# Patient Record
Sex: MALE | Race: WHITE | NOT HISPANIC OR LATINO | Employment: FULL TIME | URBAN - METROPOLITAN AREA
[De-identification: names, ages, dates, MRNs, and addresses within clinical notes are randomized per-mention and may not be internally consistent; named-entity substitution may affect disease eponyms.]

---

## 2017-05-22 ENCOUNTER — OFFICE VISIT (OUTPATIENT)
Dept: FAMILY MEDICINE | Facility: CLINIC | Age: 37
End: 2017-05-22
Payer: COMMERCIAL

## 2017-05-22 VITALS
OXYGEN SATURATION: 100 % | SYSTOLIC BLOOD PRESSURE: 112 MMHG | BODY MASS INDEX: 23.79 KG/M2 | TEMPERATURE: 97.9 F | WEIGHT: 157 LBS | HEART RATE: 64 BPM | HEIGHT: 68 IN | DIASTOLIC BLOOD PRESSURE: 66 MMHG

## 2017-05-22 DIAGNOSIS — Z23 NEED FOR TDAP VACCINATION: ICD-10-CM

## 2017-05-22 DIAGNOSIS — R13.10 SWALLOWING PROBLEM: ICD-10-CM

## 2017-05-22 DIAGNOSIS — Z00.00 ROUTINE GENERAL MEDICAL EXAMINATION AT A HEALTH CARE FACILITY: Primary | ICD-10-CM

## 2017-05-22 PROCEDURE — 90471 IMMUNIZATION ADMIN: CPT | Performed by: FAMILY MEDICINE

## 2017-05-22 PROCEDURE — 99395 PREV VISIT EST AGE 18-39: CPT | Mod: 25 | Performed by: FAMILY MEDICINE

## 2017-05-22 PROCEDURE — 90715 TDAP VACCINE 7 YRS/> IM: CPT | Performed by: FAMILY MEDICINE

## 2017-05-22 ASSESSMENT — PAIN SCALES - GENERAL: PAINLEVEL: NO PAIN (0)

## 2017-05-22 NOTE — PROGRESS NOTES
SUBJECTIVE:     CC: Daniel Sandhu is an 36 year old male who presents for preventative health visit.     Healthy Habits:    Do you get at least three servings of calcium containing foods daily (dairy, green leafy vegetables, etc.)? Yes    Amount of exercise or daily activities, outside of work: 7 day(s) per week    Problems taking medications regularly No    Medication side effects: No    Have you had an eye exam in the past two years? Yes    Do you see a dentist twice per year? No    Do you have sleep apnea, excessive snoring or daytime drowsiness? No             Today's PHQ-2 Score:   PHQ-2 ( 1999 Pfizer) 5/22/2017   Q1: Little interest or pleasure in doing things 0   Q2: Feeling down, depressed or hopeless 0   PHQ-2 Score 0       Abuse: Current or Past(Physical, Sexual or Emotional)- No  Do you feel safe in your environment - Yes    Social History   Substance Use Topics     Smoking status: Never Smoker     Smokeless tobacco: Not on file     Alcohol use Yes      Comment: 1 beer     The patient does not drink >3 drinks per day nor >7 drinks per week.    Last PSA: No results found for: PSA    No results for input(s): CHOL, HDL, LDL, TRIG, CHOLHDLRATIO, NHDL in the last 66285 hours.    Reviewed orders with patient. Reviewed health maintenance and updated orders accordingly - Yes    Reviewed and updated as needed this visit by clinical staff  Tobacco  Allergies  Meds  Med Hx  Surg Hx  Fam Hx  Soc Hx        Reviewed and updated as needed this visit by Provider            ROS:  C: NEGATIVE for fever, chills, change in weight  I: NEGATIVE for worrisome rashes, moles or lesions  E: NEGATIVE for vision changes or irritation  ENT: NEGATIVE for ear, mouth and throat problems  R: NEGATIVE for significant cough or SOB  CV: NEGATIVE for chest pain, palpitations or peripheral edema  GI: swallowing problems, especially at beginning of meal.  Like a pressure/ push  Able to get food down, no vomiting  Had some reflux which  is unusual.  In past had this a lot  No change in diet recently  Bowels fine overall   male: negative for dysuria, hematuria, decreased urinary stream, erectile dysfunction, urethral discharge  M: NEGATIVE for significant arthralgias or myalgia  N: NEGATIVE for weakness, dizziness or paresthesias    No wt change    Wears contacts, sees eye doctor    Walks dogs daily    claritin as needed     Occasional tinnitis         OBJECTIVE:     There were no vitals taken for this visit.  EXAM:  GENERAL: healthy, alert and no distress  EYES: Eyes grossly normal to inspection, PERRL and conjunctivae and sclerae normal  HENT: ear canals and TM's normal, nose and mouth without ulcers or lesions  NECK: no adenopathy, no asymmetry, masses, or scars and thyroid normal to palpation  RESP: lungs clear to auscultation - no rales, rhonchi or wheezes  CV: regular rate and rhythm, normal S1 S2, no S3 or S4, no murmur, click or rub, no peripheral edema and peripheral pulses strong  ABDOMEN: soft, nontender, no hepatosplenomegaly, no masses and bowel sounds normal  MS: no gross musculoskeletal defects noted, no edema  SKIN: no suspicious lesions or rashes  NEURO: Normal strength and tone, mentation intact and speech normal  PSYCH: mentation appears normal, affect normal/bright    ASSESSMENT/PLAN:     Daniel was seen today for physical.    Diagnoses and all orders for this visit:    Routine general medical examination at a health care facility    Need for Tdap vaccination  -     TDAP VACCINE (ADACEL)  -     VACCINE ADMINISTRATION, INITIAL    Swallowing problem  -     GASTROENTEROLOGY ADULT REF PROCEDURE ONLY    Other orders  -     Cancel: Lipid panel reflex to direct LDL      Discussed in detail several issues with patient  The swallowing / stomach issues are better now, but I did advise he call to schedule egd.  It is his choice to do this, but don't want to miss ulcer, damage to esophagus given long history of intermittent gerd, etc  Keep  "working on healthy diet/exercise   tdap given  No labs done today  No std concern per patient         COUNSELING:  Reviewed preventive health counseling, as reflected in patient instructions       Regular exercise       Healthy diet/nutrition       Vision screening       Family planning       Safe sex practices/STD prevention         reports that he has never smoked. He does not have any smokeless tobacco history on file.    Estimated body mass index is 24.33 kg/(m^2) as calculated from the following:    Height as of 11/3/16: 5' 8\" (1.727 m).    Weight as of 11/3/16: 160 lb (72.6 kg).       Counseling Resources:  ATP IV Guidelines  Pooled Cohorts Equation Calculator  FRAX Risk Assessment  ICSI Preventive Guidelines  Dietary Guidelines for Americans, 2010  USDA's MyPlate  ASA Prophylaxis  Lung CA Screening    Lencho Chan MD  [facility]  "

## 2017-05-22 NOTE — NURSING NOTE
Screening Questionnaire for Adult Immunization    Are you sick today?   No   Do you have allergies to medications, food, a vaccine component or latex?   No   Have you ever had a serious reaction after receiving a vaccination?   No   Do you have a long-term health problem with heart disease, lung disease, asthma, kidney disease, metabolic disease (e.g. diabetes), anemia, or other blood disorder?   No   Do you have cancer, leukemia, HIV/AIDS, or any other immune system problem?   No   In the past 3 months, have you taken medications that affect  your immune system, such as prednisone, other steroids, or anticancer drugs; drugs for the treatment of rheumatoid arthritis, Crohn s disease, or psoriasis; or have you had radiation treatments?   No   Have you had a seizure, or a brain or other nervous system problem?   No   During the past year, have you received a transfusion of blood or blood     products, or been given immune (gamma) globulin or antiviral drug?   No   For women: Are you pregnant or is there a chance you could become        pregnant during the next month?   No   Have you received any vaccinations in the past 4 weeks?   No     Immunization questionnaire answers were all negative.      MNVFC doesn't apply on this patient    Per orders of Dr. Chan, injection of Adacel given by Deborah Lozano. Patient instructed to remain in clinic for 20 minutes afterwards, and to report any adverse reaction to me immediately.       Screening performed by Deborah Lozano on 5/22/2017 at 3:34 PM.

## 2017-05-22 NOTE — MR AVS SNAPSHOT
After Visit Summary   5/22/2017    Daniel Sandhu    MRN: [id number]           Patient Information     Date Of Birth          1980        Visit Information        Provider Department      5/22/2017 2:00 PM Lencho Chan MD [facility]        Today's Diagnoses     Need for Tdap vaccination    -  1    Swallowing problem          Care Instructions      Preventive Health Recommendations  Male Ages 26 - 39    Yearly exam:             See your health care provider every year in order to  o   Review health changes.   o   Discuss preventive care.    o   Review your medicines if your doctor has prescribed any.    You should be tested each year for STDs (sexually transmitted diseases), if you re at risk.     After age 35, talk to your provider about cholesterol testing. If you are at risk for heart disease, have your cholesterol tested at least every 5 years.     If you are at risk for diabetes, you should have a diabetes test (fasting glucose).  Shots: Get a flu shot each year. Get a tetanus shot every 10 years.     Nutrition:    Eat at least 5 servings of fruits and vegetables daily.     Eat whole-grain bread, whole-wheat pasta and brown rice instead of white grains and rice.     Talk to your provider about Calcium and Vitamin D.     Lifestyle    Exercise for at least 150 minutes a week (30 minutes a day, 5 days a week). This will help you control your weight and prevent disease.     Limit alcohol to one drink per day.     No smoking.     Wear sunscreen to prevent skin cancer.     See your dentist every six months for an exam and cleaning.       Keep working on healthy diet/exercise     Advise scheduling upper endoscopy EGD    Schedule with dentist        Follow-ups after your visit        Additional Services     GASTROENTEROLOGY ADULT REF PROCEDURE ONLY       Last Lab Result: No results found for: CR  Body mass index is 23.7 kg/(m^2).     Needed:  No  Language:   English    Patient will be contacted to schedule procedure.     Please be aware that coverage of these services is subject to the terms and limitations of your health insurance plan.  Call member services at your health plan with any benefit or coverage questions.  Any procedures must be performed at a [facility] facility OR coordinated by your clinic's referral office.    Please bring the following with you to your appointment:    (1) Any X-Rays, CTs or MRIs which have been performed.  Contact the facility where they were done to arrange for  prior to your scheduled appointment.    (2) List of current medications   (3) This referral request   (4) Any documents/labs given to you for this referral                  Who to contact     If you have questions or need follow up information about today's clinic visit or your schedule please contact [facility] directly at [phone].  Normal or non-critical lab and imaging results will be communicated to you by MyChart, letter or phone within 4 business days after the clinic has received the results. If you do not hear from us within 7 days, please contact the clinic through [employer] or phone. If you have a critical or abnormal lab result, we will notify you by phone as soon as possible.  Submit refill requests through [employer] or call your pharmacy and they will forward the refill request to us. Please allow 3 business days for your refill to be completed.          Additional Information About Your Visit        [employer] Information     [employer] gives you secure access to your electronic health record. If you see a primary care provider, you can also send messages to your care team and make appointments. If you have questions, please call your primary care clinic.  If you do not have a primary care provider, please call [phone] and they will assist you.        Care EveryWhere ID     This is your Care EveryWhere ID. This could be used by other  "organizations to access your [facility] medical records  [id number]        Your Vitals Were     Pulse Temperature Height Pulse Oximetry BMI (Body Mass Index)       64 97.9  F (36.6  C) (Oral) 5' 8.25\" (1.734 m) 100% 23.7 kg/m2        Blood Pressure from Last 3 Encounters:   05/22/17 112/66   11/01/16 115/77    Weight from Last 3 Encounters:   05/22/17 157 lb (71.2 kg)   11/03/16 160 lb (72.6 kg)   11/01/16 160 lb (72.6 kg)              We Performed the Following     GASTROENTEROLOGY ADULT REF PROCEDURE ONLY     TDAP VACCINE (ADACEL)     VACCINE ADMINISTRATION, INITIAL        Primary Care Provider    None Specified       No primary provider on file.        Thank you!     Thank you for choosing [facility]  for your care. Our goal is always to provide you with excellent care. Hearing back from our patients is one way we can continue to improve our services. Please take a few minutes to complete the written survey that you may receive in the mail after your visit with us. Thank you!             Your Updated Medication List - Protect others around you: Learn how to safely use, store and throw away your medicines at www.disposemymeds.org.      Notice  As of 5/22/2017  3:13 PM    You have not been prescribed any medications.      "

## 2017-05-30 ENCOUNTER — TRANSFERRED RECORDS (OUTPATIENT)
Dept: HEALTH INFORMATION MANAGEMENT | Facility: CLINIC | Age: 37
End: 2017-05-30

## 2017-05-31 ENCOUNTER — TELEPHONE (OUTPATIENT)
Dept: FAMILY MEDICINE | Facility: CLINIC | Age: 37
End: 2017-05-31

## 2017-05-31 ENCOUNTER — TRANSFERRED RECORDS (OUTPATIENT)
Dept: HEALTH INFORMATION MANAGEMENT | Facility: CLINIC | Age: 37
End: 2017-05-31

## 2017-05-31 DIAGNOSIS — C15.9 MALIGNANT NEOPLASM OF ESOPHAGUS, UNSPECIFIED LOCATION (H): Primary | ICD-10-CM

## 2017-05-31 NOTE — TELEPHONE ENCOUNTER
I tried to call patient, not available.     Dr. Lopez of [facility] had called, patient had egd yest, mass present.  Biopsy showed adenocarcinoma esophagus.    Patient need to see oncology.    Lencho Chan MD

## 2017-05-31 NOTE — TELEPHONE ENCOUNTER
I called patient back and discussed in detail.  We will have him see oncology.    Please help schedule patient.    Lencho Chan MD

## 2017-05-31 NOTE — TELEPHONE ENCOUNTER
Reason for Call:  Other call back    Detailed comments: Patient is returning a call to Dr. Chan    Phone Number Patient can be reached at: Home number on file [phone] (home)    Best Time: Anytime    Can we leave a detailed message on this number? YES    Call taken on 5/31/2017 at 1:22 PM by Dom Montelongo

## 2017-06-02 PROCEDURE — 00000346 ZZHCL STATISTIC REVIEW OUTSIDE SLIDES TC 88321: Performed by: INTERNAL MEDICINE

## 2017-06-02 ASSESSMENT — ENCOUNTER SYMPTOMS
DECREASED APPETITE: 1
ABDOMINAL PAIN: 1
NERVOUS/ANXIOUS: 1

## 2017-06-05 ENCOUNTER — ONCOLOGY VISIT (OUTPATIENT)
Dept: ONCOLOGY | Facility: CLINIC | Age: 37
End: 2017-06-05
Attending: INTERNAL MEDICINE
Payer: COMMERCIAL

## 2017-06-05 ENCOUNTER — APPOINTMENT (OUTPATIENT)
Dept: LAB | Facility: CLINIC | Age: 37
End: 2017-06-05
Attending: INTERNAL MEDICINE
Payer: COMMERCIAL

## 2017-06-05 ENCOUNTER — CARE COORDINATION (OUTPATIENT)
Dept: ONCOLOGY | Facility: CLINIC | Age: 37
End: 2017-06-05

## 2017-06-05 VITALS
RESPIRATION RATE: 16 BRPM | OXYGEN SATURATION: 99 % | DIASTOLIC BLOOD PRESSURE: 73 MMHG | SYSTOLIC BLOOD PRESSURE: 116 MMHG | HEART RATE: 66 BPM | WEIGHT: 155.87 LBS | BODY MASS INDEX: 23.53 KG/M2 | TEMPERATURE: 97.8 F

## 2017-06-05 DIAGNOSIS — C15.5 MALIGNANT NEOPLASM OF LOWER THIRD OF ESOPHAGUS (H): Primary | ICD-10-CM

## 2017-06-05 LAB
ALBUMIN SERPL-MCNC: 4.1 G/DL (ref 3.4–5)
ALP SERPL-CCNC: 53 U/L (ref 40–150)
ALT SERPL W P-5'-P-CCNC: 18 U/L (ref 0–70)
ANION GAP SERPL CALCULATED.3IONS-SCNC: 8 MMOL/L (ref 3–14)
AST SERPL W P-5'-P-CCNC: 16 U/L (ref 0–45)
BASOPHILS # BLD AUTO: 0 10E9/L (ref 0–0.2)
BASOPHILS NFR BLD AUTO: 0.4 %
BILIRUB SERPL-MCNC: 0.7 MG/DL (ref 0.2–1.3)
BUN SERPL-MCNC: 14 MG/DL (ref 7–30)
CALCIUM SERPL-MCNC: 8.6 MG/DL (ref 8.5–10.1)
CHLORIDE SERPL-SCNC: 103 MMOL/L (ref 94–109)
CO2 SERPL-SCNC: 27 MMOL/L (ref 20–32)
CREAT SERPL-MCNC: 0.82 MG/DL (ref 0.66–1.25)
DIFFERENTIAL METHOD BLD: ABNORMAL
EOSINOPHIL # BLD AUTO: 0.1 10E9/L (ref 0–0.7)
EOSINOPHIL NFR BLD AUTO: 2 %
ERYTHROCYTE [DISTWIDTH] IN BLOOD BY AUTOMATED COUNT: 12.1 % (ref 10–15)
GFR SERPL CREATININE-BSD FRML MDRD: NORMAL ML/MIN/1.7M2
GLUCOSE SERPL-MCNC: 94 MG/DL (ref 70–99)
HCT VFR BLD AUTO: 39.5 % (ref 40–53)
HGB BLD-MCNC: 13.7 G/DL (ref 13.3–17.7)
IMM GRANULOCYTES # BLD: 0 10E9/L (ref 0–0.4)
IMM GRANULOCYTES NFR BLD: 0.4 %
LYMPHOCYTES # BLD AUTO: 1.7 10E9/L (ref 0.8–5.3)
LYMPHOCYTES NFR BLD AUTO: 30.4 %
MCH RBC QN AUTO: 30.1 PG (ref 26.5–33)
MCHC RBC AUTO-ENTMCNC: 34.7 G/DL (ref 31.5–36.5)
MCV RBC AUTO: 87 FL (ref 78–100)
MONOCYTES # BLD AUTO: 0.5 10E9/L (ref 0–1.3)
MONOCYTES NFR BLD AUTO: 8.6 %
NEUTROPHILS # BLD AUTO: 3.3 10E9/L (ref 1.6–8.3)
NEUTROPHILS NFR BLD AUTO: 58.2 %
NRBC # BLD AUTO: 0 10*3/UL
NRBC BLD AUTO-RTO: 0 /100
PLATELET # BLD AUTO: 177 10E9/L (ref 150–450)
POTASSIUM SERPL-SCNC: 3.5 MMOL/L (ref 3.4–5.3)
PROT SERPL-MCNC: 7.3 G/DL (ref 6.8–8.8)
RBC # BLD AUTO: 4.55 10E12/L (ref 4.4–5.9)
SODIUM SERPL-SCNC: 138 MMOL/L (ref 133–144)
WBC # BLD AUTO: 5.6 10E9/L (ref 4–11)

## 2017-06-05 PROCEDURE — 36415 COLL VENOUS BLD VENIPUNCTURE: CPT | Performed by: INTERNAL MEDICINE

## 2017-06-05 PROCEDURE — 85025 COMPLETE CBC W/AUTO DIFF WBC: CPT | Performed by: INTERNAL MEDICINE

## 2017-06-05 PROCEDURE — 99212 OFFICE O/P EST SF 10 MIN: CPT | Mod: ZF

## 2017-06-05 PROCEDURE — 80053 COMPREHEN METABOLIC PANEL: CPT | Performed by: INTERNAL MEDICINE

## 2017-06-05 PROCEDURE — 99205 OFFICE O/P NEW HI 60 MIN: CPT | Mod: ZP | Performed by: INTERNAL MEDICINE

## 2017-06-05 ASSESSMENT — PAIN SCALES - GENERAL: PAINLEVEL: NO PAIN (0)

## 2017-06-05 NOTE — MR AVS SNAPSHOT
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    Reason for Referral: esophageal cancer, age 36  Family History of: breast cancer  Personal History of: esophageal cancer  Other:     We have a sent a notice to a staff member of the Cancer Risk Management Program to give you a call to assist with scheduling your appointment.  You may also call  [phone] ([phone]) to initiate scheduling.    Please be aware that coverage of these services is subject to the terms and limitations of your health insurance plan.  Call member services at your health plan with any benefit or coverage questions.      Please bring the completed family history sheet to your appointment in addition to any available outside medical records documenting your cancer diagnosis.            THORACIC SURGERY REFERRAL       Your provider has referred you to:  [facility]: Thoracic Surgery Clinic [facility] [phone]   [email or web address]    Please be aware that coverage of these services is subject to the terms and limitations of your health insurance plan.  Call member services at your health plan with any benefit or coverage questions.      Please bring the following to your appointment:    >>   Any x-rays, CTs or MRIs which have been performed.  Contact the facility where they were done to arrange for  prior to your scheduled appointment.    >>   List  of current medications   >>   This referral request   >>   Any documents/labs given to you for this referral                  Your next 10 appointments already scheduled     Jun 12, 2017  7:30 AM CDT   PE NPET ONCOLOGY (EYES TO THIGHS) with UUPET1   [facility], [place] PET CT ([facility] [place])    [address] [id number] [phone]           Tell your doctor:   If there is any chance you may be pregnant or if you are breastfeeding.   If you have problems lying in small spaces (claustrophobia). If you do, your doctor may give you medicine to help you relax. If you have diabetes:   Have your exam early in the morning. Your blood glucose will go up as the day goes by.   Your glucose level must be 180 or less at the start of the exam. Please take any medicines you need to ensure this blood glucose level. 24 hours before your scan: Don t do any heavy exercise. (No jogging, aerobics or other workouts.) Exercise will make your pictures less accurate. 6 hours before your scan:   Stop all food and liquids (except water).   Do not chew gum or suck on mints.   If you need to take medicine with food, you may take it with a few crackers.  Please call your Imaging Department at your exam site with any questions.              Future tests that were ordered for you today     Open Future Orders        Priority Expected Expires Ordered    PET Oncology (Eyes to Thighs) Routine  6/5/2018 6/5/2017            Who to contact     If you have questions or need follow up information about today's clinic visit or your schedule please contact [facility] directly at [phone].  Normal or non-critical lab and imaging results will be communicated to you by MyChart, letter or phone within 4 business days after the clinic has received the results. If you do not hear from us within 7 days, please contact the clinic through MyChart or phone. If you have a critical or  abnormal lab result, we will notify you by phone as soon as possible.  Submit refill requests through [employer] or call your pharmacy and they will forward the refill request to us. Please allow 3 business days for your refill to be completed.          Additional Information About Your Visit        [employer] Information     [employer] gives you secure access to your electronic health record. If you see a primary care provider, you can also send messages to your care team and make appointments. If you have questions, please call your primary care clinic.  If you do not have a primary care provider, please call [phone] and they will assist you.        Care EveryWhere ID     This is your Care EveryWhere ID. This could be used by other organizations to access your [facility] medical records  [id number]        Your Vitals Were     Pulse Temperature Respirations Pulse Oximetry BMI (Body Mass Index)       66 97.8  F (36.6  C) (Oral) 16 99% 23.53 kg/m2        Blood Pressure from Last 3 Encounters:   06/05/17 116/73   05/22/17 112/66   11/01/16 115/77    Weight from Last 3 Encounters:   06/05/17 70.7 kg (155 lb 13.8 oz)   05/22/17 71.2 kg (157 lb)   11/03/16 72.6 kg (160 lb)              We Performed the Following     CANCER RISK MGMT/CANCER GENETIC COUNSELING REFERRAL     CBC with platelets differential     Comprehensive metabolic panel     THORACIC SURGERY REFERRAL     UPPER EUS        Primary Care Provider    None Specified       No primary provider on file.        Thank you!     Thank you for choosing [facility]  for your care. Our goal is always to provide you with excellent care. Hearing back from our patients is one way we can continue to improve our services. Please take a few minutes to complete the written survey that you may receive in the mail after your visit with us. Thank you!             Your Updated Medication List - Protect others around you: Learn how to safely use, store and throw away  your medicines at www.disposemymeds.org.      Notice  As of 6/5/2017  4:36 PM    You have not been prescribed any medications.

## 2017-06-05 NOTE — PROGRESS NOTES
"Met with pt and his wife after clinic visit/consultation appt with Dr. Hood  Pt  verbalizes understanding of Dr. Hood's plan of care   Household includes wife, 4 1/2 year old dtr and soon will have son (wife is pregnant and due in six weeks)  Introduced self and role of RN Care Coordinator at [facility].  Provided my contact information, [facility] Nurse Line, [facility] Scheduling Line and reviewed sections of [facility] Cancer Care Guidebook, with emphasis on \"Good to Know Numbers.\"  Provided overview of supportive services at [facility] including SW and ACS Navigator to assist with resources as needed, as well as the availability of [facility] as needed.  Pt voiced understanding and appreciation of above information and denies any further questions, and he/she understands that I will follow and provide coordination as needed.    "

## 2017-06-05 NOTE — PROGRESS NOTES
[facility]    Hematology/Oncology New Patient Note      Today's Date: 17    Reason for Consult: adenocarcinoma of the GE junction      HISTORY OF PRESENT ILLNESS: Daniel Sandhu is a 36 year old male who presents with adenocarcinoma of the GE junction.  In early 2017, patient started noticing difficulty swallowing, and that food seems to take longer to go down.  It became more frequent, and he saw his PCP, and was referred for EGD, which showed an esophageal mass located at the GE junction, measuring 4 cm.  Biopsy showed poorly differentiated adenocarcinoma.  HER-2 was sent and pending.  CT c/a/p showed a mildly prominent lymph node in the gastrohepatic ligament, but there were no pathologically enlarged lymph nodes in the chest, abdomen, or pelvis.  There was otherwise no evidence of metastatic disease.      Today, patient is here with his wife.  He says that other than the difficulty swallowing, he feels fine.  He denies pain.  No change in diet or acid reflux.  He denies weight loss.  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  He denies smoking.  He drinks ~1 beer a day.  He denies illicit drug use, other than occasional marijuana.  He lives in [place] with his wife, and 4.5 year-old daughter.  His wife is currently pregnant with a boy, and is due in 6 weeks.  He has a half sister who  of breast cancer at age 32; she was diagnosed in her late 20's.  A paternal grandmother had breast cancer in her 70's.          REVIEW OF SYSTEMS:   General Symptoms: Yes  Skin Symptoms: No  HENT Symptoms: No  EYE SYMPTOMS: No  HEART SYMPTOMS: No  LUNG SYMPTOMS: No  INTESTINAL SYMPTOMS: Yes  URINARY SYMPTOMS: No  REPRODUCTIVE SYMPTOMS: No  SKELETAL SYMPTOMS: No  BLOOD SYMPTOMS: No  NERVOUS SYSTEM SYMPTOMS: No  MENTAL HEALTH SYMPTOMS: Yes  Loss of appetite: Yes  Increased stress:  Yes  Abdominal pain: Yes  Nervous or Anxious: Yes        HOME MEDICATIONS:  No current outpatient prescriptions on file.         ALLERGIES:  No Known Allergies      PAST MEDICAL HISTORY:  No past medical history on file.      PAST SURGICAL HISTORY:  Past Surgical History:   Procedure Laterality Date     HAND SURGERY      childhood, torn tendon         SOCIAL HISTORY:  Social History     Social History     Marital status:      Spouse name: N/A     Number of children: 1     Years of education: N/A     Occupational History     musician and teacher       Social History Main Topics     Smoking status: Never Smoker     Smokeless tobacco: Not on file     Alcohol use Yes      Comment: 1 beer     Drug use: Not on file     Sexual activity: Yes     Partners: Female     Birth control/ protection: Natural Family Planning     Other Topics Concern     Not on file     Social History Narrative     Daniel lives in [place] with his wife.  He has a 4.5 year old daughter, and his wife is pregnant with a boy.        FAMILY HISTORY:  Family History   Problem Relation Age of Onset     Other - See Comments Father      sepsis     CANCER Sister      breast cancer  29 yo 1/2 sister      CANCER Paternal Grandmother      breast         PHYSICAL EXAM:  Vital signs:  /73 (BP Location: Left arm, Patient Position: Chair, Cuff Size: Adult Regular)  Pulse 66  Temp 97.8  F (36.6  C) (Oral)  Resp 16  Wt 70.7 kg (155 lb 13.8 oz)  SpO2 99%  BMI 23.53 kg/m2   ECO  GENERAL/CONSTITUTIONAL: No acute distress. Accompanied by wife.  EYES: No scleral icterus.  ENT/MOUTH: Neck supple. Oropharynx clear, no mucositis.  LYMPH: No anterior cervical, posterior cervical, or supraclavicular adenopathy.   RESPIRATORY: Clear to auscultation bilaterally. No crackles or wheezing.   CARDIOVASCULAR: Regular rate and rhythm without murmurs, gallops, or rubs.  GASTROINTESTINAL: No hepatosplenomegaly, masses, or tenderness. The patient has  "normal bowel sounds. No guarding.  No distention.  MUSCULOSKELETAL: Warm and well-perfused, no cyanosis, clubbing, or edema.  NEUROLOGIC: Alert, oriented, answers questions appropriately.  INTEGUMENTARY: No jaundice.  GAIT: Steady, does not use assistive device      LABS:  Pending.      PATHOLOGY:  5/30/17:  A. Gastroesophageal junction, \"mass\", biopsy:  1. Poorly differentiated adenocarcinoma, glandular (intestinal) type  2. Background Mathias's mucosa absent  3. HER-2 testing in progress, with addendum to follow    B. Stomach, biopsy:  1. Gastric antral and body mucosae with no diagnostic abnormalities  2. No evidence of Helicobacter organisms      EGD 5/30/17:  Esophageal mass at the GE junction, friable, size 4 cm, extending into the gastric fundus.  Biopsies were taken.        IMAGING:  CT chest/abdomen/pelvis 5/31/17:  1. There is no definite CT correlate for the esophageal lesion seen endoscopically.  Slight soft tissue fullness at the esophagogastric junction and cardia is nonspecific and can be seen in normal patients when rugal folds are prominent.  2. A mildly prominent lymph node is present in the gastrohepatic ligament, but there are no pathologically enlarged lymph nodes in the chest, abdomen, or pelvis.    3. The remainder of the study is within normal limits.      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  CT c/a/p shows no evidence of metastatic disease or pathologically enlarged lymph nodes.  There is note of a \"slighly prominent lymph node in the gastrohepatic ligament.\"  We discussed in detail the imaging and biopsy results so far.  He still needs staging with EUS.  I will having him undergo a PET scan as well for staging.  Depending on staging results, he may need esophagectomy with or without pre-operative chemoradiation, which we discussed briefly about.  I will go ahead and refer him to see thoracic surgery as " well for evaluation.  In addition, with his young age and family history, I recommended referral to genetic counseling, and he agrees.      Patient says that he and his wife decided not to have any more children, even prior to his new cancer diagnosis, so he declined fertility/sperm banking referral.  He currently has a 4.6 yo daughter and his wife is pregnant with a boy, due in 6 weeks.      -schedule PET  -refer for EUS  -referral to thoracic surgery  -referral to genetic counseling  -labs today  -RTC for follow-up once the staging scans and thoracic surgery evaluation is completed    2) Genetics:  -will refer for genetic counseling, as above      I spent a total of 60 minutes with the patient, with over >50% of the time in counseling and/or coordination of care.       Laurence Hood MD  Hematology/Oncology  [facility]

## 2017-06-05 NOTE — NURSING NOTE
"Oncology Rooming Note    June 5, 2017 2:56 PM   Daniel Sandhu is a 36 year old male who presents for:    Chief Complaint   Patient presents with     Oncology Clinic Visit     New Dx Esophageal Ca      Initial Vitals: /73 (BP Location: Left arm, Patient Position: Chair, Cuff Size: Adult Regular)  Pulse 66  Temp 97.8  F (36.6  C) (Oral)  Resp 16  Wt 70.7 kg (155 lb 13.8 oz)  SpO2 99%  BMI 23.53 kg/m2 Estimated body mass index is 23.53 kg/(m^2) as calculated from the following:    Height as of 5/22/17: 1.734 m (5' 8.25\").    Weight as of this encounter: 70.7 kg (155 lb 13.8 oz). Body surface area is 1.85 meters squared.  No Pain (0) Comment: Data Unavailable   No LMP for male patient.  Allergies reviewed: Yes  Medications reviewed: Yes    Medications: Medication refills not needed today.  Pharmacy name entered into EPIC: Data Unavailable    Clinical concerns: concerns, questions  degroot  was notified.    6 minutes for nursing intake (face to face time)     Christine Antonio MA              "

## 2017-06-05 NOTE — LETTER
2017       RE: Daniel Sandhu  [address] [address]     Dear Colleague,    Thank you for referring your patient, Daniel Sandhu, to the [facility]. Please see a copy of my visit note below.    [facility]    Hematology/Oncology New Patient Note      Today's Date: 17    Reason for Consult: adenocarcinoma of the GE junction      HISTORY OF PRESENT ILLNESS: Daniel Sandhu is a 36 year old male who presents with adenocarcinoma of the GE junction.  In early , patient started noticing difficulty swallowing, and that food seems to take longer to go down.  It became more frequent, and he saw his PCP, and was referred for EGD, which showed an esophageal mass located at the GE junction, measuring 4 cm.  Biopsy showed poorly differentiated adenocarcinoma.  HER-2 was sent and pending.  CT c/a/p showed a mildly prominent lymph node in the gastrohepatic ligament, but there were no pathologically enlarged lymph nodes in the chest, abdomen, or pelvis.  There was otherwise no evidence of metastatic disease.      Today, patient is here with his wife.  He says that other than the difficulty swallowing, he feels fine.  He denies pain.  No change in diet or acid reflux.  He denies weight loss.  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  He denies smoking.  He drinks ~1 beer a day.  He denies illicit drug use, other than occasional marijuana.  He lives in [place] with his wife, and 4.5 year-old daughter.  His wife is currently pregnant with a boy, and is due in 6 weeks.  He has a half sister who  of breast cancer at age 32; she was diagnosed in her late 20's.  A paternal grandmother had breast cancer in her 70's.          REVIEW OF SYSTEMS:   General Symptoms: Yes  Skin Symptoms: No  HENT Symptoms: No  EYE SYMPTOMS: No  HEART  SYMPTOMS: No  LUNG SYMPTOMS: No  INTESTINAL SYMPTOMS: Yes  URINARY SYMPTOMS: No  REPRODUCTIVE SYMPTOMS: No  SKELETAL SYMPTOMS: No  BLOOD SYMPTOMS: No  NERVOUS SYSTEM SYMPTOMS: No  MENTAL HEALTH SYMPTOMS: Yes  Loss of appetite: Yes  Increased stress: Yes  Abdominal pain: Yes  Nervous or Anxious: Yes        HOME MEDICATIONS:  No current outpatient prescriptions on file.         ALLERGIES:  No Known Allergies      PAST MEDICAL HISTORY:  No past medical history on file.      PAST SURGICAL HISTORY:  Past Surgical History:   Procedure Laterality Date     HAND SURGERY      childhood, torn tendon         SOCIAL HISTORY:  Social History     Social History     Marital status:      Spouse name: N/A     Number of children: 1     Years of education: N/A     Occupational History     musician and teacher       Social History Main Topics     Smoking status: Never Smoker     Smokeless tobacco: Not on file     Alcohol use Yes      Comment: 1 beer     Drug use: Not on file     Sexual activity: Yes     Partners: Female     Birth control/ protection: Natural Family Planning     Other Topics Concern     Not on file     Social History Narrative     Daniel lives in [place] with his wife.  He has a 4.5 year old daughter, and his wife is pregnant with a boy.        FAMILY HISTORY:  Family History   Problem Relation Age of Onset     Other - See Comments Father      sepsis     CANCER Sister      breast cancer  31 yo 1/2 sister      CANCER Paternal Grandmother      breast         PHYSICAL EXAM:  Vital signs:  /73 (BP Location: Left arm, Patient Position: Chair, Cuff Size: Adult Regular)  Pulse 66  Temp 97.8  F (36.6  C) (Oral)  Resp 16  Wt 70.7 kg (155 lb 13.8 oz)  SpO2 99%  BMI 23.53 kg/m2   ECO  GENERAL/CONSTITUTIONAL: No acute distress. Accompanied by wife.  EYES: No scleral icterus.  ENT/MOUTH: Neck supple. Oropharynx clear, no mucositis.  LYMPH: No anterior cervical, posterior cervical, or supraclavicular  "adenopathy.   RESPIRATORY: Clear to auscultation bilaterally. No crackles or wheezing.   CARDIOVASCULAR: Regular rate and rhythm without murmurs, gallops, or rubs.  GASTROINTESTINAL: No hepatosplenomegaly, masses, or tenderness. The patient has normal bowel sounds. No guarding.  No distention.  MUSCULOSKELETAL: Warm and well-perfused, no cyanosis, clubbing, or edema.  NEUROLOGIC: Alert, oriented, answers questions appropriately.  INTEGUMENTARY: No jaundice.  GAIT: Steady, does not use assistive device      LABS:  Pending.      PATHOLOGY:  5/30/17:  A. Gastroesophageal junction, \"mass\", biopsy:  1. Poorly differentiated adenocarcinoma, glandular (intestinal) type  2. Background Mathias's mucosa absent  3. HER-2 testing in progress, with addendum to follow    B. Stomach, biopsy:  1. Gastric antral and body mucosae with no diagnostic abnormalities  2. No evidence of Helicobacter organisms      EGD 5/30/17:  Esophageal mass at the GE junction, friable, size 4 cm, extending into the gastric fundus.  Biopsies were taken.        IMAGING:  CT chest/abdomen/pelvis 5/31/17:  1. There is no definite CT correlate for the esophageal lesion seen endoscopically.  Slight soft tissue fullness at the esophagogastric junction and cardia is nonspecific and can be seen in normal patients when rugal folds are prominent.  2. A mildly prominent lymph node is present in the gastrohepatic ligament, but there are no pathologically enlarged lymph nodes in the chest, abdomen, or pelvis.    3. The remainder of the study is within normal limits.      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  CT c/a/p shows no evidence of metastatic disease or pathologically enlarged lymph nodes.  There is note of a \"slighly prominent lymph node in the gastrohepatic ligament.\"  We discussed in detail the imaging and biopsy results so far.  He still needs staging with EUS.  I will " having him undergo a PET scan as well for staging.  Depending on staging results, he may need esophagectomy with or without pre-operative chemoradiation, which we discussed briefly about.  I will go ahead and refer him to see thoracic surgery as well for evaluation.  In addition, with his young age and family history, I recommended referral to genetic counseling, and he agrees.      Patient says that he and his wife decided not to have any more children, even prior to his new cancer diagnosis, so he declined fertility/sperm banking referral.  He currently has a 4.4 yo daughter and his wife is pregnant with a boy, due in 6 weeks.      -schedule PET  -refer for EUS  -referral to thoracic surgery  -referral to genetic counseling  -labs today  -RTC for follow-up once the staging scans and thoracic surgery evaluation is completed    2) Genetics:  -will refer for genetic counseling, as above      I spent a total of 60 minutes with the patient, with over >50% of the time in counseling and/or coordination of care.       Laurence Hood MD  Hematology/Oncology  [facility]

## 2017-06-06 ENCOUNTER — TELEPHONE (OUTPATIENT)
Dept: GASTROENTEROLOGY | Facility: CLINIC | Age: 37
End: 2017-06-06

## 2017-06-06 NOTE — TELEPHONE ENCOUNTER
Daniel is informed that he is scheduled on 06/09/2017 at 2:30 P with an arrival time of 12:30 P for an EUS procedure.  He has already had an H&P per message sent to me by NUVIA Lainez.  He was instructed to arrange for a  and someone to monitor him for 24 hours post procedure.  All instructions were sent via E-mail to Daniel, his e-mail was confirmed to be correct during this call.    SR 6.6.17  229p

## 2017-06-07 ENCOUNTER — CARE COORDINATION (OUTPATIENT)
Dept: ONCOLOGY | Facility: CLINIC | Age: 37
End: 2017-06-07

## 2017-06-07 ENCOUNTER — TELEPHONE (OUTPATIENT)
Dept: GASTROENTEROLOGY | Facility: CLINIC | Age: 37
End: 2017-06-07

## 2017-06-07 ENCOUNTER — TELEPHONE (OUTPATIENT)
Dept: PET IMAGING | Facility: CLINIC | Age: 37
End: 2017-06-07

## 2017-06-07 NOTE — PROGRESS NOTES
Reason for Outgoing Call:   Per Rossy Riley, RNCC: MD who is doing EUS on 6/12 is asking if it's possible to get the PET moved to 6/8 or even early am on 6/9 would be ok since the EUS is in the afternoon.   Nursing Action/Patient Instruction:  Notified pt of above, [facility] Scheduling adjusting PET to 6/9 am at [facility] location - okay with pt and he will look for appt info on [facility].  Patient Response/Evaluation:   Pt voiced understanding of above instructions and information and denied further questions

## 2017-06-09 ENCOUNTER — RADIANT APPOINTMENT (OUTPATIENT)
Dept: PET IMAGING | Facility: CLINIC | Age: 37
End: 2017-06-09
Attending: INTERNAL MEDICINE
Payer: COMMERCIAL

## 2017-06-09 ENCOUNTER — ANESTHESIA EVENT (OUTPATIENT)
Dept: SURGERY | Facility: CLINIC | Age: 37
End: 2017-06-09
Payer: COMMERCIAL

## 2017-06-09 ENCOUNTER — SURGERY (OUTPATIENT)
Age: 37
End: 2017-06-09

## 2017-06-09 ENCOUNTER — ANESTHESIA (OUTPATIENT)
Dept: SURGERY | Facility: CLINIC | Age: 37
End: 2017-06-09
Payer: COMMERCIAL

## 2017-06-09 ENCOUNTER — HOSPITAL ENCOUNTER (OUTPATIENT)
Facility: CLINIC | Age: 37
Discharge: HOME | End: 2017-06-09
Attending: INTERNAL MEDICINE | Admitting: INTERNAL MEDICINE
Payer: COMMERCIAL

## 2017-06-09 ENCOUNTER — TRANSFERRED RECORDS (OUTPATIENT)
Dept: HEALTH INFORMATION MANAGEMENT | Facility: CLINIC | Age: 37
End: 2017-06-09

## 2017-06-09 VITALS
HEART RATE: 72 BPM | OXYGEN SATURATION: 98 % | TEMPERATURE: 97.5 F | SYSTOLIC BLOOD PRESSURE: 114 MMHG | HEIGHT: 68 IN | WEIGHT: 154.54 LBS | BODY MASS INDEX: 23.42 KG/M2 | DIASTOLIC BLOOD PRESSURE: 66 MMHG | RESPIRATION RATE: 16 BRPM

## 2017-06-09 DIAGNOSIS — C15.5 MALIGNANT NEOPLASM OF LOWER THIRD OF ESOPHAGUS (H): ICD-10-CM

## 2017-06-09 LAB
BUN SERPL-MCNC: 11 MG/DL (ref 7–30)
ERYTHROCYTE [DISTWIDTH] IN BLOOD BY AUTOMATED COUNT: 12.7 % (ref 10–15)
HCT VFR BLD AUTO: 43 % (ref 40–53)
HGB BLD-MCNC: 14.4 G/DL (ref 13.3–17.7)
INR PPP: 1.12 (ref 0.86–1.14)
MCH RBC QN AUTO: 29.8 PG (ref 26.5–33)
MCHC RBC AUTO-ENTMCNC: 33.5 G/DL (ref 31.5–36.5)
MCV RBC AUTO: 89 FL (ref 78–100)
PLATELET # BLD AUTO: 173 10E9/L (ref 150–450)
RBC # BLD AUTO: 4.84 10E12/L (ref 4.4–5.9)
WBC # BLD AUTO: 4.7 10E9/L (ref 4–11)

## 2017-06-09 PROCEDURE — 78815 PET IMAGE W/CT SKULL-THIGH: CPT | Mod: PI | Performed by: RADIOLOGY

## 2017-06-09 PROCEDURE — 85610 PROTHROMBIN TIME: CPT | Performed by: INTERNAL MEDICINE

## 2017-06-09 PROCEDURE — 27211024 ZZHC OR SUPPLY OTHER OPNP: Performed by: INTERNAL MEDICINE

## 2017-06-09 PROCEDURE — 37000009 ZZH ANESTHESIA TECHNICAL FEE, EACH ADDTL 15 MIN: Performed by: INTERNAL MEDICINE

## 2017-06-09 PROCEDURE — 25000125 ZZHC RX 250: Performed by: ANESTHESIOLOGY

## 2017-06-09 PROCEDURE — 25000125 ZZHC RX 250: Performed by: NURSE ANESTHETIST, CERTIFIED REGISTERED

## 2017-06-09 PROCEDURE — 88173 CYTOPATH EVAL FNA REPORT: CPT | Mod: 91 | Performed by: INTERNAL MEDICINE

## 2017-06-09 PROCEDURE — 40000170 ZZH STATISTIC PRE-PROCEDURE ASSESSMENT II: Performed by: INTERNAL MEDICINE

## 2017-06-09 PROCEDURE — 00000155 ZZHCL STATISTIC H-CELL BLOCK W/STAIN: Performed by: INTERNAL MEDICINE

## 2017-06-09 PROCEDURE — 85027 COMPLETE CBC AUTOMATED: CPT | Performed by: INTERNAL MEDICINE

## 2017-06-09 PROCEDURE — 36415 COLL VENOUS BLD VENIPUNCTURE: CPT | Performed by: INTERNAL MEDICINE

## 2017-06-09 PROCEDURE — 27210794 ZZH OR GENERAL SUPPLY STERILE: Performed by: INTERNAL MEDICINE

## 2017-06-09 PROCEDURE — 25000566 ZZH SEVOFLURANE, EA 15 MIN: Performed by: INTERNAL MEDICINE

## 2017-06-09 PROCEDURE — 71000014 ZZH RECOVERY PHASE 1 LEVEL 2 FIRST HR: Performed by: INTERNAL MEDICINE

## 2017-06-09 PROCEDURE — 88172 CYTP DX EVAL FNA 1ST EA SITE: CPT | Performed by: INTERNAL MEDICINE

## 2017-06-09 PROCEDURE — 84520 ASSAY OF UREA NITROGEN: CPT | Performed by: INTERNAL MEDICINE

## 2017-06-09 PROCEDURE — 36000057 ZZH SURGERY LEVEL 3 1ST 30 MIN - UMMC: Performed by: INTERNAL MEDICINE

## 2017-06-09 PROCEDURE — 25000128 H RX IP 250 OP 636: Performed by: NURSE ANESTHETIST, CERTIFIED REGISTERED

## 2017-06-09 PROCEDURE — 36000059 ZZH SURGERY LEVEL 3 EA 15 ADDTL MIN UMMC: Performed by: INTERNAL MEDICINE

## 2017-06-09 PROCEDURE — 37000008 ZZH ANESTHESIA TECHNICAL FEE, 1ST 30 MIN: Performed by: INTERNAL MEDICINE

## 2017-06-09 PROCEDURE — 25000125 ZZHC RX 250: Performed by: INTERNAL MEDICINE

## 2017-06-09 PROCEDURE — 71000027 ZZH RECOVERY PHASE 2 EACH 15 MINS: Performed by: INTERNAL MEDICINE

## 2017-06-09 PROCEDURE — C9399 UNCLASSIFIED DRUGS OR BIOLOG: HCPCS | Performed by: NURSE ANESTHETIST, CERTIFIED REGISTERED

## 2017-06-09 PROCEDURE — 88305 TISSUE EXAM BY PATHOLOGIST: CPT | Performed by: INTERNAL MEDICINE

## 2017-06-09 PROCEDURE — A9552 F18 FDG: HCPCS | Performed by: RADIOLOGY

## 2017-06-09 RX ORDER — IOPAMIDOL 755 MG/ML
20-135 INJECTION, SOLUTION INTRAVASCULAR ONCE
Status: DISCONTINUED | OUTPATIENT
Start: 2017-06-09 | End: 2017-06-09 | Stop reason: CLARIF

## 2017-06-09 RX ORDER — SODIUM CHLORIDE, SODIUM LACTATE, POTASSIUM CHLORIDE, CALCIUM CHLORIDE 600; 310; 30; 20 MG/100ML; MG/100ML; MG/100ML; MG/100ML
INJECTION, SOLUTION INTRAVENOUS CONTINUOUS PRN
Status: DISCONTINUED | OUTPATIENT
Start: 2017-06-09 | End: 2017-06-09

## 2017-06-09 RX ORDER — LIDOCAINE 40 MG/G
CREAM TOPICAL
Status: DISCONTINUED | OUTPATIENT
Start: 2017-06-09 | End: 2017-06-09 | Stop reason: HOSPADM

## 2017-06-09 RX ORDER — FENTANYL CITRATE 50 UG/ML
25-50 INJECTION, SOLUTION INTRAMUSCULAR; INTRAVENOUS
Status: DISCONTINUED | OUTPATIENT
Start: 2017-06-09 | End: 2017-06-09 | Stop reason: HOSPADM

## 2017-06-09 RX ORDER — ONDANSETRON 2 MG/ML
INJECTION INTRAMUSCULAR; INTRAVENOUS PRN
Status: DISCONTINUED | OUTPATIENT
Start: 2017-06-09 | End: 2017-06-09

## 2017-06-09 RX ORDER — PROPOFOL 10 MG/ML
INJECTION, EMULSION INTRAVENOUS PRN
Status: DISCONTINUED | OUTPATIENT
Start: 2017-06-09 | End: 2017-06-09

## 2017-06-09 RX ORDER — NALOXONE HYDROCHLORIDE 0.4 MG/ML
.1-.4 INJECTION, SOLUTION INTRAMUSCULAR; INTRAVENOUS; SUBCUTANEOUS
Status: DISCONTINUED | OUTPATIENT
Start: 2017-06-09 | End: 2017-06-09 | Stop reason: HOSPADM

## 2017-06-09 RX ORDER — FLUMAZENIL 0.1 MG/ML
0.2 INJECTION, SOLUTION INTRAVENOUS
Status: DISCONTINUED | OUTPATIENT
Start: 2017-06-09 | End: 2017-06-09 | Stop reason: HOSPADM

## 2017-06-09 RX ORDER — MEPERIDINE HYDROCHLORIDE 25 MG/ML
12.5 INJECTION INTRAMUSCULAR; INTRAVENOUS; SUBCUTANEOUS
Status: DISCONTINUED | OUTPATIENT
Start: 2017-06-09 | End: 2017-06-09 | Stop reason: HOSPADM

## 2017-06-09 RX ORDER — GLYCOPYRROLATE 0.2 MG/ML
INJECTION, SOLUTION INTRAMUSCULAR; INTRAVENOUS PRN
Status: DISCONTINUED | OUTPATIENT
Start: 2017-06-09 | End: 2017-06-09

## 2017-06-09 RX ORDER — FENTANYL CITRATE 50 UG/ML
INJECTION, SOLUTION INTRAMUSCULAR; INTRAVENOUS PRN
Status: DISCONTINUED | OUTPATIENT
Start: 2017-06-09 | End: 2017-06-09

## 2017-06-09 RX ORDER — SODIUM CHLORIDE, SODIUM LACTATE, POTASSIUM CHLORIDE, CALCIUM CHLORIDE 600; 310; 30; 20 MG/100ML; MG/100ML; MG/100ML; MG/100ML
INJECTION, SOLUTION INTRAVENOUS CONTINUOUS
Status: DISCONTINUED | OUTPATIENT
Start: 2017-06-09 | End: 2017-06-09 | Stop reason: HOSPADM

## 2017-06-09 RX ORDER — ONDANSETRON 2 MG/ML
4 INJECTION INTRAMUSCULAR; INTRAVENOUS EVERY 30 MIN PRN
Status: DISCONTINUED | OUTPATIENT
Start: 2017-06-09 | End: 2017-06-09 | Stop reason: HOSPADM

## 2017-06-09 RX ORDER — DEXAMETHASONE SODIUM PHOSPHATE 4 MG/ML
INJECTION, SOLUTION INTRA-ARTICULAR; INTRALESIONAL; INTRAMUSCULAR; INTRAVENOUS; SOFT TISSUE PRN
Status: DISCONTINUED | OUTPATIENT
Start: 2017-06-09 | End: 2017-06-09

## 2017-06-09 RX ORDER — ONDANSETRON 4 MG/1
4 TABLET, ORALLY DISINTEGRATING ORAL EVERY 30 MIN PRN
Status: DISCONTINUED | OUTPATIENT
Start: 2017-06-09 | End: 2017-06-09 | Stop reason: HOSPADM

## 2017-06-09 RX ORDER — LIDOCAINE HYDROCHLORIDE 20 MG/ML
INJECTION, SOLUTION INFILTRATION; PERINEURAL PRN
Status: DISCONTINUED | OUTPATIENT
Start: 2017-06-09 | End: 2017-06-09

## 2017-06-09 RX ADMIN — SIMETHICONE 4 ML: 63.3; 3.7 SOLUTION/ DROPS ORAL at 13:25

## 2017-06-09 RX ADMIN — ROCURONIUM BROMIDE 20 MG: 10 INJECTION INTRAVENOUS at 13:26

## 2017-06-09 RX ADMIN — FENTANYL CITRATE 50 MCG: 50 INJECTION, SOLUTION INTRAMUSCULAR; INTRAVENOUS at 13:35

## 2017-06-09 RX ADMIN — ONDANSETRON 4 MG: 2 INJECTION INTRAMUSCULAR; INTRAVENOUS at 13:15

## 2017-06-09 RX ADMIN — DEXAMETHASONE SODIUM PHOSPHATE 4 MG: 4 INJECTION, SOLUTION INTRA-ARTICULAR; INTRALESIONAL; INTRAMUSCULAR; INTRAVENOUS; SOFT TISSUE at 13:15

## 2017-06-09 RX ADMIN — MIDAZOLAM HYDROCHLORIDE 2 MG: 1 INJECTION, SOLUTION INTRAMUSCULAR; INTRAVENOUS at 13:00

## 2017-06-09 RX ADMIN — GLYCOPYRROLATE 0.2 MG: 0.2 INJECTION, SOLUTION INTRAMUSCULAR; INTRAVENOUS at 13:28

## 2017-06-09 RX ADMIN — FENTANYL CITRATE 100 MCG: 50 INJECTION, SOLUTION INTRAMUSCULAR; INTRAVENOUS at 13:10

## 2017-06-09 RX ADMIN — ROCURONIUM BROMIDE 30 MG: 10 INJECTION INTRAVENOUS at 13:10

## 2017-06-09 RX ADMIN — SODIUM CHLORIDE, POTASSIUM CHLORIDE, SODIUM LACTATE AND CALCIUM CHLORIDE: 600; 310; 30; 20 INJECTION, SOLUTION INTRAVENOUS at 13:00

## 2017-06-09 RX ADMIN — ROCURONIUM BROMIDE 20 MG: 10 INJECTION INTRAVENOUS at 13:36

## 2017-06-09 RX ADMIN — SUGAMMADEX 200 MG: 100 INJECTION, SOLUTION INTRAVENOUS at 14:22

## 2017-06-09 RX ADMIN — PROPOFOL 200 MG: 10 INJECTION, EMULSION INTRAVENOUS at 13:10

## 2017-06-09 RX ADMIN — FENTANYL CITRATE 25 MCG: 50 INJECTION INTRAMUSCULAR; INTRAVENOUS at 14:54

## 2017-06-09 RX ADMIN — LIDOCAINE HYDROCHLORIDE 100 MG: 20 INJECTION, SOLUTION INFILTRATION; PERINEURAL at 13:10

## 2017-06-09 RX ADMIN — PHENYLEPHRINE HYDROCHLORIDE 100 MCG: 10 INJECTION, SOLUTION INTRAMUSCULAR; INTRAVENOUS; SUBCUTANEOUS at 13:24

## 2017-06-09 NOTE — PROGRESS NOTES
THORACIC SURGERY - NEW PATIENT OFFICE VISIT      Dear Dr. Hood,    I saw Mr. 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     HPI  Mr. Daniel Sandhu is a 36 year old year-old male with a GE junction adenocarcinoma. He had difficulty swallowing that began in early , and it took longer for food to empty into this stomach. He also had a recurrence of reflux that he had not had since mid 20s. His energy level and weight are stable    Histopathology: poorly differentiated adenocarcinoma, glandular type    Previsit Tests   PET/CT 17: PET+ lesions at distal esophagus and gastrohepatic ligament.       CT chest 17: GE mass seen; mildly prominent gastrohepatic ligament node.  EGD 17: 4cm mass beginning near the GE junction and extending into the fundus, small hiatal hernia      EUS (2017): tumor extending from 42-46 cm, epicenter in the gastric cardia, uT2N1    PMH  GERD    ETOH 1 beer/day  TOB no  Occasional marijuana    Physical examination  BMI 23    From a personal perspective, he lives in [facility] with his wife and 4.5 year-old daughter. He wife is pregnant with a boy, due in mid July. 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 His friends use inspiratory spirometers to train their lungs for their musical instruments. His half sister  of breast cancer at age 32, and was diagnosed in her late 20s.    IMPRESSION (C16.0) Gastroesophageal cancer (H)  (primary encounter diagnosis)    36 year old year-old male with a Siewert type III GE junction adenocarcinoma.    PLAN  I spent a total of 30 minutes with Mr. Sandhu, more than 50% of which were spent in counseling, coordination of care, and face-to-face time. I reviewed the plan as follows:  We will discuss his case at [facility]. My inclination is to treat him as a gastric cancer with neoadjuvant chemotherapy and adjuvant  chemotherapy (akin to MAGIC protocol). He will be best served with a total or subtotal gastrectomy with Terrell-en-Y reconstruction, and we briefly reviewed the procedure.  I plan to see him back in clinic 1 month after completion of neoadjuvant therapy with a new PET-CT, and we will perform surgery ~6 weeks after completion of neoadjuvant therapy.  All questions were answered and Daniel Sandhu was in agreement with the plan.  I appreciate the opportunity to participate in the care of your patient and will keep you updated.  Sincerely,

## 2017-06-09 NOTE — IP AVS SNAPSHOT
MRN:[id number]                      After Visit Summary   6/9/2017    Daniel Sandhu    MRN: [id number]           Thank you!     Thank you for choosing [facility] for your care. Our goal is always to provide you with excellent care. Hearing back from our patients is one way we can continue to improve our services. Please take a few minutes to complete the written survey that you may receive in the mail after you visit with us. Thank you!        Patient Information     Date Of Birth          1980        About your hospital stay     You were admitted on:  June 9, 2017 You last received care in the:  Post Anesthesia Care Unit [facility]    You were discharged on:  June 9, 2017       Who to Call     For medical emergencies, please call 911.  For non-urgent questions about your medical care, please call your primary care provider or clinic, [phone]  For questions related to your surgery, please call your surgery clinic        Attending Provider     Provider Guru Mark Bello MD Gastroenterology       Primary Care Provider Office Phone # Fax #    Lencho Chna [phone]      After Care Instructions     Discharge Instructions       No driving or operating machinery until the day after procedure.            Discharge Instructions       Recommend that a responsible adult remain with the patient at home for 6 hours post discharge.            Discharge Instructions       Start with clear liquids, sips of water 1 hour after procedure. If no abdominal pain and gag reflex has returned, advance as tolerated to pre-procedure diet.              Discharge Instructions       Restart home medications.            Discharge Instructions       Check with MD when to start anticoagulants.            Discharge Instructions       No ALCOHOL 24 hours post procedure.                  Your next 10 appointments already scheduled     Jun 14, 2017  5:00 PM CDT    (Arrive by 4:45 PM)   New Patient Visit with Yunior Esteban MD   [facility] ([facility])    [address] [place] [address]   [phone]            Jun 16, 2017 12:00 PM CDT   (Arrive by 11:45 AM)   NEW WITH ROOM with Ashley Trejo GC,  2 116 CONSULT RM   [facility] ([facility])    [address] [place] [address]   [phone]            Jun 16, 2017  1:15 PM CDT   [facility] Draw with  [facility] DRAW   [facility] Draw ([facility])    [address] [place] [address]   [phone]            Jun 19, 2017 11:15 AM CDT   (Arrive by 11:00 AM)   Return Visit with Laurence Hood MD   [facility] ([facility])    [address] [place] [address]   [phone]              Further instructions from your care team       [facility] [place]  Same-Day Surgery   Adult Discharge Orders & Instructions     For 24 hours after surgery    1. Get plenty of rest.  A responsible adult must stay with you for at least 24 hours after you leave the hospital.   2. Do not drive or use heavy equipment.  If you have weakness or tingling, don't drive or use heavy equipment until this feeling goes away.  3. Do not drink alcohol.  4. Avoid strenuous or risky activities.  Ask for help when climbing stairs.   5. You may feel lightheaded.  IF so, sit for a few minutes before standing.  Have someone help you get up.   6. If you have nausea (feel sick to your stomach): Drink only clear liquids such as apple juice, ginger ale, broth or 7-Up.  Rest may also help.  Be sure to drink enough fluids.  Move to a regular diet as you feel able.  7. You may have a slight fever. Call the doctor if your fever is over 100 F (37.7 C)  "(taken under the tongue) or lasts longer than 24 hours.  8. You may have a dry mouth, a sore throat, muscle aches or trouble sleeping.  These should go away after 24 hours.  9. Do not make important or legal decisions.   Call your doctor for any of the followin.  Signs of infection (fever, growing tenderness at the surgery site, a large amount of drainage or bleeding, severe pain, foul-smelling drainage, redness, swelling).    2. It has been over 8 to 10 hours since surgery and you are still not able to urinate (pass water).    3.  Headache for over 24 hours.    4.  Numbness, tingling or weakness the day after surgery (if you had spinal anesthesia).  To contact a doctor, call _Dr. Guru Avila @ [phone]  Or:   [phone] and ask for the resident on call for   GI (answered 24 hours a day)      Emergency Department:    [facility]: [phone]       (TTY for hearing impaired: [phone])              Pending Results     No orders found from 2017 to 6/10/2017.            Admission Information     Date & Time Provider Department Dept. Phone    2017 Guru Mark Avila MD Post Anesthesia Care Unit [facility] [phone]      Your Vitals Were     Blood Pressure Pulse Temperature Respirations Height Weight    110/73 71 97.8  F (36.6  C) (Temporal) 16 1.727 m (5' 8\") 70.1 kg (154 lb 8.7 oz)    Pulse Oximetry BMI (Body Mass Index)                93% 23.5 kg/m2          [employer] Information     [employer] gives you secure access to your electronic health record. If you see a primary care provider, you can also send messages to your care team and make appointments. If you have questions, please call your primary care clinic.  If you do not have a primary care provider, please call [phone] and they will assist you.        Care EveryWhere ID     This is your Care EveryWhere ID. This could be used by other organizations to access your [facility] medical " records  [id number]           Review of your medicines      Notice     You have not been prescribed any medications.             Protect others around you: Learn how to safely use, store and throw away your medicines at www.disposemymeds.org.             Medication List: This is a list of all your medications and when to take them. Check marks below indicate your daily home schedule. Keep this list as a reference.      Notice     You have not been prescribed any medications.

## 2017-06-09 NOTE — PROGRESS NOTES
Dr. Guru Avila at bedside talking with patient and patient's wife. MD will place brief op note.

## 2017-06-09 NOTE — ANESTHESIA POSTPROCEDURE EVALUATION
Patient: Daniel Sandhu    Procedure(s):  Upper Endoscopic Ultrasound, fine needle aspirate/biopsy - Wound Class: II-Clean Contaminated    Diagnosis:Esophageal Cancer   Diagnosis Additional Information: No value filed.    Anesthesia Type:  General    Note:  Anesthesia Post Evaluation    Patient location during evaluation: PACU  Patient participation: Able to fully participate in evaluation  Level of consciousness: awake  Pain management: adequate  Airway patency: patent  Cardiovascular status: acceptable  Respiratory status: acceptable  Hydration status: acceptable  PONV: none     Anesthetic complications: None          Last vitals:  Vitals:    06/09/17 1450 06/09/17 1500 06/09/17 1510   BP:  120/75 125/70   Pulse:      Resp: 14 14 18   Temp:   36.6  C (97.9  F)   SpO2: 100% 96% 100%         Electronically Signed By: Delonte Torres MD  June 9, 2017  3:20 PM

## 2017-06-09 NOTE — ANESTHESIA PREPROCEDURE EVALUATION
HPI: Daniel Sandhu is a 36 year old male who presents with adenocarcinoma of the GE junction.  In early 2017, patient started noticing difficulty swallowing, and that food seems to take longer to go down.  It became more frequent, and he saw his PCP, and was referred for EGD, which showed an esophageal mass located at the GE junction, measuring 4 cm.  Biopsy showed poorly differentiated adenocarcinoma.  HER-2 was sent and pending.  CT c/a/p showed a mildly prominent lymph node in the gastrohepatic ligament, but there were no pathologically enlarged lymph nodes in the chest, abdomen, or pelvis.  There was otherwise no evidence of metastatic disease.          Anesthesia Evaluation     .             ROS/MED HX    ENT/Pulmonary:  - neg pulmonary ROS     Neurologic:  - neg neurologic ROS     Cardiovascular:  - neg cardiovascular ROS       METS/Exercise Tolerance:     Hematologic:  - neg hematologic  ROS       Musculoskeletal:  - neg musculoskeletal ROS       GI/Hepatic:  - neg GI/hepatic ROS       Renal/Genitourinary:  - ROS Renal section negative       Endo:  - neg endo ROS       Psychiatric:  - neg psychiatric ROS       Infectious Disease:  - neg infectious disease ROS       Malignancy:   (+) Malignancy History of Other  Other CA GE junction status post      - no malignancy   Other:                     Physical Exam  Normal systems: dental    Airway   Mallampati: I  TM distance: >3 FB  Neck ROM: full    Dental     Cardiovascular   Rhythm and rate: regular and normal      Pulmonary    breath sounds clear to auscultation                    Anesthesia Plan      History & Physical Review  History and physical reviewed and following examination; no interval change.    ASA Status:  2 .    NPO Status:  > 8 hours    Plan for General with Intravenous induction. Maintenance will be Inhalation.    PONV prophylaxis:  Ondansetron          Postoperative Care  Postoperative pain management:  IV analgesics.       Consents  Anesthetic plan, risks, benefits and alternatives discussed with:  Patient..                          .

## 2017-06-09 NOTE — BRIEF OP NOTE
[facility] Brief Operative Note    Pre-operative diagnosis: Esophageal Cancer    Post-operative diagnosis * No post-op diagnosis entered *   Procedure: Procedure(s):  Upper Endoscopic Ultrasound, fine needle aspirate/biopsy - Wound Class: II-Clean Contaminated   Surgeon: Guru Austin MD       Estimated blood loss: None    Specimens: None   Findings:    Upper endoscopy   Partially obstructing esophageal mass at 43 cm to 46 cm   EUS  T2 hypoechoic lesion, no vascular involvement  A 11 mm peritumoral gastrohepatic  lymph node was seen immediately adjacent to the tumor  Three subcentimeter lymph nodes in gastrohepatic and celiac region were identified  No liver lesions    Recommendations   Await pathology  Discussed findings with Dr Hood  Follow up with Dr Esteban

## 2017-06-09 NOTE — TELEPHONE ENCOUNTER
Health Plan: [employer]   Provider: CALEB PALMA   Member: ABELARDO ARMAS ([id number])   Procedure: PET Whole Body   Est. Date of Service: June 9, 2017   Location: Clinic   Indications: DDx - Stage/Restage   DDx - Stage   Sx - Unexplained Weight Loss   Sx - Palpable Mass   Hx - Known Malignancy   Hx - Malignancy (Known Active) Type   Authorization #: [id number]   Valid: June 2, 2017 - December 6, 2017

## 2017-06-09 NOTE — ANESTHESIA CARE TRANSFER NOTE
Patient: Daniel Sandhu    Procedure(s):  Upper Endoscopic Ultrasound, fine needle aspirate/biopsy - Wound Class: II-Clean Contaminated    Diagnosis: Esophageal Cancer   Diagnosis Additional Information: No value filed.    Anesthesia Type:   General     Note:  Airway :Face Mask  Patient transferred to:PACU  Comments: To PACU, VSS, airway patent, RN at bedside.      Vitals: (Last set prior to Anesthesia Care Transfer)    CRNA VITALS  6/9/2017 1403 - 6/9/2017 1439      6/9/2017             Pulse: 198    SpO2: 97 %    Resp Rate (observed): 15                Electronically Signed By: GAVIN Zambrano CRNA  June 9, 2017  2:39 PM

## 2017-06-09 NOTE — DISCHARGE INSTRUCTIONS
[facility]  Same-Day Surgery   Adult Discharge Orders & Instructions     For 24 hours after surgery    1. Get plenty of rest.  A responsible adult must stay with you for at least 24 hours after you leave the hospital.   2. Do not drive or use heavy equipment.  If you have weakness or tingling, don't drive or use heavy equipment until this feeling goes away.  3. Do not drink alcohol.  4. Avoid strenuous or risky activities.  Ask for help when climbing stairs.   5. You may feel lightheaded.  IF so, sit for a few minutes before standing.  Have someone help you get up.   6. If you have nausea (feel sick to your stomach): Drink only clear liquids such as apple juice, ginger ale, broth or 7-Up.  Rest may also help.  Be sure to drink enough fluids.  Move to a regular diet as you feel able.  7. You may have a slight fever. Call the doctor if your fever is over 100 F (37.7 C) (taken under the tongue) or lasts longer than 24 hours.  8. You may have a dry mouth, a sore throat, muscle aches or trouble sleeping.  These should go away after 24 hours.  9. Do not make important or legal decisions.   Call your doctor for any of the followin.  Signs of infection (fever, growing tenderness at the surgery site, a large amount of drainage or bleeding, severe pain, foul-smelling drainage, redness, swelling).    2. It has been over 8 to 10 hours since surgery and you are still not able to urinate (pass water).    3.  Headache for over 24 hours.    4.  Numbness, tingling or weakness the day after surgery (if you had spinal anesthesia).  To contact a doctor, call _Dr. Guru Avila @ [phone]  Or:   [phone] and ask for the resident on call for   GI (answered 24 hours a day)      Emergency Department:    [facility]: [phone]       (TTY for hearing impaired: [phone])

## 2017-06-13 ENCOUNTER — TELEPHONE (OUTPATIENT)
Dept: GASTROENTEROLOGY | Facility: CLINIC | Age: 37
End: 2017-06-13

## 2017-06-13 LAB
COPATH REPORT: NORMAL
UPPER EUS: NORMAL

## 2017-06-13 ASSESSMENT — ENCOUNTER SYMPTOMS
VOMITING: 0
RECTAL PAIN: 0
BLOOD IN STOOL: 0
CONSTIPATION: 0
BOWEL INCONTINENCE: 0
RECTAL BLEEDING: 0
BLOOD IN STOOL: 0
DECREASED CONCENTRATION: 0
ABDOMINAL PAIN: 0
DEPRESSION: 0
DECREASED CONCENTRATION: 0
PANIC: 0
INSOMNIA: 1
NERVOUS/ANXIOUS: 1
BLOATING: 0
JAUNDICE: 0
BOWEL INCONTINENCE: 0
DEPRESSION: 0
RECTAL BLEEDING: 0
ABDOMINAL PAIN: 0
JAUNDICE: 0
NAUSEA: 0
CONSTIPATION: 0
HEARTBURN: 1
HEARTBURN: 1
NERVOUS/ANXIOUS: 1
VOMITING: 0
RECTAL PAIN: 0
NAUSEA: 0
BLOATING: 0
DIARRHEA: 0
DIARRHEA: 0
INSOMNIA: 1
PANIC: 0

## 2017-06-13 NOTE — TELEPHONE ENCOUNTER
Discussed findings of fine needle biopsy results with patient  Fine needle biopsy of the peritumoral node in the gastrohepatic ligament was suspicious for malignancy  Fine needle biopsy of the celiac node was negative for malignancy  He is scheduled to meet Dr Esteban tomorrow and Dr Hood next week for further management and follow up

## 2017-06-14 ENCOUNTER — OFFICE VISIT (OUTPATIENT)
Dept: SURGERY | Facility: CLINIC | Age: 37
End: 2017-06-14
Attending: THORACIC SURGERY (CARDIOTHORACIC VASCULAR SURGERY)
Payer: COMMERCIAL

## 2017-06-14 ENCOUNTER — CARE COORDINATION (OUTPATIENT)
Dept: GASTROENTEROLOGY | Facility: CLINIC | Age: 37
End: 2017-06-14

## 2017-06-14 VITALS
DIASTOLIC BLOOD PRESSURE: 74 MMHG | TEMPERATURE: 98.5 F | OXYGEN SATURATION: 100 % | SYSTOLIC BLOOD PRESSURE: 118 MMHG | BODY MASS INDEX: 23.31 KG/M2 | HEART RATE: 70 BPM | RESPIRATION RATE: 12 BRPM | WEIGHT: 157.4 LBS | HEIGHT: 69 IN

## 2017-06-14 DIAGNOSIS — C16.0 GASTROESOPHAGEAL CANCER (H): Primary | ICD-10-CM

## 2017-06-14 LAB — COPATH REPORT: NORMAL

## 2017-06-14 PROCEDURE — 99212 OFFICE O/P EST SF 10 MIN: CPT | Mod: ZF

## 2017-06-14 ASSESSMENT — PAIN SCALES - GENERAL: PAINLEVEL: NO PAIN (0)

## 2017-06-14 NOTE — MR AVS SNAPSHOT
After Visit Summary   6/14/2017    Daniel Sandhu    MRN: [id number]           Patient Information     Date Of Birth          1980        Visit Information        Provider Department      6/14/2017 5:00 PM Yunior Esteban MD [facility]        Today's Diagnoses     Gastroesophageal cancer (H)    -  1       Follow-ups after your visit        Your next 10 appointments already scheduled     Jun 19, 2017 11:15 AM CDT   (Arrive by 11:00 AM)   Return Visit with Laurence Hood MD   [facility] ([facility])    [address] [address] [phone]              Who to contact     If you have questions or need follow up information about today's clinic visit or your schedule please contact [facility] directly at [phone].  Normal or non-critical lab and imaging results will be communicated to you by MyChart, letter or phone within 4 business days after the clinic has received the results. If you do not hear from us within 7 days, please contact the clinic through MyChart or phone. If you have a critical or abnormal lab result, we will notify you by phone as soon as possible.  Submit refill requests through truedash or call your pharmacy and they will forward the refill request to us. Please allow 3 business days for your refill to be completed.          Additional Information About Your Visit        MyChart Information     [employer] gives you secure access to your electronic health record. If you see a primary care provider, you can also send messages to your care team and make appointments. If you have questions, please call your primary care clinic.  If you do not have a primary care provider, please call [phone] and they will assist you.        Care EveryWhere ID     This is your Care EveryWhere ID. This could be used by other organizations to access your  "[facility] medical records  [id number]        Your Vitals Were     Pulse Temperature Respirations Height Pulse Oximetry BMI (Body Mass Index)    70 98.5  F (36.9  C) (Oral) 12 1.749 m (5' 8.86\") 100% 23.34 kg/m2       Blood Pressure from Last 3 Encounters:   06/14/17 118/74   06/09/17 114/66   06/05/17 116/73    Weight from Last 3 Encounters:   06/14/17 71.4 kg (157 lb 6.4 oz)   06/09/17 70.1 kg (154 lb 8.7 oz)   06/05/17 70.7 kg (155 lb 13.8 oz)              Today, you had the following     No orders found for display       Primary Care Provider Office Phone # Fax #    Lencho Chan [phone]       [facility] [address] [facility] [address]        Thank you!     Thank you for choosing [facility]  for your care. Our goal is always to provide you with excellent care. Hearing back from our patients is one way we can continue to improve our services. Please take a few minutes to complete the written survey that you may receive in the mail after your visit with us. Thank you!             Your Updated Medication List - Protect others around you: Learn how to safely use, store and throw away your medicines at www.disposemymeds.org.      Notice  As of 6/14/2017 11:59 PM    You have not been prescribed any medications.      "

## 2017-06-14 NOTE — NURSING NOTE
"Oncology Rooming Note    June 14, 2017 5:16 PM   Daniel Sandhu is a 36 year old male who presents for:    Chief Complaint   Patient presents with     Oncology Clinic Visit     adenocarcinoma      Initial Vitals: /74  Pulse 70  Temp 98.5  F (36.9  C) (Oral)  Resp 12  Ht 1.749 m (5' 8.86\")  Wt 71.4 kg (157 lb 6.4 oz)  SpO2 100%  BMI 23.34 kg/m2 Estimated body mass index is 23.34 kg/(m^2) as calculated from the following:    Height as of this encounter: 1.749 m (5' 8.86\").    Weight as of this encounter: 71.4 kg (157 lb 6.4 oz). Body surface area is 1.86 meters squared.  No Pain (0) Comment: Data Unavailable   No LMP for male patient.  Allergies reviewed: Yes  Medications reviewed: Yes    Medications: Medication refills not needed today.  Pharmacy name entered into [employer]: [facility] - [place], [place] - [address] [place] [address]    Clinical concerns: no doc was NOT notified.    5 minutes for nursing intake (face to face time)     Marion Red CMA              "

## 2017-06-14 NOTE — LETTER
2017      RE: Daniel Sandhu  [address] [facility] [address]       THORACIC SURGERY - NEW PATIENT OFFICE VISIT      Dear Dr. Hood,    I saw Mr. 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     HPI  Mr. Daniel Sandhu is a 36 year old year-old male with a GE junction adenocarcinoma. He had difficulty swallowing that began in early , and it took longer for food to empty into this stomach. He also had a recurrence of reflux that he had not had since mid 20s. His energy level and weight are stable    Histopathology: poorly differentiated adenocarcinoma, glandular type    Previsit Tests   PET/CT 17: PET+ lesions at distal esophagus and gastrohepatic ligament.       CT chest 17: GE mass seen; mildly prominent gastrohepatic ligament node.  EGD 17: 4cm mass beginning near the GE junction and extending into the fundus, small hiatal hernia      EUS (2017): tumor extending from 42-46 cm, epicenter in the gastric cardia, uT2N1    PMH  GERD    ETOH 1 beer/day  TOB no  Occasional marijuana    Physical examination  BMI 23    From a personal perspective, he lives in [place] with his wife and 4.5 year-old daughter. He wife is pregnant with a boy, due in mid July. 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 His friends use inspiratory spirometers to train their lungs for their musical instruments. His half sister  of breast cancer at age 32, and was diagnosed in her late 20s.    IMPRESSION (C16.0) Gastroesophageal cancer (H)  (primary encounter diagnosis)    36 year old year-old male with a Siewert type III GE junction adenocarcinoma.    PLAN  I spent a total of 30 minutes with Mr. Sandhu, more than 50% of which were spent in counseling, coordination of care, and face-to-face time. I reviewed the plan as follows:  We will discuss his case at [facility]. My  inclination is to treat him as a gastric cancer with neoadjuvant chemotherapy and adjuvant chemotherapy (akin to MAGIC protocol). He will be best served with a total or subtotal gastrectomy with Terrell-en-Y reconstruction, and we briefly reviewed the procedure.  I plan to see him back in clinic 1 month after completion of neoadjuvant therapy with a new PET-CT, and we will perform surgery ~6 weeks after completion of neoadjuvant therapy.  All questions were answered and Daniel Sandhu was in agreement with the plan.  I appreciate the opportunity to participate in the care of your patient and will keep you updated.  Sincerely,      Yunior Esteban MD

## 2017-06-14 NOTE — PROGRESS NOTES
Post EUS (6/9/2017) with Dr Avila: Follow-up    Post procedure recommendations: He is scheduled to follow up with Dr Esteban on 6/14 to discuss further surgical management - Discussed finding with Dr Hood who will meet her next week as well     Patient states he is doing fine, throat is fine but his lip is still healing. Denies N/V/F and pain. Able to take in PO with no issues.     Orders placed: None at this time.     Contact information verified for future questions/concerns.    Claudia SAHA RN Coordinator  Dr. Meyer, Dr. Rashid & Dr. Avila  Pancreas~Biliary  [phone]

## 2017-06-16 ENCOUNTER — OFFICE VISIT (OUTPATIENT)
Dept: ONCOLOGY | Facility: CLINIC | Age: 37
End: 2017-06-16
Attending: GENETIC COUNSELOR, MS
Payer: COMMERCIAL

## 2017-06-16 ENCOUNTER — RESULTS ONLY (OUTPATIENT)
Dept: SURGERY | Facility: CLINIC | Age: 37
End: 2017-06-16

## 2017-06-16 DIAGNOSIS — C16.0 GE JUNCTION CARCINOMA (H): Primary | ICD-10-CM

## 2017-06-16 DIAGNOSIS — Z80.0 FAMILY HISTORY OF COLON CANCER: ICD-10-CM

## 2017-06-16 DIAGNOSIS — Z80.3 FAMILY HISTORY OF MALIGNANT NEOPLASM OF BREAST: ICD-10-CM

## 2017-06-16 PROCEDURE — 96040 ZZH GENETIC COUNSELING, EACH 30 MINUTES: CPT | Mod: ZF | Performed by: GENETIC COUNSELOR, MS

## 2017-06-16 NOTE — LETTER
Cancer Risk Management  Program Locations    [facility]  [facility]  Mailing Address  Cancer Risk Management Program  [facility]  [address]  [place] [address]    New patient appointments  [phone]  June 19, 2017    Daniel Sandhu  [address] [address]      Dear Daniel,    It was a pleasure meeting with you at the [facility] on June 16, 2017. Here is a copy of the progress note from your recent genetic counseling visit to the Cancer Risk Management Program. If you have any additional questions, please feel free to call.    6/16/2017    Referring Provider: Laurence Hood MD    Presenting Information:   I met with Daniel Sandhu today for genetic counseling at the Cancer Risk Management Program at the [facility] to discuss his personal history of a gastroesophageal junction cancer and family history of breast, colon, and gynecologic cancer. He is here today to review this history, cancer screening recommendations, and available genetic testing options.    Personal History:  Daniel is a 36 year old male.  He was diagnosed with a moderately-poorly differentiated adenocarcinoma, intestinal type, of the gastroesophageal (GE) junction at age 36. Treatment will likely include chemotherapy, followed by surgery and additional chemotherapy.    He had one upper endoscopy in May 2017 to address dysphagia that identified this cancer. He has not had a colonoscopy. He reports having one large dark-colored birthmark on his arm, but denies having any other differences to his skin (including oral leukoplakia and thick skin of his palms or soles). He does not regularly do any other cancer screening at this time.  Daniel reported no tobacco use and that he has approximately one  "alcoholic beverage per day.    Family History: (Please see scanned pedigree for detailed family history information)    Daniel has one healthy 4 year old daughter and his wife is currently pregnant, due in July 2017.    One maternal half sister was diagnosed with breast cancer at age 28 and passed away at age 32; treatment included chemotherapy. No genetic testing was pursued.    One maternal first cousin was diagnosed with a \"superficial\" bladder cancer in his 40's.    One maternal great aunt (through his grandmother) was diagnosed with lung and brain cancer at an unknown age; she did not have a history of smoking. Her daughter passed away from an unknown gynecologic cancer in her 50's.    Daniel believes several extended maternal cousins (through his grandfather) have been diagnosed with breast cancer; further details are limited.    Daniel's paternal grandmother was diagnosed with colon cancer at age 68 and passed away in her 70's; treatment is unknown.    Daniel's paternal great grandmother (through his grandmother) was diagnosed with colon cancer and passed away at age 54.    One paternal great uncle (through his grandfather) passed away from myeloma at age 89.    Daniel's paternal great grandfather (through his grandfather) may have been diagnosed with a stomach cancer before he passed away; details are unknown.    His maternal ethnicity is [place] and Citizen of [place]. His paternal ethnicity is [place] and possibly  Episcopalian. There is no reported consanguinity.    Discussion:    Daniel's personal history of a GE junction cancer and family history of multiple types of cancer is possibly suggestive of a hereditary cancer syndrome.    We reviewed the features of sporadic, familial, and hereditary cancers. In looking at Daniel's family history, it is possible that a cancer susceptibility gene is present as he was diagnosed with a GE junction cancer at a young age and without the typical risk factors (i.e. tobacco use). " He also has several other relatives diagnosed with cancer, including his half sister under age 50 and paternal relatives diagnosed with gastrointestinal cancers. That being said, he also has multiple relatives that have never been diagnosed with a cancer, including both of his parents, all his aunts and uncles, and multiple grandparents.    We discussed the natural history and genetics of several hereditary cancer syndromes, including Howel-Henson syndromes, Li-Fraumeni syndrome, and Hereditary Breast and Ovarian Cancer (HBOC) syndrome. A detailed handout regarding these syndromes and the information we discussed was provided to Daniel at the end of our appointment today and can be found in the after visit summary.  Topics included: inheritance pattern, cancer risks, cancer screening recommendations, and also risks, benefits and limitations of testing.    We reviewed that at this time, there is limited information regarding hereditary causes of GE junction cancer. There is one syndrome called Howel-Henson syndrome (caused by mutations in the RHBDF2 gene) that increases risk for esophageal cancer (typically squamous cell), as well as oral leukoplakia (white patches) and palmar/plantar keratosis (thick skin of the palms of the hands and soles of the feet). As Daniel denied having any features of this condition and his cancer is not a squamous cell carcinoma, it is very unlikely that he has this syndrome.    Though there are no other known hereditary cancer syndromes specific for GE junction cancers, we reviewed that there are still some possible explanations for his personal and family history of cancer. For example, mutations in the TP53 gene cause Li-Fraumeni syndrome. Individuals with this syndrome are at significantly increased risk for many different types of cancer and are often diagnosed with their first cancer in their 30's. This may explain why he and his half sister was diagnosed with their early-onset cancers.  Their family history is otherwise not consistent with this syndrome, though, as their mother has never been diagnosed with a cancer and she is 78. His half sister's history may also be consistent with HBOC syndrome, which is caused by mutations in the BRCA1 and BRCA2 genes.     Based on his personal and family history, Daniel meets current National Comprehensive Cancer Network (NCCN) criteria for genetic testing of BRCA1 and BRCA2.      We discussed that there are also other additional genes that could cause increased risk for the cancers in Daniel's family. As many of these genes present with overlapping features in a family, it would be reasonable for Daniel to consider panel genetic testing to analyze multiple genes at once.    Daniel explained that he would be interested in gaining as much information as possible from the testing. As such, we discussed the CancerNext gene panel.    Genetic testing is available for 34 genes associated with hereditary cancer: CancerNext (APC, TIANA, BARD1, BRCA1, BRCA2, BRIP1, BMPR1A, CDH1, CDK4, CDKN2A, CHEK2, DICER1, EPCAM, GREM1, HOXB13, MLH1, MRE11A, MSH2, MSH6, MUTYH, NBN, NF1, PALB2, PMS2, POLD1, POLE, PTEN, RAD50, RAD51C, RAD51D, SMAD4, SMARCA4, STK11, and TP53).    We discussed that many of the genes in the CancerNext panel have known risks and published management guidelines.  Some genes are associated with specific hereditary cancer syndromes: HBOC syndrome (BRCA1, BRCA2), Forbes syndrome (MLH1, MSH2, MSH6, PMS2, EPCAM), Familial Adenomatous Polyposis (APC), Hereditary Diffuse Gastric Cancer (CDH1), Familial Atypical Multiple Mole Melanoma syndrome (CDK4, CDKN2A), Juvenile Polyposis syndrome (BMPR1A, SMAD4), Cowden syndrome (PTEN), Li Fraumeni syndrome (TP53), Peutz-Jeghers syndrome (STK11), MUTYH Associated Polyposis (MUTYH), and Neurofibromatosis type 1 (NF1).     The TIANA, BRIP1, CHEK2, GREM1, NBN , PALB2, POLD1, POLE, RAD51C, and RAD51D genes are associated with increased  cancer risk and have published management guidelines for certain cancers.      The remaining genes (BARD1, DICER1, HOXB13, MRE11A, RAD50, and SMARCA4) are associated with increased cancer risk and may allow us to make medical recommendations when mutations are identified.      Daniel was provided with a detailed brochure from [employer] explaining the CancerNext testing.    Consent was obtained and genetic testing for CancerNext was sent to [facility]. Turn around time: 4 weeks.       Medical Management: The information from genetic testing may determine additional cancer screening recommendations (i.e. more frequent colonoscopies, annual dermatologic exams, MRI-based screening, etc.) as well as options for risk reducing surgeries (i.e. surgery to remove the stomach and/or colon, etc.) for Daniel and his relatives. These recommendations will be discussed in detail once genetic testing is completed.    Plan:  1) Today Daniel elected to proceed with genetic testing using the CancerNext gene panel offered by [employer].  2) This information should be available in 4 weeks.  3) Daniel will return to clinic to discuss the results.    Face to face time: 45 minutes    Ashley Trejo MS, [facility]  Certified Genetic Counselor  Office: [phone]  Pager: [phone]

## 2017-06-16 NOTE — PROGRESS NOTES
"6/16/2017    Referring Provider: Laurence Hood MD    Presenting Information:   I met with Daniel Sandhu today for genetic counseling at the Cancer Risk Management Program at the [facility] to discuss his personal history of a gastroesophageal junction cancer and family history of breast, colon, and gynecologic cancer. He is here today to review this history, cancer screening recommendations, and available genetic testing options.    Personal History:  Daniel is a 36 year old male.  He was diagnosed with a moderately-poorly differentiated adenocarcinoma, intestinal type, of the gastroesophageal (GE) junction at age 36. Treatment will likely include chemotherapy, followed by surgery and additional chemotherapy.    He had one upper endoscopy in May 2017 to address dysphagia that identified this cancer. He has not had a colonoscopy. He reports having one large dark-colored birthmark on his arm, but denies having any other differences to his skin (including oral leukoplakia and thick skin of his palms or soles). He does not regularly do any other cancer screening at this time.  Daniel reported no tobacco use and that he has approximately one alcoholic beverage per day.    Family History: (Please see scanned pedigree for detailed family history information)    Daniel has one healthy 4 year old daughter and his wife is currently pregnant, due in July 2017.    One maternal half sister was diagnosed with breast cancer at age 28 and passed away at age 32; treatment included chemotherapy. No genetic testing was pursued.    One maternal first cousin was diagnosed with a \"superficial\" bladder cancer in his 40's.    One maternal great aunt (through his grandmother) was diagnosed with lung and brain cancer at an unknown age; she did not have a history of smoking. Her daughter passed away from an unknown gynecologic cancer in her 50's.    Daniel believes several extended maternal cousins (through his grandfather) have been " diagnosed with breast cancer; further details are limited.    Daniel's paternal grandmother was diagnosed with colon cancer at age 68 and passed away in her 70's; treatment is unknown.    Daniel's paternal great grandmother (through his grandmother) was diagnosed with colon cancer and passed away at age 54.    One paternal great uncle (through his grandfather) passed away from myeloma at age 89.    Daniel's paternal great grandfather (through his grandfather) may have been diagnosed with a stomach cancer before he passed away; details are unknown.    His maternal ethnicity is [place] and [place]. His paternal ethnicity is [place] and possibly  [place]. There is no reported consanguinity.    Discussion:    Daniel's personal history of a GE junction cancer and family history of multiple types of cancer is possibly suggestive of a hereditary cancer syndrome.    We reviewed the features of sporadic, familial, and hereditary cancers. In looking at Daniel's family history, it is possible that a cancer susceptibility gene is present as he was diagnosed with a GE junction cancer at a young age and without the typical risk factors (i.e. tobacco use). He also has several other relatives diagnosed with cancer, including his half sister under age 50 and paternal relatives diagnosed with gastrointestinal cancers. That being said, he also has multiple relatives that have never been diagnosed with a cancer, including both of his parents, all his aunts and uncles, and multiple grandparents.    We discussed the natural history and genetics of several hereditary cancer syndromes, including Howel-Henson syndromes, Li-Fraumeni syndrome, and Hereditary Breast and Ovarian Cancer (HBOC) syndrome. A detailed handout regarding these syndromes and the information we discussed was provided to Daniel at the end of our appointment today and can be found in the after visit summary.  Topics included: inheritance pattern, cancer risks, cancer  screening recommendations, and also risks, benefits and limitations of testing.    We reviewed that at this time, there is limited information regarding hereditary causes of GE junction cancer. There is one syndrome called Howel-Henson syndrome (caused by mutations in the RHBDF2 gene) that increases risk for esophageal cancer (typically squamous cell), as well as oral leukoplakia (white patches) and palmar/plantar keratosis (thick skin of the palms of the hands and soles of the feet). As Daniel denied having any features of this condition and his cancer is not a squamous cell carcinoma, it is very unlikely that he has this syndrome.    Though there are no other known hereditary cancer syndromes specific for GE junction cancers, we reviewed that there are still some possible explanations for his personal and family history of cancer. For example, mutations in the TP53 gene cause Li-Fraumeni syndrome. Individuals with this syndrome are at significantly increased risk for many different types of cancer and are often diagnosed with their first cancer in their 30's. This may explain why he and his half sister was diagnosed with their early-onset cancers. Their family history is otherwise not consistent with this syndrome, though, as their mother has never been diagnosed with a cancer and she is 78. His half sister's history may also be consistent with HBOC syndrome, which is caused by mutations in the BRCA1 and BRCA2 genes.     Based on his personal and family history, Daniel meets current National Comprehensive Cancer Network (NCCN) criteria for genetic testing of BRCA1 and BRCA2.      We discussed that there are also other additional genes that could cause increased risk for the cancers in Daniel's family. As many of these genes present with overlapping features in a family, it would be reasonable for Daniel to consider panel genetic testing to analyze multiple genes at once.    Daniel explained that he would be interested  in gaining as much information as possible from the testing. As such, we discussed the CancerNext gene panel.    Genetic testing is available for 34 genes associated with hereditary cancer: CancerNext (APC, TIANA, BARD1, BRCA1, BRCA2, BRIP1, BMPR1A, CDH1, CDK4, CDKN2A, CHEK2, DICER1, EPCAM, GREM1, HOXB13, MLH1, MRE11A, MSH2, MSH6, MUTYH, NBN, NF1, PALB2, PMS2, POLD1, POLE, PTEN, RAD50, RAD51C, RAD51D, SMAD4, SMARCA4, STK11, and TP53).    We discussed that many of the genes in the CancerNext panel have known risks and published management guidelines.  Some genes are associated with specific hereditary cancer syndromes: HBOC syndrome (BRCA1, BRCA2), Forbes syndrome (MLH1, MSH2, MSH6, PMS2, EPCAM), Familial Adenomatous Polyposis (APC), Hereditary Diffuse Gastric Cancer (CDH1), Familial Atypical Multiple Mole Melanoma syndrome (CDK4, CDKN2A), Juvenile Polyposis syndrome (BMPR1A, SMAD4), Cowden syndrome (PTEN), Li Fraumeni syndrome (TP53), Peutz-Jeghers syndrome (STK11), MUTYH Associated Polyposis (MUTYH), and Neurofibromatosis type 1 (NF1).     The TIANA, BRIP1, CHEK2, GREM1, NBN , PALB2, POLD1, POLE, RAD51C, and RAD51D genes are associated with increased cancer risk and have published management guidelines for certain cancers.      The remaining genes (BARD1, DICER1, HOXB13, MRE11A, RAD50, and SMARCA4) are associated with increased cancer risk and may allow us to make medical recommendations when mutations are identified.      Daniel was provided with a detailed brochure from [employer] explaining the CancerNext testing.    Consent was obtained and genetic testing for CancerNext was sent to [facility]. Turn around time: 4 weeks.       Medical Management: The information from genetic testing may determine additional cancer screening recommendations (i.e. more frequent colonoscopies, annual dermatologic exams, MRI-based screening, etc.) as well as options for risk reducing surgeries (i.e. surgery to remove  the stomach and/or colon, etc.) for Daniel and his relatives. These recommendations will be discussed in detail once genetic testing is completed.    Plan:  1) Today Daniel elected to proceed with genetic testing using the CancerNext gene panel offered by [employer].  2) This information should be available in 4 weeks.  3) Daniel will return to clinic to discuss the results.    Face to face time: 45 minutes    Ashley Trejo MS, [facility]  Certified Genetic Counselor  Office: [phone]  Pager: [phone]

## 2017-06-16 NOTE — LETTER
"6/16/2017       RE: Daniel Sandhu  [facility] [facility] [address]     Dear Colleague,    Thank you for referring your patient, Daniel Sandhu, to the [facility]. Please see a copy of my visit note below.    6/16/2017    Referring Provider: Laurence Hood MD    Presenting Information:   I met with Daniel Sandhu today for genetic counseling at the Cancer Risk Management Program at the [facility] to discuss his personal history of a gastroesophageal junction cancer and family history of breast, colon, and gynecologic cancer. He is here today to review this history, cancer screening recommendations, and available genetic testing options.    Personal History:  Daniel is a 36 year old male.  He was diagnosed with a moderately-poorly differentiated adenocarcinoma, intestinal type, of the gastroesophageal (GE) junction at age 36. Treatment will likely include chemotherapy, followed by surgery and additional chemotherapy.    He had one upper endoscopy in May 2017 to address dysphagia that identified this cancer. He has not had a colonoscopy. He reports having one large dark-colored birthmark on his arm, but denies having any other differences to his skin (including oral leukoplakia and thick skin of his palms or soles). He does not regularly do any other cancer screening at this time.  Daniel reported no tobacco use and that he has approximately one alcoholic beverage per day.    Family History: (Please see scanned pedigree for detailed family history information)    Daniel has one healthy 4 year old daughter and his wife is currently pregnant, due in July 2017.    One maternal half sister was diagnosed with breast cancer at age 28 and passed away at age 32; treatment included chemotherapy. No genetic testing was pursued.    One maternal first cousin was diagnosed with a \"superficial\" bladder cancer in his 40's.    One maternal great aunt (through his grandmother) was " diagnosed with lung and brain cancer at an unknown age; she did not have a history of smoking. Her daughter passed away from an unknown gynecologic cancer in her 50's.    Daniel believes several extended maternal cousins (through his grandfather) have been diagnosed with breast cancer; further details are limited.    Daniel's paternal grandmother was diagnosed with colon cancer at age 68 and passed away in her 70's; treatment is unknown.    Daniel's paternal great grandmother (through his grandmother) was diagnosed with colon cancer and passed away at age 54.    One paternal great uncle (through his grandfather) passed away from myeloma at age 89.    Daniel's paternal great grandfather (through his grandfather) may have been diagnosed with a stomach cancer before he passed away; details are unknown.    His maternal ethnicity is [place] and [place]. His paternal ethnicity is [place] and possibly  [place]nostic[place]. There is no reported consanguinity.    Discussion:    Daniel's personal history of a GE junction cancer and family history of multiple types of cancer is possibly suggestive of a hereditary cancer syndrome.    We reviewed the features of sporadic, familial, and hereditary cancers. In looking at Daniel's family history, it is possible that a cancer susceptibility gene is present as he was diagnosed with a GE junction cancer at a young age and without the typical risk factors (i.e. tobacco use). He also has several other relatives diagnosed with cancer, including his half sister under age 50 and paternal relatives diagnosed with gastrointestinal cancers. That being said, he also has multiple relatives that have never been diagnosed with a cancer, including both of his parents, all his aunts and uncles, and multiple grandparents.    We discussed the natural history and genetics of several hereditary cancer syndromes, including Howel-Henson syndromes, Li-Fraumeni syndrome, and Hereditary Breast and Ovarian Cancer  (HBOC) syndrome. A detailed handout regarding these syndromes and the information we discussed was provided to Daniel at the end of our appointment today and can be found in the after visit summary.  Topics included: inheritance pattern, cancer risks, cancer screening recommendations, and also risks, benefits and limitations of testing.    We reviewed that at this time, there is limited information regarding hereditary causes of GE junction cancer. There is one syndrome called Howel-Henson syndrome (caused by mutations in the RHBDF2 gene) that increases risk for esophageal cancer (typically squamous cell), as well as oral leukoplakia (white patches) and palmar/plantar keratosis (thick skin of the palms of the hands and soles of the feet). As Daniel denied having any features of this condition and his cancer is not a squamous cell carcinoma, it is very unlikely that he has this syndrome.    Though there are no other known hereditary cancer syndromes specific for GE junction cancers, we reviewed that there are still some possible explanations for his personal and family history of cancer. For example, mutations in the TP53 gene cause Li-Fraumeni syndrome. Individuals with this syndrome are at significantly increased risk for many different types of cancer and are often diagnosed with their first cancer in their 30's. This may explain why he and his half sister was diagnosed with their early-onset cancers. Their family history is otherwise not consistent with this syndrome, though, as their mother has never been diagnosed with a cancer and she is 78. His half sister's history may also be consistent with HBOC syndrome, which is caused by mutations in the BRCA1 and BRCA2 genes.     Based on his personal and family history, Daniel meets current National Comprehensive Cancer Network (NCCN) criteria for genetic testing of BRCA1 and BRCA2.      We discussed that there are also other additional genes that could cause increased  risk for the cancers in Daniel's family. As many of these genes present with overlapping features in a family, it would be reasonable for Daniel to consider panel genetic testing to analyze multiple genes at once.    Daniel explained that he would be interested in gaining as much information as possible from the testing. As such, we discussed the CancerNext gene panel.    Genetic testing is available for 34 genes associated with hereditary cancer: CancerNext (APC, TIANA, BARD1, BRCA1, BRCA2, BRIP1, BMPR1A, CDH1, CDK4, CDKN2A, CHEK2, DICER1, EPCAM, GREM1, HOXB13, MLH1, MRE11A, MSH2, MSH6, MUTYH, NBN, NF1, PALB2, PMS2, POLD1, POLE, PTEN, RAD50, RAD51C, RAD51D, SMAD4, SMARCA4, STK11, and TP53).    We discussed that many of the genes in the CancerNext panel have known risks and published management guidelines.  Some genes are associated with specific hereditary cancer syndromes: HBOC syndrome (BRCA1, BRCA2), Forbes syndrome (MLH1, MSH2, MSH6, PMS2, EPCAM), Familial Adenomatous Polyposis (APC), Hereditary Diffuse Gastric Cancer (CDH1), Familial Atypical Multiple Mole Melanoma syndrome (CDK4, CDKN2A), Juvenile Polyposis syndrome (BMPR1A, SMAD4), Cowden syndrome (PTEN), Li Fraumeni syndrome (TP53), Peutz-Jeghers syndrome (STK11), MUTYH Associated Polyposis (MUTYH), and Neurofibromatosis type 1 (NF1).     The TIANA, BRIP1, CHEK2, GREM1, NBN , PALB2, POLD1, POLE, RAD51C, and RAD51D genes are associated with increased cancer risk and have published management guidelines for certain cancers.      The remaining genes (BARD1, DICER1, HOXB13, MRE11A, RAD50, and SMARCA4) are associated with increased cancer risk and may allow us to make medical recommendations when mutations are identified.      Daniel was provided with a detailed brochure from [employer] explaining the CancerNext testing.    Consent was obtained and genetic testing for CancerNext was sent to [facility]. Turn around time: 4 weeks.       Medical  Management: The information from genetic testing may determine additional cancer screening recommendations (i.e. more frequent colonoscopies, annual dermatologic exams, MRI-based screening, etc.) as well as options for risk reducing surgeries (i.e. surgery to remove the stomach and/or colon, etc.) for Daniel and his relatives. These recommendations will be discussed in detail once genetic testing is completed.    Plan:  1) Today Daniel elected to proceed with genetic testing using the CancerNext gene panel offered by [employer]  2) This information should be available in 4 weeks.  3) Daniel will return to clinic to discuss the results.    Face to face time: 45 minutes    Ashley Trejo MS, [facility]  Certified Genetic Counselor  Office: [phone]  Pager: [phone]

## 2017-06-16 NOTE — MR AVS SNAPSHOT
After Visit Summary   6/16/2017    Daniel Sandhu    MRN: [id number]           Patient Information     Date Of Birth          1980        Visit Information        Provider Department      6/16/2017 12:00 PM Ashley Trejo GC;  [address] CONSULT [facility]        Today's Diagnoses     GE junction carcinoma (H)    -  1    Family history of malignant neoplasm of breast        Family history of colon cancer          Care Instructions      Assessing Cancer Risk  Only about 5-10% of cancers are thought to be due to an inherited cancer susceptibility gene.    These families often have:    Several people with the same or related types of cancer    Cancers diagnosed at a young age (before age 50)    Individuals with more than one primary cancer    Multiple generations of the family affected with cancer    Some people may be candidates for genetic testing of more than one gene.  For these families, genetic testing using a multi-gene cancer panel may be offered.  These panels may test genes known to increase the risk for breast (and other) cancers: TIANA, BRCA1, BRCA2, CDH1, CHEK2, PALB2, PTEN, and TP53.  The purpose of this handout is to serve as a brief summary of the high/moderate-risk breast cancer genes which have published clinical management guidelines for individuals who are found to carry a mutation.    Hereditary Breast and Ovarian Cancer Syndrome (HBOC)  A single mutation in one of the copies of BRCA1 or BRCA2 increases the risk for breast and ovarian cancer, among others.  The risk for pancreatic cancer and melanoma may also be slightly increased in some families.  The tables below list the chance that someone with a BRCA mutation would develop cancer in his or her lifetime1,2,3,4.          Women   Men     General Population BRCA+    General Population BRCA+   Breast  12% 40-80%  Breast <1% ~7%   Ovarian  1-2% 10-40%  Prostate 16% 20%     A person s ethnic background is also  important to consider, as individuals of Ashkenazi Christian ancestry have a higher chance of having a BRCA gene mutation.  There are three BRCA mutations that occur more frequently in this population.    Li-Fraumeni Syndrome (LFS)  LFS is a cancer predisposition syndrome. Individuals with LFS are at an increased risk for developing cancer at a young age. The general lifetime risk for development of cancer is 50% by age 30 and 90% by age 60.  LFS is caused by a mutation in the TP53 gene.  A single mutation in one of the copies of TP53 increases the risk for multiple cancers.     Core Cancers: Sarcomas, Breast, Brain, Lung, Leukemias/Lymphomas, Adrenocortical carcinomas  Other Cancers: Gastrointestinal, Thyroid, Skin, Genitourinary        Cowden Syndrome  Cowden syndrome is a hereditary condition that increases the risk for breast, thyroid, endometrial, and kidney cancer.  Cowden syndrome is caused by a mutation in the PTEN gene.  A single mutation in one of the copies of PTEN causes Cowden syndrome and increases cancer risk.  The table below shows the chance that someone with a PTEN mutation would develop cancer in their lifetime5,6.  Other benign features seen in some individuals with Cowden syndrome include benign skin lesions (facial papules, keratoses, lipomas), learning disability, autism, thyroid nodules, colon polyps, and larger head size.      Lifetime Cancer Risk   Cancer Type General Population Cowden Syndrome   Breast  12% 25-50%*   Thyroid  1% 35%   Renal 1-2% 35%   Endometrial  2-3% 28%   Colon 5% 9%   Melanoma 2-3% >5%     *One recent study found breast cancer risk to be increased to 85%    Hereditary Diffuse Gastric Cancer (HDGC)  Currently, one gene is known to cause hereditary diffuse gastric cancer: CDH1.  Individuals with HDGC are at increased risk for diffuse gastric cancer and lobular breast cancer. Of people diagnosed with HDGC, 30-50% have a mutation in the CDH1 gene.  This suggests there are  likely other genes that may cause HDGC that have not been identified yet.      Lifetime Cancer Risks    General Pop HDGC    Diffuse Gastric  <1% ~80%   Breast 12% 39-52%     Additional Genes Associated with Increased Breast Cancer Risk  PALB2  Mutations in PALB2 have been shown to increase the risk of breast cancer up to 33-58% in some families; where individuals fall within this risk range is dependent upon family history.9 PALB2 mutations have also been associated with increased risk for pancreatic cancer, although this risk has not been quantified yet.  Individuals who inherit two PALB2 mutations--one from their mother and one from their father--have a condition called Fanconi Anemia.  This rare autosomal recessive condition is associated with short stature, developmental delay, bone marrow failure, and increased risk for childhood cancers.    TIANA  TIANA is a moderate-risk breast cancer gene. Women who have a mutation in TIANA can have between a 2-4 fold increased risk for breast cancer compared to the general population.10  TIANA mutations have also been associated with increased risk for pancreatic cancer, however an estimate of this cancer risk is not well understood.11  Individuals who inherit two TIANA mutations have a condition called ataxia-telangiectasia (AT).  This rare autosomal recessive condition affects the nervous system and immune system, and is associated with progressive cerebellar ataxia beginning in childhood.  Individuals with ataxia-telangiectasia often have a weakened immune system and have an increased risk for childhood cancers.           CHEK2   CHEK2 is a moderate-risk breast cancer gene.  Women who have a mutation in CHEK2 have around a 2-fold increased risk for breast cancer compared to the general population, and this risk may be higher depending upon family history.12,13,14  Mutations in CHEK2 have also been shown to increase the risk of a number of other cancers, including colon and  prostate, however these cancer risks are currently not well understood.         Inheritance   All of the genes reviewed above are inherited in an autosomal dominant pattern.  This means that if a parent has a mutation, each of his or her children will have a 50% chance of inheriting that same mutation.  Therefore, each child--male or female--would have a 50% chance of being at increased risk for developing cancer.      Image obtained from Genetics Home Reference, 2013     Mutations in some genes can occur de sarah, which means that a person s mutation occurred for the first time in them and was not inherited from a parent.  Now that they have the mutation, however, it can be passed on to future generations.    Genetic Testing  Genetic testing involves a blood test and will look for any harmful mutations within genes that are associated with increased cancer risk.  If possible, it is recommended that the person(s) who has had cancer be tested before other family members.  That person will give us the most useful information about whether or not a specific gene is associated with the cancer in the family.    Results  There are three possible results of genetic testing:    Positive--a harmful mutation was identified in one or more of the genes    Negative--no mutation was identified in any of the genes on this panel    Variant of unknown significance--a variation in one of the genes was identified, but it is unclear how this impacts cancer risk in the family    Advantages and Disadvantages  There are advantages and disadvantages to genetic testing.  Advantages    May clarify your cancer risk    Can help you make medical decisions    May explain the cancers in your family    May give useful information to your family members (if you share your results)    Disadvantages    Possible negative emotional impact of learning about inherited cancer risk    Uncertainty in interpreting a negative test result in some  situations    Possible genetic discrimination concerns (see below)    Genetic Information Nondiscrimination Act (SHANELL)  SHANELL is a federal law that protects individuals from health insurance or employment discrimination based on a genetic test result alone.  Although rare, there are currently no legal discrimination protections in terms of life insurance, long term care, or disability insurances.  Visit the National Human [employer] Ceres genome.gov/30639017 to learn more.    Reducing Cancer Risk  Each of the genes listed within this handout have nationally recognized cancer screening guidelines that would be recommended for individuals who test positive.  In addition to increased cancer screening, surgeries may be offered or recommended to reduce cancer risk.  Recommendations are based upon an individual s genetic test result as well as their personal and family history of cancer.    Questions to Think About Regarding Genetic Testing    What effect will the test result have on me and my relationship with my family members if I have an inherited gene mutation?  If I don t have a gene mutation?    Should I share my test results, and how will my family react to this news, which may also affect them?    Are my children ready to learn new information that may one day affect their own health?      Resources  FORCE: Facing Our Risk of Cancer Empowered facingourrisk.org   Bright Pink bebrightpink.org   Li-Fraumeni Syndrome Association lfsassociation.org   PTEN World [email or web address].[email or web address]   No stomach for cancer, Inc. nostomachforcancer.org   Stomach cancer relief network scrnet.org   Collaborative Group of the Americas on Inherited Colorectal Cancer (CGA) cgaicc.com    Cancer Care cancercare.org   American Cancer Society (ACS) cancer.org   National Cancer Ceres (NCI) cancer.gov     Cancer Risk Management Program [phone] ([phone])  ? Francisca Do MS, [facility]  [phone]  ? Violeta Al MS,  [facility]  [phone]  ? Ashley Trejo, MS, [facility]  [phone]  ? Merlene Ochoa, MS, [facility]  [phone]    References  1. Bernice العراقي PDP, John S, Lolita BOO, Nas JE, Tiffanie JL, Jeffrey N, Kristi H, Dex O, Hernando A, Hoseaini B, Radipavel P, Manlev S, Karl DM, Quiroz N, Shannon E, Dione H, Corbin E, Laura J, Danika J, Bekah B, Tulinius H, Thorlacius S, Eerola H, Nevmarissa H, Nicky K, Yeny OP. Average risks of breast and ovarian cancer associated with BRCA1 or BRCA2 mutations detected in case series unselected for family history: a combined analysis of 222 studies. Am J Hum Shasta. 2003;72:1117-30.  2. Ran N, iN M, Uzma G.  BRCA1 and BRCA2 Hereditary Breast and Ovarian Cancer. Gene Reviews online. 2013.  3. Espinoza YC, Ted S, Ellie G, Colon S. Breast cancer risk among male BRCA1 and BRCA2 mutation carriers. J Natl Cancer Inst. 2007;99:1811-4.  4. Sixto DG, Laisha I, Rachid J, Rubia E, Almaz ER, Jax F. Risk of breast cancer in male BRCA2 carriers. J Med Shasta. 2010;47:710-1.  5. Ino MH, Pollo J, Lisa J, Alley LA, Genesis MS, Eng C. Lifetime cancer risks in individuals with germline PTEN mutations. Clin Cancer Res. 2012;18:400-7.  6. Pilelvaki R. Cowden Syndrome: A Critical Review of the Clinical Literature. J Shasta . 2009:18:13-27.  7. National Comprehensive Cancer Network. Clinical practice guidelines in oncology, colorectal cancer screening. Available online (registration required). 2013.  8. National Cancer [place]. SEER Cancer Stat Fact Sheets.  December 2013.  9. Rogelio EAST et al. Breast-Cancer Risk in Families with Mutations in PALB2. NEJM. 2014; 371(6):497-506.  10. Elizabeth ESPINOZA, Rick D, Marissa S, Claudette P, Xiomara T, Dora M, Jacinto B, Cecile H, Erick R, Zee K, Isabel L, Sixto SERRANO, Karl D, Adan DF, Gamal DALE, The Breast Cancer Susceptibility Collaboration (UK) & Hernandez MCDERMOTT. TIANA mutations that cause ataxia-telangiectasia are breast  cancer susceptibility alleles. Nature Genetics. 2006;38:873-875  11. Calixto N , Harish Y, Lucille J, Gurvinder L, Sara GM , Tiki ML, Janice S, Saavedra AG, Syngal S, Alan ML, Tom J , Sheila R, Ingris SZ, Yashira JR, Thomas VE, Mohinder M, Vozeeshanstein B, Vee N, Patti RH, Ant KW, and Brian AP. TIANA mutations in patients with hereditary pancreatic cancer. Cancer Discover. 2012;2:41-46  12. CHEK2 Breast Cancer Case-Control Consortium. CHEK2*1100delC and susceptibility to breast cancer: A collaborative analysis involving 10,860 breast cancer cases and 9,065 controls from 10 studies. Am J Hum [place]asta, 74 (2004), pp. 5618-7978  13. John T, Ronny S, Belgica K, et al. Spectrum of Mutations in BRCA1, BRCA2, CHEK2, and TP53 in Families at High Risk of Breast Cancer. SKINNY. 2006;295(12):0990-6451.   14. Elizabeth C, Sanjay D, Abi A, et al. Risk of breast cancer in women with a CHEK2 mutation with and without a family history of breast cancer. J Clin Oncol. 2011;29:2900-4238    Assessing Cancer Risk  Only about 5-10% of cancers are thought to be due to an inherited cancer susceptibility gene.    These families often have:  ? Several people with the same or related types of cancer  ? Cancers diagnosed at a young age (before age 50)  ? Individuals with more than one primary cancer  ? Multiple generations of the family affected with cancer    Comprehensive Colon Cancer Panel  We each inherit two copies of every gene in our bodies: one from our mother, and one from our father.  Each gene has a specific job to do.  When a gene has a mistake or  mutation  in it, it does not work like it should.      This handout will review common hereditary colon cancer syndromes, and other genes related to an increased risk for colon cancer.  The genes that will be discussed in this handout are: APC, BMPR1A, CDH1, CHEK2, EPCAM, GREM1, MLH1, MSH2, MSH6, MUTYH, PMS2, POLD1, POLE, PTEN, SMAD4, STK11, and TP53.  These  genes are clinically actionable, meaning there are published guidelines for cancer screening and management for individuals who are found to carry mutations in these genes. Inheriting a mutation does not mean a person will develop cancer, but it does significantly increase his or her risk above the general population risk.      Familial Adenomatous Polyposis (FAP)  FAP is a hereditary cancer syndrome caused by mutations in the APC gene. The condition is known to cause hundreds to thousands of adenomatous polyps in the colon, creating a  carpet  of polyps. Some individuals have what is called attenuated FAP (AFAP), a milder form of FAP with fewer polyps and typically later onset. Individuals with an APC gene mutation are at an increased risk for colon, thyroid, and duodenal cancers, as well as several other types of cancer1.  Other features of this condition may include: osteomas, dental anomalies, benign skin lesions, CHRPE ( freckle  on the inside of the eye), and desmoid tumors.      Lifetime Cancer Risks     Cancer Type General Population FAP   Colon  5% near 100%   Thyroid (papillary) 1% 1-12%   Duodenal <1% 5%   Liver  <1% 1-2% before age 5   Pancreas <1% 1%   Stomach <1% 1%       Juvenile Polyposis Syndrome (JPS)  JPS is characterized by hamartomatous polyps, called juvenile polyps, in the gastrointestinal tract.  Juvenile  refers to the type of polyps seen in this hereditary cancer condition, not the age of onset. Currently, mutations in two genes are known to cause JPS: BMPR1A and SMAD4. Of individuals clinically diagnosed with JPS, only 40% have an identifiable mutation in one of these genes, suggesting there are other genes that cause JPS that have not been discovered yet. Individuals with JPS are at an increased risk for colon cancer and stomach cancer 2,3,4. Pancreatic and small bowel cancers have also been reported in JPS, but the actual risks are unknown.         Lifetime Cancer Risks    Cancer Type  General Population JPS   Colon 5% 40-50%   Gastric/Duodenal <1% 10-21%     Some individuals with SMAD4 mutations have a condition called JPS/HHT (Juvenile Polyposis/Hereditary Hemorrhagic Telangiectasia) where in addition to JPS, individuals may have nose bleeds and clotting issues.     Hereditary Diffuse Gastric Cancer (HDGC)  Currently, mutations in one gene are known to cause Hereditary Diffuse Gastric Cancer: CDH1.  Individuals with HDGC are at increased risk for diffuse gastric cancer and lobular breast cancer. Of people diagnosed with HDGC, 30-50% have a mutation in the CDH1 gene.  This suggests there are likely mutations in other genes that may cause HDGC that have not been identified yet. Individuals with HDGC may also be at increased risk for colon cancer.      Lifetime Cancer Risks    Cancer Type General Population HDGC   Diffuse Gastric <1% 67-83%   Breast 12% 39-52%     Forbes syndrome  Mutations in five different genes are known to cause Forbes syndrome: MLH1, MSH2, MSH6, PMS2, and EPCAM. Individuals with Forbes syndrome have an increased risk for colon, uterine, ovarian, small bowel, stomach, urinary tract, and brain cancer, as well as several other types of cancer. The exact lifetime cancer risks are dependent upon the gene in which the mutation was identified.      Lifetime Cancer Risks    Cancer Type General Population Forbes syndrome   Colon 5% 10-80%   Uterine 2-3% 15-60%   Stomach <1% 6-13%   Ovarian 2% 4-24%   Urinary tract <1% 1-7%   Hepatobiliary tract <1% 1-4%   Small bowel <1% 3-6%   Brain/CNS <1% 1-3%   Pancreas <1% 1-6%       MUTYH-Associated Polyposis (MAP)  MAP is a hereditary cancer syndrome caused by mutations in the MUTYH gene. Unlike the other hereditary cancer genes discussed in this handout, two mutations in the MUTYH gene cause MAP and increase cancer risk. Those affected with MAP typically have between  adenomatous polyps. This syndrome also increases the risk for colon and  duodenal cancer. Current research suggests that other cancers may be associated with MUTYH mutations, as well. The table below includes the risk that someone with two MUTYH gene mutations would develop cancer in their lifetime; of note, there is also an increased colon cancer risk for individuals who carry only one MUTYH gene mutation5,6,7.       Lifetime Cancer Risks    Cancer Type General Population MAP   Colon 5% %   Duodenal  <1% 5%       Cowden syndrome  Cowden syndrome is a hereditary condition that increases the risk for breast, thyroid, endometrial, colon, and kidney cancer.  A single mutation in the PTEN gene causes Cowden syndrome and increases cancer risk.  The table below shows the chance that someone with a PTEN mutation would develop cancer in their lifetime8,9.  Other benign features seen in some individuals with Cowden syndrome include benign skin lesions (facial papules, keratoses, lipomas), learning disabilities, autism, thyroid nodules, hamartomatous colon polyps, and larger head size.      Lifetime Cancer Risks   Cancer Type General Population Cowden Syndrome   Breast 12% 25-50%*   Thyroid 1% 35%   Renal 1-2% 35%   Endometrial 2-3% 28%   Colon 5% 9%   Melanoma 2-3% >5%     *One recent study found breast cancer risk to be increased to 85%    Peutz-Jeghers syndrome (PJS)  PJS is a hereditary cancer syndrome caused by mutations in the STK11 gene. This condition can be distinguished from other hereditary syndromes by the presence of hamartomatous polyps in the gastrointestinal tract and freckles present in unusual places such as the hands, feet, neck, and lips. Individuals with Peutz-Jeghers syndrome have an increased risk for colon, breast, pancreatic, and other cancers3.  Men are at risk for testicular tumors which can affect hormones in the body. Women are at risk for sex cord tumors of the ovaries and a rare aggressive type of cervical cancer.     Lifetime Cancer Risks    Cancer Type  General Population PJS   Breast 12% 45-50%   Colon 5% 39%   Stomach <1% 29%   Pancreas 1.5% 11-36%   Small Intestine <1% 13%     Ovarian  2%  18%   Lung 6-7% 15-17%     Additional Genes Associated with Increased Colon Cancer Risk  CHEK2  CHEK2 is a moderate-risk breast cancer gene.  Women who have a mutation in CHEK2 have around a 2-fold increased risk for breast cancer compared to the general population, and this risk may be higher depending upon family history.12,13,14.  Mutations in CHEK2 have also been shown to increase the risk of a number of other cancers, including colon and prostate, however these cancer risks are currently not well understood.     GREM1  GREM1 is a moderate-risk colon polyposis gene. Duplications of this gene are more commonly found in individuals with Ashkenazi Latter-day [id number]. Mutations in GREM1 are associated with colon polyps and therefore an increased risk of colon cancer; however the estimated cancer risk is not well [id number].     POLD1 and POLE  POLD1 and POLE are moderate-risk colon cancer genes. Carriers of a mutation in one of these genes increases the lifetime risk of colorectal [id number],18,19,20. Mutations in these genes may also be associated with increased risk for other cancers including: endometrial cancer, duodenal adenomas and carcinomas, and brain tumors.    TP53  Li Fraumeni syndrome (LFS) is a hereditary cancer predisposition syndrome. LFS is caused by a mutation in the TP53 gene. A single mutation in one of the copies of TP53 increases the risk for multiple cancers. Individuals with LFS are at an increased risk for developing cancer at a young age. The general lifetime risk for development of cancer is 50% by age 30 and 90% by age 60.      Core Cancers: Sarcomas, Breast, Brain, Lung, Leukemias/Lymphomas, Adrenocortical carcinomas  Other Cancers: Gastrointestinal, Thyroid, Skin, Genitourinary    Genetic Testing  Genetic testing involves a simple blood test and  will look at the genetic information in genes associated with an increased risk of colon cancer. The tests look for any harmful mutations that are associated with increased cancer risk.  If possible, it is recommended that the person(s) who has had cancer be tested before other family members.  That person will give us the most useful information about whether or not a specific gene mutation is associated with the cancer in the family.     Results  There are three possible results from genetic testing:  ? Positive--a harmful mutation was identified  ? Negative--no mutation was identified  ? Variant of unknown significance--a variation in one of the genes was identified, but it is unclear how this impacts cancer risk in the family  Advantages and Disadvantages  There are advantages and disadvantages to genetic testing of these genes.    Advantages  ? May clarify your cancer risk  ? Can help you make medical decisions  ? May explain the cancers in your family  ? May give useful information to your family members (if you share your results)    Disadvantages  ? Possible negative emotional impact of learning about inherited cancer risk  ? Uncertainty in interpreting a negative test result in some situations  ? Possible genetic discrimination concerns (see below)    Inheritance   Most mutations in the genes outlined above are inherited in an autosomal dominant pattern.  This means that if a parent has a mutation, each of his or her children will have a 50% chance of inheriting that same mutation.  Therefore, each child--male or female--would have a 50% chance of being at increased risk for developing cancer.                                            Image obtained from Genetics Home Reference, 2013     In the case of MUTYH-Associated Polyposis (MAP) this hereditary cancer syndrome is inherited in an autosomal recessive pattern. This means that each parent of an individual with MAP is a carrier of MAP, meaning that they  have only one mutation in MUTYH. They still have one functioning copy of their gene.  Carriers are at a slightly higher risk for colon cancer than the general population. If each parent is a carrier for MAP, they have a 25% of having a child who is affected with MAP, meaning the child inherited both gene mutations - one from each parent.       Image obtained from Genetics Home Reference, 2016    Genetic Information Nondiscrimination Act (SHANELL)  SHANELL is a federal law that protects individuals from health insurance or employment discrimination based on a genetic test result alone.  Although rare, there are currently no legal protections in terms of life insurance, long term care, or disability insurances.  Visit the National Human Genome Research [place] at Genome.gov/63589931 to learn more.    Reducing Cancer Risk  Each of the genes listed within this handout have nationally recognized cancer screening guidelines that would be recommended for individuals who test positive.  In addition to increased cancer screening, surgeries may be offered or recommended to reduce cancer risk in certain cases.  Recommendations are based upon an individual s genetic test result as well as their personal and family history of cancer.    Questions to Think About Regarding Genetic Testing  ? What effect will the test result have on me and my relationship with my family members if I have an inherited gene mutation?  If I don t have a gene mutation?  ? Should I share my test results, and how will my family react to this news, which may also affect them?  ? Are my children ready to learn new information that may one day affect their own health?    Resources    [facility] [employer]   No Stomach for Cancer, Inc. [email or web address]   Stomach Cancer Relief Network scrnet.org   Collaborative Group of the Americas on Inherited Colorectal Cancer (CGA) cgaicc.com   Cancer Care [email or web address]   American Cancer Society (ACS) cancer.org    National Cancer [place] (NCI) cancer.org   Forbes Syndrome International [email or web address]       Please call us if you have any questions or concerns.     Cancer Risk Management Program [phone]-[facility] ([phone])  ? Francisca Do, MS, [facility]  [phone]  ? Violeta Al, MS, [facility]  [phone]  ? Ashley Trejo, MS, [facility]  [phone]  ? Merlene Ochoa, MS, [facility]  [phone]  ? Fanny Hathaway, MS, [facility]  [phone]    References    1. Shalom TURCIOS, Carisa J, Erwin G, Domitila E, Nani J, et al. The Prevalence of thyroid cancer and benign thyroid disease in patients with familial adenomatous polyposis may be higher than previously recognized. Clin Colorectal Cancer. 2012;11:304-308.  2. Sammi L, Van Dk A, Tello L, Robert C, Kobe K, et al. Risk of colorectal cancer in juvenile polyposis. Gut. 2007;56:965-967.  3. Marino FG, Armin MN, Neal CA. Colorectal cancer risk in hamartomatous polyposis syndromes. World Journal of Gastrointestinal Surgery. 2015;27:25-32  4. Chuck MG. Guidance on gastrointestinal surveillance for hereditary non-polyposis colorectal cancer, familial adenomatous polypolis, juvenile polyposis, and Peutz-Jeghers syndrome. Gut. 2002;51:21-27.  5. Quoc AK, Daniel ELIGIO, Tiffany JG et al. Risk of extracolonic cancers for people with biallelic and monoallelic mutations in MUTYH. Int J of Cancer. 2016;139:1874-4601.  6. Gelacio S, Marbellaljacquie S, Godilip H, Cheikh K, Crowley M, et al. MUTYH-associated polyposis: 70 of 71 patients with biallelic mutations present with an attenuated or atypical phenotype. Int J of Cancer. 2006;119:807-814.  7. Nelly GALVEZ, Ashish F, Anthony I, Madhu M, Nelly H, et al. MUTYH mutation carriers have increased breast cancer risk. Cancer. 2012;6479-1984.  8. Ino COLLINS, Pollo J, Lisa J, Alley LA, Genesis MS, Eng C. Lifetime cancer risks in individuals with germline PTEN mutations. Clin Cancer Res. 2012;18:400-7.  9. Nelsy CHOU. Cowden Syndrome: A Critical Review of  the Clinical Literature. J [place] . 2009:18:13-27.  10. National Comprehensive Cancer Network. Clinical practice guidelines in oncology, colorectal cancer screening. Available online (registration required). 2013.  11. National Cancer [place]. SEER Cancer Stat Fact Sheets.  December 2013.  12. CHEK2 Breast Cancer Case-Control Consortium. CHEK2*1100delC and susceptibility to breast cancer: A collaborative analysis involving 10,860 breast cancer cases and 9,065 controls from 10 studies. Am J Hum [place], 74 (2004), pp. 2607-8323  13. John T, Ronny S, Belgica K, et al. Spectrum of Mutations in BRCA1, BRCA2, CHEK2, and TP53 in Families at High Risk of Breast Cancer. SKINNY. 2006;295(12):3221-2288.  14. Elizabeth ROMAN, Sanjay D, Abi ESPINOZA, et al. Risk of breast cancer in women with a CHEK2 mutation with and without a family history of breast cancer. J Clin Oncol. 2011;29:9406-0296.  15. Marcella MONTIEL, Garrett E, Abdi J, Tres N, Gurdeep M et al. Defining the polyposis/colorectal cancer phenotype associated with the GREM1 duplication: counseling and management guidelines. [place] .Res. 2016;98:1-5.  16. Afshin TURCIOS, Minh S, Jeramy A, Juan Carlos Sanders, et al. Hereditary mixed polyposis syndrome is caused by a 40kb upstream duplication that leads to increased and ectopic expression of the BMP antagonist GREM1. Jazmin [place]asta. 2015;44:699-703.  17. JESSIE Oden. et al. Germline mutations affecting the proofreading domains of POLE and POLD1 predispose to colorectal adenomas and carcinomas. Jazmin. Shasta. 45, 136-44 (2013).  18. HUBER Bergman. et al. POLE and POLD1 mutations in 529 sushila with familial colorectal cancer and/or polyposis: review of reported cases and recommendations for genetic testing and surveillance. [place]. Med. (2015). doi:10.1038/gim.2015.75  19. FLAKO Rios et al. New insights into POLE and POLD1 germline mutations in familial colorectal cancer and polyposis. Hum. Mol. [place]. 23, 4397-49 (2014).  20. Samuel Aviles al.  Frequency and phenotypic spectrum of germline mutations in POLE and seven other polymerase genes in 266 patients with colorectal adenomas and carcinomas. Int. J. Cancer 137, 320-31 (2015).           Follow-ups after your visit        Your next 10 appointments already scheduled     Jun 16, 2017  1:15 PM CDT   [facility] Draw with  [facility] DRAW   [facility] ([facility])    [address] [id number] [phone]            Jun 19, 2017 11:15 AM CDT   (Arrive by 11:00 AM)   Return Visit with Laurence Hood MD   [facility] ([facility])    [address] [place] [id number] [phone]              Future tests that were ordered for you today     Open Future Orders        Priority Expected Expires Ordered    [facility]cerNext genetic testing, [place] Test: Laboratory Miscellaneous Order Routine  6/16/2018 6/16/2017            Who to contact     If you have questions or need follow up information about today's clinic visit or your schedule please contact [facility] directly at [phone].  Normal or non-critical lab and imaging results will be communicated to you by [employer], letter or phone within 4 business days after the clinic has received the results. If you do not hear from us within 7 days, please contact the clinic through [employer] or phone. If you have a critical or abnormal lab result, we will notify you by phone as soon as possible.  Submit refill requests through [employer] or call your pharmacy and they will forward the refill request to us. Please allow 3 business days for your refill to be completed.          Additional Information About Your Visit        [employer] Information     [employer] gives you secure access to your electronic health record. If you see a primary care provider, you can also send messages to your care team and make  appointments. If you have questions, please call your primary care clinic.  If you do not have a primary care provider, please call [phone] and they will assist you.        Care EveryWhere ID     This is your Care EveryWhere ID. This could be used by other organizations to access your [place] medical records  [id number]         Blood Pressure from Last 3 Encounters:   06/14/17 118/74   06/09/17 114/66   06/05/17 116/73    Weight from Last 3 Encounters:   06/14/17 71.4 kg (157 lb 6.4 oz)   06/09/17 70.1 kg (154 lb 8.7 oz)   06/05/17 70.7 kg (155 lb 13.8 oz)               Primary Care Provider Office Phone # Fax #    Lencho Chan [phone]       [address] [address]        Thank you!     Thank you for choosing [facility]  for your care. Our goal is always to provide you with excellent care. Hearing back from our patients is one way we can continue to improve our services. Please take a few minutes to complete the written survey that you may receive in the mail after your visit with us. Thank you!             Your Updated Medication List - Protect others around you: Learn how to safely use, store and throw away your medicines at www.disposemymeds.org.      Notice  As of 6/16/2017  1:12 PM    You have not been prescribed any medications.

## 2017-06-16 NOTE — PATIENT INSTRUCTIONS
Assessing Cancer Risk  Only about 5-10% of cancers are thought to be due to an inherited cancer susceptibility gene.    These families often have:    Several people with the same or related types of cancer    Cancers diagnosed at a young age (before age 50)    Individuals with more than one primary cancer    Multiple generations of the family affected with cancer    Some people may be candidates for genetic testing of more than one gene.  For these families, genetic testing using a multi-gene cancer panel may be offered.  These panels may test genes known to increase the risk for breast (and other) cancers: TIANA, BRCA1, BRCA2, CDH1, CHEK2, PALB2, PTEN, and TP53.  The purpose of this handout is to serve as a brief summary of the high/moderate-risk breast cancer genes which have published clinical management guidelines for individuals who are found to carry a mutation.    Hereditary Breast and Ovarian Cancer Syndrome (HBOC)  A single mutation in one of the copies of BRCA1 or BRCA2 increases the risk for breast and ovarian cancer, among others.  The risk for pancreatic cancer and melanoma may also be slightly increased in some families.  The tables below list the chance that someone with a BRCA mutation would develop cancer in his or her lifetime1,2,3,4.          Women   Men     General Population BRCA+    General Population BRCA+   Breast  12% 40-80%  Breast <1% ~7%   Ovarian  1-2% 10-40%  Prostate 16% 20%     A person s ethnic background is also important to consider, as individuals of Ashkenazi Hinduism ancestry have a higher chance of having a BRCA gene mutation.  There are three BRCA mutations that occur more frequently in this population.    Li-Fraumeni Syndrome (LFS)  LFS is a cancer predisposition syndrome. Individuals with LFS are at an increased risk for developing cancer at a young age. The general lifetime risk for development of cancer is 50% by age 30 and 90% by age 60.  LFS is caused by a mutation in the  TP53 gene.  A single mutation in one of the copies of TP53 increases the risk for multiple cancers.     Core Cancers: Sarcomas, Breast, Brain, Lung, Leukemias/Lymphomas, Adrenocortical carcinomas  Other Cancers: Gastrointestinal, Thyroid, Skin, Genitourinary        Cowden Syndrome  Cowden syndrome is a hereditary condition that increases the risk for breast, thyroid, endometrial, and kidney cancer.  Cowden syndrome is caused by a mutation in the PTEN gene.  A single mutation in one of the copies of PTEN causes Cowden syndrome and increases cancer risk.  The table below shows the chance that someone with a PTEN mutation would develop cancer in their lifetime5,6.  Other benign features seen in some individuals with Cowden syndrome include benign skin lesions (facial papules, keratoses, lipomas), learning disability, autism, thyroid nodules, colon polyps, and larger head size.      Lifetime Cancer Risk   Cancer Type General Population Cowden Syndrome   Breast  12% 25-50%*   Thyroid  1% 35%   Renal 1-2% 35%   Endometrial  2-3% 28%   Colon 5% 9%   Melanoma 2-3% >5%     *One recent study found breast cancer risk to be increased to 85%    Hereditary Diffuse Gastric Cancer (HDGC)  Currently, one gene is known to cause hereditary diffuse gastric cancer: CDH1.  Individuals with HDGC are at increased risk for diffuse gastric cancer and lobular breast cancer. Of people diagnosed with HDGC, 30-50% have a mutation in the CDH1 gene.  This suggests there are likely other genes that may cause HDGC that have not been identified yet.      Lifetime Cancer Risks    General Pop HDGC    Diffuse Gastric  <1% ~80%   Breast 12% 39-52%     Additional Genes Associated with Increased Breast Cancer Risk  PALB2  Mutations in PALB2 have been shown to increase the risk of breast cancer up to 33-58% in some families; where individuals fall within this risk range is dependent upon family history.9 PALB2 mutations have also been associated with  increased risk for pancreatic cancer, although this risk has not been quantified yet.  Individuals who inherit two PALB2 mutations--one from their mother and one from their father--have a condition called Fanconi Anemia.  This rare autosomal recessive condition is associated with short stature, developmental delay, bone marrow failure, and increased risk for childhood cancers.    TIANA  TIANA is a moderate-risk breast cancer gene. Women who have a mutation in TIANA can have between a 2-4 fold increased risk for breast cancer compared to the general population.10  TIANA mutations have also been associated with increased risk for pancreatic cancer, however an estimate of this cancer risk is not well understood.11  Individuals who inherit two TIANA mutations have a condition called ataxia-telangiectasia (AT).  This rare autosomal recessive condition affects the nervous system and immune system, and is associated with progressive cerebellar ataxia beginning in childhood.  Individuals with ataxia-telangiectasia often have a weakened immune system and have an increased risk for childhood cancers.           CHEK2   CHEK2 is a moderate-risk breast cancer gene.  Women who have a mutation in CHEK2 have around a 2-fold increased risk for breast cancer compared to the general population, and this risk may be higher depending upon family history.12,13,14  Mutations in CHEK2 have also been shown to increase the risk of a number of other cancers, including colon and prostate, however these cancer risks are currently not well understood.         Inheritance   All of the genes reviewed above are inherited in an autosomal dominant pattern.  This means that if a parent has a mutation, each of his or her children will have a 50% chance of inheriting that same mutation.  Therefore, each child--male or female--would have a 50% chance of being at increased risk for developing cancer.      Image obtained from [employer], 2013      Mutations in some genes can occur de sarah, which means that a person s mutation occurred for the first time in them and was not inherited from a parent.  Now that they have the mutation, however, it can be passed on to future generations.    Genetic Testing  Genetic testing involves a blood test and will look for any harmful mutations within genes that are associated with increased cancer risk.  If possible, it is recommended that the person(s) who has had cancer be tested before other family members.  That person will give us the most useful information about whether or not a specific gene is associated with the cancer in the family.    Results  There are three possible results of genetic testing:    Positive--a harmful mutation was identified in one or more of the genes    Negative--no mutation was identified in any of the genes on this panel    Variant of unknown significance--a variation in one of the genes was identified, but it is unclear how this impacts cancer risk in the family    Advantages and Disadvantages  There are advantages and disadvantages to genetic testing.  Advantages    May clarify your cancer risk    Can help you make medical decisions    May explain the cancers in your family    May give useful information to your family members (if you share your results)    Disadvantages    Possible negative emotional impact of learning about inherited cancer risk    Uncertainty in interpreting a negative test result in some situations    Possible genetic discrimination concerns (see below)    Genetic Information Nondiscrimination Act (SHANELL)  SHANELL is a federal law that protects individuals from health insurance or employment discrimination based on a genetic test result alone.  Although rare, there are currently no legal discrimination protections in terms of life insurance, long term care, or disability insurances.  Visit the National Human Genome Research [place] genome.gov/97191609 to learn  more.    Reducing Cancer Risk  Each of the genes listed within this handout have nationally recognized cancer screening guidelines that would be recommended for individuals who test positive.  In addition to increased cancer screening, surgeries may be offered or recommended to reduce cancer risk.  Recommendations are based upon an individual s genetic test result as well as their personal and family history of cancer.    Questions to Think About Regarding Genetic Testing    What effect will the test result have on me and my relationship with my family members if I have an inherited gene mutation?  If I don t have a gene mutation?    Should I share my test results, and how will my family react to this news, which may also affect them?    Are my children ready to learn new information that may one day affect their own health?      Resources  FORCE: Facing Our Risk of Cancer Empowered facingourrisk.org   Bright Pink bebrightpink.org   Li-Fraumeni Syndrome Association lfsassociation.org   PTEN World PTENworld.com   No stomach for cancer, Inc. nostomachforcancer.org   Stomach cancer relief network scrnet.org   Collaborative Group of the Americas on Inherited Colorectal Cancer (CGA) cgaicc.com    Cancer Care cancercare.org   American Cancer Society (ACS) cancer.org   National Cancer [place] (NCI) cancer.gov     Cancer Risk Management Program [phone] ([phone])  ? Francisca Ernestina, MS, [facility]  [phone]  ? Violeta Servando, MS, [facility]  [phone]  ? Ashley Trejo, MS, [facility]  [phone]  ? Merlene Ochoa, MS, [facility]  [phone]    References  1. Bernice العراقي PDP, John S, Lolita BOO, Nas JE, Tiffanie JL, Jeffrey N, Kristi H, Dex O, Hernando A, Tamiko B, Forrest P, Manlev S, Karl DM, Richie N, Shannon TURCIOS, Dione BENITEZ, Corbin E, Laura J, Danika J, Bekah B, Tianna H, Thorlacius S, Eerola H, Sil H, Nicky K, Yeny OP. Average risks of breast and ovarian cancer associated with BRCA1 or BRCA2  mutations detected in case series unselected for family history: a combined analysis of 222 studies. Am J Hum [place]. 2003;72:1117-30.  2. Ran N, Ni M, Uzma G.  BRCA1 and BRCA2 Hereditary Breast and Ovarian Cancer. Gene Reviews online. 2013.  3. Espinoza YC, Ted S, Ellie G, Darlene S. Breast cancer risk among male BRCA1 and BRCA2 mutation carriers. J Natl Cancer Inst. 2007;99:1811-4.  4. Sixto SERRANO, Laisha I, Rachid J, Rubia E, Almaz ER, Jax F. Risk of breast cancer in male BRCA2 carriers. J Med [place]. 2010;47:710-1.  5. Ino MH, Pollo J, Lisa J, Alley ARTHUR, Genesis RANGEL, Carla C. Lifetime cancer risks in individuals with germline PTEN mutations. Clin Cancer Res. 2012;18:400-7.  6. Maryki R. Cowden Syndrome: A Critical Review of the Clinical Literature. J [place] . 2009:18:13-27.  7. National Comprehensive Cancer Network. Clinical practice guidelines in oncology, colorectal cancer screening. Available online (registration required). 2013.  8. National Cancer [place]. SEER Cancer Stat Fact Sheets.  December 2013.  9. Rogelio BLACKWELL., et al. Breast-Cancer Risk in Families with Mutations in PALB2. NEJM. 2014; 371(6):497-506.  10. Elizabeth ESPINOZA, Rick D, Marissa S, Claudette P, Xiomara T, Dora M, Jacinto B, Cecile H, Erick R, Zee K, Isabel L, Sixto SERRANO, Karl D, Adan DF, Gamal MR, The Breast Cancer Susceptibility Collaboration ([place]) & Hernandez N. TIANA mutations that cause ataxia-telangiectasia are breast cancer susceptibility alleles. Nature Genetics. 2006;38:873-875  11. Calixto N , Harish Y, Lucille J, Gurvinder L, Sara GM , Tiki ML, Janice S, Saavedra AG, Syngal S, Alan ML, Tom J , Sheila R, Ingris SZ, Yashira JR, Thomas VE, Mohinder M, Vogelstein B, Vee N, Patti RH, Ant KW, and Brian AP. TIANA mutations in patients with hereditary pancreatic cancer. Cancer Discover. 2012;2:41-46  12. CHEK2 Breast Cancer Case-Control Consortium. CHEK2*1100delC and susceptibility to  breast cancer: A collaborative analysis involving 10,860 breast cancer cases and 9,065 controls from 10 studies. Am J Hum [place], 74 (2004), pp. 4991-0199  13. John T, Ronny S, Belgica K, et al. Spectrum of Mutations in BRCA1, BRCA2, CHEK2, and TP53 in Families at High Risk of Breast Cancer. SKINNY. 2006;295(12):1648-9000.   14. Elizabeth ROMAN, Sanjay PATTERSON, Abi ESPINOZA, et al. Risk of breast cancer in women with a CHEK2 mutation with and without a family history of breast cancer. J Clin Oncol. 2011;29:9901-2061    Assessing Cancer Risk  Only about 5-10% of cancers are thought to be due to an inherited cancer susceptibility gene.    These families often have:  ? Several people with the same or related types of cancer  ? Cancers diagnosed at a young age (before age 50)  ? Individuals with more than one primary cancer  ? Multiple generations of the family affected with cancer    Comprehensive Colon Cancer Panel  We each inherit two copies of every gene in our bodies: one from our mother, and one from our father.  Each gene has a specific job to do.  When a gene has a mistake or  mutation  in it, it does not work like it should.      This handout will review common hereditary colon cancer syndromes, and other genes related to an increased risk for colon cancer.  The genes that will be discussed in this handout are: APC, BMPR1A, CDH1, CHEK2, EPCAM, GREM1, MLH1, MSH2, MSH6, MUTYH, PMS2, POLD1, POLE, PTEN, SMAD4, STK11, and TP53.  These genes are clinically actionable, meaning there are published guidelines for cancer screening and management for individuals who are found to carry mutations in these genes. Inheriting a mutation does not mean a person will develop cancer, but it does significantly increase his or her risk above the general population risk.      Familial Adenomatous Polyposis (FAP)  FAP is a hereditary cancer syndrome caused by mutations in the APC gene. The condition is known to cause hundreds to thousands of  adenomatous polyps in the colon, creating a  carpet  of polyps. Some individuals have what is called attenuated FAP (AFAP), a milder form of FAP with fewer polyps and typically later onset. Individuals with an APC gene mutation are at an increased risk for colon, thyroid, and duodenal cancers, as well as several other types of cancer1.  Other features of this condition may include: osteomas, dental anomalies, benign skin lesions, CHRPE ( freckle  on the inside of the eye), and desmoid tumors.      Lifetime Cancer Risks     Cancer Type General Population FAP   Colon  5% near 100%   Thyroid (papillary) 1% 1-12%   Duodenal <1% 5%   Liver  <1% 1-2% before age 5   Pancreas <1% 1%   Stomach <1% 1%       Juvenile Polyposis Syndrome (JPS)  JPS is characterized by hamartomatous polyps, called juvenile polyps, in the gastrointestinal tract.  Juvenile  refers to the type of polyps seen in this hereditary cancer condition, not the age of onset. Currently, mutations in two genes are known to cause JPS: BMPR1A and SMAD4. Of individuals clinically diagnosed with JPS, only 40% have an identifiable mutation in one of these genes, suggesting there are other genes that cause JPS that have not been discovered yet. Individuals with JPS are at an increased risk for colon cancer and stomach cancer 2,3,4. Pancreatic and small bowel cancers have also been reported in JPS, but the actual risks are unknown.         Lifetime Cancer Risks    Cancer Type General Population JPS   Colon 5% 40-50%   Gastric/Duodenal <1% 10-21%     Some individuals with SMAD4 mutations have a condition called JPS/HHT (Juvenile Polyposis/Hereditary Hemorrhagic Telangiectasia) where in addition to JPS, individuals may have nose bleeds and clotting issues.     Hereditary Diffuse Gastric Cancer (HDGC)  Currently, mutations in one gene are known to cause Hereditary Diffuse Gastric Cancer: CDH1.  Individuals with HDGC are at increased risk for diffuse gastric cancer and  lobular breast cancer. Of people diagnosed with HDGC, 30-50% have a mutation in the CDH1 gene.  This suggests there are likely mutations in other genes that may cause HDGC that have not been identified yet. Individuals with HDGC may also be at increased risk for colon cancer.      Lifetime Cancer Risks    Cancer Type General Population HDGC   Diffuse Gastric <1% 67-83%   Breast 12% 39-52%     Forbes syndrome  Mutations in five different genes are known to cause Forbes syndrome: MLH1, MSH2, MSH6, PMS2, and EPCAM. Individuals with Forbes syndrome have an increased risk for colon, uterine, ovarian, small bowel, stomach, urinary tract, and brain cancer, as well as several other types of cancer. The exact lifetime cancer risks are dependent upon the gene in which the mutation was identified.      Lifetime Cancer Risks    Cancer Type General Population Forbes syndrome   Colon 5% 10-80%   Uterine 2-3% 15-60%   Stomach <1% 6-13%   Ovarian 2% 4-24%   Urinary tract <1% 1-7%   Hepatobiliary tract <1% 1-4%   Small bowel <1% 3-6%   Brain/CNS <1% 1-3%   Pancreas <1% 1-6%       MUTYH-Associated Polyposis (MAP)  MAP is a hereditary cancer syndrome caused by mutations in the MUTYH gene. Unlike the other hereditary cancer genes discussed in this handout, two mutations in the MUTYH gene cause MAP and increase cancer risk. Those affected with MAP typically have between  adenomatous polyps. This syndrome also increases the risk for colon and duodenal cancer. Current research suggests that other cancers may be associated with MUTYH mutations, as well. The table below includes the risk that someone with two MUTYH gene mutations would develop cancer in their lifetime; of note, there is also an increased colon cancer risk for individuals who carry only one MUTYH gene mutation5,6,7.       Lifetime Cancer Risks    Cancer Type General Population MAP   Colon 5% %   Duodenal  <1% 5%       Cowden syndrome  Cowden syndrome is a hereditary  condition that increases the risk for breast, thyroid, endometrial, colon, and kidney cancer.  A single mutation in the PTEN gene causes Cowden syndrome and increases cancer risk.  The table below shows the chance that someone with a PTEN mutation would develop cancer in their lifetime8,9.  Other benign features seen in some individuals with Cowden syndrome include benign skin lesions (facial papules, keratoses, lipomas), learning disabilities, autism, thyroid nodules, hamartomatous colon polyps, and larger head size.      Lifetime Cancer Risks   Cancer Type General Population Cowden Syndrome   Breast 12% 25-50%*   Thyroid 1% 35%   Renal 1-2% 35%   Endometrial 2-3% 28%   Colon 5% 9%   Melanoma 2-3% >5%     *One recent study found breast cancer risk to be increased to 85%    Peutz-Jeghers syndrome (PJS)  PJS is a hereditary cancer syndrome caused by mutations in the STK11 gene. This condition can be distinguished from other hereditary syndromes by the presence of hamartomatous polyps in the gastrointestinal tract and freckles present in unusual places such as the hands, feet, neck, and lips. Individuals with Peutz-Jeghers syndrome have an increased risk for colon, breast, pancreatic, and other cancers3.  Men are at risk for testicular tumors which can affect hormones in the body. Women are at risk for sex cord tumors of the ovaries and a rare aggressive type of cervical cancer.     Lifetime Cancer Risks    Cancer Type General Population PJS   Breast 12% 45-50%   Colon 5% 39%   Stomach <1% 29%   Pancreas 1.5% 11-36%   Small Intestine <1% 13%     Ovarian  2%  18%   Lung 6-7% 15-17%     Additional Genes Associated with Increased Colon Cancer Risk  CHEK2  CHEK2 is a moderate-risk breast cancer gene.  Women who have a mutation in CHEK2 have around a 2-fold increased risk for breast cancer compared to the general population, and this risk may be higher depending upon family history.12,13,14.  Mutations in CHEK2 have also  been shown to increase the risk of a number of other cancers, including colon and prostate, however these cancer risks are currently not well understood.     GREM1  GREM1 is a moderate-risk colon polyposis gene. Duplications of this gene are more commonly found in individuals with Ashkenazi Taoist [id number]. Mutations in GREM1 are associated with colon polyps and therefore an increased risk of colon cancer; however the estimated cancer risk is not well [id number].     POLD1 and POLE  POLD1 and POLE are moderate-risk colon cancer genes. Carriers of a mutation in one of these genes increases the lifetime risk of colorectal ozzeeq50,18,19,20. Mutations in these genes may also be associated with increased risk for other cancers including: endometrial cancer, duodenal adenomas and carcinomas, and brain tumors.    TP53  Li Fraumeni syndrome (LFS) is a hereditary cancer predisposition syndrome. LFS is caused by a mutation in the TP53 gene. A single mutation in one of the copies of TP53 increases the risk for multiple cancers. Individuals with LFS are at an increased risk for developing cancer at a young age. The general lifetime risk for development of cancer is 50% by age 30 and 90% by age 60.      Core Cancers: Sarcomas, Breast, Brain, Lung, Leukemias/Lymphomas, Adrenocortical carcinomas  Other Cancers: Gastrointestinal, Thyroid, Skin, Genitourinary    Genetic Testing  Genetic testing involves a simple blood test and will look at the genetic information in genes associated with an increased risk of colon cancer. The tests look for any harmful mutations that are associated with increased cancer risk.  If possible, it is recommended that the person(s) who has had cancer be tested before other family members.  That person will give us the most useful information about whether or not a specific gene mutation is associated with the cancer in the family.     Results  There are three possible results from genetic  testing:  ? Positive--a harmful mutation was identified  ? Negative--no mutation was identified  ? Variant of unknown significance--a variation in one of the genes was identified, but it is unclear how this impacts cancer risk in the family  Advantages and Disadvantages  There are advantages and disadvantages to genetic testing of these genes.    Advantages  ? May clarify your cancer risk  ? Can help you make medical decisions  ? May explain the cancers in your family  ? May give useful information to your family members (if you share your results)    Disadvantages  ? Possible negative emotional impact of learning about inherited cancer risk  ? Uncertainty in interpreting a negative test result in some situations  ? Possible genetic discrimination concerns (see below)    Inheritance   Most mutations in the genes outlined above are inherited in an autosomal dominant pattern.  This means that if a parent has a mutation, each of his or her children will have a 50% chance of inheriting that same mutation.  Therefore, each child--male or female--would have a 50% chance of being at increased risk for developing cancer.                                            Image obtained from [employer], 2013     In the case of MUTYH-Associated Polyposis (MAP) this hereditary cancer syndrome is inherited in an autosomal recessive pattern. This means that each parent of an individual with MAP is a carrier of MAP, meaning that they have only one mutation in MUTYH. They still have one functioning copy of their gene.  Carriers are at a slightly higher risk for colon cancer than the general population. If each parent is a carrier for MAP, they have a 25% of having a child who is affected with MAP, meaning the child inherited both gene mutations - one from each parent.       Image obtained from [employer], 2016    Genetic Information Nondiscrimination Act (SHANELL)  SHANELL is a federal law that protects individuals  from health insurance or employment discrimination based on a genetic test result alone.  Although rare, there are currently no legal protections in terms of life insurance, long term care, or disability insurances.  Visit the National Human Genome Research [place] at Genome.gov/82462500 to learn more.    Reducing Cancer Risk  Each of the genes listed within this handout have nationally recognized cancer screening guidelines that would be recommended for individuals who test positive.  In addition to increased cancer screening, surgeries may be offered or recommended to reduce cancer risk in certain cases.  Recommendations are based upon an individual s genetic test result as well as their personal and family history of cancer.    Questions to Think About Regarding Genetic Testing  ? What effect will the test result have on me and my relationship with my family members if I have an inherited gene mutation?  If I don t have a gene mutation?  ? Should I share my test results, and how will my family react to this news, which may also affect them?  ? Are my children ready to learn new information that may one day affect their own health?    Resources    [facility] [email or web address]   No Stomach for Cancer, Inc. nostomachforcancer.[email or web address]   Stomach Cancer Relief Network scrnet.org   Collaborative Group of the Americas on Inherited Colorectal Cancer (CGA) cgaicc.com   Cancer Care cancercare.org   American Cancer Society (ACS) cancer.org   National Cancer [place] (NCI) cancer.org   Forbes Syndrome International [email or web address]       Please call us if you have any questions or concerns.     Cancer Risk Management Program [phone]-[phone]-CANCER ([phone])  ? Francisca Do, MS, [facility]  [phone]  ? Violeta Al, MS, [facility]  [phone]  ? Ashley Trejo, MS, [facility]  [phone]  ? Merlene Ochoa, MS, [facility]  [phone]  ? Fanny Hathaway, MS, [facility]  [phone]    References    1. Shalom TURCIOS, Carisa MONTIEL, Erwin GALVEZ, Domitila TURCIOS, Nani MONTIEL, et al. The  Prevalence of thyroid cancer and benign thyroid disease in patients with familial adenomatous polyposis may be higher than previously recognized. Clin Colorectal Cancer. 2012;11:304-308.  2. Sammi HARVEY, Eduardo Root A, Tello HARVEY, Robert ROMAN, Kobe ZUNIGA, et al. Risk of colorectal cancer in juvenile polyposis. Gut. 2007;56:965-967.  3. Ned FG, Armin MN, Neal CA. Colorectal cancer risk in hamartomatous polyposis syndromes. World Journal of Gastrointestinal Surgery. 2015;27:25-32  4. Chuck MG. Guidance on gastrointestinal surveillance for hereditary non-polyposis colorectal cancer, familial adenomatous polypolis, juvenile polyposis, and Peutz-Jeghers syndrome. Gut. 2002;51:21-27.  5. Quoc AK, Daniel ELIGIO, Tiffany MONTIELG et al. Risk of extracolonic cancers for people with biallelic and monoallelic mutations in MUTYH. Int J of Cancer. 2016;139:3290-5883.  6. Gelacio S, Homar S, Chiki H, Cheikh K, Crowley M, et al. MUTYH-associated polyposis: 70 of 71 patients with biallelic mutations present with an attenuated or atypical phenotype. Int J of Cancer. 2006;119:807-814.  7. Nelly G, Ashish F, Anthony I, Pinandrea M, Nelly H, et al. MUTYH mutation carriers have increased breast cancer risk. Cancer. 2012;3318-7638.  8. Mckinnon MH, Pollo J, Lisa J, Alley LA, Genesis MS, Eng C. Lifetime cancer risks in individuals with germline PTEN mutations. Clin Cancer Res. 2012;18:400-7.  9. Pilarski R. Cowden Syndrome: A Critical Review of the Clinical Literature. J [place] . 2009:18:13-27.  10. National Comprehensive Cancer Network. Clinical practice guidelines in oncology, colorectal cancer screening. Available online (registration required). 2013.  11. National Cancer [place]. SEER Cancer Stat Fact Sheets.  December 2013.  12. CHEK2 Breast Cancer Case-Control Consortium. CHEK2*1100delC and susceptibility to breast cancer: A collaborative analysis involving 10,860 breast cancer cases and 9,065 controls from 10 studies. Am  J Hum [place], 74 (2004), pp. 0328-8260  13. John T, Ronny S, Belgica K, et al. Spectrum of Mutations in BRCA1, BRCA2, CHEK2, and TP53 in Families at High Risk of Breast Cancer. SKINNY. 2006;295(12):1430-1779.  14. Elizabeth ROMAN, Sanjay D, Abi ESPINOZA, et al. Risk of breast cancer in women with a CHEK2 mutation with and without a family history of breast cancer. J Clin Oncol. 2011;29:6007-4065.  15. Marcella MONTIEL, Garrett E, Abdi J, Tres N, Gurdeep M et al. Defining the polyposis/colorectal cancer phenotype associated with the GREM1 duplication: counseling and management guidelines. [place] .Res. 2016;98:1-5.  16. Afshin TURCIOS, Minh S, Jeramy ESPINOZA, Juan Carlos Sanders, et al. Hereditary mixed polyposis syndrome is caused by a 40kb upstream duplication that leads to increased and ectopic expression of the BMP antagonist GREM1. Jazmin [place]. 2015;44:699-703.  17. JESSIE Oden. et al. Germline mutations affecting the proofreading domains of POLE and POLD1 predispose to colorectal adenomas and carcinomas. Jazmin. [place]asta. 45, 136-44 (2013).  18. HUBER Bergman. et al. POLE and POLD1 mutations in 529 sushila with familial colorectal cancer and/or polyposis: review of reported cases and recommendations for genetic testing and surveillance. [place]. Med. (2015). doi:10.1038/gim.2015.75  19. FLAKO Rios et al. New insights into POLE and POLD1 germline mutations in familial colorectal cancer and polyposis. Hum. Mol. [place]. 23, 6056-12 (2014).  20. CHACORTA Aviles. et al. Frequency and phenotypic spectrum of germline mutations in POLE and seven other polymerase genes in 266 patients with colorectal adenomas and carcinomas. Int. J. Cancer 137, 320-31 (2015).

## 2017-06-19 ENCOUNTER — CARE COORDINATION (OUTPATIENT)
Dept: ONCOLOGY | Facility: CLINIC | Age: 37
End: 2017-06-19

## 2017-06-19 ENCOUNTER — ONCOLOGY VISIT (OUTPATIENT)
Dept: ONCOLOGY | Facility: CLINIC | Age: 37
End: 2017-06-19
Attending: INTERNAL MEDICINE
Payer: COMMERCIAL

## 2017-06-19 VITALS
SYSTOLIC BLOOD PRESSURE: 129 MMHG | DIASTOLIC BLOOD PRESSURE: 81 MMHG | OXYGEN SATURATION: 100 % | BODY MASS INDEX: 23.05 KG/M2 | RESPIRATION RATE: 18 BRPM | TEMPERATURE: 98.2 F | HEIGHT: 69 IN | HEART RATE: 80 BPM | WEIGHT: 155.6 LBS

## 2017-06-19 DIAGNOSIS — C15.5 MALIGNANT NEOPLASM OF LOWER THIRD OF ESOPHAGUS (H): Primary | ICD-10-CM

## 2017-06-19 PROCEDURE — 99215 OFFICE O/P EST HI 40 MIN: CPT | Mod: ZP | Performed by: INTERNAL MEDICINE

## 2017-06-19 PROCEDURE — 99212 OFFICE O/P EST SF 10 MIN: CPT | Mod: ZF

## 2017-06-19 RX ORDER — METHYLPREDNISOLONE SODIUM SUCCINATE 125 MG/2ML
125 INJECTION, POWDER, LYOPHILIZED, FOR SOLUTION INTRAMUSCULAR; INTRAVENOUS
Status: CANCELLED
Start: 2017-06-26

## 2017-06-19 RX ORDER — LORAZEPAM 2 MG/ML
0.5 INJECTION INTRAMUSCULAR EVERY 4 HOURS PRN
Status: CANCELLED
Start: 2017-06-26

## 2017-06-19 RX ORDER — EPINEPHRINE 0.3 MG/.3ML
0.3 INJECTION SUBCUTANEOUS EVERY 5 MIN PRN
Status: CANCELLED | OUTPATIENT
Start: 2017-06-26

## 2017-06-19 RX ORDER — MEPERIDINE HYDROCHLORIDE 25 MG/ML
25 INJECTION INTRAMUSCULAR; INTRAVENOUS; SUBCUTANEOUS EVERY 30 MIN PRN
Status: CANCELLED | OUTPATIENT
Start: 2017-06-26

## 2017-06-19 RX ORDER — ALBUTEROL SULFATE 90 UG/1
1-2 AEROSOL, METERED RESPIRATORY (INHALATION)
Status: CANCELLED
Start: 2017-06-26

## 2017-06-19 RX ORDER — DIPHENHYDRAMINE HYDROCHLORIDE 50 MG/ML
50 INJECTION INTRAMUSCULAR; INTRAVENOUS
Status: CANCELLED
Start: 2017-06-26

## 2017-06-19 RX ORDER — SODIUM CHLORIDE 9 MG/ML
1000 INJECTION, SOLUTION INTRAVENOUS CONTINUOUS PRN
Status: CANCELLED
Start: 2017-06-26

## 2017-06-19 RX ORDER — ALBUTEROL SULFATE 0.83 MG/ML
2.5 SOLUTION RESPIRATORY (INHALATION)
Status: CANCELLED | OUTPATIENT
Start: 2017-06-26

## 2017-06-19 ASSESSMENT — PAIN SCALES - GENERAL: PAINLEVEL: NO PAIN (0)

## 2017-06-19 NOTE — PROGRESS NOTES
[facility]    Hematology/Oncology Established Patient Note      Today's Date: 06/19/17    Reason for Follow-up: adenocarcinoma of the GE junction      HISTORY OF PRESENT ILLNESS: Daniel Sandhu is a 36 year old male who presents with adenocarcinoma of the GE junction.  In early 2017, patient started noticing difficulty swallowing, and that food seems to take longer to go down.  It became more frequent, and he saw his PCP, and was referred for EGD, which showed an esophageal mass located at the GE junction, measuring 4 cm.  Biopsy showed poorly differentiated adenocarcinoma.  HER-2 was sent and is equivocal (2+).  CT c/a/p showed a mildly prominent lymph node in the gastrohepatic ligament, but there were no pathologically enlarged lymph nodes in the chest, abdomen, or pelvis.  There was otherwise no evidence of metastatic disease.  PET-CT showed thickening of the distal esophagus consistent with known esophageal adenocarcinoma, as well as mildly hypermetabolic 1 cm lymph node in the gastrohepatic ligament.  There was no evidence of distant metastatic disease.  He underwent EUS on 6/9/17, which showed the esophageal tumor in the lower third of the esophagus, staged T2NX.  There were 2 abnormal lymph nodes seen in the gastrohepatic ligament; pathology was suspicious for adenocarcinoma.  Celiac node was sampled as well, which was benign.  He was seen by surgery, Dr. Esteban, and recommended srinivas-operative chemotherapy.      INTERIM HISTORY: Daniel comes in for follow-up today.  He feels okay.  He has done his staging evaluations and seen surgery, and he is anxious to start chemotherapy and has many questions today.          REVIEW OF SYSTEMS:   General Symptoms: No  Skin Symptoms: No  HENT Symptoms: No  EYE SYMPTOMS: No  HEART SYMPTOMS: No  LUNG SYMPTOMS: No  INTESTINAL SYMPTOMS: Yes  URINARY SYMPTOMS: No  REPRODUCTIVE SYMPTOMS: No  SKELETAL SYMPTOMS: No  BLOOD SYMPTOMS: No  NERVOUS SYSTEM  SYMPTOMS: No  MENTAL HEALTH SYMPTOMS: Yes  Heart burn or indigestion: Yes  Nausea: No  Vomiting: No  Abdominal pain: No  Bloating: No  Constipation: No  Diarrhea: No  Blood in stool: No  Black stools: No  Rectal or Anal pain: No  Fecal incontinence: No  Rectal bleeding: No  Yellowing of skin or eyes: No  Vomit with blood: No  Change in stools: No  Hemorrhoids: No  Nervous or Anxious: Yes  Depression: No  Trouble sleeping: Yes  Trouble thinking or concentrating: No  Mood changes: No  Panic attacks: No      HOME MEDICATIONS:  No current outpatient prescriptions on file.         ALLERGIES:  No Known Allergies      PAST MEDICAL HISTORY:  No past medical history on file.      PAST SURGICAL HISTORY:  Past Surgical History:   Procedure Laterality Date     ESOPHAGOGASTRODUODENOSCOPY       ESOPHAGOSCOPY, GASTROSCOPY, DUODENOSCOPY (EGD), COMBINED N/A 6/9/2017    Procedure: COMBINED ENDOSCOPIC ULTRASOUND, ESOPHAGOSCOPY, GASTROSCOPY, DUODENOSCOPY (EGD), FINE NEEDLE ASPIRATE/BIOPSY;  Upper Endoscopic Ultrasound, fine needle aspirate/biopsy;  Surgeon: Guru Mark Avila MD;  Location:  OR     HAND SURGERY      childhood, torn tendon         SOCIAL HISTORY:  Social History     Social History     Marital status:      Spouse name: N/A     Number of children: 1     Years of education: N/A     Occupational History     musician and teacher       Social History Main Topics     Smoking status: Never Smoker     Smokeless tobacco: Not on file     Alcohol use Yes      Comment: 1 beer daily     Drug use: No     Sexual activity: Yes     Partners: Female     Birth control/ protection: Natural Family Planning     Other Topics Concern     Not on file     Social History Narrative     He works at [employer] in [place], 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  He denies smoking.  He drinks ~1 beer a day.  He denies illicit drug use, other than occasional marijuana.  He lives in [place] with his wife,  "and 4.5 year-old daughter.  His wife is currently pregnant with a boy, and is due in 6 weeks.  He has a half sister who  of breast cancer at age 32; she was diagnosed in her late 20's.  A paternal grandmother had breast cancer in her 70's      FAMILY HISTORY:  Family History   Problem Relation Age of Onset     Other - See Comments Father      sepsis     CANCER Sister      breast cancer  29 yo 1/2 sister      CANCER Paternal Grandmother      breast         PHYSICAL EXAM:  Vital signs:  /81 (BP Location: Left arm, Patient Position: Chair, Cuff Size: Adult Regular)  Pulse 80  Temp 98.2  F (36.8  C) (Oral)  Resp 18  Ht 1.749 m (5' 8.86\")  Wt 70.6 kg (155 lb 9.6 oz)  SpO2 100%  BMI 23.07 kg/m2   ECO  GENERAL/CONSTITUTIONAL: No acute distress.   EYES: No scleral icterus.  NEUROLOGIC: Alert, oriented, answers questions appropriately.  INTEGUMENTARY: No jaundice.      LABS:  Previous labs reviewed.    CBC RESULTS:   Recent Labs   Lab Test  17   1226   WBC  4.7   RBC  4.84   HGB  14.4   HCT  43.0   MCV  89   MCH  29.8   MCHC  33.5   RDW  12.7   PLT  173     Recent Labs   Lab Test  17   1226  17   1624   NA   --   138   POTASSIUM   --   3.5   CHLORIDE   --   103   CO2   --   27   ANIONGAP   --   8   GLC   --   94   BUN  11  14   CR   --   0.82   YOBANY   --   8.6     Lab Results   Component Value Date    AST 16 2017     Lab Results   Component Value Date    ALT 18 2017     No results found for: BILICONJ   Lab Results   Component Value Date    BILITOTAL 0.7 2017     Lab Results   Component Value Date    ALBUMIN 4.1 2017     Lab Results   Component Value Date    PROTTOTAL 7.3 2017      Lab Results   Component Value Date    ALKPHOS 53 2017         PATHOLOGY:  17:  FINAL DIAGNOSIS:   [facility] [facility] [id number]   OBTAINED 2017):   A. GASTROESOPHAGEAL JUNCTION, \"MASS\", BIOPSY:   - Adenocarcinoma, " moderately-poorly differentiated   - No background intestinal metaplasia seen   - Per report, HER2 immunohistochemistry is equivocal for expression (2+   out of 3)     B. STOMACH, BIOPSY:   - Gastric mucosa with no significant inflammation   - No H. pylori like organisms identified on routine staining   - Negative for intestinal metaplasia or dysplasia       EGD 5/30/17:  Esophageal mass at the GE junction, friable, size 4 cm, extending into the gastric fundus.  Biopsies were taken.      6/9/17:  A. Lymph node, gastrohepatic, EUS guided fine needle aspiration:   Suspicious for adenocarcinoma.   Specimen Adequacy: Satisfactory for evaluation but limited by scant   lymphoid elements     B. Lymph node, celiac node, EUS guided fine needle aspiration:   Benign   Specimen Adequacy: Satisfactory for evaluation but limited by scant   lymphoid elements.       IMAGING:  PET-CT 6/9/17:  IMPRESSION: In this patient with a history of newly diagnosed  esophageal carcinoma at the gastroesophageal junction:  1. Subtle soft tissue thickening of the distal esophagus with  associated hypermetabolism consistent with patient's known esophageal  adenocarcinoma.  2. Mildly hypermetabolic 1 cm lymph node in the lesser sac in the area  of the gastrohepatic ligament suspicious for local regional lymphatic  metastasis.  3. No evidence of metastatic disease in the chest, pelvis, or axial  skeleton.      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  Clinical stage T2N1M0 (stage IIB), based on PET-CT and EUS.  A gastrohepatic lymph node was biopsied and suspicious for adenocarcinoma.  He was seen by Dr. Esteban, and with the degree of extension into the stomach, his inclination is to treat him as a gastric cancer, and so perioperative chemotherapy, as per MAGIC trial, would be reasonable.      I had a long discussion with the patient about this today.  He is agreeable  with the plan and eager to proceed.  He would have 3 cycles of neoadjuvant chemotherapy, followed by new PET-CT about 1 month after completion of chemotherapy, followed by surgery ~ 6 weeks after completion of neoadjuvant chemotherapy, followed by 3 cycles of adjuvant chemotherapy.    Regimen as follows:    Epirubicin 50 mg/m2 IV on day 1  Oxaliplatin 130 mg/m2 IV on day 1  Capecitabine 625 mg/m2 po BID on days 1-21  Every 21 day cycles    -I discussed the schedule, regimen, dose, possible side effects, the benefits and risks, and patient agrees to treatment plan.   -side effects may include, but not limited to: fever/chills, nausea/vomiting, infection, diarrhea/constipation, pain, fatigue, anemia, bleeding, neuropathy, hair loss, mucositis, hand-foot syndrome, organ failure  -chemotherapy teaching today  -will have port placed this week  -echo this week  -registered for medical cannabis, per patient request  -plan to start chemotherapy on Monday, 6/26/17  -RTC on 6/26/17    2) Genetics:  -he has met with genetic counselor, and panel was sent      I spent a total of 40 minutes with the patient, with over >50% of the time in counseling and/or coordination of care.       Laurence Hood MD  Hematology/Oncology  [facility]

## 2017-06-19 NOTE — LETTER
6/19/2017      RE: Daniel Sandhu  [address] [address]       [facility]    Hematology/Oncology Established Patient Note      Today's Date: 06/19/17    Reason for Follow-up: adenocarcinoma of the GE junction      HISTORY OF PRESENT ILLNESS: Daniel Sandhu is a 36 year old male who presents with adenocarcinoma of the GE junction.  In early 2017, patient started noticing difficulty swallowing, and that food seems to take longer to go down.  It became more frequent, and he saw his PCP, and was referred for EGD, which showed an esophageal mass located at the GE junction, measuring 4 cm.  Biopsy showed poorly differentiated adenocarcinoma.  HER-2 was sent and is equivocal (2+).  CT c/a/p showed a mildly prominent lymph node in the gastrohepatic ligament, but there were no pathologically enlarged lymph nodes in the chest, abdomen, or pelvis.  There was otherwise no evidence of metastatic disease.  PET-CT showed thickening of the distal esophagus consistent with known esophageal adenocarcinoma, as well as mildly hypermetabolic 1 cm lymph node in the gastrohepatic ligament.  There was no evidence of distant metastatic disease.  He underwent EUS on 6/9/17, which showed the esophageal tumor in the lower third of the esophagus, staged T2NX.  There were 2 abnormal lymph nodes seen in the gastrohepatic ligament; pathology was suspicious for adenocarcinoma.  Celiac node was sampled as well, which was benign.  He was seen by surgery, Dr. Esteban, and recommended srinivas-operative chemotherapy.      INTERIM HISTORY: Daniel comes in for follow-up today.  He feels okay.  He has done his staging evaluations and seen surgery, and he is anxious to start chemotherapy and has many questions today.          REVIEW OF SYSTEMS:   General Symptoms: No  Skin Symptoms: No  HENT Symptoms: No  EYE SYMPTOMS: No  HEART SYMPTOMS: No  LUNG SYMPTOMS: No  INTESTINAL SYMPTOMS: Yes  URINARY SYMPTOMS:  No  REPRODUCTIVE SYMPTOMS: No  SKELETAL SYMPTOMS: No  BLOOD SYMPTOMS: No  NERVOUS SYSTEM SYMPTOMS: No  MENTAL HEALTH SYMPTOMS: Yes  Heart burn or indigestion: Yes  Nausea: No  Vomiting: No  Abdominal pain: No  Bloating: No  Constipation: No  Diarrhea: No  Blood in stool: No  Black stools: No  Rectal or Anal pain: No  Fecal incontinence: No  Rectal bleeding: No  Yellowing of skin or eyes: No  Vomit with blood: No  Change in stools: No  Hemorrhoids: No  Nervous or Anxious: Yes  Depression: No  Trouble sleeping: Yes  Trouble thinking or concentrating: No  Mood changes: No  Panic attacks: No      HOME MEDICATIONS:  No current outpatient prescriptions on file.         ALLERGIES:  No Known Allergies      PAST MEDICAL HISTORY:  No past medical history on file.      PAST SURGICAL HISTORY:  Past Surgical History:   Procedure Laterality Date     ESOPHAGOGASTRODUODENOSCOPY       ESOPHAGOSCOPY, GASTROSCOPY, DUODENOSCOPY (EGD), COMBINED N/A 6/9/2017    Procedure: COMBINED ENDOSCOPIC ULTRASOUND, ESOPHAGOSCOPY, GASTROSCOPY, DUODENOSCOPY (EGD), FINE NEEDLE ASPIRATE/BIOPSY;  Upper Endoscopic Ultrasound, fine needle aspirate/biopsy;  Surgeon: Guru Mark Avila MD;  Location:  OR     HAND SURGERY      childhood, torn tendon         SOCIAL HISTORY:  Social History     Social History     Marital status:      Spouse name: N/A     Number of children: 1     Years of education: N/A     Occupational History     musician and teacher       Social History Main Topics     Smoking status: Never Smoker     Smokeless tobacco: Not on file     Alcohol use Yes      Comment: 1 beer daily     Drug use: No     Sexual activity: Yes     Partners: Female     Birth control/ protection: Natural Family Planning     Other Topics Concern     Not on file     Social History Narrative     He works at [employer] in [place], 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  He denies smoking.  He drinks ~1 beer a day.  He denies illicit  "drug use, other than occasional marijuana.  He lives in [place] with his wife, and 4.5 year-old daughter.  His wife is currently pregnant with a boy, and is due in 6 weeks.  He has a half sister who  of breast cancer at age 32; she was diagnosed in her late 20's.  A paternal grandmother had breast cancer in her 70's      FAMILY HISTORY:  Family History   Problem Relation Age of Onset     Other - See Comments Father      sepsis     CANCER Sister      breast cancer  31 yo 1/2 sister      CANCER Paternal Grandmother      breast         PHYSICAL EXAM:  Vital signs:  /81 (BP Location: Left arm, Patient Position: Chair, Cuff Size: Adult Regular)  Pulse 80  Temp 98.2  F (36.8  C) (Oral)  Resp 18  Ht 1.749 m (5' 8.86\")  Wt 70.6 kg (155 lb 9.6 oz)  SpO2 100%  BMI 23.07 kg/m2   ECO  GENERAL/CONSTITUTIONAL: No acute distress.   EYES: No scleral icterus.  NEUROLOGIC: Alert, oriented, answers questions appropriately.  INTEGUMENTARY: No jaundice.      LABS:  Previous labs reviewed.    CBC RESULTS:   Recent Labs   Lab Test  17   1226   WBC  4.7   RBC  4.84   HGB  14.4   HCT  43.0   MCV  89   MCH  29.8   MCHC  33.5   RDW  12.7   PLT  173     Recent Labs   Lab Test  17   1226  17   1624   NA   --   138   POTASSIUM   --   3.5   CHLORIDE   --   103   CO2   --   27   ANIONGAP   --   8   GLC   --   94   BUN  11  14   CR   --   0.82   YOBANY   --   8.6     Lab Results   Component Value Date    AST 16 2017     Lab Results   Component Value Date    ALT 18 2017     No results found for: BILICONJ   Lab Results   Component Value Date    BILITOTAL 0.7 2017     Lab Results   Component Value Date    ALBUMIN 4.1 2017     Lab Results   Component Value Date    PROTTOTAL 7.3 2017      Lab Results   Component Value Date    ALKPHOS 53 2017         PATHOLOGY:  17:  FINAL DIAGNOSIS:   [facility] [facility] [id number],   OBTAINED " "05/30/2017):   A. GASTROESOPHAGEAL JUNCTION, \"MASS\", BIOPSY:   - Adenocarcinoma, moderately-poorly differentiated   - No background intestinal metaplasia seen   - Per report, HER2 immunohistochemistry is equivocal for expression (2+   out of 3)     B. STOMACH, BIOPSY:   - Gastric mucosa with no significant inflammation   - No H. pylori like organisms identified on routine staining   - Negative for intestinal metaplasia or dysplasia       EGD 5/30/17:  Esophageal mass at the GE junction, friable, size 4 cm, extending into the gastric fundus.  Biopsies were taken.      6/9/17:  A. Lymph node, gastrohepatic, EUS guided fine needle aspiration:   Suspicious for adenocarcinoma.   Specimen Adequacy: Satisfactory for evaluation but limited by scant   lymphoid elements     B. Lymph node, celiac node, EUS guided fine needle aspiration:   Benign   Specimen Adequacy: Satisfactory for evaluation but limited by scant   lymphoid elements.       IMAGING:  PET-CT 6/9/17:  IMPRESSION: In this patient with a history of newly diagnosed  esophageal carcinoma at the gastroesophageal junction:  1. Subtle soft tissue thickening of the distal esophagus with  associated hypermetabolism consistent with patient's known esophageal  adenocarcinoma.  2. Mildly hypermetabolic 1 cm lymph node in the lesser sac in the area  of the gastrohepatic ligament suspicious for local regional lymphatic  metastasis.  3. No evidence of metastatic disease in the chest, pelvis, or axial  skeleton.      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  Clinical stage T2N1M0 (stage IIB), based on PET-CT and EUS.  A gastrohepatic lymph node was biopsied and suspicious for adenocarcinoma.  He was seen by Dr. Esteban, and with the degree of extension into the stomach, his inclination is to treat him as a gastric cancer, and so perioperative chemotherapy, as per MAGIC trial, would be reasonable. "      I had a long discussion with the patient about this today.  He is agreeable with the plan and eager to proceed.  He would have 3 cycles of neoadjuvant chemotherapy, followed by new PET-CT about 1 month after completion of chemotherapy, followed by surgery ~ 6 weeks after completion of neoadjuvant chemotherapy, followed by 3 cycles of adjuvant chemotherapy.    Regimen as follows:    Epirubicin 50 mg/m2 IV on day 1  Oxaliplatin 130 mg/m2 IV on day 1  Capecitabine 625 mg/m2 po BID on days 1-21  Every 21 day cycles    -I discussed the schedule, regimen, dose, possible side effects, the benefits and risks, and patient agrees to treatment plan.   -side effects may include, but not limited to: fever/chills, nausea/vomiting, infection, diarrhea/constipation, pain, fatigue, anemia, bleeding, neuropathy, hair loss, mucositis, hand-foot syndrome, organ failure  -chemotherapy teaching today  -will have port placed this week  -echo this week  -registered for medical cannabis, per patient request  -plan to start chemotherapy on Monday, 6/26/17  -RTC on 6/26/17    2) Genetics:  -he has met with genetic counselor, and panel was sent      I spent a total of 40 minutes with the patient, with over >50% of the time in counseling and/or coordination of care.       Laurence Hood MD  Hematology/Oncology  [facility]

## 2017-06-19 NOTE — NURSING NOTE
"Oncology Rooming Note    June 19, 2017 11:27 AM   Daniel Sandhu is a 36 year old male who presents for:    Chief Complaint   Patient presents with     Oncology Clinic Visit     Malignant neoplasm of lower third of esophagus      Initial Vitals: /81 (BP Location: Left arm, Patient Position: Chair, Cuff Size: Adult Regular)  Pulse 80  Temp 98.2  F (36.8  C) (Oral)  Resp 18  Ht 1.749 m (5' 8.86\")  Wt 70.6 kg (155 lb 9.6 oz)  SpO2 100%  BMI 23.07 kg/m2 Estimated body mass index is 23.07 kg/(m^2) as calculated from the following:    Height as of this encounter: 1.749 m (5' 8.86\").    Weight as of this encounter: 70.6 kg (155 lb 9.6 oz). Body surface area is 1.85 meters squared.  No Pain (0) Comment: Data Unavailable   No LMP for male patient.  Allergies reviewed: Yes  Medications reviewed: Yes    Medications: Medication refills not needed today.  Pharmacy name entered into [facility]: [facility] - [place], [place] - [address] [place] [address]    Clinical concerns: No new concerns  Provider was notified.    7 minutes for nursing intake (face to face time)     Kaylen Major MA                        "

## 2017-06-19 NOTE — PROGRESS NOTES
Met with pt after clinic appt with Dr. Hood:  Pt  verbalizes understanding of Dr. Hood's plan of care and had questions re: whether Dr. Hood thinks he will lose his hair and if he qualifies to be registered as a medical cannabis user.  See [employer] message for answers to these questions.  Pt brought in a partially completed [employer] ervin application and asks for assistance in submitting this.    Reviewed in detail with pt:  - [place] document:  Your Implanted Port: What to Expect - Pt Education  - Epirubicin, oxaliplatin and capecitabine ChemoCare handouts for pt to take home, emphasizing Side Effects, self care measures and When to Contact Your Doctor of Health Care Provider sections.   - [place] document: Echocardiogram - Pt Education    Informed pt that I will give the application to ALEXA Parisi so that she can facilitate application process.    [employer] message sent to pt to indicate that Dr. Hood will certify pt as having qualifying condition for medical cannabis in [place] and that it is likely that he will lose his hair from epirubicin per Dr. Hood.    Pt voiced understanding and appreciation of above information and denies any further questions, and he understands that I will follow and provide coordination as needed.

## 2017-06-20 ENCOUNTER — TELEPHONE (OUTPATIENT)
Dept: ONCOLOGY | Facility: CLINIC | Age: 37
End: 2017-06-20

## 2017-06-20 ENCOUNTER — TEAM CONFERENCE (OUTPATIENT)
Dept: SURGERY | Facility: CLINIC | Age: 37
End: 2017-06-20

## 2017-06-20 DIAGNOSIS — C15.5 MALIGNANT NEOPLASM OF LOWER THIRD OF ESOPHAGUS (H): Primary | ICD-10-CM

## 2017-06-20 NOTE — TELEPHONE ENCOUNTER
Thoracic Tumor Conference      Patient Name: Daniel Sandhu    Reason for conference discussion (brief overview): 36 year old male with newly diagnosed GE junction adenocarcinoma. PET+ lesions at distal esophagus and gastrohepatic ligament. 4cm mass beginning near GE junction and extending into the fundus. Inclination is to treat as a gastric cancer with neoadjuvant chemotherapy and adjuvant chemotherapy (akin to the MAGIC protocol). Surgery would be a total or subtotal gastrectomy with Terrell-en-Y reconstruction. His wife is currently pregnant and due to deliver in July.     Specific Question:  Is group in agreement with the plan?    Pertinent Histology:  Poorly differentiated adenocarcinoma, glandular type.    Referring Physician: Dr. Esteban    The patient's case was presented at the multidisciplinary conference for the above noted reason.  There was a consensus recommendation for the following actions:     The group felt that this recommendation is reasonable. Patient has already seen Dr. Hood in Medical Oncology and will be starting neoadjuvant chemotherapy.          Case Lead:  Nelly Dasilva    Interventional Radiology Staff Present: N/A

## 2017-06-20 NOTE — TELEPHONE ENCOUNTER
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  He denies smoking.  He drinks ~1 beer a day.  He denies illicit drug use, other than occasional marijuana.  He lives in [place] with his wife, and 4.5 year-old daughter.  His wife is currently pregnant with a boy, and is due in 6 weeks.  He has a half sister who  of breast cancer at age 32; she was diagnosed in her late 20's.  A paternal grandmother had breast cancer in her 70's    Pt left an [employer] application in clinic yesterday to be completed.  Called pt to get the financial info required.  Will fax when pt starts treatment.  He and his wife live in [place] and are expecting their second baby next month.  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  He has set up a funding website, and said they have some very supportive friends.  His wife's family is in [place], but his mother is in [place].  She and some friends, or parents of her daughter's friends will be back-ups when they need  for the immediate future.  Sounds like friends will also help with meals.    Daniel said his position at the [employer] is contracted so he was looking at his benefits as we spoke.  Is not certain if there are any disability benefits.  He has talked to his HR to ascertain what his medical coverage is if he cannot work and the cost when those months run out.  With his wife not working next month after the new baby comes, and one of his two students going away for a while, their income will be down to about $200/month.  Their mortgage is $1050.   Gave pt some brief info on Social Security Disability if needed, and am sending info to pt on their criteria for esophageal cancer.     One of Daniel's best ways  "to cope is diversion into his work with music.  Discussed other resources, and sent a list to him of some of those including [employer].  His most difficult days are those after he meets with the doctors. Provided supportive listening.  Also sent a book for their daughter if they feel she would understand as it is written for 5-10 yr olds.  She sounds like she asks appropriate questions and they have explained it well in terms she can understand.  She sounds like a happy well-adjusted child.  Am also sending a book for her, \"Someone I Love Has Cancer\".  Pt is very resourceful, and has found a group for adults with cancer from age 18-40, which he sounds like he will attend.    P) as above  Available for support and resources  At next appt will discuss ACD, [facility] and review resources sent (SSDI, Caregiver/pt resources, book for daughter)    Laure Enrique, [facility]  [phone]               "

## 2017-06-21 RX ORDER — HEPARIN SODIUM (PORCINE) LOCK FLUSH IV SOLN 100 UNIT/ML 100 UNIT/ML
5 SOLUTION INTRAVENOUS
Status: COMPLETED | OUTPATIENT
Start: 2017-06-22 | End: 2017-06-22

## 2017-06-22 ENCOUNTER — ANESTHESIA (OUTPATIENT)
Dept: SURGERY | Facility: AMBULATORY SURGERY CENTER | Age: 37
End: 2017-06-22

## 2017-06-22 ENCOUNTER — ANESTHESIA EVENT (OUTPATIENT)
Dept: SURGERY | Facility: AMBULATORY SURGERY CENTER | Age: 37
End: 2017-06-22

## 2017-06-22 ENCOUNTER — SURGERY (OUTPATIENT)
Age: 37
End: 2017-06-22

## 2017-06-22 ENCOUNTER — HOSPITAL ENCOUNTER (OUTPATIENT)
Facility: AMBULATORY SURGERY CENTER | Age: 37
End: 2017-06-22
Attending: PHYSICIAN ASSISTANT

## 2017-06-22 VITALS
OXYGEN SATURATION: 100 % | TEMPERATURE: 98.4 F | DIASTOLIC BLOOD PRESSURE: 46 MMHG | RESPIRATION RATE: 14 BRPM | HEART RATE: 74 BPM | SYSTOLIC BLOOD PRESSURE: 108 MMHG

## 2017-06-22 DEVICE — CATH PORT POWERPORT CLEARVUE SLIM 6FR 5616000
Type: IMPLANTABLE DEVICE | Site: JUGULAR | Status: NON-FUNCTIONAL
Removed: 2018-03-19

## 2017-06-22 RX ORDER — HEPARIN SODIUM,PORCINE 10 UNIT/ML
5 VIAL (ML) INTRAVENOUS EVERY 24 HOURS
Status: CANCELLED | OUTPATIENT
Start: 2017-06-22

## 2017-06-22 RX ORDER — HEPARIN SODIUM (PORCINE) LOCK FLUSH IV SOLN 100 UNIT/ML 100 UNIT/ML
5 SOLUTION INTRAVENOUS
Status: CANCELLED | OUTPATIENT
Start: 2017-06-22

## 2017-06-22 RX ORDER — LIDOCAINE HYDROCHLORIDE 20 MG/ML
INJECTION, SOLUTION INFILTRATION; PERINEURAL PRN
Status: DISCONTINUED | OUTPATIENT
Start: 2017-06-22 | End: 2017-06-22

## 2017-06-22 RX ORDER — LIDOCAINE 40 MG/G
CREAM TOPICAL
Status: DISCONTINUED | OUTPATIENT
Start: 2017-06-22 | End: 2017-06-23 | Stop reason: HOSPADM

## 2017-06-22 RX ORDER — SODIUM CHLORIDE, SODIUM LACTATE, POTASSIUM CHLORIDE, CALCIUM CHLORIDE 600; 310; 30; 20 MG/100ML; MG/100ML; MG/100ML; MG/100ML
INJECTION, SOLUTION INTRAVENOUS CONTINUOUS PRN
Status: DISCONTINUED | OUTPATIENT
Start: 2017-06-22 | End: 2017-06-22

## 2017-06-22 RX ORDER — PROPOFOL 10 MG/ML
INJECTION, EMULSION INTRAVENOUS PRN
Status: DISCONTINUED | OUTPATIENT
Start: 2017-06-22 | End: 2017-06-22

## 2017-06-22 RX ORDER — PROPOFOL 10 MG/ML
INJECTION, EMULSION INTRAVENOUS CONTINUOUS PRN
Status: DISCONTINUED | OUTPATIENT
Start: 2017-06-22 | End: 2017-06-22

## 2017-06-22 RX ADMIN — PROPOFOL 30 MG: 10 INJECTION, EMULSION INTRAVENOUS at 09:32

## 2017-06-22 RX ADMIN — PROPOFOL 50 MG: 10 INJECTION, EMULSION INTRAVENOUS at 09:22

## 2017-06-22 RX ADMIN — LIDOCAINE HYDROCHLORIDE 80 MG: 20 INJECTION, SOLUTION INFILTRATION; PERINEURAL at 09:09

## 2017-06-22 RX ADMIN — Medication 10 ML: at 09:40

## 2017-06-22 RX ADMIN — SODIUM CHLORIDE, SODIUM LACTATE, POTASSIUM CHLORIDE, CALCIUM CHLORIDE: 600; 310; 30; 20 INJECTION, SOLUTION INTRAVENOUS at 09:07

## 2017-06-22 RX ADMIN — HEPARIN SODIUM (PORCINE) LOCK FLUSH IV SOLN 100 UNIT/ML 5 ML: 100 SOLUTION at 09:40

## 2017-06-22 RX ADMIN — PROPOFOL 150 MCG/KG/MIN: 10 INJECTION, EMULSION INTRAVENOUS at 09:09

## 2017-06-22 NOTE — PROCEDURES
Interventional Radiology Brief Post Procedure Note    Procedure:  IR CHEST PORT PLACEMENT > 5 YRS OF AGE [[id number]]    Proceduralist: Dereje Driver PA-C    Assistant: None    Time Out: Prior to the start of the procedure and with procedural staff participation, I verbally confirmed the patient s identity using two indicators, relevant allergies, that the procedure was appropriate and matched the consent or emergent situation, and that the correct equipment/implants were available. Immediately prior to starting the procedure I conducted the Time Out with the procedural staff and re-confirmed the patient s name, procedure, and site/side. (The Joint Commission universal protocol was followed.)  Yes    Sedation: Monitored Anesthesia Care (MAC) administered and documented by Anesthesia Care Provider    Findings: Completed image-guided placement of 6 [place]an 23 cm single lumen power-injectable central venous port via right IJ. Aspirates and flushes freely, heparin locked and is ready for immediate use.    Estimated Blood Loss: Less than 10 ml    Fluoroscopy Time: 0.2 minute(s)    SPECIMENS: None    Complications: 1. None     Condition: Stable    Plan: Follow-up per primary team. Return for removal as indicated.    Comments: See dictated procedure note for full details.    Dereje Driver PA-C

## 2017-06-22 NOTE — ANESTHESIA POSTPROCEDURE EVALUATION
Patient: Daniel Sandhu    Procedure(s):  Single Lumen Chest Power Port - Wound Class: I-Clean    Diagnosis:Malignant Neoplasm of Lower Third Esophagus  Diagnosis Additional Information: No value filed.    Anesthesia Type:  MAC    Note:  Anesthesia Post Evaluation    Patient location during evaluation: bedside  Patient participation: Able to fully participate in evaluation  Level of consciousness: awake  Pain management: adequate  Airway patency: patent  Cardiovascular status: acceptable  Respiratory status: acceptable  Hydration status: acceptable  PONV: controlled     Anesthetic complications: None          Last vitals:  Vitals:    06/22/17 0756 06/22/17 1000 06/22/17 1011   BP: 121/73 106/60 96/62   Pulse:  78 88   Resp: 14     Temp: 36.7  C (98  F) 36.8  C (98.2  F) 36.9  C (98.4  F)   SpO2: 100% 96% 97%         Electronically Signed By: Dwight Mondragon MD, MD  June 22, 2017  10:36 AM

## 2017-06-22 NOTE — DISCHARGE INSTRUCTIONS
A collaboration between [facility] and [facility]  Experts in minimally invasive, targeted treatments performed using imaging guidance    Venous Access Device,  Port Catheter or Tunneled Central Line Placement    Today you had a procedure today to install a venous access device; either a tunneled central vein catheter or a subcutaneous port catheter.    One of our Radiology PAs performed this procedure for you today:  ? Rod Villalpando, [facility], PA-C  ? IGOR Fraser, ARMANDO  ? Dereje Driver PA-C  ? Krysten Hopper MS, PA-C    After you go home:  - Drink plenty of fluids.  Generally 6-8 (8 ounce) glasses a day is recommended.  - Resume your regular diet unless otherwise ordered by a medical provider.  - Keep any applied tape/gauze dressings clean and dry.  Change tape/gauze dressings if they get wet or soiled.  - You may shower the following day after procedure, however cover and protect from moisture any tape/gauze dressings.  You may let water hit and run over dried skin glue, but do not scrub.  Pat the area dry after showering.  - Port placement incisions are closed with absorbable suture, meaning they do not need to be removed at a later date, and a topical skin adhesive (skin glue).  This glue will wear off in 7-14 days.  Do not remove before this time.  If 14 days have passed and residual glue is present, you may gently remove it.  - Do not apply gels, lotions, or ointments to the glue site for the first 10 days as this may cause the glue to prematurely soften and fail.  - Do not perform strenuous activities or lift greater than 10 pounds for the next three days.  - If there is bleeding or oozing from the procedure site, apply firm pressure to the area for 5-10 minutes.  If the bleeding continues seek medical advice at the numbers below.  - Mild procedure site discomfort can be treated with an ice pack and over-the-counter pain relievers.        For 24  hours after any sedation used:  - Relax and take it easy.  No strenuous activities.  - Do not drive or operate machines at home or at work.  - No alcohol consumption.  - Do not make any important or legal decisions.    Call our Interventional Radiology (IR) service if:  - If you start bleeding from the procedure site.  If you do start to bleed from the site, lie down and hold some pressure on the site.  Our radiology provider can help you decide if you need to return to the hospital.  - If you have new or worsening pain related to the procedure.  - If you have concerning swelling at the procedure site.  - If you develop persistent nausea or vomiting.  - If you develop hives or a rash or any unexplained itching.  - If you have a fever of greater than 100.5  F and chills in the first 5 days after procedure.  - Any other concerns related to your procedure.      [facility]  Interventional Radiology (IR)  [address] [facility] [address]  2nd [facility] [place] [address]    Contact Number:  [phone]  (IR control desk)  - Monday - Friday 8:00 am - 4:30 pm    After hours for urgent concerns:  [phone]  - After 4:30 pm Monday - Friday, Weekends and Holidays.   - Ask for Interventional Radiology on-call.  Someone is available 24 hours a day.  - [facility] toll free number:  [phone]

## 2017-06-22 NOTE — ANESTHESIA PREPROCEDURE EVALUATION
Anesthesia Evaluation     . Pt has had prior anesthetic. Type: General    No history of anesthetic complications          ROS/MED HX    ENT/Pulmonary:  - neg pulmonary ROS     Neurologic:  - neg neurologic ROS     Cardiovascular:  - neg cardiovascular ROS       METS/Exercise Tolerance:  >4 METS   Hematologic:  - neg hematologic  ROS       Musculoskeletal:  - neg musculoskeletal ROS       GI/Hepatic:  - neg GI/hepatic ROS       Renal/Genitourinary:  - ROS Renal section negative       Endo:  - neg endo ROS       Psychiatric:  - neg psychiatric ROS       Infectious Disease:  - neg infectious disease ROS       Malignancy:      - no malignancy   Other:                     Physical Exam  Normal systems: dental    Airway   Mallampati: I  TM distance: >3 FB  Neck ROM: full    Dental     Cardiovascular   Rhythm and rate: regular and normal      Pulmonary    breath sounds clear to auscultation                    Anesthesia Plan      History & Physical Review  History and physical reviewed and following examination; no interval change.    ASA Status:  3 .    NPO Status:  > 6 hours    Plan for MAC with Intravenous induction. Maintenance will be TIVA.  Reason for MAC:  Deep or markedly invasive procedure (G8)  PONV prophylaxis:  Ondansetron (or other 5HT-3)       Postoperative Care  Postoperative pain management:  Oral pain medications and Multi-modal analgesia.      Consents  Anesthetic plan, risks, benefits and alternatives discussed with:  Patient..                          .

## 2017-06-22 NOTE — IP AVS SNAPSHOT
MRN:[id number]                      After Visit Summary   6/22/2017    Daniel Sandhu    MRN: [id number]           Thank you!     Thank you for choosing [facility] for your care. Our goal is always to provide you with excellent care. Hearing back from our patients is one way we can continue to improve our services. Please take a few minutes to complete the written survey that you may receive in the mail after you visit with us. Thank you!        Patient Information     Date Of Birth          1980        About your hospital stay     You were admitted on:  June 22, 2017 You last received care in the:  [facility]    You were discharged on:  June 22, 2017       Who to Call     For medical emergencies, please call 911.  For non-urgent questions about your medical care, please call your primary care provider or clinic, [phone]  For questions related to your surgery, please call your surgery clinic        Attending Provider     Provider Iván Thao PA-C Physician Assistant       Primary Care Provider Office Phone # Fax #    Lencho Chan [phone]      Your next 10 appointments already scheduled     Jun 23, 2017 10:00 AM CDT   Ech Limited with UCECHCR2   [facility] ([facility])    [address] [place] [id number] [phone]           1.  Please bring or wear a comfortable two-piece outfit. 2.  You may eat, drink and take your normal medicines. 3.  For any questions that cannot be answered, please contact the ordering physician            Jun 26, 2017  6:30 AM CDT   [facility] Lab Draw with  [facility] LAB DRAW   [facility] Draw ([facility])    [address] [place] [id number] [phone]            Jun 26, 2017  7:00 AM CDT   Infusion 240 with  ONCOLOGY INFUSION, UC 12 ATC   [facility] (  [facility])    [address] [facility]  [address] [facility] [address]   [phone]            Jun 26, 2017  8:45 AM CDT   (Arrive by 8:30 AM)   Return Visit with Laurence Hood MD   [facility] ([facility])    [address] [address]   [phone]              Further instructions from your care team         A collaboration between [facility] and [facility]  Experts in minimally invasive, targeted treatments performed using imaging guidance    Venous Access Device,  Port Catheter or Tunneled Central Line Placement    Today you had a procedure today to install a venous access device; either a tunneled central vein catheter or a subcutaneous port catheter.    One of our Radiology PAs performed this procedure for you today:  ? Rod Villalpando [facility], ARMANDO  ? IGOR rFaser PA-C  ? Dereje Driver PA-C  ? Krysten Hopper MS, PA-C    After you go home:  - Drink plenty of fluids.  Generally 6-8 (8 ounce) glasses a day is recommended.  - Resume your regular diet unless otherwise ordered by a medical provider.  - Keep any applied tape/gauze dressings clean and dry.  Change tape/gauze dressings if they get wet or soiled.  - You may shower the following day after procedure, however cover and protect from moisture any tape/gauze dressings.  You may let water hit and run over dried skin glue, but do not scrub.  Pat the area dry after showering.  - Port placement incisions are closed with absorbable suture, meaning they do not need to be removed at a later date, and a topical skin adhesive (skin glue).  This glue will wear off in 7-14 days.  Do not remove before this time.  If 14 days have passed and residual glue is present, you may gently remove it.  - Do not apply gels, lotions, or ointments to the glue site for the first 10 days as this may cause the  glue to prematurely soften and fail.  - Do not perform strenuous activities or lift greater than 10 pounds for the next three days.  - If there is bleeding or oozing from the procedure site, apply firm pressure to the area for 5-10 minutes.  If the bleeding continues seek medical advice at the numbers below.  - Mild procedure site discomfort can be treated with an ice pack and over-the-counter pain relievers.        For 24 hours after any sedation used:  - Relax and take it easy.  No strenuous activities.  - Do not drive or operate machines at home or at work.  - No alcohol consumption.  - Do not make any important or legal decisions.    Call our Interventional Radiology (IR) service if:  - If you start bleeding from the procedure site.  If you do start to bleed from the site, lie down and hold some pressure on the site.  Our radiology provider can help you decide if you need to return to the hospital.  - If you have new or worsening pain related to the procedure.  - If you have concerning swelling at the procedure site.  - If you develop persistent nausea or vomiting.  - If you develop hives or a rash or any unexplained itching.  - If you have a fever of greater than 100.5  F and chills in the first 5 days after procedure.  - Any other concerns related to your procedure.      [facility]  Interventional Radiology (IR)  [address] [place] [address]    Contact Number:  [phone]  (IR control desk)  - Monday - Friday 8:00 am - 4:30 pm    After hours for urgent concerns:  [phone]  - After 4:30 pm Monday - Friday, Weekends and Holidays.   - Ask for Interventional Radiology on-call.  Someone is available 24 hours a day.  - [facility] toll free number:  [phone]        Pending Results     No orders found from 6/20/2017 to 6/23/2017.            Admission Information     Date & Time Provider Department Dept. Phone    6/22/2017 Iván Driver,  ARMANDO ROBBINS [facility] [phone]      Your Vitals Were     Blood Pressure Pulse Temperature Respirations Pulse Oximetry       96/62 88 98.2  F (36.8  C) (Temporal) 14 97%       [employer] â€“ [employer] Information     [employer] gives you secure access to your electronic health record. If you see a primary care provider, you can also send messages to your care team and make appointments. If you have questions, please call your primary care clinic.  If you do not have a primary care provider, please call [phone] and they will assist you.      [employer] is an electronic gateway that provides easy, online access to your medical records. With [employer], you can request a clinic appointment, read your test results, renew a prescription or communicate with your care team.     To access your existing account, please contact your [facility] or call [phone] for assistance.        Care EveryWhere ID     This is your Care EveryWhere ID. This could be used by other organizations to access your [facility] medical records  [id number]        Equal Access to Services     SHAWN SWAIN : Hadii aad ku hadasho Soomaali, waaxda luqadaha, qaybta kaalmada adeegyada, waxay miko brown . So [facility] [phone].    ATENCIÓN: Si habla español, tiene a zayas disposición servicios gratuitos de asistencia lingüística. Llame al [phone].    We comply with applicable federal civil rights laws and [place] laws. We do not discriminate on the basis of race, color, national origin, age, disability sex, sexual orientation or gender identity.               Review of your medicines      Notice     You have not been prescribed any medications.             Protect others around you: Learn how to safely use, store and throw away your medicines at www.disposemymeds.org.             Medication List: This is a list of all your medications and when to take them. Check marks below indicate your  daily home schedule. Keep this list as a reference.      Notice     You have not been prescribed any medications.

## 2017-06-22 NOTE — IP AVS SNAPSHOT
[facility]    [address] [place] [address]    Phone:  [phone]    Fax:  [phone]                                       After Visit Summary   6/22/2017    Daniel Sandhu    MRN: [id number]           After Visit Summary Signature Page     I have received my discharge instructions, and my questions have been answered. I have discussed any challenges I see with this plan with the nurse or doctor.    ..........................................................................................................................................  Patient/Patient Representative Signature      ..........................................................................................................................................  Patient Representative Print Name and Relationship to Patient    ..................................................               ................................................  Date                                            Time    ..........................................................................................................................................  Reviewed by Signature/Title    ...................................................              ..............................................  Date                                                            Time

## 2017-06-22 NOTE — ANESTHESIA CARE TRANSFER NOTE
Patient: Daniel Sandhu    Procedure(s):  Single Lumen Chest Power Port - Wound Class: I-Clean    Diagnosis: Malignant Neoplasm of Lower Third Esophagus  Diagnosis Additional Information: No value filed.    Anesthesia Type:   MAC     Note:  Airway :Room Air  Patient transferred to:Phase II  Comments: VSS, denies pain, no PONV, report to RN      Vitals: (Last set prior to Anesthesia Care Transfer)    CRNA VITALS  6/22/2017 0924 - 6/22/2017 1003      6/22/2017             Pulse: 67    SpO2: 100 %    Resp Rate (set): 10                Electronically Signed By: GAVIN Moreau CRNA  June 22, 2017  10:03 AM

## 2017-06-23 ENCOUNTER — RADIANT APPOINTMENT (OUTPATIENT)
Dept: CARDIOLOGY | Facility: CLINIC | Age: 37
End: 2017-06-23
Attending: INTERNAL MEDICINE

## 2017-06-23 DIAGNOSIS — C15.5 MALIGNANT NEOPLASM OF LOWER THIRD OF ESOPHAGUS (H): ICD-10-CM

## 2017-06-23 RX ORDER — ONDANSETRON 8 MG/1
8 TABLET, FILM COATED ORAL EVERY 8 HOURS PRN
Qty: 10 TABLET | Refills: 7 | Status: ON HOLD | OUTPATIENT
Start: 2017-06-23 | End: 2017-11-17

## 2017-06-23 RX ORDER — LORAZEPAM 0.5 MG/1
0.5 TABLET ORAL EVERY 4 HOURS PRN
Qty: 30 TABLET | Refills: 5 | Status: SHIPPED | OUTPATIENT
Start: 2017-06-23 | End: 2017-12-11

## 2017-06-23 RX ORDER — PROCHLORPERAZINE MALEATE 10 MG
10 TABLET ORAL EVERY 6 HOURS PRN
Qty: 30 TABLET | Refills: 5 | Status: ON HOLD | OUTPATIENT
Start: 2017-06-23 | End: 2017-11-17

## 2017-06-26 ENCOUNTER — INFUSION THERAPY VISIT (OUTPATIENT)
Dept: ONCOLOGY | Facility: CLINIC | Age: 37
End: 2017-06-26
Attending: INTERNAL MEDICINE
Payer: COMMERCIAL

## 2017-06-26 ENCOUNTER — TELEPHONE (OUTPATIENT)
Dept: ONCOLOGY | Facility: CLINIC | Age: 37
End: 2017-06-26

## 2017-06-26 ENCOUNTER — ALLIED HEALTH/NURSE VISIT (OUTPATIENT)
Dept: ONCOLOGY | Facility: CLINIC | Age: 37
End: 2017-06-26

## 2017-06-26 ENCOUNTER — NURSE TRIAGE (OUTPATIENT)
Dept: NURSING | Facility: CLINIC | Age: 37
End: 2017-06-26

## 2017-06-26 ENCOUNTER — APPOINTMENT (OUTPATIENT)
Dept: LAB | Facility: CLINIC | Age: 37
End: 2017-06-26
Attending: INTERNAL MEDICINE
Payer: COMMERCIAL

## 2017-06-26 VITALS
WEIGHT: 155.9 LBS | TEMPERATURE: 98.5 F | RESPIRATION RATE: 16 BRPM | DIASTOLIC BLOOD PRESSURE: 66 MMHG | OXYGEN SATURATION: 100 % | BODY MASS INDEX: 23.12 KG/M2 | SYSTOLIC BLOOD PRESSURE: 116 MMHG | HEART RATE: 85 BPM

## 2017-06-26 DIAGNOSIS — C15.5 MALIGNANT NEOPLASM OF LOWER THIRD OF ESOPHAGUS (H): Primary | ICD-10-CM

## 2017-06-26 DIAGNOSIS — Z71.9 VISIT FOR COUNSELING: Primary | ICD-10-CM

## 2017-06-26 LAB
ALBUMIN SERPL-MCNC: 3.7 G/DL (ref 3.4–5)
ALP SERPL-CCNC: 56 U/L (ref 40–150)
ALT SERPL W P-5'-P-CCNC: 17 U/L (ref 0–70)
ANION GAP SERPL CALCULATED.3IONS-SCNC: 8 MMOL/L (ref 3–14)
AST SERPL W P-5'-P-CCNC: 15 U/L (ref 0–45)
BASOPHILS # BLD AUTO: 0 10E9/L (ref 0–0.2)
BASOPHILS NFR BLD AUTO: 0.6 %
BILIRUB SERPL-MCNC: 0.5 MG/DL (ref 0.2–1.3)
BUN SERPL-MCNC: 12 MG/DL (ref 7–30)
CALCIUM SERPL-MCNC: 8.5 MG/DL (ref 8.5–10.1)
CHLORIDE SERPL-SCNC: 104 MMOL/L (ref 94–109)
CO2 SERPL-SCNC: 27 MMOL/L (ref 20–32)
CREAT SERPL-MCNC: 0.76 MG/DL (ref 0.66–1.25)
DIFFERENTIAL METHOD BLD: NORMAL
EOSINOPHIL # BLD AUTO: 0.2 10E9/L (ref 0–0.7)
EOSINOPHIL NFR BLD AUTO: 3.6 %
ERYTHROCYTE [DISTWIDTH] IN BLOOD BY AUTOMATED COUNT: 12 % (ref 10–15)
GFR SERPL CREATININE-BSD FRML MDRD: ABNORMAL ML/MIN/1.7M2
GLUCOSE SERPL-MCNC: 109 MG/DL (ref 70–99)
HCT VFR BLD AUTO: 40.9 % (ref 40–53)
HGB BLD-MCNC: 13.9 G/DL (ref 13.3–17.7)
IMM GRANULOCYTES # BLD: 0 10E9/L (ref 0–0.4)
IMM GRANULOCYTES NFR BLD: 0 %
LYMPHOCYTES # BLD AUTO: 1.7 10E9/L (ref 0.8–5.3)
LYMPHOCYTES NFR BLD AUTO: 33.5 %
MCH RBC QN AUTO: 29.6 PG (ref 26.5–33)
MCHC RBC AUTO-ENTMCNC: 34 G/DL (ref 31.5–36.5)
MCV RBC AUTO: 87 FL (ref 78–100)
MONOCYTES # BLD AUTO: 0.5 10E9/L (ref 0–1.3)
MONOCYTES NFR BLD AUTO: 10.2 %
NEUTROPHILS # BLD AUTO: 2.6 10E9/L (ref 1.6–8.3)
NEUTROPHILS NFR BLD AUTO: 52.1 %
NRBC # BLD AUTO: 0 10*3/UL
NRBC BLD AUTO-RTO: 0 /100
PLATELET # BLD AUTO: 187 10E9/L (ref 150–450)
POTASSIUM SERPL-SCNC: 3.6 MMOL/L (ref 3.4–5.3)
PROT SERPL-MCNC: 7.1 G/DL (ref 6.8–8.8)
RBC # BLD AUTO: 4.69 10E12/L (ref 4.4–5.9)
SODIUM SERPL-SCNC: 139 MMOL/L (ref 133–144)
WBC # BLD AUTO: 5 10E9/L (ref 4–11)

## 2017-06-26 PROCEDURE — 96367 TX/PROPH/DG ADDL SEQ IV INF: CPT

## 2017-06-26 PROCEDURE — 96415 CHEMO IV INFUSION ADDL HR: CPT

## 2017-06-26 PROCEDURE — 36415 COLL VENOUS BLD VENIPUNCTURE: CPT | Performed by: INTERNAL MEDICINE

## 2017-06-26 PROCEDURE — 85025 COMPLETE CBC W/AUTO DIFF WBC: CPT | Performed by: INTERNAL MEDICINE

## 2017-06-26 PROCEDURE — 25000128 H RX IP 250 OP 636: Mod: ZF | Performed by: INTERNAL MEDICINE

## 2017-06-26 PROCEDURE — 96411 CHEMO IV PUSH ADDL DRUG: CPT

## 2017-06-26 PROCEDURE — 25000125 ZZHC RX 250: Mod: JW,ZF | Performed by: INTERNAL MEDICINE

## 2017-06-26 PROCEDURE — 80053 COMPREHEN METABOLIC PANEL: CPT | Performed by: INTERNAL MEDICINE

## 2017-06-26 PROCEDURE — 96413 CHEMO IV INFUSION 1 HR: CPT

## 2017-06-26 PROCEDURE — 99214 OFFICE O/P EST MOD 30 MIN: CPT | Performed by: INTERNAL MEDICINE

## 2017-06-26 RX ORDER — CAPECITABINE 500 MG/1
625 TABLET, FILM COATED ORAL 2 TIMES DAILY
Qty: 84 TABLET | Refills: 0 | Status: SHIPPED | OUTPATIENT
Start: 2017-06-26 | End: 2020-05-08

## 2017-06-26 RX ORDER — HEPARIN SODIUM (PORCINE) LOCK FLUSH IV SOLN 100 UNIT/ML 100 UNIT/ML
500 SOLUTION INTRAVENOUS EVERY 8 HOURS
Status: DISCONTINUED | OUTPATIENT
Start: 2017-06-26 | End: 2017-06-26 | Stop reason: HOSPADM

## 2017-06-26 RX ORDER — HEPARIN SODIUM (PORCINE) LOCK FLUSH IV SOLN 100 UNIT/ML 100 UNIT/ML
5 SOLUTION INTRAVENOUS
Status: COMPLETED | OUTPATIENT
Start: 2017-06-26 | End: 2017-06-26

## 2017-06-26 RX ORDER — EPINEPHRINE 0.3 MG/.3ML
INJECTION SUBCUTANEOUS
Status: DISCONTINUED
Start: 2017-06-26 | End: 2017-06-26 | Stop reason: WASHOUT

## 2017-06-26 RX ADMIN — EPIRUBICIN HYDROCHLORIDE 93 MG: 2 INJECTION, SOLUTION INTRAVENOUS at 08:42

## 2017-06-26 RX ADMIN — OXALIPLATIN 250 MG: 5 INJECTION, SOLUTION, CONCENTRATE INTRAVENOUS at 08:57

## 2017-06-26 RX ADMIN — DEXAMETHASONE SODIUM PHOSPHATE: 10 INJECTION, SOLUTION INTRAMUSCULAR; INTRAVENOUS at 07:55

## 2017-06-26 RX ADMIN — DEXTROSE MONOHYDRATE 250 ML: 50 INJECTION, SOLUTION INTRAVENOUS at 07:55

## 2017-06-26 RX ADMIN — SODIUM CHLORIDE, PRESERVATIVE FREE 5 ML: 5 INJECTION INTRAVENOUS at 06:42

## 2017-06-26 RX ADMIN — SODIUM CHLORIDE, PRESERVATIVE FREE 500 UNITS: 5 INJECTION INTRAVENOUS at 11:12

## 2017-06-26 ASSESSMENT — PAIN SCALES - GENERAL
PAINLEVEL: NO PAIN (0)
PAINLEVEL: MILD PAIN (2)

## 2017-06-26 NOTE — PROGRESS NOTES
[facility]    Hematology/Oncology Established Patient Note      Today's Date: 06/26/17    Reason for Follow-up: adenocarcinoma of the GE junction      HISTORY OF PRESENT ILLNESS: Daniel Sandhu is a 36 year old male who presents with adenocarcinoma of the GE junction.  In early 2017, patient started noticing difficulty swallowing, and that food seems to take longer to go down.  It became more frequent, and he saw his PCP, and was referred for EGD, which showed an esophageal mass located at the GE junction, measuring 4 cm.  Biopsy showed poorly differentiated adenocarcinoma.  HER-2 was sent and is equivocal (2+).  CT c/a/p showed a mildly prominent lymph node in the gastrohepatic ligament, but there were no pathologically enlarged lymph nodes in the chest, abdomen, or pelvis.  There was otherwise no evidence of metastatic disease.  PET-CT showed thickening of the distal esophagus consistent with known esophageal adenocarcinoma, as well as mildly hypermetabolic 1 cm lymph node in the gastrohepatic ligament.  There was no evidence of distant metastatic disease.  He underwent EUS on 6/9/17, which showed the esophageal tumor in the lower third of the esophagus, staged T2NX.  There were 2 abnormal lymph nodes seen in the gastrohepatic ligament; pathology was suspicious for adenocarcinoma.  Celiac node was sampled as well, which was benign.  He was seen by surgery, Dr. Esteban, and recommended srinivas-operative chemotherapy.    Port was placed on 6/22/17.    Chemotherapy:  Epirubicin 50 mg/m2 IV on day 1  Oxaliplatin 130 mg/m2 IV on day 1  Capecitabine 625 mg/m2 po BID on days 1-21  Every 21 day cycles    C1D1: 6/26/17  C2D1: anticipated for 7/17/17      INTERIM HISTORY: Daniel comes in for follow-up today.  He feels well and ready to start chemotherapy.  He notes that his new baby boy is due in 3 weeks, but will likely be early.  He had some questions regarding vitamins.        REVIEW OF SYSTEMS:   14  point ROS was reviewed and is negative other than as noted above in HPI.       HOME MEDICATIONS:  Current Outpatient Prescriptions   Medication Sig Dispense Refill     capecitabine (XELODA) 500 MG tablet CHEMO Take 2 tablets (1,000 mg) by mouth 2 times daily for 21 days 84 tablet 0     LORazepam (ATIVAN) 0.5 MG tablet Take 1 tablet (0.5 mg) by mouth every 4 hours as needed (Anxiety, Nausea/Vomiting or Sleep) 30 tablet 5     prochlorperazine (COMPAZINE) 10 MG tablet Take 1 tablet (10 mg) by mouth every 6 hours as needed (Nausea/Vomiting) 30 tablet 5     ondansetron (ZOFRAN) 8 MG tablet Take 1 tablet (8 mg) by mouth every 8 hours as needed (Nausea/Vomiting) 10 tablet 7         ALLERGIES:  No Known Allergies      PAST MEDICAL HISTORY:  No past medical history on file.      PAST SURGICAL HISTORY:  Past Surgical History:   Procedure Laterality Date     ESOPHAGOGASTRODUODENOSCOPY       ESOPHAGOSCOPY, GASTROSCOPY, DUODENOSCOPY (EGD), COMBINED N/A 6/9/2017    Procedure: COMBINED ENDOSCOPIC ULTRASOUND, ESOPHAGOSCOPY, GASTROSCOPY, DUODENOSCOPY (EGD), FINE NEEDLE ASPIRATE/BIOPSY;  Upper Endoscopic Ultrasound, fine needle aspirate/biopsy;  Surgeon: Guru Mark Avila MD;  Location: [facility] OR     HAND SURGERY      childhood, torn tendon         SOCIAL HISTORY:  Social History     Social History     Marital status:      Spouse name: N/A     Number of children: 1     Years of education: N/A     Occupational History     musician and teacher       Social History Main Topics     Smoking status: Never Smoker     Smokeless tobacco: Not on file     Alcohol use Yes      Comment: 1 beer daily     Drug use: No     Sexual activity: Yes     Partners: Female     Birth control/ protection: Natural Family Planning     Other Topics Concern     Not on file     Social History Narrative     He works at [employer] in [place], 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  He denies smoking.  He drinks ~1 beer a day.  He  denies illicit drug use, other than occasional marijuana.  He lives in [place] with his wife, and 4.5 year-old daughter.  His wife is currently pregnant with a boy, and is due in 6 weeks.  He has a half sister who  of breast cancer at age 32; she was diagnosed in her late 20's.  A paternal grandmother had breast cancer in her 70's      FAMILY HISTORY:  Family History   Problem Relation Age of Onset     Other - See Comments Father      sepsis     CANCER Sister      breast cancer  29 yo 1/2 sister      CANCER Paternal Grandmother      breast         PHYSICAL EXAM:  Vital signs:  /66 (BP Location: Right arm, Patient Position: Chair, Cuff Size: Adult Regular)  Pulse 85  Temp 98.5  F (36.9  C) (Oral)  Resp 16  Wt 70.7 kg (155 lb 14.4 oz)  SpO2 100%  BMI 23.12 kg/m2   ECO  GENERAL/CONSTITUTIONAL: No acute distress.  EYES:No scleral icterus.  RESPIRATORY: Clear to auscultation bilaterally. No crackles or wheezing.   CARDIOVASCULAR: Regular rate and rhythm without murmurs, gallops, or rubs.  GASTROINTESTINAL: No tenderness. The patient has normal bowel sounds. No guarding.  No distention.  MUSCULOSKELETAL: Warm and well-perfused, no cyanosis, clubbing, or edema.  NEUROLOGIC: Alert, oriented, answers questions appropriately.  INTEGUMENTARY: No jaundice.  Port in place.      LABS:  CBC RESULTS:   Recent Labs   Lab Test  17   0649   WBC  5.0   RBC  4.69   HGB  13.9   HCT  40.9   MCV  87   MCH  29.6   MCHC  34.0   RDW  12.0   PLT  187     Recent Labs   Lab Test  17   0649  17   1226  17   1624   NA  139   --   138   POTASSIUM  3.6   --   3.5   CHLORIDE  104   --   103   CO2  27   --   27   ANIONGAP  8   --   8   GLC  109*   --   94   BUN  12  11  14   CR  0.76   --   0.82   YOBANY  8.5   --   8.6     Lab Results   Component Value Date    AST 15 2017     Lab Results   Component Value Date    ALT 2017     No results found for: BILICONJ   Lab Results   Component  "Value Date    BILITOTAL 0.5 06/26/2017     Lab Results   Component Value Date    ALBUMIN 3.7 06/26/2017     Lab Results   Component Value Date    PROTTOTAL 7.1 06/26/2017      Lab Results   Component Value Date    ALKPHOS 56 06/26/2017         PATHOLOGY:  5/30/17:  FINAL DIAGNOSIS:   [facility] [id number],   OBTAINED 05/30/2017):   A. GASTROESOPHAGEAL JUNCTION, \"MASS\", BIOPSY:   - Adenocarcinoma, moderately-poorly differentiated   - No background intestinal metaplasia seen   - Per report, HER2 immunohistochemistry is equivocal for expression (2+   out of 3)     B. STOMACH, BIOPSY:   - Gastric mucosa with no significant inflammation   - No H. pylori like organisms identified on routine staining   - Negative for intestinal metaplasia or dysplasia       EGD 5/30/17:  Esophageal mass at the GE junction, friable, size 4 cm, extending into the gastric fundus.  Biopsies were taken.      6/9/17:  A. Lymph node, gastrohepatic, EUS guided fine needle aspiration:   Suspicious for adenocarcinoma.   Specimen Adequacy: Satisfactory for evaluation but limited by scant   lymphoid elements     B. Lymph node, celiac node, EUS guided fine needle aspiration:   Benign   Specimen Adequacy: Satisfactory for evaluation but limited by scant   lymphoid elements.       IMAGING:  PET-CT 6/9/17:  IMPRESSION: In this patient with a history of newly diagnosed  esophageal carcinoma at the gastroesophageal junction:  1. Subtle soft tissue thickening of the distal esophagus with  associated hypermetabolism consistent with patient's known esophageal  adenocarcinoma.  2. Mildly hypermetabolic 1 cm lymph node in the lesser sac in the area  of the gastrohepatic ligament suspicious for local regional lymphatic  metastasis.  3. No evidence of metastatic disease in the chest, pelvis, or axial  skeleton.    Echo 6/23/17:  Global and regional left ventricular function is normal with an EF of 55-60%.  Global peak LV " longitudinal strain is averaged at -21.4%. This is within  reported normal limits (normal <-18%).  Right ventricular function, chamber size, wall motion, and thickness are  normal.  No pericardial effusion is present.      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  Clinical stage T2N1M0 (stage IIB), based on PET-CT and EUS.  A gastrohepatic lymph node was biopsied and suspicious for adenocarcinoma.  He was seen by Dr. Esteban, and with the degree of extension into the stomach, his inclination is to treat him as a gastric cancer, and so perioperative chemotherapy, as per MAGIC trial, would be reasonable.  Plan was discussed at tumor conference, the group agreed with the recommendation.      He will undergo 3 cycles of neoadjuvant chemotherapy, followed by new PET-CT about 1 month after completion of chemotherapy, followed by surgery ~ 6 weeks after completion of neoadjuvant chemotherapy, followed by 3 cycles of adjuvant chemotherapy.    -echo every 3 months  -registered for medical cannabis, per patient request  -C1D1 of EOX today  -prn anti-emetics written  -RTC in 3 weeks and cycle 2 same day    2) Genetics:  -he has met with genetic counselor, and panel was sent      I spent a total of 25 minutes with the patient, with over >50% of the time in counseling and/or coordination of care.       Laurence Hood MD  Hematology/Oncology  [facility]

## 2017-06-26 NOTE — LETTER
6/26/2017      RE: Daniel Sandhu  [address] [address]       [facility]    Hematology/Oncology Established Patient Note      Today's Date: 06/26/17    Reason for Follow-up: adenocarcinoma of the GE junction      HISTORY OF PRESENT ILLNESS: Daniel Sandhu is a 36 year old male who presents with adenocarcinoma of the GE junction.  In early 2017, patient started noticing difficulty swallowing, and that food seems to take longer to go down.  It became more frequent, and he saw his PCP, and was referred for EGD, which showed an esophageal mass located at the GE junction, measuring 4 cm.  Biopsy showed poorly differentiated adenocarcinoma.  HER-2 was sent and is equivocal (2+).  CT c/a/p showed a mildly prominent lymph node in the gastrohepatic ligament, but there were no pathologically enlarged lymph nodes in the chest, abdomen, or pelvis.  There was otherwise no evidence of metastatic disease.  PET-CT showed thickening of the distal esophagus consistent with known esophageal adenocarcinoma, as well as mildly hypermetabolic 1 cm lymph node in the gastrohepatic ligament.  There was no evidence of distant metastatic disease.  He underwent EUS on 6/9/17, which showed the esophageal tumor in the lower third of the esophagus, staged T2NX.  There were 2 abnormal lymph nodes seen in the gastrohepatic ligament; pathology was suspicious for adenocarcinoma.  Celiac node was sampled as well, which was benign.  He was seen by surgery, Dr. Esteban, and recommended srinivas-operative chemotherapy.    Port was placed on 6/22/17.    Chemotherapy:  Epirubicin 50 mg/m2 IV on day 1  Oxaliplatin 130 mg/m2 IV on day 1  Capecitabine 625 mg/m2 po BID on days 1-21  Every 21 day cycles    C1D1: 6/26/17  C2D1: anticipated for 7/17/17      INTERIM HISTORY: Daniel comes in for follow-up today.  He feels well and ready to start chemotherapy.  He notes that his new baby boy is due in 3 weeks, but will  likely be early.  He had some questions regarding vitamins.        REVIEW OF SYSTEMS:   14 point ROS was reviewed and is negative other than as noted above in HPI.       HOME MEDICATIONS:  Current Outpatient Prescriptions   Medication Sig Dispense Refill     capecitabine (XELODA) 500 MG tablet CHEMO Take 2 tablets (1,000 mg) by mouth 2 times daily for 21 days 84 tablet 0     LORazepam (ATIVAN) 0.5 MG tablet Take 1 tablet (0.5 mg) by mouth every 4 hours as needed (Anxiety, Nausea/Vomiting or Sleep) 30 tablet 5     prochlorperazine (COMPAZINE) 10 MG tablet Take 1 tablet (10 mg) by mouth every 6 hours as needed (Nausea/Vomiting) 30 tablet 5     ondansetron (ZOFRAN) 8 MG tablet Take 1 tablet (8 mg) by mouth every 8 hours as needed (Nausea/Vomiting) 10 tablet 7         ALLERGIES:  No Known Allergies      PAST MEDICAL HISTORY:  No past medical history on file.      PAST SURGICAL HISTORY:  Past Surgical History:   Procedure Laterality Date     ESOPHAGOGASTRODUODENOSCOPY       ESOPHAGOSCOPY, GASTROSCOPY, DUODENOSCOPY (EGD), COMBINED N/A 6/9/2017    Procedure: COMBINED ENDOSCOPIC ULTRASOUND, ESOPHAGOSCOPY, GASTROSCOPY, DUODENOSCOPY (EGD), FINE NEEDLE ASPIRATE/BIOPSY;  Upper Endoscopic Ultrasound, fine needle aspirate/biopsy;  Surgeon: Guru Mark Avila MD;  Location:  OR     HAND SURGERY      childhood, torn tendon         SOCIAL HISTORY:  Social History     Social History     Marital status:      Spouse name: N/A     Number of children: 1     Years of education: N/A     Occupational History     musician and teacher       Social History Main Topics     Smoking status: Never Smoker     Smokeless tobacco: Not on file     Alcohol use Yes      Comment: 1 beer daily     Drug use: No     Sexual activity: Yes     Partners: Female     Birth control/ protection: Natural Family Planning     Other Topics Concern     Not on file     Social History Narrative     He works at [employer] in [place], 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  He denies smoking.  He drinks ~1 beer a day.  He denies illicit drug use, other than occasional marijuana.  He lives in [place] with his wife, and 4.5 year-old daughter.  His wife is currently pregnant with a boy, and is due in 6 weeks.  He has a half sister who  of breast cancer at age 32; she was diagnosed in her late 20's.  A paternal grandmother had breast cancer in her 70's      FAMILY HISTORY:  Family History   Problem Relation Age of Onset     Other - See Comments Father      sepsis     CANCER Sister      breast cancer  29 yo 1/2 sister      CANCER Paternal Grandmother      breast         PHYSICAL EXAM:  Vital signs:  /66 (BP Location: Right arm, Patient Position: Chair, Cuff Size: Adult Regular)  Pulse 85  Temp 98.5  F (36.9  C) (Oral)  Resp 16  Wt 70.7 kg (155 lb 14.4 oz)  SpO2 100%  BMI 23.12 kg/m2   ECO  GENERAL/CONSTITUTIONAL: No acute distress.  EYES:No scleral icterus.  RESPIRATORY: Clear to auscultation bilaterally. No crackles or wheezing.   CARDIOVASCULAR: Regular rate and rhythm without murmurs, gallops, or rubs.  GASTROINTESTINAL: No tenderness. The patient has normal bowel sounds. No guarding.  No distention.  MUSCULOSKELETAL: Warm and well-perfused, no cyanosis, clubbing, or edema.  NEUROLOGIC: Alert, oriented, answers questions appropriately.  INTEGUMENTARY: No jaundice.  Port in place.      LABS:  CBC RESULTS:   Recent Labs   Lab Test  17   0649   WBC  5.0   RBC  4.69   HGB  13.9   HCT  40.9   MCV  87   MCH  29.6   MCHC  34.0   RDW  12.0   PLT  187     Recent Labs   Lab Test  17   0649  17   1226  17   1624   NA  139   --   138   POTASSIUM  3.6   --   3.5   CHLORIDE  104   --   103   CO2  27   --   27   ANIONGAP  8   --   8   GLC  109*   --   94   BUN  12  11  14   CR  0.76   --   0.82   YOBANY  8.5   --   8.6     Lab Results   Component Value Date    AST 15 2017     Lab Results   Component  "Value Date    ALT 17 06/26/2017     No results found for: BILICONJ   Lab Results   Component Value Date    BILITOTAL 0.5 06/26/2017     Lab Results   Component Value Date    ALBUMIN 3.7 06/26/2017     Lab Results   Component Value Date    PROTTOTAL 7.1 06/26/2017      Lab Results   Component Value Date    ALKPHOS 56 06/26/2017         PATHOLOGY:  5/30/17:  FINAL DIAGNOSIS:   [place] [facility] [id number]   OBTAINED 05/30/2017):   A. GASTROESOPHAGEAL JUNCTION, \"MASS\", BIOPSY:   - Adenocarcinoma, moderately-poorly differentiated   - No background intestinal metaplasia seen   - Per report, HER2 immunohistochemistry is equivocal for expression (2+   out of 3)     B. STOMACH, BIOPSY:   - Gastric mucosa with no significant inflammation   - No H. pylori like organisms identified on routine staining   - Negative for intestinal metaplasia or dysplasia       EGD 5/30/17:  Esophageal mass at the GE junction, friable, size 4 cm, extending into the gastric fundus.  Biopsies were taken.      6/9/17:  A. Lymph node, gastrohepatic, EUS guided fine needle aspiration:   Suspicious for adenocarcinoma.   Specimen Adequacy: Satisfactory for evaluation but limited by scant   lymphoid elements     B. Lymph node, celiac node, EUS guided fine needle aspiration:   Benign   Specimen Adequacy: Satisfactory for evaluation but limited by scant   lymphoid elements.       IMAGING:  PET-CT 6/9/17:  IMPRESSION: In this patient with a history of newly diagnosed  esophageal carcinoma at the gastroesophageal junction:  1. Subtle soft tissue thickening of the distal esophagus with  associated hypermetabolism consistent with patient's known esophageal  adenocarcinoma.  2. Mildly hypermetabolic 1 cm lymph node in the lesser sac in the area  of the gastrohepatic ligament suspicious for local regional lymphatic  metastasis.  3. No evidence of metastatic disease in the chest, pelvis, or axial  skeleton.    Echo " 6/23/17:  Global and regional left ventricular function is normal with an EF of 55-60%.  Global peak LV longitudinal strain is averaged at -21.4%. This is within  reported normal limits (normal <-18%).  Right ventricular function, chamber size, wall motion, and thickness are  normal.  No pericardial effusion is present.      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  Clinical stage T2N1M0 (stage IIB), based on PET-CT and EUS.  A gastrohepatic lymph node was biopsied and suspicious for adenocarcinoma.  He was seen by Dr. Esteban, and with the degree of extension into the stomach, his inclination is to treat him as a gastric cancer, and so perioperative chemotherapy, as per MAGIC trial, would be reasonable.  Plan was discussed at tumor conference, the group agreed with the recommendation.      He will undergo 3 cycles of neoadjuvant chemotherapy, followed by new PET-CT about 1 month after completion of chemotherapy, followed by surgery ~ 6 weeks after completion of neoadjuvant chemotherapy, followed by 3 cycles of adjuvant chemotherapy.    -echo every 3 months  -registered for medical cannabis, per patient request  -C1D1 of EOX today  -prn anti-emetics written  -RTC in 3 weeks and cycle 2 same day    2) Genetics:  -he has met with genetic counselor, and panel was sent      I spent a total of 25 minutes with the patient, with over >50% of the time in counseling and/or coordination of care.       Laurence Hood MD  Hematology/Oncology  [facility]

## 2017-06-26 NOTE — MR AVS SNAPSHOT
After Visit Summary   6/26/2017    Daniel Sandhu    MRN: [id number]           Patient Information     Date Of Birth          1980        Visit Information        Provider Department      6/26/2017 7:00 AM  12 ATC; [facility] ONCOLOGY INFUSION [facility]        Today's Diagnoses     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    -  1      Care Instructions    Contact Numbers    [facility] [place]: [phone]  [facility] [place] Triage:  [phone]    Call triage with chills and/or temperature greater than or equal to 100.5, uncontrolled nausea/vomiting, diarrhea, constipation, dizziness, shortness of breath, chest pain, bleeding, unexplained bruising, or any new/concerning symptoms, questions/concerns.     If you are having any concerning symptoms or wish to speak to a provider before your next infusion visit, please call your care coordinator or triage to notify them so we can adequately serve you.       After Hours: [phone]    If after hours, weekends, or holidays, call main hospital  and ask for Oncology doctor on call.           June 2017 Sunday Monday Tuesday Wednesday Thursday Friday Saturday                       1     2     LAB    5:45 PM   (15 min.)   SPECIMEN MGMT   [facility] [place], Lab 3       4     5     [facility] NEW    2:30 PM   (60 min.)   Laurence Hood MD   [facility] LAB DRAW    4:30 PM   (15 min.)    [facility] LAB DRAW   [facility] Lab Draw 6     7     8     9     PE EYE/TH ONCOLOGY    9:15 AM   (45 min.)   [id number]   RUST     Admission   11:36 AM   Guru Mark Avila MD   Post Anesthesia Care Unit [facility]   (Discharge: 6/9/2017)     ENDOSCOPIC ULTRASOUND, ESOPHAGOSCOPY / UPPER GASTROINTESTINAL TRACT (GI)    1:00 PM   Guru Mark Avila MD   [facility] OR 10       11     12     13     14     [facility] NEW    4:45 PM   (30 min.)   Yunior Esteban MD     [facility] 15     16     [facility] NEW WITH ROOM   11:45 AM   (75 min.)   Ashley Trejo GC   [facility] LAB DRAW    1:15 PM   (15 min.)    [facility] LAB DRAW   [facility] Lab Draw 17       18     19     [facility] RETURN   11:00 AM   (30 min.)   Laurence Hood MD   [facility] 20     21     22     IR CHEST PORT PLACEMENT >5 YRS    7:30 AM   (60 min.)   [id number]   [facility] ASC Imaging     Outpatient Visit    7:46 AM   [facility]     INSERT PORT VASCULAR ACCESS    9:00 AM   Iván Driver PA-C   [facility] OR 23     ECH LIMITED   10:00 AM   (60 min.)   [email or web address]   [facility] Echo 24       25     26     [facility] LAB DRAW    6:30 AM   (15 min.)    [facility] LAB DRAW   [facility] Lab Draw     [facility] ONC INFUSION 240    7:00 AM   (240 min.)    ONCOLOGY INFUSION   [facility]     [facility] RETURN    8:30 AM   (30 min.)   Laurence Hood MD   [facility] 27  Happy Birthday!     28 29 30 July 2017 Sunday Monday Tuesday Wednesday Thursday Friday Saturday                                 1       2     3     4     5     6     7     8       9     10     11     12     13     14     15       16     17     [facility] LAB DRAW   12:45 PM   (15 min.)    [facility] LAB DRAW   [facility] Lab Draw     [facility] RETURN    1:00 PM   (30 min.)   Laurence Hood MD   [facility]     [facility] ONC INFUSION 240    1:30 PM   (240 min.)    ONCOLOGY INFUSION   [facility] 18     19     20     21     22       23     24     25     26     27     28     29       30     31                                          Recent Results (from the past 24 hour(s))   CBC with platelets differential    Collection Time: 06/26/17  6:49 AM   Result Value Ref Range    WBC 5.0 4.0 - 11.0 10e9/L    RBC Count 4.69 4.4 - 5.9 10e12/L    Hemoglobin  13.9 13.3 - 17.7 g/dL    Hematocrit 40.9 40.0 - 53.0 %    MCV 87 78 - 100 fl    MCH 29.6 26.5 - 33.0 pg    MCHC 34.0 31.5 - 36.5 g/dL    RDW 12.0 10.0 - 15.0 %    Platelet Count 187 150 - 450 10e9/L    Diff Method Automated Method     % Neutrophils 52.1 %    % Lymphocytes 33.5 %    % Monocytes 10.2 %    % Eosinophils 3.6 %    % Basophils 0.6 %    % Immature Granulocytes 0.0 %    Nucleated RBCs 0 0 /100    Absolute Neutrophil 2.6 1.6 - 8.3 10e9/L    Absolute Lymphocytes 1.7 0.8 - 5.3 10e9/L    Absolute Monocytes 0.5 0.0 - 1.3 10e9/L    Absolute Eosinophils 0.2 0.0 - 0.7 10e9/L    Absolute Basophils 0.0 0.0 - 0.2 10e9/L    Abs Immature Granulocytes 0.0 0 - 0.4 10e9/L    Absolute Nucleated RBC 0.0    Comprehensive metabolic panel    Collection Time: 06/26/17  6:49 AM   Result Value Ref Range    Sodium 139 133 - 144 mmol/L    Potassium 3.6 3.4 - 5.3 mmol/L    Chloride 104 94 - 109 mmol/L    Carbon Dioxide 27 20 - 32 mmol/L    Anion Gap 8 3 - 14 mmol/L    Glucose 109 (H) 70 - 99 mg/dL    Urea Nitrogen 12 7 - 30 mg/dL    Creatinine 0.76 0.66 - 1.25 mg/dL    GFR Estimate >90  Non  GFR Calc   >60 mL/min/1.7m2    GFR Estimate If Black >90   GFR Calc   >60 mL/min/1.7m2    Calcium 8.5 8.5 - 10.1 mg/dL    Bilirubin Total 0.5 0.2 - 1.3 mg/dL    Albumin 3.7 3.4 - 5.0 g/dL    Protein Total 7.1 6.8 - 8.8 g/dL    Alkaline Phosphatase 56 40 - 150 U/L    ALT 17 0 - 70 U/L    AST 15 0 - 45 U/L               Follow-ups after your visit        Your next 10 appointments already scheduled     Jul 17, 2017 12:45 PM T   [facility] Lab Draw with  [facility] LAB DRAW   [facility] Lab Draw ([facility])    [address] [place] [address] Se  2nd Floor  [facility] [address]   [phone]            Jul 17, 2017  1:15 PM CDT   (Arrive by 1:00 PM)   Return Visit with Laurence Hood MD   [facility] ([facility])    [address] [place] [address]  Floor  [facility] [address] [phone]            Jul 17, 2017  1:30 PM CDT   Infusion 240 with UC ONCOLOGY INFUSION, UC 32 ATC   [facility] Cancer [facility] ([facility])    [address]  2nd Floor  [facility] [address] [phone]              Who to contact     If you have questions or need follow up information about today's clinic visit or your schedule please contact [facility] CANCER [facility] directly at [phone].  Normal or non-critical lab and imaging results will be communicated to you by Moonfryehart, letter or phone within 4 business days after the clinic has received the results. If you do not hear from us within 7 days, please contact the clinic through CatchThatBus or phone. If you have a critical or abnormal lab result, we will notify you by phone as soon as possible.  Submit refill requests through CatchThatBus or call your pharmacy and they will forward the refill request to us. Please allow 3 business days for your refill to be completed.          Additional Information About Your Visit        CatchThatBus Information     CatchThatBus gives you secure access to your electronic health record. If you see a primary care provider, you can also send messages to your care team and make appointments. If you have questions, please call your primary care clinic.  If you do not have a primary care provider, please call [phone] and they will assist you.        Care EveryWhere ID     This is your Care EveryWhere ID. This could be used by other organizations to access your [place] medical records  [id number]         Blood Pressure from Last 3 Encounters:   06/26/17 116/66   06/22/17 108/46   06/19/17 129/81    Weight from Last 3 Encounters:   06/26/17 70.7 kg (155 lb 14.4 oz)   06/19/17 70.6 kg (155 lb 9.6 oz)   06/14/17 71.4 kg (157 lb 6.4 oz)              We Performed the Following     CBC with platelets differential     Comprehensive metabolic panel           Today's Medication Changes          These changes are accurate as of: 6/26/17 10:35 AM.  If you have any questions, ask your nurse or doctor.               Start taking these medicines.        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        Dose:  0.5 mg   Take 1 tablet (0.5 mg) by mouth every 4 hours as needed (Anxiety, Nausea/Vomiting or Sleep)   Quantity:  30 tablet   Refills:  5       ondansetron 8 MG tablet   Commonly known as:  ZOFRAN   Used for:  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        Dose:  8 mg   Take 1 tablet (8 mg) by mouth every 8 hours as needed (Nausea/Vomiting)   Quantity:  10 tablet   Refills:  7       prochlorperazine 10 MG tablet   Commonly known as:  COMPAZINE   Used for:  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        Dose:  10 mg   Take 1 tablet (10 mg) by mouth every 6 hours as needed (Nausea/Vomiting)   Quantity:  30 tablet   Refills:  5            Where to get your medicines      These medications were sent to [address] [address]    Hours:  TRANSPLANT PHONE NUMBER [phone] Phone:  [phone]     capecitabine 500 MG tablet CHEMO    ondansetron 8 MG tablet    prochlorperazine 10 MG tablet         Some of these will need a paper prescription and others can be bought over the counter.  Ask your nurse if you have questions.     Bring a paper prescription for each of these medications     LORazepam 0.5 MG tablet                Primary Care Provider Office Phone # Fax #    Lencho Chan [phone]  [phone]       [facility] [address] [facility] [address]        Equal Access to Services     SHAWN SWAIN : Hadii aad ku hadjesus albertonancy Bambiali, waaxda luqadaha, qaybta kaalmada niecyjudson, alejandro florencioin hayaarobina pembertonciara naomiemaría stephanie arteaga. So [facility] [phone].    ATENCIÓN: Si habla español, tiene a zayas disposición servicios gratuitos de asistencia lingüística. Llame al [phone].    We comply with applicable federal civil rights laws and [place] laws. We do not discriminate on the basis of race, color, national origin, age, disability sex, sexual orientation or gender identity.            Thank you!     Thank you for choosing [facility] CANCER CLINIC  for your care. Our goal is always to provide you with excellent care. Hearing back from our patients is one way we can continue to improve our services. Please take a few minutes to complete the written survey that you may receive in the mail after your visit with us. Thank you!             Your Updated Medication List - Protect others around you: Learn how to safely use, store and throw away your medicines at www.disposemymeds.org.          This list is accurate as of: 6/26/17 10:35 AM.  Always use your most recent med list.                   Brand Name Dispense Instructions for use Diagnosis    capecitabine 500 MG tablet CHEMO    XELODA    84 tablet    Take 2 tablets (1,000 mg) by mouth 2 times daily for 21 days    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       LORazepam 0.5 MG tablet    ATIVAN    30 tablet    Take 1 tablet (0.5 mg) by mouth every 4 hours as needed (Anxiety, Nausea/Vomiting or Sleep)    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       ondansetron 8 MG tablet    ZOFRAN    10 tablet    Take 1 tablet (8 mg) by mouth every 8 hours as needed (Nausea/Vomiting)    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       prochlorperazine 10 MG tablet    COMPAZINE    30 tablet    Take 1 tablet (10 mg) by mouth  every 6 hours as needed (Nausea/Vomiting)    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

## 2017-06-26 NOTE — PROGRESS NOTES
ONCOLOGY SOCIAL WORK  D) Daniel is  A 36-yr-old gentleman newly dx'd with esophageal cancer.  See prior SW note  Met pt briefly.  He went over the info sent briefly.  Couple have decided they would like meals so faxed completed application to [employer]  [facility] application was faxed today as treatment started.    Advance Care Directive/Planning: gave pt a copy of an advance care directive and went thru the form briefly    P) available as needed for support and resources    Laure Enrique, [facility]  [phone]

## 2017-06-26 NOTE — NURSING NOTE
"Chief Complaint   Patient presents with     Port Draw     port accessed and labs drawn by rn.  vs taken.     Port accessed with 20g 3/4\" gripper needle, labs drawn, port flushed with saline and heparin, vitals checked, pt checked in for next appointment.  Génesis Tripp RN    "

## 2017-06-26 NOTE — MR AVS SNAPSHOT
After Visit Summary   6/26/2017    Daniel Sandhu    MRN: [id number]           Patient Information     Date Of Birth          1980        Visit Information        Provider Department      6/26/2017 12:20 PM Laure Enrique, [facility]        Today's Diagnoses     Visit for counseling    -  1       Follow-ups after your visit        Your next 10 appointments already scheduled     Jul 17, 2017 12:45 PM CDT   [facility] Lab Draw with  [facility] LAB DRAW   [facility] Lab Draw ([facility])    [address] [place] [address] [phone]            Jul 17, 2017  1:15 PM CDT   (Arrive by 1:00 PM)   Return Visit with Laurence Hood MD   [facility] ([facility])    [address] [place] [address] [phone]            Jul 17, 2017  1:30 PM CDT   Infusion 240 with UC ONCOLOGY INFUSION, UC 32 ATC   [facility] Cancer [facility] ([facility])    [address] [place] [address] [phone]              Who to contact     Please call your clinic at [phone] to:    Ask questions about your health    Make or cancel appointments    Discuss your medicines    Learn about your test results    Speak to your doctor   If you have compliments or concerns about an experience at your clinic, or if you wish to file a complaint, please contact [facility] Patient Relations at [phone] or email us at [email or web address]         Additional Information About Your Visit        MyChart Information     Avanthahart gives you secure access to your electronic health record. If you see a primary care provider, you can also send messages to your care team and make appointments. If you have questions, please call your primary care clinic.  If you do not have a primary care provider,  please call [phone] and they will assist you.      GIDEEN is an electronic gateway that provides easy, online access to your medical records. With GIDEEN, you can request a clinic appointment, read your test results, renew a prescription or communicate with your care team.     To access your existing account, please contact your [facility] or call [phone] for assistance.        Care EveryWhere ID     This is your Care EveryWhere ID. This could be used by other organizations to access your [place] medical records  [id number]         Blood Pressure from Last 3 Encounters:   06/26/17 116/66   06/22/17 108/46   06/19/17 129/81    Weight from Last 3 Encounters:   06/26/17 70.7 kg (155 lb 14.4 oz)   06/19/17 70.6 kg (155 lb 9.6 oz)   06/14/17 71.4 kg (157 lb 6.4 oz)              Today, you had the following     No orders found for display         Today's Medication Changes          These changes are accurate as of: 6/26/17 12:24 PM.  If you have any questions, ask your nurse or doctor.               Start taking these medicines.        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        Dose:  0.5 mg   Take 1 tablet (0.5 mg) by mouth every 4 hours as needed (Anxiety, Nausea/Vomiting or Sleep)   Quantity:  30 tablet   Refills:  5       ondansetron 8 MG tablet   Commonly known as:  ZOFRAN   Used for:  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        Dose:  8 mg   Take 1 tablet (8 mg) by mouth every 8 hours as needed (Nausea/Vomiting)   Quantity:  10 tablet   Refills:  7       prochlorperazine 10 MG tablet   Commonly known as:   COMPAZINE   Used for:  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        Dose:  10 mg   Take 1 tablet (10 mg) by mouth every 6 hours as needed (Nausea/Vomiting)   Quantity:  30 tablet   Refills:  5            Where to get your medicines      These medications were sent to [facility] [place], [place] - [address]  [address], [facility] [address]    Hours:  TRANSPLANT PHONE NUMBER [phone] Phone:  [phone]     capecitabine 500 MG tablet CHEMO    ondansetron 8 MG tablet    prochlorperazine 10 MG tablet         Some of these will need a paper prescription and others can be bought over the counter.  Ask your nurse if you have questions.     Bring a paper prescription for each of these medications     LORazepam 0.5 MG tablet                Primary Care Provider Office Phone # Fax #    Lencho Chan [phone]       [facility] [address] [facility] [address]        Equal Access to Services     SHAWN SWAIN AH: Hadii garo ku hadasho Soomaali, waaxda luqadaha, qaybta kaalmada adeegyada, waxay idiin hayewan madelyn brown . So [facility] [phone].    ATENCIÓN: Si habla español, tiene a zayas disposición servicios gratuitos de asistencia lingüística. Llame al [phone].    We comply with applicable federal civil rights laws and [place] laws. We do not discriminate on the basis of race, color, national origin, age, disability sex, sexual orientation or gender identity.            Thank you!     Thank you for choosing [facility] CANCER [facility]  for your care. Our goal is always to provide you with excellent care. Hearing back from our patients is one way we can continue to improve our services. Please take a few minutes to complete the written survey that you may receive in the mail after your visit with us. Thank you!             Your Updated Medication List - Protect others around you: Learn how to  safely use, store and throw away your medicines at www.disposemymeds.org.          This list is accurate as of: 6/26/17 12:24 PM.  Always use your most recent med list.                   Brand Name Dispense Instructions for use Diagnosis    capecitabine 500 MG tablet CHEMO    XELODA    84 tablet    Take 2 tablets (1,000 mg) by mouth 2 times daily for 21 days    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       LORazepam 0.5 MG tablet    ATIVAN    30 tablet    Take 1 tablet (0.5 mg) by mouth every 4 hours as needed (Anxiety, Nausea/Vomiting or Sleep)    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       ondansetron 8 MG tablet    ZOFRAN    10 tablet    Take 1 tablet (8 mg) by mouth every 8 hours as needed (Nausea/Vomiting)    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       prochlorperazine 10 MG tablet    COMPAZINE    30 tablet    Take 1 tablet (10 mg) by mouth every 6 hours as needed (Nausea/Vomiting)    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

## 2017-06-26 NOTE — PROGRESS NOTES
Infusion Nursing Note:    Patient presents today for Cycle 1 Day 1 Epirubicin, Oxaliplatin, Xeloda.    Patient met with Dr. Hood during infusion today.    Lab Results   Component Value Date    HGB 13.9 06/26/2017     Lab Results   Component Value Date    WBC 5.0 06/26/2017      Lab Results   Component Value Date    ANEU 2.6 06/26/2017     Lab Results   Component Value Date     06/26/2017      Lab Results   Component Value Date     06/26/2017                   Lab Results   Component Value Date    POTASSIUM 3.6 06/26/2017           No results found for: MAG         Lab Results   Component Value Date    CR 0.76 06/26/2017                   Lab Results   Component Value Date    YOBANY 8.5 06/26/2017                Lab Results   Component Value Date    BILITOTAL 0.5 06/26/2017           Lab Results   Component Value Date    ALBUMIN 3.7 06/26/2017                    Lab Results   Component Value Date    ALT 17 06/26/2017           Lab Results   Component Value Date    AST 15 06/26/2017     ECHO/MUGA completed 6/23/17  EF 60%.    Note: Pt new to infusion today, oriented to infusion space. Pt has written information on chemotherapy, side effects reviewed and all questions answered.     Intravenous Access:  Implanted Port.    Post Infusion Assessment:  Patient tolerated infusion without incident.  Blood return noted pre and post infusion.  Blood return noted during administration every 2 cc during epirubicin administration.  Site patent and intact, free from redness, edema or discomfort.  No evidence of extravasations.  Access discontinued per protocol.    Discharge Plan:   Prescription refills given for xeloda, ativan, compazine, and zofran.  Discharge instructions reviewed with: Patient.  Copy of AVS reviewed with patient and/or family.  Patient will return 7/17 for next appointment.  Patient discharged in stable condition accompanied by: self.  Departure Mode: Ambulatory.  Face to Face time: 0.

## 2017-06-26 NOTE — PATIENT INSTRUCTIONS
Contact Numbers    [facility] Main Line: [phone]  [facility] Triage:  [phone]    Call triage with chills and/or temperature greater than or equal to 100.5, uncontrolled nausea/vomiting, diarrhea, constipation, dizziness, shortness of breath, chest pain, bleeding, unexplained bruising, or any new/concerning symptoms, questions/concerns.     If you are having any concerning symptoms or wish to speak to a provider before your next infusion visit, please call your care coordinator or triage to notify them so we can adequately serve you.       After Hours: [phone]    If after hours, weekends, or holidays, call main hospital  and ask for Oncology doctor on call.           June 2017 Sunday Monday Tuesday Wednesday Thursday Friday Saturday                       1     2     LAB    5:45 PM   (15 min.)   SPECIMEN MGMT   [facility] [place], Lab 3       4     5     [facility] NEW    2:30 PM   (60 min.)   Laurence Hood MD   [facility] DRAW    4:30 PM   (15 min.)   [facility]   [facility] Lab Draw 6     7     8     9     PE EYE/ ONCOLOGY    9:15 AM   (45 min.)   [id number]   [facility]     Admission   11:36 AM   Guru Mark Avila MD   Post Anesthesia Care Unit [facility]   (Discharge: 6/9/2017)     ENDOSCOPIC ULTRASOUND, ESOPHAGOSCOPY / UPPER GASTROINTESTINAL TRACT (GI)    1:00 PM   Guru Mark Avila MD   [facility] OR 10       11     12     13     14     [facility] NEW    4:45 PM   (30 min.)   Yunior Esteban MD   [facility] 15     16     [facility] NEW WITH ROOM   11:45 AM   (75 min.)   Ashley Trejo GC   [facility] DRAW    1:15 PM   (15 min.)   [facility] D[facility]   [facility] Lab Draw 17       18     19     [facility] RETURN   11:00 AM   (30 min.)   Laurence Hood MD   [facility] 20     21     22     IR CHEST PORT PLACEMENT  >5 YRS    7:30 AM   (60 min.)   [email or web address]   [facility]     Outpatient Visit    7:46 AM   [facility]     INSERT PORT VASCULAR ACCESS    9:00 AM   Iván Driver PA-C    OR 23     ECH LIMITED   10:00 AM   (60 min.)   [email or web address]   [facility] Echo 24       25     26     [facility] DRAW    6:30 AM   (15 min.)    [facility]     [facility] ONC INFUSION 240    7:00 AM   (240 min.)    ONCOLOGY INFUSION   [facility]     [facility] RETURN    8:30 AM   (30 min.)   Laurence Hood MD   [facility] 27  Happy Birthday!     28 29 30 July 2017 Sunday Monday Tuesday Wednesday Thursday Friday Saturday                                 1       2     3     4     5     6     7     8       9     10     11     12     13     14     15       16     17     [facility] DRAW   12:45 PM   (15 min.)    [facility] DRAW   [facility] Lab Draw     [facility] RETURN    1:00 PM   (30 min.)   Laurence Hood MD   [facility]     [facility] ONC INFUSION 240    1:30 PM   (240 min.)    ONCOLOGY INFUSION   [facility] 18     19     20     21     22       23     24     25     26     27     28     29       30     31                                          Recent Results (from the past 24 hour(s))   CBC with platelets differential    Collection Time: 06/26/17  6:49 AM   Result Value Ref Range    WBC 5.0 4.0 - 11.0 10e9/L    RBC Count 4.69 4.4 - 5.9 10e12/L    Hemoglobin 13.9 13.3 - 17.7 g/dL    Hematocrit 40.9 40.0 - 53.0 %    MCV 87 78 - 100 fl    MCH 29.6 26.5 - 33.0 pg    MCHC 34.0 31.5 - 36.5 g/dL    RDW 12.0 10.0 - 15.0 %    Platelet Count 187 150 - 450 10e9/L    Diff Method Automated Method     % Neutrophils 52.1 %    % Lymphocytes 33.5 %    % Monocytes 10.2 %    % Eosinophils 3.6 %    % Basophils 0.6 %    % Immature Granulocytes 0.0 %    Nucleated RBCs 0 0  /100    Absolute Neutrophil 2.6 1.6 - 8.3 10e9/L    Absolute Lymphocytes 1.7 0.8 - 5.3 10e9/L    Absolute Monocytes 0.5 0.0 - 1.3 10e9/L    Absolute Eosinophils 0.2 0.0 - 0.7 10e9/L    Absolute Basophils 0.0 0.0 - 0.2 10e9/L    Abs Immature Granulocytes 0.0 0 - 0.4 10e9/L    Absolute Nucleated RBC 0.0    Comprehensive metabolic panel    Collection Time: 06/26/17  6:49 AM   Result Value Ref Range    Sodium 139 133 - 144 mmol/L    Potassium 3.6 3.4 - 5.3 mmol/L    Chloride 104 94 - 109 mmol/L    Carbon Dioxide 27 20 - 32 mmol/L    Anion Gap 8 3 - 14 mmol/L    Glucose 109 (H) 70 - 99 mg/dL    Urea Nitrogen 12 7 - 30 mg/dL    Creatinine 0.76 0.66 - 1.25 mg/dL    GFR Estimate >90  Non  GFR Calc   >60 mL/min/1.7m2    GFR Estimate If Black >90   GFR Calc   >60 mL/min/1.7m2    Calcium 8.5 8.5 - 10.1 mg/dL    Bilirubin Total 0.5 0.2 - 1.3 mg/dL    Albumin 3.7 3.4 - 5.0 g/dL    Protein Total 7.1 6.8 - 8.8 g/dL    Alkaline Phosphatase 56 40 - 150 U/L    ALT 17 0 - 70 U/L    AST 15 0 - 45 U/L

## 2017-06-26 NOTE — MR AVS SNAPSHOT
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  Normal or non-critical lab and imaging results will be communicated to you by [employer], letter or phone within 4 business days after the clinic has received the results. If you do not hear from us within 7 days, please contact the clinic through [employer] or phone. If you have a critical or abnormal lab result, we will notify you by phone as soon as possible.  Submit refill requests through [employer] or call your pharmacy and they will forward the refill request to us. Please allow 3 business days for your refill to be completed.          Additional Information About Your Visit        MyChart Information     [employer] gives you secure access to your electronic health record. If you see a primary care provider, you can also send messages to your care team and make appointments. If you have questions, please call your primary care clinic.  If you do not have a primary care provider, please call [phone] and they will assist you.        Care EveryWhere ID     This is your Care EveryWhere ID. This could be used by other organizations to access your [facility] medical records  [id number]        Your Vitals Were     Pulse Temperature Respirations Pulse Oximetry BMI (Body Mass Index)       85 98.5  F (36.9  C) (Oral) 16 100% 23.12 kg/m2        Blood Pressure from Last 3 Encounters:   06/26/17 116/66   06/22/17 108/46   06/19/17 129/81     Weight from Last 3 Encounters:   06/26/17 70.7 kg (155 lb 14.4 oz)   06/19/17 70.6 kg (155 lb 9.6 oz)   06/14/17 71.4 kg (157 lb 6.4 oz)              Today, you had the following     No orders found for display         Today's Medication Changes          These changes are accurate as of: 6/26/17  9:21 AM.  If you have any questions, ask your nurse or doctor.               Start taking these medicines.        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        Dose:  0.5 mg   Take 1 tablet (0.5 mg) by mouth every 4 hours as needed (Anxiety, Nausea/Vomiting or Sleep)   Quantity:  30 tablet   Refills:  5       ondansetron 8 MG tablet   Commonly known as:  ZOFRAN   Used for:  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        Dose:  8 mg   Take 1 tablet (8 mg) by mouth every 8 hours as needed (Nausea/Vomiting)   Quantity:  10 tablet   Refills:  7       prochlorperazine 10 MG tablet   Commonly known as:  COMPAZINE   Used for:  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        Dose:  10 mg   Take 1 tablet (10 mg) by mouth every 6 hours as needed (Nausea/Vomiting)   Quantity:  30 tablet   Refills:  5            Where to get your medicines      These medications were sent to [facility] [address] [facility] [address] [place] [address] [address] [address]    Hours:  TRANSPLANT PHONE NUMBER [phone] Phone:  [phone]     capecitabine 500 MG tablet CHEMO    ondansetron 8 MG tablet    prochlorperazine 10 MG tablet         Some of these will need a paper prescription and others can be bought over the counter.   Ask your nurse if you have questions.     Bring a paper prescription for each of these medications     LORazepam 0.5 MG tablet                Primary Care Provider Office Phone # Fax #    Lencho Chan [phone]       [facility] [address] [place], [place]. \Hospital Has a New Name and Outlook.\"" [id number]        Equal Access to Services     SHAWN SWAIN : Hadii aad ku hadasho Soomaali, waaxda luqadaha, qaybta kaalmada adeegyada, waxay florencioin hayewan madelyn akbarnailachaka arteaga. So [facility] [phone].    ATENCIÓN: Si habla español, tiene a zayas disposición servicios gratuitos de asistencia lingüística. [facility] [phone].    We comply with applicable federal civil rights laws and [place] laws. We do not discriminate on the basis of race, color, national origin, age, disability sex, sexual orientation or gender identity.            Thank you!     Thank you for choosing [facility]  for your care. Our goal is always to provide you with excellent care. Hearing back from our patients is one way we can continue to improve our services. Please take a few minutes to complete the written survey that you may receive in the mail after your visit with us. Thank you!             Your Updated Medication List - Protect others around you: Learn how to safely use, store and throw away your medicines at www.disposemymeds.org.          This list is accurate as of: 6/26/17  9:21 AM.  Always use your most recent med list.                   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

## 2017-06-27 ENCOUNTER — TELEPHONE (OUTPATIENT)
Dept: ONCOLOGY | Facility: CLINIC | Age: 37
End: 2017-06-27

## 2017-06-27 ENCOUNTER — APPOINTMENT (OUTPATIENT)
Dept: LAB | Facility: CLINIC | Age: 37
End: 2017-06-27
Attending: INTERNAL MEDICINE
Payer: COMMERCIAL

## 2017-06-27 ENCOUNTER — ONCOLOGY VISIT (OUTPATIENT)
Dept: ONCOLOGY | Facility: CLINIC | Age: 37
End: 2017-06-27
Attending: PHYSICIAN ASSISTANT
Payer: COMMERCIAL

## 2017-06-27 ENCOUNTER — INFUSION THERAPY VISIT (OUTPATIENT)
Dept: ONCOLOGY | Facility: CLINIC | Age: 37
End: 2017-06-27
Attending: INTERNAL MEDICINE
Payer: COMMERCIAL

## 2017-06-27 VITALS
TEMPERATURE: 98 F | SYSTOLIC BLOOD PRESSURE: 109 MMHG | OXYGEN SATURATION: 98 % | WEIGHT: 153.4 LBS | BODY MASS INDEX: 22.75 KG/M2 | HEART RATE: 80 BPM | DIASTOLIC BLOOD PRESSURE: 63 MMHG | RESPIRATION RATE: 16 BRPM

## 2017-06-27 DIAGNOSIS — C15.5 MALIGNANT NEOPLASM OF LOWER THIRD OF ESOPHAGUS (H): Primary | ICD-10-CM

## 2017-06-27 DIAGNOSIS — C15.5 MALIGNANT NEOPLASM OF LOWER THIRD OF ESOPHAGUS (H): ICD-10-CM

## 2017-06-27 DIAGNOSIS — R11.0 NAUSEA WITHOUT VOMITING: Primary | ICD-10-CM

## 2017-06-27 DIAGNOSIS — R11.2 NON-INTRACTABLE VOMITING WITH NAUSEA, UNSPECIFIED VOMITING TYPE: ICD-10-CM

## 2017-06-27 LAB
ALBUMIN SERPL-MCNC: 3.8 G/DL (ref 3.4–5)
ALP SERPL-CCNC: 43 U/L (ref 40–150)
ALT SERPL W P-5'-P-CCNC: 19 U/L (ref 0–70)
ANION GAP SERPL CALCULATED.3IONS-SCNC: 7 MMOL/L (ref 3–14)
AST SERPL W P-5'-P-CCNC: 14 U/L (ref 0–45)
BASOPHILS # BLD AUTO: 0 10E9/L (ref 0–0.2)
BASOPHILS NFR BLD AUTO: 0.1 %
BILIRUB SERPL-MCNC: 0.8 MG/DL (ref 0.2–1.3)
BUN SERPL-MCNC: 18 MG/DL (ref 7–30)
CALCIUM SERPL-MCNC: 8.8 MG/DL (ref 8.5–10.1)
CHLORIDE SERPL-SCNC: 99 MMOL/L (ref 94–109)
CO2 SERPL-SCNC: 28 MMOL/L (ref 20–32)
CREAT SERPL-MCNC: 0.75 MG/DL (ref 0.66–1.25)
DIFFERENTIAL METHOD BLD: ABNORMAL
EOSINOPHIL # BLD AUTO: 0 10E9/L (ref 0–0.7)
EOSINOPHIL NFR BLD AUTO: 0.1 %
ERYTHROCYTE [DISTWIDTH] IN BLOOD BY AUTOMATED COUNT: 12 % (ref 10–15)
GFR SERPL CREATININE-BSD FRML MDRD: ABNORMAL ML/MIN/1.7M2
GLUCOSE SERPL-MCNC: 124 MG/DL (ref 70–99)
HCT VFR BLD AUTO: 37.9 % (ref 40–53)
HGB BLD-MCNC: 13.1 G/DL (ref 13.3–17.7)
IMM GRANULOCYTES # BLD: 0 10E9/L (ref 0–0.4)
IMM GRANULOCYTES NFR BLD: 0.4 %
LYMPHOCYTES # BLD AUTO: 0.7 10E9/L (ref 0.8–5.3)
LYMPHOCYTES NFR BLD AUTO: 8.8 %
MAGNESIUM SERPL-MCNC: 2.3 MG/DL (ref 1.6–2.3)
MCH RBC QN AUTO: 30 PG (ref 26.5–33)
MCHC RBC AUTO-ENTMCNC: 34.6 G/DL (ref 31.5–36.5)
MCV RBC AUTO: 87 FL (ref 78–100)
MONOCYTES # BLD AUTO: 0.7 10E9/L (ref 0–1.3)
MONOCYTES NFR BLD AUTO: 8.5 %
NEUTROPHILS # BLD AUTO: 6.8 10E9/L (ref 1.6–8.3)
NEUTROPHILS NFR BLD AUTO: 82.1 %
NRBC # BLD AUTO: 0 10*3/UL
NRBC BLD AUTO-RTO: 0 /100
PHOSPHATE SERPL-MCNC: 2.3 MG/DL (ref 2.5–4.5)
PLATELET # BLD AUTO: 186 10E9/L (ref 150–450)
POTASSIUM SERPL-SCNC: 3.6 MMOL/L (ref 3.4–5.3)
PROT SERPL-MCNC: 7 G/DL (ref 6.8–8.8)
RBC # BLD AUTO: 4.36 10E12/L (ref 4.4–5.9)
SODIUM SERPL-SCNC: 134 MMOL/L (ref 133–144)
WBC # BLD AUTO: 8.2 10E9/L (ref 4–11)

## 2017-06-27 PROCEDURE — 25000128 H RX IP 250 OP 636: Mod: ZF | Performed by: PHYSICIAN ASSISTANT

## 2017-06-27 PROCEDURE — 96361 HYDRATE IV INFUSION ADD-ON: CPT

## 2017-06-27 PROCEDURE — 96375 TX/PRO/DX INJ NEW DRUG ADDON: CPT

## 2017-06-27 PROCEDURE — 83735 ASSAY OF MAGNESIUM: CPT | Performed by: PHYSICIAN ASSISTANT

## 2017-06-27 PROCEDURE — 84100 ASSAY OF PHOSPHORUS: CPT | Performed by: PHYSICIAN ASSISTANT

## 2017-06-27 PROCEDURE — 36415 COLL VENOUS BLD VENIPUNCTURE: CPT | Performed by: PHYSICIAN ASSISTANT

## 2017-06-27 PROCEDURE — 96365 THER/PROPH/DIAG IV INF INIT: CPT

## 2017-06-27 PROCEDURE — 85025 COMPLETE CBC W/AUTO DIFF WBC: CPT | Performed by: PHYSICIAN ASSISTANT

## 2017-06-27 PROCEDURE — 80053 COMPREHEN METABOLIC PANEL: CPT | Performed by: PHYSICIAN ASSISTANT

## 2017-06-27 PROCEDURE — 96367 TX/PROPH/DG ADDL SEQ IV INF: CPT

## 2017-06-27 PROCEDURE — 25000128 H RX IP 250 OP 636: Mod: ZF | Performed by: INTERNAL MEDICINE

## 2017-06-27 PROCEDURE — 99213 OFFICE O/P EST LOW 20 MIN: CPT | Mod: ZP | Performed by: PHYSICIAN ASSISTANT

## 2017-06-27 RX ORDER — ONDANSETRON 8 MG/1
8 TABLET, ORALLY DISINTEGRATING ORAL EVERY 8 HOURS PRN
Qty: 60 TABLET | Refills: 3 | Status: ON HOLD | OUTPATIENT
Start: 2017-06-27 | End: 2017-11-17

## 2017-06-27 RX ORDER — HEPARIN SODIUM (PORCINE) LOCK FLUSH IV SOLN 100 UNIT/ML 100 UNIT/ML
500 SOLUTION INTRAVENOUS EVERY 8 HOURS
Status: DISCONTINUED | OUTPATIENT
Start: 2017-06-27 | End: 2017-06-27 | Stop reason: HOSPADM

## 2017-06-27 RX ORDER — PALONOSETRON 0.05 MG/ML
0.25 INJECTION, SOLUTION INTRAVENOUS ONCE
Status: CANCELLED
Start: 2017-06-27 | End: 2017-06-27

## 2017-06-27 RX ORDER — PALONOSETRON 0.05 MG/ML
0.25 INJECTION, SOLUTION INTRAVENOUS ONCE
Status: COMPLETED | OUTPATIENT
Start: 2017-06-27 | End: 2017-06-27

## 2017-06-27 RX ORDER — HEPARIN SODIUM (PORCINE) LOCK FLUSH IV SOLN 100 UNIT/ML 100 UNIT/ML
500 SOLUTION INTRAVENOUS ONCE
Status: COMPLETED | OUTPATIENT
Start: 2017-06-27 | End: 2017-06-27

## 2017-06-27 RX ADMIN — SODIUM CHLORIDE 150 MG: 9 INJECTION, SOLUTION INTRAVENOUS at 15:36

## 2017-06-27 RX ADMIN — SODIUM CHLORIDE 1000 ML: 9 INJECTION, SOLUTION INTRAVENOUS at 15:06

## 2017-06-27 RX ADMIN — PALONOSETRON HYDROCHLORIDE 0.25 MG: 0.25 INJECTION INTRAVENOUS at 15:34

## 2017-06-27 RX ADMIN — SODIUM CHLORIDE, PRESERVATIVE FREE 500 UNITS: 5 INJECTION INTRAVENOUS at 17:03

## 2017-06-27 RX ADMIN — DEXAMETHASONE SODIUM PHOSPHATE 12 MG: 10 INJECTION, SOLUTION INTRAMUSCULAR; INTRAVENOUS at 15:18

## 2017-06-27 RX ADMIN — SODIUM CHLORIDE, PRESERVATIVE FREE 500 UNITS: 5 INJECTION INTRAVENOUS at 14:40

## 2017-06-27 ASSESSMENT — PAIN SCALES - GENERAL: PAINLEVEL: MILD PAIN (3)

## 2017-06-27 NOTE — NURSING NOTE
Chief Complaint   Patient presents with     Blood Draw     labs collected form portacath, line flushed with NS and heparin, vtials taken      Molly Whelan RN

## 2017-06-27 NOTE — LETTER
6/27/2017      RE: Daniel Sandhu  [address] [address]       [facility]    Hematology/Oncology Established Patient Note      Today's Date: Jun 27, 2017    Reason for Follow-up: adenocarcinoma of the GE junction, recent nausea/vomiting after chemotherapy      HISTORY OF PRESENT ILLNESS: Daniel Sandhu is a 37 year old male who presents with adenocarcinoma of the GE junction.  In early 2017, patient started noticing difficulty swallowing, and that food seems to take longer to go down.  It became more frequent, and he saw his PCP, and was referred for EGD, which showed an esophageal mass located at the GE junction, measuring 4 cm.  Biopsy showed poorly differentiated adenocarcinoma.  HER-2 was sent and is equivocal (2+).  CT c/a/p showed a mildly prominent lymph node in the gastrohepatic ligament, but there were no pathologically enlarged lymph nodes in the chest, abdomen, or pelvis.  There was otherwise no evidence of metastatic disease.  PET-CT showed thickening of the distal esophagus consistent with known esophageal adenocarcinoma, as well as mildly hypermetabolic 1 cm lymph node in the gastrohepatic ligament.  There was no evidence of distant metastatic disease.  He underwent EUS on 6/9/17, which showed the esophageal tumor in the lower third of the esophagus, staged T2NX.  There were 2 abnormal lymph nodes seen in the gastrohepatic ligament; pathology was suspicious for adenocarcinoma.  Celiac node was sampled as well, which was benign.  He was seen by surgery, Dr. Esteban, and recommended srinivas-operative chemotherapy.    Port was placed on 6/22/17.    Chemotherapy:  Epirubicin 50 mg/m2 IV on day 1  Oxaliplatin 130 mg/m2 IV on day 1  Capecitabine 625 mg/m2 po BID on days 1-21  Every 21 day cycles    C1D1: 6/26/17    INTERIM HISTORY: Patient reports that last night, he developed nausea and vomiting x 5-6 episodes. He got some relief from Zofran ODT from his wife  with some relief. He tried vaping marijuana, but did not find much benefit from that. He also took Compazine. His appetite remains low today. He was able to eat cheese, a honey stick, cereal bar, and kale today. He has had 12 oz water today and some orange juice. He did feel fatigued this morning, but feels better now. He was able to take his Xeloda this morning. He did feel dizzy earlier this afternoon, better now. His abdomen is achy after vomiting, but he denies abdominal pain. He had a normal bowel movement yesterday and has urinated twice today. He has mild cold sensitivity in his throat. He denies other concerns.     HOME MEDICATIONS:  Current Outpatient Prescriptions   Medication Sig Dispense Refill     ondansetron (ZOFRAN-ODT) 8 MG ODT tab Take 1 tablet (8 mg) by mouth every 8 hours as needed for nausea 60 tablet 3     capecitabine (XELODA) 500 MG tablet CHEMO Take 2 tablets (1,000 mg) by mouth 2 times daily for 21 days 84 tablet 0     LORazepam (ATIVAN) 0.5 MG tablet Take 1 tablet (0.5 mg) by mouth every 4 hours as needed (Anxiety, Nausea/Vomiting or Sleep) 30 tablet 5     prochlorperazine (COMPAZINE) 10 MG tablet Take 1 tablet (10 mg) by mouth every 6 hours as needed (Nausea/Vomiting) 30 tablet 5     ondansetron (ZOFRAN) 8 MG tablet Take 1 tablet (8 mg) by mouth every 8 hours as needed (Nausea/Vomiting) 10 tablet 7     ALLERGIES:  No Known Allergies    PHYSICAL EXAM:  Vital signs:       Vital Signs 2017   Systolic 109   Diastolic 63   Pulse 80   Temperature 98   Respirations 16   Weight (LB) 153 lb 6.4 oz   O2 98   ECO  GENERAL/CONSTITUTIONAL: No acute distress. Here today with his wife.  EYES:No scleral icterus.   MOUTH: Mucosa is moist with no lesions or thrush.  RESPIRATORY: Clear to auscultation bilaterally. No crackles or wheezing.   CARDIOVASCULAR: Regular rate and rhythm.  GASTROINTESTINAL: No tenderness. The patient has normal bowel sounds. No guarding.  No distention. No masses palpable  in a seated position.  MUSCULOSKELETAL: No edema.  NEUROLOGIC: Alert, oriented, answers questions appropriately.  INTEGUMENTARY: No jaundice.  Port in place.      LABS:   6/27/2017 14:47   Sodium 134   Potassium 3.6   Chloride 99   Carbon Dioxide 28   Urea Nitrogen 18   Creatinine 0.75   GFR Estimate >90...   GFR Estimate If Black >90...   Calcium 8.8   Anion Gap 7   Magnesium 2.3   Phosphorus 2.3 (L)   Albumin 3.8   Protein Total 7.0   Bilirubin Total 0.8   Alkaline Phosphatase 43   ALT 19   AST 14   Glucose 124 (H)   WBC 8.2   Hemoglobin 13.1 (L)   Hematocrit 37.9 (L)   Platelet Count 186   RBC Count 4.36 (L)   MCV 87   MCH 30.0   MCHC 34.6   RDW 12.0   Diff Method Automated Method   % Neutrophils 82.1   % Lymphocytes 8.8   % Monocytes 8.5   % Eosinophils 0.1   % Basophils 0.1   % Immature Granulocytes 0.4   Nucleated RBCs 0   Absolute Neutrophil 6.8   Absolute Lymphocytes 0.7 (L)   Absolute Monocytes 0.7   Absolute Eosinophils 0.0   Absolute Basophils 0.0   Abs Immature Granulocytes 0.0   Absolute Nucleated RBC 0.0     ASSESSMENT/PLAN:  Daniel Sandhu is a 37 year old male with:  1. Nausea and vomiting. Secondary to chemotherapy. Already much improved. No concerns on his labs from today. He will receive 1L IV NS, IV Emend/Aloxi/dex today. He was given a prescription for Zofran ODT, which he may start in 2 days if still nauseated. In the interim, he may use Compazine or Ativan if still nauseated. I will add Emend and switch from Zofran to Aloxi to his pre-meds for cycle 2. We reviewed eating a bland diet today and gradually advancing his diet over the next week.     2. 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  Clinical stage T2N1M0 (stage IIB), based on PET-CT and EUS.  A gastrohepatic lymph node was biopsied and suspicious for adenocarcinoma.  He was seen by Dr. Esteban, and with the degree of extension into the stomach, his inclination is to treat him as a gastric cancer, and so perioperative  chemotherapy, as per MAGIC trial, would be reasonable.  Plan was discussed at tumor conference, the group agreed with the recommendation.  He will undergo 3 cycles of neoadjuvant chemotherapy, followed by new PET-CT about 1 month after completion of chemotherapy, followed by surgery ~ 6 weeks after completion of neoadjuvant chemotherapy, followed by 3 cycles of adjuvant chemotherapy. He started treatment yesterday and will see Dr. Mosqueda with in 3 weeks with cycle 2.     Violeta Coto PA-C  [facility]  [address] [place] [address] [id number] [phone]

## 2017-06-27 NOTE — PATIENT INSTRUCTIONS
The following foods are high in phosphorus.     Milk and milk products (including chocolate milk, cheese, cottage cheese, yogurt, ice cream, custard and pudding)    Whole-grain breads, bran, whole-grain cereal, wheat germ, galloway's yeast    Legumes (dried or canned beans, baked beans, split peas, lentils), nuts, nut butters (like peanut butter), seeds    Chocolate and cocoa    Cola (like Dustin Bonner, Mr. Pibb), beer and matt    Tofu

## 2017-06-27 NOTE — PROGRESS NOTES
[facility]    Hematology/Oncology Established Patient Note      Today's Date: Jun 27, 2017    Reason for Follow-up: adenocarcinoma of the GE junction, recent nausea/vomiting after chemotherapy      HISTORY OF PRESENT ILLNESS: Daniel Sandhu is a 37 year old male who presents with adenocarcinoma of the GE junction.  In early 2017, patient started noticing difficulty swallowing, and that food seems to take longer to go down.  It became more frequent, and he saw his PCP, and was referred for EGD, which showed an esophageal mass located at the GE junction, measuring 4 cm.  Biopsy showed poorly differentiated adenocarcinoma.  HER-2 was sent and is equivocal (2+).  CT c/a/p showed a mildly prominent lymph node in the gastrohepatic ligament, but there were no pathologically enlarged lymph nodes in the chest, abdomen, or pelvis.  There was otherwise no evidence of metastatic disease.  PET-CT showed thickening of the distal esophagus consistent with known esophageal adenocarcinoma, as well as mildly hypermetabolic 1 cm lymph node in the gastrohepatic ligament.  There was no evidence of distant metastatic disease.  He underwent EUS on 6/9/17, which showed the esophageal tumor in the lower third of the esophagus, staged T2NX.  There were 2 abnormal lymph nodes seen in the gastrohepatic ligament; pathology was suspicious for adenocarcinoma.  Celiac node was sampled as well, which was benign.  He was seen by surgery, Dr. Esteban, and recommended srinivas-operative chemotherapy.    Port was placed on 6/22/17.    Chemotherapy:  Epirubicin 50 mg/m2 IV on day 1  Oxaliplatin 130 mg/m2 IV on day 1  Capecitabine 625 mg/m2 po BID on days 1-21  Every 21 day cycles    C1D1: 6/26/17    INTERIM HISTORY: Patient reports that last night, he developed nausea and vomiting x 5-6 episodes. He got some relief from Zofran ODT from his wife with some relief. He tried vaping marijuana, but did not find much benefit from that. He  also took Compazine. His appetite remains low today. He was able to eat cheese, a honey stick, cereal bar, and kale today. He has had 12 oz water today and some orange juice. He did feel fatigued this morning, but feels better now. He was able to take his Xeloda this morning. He did feel dizzy earlier this afternoon, better now. His abdomen is achy after vomiting, but he denies abdominal pain. He had a normal bowel movement yesterday and has urinated twice today. He has mild cold sensitivity in his throat. He denies other concerns.     HOME MEDICATIONS:  Current Outpatient Prescriptions   Medication Sig Dispense Refill     ondansetron (ZOFRAN-ODT) 8 MG ODT tab Take 1 tablet (8 mg) by mouth every 8 hours as needed for nausea 60 tablet 3     capecitabine (XELODA) 500 MG tablet CHEMO Take 2 tablets (1,000 mg) by mouth 2 times daily for 21 days 84 tablet 0     LORazepam (ATIVAN) 0.5 MG tablet Take 1 tablet (0.5 mg) by mouth every 4 hours as needed (Anxiety, Nausea/Vomiting or Sleep) 30 tablet 5     prochlorperazine (COMPAZINE) 10 MG tablet Take 1 tablet (10 mg) by mouth every 6 hours as needed (Nausea/Vomiting) 30 tablet 5     ondansetron (ZOFRAN) 8 MG tablet Take 1 tablet (8 mg) by mouth every 8 hours as needed (Nausea/Vomiting) 10 tablet 7     ALLERGIES:  No Known Allergies    PHYSICAL EXAM:  Vital signs:       Vital Signs 2017   Systolic 109   Diastolic 63   Pulse 80   Temperature 98   Respirations 16   Weight (LB) 153 lb 6.4 oz   O2 98   ECO  GENERAL/CONSTITUTIONAL: No acute distress. Here today with his wife.  EYES:No scleral icterus.   MOUTH: Mucosa is moist with no lesions or thrush.  RESPIRATORY: Clear to auscultation bilaterally. No crackles or wheezing.   CARDIOVASCULAR: Regular rate and rhythm.  GASTROINTESTINAL: No tenderness. The patient has normal bowel sounds. No guarding.  No distention. No masses palpable in a seated position.  MUSCULOSKELETAL: No edema.  NEUROLOGIC: Alert, oriented, answers  questions appropriately.  INTEGUMENTARY: No jaundice.  Port in place.      LABS:   6/27/2017 14:47   Sodium 134   Potassium 3.6   Chloride 99   Carbon Dioxide 28   Urea Nitrogen 18   Creatinine 0.75   GFR Estimate >90...   GFR Estimate If Black >90...   Calcium 8.8   Anion Gap 7   Magnesium 2.3   Phosphorus 2.3 (L)   Albumin 3.8   Protein Total 7.0   Bilirubin Total 0.8   Alkaline Phosphatase 43   ALT 19   AST 14   Glucose 124 (H)   WBC 8.2   Hemoglobin 13.1 (L)   Hematocrit 37.9 (L)   Platelet Count 186   RBC Count 4.36 (L)   MCV 87   MCH 30.0   MCHC 34.6   RDW 12.0   Diff Method Automated Method   % Neutrophils 82.1   % Lymphocytes 8.8   % Monocytes 8.5   % Eosinophils 0.1   % Basophils 0.1   % Immature Granulocytes 0.4   Nucleated RBCs 0   Absolute Neutrophil 6.8   Absolute Lymphocytes 0.7 (L)   Absolute Monocytes 0.7   Absolute Eosinophils 0.0   Absolute Basophils 0.0   Abs Immature Granulocytes 0.0   Absolute Nucleated RBC 0.0     ASSESSMENT/PLAN:  Daniel Sandhu is a 37 year old male with:  1. Nausea and vomiting. Secondary to chemotherapy. Already much improved. No concerns on his labs from today. He will receive 1L IV NS, IV Emend/Aloxi/dex today. He was given a prescription for Zofran ODT, which he may start in 2 days if still nauseated. In the interim, he may use Compazine or Ativan if still nauseated. I will add Emend and switch from Zofran to Aloxi to his pre-meds for cycle 2. We reviewed eating a bland diet today and gradually advancing his diet over the next week.     2. 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  Clinical stage T2N1M0 (stage IIB), based on PET-CT and EUS.  A gastrohepatic lymph node was biopsied and suspicious for adenocarcinoma.  He was seen by Dr. Esteban, and with the degree of extension into the stomach, his inclination is to treat him as a gastric cancer, and so perioperative chemotherapy, as per MAGIC trial, would be reasonable.  Plan was discussed at tumor  conference, the group agreed with the recommendation.  He will undergo 3 cycles of neoadjuvant chemotherapy, followed by new PET-CT about 1 month after completion of chemotherapy, followed by surgery ~ 6 weeks after completion of neoadjuvant chemotherapy, followed by 3 cycles of adjuvant chemotherapy. He started treatment yesterday and will see Dr. Mosqueda with in 3 weeks with cycle 2.     Violeta Coto PA-C  [facility] [address] [address] [phone]

## 2017-06-27 NOTE — PROGRESS NOTES
Infusion Nursing Note:  Daniel Sandhu presents today for IVF's, IV antiemetics.    Patient seen by provider today: Yes: Violeta JARVIS while in infusion room.   present during visit today: Not Applicable.    Note: Being seen today as an add on via triage.  Reports having 5 episodes of vomiting yesterday.  None today.  Last took Zofran and Compazine together about 8 hours ago.  Denies nausea currently but he would like IV antiemetics.  Had some dizziness when getting up today but none currently.  Has a slight HA.   Doesn't feel like eating.  See doc flow sheet for assessment.      Feeling much better while here today getting infused.  Was able to snack on crackers and had a sandwich.  Keeping fluids down as well.    Intravenous Access:  Implanted Port.    Treatment Conditions:  Lab Results   Component Value Date    HGB 13.1 06/27/2017     Lab Results   Component Value Date    WBC 8.2 06/27/2017      Lab Results   Component Value Date    ANEU 6.8 06/27/2017     Lab Results   Component Value Date     06/27/2017      Lab Results   Component Value Date     06/27/2017                   Lab Results   Component Value Date    POTASSIUM 3.6 06/27/2017           Lab Results   Component Value Date    MAG 2.3 06/27/2017            Lab Results   Component Value Date    CR 0.75 06/27/2017                   Lab Results   Component Value Date    YOBANY 8.8 06/27/2017                Lab Results   Component Value Date    BILITOTAL 0.8 06/27/2017           Lab Results   Component Value Date    ALBUMIN 3.8 06/27/2017                    Lab Results   Component Value Date    ALT 19 06/27/2017           Lab Results   Component Value Date    AST 14 06/27/2017         Post Infusion Assessment:  Patient tolerated infusion without incident.  Blood return noted pre and post infusion.  Site patent and intact, free from redness, edema or discomfort.  No evidence of extravasations.  Access discontinued per  protocol.    Discharge Plan:   Prescription refills given for Zofran ODT.  AVS to patient via.  Patient will return 7/17/17 for next appointment.   Patient discharged in stable condition accompanied by: wife.  Departure Mode: Ambulatory.  Face to Face time: 0.    Ciarra Dong RN

## 2017-06-27 NOTE — MR AVS SNAPSHOT
After Visit Summary   6/27/2017    Daniel Sandhu    MRN: [id number]           Patient Information     Date Of Birth          1980        Visit Information        Provider Department      6/27/2017 3:00 PM Violeta Coto PA-C [facility]        Today's Diagnoses     Nausea without vomiting    -  1    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           Follow-ups after your visit        Your next 10 appointments already scheduled     Jul 17, 2017 12:45 PM CDT   [facility] Lab Draw with [facility] LAB DRAW   [facility] Lab Draw ([facility])    [address] [place] [id number] [phone]            Jul 17, 2017  1:15 PM CDT   (Arrive by 1:00 PM)   Return Visit with Laurence Hood MD   [facility] ([facility])    [address] [place] [address] [id number] [phone]            Jul 17, 2017  1:30 PM CDT   Infusion 240 with  ONCOLOGY INFUSION, UC 32 ATC   [facility] ([facility])    [address] [place] [id number] [phone]              Who to contact     If you have questions or need follow up information about today's clinic visit or your schedule please contact [facility] directly at [phone].  Normal or non-critical lab and imaging results will be communicated to you by MyChart, letter or phone within 4 business days after the clinic has received the results. If you do not hear from us within 7 days, please contact the clinic through MyChart or phone. If you have a critical or abnormal lab result, we will notify you by phone as soon as possible.  Submit refill requests through [employer] or call your pharmacy and they will forward the refill request to us. Please allow 3 business days for your refill to be completed.          Additional  Information About Your Visit        MyChart Information     [employer] gives you secure access to your electronic health record. If you see a primary care provider, you can also send messages to your care team and make appointments. If you have questions, please call your primary care clinic.  If you do not have a primary care provider, please call [phone] and they will assist you.        Care EveryWhere ID     This is your Care EveryWhere ID. This could be used by other organizations to access your [place] medical records  [id number]         Blood Pressure from Last 3 Encounters:   06/27/17 109/63   06/26/17 116/66   06/22/17 108/46    Weight from Last 3 Encounters:   06/27/17 69.6 kg (153 lb 6.4 oz)   06/26/17 70.7 kg (155 lb 14.4 oz)   06/19/17 70.6 kg (155 lb 9.6 oz)              Today, you had the following     No orders found for display         Today's Medication Changes          These changes are accurate as of: 6/27/17  4:41 PM.  If you have any questions, ask your nurse or doctor.               Start taking these medicines.        Dose/Directions    ondansetron 8 MG ODT tab   Commonly known as:  ZOFRAN-ODT   Used for:  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, Non-intractable vomiting with nausea, unspecified vomiting type        Dose:  8 mg   Take 1 tablet (8 mg) by mouth every 8 hours as needed for nausea   Quantity:  60 tablet   Refills:  3            Where to get your medicines      These medications were sent to [place] [id number] [facility] [address] [place] [address] [address] [place] [id number]    Hours:  TRANSPLANT PHONE NUMBER [phone] Phone:  [phone]     ondansetron 8 MG ODT tab                Primary Care Provider Office Phone # Fax #    Lencho Chan [phone]       [facility] [address] [facility] [address]        Equal Access to Services     SHAWN SWAIN AH: Fadyii garo ku  mannie Araujo, wakarlda luqadaha, qaybta kadiannda ross, alejandro irving niecyciara naomiemaría lacoryrobina chandler. So [facility] [phone].    ATENCIÓN: Si habla ginañol, tiene a zayas disposición servicios gratuitos de asistencia lingüística. Isaias al [phone].    We comply with applicable federal civil rights laws and [place] laws. We do not discriminate on the basis of race, color, national origin, age, disability sex, sexual orientation or gender identity.            Thank you!     Thank you for choosing [facility]  for your care. Our goal is always to provide you with excellent care. Hearing back from our patients is one way we can continue to improve our services. Please take a few minutes to complete the written survey that you may receive in the mail after your visit with us. Thank you!             Your Updated Medication List - Protect others around you: Learn how to safely use, store and throw away your medicines at www.disposemymeds.org.          This list is accurate as of: 6/27/17  4:41 PM.  Always use your most recent med list.                   Brand Name Dispense Instructions for use Diagnosis    capecitabine 500 MG tablet CHEMO    XELODA    84 tablet    Take 2 tablets (1,000 mg) by mouth 2 times daily for 21 days    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       LORazepam 0.5 MG tablet    ATIVAN    30 tablet    Take 1 tablet (0.5 mg) by mouth every 4 hours as needed (Anxiety, Nausea/Vomiting or Sleep)    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       ondansetron 8 MG ODT tab    ZOFRAN-ODT    60 tablet    Take 1 tablet (8 mg) by mouth every 8 hours as needed for nausea    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, Non-intractable vomiting with nausea, unspecified vomiting type       ondansetron 8 MG tablet    ZOFRAN    10 tablet    Take 1 tablet (8 mg) by mouth every 8 hours as needed (Nausea/Vomiting)    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        prochlorperazine 10 MG tablet    COMPAZINE    30 tablet    Take 1 tablet (10 mg) by mouth every 6 hours as needed (Nausea/Vomiting)    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

## 2017-06-27 NOTE — TELEPHONE ENCOUNTER
Patient is getting neoadjuvant EOX chemo, first day today. He developed nausea and emesis~ 5 episodes this pm. He took sublingual zofran 8 mg (leftover from his wife pregnancy), this seems to help and he is not nauseated now, able to keep fluids down. We went over his antiemetics, advised to continue with zofran q 8 hr, okay to make it scheduled for a day or so, compazine prn q 6 hrs. He also has ativan if nothing works. Advised to call back or go to the ER if he is unable to keep fluids down or develops new symptoms, such as dizziness, confusion, decreased urine output. Patient verbalized understanding.

## 2017-06-27 NOTE — TELEPHONE ENCOUNTER
[facility] Telephone Triage Note    Assessment: Patient called in to triage reporting the following symptoms: nausea,  vomiting, starting yesterday with 5 episodes in the last 24 hours (none so far today), dizziness.  Per pt report, he has only had a couple of bites of oatmeal and some sips of juice and water today.    Recommendations: Discussed with MATHEUS Gomez.  Clinic visit today with MATHEUS Card with the following procedures/labs:  no procedures,  CBC, CMP, Magnesium, Phosphorus,  Infusion of IVF and possible IV anti-emetics.    Follow-Up: Order for above labs/procedures/infusion placed, Message sent to schedulers to add pt on to schedule, Informed patient of appointment times, Instructed patient to seek care immediately for worsening sypmtoms, including: fever, chest pain, shortness of breath, dizziness. Patient voiced understanding of advice and/or instructions given.

## 2017-06-27 NOTE — MR AVS SNAPSHOT
After Visit Summary   6/27/2017    Daniel Sandhu    MRN: [id number]           Patient Information     Date Of Birth          1980        Visit Information        Provider Department      6/27/2017 3:00 PM UC 24 ATC; UC ONCOLOGY INFUSION [facility]        Today's Diagnoses     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    -  1    Non-intractable vomiting with nausea, unspecified vomiting type          Care Instructions    The following foods are high in phosphorus.     Milk and milk products (including chocolate milk, cheese, cottage cheese, yogurt, ice cream, custard and pudding)    Whole-grain breads, bran, whole-grain cereal, wheat germ, galloway's yeast    Legumes (dried or canned beans, baked beans, split peas, lentils), nuts, nut butters (like peanut butter), seeds    Chocolate and cocoa    Cola (like Coke, Pepsi, MrNikita Delarosa), beer and matt    Tofu            Follow-ups after your visit        Your next 10 appointments already scheduled     Jul 17, 2017 12:45 PM CDT   [facility]   [facility] Draw ([facility])    [address] [place] [id number] [phone]            Jul 17, 2017  1:15 PM CDT   (Arrive by 1:00 PM)   Return Visit with Laurence Hood MD   [facility] ([facility])    [address] [place] [id number] [phone]            Jul 17, 2017  1:30 PM CDT   Infusion 240 with  ONCOLOGY INFUSION, UC 32 ATC   [facility] ([facility])    [address] [place] [id number] [phone]              Who to contact     If you have questions or need follow up information about today's clinic visit or your schedule please contact [facility] directly at [phone].  Normal or non-critical lab and imaging  results will be communicated to you by [employer] [facility] [employer], letter or phone within 4 business days after the clinic has received the results. If you do not hear from us within 7 days, please contact the clinic through [employer] or phone. If you have a critical or abnormal lab result, we will notify you by phone as soon as possible.  Submit refill requests through [employer] or call your pharmacy and they will forward the refill request to us. Please allow 3 business days for your refill to be completed.          Additional Information About Your Visit        [employer] Information     [employer] gives you secure access to your electronic health record. If you see a primary care provider, you can also send messages to your care team and make appointments. If you have questions, please call your primary care clinic.  If you do not have a primary care provider, please call [phone] and they will assist you.        Care EveryWhere ID     This is your Care EveryWhere ID. This could be used by other organizations to access your [place] medical records  [id number]        Your Vitals Were     Pulse Temperature Respirations Pulse Oximetry BMI (Body Mass Index)       80 98  F (36.7  C) 16 98% 22.75 kg/m2        Blood Pressure from Last 3 Encounters:   06/27/17 109/63   06/26/17 116/66   06/22/17 108/46    Weight from Last 3 Encounters:   06/27/17 69.6 kg (153 lb 6.4 oz)   06/26/17 70.7 kg (155 lb 14.4 oz)   06/19/17 70.6 kg (155 lb 9.6 oz)              We Performed the Following     *CBC with platelets differential     Comprehensive metabolic panel     Magnesium     Phosphorus          Today's Medication Changes          These changes are accurate as of: 6/27/17  5:23 PM.  If you have any questions, ask your nurse or doctor.               Start taking these medicines.        Dose/Directions    ondansetron 8 MG ODT tab   Commonly known as:  ZOFRAN-ODT   Used for:  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, Non-intractable vomiting  with nausea, unspecified vomiting type        Dose:  8 mg   Take 1 tablet (8 mg) by mouth every 8 hours as needed for nausea   Quantity:  60 tablet   Refills:  3            Where to get your medicines      These medications were sent to [place], [place] - [address]  [address], [facility] [address]    Hours:  TRANSPLANT PHONE NUMBER [phone] Phone:  [phone]     ondansetron 8 MG ODT tab                Primary Care Provider Office Phone # Fax #    Lencho Chan [phone]       [facility] [address]  [facility] [address]        Equal Access to Services     SHAWN SWAIN : Hadii aad ku hadasho Soomaali, waaxda luqadaha, qaybta kaalmada adeegyada, alejandro arteaga. So [facility] [phone].    ATENCIÓN: Si habla español, tiene a zayas disposición servicios gratuitos de asistencia lingüística. Llame al [phone].    We comply with applicable federal civil rights laws and [place] laws. We do not discriminate on the basis of race, color, national origin, age, disability sex, sexual orientation or gender identity.            Thank you!     Thank you for choosing [facility]  for your care. Our goal is always to provide you with excellent care. Hearing back from our patients is one way we can continue to improve our services. Please take a few minutes to complete the written survey that you may receive in the mail after your visit with us. Thank you!             Your Updated Medication List - Protect others around you: Learn how to safely use, store and throw away your medicines at www.disposemymeds.org.          This list is accurate as of: 6/27/17  5:23 PM.  Always use your most recent med list.                   Brand Name Dispense Instructions for use Diagnosis    capecitabine 500 MG tablet CHEMO    XELODA    84 tablet    Take 2 tablets (1,000 mg) by  mouth 2 times daily for 21 days    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       LORazepam 0.5 MG tablet    ATIVAN    30 tablet    Take 1 tablet (0.5 mg) by mouth every 4 hours as needed (Anxiety, Nausea/Vomiting or Sleep)    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       ondansetron 8 MG ODT tab    ZOFRAN-ODT    60 tablet    Take 1 tablet (8 mg) by mouth every 8 hours as needed for nausea    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, Non-intractable vomiting with nausea, unspecified vomiting type       ondansetron 8 MG tablet    ZOFRAN    10 tablet    Take 1 tablet (8 mg) by mouth every 8 hours as needed (Nausea/Vomiting)    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       prochlorperazine 10 MG tablet    COMPAZINE    30 tablet    Take 1 tablet (10 mg) by mouth every 6 hours as needed (Nausea/Vomiting)    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

## 2017-06-27 NOTE — TELEPHONE ENCOUNTER
"\"I had my first chemo treatment today.  At 2:30pm I started feeling nauseous.  I have vomited 5 times since then.  I have tried Zofran and Compazine without relief of nausea/vomiting.  I have an order for Lorazepam but I really didn't want to have to take all three of my meds today.\"  Advised patient that the ED is recommended.  Patient prefers he speaks to his physician.  Paged on call Oncologist at 9:50pm to contact patient directly.     Reason for Disposition    [1] Neutropenia known or suspected (e.g., recent cancer chemotherapy) AND [2] vomiting    Additional Information    Negative: Shock suspected (e.g., cold/pale/clammy skin, too weak to stand, low BP, rapid pulse)    Negative: Difficult to awaken or acting confused  (e.g., disoriented, slurred speech)    Negative: Sounds like a life-threatening emergency to the triager    Negative: Chest pain    Negative: Vomiting occurs only while coughing    Negative: Constipation is main symptom    Negative: Diarrhea is main symptom    Negative: [1] Vomiting AND [2] contains red blood or black (\"coffee ground\") material  (Exception: few red streaks in vomit that only happened once)    Negative: [1] Vomiting AND [2] recent head injury (within last 3 days)    Negative: [1] Vomiting AND [2] recent abdominal injury (within last 3 days)    Negative: [1] Vomiting AND [2] insulin-dependent diabetes (Type I) AND [3] glucose > 400 mg/dl (22 mmol/l)    Negative: [1] Neutropenia known or suspected (e.g., recent cancer chemotherapy) AND [2] fever > 100.4 F (38.0 C)    Protocols used: CANCER - NAUSEA AND VOMITING-ADULT-    "

## 2017-06-28 ENCOUNTER — OFFICE VISIT (OUTPATIENT)
Dept: GASTROENTEROLOGY | Facility: CLINIC | Age: 37
End: 2017-06-28

## 2017-06-28 ENCOUNTER — CARE COORDINATION (OUTPATIENT)
Dept: ONCOLOGY | Facility: CLINIC | Age: 37
End: 2017-06-28

## 2017-06-28 DIAGNOSIS — C15.5 MALIGNANT NEOPLASM OF LOWER THIRD OF ESOPHAGUS (H): Primary | ICD-10-CM

## 2017-06-28 DIAGNOSIS — C15.5 MALIGNANT NEOPLASM OF LOWER THIRD OF ESOPHAGUS (H): ICD-10-CM

## 2017-06-28 NOTE — LETTER
"6/28/2017       RE: Daniel Sandhu  [address] [facility] [address]     Dear Colleague,    Thank you for referring your patient, Daniel Sandhu, to the [facility] GASTROENTEROLOGY AND IBD at [facility]. Please see a copy of my visit note below.    CLINICAL NUTRITION SERVICES - ASSESSMENT NOTE    REASON FOR ASSESSMENT  Daniel Sandhu is a 37 year old male seen by the dietitian for Medical Nutrition Therapy related to esophageal cancer referred by Dr. Hood  Pt accompanied by self.     NUTRITION HISTORY  Factors affecting nutrition intake include:nausea and vomiting    C1D1 6/25 EOX     Daniel reports that his intake was low 2-3 days post first infusion d/t nausea and vomiting.  He has started anti-emetic regimen which has significantly help reduce both nausea and vomiting.    -He presents today with desire to learn about what foods to eat during treatment and ways to cope with side effects from chemotherapy.   -He endorses a very healthy diet, avoiding most processed foods and seldomly dining out.  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  He expresses his concern with this as his wife is due to have their 2nd child on 7/17, his next scheduled chemotherapy.  He has a 4 year old daughter at home.    -His family and friends will be providing healthful meals via meal train during his cancer treatment and upcoming birth of his baby boy.     Diet recall   Breakfast: oatmeal made with cheese and eggs   Snack: full fat cherry yogurt   Lunch: sandwich with meat and cheese on whole wheat bread   Dinner: lean protein source (fish, chicken, turkey), whole grains (bread, quiona, pasta), cooked vegetables or Kale   Snacks: Cheese stick, cereal bar   Beverages: Coconut water, water     Patient is consuming the following supplements at home - not taking ONS at this time.     ANTHROPOMETRICS  Height: 68\"  Weight: 153 lbs/70kg  BMI: 22.75 kg/m   Weight Status:  Normal BMI  IBW: 156 " lbs  % IBW: 98%  Weight History: down 2-3 lbs from baseline weight.  He reports that his UBW is 155 lbs which has been his weight since HS.   Wt Readings from Last 5 Encounters:   06/27/17 69.6 kg (153 lb 6.4 oz)   06/26/17 70.7 kg (155 lb 14.4 oz)   06/19/17 70.6 kg (155 lb 9.6 oz)   06/14/17 71.4 kg (157 lb 6.4 oz)   06/09/17 70.1 kg (154 lb 8.7 oz)     LABS  Labs reviewed    MEDICATIONS/VITAMINS/MINERALS  Medications reviewed    PROCEDURES WITH NUTRITIONAL IMPLICATIONS  Chemotherapy - EOX    ASSESSED NUTRITION NEEDS:  Estimated Energy Needs: 2500 kcals (30-35 Kcal/Kg)  Justification: increased needs with Chemotherapy   Estimated Protein Needs: 105 grams protein (1.2-1.5 g pro/Kg)  Justification: increased needs with Chemotherapy   Estimated Fluid Needs: 2500  mL (1 mL/Kcal)  Justification: maintenance    MALNUTRITION:  % Weight Loss:  Weight loss does not meet criteria for malnutrition   % Intake:  Decreased intake does not meet criteria for malnutrition   Subcutaneous Fat Loss:  None observed  Muscle Loss:  None observed  Fluid Retention:  None noted    Malnutrition Diagnosis: Patient does not meet two of the above criteria necessary for diagnosing malnutrition    NUTRITION DIAGNOSIS:  Food and nutrition-related knowledge deficit related to nutrition needs with cancer treatment as evidenced by pt unable to verbalize understanding of calorie/protein needs and ways to cope with chemo side effects.     INTERVENTIONS  Recommendations / Nutrition Prescription  1. 5-6 small frequent meals, 2500kcal, 105g protein/day  Nutrition Education    Provided written & verbal education on:   - Ways to maximize kcal and protein intake. Discussed calorie and protein needs for maintenance of weight and nutrition status.  Advised pt to aim for 2500kcal and 105g protein via 5-6 small frequent meals.   - Coping with barriers - as chemotherapy/radiation progresses, eating may become more difficult and discussed ways to cope with  this.  - ONS (Ensure Enlive, Boost Plus, CIB with whole milk etc). Suggested ways to incorporate these supplements to avoid flavor fatigue (adding to home made smoothies)    Provided pt with corresponding education materials on:  High Calorie, High Protein Recipe booklet, Healthful recipes for batch cooking/convenience meals, Tips to Increase the Calories in Your Diet, Tips to Increase the Protein in Your Diet, Protein Sources and list of cookbooks for recipes during cancer treatment.      Pt verbalize understanding of materials provided during consult.   Patient Understanding: Excellent  Expected Compliance: Excellent    Goals  1.  Aim for 5-6 small frequent meals  2.  Aim for 2500kcal and 105g protein/day  3. Weight maintenance through cancer treatment      Follow-Up Plans: Pt has RD contact information for questions.      MONITORING AND EVALUATION:  -Food intake, adequacy  -Weight trends     Time spent with patient: 45 minutes.  Claudette Pineda, JUNO, LD

## 2017-06-28 NOTE — MR AVS SNAPSHOT
MRN:[id number]                      After Visit Summary   6/28/2017    Daniel Sandhu    MRN: [id number]           Visit Information        Provider Department      6/28/2017 2:00 PM Claudette Aguayo RD [facility] Gastroenterology and IBD        Your next 10 appointments already scheduled     Jul 17, 2017 12:45 PM CDT   [facility] Draw with  [facility]   [facility] ([facility])    [address] [place] [id number] [phone]            Jul 17, 2017  1:15 PM CDT   (Arrive by 1:00 PM)   Return Visit with Laurence Hood MD   [facility] ([facility])    [address] [address] [phone]            Jul 17, 2017  1:30 PM CDT   Infusion 240 with UC ONCOLOGY INFUSION, UC 32 ATC   [facility] ([facility])    [address] [place] [address] [phone]              [employer] Information     [email or web address] gives you secure access to your electronic health record. If you see a primary care provider, you can also send messages to your care team and make appointments. If you have questions, please call your primary care clinic.  If you do not have a primary care provider, please call [phone] and they will assist you.      [email or web address] is an electronic gateway that provides easy, online access to your medical records. With [email or web address], you can request a clinic appointment, read your test results, renew a prescription or communicate with your care team.     To access your existing account, please contact your [facility] or call [phone] for assistance.        Care EveryWhere ID     This is your Care EveryWhere ID. This could be used by other organizations to access your [facility] medical records  [id number]        Equal Access to Services      SHAWN SWAIN : Hadii garo Araujo, waaxda luqadaha, qaybta kaalmada ross, alejandro arteaga. [facility] [phone].    ATENCIÓN: Si habla español, tiene a zayas disposición servicios gratuitos de asistencia lingüística. Llame al [phone].    We comply with applicable federal civil rights laws and [place] laws. We do not discriminate on the basis of race, color, national origin, age, disability sex, sexual orientation or gender identity.

## 2017-06-28 NOTE — PROGRESS NOTES
Reason for Outgoing Call:   To follow-up on pt after first dose of EOX on 6/26.   Chart reviewed yesterday, pt was in clinic after calling [facility] [employer] Triage re: n/v and my call was postponed until today. MATHEUS Sigala saw pt and planning for adjustment of pre-meds with next cycle.  Patient Questions/Concerns:   - Daniel reports he is still feeling better, no more n/v. Eating bland small meals, has appetite. No other sx/concerns when asked.  - LBM this am: soft/small. Will watch for and tx constipation with OTC meds PRN  - asks for dietitian referral as offered earlier, his wife's due date is 7/17 and friends/family will be providing Meal Train assistance and he would like guidance for foods during tx  Nursing Action/Patient Instruction:  - reiterated watching for constipation due to antiemetics and tx with OTC meds PRN, see Coping with Side Effects literature in [facility] [employer] Cancer Guidebook  - referral placed for oncology dietitian consult and message sent to Claudette Gatica RD.  Expained to pt that he will receive a call re: consult  - confirmed next appts 7/17: lab/MD/cycle 2 EOX and to call [facility] [employer] Triage PRN sx in interim  Patient Response/Evaluation:   Pt voiced understanding of above instructions and information and denied further questions today

## 2017-06-29 NOTE — PROGRESS NOTES
"CLINICAL NUTRITION SERVICES - ASSESSMENT NOTE    REASON FOR ASSESSMENT  Daniel Sandhu is a 37 year old male seen by the dietitian for Medical Nutrition Therapy related to esophageal cancer referred by Dr. Hood  Pt accompanied by self.     NUTRITION HISTORY  Factors affecting nutrition intake include:nausea and vomiting    C1D1 6/25 EOX     Daniel reports that his intake was low 2-3 days post first infusion d/t nausea and vomiting.  He has started anti-emetic regimen which has significantly help reduce both nausea and vomiting.    -He presents today with desire to learn about what foods to eat during treatment and ways to cope with side effects from chemotherapy.   -He endorses a very healthy diet, avoiding most processed foods and seldomly dining out.  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  He expresses his concern with this as his wife is due to have their 2nd child on 7/17, his next scheduled chemotherapy.  He has a 4 year old daughter at home.    -His family and friends will be providing healthful meals via meal train during his cancer treatment and upcoming birth of his baby boy.     Diet recall   Breakfast: oatmeal made with cheese and eggs   Snack: full fat cherry yogurt   Lunch: sandwich with meat and cheese on whole wheat bread   Dinner: lean protein source (fish, chicken, turkey), whole grains (bread, quiona, pasta), cooked vegetables or Kale   Snacks: Cheese stick, cereal bar   Beverages: Coconut water, water     Patient is consuming the following supplements at home - not taking ONS at this time.     ANTHROPOMETRICS  Height: 68\"  Weight: 153 lbs/70kg  BMI: 22.75 kg/m   Weight Status:  Normal BMI  IBW: 156 lbs  % IBW: 98%  Weight History: down 2-3 lbs from baseline weight.  He reports that his UBW is 155 lbs which has been his weight since HS.   Wt Readings from Last 5 Encounters:   06/27/17 69.6 kg (153 lb 6.4 oz)   06/26/17 70.7 kg (155 lb 14.4 oz)   06/19/17 70.6 kg (155 lb 9.6 oz)   06/14/17 71.4 kg " (157 lb 6.4 oz)   06/09/17 70.1 kg (154 lb 8.7 oz)     LABS  Labs reviewed    MEDICATIONS/VITAMINS/MINERALS  Medications reviewed    PROCEDURES WITH NUTRITIONAL IMPLICATIONS  Chemotherapy - EOX    ASSESSED NUTRITION NEEDS:  Estimated Energy Needs: 2500 kcals (30-35 Kcal/Kg)  Justification: increased needs with Chemotherapy   Estimated Protein Needs: 105 grams protein (1.2-1.5 g pro/Kg)  Justification: increased needs with Chemotherapy   Estimated Fluid Needs: 2500  mL (1 mL/Kcal)  Justification: maintenance    MALNUTRITION:  % Weight Loss:  Weight loss does not meet criteria for malnutrition   % Intake:  Decreased intake does not meet criteria for malnutrition   Subcutaneous Fat Loss:  None observed  Muscle Loss:  None observed  Fluid Retention:  None noted    Malnutrition Diagnosis: Patient does not meet two of the above criteria necessary for diagnosing malnutrition    NUTRITION DIAGNOSIS:  Food and nutrition-related knowledge deficit related to nutrition needs with cancer treatment as evidenced by pt unable to verbalize understanding of calorie/protein needs and ways to cope with chemo side effects.     INTERVENTIONS  Recommendations / Nutrition Prescription  1. 5-6 small frequent meals, 2500kcal, 105g protein/day  Nutrition Education    Provided written & verbal education on:   - Ways to maximize kcal and protein intake. Discussed calorie and protein needs for maintenance of weight and nutrition status.  Advised pt to aim for 2500kcal and 105g protein via 5-6 small frequent meals.   - Coping with barriers - as chemotherapy/radiation progresses, eating may become more difficult and discussed ways to cope with this.  - ONS (Ensure Enlive, Boost Plus, CIB with whole milk etc). Suggested ways to incorporate these supplements to avoid flavor fatigue (adding to home made smoothies)    Provided pt with corresponding education materials on:  High Calorie, High Protein Recipe booklet, Healthful recipes for batch  cooking/convenience meals, Tips to Increase the Calories in Your Diet, Tips to Increase the Protein in Your Diet, Protein Sources and list of cookbooks for recipes during cancer treatment.      Pt verbalize understanding of materials provided during consult.   Patient Understanding: Excellent  Expected Compliance: Excellent    Goals  1.  Aim for 5-6 small frequent meals  2.  Aim for 2500kcal and 105g protein/day  3. Weight maintenance through cancer treatment      Follow-Up Plans: Pt has RD contact information for questions.      MONITORING AND EVALUATION:  -Food intake, adequacy  -Weight trends     Time spent with patient: 45 minutes.  Claudette Pineda RD, LD

## 2017-07-02 LAB — LAB SCANNED RESULT: NORMAL

## 2017-07-03 DIAGNOSIS — B37.0 THRUSH: Primary | ICD-10-CM

## 2017-07-03 RX ORDER — NYSTATIN 100000/ML
500000 SUSPENSION, ORAL (FINAL DOSE FORM) ORAL 4 TIMES DAILY
Qty: 60 ML | Refills: 0 | Status: SHIPPED | OUTPATIENT
Start: 2017-07-03 | End: 2017-07-17

## 2017-07-17 ENCOUNTER — INFUSION THERAPY VISIT (OUTPATIENT)
Dept: ONCOLOGY | Facility: CLINIC | Age: 37
End: 2017-07-17
Attending: INTERNAL MEDICINE
Payer: COMMERCIAL

## 2017-07-17 ENCOUNTER — APPOINTMENT (OUTPATIENT)
Dept: LAB | Facility: CLINIC | Age: 37
End: 2017-07-17
Attending: INTERNAL MEDICINE
Payer: COMMERCIAL

## 2017-07-17 ENCOUNTER — ONCOLOGY VISIT (OUTPATIENT)
Dept: ONCOLOGY | Facility: CLINIC | Age: 37
End: 2017-07-17
Attending: GENETIC COUNSELOR, MS
Payer: COMMERCIAL

## 2017-07-17 VITALS
OXYGEN SATURATION: 98 % | HEART RATE: 106 BPM | SYSTOLIC BLOOD PRESSURE: 116 MMHG | BODY MASS INDEX: 23.55 KG/M2 | DIASTOLIC BLOOD PRESSURE: 57 MMHG | WEIGHT: 158.8 LBS | TEMPERATURE: 97.6 F

## 2017-07-17 DIAGNOSIS — Z80.3 FAMILY HISTORY OF MALIGNANT NEOPLASM OF BREAST: ICD-10-CM

## 2017-07-17 DIAGNOSIS — C15.5 MALIGNANT NEOPLASM OF LOWER THIRD OF ESOPHAGUS (H): Primary | ICD-10-CM

## 2017-07-17 DIAGNOSIS — B37.0 THRUSH: ICD-10-CM

## 2017-07-17 DIAGNOSIS — Z80.0 FAMILY HISTORY OF COLON CANCER: ICD-10-CM

## 2017-07-17 DIAGNOSIS — C16.0 GE JUNCTION CARCINOMA (H): Primary | ICD-10-CM

## 2017-07-17 LAB
ALBUMIN SERPL-MCNC: 3.9 G/DL (ref 3.4–5)
ALP SERPL-CCNC: 65 U/L (ref 40–150)
ALT SERPL W P-5'-P-CCNC: 29 U/L (ref 0–70)
ANION GAP SERPL CALCULATED.3IONS-SCNC: 7 MMOL/L (ref 3–14)
AST SERPL W P-5'-P-CCNC: 19 U/L (ref 0–45)
BASOPHILS # BLD AUTO: 0 10E9/L (ref 0–0.2)
BASOPHILS NFR BLD AUTO: 0.3 %
BILIRUB SERPL-MCNC: 0.4 MG/DL (ref 0.2–1.3)
BUN SERPL-MCNC: 19 MG/DL (ref 7–30)
CALCIUM SERPL-MCNC: 8.5 MG/DL (ref 8.5–10.1)
CHLORIDE SERPL-SCNC: 104 MMOL/L (ref 94–109)
CO2 SERPL-SCNC: 28 MMOL/L (ref 20–32)
CREAT SERPL-MCNC: 0.75 MG/DL (ref 0.66–1.25)
DIFFERENTIAL METHOD BLD: ABNORMAL
EOSINOPHIL # BLD AUTO: 0.1 10E9/L (ref 0–0.7)
EOSINOPHIL NFR BLD AUTO: 1 %
ERYTHROCYTE [DISTWIDTH] IN BLOOD BY AUTOMATED COUNT: 13 % (ref 10–15)
GFR SERPL CREATININE-BSD FRML MDRD: ABNORMAL ML/MIN/1.7M2
GLUCOSE SERPL-MCNC: 100 MG/DL (ref 70–99)
HCT VFR BLD AUTO: 37.9 % (ref 40–53)
HGB BLD-MCNC: 12.9 G/DL (ref 13.3–17.7)
IMM GRANULOCYTES # BLD: 0 10E9/L (ref 0–0.4)
IMM GRANULOCYTES NFR BLD: 0.1 %
LYMPHOCYTES # BLD AUTO: 1.4 10E9/L (ref 0.8–5.3)
LYMPHOCYTES NFR BLD AUTO: 19.5 %
MCH RBC QN AUTO: 29.8 PG (ref 26.5–33)
MCHC RBC AUTO-ENTMCNC: 34 G/DL (ref 31.5–36.5)
MCV RBC AUTO: 88 FL (ref 78–100)
MONOCYTES # BLD AUTO: 1 10E9/L (ref 0–1.3)
MONOCYTES NFR BLD AUTO: 14.9 %
NEUTROPHILS # BLD AUTO: 4.4 10E9/L (ref 1.6–8.3)
NEUTROPHILS NFR BLD AUTO: 64.2 %
NRBC # BLD AUTO: 0 10*3/UL
NRBC BLD AUTO-RTO: 0 /100
PLATELET # BLD AUTO: 215 10E9/L (ref 150–450)
POTASSIUM SERPL-SCNC: 4 MMOL/L (ref 3.4–5.3)
PROT SERPL-MCNC: 7.1 G/DL (ref 6.8–8.8)
RBC # BLD AUTO: 4.33 10E12/L (ref 4.4–5.9)
SODIUM SERPL-SCNC: 139 MMOL/L (ref 133–144)
WBC # BLD AUTO: 6.9 10E9/L (ref 4–11)

## 2017-07-17 PROCEDURE — 96415 CHEMO IV INFUSION ADDL HR: CPT

## 2017-07-17 PROCEDURE — 36415 COLL VENOUS BLD VENIPUNCTURE: CPT | Performed by: INTERNAL MEDICINE

## 2017-07-17 PROCEDURE — 96367 TX/PROPH/DG ADDL SEQ IV INF: CPT

## 2017-07-17 PROCEDURE — 99215 OFFICE O/P EST HI 40 MIN: CPT | Mod: ZP | Performed by: INTERNAL MEDICINE

## 2017-07-17 PROCEDURE — 85025 COMPLETE CBC W/AUTO DIFF WBC: CPT | Performed by: INTERNAL MEDICINE

## 2017-07-17 PROCEDURE — 40000072 ZZH STATISTIC GENETIC COUNSELING, < 16 MIN: Mod: ZF | Performed by: GENETIC COUNSELOR, MS

## 2017-07-17 PROCEDURE — 80053 COMPREHEN METABOLIC PANEL: CPT | Performed by: INTERNAL MEDICINE

## 2017-07-17 PROCEDURE — 25000125 ZZHC RX 250: Mod: JW,ZF | Performed by: INTERNAL MEDICINE

## 2017-07-17 PROCEDURE — 25000128 H RX IP 250 OP 636: Mod: ZF | Performed by: INTERNAL MEDICINE

## 2017-07-17 PROCEDURE — 96375 TX/PRO/DX INJ NEW DRUG ADDON: CPT

## 2017-07-17 PROCEDURE — 96413 CHEMO IV INFUSION 1 HR: CPT

## 2017-07-17 PROCEDURE — 96411 CHEMO IV PUSH ADDL DRUG: CPT

## 2017-07-17 RX ORDER — CAPECITABINE 500 MG/1
625 TABLET, FILM COATED ORAL 2 TIMES DAILY
Qty: 84 TABLET | Refills: 0 | Status: SHIPPED | OUTPATIENT
Start: 2017-07-17 | End: 2020-05-08

## 2017-07-17 RX ORDER — PALONOSETRON 0.05 MG/ML
0.25 INJECTION, SOLUTION INTRAVENOUS ONCE
Status: CANCELLED
Start: 2017-07-17 | End: 2017-07-17

## 2017-07-17 RX ORDER — HEPARIN SODIUM (PORCINE) LOCK FLUSH IV SOLN 100 UNIT/ML 100 UNIT/ML
500 SOLUTION INTRAVENOUS EVERY 8 HOURS
Status: CANCELLED
Start: 2017-07-17

## 2017-07-17 RX ORDER — METHYLPREDNISOLONE SODIUM SUCCINATE 125 MG/2ML
125 INJECTION, POWDER, LYOPHILIZED, FOR SOLUTION INTRAMUSCULAR; INTRAVENOUS
Status: CANCELLED
Start: 2017-07-17

## 2017-07-17 RX ORDER — SODIUM CHLORIDE 9 MG/ML
1000 INJECTION, SOLUTION INTRAVENOUS CONTINUOUS PRN
Status: CANCELLED
Start: 2017-07-17

## 2017-07-17 RX ORDER — NYSTATIN 100000/ML
500000 SUSPENSION, ORAL (FINAL DOSE FORM) ORAL 4 TIMES DAILY
Qty: 60 ML | Refills: 3 | Status: SHIPPED | OUTPATIENT
Start: 2017-07-17 | End: 2017-08-08

## 2017-07-17 RX ORDER — MEPERIDINE HYDROCHLORIDE 25 MG/ML
25 INJECTION INTRAMUSCULAR; INTRAVENOUS; SUBCUTANEOUS EVERY 30 MIN PRN
Status: CANCELLED | OUTPATIENT
Start: 2017-07-17

## 2017-07-17 RX ORDER — HEPARIN SODIUM (PORCINE) LOCK FLUSH IV SOLN 100 UNIT/ML 100 UNIT/ML
500 SOLUTION INTRAVENOUS EVERY 8 HOURS
Status: DISCONTINUED | OUTPATIENT
Start: 2017-07-17 | End: 2017-07-17 | Stop reason: HOSPADM

## 2017-07-17 RX ORDER — ALBUTEROL SULFATE 0.83 MG/ML
2.5 SOLUTION RESPIRATORY (INHALATION)
Status: CANCELLED | OUTPATIENT
Start: 2017-07-17

## 2017-07-17 RX ORDER — PALONOSETRON 0.05 MG/ML
0.25 INJECTION, SOLUTION INTRAVENOUS ONCE
Status: COMPLETED | OUTPATIENT
Start: 2017-07-17 | End: 2017-07-17

## 2017-07-17 RX ORDER — EPINEPHRINE 0.3 MG/.3ML
0.3 INJECTION SUBCUTANEOUS EVERY 5 MIN PRN
Status: CANCELLED | OUTPATIENT
Start: 2017-07-17

## 2017-07-17 RX ORDER — ALBUTEROL SULFATE 90 UG/1
1-2 AEROSOL, METERED RESPIRATORY (INHALATION)
Status: CANCELLED
Start: 2017-07-17

## 2017-07-17 RX ORDER — DIPHENHYDRAMINE HYDROCHLORIDE 50 MG/ML
50 INJECTION INTRAMUSCULAR; INTRAVENOUS
Status: CANCELLED
Start: 2017-07-17

## 2017-07-17 RX ORDER — LORAZEPAM 2 MG/ML
0.5 INJECTION INTRAMUSCULAR EVERY 4 HOURS PRN
Status: CANCELLED
Start: 2017-07-17

## 2017-07-17 RX ORDER — HEPARIN SODIUM (PORCINE) LOCK FLUSH IV SOLN 100 UNIT/ML 100 UNIT/ML
5 SOLUTION INTRAVENOUS EVERY 8 HOURS
Status: DISCONTINUED | OUTPATIENT
Start: 2017-07-17 | End: 2017-07-17 | Stop reason: HOSPADM

## 2017-07-17 RX ADMIN — PALONOSETRON HYDROCHLORIDE 0.25 MG: 0.25 INJECTION INTRAVENOUS at 14:35

## 2017-07-17 RX ADMIN — SODIUM CHLORIDE 150 MG: 9 INJECTION, SOLUTION INTRAVENOUS at 14:56

## 2017-07-17 RX ADMIN — DEXTROSE MONOHYDRATE 250 ML: 50 INJECTION, SOLUTION INTRAVENOUS at 14:23

## 2017-07-17 RX ADMIN — DEXAMETHASONE SODIUM PHOSPHATE: 10 INJECTION, SOLUTION INTRAMUSCULAR; INTRAVENOUS at 14:35

## 2017-07-17 RX ADMIN — OXALIPLATIN 250 MG: 5 INJECTION, SOLUTION, CONCENTRATE INTRAVENOUS at 15:49

## 2017-07-17 RX ADMIN — EPIRUBICIN HYDROCHLORIDE 93 MG: 2 INJECTION, SOLUTION INTRAVENOUS at 15:36

## 2017-07-17 RX ADMIN — SODIUM CHLORIDE, PRESERVATIVE FREE 500 UNITS: 5 INJECTION INTRAVENOUS at 17:50

## 2017-07-17 RX ADMIN — SODIUM CHLORIDE, PRESERVATIVE FREE 5 ML: 5 INJECTION INTRAVENOUS at 13:30

## 2017-07-17 NOTE — MR AVS SNAPSHOT
After Visit Summary   7/17/2017    Daniel Sandhu    MRN: [id number]           Patient Information     Date Of Birth          1980        Visit Information        Provider Department      7/17/2017 2:00 PM Ashley Trejo GC [facility]        Today's Diagnoses     GE junction carcinoma (H)    -  1    Family history of malignant neoplasm of breast        Family history of colon cancer           Follow-ups after your visit        Your next 10 appointments already scheduled     Aug 08, 2017 11:15 AM CDT   [facility] Draw with [facility] LAB DRAW   [facility] Draw ([facility])    [address] [place] [id number]   [phone]            Aug 08, 2017 11:50 AM CDT   (Arrive by 11:35 AM)   Return Visit with Alisa Otero PA-C   [facility] ([facility])    [address] [place] [id number]   [phone]            Aug 08, 2017  1:00 PM CDT   Infusion 240 with  ONCOLOGY INFUSION, UC 10 ATC   [facility] ([facility])    [address] [place] [id number]   [phone]            Sep 22, 2017  9:15 AM CDT   PE NPET ONCOLOGY (EYES TO THIGHS) with MGPET1   [facility] ([facility])    [id number] [place] [id number] [phone]           Tell your doctor:   If there is any chance you may be pregnant or if you are breastfeeding.   If you have problems lying in small spaces (claustrophobia). If you do, your doctor may give you medicine to help you relax. If you have diabetes:   Have your exam early in the morning. Your blood glucose will go up as the day goes by.   Your glucose level must be 180 or less at the start of the exam. Please take any medicines you need to ensure this blood glucose level. 24 hours before your scan: Don t  do any heavy exercise. (No jogging, aerobics or other workouts.) Exercise will make your pictures less accurate. 6 hours before your scan:   Stop all food and liquids (except water).   Do not chew gum or suck on mints.   If you need to take medicine with food, you may take it with a few crackers.  Please call your Imaging Department at your exam site with any questions.            Sep 25, 2017  9:45 AM CDT   [facility] Lab Draw with  [facility] DRAW   [facility] Lab Draw ([facility])    [address] [place] [id number] [phone]            Sep 25, 2017 10:15 AM CDT   (Arrive by 10:00 AM)   Return Visit with Laurence Hood MD   [facility] ([facility])    [address] [place] [id number] [phone]              Future tests that were ordered for you today     Open Future Orders        Priority Expected Expires Ordered    CBC with platelets differential Routine 9/25/2017 7/17/2018 7/17/2017    Comprehensive metabolic panel Routine 9/25/2017 7/17/2018 7/17/2017    PET Oncology (Eyes to Thighs) Routine 9/22/2017 7/17/2018 7/17/2017            Who to contact     If you have questions or need follow up information about today's clinic visit or your schedule please contact [facility] directly at [phone].  Normal or non-critical lab and imaging results will be communicated to you by MyChart, letter or phone within 4 business days after the clinic has received the results. If you do not hear from us within 7 days, please contact the clinic through MyChart or phone. If you have a critical or abnormal lab result, we will notify you by phone as soon as possible.  Submit refill requests through [employer] or call your pharmacy and they will forward the refill request to us. Please allow 3 business days for your refill to be completed.          Additional  Information About Your Visit        [employer] Information     [employer] gives you secure access to your electronic health record. If you see a primary care provider, you can also send messages to your care team and make appointments. If you have questions, please call your primary care clinic.  If you do not have a primary care provider, please call [phone] and they will assist you.        Care EveryWhere ID     This is your Care EveryWhere ID. This could be used by other organizations to access your [place] medical records  [id number]         Blood Pressure from Last 3 Encounters:   07/17/17 116/57   06/27/17 109/63   06/26/17 116/66    Weight from Last 3 Encounters:   07/17/17 72 kg (158 lb 12.8 oz)   06/27/17 69.6 kg (153 lb 6.4 oz)   06/26/17 70.7 kg (155 lb 14.4 oz)              Today, you had the following     No orders found for display         Today's Medication Changes          These changes are accurate as of: 7/17/17 11:59 PM.  If you have any questions, ask your nurse or doctor.               These medicines have changed or have updated prescriptions.        Dose/Directions    * capecitabine 500 MG tablet CHEMO   Commonly known as:  XELODA   This may have changed:  Another medication with the same name was added. Make sure you understand how and when to take each.   Used for:  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        Dose:  625 mg/m2   Take 2 tablets (1,000 mg) by mouth 2 times daily for 21 days   Quantity:  84 tablet   Refills:  0       * capecitabine 500 MG tablet CHEMO   Commonly known as:  XELODA   This may have changed:  You were already taking a medication with the same name, and this prescription was added. Make sure you understand how and when to take each.   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 Read the directions carefully, and ask your doctor or other care provider to review them with you.         Where to get your medicines      These medications were sent to [facility] - [place], [place] - [address]  [address], [facility] [address]     Phone:  [phone]     capecitabine 500 MG tablet CHEMO    nystatin 269146 UNIT/ML suspension                Primary Care Provider Office Phone # Fax #    Lencho Chan [phone]       [facility] [address] [facility] [address]        Equal Access to Services     [facility]: Hadii aad ku hadasho Soomaali, waaxda luqadaha, qaybta kaalmada adeegyada, alejandro brown . So [facility] [phone].    ATENCIÓN: Si habla español, tiene a zayas disposición servicios gratuitos de asistencia lingüística. [facility] [phone].    We comply with applicable federal civil rights laws and [place] laws. We do not discriminate on the basis of race, color, national origin, age, disability sex, sexual orientation or gender identity.            Thank you!     Thank you for choosing [facility]  for your care. Our goal is always to provide you with excellent care. Hearing back from our patients is one way we can continue to improve our services. Please take a few minutes to complete the written survey that you may receive in the mail after your visit with us. Thank you!             Your Updated Medication List - Protect others around you: Learn how to safely use, store and throw away your medicines at www.disposemymeds.org.          This list is accurate as of: 7/17/17 11:59 PM.  Always use your most recent med list.                   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       LORazepam 0.5 MG tablet    ATIVAN    30 tablet    Take 1 tablet (0.5 mg) by mouth every 4 hours as needed (Anxiety, Nausea/Vomiting or Sleep)    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       nystatin 729215 UNIT/ML suspension    MYCOSTATIN    60 mL    Take 5 mLs (500,000 Units) by mouth 4 times daily    Thrush       ondansetron 8 MG ODT tab    ZOFRAN-ODT    60 tablet    Take 1 tablet (8 mg) by mouth every 8 hours as needed for nausea    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, Non-intractable vomiting with nausea, unspecified vomiting type       ondansetron 8 MG tablet    ZOFRAN    10 tablet    Take 1 tablet (8 mg) by mouth every 8 hours as needed (Nausea/Vomiting)    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       prochlorperazine 10 MG tablet    COMPAZINE    30 tablet    Take 1 tablet (10 mg) by mouth every 6 hours as needed (Nausea/Vomiting)    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       * Notice:  This list has 2 medication(s) that are the same as other medications prescribed for you. Read the directions carefully, and ask your doctor or other care provider to review them with you.

## 2017-07-17 NOTE — LETTER
7/17/2017      RE: Daniel Sandhu  [address] [facility] [address]       [facility]    Hematology/Oncology Established Patient Note      Today's Date: 07/17/17    Reason for Follow-up: adenocarcinoma of the GE junction      HISTORY OF PRESENT ILLNESS: Daniel Sandhu is a 36 year old male who presents with adenocarcinoma of the GE junction.  In early 2017, patient started noticing difficulty swallowing, and that food seems to take longer to go down.  It became more frequent, and he saw his PCP, and was referred for EGD, which showed an esophageal mass located at the GE junction, measuring 4 cm.  Biopsy showed poorly differentiated adenocarcinoma.  HER-2 was sent and is equivocal (2+).  CT c/a/p showed a mildly prominent lymph node in the gastrohepatic ligament, but there were no pathologically enlarged lymph nodes in the chest, abdomen, or pelvis.  There was otherwise no evidence of metastatic disease.  PET-CT showed thickening of the distal esophagus consistent with known esophageal adenocarcinoma, as well as mildly hypermetabolic 1 cm lymph node in the gastrohepatic ligament.  There was no evidence of distant metastatic disease.  He underwent EUS on 6/9/17, which showed the esophageal tumor in the lower third of the esophagus, staged T2NX.  There were 2 abnormal lymph nodes seen in the gastrohepatic ligament; pathology was suspicious for adenocarcinoma.  Celiac node was sampled as well, which was benign.  He was seen by surgery, Dr. Esteban, and recommended srinivas-operative chemotherapy.    Port was placed on 6/22/17.    Chemotherapy:  Epirubicin 50 mg/m2 IV on day 1  Oxaliplatin 130 mg/m2 IV on day 1  Capecitabine 625 mg/m2 po BID on days 1-21  Every 21 day cycles    C1D1: 6/26/17  C2D1: 7/17/17  C3D1: anticipated for 8/7/17      INTERIM HISTORY: Daniel comes in for follow-up today.  The day after he received his first cycle of chemotherapy, he came back in and saw iVoleta  MATHEUS Clement for nausea/vomiting.  He received IV fluids, and IV Emend/Aloxi/dex that day.  He received prescription for Zofran ODT.  His pre-meds were switched from Zofran to Aloxi and Emend was added with cycle 2.    He noted that for the first 2 weeks, he had mistakenly only taken 1 Xeloda pill twice a day, instead of 2 pills twice a day.    His nausea has resolved, and he feels great now.  His new baby boy was born last week.  He notes having thrush initially, but resolved with Nystatin.  He also had some reflux, and has some Nexium.  In addition, he had constipation with the Zofran/Aloxi.  Milk of Magnesia has been effective.  He also has Miralax.  He noted some mild cold sensitivity in his throat early on in the cycle, but has since resolved.  He has not had peripheral neuropathy.      REVIEW OF SYSTEMS:   14 point ROS was reviewed and is negative other than as noted above in HPI.       HOME MEDICATIONS:  Current Outpatient Prescriptions   Medication Sig Dispense Refill     nystatin (MYCOSTATIN) 259451 UNIT/ML suspension Take 5 mLs (500,000 Units) by mouth 4 times daily 60 mL 3     ondansetron (ZOFRAN-ODT) 8 MG ODT tab Take 1 tablet (8 mg) by mouth every 8 hours as needed for nausea 60 tablet 3     capecitabine (XELODA) 500 MG tablet CHEMO Take 2 tablets (1,000 mg) by mouth 2 times daily for 21 days 84 tablet 0     LORazepam (ATIVAN) 0.5 MG tablet Take 1 tablet (0.5 mg) by mouth every 4 hours as needed (Anxiety, Nausea/Vomiting or Sleep) 30 tablet 5     prochlorperazine (COMPAZINE) 10 MG tablet Take 1 tablet (10 mg) by mouth every 6 hours as needed (Nausea/Vomiting) 30 tablet 5     ondansetron (ZOFRAN) 8 MG tablet Take 1 tablet (8 mg) by mouth every 8 hours as needed (Nausea/Vomiting) 10 tablet 7         ALLERGIES:  No Known Allergies      PAST MEDICAL HISTORY:  No past medical history on file.      PAST SURGICAL HISTORY:  Past Surgical History:   Procedure Laterality Date     ESOPHAGOGASTRODUODENOSCOPY        ESOPHAGOSCOPY, GASTROSCOPY, DUODENOSCOPY (EGD), COMBINED N/A 2017    Procedure: COMBINED ENDOSCOPIC ULTRASOUND, ESOPHAGOSCOPY, GASTROSCOPY, DUODENOSCOPY (EGD), FINE NEEDLE ASPIRATE/BIOPSY;  Upper Endoscopic Ultrasound, fine needle aspirate/biopsy;  Surgeon: Guru Mark Avila MD;  Location: [facility]     HAND SURGERY      childhood, torn tendon     INSERT PORT VASCULAR ACCESS Right 2017    Procedure: INSERT PORT VASCULAR ACCESS;  Single Lumen Chest Power Port;  Surgeon: Iván Driver PA-C;  Location:  OR         SOCIAL HISTORY:  Social History     Social History     Marital status:      Spouse name: N/A     Number of children: 1     Years of education: N/A     Occupational History     musician and teacher       Social History Main Topics     Smoking status: Never Smoker     Smokeless tobacco: Not on file     Alcohol use Yes      Comment: 1 beer daily     Drug use: No     Sexual activity: Yes     Partners: Female     Birth control/ protection: Natural Family Planning     Other Topics Concern     Not on file     Social History Narrative     He works at [employer] in [place], 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  He denies smoking.  He drinks ~1 beer a day.  He denies illicit drug use, other than occasional marijuana.  He lives in [place] with his wife, and 4.5 year-old daughter.  His wife is currently pregnant with a boy, and is due in 6 weeks.  He has a half sister who  of breast cancer at age 32; she was diagnosed in her late 20's.  A paternal grandmother had breast cancer in her 70's      FAMILY HISTORY:  Family History   Problem Relation Age of Onset     Other - See Comments Father      sepsis     CANCER Sister      breast cancer  31 yo 1/2 sister      CANCER Paternal Grandmother      breast         PHYSICAL EXAM:  Vital signs:  /57  Pulse 106  Temp 97.6  F (36.4  C) (Oral)  Wt 72 kg (158 lb 12.8 oz)  SpO2 98%  BMI 23.55 kg/m2   ECOG:  "0  GENERAL/CONSTITUTIONAL: No acute distress.  EYES: No scleral icterus.  MOUTH: No mucositis, no thrush.  RESPIRATORY: Clear to auscultation bilaterally. No crackles or wheezing.   CARDIOVASCULAR: Regular rate and rhythm without murmurs, gallops, or rubs.  GASTROINTESTINAL: No tenderness. The patient has normal bowel sounds. No guarding.  No distention.  MUSCULOSKELETAL: Warm and well-perfused, no cyanosis, clubbing, or edema.  NEUROLOGIC: Alert, oriented, answers questions appropriately.  INTEGUMENTARY: No jaundice.  Port in place.      LABS:  CBC RESULTS:   Recent Labs   Lab Test  07/17/17   1330   WBC  6.9   RBC  4.33*   HGB  12.9*   HCT  37.9*   MCV  88   MCH  29.8   MCHC  34.0   RDW  13.0   PLT  215     Recent Labs   Lab Test  07/17/17   1330  06/27/17   1447   NA  139  134   POTASSIUM  4.0  3.6   CHLORIDE  104  99   CO2  28  28   ANIONGAP  7  7   GLC  100*  124*   BUN  19  18   CR  0.75  0.75   YOBANY  8.5  8.8     Lab Results   Component Value Date    AST 19 07/17/2017     Lab Results   Component Value Date    ALT 29 07/17/2017     No results found for: BILICONJ   Lab Results   Component Value Date    BILITOTAL 0.4 07/17/2017     Lab Results   Component Value Date    ALBUMIN 3.9 07/17/2017     Lab Results   Component Value Date    PROTTOTAL 7.1 07/17/2017      Lab Results   Component Value Date    ALKPHOS 65 07/17/2017         PATHOLOGY:  5/30/17:  FINAL DIAGNOSIS:   [facility] [id number],   OBTAINED 05/30/2017):   A. GASTROESOPHAGEAL JUNCTION, \"MASS\", BIOPSY:   - Adenocarcinoma, moderately-poorly differentiated   - No background intestinal metaplasia seen   - Per report, HER2 immunohistochemistry is equivocal for expression (2+   out of 3)     B. STOMACH, BIOPSY:   - Gastric mucosa with no significant inflammation   - No H. pylori like organisms identified on routine staining   - Negative for intestinal metaplasia or dysplasia       EGD 5/30/17:  Esophageal mass at " the GE junction, friable, size 4 cm, extending into the gastric fundus.  Biopsies were taken.      6/9/17:  A. Lymph node, gastrohepatic, EUS guided fine needle aspiration:   Suspicious for adenocarcinoma.   Specimen Adequacy: Satisfactory for evaluation but limited by scant   lymphoid elements     B. Lymph node, celiac node, EUS guided fine needle aspiration:   Benign   Specimen Adequacy: Satisfactory for evaluation but limited by scant   lymphoid elements.       IMAGING:  PET-CT 6/9/17:  IMPRESSION: In this patient with a history of newly diagnosed  esophageal carcinoma at the gastroesophageal junction:  1. Subtle soft tissue thickening of the distal esophagus with  associated hypermetabolism consistent with patient's known esophageal  adenocarcinoma.  2. Mildly hypermetabolic 1 cm lymph node in the lesser sac in the area  of the gastrohepatic ligament suspicious for local regional lymphatic  metastasis.  3. No evidence of metastatic disease in the chest, pelvis, or axial  skeleton.    Echo 6/23/17:  Global and regional left ventricular function is normal with an EF of 55-60%.  Global peak LV longitudinal strain is averaged at -21.4%. This is within  reported normal limits (normal <-18%).  Right ventricular function, chamber size, wall motion, and thickness are  normal.  No pericardial effusion is present.      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  Clinical stage T2N1M0 (stage IIB), based on PET-CT and EUS.  A gastrohepatic lymph node was biopsied and suspicious for adenocarcinoma.  He was seen by Dr. Esteban, and with the degree of extension into the stomach, his inclination is to treat him as a gastric cancer, and so perioperative chemotherapy, as per MAGIC trial, would be reasonable.  Plan was discussed at tumor conference, the group agreed with the recommendation.      He will undergo 3 cycles of neoadjuvant chemotherapy,  followed by new PET-CT about 1 month after completion of chemotherapy, followed by surgery ~ 6 weeks after completion of neoadjuvant chemotherapy, followed by 3 cycles of adjuvant chemotherapy.    -echo every 3 months  -registered for medical cannabis, per patient request  -C2D1 of EOX today  -return to clinic in 3 weeks with PA with cycle 3 of chemotherapy  -plan for restaging PET-CT ~4 weeks after completion of chemotherapy - will schedule for week of 9/22/17  -appointment with me on 9/25/17 to review results and likely referral back to surgery    2) Genetics:  -he has met with genetic counselor, and panel was sent    3) Chemotherapy-induced nausea/vomiting:   -Emend and Aloxi added to pre-meds  -he has Zofran ODT and Compazine, which are helpful    4) Oral thrush:  -Nystatin prescription renewed to his pharmacy    5) GERD:  -he has Nexium    6) Constipation:  -Milk of magnesia works well for him.  He also has Miralax.      I spent a total of 40 minutes with the patient, with over >50% of the time in counseling and/or coordination of care.       Laurence Hood MD  Hematology/Oncology  [facility]

## 2017-07-17 NOTE — MR AVS SNAPSHOT
After Visit Summary   7/17/2017    Daniel Sandhu    MRN: [id number]           Patient Information     Date Of Birth          1980        Visit Information        Provider Department      7/17/2017 1:15 PM Laurence Hood MD [facility]        Today's Diagnoses     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    -  1    Thrush           Follow-ups after your visit        Your next 10 appointments already scheduled     Aug 08, 2017 11:15 AM CDT   [facility] Lab Draw with UC [facility]IC LAB DRAW   [facility] Lab Draw ([facility])    [address] [place] [id number]   [phone]            Aug 08, 2017 11:50 AM CDT   (Arrive by 11:35 AM)   Return Visit with Alisa Otero PA-C   [facility] ([facility])    [address] [place] [id number]   [phone]            Aug 08, 2017  1:00 PM CDT   Infusion 240 with UC ONCOLOGY INFUSION, UC 10 ATC   [facility] ([facility])    [address] [place] [address]   [phone]            Sep 22, 2017  9:15 AM CDT   PE NPET ONCOLOGY (EYES TO THIGHS) with MGPET1   [facility]    [id number] [place] [id number] [phone]           Tell your doctor:   If there is any chance you may be pregnant or if you are breastfeeding.   If you have problems lying in small spaces (claustrophobia). If you do, your doctor may give you medicine to help you relax. If you have diabetes:   Have your exam early in the morning. Your blood glucose will go up as the day goes by.   Your glucose level must be 180 or less at the start of the exam. Please take any medicines you need to ensure this blood glucose level. 24 hours before your scan: Don t do any heavy exercise. (No jogging,  aerobics or other workouts.) Exercise will make your pictures less accurate. 6 hours before your scan:   Stop all food and liquids (except water).   Do not chew gum or suck on mints.   If you need to take medicine with food, you may take it with a few crackers.  Please call your Imaging Department at your exam site with any questions.            Sep 25, 2017  9:45 AM CDT   M[facility]ic Lab Draw with  MASONIC LAB DRAW   [facility] Draw ([facility])    [address] [place] [address] [phone]            Sep 25, 2017 10:15 AM CDT   (Arrive by 10:00 AM)   Return Visit with Laurence Hood MD   [facility] ([facility])    [address] [place] [address] [phone]              Future tests that were ordered for you today     Open Future Orders        Priority Expected Expires Ordered    CBC with platelets differential Routine 9/25/2017 7/17/2018 7/17/2017    Comprehensive metabolic panel Routine 9/25/2017 7/17/2018 7/17/2017    PET Oncology (Eyes to Thighs) Routine 9/22/2017 7/17/2018 7/17/2017            Who to contact     If you have questions or need follow up information about today's clinic visit or your schedule please contact [facility] directly at [phone].  Normal or non-critical lab and imaging results will be communicated to you by MyChart, letter or phone within 4 business days after the clinic has received the results. If you do not hear from us within 7 days, please contact the clinic through [email or web address] or phone. If you have a critical or abnormal lab result, we will notify you by phone as soon as possible.  Submit refill requests through [employer] or call your pharmacy and they will forward the refill request to us. Please allow 3 business days for your refill to be completed.          Additional Information About Your Visit        [email or web address] ([email or web address]  Information     [employer] gives you secure access to your electronic health record. If you see a primary care provider, you can also send messages to your care team and make appointments. If you have questions, please call your primary care clinic.  If you do not have a primary care provider, please call [phone] and they will assist you.        Care EveryWhere ID     This is your Care EveryWhere ID. This could be used by other organizations to access your [facility] medical records  [id number]        Your Vitals Were     Pulse Temperature Pulse Oximetry BMI (Body Mass Index)          106 97.6  F (36.4  C) (Oral) 98% 23.55 kg/m2         Blood Pressure from Last 3 Encounters:   07/17/17 116/57   06/27/17 109/63   06/26/17 116/66    Weight from Last 3 Encounters:   07/17/17 72 kg (158 lb 12.8 oz)   06/27/17 69.6 kg (153 lb 6.4 oz)   06/26/17 70.7 kg (155 lb 14.4 oz)                 Today's Medication Changes          These changes are accurate as of: 7/17/17  2:20 PM.  If you have any questions, ask your nurse or doctor.               These medicines have changed or have updated prescriptions.        Dose/Directions    * capecitabine 500 MG tablet CHEMO   Commonly known as:  XELODA   This may have changed:  Another medication with the same name was added. Make sure you understand how and when to take each.   Used for:  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        Dose:  625 mg/m2   Take 2 tablets (1,000 mg) by mouth 2 times daily for 21 days   Quantity:  84 tablet   Refills:  0       * capecitabine 500 MG tablet CHEMO   Commonly known as:  XELODA   This may have changed:  You were already taking a medication with the same name, and this prescription was added. Make sure you understand how and when to take each.   Used for:  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        Dose:  625 mg/m2   Take 2 tablets (1,000 mg) by mouth 2 times daily for 21 days   Quantity:  84 tablet   Refills:  0       *  Notice:  This list has 2 medication(s) that are the same as other medications prescribed for you. Read the directions carefully, and ask your doctor or other care provider to review them with you.         Where to get your medicines      These medications were sent to [facility] - [place], [place] - [address]  [address], [facility] [address]     Phone:  [phone]     capecitabine 500 MG tablet CHEMO    nystatin 292041 UNIT/ML suspension                Primary Care Provider Office Phone # Fax #    Lencho Chan [phone]       [facility] [address] [facility] [address]        Equal Access to Services     Kern ValleyJOSÉ ANTONIO : Hadii garo stanley hadasho Soomaali, waaxda luqadaha, qaybta kaalmada adeegyada, alejandro brown . So [facility] [phone].    ATENCIÓN: Si habla español, tiene a zayas disposición servicios gratuitos de asistencia lingüística. Ll[facility] [phone].    We comply with applicable federal civil rights laws and [place] laws. We do not discriminate on the basis of race, color, national origin, age, disability sex, sexual orientation or gender identity.            Thank you!     Thank you for choosing [facility]  for your care. Our goal is always to provide you with excellent care. Hearing back from our patients is one way we can continue to improve our services. Please take a few minutes to complete the written survey that you may receive in the mail after your visit with us. Thank you!             Your Updated Medication List - Protect others around you: Learn how to safely use, store and throw away your medicines at www.disposemymeds.org.          This list is accurate as of: 7/17/17  2:20 PM.  Always use your most recent med list.                   Brand Name Dispense Instructions for use Diagnosis    * capecitabine 500 MG tablet CHEMO    XELODA    84 tablet     Take 2 tablets (1,000 mg) by mouth 2 times daily for 21 days    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       * capecitabine 500 MG tablet CHEMO    XELODA    84 tablet    Take 2 tablets (1,000 mg) by mouth 2 times daily for 21 days    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       LORazepam 0.5 MG tablet    ATIVAN    30 tablet    Take 1 tablet (0.5 mg) by mouth every 4 hours as needed (Anxiety, Nausea/Vomiting or Sleep)    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       nystatin [id number] UNIT/ML suspension    MYCOSTATIN    60 mL    Take 5 mLs (500,000 Units) by mouth 4 times daily    Thrush       ondansetron 8 MG ODT tab    ZOFRAN-ODT    60 tablet    Take 1 tablet (8 mg) by mouth every 8 hours as needed for nausea    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, Non-intractable vomiting with nausea, unspecified vomiting type       ondansetron 8 MG tablet    ZOFRAN    10 tablet    Take 1 tablet (8 mg) by mouth every 8 hours as needed (Nausea/Vomiting)    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       prochlorperazine 10 MG tablet    COMPAZINE    30 tablet    Take 1 tablet (10 mg) by mouth every 6 hours as needed (Nausea/Vomiting)    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       * Notice:  This list has 2 medication(s) that are the same as other medications prescribed for you. Read the directions carefully, and ask your doctor or other care provider to review them with you.

## 2017-07-17 NOTE — PROGRESS NOTES
Infusion Nursing Note:  Daniel MONTIEL Sandhu presents today for C2D1 - Epirubicin/Oxaliplatin.    Patient seen by provider today: Yes: Dr. Dykes   present during visit today: Not Applicable.    Note: N/A.    Intravenous Access:  Implanted Port.    Treatment Conditions:  Lab Results   Component Value Date    HGB 12.9 07/17/2017     Lab Results   Component Value Date    WBC 6.9 07/17/2017      Lab Results   Component Value Date    ANEU 4.4 07/17/2017     Lab Results   Component Value Date     07/17/2017      Lab Results   Component Value Date     07/17/2017                   Lab Results   Component Value Date    POTASSIUM 4.0 07/17/2017           Lab Results   Component Value Date    MAG 2.3 06/27/2017            Lab Results   Component Value Date    CR 0.75 07/17/2017                   Lab Results   Component Value Date    YOBANY 8.5 07/17/2017                Lab Results   Component Value Date    BILITOTAL 0.4 07/17/2017           Lab Results   Component Value Date    ALBUMIN 3.9 07/17/2017                    Lab Results   Component Value Date    ALT 29 07/17/2017           Lab Results   Component Value Date    AST 19 07/17/2017     Results reviewed, labs MET treatment parameters, ok to proceed with treatment.  ECHO/MUGA completed 6/23/17  EF 55-60%.      Post Infusion Assessment:  Patient tolerated infusion without incident.  Blood return noted pre and post infusion.  Blood return noted during administration every 3 cc.  Site patent and intact, free from redness, edema or discomfort.  No evidence of extravasations.  Access discontinued per protocol.    Discharge Plan:   Prescription refills given for Xeloda.  Discharge instructions reviewed with: Patient.  Patient and/or family verbalized understanding of discharge instructions and all questions answered.  Copy of AVS reviewed with patient and/or family.  Patient will return 8/8/17 for next appointment.  Patient discharged in stable condition  accompanied by: self.  Departure Mode: Ambulatory.    Phyllis Leon RN

## 2017-07-17 NOTE — MR AVS SNAPSHOT
After Visit Summary   7/17/2017    Daniel Sandhu    MRN: [id number]           Patient Information     Date Of Birth          1980        Visit Information        Provider Department      7/17/2017 1:30 PM  32 ATC;  ONCOLOGY INFUSION [facility]        Today's Diagnoses     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    -  1      Care Instructions    Contact Numbers    [facility] Main Line: [phone]  [facility] Triage:  [phone]    Call triage with chills and/or temperature greater than or equal to 100.5, uncontrolled nausea/vomiting, diarrhea, constipation, dizziness, shortness of breath, chest pain, bleeding, unexplained bruising, or any new/concerning symptoms, questions/concerns.     If you are having any concerning symptoms or wish to speak to a provider before your next infusion visit, please call your care coordinator or triage to notify them so we can adequately serve you.       After Hours: [phone]    If after hours, weekends, or holidays, call main hospital  and ask for Oncology doctor on call.         July 2017 Sunday Monday Tuesday Wednesday Thursday Friday Saturday                                 1       2     3     4     5     6     7     8       9     10     11     12     13     14     15       16     17     [facility] LAB DRAW   12:45 PM   (15 min.)   [facility]   [facility] Lab Draw     [facility] RETURN    1:00 PM   (30 min.)   Laurence Hood MD   [facility] ONC INFUSION 240    1:30 PM   (240 min.)    ONCOLOGY INFUSION   [facility] RETURN    1:45 PM   (60 min.)   Ashley Trejo GC   [facility] 18     19     20     21     22       23     24     25     26     27     28     29       30     31 August 2017 Sunday Monday Tuesday Wednesday Thursday Friday Saturday             1     2     3     4     5        6     7     8     [facility] LAB DRAW   11:15 AM   (15 min.)   [facility] LAB DRAW   [facility] Lab Draw     [facility] RETURN   11:35 AM   (50 min.)   Alisa Otero PA-C   [facility] ONC INFUSION 240    1:00 PM   (240 min.)    ONCOLOGY INFUSION   [facility] 9     10     11     12       13     14     15     16     17     18     19       20     21     22     23     24     25     26       27     28     29     30     31                            Lab Results:  Recent Results (from the past 12 hour(s))   CBC with platelets differential    Collection Time: 07/17/17  1:30 PM   Result Value Ref Range    WBC 6.9 4.0 - 11.0 10e9/L    RBC Count 4.33 (L) 4.4 - 5.9 10e12/L    Hemoglobin 12.9 (L) 13.3 - 17.7 g/dL    Hematocrit 37.9 (L) 40.0 - 53.0 %    MCV 88 78 - 100 fl    MCH 29.8 26.5 - 33.0 pg    MCHC 34.0 31.5 - 36.5 g/dL    RDW 13.0 10.0 - 15.0 %    Platelet Count 215 150 - 450 10e9/L    Diff Method Automated Method     % Neutrophils 64.2 %    % Lymphocytes 19.5 %    % Monocytes 14.9 %    % Eosinophils 1.0 %    % Basophils 0.3 %    % Immature Granulocytes 0.1 %    Nucleated RBCs 0 0 /100    Absolute Neutrophil 4.4 1.6 - 8.3 10e9/L    Absolute Lymphocytes 1.4 0.8 - 5.3 10e9/L    Absolute Monocytes 1.0 0.0 - 1.3 10e9/L    Absolute Eosinophils 0.1 0.0 - 0.7 10e9/L    Absolute Basophils 0.0 0.0 - 0.2 10e9/L    Abs Immature Granulocytes 0.0 0 - 0.4 10e9/L    Absolute Nucleated RBC 0.0    Comprehensive metabolic panel    Collection Time: 07/17/17  1:30 PM   Result Value Ref Range    Sodium 139 133 - 144 mmol/L    Potassium 4.0 3.4 - 5.3 mmol/L    Chloride 104 94 - 109 mmol/L    Carbon Dioxide 28 20 - 32 mmol/L    Anion Gap 7 3 - 14 mmol/L    Glucose 100 (H) 70 - 99 mg/dL    Urea Nitrogen 19 7 - 30 mg/dL    Creatinine 0.75 0.66 - 1.25 mg/dL    GFR Estimate >90  Non  GFR Calc   >60 mL/min/1.7m2    GFR Estimate If Black >90   GFR Calc    >60 mL/min/1.7m2    Calcium 8.5 8.5 - 10.1 mg/dL    Bilirubin Total 0.4 0.2 - 1.3 mg/dL    Albumin 3.9 3.4 - 5.0 g/dL    Protein Total 7.1 6.8 - 8.8 g/dL    Alkaline Phosphatase 65 40 - 150 U/L    ALT 29 0 - 70 U/L    AST 19 0 - 45 U/L               Follow-ups after your visit        Your next 10 appointments already scheduled     Aug 08, 2017 11:15 AM CDT   [facility] LAB DRAW   [facility] Lab Draw ([facility])    [address] [place] [id number]   [phone]            Aug 08, 2017 11:50 AM CDT   (Arrive by 11:35 AM)   Return Visit with Alisa Otero PA-C   [facility] ([facility])    [address] [place] [id number]   [phone]            Aug 08, 2017  1:00 PM CDT   Infusion 240 with  ONCOLOGY INFUSION, UC 10 ATC   [facility]    [address] [place] [id number]   [phone]            Sep 22, 2017  9:15 AM CDT   PE NPET ONCOLOGY (EYES TO THIGHS) with MGPET1   [facility] ([facility]    [address] [place] [id number] [phone]           Tell your doctor:   If there is any chance you may be pregnant or if you are breastfeeding.   If you have problems lying in small spaces (claustrophobia). If you do, your doctor may give you medicine to help you relax. If you have diabetes:   Have your exam early in the morning. Your blood glucose will go up as the day goes by.   Your glucose level must be 180 or less at the start of the exam. Please take any medicines you need to ensure this blood glucose level. 24 hours before your scan: Don t do any heavy exercise. (No jogging, aerobics or other workouts.) Exercise will make your pictures less accurate. 6 hours before your scan:   Stop all food and liquids (except water).    Do not chew gum or suck on mints.   If you need to take medicine with food, you may take it with a few crackers.  Please call your Imaging Department at your exam site with any questions.            Sep 25, 2017  9:45 AM CDT   [facility] Lab Draw with  [facility] LAB DRAW   [facility] Lab Draw ([facility])    [address] [place] [id number] [phone]            Sep 25, 2017 10:15 AM CDT   (Arrive by 10:00 AM)   Return Visit with Laurence Hood MD   [facility] ([facility])    [address] [place] [id number] [phone]              Future tests that were ordered for you today     Open Future Orders        Priority Expected Expires Ordered    CBC with platelets differential Routine 9/25/2017 7/17/2018 7/17/2017    Comprehensive metabolic panel Routine 9/25/2017 7/17/2018 7/17/2017    PET Oncology (Eyes to Thighs) Routine 9/22/2017 7/17/2018 7/17/2017            Who to contact     If you have questions or need follow up information about today's clinic visit or your schedule please contact [facility] directly at [phone].  Normal or non-critical lab and imaging results will be communicated to you by [employer], letter or phone within 4 business days after the clinic has received the results. If you do not hear from us within 7 days, please contact the clinic through [employer] or phone. If you have a critical or abnormal lab result, we will notify you by phone as soon as possible.  Submit refill requests through [employer] or call your pharmacy and they will forward the refill request to us. Please allow 3 business days for your refill to be completed.          Additional Information About Your Visit        [employer] Information     [employer] gives you secure access to your electronic health record. If you see a primary care provider, you can also send messages to  your care team and make appointments. If you have questions, please call your primary care clinic.  If you do not have a primary care provider, please call [phone] and they will assist you.        Care EveryWhere ID     This is your Care EveryWhere ID. This could be used by other organizations to access your [place] medical records  [id number]         Blood Pressure from Last 3 Encounters:   07/17/17 116/57   06/27/17 109/63   06/26/17 116/66    Weight from Last 3 Encounters:   07/17/17 72 kg (158 lb 12.8 oz)   06/27/17 69.6 kg (153 lb 6.4 oz)   06/26/17 70.7 kg (155 lb 14.4 oz)              We Performed the Following     CBC with platelets differential     Comprehensive metabolic panel          Today's Medication Changes          These changes are accurate as of: 7/17/17  3:59 PM.  If you have any questions, ask your nurse or doctor.               These medicines have changed or have updated prescriptions.        Dose/Directions    * capecitabine 500 MG tablet CHEMO   Commonly known as:  XELODA   This may have changed:  Another medication with the same name was added. Make sure you understand how and when to take each.   Used for:  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        Dose:  625 mg/m2   Take 2 tablets (1,000 mg) by mouth 2 times daily for 21 days   Quantity:  84 tablet   Refills:  0       * capecitabine 500 MG tablet CHEMO   Commonly known as:  XELODA   This may have changed:  You were already taking a medication with the same name, and this prescription was added. Make sure you understand how and when to take each.   Used for:  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        Dose:  625 mg/m2   Take 2 tablets (1,000 mg) by mouth 2 times daily for 21 days   Quantity:  84 tablet   Refills:  0       * Notice:  This list has 2 medication(s) that are the same as other medications prescribed for you. Read the directions carefully, and ask your doctor or other care provider to review  them with you.         Where to get your medicines      These medications were sent to [facility] - [place], [place] - [address]  [address], [facility] [address]     Phone:  [phone]     capecitabine 500 MG tablet CHEMO    nystatin 613266 UNIT/ML suspension                Primary Care Provider Office Phone # Fax #    Lencho Chan [phone]       [facility] [address] [facility] [address]        Equal Access to Services     SHAWN SWAIN : Hadii aad ku hadasho Soomaali, waaxda luqadaha, qaybta kaalmada adeegyada, waxay idiin hayaan adeeg naomiearachaka labello . So [facility] [phone].    ATENCIÓN: Si habla español, tiene a zayas disposición servicios gratuitos de asistencia lingüística. Llame al [phone].    We comply with applicable federal civil rights laws and [place] laws. We do not discriminate on the basis of race, color, national origin, age, disability sex, sexual orientation or gender identity.            Thank you!     Thank you for choosing [facility]  for your care. Our goal is always to provide you with excellent care. Hearing back from our patients is one way we can continue to improve our services. Please take a few minutes to complete the written survey that you may receive in the mail after your visit with us. Thank you!             Your Updated Medication List - Protect others around you: Learn how to safely use, store and throw away your medicines at www.disposemymeds.org.          This list is accurate as of: 7/17/17  3:59 PM.  Always use your most recent med list.                   Brand Name Dispense Instructions for use Diagnosis    * capecitabine 500 MG tablet CHEMO    XELODA    84 tablet    Take 2 tablets (1,000 mg) by mouth 2 times daily for 21 days    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       * capecitabine 500 MG tablet CHEMO    XELODA    84 tablet     Take 2 tablets (1,000 mg) by mouth 2 times daily for 21 days    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       LORazepam 0.5 MG tablet    ATIVAN    30 tablet    Take 1 tablet (0.5 mg) by mouth every 4 hours as needed (Anxiety, Nausea/Vomiting or Sleep)    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       nystatin 540758 UNIT/ML suspension    MYCOSTATIN    60 mL    Take 5 mLs (500,000 Units) by mouth 4 times daily    Thrush       ondansetron 8 MG ODT tab    ZOFRAN-ODT    60 tablet    Take 1 tablet (8 mg) by mouth every 8 hours as needed for nausea    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, Non-intractable vomiting with nausea, unspecified vomiting type       ondansetron 8 MG tablet    ZOFRAN    10 tablet    Take 1 tablet (8 mg) by mouth every 8 hours as needed (Nausea/Vomiting)    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       prochlorperazine 10 MG tablet    COMPAZINE    30 tablet    Take 1 tablet (10 mg) by mouth every 6 hours as needed (Nausea/Vomiting)    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       * Notice:  This list has 2 medication(s) that are the same as other medications prescribed for you. Read the directions carefully, and ask your doctor or other care provider to review them with you.

## 2017-07-17 NOTE — LETTER
Cancer Risk Management  Program Locations    [facility]  [facility]  Mailing Address  Cancer Risk Management Program  [address]  [place] [address]    New patient appointments  [phone]  July 18, 2017    Daniel Sandhu  [address] [facility] [facility] [address]      Dear Daniel,    It was a pleasure meeting with you again at the [facility] on July 17, 2017. Here is a copy of the progress note from your recent genetic counseling visit to the Cancer Risk Management Program. If you have any additional questions, please feel free to call.    7/17/2017    Referring Provider: Laurence Hood MD    Presenting Information:  Daniel Sandhu returned to the Cancer Risk Management Program at the [facility] to discuss his genetic testing results. His blood was drawn on 6/16/2017.  BRCA1/2 Analyses with the CancerNext gene panel was ordered from [facility]. This testing was done because of his personal history of GE junction cancer and family history of breast and gastrointestinal cancers.    Genetic Testing Result: NEGATIVE  Daniel is negative for mutations in the APC, TIANA, BARD1, BRCA1, BRCA2, BRIP1, BMPR1A, CDH1, CDK4, CDKN2A, CHEK2, DICER1, EPCAM, HOXB13, GREM1, MLH1, MRE11A, MSH2, MSH6, MUTYH, NBN, NF1, PALB2, PMS2, POLD1, POLE, PTEN, RAD50, RAD51C, RAD51D, SMAD4, SMARCA4, STK11, and TP53 genes. No mutations were found in any of the 34 genes analyzed. This test involved sequencing and deletion/duplication analysis of all genes with the exception of EPCAM and GREM1 (deletions only).    Testing did not detect an identifiable mutation associated with Hereditary Breast and Ovarian Cancer syndrome (BRCA1, BRCA2), Forbes syndrome (MLH1, MSH2, MSH6, PMS2, EPCAM), Familial Adenomatous  "Polyposis (APC), Hereditary Diffuse Gastric Cancer (CDH1), Familial Atypical Multiple Mole Melanoma syndrome (CDK4, CDKN2A), Juvenile Polyposis syndrome (BMPR1A, SMAD4), Cowden syndrome (PTEN), Li Fraumeni syndrome (TP53), Peutz-Jeghers syndrome (STK11), MUTYH Associated Polyposis (MUTYH), or Neurofibromatosis type 1 (NF1).     A copy of the test report can be found in the Laboratory tab, dated 6/16/2017, and named \"SEND OUTS [facility] TEST\". The report is scanned in as a linked document.    Interpretation:  We discussed several different interpretations of this negative test result.    1. One explanation may be that there is a different gene or combination of genes and environment that are associated with the cancers in Daniel and/or his relatives. We reviewed that additional genetic testing may be available in the future that can identify a genetic/hereditary explanation for Daniel's cancer. As such, he is encouraged to contact me annually to review any new genetic testing options that may be appropriate for him.   2. It is possible that another relative, such as his half-sister with breast cancer, did have a mutation in one of these 34 genes and Daniel did not inherit it. If that is the case, Daniel's GE junction cancer may be a sporadic cancer caused by random cellular changes.  3. There is also a small possibility that there is a mutation in one of these genes, and we could not find it with our current testing methods.       Screening:  Based on this negative test result, it is important for Daniel and his relatives to refer back to the family history for appropriate cancer screening.      Daniel should continue to follow all GE junction cancer treatment and future cancer screening recommendations as made by his medical providers.    Given Daniel's history, it is likely that his close relatives are at some increased risk for a GE junction cancer. The exact lifetime risk, though, is unknown. Currently there are no published "  junction cancer screening guidelines for those with a family history of the cancer. Daniel's relatives, though, are strongly encouraged to share the family history with their primary care providers, as they may have more individualized screening recommendations.    Daniel s close female relatives (particularly the children of his half-brothers) remain at increased risk for breast cancer given their family history. Breast screening options should be discussed with an individual's primary care provider and a genetic counselor, to determine at what age to begin screening (possibly as early as the 20's), what screening is appropriate, or if additional screening (such as breast MRI) is necessary based on personal/family history factors.    Other population cancer screening options, such as those recommended by the American Cancer Society and the National Comprehensive Cancer Network (NCCN), are appropriate for Daniel and his family. These screening recommendations may change if there are changes to Daniel's personal and/or family history. Final screening recommendations should be made by each individual's managing physician.       Inheritance:  We reviewed the autosomal dominant inheritance of mutations in these 34 genes. We discussed that Daniel did not pass on an identifiable mutation in these genes to his children based on this test result.  Mutations in these gene do not skip generations.      Additional Testing Considerations:  Although Daniel's genetic testing result was negative, other relatives may still carry a gene mutation associated with hereditary cancer. Genetic counseling is recommended for Daniel's mother and maternal half-brother to discuss the family history and their genetic testing options. If these relatives do pursue genetic testing, Daniel is encouraged to contact me with the results so we may review the impact of these results on Daniel.    We also briefly reviewed the option of DNA banking in the event  that additional genetic testing is available in the future. One option is [employer], which currently offers DNA banking for a one time cost of $169.00. Once a sample is collected and banked, Daniel would be able to specify which relatives would have access to the banked DNA in the future. This and other options can be discussed in more detail in the future, if Daniel is interested.    Summary:  We do not have an explanation for Daniel's GE junction cancer. Because of that, it is important that he continue with cancer screening based on his personal and family history as discussed above.    Genetic testing is rapidly advancing and new cancer susceptibility genes will most likely be identified in the future. Therefore, I encouraged Daniel to contact me annually or if there are changes in his personal or family history. This may change how we assess his cancer risk, screening, and the testing we would offer.    Plan:  1. I provided Daniel with a copy of his test results today.  2. He plans to follow-up with his medical providers, including Dr. Hood.  3. He should contact me annually, or sooner if his family history changes.    If Daniel has any further questions, I encouraged him to contact me at [phone].    Time spent face to face: 15 minutes    Ashley Trejo MS, [facility]  Certified Genetic Counselor  Office: [phone]  Pager: [phone]

## 2017-07-17 NOTE — PATIENT INSTRUCTIONS
Contact Numbers    [facility] Main Line: [phone]  [facility] Triage:  [phone]    Call triage with chills and/or temperature greater than or equal to 100.5, uncontrolled nausea/vomiting, diarrhea, constipation, dizziness, shortness of breath, chest pain, bleeding, unexplained bruising, or any new/concerning symptoms, questions/concerns.     If you are having any concerning symptoms or wish to speak to a provider before your next infusion visit, please call your care coordinator or triage to notify them so we can adequately serve you.       After Hours: [phone]    If after hours, weekends, or holidays, call main hospital  and ask for Oncology doctor on call.         July 2017 Sunday Monday Tuesday Wednesday Thursday Friday Saturday                                 1       2     3     4     5     6     7     8       9     10     11     12     13     14     15       16     17     [facility] D[facility]   12:45 PM   (15 min.)    [facility]   [facility] Draw     [facility] RETURN    1:00 PM   (30 min.)   Laurence Hood MD   [facility] EDP ONC INFUSION 240    1:30 PM   (240 min.)    ONCOLOGY INFUSION   [facility]     [facility] RETURN    1:45 PM   (60 min.)   Ashley Trejo GC   [facility] 18     19     20     21     22       23     24     25     26     27     28     29       30     31                                         August 2017 Sunday Monday Tuesday Wednesday Thursday Friday Saturday             1     2     3     4     5       6     7     8     [facility]   11:15 AM   (15 min.)    [facility] D[facility]   [facility] Draw     [facility] RETURN   11:35 AM   (50 min.)   Alisa Otero PA-C   [facility] EDP ONC INFUSION 240    1:00 PM   (240 min.)    ONCOLOGY INFUSION   [facility] 9     10     11     12       13     14     15     16     17     18     19        20     21     22     23     24     25     26       27     28     29     30     31                            Lab Results:  Recent Results (from the past 12 hour(s))   CBC with platelets differential    Collection Time: 07/17/17  1:30 PM   Result Value Ref Range    WBC 6.9 4.0 - 11.0 10e9/L    RBC Count 4.33 (L) 4.4 - 5.9 10e12/L    Hemoglobin 12.9 (L) 13.3 - 17.7 g/dL    Hematocrit 37.9 (L) 40.0 - 53.0 %    MCV 88 78 - 100 fl    MCH 29.8 26.5 - 33.0 pg    MCHC 34.0 31.5 - 36.5 g/dL    RDW 13.0 10.0 - 15.0 %    Platelet Count 215 150 - 450 10e9/L    Diff Method Automated Method     % Neutrophils 64.2 %    % Lymphocytes 19.5 %    % Monocytes 14.9 %    % Eosinophils 1.0 %    % Basophils 0.3 %    % Immature Granulocytes 0.1 %    Nucleated RBCs 0 0 /100    Absolute Neutrophil 4.4 1.6 - 8.3 10e9/L    Absolute Lymphocytes 1.4 0.8 - 5.3 10e9/L    Absolute Monocytes 1.0 0.0 - 1.3 10e9/L    Absolute Eosinophils 0.1 0.0 - 0.7 10e9/L    Absolute Basophils 0.0 0.0 - 0.2 10e9/L    Abs Immature Granulocytes 0.0 0 - 0.4 10e9/L    Absolute Nucleated RBC 0.0    Comprehensive metabolic panel    Collection Time: 07/17/17  1:30 PM   Result Value Ref Range    Sodium 139 133 - 144 mmol/L    Potassium 4.0 3.4 - 5.3 mmol/L    Chloride 104 94 - 109 mmol/L    Carbon Dioxide 28 20 - 32 mmol/L    Anion Gap 7 3 - 14 mmol/L    Glucose 100 (H) 70 - 99 mg/dL    Urea Nitrogen 19 7 - 30 mg/dL    Creatinine 0.75 0.66 - 1.25 mg/dL    GFR Estimate >90  Non  GFR Calc   >60 mL/min/1.7m2    GFR Estimate If Black >90   GFR Calc   >60 mL/min/1.7m2    Calcium 8.5 8.5 - 10.1 mg/dL    Bilirubin Total 0.4 0.2 - 1.3 mg/dL    Albumin 3.9 3.4 - 5.0 g/dL    Protein Total 7.1 6.8 - 8.8 g/dL    Alkaline Phosphatase 65 40 - 150 U/L    ALT 29 0 - 70 U/L    AST 19 0 - 45 U/L

## 2017-07-17 NOTE — PROGRESS NOTES
"7/17/2017    Referring Provider: Laurence Hood MD    Presenting Information:  Daniel Sandhu returned to the Cancer Risk Management Program at the [facility] to discuss his genetic testing results. His blood was drawn on 6/16/2017.  BRCA1/2 Analyses with the CancerNext gene panel was ordered from [employer]. This testing was done because of his personal history of GE junction cancer and family history of breast and gastrointestinal cancers.    Genetic Testing Result: NEGATIVE  Daniel is negative for mutations in the APC, TIANA, BARD1, BRCA1, BRCA2, BRIP1, BMPR1A, CDH1, CDK4, CDKN2A, CHEK2, DICER1, EPCAM, HOXB13, GREM1, MLH1, MRE11A, MSH2, MSH6, MUTYH, NBN, NF1, PALB2, PMS2, POLD1, POLE, PTEN, RAD50, RAD51C, RAD51D, SMAD4, SMARCA4, STK11, and TP53 genes. No mutations were found in any of the 34 genes analyzed. This test involved sequencing and deletion/duplication analysis of all genes with the exception of EPCAM and GREM1 (deletions only).    Testing did not detect an identifiable mutation associated with Hereditary Breast and Ovarian Cancer syndrome (BRCA1, BRCA2), Forbes syndrome (MLH1, MSH2, MSH6, PMS2, EPCAM), Familial Adenomatous Polyposis (APC), Hereditary Diffuse Gastric Cancer (CDH1), Familial Atypical Multiple Mole Melanoma syndrome (CDK4, CDKN2A), Juvenile Polyposis syndrome (BMPR1A, SMAD4), Cowden syndrome (PTEN), Li Fraumeni syndrome (TP53), Peutz-Jeghers syndrome (STK11), MUTYH Associated Polyposis (MUTYH), or Neurofibromatosis type 1 (NF1).     A copy of the test report can be found in the Laboratory tab, dated 6/16/2017, and named \"[facility]\". The report is scanned in as a linked document.    Interpretation:  We discussed several different interpretations of this negative test result.    1. One explanation may be that there is a different gene or combination of genes and environment that are associated with the cancers in Daniel and/or his relatives. We reviewed that additional " genetic testing may be available in the future that can identify a genetic/hereditary explanation for Daniel's cancer. As such, he is encouraged to contact me annually to review any new genetic testing options that may be appropriate for him.   2. It is possible that another relative, such as his half-sister with breast cancer, did have a mutation in one of these 34 genes and Daniel did not inherit it. If that is the case, Daniel's GE junction cancer may be a sporadic cancer caused by random cellular changes.  3. There is also a small possibility that there is a mutation in one of these genes, and we could not find it with our current testing methods.       Screening:  Based on this negative test result, it is important for Daniel and his relatives to refer back to the family history for appropriate cancer screening.      Daniel should continue to follow all GE junction cancer treatment and future cancer screening recommendations as made by his medical providers.    Given Daniel's history, it is likely that his close relatives are at some increased risk for a GE junction cancer. The exact lifetime risk, though, is unknown. Currently there are no published GE junction cancer screening guidelines for those with a family history of the cancer. Daniel's relatives, though, are strongly encouraged to share the family history with their primary care providers, as they may have more individualized screening recommendations.    Daniel s close female relatives (particularly the children of his half-brothers) remain at increased risk for breast cancer given their family history. Breast screening options should be discussed with an individual's primary care provider and a genetic counselor, to determine at what age to begin screening (possibly as early as the 20's), what screening is appropriate, or if additional screening (such as breast MRI) is necessary based on personal/family history factors.    Other population cancer screening  options, such as those recommended by the American Cancer Society and the National Comprehensive Cancer Network (NCCN), are appropriate for Daniel and his family. These screening recommendations may change if there are changes to Daniel's personal and/or family history. Final screening recommendations should be made by each individual's managing physician.       Inheritance:  We reviewed the autosomal dominant inheritance of mutations in these 34 genes. We discussed that Daniel did not pass on an identifiable mutation in these genes to his children based on this test result.  Mutations in these gene do not skip generations.      Additional Testing Considerations:  Although Daniel's genetic testing result was negative, other relatives may still carry a gene mutation associated with hereditary cancer. Genetic counseling is recommended for Daniel's mother and maternal half-brother to discuss the family history and their genetic testing options. If these relatives do pursue genetic testing, Daniel is encouraged to contact me with the results so we may review the impact of these results on Daniel.    We also briefly reviewed the option of DNA banking in the event that additional genetic testing is available in the future. One option is [employer], which currently offers DNA banking for a one time cost of $169.00. Once a sample is collected and banked, Daniel would be able to specify which relatives would have access to the banked DNA in the future. This and other options can be discussed in more detail in the future, if Daniel is interested.    Summary:  We do not have an explanation for Daniel's GE junction cancer. Because of that, it is important that he continue with cancer screening based on his personal and family history as discussed above.    Genetic testing is rapidly advancing and new cancer susceptibility genes will most likely be identified in the future. Therefore, I encouraged Daniel to contact me annually or  if there are changes in his personal or family history. This may change how we assess his cancer risk, screening, and the testing we would offer.    Plan:  1. I provided Daniel with a copy of his test results today.  2. He plans to follow-up with his medical providers, including Dr. Hood.  3. He should contact me annually, or sooner if his family history changes.    If Daniel has any further questions, I encouraged him to contact me at [phone].    Time spent face to face: 15 minutes    Ashley Trejo MS, [facility]  Certified Genetic Counselor  Office: [phone]  Pager: [phone]

## 2017-07-17 NOTE — PROGRESS NOTES
[facility]    Hematology/Oncology Established Patient Note      Today's Date: 07/17/17    Reason for Follow-up: adenocarcinoma of the GE junction      HISTORY OF PRESENT ILLNESS: Daniel Sandhu is a 36 year old male who presents with adenocarcinoma of the GE junction.  In early 2017, patient started noticing difficulty swallowing, and that food seems to take longer to go down.  It became more frequent, and he saw his PCP, and was referred for EGD, which showed an esophageal mass located at the GE junction, measuring 4 cm.  Biopsy showed poorly differentiated adenocarcinoma.  HER-2 was sent and is equivocal (2+).  CT c/a/p showed a mildly prominent lymph node in the gastrohepatic ligament, but there were no pathologically enlarged lymph nodes in the chest, abdomen, or pelvis.  There was otherwise no evidence of metastatic disease.  PET-CT showed thickening of the distal esophagus consistent with known esophageal adenocarcinoma, as well as mildly hypermetabolic 1 cm lymph node in the gastrohepatic ligament.  There was no evidence of distant metastatic disease.  He underwent EUS on 6/9/17, which showed the esophageal tumor in the lower third of the esophagus, staged T2NX.  There were 2 abnormal lymph nodes seen in the gastrohepatic ligament; pathology was suspicious for adenocarcinoma.  Celiac node was sampled as well, which was benign.  He was seen by surgery, Dr. Esteban, and recommended srinivas-operative chemotherapy.    Port was placed on 6/22/17.    Chemotherapy:  Epirubicin 50 mg/m2 IV on day 1  Oxaliplatin 130 mg/m2 IV on day 1  Capecitabine 625 mg/m2 po BID on days 1-21  Every 21 day cycles    C1D1: 6/26/17  C2D1: 7/17/17  C3D1: anticipated for 8/7/17      INTERIM HISTORY: Daniel comes in for follow-up today.  The day after he received his first cycle of chemotherapy, he came back in and saw MATHEUS Auguste for nausea/vomiting.  He received IV fluids, and IV Emend/Aloxi/dex that day.  He  received prescription for Zofran ODT.  His pre-meds were switched from Zofran to Aloxi and Emend was added with cycle 2.    He noted that for the first 2 weeks, he had mistakenly only taken 1 Xeloda pill twice a day, instead of 2 pills twice a day.    His nausea has resolved, and he feels great now.  His new baby boy was born last week.  He notes having thrush initially, but resolved with Nystatin.  He also had some reflux, and has some Nexium.  In addition, he had constipation with the Zofran/Aloxi.  Milk of Magnesia has been effective.  He also has Miralax.  He noted some mild cold sensitivity in his throat early on in the cycle, but has since resolved.  He has not had peripheral neuropathy.      REVIEW OF SYSTEMS:   14 point ROS was reviewed and is negative other than as noted above in HPI.       HOME MEDICATIONS:  Current Outpatient Prescriptions   Medication Sig Dispense Refill     nystatin (MYCOSTATIN) 412036 UNIT/ML suspension Take 5 mLs (500,000 Units) by mouth 4 times daily 60 mL 3     ondansetron (ZOFRAN-ODT) 8 MG ODT tab Take 1 tablet (8 mg) by mouth every 8 hours as needed for nausea 60 tablet 3     capecitabine (XELODA) 500 MG tablet CHEMO Take 2 tablets (1,000 mg) by mouth 2 times daily for 21 days 84 tablet 0     LORazepam (ATIVAN) 0.5 MG tablet Take 1 tablet (0.5 mg) by mouth every 4 hours as needed (Anxiety, Nausea/Vomiting or Sleep) 30 tablet 5     prochlorperazine (COMPAZINE) 10 MG tablet Take 1 tablet (10 mg) by mouth every 6 hours as needed (Nausea/Vomiting) 30 tablet 5     ondansetron (ZOFRAN) 8 MG tablet Take 1 tablet (8 mg) by mouth every 8 hours as needed (Nausea/Vomiting) 10 tablet 7         ALLERGIES:  No Known Allergies      PAST MEDICAL HISTORY:  No past medical history on file.      PAST SURGICAL HISTORY:  Past Surgical History:   Procedure Laterality Date     ESOPHAGOGASTRODUODENOSCOPY       ESOPHAGOSCOPY, GASTROSCOPY, DUODENOSCOPY (EGD), COMBINED N/A 6/9/2017    Procedure: COMBINED  ENDOSCOPIC ULTRASOUND, ESOPHAGOSCOPY, GASTROSCOPY, DUODENOSCOPY (EGD), FINE NEEDLE ASPIRATE/BIOPSY;  Upper Endoscopic Ultrasound, fine needle aspirate/biopsy;  Surgeon: Guru Mark Avila MD;  Location:  OR     HAND SURGERY      childhood, torn tendon     INSERT PORT VASCULAR ACCESS Right 2017    Procedure: INSERT PORT VASCULAR ACCESS;  Single Lumen Chest Power Port;  Surgeon: Iván Driver PA-C;  Location:  OR         SOCIAL HISTORY:  Social History     Social History     Marital status:      Spouse name: N/A     Number of children: 1     Years of education: N/A     Occupational History     musician and teacher       Social History Main Topics     Smoking status: Never Smoker     Smokeless tobacco: Not on file     Alcohol use Yes      Comment: 1 beer daily     Drug use: No     Sexual activity: Yes     Partners: Female     Birth control/ protection: Natural Family Planning     Other Topics Concern     Not on file     Social History Narrative     He works at [employer] in [place], 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  He denies smoking.  He drinks ~1 beer a day.  He denies illicit drug use, other than occasional marijuana.  He lives in [place] with his wife, and 4.5 year-old daughter.  His wife is currently pregnant with a boy, and is due in 6 weeks.  He has a half sister who  of breast cancer at age 32; she was diagnosed in her late 20's.  A paternal grandmother had breast cancer in her 70's      FAMILY HISTORY:  Family History   Problem Relation Age of Onset     Other - See Comments Father      sepsis     CANCER Sister      breast cancer  29 yo 1/2 sister      CANCER Paternal Grandmother      breast         PHYSICAL EXAM:  Vital signs:  /57  Pulse 106  Temp 97.6  F (36.4  C) (Oral)  Wt 72 kg (158 lb 12.8 oz)  SpO2 98%  BMI 23.55 kg/m2   ECO  GENERAL/CONSTITUTIONAL: No acute distress.  EYES: No scleral icterus.  MOUTH: No  "mucositis, no thrush.  RESPIRATORY: Clear to auscultation bilaterally. No crackles or wheezing.   CARDIOVASCULAR: Regular rate and rhythm without murmurs, gallops, or rubs.  GASTROINTESTINAL: No tenderness. The patient has normal bowel sounds. No guarding.  No distention.  MUSCULOSKELETAL: Warm and well-perfused, no cyanosis, clubbing, or edema.  NEUROLOGIC: Alert, oriented, answers questions appropriately.  INTEGUMENTARY: No jaundice.  Port in place.      LABS:  CBC RESULTS:   Recent Labs   Lab Test  07/17/17   1330   WBC  6.9   RBC  4.33*   HGB  12.9*   HCT  37.9*   MCV  88   MCH  29.8   MCHC  34.0   RDW  13.0   PLT  215     Recent Labs   Lab Test  07/17/17   1330  06/27/17   1447   NA  139  134   POTASSIUM  4.0  3.6   CHLORIDE  104  99   CO2  28  28   ANIONGAP  7  7   GLC  100*  124*   BUN  19  18   CR  0.75  0.75   YOBANY  8.5  8.8     Lab Results   Component Value Date    AST 19 07/17/2017     Lab Results   Component Value Date    ALT 29 07/17/2017     No results found for: BILICONJ   Lab Results   Component Value Date    BILITOTAL 0.4 07/17/2017     Lab Results   Component Value Date    ALBUMIN 3.9 07/17/2017     Lab Results   Component Value Date    PROTTOTAL 7.1 07/17/2017      Lab Results   Component Value Date    ALKPHOS 65 07/17/2017         PATHOLOGY:  5/30/17:  FINAL DIAGNOSIS:   [facility] [id number]   OBTAINED 05/30/2017):   A. GASTROESOPHAGEAL JUNCTION, \"MASS\", BIOPSY:   - Adenocarcinoma, moderately-poorly differentiated   - No background intestinal metaplasia seen   - Per report, HER2 immunohistochemistry is equivocal for expression (2+   out of 3)     B. STOMACH, BIOPSY:   - Gastric mucosa with no significant inflammation   - No H. pylori like organisms identified on routine staining   - Negative for intestinal metaplasia or dysplasia       EGD 5/30/17:  Esophageal mass at the GE junction, friable, size 4 cm, extending into the gastric fundus.  Biopsies were " taken.      6/9/17:  A. Lymph node, gastrohepatic, EUS guided fine needle aspiration:   Suspicious for adenocarcinoma.   Specimen Adequacy: Satisfactory for evaluation but limited by scant   lymphoid elements     B. Lymph node, celiac node, EUS guided fine needle aspiration:   Benign   Specimen Adequacy: Satisfactory for evaluation but limited by scant   lymphoid elements.       IMAGING:  PET-CT 6/9/17:  IMPRESSION: In this patient with a history of newly diagnosed  esophageal carcinoma at the gastroesophageal junction:  1. Subtle soft tissue thickening of the distal esophagus with  associated hypermetabolism consistent with patient's known esophageal  adenocarcinoma.  2. Mildly hypermetabolic 1 cm lymph node in the lesser sac in the area  of the gastrohepatic ligament suspicious for local regional lymphatic  metastasis.  3. No evidence of metastatic disease in the chest, pelvis, or axial  skeleton.    Echo 6/23/17:  Global and regional left ventricular function is normal with an EF of 55-60%.  Global peak LV longitudinal strain is averaged at -21.4%. This is within  reported normal limits (normal <-18%).  Right ventricular function, chamber size, wall motion, and thickness are  normal.  No pericardial effusion is present.      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  Clinical stage T2N1M0 (stage IIB), based on PET-CT and EUS.  A gastrohepatic lymph node was biopsied and suspicious for adenocarcinoma.  He was seen by Dr. Esteban, and with the degree of extension into the stomach, his inclination is to treat him as a gastric cancer, and so perioperative chemotherapy, as per MAGIC trial, would be reasonable.  Plan was discussed at tumor conference, the group agreed with the recommendation.      He will undergo 3 cycles of neoadjuvant chemotherapy, followed by new PET-CT about 1 month after completion of chemotherapy, followed by  surgery ~ 6 weeks after completion of neoadjuvant chemotherapy, followed by 3 cycles of adjuvant chemotherapy.    -echo every 3 months  -registered for medical cannabis, per patient request  -C2D1 of EOX today  -return to clinic in 3 weeks with PA with cycle 3 of chemotherapy  -plan for restaging PET-CT ~4 weeks after completion of chemotherapy - will schedule for week of 9/22/17  -appointment with me on 9/25/17 to review results and likely referral back to surgery    2) Genetics:  -he has met with genetic counselor, and panel was sent    3) Chemotherapy-induced nausea/vomiting:   -Emend and Aloxi added to pre-meds  -he has Zofran ODT and Compazine, which are helpful    4) Oral thrush:  -Nystatin prescription renewed to his pharmacy    5) GERD:  -he has Nexium    6) Constipation:  -Milk of magnesia works well for him.  He also has Miralax.      I spent a total of 40 minutes with the patient, with over >50% of the time in counseling and/or coordination of care.       Laurence Hood MD  Hematology/Oncology  [facility]

## 2017-07-17 NOTE — NURSING NOTE
Chief Complaint   Patient presents with     Port Draw     Esophageal ca, labs collected via portacath.

## 2017-07-17 NOTE — Clinical Note
7/17/2017       RE: Daniel Sandhu  [address] [facility] [address]     Dear Colleague,    Thank you for referring your patient, Daniel Sandhu, to the [facility]. Please see a copy of my visit note below.    [facility]    Hematology/Oncology Established Patient Note      Today's Date: 07/17/17    Reason for Follow-up: adenocarcinoma of the GE junction      HISTORY OF PRESENT ILLNESS: Daniel Sandhu is a 36 year old male who presents with adenocarcinoma of the GE junction.  In early 2017, patient started noticing difficulty swallowing, and that food seems to take longer to go down.  It became more frequent, and he saw his PCP, and was referred for EGD, which showed an esophageal mass located at the GE junction, measuring 4 cm.  Biopsy showed poorly differentiated adenocarcinoma.  HER-2 was sent and is equivocal (2+).  CT c/a/p showed a mildly prominent lymph node in the gastrohepatic ligament, but there were no pathologically enlarged lymph nodes in the chest, abdomen, or pelvis.  There was otherwise no evidence of metastatic disease.  PET-CT showed thickening of the distal esophagus consistent with known esophageal adenocarcinoma, as well as mildly hypermetabolic 1 cm lymph node in the gastrohepatic ligament.  There was no evidence of distant metastatic disease.  He underwent EUS on 6/9/17, which showed the esophageal tumor in the lower third of the esophagus, staged T2NX.  There were 2 abnormal lymph nodes seen in the gastrohepatic ligament; pathology was suspicious for adenocarcinoma.  Celiac node was sampled as well, which was benign.  He was seen by surgery, Dr. Esteban, and recommended srinivas-operative chemotherapy.    Port was placed on 6/22/17.    Chemotherapy:  Epirubicin 50 mg/m2 IV on day 1  Oxaliplatin 130 mg/m2 IV on day 1  Capecitabine 625 mg/m2 po BID on days 1-21  Every 21 day cycles    C1D1: 6/26/17  C2D1: 7/17/17  C3D1: anticipated for  8/7/17      INTERIM HISTORY: Daniel comes in for follow-up today.  The day after he received his first cycle of chemotherapy, he came back in and saw MATHEUS Auguste for nausea/vomiting.  He received IV fluids, and IV Emend/Aloxi/dex that day.  He received prescription for Zofran ODT.  His pre-meds were switched from Zofran to Aloxi and Emend was added with cycle 2.    He noted that for the first 2 weeks, he had mistakenly only taken 1 Xeloda pill twice a day, instead of 2 pills twice a day.    His nausea has resolved, and he feels great now.  His new baby boy was born last week.  He notes having thrush initially, but resolved with Nystatin.  He also had some reflux, and has some Nexium.  In addition, he had constipation with the Zofran/Aloxi.  Milk of Magnesia has been effective.  He also has Miralax.  He noted some mild cold sensitivity in his throat early on in the cycle, but has since resolved.  He has not had peripheral neuropathy.      REVIEW OF SYSTEMS:   14 point ROS was reviewed and is negative other than as noted above in HPI.       HOME MEDICATIONS:  Current Outpatient Prescriptions   Medication Sig Dispense Refill     nystatin (MYCOSTATIN) 214548 UNIT/ML suspension Take 5 mLs (500,000 Units) by mouth 4 times daily 60 mL 3     ondansetron (ZOFRAN-ODT) 8 MG ODT tab Take 1 tablet (8 mg) by mouth every 8 hours as needed for nausea 60 tablet 3     capecitabine (XELODA) 500 MG tablet CHEMO Take 2 tablets (1,000 mg) by mouth 2 times daily for 21 days 84 tablet 0     LORazepam (ATIVAN) 0.5 MG tablet Take 1 tablet (0.5 mg) by mouth every 4 hours as needed (Anxiety, Nausea/Vomiting or Sleep) 30 tablet 5     prochlorperazine (COMPAZINE) 10 MG tablet Take 1 tablet (10 mg) by mouth every 6 hours as needed (Nausea/Vomiting) 30 tablet 5     ondansetron (ZOFRAN) 8 MG tablet Take 1 tablet (8 mg) by mouth every 8 hours as needed (Nausea/Vomiting) 10 tablet 7         ALLERGIES:  No Known Allergies      PAST MEDICAL  HISTORY:  No past medical history on file.      PAST SURGICAL HISTORY:  Past Surgical History:   Procedure Laterality Date     ESOPHAGOGASTRODUODENOSCOPY       ESOPHAGOSCOPY, GASTROSCOPY, DUODENOSCOPY (EGD), COMBINED N/A 2017    Procedure: COMBINED ENDOSCOPIC ULTRASOUND, ESOPHAGOSCOPY, GASTROSCOPY, DUODENOSCOPY (EGD), FINE NEEDLE ASPIRATE/BIOPSY;  Upper Endoscopic Ultrasound, fine needle aspirate/biopsy;  Surgeon: Guru Mark Avila MD;  Location:  OR     HAND SURGERY      childhood, torn tendon     INSERT PORT VASCULAR ACCESS Right 2017    Procedure: INSERT PORT VASCULAR ACCESS;  Single Lumen Chest Power Port;  Surgeon: Iván Driver PA-C;  Location:  OR         SOCIAL HISTORY:  Social History     Social History     Marital status:      Spouse name: N/A     Number of children: 1     Years of education: N/A     Occupational History     musician and teacher       Social History Main Topics     Smoking status: Never Smoker     Smokeless tobacco: Not on file     Alcohol use Yes      Comment: 1 beer daily     Drug use: No     Sexual activity: Yes     Partners: Female     Birth control/ protection: Natural Family Planning     Other Topics Concern     Not on file     Social History Narrative     He works at [employer] in [place], 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  He denies smoking.  He drinks ~1 beer a day.  He denies illicit drug use, other than occasional marijuana.  He lives in [place] with his wife, and 4.5 year-old daughter.  His wife is currently pregnant with a boy, and is due in 6 weeks.  He has a half sister who  of breast cancer at age 32; she was diagnosed in her late 20's.  A paternal grandmother had breast cancer in her 70's      FAMILY HISTORY:  Family History   Problem Relation Age of Onset     Other - See Comments Father      sepsis     CANCER Sister      breast cancer  29 yo 1/2 sister      CANCER Paternal Grandmother       "breast         PHYSICAL EXAM:  Vital signs:  /57  Pulse 106  Temp 97.6  F (36.4  C) (Oral)  Wt 72 kg (158 lb 12.8 oz)  SpO2 98%  BMI 23.55 kg/m2   ECO  GENERAL/CONSTITUTIONAL: No acute distress.  EYES: No scleral icterus.  MOUTH: No mucositis, no thrush.  RESPIRATORY: Clear to auscultation bilaterally. No crackles or wheezing.   CARDIOVASCULAR: Regular rate and rhythm without murmurs, gallops, or rubs.  GASTROINTESTINAL: No tenderness. The patient has normal bowel sounds. No guarding.  No distention.  MUSCULOSKELETAL: Warm and well-perfused, no cyanosis, clubbing, or edema.  NEUROLOGIC: Alert, oriented, answers questions appropriately.  INTEGUMENTARY: No jaundice.  Port in place.      LABS:  CBC RESULTS:   Recent Labs   Lab Test  17   1330   WBC  6.9   RBC  4.33*   HGB  12.9*   HCT  37.9*   MCV  88   MCH  29.8   MCHC  34.0   RDW  13.0   PLT  215     Recent Labs   Lab Test  17   1330  17   1447   NA  139  134   POTASSIUM  4.0  3.6   CHLORIDE  104  99   CO2  28  28   ANIONGAP  7  7   GLC  100*  124*   BUN  19  18   CR  0.75  0.75   YOBANY  8.5  8.8     Lab Results   Component Value Date    AST 19 2017     Lab Results   Component Value Date    ALT 29 2017     No results found for: BILICONJ   Lab Results   Component Value Date    BILITOTAL 0.4 2017     Lab Results   Component Value Date    ALBUMIN 3.9 2017     Lab Results   Component Value Date    PROTTOTAL 7.1 2017      Lab Results   Component Value Date    ALKPHOS 65 2017         PATHOLOGY:  17:  FINAL DIAGNOSIS:   [facility] [id number],   OBTAINED 2017):   A. GASTROESOPHAGEAL JUNCTION, \"MASS\", BIOPSY:   - Adenocarcinoma, moderately-poorly differentiated   - No background intestinal metaplasia seen   - Per report, HER2 immunohistochemistry is equivocal for expression (2+   out of 3)     B. STOMACH, BIOPSY:   - Gastric mucosa with no significant " inflammation   - No H. pylori like organisms identified on routine staining   - Negative for intestinal metaplasia or dysplasia       EGD 5/30/17:  Esophageal mass at the GE junction, friable, size 4 cm, extending into the gastric fundus.  Biopsies were taken.      6/9/17:  A. Lymph node, gastrohepatic, EUS guided fine needle aspiration:   Suspicious for adenocarcinoma.   Specimen Adequacy: Satisfactory for evaluation but limited by scant   lymphoid elements     B. Lymph node, celiac node, EUS guided fine needle aspiration:   Benign   Specimen Adequacy: Satisfactory for evaluation but limited by scant   lymphoid elements.       IMAGING:  PET-CT 6/9/17:  IMPRESSION: In this patient with a history of newly diagnosed  esophageal carcinoma at the gastroesophageal junction:  1. Subtle soft tissue thickening of the distal esophagus with  associated hypermetabolism consistent with patient's known esophageal  adenocarcinoma.  2. Mildly hypermetabolic 1 cm lymph node in the lesser sac in the area  of the gastrohepatic ligament suspicious for local regional lymphatic  metastasis.  3. No evidence of metastatic disease in the chest, pelvis, or axial  skeleton.    Echo 6/23/17:  Global and regional left ventricular function is normal with an EF of 55-60%.  Global peak LV longitudinal strain is averaged at -21.4%. This is within  reported normal limits (normal <-18%).  Right ventricular function, chamber size, wall motion, and thickness are  normal.  No pericardial effusion is present.      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  Clinical stage T2N1M0 (stage IIB), based on PET-CT and EUS.  A gastrohepatic lymph node was biopsied and suspicious for adenocarcinoma.  He was seen by Dr. Esteban, and with the degree of extension into the stomach, his inclination is to treat him as a gastric cancer, and so perioperative chemotherapy, as per MAGIC  trial, would be reasonable.  Plan was discussed at tumor conference, the group agreed with the recommendation.      He will undergo 3 cycles of neoadjuvant chemotherapy, followed by new PET-CT about 1 month after completion of chemotherapy, followed by surgery ~ 6 weeks after completion of neoadjuvant chemotherapy, followed by 3 cycles of adjuvant chemotherapy.    -echo every 3 months  -registered for medical cannabis, per patient request  -C2D1 of EOX today  -return to clinic in 3 weeks with PA with cycle 3 of chemotherapy  -plan for restaging PET-CT ~4 weeks after completion of chemotherapy - will schedule for week of 9/22/17  -appointment with me on 9/25/17 to review results and likely referral back to surgery    2) Genetics:  -he has met with genetic counselor, and panel was sent    3) Chemotherapy-induced nausea/vomiting:   -Emend and Aloxi added to pre-meds  -he has Zofran ODT and Compazine, which are helpful    4) Oral thrush:  -Nystatin prescription renewed to his pharmacy    5) GERD:  -he has Nexium    6) Constipation:  -Milk of magnesia works well for him.  He also has Miralax.      I spent a total of 40 minutes with the patient, with over >50% of the time in counseling and/or coordination of care.       Laurence Hood MD  Hematology/Oncology  [facility]      Again, thank you for allowing me to participate in the care of your patient.      Sincerely,    Laurence Hood MD

## 2017-07-17 NOTE — LETTER
"7/17/2017       RE: Daniel Sandhu  [facility] [facility] [address]     Dear Colleague,    Thank you for referring your patient, Daniel Sandhu, to the [facility]. Please see a copy of my visit note below.    7/17/2017    Referring Provider: Laurence Hood MD    Presenting Information:  Daniel Sandhu returned to the Cancer Risk Management Program at the [facility] to discuss his genetic testing results. His blood was drawn on 6/16/2017.  BRCA1/2 Analyses with the CancerNext gene panel was ordered from [facility]. This testing was done because of his personal history of GE junction cancer and family history of breast and gastrointestinal cancers.    Genetic Testing Result: NEGATIVE  Daniel is negative for mutations in the APC, TIANA, BARD1, BRCA1, BRCA2, BRIP1, BMPR1A, CDH1, CDK4, CDKN2A, CHEK2, DICER1, EPCAM, HOXB13, GREM1, MLH1, MRE11A, MSH2, MSH6, MUTYH, NBN, NF1, PALB2, PMS2, POLD1, POLE, PTEN, RAD50, RAD51C, RAD51D, SMAD4, SMARCA4, STK11, and TP53 genes. No mutations were found in any of the 34 genes analyzed. This test involved sequencing and deletion/duplication analysis of all genes with the exception of EPCAM and GREM1 (deletions only).    Testing did not detect an identifiable mutation associated with Hereditary Breast and Ovarian Cancer syndrome (BRCA1, BRCA2), Forbes syndrome (MLH1, MSH2, MSH6, PMS2, EPCAM), Familial Adenomatous Polyposis (APC), Hereditary Diffuse Gastric Cancer (CDH1), Familial Atypical Multiple Mole Melanoma syndrome (CDK4, CDKN2A), Juvenile Polyposis syndrome (BMPR1A, SMAD4), Cowden syndrome (PTEN), Li Fraumeni syndrome (TP53), Peutz-Jeghers syndrome (STK11), MUTYH Associated Polyposis (MUTYH), or Neurofibromatosis type 1 (NF1).     A copy of the test report can be found in the Laboratory tab, dated 6/16/2017, and named \"[facility]". The report is scanned in as a linked document.    Interpretation:  We discussed several " different interpretations of this negative test result.    1. One explanation may be that there is a different gene or combination of genes and environment that are associated with the cancers in Daniel and/or his relatives. We reviewed that additional genetic testing may be available in the future that can identify a genetic/hereditary explanation for Daniel's cancer. As such, he is encouraged to contact me annually to review any new genetic testing options that may be appropriate for him.   2. It is possible that another relative, such as his half-sister with breast cancer, did have a mutation in one of these 34 genes and Daniel did not inherit it. If that is the case, Daniel's GE junction cancer may be a sporadic cancer caused by random cellular changes.  3. There is also a small possibility that there is a mutation in one of these genes, and we could not find it with our current testing methods.       Screening:  Based on this negative test result, it is important for Daniel and his relatives to refer back to the family history for appropriate cancer screening.      Daniel should continue to follow all GE junction cancer treatment and future cancer screening recommendations as made by his medical providers.    Given Daniel's history, it is likely that his close relatives are at some increased risk for a GE junction cancer. The exact lifetime risk, though, is unknown. Currently there are no published GE junction cancer screening guidelines for those with a family history of the cancer. Daniel's relatives, though, are strongly encouraged to share the family history with their primary care providers, as they may have more individualized screening recommendations.    Daniel s close female relatives (particularly the children of his half-brothers) remain at increased risk for breast cancer given their family history. Breast screening options should be discussed with an individual's primary care provider and a genetic  counselor, to determine at what age to begin screening (possibly as early as the 20's), what screening is appropriate, or if additional screening (such as breast MRI) is necessary based on personal/family history factors.    Other population cancer screening options, such as those recommended by the American Cancer Society and the National Comprehensive Cancer Network (NCCN), are appropriate for Daniel and his family. These screening recommendations may change if there are changes to Daniel's personal and/or family history. Final screening recommendations should be made by each individual's managing physician.       Inheritance:  We reviewed the autosomal dominant inheritance of mutations in these 34 genes. We discussed that Daniel did not pass on an identifiable mutation in these genes to his children based on this test result.  Mutations in these gene do not skip generations.      Additional Testing Considerations:  Although Daniel's genetic testing result was negative, other relatives may still carry a gene mutation associated with hereditary cancer. Genetic counseling is recommended for Daniel's mother and maternal half-brother to discuss the family history and their genetic testing options. If these relatives do pursue genetic testing, Daniel is encouraged to contact me with the results so we may review the impact of these results on Daniel.    We also briefly reviewed the option of DNA banking in the event that additional genetic testing is available in the future. One option is [employer], which currently offers DNA banking for a one time cost of $169.00. Once a sample is collected and banked, Daniel would be able to specify which relatives would have access to the banked DNA in the future. This and other options can be discussed in more detail in the future, if Daniel is interested.    Summary:  We do not have an explanation for Daniel's GE junction cancer. Because of that, it is important that he continue  with cancer screening based on his personal and family history as discussed above.    Genetic testing is rapidly advancing and new cancer susceptibility genes will most likely be identified in the future. Therefore, I encouraged Daniel to contact me annually or if there are changes in his personal or family history. This may change how we assess his cancer risk, screening, and the testing we would offer.    Plan:  1. I provided Daniel with a copy of his test results today.  2. He plans to follow-up with his medical providers, including Dr. Hood.  3. He should contact me annually, or sooner if his family history changes.    If Daniel has any further questions, I encouraged him to contact me at [phone].    Time spent face to face: 15 minutes    Ashley Trejo MS, [facility]  Certified Genetic Counselor  Office: [phone]  Pager: [phone]    Again, thank you for allowing me to participate in the care of your patient.      Sincerely,    Ashley Trejo GC

## 2017-07-23 ENCOUNTER — NURSE TRIAGE (OUTPATIENT)
Dept: NURSING | Facility: CLINIC | Age: 37
End: 2017-07-23

## 2017-07-24 ENCOUNTER — TELEPHONE (OUTPATIENT)
Dept: ONCOLOGY | Facility: CLINIC | Age: 37
End: 2017-07-24

## 2017-07-24 ENCOUNTER — OFFICE VISIT (OUTPATIENT)
Dept: FAMILY MEDICINE | Facility: CLINIC | Age: 37
End: 2017-07-24
Payer: COMMERCIAL

## 2017-07-24 VITALS
DIASTOLIC BLOOD PRESSURE: 63 MMHG | HEART RATE: 84 BPM | BODY MASS INDEX: 22.69 KG/M2 | OXYGEN SATURATION: 100 % | WEIGHT: 153 LBS | TEMPERATURE: 97.4 F | SYSTOLIC BLOOD PRESSURE: 102 MMHG

## 2017-07-24 DIAGNOSIS — B37.0 THRUSH: ICD-10-CM

## 2017-07-24 DIAGNOSIS — N48.89 PENILE SWELLING: Primary | ICD-10-CM

## 2017-07-24 PROCEDURE — 99213 OFFICE O/P EST LOW 20 MIN: CPT | Performed by: FAMILY MEDICINE

## 2017-07-24 NOTE — MR AVS SNAPSHOT
After Visit Summary   7/24/2017    Daniel Sandhu    MRN: [id number]           Patient Information     Date Of Birth          1980        Visit Information        Provider Department      7/24/2017 3:20 PM Harjinder Powell MD [facility]        Today's Diagnoses     Penile swelling    -  1    Thrush           Follow-ups after your visit        Your next 10 appointments already scheduled     Aug 08, 2017 11:15 AM CDT   [facility] Draw with [facility] LAB DRAW   [facility] ([facility])    [address] [place] [id number]   [phone]            Aug 08, 2017 11:50 AM CDT   (Arrive by 11:35 AM)   Return Visit with Alisa Otero PA-C   [facility] ([facility])    [address] [place] [id number]   [phone]            Aug 08, 2017  1:00 PM CDT   Infusion 240 with  ONCOLOGY INFUSION, UC 10 ATC   [facility] ([facility])    [address] [place] [id number]   [phone]            Sep 22, 2017  9:15 AM CDT   PE NPET ONCOLOGY (EYES TO THIGHS) with MGPET1   [facility] ([facility])    [address] [place] [id number] [phone]           Tell your doctor:   If there is any chance you may be pregnant or if you are breastfeeding.   If you have problems lying in small spaces (claustrophobia). If you do, your doctor may give you medicine to help you relax. If you have diabetes:   Have your exam early in the morning. Your blood glucose will go up as the day goes by.   Your glucose level must be 180 or less at the start of the exam. Please take any medicines you need to ensure this blood glucose level. 24 hours before your scan: Don t do any heavy exercise. (No jogging, aerobics or other workouts.) Exercise will  make your pictures less accurate. 6 hours before your scan:   Stop all food and liquids (except water).   Do not chew gum or suck on mints.   If you need to take medicine with food, you may take it with a few crackers.  Please call your Imaging Department at your exam site with any questions.            Sep 25, 2017  9:45 AM CDT   [facility] Lab Draw with  [facility] LAB DRAW   [facility] ([facility])    [address] [place] [id number] [phone]            Sep 25, 2017 10:15 AM CDT   (Arrive by 10:00 AM)   Return Visit with Laurence Hood MD   [facility] ([facility])    [address] [place] [id number] [phone]              Who to contact     If you have questions or need follow up information about today's clinic visit or your schedule please contact [facility] directly at [phone].  Normal or non-critical lab and imaging results will be communicated to you by MyChart, letter or phone within 4 business days after the clinic has received the results. If you do not hear from us within 7 days, please contact the clinic through QRusohart or phone. If you have a critical or abnormal lab result, we will notify you by phone as soon as possible.  Submit refill requests through [employer] or call your pharmacy and they will forward the refill request to us. Please allow 3 business days for your refill to be completed.          Additional Information About Your Visit        QRusohart Information     [employer] gives you secure access to your electronic health record. If you see a primary care provider, you can also send messages to your care team and make appointments. If you have questions, please call your primary care clinic.  If you do not have a primary care provider, please call [phone] and they will assist you.        Care EveryWhere ID      This is your Care EveryWhere ID. This could be used by other organizations to access your [facility] medical records  [id number]        Your Vitals Were     Pulse Temperature Pulse Oximetry BMI (Body Mass Index)          84 97.4  F (36.3  C) (Oral) 100% 22.69 kg/m2         Blood Pressure from Last 3 Encounters:   07/24/17 102/63   07/17/17 116/57   06/27/17 109/63    Weight from Last 3 Encounters:   07/24/17 153 lb (69.4 kg)   07/17/17 158 lb 12.8 oz (72 kg)   06/27/17 153 lb 6.4 oz (69.6 kg)              Today, you had the following     No orders found for display       Primary Care Provider Office Phone # Fax #    Lencho Chan [phone]       [facility] [address]  [place] [address]        Equal Access to Services     [facility] Center[facility] ANTONIO : Hadii aad ku hadasho Soomaali, waaxda luqadaha, qaybta kaalmada adeegyada, waxay florencioin hayewan madelyn brown . So [facility] [phone].    ATENCIÓN: Si habla español, tiene a zayas disposición servicios gratuitos de asistencia lingüística. Llame al [phone].    We comply with applicable federal civil rights laws and [place] laws. We do not discriminate on the basis of race, color, national origin, age, disability sex, sexual orientation or gender identity.            Thank you!     Thank you for choosing [facility]  for your care. Our goal is always to provide you with excellent care. Hearing back from our patients is one way we can continue to improve our services. Please take a few minutes to complete the written survey that you may receive in the mail after your visit with us. Thank you!             Your Updated Medication List - Protect others around you: Learn how to safely use, store and throw away your medicines at www.disposemymeds.org.          This list is accurate as of: 7/24/17  4:12 PM.  Always use your most recent med list.                   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       nystatin 998441 UNIT/ML suspension    MYCOSTATIN    60 mL    Take 5 mLs (500,000 Units) by mouth 4 times daily    Thrush       ondansetron 8 MG ODT tab    ZOFRAN-ODT    60 tablet    Take 1 tablet (8 mg) by mouth every 8 hours as needed for nausea    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

## 2017-07-24 NOTE — TELEPHONE ENCOUNTER
ONCOLOGY SOCIAL WORK  D) Daniel is a 36-yr-old gentleman newly dx'd with esophageal cancer  I) resources and support  A) see prior SW note  P/c from pt's wife Nallely asking about day care resources given Daniel will not be able to care for their  part time as they had originally planned.  She is asking about both financial help and the names of providers.  Encouraged her to begin with the [facility] hospital and asking for their list or website to available day care providers, as well as what the criteria are for financial help.  Also gave her the website for [facility] as they have some very limited resources for assistance with the cost of day care while pts are undergoing treatment  P) Pt's wife will follow up with the Critical access hospital and [facility], and follow up with SW as needed    Laure Enrique, [facility]  [phone]

## 2017-07-24 NOTE — PROGRESS NOTES
SUBJECTIVE:                                                    Daniel Sandhu is a 37 year old male who presents to clinic today for the following health issues:    Check lesion tip of urethral x 1 day    Patient has esophageal cancer     He states he is getting chemotherapy     Denies dysuria   He has some blood   He has an open sore which he has to peel apart to urinate     History reviewed. No pertinent past medical history.    Past Surgical History:   Procedure Laterality Date     ESOPHAGOGASTRODUODENOSCOPY       ESOPHAGOSCOPY, GASTROSCOPY, DUODENOSCOPY (EGD), COMBINED N/A 2017    Procedure: COMBINED ENDOSCOPIC ULTRASOUND, ESOPHAGOSCOPY, GASTROSCOPY, DUODENOSCOPY (EGD), FINE NEEDLE ASPIRATE/BIOPSY;  Upper Endoscopic Ultrasound, fine needle aspirate/biopsy;  Surgeon: Guru Mark Avila MD;  Location: [facility] OR     HAND SURGERY      childhood, torn tendon     INSERT PORT VASCULAR ACCESS Right 2017    Procedure: INSERT PORT VASCULAR ACCESS;  Single Lumen Chest Power Port;  Surgeon: Iván Driver PA-C;  Location: [facility] OR       Family History   Problem Relation Age of Onset     Other - See Comments Father      sepsis     CANCER Sister      breast cancer  29 yo 1/2 sister      CANCER Paternal Grandmother      breast       Social History   Substance Use Topics     Smoking status: Never Smoker     Smokeless tobacco: Never Used     Alcohol use Yes      Comment: 1 beer daily         O: /63  Pulse 84  Temp 97.4  F (36.3  C) (Oral)  Wt 153 lb (69.4 kg)  SpO2 100%  BMI 22.69 kg/m2      Single partner with no symptoms     Patient has thrush in his mouth   He has some white on the back of his throat     Small area of swelling and redness on the tip of the penis with no active ulceration       ICD-10-CM    1. Penile swelling N48.89    2. Thrush B37.0      Patient will apply topical antibiotic  to the red area     If worsens call for an  Antibiotic     Thrush is already being treated

## 2017-07-24 NOTE — TELEPHONE ENCOUNTER
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 On day two of taking daily nexium.  Ate lasagna for dinner.  During triage states abrupt decrease in intensity of pain.    Advised in home care  Advised to call if pain returns or develops any new or worsening pain  Advised to contact care coordinator tomorrow to report incident.    Additional Information    Pain is mainly in upper abdomen  (if needed ask: \"is it mainly above the belly button?\")    Negative: [1] Abdominal pain is intermittent AND [2] shoots into chest, with sour taste in mouth    Abdominal pain (all triage questions negative)    Protocols used: ABDOMINAL PAIN - MALE-ADULT-, ABDOMINAL PAIN - UPPER-ADULT-  Vickie Bowens RN  FNA    "

## 2017-07-24 NOTE — NURSING NOTE
"Chief Complaint   Patient presents with     Lesion     Tip of Urethal x 1 day       Initial /63  Pulse 84  Temp 97.4  F (36.3  C) (Oral)  Wt 153 lb (69.4 kg)  SpO2 100%  BMI 22.69 kg/m2 Estimated body mass index is 22.69 kg/(m^2) as calculated from the following:    Height as of 6/19/17: 5' 8.86\" (1.749 m).    Weight as of this encounter: 153 lb (69.4 kg).  Medication Reconciliation: complete   Marie Hu MA      "

## 2017-07-25 ENCOUNTER — CARE COORDINATION (OUTPATIENT)
Dept: ONCOLOGY | Facility: CLINIC | Age: 37
End: 2017-07-25

## 2017-07-25 NOTE — PROGRESS NOTES
Reason for Outgoing Call:   - to notify pt that according to [facility] Pharmacist he will pay $200 copay for full Xeloda script as well as one that is 6 pills less due to how it is billed  - to f/u on sx pt saw PCP for yesterday: sore on penis  Patient Questions/Concerns:   - pt states that he is treating the sore on his penis with Neosporin as advised by his PCP yesterday and it has not worsened or improved yet  - pt states that use of the Nystatin swish has help resolve his oral thrush and his reflux sx have also improved  Nursing Action/Patient Instruction:  - Advised pt to call as per Dr. Hood's instructions yesterday if sx do not improve soon and we will have him in to see ROMEO here this week  - Notified pt of Xeloda copay information above  Patient Response/Evaluation:   Pt voiced understanding of above instructions and information and denied further questions for us today

## 2017-07-29 ENCOUNTER — NURSE TRIAGE (OUTPATIENT)
Dept: NURSING | Facility: CLINIC | Age: 37
End: 2017-07-29

## 2017-07-29 ENCOUNTER — TELEPHONE (OUTPATIENT)
Dept: ONCOLOGY | Facility: CLINIC | Age: 37
End: 2017-07-29

## 2017-07-29 ENCOUNTER — HOSPITAL ENCOUNTER (EMERGENCY)
Facility: CLINIC | Age: 37
Discharge: HOME | End: 2017-07-29
Attending: INTERNAL MEDICINE | Admitting: INTERNAL MEDICINE
Payer: COMMERCIAL

## 2017-07-29 VITALS
OXYGEN SATURATION: 100 % | RESPIRATION RATE: 16 BRPM | SYSTOLIC BLOOD PRESSURE: 112 MMHG | TEMPERATURE: 98.9 F | WEIGHT: 152.78 LBS | HEART RATE: 89 BPM | DIASTOLIC BLOOD PRESSURE: 72 MMHG | BODY MASS INDEX: 22.65 KG/M2

## 2017-07-29 DIAGNOSIS — K62.5 HEMORRHAGE OF RECTUM AND ANUS: ICD-10-CM

## 2017-07-29 DIAGNOSIS — C15.5 MALIGNANT NEOPLASM OF LOWER THIRD OF ESOPHAGUS (H): ICD-10-CM

## 2017-07-29 LAB
ALBUMIN SERPL-MCNC: 3.7 G/DL (ref 3.4–5)
ALP SERPL-CCNC: 57 U/L (ref 40–150)
ALT SERPL W P-5'-P-CCNC: 70 U/L (ref 0–70)
ANION GAP SERPL CALCULATED.3IONS-SCNC: 5 MMOL/L (ref 3–14)
AST SERPL W P-5'-P-CCNC: 41 U/L (ref 0–45)
BASOPHILS # BLD AUTO: 0 10E9/L (ref 0–0.2)
BASOPHILS NFR BLD AUTO: 0.3 %
BILIRUB SERPL-MCNC: 0.4 MG/DL (ref 0.2–1.3)
BUN SERPL-MCNC: 14 MG/DL (ref 7–30)
CALCIUM SERPL-MCNC: 8.3 MG/DL (ref 8.5–10.1)
CHLORIDE SERPL-SCNC: 106 MMOL/L (ref 94–109)
CO2 SERPL-SCNC: 30 MMOL/L (ref 20–32)
CREAT SERPL-MCNC: 0.76 MG/DL (ref 0.66–1.25)
DIFFERENTIAL METHOD BLD: ABNORMAL
EOSINOPHIL # BLD AUTO: 0.1 10E9/L (ref 0–0.7)
EOSINOPHIL NFR BLD AUTO: 3.2 %
ERYTHROCYTE [DISTWIDTH] IN BLOOD BY AUTOMATED COUNT: 13.9 % (ref 10–15)
GFR SERPL CREATININE-BSD FRML MDRD: ABNORMAL ML/MIN/1.7M2
GLUCOSE SERPL-MCNC: 101 MG/DL (ref 70–99)
HCT VFR BLD AUTO: 34.3 % (ref 40–53)
HGB BLD-MCNC: 12.3 G/DL (ref 13.3–17.7)
IMM GRANULOCYTES # BLD: 0 10E9/L (ref 0–0.4)
IMM GRANULOCYTES NFR BLD: 0 %
INR PPP: 1.14 (ref 0.86–1.14)
LYMPHOCYTES # BLD AUTO: 1.2 10E9/L (ref 0.8–5.3)
LYMPHOCYTES NFR BLD AUTO: 31.3 %
MCH RBC QN AUTO: 30.9 PG (ref 26.5–33)
MCHC RBC AUTO-ENTMCNC: 35.9 G/DL (ref 31.5–36.5)
MCV RBC AUTO: 86 FL (ref 78–100)
MONOCYTES # BLD AUTO: 0.5 10E9/L (ref 0–1.3)
MONOCYTES NFR BLD AUTO: 12.4 %
NEUTROPHILS # BLD AUTO: 2 10E9/L (ref 1.6–8.3)
NEUTROPHILS NFR BLD AUTO: 52.8 %
NRBC # BLD AUTO: 0 10*3/UL
NRBC BLD AUTO-RTO: 0 /100
PLATELET # BLD AUTO: 140 10E9/L (ref 150–450)
POTASSIUM SERPL-SCNC: 3.9 MMOL/L (ref 3.4–5.3)
PROT SERPL-MCNC: 6.8 G/DL (ref 6.8–8.8)
RBC # BLD AUTO: 3.98 10E12/L (ref 4.4–5.9)
SODIUM SERPL-SCNC: 140 MMOL/L (ref 133–144)
WBC # BLD AUTO: 3.7 10E9/L (ref 4–11)

## 2017-07-29 PROCEDURE — 85610 PROTHROMBIN TIME: CPT | Performed by: INTERNAL MEDICINE

## 2017-07-29 PROCEDURE — 80053 COMPREHEN METABOLIC PANEL: CPT | Performed by: INTERNAL MEDICINE

## 2017-07-29 PROCEDURE — 46600 DIAGNOSTIC ANOSCOPY SPX: CPT

## 2017-07-29 PROCEDURE — 99285 EMERGENCY DEPT VISIT HI MDM: CPT | Mod: 25

## 2017-07-29 PROCEDURE — 46600 DIAGNOSTIC ANOSCOPY SPX: CPT | Mod: Z6 | Performed by: INTERNAL MEDICINE

## 2017-07-29 PROCEDURE — 25000128 H RX IP 250 OP 636: Performed by: INTERNAL MEDICINE

## 2017-07-29 PROCEDURE — 99284 EMERGENCY DEPT VISIT MOD MDM: CPT | Mod: 25 | Performed by: INTERNAL MEDICINE

## 2017-07-29 PROCEDURE — 85025 COMPLETE CBC W/AUTO DIFF WBC: CPT | Performed by: INTERNAL MEDICINE

## 2017-07-29 RX ORDER — HEPARIN SODIUM (PORCINE) LOCK FLUSH IV SOLN 100 UNIT/ML 100 UNIT/ML
5 SOLUTION INTRAVENOUS ONCE
Status: COMPLETED | OUTPATIENT
Start: 2017-07-29 | End: 2017-07-29

## 2017-07-29 RX ORDER — HEPARIN SODIUM (PORCINE) LOCK FLUSH IV SOLN 100 UNIT/ML 100 UNIT/ML
5 SOLUTION INTRAVENOUS
Status: DISCONTINUED | OUTPATIENT
Start: 2017-07-29 | End: 2017-07-29

## 2017-07-29 RX ADMIN — SODIUM CHLORIDE, PRESERVATIVE FREE 5 ML: 5 INJECTION INTRAVENOUS at 16:05

## 2017-07-29 ASSESSMENT — ENCOUNTER SYMPTOMS
BLOOD IN STOOL: 1
ANAL BLEEDING: 1
ABDOMINAL PAIN: 0
RECTAL PAIN: 0

## 2017-07-29 NOTE — TELEPHONE ENCOUNTER
"\"I have been constipated from the anti-nausea medication and have been taking Miralax.  I have had bowel movements but last night I noticed blood in the toilet.  This has happened twice now and I noticed small clots of blood, even without having a stool.\"  Denies a fever, constipation, abdominal pain.     Reason for Disposition    [1] MODERATE rectal bleeding (small blood clots, passing blood without stool, or toilet water turns red) AND [2] more than once a day    Additional Information    Negative: Shock suspected (e.g., cold/pale/clammy skin, too weak to stand, low BP, rapid pulse)    Negative: Difficult to awaken or acting confused  (e.g., disoriented, slurred speech)    Negative: Passed out (i.e., lost consciousness, collapsed and was not responding)    Negative: [1] Vomiting AND [2] contains red blood or black (\"coffee ground\") material  (Exception: few red streaks in vomit that only happened once)    Negative: Sounds like a life-threatening emergency to the triager    Negative: Diarrhea is main symptom    Negative: Stool color other than brown or tan is main concern  (no bleeding and no melena)    Negative: SEVERE rectal bleeding (large blood clots; on and off, or constant bleeding)    Negative: SEVERE dizziness (e.g., unable to stand, requires support to walk, feels like passing out now)    Protocols used: RECTAL BLEEDING-ADULT-    "

## 2017-07-29 NOTE — ED PROVIDER NOTES
"  History     Chief Complaint   Patient presents with     Rectal Bleeding     HPI  Daniel Sandhu is a 37 year old male with a history of adenocarcinoma of the GE junction who presents to the Emergency Department for evaluation of rectal bleeding. Patient reports seeing bright red blood in the toilet following a bowel movement around 8:00PM last night. He has had recurrent episodes of this x3 today. He notes, that he noticed a clot \"the size of the tip of the pinky\" in his stool today and then some rectal bleeding after that. He denies having any strenuous bowel movements in the last couple days, but has had a history of this for the last couple months due to Zofran use. He has not taken Zofran in the last 1 week. Patient has a history of hemorrhoids. He denies abdominal or rectal pain. Patient is on chemotherapy; last treatment on 17.    History reviewed. No pertinent past medical history.    Past Surgical History:   Procedure Laterality Date     ESOPHAGOGASTRODUODENOSCOPY       ESOPHAGOSCOPY, GASTROSCOPY, DUODENOSCOPY (EGD), COMBINED N/A 2017    Procedure: COMBINED ENDOSCOPIC ULTRASOUND, ESOPHAGOSCOPY, GASTROSCOPY, DUODENOSCOPY (EGD), FINE NEEDLE ASPIRATE/BIOPSY;  Upper Endoscopic Ultrasound, fine needle aspirate/biopsy;  Surgeon: Guru Mark Avila MD;  Location: [facility] OR     HAND SURGERY      childhood, torn tendon     INSERT PORT VASCULAR ACCESS Right 2017    Procedure: INSERT PORT VASCULAR ACCESS;  Single Lumen Chest Power Port;  Surgeon: Iván Driver PA-C;  Location: [facility] OR       Family History   Problem Relation Age of Onset     Other - See Comments Father      sepsis     CANCER Sister      breast cancer  31 yo 1/2 sister      CANCER Paternal Grandmother      breast       Social History   Substance Use Topics     Smoking status: Never Smoker     Smokeless tobacco: Never Used     Alcohol use Yes      Comment: 1 beer daily       No current facility-administered " medications for this encounter.      Current Outpatient Prescriptions   Medication     UNABLE TO FIND     nystatin (MYCOSTATIN) 717774 UNIT/ML suspension     capecitabine (XELODA) 500 MG tablet CHEMO     ondansetron (ZOFRAN-ODT) 8 MG ODT tab     LORazepam (ATIVAN) 0.5 MG tablet     prochlorperazine (COMPAZINE) 10 MG tablet     ondansetron (ZOFRAN) 8 MG tablet      No Known Allergies      I have reviewed the Medications, Allergies, Past Medical and Surgical History, and Social History in the Epic system.    Review of Systems   Gastrointestinal: Positive for anal bleeding and blood in stool. Negative for abdominal pain and rectal pain.       Physical Exam   BP: 117/65  Pulse: 89  Temp: 98.9  F (37.2  C)  Resp: 16  Weight: 69.3 kg (152 lb 12.5 oz)  SpO2: 93 %  Physical Exam   Constitutional: No distress.   HENT:   Head: Atraumatic.   Mouth/Throat: Oropharynx is clear and moist. No oropharyngeal exudate.   Eyes: Pupils are equal, round, and reactive to light. No scleral icterus.   Neck: Neck supple. No JVD present.   Cardiovascular: Normal rate, normal heart sounds and intact distal pulses.  Exam reveals no gallop and no friction rub.    No murmur heard.  Pulmonary/Chest: Effort normal and breath sounds normal. No respiratory distress. He has no wheezes. He has no rales. He exhibits no tenderness.   Abdominal: Soft. Bowel sounds are normal. He exhibits no distension and no mass. There is no tenderness. There is no rebound and no guarding.   Genitourinary: Rectal exam shows internal hemorrhoid. Rectal exam shows no external hemorrhoid, no fissure, no mass, no tenderness and anal tone normal.   Musculoskeletal: He exhibits no edema or tenderness.   Skin: Skin is warm. No rash noted. He is not diaphoretic.       ED Course   2:35 PM  The patient was seen and examined by Annika Ross MD in Room 17.     ED Course     Procedures        Results for orders placed or performed during the hospital encounter of 07/29/17 (from the  past 24 hour(s))   CBC with platelets differential     Status: Abnormal    Collection Time: 07/29/17  2:53 PM   Result Value Ref Range    WBC 3.7 (L) 4.0 - 11.0 10e9/L    RBC Count 3.98 (L) 4.4 - 5.9 10e12/L    Hemoglobin 12.3 (L) 13.3 - 17.7 g/dL    Hematocrit 34.3 (L) 40.0 - 53.0 %    MCV 86 78 - 100 fl    MCH 30.9 26.5 - 33.0 pg    MCHC 35.9 31.5 - 36.5 g/dL    RDW 13.9 10.0 - 15.0 %    Platelet Count 140 (L) 150 - 450 10e9/L    Diff Method Automated Method     % Neutrophils 52.8 %    % Lymphocytes 31.3 %    % Monocytes 12.4 %    % Eosinophils 3.2 %    % Basophils 0.3 %    % Immature Granulocytes 0.0 %    Nucleated RBCs 0 0 /100    Absolute Neutrophil 2.0 1.6 - 8.3 10e9/L    Absolute Lymphocytes 1.2 0.8 - 5.3 10e9/L    Absolute Monocytes 0.5 0.0 - 1.3 10e9/L    Absolute Eosinophils 0.1 0.0 - 0.7 10e9/L    Absolute Basophils 0.0 0.0 - 0.2 10e9/L    Abs Immature Granulocytes 0.0 0 - 0.4 10e9/L    Absolute Nucleated RBC 0.0    INR     Status: None    Collection Time: 07/29/17  2:53 PM   Result Value Ref Range    INR 1.14 0.86 - 1.14   Comprehensive metabolic panel     Status: Abnormal    Collection Time: 07/29/17  2:53 PM   Result Value Ref Range    Sodium 140 133 - 144 mmol/L    Potassium 3.9 3.4 - 5.3 mmol/L    Chloride 106 94 - 109 mmol/L    Carbon Dioxide 30 20 - 32 mmol/L    Anion Gap 5 3 - 14 mmol/L    Glucose 101 (H) 70 - 99 mg/dL    Urea Nitrogen 14 7 - 30 mg/dL    Creatinine 0.76 0.66 - 1.25 mg/dL    GFR Estimate >90  Non  GFR Calc   >60 mL/min/1.7m2    GFR Estimate If Black >90   GFR Calc   >60 mL/min/1.7m2    Calcium 8.3 (L) 8.5 - 10.1 mg/dL    Bilirubin Total 0.4 0.2 - 1.3 mg/dL    Albumin 3.7 3.4 - 5.0 g/dL    Protein Total 6.8 6.8 - 8.8 g/dL    Alkaline Phosphatase 57 40 - 150 U/L    ALT 70 0 - 70 U/L    AST 41 0 - 45 U/L           Labs Ordered and Resulted from Time of ED Arrival Up to the Time of Departure from the ED   CBC WITH PLATELETS DIFFERENTIAL - Abnormal;  Notable for the following:        Result Value    WBC 3.7 (*)     RBC Count 3.98 (*)     Hemoglobin 12.3 (*)     Hematocrit 34.3 (*)     Platelet Count 140 (*)     All other components within normal limits   COMPREHENSIVE METABOLIC PANEL - Abnormal; Notable for the following:     Glucose 101 (*)     Calcium 8.3 (*)     All other components within normal limits   INR   ORTHOSTATIC BLOOD PRESSURE AND PULSE            Assessments & Plan (with Medical Decision Making)  BRBPR from internal hemorrhoid visualized on anoscope, labs including CBC mildly low as expected with chemo, will DC with hemorroid hand out, sitz bath, laxative prn, follow up with his PMD and Onc.  Esophageal Ca on chemo last one 3 weeks ago.       I have reviewed the nursing notes.    I have reviewed the findings, diagnosis, plan and need for follow up with the patient.    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      IAnnika MD, was physically present and have reviewed and verified the accuracy of this note documented by Karley Pandey.       7/29/2017   [facility], [place], EMERGENCY DEPARTMENT     Annika Ross MD  07/29/17 [id number]

## 2017-07-29 NOTE — DISCHARGE INSTRUCTIONS
Please make an appointment to follow up with Your Primary Care Provider or Oncologist in 5-10 days even if entirely better.

## 2017-07-29 NOTE — ED AVS SNAPSHOT
[facility], Emergency Department    [address] 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        Discharge Instructions       Please make an appointment to follow up with Your Primary Care Provider or Oncologist in 5-10 days even if entirely better.     Discharge References/Attachments     HEMORRHOIDS, TREATING: SELF-CARE (ENGLISH)    HEMORRHOIDS, UNDERSTANDING (ENGLISH)      Future Appointments        Provider Department Dept Phone Center    8/8/2017 11:15 AM [facility] Draw [facility] Lab Draw [phone] [facility]    8/8/2017 11:50 AM Alisa Otero PA-C [facility] Cancer [place] [address] [phone] [facility]    8/8/2017 1:00 PM [facility]; Oncology Infusion [facility] [place] [address] [phone] [facility]    9/22/2017 9:15 AM [facility] PET ROOM 1 [facility] [phone] [place]    9/25/2017 9:45 AM [facility] Draw [facility] Lab Draw [phone] [facility]    9/25/2017 10:15 AM Laurence Hood MD [facility] [place] [address] [phone] [facility]      24 Hour Appointment Hotline       To make an appointment at any [facility], call [phone] ([phone]). If you don't have a family doctor or clinic, we will help you find one. [facility] clinics are conveniently located to serve the needs of you and your family.             Review of your medicines      Our records show that you are taking the medicines listed below. If these are incorrect, please call your family doctor or clinic.        Dose / Directions Last dose taken    capecitabine  500 MG tablet CHEMO   Commonly known as:  XELODA   Dose:  625 mg/m2   Quantity:  84 tablet        Take 2 tablets (1,000 mg) by mouth 2 times daily for 21 days   Refills:  0        LORazepam 0.5 MG tablet   Commonly known as:  ATIVAN   Dose:  0.5 mg   Quantity:  30 tablet        Take 1 tablet (0.5 mg) by mouth every 4 hours as needed (Anxiety, Nausea/Vomiting or Sleep)   Refills:  5        nystatin 611698 UNIT/ML suspension   Commonly known as:  MYCOSTATIN   Dose:  196216 Units   Quantity:  60 mL        Take 5 mLs (500,000 Units) by mouth 4 times daily   Refills:  3        ondansetron 8 MG ODT tab   Commonly known as:  ZOFRAN-ODT   Dose:  8 mg   Quantity:  60 tablet        Take 1 tablet (8 mg) by mouth every 8 hours as needed for nausea   Refills:  3        ondansetron 8 MG tablet   Commonly known as:  ZOFRAN   Dose:  8 mg   Quantity:  10 tablet        Take 1 tablet (8 mg) by mouth every 8 hours as needed (Nausea/Vomiting)   Refills:  7        prochlorperazine 10 MG tablet   Commonly known as:  COMPAZINE   Dose:  10 mg   Quantity:  30 tablet        Take 1 tablet (10 mg) by mouth every 6 hours as needed (Nausea/Vomiting)   Refills:  5        UNABLE TO FIND        MEDICATION NAME: IV Chemo Therapy   Refills:  0                Procedures and tests performed during your visit     CBC with platelets differential    Comprehensive metabolic panel    INR    Orthostatic blood pressure and pulse      Orders Needing Specimen Collection     None      Pending Results     No orders found from 7/27/2017 to 7/30/2017.            Pending Culture Results     No orders found from 7/27/2017 to 7/30/2017.            Pending Results Instructions     If you had any lab results that were not finalized at the time of your Discharge, you can call the ED Lab Result RN at [phone]. You will be contacted by this team for any positive Lab results or changes in treatment. The nurses are available 7 days a week from 10A to 6:30P.  You can leave  a message 24 hours per day and they will return your call.        Thank you for choosing [facility]       Thank you for choosing [facility] for your care. Our goal is always to provide you with excellent care. Hearing back from our patients is one way we can continue to improve our services. Please take a few minutes to complete the written survey that you may receive in the mail after you visit with us. Thank you!        [facility] Information     [facility] gives you secure access to your electronic health record. If you see a primary care provider, you can also send messages to your care team and make appointments. If you have questions, please call your primary care clinic.  If you do not have a primary care provider, please call [phone] and they will assist you.        Care EveryWhere ID     This is your Care EveryWhere ID. This could be used by other organizations to access your [facility] medical records  [id number]        Equal Access to Services     SHAWN SWAIN : Judy Araujo, kelly matias, alejandro garcia. So [facility] [phone].    ATENCIÓN: Si habla español, tiene a zayas disposición servicios gratuitos de asistencia lingüística. Llame al [phone].    We comply with applicable federal civil rights laws and [place] laws. We do not discriminate on the basis of race, color, national origin, age, disability sex, sexual orientation or gender identity.            After Visit Summary       This is your record. Keep this with you and show to your community pharmacist(s) and doctor(s) at your next visit.

## 2017-07-29 NOTE — ED NOTES
Arrived to ED d/t c/o rectal bleeding, last night was the onset, has been noted straining more going to the bathroom, having formed stools, does have hx of hemorrhoids, oncologist requested he come into the ED to have blood work and rectal exam done, hx of esophageal and gastric CA, VSS in triage, denies pain, last round of chem on 7/17/17

## 2017-07-29 NOTE — ED AVS SNAPSHOT
[facility], [place], Emergency Department    [address] [facility] [address]    Phone:  [phone]                                       Daniel Sandhu   MRN: [id number]    Department:  [facility], Emergency Department   Date of Visit:  7/29/2017           After Visit Summary Signature Page     I have received my discharge instructions, and my questions have been answered. I have discussed any challenges I see with this plan with the nurse or doctor.    ..........................................................................................................................................  Patient/Patient Representative Signature      ..........................................................................................................................................  Patient Representative Print Name and Relationship to Patient    ..................................................               ................................................  Date                                            Time    ..........................................................................................................................................  Reviewed by Signature/Title    ...................................................              ..............................................  Date                                                            Time

## 2017-07-29 NOTE — TELEPHONE ENCOUNTER
"Patient states, \"I just spoke with a nurse there and she triaged my rectal bleeding symptoms and told me to go into ER, but I am not in pain, I am not actively bleeding and I can eat normally.  Would you please page my oncologist to call me so I can discuss this with her?\"  On call provider for [facility], Dr Edgar Rosenthal, was paged at 1:05pm to call patient at [phone].  Instructed patient to call back if he has not heard from provider by 1:30pm.  Patient verbalized understanding and is appreciative of information.      "

## 2017-08-08 ENCOUNTER — ALLIED HEALTH/NURSE VISIT (OUTPATIENT)
Dept: ONCOLOGY | Facility: CLINIC | Age: 37
End: 2017-08-08

## 2017-08-08 ENCOUNTER — APPOINTMENT (OUTPATIENT)
Dept: LAB | Facility: CLINIC | Age: 37
End: 2017-08-08
Attending: INTERNAL MEDICINE
Payer: COMMERCIAL

## 2017-08-08 ENCOUNTER — ONCOLOGY VISIT (OUTPATIENT)
Dept: ONCOLOGY | Facility: CLINIC | Age: 37
End: 2017-08-08
Attending: INTERNAL MEDICINE
Payer: COMMERCIAL

## 2017-08-08 VITALS
RESPIRATION RATE: 18 BRPM | HEART RATE: 97 BPM | WEIGHT: 158.9 LBS | OXYGEN SATURATION: 99 % | DIASTOLIC BLOOD PRESSURE: 62 MMHG | TEMPERATURE: 98.6 F | SYSTOLIC BLOOD PRESSURE: 123 MMHG | BODY MASS INDEX: 23.54 KG/M2 | HEIGHT: 69 IN

## 2017-08-08 DIAGNOSIS — C15.5 MALIGNANT NEOPLASM OF LOWER THIRD OF ESOPHAGUS (H): Primary | ICD-10-CM

## 2017-08-08 DIAGNOSIS — B37.0 THRUSH: ICD-10-CM

## 2017-08-08 DIAGNOSIS — Z71.9 VISIT FOR COUNSELING: Primary | ICD-10-CM

## 2017-08-08 LAB
ALBUMIN SERPL-MCNC: 3.9 G/DL (ref 3.4–5)
ALP SERPL-CCNC: 71 U/L (ref 40–150)
ALT SERPL W P-5'-P-CCNC: 219 U/L (ref 0–70)
ANION GAP SERPL CALCULATED.3IONS-SCNC: 5 MMOL/L (ref 3–14)
AST SERPL W P-5'-P-CCNC: 99 U/L (ref 0–45)
BASOPHILS # BLD AUTO: 0 10E9/L (ref 0–0.2)
BASOPHILS NFR BLD AUTO: 0.6 %
BILIRUB SERPL-MCNC: 0.6 MG/DL (ref 0.2–1.3)
BUN SERPL-MCNC: 15 MG/DL (ref 7–30)
CALCIUM SERPL-MCNC: 8.8 MG/DL (ref 8.5–10.1)
CHLORIDE SERPL-SCNC: 104 MMOL/L (ref 94–109)
CO2 SERPL-SCNC: 29 MMOL/L (ref 20–32)
CREAT SERPL-MCNC: 0.74 MG/DL (ref 0.66–1.25)
DIFFERENTIAL METHOD BLD: ABNORMAL
EOSINOPHIL # BLD AUTO: 0.1 10E9/L (ref 0–0.7)
EOSINOPHIL NFR BLD AUTO: 1.7 %
ERYTHROCYTE [DISTWIDTH] IN BLOOD BY AUTOMATED COUNT: 16 % (ref 10–15)
GFR SERPL CREATININE-BSD FRML MDRD: ABNORMAL ML/MIN/1.7M2
GLUCOSE SERPL-MCNC: 92 MG/DL (ref 70–99)
HCT VFR BLD AUTO: 36.6 % (ref 40–53)
HGB BLD-MCNC: 12.8 G/DL (ref 13.3–17.7)
IMM GRANULOCYTES # BLD: 0 10E9/L (ref 0–0.4)
IMM GRANULOCYTES NFR BLD: 0.4 %
LYMPHOCYTES # BLD AUTO: 1.6 10E9/L (ref 0.8–5.3)
LYMPHOCYTES NFR BLD AUTO: 29.9 %
MCH RBC QN AUTO: 31.2 PG (ref 26.5–33)
MCHC RBC AUTO-ENTMCNC: 35 G/DL (ref 31.5–36.5)
MCV RBC AUTO: 89 FL (ref 78–100)
MONOCYTES # BLD AUTO: 0.9 10E9/L (ref 0–1.3)
MONOCYTES NFR BLD AUTO: 16.6 %
NEUTROPHILS # BLD AUTO: 2.7 10E9/L (ref 1.6–8.3)
NEUTROPHILS NFR BLD AUTO: 50.8 %
NRBC # BLD AUTO: 0 10*3/UL
NRBC BLD AUTO-RTO: 0 /100
PLATELET # BLD AUTO: 202 10E9/L (ref 150–450)
POTASSIUM SERPL-SCNC: 4 MMOL/L (ref 3.4–5.3)
PROT SERPL-MCNC: 7.2 G/DL (ref 6.8–8.8)
RBC # BLD AUTO: 4.1 10E12/L (ref 4.4–5.9)
SODIUM SERPL-SCNC: 138 MMOL/L (ref 133–144)
WBC # BLD AUTO: 5.3 10E9/L (ref 4–11)

## 2017-08-08 PROCEDURE — 99215 OFFICE O/P EST HI 40 MIN: CPT | Mod: ZP | Performed by: PHYSICIAN ASSISTANT

## 2017-08-08 PROCEDURE — 85025 COMPLETE CBC W/AUTO DIFF WBC: CPT | Performed by: PHYSICIAN ASSISTANT

## 2017-08-08 PROCEDURE — 96415 CHEMO IV INFUSION ADDL HR: CPT

## 2017-08-08 PROCEDURE — 80053 COMPREHEN METABOLIC PANEL: CPT | Performed by: PHYSICIAN ASSISTANT

## 2017-08-08 PROCEDURE — 96375 TX/PRO/DX INJ NEW DRUG ADDON: CPT

## 2017-08-08 PROCEDURE — 25000128 H RX IP 250 OP 636: Mod: ZF | Performed by: PHYSICIAN ASSISTANT

## 2017-08-08 PROCEDURE — 99212 OFFICE O/P EST SF 10 MIN: CPT | Mod: ZF

## 2017-08-08 PROCEDURE — 96411 CHEMO IV PUSH ADDL DRUG: CPT

## 2017-08-08 PROCEDURE — 96413 CHEMO IV INFUSION 1 HR: CPT

## 2017-08-08 PROCEDURE — 96367 TX/PROPH/DG ADDL SEQ IV INF: CPT

## 2017-08-08 RX ORDER — ALBUTEROL SULFATE 90 UG/1
1-2 AEROSOL, METERED RESPIRATORY (INHALATION)
Status: CANCELLED
Start: 2017-08-08

## 2017-08-08 RX ORDER — ALBUTEROL SULFATE 0.83 MG/ML
2.5 SOLUTION RESPIRATORY (INHALATION)
Status: CANCELLED | OUTPATIENT
Start: 2017-08-08

## 2017-08-08 RX ORDER — EPINEPHRINE 0.3 MG/.3ML
0.3 INJECTION SUBCUTANEOUS EVERY 5 MIN PRN
Status: CANCELLED | OUTPATIENT
Start: 2017-08-08

## 2017-08-08 RX ORDER — MEPERIDINE HYDROCHLORIDE 25 MG/ML
25 INJECTION INTRAMUSCULAR; INTRAVENOUS; SUBCUTANEOUS EVERY 30 MIN PRN
Status: CANCELLED | OUTPATIENT
Start: 2017-08-08

## 2017-08-08 RX ORDER — PALONOSETRON 0.05 MG/ML
0.25 INJECTION, SOLUTION INTRAVENOUS ONCE
Status: CANCELLED
Start: 2017-08-08 | End: 2017-08-08

## 2017-08-08 RX ORDER — PALONOSETRON 0.05 MG/ML
0.25 INJECTION, SOLUTION INTRAVENOUS ONCE
Status: COMPLETED | OUTPATIENT
Start: 2017-08-08 | End: 2017-08-08

## 2017-08-08 RX ORDER — NYSTATIN 100000/ML
500000 SUSPENSION, ORAL (FINAL DOSE FORM) ORAL 4 TIMES DAILY
Qty: 473 ML | Refills: 3 | Status: ON HOLD | OUTPATIENT
Start: 2017-08-08 | End: 2017-11-17

## 2017-08-08 RX ORDER — METHYLPREDNISOLONE SODIUM SUCCINATE 125 MG/2ML
125 INJECTION, POWDER, LYOPHILIZED, FOR SOLUTION INTRAMUSCULAR; INTRAVENOUS
Status: CANCELLED
Start: 2017-08-08

## 2017-08-08 RX ORDER — HEPARIN SODIUM (PORCINE) LOCK FLUSH IV SOLN 100 UNIT/ML 100 UNIT/ML
5 SOLUTION INTRAVENOUS
Status: COMPLETED | OUTPATIENT
Start: 2017-08-08 | End: 2017-08-08

## 2017-08-08 RX ORDER — HEPARIN SODIUM (PORCINE) LOCK FLUSH IV SOLN 100 UNIT/ML 100 UNIT/ML
500 SOLUTION INTRAVENOUS EVERY 8 HOURS
Status: CANCELLED
Start: 2017-08-08

## 2017-08-08 RX ORDER — HEPARIN SODIUM (PORCINE) LOCK FLUSH IV SOLN 100 UNIT/ML 100 UNIT/ML
500 SOLUTION INTRAVENOUS EVERY 8 HOURS
Status: DISCONTINUED | OUTPATIENT
Start: 2017-08-08 | End: 2017-08-08 | Stop reason: HOSPADM

## 2017-08-08 RX ORDER — SODIUM CHLORIDE 9 MG/ML
1000 INJECTION, SOLUTION INTRAVENOUS CONTINUOUS PRN
Status: CANCELLED
Start: 2017-08-08

## 2017-08-08 RX ORDER — NYSTATIN 100000/ML
500000 SUSPENSION, ORAL (FINAL DOSE FORM) ORAL 4 TIMES DAILY
Qty: 60 ML | Refills: 3 | Status: SHIPPED | OUTPATIENT
Start: 2017-08-08 | End: 2017-08-08

## 2017-08-08 RX ORDER — DIPHENHYDRAMINE HYDROCHLORIDE 50 MG/ML
50 INJECTION INTRAMUSCULAR; INTRAVENOUS
Status: CANCELLED
Start: 2017-08-08

## 2017-08-08 RX ORDER — CAPECITABINE 500 MG/1
625 TABLET, FILM COATED ORAL 2 TIMES DAILY
Qty: 84 TABLET | Refills: 0 | Status: SHIPPED | OUTPATIENT
Start: 2017-08-08 | End: 2017-11-01

## 2017-08-08 RX ORDER — LORAZEPAM 2 MG/ML
0.5 INJECTION INTRAMUSCULAR EVERY 4 HOURS PRN
Status: CANCELLED
Start: 2017-08-08

## 2017-08-08 RX ORDER — EPINEPHRINE 1 MG/ML
0.3 INJECTION INTRAMUSCULAR; INTRAVENOUS; SUBCUTANEOUS EVERY 5 MIN PRN
Status: CANCELLED | OUTPATIENT
Start: 2017-08-08

## 2017-08-08 RX ORDER — CAPECITABINE 500 MG/1
625 TABLET, FILM COATED ORAL 2 TIMES DAILY
Qty: 84 TABLET | Refills: 0 | Status: CANCELLED | OUTPATIENT
Start: 2017-08-08 | End: 2017-08-29

## 2017-08-08 RX ADMIN — PALONOSETRON HYDROCHLORIDE 0.25 MG: 0.25 INJECTION INTRAVENOUS at 13:30

## 2017-08-08 RX ADMIN — SODIUM CHLORIDE, PRESERVATIVE FREE 500 UNITS: 5 INJECTION INTRAVENOUS at 16:33

## 2017-08-08 RX ADMIN — SODIUM CHLORIDE, PRESERVATIVE FREE 5 ML: 5 INJECTION INTRAVENOUS at 11:51

## 2017-08-08 RX ADMIN — OXALIPLATIN 250 MG: 5 INJECTION, SOLUTION, CONCENTRATE INTRAVENOUS at 14:31

## 2017-08-08 RX ADMIN — DEXTROSE MONOHYDRATE 250 ML: 50 INJECTION, SOLUTION INTRAVENOUS at 14:16

## 2017-08-08 RX ADMIN — EPIRUBICIN HYDROCHLORIDE 46 MG: 2 INJECTION, SOLUTION INTRAVENOUS at 14:19

## 2017-08-08 RX ADMIN — SODIUM CHLORIDE 150 MG: 9 INJECTION, SOLUTION INTRAVENOUS at 13:33

## 2017-08-08 RX ADMIN — DEXAMETHASONE SODIUM PHOSPHATE: 10 INJECTION, SOLUTION INTRAMUSCULAR; INTRAVENOUS at 13:59

## 2017-08-08 ASSESSMENT — PAIN SCALES - GENERAL: PAINLEVEL: NO PAIN (1)

## 2017-08-08 NOTE — PATIENT INSTRUCTIONS
Contact Numbers    [facility] Main Line: [phone]  [facility] Triage:  [phone]    Call triage with chills and/or temperature greater than or equal to 100.5, uncontrolled nausea/vomiting, diarrhea, constipation, dizziness, shortness of breath, chest pain, bleeding, unexplained bruising, or any new/concerning symptoms, questions/concerns.     If you are having any concerning symptoms or wish to speak to a provider before your next infusion visit, please call your care coordinator or triage to notify them so we can adequately serve you.       After Hours: [phone]    If after hours, weekends, or holidays, call main hospital  and ask for Oncology doctor on call.         August 2017 Sunday Monday Tuesday Wednesday Thursday Friday Saturday             1     2     3     4     5       6     7     8     P [facility] DRAW   11:15 AM   (15 min.)    [facility] DRAW   [facility] Lab Draw     [facility] RETURN   11:35 AM   (50 min.)   Al[facility]tero [facility]-C   [facility]    1:00 PM   (240 min.)   [facility]   [facility] 9     10     11     12       13     14     15     16     17     18     19       20     21     22     23     24     25     26       27     28     29     30     31 September 2017 Sunday Monday Tuesday Wednesday Thursday Friday Saturday                            1     2       3     4     5     6     7     8     9       10     11     12     13     14     15     16       17     18     19     20     21     22     [facility]/ ONCOLOGY    9:15 AM   (45 min.)   [id number]   [facility] 23       24     25     [facility] DRAW    8:00 AM   (15 min.)    [facility] DRAW   [facility] Lab Draw     ECH COMPLETE    8:30 AM   (60 min.)   [email or web address]   [facility]     [facility] RETURN   10:00 AM   (30 min.)   Laurence Hood MD   [facility] 26     27     28      29     30                  Lab Results:  Recent Results (from the past 12 hour(s))   CBC with platelets differential    Collection Time: 08/08/17 11:58 AM   Result Value Ref Range    WBC 5.3 4.0 - 11.0 10e9/L    RBC Count 4.10 (L) 4.4 - 5.9 10e12/L    Hemoglobin 12.8 (L) 13.3 - 17.7 g/dL    Hematocrit 36.6 (L) 40.0 - 53.0 %    MCV 89 78 - 100 fl    MCH 31.2 26.5 - 33.0 pg    MCHC 35.0 31.5 - 36.5 g/dL    RDW 16.0 (H) 10.0 - 15.0 %    Platelet Count 202 150 - 450 10e9/L    Diff Method Automated Method     % Neutrophils 50.8 %    % Lymphocytes 29.9 %    % Monocytes 16.6 %    % Eosinophils 1.7 %    % Basophils 0.6 %    % Immature Granulocytes 0.4 %    Nucleated RBCs 0 0 /100    Absolute Neutrophil 2.7 1.6 - 8.3 10e9/L    Absolute Lymphocytes 1.6 0.8 - 5.3 10e9/L    Absolute Monocytes 0.9 0.0 - 1.3 10e9/L    Absolute Eosinophils 0.1 0.0 - 0.7 10e9/L    Absolute Basophils 0.0 0.0 - 0.2 10e9/L    Abs Immature Granulocytes 0.0 0 - 0.4 10e9/L    Absolute Nucleated RBC 0.0    Comprehensive metabolic panel    Collection Time: 08/08/17 11:58 AM   Result Value Ref Range    Sodium 138 133 - 144 mmol/L    Potassium 4.0 3.4 - 5.3 mmol/L    Chloride 104 94 - 109 mmol/L    Carbon Dioxide 29 20 - 32 mmol/L    Anion Gap 5 3 - 14 mmol/L    Glucose 92 70 - 99 mg/dL    Urea Nitrogen 15 7 - 30 mg/dL    Creatinine 0.74 0.66 - 1.25 mg/dL    GFR Estimate >90  Non  GFR Calc   >60 mL/min/1.7m2    GFR Estimate If Black >90   GFR Calc   >60 mL/min/1.7m2    Calcium 8.8 8.5 - 10.1 mg/dL    Bilirubin Total 0.6 0.2 - 1.3 mg/dL    Albumin 3.9 3.4 - 5.0 g/dL    Protein Total 7.2 6.8 - 8.8 g/dL    Alkaline Phosphatase 71 40 - 150 U/L     (H) 0 - 70 U/L    AST 99 (H) 0 - 45 U/L

## 2017-08-08 NOTE — LETTER
8/8/2017       RE: Daniel Sandhu  [address] [address]     Dear Colleague,    Thank you for referring your patient, Daniel Sandhu, to the [facility]. Please see a copy of my visit note below.    [facility]    Hematology/Oncology Established Patient Note      August 8, 2017      Reason for Follow-up: adenocarcinoma of the GE junction      HISTORY OF PRESENT ILLNESS: Daniel Sandhu is a 36 year old male who presents with adenocarcinoma of the GE junction.  In early 2017, patient started noticing difficulty swallowing, and that food seems to take longer to go down.  It became more frequent, and he saw his PCP, and was referred for EGD, which showed an esophageal mass located at the GE junction, measuring 4 cm.  Biopsy showed poorly differentiated adenocarcinoma.  HER-2 was sent and is equivocal (2+).  CT c/a/p showed a mildly prominent lymph node in the gastrohepatic ligament, but there were no pathologically enlarged lymph nodes in the chest, abdomen, or pelvis.  There was otherwise no evidence of metastatic disease.  PET-CT showed thickening of the distal esophagus consistent with known esophageal adenocarcinoma, as well as mildly hypermetabolic 1 cm lymph node in the gastrohepatic ligament.  There was no evidence of distant metastatic disease.  He underwent EUS on 6/9/17, which showed the esophageal tumor in the lower third of the esophagus, staged T2NX.  There were 2 abnormal lymph nodes seen in the gastrohepatic ligament; pathology was suspicious for adenocarcinoma.  Celiac node was sampled as well, which was benign.  He was seen by surgery, Dr. Esteban, and recommended srinivas-operative chemotherapy.    Port was placed on 6/22/17.    Chemotherapy:  Epirubicin 50 mg/m2 IV on day 1  Oxaliplatin 130 mg/m2 IV on day 1  Capecitabine 625 mg/m2 po BID on days 1-21  Every 21 day cycles    C1D1: 6/26/17  C2D1: 7/17/17  C3D1: anticipated for  8/7/17      INTERIM HISTORY: Daniel comes in for follow-up today. Overall, things are going well. The nausea was so much better with cycle 2, but at a cost for constipation. He was found to have a bleeding hemorrhoid in the ED. This hasn't re-bled and bowels now moving. He has a few things in his arsenal to facitate a bowel movement but isn't needing anything right now. He is starting to notice some sensitivity on his hands and feet, especially with prolonged use. Using Udder Balm. No cracks, peeling, or blisters. GERD under control now. Minimal cold sensitivity. No neuropathy symptoms. Otherwise, no fevers, chills, cough, SOB, chest pain, abdominal pain, vomiting, bleeding, or swelling.     REVIEW OF SYSTEMS:   14 point ROS was reviewed and is negative other than as noted above in HPI.       HOME MEDICATIONS:  Current Outpatient Prescriptions   Medication Sig Dispense Refill     UNABLE TO FIND MEDICATION NAME: IV Chemo Therapy       nystatin (MYCOSTATIN) 264445 UNIT/ML suspension Take 5 mLs (500,000 Units) by mouth 4 times daily 60 mL 3     capecitabine (XELODA) 500 MG tablet CHEMO Take 2 tablets (1,000 mg) by mouth 2 times daily for 21 days 84 tablet 0     ondansetron (ZOFRAN-ODT) 8 MG ODT tab Take 1 tablet (8 mg) by mouth every 8 hours as needed for nausea 60 tablet 3     LORazepam (ATIVAN) 0.5 MG tablet Take 1 tablet (0.5 mg) by mouth every 4 hours as needed (Anxiety, Nausea/Vomiting or Sleep) 30 tablet 5     prochlorperazine (COMPAZINE) 10 MG tablet Take 1 tablet (10 mg) by mouth every 6 hours as needed (Nausea/Vomiting) 30 tablet 5     ondansetron (ZOFRAN) 8 MG tablet Take 1 tablet (8 mg) by mouth every 8 hours as needed (Nausea/Vomiting) 10 tablet 7         ALLERGIES:  No Known Allergies      PHYSICAL EXAM:  Vital signs:  /62 (BP Location: Right arm, Patient Position: Chair, Cuff Size: Adult Regular)  Pulse 97  Temp 98.6  F (37  C) (Oral)  Resp 18  Wt 72.1 kg (158 lb 14.4 oz)  SpO2 99%  BMI 23.56  kg/m2   ECO  GENERAL/CONSTITUTIONAL: No acute distress.  EYES: No scleral icterus.  MOUTH: No mucositis, no thrush.  RESPIRATORY: Clear to auscultation bilaterally. No crackles or wheezing.   CARDIOVASCULAR: Regular rate and rhythm without murmurs, gallops, or rubs.  GASTROINTESTINAL: No tenderness. The patient has normal bowel sounds. No guarding.  No distention.  MUSCULOSKELETAL: Warm and well-perfused, no cyanosis, clubbing, or edema.  NEUROLOGIC: Alert, oriented, answers questions appropriately.  INTEGUMENTARY: No jaundice. Skin is intact without fissuring or blistering on hands and feet. Mild erythema in skin creases on palms.  Port in place.      LABS:  Results for ABELARDO ARMAS (MRN [id number]) as of 2017 12:07   Ref. Range 2017 14:53 2017 11:58   WBC Latest Ref Range: 4.0 - 11.0 10e9/L 3.7 (L) 5.3   Hemoglobin Latest Ref Range: 13.3 - 17.7 g/dL 12.3 (L) 12.8 (L)   Hematocrit Latest Ref Range: 40.0 - 53.0 % 34.3 (L) 36.6 (L)   Platelet Count Latest Ref Range: 150 - 450 10e9/L 140 (L) 202   Results for ABELARDO ARMAS (MRN [id number]) as of 2017 12:49   Ref. Range 2017 14:53 2017 11:58   Sodium Latest Ref Range: 133 - 144 mmol/L 140 138   Potassium Latest Ref Range: 3.4 - 5.3 mmol/L 3.9 4.0   Chloride Latest Ref Range: 94 - 109 mmol/L 106 104   Carbon Dioxide Latest Ref Range: 20 - 32 mmol/L 30 29   Urea Nitrogen Latest Ref Range: 7 - 30 mg/dL 14 15   Creatinine Latest Ref Range: 0.66 - 1.25 mg/dL 0.76 0.74   GFR Estimate Latest Ref Range: >60 mL/min/1.7m2 >90... >90...   GFR Estimate If Black Latest Ref Range: >60 mL/min/1.7m2 >90... >90...   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  Clinical stage T2N1M0 (stage IIB), based on PET-CT and EUS.  A gastrohepatic lymph node was biopsied and suspicious for adenocarcinoma.  He was seen by Dr. Esteban, and with the degree of extension into the stomach, his inclination is to treat him as a gastric cancer, and so perioperative chemotherapy, as per MAGIC trial, would be reasonable.  Plan was discussed at tumor conference, the group agreed with the recommendation.      He will undergo 3 cycles of neoadjuvant chemotherapy with EOX, followed by new PET-CT about 1 month after completion of chemotherapy, followed by surgery ~ 6 weeks after completion of neoadjuvant chemotherapy, followed by 3 cycles of adjuvant chemotherapy.    Today is cycle 3. He is tolerating well without nausea or GERD with the addition of Emend/Aloxi and Nexium. Elevated LFTs today, d/w pharmacy, requires 50% dose reduction of epirubicin.    -echo every 3 months, due in September prior to seeing Dr. Hood.    2) Genetics:  -he has met with genetic counselor, and panel was sent    3) Chemotherapy-induced nausea/vomiting:   -Improvement with addition of Emend and Aloxi to pre-meds  -he has Zofran ODT and Compazine, which are helpful    4) Oral thrush:  Resolved today, but has been recurrent. He wanted to  an Rx just in case. Sent.    5) GERD:  -he has Nexium    6) Constipation:  - Recommended starting the Miralax today in anticipation of constipation with the anti-emetics, continue with daily Miralax and add in dulcolax and/or Milk of Mag as needed. He didn't want an Rx for Senna-S.     7. Rectal bleeding: Went to ED 7/29 for rectal bleeding. Found to have internal hemorrhoid on anoscope. Encouraged to take sitz baths.  Will work on constipation, as above.    8. ELevated LFTs: Likely secondary to oxali or epirubicin. Dose reduction as above.    9. HFS: Mild, skin integrity is intact. Mostly just mild tenderness. Continue with emollients. Can try epsom soaks. Avoid friction. Call with any worsening sx.      Alisa Otero PA-C      Again, thank you for allowing me to participate in the care of your patient.      Sincerely,    Alisa Otero PA-C

## 2017-08-08 NOTE — NURSING NOTE
"Chief Complaint   Patient presents with     Oncology Clinic Visit     Return: Esophageal ca      Port Draw     port accessed and labs drawn by rn.  vs taken.     Port accessed with 20g 3/4\" gripper needle, labs drawn, port flushed with saline and heparin, vitals checked, pt checked in for next appointment.  Génesis Tripp RN    "

## 2017-08-08 NOTE — PROGRESS NOTES
Infusion Nursing Note:  Daniel Sandhu presents today for Cycle 3, day 1 Epirubicin and Oxaliplatin.    Patient seen by provider today: Yes: MATHEUS Gomez    Note: Patient presents to clinic today feeling well with no questions.      Intravenous Access:  Implanted Port.    Treatment Conditions:  Lab Results   Component Value Date    HGB 12.8 08/08/2017     Lab Results   Component Value Date    WBC 5.3 08/08/2017      Lab Results   Component Value Date    ANEU 2.7 08/08/2017     Lab Results   Component Value Date     08/08/2017      Lab Results   Component Value Date     08/08/2017                   Lab Results   Component Value Date    POTASSIUM 4.0 08/08/2017           Lab Results   Component Value Date    MAG 2.3 06/27/2017            Lab Results   Component Value Date    CR 0.74 08/08/2017                   Lab Results   Component Value Date    YOBANY 8.8 08/08/2017                Lab Results   Component Value Date    BILITOTAL 0.6 08/08/2017           Lab Results   Component Value Date    ALBUMIN 3.9 08/08/2017                    Lab Results   Component Value Date     08/08/2017           Lab Results   Component Value Date    AST 99 08/08/2017     Results reviewed, labs MET treatment parameters, ok to proceed with treatment.  ECHO/MUGA completed 6/23/17 EF 55-60%.    Post Infusion Assessment:  Patient tolerated infusion without incident.  Blood return noted pre and post infusion.  Blood return noted during Epirubicin administration every 2 cc.  Site patent and intact, free from redness, edema or discomfort.  No evidence of extravasations.  Access discontinued per protocol.    Discharge Plan:   Prescription refill for Xeloda given.  Discharge instructions reviewed with: Patient.  Patient and/or family verbalized understanding of discharge instructions and all questions answered.  Copy of AVS reviewed with patient and/or family.  Patient will return 9/22/2017 for next appointment.  Departure  Mode: Ambulatory.    Laurence Davidson RN

## 2017-08-08 NOTE — MR AVS SNAPSHOT
After Visit Summary   8/8/2017    Daniel Sandhu    MRN: [id number]           Patient Information     Date Of Birth          1980        Visit Information        Provider Department      8/8/2017 11:50 AM Alisa Otero PA-C [facility]        Today's Diagnoses     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    -  1    Thrush           Follow-ups after your visit        Your next 10 appointments already scheduled     Sep 22, 2017  9:15 AM CDT   PE NPET ONCOLOGY (EYES TO THIGHS) with MGPET1   [facility] ([facility])    [id number] [place] [address] [phone]           Tell your doctor:   If there is any chance you may be pregnant or if you are breastfeeding.   If you have problems lying in small spaces (claustrophobia). If you do, your doctor may give you medicine to help you relax. If you have diabetes:   Have your exam early in the morning. Your blood glucose will go up as the day goes by.   Your glucose level must be 180 or less at the start of the exam. Please take any medicines you need to ensure this blood glucose level. 24 hours before your scan: Don t do any heavy exercise. (No jogging, aerobics or other workouts.) Exercise will make your pictures less accurate. 6 hours before your scan:   Stop all food and liquids (except water).   Do not chew gum or suck on mints.   If you need to take medicine with food, you may take it with a few crackers.  Please call your Imaging Department at your exam site with any questions.            Sep 25, 2017  8:00 AM CDT   Masonic Lab Draw with  MASONIC LAB DRAW   [facility] Masonic Lab Draw ([facility])    [address] [place] [address] [phone]            Sep 25, 2017  8:30 AM CDT   Ech Complete with [email or web address]   [facility] Echo ([facility])    [address] [place] [address]  Floor  [facility] [id number] [phone]           1. Please bring or wear a comfortable two-piece outfit. 2. You may eat, drink and take your normal medicines. 3. For any questions that cannot be answered, please contact the ordering physician            Sep 25, 2017 10:15 AM CDT   (Arrive by 10:00 AM)   Return Visit with Laurence Hood MD   [facility] ([facility])    [address]  2nd Floor  [facility] [id number] [phone]              Future tests that were ordered for you today     Open Future Orders        Priority Expected Expires Ordered    CBC with platelets differential Routine 9/25/2017 11/8/2017 8/8/2017    Comprehensive metabolic panel Routine 9/25/2017 11/8/2017 8/8/2017    Echocardiogram Complete Routine  8/8/2018 8/8/2017            Who to contact     If you have questions or need follow up information about today's clinic visit or your schedule please contact [facility] directly at [phone].  Normal or non-critical lab and imaging results will be communicated to you by Nexanthart, letter or phone within 4 business days after the clinic has received the results. If you do not hear from us within 7 days, please contact the clinic through Theralogixt or phone. If you have a critical or abnormal lab result, we will notify you by phone as soon as possible.  Submit refill requests through Viratech or call your pharmacy and they will forward the refill request to us. Please allow 3 business days for your refill to be completed.          Additional Information About Your Visit        Viratech Information     Viratech gives you secure access to your electronic health record. If you see a primary care provider, you can also send messages to your care team and make appointments. If you have questions, please call your primary care clinic.  If you do not have a primary care provider, please call [phone] and they  "will assist you.        Care EveryWhere ID     This is your Care EveryWhere ID. This could be used by other organizations to access your [place] medical records  [id number]        Your Vitals Were     Pulse Temperature Respirations Height Pulse Oximetry BMI (Body Mass Index)    97 98.6  F (37  C) (Oral) 18 1.749 m (5' 8.86\") 99% 23.56 kg/m2       Blood Pressure from Last 3 Encounters:   08/08/17 123/62   07/29/17 112/72   07/24/17 102/63    Weight from Last 3 Encounters:   08/08/17 72.1 kg (158 lb 14.4 oz)   07/29/17 69.3 kg (152 lb 12.5 oz)   07/24/17 69.4 kg (153 lb)                 Today's Medication Changes          These changes are accurate as of: 8/8/17  2:47 PM.  If you have any questions, ask your nurse or doctor.               Start taking these medicines.        Dose/Directions    nystatin 473154 UNIT/ML suspension   Commonly known as:  MYCOSTATIN   Used for:  Thrush   Started by:  Alisa Otero PA-C        Dose:  272916 Units   Take 5 mLs (500,000 Units) by mouth 4 times daily   Quantity:  473 mL   Refills:  3         These medicines have changed or have updated prescriptions.        Dose/Directions    * capecitabine 500 MG tablet CHEMO   Commonly known as:  XELODA   This may have changed:  Another medication with the same name was added. Make sure you understand how and when to take each.   Used for:  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        Dose:  625 mg/m2   Take 2 tablets (1,000 mg) by mouth 2 times daily for 21 days   Quantity:  84 tablet   Refills:  0       * capecitabine 500 MG tablet CHEMO   Commonly known as:  XELODA   This may have changed:  You were already taking a medication with the same name, and this prescription was added. Make sure you understand how and when to take each.   Used for:  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        Dose:  625 mg/m2   Take 2 tablets (1,000 mg) by mouth 2 times daily for 21 days   Quantity:  84 tablet   Refills:  " 0       * Notice:  This list has 2 medication(s) that are the same as other medications prescribed for you. Read the directions carefully, and ask your doctor or other care provider to review them with you.         Where to get your medicines      These medications were sent to [facility] - [place], [place] - [address]  [address], [facility] [address]    Hours:  TRANSPLANT PHONE NUMBER [phone] Phone:  [phone]     capecitabine 500 MG tablet CHEMO    nystatin 241874 UNIT/ML suspension                Primary Care Provider Office Phone # Fax #    Lencho Chan [phone]       [facility] [address]  [facility] [address]        Equal Access to Services     SHAWN SWAIN : Hadii garo stanley hadasho Solanreali, waaxda luqadaha, qaybta kaalmada adeegyada, waxkeith matiasin aristides arteaga. So [facility] [phone].    ATENCIÓN: Si habla español, tiene a zayas disposición servicios gratuitos de asistencia lingüística. [facility] [phone].    We comply with applicable federal civil rights laws and [place] laws. We do not discriminate on the basis of race, color, national origin, age, disability sex, sexual orientation or gender identity.            Thank you!     Thank you for choosing [facility]  for your care. Our goal is always to provide you with excellent care. Hearing back from our patients is one way we can continue to improve our services. Please take a few minutes to complete the written survey that you may receive in the mail after your visit with us. Thank you!             Your Updated Medication List - Protect others around you: Learn how to safely use, store and throw away your medicines at www.disposemymeds.org.          This list is accurate as of: 8/8/17  2:47 PM.  Always use your most recent med list.                   Brand Name Dispense Instructions for use Diagnosis    *  capecitabine 500 MG tablet CHEMO    XELODA    84 tablet    Take 2 tablets (1,000 mg) by mouth 2 times daily for 21 days    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       * capecitabine 500 MG tablet CHEMO    XELODA    84 tablet    Take 2 tablets (1,000 mg) by mouth 2 times daily for 21 days    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       LORazepam 0.5 MG tablet    ATIVAN    30 tablet    Take 1 tablet (0.5 mg) by mouth every 4 hours as needed (Anxiety, Nausea/Vomiting or Sleep)    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       nystatin 563863 UNIT/ML suspension    MYCOSTATIN    473 mL    Take 5 mLs (500,000 Units) by mouth 4 times daily    Thrush       ondansetron 8 MG ODT tab    ZOFRAN-ODT    60 tablet    Take 1 tablet (8 mg) by mouth every 8 hours as needed for nausea    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        * Notice:  This list has 2 medication(s) that are the same as other medications prescribed for you. Read the directions carefully, and ask your doctor or other care provider to review them with you.

## 2017-08-08 NOTE — MR AVS SNAPSHOT
After Visit Summary   8/8/2017    Daniel Sandhu    MRN: [id number]           Patient Information     Date Of Birth          1980        Visit Information        Provider Department      8/8/2017 3:54 PM Han, Soo Yeon, MSW [facility]        Today's Diagnoses     Visit for counseling    -  1       Follow-ups after your visit        Your next 10 appointments already scheduled     Sep 22, 2017  9:15 AM CDT   PE NPET ONCOLOGY (EYES TO THIGHS) with MGPET1   [facility]    [address] [place] [id number] [phone]           Tell your doctor:   If there is any chance you may be pregnant or if you are breastfeeding.   If you have problems lying in small spaces (claustrophobia). If you do, your doctor may give you medicine to help you relax. If you have diabetes:   Have your exam early in the morning. Your blood glucose will go up as the day goes by.   Your glucose level must be 180 or less at the start of the exam. Please take any medicines you need to ensure this blood glucose level. 24 hours before your scan: Don t do any heavy exercise. (No jogging, aerobics or other workouts.) Exercise will make your pictures less accurate. 6 hours before your scan:   Stop all food and liquids (except water).   Do not chew gum or suck on mints.   If you need to take medicine with food, you may take it with a few crackers.  Please call your Imaging Department at your exam site with any questions.            Sep 25, 2017  8:00 AM CDT   Masonic Lab Draw with UC MASONIC LAB DRAW   [facility] Masonic Lab Draw ([facility])    [address] [place] [address]  [address] [facility] [address] [phone]            Sep 25, 2017  8:30 AM CDT   Ech Complete with UCECHCR1   [facility] Echo ([facility])    [address] [place] [address] [phone]           1. Please  bring or wear a comfortable two-piece outfit. 2. You may eat, drink and take your normal medicines. 3. For any questions that cannot be answered, please contact the ordering physician            Sep 25, 2017 10:15 AM CDT   (Arrive by 10:00 AM)   Return Visit with Laurence Hood MD   [facility] ([facility])    [address] [address] [phone]              Who to contact     If you have questions or need follow up information about today's clinic visit or your schedule please contact [facility] directly at [phone].  Normal or non-critical lab and imaging results will be communicated to you by Sync.MEhart, letter or phone within 4 business days after the clinic has received the results. If you do not hear from us within 7 days, please contact the clinic through Sync.MEhart or phone. If you have a critical or abnormal lab result, we will notify you by phone as soon as possible.  Submit refill requests through ACS Biomarker or call your pharmacy and they will forward the refill request to us. Please allow 3 business days for your refill to be completed.          Additional Information About Your Visit        MyChart Information     ACS Biomarker gives you secure access to your electronic health record. If you see a primary care provider, you can also send messages to your care team and make appointments. If you have questions, please call your primary care clinic.  If you do not have a primary care provider, please call [phone] and they will assist you.        Care EveryWhere ID     This is your Care EveryWhere ID. This could be used by other organizations to access your [place] medical records  [id number]         Blood Pressure from Last 3 Encounters:   08/08/17 123/62   07/29/17 112/72   07/24/17 102/63    Weight from Last 3 Encounters:   08/08/17 72.1 kg (158 lb 14.4 oz)   07/29/17 69.3 kg (152 lb 12.5 oz)    07/24/17 69.4 kg (153 lb)              Today, you had the following     No orders found for display         Today's Medication Changes          These changes are accurate as of: 8/8/17 11:59 PM.  If you have any questions, ask your nurse or doctor.               Start taking these medicines.        Dose/Directions    nystatin 482625 UNIT/ML suspension   Commonly known as:  MYCOSTATIN   Used for:  Thrush   Started by:  Alisa Otero PA-C        Dose:  954952 Units   Take 5 mLs (500,000 Units) by mouth 4 times daily   Quantity:  473 mL   Refills:  3         These medicines have changed or have updated prescriptions.        Dose/Directions    * capecitabine 500 MG tablet CHEMO   Commonly known as:  XELODA   This may have changed:  Another medication with the same name was added. Make sure you understand how and when to take each.   Used for:  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        Dose:  625 mg/m2   Take 2 tablets (1,000 mg) by mouth 2 times daily for 21 days   Quantity:  84 tablet   Refills:  0       * capecitabine 500 MG tablet CHEMO   Commonly known as:  XELODA   This may have changed:  You were already taking a medication with the same name, and this prescription was added. Make sure you understand how and when to take each.   Used for:  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        Dose:  625 mg/m2   Take 2 tablets (1,000 mg) by mouth 2 times daily for 21 days   Quantity:  84 tablet   Refills:  0       * Notice:  This list has 2 medication(s) that are the same as other medications prescribed for you. Read the directions carefully, and ask your doctor or other care provider to review them with you.         Where to get your medicines      These medications were sent to [place], [place] - [address] [address]    Hours:  TRANSPLANT PHONE NUMBER [phone] Phone:  [phone]      capecitabine 500 MG tablet CHEMO    nystatin 715291 UNIT/ML suspension                Primary Care Provider Office Phone # Fax #    Lencho Chan [phone]       [address] [facility] [address]        Equal Access to Services     SHAWN SWAIN : Hadii garo stanley mannie Sotee, wakarlda luqadaha, qaybta kaalmada ross, alejandro florencioin hayaarobina pembertonciara meraz stephanie arteaga. So [facility] [phone].    ATENCIÓN: Si habla español, tiene a zayas disposición servicios gratuitos de asistencia lingüística. Ll[facility] [phone].    We comply with applicable federal civil rights laws and [place] laws. We do not discriminate on the basis of race, color, national origin, age, disability sex, sexual orientation or gender identity.            Thank you!     Thank you for choosing [facility]  for your care. Our goal is always to provide you with excellent care. Hearing back from our patients is one way we can continue to improve our services. Please take a few minutes to complete the written survey that you may receive in the mail after your visit with us. Thank you!             Your Updated Medication List - Protect others around you: Learn how to safely use, store and throw away your medicines at www.disposemymeds.org.          This list is accurate as of: 8/8/17 11:59 PM.  Always use your most recent med list.                   Brand Name Dispense Instructions for use Diagnosis    * capecitabine 500 MG tablet CHEMO    XELODA    84 tablet    Take 2 tablets (1,000 mg) by mouth 2 times daily for 21 days    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       * capecitabine 500 MG tablet CHEMO    XELODA    84 tablet    Take 2 tablets (1,000 mg) by mouth 2 times daily for 21 days    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       LORazepam 0.5 MG tablet    ATIVAN    30 tablet    Take 1 tablet (0.5 mg) by mouth every 4 hours as needed (Anxiety, Nausea/Vomiting or Sleep)     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       nystatin 474354 UNIT/ML suspension    MYCOSTATIN    473 mL    Take 5 mLs (500,000 Units) by mouth 4 times daily    Thrush       ondansetron 8 MG ODT tab    ZOFRAN-ODT    60 tablet    Take 1 tablet (8 mg) by mouth every 8 hours as needed for nausea    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        * Notice:  This list has 2 medication(s) that are the same as other medications prescribed for you. Read the directions carefully, and ask your doctor or other care provider to review them with you.

## 2017-08-08 NOTE — NURSING NOTE
"Oncology Rooming Note    August 8, 2017 12:04 PM   Daniel Sandhu is a 53 year old male who presents for: Oncology Clinic Visit and Port Draw    Initial Vitals: /62 (BP Location: Right arm, Patient Position: Chair, Cuff Size: Adult Regular)  Pulse 97  Temp 98.6  F (37  C) (Oral)  Resp 18  Ht 1.749 m (5' 8.86\")  Wt 72.1 kg (158 lb 14.4 oz)  SpO2 99%  BMI 23.56 kg/m2 Estimated body mass index is 23.56 kg/(m^2) as calculated from the following:    Height as of this encounter: 1.749 m (5' 8.86\").    Weight as of this encounter: 72.1 kg (158 lb 14.4 oz). Body surface area is 1.87 meters squared.  No Pain (1) Comment: Data Unavailable   No LMP for male patient.  Allergies reviewed: Yes  Medications reviewed: Yes    Medications: MEDICATION REFILLS NEEDED TODAY. Provider was NOT notified.  Pharmacy name entered into [facility]: [facility] - [address]    Clinical concerns:refill on Nystatin solution .  I informed pt to remind the Provider/ROMEO about refills in case i dont touch bases with them, if the provider was in the exam room while i attend on rooming the next pt. Pt verbalized understandings.  Pam Lui CMA     6 minutes for nursing intake (face to face time)     Pam Lui CMA                              "

## 2017-08-08 NOTE — PROGRESS NOTES
[facility]    Hematology/Oncology Established Patient Note      August 8, 2017      Reason for Follow-up: adenocarcinoma of the GE junction      HISTORY OF PRESENT ILLNESS: Daniel Sandhu is a 36 year old male who presents with adenocarcinoma of the GE junction.  In early 2017, patient started noticing difficulty swallowing, and that food seems to take longer to go down.  It became more frequent, and he saw his PCP, and was referred for EGD, which showed an esophageal mass located at the GE junction, measuring 4 cm.  Biopsy showed poorly differentiated adenocarcinoma.  HER-2 was sent and is equivocal (2+).  CT c/a/p showed a mildly prominent lymph node in the gastrohepatic ligament, but there were no pathologically enlarged lymph nodes in the chest, abdomen, or pelvis.  There was otherwise no evidence of metastatic disease.  PET-CT showed thickening of the distal esophagus consistent with known esophageal adenocarcinoma, as well as mildly hypermetabolic 1 cm lymph node in the gastrohepatic ligament.  There was no evidence of distant metastatic disease.  He underwent EUS on 6/9/17, which showed the esophageal tumor in the lower third of the esophagus, staged T2NX.  There were 2 abnormal lymph nodes seen in the gastrohepatic ligament; pathology was suspicious for adenocarcinoma.  Celiac node was sampled as well, which was benign.  He was seen by surgery, Dr. Esteban, and recommended srinivas-operative chemotherapy.    Port was placed on 6/22/17.    Chemotherapy:  Epirubicin 50 mg/m2 IV on day 1  Oxaliplatin 130 mg/m2 IV on day 1  Capecitabine 625 mg/m2 po BID on days 1-21  Every 21 day cycles    C1D1: 6/26/17  C2D1: 7/17/17  C3D1: anticipated for 8/7/17      INTERIM HISTORY: Daniel comes in for follow-up today. Overall, things are going well. The nausea was so much better with cycle 2, but at a cost for constipation. He was found to have a bleeding hemorrhoid in the ED. This hasn't re-bled and  bowels now moving. He has a few things in his arsenal to facitate a bowel movement but isn't needing anything right now. He is starting to notice some sensitivity on his hands and feet, especially with prolonged use. Using Udder Balm. No cracks, peeling, or blisters. GERD under control now. Minimal cold sensitivity. No neuropathy symptoms. Otherwise, no fevers, chills, cough, SOB, chest pain, abdominal pain, vomiting, bleeding, or swelling.     REVIEW OF SYSTEMS:   14 point ROS was reviewed and is negative other than as noted above in HPI.       HOME MEDICATIONS:  Current Outpatient Prescriptions   Medication Sig Dispense Refill     UNABLE TO FIND MEDICATION NAME: IV Chemo Therapy       nystatin (MYCOSTATIN) 104109 UNIT/ML suspension Take 5 mLs (500,000 Units) by mouth 4 times daily 60 mL 3     capecitabine (XELODA) 500 MG tablet CHEMO Take 2 tablets (1,000 mg) by mouth 2 times daily for 21 days 84 tablet 0     ondansetron (ZOFRAN-ODT) 8 MG ODT tab Take 1 tablet (8 mg) by mouth every 8 hours as needed for nausea 60 tablet 3     LORazepam (ATIVAN) 0.5 MG tablet Take 1 tablet (0.5 mg) by mouth every 4 hours as needed (Anxiety, Nausea/Vomiting or Sleep) 30 tablet 5     prochlorperazine (COMPAZINE) 10 MG tablet Take 1 tablet (10 mg) by mouth every 6 hours as needed (Nausea/Vomiting) 30 tablet 5     ondansetron (ZOFRAN) 8 MG tablet Take 1 tablet (8 mg) by mouth every 8 hours as needed (Nausea/Vomiting) 10 tablet 7         ALLERGIES:  No Known Allergies      PHYSICAL EXAM:  Vital signs:  /62 (BP Location: Right arm, Patient Position: Chair, Cuff Size: Adult Regular)  Pulse 97  Temp 98.6  F (37  C) (Oral)  Resp 18  Wt 72.1 kg (158 lb 14.4 oz)  SpO2 99%  BMI 23.56 kg/m2   ECO  GENERAL/CONSTITUTIONAL: No acute distress.  EYES: No scleral icterus.  MOUTH: No mucositis, no thrush.  RESPIRATORY: Clear to auscultation bilaterally. No crackles or wheezing.   CARDIOVASCULAR: Regular rate and rhythm without murmurs,  gallops, or rubs.  GASTROINTESTINAL: No tenderness. The patient has normal bowel sounds. No guarding.  No distention.  MUSCULOSKELETAL: Warm and well-perfused, no cyanosis, clubbing, or edema.  NEUROLOGIC: Alert, oriented, answers questions appropriately.  INTEGUMENTARY: No jaundice. Skin is intact without fissuring or blistering on hands and feet. Mild erythema in skin creases on palms.  Port in place.      LABS:  Results for DANIEL SANDHU (MRN [id number]) as of 8/8/2017 12:07   Ref. Range 7/29/2017 14:53 8/8/2017 11:58   WBC Latest Ref Range: 4.0 - 11.0 10e9/L 3.7 (L) 5.3   Hemoglobin Latest Ref Range: 13.3 - 17.7 g/dL 12.3 (L) 12.8 (L)   Hematocrit Latest Ref Range: 40.0 - 53.0 % 34.3 (L) 36.6 (L)   Platelet Count Latest Ref Range: 150 - 450 10e9/L 140 (L) 202   Results for DANIEL SANDHU (MRN [id number]) as of 8/8/2017 12:49   Ref. Range 7/29/2017 14:53 8/8/2017 11:58   Sodium Latest Ref Range: 133 - 144 mmol/L 140 138   Potassium Latest Ref Range: 3.4 - 5.3 mmol/L 3.9 4.0   Chloride Latest Ref Range: 94 - 109 mmol/L 106 104   Carbon Dioxide Latest Ref Range: 20 - 32 mmol/L 30 29   Urea Nitrogen Latest Ref Range: 7 - 30 mg/dL 14 15   Creatinine Latest Ref Range: 0.66 - 1.25 mg/dL 0.76 0.74   GFR Estimate Latest Ref Range: >60 mL/min/1.7m2 >90... >90...   GFR Estimate If Black Latest Ref Range: >60 mL/min/1.7m2 >90... >90...   Calcium Latest Ref Range: 8.5 - 10.1 mg/dL 8.3 (L) 8.8   Anion Gap Latest Ref Range: 3 - 14 mmol/L 5 5   Albumin Latest Ref Range: 3.4 - 5.0 g/dL 3.7 3.9   Protein Total Latest Ref Range: 6.8 - 8.8 g/dL 6.8 7.2   Bilirubin Total Latest Ref Range: 0.2 - 1.3 mg/dL 0.4 0.6   Alkaline Phosphatase Latest Ref Range: 40 - 150 U/L 57 71   ALT Latest Ref Range: 0 - 70 U/L 70 219 (H)   AST Latest Ref Range: 0 - 45 U/L 41 99 (H)   Glucose Latest Ref Range: 70 - 99 mg/dL 101 (H) 92     ASSESSMENT/PLAN:  Daniel DINESH Sandhu is a 36 year old male with:    1) Adenocarcinoma of the GE junction: measures 4 cm  on EGD.  Clinical stage T2N1M0 (stage IIB), based on PET-CT and EUS.  A gastrohepatic lymph node was biopsied and suspicious for adenocarcinoma.  He was seen by Dr. Esteban, and with the degree of extension into the stomach, his inclination is to treat him as a gastric cancer, and so perioperative chemotherapy, as per MAGIC trial, would be reasonable.  Plan was discussed at tumor conference, the group agreed with the recommendation.      He will undergo 3 cycles of neoadjuvant chemotherapy with EOX, followed by new PET-CT about 1 month after completion of chemotherapy, followed by surgery ~ 6 weeks after completion of neoadjuvant chemotherapy, followed by 3 cycles of adjuvant chemotherapy.    Today is cycle 3. He is tolerating well without nausea or GERD with the addition of Emend/Aloxi and Nexium. Elevated LFTs today, d/w pharmacy, requires 50% dose reduction of epirubicin.    -echo every 3 months, due in September prior to seeing Dr. Hood.    2) Genetics:  -he has met with genetic counselor, and panel was sent    3) Chemotherapy-induced nausea/vomiting:   -Improvement with addition of Emend and Aloxi to pre-meds  -he has Zofran ODT and Compazine, which are helpful    4) Oral thrush:  Resolved today, but has been recurrent. He wanted to  an Rx just in case. Sent.    5) GERD:  -he has Nexium    6) Constipation:  - Recommended starting the Miralax today in anticipation of constipation with the anti-emetics, continue with daily Miralax and add in dulcolax and/or Milk of Mag as needed. He didn't want an Rx for Senna-S.     7. Rectal bleeding: Went to ED 7/29 for rectal bleeding. Found to have internal hemorrhoid on anoscope. Encouraged to take sitz baths. Will work on constipation, as above.    8. ELevated LFTs: Likely secondary to oxali or epirubicin. Dose reduction as above.    9. HFS: Mild, skin integrity is intact. Mostly just mild tenderness. Continue with emollients. Can try epsom soaks. Avoid friction.  Call with any worsening sx.      Alisa Otero PA-C

## 2017-08-08 NOTE — MR AVS SNAPSHOT
After Visit Summary   8/8/2017    Daniel Sandhu    MRN: [id number]           Patient Information     Date Of Birth          1980        Visit Information        Provider Department      8/8/2017 1:00 PM  10 ATC;  ONCOLOGY INFUSION [facility]        Today's Diagnoses     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    -  1      Care Instructions    Contact Numbers    [facility] Main Line: [phone]  [facility] Triage:  [phone]    Call triage with chills and/or temperature greater than or equal to 100.5, uncontrolled nausea/vomiting, diarrhea, constipation, dizziness, shortness of breath, chest pain, bleeding, unexplained bruising, or any new/concerning symptoms, questions/concerns.     If you are having any concerning symptoms or wish to speak to a provider before your next infusion visit, please call your care coordinator or triage to notify them so we can adequately serve you.       After Hours: [phone]    If after hours, weekends, or holidays, call main hospital  and ask for Oncology doctor on call.         August 2017 Sunday Monday Tuesday Wednesday Thursday Friday Saturday             1     2     3     4     5       6     7     8     [facility] LAB DRAW   11:15 AM   (15 min.)   [facility] D[facility]   [facility] Lab Draw     P RETURN   11:35 AM   (50 min.)   Alisa Otero PA-C   [facility] ONC INFUSION 240    1:00 PM   (240 min.)    ONCOLOGY INFUSION   [facility] 9     10     11     12       13     14     15     16     17     18     19       20     21     22     23     24     25     26       27     28     29     30     31 September 2017 Sunday Monday Tuesday Wednesday Thursday Friday Saturday                            1     2       3     4     5     6     7     8     9       10     11     12     13     14     15     16       17     18     19     20     21      22     PE EYE/TH ONCOLOGY    9:15 AM   (45 min.)   [email or web address]   [facility] 23       24     25     [facility] LAB DRAW    8:00 AM   (15 min.)   [facility] LAB DRAW   [facility] Lab Draw     ECH COMPLETE    8:30 AM   (60 min.)   [id number]   [facility] Echo     UMP RETURN   10:00 AM   (30 min.)   Laurence Hood MD   [facility] 26     27     28     29     30                  Lab Results:  Recent Results (from the past 12 hour(s))   CBC with platelets differential    Collection Time: 08/08/17 11:58 AM   Result Value Ref Range    WBC 5.3 4.0 - 11.0 10e9/L    RBC Count 4.10 (L) 4.4 - 5.9 10e12/L    Hemoglobin 12.8 (L) 13.3 - 17.7 g/dL    Hematocrit 36.6 (L) 40.0 - 53.0 %    MCV 89 78 - 100 fl    MCH 31.2 26.5 - 33.0 pg    MCHC 35.0 31.5 - 36.5 g/dL    RDW 16.0 (H) 10.0 - 15.0 %    Platelet Count 202 150 - 450 10e9/L    Diff Method Automated Method     % Neutrophils 50.8 %    % Lymphocytes 29.9 %    % Monocytes 16.6 %    % Eosinophils 1.7 %    % Basophils 0.6 %    % Immature Granulocytes 0.4 %    Nucleated RBCs 0 0 /100    Absolute Neutrophil 2.7 1.6 - 8.3 10e9/L    Absolute Lymphocytes 1.6 0.8 - 5.3 10e9/L    Absolute Monocytes 0.9 0.0 - 1.3 10e9/L    Absolute Eosinophils 0.1 0.0 - 0.7 10e9/L    Absolute Basophils 0.0 0.0 - 0.2 10e9/L    Abs Immature Granulocytes 0.0 0 - 0.4 10e9/L    Absolute Nucleated RBC 0.0    Comprehensive metabolic panel    Collection Time: 08/08/17 11:58 AM   Result Value Ref Range    Sodium 138 133 - 144 mmol/L    Potassium 4.0 3.4 - 5.3 mmol/L    Chloride 104 94 - 109 mmol/L    Carbon Dioxide 29 20 - 32 mmol/L    Anion Gap 5 3 - 14 mmol/L    Glucose 92 70 - 99 mg/dL    Urea Nitrogen 15 7 - 30 mg/dL    Creatinine 0.74 0.66 - 1.25 mg/dL    GFR Estimate >90  Non  GFR Calc   >60 mL/min/1.7m2    GFR Estimate If Black >90   GFR Calc   >60 mL/min/1.7m2    Calcium 8.8 8.5 - 10.1 mg/dL    Bilirubin Total 0.6 0.2 - 1.3 mg/dL     Albumin 3.9 3.4 - 5.0 g/dL    Protein Total 7.2 6.8 - 8.8 g/dL    Alkaline Phosphatase 71 40 - 150 U/L     (H) 0 - 70 U/L    AST 99 (H) 0 - 45 U/L               Follow-ups after your visit        Your next 10 appointments already scheduled     Sep 22, 2017  9:15 AM CDT   PE NPET ONCOLOGY (EYES TO THIGHS) with MGPET1   [facility]    [id number] [place] [address] [phone]           Tell your doctor:   If there is any chance you may be pregnant or if you are breastfeeding.   If you have problems lying in small spaces (claustrophobia). If you do, your doctor may give you medicine to help you relax. If you have diabetes:   Have your exam early in the morning. Your blood glucose will go up as the day goes by.   Your glucose level must be 180 or less at the start of the exam. Please take any medicines you need to ensure this blood glucose level. 24 hours before your scan: Don t do any heavy exercise. (No jogging, aerobics or other workouts.) Exercise will make your pictures less accurate. 6 hours before your scan:   Stop all food and liquids (except water).   Do not chew gum or suck on mints.   If you need to take medicine with food, you may take it with a few crackers.  Please call your Imaging Department at your exam site with any questions.            Sep 25, 2017  8:00 AM CDT   [facility] Lab Draw with  MASONIC LAB DRAW   [facility] Lab Draw ([facility])    [address] [place] [address] [phone]            Sep 25, 2017  8:30 AM CDT   Ech Complete with [id number]   [facility] Echo ([facility])    [address] [address] [phone]           1. Please bring or wear a comfortable two-piece outfit. 2. You may eat, drink and take your normal medicines. 3. For any questions that cannot be answered, please contact the  ordering physician            Sep 25, 2017 10:15 AM CDT   (Arrive by 10:00 AM)   Return Visit with Laurence Hood MD   [facility] ([facility])    [address] [address] [phone]              Future tests that were ordered for you today     Open Future Orders        Priority Expected Expires Ordered    CBC with platelets differential Routine 9/25/2017 11/8/2017 8/8/2017    Comprehensive metabolic panel Routine 9/25/2017 11/8/2017 8/8/2017    Echocardiogram Complete Routine  8/8/2018 8/8/2017            Who to contact     If you have questions or need follow up information about today's clinic visit or your schedule please contact [facility] directly at [phone].  Normal or non-critical lab and imaging results will be communicated to you by [employer], letter or phone within 4 business days after the clinic has received the results. If you do not hear from us within 7 days, please contact the clinic through [employer] or phone. If you have a critical or abnormal lab result, we will notify you by phone as soon as possible.  Submit refill requests through [employer] or call your pharmacy and they will forward the refill request to us. Please allow 3 business days for your refill to be completed.          Additional Information About Your Visit        MyChart Information     [employer] gives you secure access to your electronic health record. If you see a primary care provider, you can also send messages to your care team and make appointments. If you have questions, please call your primary care clinic.  If you do not have a primary care provider, please call [phone] and they will assist you.        Care EveryWhere ID     This is your Care EveryWhere ID. This could be used by other organizations to access your [employer] medical records  [id number]         Blood Pressure from Last 3 Encounters:   08/08/17  123/62   07/29/17 112/72   07/24/17 102/63    Weight from Last 3 Encounters:   08/08/17 72.1 kg (158 lb 14.4 oz)   07/29/17 69.3 kg (152 lb 12.5 oz)   07/24/17 69.4 kg (153 lb)              We Performed the Following     CBC with platelets differential     Comprehensive metabolic panel          Today's Medication Changes          These changes are accurate as of: 8/8/17  5:53 PM.  If you have any questions, ask your nurse or doctor.               Start taking these medicines.        Dose/Directions    nystatin 937537 UNIT/ML suspension   Commonly known as:  MYCOSTATIN   Used for:  Thrush   Started by:  Alisa Otero PA-C        Dose:  520651 Units   Take 5 mLs (500,000 Units) by mouth 4 times daily   Quantity:  473 mL   Refills:  3         These medicines have changed or have updated prescriptions.        Dose/Directions    * capecitabine 500 MG tablet CHEMO   Commonly known as:  XELODA   This may have changed:  Another medication with the same name was added. Make sure you understand how and when to take each.   Used for:  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        Dose:  625 mg/m2   Take 2 tablets (1,000 mg) by mouth 2 times daily for 21 days   Quantity:  84 tablet   Refills:  0       * capecitabine 500 MG tablet CHEMO   Commonly known as:  XELODA   This may have changed:  You were already taking a medication with the same name, and this prescription was added. Make sure you understand how and when to take each.   Used for:  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        Dose:  625 mg/m2   Take 2 tablets (1,000 mg) by mouth 2 times daily for 21 days   Quantity:  84 tablet   Refills:  0       * Notice:  This list has 2 medication(s) that are the same as other medications prescribed for you. Read the directions carefully, and ask your doctor or other care provider to review them with you.         Where to get your medicines      These medications were sent to [facility]  [place], [place] - [address]  [address], [facility] [address]    Hours:  TRANSPLANT PHONE NUMBER [phone] Phone:  [phone]     capecitabine 500 MG tablet CHEMO    nystatin 085686 UNIT/ML suspension                Primary Care Provider Office Phone # Fax #    Lencho Chan [phone]       [address] [facility] [address]        Equal Access to Services     SHAWN SWAIN : Hadii aad ku hadasho Soomaali, waaxda luqadaha, qaybta kaalmada adeegyada, waxay idiin hayaan adeeg kharash la'marla . So [facility] [phone].    ATENCIÓN: Si habla español, tiene a zayas disposición servicios gratuitos de asistencia lingüística. [facility] [phone].    We comply with applicable federal civil rights laws and [place] laws. We do not discriminate on the basis of race, color, national origin, age, disability sex, sexual orientation or gender identity.            Thank you!     Thank you for choosing [facility]  for your care. Our goal is always to provide you with excellent care. Hearing back from our patients is one way we can continue to improve our services. Please take a few minutes to complete the written survey that you may receive in the mail after your visit with us. Thank you!             Your Updated Medication List - Protect others around you: Learn how to safely use, store and throw away your medicines at www.disposemymeds.org.          This list is accurate as of: 8/8/17  5:53 PM.  Always use your most recent med list.                   Brand Name Dispense Instructions for use Diagnosis    * capecitabine 500 MG tablet CHEMO    XELODA    84 tablet    Take 2 tablets (1,000 mg) by mouth 2 times daily for 21 days    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       * capecitabine 500 MG tablet CHEMO    XELODA    84 tablet    Take 2 tablets (1,000 mg) by mouth 2 times daily for 21 days    Malignant neoplasm  of lower third of esophagus (H)       LORazepam 0.5 MG tablet    ATIVAN    30 tablet    Take 1 tablet (0.5 mg) by mouth every 4 hours as needed (Anxiety, Nausea/Vomiting or Sleep)    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       nystatin 977665 UNIT/ML suspension    MYCOSTATIN    473 mL    Take 5 mLs (500,000 Units) by mouth 4 times daily    Thrush       ondansetron 8 MG ODT tab    ZOFRAN-ODT    60 tablet    Take 1 tablet (8 mg) by mouth every 8 hours as needed for nausea    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        * Notice:  This list has 2 medication(s) that are the same as other medications prescribed for you. Read the directions carefully, and ask your doctor or other care provider to review them with you.

## 2017-08-08 NOTE — LETTER
8/8/2017      RE: Daniel Sandhu  [address] [facility] [address]       [facility]    Hematology/Oncology Established Patient Note      August 8, 2017      Reason for Follow-up: adenocarcinoma of the GE junction      HISTORY OF PRESENT ILLNESS: Daniel Sandhu is a 36 year old male who presents with adenocarcinoma of the GE junction.  In early 2017, patient started noticing difficulty swallowing, and that food seems to take longer to go down.  It became more frequent, and he saw his PCP, and was referred for EGD, which showed an esophageal mass located at the GE junction, measuring 4 cm.  Biopsy showed poorly differentiated adenocarcinoma.  HER-2 was sent and is equivocal (2+).  CT c/a/p showed a mildly prominent lymph node in the gastrohepatic ligament, but there were no pathologically enlarged lymph nodes in the chest, abdomen, or pelvis.  There was otherwise no evidence of metastatic disease.  PET-CT showed thickening of the distal esophagus consistent with known esophageal adenocarcinoma, as well as mildly hypermetabolic 1 cm lymph node in the gastrohepatic ligament.  There was no evidence of distant metastatic disease.  He underwent EUS on 6/9/17, which showed the esophageal tumor in the lower third of the esophagus, staged T2NX.  There were 2 abnormal lymph nodes seen in the gastrohepatic ligament; pathology was suspicious for adenocarcinoma.  Celiac node was sampled as well, which was benign.  He was seen by surgery, Dr. Esteban, and recommended srinivas-operative chemotherapy.    Port was placed on 6/22/17.    Chemotherapy:  Epirubicin 50 mg/m2 IV on day 1  Oxaliplatin 130 mg/m2 IV on day 1  Capecitabine 625 mg/m2 po BID on days 1-21  Every 21 day cycles    C1D1: 6/26/17  C2D1: 7/17/17  C3D1: anticipated for 8/7/17      INTERIM HISTORY: Daniel comes in for follow-up today. Overall, things are going well. The nausea was so much better with cycle 2, but at a cost for  constipation. He was found to have a bleeding hemorrhoid in the ED. This hasn't re-bled and bowels now moving. He has a few things in his arsenal to facitate a bowel movement but isn't needing anything right now. He is starting to notice some sensitivity on his hands and feet, especially with prolonged use. Using Udder Balm. No cracks, peeling, or blisters. GERD under control now. Minimal cold sensitivity. No neuropathy symptoms. Otherwise, no fevers, chills, cough, SOB, chest pain, abdominal pain, vomiting, bleeding, or swelling.     REVIEW OF SYSTEMS:   14 point ROS was reviewed and is negative other than as noted above in HPI.       HOME MEDICATIONS:  Current Outpatient Prescriptions   Medication Sig Dispense Refill     UNABLE TO FIND MEDICATION NAME: IV Chemo Therapy       nystatin (MYCOSTATIN) 365767 UNIT/ML suspension Take 5 mLs (500,000 Units) by mouth 4 times daily 60 mL 3     capecitabine (XELODA) 500 MG tablet CHEMO Take 2 tablets (1,000 mg) by mouth 2 times daily for 21 days 84 tablet 0     ondansetron (ZOFRAN-ODT) 8 MG ODT tab Take 1 tablet (8 mg) by mouth every 8 hours as needed for nausea 60 tablet 3     LORazepam (ATIVAN) 0.5 MG tablet Take 1 tablet (0.5 mg) by mouth every 4 hours as needed (Anxiety, Nausea/Vomiting or Sleep) 30 tablet 5     prochlorperazine (COMPAZINE) 10 MG tablet Take 1 tablet (10 mg) by mouth every 6 hours as needed (Nausea/Vomiting) 30 tablet 5     ondansetron (ZOFRAN) 8 MG tablet Take 1 tablet (8 mg) by mouth every 8 hours as needed (Nausea/Vomiting) 10 tablet 7         ALLERGIES:  No Known Allergies      PHYSICAL EXAM:  Vital signs:  /62 (BP Location: Right arm, Patient Position: Chair, Cuff Size: Adult Regular)  Pulse 97  Temp 98.6  F (37  C) (Oral)  Resp 18  Wt 72.1 kg (158 lb 14.4 oz)  SpO2 99%  BMI 23.56 kg/m2   ECO  GENERAL/CONSTITUTIONAL: No acute distress.  EYES: No scleral icterus.  MOUTH: No mucositis, no thrush.  RESPIRATORY: Clear to auscultation  bilaterally. No crackles or wheezing.   CARDIOVASCULAR: Regular rate and rhythm without murmurs, gallops, or rubs.  GASTROINTESTINAL: No tenderness. The patient has normal bowel sounds. No guarding.  No distention.  MUSCULOSKELETAL: Warm and well-perfused, no cyanosis, clubbing, or edema.  NEUROLOGIC: Alert, oriented, answers questions appropriately.  INTEGUMENTARY: No jaundice. Skin is intact without fissuring or blistering on hands and feet. Mild erythema in skin creases on palms.  Port in place.      LABS:  Results for ABELARDO ARMAS (MRN [id number]) as of 8/8/2017 12:07   Ref. Range 7/29/2017 14:53 8/8/2017 11:58   WBC Latest Ref Range: 4.0 - 11.0 10e9/L 3.7 (L) 5.3   Hemoglobin Latest Ref Range: 13.3 - 17.7 g/dL 12.3 (L) 12.8 (L)   Hematocrit Latest Ref Range: 40.0 - 53.0 % 34.3 (L) 36.6 (L)   Platelet Count Latest Ref Range: 150 - 450 10e9/L 140 (L) 202   Results for ABELARDO ARMAS (MRN [id number]) as of 8/8/2017 12:49   Ref. Range 7/29/2017 14:53 8/8/2017 11:58   Sodium Latest Ref Range: 133 - 144 mmol/L 140 138   Potassium Latest Ref Range: 3.4 - 5.3 mmol/L 3.9 4.0   Chloride Latest Ref Range: 94 - 109 mmol/L 106 104   Carbon Dioxide Latest Ref Range: 20 - 32 mmol/L 30 29   Urea Nitrogen Latest Ref Range: 7 - 30 mg/dL 14 15   Creatinine Latest Ref Range: 0.66 - 1.25 mg/dL 0.76 0.74   GFR Estimate Latest Ref Range: >60 mL/min/1.7m2 >90... >90...   GFR Estimate If Black Latest Ref Range: >60 mL/min/1.7m2 >90... >90...   Calcium Latest Ref Range: 8.5 - 10.1 mg/dL 8.3 (L) 8.8   Anion Gap Latest Ref Range: 3 - 14 mmol/L 5 5   Albumin Latest Ref Range: 3.4 - 5.0 g/dL 3.7 3.9   Protein Total Latest Ref Range: 6.8 - 8.8 g/dL 6.8 7.2   Bilirubin Total Latest Ref Range: 0.2 - 1.3 mg/dL 0.4 0.6   Alkaline Phosphatase Latest Ref Range: 40 - 150 U/L 57 71   ALT Latest Ref Range: 0 - 70 U/L 70 219 (H)   AST Latest Ref Range: 0 - 45 U/L 41 99 (H)   Glucose Latest Ref Range: 70 - 99 mg/dL 101 (H) 92     ASSESSMENT/PLAN:   Daniel Sadnhu is a 36 year old male with:    1) Adenocarcinoma of the GE junction: measures 4 cm on EGD.  Clinical stage T2N1M0 (stage IIB), based on PET-CT and EUS.  A gastrohepatic lymph node was biopsied and suspicious for adenocarcinoma.  He was seen by Dr. Esteban, and with the degree of extension into the stomach, his inclination is to treat him as a gastric cancer, and so perioperative chemotherapy, as per MAGIC trial, would be reasonable.  Plan was discussed at tumor conference, the group agreed with the recommendation.      He will undergo 3 cycles of neoadjuvant chemotherapy with EOX, followed by new PET-CT about 1 month after completion of chemotherapy, followed by surgery ~ 6 weeks after completion of neoadjuvant chemotherapy, followed by 3 cycles of adjuvant chemotherapy.    Today is cycle 3. He is tolerating well without nausea or GERD with the addition of Emend/Aloxi and Nexium. Elevated LFTs today, d/w pharmacy, requires 50% dose reduction of epirubicin.    -echo every 3 months, due in September prior to seeing Dr. Hood.    2) Genetics:  -he has met with genetic counselor, and panel was sent    3) Chemotherapy-induced nausea/vomiting:   -Improvement with addition of Emend and Aloxi to pre-meds  -he has Zofran ODT and Compazine, which are helpful    4) Oral thrush:  Resolved today, but has been recurrent. He wanted to  an Rx just in case. Sent.    5) GERD:  -he has Nexium    6) Constipation:  - Recommended starting the Miralax today in anticipation of constipation with the anti-emetics, continue with daily Miralax and add in dulcolax and/or Milk of Mag as needed. He didn't want an Rx for Senna-S.     7. Rectal bleeding: Went to ED 7/29 for rectal bleeding. Found to have internal hemorrhoid on anoscope. Encouraged to take sitz baths. Will work on constipation, as above.    8. ELevated LFTs: Likely secondary to oxali or epirubicin. Dose reduction as above.    9. HFS: Mild, skin integrity is  intact. Mostly just mild tenderness. Continue with emollients. Can try epsom soaks. Avoid friction. Call with any worsening sx.    Alisa Otero PA-C

## 2017-08-11 NOTE — PROGRESS NOTES
D: 37 year old male with esophageal cancer  I: Patient request to meet with social work  A: ALEXA, covering for ALEXA Enrique, met with patient, his wife and their baby in infusion per patient's request.  Patient indicated his new baby is four weeks old ([place]) and that the family is anticipating that his wife will need to be a caregiver for both the infant and patient following his surgery.  He also reported his wife may have to work during this time period in order to provide income for the family.  Their primary concern during visit was that their older daughter (4.4 yo) would be starting  in the fall and they have attempted to contact the [facility] about financial assistance with  costs but were told they would need to be eligible for general assistance in order to receive help with education.  Patient stated after looking into general assistance that the family would make too much income in order to be eligible.  SW suggested patient talk directly with the  in order to see if a scholarship program or some other financial aid would be available particularly for a private school, which patient confirmed.  ALEXA also provided information on community organizations that could assist in seeing if patient would be eligible for any other state based assistance with education.      Patient had also brought his completed advance directive.  Healthcare directive was notarized while he was in clinic and copy was sent to be scanned into medical record.      ALEXA also provided [employer] pack for patient's children.    P: ALEXA Enrique contact information provided and is available to assist with any other identified needs, questions or concerns.     Soo Yeon Han, [facility]  [phone]

## 2017-08-25 ENCOUNTER — CARE COORDINATION (OUTPATIENT)
Dept: ONCOLOGY | Facility: CLINIC | Age: 37
End: 2017-08-25

## 2017-08-25 ENCOUNTER — ONCOLOGY VISIT (OUTPATIENT)
Dept: ONCOLOGY | Facility: CLINIC | Age: 37
End: 2017-08-25
Attending: PHYSICIAN ASSISTANT
Payer: COMMERCIAL

## 2017-08-25 VITALS
WEIGHT: 157.9 LBS | RESPIRATION RATE: 16 BRPM | HEART RATE: 78 BPM | DIASTOLIC BLOOD PRESSURE: 69 MMHG | OXYGEN SATURATION: 100 % | TEMPERATURE: 97.9 F | BODY MASS INDEX: 23.39 KG/M2 | SYSTOLIC BLOOD PRESSURE: 120 MMHG | HEIGHT: 69 IN

## 2017-08-25 DIAGNOSIS — C15.5 MALIGNANT NEOPLASM OF LOWER THIRD OF ESOPHAGUS (H): Primary | ICD-10-CM

## 2017-08-25 DIAGNOSIS — N48.9 PENILE LESION: Primary | ICD-10-CM

## 2017-08-25 DIAGNOSIS — C15.5 MALIGNANT NEOPLASM OF LOWER THIRD OF ESOPHAGUS (H): ICD-10-CM

## 2017-08-25 LAB
DIFFERENTIAL METHOD BLD: NORMAL
ERYTHROCYTE [DISTWIDTH] IN BLOOD BY AUTOMATED COUNT: NORMAL % (ref 10–15)
HCT VFR BLD AUTO: NORMAL % (ref 40–53)
HGB BLD-MCNC: NORMAL G/DL (ref 13.3–17.7)
MCH RBC QN AUTO: NORMAL PG (ref 26.5–33)
MCHC RBC AUTO-ENTMCNC: NORMAL G/DL (ref 31.5–36.5)
MCV RBC AUTO: NORMAL FL (ref 78–100)
PLATELET # BLD AUTO: NORMAL 10E9/L (ref 150–450)
RBC # BLD AUTO: NORMAL 10E12/L (ref 4.4–5.9)
WBC # BLD AUTO: NORMAL 10E9/L (ref 4–11)

## 2017-08-25 PROCEDURE — 99213 OFFICE O/P EST LOW 20 MIN: CPT | Mod: ZP | Performed by: PHYSICIAN ASSISTANT

## 2017-08-25 PROCEDURE — 99212 OFFICE O/P EST SF 10 MIN: CPT | Mod: ZF

## 2017-08-25 ASSESSMENT — ENCOUNTER SYMPTOMS
NAIL CHANGES: 0
POOR WOUND HEALING: 1
SKIN CHANGES: 0

## 2017-08-25 ASSESSMENT — PAIN SCALES - GENERAL: PAINLEVEL: NO PAIN (0)

## 2017-08-25 NOTE — NURSING NOTE
"Oncology Rooming Note    August 25, 2017 4:13 PM   Daniel Sandhu is a 37 year old male who presents for:    Chief Complaint   Patient presents with     Blood Draw     Labs drawn from right arm in lab by MA     Oncology Clinic Visit     Follow up-Esophageal CA     Initial Vitals: /69  Pulse 78  Temp 97.9  F (36.6  C) (Oral)  Resp 16  Ht 1.749 m (5' 8.86\")  Wt 71.6 kg (157 lb 14.4 oz)  SpO2 100%  BMI 23.41 kg/m2 Estimated body mass index is 23.41 kg/(m^2) as calculated from the following:    Height as of this encounter: 1.749 m (5' 8.86\").    Weight as of this encounter: 71.6 kg (157 lb 14.4 oz). Body surface area is 1.87 meters squared.  No Pain (0) Comment: Data Unavailable   No LMP for male patient.  Allergies reviewed: Yes  Medications reviewed: Yes    Medications: Medication refills not needed today.  Pharmacy name entered into [facility]: [facility] [place], [place] - [address] [place] [address]    Clinical concerns: Sores on Penis, Hands and Feet Callus Alisa Otero was notified.    10 minutes for nursing intake (face to face time)     Rhonda Swain LPN              "

## 2017-08-25 NOTE — PROGRESS NOTES
Reason for Outgoing Call:  See pt's [employer] message re: sx  Nursing Action/Patient Instruction:  Discussed sx with MATHEUS Gomez who will see pt today  with labs at 3:15, and pt agrees with this plan

## 2017-08-25 NOTE — MR AVS SNAPSHOT
After Visit Summary   8/25/2017    Daniel Sandhu    MRN: [id number]           Patient Information     Date Of Birth          1980        Visit Information        Provider Department      8/25/2017 4:10 PM Alisa Otero PA-C [facility]        Today's Diagnoses     Penile lesion    -  1    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           Follow-ups after your visit        Your next 10 appointments already scheduled     Sep 22, 2017  9:15 AM CDT   PE NPET ONCOLOGY (EYES TO THIGHS) with [id number]   [facility] ([facility])    [id number] [place] [address] [phone]           Tell your doctor:   If there is any chance you may be pregnant or if you are breastfeeding.   If you have problems lying in small spaces (claustrophobia). If you do, your doctor may give you medicine to help you relax. If you have diabetes:   Have your exam early in the morning. Your blood glucose will go up as the day goes by.   Your glucose level must be 180 or less at the start of the exam. Please take any medicines you need to ensure this blood glucose level. 24 hours before your scan: Don t do any heavy exercise. (No jogging, aerobics or other workouts.) Exercise will make your pictures less accurate. 6 hours before your scan:   Stop all food and liquids (except water).   Do not chew gum or suck on mints.   If you need to take medicine with food, you may take it with a few crackers.  Please call your Imaging Department at your exam site with any questions.            Sep 25, 2017  8:00 AM CDT   Masonic Lab Draw with  MASONIC LAB DRAW   [facility] Masonic Lab Draw ([facility])    [address] [place] [id number] [phone]            Sep 25, 2017  8:30 AM CDT   Ech Complete with [id number]   [facility] Echo ([facility])    [address] [place] [address]  Floor  [facility] [address] [phone]           1. Please bring or wear a comfortable two-piece outfit. 2. You may eat, drink and take your normal medicines. 3. For any questions that cannot be answered, please contact the ordering physician            Sep 25, 2017 10:15 AM CDT   (Arrive by 10:00 AM)   Return Visit with Laurence Hood MD   [facility] ([facility])    [address] [facility]  [address] [facility] [address] [phone]              Who to contact     If you have questions or need follow up information about today's clinic visit or your schedule please contact [facility] directly at [phone].  Normal or non-critical lab and imaging results will be communicated to you by MyChart, letter or phone within 4 business days after the clinic has received the results. If you do not hear from us within 7 days, please contact the clinic through [employer] or phone. If you have a critical or abnormal lab result, we will notify you by phone as soon as possible.  Submit refill requests through [employer] or call your pharmacy and they will forward the refill request to us. Please allow 3 business days for your refill to be completed.          Additional Information About Your Visit        [employer] Information     [employer] gives you secure access to your electronic health record. If you see a primary care provider, you can also send messages to your care team and make appointments. If you have questions, please call your primary care clinic.  If you do not have a primary care provider, please call [phone] and they will assist you.        Care EveryWhere ID     This is your Care EveryWhere ID. This could be used by other organizations to access your [facility] medical records  [id number]        Your Vitals Were     Pulse Temperature Respirations Height Pulse Oximetry BMI (Body Mass Index)    78 97.9  F (36.6  C) (Oral) 16 1.749  "m (5' 8.86\") 100% 23.41 kg/m2       Blood Pressure from Last 3 Encounters:   08/25/17 120/69   08/08/17 123/62   07/29/17 112/72    Weight from Last 3 Encounters:   08/25/17 71.6 kg (157 lb 14.4 oz)   08/08/17 72.1 kg (158 lb 14.4 oz)   07/29/17 69.3 kg (152 lb 12.5 oz)              Today, you had the following     No orders found for display         Today's Medication Changes          These changes are accurate as of: 8/25/17  5:17 PM.  If you have any questions, ask your nurse or doctor.               Stop taking these medicines if you haven't already. Please contact your care team if you have questions.     UNABLE TO FIND   Stopped by:  Alisa Otero PA-C                    Primary Care Provider Office Phone # Fax #    Lencho Chan [phone]       [address] [address]        Equal Access to Services     [facility]: Hadii aad ku hadasho Soomaali, waaxda luqadaha, qaybta kaalmada adeegyada, waxay idiin hayewan madelyn brown . So [facility] [phone].    ATENCIÓN: Si habla español, tiene a zayas disposición servicios gratuitos de asistencia lingüística. LlFort [facility] [phone].    We comply with applicable federal civil rights laws and [place] laws. We do not discriminate on the basis of race, color, national origin, age, disability sex, sexual orientation or gender identity.            Thank you!     Thank you for choosing [facility]  for your care. Our goal is always to provide you with excellent care. Hearing back from our patients is one way we can continue to improve our services. Please take a few minutes to complete the written survey that you may receive in the mail after your visit with us. Thank you!             Your Updated Medication List - Protect others around you: Learn how to safely use, store and throw away your medicines at www.disposemymeds.org.          This list is accurate as of: 8/25/17  5:17 PM.  Always use " your most recent med list.                   Brand Name Dispense Instructions for use Diagnosis    capecitabine 500 MG tablet CHEMO    XELODA    84 tablet    Take 2 tablets (1,000 mg) by mouth 2 times daily for 21 days    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       LORazepam 0.5 MG tablet    ATIVAN    30 tablet    Take 1 tablet (0.5 mg) by mouth every 4 hours as needed (Anxiety, Nausea/Vomiting or Sleep)    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       nystatin [id number] UNIT/ML suspension    MYCOSTATIN    473 mL    Take 5 mLs (500,000 Units) by mouth 4 times daily    Thrush       ondansetron 8 MG ODT tab    ZOFRAN-ODT    60 tablet    Take 1 tablet (8 mg) by mouth every 8 hours as needed for nausea    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, Non-intractable vomiting with nausea, unspecified vomiting type       ondansetron 8 MG tablet    ZOFRAN    10 tablet    Take 1 tablet (8 mg) by mouth every 8 hours as needed (Nausea/Vomiting)    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       prochlorperazine 10 MG tablet    COMPAZINE    30 tablet    Take 1 tablet (10 mg) by mouth every 6 hours as needed (Nausea/Vomiting)    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

## 2017-08-25 NOTE — LETTER
8/25/2017      RE: Daniel Sandhu  [address] [place] [address]       [facility]    Hematology/Oncology Established Patient Note      August 25, 2017    Reason for Follow-up: add-on for penile sores in patient w/ adenocarcinoma of the GE junction    HISTORY OF PRESENT ILLNESS: Daniel Sandhu is a 36 year old male who presents with adenocarcinoma of the GE junction.  In early 2017, patient started noticing difficulty swallowing, and that food seems to take longer to go down.  It became more frequent, and he saw his PCP, and was referred for EGD, which showed an esophageal mass located at the GE junction, measuring 4 cm.  Biopsy showed poorly differentiated adenocarcinoma.  HER-2 was sent and is equivocal (2+).  CT c/a/p showed a mildly prominent lymph node in the gastrohepatic ligament, but there were no pathologically enlarged lymph nodes in the chest, abdomen, or pelvis.  There was otherwise no evidence of metastatic disease.  PET-CT showed thickening of the distal esophagus consistent with known esophageal adenocarcinoma, as well as mildly hypermetabolic 1 cm lymph node in the gastrohepatic ligament.  There was no evidence of distant metastatic disease.  He underwent EUS on 6/9/17, which showed the esophageal tumor in the lower third of the esophagus, staged T2NX.  There were 2 abnormal lymph nodes seen in the gastrohepatic ligament; pathology was suspicious for adenocarcinoma.  Celiac node was sampled as well, which was benign.  He was seen by surgery, Dr. Esteban, and recommended srinivas-operative chemotherapy.    Port was placed on 6/22/17.    Chemotherapy:  Epirubicin 50 mg/m2 IV on day 1  Oxaliplatin 130 mg/m2 IV on day 1  Capecitabine 625 mg/m2 po BID on days 1-21  Every 21 day cycles    C1D1: 6/26/17  C2D1: 7/17/17  C3D1:  8/7/17      INTERIM HISTORY: Daniel comes in on an add-on basis for concerns of penile lesions again. He had a small lesion on the tip of his pain  "with cycle 2. It ultimately resolved off Xeloda and with anti-biotic ointment.    Three days ago, he was at [employer] and started to notice some discomfort on the tip of his penis as it was rubbing on his underwear. It got progressively worse. He examined the area when he got home and it looked like a \"rug burn\". He has been applying gauze and anti-biotic cream, which does seem to be helping. He hasn't noticed any fevers, discharge, drainage, or additional lesions. The area is in the area of his penis that rubs on his underwear. There is no pain at rest, only painful when it rubs. Aside from the smaller lesion with cycle 2, never has had a penile lesion. Reports he has no concerns regarding STD with himself or his wife, see [employer] message for his \"sexual tree\".     Hand foot syndrome has been overall stable, has callouses on his hands and feet with redness, small dry fissuring on his palm creases without any actual open skin, no blisters.     Otherwise doing well.           HOME MEDICATIONS:  Current Outpatient Prescriptions   Medication Sig Dispense Refill     nystatin (MYCOSTATIN) 196944 UNIT/ML suspension Take 5 mLs (500,000 Units) by mouth 4 times daily 473 mL 3     capecitabine (XELODA) 500 MG tablet CHEMO Take 2 tablets (1,000 mg) by mouth 2 times daily for 21 days 84 tablet 0     UNABLE TO FIND MEDICATION NAME: IV Chemo Therapy       ondansetron (ZOFRAN-ODT) 8 MG ODT tab Take 1 tablet (8 mg) by mouth every 8 hours as needed for nausea (Patient not taking: Reported on 8/8/2017) 60 tablet 3     LORazepam (ATIVAN) 0.5 MG tablet Take 1 tablet (0.5 mg) by mouth every 4 hours as needed (Anxiety, Nausea/Vomiting or Sleep) (Patient not taking: Reported on 8/8/2017) 30 tablet 5     prochlorperazine (COMPAZINE) 10 MG tablet Take 1 tablet (10 mg) by mouth every 6 hours as needed (Nausea/Vomiting) (Patient not taking: Reported on 8/8/2017) 30 tablet 5     ondansetron (ZOFRAN) 8 MG tablet Take 1 tablet (8 mg) by mouth every " "8 hours as needed (Nausea/Vomiting) (Patient not taking: Reported on 2017) 10 tablet 7         ALLERGIES:  No Known Allergies      PHYSICAL EXAM:  Vital signs:  /69  Pulse 78  Temp 97.9  F (36.6  C) (Oral)  Resp 16  Ht 1.749 m (5' 8.86\")  Wt 71.6 kg (157 lb 14.4 oz)  SpO2 100%  BMI 23.41 kg/m2   ECO  GENERAL/CONSTITUTIONAL: No acute distress.  INTEGUMENTARY: No jaundice. Hands with blotchy erythema and leathered appearance, dry fissuring on palmar creases and fingers, without any open skin. No blisters.  Port in place.    Genital: two small superficial erythematous erosions on the tip of the penis on either side of the urethral meatus, in distrubution of where the tip of the penis comes in contact with underwear, without drainage    ASSESSMENT/PLAN:  Daniel Sandhu is a 36 year old male with adenocarcinoma of the GE junction, currently on chemotherapy with EOX, here today on an add-on basis for concerns of recurrent penile lesions.    1) Penile lesions: Daniel had a small area of erosion/pain on the tip of his penis towards the end of cycle 2, improved off Xeloda and with antibiotic cream prescribed by PCP. Three days ago, lesion returned however affected larger area of his head of penis, only in the area that comes in contact with his underwear. He has already found relief with covering the area and avoiding friction, no pain at rest, no drainage, and a negative sexual history. On exam there are two small areas of erosion on the tip of the penis on either side of the urethra, without drainage or vesicles. This does not have the appearance of Herpes or any other STDs, does not look like an infection.      While I have not personally seen this side effect nor is it listed on UTD, I was able to find a case report in [facility] reporting three cases of genital erythrodysesthesia secondary to capecitabine, which involved painful erythema, crusts, and erosions. Topical steroids, anti-fungals, and " anti-biotics were all infective. The lesions resolved within 10 days of cessation of capecitabine. The lesions that Daniel has do look similar to these lesions.    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 We discussed that stopping the Xeloda will be the only way to allow this to heal, and that since I have not seen this before, I do not know the repercussion of continuing the medication, but discussed perhaps that he could have complications of scarring, more significant tissue damage, or infection.    Ultimately, Daniel was very much wanting to continue Xeloda at this time, which he is due to complete on Monday. He agreed that should the lesions get ANY worse, he will stop. We discussed that he could always resume the last few days of Xeloda once the area settles down and heals.      2. Adenocarcinoma of the GE junction: Clinical stage T2N1M0 based on PET and EUS. He is currently on EOX, with cycle 3 day 1 8/8/2017. Planning restaging next month and to see Dr. Hood at that time. Overall plan is to proceed with surgery, followed by 3 additional cycles of EOX. With the suspected genital erythroysesthesia as above, I am wondering if 5-FU would be better for him.          Alisa Otero PA-C

## 2017-08-25 NOTE — NURSING NOTE
Chief Complaint   Patient presents with     Blood Draw     Labs drawn from right arm in lab by MA     Pt tolerated well  Kaylen Major MA

## 2017-08-25 NOTE — PROGRESS NOTES
[facility]    Hematology/Oncology Established Patient Note      August 25, 2017    Reason for Follow-up: add-on for penile sores in patient w/ adenocarcinoma of the GE junction    HISTORY OF PRESENT ILLNESS: Daniel Sandhu is a 36 year old male who presents with adenocarcinoma of the GE junction.  In early 2017, patient started noticing difficulty swallowing, and that food seems to take longer to go down.  It became more frequent, and he saw his PCP, and was referred for EGD, which showed an esophageal mass located at the GE junction, measuring 4 cm.  Biopsy showed poorly differentiated adenocarcinoma.  HER-2 was sent and is equivocal (2+).  CT c/a/p showed a mildly prominent lymph node in the gastrohepatic ligament, but there were no pathologically enlarged lymph nodes in the chest, abdomen, or pelvis.  There was otherwise no evidence of metastatic disease.  PET-CT showed thickening of the distal esophagus consistent with known esophageal adenocarcinoma, as well as mildly hypermetabolic 1 cm lymph node in the gastrohepatic ligament.  There was no evidence of distant metastatic disease.  He underwent EUS on 6/9/17, which showed the esophageal tumor in the lower third of the esophagus, staged T2NX.  There were 2 abnormal lymph nodes seen in the gastrohepatic ligament; pathology was suspicious for adenocarcinoma.  Celiac node was sampled as well, which was benign.  He was seen by surgery, Dr. Esteban, and recommended srinivas-operative chemotherapy.    Port was placed on 6/22/17.    Chemotherapy:  Epirubicin 50 mg/m2 IV on day 1  Oxaliplatin 130 mg/m2 IV on day 1  Capecitabine 625 mg/m2 po BID on days 1-21  Every 21 day cycles    C1D1: 6/26/17  C2D1: 7/17/17  C3D1:  8/7/17      INTERIM HISTORY: Daniel comes in on an add-on basis for concerns of penile lesions again. He had a small lesion on the tip of his pain with cycle 2. It ultimately resolved off Xeloda and with anti-biotic ointment.    Three  "days ago, he was at [employer] and started to notice some discomfort on the tip of his penis as it was rubbing on his underwear. It got progressively worse. He examined the area when he got home and it looked like a \"rug burn\". He has been applying gauze and anti-biotic cream, which does seem to be helping. He hasn't noticed any fevers, discharge, drainage, or additional lesions. The area is in the area of his penis that rubs on his underwear. There is no pain at rest, only painful when it rubs. Aside from the smaller lesion with cycle 2, never has had a penile lesion. Reports he has no concerns regarding STD with himself or his wife, see [employer] message for his \"sexual tree\".     Hand foot syndrome has been overall stable, has callouses on his hands and feet with redness, small dry fissuring on his palm creases without any actual open skin, no blisters.     Otherwise doing well.           HOME MEDICATIONS:  Current Outpatient Prescriptions   Medication Sig Dispense Refill     nystatin (MYCOSTATIN) 333288 UNIT/ML suspension Take 5 mLs (500,000 Units) by mouth 4 times daily 473 mL 3     capecitabine (XELODA) 500 MG tablet CHEMO Take 2 tablets (1,000 mg) by mouth 2 times daily for 21 days 84 tablet 0     UNABLE TO FIND MEDICATION NAME: IV Chemo Therapy       ondansetron (ZOFRAN-ODT) 8 MG ODT tab Take 1 tablet (8 mg) by mouth every 8 hours as needed for nausea (Patient not taking: Reported on 8/8/2017) 60 tablet 3     LORazepam (ATIVAN) 0.5 MG tablet Take 1 tablet (0.5 mg) by mouth every 4 hours as needed (Anxiety, Nausea/Vomiting or Sleep) (Patient not taking: Reported on 8/8/2017) 30 tablet 5     prochlorperazine (COMPAZINE) 10 MG tablet Take 1 tablet (10 mg) by mouth every 6 hours as needed (Nausea/Vomiting) (Patient not taking: Reported on 8/8/2017) 30 tablet 5     ondansetron (ZOFRAN) 8 MG tablet Take 1 tablet (8 mg) by mouth every 8 hours as needed (Nausea/Vomiting) (Patient not taking: Reported on 8/8/2017) 10 " "tablet 7         ALLERGIES:  No Known Allergies      PHYSICAL EXAM:  Vital signs:  /69  Pulse 78  Temp 97.9  F (36.6  C) (Oral)  Resp 16  Ht 1.749 m (5' 8.86\")  Wt 71.6 kg (157 lb 14.4 oz)  SpO2 100%  BMI 23.41 kg/m2   ECO  GENERAL/CONSTITUTIONAL: No acute distress.  INTEGUMENTARY: No jaundice. Hands with blotchy erythema and leathered appearance, dry fissuring on palmar creases and fingers, without any open skin. No blisters.  Port in place.    Genital: two small superficial erythematous erosions on the tip of the penis on either side of the urethral meatus, in distrubution of where the tip of the penis comes in contact with underwear, without drainage    ASSESSMENT/PLAN:  Daniel Sandhu is a 36 year old male with adenocarcinoma of the GE junction, currently on chemotherapy with EOX, here today on an add-on basis for concerns of recurrent penile lesions.    1) Penile lesions: Daniel had a small area of erosion/pain on the tip of his penis towards the end of cycle 2, improved off Xeloda and with antibiotic cream prescribed by PCP. Three days ago, lesion returned however affected larger area of his head of penis, only in the area that comes in contact with his underwear. He has already found relief with covering the area and avoiding friction, no pain at rest, no drainage, and a negative sexual history. On exam there are two small areas of erosion on the tip of the penis on either side of the urethra, without drainage or vesicles. This does not have the appearance of Herpes or any other STDs, does not look like an infection.      While I have not personally seen this side effect nor is it listed on UTD, I was able to find a case report in [facility] reporting three cases of genital erythrodysesthesia secondary to capecitabine, which involved painful erythema, crusts, and erosions. Topical steroids, anti-fungals, and anti-biotics were all infective. The lesions resolved within 10 days of cessation of " capecitabine. The lesions that Daniel has do look similar to these lesions.    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 We discussed that stopping the Xeloda will be the only way to allow this to heal, and that since I have not seen this before, I do not know the repercussion of continuing the medication, but discussed perhaps that he could have complications of scarring, more significant tissue damage, or infection.    Ultimately, Daniel was very much wanting to continue Xeloda at this time, which he is due to complete on Monday. He agreed that should the lesions get ANY worse, he will stop. We discussed that he could always resume the last few days of Xeloda once the area settles down and heals.      2. Adenocarcinoma of the GE junction: Clinical stage T2N1M0 based on PET and EUS. He is currently on EOX, with cycle 3 day 1 8/8/2017. Planning restaging next month and to see Dr. Hood at that time. Overall plan is to proceed with surgery, followed by 3 additional cycles of EOX. With the suspected genital erythroysesthesia as above, I am wondering if 5-FU would be better for him.          Alisa Otero PA-C

## 2017-09-22 ENCOUNTER — RADIANT APPOINTMENT (OUTPATIENT)
Dept: PET IMAGING | Facility: CLINIC | Age: 37
End: 2017-09-22
Attending: INTERNAL MEDICINE
Payer: COMMERCIAL

## 2017-09-22 DIAGNOSIS — C15.5 MALIGNANT NEOPLASM OF LOWER THIRD OF ESOPHAGUS (H): ICD-10-CM

## 2017-09-22 PROCEDURE — 71260 CT THORAX DX C+: CPT | Mod: 59

## 2017-09-22 PROCEDURE — A9552 F18 FDG: HCPCS

## 2017-09-22 PROCEDURE — 78815 PET IMAGE W/CT SKULL-THIGH: CPT | Mod: PS

## 2017-09-22 PROCEDURE — 74177 CT ABD & PELVIS W/CONTRAST: CPT | Mod: 59

## 2017-09-22 RX ORDER — IOPAMIDOL 755 MG/ML
100 INJECTION, SOLUTION INTRAVASCULAR ONCE
Status: COMPLETED | OUTPATIENT
Start: 2017-09-22 | End: 2017-09-22

## 2017-09-22 RX ADMIN — IOPAMIDOL 100 ML: 755 INJECTION, SOLUTION INTRAVASCULAR at 09:21

## 2017-09-25 ENCOUNTER — APPOINTMENT (OUTPATIENT)
Dept: LAB | Facility: CLINIC | Age: 37
End: 2017-09-25
Attending: INTERNAL MEDICINE
Payer: COMMERCIAL

## 2017-09-25 ENCOUNTER — RADIANT APPOINTMENT (OUTPATIENT)
Dept: CARDIOLOGY | Facility: CLINIC | Age: 37
End: 2017-09-25
Attending: PHYSICIAN ASSISTANT

## 2017-09-25 ENCOUNTER — ONCOLOGY VISIT (OUTPATIENT)
Dept: ONCOLOGY | Facility: CLINIC | Age: 37
End: 2017-09-25
Attending: INTERNAL MEDICINE
Payer: COMMERCIAL

## 2017-09-25 VITALS
DIASTOLIC BLOOD PRESSURE: 80 MMHG | OXYGEN SATURATION: 98 % | HEART RATE: 84 BPM | WEIGHT: 161.4 LBS | BODY MASS INDEX: 23.91 KG/M2 | SYSTOLIC BLOOD PRESSURE: 129 MMHG | TEMPERATURE: 97.9 F | RESPIRATION RATE: 16 BRPM | HEIGHT: 69 IN

## 2017-09-25 DIAGNOSIS — C15.5 MALIGNANT NEOPLASM OF LOWER THIRD OF ESOPHAGUS (H): ICD-10-CM

## 2017-09-25 DIAGNOSIS — B37.0 THRUSH: Primary | ICD-10-CM

## 2017-09-25 LAB
ALBUMIN SERPL-MCNC: 3.7 G/DL (ref 3.4–5)
ALP SERPL-CCNC: 56 U/L (ref 40–150)
ALT SERPL W P-5'-P-CCNC: 35 U/L (ref 0–70)
ANION GAP SERPL CALCULATED.3IONS-SCNC: 4 MMOL/L (ref 3–14)
AST SERPL W P-5'-P-CCNC: 24 U/L (ref 0–45)
BASOPHILS # BLD AUTO: 0 10E9/L (ref 0–0.2)
BASOPHILS NFR BLD AUTO: 0.7 %
BILIRUB SERPL-MCNC: 0.7 MG/DL (ref 0.2–1.3)
BUN SERPL-MCNC: 13 MG/DL (ref 7–30)
CALCIUM SERPL-MCNC: 8.4 MG/DL (ref 8.5–10.1)
CHLORIDE SERPL-SCNC: 106 MMOL/L (ref 94–109)
CO2 SERPL-SCNC: 29 MMOL/L (ref 20–32)
CREAT SERPL-MCNC: 0.78 MG/DL (ref 0.66–1.25)
DIFFERENTIAL METHOD BLD: ABNORMAL
EOSINOPHIL # BLD AUTO: 0.1 10E9/L (ref 0–0.7)
EOSINOPHIL NFR BLD AUTO: 2 %
ERYTHROCYTE [DISTWIDTH] IN BLOOD BY AUTOMATED COUNT: 13.3 % (ref 10–15)
GFR SERPL CREATININE-BSD FRML MDRD: >90 ML/MIN/1.7M2
GLUCOSE SERPL-MCNC: 99 MG/DL (ref 70–99)
HCT VFR BLD AUTO: 36.9 % (ref 40–53)
HGB BLD-MCNC: 12.9 G/DL (ref 13.3–17.7)
IMM GRANULOCYTES # BLD: 0 10E9/L (ref 0–0.4)
IMM GRANULOCYTES NFR BLD: 0.2 %
LYMPHOCYTES # BLD AUTO: 1.2 10E9/L (ref 0.8–5.3)
LYMPHOCYTES NFR BLD AUTO: 26.7 %
MCH RBC QN AUTO: 32.8 PG (ref 26.5–33)
MCHC RBC AUTO-ENTMCNC: 35 G/DL (ref 31.5–36.5)
MCV RBC AUTO: 94 FL (ref 78–100)
MONOCYTES # BLD AUTO: 0.6 10E9/L (ref 0–1.3)
MONOCYTES NFR BLD AUTO: 14.5 %
NEUTROPHILS # BLD AUTO: 2.5 10E9/L (ref 1.6–8.3)
NEUTROPHILS NFR BLD AUTO: 55.9 %
NRBC # BLD AUTO: 0 10*3/UL
NRBC BLD AUTO-RTO: 0 /100
PLATELET # BLD AUTO: 195 10E9/L (ref 150–450)
POTASSIUM SERPL-SCNC: 4.1 MMOL/L (ref 3.4–5.3)
PROT SERPL-MCNC: 7 G/DL (ref 6.8–8.8)
RBC # BLD AUTO: 3.93 10E12/L (ref 4.4–5.9)
SODIUM SERPL-SCNC: 139 MMOL/L (ref 133–144)
WBC # BLD AUTO: 4.4 10E9/L (ref 4–11)

## 2017-09-25 PROCEDURE — 99213 OFFICE O/P EST LOW 20 MIN: CPT | Mod: ZF

## 2017-09-25 PROCEDURE — 85025 COMPLETE CBC W/AUTO DIFF WBC: CPT | Performed by: PHYSICIAN ASSISTANT

## 2017-09-25 PROCEDURE — 36591 DRAW BLOOD OFF VENOUS DEVICE: CPT

## 2017-09-25 PROCEDURE — 25000128 H RX IP 250 OP 636: Mod: ZF | Performed by: INTERNAL MEDICINE

## 2017-09-25 PROCEDURE — 80053 COMPREHEN METABOLIC PANEL: CPT | Performed by: PHYSICIAN ASSISTANT

## 2017-09-25 PROCEDURE — 99215 OFFICE O/P EST HI 40 MIN: CPT | Mod: ZP | Performed by: INTERNAL MEDICINE

## 2017-09-25 RX ORDER — HEPARIN SODIUM (PORCINE) LOCK FLUSH IV SOLN 100 UNIT/ML 100 UNIT/ML
5 SOLUTION INTRAVENOUS EVERY 8 HOURS
Status: DISCONTINUED | OUTPATIENT
Start: 2017-09-25 | End: 2017-09-25 | Stop reason: HOSPADM

## 2017-09-25 RX ORDER — FLUCONAZOLE 200 MG/1
200 TABLET ORAL DAILY
Qty: 14 TABLET | Refills: 0 | Status: SHIPPED | OUTPATIENT
Start: 2017-09-25 | End: 2017-10-11

## 2017-09-25 RX ADMIN — SODIUM CHLORIDE, PRESERVATIVE FREE 5 ML: 5 INJECTION INTRAVENOUS at 08:21

## 2017-09-25 ASSESSMENT — PAIN SCALES - GENERAL: PAINLEVEL: NO PAIN (0)

## 2017-09-25 NOTE — NURSING NOTE
Chief Complaint   Patient presents with     Port Draw     accessed with Gripper needle for labs, heparin locked, vitals checked

## 2017-09-25 NOTE — NURSING NOTE
"Oncology Rooming Note    September 25, 2017 9:17 AM   Daniel Sandhu is a 37 year old male who presents for:    Chief Complaint   Patient presents with     Port Draw     accessed with Gripper needle for labs, heparin locked, vitals checked     Oncology Clinic Visit     Return visit related to Esophageal Cancer     Initial Vitals: /80  Pulse 84  Temp 97.9  F (36.6  C) (Oral)  Resp 16  Ht 1.749 m (5' 8.86\")  Wt 73.2 kg (161 lb 6.4 oz)  SpO2 98%  BMI 23.93 kg/m2 Estimated body mass index is 23.93 kg/(m^2) as calculated from the following:    Height as of this encounter: 1.749 m (5' 8.86\").    Weight as of this encounter: 73.2 kg (161 lb 6.4 oz). Body surface area is 1.89 meters squared.  No Pain (0) Comment: Data Unavailable   No LMP for male patient.  Allergies reviewed: Yes  Medications reviewed: Yes    Medications: Medication refills not needed today.  Pharmacy name entered into [facility]: [facility] - [place], [place] - [address] [place] [address]    Clinical concerns: Patient states that he still has thrush and that he would like a different prescription. Provider was notified.    10 minutes for nursing intake (face to face time)     Xochitl Tovar LPN            "

## 2017-09-25 NOTE — LETTER
9/25/2017      RE: Daniel Sandhu  [address] [address]       [facility]    Hematology/Oncology Established Patient Note      Today's Date: 09/25/17    Reason for Follow-up: adenocarcinoma of the GE junction      HISTORY OF PRESENT ILLNESS: Daniel Sandhu is a 37 year old male who presents with adenocarcinoma of the GE junction.  In early 2017, patient started noticing difficulty swallowing, and that food seems to take longer to go down.  It became more frequent, and he saw his PCP, and was referred for EGD, which showed an esophageal mass located at the GE junction, measuring 4 cm.  Biopsy showed poorly differentiated adenocarcinoma.  HER-2 was sent and is equivocal (2+).  CT c/a/p showed a mildly prominent lymph node in the gastrohepatic ligament, but there were no pathologically enlarged lymph nodes in the chest, abdomen, or pelvis.  There was otherwise no evidence of metastatic disease.  PET-CT showed thickening of the distal esophagus consistent with known esophageal adenocarcinoma, as well as mildly hypermetabolic 1 cm lymph node in the gastrohepatic ligament.  There was no evidence of distant metastatic disease.  He underwent EUS on 6/9/17, which showed the esophageal tumor in the lower third of the esophagus, staged T2NX.  There were 2 abnormal lymph nodes seen in the gastrohepatic ligament; pathology was suspicious for adenocarcinoma.  Celiac node was sampled as well, which was benign.  He was seen by surgery, Dr. Esteban, and recommended srinivas-operative chemotherapy.    Port was placed on 6/22/17.    Chemotherapy:  Epirubicin 50 mg/m2 IV on day 1  Oxaliplatin 130 mg/m2 IV on day 1  Capecitabine 625 mg/m2 po BID on days 1-21  Every 21 day cycles    C1D1: 6/26/17  C2D1: 7/17/17  C3D1: 8/7/17      INTERIM HISTORY: Daniel comes in for follow-up today.  He has finished 3 cycles of chemotherapy.  During cycle 2, he developed a small lesion on the tip of his  penis.  It resolved off of Xeloda and with antibiotic ointment.  During cycle 3, he again noticed discomfort on the tip of his penis and got worse.  He did take a few days break off from Xeloda and the lesion did improve, and then he resumed the Xeloda.  His last day of chemotherapy was on 8/25/17.  He also got hand-foot syndrome with the Xeloda, with blisters on his hands and feet.      He is feeling much better now that he has been off of chemotherapy for a month.  He still has thrush in the back of his tongue despite using Nystatin mouthwash.        REVIEW OF SYSTEMS:   14 point ROS was reviewed and is negative other than as noted above in HPI.       HOME MEDICATIONS:  Current Outpatient Prescriptions   Medication Sig Dispense Refill     fluconazole (DIFLUCAN) 200 MG tablet Take 1 tablet (200 mg) by mouth daily for 14 days 14 tablet 0     nystatin (MYCOSTATIN) 986088 UNIT/ML suspension Take 5 mLs (500,000 Units) by mouth 4 times daily (Patient not taking: Reported on 8/25/2017) 473 mL 3     capecitabine (XELODA) 500 MG tablet CHEMO Take 2 tablets (1,000 mg) by mouth 2 times daily for 21 days 84 tablet 0     ondansetron (ZOFRAN-ODT) 8 MG ODT tab Take 1 tablet (8 mg) by mouth every 8 hours as needed for nausea (Patient not taking: Reported on 8/8/2017) 60 tablet 3     LORazepam (ATIVAN) 0.5 MG tablet Take 1 tablet (0.5 mg) by mouth every 4 hours as needed (Anxiety, Nausea/Vomiting or Sleep) (Patient not taking: Reported on 8/8/2017) 30 tablet 5     prochlorperazine (COMPAZINE) 10 MG tablet Take 1 tablet (10 mg) by mouth every 6 hours as needed (Nausea/Vomiting) (Patient not taking: Reported on 8/8/2017) 30 tablet 5     ondansetron (ZOFRAN) 8 MG tablet Take 1 tablet (8 mg) by mouth every 8 hours as needed (Nausea/Vomiting) (Patient not taking: Reported on 8/8/2017) 10 tablet 7         ALLERGIES:  No Known Allergies      PAST MEDICAL HISTORY:  No past medical history on file.      PAST SURGICAL HISTORY:  Past  Surgical History:   Procedure Laterality Date     ESOPHAGOGASTRODUODENOSCOPY       ESOPHAGOSCOPY, GASTROSCOPY, DUODENOSCOPY (EGD), COMBINED N/A 2017    Procedure: COMBINED ENDOSCOPIC ULTRASOUND, ESOPHAGOSCOPY, GASTROSCOPY, DUODENOSCOPY (EGD), FINE NEEDLE ASPIRATE/BIOPSY;  Upper Endoscopic Ultrasound, fine needle aspirate/biopsy;  Surgeon: Guru Mark Avila MD;  Location: [facility] OR     HAND SURGERY      childhood, torn tendon     INSERT PORT VASCULAR ACCESS Right 2017    Procedure: INSERT PORT VASCULAR ACCESS;  Single Lumen Chest Power Port;  Surgeon: Iván Driver PA-C;  Location:  OR         SOCIAL HISTORY:  Social History     Social History     Marital status:      Spouse name: N/A     Number of children: 1     Years of education: N/A     Occupational History     musician and teacher       Social History Main Topics     Smoking status: Never Smoker     Smokeless tobacco: Never Used     Alcohol use Yes      Comment: 1 beer daily     Drug use: No     Sexual activity: Yes     Partners: Female     Birth control/ protection: Natural Family Planning     Other Topics Concern     Not on file     Social History Narrative     He works at [employer] in [place], 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  He denies smoking.  He drinks ~1 beer a day.  He denies illicit drug use, other than occasional marijuana.  He lives in [place] with his wife, and 4.5 year-old daughter.  His wife is currently pregnant with a boy, and is due in 6 weeks.  He has a half sister who  of breast cancer at age 32; she was diagnosed in her late 20's.  A paternal grandmother had breast cancer in her 70's      FAMILY HISTORY:  Family History   Problem Relation Age of Onset     Other - See Comments Father      sepsis     CANCER Sister      breast cancer  31 yo 1/2 sister      CANCER Paternal Grandmother      breast         PHYSICAL EXAM:  Vital signs:  /80  Pulse 84  Temp 97.9  F  "(36.6  C) (Oral)  Resp 16  Ht 1.749 m (5' 8.86\")  Wt 73.2 kg (161 lb 6.4 oz)  SpO2 98%  BMI 23.93 kg/m2   ECO  GENERAL/CONSTITUTIONAL: No acute distress. Accompanied by wife.  EYES: No scleral icterus.  MOUTH: No mucositis, +thrush at the back of the tongue.  RESPIRATORY: Clear to auscultation bilaterally. No crackles or wheezing.   CARDIOVASCULAR: Regular rate and rhythm without murmurs, gallops, or rubs.  GASTROINTESTINAL: No tenderness. The patient has normal bowel sounds. No guarding.  No distention.  MUSCULOSKELETAL: Warm and well-perfused, no cyanosis, clubbing, or edema.  NEUROLOGIC: Alert, oriented, answers questions appropriately.  INTEGUMENTARY: No jaundice.  Port in place.      LABS:  CBC RESULTS:   Recent Labs   Lab Test  17   WBC  4.4   RBC  3.93*   HGB  12.9*   HCT  36.9*   MCV  94   MCH  32.8   MCHC  35.0   RDW  13.3   PLT  195     Recent Labs   Lab Test  17   0822  17   1158   NA  139  138   POTASSIUM  4.1  4.0   CHLORIDE  106  104   CO2  29  29   ANIONGAP  4  5   GLC  99  92   BUN  13  15   CR  0.78  0.74   YOBANY  8.4*  8.8     Lab Results   Component Value Date    AST 24 2017     Lab Results   Component Value Date    ALT 35 2017     No results found for: BILICONJ   Lab Results   Component Value Date    BILITOTAL 0.7 2017     Lab Results   Component Value Date    ALBUMIN 3.7 2017     Lab Results   Component Value Date    PROTTOTAL 7.0 2017      Lab Results   Component Value Date    ALKPHOS 56 2017         PATHOLOGY:  17:  FINAL DIAGNOSIS:   [facility] [id number],   OBTAINED 2017):   A. GASTROESOPHAGEAL JUNCTION, \"MASS\", BIOPSY:   - Adenocarcinoma, moderately-poorly differentiated   - No background intestinal metaplasia seen   - Per report, HER2 immunohistochemistry is equivocal for expression (2+   out of 3)     B. STOMACH, BIOPSY:   - Gastric mucosa with no significant " inflammation   - No H. pylori like organisms identified on routine staining   - Negative for intestinal metaplasia or dysplasia       EGD 5/30/17:  Esophageal mass at the GE junction, friable, size 4 cm, extending into the gastric fundus.  Biopsies were taken.      6/9/17:  A. Lymph node, gastrohepatic, EUS guided fine needle aspiration:   Suspicious for adenocarcinoma.   Specimen Adequacy: Satisfactory for evaluation but limited by scant   lymphoid elements     B. Lymph node, celiac node, EUS guided fine needle aspiration:   Benign   Specimen Adequacy: Satisfactory for evaluation but limited by scant   lymphoid elements.       IMAGING:  PET-CT 9/22/17:  FINDINGS:      HEAD/NECK:  There is no  suspicious FDG uptake in the neck.      Mucosal thickening in bilateral maxillary sinuses with near complete  opacification of the left maxillary sinus. The mastoid air cells are  also clear.      The mucosal pharyngeal space, the , prevertebral and carotid  spaces are within normal limits.      No masses, mass effect or pathologically enlarged lymph nodes are  evident. The thyroid gland is normal.     CHEST:  There is no suspicious FDG uptake in the chest.      There are no pathologically enlarged mediastinal, hilar or axillary  lymph nodes.  There are no suspicious lung nodules or evidence for infection.      There is no significant pericardial or plural effusions.     Right IJV port catheter with the tip is in the right atrium.     ABDOMEN AND PELVIS:  Interval decrease in the size and FDG uptake of soft tissue thickening  at the gastroesophageal junction with max SUV of 3.0, previously 14.  Also, there is interval decrease in size with resolution of FDG uptake  of previously reported the gastrohepatic lymph node. No new lymph  node.     There are no suspicious hepatic lesions. There is no splenomegaly or  evidence for splenic or pancreatic mass lesion.  There are no suspicious adrenal mass lesions or opaque  gallbladder  calculi. There is symmetric nephrographic renal phase without  hydronephrosis.     There is no evidence for diverticulitis, bowel obstruction or free  fluid.     LOWER EXTREMITIES:   No abnormal masses or hypermetabolic lesions.     BONES:   There are no suspicious lytic or blastic osseous lesions.  There is no  abnormal FDG uptake in the skeleton.         IMPRESSION: In this patient with history of the esophageal  adenocarcinoma;  1. Substantial interval decrease in the size and FDG uptake of the  mass at the level of gastroesophageal junction. There may be complete  remission.  2. Interval resolution of FDG avid gastrohepatic lymph node.      Echo 9/25/17:  Global and regional left ventricular function is normal with an EF of 55-60%.  Global peak LV longitudinal strain is averaged at -20.3%. This is within  reported normal limits (normal <-18%).  Right ventricular function, chamber size, wall motion, and thickness are  normal.  No pericardial effusion is present.  There has been no change.      ASSESSMENT/PLAN:  Daniel Sandhu is a 37 year old male with:    1) Adenocarcinoma of the GE junction: measures 4 cm on EGD.  Clinical stage T2N1M0 (stage IIB), based on PET-CT and EUS.  A gastrohepatic lymph node was biopsied and suspicious for adenocarcinoma.  He was seen by Dr. Esteban, and with the degree of extension into the stomach, his inclination is to treat him as a gastric cancer, and so perioperative chemotherapy, as per MAGIC trial, would be reasonable.  Plan was discussed at tumor conference, the group agreed with the recommendation.      He has completed 3 cycles of neoadjuvant chemotherapy.  He has surgery appointment with Dr. Esteban on 11/1/17.  He will finish up with 3 cycles of adjuvant chemotherapy after recover from surgery.  Considering his struggles with Xeloda, with palmar-plantar and likely genital erythrodysesthesia, we discussed changing to 5-FU.  Daniel wants to try the Xeloda again  and switch if he continues to have troubles.  We will further discuss and decide after his surgery.      -echo every 3 months while on chemotherapy  -registered for medical cannabis, per patient request  -follow-up with surgery  -return to clinic ~2-3 weeks after surgery to review pathology, and to plan for adjuvant chemotherapy    2) Genetics:  -genetic testing was negative    3) Chemotherapy-induced nausea/vomiting:   -Emend and Aloxi added to pre-meds  -he has Zofran ODT and Compazine, which are helpful    4) Oral thrush:  -Nystatin hasn't been effective  -will prescribe fluconazole    5) GERD:  -he has Nexium    6) Constipation:  -Milk of magnesia works well for him.  He also has Miralax.      I spent a total of 40 minutes with the patient, with over >50% of the time in counseling and/or coordination of care.       Laurence Hood MD  Hematology/Oncology  [facility]

## 2017-09-25 NOTE — PROGRESS NOTES
[facility]    Hematology/Oncology Established Patient Note      Today's Date: 09/25/17    Reason for Follow-up: adenocarcinoma of the GE junction      HISTORY OF PRESENT ILLNESS: Daniel Sandhu is a 37 year old male who presents with adenocarcinoma of the GE junction.  In early 2017, patient started noticing difficulty swallowing, and that food seems to take longer to go down.  It became more frequent, and he saw his PCP, and was referred for EGD, which showed an esophageal mass located at the GE junction, measuring 4 cm.  Biopsy showed poorly differentiated adenocarcinoma.  HER-2 was sent and is equivocal (2+).  CT c/a/p showed a mildly prominent lymph node in the gastrohepatic ligament, but there were no pathologically enlarged lymph nodes in the chest, abdomen, or pelvis.  There was otherwise no evidence of metastatic disease.  PET-CT showed thickening of the distal esophagus consistent with known esophageal adenocarcinoma, as well as mildly hypermetabolic 1 cm lymph node in the gastrohepatic ligament.  There was no evidence of distant metastatic disease.  He underwent EUS on 6/9/17, which showed the esophageal tumor in the lower third of the esophagus, staged T2NX.  There were 2 abnormal lymph nodes seen in the gastrohepatic ligament; pathology was suspicious for adenocarcinoma.  Celiac node was sampled as well, which was benign.  He was seen by surgery, Dr. Esteban, and recommended srinivas-operative chemotherapy.    Port was placed on 6/22/17.    Chemotherapy:  Epirubicin 50 mg/m2 IV on day 1  Oxaliplatin 130 mg/m2 IV on day 1  Capecitabine 625 mg/m2 po BID on days 1-21  Every 21 day cycles    C1D1: 6/26/17  C2D1: 7/17/17  C3D1: 8/7/17      INTERIM HISTORY: Daniel comes in for follow-up today.  He has finished 3 cycles of chemotherapy.  During cycle 2, he developed a small lesion on the tip of his penis.  It resolved off of Xeloda and with antibiotic ointment.  During cycle 3, he again  noticed discomfort on the tip of his penis and got worse.  He did take a few days break off from Xeloda and the lesion did improve, and then he resumed the Xeloda.  His last day of chemotherapy was on 8/25/17.  He also got hand-foot syndrome with the Xeloda, with blisters on his hands and feet.      He is feeling much better now that he has been off of chemotherapy for a month.  He still has thrush in the back of his tongue despite using Nystatin mouthwash.        REVIEW OF SYSTEMS:   14 point ROS was reviewed and is negative other than as noted above in HPI.       HOME MEDICATIONS:  Current Outpatient Prescriptions   Medication Sig Dispense Refill     fluconazole (DIFLUCAN) 200 MG tablet Take 1 tablet (200 mg) by mouth daily for 14 days 14 tablet 0     nystatin (MYCOSTATIN) 003947 UNIT/ML suspension Take 5 mLs (500,000 Units) by mouth 4 times daily (Patient not taking: Reported on 8/25/2017) 473 mL 3     capecitabine (XELODA) 500 MG tablet CHEMO Take 2 tablets (1,000 mg) by mouth 2 times daily for 21 days 84 tablet 0     ondansetron (ZOFRAN-ODT) 8 MG ODT tab Take 1 tablet (8 mg) by mouth every 8 hours as needed for nausea (Patient not taking: Reported on 8/8/2017) 60 tablet 3     LORazepam (ATIVAN) 0.5 MG tablet Take 1 tablet (0.5 mg) by mouth every 4 hours as needed (Anxiety, Nausea/Vomiting or Sleep) (Patient not taking: Reported on 8/8/2017) 30 tablet 5     prochlorperazine (COMPAZINE) 10 MG tablet Take 1 tablet (10 mg) by mouth every 6 hours as needed (Nausea/Vomiting) (Patient not taking: Reported on 8/8/2017) 30 tablet 5     ondansetron (ZOFRAN) 8 MG tablet Take 1 tablet (8 mg) by mouth every 8 hours as needed (Nausea/Vomiting) (Patient not taking: Reported on 8/8/2017) 10 tablet 7         ALLERGIES:  No Known Allergies      PAST MEDICAL HISTORY:  No past medical history on file.      PAST SURGICAL HISTORY:  Past Surgical History:   Procedure Laterality Date     ESOPHAGOGASTRODUODENOSCOPY       ESOPHAGOSCOPY,  "GASTROSCOPY, DUODENOSCOPY (EGD), COMBINED N/A 2017    Procedure: COMBINED ENDOSCOPIC ULTRASOUND, ESOPHAGOSCOPY, GASTROSCOPY, DUODENOSCOPY (EGD), FINE NEEDLE ASPIRATE/BIOPSY;  Upper Endoscopic Ultrasound, fine needle aspirate/biopsy;  Surgeon: Guru Mark Avila MD;  Location: [facility]     HAND SURGERY      childhood, torn tendon     INSERT PORT VASCULAR ACCESS Right 2017    Procedure: INSERT PORT VASCULAR ACCESS;  Single Lumen Chest Power Port;  Surgeon: Iván Driver PA-C;  Location:  OR         SOCIAL HISTORY:  Social History     Social History     Marital status:      Spouse name: N/A     Number of children: 1     Years of education: N/A     Occupational History     musician and teacher       Social History Main Topics     Smoking status: Never Smoker     Smokeless tobacco: Never Used     Alcohol use Yes      Comment: 1 beer daily     Drug use: No     Sexual activity: Yes     Partners: Female     Birth control/ protection: Natural Family Planning     Other Topics Concern     Not on file     Social History Narrative     He works at [employer] in [place], 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  He denies smoking.  He drinks ~1 beer a day.  He denies illicit drug use, other than occasional marijuana.  He lives in [place] with his wife, and 4.5 year-old daughter.  His wife is currently pregnant with a boy, and is due in 6 weeks.  He has a half sister who  of breast cancer at age 32; she was diagnosed in her late 20's.  A paternal grandmother had breast cancer in her 70's      FAMILY HISTORY:  Family History   Problem Relation Age of Onset     Other - See Comments Father      sepsis     CANCER Sister      breast cancer  29 yo 1/2 sister      CANCER Paternal Grandmother      breast         PHYSICAL EXAM:  Vital signs:  /80  Pulse 84  Temp 97.9  F (36.6  C) (Oral)  Resp 16  Ht 1.749 m (5' 8.86\")  Wt 73.2 kg (161 lb 6.4 oz)  SpO2 98%  BMI " "23.93 kg/m2   ECO  GENERAL/CONSTITUTIONAL: No acute distress. Accompanied by wife.  EYES: No scleral icterus.  MOUTH: No mucositis, +thrush at the back of the tongue.  RESPIRATORY: Clear to auscultation bilaterally. No crackles or wheezing.   CARDIOVASCULAR: Regular rate and rhythm without murmurs, gallops, or rubs.  GASTROINTESTINAL: No tenderness. The patient has normal bowel sounds. No guarding.  No distention.  MUSCULOSKELETAL: Warm and well-perfused, no cyanosis, clubbing, or edema.  NEUROLOGIC: Alert, oriented, answers questions appropriately.  INTEGUMENTARY: No jaundice.  Port in place.      LABS:  CBC RESULTS:   Recent Labs   Lab Test  17   WBC  4.4   RBC  3.93*   HGB  12.9*   HCT  36.9*   MCV  94   MCH  32.8   MCHC  35.0   RDW  13.3   PLT  195     Recent Labs   Lab Test  1722  17   1158   NA  139  138   POTASSIUM  4.1  4.0   CHLORIDE  106  104   CO2  29  29   ANIONGAP  4  5   GLC  99  92   BUN  13  15   CR  0.78  0.74   YOBANY  8.4*  8.8     Lab Results   Component Value Date    AST 24 2017     Lab Results   Component Value Date    ALT 35 2017     No results found for: BILICONJ   Lab Results   Component Value Date    BILITOTAL 0.7 2017     Lab Results   Component Value Date    ALBUMIN 3.7 2017     Lab Results   Component Value Date    PROTTOTAL 7.0 2017      Lab Results   Component Value Date    ALKPHOS 56 2017         PATHOLOGY:  17:  FINAL DIAGNOSIS:   [facility] [place] ([id number],   OBTAINED 2017):   A. GASTROESOPHAGEAL JUNCTION, \"MASS\", BIOPSY:   - Adenocarcinoma, moderately-poorly differentiated   - No background intestinal metaplasia seen   - Per report, HER2 immunohistochemistry is equivocal for expression (2+   out of 3)     B. STOMACH, BIOPSY:   - Gastric mucosa with no significant inflammation   - No H. pylori like organisms identified on routine staining   - Negative for intestinal " metaplasia or dysplasia       EGD 5/30/17:  Esophageal mass at the GE junction, friable, size 4 cm, extending into the gastric fundus.  Biopsies were taken.      6/9/17:  A. Lymph node, gastrohepatic, EUS guided fine needle aspiration:   Suspicious for adenocarcinoma.   Specimen Adequacy: Satisfactory for evaluation but limited by scant   lymphoid elements     B. Lymph node, celiac node, EUS guided fine needle aspiration:   Benign   Specimen Adequacy: Satisfactory for evaluation but limited by scant   lymphoid elements.       IMAGING:  PET-CT 9/22/17:  FINDINGS:      HEAD/NECK:  There is no  suspicious FDG uptake in the neck.      Mucosal thickening in bilateral maxillary sinuses with near complete  opacification of the left maxillary sinus. The mastoid air cells are  also clear.      The mucosal pharyngeal space, the , prevertebral and carotid  spaces are within normal limits.      No masses, mass effect or pathologically enlarged lymph nodes are  evident. The thyroid gland is normal.     CHEST:  There is no suspicious FDG uptake in the chest.      There are no pathologically enlarged mediastinal, hilar or axillary  lymph nodes.  There are no suspicious lung nodules or evidence for infection.      There is no significant pericardial or plural effusions.     Right IJV port catheter with the tip is in the right atrium.     ABDOMEN AND PELVIS:  Interval decrease in the size and FDG uptake of soft tissue thickening  at the gastroesophageal junction with max SUV of 3.0, previously 14.  Also, there is interval decrease in size with resolution of FDG uptake  of previously reported the gastrohepatic lymph node. No new lymph  node.     There are no suspicious hepatic lesions. There is no splenomegaly or  evidence for splenic or pancreatic mass lesion.  There are no suspicious adrenal mass lesions or opaque gallbladder  calculi. There is symmetric nephrographic renal phase without  hydronephrosis.     There is no  evidence for diverticulitis, bowel obstruction or free  fluid.     LOWER EXTREMITIES:   No abnormal masses or hypermetabolic lesions.     BONES:   There are no suspicious lytic or blastic osseous lesions.  There is no  abnormal FDG uptake in the skeleton.         IMPRESSION: In this patient with history of the esophageal  adenocarcinoma;  1. Substantial interval decrease in the size and FDG uptake of the  mass at the level of gastroesophageal junction. There may be complete  remission.  2. Interval resolution of FDG avid gastrohepatic lymph node.      Echo 9/25/17:  Global and regional left ventricular function is normal with an EF of 55-60%.  Global peak LV longitudinal strain is averaged at -20.3%. This is within  reported normal limits (normal <-18%).  Right ventricular function, chamber size, wall motion, and thickness are  normal.  No pericardial effusion is present.  There has been no change.      ASSESSMENT/PLAN:  Daniel Sandhu is a 37 year old male with:    1) Adenocarcinoma of the GE junction: measures 4 cm on EGD.  Clinical stage T2N1M0 (stage IIB), based on PET-CT and EUS.  A gastrohepatic lymph node was biopsied and suspicious for adenocarcinoma.  He was seen by Dr. Esteban, and with the degree of extension into the stomach, his inclination is to treat him as a gastric cancer, and so perioperative chemotherapy, as per MAGIC trial, would be reasonable.  Plan was discussed at tumor conference, the group agreed with the recommendation.      He has completed 3 cycles of neoadjuvant chemotherapy.  He has surgery appointment with Dr. Esteban on 11/1/17.  He will finish up with 3 cycles of adjuvant chemotherapy after recover from surgery.  Considering his struggles with Xeloda, with palmar-plantar and likely genital erythrodysesthesia, we discussed changing to 5-FU.  aDniel wants to try the Xeloda again and switch if he continues to have troubles.  We will further discuss and decide after his surgery.       -echo every 3 months while on chemotherapy  -registered for medical cannabis, per patient request  -follow-up with surgery  -return to clinic ~2-3 weeks after surgery to review pathology, and to plan for adjuvant chemotherapy    2) Genetics:  -genetic testing was negative    3) Chemotherapy-induced nausea/vomiting:   -Emend and Aloxi added to pre-meds  -he has Zofran ODT and Compazine, which are helpful    4) Oral thrush:  -Nystatin hasn't been effective  -will prescribe fluconazole    5) GERD:  -he has Nexium    6) Constipation:  -Milk of magnesia works well for him.  He also has Miralax.      I spent a total of 40 minutes with the patient, with over >50% of the time in counseling and/or coordination of care.       Laurence Hood MD  Hematology/Oncology  [facility]

## 2017-09-25 NOTE — MR AVS SNAPSHOT
After Visit Summary   9/25/2017    Daniel Sandhu    MRN: [id number]           Patient Information     Date Of Birth          1980        Visit Information        Provider Department      9/25/2017 10:15 AM Laurence Hood MD [facility]        Today's Diagnoses     Thrush    -  1    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           Follow-ups after your visit        Your next 10 appointments already scheduled     Nov 01, 2017  4:30 PM CDT   [facility] Draw with [facility] LAB DRAW   [facility] Lab Draw ([facility])    [address] [place] [id number] [phone]            Nov 01, 2017  5:00 PM CDT   (Arrive by 4:45 PM)   Return Visit with Yunior Esteban MD   [facility] ([facility])    [address] [place] [id number] [phone]              Who to contact     If you have questions or need follow up information about today's clinic visit or your schedule please contact [facility] directly at [phone].  Normal or non-critical lab and imaging results will be communicated to you by MyChart, letter or phone within 4 business days after the clinic has received the results. If you do not hear from us within 7 days, please contact the clinic through [facility] or phone. If you have a critical or abnormal lab result, we will notify you by phone as soon as possible.  Submit refill requests through giftee or call your pharmacy and they will forward the refill request to us. Please allow 3 business days for your refill to be completed.          Additional Information About Your Visit        MyChart Information     giftee gives you secure access to your electronic health record. If you see a primary care provider, you can also send messages to your care team and make appointments. If you have questions,  "please call your primary care clinic.  If you do not have a primary care provider, please call [phone] and they will assist you.        Care EveryWhere ID     This is your Care EveryWhere ID. This could be used by other organizations to access your [facility] medical records  [id number]        Your Vitals Were     Pulse Temperature Respirations Height Pulse Oximetry BMI (Body Mass Index)    84 97.9  F (36.6  C) (Oral) 16 1.749 m (5' 8.86\") 98% 23.93 kg/m2       Blood Pressure from Last 3 Encounters:   09/25/17 129/80   08/25/17 120/69   08/08/17 123/62    Weight from Last 3 Encounters:   09/25/17 73.2 kg (161 lb 6.4 oz)   08/25/17 71.6 kg (157 lb 14.4 oz)   08/08/17 72.1 kg (158 lb 14.4 oz)              We Performed the Following     CBC with platelets differential     Comprehensive metabolic panel          Today's Medication Changes          These changes are accurate as of: 9/25/17 11:12 AM.  If you have any questions, ask your nurse or doctor.               Start taking these medicines.        Dose/Directions    fluconazole 200 MG tablet   Commonly known as:  DIFLUCAN   Used for:  Thrush   Started by:  Laurence Hood MD        Dose:  200 mg   Take 1 tablet (200 mg) by mouth daily for 14 days   Quantity:  14 tablet   Refills:  0            Where to get your medicines      These medications were sent to [address] [address]    Hours:  TRANSPLANT PHONE NUMBER [phone] Phone:  [phone]     fluconazole 200 MG tablet                Primary Care Provider Office Phone # Fax #    Lencho Chan [phone]       [address] [facility] [place] [address]        Equal Access to Services     SHAWN SWAIN AH: Judy mayo Sotee, waaxda luqadaha, qaybta kaalmada adeegyada, alejandro arteaga. So [facility] [phone].    ATENCIÓN: Si habla " español, tiene a zayas disposición servicios gratuitos de asistencia lingüística. Isaias zavaleta [phone].    We comply with applicable federal civil rights laws and [place] laws. We do not discriminate on the basis of race, color, national origin, age, disability sex, sexual orientation or gender identity.            Thank you!     Thank you for choosing [facility]  for your care. Our goal is always to provide you with excellent care. Hearing back from our patients is one way we can continue to improve our services. Please take a few minutes to complete the written survey that you may receive in the mail after your visit with us. Thank you!             Your Updated Medication List - Protect others around you: Learn how to safely use, store and throw away your medicines at www.disposemymeds.org.          This list is accurate as of: 9/25/17 11:12 AM.  Always use your most recent med list.                   Brand Name Dispense Instructions for use Diagnosis    capecitabine 500 MG tablet CHEMO    XELODA    84 tablet    Take 2 tablets (1,000 mg) by mouth 2 times daily for 21 days    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       fluconazole 200 MG tablet    DIFLUCAN    14 tablet    Take 1 tablet (200 mg) by mouth daily for 14 days    Thrush       LORazepam 0.5 MG tablet    ATIVAN    30 tablet    Take 1 tablet (0.5 mg) by mouth every 4 hours as needed (Anxiety, Nausea/Vomiting or Sleep)    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       nystatin 056677 UNIT/ML suspension    MYCOSTATIN    473 mL    Take 5 mLs (500,000 Units) by mouth 4 times daily    Thrush       ondansetron 8 MG ODT tab    ZOFRAN-ODT    60 tablet    Take 1 tablet (8 mg) by mouth every 8 hours as needed for nausea    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, Non-intractable vomiting with nausea, unspecified vomiting type       ondansetron 8 MG tablet    ZOFRAN    10 tablet    Take 1 tablet (8 mg) by mouth  every 8 hours as needed (Nausea/Vomiting)    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       prochlorperazine 10 MG tablet    COMPAZINE    30 tablet    Take 1 tablet (10 mg) by mouth every 6 hours as needed (Nausea/Vomiting)    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

## 2017-09-28 DIAGNOSIS — C15.9 MALIGNANT NEOPLASM OF ESOPHAGUS, UNSPECIFIED LOCATION (H): Primary | ICD-10-CM

## 2017-10-31 ENCOUNTER — ANESTHESIA EVENT (OUTPATIENT)
Dept: SURGERY | Facility: CLINIC | Age: 37
DRG: 327 | End: 2017-10-31
Payer: COMMERCIAL

## 2017-11-01 ENCOUNTER — HOSPITAL ENCOUNTER (OUTPATIENT)
Dept: PET IMAGING | Facility: CLINIC | Age: 37
Discharge: HOME | End: 2017-11-01
Attending: CLINICAL NURSE SPECIALIST | Admitting: CLINICAL NURSE SPECIALIST
Payer: COMMERCIAL

## 2017-11-01 ENCOUNTER — OFFICE VISIT (OUTPATIENT)
Dept: SURGERY | Facility: CLINIC | Age: 37
End: 2017-11-01
Attending: THORACIC SURGERY (CARDIOTHORACIC VASCULAR SURGERY)
Payer: COMMERCIAL

## 2017-11-01 ENCOUNTER — OFFICE VISIT (OUTPATIENT)
Dept: SURGERY | Facility: CLINIC | Age: 37
End: 2017-11-01

## 2017-11-01 ENCOUNTER — ALLIED HEALTH/NURSE VISIT (OUTPATIENT)
Dept: SURGERY | Facility: CLINIC | Age: 37
End: 2017-11-01

## 2017-11-01 ENCOUNTER — APPOINTMENT (OUTPATIENT)
Dept: LAB | Facility: CLINIC | Age: 37
End: 2017-11-01
Attending: INTERNAL MEDICINE
Payer: COMMERCIAL

## 2017-11-01 VITALS
DIASTOLIC BLOOD PRESSURE: 76 MMHG | HEIGHT: 68 IN | HEART RATE: 99 BPM | OXYGEN SATURATION: 99 % | BODY MASS INDEX: 24.86 KG/M2 | TEMPERATURE: 97.9 F | RESPIRATION RATE: 16 BRPM | WEIGHT: 164 LBS | SYSTOLIC BLOOD PRESSURE: 125 MMHG

## 2017-11-01 VITALS
DIASTOLIC BLOOD PRESSURE: 76 MMHG | HEART RATE: 99 BPM | WEIGHT: 164.02 LBS | TEMPERATURE: 97.9 F | RESPIRATION RATE: 16 BRPM | SYSTOLIC BLOOD PRESSURE: 125 MMHG | BODY MASS INDEX: 24.94 KG/M2

## 2017-11-01 DIAGNOSIS — C15.5 MALIGNANT NEOPLASM OF LOWER THIRD OF ESOPHAGUS (H): ICD-10-CM

## 2017-11-01 DIAGNOSIS — Z01.818 PREOP EXAMINATION: Primary | ICD-10-CM

## 2017-11-01 DIAGNOSIS — C15.9 MALIGNANT NEOPLASM OF ESOPHAGUS, UNSPECIFIED LOCATION (H): ICD-10-CM

## 2017-11-01 LAB
ALBUMIN SERPL-MCNC: 3.8 G/DL (ref 3.4–5)
ALP SERPL-CCNC: 48 U/L (ref 40–150)
ALT SERPL W P-5'-P-CCNC: 31 U/L (ref 0–70)
ANION GAP SERPL CALCULATED.3IONS-SCNC: 4 MMOL/L (ref 3–14)
AST SERPL W P-5'-P-CCNC: 21 U/L (ref 0–45)
BASOPHILS # BLD AUTO: 0 10E9/L (ref 0–0.2)
BASOPHILS NFR BLD AUTO: 0.5 %
BILIRUB SERPL-MCNC: 0.5 MG/DL (ref 0.2–1.3)
BUN SERPL-MCNC: 18 MG/DL (ref 7–30)
CALCIUM SERPL-MCNC: 7.8 MG/DL (ref 8.5–10.1)
CHLORIDE SERPL-SCNC: 107 MMOL/L (ref 94–109)
CO2 SERPL-SCNC: 27 MMOL/L (ref 20–32)
CREAT SERPL-MCNC: 0.9 MG/DL (ref 0.66–1.25)
DIFFERENTIAL METHOD BLD: NORMAL
EOSINOPHIL # BLD AUTO: 0.1 10E9/L (ref 0–0.7)
EOSINOPHIL NFR BLD AUTO: 1.9 %
ERYTHROCYTE [DISTWIDTH] IN BLOOD BY AUTOMATED COUNT: 11 % (ref 10–15)
GFR SERPL CREATININE-BSD FRML MDRD: >90 ML/MIN/1.7M2
GLUCOSE BLDC GLUCOMTR-MCNC: 92 MG/DL (ref 70–99)
GLUCOSE SERPL-MCNC: 97 MG/DL (ref 70–99)
HCT VFR BLD AUTO: 41.2 % (ref 40–53)
HGB BLD-MCNC: 14.1 G/DL (ref 13.3–17.7)
IMM GRANULOCYTES # BLD: 0 10E9/L (ref 0–0.4)
IMM GRANULOCYTES NFR BLD: 0.2 %
LYMPHOCYTES # BLD AUTO: 1.4 10E9/L (ref 0.8–5.3)
LYMPHOCYTES NFR BLD AUTO: 21.5 %
MCH RBC QN AUTO: 31.5 PG (ref 26.5–33)
MCHC RBC AUTO-ENTMCNC: 34.2 G/DL (ref 31.5–36.5)
MCV RBC AUTO: 92 FL (ref 78–100)
MONOCYTES # BLD AUTO: 0.6 10E9/L (ref 0–1.3)
MONOCYTES NFR BLD AUTO: 9.5 %
NEUTROPHILS # BLD AUTO: 4.2 10E9/L (ref 1.6–8.3)
NEUTROPHILS NFR BLD AUTO: 66.4 %
NRBC # BLD AUTO: 0 10*3/UL
NRBC BLD AUTO-RTO: 0 /100
PLATELET # BLD AUTO: 185 10E9/L (ref 150–450)
POTASSIUM SERPL-SCNC: 3.7 MMOL/L (ref 3.4–5.3)
PROT SERPL-MCNC: 6.9 G/DL (ref 6.8–8.8)
RBC # BLD AUTO: 4.48 10E12/L (ref 4.4–5.9)
SODIUM SERPL-SCNC: 139 MMOL/L (ref 133–144)
WBC # BLD AUTO: 6.3 10E9/L (ref 4–11)

## 2017-11-01 PROCEDURE — 86900 BLOOD TYPING SEROLOGIC ABO: CPT | Performed by: CLINICAL NURSE SPECIALIST

## 2017-11-01 PROCEDURE — 78816 PET IMAGE W/CT FULL BODY: CPT | Mod: PS

## 2017-11-01 PROCEDURE — 86901 BLOOD TYPING SEROLOGIC RH(D): CPT | Performed by: CLINICAL NURSE SPECIALIST

## 2017-11-01 PROCEDURE — 34300033 ZZH RX 343: Performed by: CLINICAL NURSE SPECIALIST

## 2017-11-01 PROCEDURE — 86923 COMPATIBILITY TEST ELECTRIC: CPT | Performed by: CLINICAL NURSE SPECIALIST

## 2017-11-01 PROCEDURE — 25000128 H RX IP 250 OP 636: Mod: ZF | Performed by: THORACIC SURGERY (CARDIOTHORACIC VASCULAR SURGERY)

## 2017-11-01 PROCEDURE — 71260 CT THORAX DX C+: CPT

## 2017-11-01 PROCEDURE — A9552 F18 FDG: HCPCS | Performed by: CLINICAL NURSE SPECIALIST

## 2017-11-01 PROCEDURE — 25000128 H RX IP 250 OP 636: Performed by: CLINICAL NURSE SPECIALIST

## 2017-11-01 PROCEDURE — 80053 COMPREHEN METABOLIC PANEL: CPT | Performed by: INTERNAL MEDICINE

## 2017-11-01 PROCEDURE — 99212 OFFICE O/P EST SF 10 MIN: CPT | Mod: ZF

## 2017-11-01 PROCEDURE — 86850 RBC ANTIBODY SCREEN: CPT | Performed by: CLINICAL NURSE SPECIALIST

## 2017-11-01 PROCEDURE — 85025 COMPLETE CBC W/AUTO DIFF WBC: CPT | Performed by: INTERNAL MEDICINE

## 2017-11-01 RX ORDER — IOPAMIDOL 755 MG/ML
124 INJECTION, SOLUTION INTRAVASCULAR ONCE
Status: COMPLETED | OUTPATIENT
Start: 2017-11-01 | End: 2017-11-01

## 2017-11-01 RX ORDER — HEPARIN SODIUM (PORCINE) LOCK FLUSH IV SOLN 100 UNIT/ML 100 UNIT/ML
5 SOLUTION INTRAVENOUS
Status: COMPLETED | OUTPATIENT
Start: 2017-11-01 | End: 2017-11-01

## 2017-11-01 RX ORDER — HEPARIN SODIUM (PORCINE) LOCK FLUSH IV SOLN 100 UNIT/ML 100 UNIT/ML
500 SOLUTION INTRAVENOUS ONCE
Status: COMPLETED | OUTPATIENT
Start: 2017-11-01 | End: 2017-11-01

## 2017-11-01 RX ADMIN — SODIUM CHLORIDE, PRESERVATIVE FREE 500 UNITS: 5 INJECTION INTRAVENOUS at 10:44

## 2017-11-01 RX ADMIN — IOPAMIDOL 124 ML: 755 INJECTION, SOLUTION INTRAVENOUS at 10:23

## 2017-11-01 RX ADMIN — FLUDEOXYGLUCOSE F-18 12.93 MCI.: 500 INJECTION, SOLUTION INTRAVENOUS at 10:00

## 2017-11-01 RX ADMIN — SODIUM CHLORIDE, PRESERVATIVE FREE 5 ML: 5 INJECTION INTRAVENOUS at 16:00

## 2017-11-01 ASSESSMENT — PAIN SCALES - GENERAL: PAINLEVEL: NO PAIN (0)

## 2017-11-01 NOTE — MR AVS SNAPSHOT
After Visit Summary   2017    Daniel Sandhu    MRN: [id number]           Patient Information     Date Of Birth          1980        Visit Information        Provider Department      2017 3:00 PM Rn, [facility]        Care Instructions    Preparing for Your Surgery      Name:  Daniel Sandhu   MRN:  [id number]   :  1980   Today's Date:  2017     Arriving for surgery:  Surgery date:  11/3/17  Arrival time:  0530  Please come to:       [facility] [address]  [address] [place]  [address]    -   parking is available in front of the hospital from 5:15 am to 8:00 pm    -  Stop at the Information Desk in the lobby    -   Inform the information person that you are here for surgery. An escort to 3c will be provided. If you would not like an escort, please proceed to 3C on the 3rd floor. [phone]     What can I eat or drink?  -  You may have solid food or milk products until 8 hours prior to your surgery.  -  You may have water, apple juice or 7up/Sprite until 2 hours prior to your surgery.    Which medicines can I take?  -  Do NOT take these medications in the morning, the day of surgery:  Aspirin and supplements x 7 days before surgery, ibuprofen x 2 days before surgery    -  Please take these medications the day of surgery:  Tylenol or ativan or zofran or compazine if needed    How do I prepare myself?  -  Take two showers: one the night before surgery; and one the morning of surgery.         Use Scrubcare or Hibiclens to wash from neck down.  You may use your own shampoo and conditioner. No other hair products.   -  Do NOT use lotion, powder, deodorant, or antiperspirant the day of your surgery.  -  Do NOT wear any makeup, fingernail polish or jewelry.  -  Begin using Incentive Spirometer 1 week prior to surgery.  Use 4 times per day, up to 5 breaths each time.  Bring Incentive Spirometer to  hospital.  -Do not bring your own medications to the hospital, except for inhalers and eye drops.  -  Bring your ID and insurance card.    Questions or Concerns:  If you have questions or concerns, please call the  Preoperative Assessment Center, Monday-Friday 7AM-7PM:  [phone]                    Follow-ups after your visit        Your next 10 appointments already scheduled     Nov 01, 2017  3:00 PM CDT   (Arrive by 2:45 PM)   PAC RN ASSESSMENT with  Pac Rn   [facility] ([facility])    [address] [place] [id number]   [phone]            Nov 01, 2017  3:30 PM CDT   (Arrive by 3:15 PM)   PAC Anesthesia Consult with  Pac Anesthesiologist   [facility] Preoperative [facility] [place] ([facility])    [address] [place] [id number]   [phone]            Nov 01, 2017  3:45 PM CDT   LAB with  LAB   [facility] Lab ([facility])    [address] [place] [id number] [phone]           Please do not eat 10-12 hours before your appointment if you are coming in fasting for labs on lipids, cholesterol, or glucose (sugar). This does not apply to pregnant women. Water, hot tea and black coffee (with nothing added) are okay. Do not drink other fluids, diet soda or chew gum.            Nov 01, 2017  4:30 PM CDT   [facility] Draw with  [facility] DRAW   [facility] Draw ([facility])    [address] [place] [id number]   [phone]            Nov 01, 2017  5:00 PM CDT   (Arrive by 4:45 PM)   Return Visit with Yunior Esteban MD   [facility] ([facility])    [address] [place] [address]   [phone]            Nov 03, 2017   Procedure with Yunior Esteban MD   [facility] [place]  Day Surgery (--)    [address] [facility] [place] [id number] [phone]              Future tests that were ordered for you today     Open Future Orders        Priority Expected Expires Ordered    PET Oncology Whole Body Routine  12/27/2017 9/28/2017            Who to contact     Please call your clinic at [phone] to:    Ask questions about your health    Make or cancel appointments    Discuss your medicines    Learn about your test results    Speak to your doctor   If you have compliments or concerns about an experience at your clinic, or if you wish to file a complaint, please contact [facility] Patient Relations at [phone] or email us at [email or web address]         Additional Information About Your Visit        Where I've Been Information     Where I've Been gives you secure access to your electronic health record. If you see a primary care provider, you can also send messages to your care team and make appointments. If you have questions, please call your primary care clinic.  If you do not have a primary care provider, please call [phone] and they will assist you.      Where I've Been is an electronic gateway that provides easy, online access to your medical records. With Where I've Been, you can request a clinic appointment, read your test results, renew a prescription or communicate with your care team.     To access your existing account, please contact your [facility] or call [phone] for assistance.        Care EveryWhere ID     This is your Care EveryWhere ID. This could be used by other organizations to access your [facility] medical records  [id number]         Blood Pressure from Last 3 Encounters:   09/25/17 129/80   08/25/17 120/69   08/08/17 123/62    Weight from Last 3 Encounters:   09/25/17 73.2 kg (161 lb 6.4 oz)   08/25/17 71.6 kg (157 lb 14.4 oz)   08/08/17 72.1 kg (158 lb 14.4 oz)              Today, you had the following     No orders  found for display       Primary Care Provider Office Phone # Fax #    Lencho Chan [phone]       [address] [address]        Equal Access to Services     SHAWN SWAIN : Hadii garo stanley mannie Araujo, wakarlda luqadaha, qaybta kaalmada ross, alejandro meraz laWestleymarla arteaga. So [facility] [phone].    ATENCIÓN: Si habla español, tiene a zayas disposición servicios gratuitos de asistencia lingüística. Llame al [phone].    We comply with applicable federal civil rights laws and [place] laws. We do not discriminate on the basis of race, color, national origin, age, disability, sex, sexual orientation, or gender identity.            Thank you!     Thank you for choosing [facility]  for your care. Our goal is always to provide you with excellent care. Hearing back from our patients is one way we can continue to improve our services. Please take a few minutes to complete the written survey that you may receive in the mail after your visit with us. Thank you!             Your Updated Medication List - Protect others around you: Learn how to safely use, store and throw away your medicines at www.disposemymeds.org.          This list is accurate as of: 11/1/17  2:16 PM.  Always use your most recent med list.                   Brand Name Dispense Instructions for use Diagnosis    LORazepam 0.5 MG tablet    ATIVAN    30 tablet    Take 1 tablet (0.5 mg) by mouth every 4 hours as needed (Anxiety, Nausea/Vomiting or Sleep)    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       nystatin 341814 UNIT/ML suspension    MYCOSTATIN    473 mL    Take 5 mLs (500,000 Units) by mouth 4 times daily    Thrush       ondansetron 8 MG ODT tab    ZOFRAN-ODT    60 tablet    Take 1 tablet (8 mg) by mouth every 8 hours as needed for nausea    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, Non-intractable vomiting with nausea, unspecified vomiting  type       ondansetron 8 MG tablet    ZOFRAN    10 tablet    Take 1 tablet (8 mg) by mouth every 8 hours as needed (Nausea/Vomiting)    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       prochlorperazine 10 MG tablet    COMPAZINE    30 tablet    Take 1 tablet (10 mg) by mouth every 6 hours as needed (Nausea/Vomiting)    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

## 2017-11-01 NOTE — ANESTHESIA PREPROCEDURE EVALUATION
Anesthesia Evaluation     . Pt has had prior anesthetic. Type: General and MAC    No history of anesthetic complications          ROS/MED HX    ENT/Pulmonary:  - neg pulmonary ROS     Neurologic:  - neg neurologic ROS     Cardiovascular:  - neg cardiovascular ROS   (+) ----. : . . . :. . Previous cardiac testing Echodate:9/25/17results:Interpretation Summary  Global and regional left ventricular function is normal with an EF of 55-60%. Global peak LV longitudinal strain is averaged at -20.3%. This is within reported normal limits (normal <-18%). Right ventricular function, chamber size, wall motion, and thickness are normal. No pericardial effusion is present. There has been no change.    date: results: date: results: date: results:          METS/Exercise Tolerance:  >4 METS   Hematologic:  - neg hematologic  ROS       Musculoskeletal:   (+) , , other musculoskeletal- low back pain      GI/Hepatic:     (+) GERD Other,       Renal/Genitourinary:  - ROS Renal section negative       Endo:  - neg endo ROS       Psychiatric:  - neg psychiatric ROS       Infectious Disease:  - neg infectious disease ROS       Malignancy:   (+) Malignancy History of Other  Other CA esophageal cancer Active status post Chemo         Other:    (+) No chance of pregnancy C-spine cleared: N/A, no H/O Chronic Pain,no other significant disability                    Physical Exam  Normal systems: dental    Airway   Mallampati: I  TM distance: >3 FB  Neck ROM: full    Dental     Cardiovascular   Rhythm and rate: regular and normal      Pulmonary    breath sounds clear to auscultation    Other findings: For further details of assessment, testing, and physical exam please see H and P completed on same date.           PAC Discussion and Assessment    ASA Classification: 2  Case is suitable for: [place]  Anesthetic techniques and relevant risks discussed: GA and GA with regional block for post-op pain control  Invasive monitoring and risk  discussed: No  Types:   Possibility and Risk of blood transfusion discussed: Yes  NPO instructions given:   Additional anesthetic preparation and risks discussed:   Needs early admission to pre-op area:   Other:     PAC Resident/NP Anesthesia Assessment:      Reviewed and Signed by PAC Mid-Level Provider/Resident  Mid-Level Provider/Resident: GAVIN Guzman CNS  Date: 11/1/17  Time: 3:46pm    Attending Anesthesiologist Anesthesia Assessment:  I have examined the patient and reviewed the medical record.  I have discussed the patient with the ROMEO and concur with her assessment  The patient is scheduled for esophagectomy and partial gastrectomy for esophageal CA.  He is otherwise very healthy    PE:  THin, healthy appearing male in NAD.  MPC 1.  Lungs Clear.  CVS  RRR with no murmur.  Right chest port    No further testing necessary per protocol.  Final plan per attending anesthesiologist the day of surgery.  Discussed epidural preoperatively  Discussed arterial line, 2 PIV, possible blood transfuison    Reviewed and Signed by PAC Anesthesiologist  Anesthesiologist: Lee Downing MD  Date: 11/1/2017  Time:   Pass/Fail:   Disposition:     PAC Pharmacist Assessment:        Pharmacist:   Date:   Time:      Anesthesia Plan      History & Physical Review  History and physical reviewed and following examination; no interval change.    ASA Status:  3 .    NPO Status:  > 8 hours    Plan for ETT and General with Intravenous induction. Maintenance will be Balanced.    PONV prophylaxis:  Ondansetron (or other 5HT-3) and Dexamethasone or Solumedrol  Additional equipment: 2nd IV and Arterial Line I have examined the patient and reviewed the medical record.    Plan:  GA with ETT, arterial line with flotrak, 2 PIV, access the portacath.  Bupivicaine and preervative free fentanyl for epidural, Norepinepherine drip in room.  Bronchial blocker in room    Lee Downing MD      Postoperative Care  Postoperative pain management:   Neuraxial analgesia.      Consents  Anesthetic plan, risks, benefits and alternatives discussed with:  Patient.  Use of blood products discussed: Yes.   .                          .

## 2017-11-01 NOTE — PATIENT INSTRUCTIONS
Preparing for Your Surgery      Name:  Daniel Sandhu   MRN:  [id number]   :  1980   Today's Date:  2017     Arriving for surgery:  Surgery date:  11/3/17  Arrival time:  0530  Please come to:       [facility] [address]  [address] [place]  [address]    -   parking is available in front of the hospital from 5:15 am to 8:00 pm    -  Stop at the Information Desk in the lobby    -   Inform the information person that you are here for surgery. An escort to 3c will be provided. If you would not like an escort, please proceed to 3C on the 3rd floor. [phone]     What can I eat or drink?  -  You may have solid food or milk products until 8 hours prior to your surgery.  -  You may have water, apple juice or 7up/Sprite until 2 hours prior to your surgery.    Which medicines can I take?  -  Do NOT take these medications in the morning, the day of surgery:  Aspirin and supplements x 7 days before surgery, ibuprofen x 2 days before surgery    -  Please take these medications the day of surgery:  Tylenol or ativan or zofran or compazine if needed    How do I prepare myself?  -  Take two showers: one the night before surgery; and one the morning of surgery.         Use Scrubcare or Hibiclens to wash from neck down.  You may use your own shampoo and conditioner. No other hair products.   -  Do NOT use lotion, powder, deodorant, or antiperspirant the day of your surgery.  -  Do NOT wear any makeup, fingernail polish or jewelry.  -  Begin using Incentive Spirometer 1 week prior to surgery.  Use 4 times per day, up to 5 breaths each time.  Bring Incentive Spirometer to hospital.  -Do not bring your own medications to the hospital, except for inhalers and eye drops.  -  Bring your ID and insurance card.    Questions or Concerns:  If you have questions or concerns, please call the  Preoperative Assessment Center, Monday-Friday 7AM-7PM:  [phone]

## 2017-11-01 NOTE — NURSING NOTE
"Chief Complaint   Patient presents with     Port Draw     labs drawn from port by rn.     Port accessed with 20 gauge 3/4\" gripper needle and labs drawn by rn.  Port flushed with NS and heparin.  Pt tolerated well.  Pt checked in for next appt.  Fidelia Massey RN      "

## 2017-11-01 NOTE — H&P
Pre-Operative H & P     CC:  Preoperative exam to assess for increased cardiopulmonary risk while undergoing surgery and anesthesia.    Date of Encounter: 11/1/2017  Primary Care Physician:  Lencho Chan  Daniel Sandhu is a 37 year old male who presents for pre-operative H & P in preparation for diagnostic laparoscopy, laparotomy, left thoracotomy, total gastrectomy, jejunostomy placement, pharyngostomy tube placement, and EGD with Dr. Esteban on 11/3/17 at [facility]. History is obtained from the patient.     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 He has been undergoing preop chemotherapy, last approximately one month ago. He has also been followed by Dr. Esteban with above procedure now planned.  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 6/5/2017       Past Surgical History  Past Surgical History:   Procedure Laterality Date     ESOPHAGOGASTRODUODENOSCOPY       ESOPHAGOSCOPY, GASTROSCOPY, DUODENOSCOPY (EGD), COMBINED N/A 6/9/2017    Procedure: COMBINED ENDOSCOPIC ULTRASOUND, ESOPHAGOSCOPY, GASTROSCOPY, DUODENOSCOPY (EGD), FINE NEEDLE ASPIRATE/BIOPSY;  Upper Endoscopic Ultrasound, fine needle aspirate/biopsy;  Surgeon: Guru Mark Avila MD;  Location: [facility] OR     HAND SURGERY      childhood, torn tendon     INSERT PORT VASCULAR ACCESS Right 6/22/2017    Procedure: INSERT PORT VASCULAR ACCESS;  Single Lumen Chest Power Port;  Surgeon: Iván Driver PA-C;  Location: [facility] OR       Hx of Blood transfusions/reactions: Denies.     Hx of abnormal bleeding or anti-platelet use: Denies.     Menstrual history: No LMP for male patient.    Steroid use in the last year: Yes, with chemotherapy    Personal or FH with difficulty with Anesthesia:   Denies.    Prior to Admission Medications  Current Outpatient Prescriptions   Medication Sig Dispense Refill     nystatin (MYCOSTATIN) 899225 UNIT/ML suspension Take 5 mLs (500,000 Units) by mouth 4 times daily (Patient not taking: Reported on 2017) 473 mL 3     ondansetron (ZOFRAN-ODT) 8 MG ODT tab Take 1 tablet (8 mg) by mouth every 8 hours as needed for nausea (Patient not taking: Reported on 2017) 60 tablet 3     LORazepam (ATIVAN) 0.5 MG tablet Take 1 tablet (0.5 mg) by mouth every 4 hours as needed (Anxiety, Nausea/Vomiting or Sleep) (Patient not taking: Reported on 2017) 30 tablet 5     prochlorperazine (COMPAZINE) 10 MG tablet Take 1 tablet (10 mg) by mouth every 6 hours as needed (Nausea/Vomiting) (Patient not taking: Reported on 2017) 30 tablet 5     ondansetron (ZOFRAN) 8 MG tablet Take 1 tablet (8 mg) by mouth every 8 hours as needed (Nausea/Vomiting) (Patient not taking: Reported on 2017) 10 tablet 7       Allergies  No Known Allergies    Social History  Social History     Social History     Marital status:      Spouse name: N/A     Number of children: 1     Years of education: N/A     Occupational History     musician and teacher       Social History Main Topics     Smoking status: Never Smoker     Smokeless tobacco: Never Used     Alcohol use Yes      Comment: 1 beer daily     Drug use: Yes     Special: Marijuana      Comment: occasional     Sexual activity: Yes     Partners: Female     Birth control/ protection: Natural Family Planning     Other Topics Concern     Not on file     Social History Narrative       Family History  Family History   Problem Relation Age of Onset     Other - See Comments Father      sepsis     CANCER Sister      breast cancer  29 yo 1/2 sister      CANCER Paternal Grandmother      breast       Review of Systems  The complete review of systems is negative other than noted in the HPI or here.   Constitutional: Denies fever, chills, weight loss.  "Has gained weight in preparation for surgery.  HEENT: Wears contacts/glasses for vision. No swallowing difficulty. Since chemotherapy has been treated twice for oral thrush, once with Nystatin and most recently Diflucan. He is concerned that he still has symptoms.  Respiratory: Denies cough or shortness of breath.  CV: Denies chest pain or irregular HR. Good exercise tolerance.  GI: Denies abdominal pain or bowel issues. Possible occasional heartburn, he is having some difficulty differentiating this from his thrush symptoms.  : Denies dysuria.  M/S: Recent pain in coccyx and low back. Left index finger has joint tenderness.     Temp: 97.9  F (36.6  C) Temp src: Oral BP: 125/76 Pulse: 99   Resp: 16 SpO2: 99 %         164 lbs 0 oz  5' 8\"   Body mass index is 24.94 kg/(m^2).       Physical Exam  Constitutional: Awake, alert, cooperative, no apparent distress, and appears stated age.   Eyes: Pupils equal, round and reactive to light, extra ocular muscles intact, sclera clear, conjunctiva normal.   HENT: Normocephalic, oral pharynx with moist mucus membranes, good dentition. No goiter appreciated. Examined tongue and oral tissues; no redness, white patches or coating of tongue.   Respiratory: Clear to auscultation bilaterally, no crackles or wheezing. No cough or obvious dyspnea.  Cardiovascular: Regular rate and rhythm, normal S1 and S2, and no murmur noted. Carotids, no bruits. No edema. Palpable pulses to radial  DP and PT arteries.   GI: Normal bowel sounds, soft, non-distended, non-tender, no masses palpated, no hepatosplenomegaly.    Lymph/Hematologic: No cervical lymphadenopathy and no supraclavicular lymphadenopathy.  Genitourinary:  Deferred.  Skin: Warm and dry.  No rashes at anticipated surgical site. Right upper chest port.  Musculoskeletal: Full ROM of neck. There is no redness, warmth, or swelling of the joints. Gross motor strength is normal.    Neurologic: Awake, alert, oriented to name, place and " time. Cranial nerves II-XII are grossly intact. Gait is normal.   Neuropsychiatric: Mildly anxious, cooperative. Normal affect.     Labs: (personally reviewed)  Lab Results   Component Value Date    WBC 4.4 09/25/2017     Lab Results   Component Value Date    RBC 3.93 09/25/2017     Lab Results   Component Value Date    HGB 12.9 09/25/2017     Lab Results   Component Value Date    HCT 36.9 09/25/2017     Lab Results   Component Value Date    MCV 94 09/25/2017     Lab Results   Component Value Date    MCH 32.8 09/25/2017     Lab Results   Component Value Date    MCHC 35.0 09/25/2017     Lab Results   Component Value Date    RDW 13.3 09/25/2017     Lab Results   Component Value Date     09/25/2017     Last Basic Metabolic Panel:  Lab Results   Component Value Date     09/25/2017      Lab Results   Component Value Date    POTASSIUM 4.1 09/25/2017     Lab Results   Component Value Date    CHLORIDE 106 09/25/2017     Lab Results   Component Value Date    YOBANY 8.4 09/25/2017     Lab Results   Component Value Date    CO2 29 09/25/2017     Lab Results   Component Value Date    BUN 13 09/25/2017     Lab Results   Component Value Date    CR 0.78 09/25/2017     Lab Results   Component Value Date    GLC 99 09/25/2017     Lab Results   Component Value Date    AST 24 09/25/2017     Lab Results   Component Value Date    ALT 35 09/25/2017     No results found for: BILICONJ   Lab Results   Component Value Date    BILITOTAL 0.7 09/25/2017     Lab Results   Component Value Date    ALBUMIN 3.7 09/25/2017     Lab Results   Component Value Date    PROTTOTAL 7.0 09/25/2017      Lab Results   Component Value Date    ALKPHOS 56 09/25/2017     EKG: Not indicated.  Cardiac echo: 9/25/17  Interpretation Summary  Global and regional left ventricular function is normal with an EF of 55-60%.  Global peak LV longitudinal strain is averaged at -20.3%. This is within  reported normal limits (normal <-18%).  Right ventricular function,  chamber size, wall motion, and thickness are  normal.  No pericardial effusion is present.  There has been no change.    Most Recent [facility] Pulmonary Function Testing 11/1/17    FVC-Pred   Date Value Ref Range Status   11/01/2017 4.97 L      FVC-Pre   Date Value Ref Range Status   11/01/2017 6.13 L      FVC-%Pred-Pre   Date Value Ref Range Status   11/01/2017 123 %      FEV1-Pre   Date Value Ref Range Status   11/01/2017 4.80 L      FEV1-%Pred-Pre   Date Value Ref Range Status   11/01/2017 118 %      FEV1FVC-Pred   Date Value Ref Range Status   11/01/2017 82 %      FEV1FVC-Pre   Date Value Ref Range Status   11/01/2017 78 %        FEFMax-Pred   Date Value Ref Range Status   11/01/2017 9.79 L/sec      FEFMax-Pre   Date Value Ref Range Status   11/01/2017 12.19 L/sec      FEFMax-%Pred-Pre   Date Value Ref Range Status   11/01/2017 124 %      ExpTime-Pre   Date Value Ref Range Status   11/01/2017 7.41 sec      FIFMax-Pre   Date Value Ref Range Status   11/01/2017 10.73 L/sec      FEV1FEV6-Pred   Date Value Ref Range Status   11/01/2017 82 %      FEV1FEV6-Pre   Date Value Ref Range Status   11/01/2017 78 %        9/22/17 PET/CT  Updated imaging pending    IMPRESSION: In this patient with history of the esophageal  adenocarcinoma;  1. Substantial interval decrease in the size and FDG uptake of the  mass at the level of gastroesophageal junction. There may be complete  remission.  2. Interval resolution of FDG avid gastrohepatic lymph node.     ASSESSMENT and PLAN  Daniel Sandhu is a 37 year old male scheduled to undergo diagnostic laparoscopy, laparotomy, left thoracotomy, total gastrectomy, jejunostomy placement, pharyngostomy tube placement, and EGD with Dr. Esteban on 11/3/17. He has the following specific operative considerations:   - RCRI : No serious cardiac risks.    - Anesthesia considerations:  Refer to PAC assessment in anesthesia records  - VTE risk: 3%  - ELOINA # of risks 1/8 = Low risk  - Risk of PONV score =  2.  If > 2, anti-emetic intervention recommended.     --Adenocarcinoma of GE junction s/p three cycles of chemotherapy. Above procedure now planned.   --No cardiac history, symptoms or meds. Echo above.  Good exercise tolerance.   --Nonsmoker. No pulmonary issues.    --Occasional GERD symptoms. No meds at this time.   --IV access: Right chest  port   --Pain management. Discussed possibility of nerve block if appropriate for patient's procedure. Final counseling and decisions by regional team on DOS.  Type and screen drawn today.  Arrival time, NPO, shower and medication instructions provided by nursing staff today. Preparing For Your Surgery handout given.    Patient was discussed with Dr Downing.    GAVIN Aburto CNS  Preoperative Assessment Center  [facility]  Clinic and Surgery Center  Phone: [phone]  Fax: [phone]

## 2017-11-01 NOTE — NURSING NOTE
"Oncology Rooming Note    November 1, 2017 4:56 PM   Daniel Sandhu is a 37 year old male who presents for:    Chief Complaint   Patient presents with     Port Draw     labs drawn from port by rn.     Oncology Clinic Visit     Return for Pet, PFL results      Initial Vitals: /76  Pulse 99  Temp 97.9  F (36.6  C) (Oral)  Resp 16  Wt 74.4 kg (164 lb 0.4 oz)  BMI 24.94 kg/m2 Estimated body mass index is 24.94 kg/(m^2) as calculated from the following:    Height as of an earlier encounter on 11/1/17: 1.727 m (5' 8\").    Weight as of this encounter: 74.4 kg (164 lb 0.4 oz). Body surface area is 1.89 meters squared.  No Pain (0) Comment: Data Unavailable   No LMP for male patient.  Allergies reviewed: Yes  Medications reviewed: Yes    Medications: Medication refills not needed today.  Pharmacy name entered into [facility]: [facility] - [place], [place] - [address] [place] [address]    Clinical concerns: doreen  gael  was notified.    6  minutes for nursing intake (face to face time)     Christine Antonio MA              "

## 2017-11-01 NOTE — MR AVS SNAPSHOT
After Visit Summary   11/1/2017    Daniel Sandhu    MRN: [id number]           Patient Information     Date Of Birth          1980        Visit Information        Provider Department      11/1/2017 5:00 PM Yunior Esteban MD [facility]        Today's Diagnoses     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           Follow-ups after your visit        Your next 10 appointments already scheduled     Nov 03, 2017   Procedure with Yunior Esteban MD   [facility] [place], Same Day Surgery (--)    [address]  [place] [address] [phone]              Future tests that were ordered for you today     Open Future Orders        Priority Expected Expires Ordered    PET Oncology Whole Body Routine  12/27/2017 9/28/2017            Who to contact     If you have questions or need follow up information about today's clinic visit or your schedule please contact [facility] directly at [phone].  Normal or non-critical lab and imaging results will be communicated to you by [employer] [facility] [employer], letter or phone within 4 business days after the clinic has received the results. If you do not hear from us within 7 days, please contact the clinic through [employer] or phone. If you have a critical or abnormal lab result, we will notify you by phone as soon as possible.  Submit refill requests through [employer] or call your pharmacy and they will forward the refill request to us. Please allow 3 business days for your refill to be completed.          Additional Information About Your Visit        MyChart Information     [employer] gives you secure access to your electronic health record. If you see a primary care provider, you can also send messages to your care team and make appointments. If you have questions, please call your primary care clinic.  If you do not have a primary care provider, please call [phone] and they will assist you.        Care EveryWhere  ID     This is your Care EveryWhere ID. This could be used by other organizations to access your [facility] medical records  [id number]        Your Vitals Were     Pulse Temperature Respirations BMI (Body Mass Index)          99 97.9  F (36.6  C) (Oral) 16 24.94 kg/m2         Blood Pressure from Last 3 Encounters:   11/01/17 125/76   11/01/17 125/76   09/25/17 129/80    Weight from Last 3 Encounters:   11/01/17 74.4 kg (164 lb 0.4 oz)   11/01/17 74.4 kg (164 lb)   09/25/17 73.2 kg (161 lb 6.4 oz)              We Performed the Following     ABO/Rh type and screen     CBC with platelets differential     Comprehensive metabolic panel        Primary Care Provider Office Phone # Fax #    Lencho hCan [phone]       [address] [facility] [place] [address]        Equal Access to Services     [facility]: Hadii aad ku hadasho Soomaali, waaxda luqadaha, qaybta kaalmada adeegyada, waxay florencioin hayewan madelyn brown . So [facility] [phone].    ATENCIÓN: Si habla español, tiene a zayas disposición servicios gratuitos de asistencia lingüística. Llame al [phone].    We comply with applicable federal civil rights laws and [place] laws. We do not discriminate on the basis of race, color, national origin, age, disability, sex, sexual orientation, or gender identity.            Thank you!     Thank you for choosing [facility]  for your care. Our goal is always to provide you with excellent care. Hearing back from our patients is one way we can continue to improve our services. Please take a few minutes to complete the written survey that you may receive in the mail after your visit with us. Thank you!             Your Updated Medication List - Protect others around you: Learn how to safely use, store and throw away your medicines at www.disposemymeds.org.          This list is accurate as of: 11/1/17 11:59 PM.  Always use your most recent med list.                   Brand  Name Dispense Instructions for use Diagnosis    LORazepam 0.5 MG tablet    ATIVAN    30 tablet    Take 1 tablet (0.5 mg) by mouth every 4 hours as needed (Anxiety, Nausea/Vomiting or Sleep)    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       nystatin 929855 UNIT/ML suspension    MYCOSTATIN    473 mL    Take 5 mLs (500,000 Units) by mouth 4 times daily    Thrush       ondansetron 8 MG ODT tab    ZOFRAN-ODT    60 tablet    Take 1 tablet (8 mg) by mouth every 8 hours as needed for nausea    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, Non-intractable vomiting with nausea, unspecified vomiting type       ondansetron 8 MG tablet    ZOFRAN    10 tablet    Take 1 tablet (8 mg) by mouth every 8 hours as needed (Nausea/Vomiting)    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       prochlorperazine 10 MG tablet    COMPAZINE    30 tablet    Take 1 tablet (10 mg) by mouth every 6 hours as needed (Nausea/Vomiting)    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

## 2017-11-01 NOTE — PROGRESS NOTES
THORACIC SURGERY - ESTABLISHED PATIENT OFFICE VISIT       Dear Dr. Hood,     I saw Mr. 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      Neoadjuvant chemotherapy   Epirubicin, oxaliplatin, capecitabine: last dose 8/25/2017.     Complications from neoadjuvant chemotherapy   Hand-foot syndrome from capecitabine    Previsit Tests   PET/CT 11/1/2017: decreased hypermetabolism at GE junction compared to Zulema exam, mildly hypermetabolic punctate paraesophageal nodes    CT chest 5/31/17: GE mass seen; mildly prominent gastrohepatic ligament node.  EGD 5/30/17: 4cm mass beginning near the GE junction and extending into the fundus, small hiatal hernia       EUS (6/9/2017): tumor extending from 42-46 cm, epicenter in the gastric cardia, uT2N1      PMH  GERD     ETOH 1 beer/day  TOB no  Occasional marijuana     Physical examination  BMI 25  Daniel looks stronger and better than on previous visits     From a personal perspective, he lives in [place] with his wife Violeta and 5 year-old daughter. He also has a new son at home, Mayo, who was born in July. 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     IMPRESSION (C16.0) Gastroesophageal cancer (H)  (primary encounter diagnosis)     36 year old year-old male with a Siewert type III GE junction adenocarcinoma s/p neoadjuvant chemotherapy.      PLAN  I spent a total of 30 minutes with Mr. Sandhu, more than 50% of which were spent in counseling, coordination of care, and face-to-face time. I reviewed the plan as follows:  1) The patient is scheduled for surgery this Friday, November 3 with Dr. Esteban. We will plan for a diagnostic laparoscopy, distal esophagectomy, total gastrectomy with Terrell-en-Y reconstruction, jejunostomy tube placement, pharyngostomy tube placement, possible thoracotomy. I reviewed the indications, risks, and benefits of the procedure with Mr. Daniel Sandhu and his  wife. We discussed the intraoperative risks of bleeding, injury to vital organs, and potential for esophageal discontinuity. Potential postoperative complications include, but are not limited to, major respiratory events, arrhythmia, bleeding, infection, reoperation, anastomotic leak, conduit necrosis with discontinuity, vocal cord palsy, and death. I explained the anticipated hospital course (10-14 days) and postoperative recovery including pain control, tube feedings, dietary changes, and overall drain management. We discussed the importance of lifetime awareness to limit lifting heavy weights to prevent hiatal herniation as well as the need for aspiration precautions.  2) Consent obtained in clinic.  3) Enoxaparin 40 mg SQ to be given pre-op  4) PAC: done today  5) Epidural  All questions were answered and Daniel Sandhu was in agreement with the plan.  I appreciate the opportunity to participate in the care of your patient and will keep you updated.  Sincerely,

## 2017-11-01 NOTE — LETTER
11/1/2017      RE: Daniel Sandhu  [address] [address]       THORACIC SURGERY - ESTABLISHED PATIENT OFFICE VISIT       Dear Dr. Hood,     I saw Mr. 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      Neoadjuvant chemotherapy   Epirubicin, oxaliplatin, capecitabine: last dose 8/25/2017.     Complications from neoadjuvant chemotherapy   Hand-foot syndrome from capecitabine    Previsit Tests   PET/CT 11/1/2017: decreased hypermetabolism at GE junction compared to Zulema exam, mildly hypermetabolic punctate paraesophageal nodes    CT chest 5/31/17: GE mass seen; mildly prominent gastrohepatic ligament node.  EGD 5/30/17: 4cm mass beginning near the GE junction and extending into the fundus, small hiatal hernia       EUS (6/9/2017): tumor extending from 42-46 cm, epicenter in the gastric cardia, uT2N1      PMH  GERD     ETOH 1 beer/day  TOB no  Occasional marijuana     Physical examination  BMI 25  Daniel looks stronger and better than on previous visits     From a personal perspective, he lives in [place] with his wife Violeta and 5 year-old daughter. He also has a new son at home, Mayo, who was born in July. 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     IMPRESSION (C16.0) Gastroesophageal cancer (H)  (primary encounter diagnosis)     36 year old year-old male with a Siewert type III GE junction adenocarcinoma s/p neoadjuvant chemotherapy.      PLAN  I spent a total of 30 minutes with Mr. Sandhu, more than 50% of which were spent in counseling, coordination of care, and face-to-face time. I reviewed the plan as follows:  1) The patient is scheduled for surgery this Friday, November 3 with Dr. Esteban. We will plan for a diagnostic laparoscopy, distal esophagectomy, total gastrectomy with Terrell-en-Y reconstruction, jejunostomy tube placement, pharyngostomy tube placement, possible thoracotomy. I  reviewed the indications, risks, and benefits of the procedure with Mr. Daniel Sandhu and his wife. We discussed the intraoperative risks of bleeding, injury to vital organs, and potential for esophageal discontinuity. Potential postoperative complications include, but are not limited to, major respiratory events, arrhythmia, bleeding, infection, reoperation, anastomotic leak, conduit necrosis with discontinuity, vocal cord palsy, and death. I explained the anticipated hospital course (10-14 days) and postoperative recovery including pain control, tube feedings, dietary changes, and overall drain management. We discussed the importance of lifetime awareness to limit lifting heavy weights to prevent hiatal herniation as well as the need for aspiration precautions.  2) Consent obtained in clinic.  3) Enoxaparin 40 mg SQ to be given pre-op  4) PAC: done today  5) Epidural  All questions were answered and Daniel Sandhu was in agreement with the plan.  I appreciate the opportunity to participate in the care of your patient and will keep you updated.  Sincerely,      Yunior Esteban MD

## 2017-11-03 ENCOUNTER — APPOINTMENT (OUTPATIENT)
Dept: GENERAL RADIOLOGY | Facility: CLINIC | Age: 37
DRG: 327 | End: 2017-11-03
Attending: THORACIC SURGERY (CARDIOTHORACIC VASCULAR SURGERY)
Payer: COMMERCIAL

## 2017-11-03 ENCOUNTER — HOSPITAL ENCOUNTER (INPATIENT)
Facility: CLINIC | Age: 37
LOS: 14 days | Discharge: HOME HEALTH | DRG: 327 | End: 2017-11-17
Attending: THORACIC SURGERY (CARDIOTHORACIC VASCULAR SURGERY) | Admitting: THORACIC SURGERY (CARDIOTHORACIC VASCULAR SURGERY)
Payer: COMMERCIAL

## 2017-11-03 ENCOUNTER — ANESTHESIA (OUTPATIENT)
Dept: SURGERY | Facility: CLINIC | Age: 37
DRG: 327 | End: 2017-11-03
Payer: COMMERCIAL

## 2017-11-03 DIAGNOSIS — G89.18 ACUTE POST-OPERATIVE PAIN: ICD-10-CM

## 2017-11-03 DIAGNOSIS — C15.5 MALIGNANT NEOPLASM OF LOWER THIRD OF ESOPHAGUS (H): Primary | ICD-10-CM

## 2017-11-03 DIAGNOSIS — R19.4 BOWEL HABIT CHANGES: ICD-10-CM

## 2017-11-03 PROBLEM — C15.9 ESOPHAGEAL CANCER (H): Status: ACTIVE | Noted: 2017-11-03

## 2017-11-03 LAB
ABO + RH BLD: NORMAL
ABO + RH BLD: NORMAL
ANION GAP SERPL CALCULATED.3IONS-SCNC: 7 MMOL/L (ref 3–14)
BASE DEFICIT BLDA-SCNC: 1.9 MMOL/L
BASE DEFICIT BLDA-SCNC: 3.2 MMOL/L
BASE DEFICIT BLDA-SCNC: 3.3 MMOL/L
BASE DEFICIT BLDA-SCNC: 3.5 MMOL/L
BASE DEFICIT BLDA-SCNC: 3.7 MMOL/L
BASE DEFICIT BLDA-SCNC: 4.2 MMOL/L
BLD GP AB SCN SERPL QL: NORMAL
BLD PROD TYP BPU: NORMAL
BLOOD BANK CMNT PATIENT-IMP: NORMAL
BLOOD BANK CMNT PATIENT-IMP: NORMAL
BUN SERPL-MCNC: 23 MG/DL (ref 7–30)
CA-I BLD-MCNC: 4.4 MG/DL (ref 4.4–5.2)
CA-I BLD-MCNC: 4.5 MG/DL (ref 4.4–5.2)
CA-I BLD-MCNC: 4.5 MG/DL (ref 4.4–5.2)
CA-I BLD-MCNC: 4.6 MG/DL (ref 4.4–5.2)
CA-I BLD-MCNC: 4.6 MG/DL (ref 4.4–5.2)
CALCIUM SERPL-MCNC: 7.6 MG/DL (ref 8.5–10.1)
CHLORIDE SERPL-SCNC: 108 MMOL/L (ref 94–109)
CO2 SERPL-SCNC: 25 MMOL/L (ref 20–32)
CREAT SERPL-MCNC: 0.71 MG/DL (ref 0.66–1.25)
CREAT SERPL-MCNC: 0.74 MG/DL (ref 0.66–1.25)
ERYTHROCYTE [DISTWIDTH] IN BLOOD BY AUTOMATED COUNT: 11.6 % (ref 10–15)
GFR SERPL CREATININE-BSD FRML MDRD: >90 ML/MIN/1.7M2
GFR SERPL CREATININE-BSD FRML MDRD: >90 ML/MIN/1.7M2
GLUCOSE BLD-MCNC: 129 MG/DL (ref 70–99)
GLUCOSE BLD-MCNC: 167 MG/DL (ref 70–99)
GLUCOSE BLD-MCNC: 170 MG/DL (ref 70–99)
GLUCOSE BLD-MCNC: 174 MG/DL (ref 70–99)
GLUCOSE BLD-MCNC: 208 MG/DL (ref 70–99)
GLUCOSE BLDC GLUCOMTR-MCNC: 151 MG/DL (ref 70–99)
GLUCOSE BLDC GLUCOMTR-MCNC: 151 MG/DL (ref 70–99)
GLUCOSE BLDC GLUCOMTR-MCNC: 164 MG/DL (ref 70–99)
GLUCOSE BLDC GLUCOMTR-MCNC: 88 MG/DL (ref 70–99)
GLUCOSE SERPL-MCNC: 174 MG/DL (ref 70–99)
HCO3 BLD-SCNC: 23 MMOL/L (ref 21–28)
HCO3 BLD-SCNC: 24 MMOL/L (ref 21–28)
HCO3 BLD-SCNC: 25 MMOL/L (ref 21–28)
HCT VFR BLD AUTO: 33.2 % (ref 40–53)
HGB BLD-MCNC: 11.6 G/DL (ref 13.3–17.7)
HGB BLD-MCNC: 11.8 G/DL (ref 13.3–17.7)
HGB BLD-MCNC: 12.5 G/DL (ref 13.3–17.7)
HGB BLD-MCNC: 12.6 G/DL (ref 13.3–17.7)
HGB BLD-MCNC: 12.7 G/DL (ref 13.3–17.7)
HGB BLD-MCNC: 12.8 G/DL (ref 13.3–17.7)
LACTATE BLD-SCNC: 1.1 MMOL/L (ref 0.7–2)
LACTATE BLD-SCNC: 1.9 MMOL/L (ref 0.7–2)
MAGNESIUM SERPL-MCNC: 1.5 MG/DL (ref 1.6–2.3)
MAGNESIUM SERPL-MCNC: 1.7 MG/DL (ref 1.6–2.3)
MCH RBC QN AUTO: 32.1 PG (ref 26.5–33)
MCHC RBC AUTO-ENTMCNC: 34.9 G/DL (ref 31.5–36.5)
MCV RBC AUTO: 92 FL (ref 78–100)
MRSA DNA SPEC QL NAA+PROBE: NEGATIVE
NUM BPU REQUESTED: 2
O2/TOTAL GAS SETTING VFR VENT: 100 %
O2/TOTAL GAS SETTING VFR VENT: 100 %
O2/TOTAL GAS SETTING VFR VENT: 49 %
O2/TOTAL GAS SETTING VFR VENT: 63 %
O2/TOTAL GAS SETTING VFR VENT: ABNORMAL %
O2/TOTAL GAS SETTING VFR VENT: ABNORMAL %
PCO2 BLD: 45 MM HG (ref 35–45)
PCO2 BLD: 46 MM HG (ref 35–45)
PCO2 BLD: 48 MM HG (ref 35–45)
PCO2 BLD: 49 MM HG (ref 35–45)
PCO2 BLD: 51 MM HG (ref 35–45)
PCO2 BLD: 54 MM HG (ref 35–45)
PH BLD: 7.26 PH (ref 7.35–7.45)
PH BLD: 7.28 PH (ref 7.35–7.45)
PH BLD: 7.3 PH (ref 7.35–7.45)
PH BLD: 7.3 PH (ref 7.35–7.45)
PH BLD: 7.31 PH (ref 7.35–7.45)
PH BLD: 7.31 PH (ref 7.35–7.45)
PLATELET # BLD AUTO: 140 10E9/L (ref 150–450)
PLATELET # BLD AUTO: 164 10E9/L (ref 150–450)
PO2 BLD: 164 MM HG (ref 80–105)
PO2 BLD: 192 MM HG (ref 80–105)
PO2 BLD: 196 MM HG (ref 80–105)
PO2 BLD: 287 MM HG (ref 80–105)
PO2 BLD: 464 MM HG (ref 80–105)
PO2 BLD: 73 MM HG (ref 80–105)
POTASSIUM BLD-SCNC: 3.8 MMOL/L (ref 3.4–5.3)
POTASSIUM BLD-SCNC: 4 MMOL/L (ref 3.4–5.3)
POTASSIUM BLD-SCNC: 4.1 MMOL/L (ref 3.4–5.3)
POTASSIUM BLD-SCNC: 4.2 MMOL/L (ref 3.4–5.3)
POTASSIUM BLD-SCNC: 4.6 MMOL/L (ref 3.4–5.3)
POTASSIUM SERPL-SCNC: 4.6 MMOL/L (ref 3.4–5.3)
RBC # BLD AUTO: 3.61 10E12/L (ref 4.4–5.9)
SODIUM BLD-SCNC: 136 MMOL/L (ref 133–144)
SODIUM BLD-SCNC: 137 MMOL/L (ref 133–144)
SODIUM BLD-SCNC: 138 MMOL/L (ref 133–144)
SODIUM SERPL-SCNC: 140 MMOL/L (ref 133–144)
SPECIMEN EXP DATE BLD: NORMAL
SPECIMEN SOURCE: NORMAL
WBC # BLD AUTO: 14.6 10E9/L (ref 4–11)

## 2017-11-03 PROCEDURE — 25000128 H RX IP 250 OP 636: Performed by: NURSE ANESTHETIST, CERTIFIED REGISTERED

## 2017-11-03 PROCEDURE — 36000064 ZZH SURGERY LEVEL 4 EA 15 ADDTL MIN - UMMC: Performed by: THORACIC SURGERY (CARDIOTHORACIC VASCULAR SURGERY)

## 2017-11-03 PROCEDURE — 25000565 ZZH ISOFLURANE, EA 15 MIN: Performed by: THORACIC SURGERY (CARDIOTHORACIC VASCULAR SURGERY)

## 2017-11-03 PROCEDURE — 00000146 ZZHCL STATISTIC GLUCOSE BY METER IP

## 2017-11-03 PROCEDURE — 20000004 ZZH R&B ICU UMMC

## 2017-11-03 PROCEDURE — 84295 ASSAY OF SERUM SODIUM: CPT | Performed by: THORACIC SURGERY (CARDIOTHORACIC VASCULAR SURGERY)

## 2017-11-03 PROCEDURE — 88377 M/PHMTRC ALYS ISHQUANT/SEMIQ: CPT | Performed by: PATHOLOGY

## 2017-11-03 PROCEDURE — S0020 INJECTION, BUPIVICAINE HYDRO: HCPCS | Performed by: ANESTHESIOLOGY

## 2017-11-03 PROCEDURE — 84132 ASSAY OF SERUM POTASSIUM: CPT | Performed by: THORACIC SURGERY (CARDIOTHORACIC VASCULAR SURGERY)

## 2017-11-03 PROCEDURE — 27210995 ZZH RX 272: Performed by: THORACIC SURGERY (CARDIOTHORACIC VASCULAR SURGERY)

## 2017-11-03 PROCEDURE — 0DJ68ZZ INSPECTION OF STOMACH, VIA NATURAL OR ARTIFICIAL OPENING ENDOSCOPIC: ICD-10-PCS | Performed by: THORACIC SURGERY (CARDIOTHORACIC VASCULAR SURGERY)

## 2017-11-03 PROCEDURE — S0020 INJECTION, BUPIVICAINE HYDRO: HCPCS | Performed by: NURSE ANESTHETIST, CERTIFIED REGISTERED

## 2017-11-03 PROCEDURE — 25000128 H RX IP 250 OP 636: Performed by: CLINICAL NURSE SPECIALIST

## 2017-11-03 PROCEDURE — 25000128 H RX IP 250 OP 636: Performed by: ANESTHESIOLOGY

## 2017-11-03 PROCEDURE — 99233 SBSQ HOSP IP/OBS HIGH 50: CPT | Mod: GC | Performed by: ANESTHESIOLOGY

## 2017-11-03 PROCEDURE — 82947 ASSAY GLUCOSE BLOOD QUANT: CPT | Performed by: THORACIC SURGERY (CARDIOTHORACIC VASCULAR SURGERY)

## 2017-11-03 PROCEDURE — 25000125 ZZHC RX 250: Performed by: THORACIC SURGERY (CARDIOTHORACIC VASCULAR SURGERY)

## 2017-11-03 PROCEDURE — 07BD0ZZ EXCISION OF AORTIC LYMPHATIC, OPEN APPROACH: ICD-10-PCS | Performed by: THORACIC SURGERY (CARDIOTHORACIC VASCULAR SURGERY)

## 2017-11-03 PROCEDURE — 37000009 ZZH ANESTHESIA TECHNICAL FEE, EACH ADDTL 15 MIN: Performed by: THORACIC SURGERY (CARDIOTHORACIC VASCULAR SURGERY)

## 2017-11-03 PROCEDURE — 25000128 H RX IP 250 OP 636: Performed by: STUDENT IN AN ORGANIZED HEALTH CARE EDUCATION/TRAINING PROGRAM

## 2017-11-03 PROCEDURE — 0C7M8DZ DILATION OF PHARYNX WITH INTRALUMINAL DEVICE, VIA NATURAL OR ARTIFICIAL OPENING ENDOSCOPIC: ICD-10-PCS | Performed by: THORACIC SURGERY (CARDIOTHORACIC VASCULAR SURGERY)

## 2017-11-03 PROCEDURE — 82947 ASSAY GLUCOSE BLOOD QUANT: CPT | Performed by: ANESTHESIOLOGY

## 2017-11-03 PROCEDURE — 25000132 ZZH RX MED GY IP 250 OP 250 PS 637: Performed by: THORACIC SURGERY (CARDIOTHORACIC VASCULAR SURGERY)

## 2017-11-03 PROCEDURE — 84132 ASSAY OF SERUM POTASSIUM: CPT | Performed by: ANESTHESIOLOGY

## 2017-11-03 PROCEDURE — 00000159 ZZHCL STATISTIC H-SEND OUTS PREP: Performed by: THORACIC SURGERY (CARDIOTHORACIC VASCULAR SURGERY)

## 2017-11-03 PROCEDURE — 84295 ASSAY OF SERUM SODIUM: CPT | Performed by: ANESTHESIOLOGY

## 2017-11-03 PROCEDURE — 87641 MR-STAPH DNA AMP PROBE: CPT | Performed by: THORACIC SURGERY (CARDIOTHORACIC VASCULAR SURGERY)

## 2017-11-03 PROCEDURE — 86901 BLOOD TYPING SEROLOGIC RH(D): CPT | Performed by: CLINICAL NURSE SPECIALIST

## 2017-11-03 PROCEDURE — 0WJG4ZZ INSPECTION OF PERITONEAL CAVITY, PERCUTANEOUS ENDOSCOPIC APPROACH: ICD-10-PCS | Performed by: THORACIC SURGERY (CARDIOTHORACIC VASCULAR SURGERY)

## 2017-11-03 PROCEDURE — 25000125 ZZHC RX 250: Performed by: ANESTHESIOLOGY

## 2017-11-03 PROCEDURE — 40000171 ZZH STATISTIC PRE-PROCEDURE ASSESSMENT III: Performed by: THORACIC SURGERY (CARDIOTHORACIC VASCULAR SURGERY)

## 2017-11-03 PROCEDURE — 0BJ08ZZ INSPECTION OF TRACHEOBRONCHIAL TREE, VIA NATURAL OR ARTIFICIAL OPENING ENDOSCOPIC: ICD-10-PCS | Performed by: THORACIC SURGERY (CARDIOTHORACIC VASCULAR SURGERY)

## 2017-11-03 PROCEDURE — 82330 ASSAY OF CALCIUM: CPT | Performed by: ANESTHESIOLOGY

## 2017-11-03 PROCEDURE — 80048 BASIC METABOLIC PNL TOTAL CA: CPT | Performed by: THORACIC SURGERY (CARDIOTHORACIC VASCULAR SURGERY)

## 2017-11-03 PROCEDURE — 85027 COMPLETE CBC AUTOMATED: CPT | Performed by: THORACIC SURGERY (CARDIOTHORACIC VASCULAR SURGERY)

## 2017-11-03 PROCEDURE — 00HU33Z INSERTION OF INFUSION DEVICE INTO SPINAL CANAL, PERCUTANEOUS APPROACH: ICD-10-PCS

## 2017-11-03 PROCEDURE — 25000128 H RX IP 250 OP 636: Performed by: THORACIC SURGERY (CARDIOTHORACIC VASCULAR SURGERY)

## 2017-11-03 PROCEDURE — 86850 RBC ANTIBODY SCREEN: CPT | Performed by: CLINICAL NURSE SPECIALIST

## 2017-11-03 PROCEDURE — 83735 ASSAY OF MAGNESIUM: CPT | Performed by: THORACIC SURGERY (CARDIOTHORACIC VASCULAR SURGERY)

## 2017-11-03 PROCEDURE — 36000062 ZZH SURGERY LEVEL 4 1ST 30 MIN - UMMC: Performed by: THORACIC SURGERY (CARDIOTHORACIC VASCULAR SURGERY)

## 2017-11-03 PROCEDURE — 40000275 ZZH STATISTIC RCP TIME EA 10 MIN

## 2017-11-03 PROCEDURE — 00000158 ZZHCL STATISTIC H-FISH PROCESS B/S: Performed by: THORACIC SURGERY (CARDIOTHORACIC VASCULAR SURGERY)

## 2017-11-03 PROCEDURE — 0D150ZA BYPASS ESOPHAGUS TO JEJUNUM, OPEN APPROACH: ICD-10-PCS | Performed by: THORACIC SURGERY (CARDIOTHORACIC VASCULAR SURGERY)

## 2017-11-03 PROCEDURE — 88309 TISSUE EXAM BY PATHOLOGIST: CPT | Performed by: THORACIC SURGERY (CARDIOTHORACIC VASCULAR SURGERY)

## 2017-11-03 PROCEDURE — 25000125 ZZHC RX 250: Performed by: NURSE ANESTHETIST, CERTIFIED REGISTERED

## 2017-11-03 PROCEDURE — 27211024 ZZHC OR SUPPLY OTHER OPNP: Performed by: THORACIC SURGERY (CARDIOTHORACIC VASCULAR SURGERY)

## 2017-11-03 PROCEDURE — 82803 BLOOD GASES ANY COMBINATION: CPT | Performed by: ANESTHESIOLOGY

## 2017-11-03 PROCEDURE — 3E0R3NZ INTRODUCTION OF ANALGESICS, HYPNOTICS, SEDATIVES INTO SPINAL CANAL, PERCUTANEOUS APPROACH: ICD-10-PCS

## 2017-11-03 PROCEDURE — 88305 TISSUE EXAM BY PATHOLOGIST: CPT | Performed by: THORACIC SURGERY (CARDIOTHORACIC VASCULAR SURGERY)

## 2017-11-03 PROCEDURE — 88331 PATH CONSLTJ SURG 1 BLK 1SPC: CPT | Performed by: THORACIC SURGERY (CARDIOTHORACIC VASCULAR SURGERY)

## 2017-11-03 PROCEDURE — 71000017 ZZH RECOVERY PHASE 1 LEVEL 3 EA ADDTL HR: Performed by: THORACIC SURGERY (CARDIOTHORACIC VASCULAR SURGERY)

## 2017-11-03 PROCEDURE — 85049 AUTOMATED PLATELET COUNT: CPT | Performed by: THORACIC SURGERY (CARDIOTHORACIC VASCULAR SURGERY)

## 2017-11-03 PROCEDURE — C9399 UNCLASSIFIED DRUGS OR BIOLOG: HCPCS | Performed by: NURSE ANESTHETIST, CERTIFIED REGISTERED

## 2017-11-03 PROCEDURE — 82330 ASSAY OF CALCIUM: CPT | Performed by: THORACIC SURGERY (CARDIOTHORACIC VASCULAR SURGERY)

## 2017-11-03 PROCEDURE — 25000132 ZZH RX MED GY IP 250 OP 250 PS 637: Performed by: ANESTHESIOLOGY

## 2017-11-03 PROCEDURE — 0W9900Z DRAINAGE OF RIGHT PLEURAL CAVITY WITH DRAINAGE DEVICE, OPEN APPROACH: ICD-10-PCS | Performed by: THORACIC SURGERY (CARDIOTHORACIC VASCULAR SURGERY)

## 2017-11-03 PROCEDURE — 0DHA0UZ INSERTION OF FEEDING DEVICE INTO JEJUNUM, OPEN APPROACH: ICD-10-PCS | Performed by: THORACIC SURGERY (CARDIOTHORACIC VASCULAR SURGERY)

## 2017-11-03 PROCEDURE — 83605 ASSAY OF LACTIC ACID: CPT | Performed by: ANESTHESIOLOGY

## 2017-11-03 PROCEDURE — 40000986 XR CHEST PORT 1 VW

## 2017-11-03 PROCEDURE — 71000016 ZZH RECOVERY PHASE 1 LEVEL 3 FIRST HR: Performed by: THORACIC SURGERY (CARDIOTHORACIC VASCULAR SURGERY)

## 2017-11-03 PROCEDURE — 82803 BLOOD GASES ANY COMBINATION: CPT | Performed by: THORACIC SURGERY (CARDIOTHORACIC VASCULAR SURGERY)

## 2017-11-03 PROCEDURE — 37000008 ZZH ANESTHESIA TECHNICAL FEE, 1ST 30 MIN: Performed by: THORACIC SURGERY (CARDIOTHORACIC VASCULAR SURGERY)

## 2017-11-03 PROCEDURE — P9041 ALBUMIN (HUMAN),5%, 50ML: HCPCS | Performed by: NURSE ANESTHETIST, CERTIFIED REGISTERED

## 2017-11-03 PROCEDURE — 0DB40ZZ EXCISION OF ESOPHAGOGASTRIC JUNCTION, OPEN APPROACH: ICD-10-PCS | Performed by: THORACIC SURGERY (CARDIOTHORACIC VASCULAR SURGERY)

## 2017-11-03 PROCEDURE — 86900 BLOOD TYPING SEROLOGIC ABO: CPT | Performed by: CLINICAL NURSE SPECIALIST

## 2017-11-03 PROCEDURE — 27210794 ZZH OR GENERAL SUPPLY STERILE: Performed by: THORACIC SURGERY (CARDIOTHORACIC VASCULAR SURGERY)

## 2017-11-03 PROCEDURE — 40000014 ZZH STATISTIC ARTERIAL MONITORING DAILY

## 2017-11-03 PROCEDURE — 82565 ASSAY OF CREATININE: CPT | Performed by: THORACIC SURGERY (CARDIOTHORACIC VASCULAR SURGERY)

## 2017-11-03 PROCEDURE — 0DT60ZZ RESECTION OF STOMACH, OPEN APPROACH: ICD-10-PCS | Performed by: THORACIC SURGERY (CARDIOTHORACIC VASCULAR SURGERY)

## 2017-11-03 PROCEDURE — 87640 STAPH A DNA AMP PROBE: CPT | Performed by: THORACIC SURGERY (CARDIOTHORACIC VASCULAR SURGERY)

## 2017-11-03 RX ORDER — PROCHLORPERAZINE MALEATE 5 MG
5-10 TABLET ORAL EVERY 6 HOURS PRN
Status: DISCONTINUED | OUTPATIENT
Start: 2017-11-03 | End: 2017-11-17 | Stop reason: HOSPADM

## 2017-11-03 RX ORDER — ONDANSETRON 4 MG/1
4 TABLET, ORALLY DISINTEGRATING ORAL EVERY 30 MIN PRN
Status: DISCONTINUED | OUTPATIENT
Start: 2017-11-03 | End: 2017-11-03 | Stop reason: HOSPADM

## 2017-11-03 RX ORDER — SODIUM CHLORIDE, SODIUM LACTATE, POTASSIUM CHLORIDE, CALCIUM CHLORIDE 600; 310; 30; 20 MG/100ML; MG/100ML; MG/100ML; MG/100ML
INJECTION, SOLUTION INTRAVENOUS CONTINUOUS
Status: DISCONTINUED | OUTPATIENT
Start: 2017-11-03 | End: 2017-11-03 | Stop reason: HOSPADM

## 2017-11-03 RX ORDER — SODIUM CHLORIDE, SODIUM LACTATE, POTASSIUM CHLORIDE, CALCIUM CHLORIDE 600; 310; 30; 20 MG/100ML; MG/100ML; MG/100ML; MG/100ML
INJECTION, SOLUTION INTRAVENOUS CONTINUOUS
Status: DISCONTINUED | OUTPATIENT
Start: 2017-11-03 | End: 2017-11-04

## 2017-11-03 RX ORDER — POTASSIUM CHLORIDE 29.8 MG/ML
20 INJECTION INTRAVENOUS
Status: DISCONTINUED | OUTPATIENT
Start: 2017-11-03 | End: 2017-11-03 | Stop reason: RX

## 2017-11-03 RX ORDER — NALOXONE HYDROCHLORIDE 0.4 MG/ML
.1-.4 INJECTION, SOLUTION INTRAMUSCULAR; INTRAVENOUS; SUBCUTANEOUS
Status: DISCONTINUED | OUTPATIENT
Start: 2017-11-03 | End: 2017-11-06

## 2017-11-03 RX ORDER — ALBUMIN, HUMAN INJ 5% 5 %
SOLUTION INTRAVENOUS CONTINUOUS PRN
Status: DISCONTINUED | OUTPATIENT
Start: 2017-11-03 | End: 2017-11-03

## 2017-11-03 RX ORDER — ACETAMINOPHEN 10 MG/ML
1000 INJECTION, SOLUTION INTRAVENOUS ONCE
Status: COMPLETED | OUTPATIENT
Start: 2017-11-03 | End: 2017-11-03

## 2017-11-03 RX ORDER — MAGNESIUM HYDROXIDE 1200 MG/15ML
LIQUID ORAL PRN
Status: DISCONTINUED | OUTPATIENT
Start: 2017-11-03 | End: 2017-11-03

## 2017-11-03 RX ORDER — NALBUPHINE HYDROCHLORIDE 10 MG/ML
2.5-5 INJECTION, SOLUTION INTRAMUSCULAR; INTRAVENOUS; SUBCUTANEOUS EVERY 6 HOURS PRN
Status: DISCONTINUED | OUTPATIENT
Start: 2017-11-03 | End: 2017-11-03

## 2017-11-03 RX ORDER — CHLORHEXIDINE GLUCONATE ORAL RINSE 1.2 MG/ML
15 SOLUTION DENTAL 3 TIMES DAILY
Status: DISCONTINUED | OUTPATIENT
Start: 2017-11-03 | End: 2017-11-09

## 2017-11-03 RX ORDER — EPHEDRINE SULFATE 50 MG/ML
INJECTION, SOLUTION INTRAMUSCULAR; INTRAVENOUS; SUBCUTANEOUS PRN
Status: DISCONTINUED | OUTPATIENT
Start: 2017-11-03 | End: 2017-11-03

## 2017-11-03 RX ORDER — LIDOCAINE HYDROCHLORIDE 20 MG/ML
INJECTION, SOLUTION INFILTRATION; PERINEURAL PRN
Status: DISCONTINUED | OUTPATIENT
Start: 2017-11-03 | End: 2017-11-03

## 2017-11-03 RX ORDER — POTASSIUM CL/LIDO/0.9 % NACL 10MEQ/0.1L
10 INTRAVENOUS SOLUTION, PIGGYBACK (ML) INTRAVENOUS
Status: DISCONTINUED | OUTPATIENT
Start: 2017-11-03 | End: 2017-11-17 | Stop reason: HOSPADM

## 2017-11-03 RX ORDER — HEPARIN SODIUM 5000 [USP'U]/.5ML
5000 INJECTION, SOLUTION INTRAVENOUS; SUBCUTANEOUS ONCE
Status: COMPLETED | OUTPATIENT
Start: 2017-11-03 | End: 2017-11-03

## 2017-11-03 RX ORDER — POTASSIUM CHLORIDE 1.5 G/1.58G
20-40 POWDER, FOR SOLUTION ORAL
Status: DISCONTINUED | OUTPATIENT
Start: 2017-11-03 | End: 2017-11-17 | Stop reason: HOSPADM

## 2017-11-03 RX ORDER — ONDANSETRON 2 MG/ML
4 INJECTION INTRAMUSCULAR; INTRAVENOUS EVERY 30 MIN PRN
Status: DISCONTINUED | OUTPATIENT
Start: 2017-11-03 | End: 2017-11-03 | Stop reason: HOSPADM

## 2017-11-03 RX ORDER — HYDRALAZINE HYDROCHLORIDE 20 MG/ML
2.5-5 INJECTION INTRAMUSCULAR; INTRAVENOUS EVERY 10 MIN PRN
Status: DISCONTINUED | OUTPATIENT
Start: 2017-11-03 | End: 2017-11-03 | Stop reason: HOSPADM

## 2017-11-03 RX ORDER — FLUMAZENIL 0.1 MG/ML
0.2 INJECTION, SOLUTION INTRAVENOUS
Status: DISCONTINUED | OUTPATIENT
Start: 2017-11-03 | End: 2017-11-03 | Stop reason: HOSPADM

## 2017-11-03 RX ORDER — HYDRALAZINE HYDROCHLORIDE 20 MG/ML
10 INJECTION INTRAMUSCULAR; INTRAVENOUS EVERY 30 MIN PRN
Status: DISCONTINUED | OUTPATIENT
Start: 2017-11-03 | End: 2017-11-17 | Stop reason: HOSPADM

## 2017-11-03 RX ORDER — KETAMINE HYDROCHLORIDE 10 MG/ML
20 INJECTION, SOLUTION INTRAMUSCULAR; INTRAVENOUS ONCE
Status: COMPLETED | OUTPATIENT
Start: 2017-11-03 | End: 2017-11-03

## 2017-11-03 RX ORDER — LABETALOL HYDROCHLORIDE 5 MG/ML
10-40 INJECTION, SOLUTION INTRAVENOUS EVERY 10 MIN PRN
Status: DISCONTINUED | OUTPATIENT
Start: 2017-11-03 | End: 2017-11-17 | Stop reason: HOSPADM

## 2017-11-03 RX ORDER — DEXAMETHASONE SODIUM PHOSPHATE 4 MG/ML
INJECTION, SOLUTION INTRA-ARTICULAR; INTRALESIONAL; INTRAMUSCULAR; INTRAVENOUS; SOFT TISSUE PRN
Status: DISCONTINUED | OUTPATIENT
Start: 2017-11-03 | End: 2017-11-03

## 2017-11-03 RX ORDER — CHLORHEXIDINE GLUCONATE 2% 2 G/100ML
SOLUTION TOPICAL 2 TIMES DAILY
Status: DISCONTINUED | OUTPATIENT
Start: 2017-11-04 | End: 2017-11-03 | Stop reason: ALTCHOICE

## 2017-11-03 RX ORDER — FENTANYL CITRATE 50 UG/ML
25-50 INJECTION, SOLUTION INTRAMUSCULAR; INTRAVENOUS
Status: DISCONTINUED | OUTPATIENT
Start: 2017-11-03 | End: 2017-11-03 | Stop reason: HOSPADM

## 2017-11-03 RX ORDER — POTASSIUM CHLORIDE 1500 MG/1
20-40 TABLET, EXTENDED RELEASE ORAL
Status: DISCONTINUED | OUTPATIENT
Start: 2017-11-03 | End: 2017-11-17 | Stop reason: HOSPADM

## 2017-11-03 RX ORDER — POTASSIUM CHLORIDE 7.45 MG/ML
10 INJECTION INTRAVENOUS
Status: DISCONTINUED | OUTPATIENT
Start: 2017-11-03 | End: 2017-11-17 | Stop reason: HOSPADM

## 2017-11-03 RX ORDER — LIDOCAINE 40 MG/G
CREAM TOPICAL
Status: DISCONTINUED | OUTPATIENT
Start: 2017-11-03 | End: 2017-11-03 | Stop reason: HOSPADM

## 2017-11-03 RX ORDER — MAGNESIUM SULFATE HEPTAHYDRATE 40 MG/ML
4 INJECTION, SOLUTION INTRAVENOUS EVERY 4 HOURS PRN
Status: DISCONTINUED | OUTPATIENT
Start: 2017-11-03 | End: 2017-11-17 | Stop reason: HOSPADM

## 2017-11-03 RX ORDER — ONDANSETRON 2 MG/ML
INJECTION INTRAMUSCULAR; INTRAVENOUS PRN
Status: DISCONTINUED | OUTPATIENT
Start: 2017-11-03 | End: 2017-11-03

## 2017-11-03 RX ORDER — NALOXONE HYDROCHLORIDE 0.4 MG/ML
.1-.4 INJECTION, SOLUTION INTRAMUSCULAR; INTRAVENOUS; SUBCUTANEOUS
Status: DISCONTINUED | OUTPATIENT
Start: 2017-11-03 | End: 2017-11-03 | Stop reason: HOSPADM

## 2017-11-03 RX ORDER — LIDOCAINE HYDROCHLORIDE AND EPINEPHRINE 15; 5 MG/ML; UG/ML
INJECTION, SOLUTION EPIDURAL PRN
Status: DISCONTINUED | OUTPATIENT
Start: 2017-11-03 | End: 2017-11-03

## 2017-11-03 RX ORDER — ACETAMINOPHEN 325 MG/1
975 TABLET ORAL ONCE
Status: COMPLETED | OUTPATIENT
Start: 2017-11-03 | End: 2017-11-03

## 2017-11-03 RX ORDER — ONDANSETRON 4 MG/1
4 TABLET, ORALLY DISINTEGRATING ORAL EVERY 6 HOURS PRN
Status: DISCONTINUED | OUTPATIENT
Start: 2017-11-03 | End: 2017-11-17 | Stop reason: HOSPADM

## 2017-11-03 RX ORDER — GABAPENTIN 300 MG/1
300 CAPSULE ORAL ONCE
Status: COMPLETED | OUTPATIENT
Start: 2017-11-03 | End: 2017-11-03

## 2017-11-03 RX ORDER — IPRATROPIUM BROMIDE AND ALBUTEROL SULFATE 2.5; .5 MG/3ML; MG/3ML
3 SOLUTION RESPIRATORY (INHALATION)
Status: DISCONTINUED | OUTPATIENT
Start: 2017-11-03 | End: 2017-11-17 | Stop reason: HOSPADM

## 2017-11-03 RX ORDER — SODIUM CHLORIDE, SODIUM LACTATE, POTASSIUM CHLORIDE, CALCIUM CHLORIDE 600; 310; 30; 20 MG/100ML; MG/100ML; MG/100ML; MG/100ML
INJECTION, SOLUTION INTRAVENOUS CONTINUOUS PRN
Status: DISCONTINUED | OUTPATIENT
Start: 2017-11-03 | End: 2017-11-03

## 2017-11-03 RX ORDER — ONDANSETRON 2 MG/ML
4 INJECTION INTRAMUSCULAR; INTRAVENOUS EVERY 6 HOURS PRN
Status: DISCONTINUED | OUTPATIENT
Start: 2017-11-03 | End: 2017-11-17 | Stop reason: HOSPADM

## 2017-11-03 RX ORDER — LABETALOL HYDROCHLORIDE 5 MG/ML
5 INJECTION, SOLUTION INTRAVENOUS EVERY 10 MIN PRN
Status: DISCONTINUED | OUTPATIENT
Start: 2017-11-03 | End: 2017-11-03 | Stop reason: HOSPADM

## 2017-11-03 RX ORDER — LIDOCAINE 50 MG/G
1 PATCH TOPICAL
Status: DISCONTINUED | OUTPATIENT
Start: 2017-11-03 | End: 2017-11-17 | Stop reason: HOSPADM

## 2017-11-03 RX ORDER — PROPOFOL 10 MG/ML
INJECTION, EMULSION INTRAVENOUS PRN
Status: DISCONTINUED | OUTPATIENT
Start: 2017-11-03 | End: 2017-11-03

## 2017-11-03 RX ORDER — CEFAZOLIN SODIUM 2 G/100ML
2 INJECTION, SOLUTION INTRAVENOUS
Status: COMPLETED | OUTPATIENT
Start: 2017-11-03 | End: 2017-11-03

## 2017-11-03 RX ORDER — GINSENG 100 MG
CAPSULE ORAL 2 TIMES DAILY
Status: DISCONTINUED | OUTPATIENT
Start: 2017-11-04 | End: 2017-11-09

## 2017-11-03 RX ORDER — OXYMETAZOLINE HYDROCHLORIDE 0.05 G/100ML
SPRAY NASAL PRN
Status: DISCONTINUED | OUTPATIENT
Start: 2017-11-03 | End: 2017-11-03 | Stop reason: HOSPADM

## 2017-11-03 RX ORDER — FENTANYL CITRATE 50 UG/ML
INJECTION, SOLUTION INTRAMUSCULAR; INTRAVENOUS PRN
Status: DISCONTINUED | OUTPATIENT
Start: 2017-11-03 | End: 2017-11-03

## 2017-11-03 RX ORDER — CEFAZOLIN SODIUM 1 G/3ML
1 INJECTION, POWDER, FOR SOLUTION INTRAMUSCULAR; INTRAVENOUS SEE ADMIN INSTRUCTIONS
Status: DISCONTINUED | OUTPATIENT
Start: 2017-11-03 | End: 2017-11-03 | Stop reason: HOSPADM

## 2017-11-03 RX ORDER — NALOXONE HYDROCHLORIDE 0.4 MG/ML
.1-.4 INJECTION, SOLUTION INTRAMUSCULAR; INTRAVENOUS; SUBCUTANEOUS
Status: DISCONTINUED | OUTPATIENT
Start: 2017-11-03 | End: 2017-11-03

## 2017-11-03 RX ADMIN — ROCURONIUM BROMIDE 20 MG: 10 INJECTION INTRAVENOUS at 11:28

## 2017-11-03 RX ADMIN — FENTANYL CITRATE 25 MCG: 50 INJECTION, SOLUTION INTRAMUSCULAR; INTRAVENOUS at 09:21

## 2017-11-03 RX ADMIN — PHENYLEPHRINE HYDROCHLORIDE 100 MCG: 10 INJECTION, SOLUTION INTRAMUSCULAR; INTRAVENOUS; SUBCUTANEOUS at 10:00

## 2017-11-03 RX ADMIN — PHENYLEPHRINE HYDROCHLORIDE 100 MCG: 10 INJECTION, SOLUTION INTRAMUSCULAR; INTRAVENOUS; SUBCUTANEOUS at 10:37

## 2017-11-03 RX ADMIN — PROPOFOL 40 MG: 10 INJECTION, EMULSION INTRAVENOUS at 16:46

## 2017-11-03 RX ADMIN — BUPIVACAINE HYDROCHLORIDE 5 ML: 2.5 INJECTION, SOLUTION EPIDURAL; INFILTRATION; INTRACAUDAL; PERINEURAL at 09:02

## 2017-11-03 RX ADMIN — Medication 5 MG: at 09:08

## 2017-11-03 RX ADMIN — ROCURONIUM BROMIDE 20 MG: 10 INJECTION INTRAVENOUS at 14:15

## 2017-11-03 RX ADMIN — Medication 5 MG: at 09:27

## 2017-11-03 RX ADMIN — ROCURONIUM BROMIDE 50 MG: 10 INJECTION INTRAVENOUS at 08:14

## 2017-11-03 RX ADMIN — ONDANSETRON 4 MG: 2 INJECTION INTRAMUSCULAR; INTRAVENOUS at 16:13

## 2017-11-03 RX ADMIN — FENTANYL CITRATE 12.5 MCG: 50 INJECTION, SOLUTION INTRAMUSCULAR; INTRAVENOUS at 09:50

## 2017-11-03 RX ADMIN — ROCURONIUM BROMIDE 30 MG: 10 INJECTION INTRAVENOUS at 09:50

## 2017-11-03 RX ADMIN — BUPIVACAINE HYDROCHLORIDE 2.5 ML: 2.5 INJECTION, SOLUTION EPIDURAL; INFILTRATION; INTRACAUDAL; PERINEURAL at 09:50

## 2017-11-03 RX ADMIN — PHENYLEPHRINE HYDROCHLORIDE 200 MCG: 10 INJECTION, SOLUTION INTRAMUSCULAR; INTRAVENOUS; SUBCUTANEOUS at 13:36

## 2017-11-03 RX ADMIN — PHENYLEPHRINE HYDROCHLORIDE 100 MCG: 10 INJECTION, SOLUTION INTRAMUSCULAR; INTRAVENOUS; SUBCUTANEOUS at 09:15

## 2017-11-03 RX ADMIN — PHENYLEPHRINE HYDROCHLORIDE 100 MCG: 10 INJECTION, SOLUTION INTRAMUSCULAR; INTRAVENOUS; SUBCUTANEOUS at 12:18

## 2017-11-03 RX ADMIN — LIDOCAINE HYDROCHLORIDE,EPINEPHRINE BITARTRATE 3 ML: 15; .005 INJECTION, SOLUTION EPIDURAL; INFILTRATION; INTRACAUDAL; PERINEURAL at 07:33

## 2017-11-03 RX ADMIN — ROCURONIUM BROMIDE 20 MG: 10 INJECTION INTRAVENOUS at 15:17

## 2017-11-03 RX ADMIN — FENTANYL CITRATE 25 MCG: 50 INJECTION, SOLUTION INTRAMUSCULAR; INTRAVENOUS at 09:02

## 2017-11-03 RX ADMIN — MIDAZOLAM HYDROCHLORIDE 2 MG: 1 INJECTION, SOLUTION INTRAMUSCULAR; INTRAVENOUS at 08:05

## 2017-11-03 RX ADMIN — VASOPRESSIN 0.5 UNITS: 20 INJECTION, SOLUTION INTRAMUSCULAR; SUBCUTANEOUS at 13:05

## 2017-11-03 RX ADMIN — PHENYLEPHRINE HYDROCHLORIDE 100 MCG: 10 INJECTION, SOLUTION INTRAMUSCULAR; INTRAVENOUS; SUBCUTANEOUS at 08:38

## 2017-11-03 RX ADMIN — ALBUMIN (HUMAN): 12.5 SOLUTION INTRAVENOUS at 08:40

## 2017-11-03 RX ADMIN — PHENYLEPHRINE HYDROCHLORIDE 100 MCG: 10 INJECTION, SOLUTION INTRAMUSCULAR; INTRAVENOUS; SUBCUTANEOUS at 10:43

## 2017-11-03 RX ADMIN — SUGAMMADEX 150 MG: 100 INJECTION, SOLUTION INTRAVENOUS at 16:56

## 2017-11-03 RX ADMIN — FENTANYL CITRATE 50 MCG: 50 INJECTION, SOLUTION INTRAMUSCULAR; INTRAVENOUS at 07:16

## 2017-11-03 RX ADMIN — PHENYLEPHRINE HYDROCHLORIDE 100 MCG: 10 INJECTION, SOLUTION INTRAMUSCULAR; INTRAVENOUS; SUBCUTANEOUS at 11:43

## 2017-11-03 RX ADMIN — DEXAMETHASONE SODIUM PHOSPHATE 4 MG: 4 INJECTION, SOLUTION INTRA-ARTICULAR; INTRALESIONAL; INTRAMUSCULAR; INTRAVENOUS; SOFT TISSUE at 08:15

## 2017-11-03 RX ADMIN — PHENYLEPHRINE HYDROCHLORIDE 100 MCG: 10 INJECTION, SOLUTION INTRAMUSCULAR; INTRAVENOUS; SUBCUTANEOUS at 10:38

## 2017-11-03 RX ADMIN — SODIUM CHLORIDE, POTASSIUM CHLORIDE, SODIUM LACTATE AND CALCIUM CHLORIDE: 600; 310; 30; 20 INJECTION, SOLUTION INTRAVENOUS at 08:57

## 2017-11-03 RX ADMIN — Medication 20 MG: at 18:43

## 2017-11-03 RX ADMIN — MIDAZOLAM 1 MG: 1 INJECTION INTRAMUSCULAR; INTRAVENOUS at 07:16

## 2017-11-03 RX ADMIN — HEPARIN SODIUM 5000 UNITS: 10000 INJECTION, SOLUTION INTRAVENOUS; SUBCUTANEOUS at 08:20

## 2017-11-03 RX ADMIN — PHENYLEPHRINE HYDROCHLORIDE 100 MCG: 10 INJECTION, SOLUTION INTRAMUSCULAR; INTRAVENOUS; SUBCUTANEOUS at 16:00

## 2017-11-03 RX ADMIN — PHENYLEPHRINE HYDROCHLORIDE 100 MCG: 10 INJECTION, SOLUTION INTRAMUSCULAR; INTRAVENOUS; SUBCUTANEOUS at 12:20

## 2017-11-03 RX ADMIN — PHENYLEPHRINE HYDROCHLORIDE 100 MCG: 10 INJECTION, SOLUTION INTRAMUSCULAR; INTRAVENOUS; SUBCUTANEOUS at 09:01

## 2017-11-03 RX ADMIN — ALBUMIN (HUMAN): 12.5 SOLUTION INTRAVENOUS at 09:09

## 2017-11-03 RX ADMIN — ROCURONIUM BROMIDE 20 MG: 10 INJECTION INTRAVENOUS at 11:00

## 2017-11-03 RX ADMIN — PHENYLEPHRINE HYDROCHLORIDE 100 MCG: 10 INJECTION, SOLUTION INTRAMUSCULAR; INTRAVENOUS; SUBCUTANEOUS at 14:28

## 2017-11-03 RX ADMIN — PHENYLEPHRINE HYDROCHLORIDE 200 MCG: 10 INJECTION, SOLUTION INTRAMUSCULAR; INTRAVENOUS; SUBCUTANEOUS at 13:46

## 2017-11-03 RX ADMIN — SODIUM CHLORIDE, POTASSIUM CHLORIDE, SODIUM LACTATE AND CALCIUM CHLORIDE: 600; 310; 30; 20 INJECTION, SOLUTION INTRAVENOUS at 07:15

## 2017-11-03 RX ADMIN — PHENYLEPHRINE HYDROCHLORIDE 100 MCG: 10 INJECTION, SOLUTION INTRAMUSCULAR; INTRAVENOUS; SUBCUTANEOUS at 09:55

## 2017-11-03 RX ADMIN — NOREPINEPHRINE BITARTRATE 0.03 MCG/KG/MIN: 1 INJECTION INTRAVENOUS at 09:03

## 2017-11-03 RX ADMIN — Medication 5 MG: at 08:28

## 2017-11-03 RX ADMIN — ROCURONIUM BROMIDE 30 MG: 10 INJECTION INTRAVENOUS at 10:21

## 2017-11-03 RX ADMIN — HYDROMORPHONE HYDROCHLORIDE: 10 INJECTION, SOLUTION INTRAMUSCULAR; INTRAVENOUS; SUBCUTANEOUS at 21:16

## 2017-11-03 RX ADMIN — VASOPRESSIN 0.5 UNITS: 20 INJECTION, SOLUTION INTRAMUSCULAR; SUBCUTANEOUS at 13:10

## 2017-11-03 RX ADMIN — ROCURONIUM BROMIDE 20 MG: 10 INJECTION INTRAVENOUS at 14:49

## 2017-11-03 RX ADMIN — PHENYLEPHRINE HYDROCHLORIDE 100 MCG: 10 INJECTION, SOLUTION INTRAMUSCULAR; INTRAVENOUS; SUBCUTANEOUS at 09:27

## 2017-11-03 RX ADMIN — HYDROMORPHONE HYDROCHLORIDE 0.5 MG: 1 INJECTION, SOLUTION INTRAMUSCULAR; INTRAVENOUS; SUBCUTANEOUS at 17:16

## 2017-11-03 RX ADMIN — LIDOCAINE HYDROCHLORIDE 100 MG: 20 INJECTION, SOLUTION INFILTRATION; PERINEURAL at 08:11

## 2017-11-03 RX ADMIN — PHENYLEPHRINE HYDROCHLORIDE 0.3 MCG/KG/MIN: 10 INJECTION, SOLUTION INTRAMUSCULAR; INTRAVENOUS; SUBCUTANEOUS at 13:11

## 2017-11-03 RX ADMIN — FENTANYL CITRATE 50 MCG: 50 INJECTION, SOLUTION INTRAMUSCULAR; INTRAVENOUS at 17:15

## 2017-11-03 RX ADMIN — BUPIVACAINE HYDROCHLORIDE 5 ML: 2.5 INJECTION, SOLUTION EPIDURAL; INFILTRATION; INTRACAUDAL; PERINEURAL at 09:33

## 2017-11-03 RX ADMIN — ROCURONIUM BROMIDE 20 MG: 10 INJECTION INTRAVENOUS at 11:54

## 2017-11-03 RX ADMIN — BUPIVACAINE HYDROCHLORIDE 5 ML: 2.5 INJECTION, SOLUTION EPIDURAL; INFILTRATION; INTRACAUDAL; PERINEURAL at 09:21

## 2017-11-03 RX ADMIN — PHENYLEPHRINE HYDROCHLORIDE 100 MCG: 10 INJECTION, SOLUTION INTRAMUSCULAR; INTRAVENOUS; SUBCUTANEOUS at 10:36

## 2017-11-03 RX ADMIN — PHENYLEPHRINE HYDROCHLORIDE 100 MCG: 10 INJECTION, SOLUTION INTRAMUSCULAR; INTRAVENOUS; SUBCUTANEOUS at 12:25

## 2017-11-03 RX ADMIN — PHENYLEPHRINE HYDROCHLORIDE 200 MCG: 10 INJECTION, SOLUTION INTRAMUSCULAR; INTRAVENOUS; SUBCUTANEOUS at 13:02

## 2017-11-03 RX ADMIN — ACETAMINOPHEN 975 MG: 325 TABLET, FILM COATED ORAL at 07:45

## 2017-11-03 RX ADMIN — PHENYLEPHRINE HYDROCHLORIDE 100 MCG: 10 INJECTION, SOLUTION INTRAMUSCULAR; INTRAVENOUS; SUBCUTANEOUS at 11:08

## 2017-11-03 RX ADMIN — SODIUM CHLORIDE, POTASSIUM CHLORIDE, SODIUM LACTATE AND CALCIUM CHLORIDE: 600; 310; 30; 20 INJECTION, SOLUTION INTRAVENOUS at 23:26

## 2017-11-03 RX ADMIN — ROCURONIUM BROMIDE 10 MG: 10 INJECTION INTRAVENOUS at 15:57

## 2017-11-03 RX ADMIN — PHENYLEPHRINE HYDROCHLORIDE 100 MCG: 10 INJECTION, SOLUTION INTRAMUSCULAR; INTRAVENOUS; SUBCUTANEOUS at 13:50

## 2017-11-03 RX ADMIN — PHENYLEPHRINE HYDROCHLORIDE 200 MCG: 10 INJECTION, SOLUTION INTRAMUSCULAR; INTRAVENOUS; SUBCUTANEOUS at 13:55

## 2017-11-03 RX ADMIN — ALBUMIN (HUMAN): 12.5 SOLUTION INTRAVENOUS at 14:14

## 2017-11-03 RX ADMIN — PHENYLEPHRINE HYDROCHLORIDE 100 MCG: 10 INJECTION, SOLUTION INTRAMUSCULAR; INTRAVENOUS; SUBCUTANEOUS at 14:13

## 2017-11-03 RX ADMIN — BUPIVACAINE HYDROCHLORIDE 6 ML/HR: 7.5 INJECTION, SOLUTION EPIDURAL; RETROBULBAR at 10:12

## 2017-11-03 RX ADMIN — SODIUM CHLORIDE, POTASSIUM CHLORIDE, SODIUM LACTATE AND CALCIUM CHLORIDE: 600; 310; 30; 20 INJECTION, SOLUTION INTRAVENOUS at 15:35

## 2017-11-03 RX ADMIN — SUGAMMADEX 50 MG: 100 INJECTION, SOLUTION INTRAVENOUS at 17:07

## 2017-11-03 RX ADMIN — PHENYLEPHRINE HYDROCHLORIDE 100 MCG: 10 INJECTION, SOLUTION INTRAMUSCULAR; INTRAVENOUS; SUBCUTANEOUS at 13:11

## 2017-11-03 RX ADMIN — ROCURONIUM BROMIDE 20 MG: 10 INJECTION INTRAVENOUS at 12:30

## 2017-11-03 RX ADMIN — CEFAZOLIN SODIUM 2 G: 2 INJECTION, SOLUTION INTRAVENOUS at 09:00

## 2017-11-03 RX ADMIN — PHENYLEPHRINE HYDROCHLORIDE 200 MCG: 10 INJECTION, SOLUTION INTRAMUSCULAR; INTRAVENOUS; SUBCUTANEOUS at 13:13

## 2017-11-03 RX ADMIN — ROCURONIUM BROMIDE 20 MG: 10 INJECTION INTRAVENOUS at 13:52

## 2017-11-03 RX ADMIN — ROCURONIUM BROMIDE 30 MG: 10 INJECTION INTRAVENOUS at 13:26

## 2017-11-03 RX ADMIN — ROCURONIUM BROMIDE 50 MG: 10 INJECTION INTRAVENOUS at 08:33

## 2017-11-03 RX ADMIN — FENTANYL CITRATE 25 MCG: 50 INJECTION, SOLUTION INTRAMUSCULAR; INTRAVENOUS at 09:33

## 2017-11-03 RX ADMIN — PHENYLEPHRINE HYDROCHLORIDE 200 MCG: 10 INJECTION, SOLUTION INTRAMUSCULAR; INTRAVENOUS; SUBCUTANEOUS at 13:04

## 2017-11-03 RX ADMIN — PHENYLEPHRINE HYDROCHLORIDE 100 MCG: 10 INJECTION, SOLUTION INTRAMUSCULAR; INTRAVENOUS; SUBCUTANEOUS at 13:08

## 2017-11-03 RX ADMIN — CEFAZOLIN 1 G: 1 INJECTION, POWDER, FOR SOLUTION INTRAMUSCULAR; INTRAVENOUS at 15:00

## 2017-11-03 RX ADMIN — CEFAZOLIN 1 G: 1 INJECTION, POWDER, FOR SOLUTION INTRAMUSCULAR; INTRAVENOUS at 11:08

## 2017-11-03 RX ADMIN — PHENYLEPHRINE HYDROCHLORIDE 100 MCG: 10 INJECTION, SOLUTION INTRAMUSCULAR; INTRAVENOUS; SUBCUTANEOUS at 14:14

## 2017-11-03 RX ADMIN — PHENYLEPHRINE HYDROCHLORIDE 100 MCG: 10 INJECTION, SOLUTION INTRAMUSCULAR; INTRAVENOUS; SUBCUTANEOUS at 13:00

## 2017-11-03 RX ADMIN — PHENYLEPHRINE HYDROCHLORIDE 100 MCG: 10 INJECTION, SOLUTION INTRAMUSCULAR; INTRAVENOUS; SUBCUTANEOUS at 10:17

## 2017-11-03 RX ADMIN — PROPOFOL 200 MG: 10 INJECTION, EMULSION INTRAVENOUS at 08:13

## 2017-11-03 RX ADMIN — PHENYLEPHRINE HYDROCHLORIDE 100 MCG: 10 INJECTION, SOLUTION INTRAMUSCULAR; INTRAVENOUS; SUBCUTANEOUS at 12:04

## 2017-11-03 RX ADMIN — Medication 2 G: at 23:26

## 2017-11-03 RX ADMIN — LIDOCAINE 1 PATCH: 50 PATCH CUTANEOUS at 21:54

## 2017-11-03 RX ADMIN — PHENYLEPHRINE HYDROCHLORIDE 100 MCG: 10 INJECTION, SOLUTION INTRAMUSCULAR; INTRAVENOUS; SUBCUTANEOUS at 11:22

## 2017-11-03 RX ADMIN — PHENYLEPHRINE HYDROCHLORIDE 100 MCG: 10 INJECTION, SOLUTION INTRAMUSCULAR; INTRAVENOUS; SUBCUTANEOUS at 14:47

## 2017-11-03 RX ADMIN — ROCURONIUM BROMIDE 20 MG: 10 INJECTION INTRAVENOUS at 09:09

## 2017-11-03 RX ADMIN — GABAPENTIN 300 MG: 300 CAPSULE ORAL at 07:47

## 2017-11-03 RX ADMIN — ACETAMINOPHEN 1000 MG: 10 INJECTION, SOLUTION INTRAVENOUS at 14:43

## 2017-11-03 RX ADMIN — PHENYLEPHRINE HYDROCHLORIDE 200 MCG: 10 INJECTION, SOLUTION INTRAMUSCULAR; INTRAVENOUS; SUBCUTANEOUS at 13:03

## 2017-11-03 RX ADMIN — PHENYLEPHRINE HYDROCHLORIDE 100 MCG: 10 INJECTION, SOLUTION INTRAMUSCULAR; INTRAVENOUS; SUBCUTANEOUS at 15:15

## 2017-11-03 RX ADMIN — HUMAN INSULIN 2 UNITS/HR: 100 INJECTION, SOLUTION SUBCUTANEOUS at 11:34

## 2017-11-03 RX ADMIN — FENTANYL CITRATE 100 MCG: 50 INJECTION, SOLUTION INTRAMUSCULAR; INTRAVENOUS at 08:12

## 2017-11-03 RX ADMIN — PHENYLEPHRINE HYDROCHLORIDE 100 MCG: 10 INJECTION, SOLUTION INTRAMUSCULAR; INTRAVENOUS; SUBCUTANEOUS at 15:09

## 2017-11-03 RX ADMIN — ALBUMIN (HUMAN): 12.5 SOLUTION INTRAVENOUS at 08:51

## 2017-11-03 ASSESSMENT — PAIN DESCRIPTION - DESCRIPTORS: DESCRIPTORS: CONSTANT

## 2017-11-03 NOTE — H&P
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 Notify the MD for any acute changes in exam.  - Dilaudid PCA for pain.  - Sedation: none.    Pulmonary care:   - Supplemental oxygen to keep saturation above 92 %.  - Incentive spirometer every 15- 30 minutes when awake.  -CTs to water seal     Cardiovascular:    - Monitor hemodynamic status.   - Was on norepinephrine and phenylephrine in the OR. Currently off pressors  - Given new anastomosis, will avoid pressors as much as possible    GI care:   - NPO except ice chips and medications.  - J tube to gravity  - Pharyngostomy to LIS, pharyngostomy care orders placed    Fluids/ Electrolytes/ Nutrition:   - LR@110 for IV fluid hydration  - No indication for parenteral nutrition.    Renal/ Fluid Balance:    - Urine output is adequate so far.  - Will continue to monitor intake and output.  - Acuna to stay in place for strict Is and Os  - AM labs ordered     Endocrine:    - No management indication.   - hypoglycemia protocol    ID/ Antibiotics:  -  No srinivas-operative antibiotics as per primary team    Heme:     - Hemoglobin stable. 11.6, platelets 165  - Will monitor for increased output from pharyngostomy    MSK: No acute issues. PT/OT ordered     Prophylaxis:    - Mechanical prophylaxis for DVT.   - No chemical DVT prophylaxis due to high risk of bleeding at this point in time  - Lovenox to start 24 hours after procedure    Lines/ tubes/ drains:  - Vascular access port, Arterial line, Acuna , Pharyngostomy, chest tubes x 2 [ 19Fr Rt pleural marty drain, 28FR Left pleural angled  chest tube] , J tube     Disposition:  -  Surgical ICU.     Patient seen, findings and plan discussed with surgical ICU staff , Dr. Lawrence.    - - - - - - - - - - - - - - - - - - - - - - - - - - - - - - - - - - - - - - - - - - - - - - - - - - - - - - - - -     PRIMARY TEAM: Thoracic Surgery  PRIMARY PHYSICIAN: Dr. Esteban    REASON FOR CRITICAL CARE ADMISSION: Hemodynamic monitoring   ADMITTING PHYSICIAN: Dr. 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 He was extubated in the OR and was weaned off norepinephrine and phenylephrine prior to transfer to SICU. Is being admitted for hemodynamic monitoring     REVIEW OF SYSTEMS: Negative unless noted in HPI    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       SURGICAL HISTORY:   Past Surgical History:   Procedure Laterality Date     ESOPHAGOGASTRODUODENOSCOPY       ESOPHAGOSCOPY, GASTROSCOPY, DUODENOSCOPY (EGD), COMBINED N/A 6/9/2017    Procedure: COMBINED ENDOSCOPIC ULTRASOUND, ESOPHAGOSCOPY, GASTROSCOPY, DUODENOSCOPY (EGD), FINE NEEDLE ASPIRATE/BIOPSY;  Upper Endoscopic Ultrasound, fine needle aspirate/biopsy;  Surgeon: Guru Mark Avila MD;  Location: [facility] OR     HAND SURGERY      childhood, torn tendon     INSERT PORT VASCULAR ACCESS Right 6/22/2017    Procedure: INSERT PORT VASCULAR ACCESS;  Single Lumen Chest Power Port;  Surgeon: Iván Driver PA-C;  Location:  OR       SOCIAL HISTORY: Tobacco -  Never smoker , never used smokeless tobacco, occasional ETOH    FAMILY HISTORY: No bleeding/clotting disorders nor problems with anesthesia.    ALLERGIES:    No Known  Allergies    MEDICATIONS:    No current facility-administered medications on file prior to encounter.   Current Outpatient Prescriptions on File Prior to Encounter:  nystatin (MYCOSTATIN) 552209 UNIT/ML suspension Take 5 mLs (500,000 Units) by mouth 4 times daily   ondansetron (ZOFRAN-ODT) 8 MG ODT tab Take 1 tablet (8 mg) by mouth every 8 hours as needed for nausea (Patient not taking: Reported on 8/8/2017)   LORazepam (ATIVAN) 0.5 MG tablet Take 1 tablet (0.5 mg) by mouth every 4 hours as needed (Anxiety, Nausea/Vomiting or Sleep) (Patient not taking: Reported on 8/8/2017)   prochlorperazine (COMPAZINE) 10 MG tablet Take 1 tablet (10 mg) by mouth every 6 hours as needed (Nausea/Vomiting) (Patient not taking: Reported on 8/8/2017)   ondansetron (ZOFRAN) 8 MG tablet Take 1 tablet (8 mg) by mouth every 8 hours as needed (Nausea/Vomiting) (Patient not taking: Reported on 8/8/2017)       PHYSICAL EXAMINATION:  Temp:  [98.2  F (36.8  C)] 98.2  F (36.8  C)  Heart Rate:  [73-97] 85  Resp:  [11-16] 11  BP: ()/(40-77) 103/63  SpO2:  [100 %] 100 %  General: drowsy but answers questions appropriately   Neck: pharyngostomy site C/D with small amount of blood seen in tubing   Respiratory: Non-labored breathing.  Cardiovascular: Regular rate and rhythm.   Gastrointestinal: Abdomen non-distended, not palpated given POD 0. Incisions C/D. J tube site C/D    LABS: Reviewed.   Arterial Blood Gases     Recent Labs  Lab 11/03/17  1541 11/03/17  1402 11/03/17  1326 11/03/17  1115   PH 7.28* 7.30* 7.31* 7.26*   PCO2 49* 48* 46* 54*   PO2 464* 73* 196* 164*   HCO3 23 23 23 24     Complete Blood Count     Recent Labs  Lab 11/03/17  1541 11/03/17  1402 11/03/17  1326 11/03/17  1115  11/01/17  1609   WBC  --   --   --   --   --  6.3   HGB 11.8* 12.5* 12.7* 12.8*  < > 14.1   PLT  --   --   --   --   --  185   < > = values in this interval not displayed.  Basic Metabolic Panel    Recent Labs  Lab 11/03/17  1541 11/03/17  1402  11/03/17  1326 11/03/17  1115  11/01/17  1609    138 137 137  < > 139   POTASSIUM 4.6 4.0 3.8 4.1  < > 3.7   CHLORIDE  --   --   --   --   --  107   CO2  --   --   --   --   --  27   BUN  --   --   --   --   --  18   CR  --   --   --   --   --  0.90   * 170* 174* 208*  < > 97   < > = values in this interval not displayed.  Liver Function Tests    Recent Labs  Lab 11/01/17  1609   AST 21   ALT 31   ALKPHOS 48   BILITOTAL 0.5   ALBUMIN 3.8     Pancreatic Enzymes  No lab results found in last 7 days.  Coagulation Profile  No lab results found in last 7 days.  Lactate  Invalid input(s): LACTATE    IMAGING:  Results for orders placed or performed during the hospital encounter of 11/01/17   PET Oncology Whole Body    Narrative    Combined Report of:    PET and CT on  11/1/2017 11:29 AM :    1. PET of the neck, chest, abdomen, and pelvis.  2. PET CT Fusion for Attenuation Correction and Anatomical  Localization:    3. Diagnostic CT scan of the chest, abdomen, and pelvis with  intravenous contrast for interpretation.  3. CT of the chest, abdomen and pelvis obtained for diagnostic  interpretation.  4. 3D MIP and PET-CT fused images were processed on an independent  workstation and archived to PACS and reviewed by a radiologist.    Technique:    1. PET: The patient received 12.93 mCi of F-18-FDG; the serum glucose  was 92 prior to administration, body weight was 92.3 kg. Images were  evaluated in the axial, sagittal, and coronal planes as well as the  rotational whole body MIP. Images were acquired from the Vertex to the  Feet.    UPTAKE WAS MEASURED AT 70 MINUTES.     2. CT: Volumetric acquisition for clinical interpretation of the  chest, abdomen, and pelvis acquired at 3 mm sections . The chest,  abdomen, and pelvis were evaluated at 5 mm sections in bone, soft  tissue, and lung windows.      The patient received 124 cc. Of Isovue 370 intravenously for the  examination.      3. 3D MIP and PET-CT fused images  were processed on an independent  workstation and archived to PACS and reviewed by a radiologist.    INDICATION: Esophageal adenocarcinoma    ADDITIONAL INFORMATION OBTAINED FROM EMR: Siewert type III GE junction  adenocarcinoma s/p neoadjuvant chemotherapy.     COMPARISON: PET CT 9/22/2017    FINDINGS:     HEAD/NECK:  There is no  suspicious FDG uptake in the neck.     The paranasal sinuses are clear. The mastoid air cells are patent.     The mucosal pharyngeal space, the , prevertebral and carotid  spaces are within normal limits.     No masses, mass effect or pathologically enlarged lymph nodes are  evident. The thyroid gland is unremarkable.    CHEST:  Focal area of increased radiotracer at the GE junction, measuring 6.56  maximum SUV, previously measuring 2.98 maximum SUV. Subcentimeter  paraesophageal nodes, one of which measures maximum SUV of 3.54.    Right chest wall Port-A-Cath with tip in the high right atrium. No  axillary, mediastinal, or hilar lymphadenopathy. Normal branching  pattern of the great vessels. The heart is not enlarged. No  pericardial effusion. Central tracheobronchial tree is patent. No  pleural effusion. No suspicious pulmonary nodule.    ABDOMEN AND PELVIS:  There is no suspicious FDG uptake in the abdomen or pelvis.    Subcentimeter hypodensity in the right lower liver, too small to  adequately characterize. The gallbladder, pancreas, spleen, and  adrenal glands are unremarkable. Subcentimeter hypodensity in the  superior pole left kidney, too small to adequately characterize.  Urinary bladder is unremarkable. No focal bowel dilatation to suggest  obstruction. No free fluid. No free air. Abdominal and pelvic  vasculature are unremarkable.    LOWER EXTREMITIES:   No abnormal masses or hypermetabolic lesions.    BONES:   There are no suspicious lytic or blastic osseous lesions.  There is no  abnormal FDG uptake in the skeleton.        Impression    IMPRESSION:   1. In this  patient with GE junction adenocarcinoma status post  adjuvant chemotherapy, there is a focus of increased hypermetabolism  at the GE junction compared to the previous examination on 9/22/2017.  However this is significantly decreased since the examination on  6/9/2017. Direct visualization is recommended.  2. Mildly hypermetabolic punctate paraesophageal nodes, indeterminate  for metastatic disease versus reactive.    I have personally reviewed the examination and initial interpretation  and I agree with the findings.    RENETTA KERN MD         Discussed with staff, Dr.Ferris Simone Oakes MD  PGY-2 General Surgery Resident   [phone]

## 2017-11-03 NOTE — ANESTHESIA PROCEDURE NOTES
Epidural Procedure Note    Staff:     Anesthesiologist:  ENRIQUE CALDERON    Resident/CRNA:  RONALD GREENE    Procedure performed by resident/CRNA in the presence of a teaching physician    Location: Pre-op     Procedure start time:  11/3/2017 7:04 AM     Procedure end time:  11/3/2017 7:35 AM   Pre-procedure checklist:   patient identified, IV checked, site marked, risks and benefits discussed, informed consent, monitors and equipment checked, pre-op evaluation, at physician/surgeon's request and post-op pain management      Correct Patient: Yes      Correct Position: Yes      Correct Site: Yes      Correct Procedure: Yes      Correct Laterality:  Yes    Site Marked:  Yes  Procedure:     Procedure:  Epidural catheter    ASA:  2    Diagnosis:  Operative and post op analgesia    Position:  Sitting    Sterile Prep: chloraprep, mask, sterile gloves and patient draped      Insertion site:  T6-7    Local skin infiltration:  1% lidocaine    amount (mL):  5    Approach:  Right paramedian    Needle gauge (G):  17    Needle Length (in):  3.5    Block Needle Type:  Touhjosé miguel    Injection Technique:  LORT saline    GIANNI at (cm):  6.5    Attempts:  2 (first attempt midline unsuccessful)    Redirects:  2 (redirects on first attemp)    Catheter gauge (G):  19    Catheter threaded easily: Yes      Threaded to cm at skin:  11.5    Threaded in epidural space (cm):  5    Paresthesias:  No    Aspiration negative for Heme or CSF: Yes      Test dose (mL):  3     Local anesthetic:  Lidocaine 1.5% w/ 1:200,000 epinephrine    Test dose time:  07:33    Test dose negative for signs of intravascular, subdural or intrathecal injection: Yes

## 2017-11-03 NOTE — IP AVS SNAPSHOT
MRN:[id number]                      After Visit Summary   11/3/2017    Daniel Sandhu    MRN: [id number]           Thank you!     Thank you for choosing [facility] for your care. Our goal is always to provide you with excellent care. Hearing back from our patients is one way we can continue to improve our services. Please take a few minutes to complete the written survey that you may receive in the mail after you visit with us. Thank you!        Patient Information     Date Of Birth          1980        Designated Caregiver       Most Recent Value    Caregiver    Will someone help with your care after discharge? no      About your hospital stay     You were admitted on:  November 3, 2017 You last received care in the:  [address] [facility] [place]    You were discharged on:  November 17, 2017       Who to Call     For medical emergencies, please call 911.  For non-urgent questions about your medical care, please call your primary care provider or clinic, [phone]  For questions related to your surgery, please call your surgery clinic        Attending Provider     Provider Specialty    Yunior Esteban MD Thoracic Diseases       Primary Care Provider Office Phone # Fax #    Lencho Chan [phone]      After Care Instructions     Adult Formula Drip Feeding       Isosource 1.5 @ 100 mL/hr x 16 hours            Discharge Instructions       DIET: Clear liquid diet for 2 days, then full liquid diet for one week, then soft mechanical diet until follow up.  With the soft diet chew very well and take 4-5 small meals per day, or more if necessary.     You will continue tube feeds until follow up, so food by mouth is primarily for pleasure.  Transition to each new diet stage and introduce new foods slowly to  how well you tolerate them.    Follow instructions provided by the dietician and the speech therapist    Sleep with the head of your bed elevated to help prevent  "reflux, which is common after esophagectomy    If your travel plans upon discharge include prolonged periods of sitting (a lengthy car or plane ride), it is highly beneficial to get up and walk at least once per hour to help prevent swelling and blood clots.     You may remove chest tube dressing 48 hours after tube removal and bandage the site at your own discretion thereafter.  Small amounts of leakage are normal for 2-3 days after removal.  Feel free to call with questions.    You may get incision wet 2 days after operation. Do not submerge, soak, or scrub incision or swim until seen in follow-up.    Take incentive spirometer home for continued frequent use    Activity as tolerated, no strenous activity until seen in follow-up, no lifting greater than 10 pounds for the next 2-4 weeks.    Stay hydrated. Take over the counter fiber (metamucil or benefiber) and stool softeners (Miralax, docusate or senna) if becoming constipated.     Call for fever greater than 101.5, chills, increased size of incision, red skin around incision, vision changes, muscle strength changes, sensation changes, shortness of breath, or other concerns.    No driving while taking narcotic pain medication.    Transition to ibuprofen or tylenol/acetaminophen for pain control. Do not take tylenol/acetaminophen and acetaminophen containing narcotic (e.g., percocet or vicodin) at the same time. If you have known ulcer problems, or kidney trouble (elevated creatinine) do not take the ibuprofen.    In emergencies, call 911    For other Questions or Concerns;   A.) During weekday working hours (Monday through Friday 8am to 4:30pm)   call [phone] and ask to speak to a clinical nurse specialist.     B.) At nights (after 4:30pm), on weekends, or if urgent call [phone] and   tell the  \"I would like to page job code [phone], the thoracic surgery   fellow on call, please.\"            Tubes       J TUBE INSTRUCTIONS:    Flush your " feeding tube with 30cc of water;  1. After medication administration through the feeding tube  2. Before and after tube feeds  3. Every 8 hours while awake if you have not used the tube during that time      -If possible take medications by mouth or as solution via j tube (Do not put crushed pills through the tube if possible)     -Change the gauze around your tube daily or more often if soiled    -KEEP A FLEXI-TRACK (or other tube retention device) on your tube at all times to prevent incidental removal.                  Follow-up Appointments     Follow Up and recommended labs and tests       1.) Follow up with primary care physician, Lencho Chan, in 1-2 weeks.  2.) Follow up with a thoracic surgery Clinical Nurse Specialist in Thoracic Surgery clinic in 1 month, prior to which a CXR should be performed.                  Your next 10 appointments already scheduled     Nov 27, 2017 10:15 AM CST   (Arrive by 10:00 AM)   Return Visit with Laurence Hood MD   [facility]    [address] [place] [address]  2nd Floor  [facility] [address] [phone]              Additional Services     Home infusion referral       _______________________________  [facility]  Phone  [phone]  Fax  [phone]  ______________________________      Skilled RN visit for initial home safety eval and to assist with MD team plan of care as outlined in discharge orders.  Skilled RN to assist with enteral feeding via GJ enteral feeding tube  Skilled RN  To assist with GJ cares  per home care agency routine  Skilled RN  to assess for medication management, nutrition and hydration                  Future tests that were ordered for you     XR Chest 2 Views                 Pending Results     Date and Time Order Name Status Description    11/17/2017 1050 XR Chest 2 Views In process     11/17/2017 0617 XR Chest Port 1 View Preliminary            "  Statement of Approval     Ordered          11/17/17 1053  I have reviewed and agree with all the recommendations and orders detailed in this document.  EFFECTIVE NOW     Approved and electronically signed by:  Dung Souza PA-C             Admission Information     Date & Time Provider Department Dept. Phone    11/3/2017 Yunior Esteban MD [address] [facility] [place] [phone]      Your Vitals Were     Blood Pressure Pulse Temperature Respirations Height Weight    98/56 (BP Location: Left arm) 93 98.3  F (36.8  C) (Oral) 18 1.73 m (5' 8.11\") 72.5 kg (159 lb 14.4 oz)    Pulse Oximetry BMI (Body Mass Index)                98% 24.23 kg/m2          [employer] Information     [employer] gives you secure access to your electronic health record. If you see a primary care provider, you can also send messages to your care team and make appointments. If you have questions, please call your primary care clinic.  If you do not have a primary care provider, please call [phone] and they will assist you.        Care EveryWhere ID     This is your Care EveryWhere ID. This could be used by other organizations to access your [facility] medical records  [id number]        Equal Access to Services     SHAWN SWAIN AH: Judy Araujo, kelly matias, qaarvind kaderek hawkins, alejandro arteaga. So [facility] [phone].    ATENCIÓN: Si habla español, tiene a zayas disposición servicios gratuitos de asistencia lingüística. Llame al [phone].    We comply with applicable federal civil rights laws and [place] laws. We do not discriminate on the basis of race, color, national origin, age, disability, sex, sexual orientation, or gender identity.               Review of your medicines      START taking        Dose / Directions    acetaminophen 32 mg/mL solution   Commonly known as:  TYLENOL   Used for:  Acute post-operative pain        Dose:  975 mg   30.45 mLs (975 mg) by Per J Tube route " 3 times daily   Quantity:  400 mL   Refills:  0       multivitamins with minerals Liqd liquid        Dose:  15 mL   Start taking on:  11/18/2017   15 mLs by Per J Tube route daily   Quantity:  450 mL   Refills:  0       oxyCODONE 5 MG/5ML solution   Commonly known as:  ROXICODONE   Used for:  Acute post-operative pain        Dose:  5-10 mg   Take 5-10 mLs (5-10 mg) by mouth every 4 hours as needed for moderate to severe pain   Quantity:  300 mL   Refills:  0       sennosides 8.8 MG/5ML syrup   Commonly known as:  SENOKOT   Used for:  Bowel habit changes        Dose:  10 mL   10 mLs by Per J Tube route 2 times daily   Quantity:  400 mL   Refills:  0         CONTINUE these medicines which have NOT CHANGED        Dose / Directions    LORazepam 0.5 MG tablet   Commonly known as:  ATIVAN        Dose:  0.5 mg   Take 1 tablet (0.5 mg) by mouth every 4 hours as needed (Anxiety, Nausea/Vomiting or Sleep)   Quantity:  30 tablet   Refills:  5         STOP taking     nystatin 642481 UNIT/ML suspension   Commonly known as:  MYCOSTATIN           ondansetron 8 MG ODT tab   Commonly known as:  ZOFRAN-ODT           ondansetron 8 MG tablet   Commonly known as:  ZOFRAN           prochlorperazine 10 MG tablet   Commonly known as:  COMPAZINE                Where to get your medicines      These medications were sent to [facility] [place], [place] - [address] [address]     Phone:  [phone]     acetaminophen 32 mg/mL solution    multivitamins with minerals Liqd liquid    sennosides 8.8 MG/5ML syrup         Some of these will need a paper prescription and others can be bought over the counter. Ask your nurse if you have questions.     Bring a paper prescription for each of these medications     oxyCODONE 5 MG/5ML solution                Protect others around you: Learn how to safely use, store and throw away your medicines at www.disposemymeds.org.             Medication  List: This is a list of all your medications and when to take them. Check marks below indicate your daily home schedule. Keep this list as a reference.      Medications           Morning Afternoon Evening Bedtime As Needed    acetaminophen 32 mg/mL solution   Commonly known as:  TYLENOL   30.45 mLs (975 mg) by Per J Tube route 3 times daily   Last time this was given:  650 mg on 11/9/2017  3:46 AM                                LORazepam 0.5 MG tablet   Commonly known as:  ATIVAN   Take 1 tablet (0.5 mg) by mouth every 4 hours as needed (Anxiety, Nausea/Vomiting or Sleep)                                multivitamins with minerals Liqd liquid   15 mLs by Per J Tube route daily   Start taking on:  11/18/2017   Last time this was given:  15 mLs on 11/17/2017  8:15 AM                                oxyCODONE 5 MG/5ML solution   Commonly known as:  ROXICODONE   Take 5-10 mLs (5-10 mg) by mouth every 4 hours as needed for moderate to severe pain   Last time this was given:  5 mg on 11/17/2017  6:55 AM                                sennosides 8.8 MG/5ML syrup   Commonly known as:  SENOKOT   10 mLs by Per J Tube route 2 times daily   Last time this was given:  10 mLs on 11/17/2017  8:15 AM

## 2017-11-03 NOTE — OR NURSING
Pt got an epidural in preop. Pt's BP dropped after he got his versed and Fent. BP recovered without intervention and pt felt well at the end of epidural placement.

## 2017-11-03 NOTE — ANESTHESIA CARE TRANSFER NOTE
Patient: Daniel Sandhu    Procedure(s):  diagnostic laparoscopy, right chest tube, total gastrectomy with distal esophagectomy, intrathoracic eveline-y esophago-jejunostomy, feeding jejunostomy, pharyngostomy, esophagogastroduodenoscopy, flexible bronchoscopy - Wound Class: I-Clean   - Wound Class: I-Clean   - Wound Class: II-Clean Contaminated   - Wound Class: II-Clean Contaminated   - Wound Class: II-Clean Contaminated   - Wound Class: II-Clean Contaminated    Diagnosis: Esophageal Cancer   Diagnosis Additional Information: No value filed.    Anesthesia Type:   ETT, General     Note:  Airway :Face Mask  Patient transferred to:PACU  Comments: Sleepy, awakens to verbal stimuli. Stable, report to RN.Handoff Report: Identifed the Patient, Identified the Reponsible Provider, Reviewed the pertinent medical history, Discussed the surgical course, Reviewed Intra-OP anesthesia mangement and issues during anesthesia, Set expectations for post-procedure period and Allowed opportunity for questions and acknowledgement of understanding      Vitals: (Last set prior to Anesthesia Care Transfer)    CRNA VITALS  11/3/2017 1649 - 11/3/2017 1728      11/3/2017             Pulse: 123    SpO2: 99 %    Resp Rate (observed): (!)  1    Resp Rate (set): 10                Electronically Signed By: GAVIN Berry CRNA  November 3, 2017  5:28 PM

## 2017-11-03 NOTE — LETTER
Transition Communication Hand-off for Care Transitions to Next Level of Care Provider    Name: Daniel Sandhu  MRN #: [id number]  Primary Care Provider: Lencho Chan     Primary Clinic: [address] [place] [id number]     Reason for Hospitalization:  Esophageal cancer (H) [C15.9]  Admit Date/Time: 11/3/2017  5:43 AM  Discharge Date: 11/17/17  Payor Source: Payor: [employer] / Plan: [employer] OPEN ACCESS FULLY INSURED / Product Type: HMO /                Reason for Communication Hand-off Referral:  Admit and discharge communication    Discharge Plan: Home with Home Care     Concern for non-adherence with plan of care:   NO      Needs       Most Recent Value    Equipment Currently Used at Home none    Transportation Available family or friend will provide            Follow-up specialty is recommended: Yes    Follow-up plan:  Future Appointments  Date Time Provider Department Center   11/27/2017 10:15 AM Laurence Hood MD [facility]       Any outstanding tests or procedures:        Radiology & Cardiology Orders     Future Labs/Procedures Complete By Expires    XR Chest 2 Views  12/14/2017 2/15/2018        Referrals     Future Labs/Procedures    Home infusion referral     Comments:    _______________________________  [facility]  Phone  [phone]  Fax  [phone]  ______________________________      Skilled RN visit for initial home safety eval and to assist with MD team plan of care as outlined in discharge orders.  Skilled RN to assist with enteral feeding via GJ enteral feeding tube  Skilled RN  To assist with GJ cares  per home care agency routine  Skilled RN  to assess for medication management, nutrition and hydration            Key Recommendations:      Sue Live    AVS/Discharge Summary is the source of truth; this is a helpful guide for improved communication of patient story

## 2017-11-03 NOTE — OR NURSING
Dr. Ortiz going to double check if it is okay for patient to have heparin right after epidural. Waiting for okay to give heparin.

## 2017-11-03 NOTE — OR NURSING
Pt arrived to PACU with fbg 151, insulin drip turned town to 1 unit/hour per insulin infusion algorithm, verified and okay with Dr. Huggins.

## 2017-11-03 NOTE — ANESTHESIA PROCEDURE NOTES
Arterial Line Procedure Note  Staff:     Anesthesiologist:  WANDA BOSTON  Location: In OR After Induction  Procedure Start/Stop Times:     patient identified, IV checked, site marked, risks and benefits discussed, informed consent, monitors and equipment checked and pre-op evaluation      Correct Patient: Yes      Correct Position: Yes      Correct Site: Yes      Correct Procedure: Yes      Correct Laterality:  N/A    Site Marked:  N/A  Line Placement:     Procedure:  Arterial Line    Insertion Site:  Radial    Insertion laterality:  Left    Skin Prep: Chloraprep      Patient Prep: patient draped, mask, sterile gloves, hat and hand hygiene      Local skin infiltration:  None    Ultrasound Guided?: No      Catheter size:  20 gauge, 12 cm    Cath secured with: suture      Dressing:  Tegaderm    Complications:  None obvious    Arterial waveform: Yes      IBP within 10% of NIBP: Yes    Assessment/Narrative:      2 passes with TWN, easy passage of wire and catheter, good capillary refill post placement

## 2017-11-03 NOTE — BRIEF OP NOTE
[facility], [place]    Brief Operative Note    Pre-operative diagnosis: Esophageal Cancer   Post-operative diagnosis Same  Procedure: Procedure(s):  diagnostic laparoscopy, right chest tube, total gastrectomy with distal esophagectomy, intrathoracic eveline-y esophago-jejunostomy, feeding jejunostomy, pharyngostomy, esophagogastroduodenoscopy, flexible bronchoscopy - Wound Class: I-Clean   - Wound Class: I-Clean   - Wound Class: II-Clean Contaminated   - Wound Class: II-Clean Contaminated   - Wound Class: II-Clean Contaminated   - Wound Class: II-Clean Contaminated  Surgeon: Surgeon(s) and Role:     * Yunior Esteban MD - Primary     * Stevan Pavon MD - Assisting     * Damion Wiggins MD - Resident - Assisting     * Rosalba Grace MD - Assisting  Anesthesia: General   Estimated blood loss: 500 ml  Drains: 19Fr Rt pleural marty drain, 28FR Left pleural angled chest tube  Specimens:   ID Type Source Tests Collected by Time Destination   A : Shelby esophageal tissue  Tissue Other SURGICAL PATHOLOGY EXAM Yunior Esteban MD 11/3/2017 10:41 AM    B : common hepatic artery lymph node  Tissue Other SURGICAL PATHOLOGY EXAM Yunior Esteban MD 11/3/2017 12:35 PM    C : esophagogastrectomy  Tissue Other SURGICAL PATHOLOGY EXAM Yunior Esteban MD 11/3/2017  2:00 PM    D : esophageal final margin  Tissue Other SURGICAL PATHOLOGY EXAM Yunior Esteban MD 11/3/2017  2:20 PM      Findings:   Eveline en Y anastomosis in chest, no leak on intraop EGD, extensive lymphatic leakage from multiplelymphatic channels..  Complications: None.  Implants: None.

## 2017-11-04 ENCOUNTER — APPOINTMENT (OUTPATIENT)
Dept: GENERAL RADIOLOGY | Facility: CLINIC | Age: 37
DRG: 327 | End: 2017-11-04
Attending: THORACIC SURGERY (CARDIOTHORACIC VASCULAR SURGERY)
Payer: COMMERCIAL

## 2017-11-04 ENCOUNTER — APPOINTMENT (OUTPATIENT)
Dept: OCCUPATIONAL THERAPY | Facility: CLINIC | Age: 37
DRG: 327 | End: 2017-11-04
Attending: THORACIC SURGERY (CARDIOTHORACIC VASCULAR SURGERY)
Payer: COMMERCIAL

## 2017-11-04 LAB
ALBUMIN SERPL-MCNC: 3.1 G/DL (ref 3.4–5)
ALP SERPL-CCNC: 24 U/L (ref 40–150)
ALT SERPL W P-5'-P-CCNC: 205 U/L (ref 0–70)
ANION GAP SERPL CALCULATED.3IONS-SCNC: 6 MMOL/L (ref 3–14)
AST SERPL W P-5'-P-CCNC: 183 U/L (ref 0–45)
BILIRUB SERPL-MCNC: 0.8 MG/DL (ref 0.2–1.3)
BUN SERPL-MCNC: 20 MG/DL (ref 7–30)
CALCIUM SERPL-MCNC: 7.8 MG/DL (ref 8.5–10.1)
CHLORIDE SERPL-SCNC: 106 MMOL/L (ref 94–109)
CO2 SERPL-SCNC: 28 MMOL/L (ref 20–32)
CREAT SERPL-MCNC: 0.75 MG/DL (ref 0.66–1.25)
ERYTHROCYTE [DISTWIDTH] IN BLOOD BY AUTOMATED COUNT: 11.7 % (ref 10–15)
GFR SERPL CREATININE-BSD FRML MDRD: >90 ML/MIN/1.7M2
GLUCOSE SERPL-MCNC: 163 MG/DL (ref 70–99)
HCT VFR BLD AUTO: 32.2 % (ref 40–53)
HGB BLD-MCNC: 11 G/DL (ref 13.3–17.7)
MAGNESIUM SERPL-MCNC: 2.3 MG/DL (ref 1.6–2.3)
MCH RBC QN AUTO: 31.3 PG (ref 26.5–33)
MCHC RBC AUTO-ENTMCNC: 34.2 G/DL (ref 31.5–36.5)
MCV RBC AUTO: 92 FL (ref 78–100)
PLATELET # BLD AUTO: 180 10E9/L (ref 150–450)
POTASSIUM SERPL-SCNC: 4.6 MMOL/L (ref 3.4–5.3)
PROT SERPL-MCNC: 5.4 G/DL (ref 6.8–8.8)
RBC # BLD AUTO: 3.51 10E12/L (ref 4.4–5.9)
SODIUM SERPL-SCNC: 140 MMOL/L (ref 133–144)
WBC # BLD AUTO: 13.2 10E9/L (ref 4–11)

## 2017-11-04 PROCEDURE — 40000133 ZZH STATISTIC OT WARD VISIT: Performed by: OCCUPATIONAL THERAPIST

## 2017-11-04 PROCEDURE — S0020 INJECTION, BUPIVICAINE HYDRO: HCPCS | Performed by: ANESTHESIOLOGY

## 2017-11-04 PROCEDURE — 12000006 ZZH R&B IMCU INTERMEDIATE UMMC

## 2017-11-04 PROCEDURE — 40000014 ZZH STATISTIC ARTERIAL MONITORING DAILY

## 2017-11-04 PROCEDURE — 83735 ASSAY OF MAGNESIUM: CPT | Performed by: THORACIC SURGERY (CARDIOTHORACIC VASCULAR SURGERY)

## 2017-11-04 PROCEDURE — 25800025 ZZH RX 258: Performed by: SURGERY

## 2017-11-04 PROCEDURE — 71010 XR CHEST PORT 1 VW: CPT

## 2017-11-04 PROCEDURE — 97535 SELF CARE MNGMENT TRAINING: CPT | Mod: GO | Performed by: OCCUPATIONAL THERAPIST

## 2017-11-04 PROCEDURE — 25000125 ZZHC RX 250: Performed by: THORACIC SURGERY (CARDIOTHORACIC VASCULAR SURGERY)

## 2017-11-04 PROCEDURE — 40000275 ZZH STATISTIC RCP TIME EA 10 MIN

## 2017-11-04 PROCEDURE — 25000128 H RX IP 250 OP 636: Performed by: THORACIC SURGERY (CARDIOTHORACIC VASCULAR SURGERY)

## 2017-11-04 PROCEDURE — 80053 COMPREHEN METABOLIC PANEL: CPT | Performed by: THORACIC SURGERY (CARDIOTHORACIC VASCULAR SURGERY)

## 2017-11-04 PROCEDURE — 25000125 ZZHC RX 250: Performed by: ANESTHESIOLOGY

## 2017-11-04 PROCEDURE — 97165 OT EVAL LOW COMPLEX 30 MIN: CPT | Mod: GO | Performed by: OCCUPATIONAL THERAPIST

## 2017-11-04 PROCEDURE — 25000128 H RX IP 250 OP 636: Performed by: ANESTHESIOLOGY

## 2017-11-04 PROCEDURE — 85027 COMPLETE CBC AUTOMATED: CPT | Performed by: THORACIC SURGERY (CARDIOTHORACIC VASCULAR SURGERY)

## 2017-11-04 PROCEDURE — 25000132 ZZH RX MED GY IP 250 OP 250 PS 637: Performed by: THORACIC SURGERY (CARDIOTHORACIC VASCULAR SURGERY)

## 2017-11-04 RX ORDER — DEXTROSE MONOHYDRATE, SODIUM CHLORIDE, AND POTASSIUM CHLORIDE 50; 1.49; 4.5 G/1000ML; G/1000ML; G/1000ML
INJECTION, SOLUTION INTRAVENOUS CONTINUOUS
Status: DISCONTINUED | OUTPATIENT
Start: 2017-11-04 | End: 2017-11-11

## 2017-11-04 RX ORDER — NALOXONE HYDROCHLORIDE 0.4 MG/ML
.1-.4 INJECTION, SOLUTION INTRAMUSCULAR; INTRAVENOUS; SUBCUTANEOUS
Status: DISCONTINUED | OUTPATIENT
Start: 2017-11-04 | End: 2017-11-17 | Stop reason: HOSPADM

## 2017-11-04 RX ADMIN — BUPIVACAINE HYDROCHLORIDE: 7.5 INJECTION, SOLUTION EPIDURAL; RETROBULBAR at 11:46

## 2017-11-04 RX ADMIN — Medication 15 ML: at 14:13

## 2017-11-04 RX ADMIN — POTASSIUM CHLORIDE, DEXTROSE MONOHYDRATE AND SODIUM CHLORIDE: 150; 5; 450 INJECTION, SOLUTION INTRAVENOUS at 09:34

## 2017-11-04 RX ADMIN — Medication 15 ML: at 08:11

## 2017-11-04 RX ADMIN — ENOXAPARIN SODIUM 40 MG: 40 INJECTION SUBCUTANEOUS at 17:30

## 2017-11-04 RX ADMIN — SODIUM CHLORIDE, POTASSIUM CHLORIDE, SODIUM LACTATE AND CALCIUM CHLORIDE: 600; 310; 30; 20 INJECTION, SOLUTION INTRAVENOUS at 08:56

## 2017-11-04 RX ADMIN — Medication 15 ML: at 21:02

## 2017-11-04 RX ADMIN — POTASSIUM CHLORIDE, DEXTROSE MONOHYDRATE AND SODIUM CHLORIDE: 150; 5; 450 INJECTION, SOLUTION INTRAVENOUS at 17:31

## 2017-11-04 RX ADMIN — BACITRACIN: 500 OINTMENT TOPICAL at 08:11

## 2017-11-04 RX ADMIN — BACITRACIN: 500 OINTMENT TOPICAL at 21:01

## 2017-11-04 ASSESSMENT — PAIN DESCRIPTION - DESCRIPTORS
DESCRIPTORS: CONSTANT
DESCRIPTORS: ACHING;SORE
DESCRIPTORS: ACHING;SORE
DESCRIPTORS: CONSTANT
DESCRIPTORS: ACHING;SORE
DESCRIPTORS: CONSTANT
DESCRIPTORS: CONSTANT

## 2017-11-04 ASSESSMENT — ACTIVITIES OF DAILY LIVING (ADL): ADLS_ACUITY_SCORE: 11

## 2017-11-04 NOTE — PROGRESS NOTES
SURGICAL ICU PROGRESS NOTE  November 4, 2017      CO-MORBIDITIES:   No diagnosis found.    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 Notify the MD for any acute changes in exam.  - Dilaudid PCA for pain.  - Sedation: none.   - Thoracic epidural, Anesthesia Pain service following    Pulmonary care:   - Supplemental oxygen to keep saturation above 92 %.  - Incentive spirometer every 15- 30 minutes when awake.  -CTs to water seal      Cardiovascular:    - Monitor hemodynamic status.   - Off pressors, MAP goal >65  - Given new anastomosis, will avoid pressors as much as possible     GI care:   - NPO except ice chips and medications.  - J tube to gravity  - Pharyngostomy to LIS, pharyngostomy care orders placed     Fluids/ Electrolytes/ Nutrition:   - LR@110 for IV fluid hydration  - No indication for parenteral nutrition.    Renal/ Fluid Balance:    - Urine output is adequate   - Will continue to monitor intake and output.  - Acuna to stay in place for strict Is and Os     Endocrine:    - No management indication.   - hypoglycemia protocol     ID/ Antibiotics:  -  No srinivas-operative antibiotics as per primary team     Heme:     - Hemoglobin stable  - Will monitor for increased output from pharyngostomy     MSK: No acute issues.   - PT/OT ordered      Prophylaxis:    - Mechanical prophylaxis for DVT.   - No chemical DVT prophylaxis due to high risk of bleeding at this point in time  - Lovenox to start 24 hours after procedure     Lines/ tubes/ drains:  - Vascular access port, Arterial line, Acuna , Pharyngostomy, chest tubes x 2 [ 19Fr Rt pleural marty drain, 28FR Left  pleural angled chest tube] , J tube      Disposition:  -  Surgical ICU.      Patient seen, findings and plan discussed with surgical ICU staff , Dr. Kimo Raygoza MD  CA-2/PGY-3 Anesthesiology  [phone]      ====================================    SUBJECTIVE:   ROBERTO CARLOS ON, pain poorly controlled this am, epidural bolused with good relief. Denies SOB, fevers, chills, nausea or vomiting.        OBJECTIVE:   1. VITAL SIGNS:   Temp:  [96.6  F (35.9  C)-99.5  F (37.5  C)] 98.6  F (37  C)  Heart Rate:  [] 114  Resp:  [12-25] 12  BP: (104-122)/(56-86) 115/70  MAP:  [54 mmHg-78 mmHg] 72 mmHg  Arterial Line BP: ()/(39-63) 92/54  SpO2:  [90 %-100 %] 96 %  Resp: 12    2. INTAKE/ OUTPUT:   I/O last 3 completed shifts:  In: 4983.33 [I.V.:3733.33]  Out: 2595 [Urine:1155; Drains:30; Blood:500; Chest Tube:910]    3. PHYSICAL EXAMINATION:   General: diaphoretic, in some obvious pain, otherwise appropriate, pleasant  Neuro: A&Ox3  Resp: Breathing non-labored, on NC  CV: RRR  Abdomen: Soft, appropriately tender to palpation  Incisions: c/d/i  Extremities: warm and well perfused    4. INVESTIGATIONS:   Arterial Blood Gases     Recent Labs  Lab 11/03/17  1745 11/03/17  1541 11/03/17  1402 11/03/17  1326   PH 7.31* 7.28* 7.30* 7.31*   PCO2 45 49* 48* 46*   PO2 192* 464* 73* 196*   HCO3 23 23 23 23     Complete Blood Count     Recent Labs  Lab 11/04/17  0356 11/03/17  2220 11/03/17  1745 11/03/17  1541 11/03/17  1402  11/01/17  1609   WBC 13.2*  --  14.6*  --   --   --  6.3   HGB 11.0*  --  11.6* 11.8* 12.5*  < > 14.1    140* 164  --   --   --  185   < > = values in this interval not displayed.  Basic Metabolic Panel    Recent Labs  Lab 11/04/17  0356 11/03/17  2220 11/03/17  1745 11/03/17  1541 11/03/17  1402  11/01/17  1609     --  140 136 138  < > 139   POTASSIUM 4.6  --  4.6 4.6 4.0  < > 3.7   CHLORIDE 106  --  108  --   --   --  107   CO2 28  --  25  --   --   --  27   BUN 20  --  23  --   --    --  18   CR 0.75 0.71 0.74  --   --   --  0.90   *  --  174* 167* 170*  < > 97   < > = values in this interval not displayed.  Liver Function Tests    Recent Labs  Lab 11/04/17  0356 11/01/17  1609   * 21   * 31   ALKPHOS 24* 48   BILITOTAL 0.8 0.5   ALBUMIN 3.1* 3.8     Pancreatic Enzymes  No lab results found in last 7 days.  Coagulation Profile  No lab results found in last 7 days.      5. RADIOLOGY:   Recent Results (from the past 24 hour(s))   XR Chest Port 1 View    Narrative    XR CHEST PORT 1 VW 11/3/2017 5:45 PM    History: Post Op thoracic surgery    Comparison: PET/CT on 11/1/2017    Findings: Single view of the chest was obtained. Right IJ Port-A-Cath  tip projects over high right atrium. Bibasilar chest tubes. Enteric  tube crosses the diaphragm with the distal tip outside the field of  view. Surgical clips projects over the upper abdomen comp from prior  surgeries. Paravertebral anesthesia catheter is noted. No evidence of  pleural effusion. Small apical pneumothoraces. Left chest wall  subcutaneous emphysema.      Impression    Impression:   1. Stable postsurgical changes and supporting devices.  2. Small apical pneumothoraces. Chest tubes are in place.    I have personally reviewed the examination and initial interpretation  and I agree with the findings.    GWYN ABURTO MD   XR Chest Port 1 View    Narrative    XR CHEST PORT 1 VW  11/4/2017 8:26 AM      HISTORY: s/p thoracotomy, chest tubes in place    COMPARISON: 11/3/2017    FINDINGS: Right chest Port-A-Cath tip projects over the atriocaval  junction. Laparotomy staples. Right basilar chest tube. Left basilar  chest tube. Improving left lateral chest wall subcutaneous emphysema.  Small biapical pneumothoraces. Cardiac silhouette is not enlarged.  Radiopaque tube projects over the proximal stomach. No acute osseous  abnormalities. Stable surgical defect in the left ribs. Bibasilar  linear opacities.      Impression     IMPRESSION:   1. Small biapical pneumothoraces, not significantly changed in size.  Chest tubes in place and stable in position.  2. Bibasilar linear opacities, which favor atelectasis. Left  retrocardiac atelectasis has improved from prior exam.    I have personally reviewed the examination and initial interpretation  and I agree with the findings.    CHEN BEST       =========================================  Physician Attestation   I, Abdi Malin, saw this patient with the resident and agree with the resident s findings and plan of care as documented in the resident s note.      I personally reviewed vital signs, medications, labs and imaging.    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 Patient is extubated. Continue supportive care and monitoring.     Abdi Malin  Date of Service (when I saw the patient): 11/4/2017  Time Spent on this Encounter   I spent 30 minutes managing the critical care of Daniel Sandhu in relation to the issues listed in this note.

## 2017-11-04 NOTE — PROGRESS NOTES
"Thoracic Surgery Progress Note  Daniel Sandhu  [id number]  11/4/2017    No acute events overnight. Pain is present but controlled with epidural and PCA. No nausea/vomiting. Voiding well per barnes.     /70 (BP Location: Right arm)  Temp 98.6  F (37  C) (Oral)  Resp 12  Ht 1.73 m (5' 8.11\")  Wt 75.9 kg (167 lb 5.3 oz)  SpO2 96%  BMI 25.36 kg/m2  NAD  Comfortable in bed  Non-labored breathing on 1L NC  Sinus tachycardia  Abdomen soft, appropriately tender  Incision sites C/D/I  Bilateral chest tubes in place, serosanguinous, no chylous drainage, tidaling appropriately    R CT 0+10 cc  L +670 cc    Labs reviewed.  BMP WNL  Albumin 3.1      WBC 13.2  Hgb 11.0  Plt 180    CXR reviewed, stable small biapical pneumothoraces.    A/P: Daniel Sandhu is a 37 year old male with GE junction adenocarcinoma now POD#1 after distal esophagectomy, total gastrectomy, Terrell-en-Y reconstruction, thoracotomy, bilateral chest tube placement, pharyngostomy tube placement, jejunostomy tube placement.  -- Pain control with epidural, Dilaudid PCA  -- No feeds via J tube until Monday  -- Pharyngostomy tube to LIS, flush 30 cc water qshift  -- J tube to gravity drainage  -- Encourage cough, deep breathing, IS, ambulation  -- Chest tubes to water seal  -- mIVF at 125 cc/hr  -- Monitor I/O, ok to remove barnes  -- PT/OT  -- Ok to start lovenox, mechanical prophylaxis    Damion Wiggins MD  PGY-3, General Surgery  [phone]    "

## 2017-11-04 NOTE — PLAN OF CARE
Admission          11/3/2017  5:43 AM  -----------------------------------------------------------  Reason for admission: surgery   Primary team notified of pt arrival.  Admitted from: 4A  Via: bed  Accompanied by: family and float RN  Belongings: Placed in closet  Admission Profile: complete  Teaching: orientation to unit and call light- call light within reach, call don't fall, use of console, meal times, when to call for the RN, and enforced importance of safety   Access: PIV & Port  Telemetry:Placed on pt  Ht./Wt.: complete  2 RN Skin Assessment Completed By: Daphne Jeff    Temp:  [96.6  F (35.9  C)-99.1  F (37.3  C)] 98.5  F (36.9  C)  Heart Rate:  [] 98  Resp:  [12-25] 18  BP: (104-119)/(56-86) 118/76  MAP:  [54 mmHg-76 mmHg] 72 mmHg  Arterial Line BP: ()/(43-63) 92/54  SpO2:  [90 %-100 %] 99 %

## 2017-11-04 NOTE — PROGRESS NOTES
11/04/17 1304   Quick Adds   Type of Visit Initial Occupational Therapy Evaluation   Living Environment   Lives With spouse   Living Arrangements house   Home Accessibility stairs to enter home;stairs within home;tub/shower is not walk in  (walk in shower in basement)   Number of Stairs to Enter Home 5   Number of Stairs Within Home (Can be on main level otherwise 13 up and down)   Transportation Available car;family or friend will provide   Living Environment Comment Wife is home to A prn   Self-Care   Dominant Hand right   Usual Activity Tolerance excellent   Current Activity Tolerance moderate   Regular Exercise yes   Activity/Exercise Type walking   Exercise Amount/Frequency daily   Equipment Currently Used at Home none   Functional Level Prior   Ambulation 0-->independent   Transferring 0-->independent   Toileting 0-->independent   Bathing 0-->independent   Dressing 0-->independent   Eating 0-->independent   Communication 0-->understands/communicates without difficulty   Swallowing 0-->swallows foods/liquids without difficulty   Cognition 0 - no cognition issues reported   Fall history within last six months no   Which of the above functional risks had a recent onset or change? 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, Siewert type III (s/p neoadjuvant chemotherapy)    Precautions/Limitations abdominal precautions  (thoracotomy precautions)   Cognitive Status Examination   Orientation orientation to person, place and time   Level of Consciousness  alert   Visual Perception   Visual Perception Wears glasses;No deficits were identified   Sensory Examination   Sensory Quick Adds No deficits were identified   Pain Assessment   Patient Currently in Pain Yes, see Vital Sign flowsheet   Integumentary/Edema   Integumentary/Edema no deficits were identifed   Range of Motion (ROM)   ROM Comment WNL   Strength   Strength Comments NT 2/2 precautions   Muscle Tone Assessment   Muscle Tone Comments WNL   Coordination   Upper Extremity Coordination No deficits were identified   Transfer Skill: Sit to Stand   Level of Shubert: Sit/Stand stand-by assist   Upper Body Dressing   Level of Shubert: Dress Upper Body minimum assist (75% patients effort)   Lower Body Dressing   Level of Shubert: Dress Lower Body minimum assist (75% patients effort)   Toileting   Level of Shubert: Toilet minimum assist (75% patients effort)   Grooming   Level of Shubert: Grooming stand-by assist   Instrumental Activities of Daily Living (IADL)   Previous Responsibilities (Pt was Ind)   Activities of Daily Living Analysis   Impairments Contributing to Impaired Activities of Daily Living post surgical precautions;pain   General Therapy Interventions   Planned Therapy Interventions ADL retraining;home program guidelines;progressive activity/exercise   Clinical Impression   Criteria for Skilled Therapeutic Interventions Met yes, treatment indicated   OT Diagnosis decreased ADL I and tolerance   Influenced by the following impairments thoracic and abdominal precautions   Assessment of Occupational Performance 1-3 Performance Deficits   Identified Performance Deficits leisure, home mgmt, LE dressing   Clinical Decision Making (Complexity) Low complexity   Therapy Frequency daily   Predicted Duration of Therapy Intervention (days/wks) Pt would benefit from skilled OT to help increase ADL I and tolerance   Anticipated Equipment Needs at Discharge (none)   Anticipated Discharge  "Disposition Home with Assist  (A from family)   Risks and Benefits of Treatment have been explained. Yes   Patient, Family & other staff in agreement with plan of care Yes   Clinical Impression Comments Pt would benefit from skilled OT to help increase ADL I and tolerance post surgery   [facility] AM-PAC TM \"6 Clicks\"   2016, Trustees of [facility], under license to [employer].  All rights reserved.   6 Clicks Short Forms Daily Activity Inpatient Short Form   [facility] AM-PAC  \"6 Clicks\" Daily Activity Inpatient Short Form   1. Putting on and taking off regular lower body clothing? 3 - A Little   2. Bathing (including washing, rinsing, drying)? 3 - A Little   3. Toileting, which includes using toilet, bedpan or urinal? 3 - A Little   4. Putting on and taking off regular upper body clothing? 3 - A Little   5. Taking care of personal grooming such as brushing teeth? 4 - None   6. Eating meals? 4 - None   Daily Activity Raw Score (Score out of 24.Lower scores equate to lower levels of function) 20   Total Evaluation Time   Total Evaluation Time (Minutes) 10     "

## 2017-11-04 NOTE — OR NURSING
Dr. Huggins at bedside, pt complaining of abdominal and throat pain.  20mg ketamine IV ordered by Dr. Huggins.

## 2017-11-04 NOTE — PLAN OF CARE
Pt transferred from PACU after report from Janna MAJANO on 11/3/17 at 2100 to 4A. Post-op day #0 from a diagnostic laparoscopy, total gastrectomy with distal esophagectomy, intrathoracic eveline-y esophago-jejunostomy + feeding tube, and pharyngostomy s/p adenocarcinoma at the GE junction.     Neuro: A & O x 4. Follows commands, able to make needs known. Moves all extremities, but has generalized weakness. PERRL.   CV: Sinus tachy, 100-120's no ectopy noted. -120's, MAP > 70. Afebrile.   Resp: Pt remains on RA sats > 92%. Capnography in place, EtCO2 40's, PIP 10. LS CTA, equal bilaterally. Shallow breathing, due to pain, encouraging deep breathing, coughing, and splinting.   : Acuna remains in place for recovery from anesthesia and strict I&O. Adequate output, 50-75 mL/hr.   GI: Strict NPO, nothing through J-tube.   Skin: Midline incision with drsg marked and unchanged. L lateral chest incision covered, unchanged. 4 sites around umbilicus CDI.   Drains: R and L chest tube to water seal, no air leak. J tube to gravity. Pharyngostomy to LIS.   Access: R chest port infusing. R and L 16 G PIV in hands sl'd. L radial A-line, dampened and not correlating at times.   Gtts: LR @ 125. Dilaudid PCA 0.2, max dose 0.3, hour limit 1.8, 10 min lockout.      Continue to monitor and notify MD of any changes. Manage pain. Encourage good pulmonary toilet. Infection prevention. Possibly transfer to floor.

## 2017-11-04 NOTE — OR NURSING
Dr. Huggins and Dr. Monique at bedside in PACU for anesthesia handoff.  Writer asked about surgical team ordering dilaudid PCA while pt having epidural - per both Dr. Huggins and Dr. Monique, dilaudid PCA is not to be started unless ordered by pain team or epidural is discontinued, as epidural contains fentanyl.

## 2017-11-04 NOTE — PLAN OF CARE
Problem: Patient Care Overview  Goal: Plan of Care/Patient Progress Review  Discharge Planner OT   Patient plan for discharge: home with A from family  Current status: Pt requires Mod A for LE dressing, but CGA for standing and marching in place. SBA for simple g/h tasks, VSS throughout. Pt c/o dizziness throughout session.   Barriers to return to prior living situation: abdominal and thoracotomy precautions, pain  Recommendations for discharge: home with A from family  Rationale for recommendations: Anticipate pt's Ind will progress and be safe to discharge home.        Entered by: Nakul Samuel 11/04/2017 2:00 PM

## 2017-11-04 NOTE — PROGRESS NOTES
REGIONAL ANESTHESIA PAIN SERVICE CONTINUOUS NERVE INFUSION NOTE  Subjective and Interval History: Pt reports inadequate pain control via nerve block continuous infusion. He has a lot of incisional pain despite using PCA. Denies any weakness, paresthesias, circumoral numbness, metallic taste or tinnitus.  Pt is not ambulating yet.  Patient currently denies nausea or vomiting. Patient is NPO.       Clinically Aligned Pain Assessment (CAPA):   Comfort (How is your pain?): Tolerable with discomfort  Change in Pain (Since your last medication/intervention?): About the same  Pain Control (How are your pain treatments working?):  Inadequate pain control  Functioning (Are you able to do activities to get better?) : Can't do anything because of pain  Sleep (Does your pain management allow you to sleep or rest?): Awake with occasional pain        Anticoagulation:  lovenox to start tonight       OBJECTIVE:    Diagnostic:  Lab Results   Component Value Date    WBC 13.2 11/04/2017     Lab Results   Component Value Date    RBC 3.51 11/04/2017     Lab Results   Component Value Date    HGB 11.0 11/04/2017     Lab Results   Component Value Date    HCT 32.2 11/04/2017     Lab Results   Component Value Date     11/04/2017         Vitals:    Temp:  [96.6  F (35.9  C)-99.5  F (37.5  C)] 98.2  F (36.8  C)  Heart Rate:  [] 125  Resp:  [12-25] 18  BP: (104-122)/(56-86) 113/86  MAP:  [54 mmHg-78 mmHg] 72 mmHg  Arterial Line BP: ()/(39-63) 92/54  SpO2:  [90 %-100 %] 96 %    Exam:    Strength 5/5 and symmetric grossly in bilateral LE   Epidural catheter site with dressing c/d/i, no tenderness, erythema, heme, edema      ASSESSMENT/PLAN:    Daniel Sandhu is a 37 year old male POD #1 s/p LAPAROSCOPY DIAGNOSTIC (GENERAL)  LAPAROTOMY EXPLORATORY  THORACOTOMY  GASTRECTOMY  PERCUTANEOUS INSERTION TUBE JEJUNOSTOMY  PHARYNGOSTOMY  COMBINED ESOPHAGOSCOPY, GASTROSCOPY, DUODENOSCOPY (EGD) and placement of T6/7 epidural catheter for  analgesia.  Pt is receiving inadequate analgesia with bupivacaine 0.125% with 3 mcg/ml fentanyl, total infusion 10mL/hour.  Pt is not ambulating yet.  No weakness or paresthesias, Inadequate sensory block.  No evidence of adverse side effects associated with local anesthetic.     Epidural catheter bolused with PF bupivacaine 0.125%, 8 mL incrementally with negative aspirate before and between each mL without complication, no symptoms of local anesthetic toxicity (LAST).  Remained with and assessed patient for 10 min post-injection.  Bedside nurse aware she should continue monitor BP/P, MAP Q 5 min x 30 min, and assess for any untoward effects to local anesthetic following injection and contact anesthesia for any questions or concerns. or concerns.    - increase current total infusion to 12 mL/hour  - will continue to follow and adjust as needed  - discussed plan with attending anesthesiologist    Monik Ortiz MD  Regional Anesthesia Pain Service  11/4/2017 10:20 AM    24 hour Job Code Pager.  For in-house use only.     [place]:  * * [phone]  [place]: * * [phone]  Peds: * * [phone]  Enter call-back number and #      This pager only accepts text messages through [facility]

## 2017-11-04 NOTE — OR NURSING
Pt still infusing regular insulin drip at 1unit/hour IV, last two fbgs 151.  Orders from Dr. Monique to ENZO lyles.

## 2017-11-05 ENCOUNTER — APPOINTMENT (OUTPATIENT)
Dept: PHYSICAL THERAPY | Facility: CLINIC | Age: 37
DRG: 327 | End: 2017-11-05
Attending: THORACIC SURGERY (CARDIOTHORACIC VASCULAR SURGERY)
Payer: COMMERCIAL

## 2017-11-05 ENCOUNTER — APPOINTMENT (OUTPATIENT)
Dept: GENERAL RADIOLOGY | Facility: CLINIC | Age: 37
DRG: 327 | End: 2017-11-05
Attending: THORACIC SURGERY (CARDIOTHORACIC VASCULAR SURGERY)
Payer: COMMERCIAL

## 2017-11-05 ENCOUNTER — APPOINTMENT (OUTPATIENT)
Dept: OCCUPATIONAL THERAPY | Facility: CLINIC | Age: 37
DRG: 327 | End: 2017-11-05
Attending: THORACIC SURGERY (CARDIOTHORACIC VASCULAR SURGERY)
Payer: COMMERCIAL

## 2017-11-05 LAB
ANION GAP SERPL CALCULATED.3IONS-SCNC: 4 MMOL/L (ref 3–14)
BUN SERPL-MCNC: 17 MG/DL (ref 7–30)
CALCIUM SERPL-MCNC: 7.7 MG/DL (ref 8.5–10.1)
CHLORIDE SERPL-SCNC: 104 MMOL/L (ref 94–109)
CO2 SERPL-SCNC: 32 MMOL/L (ref 20–32)
CREAT SERPL-MCNC: 0.67 MG/DL (ref 0.66–1.25)
ERYTHROCYTE [DISTWIDTH] IN BLOOD BY AUTOMATED COUNT: 11.8 % (ref 10–15)
GFR SERPL CREATININE-BSD FRML MDRD: >90 ML/MIN/1.7M2
GLUCOSE SERPL-MCNC: 128 MG/DL (ref 70–99)
HCT VFR BLD AUTO: 28.4 % (ref 40–53)
HGB BLD-MCNC: 9.3 G/DL (ref 13.3–17.7)
LACTATE BLD-SCNC: 0.9 MMOL/L (ref 0.7–2)
MAGNESIUM SERPL-MCNC: 2 MG/DL (ref 1.6–2.3)
MCH RBC QN AUTO: 31.1 PG (ref 26.5–33)
MCHC RBC AUTO-ENTMCNC: 32.7 G/DL (ref 31.5–36.5)
MCV RBC AUTO: 95 FL (ref 78–100)
PHOSPHATE SERPL-MCNC: 1.7 MG/DL (ref 2.5–4.5)
PHOSPHATE SERPL-MCNC: 2.6 MG/DL (ref 2.5–4.5)
PLATELET # BLD AUTO: 154 10E9/L (ref 150–450)
POTASSIUM SERPL-SCNC: 4.5 MMOL/L (ref 3.4–5.3)
RBC # BLD AUTO: 2.99 10E12/L (ref 4.4–5.9)
SODIUM SERPL-SCNC: 139 MMOL/L (ref 133–144)
WBC # BLD AUTO: 12 10E9/L (ref 4–11)

## 2017-11-05 PROCEDURE — 40000133 ZZH STATISTIC OT WARD VISIT

## 2017-11-05 PROCEDURE — 84100 ASSAY OF PHOSPHORUS: CPT | Performed by: THORACIC SURGERY (CARDIOTHORACIC VASCULAR SURGERY)

## 2017-11-05 PROCEDURE — 25000125 ZZHC RX 250: Performed by: ANESTHESIOLOGY

## 2017-11-05 PROCEDURE — 83605 ASSAY OF LACTIC ACID: CPT | Performed by: THORACIC SURGERY (CARDIOTHORACIC VASCULAR SURGERY)

## 2017-11-05 PROCEDURE — 97530 THERAPEUTIC ACTIVITIES: CPT | Mod: GP

## 2017-11-05 PROCEDURE — 97161 PT EVAL LOW COMPLEX 20 MIN: CPT | Mod: GP

## 2017-11-05 PROCEDURE — 85027 COMPLETE CBC AUTOMATED: CPT | Performed by: SURGERY

## 2017-11-05 PROCEDURE — 40000193 ZZH STATISTIC PT WARD VISIT

## 2017-11-05 PROCEDURE — 36592 COLLECT BLOOD FROM PICC: CPT | Performed by: THORACIC SURGERY (CARDIOTHORACIC VASCULAR SURGERY)

## 2017-11-05 PROCEDURE — 71010 XR CHEST PORT 1 VW: CPT

## 2017-11-05 PROCEDURE — 25000128 H RX IP 250 OP 636: Performed by: ANESTHESIOLOGY

## 2017-11-05 PROCEDURE — 97530 THERAPEUTIC ACTIVITIES: CPT | Mod: GO

## 2017-11-05 PROCEDURE — S0020 INJECTION, BUPIVICAINE HYDRO: HCPCS | Performed by: ANESTHESIOLOGY

## 2017-11-05 PROCEDURE — 80048 BASIC METABOLIC PNL TOTAL CA: CPT | Performed by: SURGERY

## 2017-11-05 PROCEDURE — 25000125 ZZHC RX 250: Performed by: THORACIC SURGERY (CARDIOTHORACIC VASCULAR SURGERY)

## 2017-11-05 PROCEDURE — 83735 ASSAY OF MAGNESIUM: CPT | Performed by: SURGERY

## 2017-11-05 PROCEDURE — 25000128 H RX IP 250 OP 636: Performed by: SURGERY

## 2017-11-05 PROCEDURE — 97116 GAIT TRAINING THERAPY: CPT | Mod: GP

## 2017-11-05 PROCEDURE — 25800025 ZZH RX 258: Performed by: SURGERY

## 2017-11-05 PROCEDURE — 12000006 ZZH R&B IMCU INTERMEDIATE UMMC

## 2017-11-05 PROCEDURE — 25000128 H RX IP 250 OP 636: Performed by: THORACIC SURGERY (CARDIOTHORACIC VASCULAR SURGERY)

## 2017-11-05 PROCEDURE — 97110 THERAPEUTIC EXERCISES: CPT | Mod: GO

## 2017-11-05 PROCEDURE — 36592 COLLECT BLOOD FROM PICC: CPT | Performed by: SURGERY

## 2017-11-05 PROCEDURE — 25000132 ZZH RX MED GY IP 250 OP 250 PS 637: Performed by: THORACIC SURGERY (CARDIOTHORACIC VASCULAR SURGERY)

## 2017-11-05 PROCEDURE — 84100 ASSAY OF PHOSPHORUS: CPT | Performed by: SURGERY

## 2017-11-05 RX ORDER — DEXTROSE, SODIUM CHLORIDE, AND POTASSIUM CHLORIDE 5; .45; .15 G/100ML; G/100ML; G/100ML
0-500 INJECTION INTRAVENOUS EVERY 8 HOURS
Status: DISCONTINUED | OUTPATIENT
Start: 2017-11-05 | End: 2017-11-17 | Stop reason: HOSPADM

## 2017-11-05 RX ORDER — OXYCODONE HCL 5 MG/5 ML
5-10 SOLUTION, ORAL ORAL EVERY 4 HOURS PRN
Status: DISCONTINUED | OUTPATIENT
Start: 2017-11-05 | End: 2017-11-08

## 2017-11-05 RX ADMIN — POTASSIUM CHLORIDE, DEXTROSE MONOHYDRATE AND SODIUM CHLORIDE 260 ML: 150; 5; 450 INJECTION, SOLUTION INTRAVENOUS at 14:55

## 2017-11-05 RX ADMIN — POTASSIUM CHLORIDE, DEXTROSE MONOHYDRATE AND SODIUM CHLORIDE 115 ML: 150; 5; 450 INJECTION, SOLUTION INTRAVENOUS at 23:41

## 2017-11-05 RX ADMIN — BUPIVACAINE HYDROCHLORIDE: 7.5 INJECTION, SOLUTION EPIDURAL; RETROBULBAR at 06:57

## 2017-11-05 RX ADMIN — SODIUM CHLORIDE, POTASSIUM CHLORIDE, SODIUM LACTATE AND CALCIUM CHLORIDE 500 ML: 600; 310; 30; 20 INJECTION, SOLUTION INTRAVENOUS at 10:46

## 2017-11-05 RX ADMIN — BACITRACIN: 500 OINTMENT TOPICAL at 20:17

## 2017-11-05 RX ADMIN — ENOXAPARIN SODIUM 40 MG: 40 INJECTION SUBCUTANEOUS at 15:06

## 2017-11-05 RX ADMIN — Medication 15 ML: at 20:16

## 2017-11-05 RX ADMIN — BACITRACIN: 500 OINTMENT TOPICAL at 08:12

## 2017-11-05 RX ADMIN — POTASSIUM CHLORIDE, DEXTROSE MONOHYDRATE AND SODIUM CHLORIDE: 150; 5; 450 INJECTION, SOLUTION INTRAVENOUS at 08:12

## 2017-11-05 RX ADMIN — POTASSIUM PHOSPHATE, MONOBASIC AND POTASSIUM PHOSPHATE, DIBASIC 20 MMOL: 224; 236 INJECTION, SOLUTION INTRAVENOUS at 15:58

## 2017-11-05 RX ADMIN — Medication 2 G: at 13:10

## 2017-11-05 RX ADMIN — POTASSIUM CHLORIDE, DEXTROSE MONOHYDRATE AND SODIUM CHLORIDE: 150; 5; 450 INJECTION, SOLUTION INTRAVENOUS at 00:44

## 2017-11-05 RX ADMIN — Medication 15 ML: at 08:12

## 2017-11-05 RX ADMIN — Medication 15 ML: at 14:20

## 2017-11-05 RX ADMIN — LIDOCAINE 1 PATCH: 50 PATCH CUTANEOUS at 20:00

## 2017-11-05 ASSESSMENT — ACTIVITIES OF DAILY LIVING (ADL)
ADLS_ACUITY_SCORE: 11

## 2017-11-05 ASSESSMENT — PAIN DESCRIPTION - DESCRIPTORS
DESCRIPTORS: ACHING;SORE

## 2017-11-05 NOTE — PROGRESS NOTES
11/05/17 0800   Quick Adds   Type of Visit Initial PT Evaluation   Living Environment   Lives With spouse;child(janes), dependent  (6 yo and 4 mo, 2 dogs)   Living Arrangements house   Home Accessibility bed not on first floor;tub/shower is not walk in;stairs (1 railing present)  (downstairs shower is walk in and has bulit in bench)   Number of Stairs to Enter Home 5  (3 in front)   Number of Stairs Within Home 26  (13 up to bed, 13 down to office and shower)   Stair Railings at Home inside, present on left side   Transportation Available family or friend will provide   Living Environment Comment Lives with support of spouse   Self-Care   Usual Activity Tolerance excellent   Current Activity Tolerance moderate   Regular Exercise yes   Activity/Exercise Type walking   Exercise Amount/Frequency 5-7 times/wk   Equipment Currently Used at Home none   Activity/Exercise/Self-Care Comment Occupation: musician, 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. Hobbies: music and video games   Functional Level Prior   Ambulation 0-->independent   Transferring 0-->independent   Toileting 0-->independent   Bathing 0-->independent   Dressing 0-->independent   Eating 0-->independent   Communication 0-->understands/communicates without difficulty   Swallowing 0-->swallows foods/liquids without difficulty   Cognition 0 - no cognition issues reported   Fall history within last six months no   Which of the above functional risks had a recent onset or change? 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  Other PMH is unremarkable.    Precautions/Limitations abdominal precautions;oxygen therapy device and L/min   General Observations Pt supine with HOB elevated upon entry   Cognitive Status Examination   Orientation orientation to person, place and time   Level of Consciousness alert   Follows Commands and Answers Questions 100% of the time   Personal Safety and Judgment intact   Memory intact   Cognitive Comment No further screening indicated   Pain Assessment   Patient Currently in Pain Yes, see Vital Sign flowsheet   Integumentary/Edema   Integumentary/Edema no deficits were identifed   Posture    Posture Protracted shoulders;Forward head position;Kyphosis   Range of Motion (ROM)   ROM Comment B UE and LE ROM WFL as per functional exam.    Strength   Strength Comments B UE and LE strength WFL as per functional exam.  Pt able to bear weight evenly through B UE and LE while ambulating with FFW.   Bed Mobility   Bed Mobility Comments Pt transfers supine> sit with HOB elevated, min A of 1, and cuing for technique. Pt able to scoot to EOB independently.     Transfer Skills   Transfer Comments Pt performs sit<>stand with CGA, FWW and cuing for technique.    Gait   Gait Comments Pt amb 100' x2 with prolonged seated rest between bouts, CGA and FWW.  Pt on RA for first bout and 2 L O2 via NC during second bout. Pt amb with reciprocal gait pattern, decreased caitie, shortened step length and increased kyphosis.   Balance   Balance Comments Pt showed no signs of increased trunk sway, difficulty with foot clearance, or path deviations.  Pt was able to naviage obstacles in kincaid and room without LOB.    Sensory Examination   Sensory Perception Comments Pt reported he has decreased sensation in his feet while trying to put on shorts, but  "he believes it is due to being confined to his bed for the past few days.  No formal testing performed.   General Therapy Interventions   Planned Therapy Interventions gait training;neuromuscular re-education;strengthening;progressive activity/exercise   Clinical Impression   Criteria for Skilled Therapeutic Intervention yes, treatment indicated   PT Diagnosis Impaired functional mobility   Influenced by the following impairments Pain, deconditioning, posture   Functional limitations due to impairments gait, transfers, bed mobility, endurance, toileting, dressing, bathing   Clinical Presentation Stable/Uncomplicated   Clinical Presentation Rationale Pt's current status   Clinical Decision Making (Complexity) Low complexity   Therapy Frequency` daily   Predicted Duration of Therapy Intervention (days/wks) 1 week   Anticipated Discharge Disposition Home with Assist;Home with Home Therapy   Risk & Benefits of therapy have been explained Yes   Patient, Family & other staff in agreement with plan of care Yes   [facility] AM-PAC  \"6 Clicks\" V.2 Basic Mobility Inpatient Short Form   1. Turning from your back to your side while in a flat bed without using bedrails? 3 - A Little   2. Moving from lying on your back to sitting on the side of a flat bed without using bedrails? 3 - A Little   3. Moving to and from a bed to a chair (including a wheelchair)? 3 - A Little   4. Standing up from a chair using your arms (e.g., wheelchair, or bedside chair)? 3 - A Little   5. To walk in hospital room? 3 - A Little   6. Climbing 3-5 steps with a railing? 2 - A Lot   Basic Mobility Raw Score (Score out of 24.Lower scores equate to lower levels of function) 17   Total Evaluation Time   Total Evaluation Time (Minutes) 12     "

## 2017-11-05 NOTE — PLAN OF CARE
Problem: Patient Care Overview  Goal: Plan of Care/Patient Progress Review  Outcome: Improving  Pt transferred from  about 1700 via bed with wife at bedside.  Pt is A/Ox4 pleasant and cooperative.  Pt reports pain to left side of chest and back around surgery site, has dilaudid PCA and fentanyl/bupivicaine epidural.  Epidural site had about quarter sized amt of fresh bloody drainage, Monik Ortiz from anesthesia notified, came up and changed dressing and also gave pt small bolus in epidural see note, otherwise pts CMS checks intact.  Pt has barnes in place with subpar urine output, will continue to closely monitor.  Left chest tube and right/anterior chest tube to water seal, see I & O's for output.  VS stable see flowsheets.

## 2017-11-05 NOTE — PLAN OF CARE
Problem: Patient Care Overview  Goal: Plan of Care/Patient Progress Review  Pt transferred to 6B this evening, report called and given to 6B RN; VSS except for intermittent ST with activity; PCA Dilaudid at 0.2 Q 10 mins; Bupivacaine rate increased to 12ml/hr and michelle;us given by Anesthesia team for better pain management and pt has been reporting well controlled pain; on RA while awake but on 1L NC when sleeping; shallow breathing, pulm toilet encouraged; CT to water seal with no air leak; barnes with belia colored UOP; MIVF at 125ml/hr; NPO, pharyngostomy tube to LIS, flushed with 30cc NS per order;  J tube to gravity, scant amount of bile green output; hypoactive BS, burping but no flatus; wife has been at bedside.

## 2017-11-05 NOTE — PROGRESS NOTES
REGIONAL ANESTHESIA PAIN SERVICE CONTINUOUS NERVE INFUSION NOTE  Subjective and Interval History: Pt reports inadequate pain control via nerve block continuous infusion though somewhat improved today from yesterday. Denies any weakness, paresthesias, circumoral numbness, metallic taste or tinnitus.  Pt is not ambulating yet.  Patient currently denies nausea or vomiting. Patient is NPO.       Clinically Aligned Pain Assessment (CAPA):   Comfort (How is your pain?): Tolerable with discomfort  Change in Pain (Since your last medication/intervention?): About the same  Pain Control (How are your pain treatments working?):  Inadequate pain control  Functioning (Are you able to do activities to get better?) : Can't do anything because of pain  Sleep (Does your pain management allow you to sleep or rest?): Awake with occasional pain        Anticoagulation:  lovenox       OBJECTIVE:    Diagnostic:  Lab Results   Component Value Date    WBC 13.2 11/04/2017     Lab Results   Component Value Date    RBC 3.51 11/04/2017     Lab Results   Component Value Date    HGB 11.0 11/04/2017     Lab Results   Component Value Date    HCT 32.2 11/04/2017     Lab Results   Component Value Date     11/04/2017         Vitals:    Temp:  [36.9  C (98.5  F)-37.2  C (99  F)] 37.1  C (98.8  F)  Pulse:  [101] 101  Heart Rate:  [] 99  Resp:  [16-22] 22  BP: (110-134)/(59-76) 131/71  FiO2 (%):  [2 %] 2 %  SpO2:  [88 %-99 %] 96 %    Exam:    Strength 5/5 and symmetric grossly in bilateral LE   Epidural catheter site with dressing c/d/i, no tenderness, erythema, heme, edema      ASSESSMENT/PLAN:    Daniel Sandhu is a 37 year old male POD #2 s/p LAPAROSCOPY DIAGNOSTIC (GENERAL)  LAPAROTOMY EXPLORATORY  THORACOTOMY  GASTRECTOMY  PERCUTANEOUS INSERTION TUBE JEJUNOSTOMY  PHARYNGOSTOMY  COMBINED ESOPHAGOSCOPY, GASTROSCOPY, DUODENOSCOPY (EGD) and placement of T6/7 epidural catheter for analgesia.  Pt is receiving inadequate analgesia with  bupivacaine 0.125% with 3 mcg/ml fentanyl, total infusion 12mL/hour.  Pt is ambulating with significant discomfort.  No weakness or paresthesias, Inadequate sensory block.  No evidence of adverse side effects associated with local anesthetic.     - current total infusion to 12 mL/hour  - will continue to follow and adjust as needed  - discussed plan with attending anesthesiologist    Baljinder Casarez MD  Regional Anesthesia Pain Service  11/4/2017 10:20 AM    24 hour Job Code Pager.  For in-house use only.     [place]:  * * [phone]  [place]: * * [phone]  Peds: * * [phone]  Enter call-back number and #      This pager only accepts text messages through [facility]

## 2017-11-05 NOTE — PROGRESS NOTES
"Thoracic Surgery Progress Note  Daniel Sandhu  [id number]  11/5/2017    No acute events overnight. Pain is present but reasonably controlled with epidural and PCA. No nausea/vomiting. Voiding per barnes. Has not ambulated.    /63 (BP Location: Right arm)  Pulse 101  Temp 98.8  F (37.1  C) (Oral)  Resp 20  Ht 1.73 m (5' 8.11\")  Wt 74.8 kg (164 lb 14.4 oz)  SpO2 98%  BMI 24.99 kg/m2  NAD  Comfortable in bed  Non-labored breathing on 1L NC  Sinus tachycardia  Abdomen soft, appropriately tender  Incision sites C/D/I  Bilateral chest tubes in place, serosanguinous, no chylous drainage, tidaling appropriately  Barnes in place    R CT 35 cc  L CT 1380 cc    Labs reviewed.  Pending this AM    CXR reviewed, stable small biapical pneumothoraces.    A/P: Daniel Sandhu is a 37 year old male with GE junction adenocarcinoma now POD#1 after distal esophagectomy, total gastrectomy, Terrell-en-Y reconstruction, thoracotomy, bilateral chest tube placement, pharyngostomy tube placement, jejunostomy tube placement.  -- Pain control with epidural, Dilaudid PCA  -- No feeds via J tube until Monday  -- 500 cc bolus now  -- Replace left chest tube output 1:2 with D5 0.45% NaCl with 20 KCl  -- Pharyngostomy tube to LIS, flush 30 cc water qshift  -- J tube to gravity drainage  -- Encourage cough, deep breathing, IS, ambulation  -- Chest tubes to water seal   -- mIVF at 125 cc/hr  -- Monitor I/O  -- Remove Barnes  -- PT/OT  -- Aki Wiggins MD  PGY-3, General Surgery  [phone]    "

## 2017-11-05 NOTE — PLAN OF CARE
Problem: Patient Care Overview  Goal: Plan of Care/Patient Progress Review  Discharge Planner OT   Patient plan for discharge: home  Current status: Pt up in BR washing face, ambulated back to chair with SBA. Completed thoracotomy exercises. Limited by fatigue and back pain. Returned to supine position with min A for managing LB.   Barriers to return to prior living situation: pain, post surgical precautions  Recommendations for discharge: anticipate home with assist  Rationale for recommendations: pt limited by pain and decreased functional endurance though anticipate he will progress quickly with therapy.        Entered by: Sommer Lindsay 11/05/2017 3:10 PM     OT 6B

## 2017-11-05 NOTE — PLAN OF CARE
Problem: Pain, Acute (Adult)  Goal: Acceptable Pain Control/Comfort Level  Patient will demonstrate the desired outcomes by discharge/transition of care.   Outcome: No Change  A/O X4. Able to make needs known, uses call light appropriately.  -120's, VSS; afebrile; Lung sounds clear, RA; 2L NC while sleeping. CMS intact. Midline incision w/ moderate dried drainage; dressing marked. Marginal urine output. Bowel sounds hypoactive. Epidural infusing at 12ml/hr, dilaudid PCA 0.2 mg Q10min w/ adequate pain control. NPO. pharyngostomy to LIS. Chest tubes patent to water seal, see I/O's for output. Will continue to monitor and follow plan of care.

## 2017-11-05 NOTE — PLAN OF CARE
Problem: Patient Care Overview  Goal: Plan of Care/Patient Progress Review  6B PT -      Discharge Planner PT   Patient plan for discharge: Home with assist  Current status: Pt requires min A-CGA and FWW for sit<>stand transfers and min A of 1 for supine>sit.  Pt is able to amb 100' x2 with prolonged seated rest between bouts, CGA, and FWW.  Pt's O2 levels remained at or >90% on RA.   Barriers to return to prior living situation: amount of assist, medical status, activity intolerance  Recommendations for discharge: Home with assist, possible home moises PT   Rationale for recommendations: Pt's current functional status is below baseline.           Entered by: Radha Romeo 11/05/2017 10:54 AM

## 2017-11-06 ENCOUNTER — APPOINTMENT (OUTPATIENT)
Dept: GENERAL RADIOLOGY | Facility: CLINIC | Age: 37
DRG: 327 | End: 2017-11-06
Attending: THORACIC SURGERY (CARDIOTHORACIC VASCULAR SURGERY)
Payer: COMMERCIAL

## 2017-11-06 ENCOUNTER — APPOINTMENT (OUTPATIENT)
Dept: PHYSICAL THERAPY | Facility: CLINIC | Age: 37
DRG: 327 | End: 2017-11-06
Attending: THORACIC SURGERY (CARDIOTHORACIC VASCULAR SURGERY)
Payer: COMMERCIAL

## 2017-11-06 ENCOUNTER — HOME INFUSION (PRE-WILLOW HOME INFUSION) (OUTPATIENT)
Dept: PHARMACY | Facility: CLINIC | Age: 37
End: 2017-11-06

## 2017-11-06 LAB
ANION GAP SERPL CALCULATED.3IONS-SCNC: 4 MMOL/L (ref 3–14)
BUN SERPL-MCNC: 16 MG/DL (ref 7–30)
CALCIUM SERPL-MCNC: 7.7 MG/DL (ref 8.5–10.1)
CHLORIDE SERPL-SCNC: 99 MMOL/L (ref 94–109)
CO2 SERPL-SCNC: 33 MMOL/L (ref 20–32)
CREAT SERPL-MCNC: 0.6 MG/DL (ref 0.66–1.25)
ERYTHROCYTE [DISTWIDTH] IN BLOOD BY AUTOMATED COUNT: 11.9 % (ref 10–15)
GFR SERPL CREATININE-BSD FRML MDRD: >90 ML/MIN/1.7M2
GLUCOSE SERPL-MCNC: 103 MG/DL (ref 70–99)
HCT VFR BLD AUTO: 25.7 % (ref 40–53)
HGB BLD-MCNC: 8.4 G/DL (ref 13.3–17.7)
LACTATE BLD-SCNC: 0.5 MMOL/L (ref 0.7–2)
MAGNESIUM SERPL-MCNC: 2 MG/DL (ref 1.6–2.3)
MAGNESIUM SERPL-MCNC: 2.1 MG/DL (ref 1.6–2.3)
MCH RBC QN AUTO: 31.1 PG (ref 26.5–33)
MCHC RBC AUTO-ENTMCNC: 32.7 G/DL (ref 31.5–36.5)
MCV RBC AUTO: 95 FL (ref 78–100)
PHOSPHATE SERPL-MCNC: 2.2 MG/DL (ref 2.5–4.5)
PHOSPHATE SERPL-MCNC: 2.8 MG/DL (ref 2.5–4.5)
PLATELET # BLD AUTO: 146 10E9/L (ref 150–450)
POTASSIUM SERPL-SCNC: 4 MMOL/L (ref 3.4–5.3)
RBC # BLD AUTO: 2.7 10E12/L (ref 4.4–5.9)
SODIUM SERPL-SCNC: 136 MMOL/L (ref 133–144)
WBC # BLD AUTO: 8.8 10E9/L (ref 4–11)

## 2017-11-06 PROCEDURE — 25000125 ZZHC RX 250: Performed by: ANESTHESIOLOGY

## 2017-11-06 PROCEDURE — S0020 INJECTION, BUPIVICAINE HYDRO: HCPCS | Performed by: ANESTHESIOLOGY

## 2017-11-06 PROCEDURE — 25000125 ZZHC RX 250: Performed by: NURSE PRACTITIONER

## 2017-11-06 PROCEDURE — 36592 COLLECT BLOOD FROM PICC: CPT | Performed by: STUDENT IN AN ORGANIZED HEALTH CARE EDUCATION/TRAINING PROGRAM

## 2017-11-06 PROCEDURE — 83605 ASSAY OF LACTIC ACID: CPT | Performed by: THORACIC SURGERY (CARDIOTHORACIC VASCULAR SURGERY)

## 2017-11-06 PROCEDURE — 71010 XR CHEST PORT 1 VW: CPT

## 2017-11-06 PROCEDURE — 83735 ASSAY OF MAGNESIUM: CPT | Performed by: THORACIC SURGERY (CARDIOTHORACIC VASCULAR SURGERY)

## 2017-11-06 PROCEDURE — 40000193 ZZH STATISTIC PT WARD VISIT

## 2017-11-06 PROCEDURE — 97116 GAIT TRAINING THERAPY: CPT | Mod: GP

## 2017-11-06 PROCEDURE — 80048 BASIC METABOLIC PNL TOTAL CA: CPT | Performed by: THORACIC SURGERY (CARDIOTHORACIC VASCULAR SURGERY)

## 2017-11-06 PROCEDURE — 25000128 H RX IP 250 OP 636: Performed by: THORACIC SURGERY (CARDIOTHORACIC VASCULAR SURGERY)

## 2017-11-06 PROCEDURE — 84100 ASSAY OF PHOSPHORUS: CPT | Performed by: THORACIC SURGERY (CARDIOTHORACIC VASCULAR SURGERY)

## 2017-11-06 PROCEDURE — 85027 COMPLETE CBC AUTOMATED: CPT | Performed by: STUDENT IN AN ORGANIZED HEALTH CARE EDUCATION/TRAINING PROGRAM

## 2017-11-06 PROCEDURE — 25000132 ZZH RX MED GY IP 250 OP 250 PS 637: Performed by: THORACIC SURGERY (CARDIOTHORACIC VASCULAR SURGERY)

## 2017-11-06 PROCEDURE — 25000132 ZZH RX MED GY IP 250 OP 250 PS 637: Performed by: SURGERY

## 2017-11-06 PROCEDURE — 25800025 ZZH RX 258: Performed by: SURGERY

## 2017-11-06 PROCEDURE — 12000006 ZZH R&B IMCU INTERMEDIATE UMMC

## 2017-11-06 PROCEDURE — 25000128 H RX IP 250 OP 636: Performed by: ANESTHESIOLOGY

## 2017-11-06 PROCEDURE — 25000125 ZZHC RX 250: Performed by: THORACIC SURGERY (CARDIOTHORACIC VASCULAR SURGERY)

## 2017-11-06 PROCEDURE — 25000132 ZZH RX MED GY IP 250 OP 250 PS 637: Performed by: STUDENT IN AN ORGANIZED HEALTH CARE EDUCATION/TRAINING PROGRAM

## 2017-11-06 PROCEDURE — 97530 THERAPEUTIC ACTIVITIES: CPT | Mod: GP

## 2017-11-06 PROCEDURE — S0020 INJECTION, BUPIVICAINE HYDRO: HCPCS | Performed by: NURSE PRACTITIONER

## 2017-11-06 PROCEDURE — 36592 COLLECT BLOOD FROM PICC: CPT | Performed by: THORACIC SURGERY (CARDIOTHORACIC VASCULAR SURGERY)

## 2017-11-06 PROCEDURE — 27210443 ZZH NUTRITION PRODUCT SPECIALIZED PACKET

## 2017-11-06 PROCEDURE — 25000128 H RX IP 250 OP 636: Performed by: NURSE PRACTITIONER

## 2017-11-06 RX ADMIN — OXYCODONE HYDROCHLORIDE 5 MG: 5 SOLUTION ORAL at 10:21

## 2017-11-06 RX ADMIN — CHLORASEPTIC 1 ML: 1.5 LIQUID ORAL at 21:13

## 2017-11-06 RX ADMIN — POTASSIUM PHOSPHATE, MONOBASIC AND POTASSIUM PHOSPHATE, DIBASIC 10 MMOL: 224; 236 INJECTION, SOLUTION INTRAVENOUS at 00:20

## 2017-11-06 RX ADMIN — BACITRACIN: 500 OINTMENT TOPICAL at 07:59

## 2017-11-06 RX ADMIN — POTASSIUM CHLORIDE, DEXTROSE MONOHYDRATE AND SODIUM CHLORIDE 75 ML: 150; 5; 450 INJECTION, SOLUTION INTRAVENOUS at 06:42

## 2017-11-06 RX ADMIN — BUPIVACAINE HYDROCHLORIDE: 7.5 INJECTION, SOLUTION EPIDURAL; RETROBULBAR at 03:01

## 2017-11-06 RX ADMIN — BUPIVACAINE HYDROCHLORIDE: 7.5 INJECTION, SOLUTION EPIDURAL; RETROBULBAR at 14:39

## 2017-11-06 RX ADMIN — POTASSIUM CHLORIDE, DEXTROSE MONOHYDRATE AND SODIUM CHLORIDE: 150; 5; 450 INJECTION, SOLUTION INTRAVENOUS at 10:17

## 2017-11-06 RX ADMIN — ENOXAPARIN SODIUM 40 MG: 40 INJECTION SUBCUTANEOUS at 16:48

## 2017-11-06 RX ADMIN — MULTIVITAMIN 15 ML: LIQUID ORAL at 14:59

## 2017-11-06 RX ADMIN — OXYCODONE HYDROCHLORIDE 10 MG: 5 SOLUTION ORAL at 16:48

## 2017-11-06 RX ADMIN — OXYCODONE HYDROCHLORIDE 10 MG: 5 SOLUTION ORAL at 21:06

## 2017-11-06 RX ADMIN — HYDROMORPHONE HYDROCHLORIDE: 10 INJECTION, SOLUTION INTRAMUSCULAR; INTRAVENOUS; SUBCUTANEOUS at 00:46

## 2017-11-06 RX ADMIN — Medication 15 ML: at 07:45

## 2017-11-06 RX ADMIN — HYDROMORPHONE HYDROCHLORIDE: 10 INJECTION, SOLUTION INTRAMUSCULAR; INTRAVENOUS; SUBCUTANEOUS at 14:55

## 2017-11-06 RX ADMIN — Medication 15 ML: at 14:34

## 2017-11-06 RX ADMIN — CHLORASEPTIC 1 ML: 1.5 LIQUID ORAL at 18:34

## 2017-11-06 RX ADMIN — Medication 15 ML: at 21:08

## 2017-11-06 RX ADMIN — BENZOCAINE AND MENTHOL 1 LOZENGE: 15; 3.6 LOZENGE ORAL at 16:48

## 2017-11-06 RX ADMIN — POTASSIUM CHLORIDE, DEXTROSE MONOHYDRATE AND SODIUM CHLORIDE: 150; 5; 450 INJECTION, SOLUTION INTRAVENOUS at 19:10

## 2017-11-06 RX ADMIN — POTASSIUM CHLORIDE, DEXTROSE MONOHYDRATE AND SODIUM CHLORIDE 60 ML: 150; 5; 450 INJECTION, SOLUTION INTRAVENOUS at 15:00

## 2017-11-06 RX ADMIN — BACITRACIN: 500 OINTMENT TOPICAL at 21:08

## 2017-11-06 RX ADMIN — OXYCODONE HYDROCHLORIDE 5 MG: 5 SOLUTION ORAL at 07:45

## 2017-11-06 RX ADMIN — Medication 2 G: at 16:48

## 2017-11-06 RX ADMIN — POTASSIUM PHOSPHATE, MONOBASIC AND POTASSIUM PHOSPHATE, DIBASIC 15 MMOL: 224; 236 INJECTION, SOLUTION INTRAVENOUS at 10:12

## 2017-11-06 ASSESSMENT — ACTIVITIES OF DAILY LIVING (ADL)
ADLS_ACUITY_SCORE: 11

## 2017-11-06 ASSESSMENT — PAIN DESCRIPTION - DESCRIPTORS: DESCRIPTORS: SHARP

## 2017-11-06 NOTE — PROGRESS NOTES
REGIONAL ANESTHESIA PAIN SERVICE EPIDURAL NOTE  SUBJECTIVE:   Interval History: Pt reports adequate pain control via epidural.  Denies any weakness, paresthesias, circumoral numbness, metallic taste or tinnitus.  Pt is ambulating with assistance.  Patient is currently without nausea; without pruritis.  Currently NPO.       Clinically Aligned Pain Assessment (CAPA):  Comfort (How is your pain?): Tolerable with discomfort  Change in Pain (Since your last medication/intervention?): Getting better  Pain Control (How are your pain treatments working?):  Partially effective pain control  Functioning (Are you able to do activities to get better?) : Can do most things, but pain gets in the way of some   Sleep (Does your pain management allow you to sleep or rest?): Awake with occasional pain       Anticoagulation:     enoxaparin (LOVENOX) injection 40 mg 40 mg, SC, Q24H Given: 11/05 1506     Medications related to Pain Management (Future)    Start     Dose/Rate Route Frequency Ordered Stop    11/05/17 1043  oxyCODONE (ROXICODONE) solution 5-10 mg      5-10 mg Oral EVERY 4 HOURS PRN 11/05/17 1043      11/04/17 1045  fentaNYL (PF) (SUBLIMAZE) 3 mcg/mL, bupivacaine (MARCAINE) 0.125 % in NaCl 0.9 % 250 mL EPIDURAL Infusion      12 mL/hr  EPIDURAL CONTINUOUS 11/04/17 1037      11/03/17 2045  HYDROmorphone (DILAUDID) PCA 1 mg/mL       Intravenous PCA 11/03/17 2032 11/03/17 2045  lidocaine (LIDODERM) 5 % Patch 1 patch      1 patch Transdermal EVERY 24 HOURS 2000 11/03/17 2032 11/03/17 2032  lidocaine (LIDODERM) patch in PLACE       Transdermal EVERY 8 HOURS 11/03/17 2032                OBJECTIVE:  Diagnostics:  Lab Results   Component Value Date    WBC 12.0 11/05/2017     Lab Results   Component Value Date    RBC 2.99 11/05/2017     Lab Results   Component Value Date    HGB 9.3 11/05/2017     Lab Results   Component Value Date    HCT 28.4 11/05/2017     Lab Results   Component Value Date     11/05/2017       Lab  Results   Component Value Date    INR 1.14 07/29/2017    INR 1.12 06/09/2017       Vitals:    Temp:  [98.7  F (37.1  C)-99.8  F (37.7  C)] 99.8  F (37.7  C)  Heart Rate:  [] 105  Resp:  [20-22] 22  BP: (120-134)/(54-82) 123/64  FiO2 (%):  [2 %] 2 %  SpO2:  [88 %-99 %] 95 %    Exam:    Strength 5/5 and symmetric grossly in bilateral LE      Epidural site with dressing c/d/i, no tenderness, erythema, heme, edema      ASSESSMENT/PLAN:    Daniel Sandhu is a 37 year old male POD #3 s/p LAPAROSCOPY DIAGNOSTIC (GENERAL) LAPAROTOMY EXPLORATORY THORACOTOMY  GASTRECTOMY PERCUTANEOUS INSERTION TUBE JEJUNOSTOMY PHARYNGOSTOMY COMBINED ESOPHAGOSCOPY, GASTROSCOPY, DUODENOSCOPY (EGD) and placement of T6-7 epidural for analgesia.  Pt receiving adequate analgesia with epidural infusion Bupivacaine 0.125% + Fentanyl 3mcg/mL at 12mL/hour.  Pt is ambulating without difficulty.  No weakness or paresthesias.  No evidence of adverse side effects related to local anesthetic.  Pt has two chest tubes.    - continue current epidural infusion at 12mL/hour - will DC fentanyl in epidural  - RN informed not to place Lidocaine Patch on abdomen or back between nipple line and umbilicus due to increase risk for LAST symptoms with bupivacaine epiudral  - will continue to follow and adjust as needed  - discussed plan with attending anesthesiologist        GAVIN Krueger CNP  Regional Anesthesia Pain Service  11/6/2017 7:38 AM    24 hour Job Code Pager.  For in-house use only.     Dial * * [phone] and  [place]:  [phone]  [place]: [phone]  Peds:  [phone]  Then enter call-back number  May text page using [employer], but NOT [employer].

## 2017-11-06 NOTE — PROVIDER NOTIFICATION
Surgery cross-cover notified that Pt bolused by anesthesia. Pt fever maxed 99.8. Triggered sepsis protocol, lactic pending. No other concerns at this time.

## 2017-11-06 NOTE — PROGRESS NOTES
Pain Cross Cover Note      Paged by nursing ~5AM regarding significant increase in pain overnight related to sleeping well and not using his PCA consistently overnight. Previously had denied bolus at 9PM with urging for use. Primary team and Pain team to discuss use of J tube for longer acting opioid medication today to alleviate the frequency of PCA use and improve overnight pain control.    Not LAST symptoms. Epidural site c/d/i with no noted erythema.      - Bolus of 12mL of 0.125% bupivacaine at 0500  - Continue basal rate of 12mL/hr  - BP checks q5min for 1hr  Nursing staff aware and will monitor for LAST symptoms    Baljinder Casarez MD  Anesthesiology Resident CA2, PGY3

## 2017-11-06 NOTE — PLAN OF CARE
Problem: Patient Care Overview  Goal: Plan of Care/Patient Progress Review  Outcome: No Change  Neuro: A&Ox4.   Cardiac: ST HR 90s-150s with walking/standing (MD aware). VSS. SBP 120s-130s.          Respiratory: Sating 95% on 1-2L NC. No SOB. Bilateral CT to water seal. R with scant output, L with 110-150 out Q4 hours, bolus IVF replacement Q8 hours.  GI/: Straight cath 550 x1 d/t unable to void. Pt voided 40 this AM in urinal. No N/V. J tube to gravity.   Diet/appetite: Tolerating NPO status.   Activity:  Assist of 1 to stand/walk.   Pain: Dilaudid PCA 0.3 Q10, no basal rate. Epidural at 12ml/hr, pain team gave one bolus this AM when pt woke up with pain control issues. Informed by thoracic team to use oxi more frequently through J and clamp for an hour.   Skin: Intact, no new deficits noted. Midline/L chest incision with dressings intact.      Plan: Continue with POC. Notify primary team with changes.

## 2017-11-06 NOTE — PROGRESS NOTES
Care Coordinator Progress Note     Admission Date/Time:  11/3/2017  Attending MD:  Yunior Esteban*     Data  Chart reviewed, discussed with interdisciplinary team.   Patient was admitted for: Esophageal ca    Concerns with insurance coverage for discharge needs: None identified  Current Living Situation: Patient lives with Wife and Children  Support System: WifeVioleta is Primary Caregiver  Services Involved:   Transportation: Family  Barriers to Discharge: None identified    Coordination of Care and Referrals: Provided patient/family with options for Home Infusion      Assessment  Pt s/p total gastrectomy with distal esophagectomy, intrathoracic eveline-y, esophago-jejunostomy 11/3/17, anticipated discharge 5-6 days. I have met with Pt and his Wife, Violeta to introduce myself and care coordinator role. Prior to admission, Pt was independent living with his Wife, 4yr old Daughter and 4 mo Son. Post discharge, Pt will require tube feeding support. Choice of Home Infusion agencies offered, [facility] is preferred. I have made a referral to [facility] and made a [facility] appointment for 11/7/17 @ 10:30am.  Pt and Wife agreeable to above plan and voice understanding.       Plan  Anticipated Discharge Date:  5-6 days  Anticipated Discharge Plan: D/C to home with Family, Enteral support provided by [facility]    Carissa Francisco RN   6B care coordinator #[phone]

## 2017-11-06 NOTE — PLAN OF CARE
Problem: Patient Care Overview  Goal: Plan of Care/Patient Progress Review  6B PT -      Discharge Planner PT   Patient plan for discharge: Home with assist  Current status: Pt is able to amb a total distance of 350' with CGA and FWW. Pt required one prolonged seated rest break between bouts.  Pt requires CGA-min A for transfers.  Pt maintained O2 sats > or = to 90% on RA during activity, and demonstrates improved activity tolerance.  Barriers to return to prior living situation: decreased endurance, amount of assist, medical status   Recommendations for discharge: Home with assist possible home health PT   Rationale for recommendations: Pt's current functional status is below baseline          Entered by: Radha Romeo 11/06/2017 10:34 AM

## 2017-11-06 NOTE — PROGRESS NOTES
Therapy: ENTERAL  Insurance: [employer]  Ded: $250  Met: $250    Co-Insurance: 10%  Max Out of Pocket: $1250  Met: $1250    Please contact Intake with any questions, [phone] or In Basket pool, [facility] ([phone]).    IN REFERENCE TO ADMISSION ON 11/03/2017 TO CHECK ENTERAL NUTRITION BENEFITS. ENTERAL MUST MEET 75% OF NUTRITIONAL NEEDS.

## 2017-11-06 NOTE — PROGRESS NOTES
"Thoracic Surgery Progress Note  Daniel Sandhu  [id number]  November 6, 2017    Subjective: No acute issues overnight. Pain controlled. Required straight cath to void s/p D/c of barnes. Reports some urinary retention. Denies flatus or bm.     Objective:   /54 (BP Location: Right arm)  Pulse 118  Temp 99.3  F (37.4  C) (Oral)  Resp 20  Ht 1.73 m (5' 8.11\")  Wt 73.9 kg (162 lb 14.4 oz)  SpO2 98%  BMI 24.69 kg/m2    PE:  Gen: Awake, alert, NAD   CV: sinus tach 100s  Resp: non-labored at rest, bilateral CT with s/s op, no airleak  Abd: Soft, non-distended, NTTP  Ext: warm and well perfused  Incision: c/d/i  CT: Serosanguinous    I/O last 3 completed shifts:  In: 2140 [I.V.:1790; NG/GT:90; IV Piggyback:260]  Out: 2835 [Urine:1090; Emesis/NG output:435; Chest Tube:1310] - Last 24 hours      Labs/Imaging  Heme:  Recent Labs  Lab 11/05/17  1112 11/04/17  0356 11/03/17  2220 11/03/17  1745 11/03/17  1541  11/01/17  1609   WBC 12.0* 13.2*  --  14.6*  --   --  6.3   HGB 9.3* 11.0*  --  11.6* 11.8*  < > 14.1    180 140* 164  --   --  185   < > = values in this interval not displayed.  Chem:  Recent Labs  Lab 11/05/17  1112 11/04/17  0356 11/03/17  2220 11/03/17  1745 11/03/17  1541   POTASSIUM 4.5 4.6  --  4.6 4.6   CR 0.67 0.75 0.71 0.74  --          A/P:Daniel Sandhu is a 37 year old male with GE junction adenocarcinoma now POD#3 after distal esophagectomy, total gastrectomy, Terrell-en-Y reconstruction, thoracotomy, bilateral chest tube placement, pharyngostomy tube placement, jejunostomy tube placement.  -- Pain control with epidural, Dilaudid PCA  -- Start TTF today. Appreciate nutritionist.  -- Replace left chest tube output 1:2 with D5 0.45% NaCl with 20 KCl  -- Pharyngostomy tube to LIS, flush 30 cc water qshift  -- J tube to gravity drainage  -- Encourage cough, deep breathing, IS, ambulation  -- Chest tubes to water seal. CXR and D/c right CT today   -- mIVF at 125 cc/hr  -- Monitor I/O  -- Bladder " scan per routine. If continues  To require straight cath for voids today, will consider replacing barnes.  -- PT/OT  -- Lovenox      Patient seen and discussed with the Fellow, Dr. Pavon.      Shital Grant MD  PGY-1, General Surgery

## 2017-11-06 NOTE — PLAN OF CARE
Problem: Patient Care Overview  Goal: Plan of Care/Patient Progress Review  Outcome: Improving  Vital signs:  Temp: 99.1  F (37.3  C) Temp src: Oral BP: 125/68 Pulse: 118 Heart Rate: 104 Resp: 20 SpO2: 98 % O2 Device: Nasal cannula Oxygen Delivery: 1 LPM   Neuro: A&Ox4.   Cardiac: Sinus tach. VSS.       Respiratory: Sating 98% on 1 L  GI/: Unable to void. Requiring straight cathing. Cath'd for 500 at noon. Order for barnes. No flatus yet.  Diet/appetite: NPO. Swabbing mouth.  Activity:  Assist of 1, up to chair and in halls.  Pain: At acceptable level with oxycodone, epidural and PCA.  Skin: Surgical incision dressings changed and sites WNL. Chest tube dressings changed and sites WNL.  LDA's: R chest port with D5 and .45 NS with 20 K @ 125 with boluses q8h to replace half of left chest tube output. Pharyngostomy to LIS. PEJ to gravity. Left chest tube to water seal. Epidural with bupivicane at 12 ml/hr.     Plan: Start tube feeds and place barnes. Notify primary team with changes.

## 2017-11-06 NOTE — PROGRESS NOTES
"CLINICAL NUTRITION SERVICES - ASSESSMENT NOTE     Nutrition Prescription    RECOMMENDATIONS FOR MDs/PROVIDERS TO ORDER:  Monitor ability to switch to Impact peptide and/or adv vivonex ten to goal.     Malnutrition Status:    Patient does not meet two of the above criteria necessary for diagnosing malnutrition but is at risk for malnutrition    Recommendations already ordered by Registered Dietitian (RD):  RD to order trophic feeds as follows: Vivonex TEN @ 10ml/hr, do not advance until ok'd by provider. Goal TF @ 100ml/hr, 2000ml, 8 packets= yield 2400ml,  2400kcals, 92 g pro.    Future/Additional Recommendations:  - Once pt is able to have non-fat free formula, would recommend TF  Regimen as follows: Impact Peptide @ goal 70 ml/hr (1680 ml/day) to provide 2520 kcals (35 kcal/kg/day), 158 g PRO (2+ g/kg/day), 1294 ml free H2O, 108 g Fat (50% from MCTs), 235 g CHO and no Fiber daily.     REASON FOR ASSESSMENT  Daniel Sandhu is a/an 37 year old male assessed by the dietitian for Provider Order - Registered Dietitian to Assess and Order TF per Medical Nutrition Therapy Protocol- Patient has known chylothorax. Would like to start on no fats formula with plans for TTF only today. Will reassess tomorrow for possible advancement towards goal. - Shital Grant MD    NUTRITION HISTORY  Pt was seen by outpatient RD in June of this year for ways to increase nutrition while on chemotherapy. PT reports no decreased appetite and has actually had some intentional wt increase.    Procedures: diagnostic laparoscopy, right chest tube, total gastrectomy with distal esophagectomy, intrathoracic eveline-y esophago-jejunostomy, feeding jejunostomy, pharyngostomy, esophagogastroduodenoscopy, flexible bronchoscopy     CURRENT NUTRITION ORDERS  Diet: NPO  Intake/Tolerance: Pt with current chylothorax    LABS  Phos: 2.2 (L), Labs reviewed    MEDICATIONS  Medications reviewed    ANTHROPOMETRICS  Height: 173 cm (5' 8.11\")  Most Recent Weight: " 73.9 kg (162 lb 14.4 oz)    IBW: 70 kg  BMI: Normal BMI  Weight History:   Wt Readings from Last 10 Encounters:   11/06/17 73.9 kg (162 lb 14.4 oz)   11/01/17 74.4 kg (164 lb 0.4 oz)   11/01/17 74.4 kg (164 lb)   09/25/17 73.2 kg (161 lb 6.4 oz)   08/25/17 71.6 kg (157 lb 14.4 oz)   08/08/17 72.1 kg (158 lb 14.4 oz)   07/29/17 69.3 kg (152 lb 12.5 oz)   07/24/17 69.4 kg (153 lb)   07/17/17 72 kg (158 lb 12.8 oz)   06/27/17 69.6 kg (153 lb 6.4 oz)     Dosing Weight: 74 kg (actual)    ASSESSED NUTRITION NEEDS  Estimated Energy Needs: 3215-7687 kcals/day (30 - 35 kcals/kg )  Justification: Increased needs and Post-op  Estimated Protein Needs: 111-148 grams protein/day (1.5 - 2 grams of pro/kg)  Justification: Increased needs and Post-op  Estimated Fluid Needs: 1 mL/kcal/day   Justification: Maintenance    PHYSICAL FINDINGS  See malnutrition section below.     MALNUTRITION  % Intake: No decreased intake noted  % Weight Loss: None noted  Subcutaneous Fat Loss: None observed  Muscle Loss: None observed  Fluid Accumulation/Edema: None noted  Malnutrition Diagnosis: Patient does not meet two of the above criteria necessary for diagnosing malnutrition but is at risk for malnutrition    NUTRITION DIAGNOSIS  Inadequate protein-energy intake related to altered GI tract with esophagectomy and gastrectomy with RNY as evidenced by Jtube placement and need for nutrition support to provide 100% of nutritional needs.       INTERVENTIONS  Implementation  Nutrition Education: Provided education on role of RD and nutrition POC   Enteral Nutrition - Initiate     Goals  Total avg nutritional intake to meet a minimum of 30 kcal/kg and 1.5 g PRO/kg daily (per dosing wt 74 kg).     Monitoring/Evaluation  Progress toward goals will be monitored and evaluated per protocol.      Amy Pack RD, MS, LD  6B- Pager: [phone]

## 2017-11-06 NOTE — OP NOTE
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  Procedure:   1) Esophagogastroscopy  2) Diagnostic laparoscopy  3) Laparotomy with total gastrectomy and abdominal lymphadenectomy (D2)  4) LEFT thoracotomy with distal esophagectomy and intrathoracic Terrell-en-Y esophagojejunostomy  5) Feeding jejunostomy  6) LEFT pharyngostomy tube placement  7) RIGHT tube thoracostomy  8) Flexible bronchoscopy  Anesthesia: General with single-lumen ETT and left bronchial blocker  Surgeon: Yunior Esteban (present and participated in the entire procedure)  Resident surgeon: Stevan Pavon  EBL: 500 ml  Specimens:  total gastrectomy with distal esophagectomy  Complications: none immediate  Findings:  Endoscopy:   Tumor: size 1 cm; upper extent at 42 cm from incisors  Anastomosis: anastomosis at 39 cm from incisors, conduit appeared viable, leak test negative.  Laparoscopy: diagnostic portion was negative.  Intraoperative frozen section pathology: esophageal margin negative, gross margin 4 cm.  Patient tolerance: good    Position  Semi-lateral left side up.  Diagnostic laparoscopy  We started with a 5 port approach and used a Eileen liver retractor. Our initial exploration revealed no intraperitoneal metastatic disease. Following this, we took down the gastrohepatic ligament, short gastric vessels and freed the hiatus up circumferentially. We performed an extensive mediastinal dissection skeletonizing the pericardium, aorta and dissection along both inferior pulmonary ligaments to the level of the inferior pulmonary veins.  We also ligated the thoracic duct transhiatally ligating all tissue between the aorta and azygous vein with a tie.  At this point we converted to laparotomy as planned.  Laparotomy, total gastrectomy, abdominal lymphadenectomy, Terrell-en-Y preparation  We made an upper midline laparotomy  incision and once we had proper exposure, we mobilized the omentum off the transverse colon and dissected it bluntly of the mesocolon between both peritoneal sheets. Once we were in the lesser sack, we dissected free the origin of the left gastric artery, suture ligated it and transected it. We also dissected the origin of the right gastroepiploic artery and suture ligated it, and at this point we transected the duodenum just distal to the pylorus with a green staple-load. We then dissected the lymph nodes along the common hepatic artery, celiac lymph nodes, left gastric artery lymph nodes and splenic artery lymph nodes.  Of note, there were enlarged lymphatics surrounding the joseph hepatis and along the origin of the LGA, that required several stitches and clips to stop the lymph leak. At the end of the procedure we had controlled all visible lymph leak.  Terrell-en-Y  We identified the ligament of Treitz and transected the bowel about 20 to 30 cm distally at a point where the vascular arcades were favorable to transect 1 or 2 jejunal branches. We then skeletonized the jejunal branches of the Terrell limb and transected 2 of them, verifying that the Terrell limb look viable. There was excellent blood flow through the arcade to the end of the Terrell limb. We then opened a window in the mesocolon to transpose the Terrell limb retrocolically, and it reached well into the left chest to the level of the inferior pulmonary vein.   LEFT thoracotomy, distal esophagectomy, and Terrell-en-Y esophagojejunostomy  We performed an anterolateral thoracotomy incision in the 7th or 8th intercostal space, we preserved the latissimus dorsi muscle and mobilized the intercostal muscle of the superior rib to avoid crush injury from the retractor. The inferior rib was shingled, and we placed stitches in the diaphragm and pericardium for better exposure. The esophagus was completely dissected free to the level of the LEFT inferior pulmonary vein. We  transected the esophagus gradually about 5 cm above the upper limit of the tumor and placed a 2-0 prolene purse-string as we transected it. We placed a 25 anvil into the lumen and tightened the purse-string. Once we verified that the jejunum was properly oriented and appeared well vascularized, we amputated the very end and placed the EEA stapler into it. We performed the anastomosis without difficulty and had 2 complete mucosal doughnuts. We then stapled the open end of the jejunum and tested the anastomosis under immersion to verify patency and the absence of a leak. We then placed several silk stitches from the hiatus to the jejunal serosa to prevent herniation.  At the end, we placed a chest tube and ensured that it was not in direct contact with the conduit. We closed the thoracotomy with absorbable sutures in the conventional fashion.  Jejuno-jejunostomy, feeding jejunostomy, laparotomy closure  Jejuno-jejunostomy  We measured about 60 cm on the Terrell limb distally and tacked this portion to the biliopancreatic (BP)  limb and created a side-to-side functional end-to-end anastomosis using a 60 mm blue staple-load and closed the common enterotomy with another blue staple-load. We verified that the diameter of the Terrell limb was not compromised.  Mesenteric defects  We closed the mesocolic defect with stitches from mesocolon to jejunum and the mesenteric defect at the J-J anastomosis.  Jejunostomy feeding tube  We identified a spot about 10 cm distal to the jejuno-jejunostomty and passed a 12 Fr. balloon-tipped HILDA-J tube through the lower left abdominal wall. We placed it into the jejunum through a small purse-string and insufflated the balloon. We then tacked the small bowel to the abdominal wall with 4 stitches and also placed a distal anti-obstruction stitch.  Closure  We ensured hemostasis, irrigated, verified counts, and closed the fascia with absorbable sutures and the skin with staples.  Pharyngostomy tube  placement  We examined the oropharynx and identified the posterior tonsillar pillar. We then placed a long clamp behind the posterior tonsillar pillar and could feel the tip on the neck about 1 cm anterior and inferior to the angle of the jaw. We made a small stab incision and pulled the pharyngostomy tube through. After placing a wire endoscopically down the conduit, we threaded the tube over the wire into the conduit and verified that the tip of the tube was in the proximal Terrell limb. We then secured the pharyngostomy tube with stitches to the skin and verified that there was no oropharyngeal bleeding at the insertion site.  RIGHT tube thoracostomy  We used a subxiphoid approach to place a 19 Fr Tim drain into the right pleural space and secured it.  Flexible bronchoscopy  We performed a flexible bronchoscopy at the end to clear secretions.

## 2017-11-06 NOTE — PLAN OF CARE
Problem: Patient Care Overview  Goal: Plan of Care/Patient Progress Review  Pt A/ox4 pleasant and cooperative.  Pt up and walked x3 during shift, tolerating ambulation well.  Pt had a total of 520 ml of serosanguinous drainage from left chest tube, replaced with IVF bolus of 260 ml per MAR.  500 ml bolus of LR also given as pt has had marginal urine output.  Acuna removed, pt has not urinated yet.   Dilaudid PCA increased from 0.2 mg to 0.3 mg, pt reports better pain control with increase.  Epidural continues at 12 ml/hr, pain service continues to follow with pt.  F/u chest xray done today, see results.  Mag and phos replaced, see MAR.  VS stable see flowsheets.

## 2017-11-07 ENCOUNTER — APPOINTMENT (OUTPATIENT)
Dept: GENERAL RADIOLOGY | Facility: CLINIC | Age: 37
DRG: 327 | End: 2017-11-07
Attending: THORACIC SURGERY (CARDIOTHORACIC VASCULAR SURGERY)
Payer: COMMERCIAL

## 2017-11-07 ENCOUNTER — APPOINTMENT (OUTPATIENT)
Dept: PHYSICAL THERAPY | Facility: CLINIC | Age: 37
DRG: 327 | End: 2017-11-07
Attending: THORACIC SURGERY (CARDIOTHORACIC VASCULAR SURGERY)
Payer: COMMERCIAL

## 2017-11-07 ENCOUNTER — APPOINTMENT (OUTPATIENT)
Dept: OCCUPATIONAL THERAPY | Facility: CLINIC | Age: 37
DRG: 327 | End: 2017-11-07
Attending: THORACIC SURGERY (CARDIOTHORACIC VASCULAR SURGERY)
Payer: COMMERCIAL

## 2017-11-07 LAB
ANION GAP SERPL CALCULATED.3IONS-SCNC: 4 MMOL/L (ref 3–14)
BLD PROD TYP BPU: NORMAL
BLD PROD TYP BPU: NORMAL
BLD UNIT ID BPU: 0
BLD UNIT ID BPU: 0
BLOOD PRODUCT CODE: NORMAL
BLOOD PRODUCT CODE: NORMAL
BPU ID: NORMAL
BPU ID: NORMAL
BUN SERPL-MCNC: 13 MG/DL (ref 7–30)
CALCIUM SERPL-MCNC: 7.5 MG/DL (ref 8.5–10.1)
CHLORIDE SERPL-SCNC: 98 MMOL/L (ref 94–109)
CO2 SERPL-SCNC: 35 MMOL/L (ref 20–32)
CREAT SERPL-MCNC: 0.6 MG/DL (ref 0.66–1.25)
ERYTHROCYTE [DISTWIDTH] IN BLOOD BY AUTOMATED COUNT: 11.9 % (ref 10–15)
GFR SERPL CREATININE-BSD FRML MDRD: >90 ML/MIN/1.7M2
GLUCOSE SERPL-MCNC: 130 MG/DL (ref 70–99)
HCT VFR BLD AUTO: 23 % (ref 40–53)
HGB BLD-MCNC: 7.5 G/DL (ref 13.3–17.7)
MAGNESIUM SERPL-MCNC: 2 MG/DL (ref 1.6–2.3)
MCH RBC QN AUTO: 30.6 PG (ref 26.5–33)
MCHC RBC AUTO-ENTMCNC: 32.6 G/DL (ref 31.5–36.5)
MCV RBC AUTO: 94 FL (ref 78–100)
PLATELET # BLD AUTO: 157 10E9/L (ref 150–450)
POTASSIUM SERPL-SCNC: 3.9 MMOL/L (ref 3.4–5.3)
RBC # BLD AUTO: 2.45 10E12/L (ref 4.4–5.9)
SODIUM SERPL-SCNC: 138 MMOL/L (ref 133–144)
TRANSFUSION STATUS PATIENT QL: NORMAL
WBC # BLD AUTO: 7.9 10E9/L (ref 4–11)

## 2017-11-07 PROCEDURE — 85027 COMPLETE CBC AUTOMATED: CPT | Performed by: STUDENT IN AN ORGANIZED HEALTH CARE EDUCATION/TRAINING PROGRAM

## 2017-11-07 PROCEDURE — 97535 SELF CARE MNGMENT TRAINING: CPT | Mod: GO

## 2017-11-07 PROCEDURE — 25000132 ZZH RX MED GY IP 250 OP 250 PS 637: Performed by: SURGERY

## 2017-11-07 PROCEDURE — 83735 ASSAY OF MAGNESIUM: CPT | Performed by: STUDENT IN AN ORGANIZED HEALTH CARE EDUCATION/TRAINING PROGRAM

## 2017-11-07 PROCEDURE — 71020 XR CHEST 2 VW: CPT

## 2017-11-07 PROCEDURE — 25000128 H RX IP 250 OP 636: Performed by: THORACIC SURGERY (CARDIOTHORACIC VASCULAR SURGERY)

## 2017-11-07 PROCEDURE — 27210443 ZZH NUTRITION PRODUCT SPECIALIZED PACKET

## 2017-11-07 PROCEDURE — 25000128 H RX IP 250 OP 636: Performed by: NURSE PRACTITIONER

## 2017-11-07 PROCEDURE — 25000125 ZZHC RX 250: Performed by: NURSE PRACTITIONER

## 2017-11-07 PROCEDURE — 25000132 ZZH RX MED GY IP 250 OP 250 PS 637: Performed by: PHYSICIAN ASSISTANT

## 2017-11-07 PROCEDURE — 36592 COLLECT BLOOD FROM PICC: CPT | Performed by: STUDENT IN AN ORGANIZED HEALTH CARE EDUCATION/TRAINING PROGRAM

## 2017-11-07 PROCEDURE — 97110 THERAPEUTIC EXERCISES: CPT | Mod: GO

## 2017-11-07 PROCEDURE — 25800025 ZZH RX 258: Performed by: SURGERY

## 2017-11-07 PROCEDURE — 25000125 ZZHC RX 250: Performed by: THORACIC SURGERY (CARDIOTHORACIC VASCULAR SURGERY)

## 2017-11-07 PROCEDURE — 12000006 ZZH R&B IMCU INTERMEDIATE UMMC

## 2017-11-07 PROCEDURE — S0020 INJECTION, BUPIVICAINE HYDRO: HCPCS | Performed by: NURSE PRACTITIONER

## 2017-11-07 PROCEDURE — 40000133 ZZH STATISTIC OT WARD VISIT

## 2017-11-07 PROCEDURE — 40000193 ZZH STATISTIC PT WARD VISIT

## 2017-11-07 PROCEDURE — 97116 GAIT TRAINING THERAPY: CPT | Mod: GP

## 2017-11-07 PROCEDURE — 97530 THERAPEUTIC ACTIVITIES: CPT | Mod: GP

## 2017-11-07 PROCEDURE — 80048 BASIC METABOLIC PNL TOTAL CA: CPT | Performed by: STUDENT IN AN ORGANIZED HEALTH CARE EDUCATION/TRAINING PROGRAM

## 2017-11-07 PROCEDURE — 25000132 ZZH RX MED GY IP 250 OP 250 PS 637: Performed by: THORACIC SURGERY (CARDIOTHORACIC VASCULAR SURGERY)

## 2017-11-07 RX ADMIN — CHLORASEPTIC 1 ML: 1.5 LIQUID ORAL at 01:21

## 2017-11-07 RX ADMIN — Medication 15 ML: at 09:26

## 2017-11-07 RX ADMIN — OXYCODONE HYDROCHLORIDE 10 MG: 5 SOLUTION ORAL at 22:17

## 2017-11-07 RX ADMIN — POTASSIUM CHLORIDE, DEXTROSE MONOHYDRATE AND SODIUM CHLORIDE 120 ML: 150; 5; 450 INJECTION, SOLUTION INTRAVENOUS at 01:24

## 2017-11-07 RX ADMIN — OXYCODONE HYDROCHLORIDE 10 MG: 5 SOLUTION ORAL at 14:03

## 2017-11-07 RX ADMIN — MENTHOL 1 PATCH: 205.5 PATCH TOPICAL at 01:01

## 2017-11-07 RX ADMIN — Medication 15 ML: at 20:07

## 2017-11-07 RX ADMIN — CHLORASEPTIC 1 ML: 1.5 LIQUID ORAL at 19:55

## 2017-11-07 RX ADMIN — CHLORASEPTIC 1 ML: 1.5 LIQUID ORAL at 11:44

## 2017-11-07 RX ADMIN — BACITRACIN: 500 OINTMENT TOPICAL at 20:08

## 2017-11-07 RX ADMIN — OXYCODONE HYDROCHLORIDE 10 MG: 5 SOLUTION ORAL at 01:04

## 2017-11-07 RX ADMIN — Medication 10 ML: at 11:44

## 2017-11-07 RX ADMIN — Medication 10 ML: at 20:07

## 2017-11-07 RX ADMIN — ENOXAPARIN SODIUM 40 MG: 40 INJECTION SUBCUTANEOUS at 17:03

## 2017-11-07 RX ADMIN — BACITRACIN: 500 OINTMENT TOPICAL at 09:26

## 2017-11-07 RX ADMIN — MULTIVITAMIN 15 ML: LIQUID ORAL at 09:36

## 2017-11-07 RX ADMIN — POTASSIUM CHLORIDE, DEXTROSE MONOHYDRATE AND SODIUM CHLORIDE: 150; 5; 450 INJECTION, SOLUTION INTRAVENOUS at 03:51

## 2017-11-07 RX ADMIN — OXYCODONE HYDROCHLORIDE 10 MG: 5 SOLUTION ORAL at 18:09

## 2017-11-07 RX ADMIN — CHLORASEPTIC 1 ML: 1.5 LIQUID ORAL at 17:03

## 2017-11-07 RX ADMIN — OXYCODONE HYDROCHLORIDE 10 MG: 5 SOLUTION ORAL at 09:45

## 2017-11-07 RX ADMIN — OXYCODONE HYDROCHLORIDE 10 MG: 5 SOLUTION ORAL at 04:57

## 2017-11-07 RX ADMIN — Medication 15 ML: at 14:03

## 2017-11-07 RX ADMIN — BUPIVACAINE HYDROCHLORIDE: 7.5 INJECTION, SOLUTION EPIDURAL; RETROBULBAR at 11:44

## 2017-11-07 RX ADMIN — HYDROMORPHONE HYDROCHLORIDE: 10 INJECTION, SOLUTION INTRAMUSCULAR; INTRAVENOUS; SUBCUTANEOUS at 17:41

## 2017-11-07 RX ADMIN — POTASSIUM CHLORIDE, DEXTROSE MONOHYDRATE AND SODIUM CHLORIDE 137.5 ML: 150; 5; 450 INJECTION, SOLUTION INTRAVENOUS at 09:25

## 2017-11-07 RX ADMIN — Medication 2 G: at 22:08

## 2017-11-07 RX ADMIN — MENTHOL 1 PATCH: 205.5 PATCH TOPICAL at 11:44

## 2017-11-07 RX ADMIN — POTASSIUM CHLORIDE, DEXTROSE MONOHYDRATE AND SODIUM CHLORIDE 72.5 ML: 150; 5; 450 INJECTION, SOLUTION INTRAVENOUS at 17:14

## 2017-11-07 ASSESSMENT — ACTIVITIES OF DAILY LIVING (ADL)
ADLS_ACUITY_SCORE: 11

## 2017-11-07 ASSESSMENT — PAIN DESCRIPTION - DESCRIPTORS
DESCRIPTORS: SHARP
DESCRIPTORS: SHARP

## 2017-11-07 NOTE — PLAN OF CARE
"Problem: Pain, Acute (Adult)  Goal: Identify Related Risk Factors and Signs and Symptoms  Related risk factors and signs and symptoms are identified upon initiation of Human Response Clinical Practice Guideline (CPG).   Outcome: No Change  /66 (BP Location: Right arm)  Pulse 118  Temp 99.6  F (37.6  C) (Oral)  Resp 20  Ht 1.73 m (5' 8.11\")  Wt 73.9 kg (162 lb 14.4 oz)  SpO2 97%  BMI 24.69 kg/m2     Alert and oriented x4. VSS. Afebrile. Sinus rhythm/sinus tach. Sats upper 90s on 2 LPM nasal cannula. Pain managed with epidural @ 12mL/hr, Dilaudid PCA 0.3 q 10, and oxy q 4. NPO with tube feeds via J tube at 10mL/hr. Hypoactive bowel sounds. No BM since 11/3. Acuna placed at 0140 with good output. Abdominal and chest incisions CDI. Pharyngostomy to LIS. J tube in place with leaking at site. Left chest tube to water seal. Port infusing D5 1/2 NS with 20 KCl at 125mL/hr. Independently positioning in bed. Plan for chest xray this morning. Will continue to monitor and notify MDs accordingly.      "

## 2017-11-07 NOTE — PROGRESS NOTES
"Thoracic Surgery Progress Note  Daniel Sandhu  [id number]  November 7, 2017    Subjective: No acute issues overnight. Recovering well. Barnes replaced yesterday 2/2 urinary retention.    Objective:   /77 (BP Location: Right arm)  Pulse 118  Temp 97.6  F (36.4  C) (Oral)  Resp 20  Ht 1.73 m (5' 8.11\")  Wt 73.9 kg (162 lb 14.4 oz)  SpO2 94%  BMI 24.69 kg/m2    PE:  Gen: Awake, alert, NAD   CV: sinus tach 100s  Resp: non-labored at rest, left CT with s/s op, no airleak  Abd: Soft, non-distended, NTTP  Ext: warm and well perfused  Incision: c/d/i      I/O last 3 completed shifts:  In: 3275 [I.V.:2810; NG/GT:210; IV Piggyback:195]  Out: 2805 [Urine:1625; Emesis/NG output:600; Chest Tube:580] - Last 24 hours      Labs/Imaging  Heme:  Recent Labs  Lab 11/07/17  0541 11/06/17  1103 11/05/17  1112 11/04/17  0356   WBC 7.9 8.8 12.0* 13.2*   HGB 7.5* 8.4* 9.3* 11.0*    146* 154 180     Chem:  Recent Labs  Lab 11/07/17  0541 11/06/17  0555 11/05/17  1112 11/04/17  0356   POTASSIUM 3.9 4.0 4.5 4.6   CR 0.60* 0.60* 0.67 0.75         A/P: Daniel Sandhu is a 37 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  --Advance TF to 20 cc/h  --MIVF @ 75 ml/h  --Replace lytes PRN for K<4, Mg <2  -- continue barnes  --CXR today. CT to w/s. Encourage IS, ambulation.  --Pharyngostomy tubes to LIS, flush 30cc water Qshift  --daily labs  --bowel regimen  --lovenox for PPX    Patient seen and discussed with the Fellow, Dr. Pavon.      Shital Grant MD  PGY-1, General Surgery      "

## 2017-11-07 NOTE — PLAN OF CARE
Problem: Patient Care Overview  Goal: Plan of Care/Patient Progress Review  Discharge Planner OT   Patient plan for discharge: Home with assist  Current status: Pt completed thoracotomy exercises while seated in chair to promote increased ROM. He transferred STS and into bathroom while holding IV pole with SBA. Set up required for line management. Pt demonstrated increased activity tolerance while standing at sink to complete g/h tasks.   Barriers to return to prior living situation: Medical status, increased pain   Recommendations for discharge: Home with assist  Rationale for recommendations: Post-op precautions, requires assist to manage lines/drains        Entered by: Emperatriz Hardin 11/07/2017 11:23 AM

## 2017-11-07 NOTE — PLAN OF CARE
Problem: Patient Care Overview  Goal: Plan of Care/Patient Progress Review  Outcome: Improving  Vital signs:  Temp: 97.6  F (36.4  C) Temp src: Oral BP: 125/77   Heart Rate: 101 Resp: 20 SpO2: 94 % O2 Device: None (Room air)      Neuro: A&Ox4.   Cardiac: Sinus tach. VSS.       Respiratory: Sating 94% on RA.  GI/: Adequate urine output from barnes. No flatus. Senna given this afternoon.  Diet/appetite: Tolerating TF increase to 20 ml/hr. No nausea.  Activity:  Assist of 1, up to chair and in halls.  Pain: At acceptable level PCA, epidural, and oxycodone.  Skin: Surgical incision and chest tube sites WNL and dressings changed.  LDA's: Right chest port with D5 0.45 NS and 20 KCL @ 75 and boluses every shift for 1/2 of left chest tube output from previous shift. Pharyngostomy tube irrigating with 30 ml q8h. Left chest tube to water seal. Epidural with bupivicane at 12 ml/hr.     Plan: Continue with POC. Notify primary team with changes.

## 2017-11-07 NOTE — PROGRESS NOTES
REGIONAL ANESTHESIA PAIN SERVICE EPIDURAL NOTE  SUBJECTIVE:   Interval History: Pt reports pain improved since taking oral oxycodone on a more regular basis and fentanyl removed from the epidural infusion, but still has difficulty in the night when sleeping and not awake to push PCA button.  Denies any weakness, paresthesias, circumoral numbness, metallic taste or tinnitus.  Pt ambulating with standby assistance.  Currently up in chair participating with PT exercises without difficulty, states it makes him feel better.  Patient is currently denies nausea, NPO and tolerating tube feedings.       Clinically Aligned Pain Assessment (CAPA):  Comfort (How is your pain?): Comfortably manageable  Change in Pain (Since your last medication/intervention?): Getting better  Pain Control (How are your pain treatments working?):  Partially effective pain control  Functioning (Are you able to do activities to get better?) : Can do most things, but pain gets in the way of some   Sleep (Does your pain management allow you to sleep or rest?): Awake with occasional pain      Anticoagulation:      enoxaparin (LOVENOX) injection 40 mg 40 mg, SC, Q24H Given: 11/06 1648       Medications related to Pain Management (Future)    Start     Dose/Rate Route Frequency Ordered Stop    11/07/17 1115  sennosides (SENOKOT) syrup 10 mL      10 mL Per J Tube 2 TIMES DAILY 11/07/17 1106      11/06/17 1215  bupivacaine (MARCAINE) 0.125 % in NaCl 0.9 % 250 mL EPIDURAL Infusion      12 mL/hr  EPIDURAL CONTINUOUS 11/06/17 1203      11/05/17 1043  oxyCODONE (ROXICODONE) solution 5-10 mg      5-10 mg Oral EVERY 4 HOURS PRN 11/05/17 1043      11/03/17 2045  HYDROmorphone (DILAUDID) PCA 1 mg/mL       Intravenous PCA 11/03/17 2032 11/03/17 2045  lidocaine (LIDODERM) 5 % Patch 1 patch      1 patch Transdermal EVERY 24 HOURS 2000 11/03/17 2032 11/03/17 2032  lidocaine (LIDODERM) patch in PLACE       Transdermal EVERY 8 HOURS 11/03/17 2032               OBJECTIVE:  Diagnostics:  Lab Results   Component Value Date    WBC 7.9 11/07/2017     Lab Results   Component Value Date    RBC 2.45 11/07/2017     Lab Results   Component Value Date    HGB 7.5 11/07/2017     Lab Results   Component Value Date    HCT 23.0 11/07/2017     Lab Results   Component Value Date     11/07/2017       Lab Results   Component Value Date    INR 1.14 07/29/2017    INR 1.12 06/09/2017       Vitals:    Temp:  [97.6  F (36.4  C)-99.6  F (37.6  C)] 97.6  F (36.4  C)  Heart Rate:  [] 101  Resp:  [20] 20  BP: (122-133)/(59-77) 125/77  SpO2:  [94 %-100 %] 94 %    Exam:    Strength 5/5 and symmetric grossly in bilateral LE   Epidural site with dressing c/d/i, no tenderness, erythema, heme, edema      ASSESSMENT/PLAN:    Daniel Sandhu is a 37 year old male POD #4 s/p LAPAROSCOPY DIAGNOSTIC (GENERAL) LAPAROTOMY EXPLORATORY THORACOTOMY GASTRECTOMY PERCUTANEOUS INSERTION TUBE JEJUNOSTOMY PHARYNGOSTOMY COMBINED ESOPHAGOSCOPY, GASTROSCOPY, DUODENOSCOPY (EGD) and placement of T6-7 epidural for analgesia.  Pt receiving adequate analgesia with epidural infusion Bupivacaine 0.125% at 12 mL/hour, oxycodone Q 4 hr PRN and PCA Dilaudid PRN.  Pt ambulating without difficulty.  No weakness or paresthesias, adequate sensory block.  No evidence of adverse side effects related to local anesthetic.    Epidural T6-7 catheter placement 11/3/2017, POD #4, catheter day #4  - continue current epidural infusion Bupivacaine 0.125% at 12 mL/hour  - plan to remove catheter tomorrow 11/8/17 on catheter day #5.  Will remove catheter at least 10 hrs after last dose of enoxaparin administered SC daily  - contact RAPS prior to any changes in anticoagulation orders while epidural in place  - consider changing oxycodone orders from Q 4 hr prn to Q 3 hr prn pain      - will continue to follow and adjust as needed  - discussed plan with attending anesthesiologist        Keri Henderson, APRN CNP  Regional  Anesthesia Pain Service  11/7/2017 4:12 PM    24 hour Job Code Pager.  For in-house use only.     Dial * * [phone] and  [place]:  [phone]  [place]: [phone]  Peds:  [phone]  Then enter call-back number  May text page using [employer], but NOT [employer].

## 2017-11-07 NOTE — PROVIDER NOTIFICATION
Time of notification: 9:20 PM  MD notified: Kori Mercedes  Patient status: Pt reporting coughing up tube feeding. TF started around 1900.  Temp:  [98.7  F (37.1  C)-99.8  F (37.7  C)] 98.7  F (37.1  C)  Pulse:  [118] 118  Heart Rate:  [] 95  Resp:  [20-22] 20  BP: (120-134)/(46-82) 123/67  SpO2:  [95 %-100 %] 100 %  Orders received: Restart TF, MD believes pt is coughing up sputum.

## 2017-11-07 NOTE — PLAN OF CARE
Problem: Patient Care Overview  Goal: Plan of Care/Patient Progress Review  6B PT -      Discharge Planner PT   Patient plan for discharge: Home with assist  Current status: Pt requires SBA for sit<>stand transfers. Pt ambulates 100' with SBA and unilateral UE support on IV pole, and bouts of 150' and 100' with no UE support and CGA. Pt's O2 sats remain equal to or >90% on RA with activity.  Barriers to return to prior living situation: decreased endurance, requires assistance, medical status  Recommendations for discharge: Home with assist, possible home moises PT if pt unable to manage 13+ stairs.   Rationale for recommendations: Pt's current functional status, pt's home environment has 2 sets of 13 stairs.           Entered by: Radha Romeo 11/07/2017 4:16 PM

## 2017-11-07 NOTE — PROGRESS NOTES
Problem: Patient Care Overview    Goal: Plan of Care/Patient Progress Review   Neuro: A&Ox4.   Cardiac: SR/ST 90-110s, up to 120s with movement. VSS.  Respiratory: Sating 95% on RA. Per pt request placed on NC with humidification. Productive cough.   GI/: Straight cath 600ml at 1900. Acuna catheter to be inserted. Bowel sounds hypoactive.   Diet/appetite: Tolerating NPO status with tube feedings.  Activity:  Assist of 1 to stand and ambulate.   Pain: PCA pump. Epidural in place. PRN oxycodone given.   Skin: All dressings CDI. No new deficits noted.        Plan: Continue with POC. Chest Xray scheduled in AM. Mg replaced per protocol, recheck in AM. Notify primary team with changes

## 2017-11-07 NOTE — PROGRESS NOTES
/07/17 1205     Krysten Us-Registered Nurse (Nursing)  Wife attended J-tube class alone with 4 month old baby. Mom held baby entire time while she observed me RD on model with all cares including site cares, anchoring tube, flushing tube, medication administration and use of [facility] feeding pump. Mom RD with flushing and setting pump. Will need to review all skills with home care and nursing staff. Pt was encouraged to attend class before discharge with wife again, if possible. Pt seen at bedside after class and agreed to attend class closer to discharge. Wife received written material related to all content taught. States she had no further questions at this time.

## 2017-11-08 ENCOUNTER — APPOINTMENT (OUTPATIENT)
Dept: PHYSICAL THERAPY | Facility: CLINIC | Age: 37
DRG: 327 | End: 2017-11-08
Attending: THORACIC SURGERY (CARDIOTHORACIC VASCULAR SURGERY)
Payer: COMMERCIAL

## 2017-11-08 ENCOUNTER — APPOINTMENT (OUTPATIENT)
Dept: GENERAL RADIOLOGY | Facility: CLINIC | Age: 37
DRG: 327 | End: 2017-11-08
Attending: THORACIC SURGERY (CARDIOTHORACIC VASCULAR SURGERY)
Payer: COMMERCIAL

## 2017-11-08 LAB
ANION GAP SERPL CALCULATED.3IONS-SCNC: 5 MMOL/L (ref 3–14)
BUN SERPL-MCNC: 11 MG/DL (ref 7–30)
CALCIUM SERPL-MCNC: 7.4 MG/DL (ref 8.5–10.1)
CHLORIDE SERPL-SCNC: 97 MMOL/L (ref 94–109)
CO2 SERPL-SCNC: 32 MMOL/L (ref 20–32)
CREAT SERPL-MCNC: 0.46 MG/DL (ref 0.66–1.25)
ERYTHROCYTE [DISTWIDTH] IN BLOOD BY AUTOMATED COUNT: 11.8 % (ref 10–15)
GFR SERPL CREATININE-BSD FRML MDRD: >90 ML/MIN/1.7M2
GLUCOSE SERPL-MCNC: 126 MG/DL (ref 70–99)
HCT VFR BLD AUTO: 23.3 % (ref 40–53)
HGB BLD-MCNC: 7.9 G/DL (ref 13.3–17.7)
MAGNESIUM SERPL-MCNC: 2.1 MG/DL (ref 1.6–2.3)
MCH RBC QN AUTO: 31.5 PG (ref 26.5–33)
MCHC RBC AUTO-ENTMCNC: 33.9 G/DL (ref 31.5–36.5)
MCV RBC AUTO: 93 FL (ref 78–100)
PLATELET # BLD AUTO: 152 10E9/L (ref 150–450)
POTASSIUM SERPL-SCNC: 3.7 MMOL/L (ref 3.4–5.3)
RBC # BLD AUTO: 2.51 10E12/L (ref 4.4–5.9)
SODIUM SERPL-SCNC: 134 MMOL/L (ref 133–144)
WBC # BLD AUTO: 8.7 10E9/L (ref 4–11)

## 2017-11-08 PROCEDURE — 25800025 ZZH RX 258: Performed by: SURGERY

## 2017-11-08 PROCEDURE — 27210443 ZZH NUTRITION PRODUCT SPECIALIZED PACKET

## 2017-11-08 PROCEDURE — 25000128 H RX IP 250 OP 636: Performed by: THORACIC SURGERY (CARDIOTHORACIC VASCULAR SURGERY)

## 2017-11-08 PROCEDURE — 25000128 H RX IP 250 OP 636: Performed by: NURSE PRACTITIONER

## 2017-11-08 PROCEDURE — 80048 BASIC METABOLIC PNL TOTAL CA: CPT | Performed by: STUDENT IN AN ORGANIZED HEALTH CARE EDUCATION/TRAINING PROGRAM

## 2017-11-08 PROCEDURE — 25000132 ZZH RX MED GY IP 250 OP 250 PS 637: Performed by: THORACIC SURGERY (CARDIOTHORACIC VASCULAR SURGERY)

## 2017-11-08 PROCEDURE — 71010 XR CHEST PORT 1 VW: CPT

## 2017-11-08 PROCEDURE — 97116 GAIT TRAINING THERAPY: CPT | Mod: GP | Performed by: PHYSICAL THERAPIST

## 2017-11-08 PROCEDURE — 25000132 ZZH RX MED GY IP 250 OP 250 PS 637: Performed by: PHYSICIAN ASSISTANT

## 2017-11-08 PROCEDURE — 12000006 ZZH R&B IMCU INTERMEDIATE UMMC

## 2017-11-08 PROCEDURE — 85027 COMPLETE CBC AUTOMATED: CPT | Performed by: STUDENT IN AN ORGANIZED HEALTH CARE EDUCATION/TRAINING PROGRAM

## 2017-11-08 PROCEDURE — 71020 XR CHEST 2 VW: CPT

## 2017-11-08 PROCEDURE — 25800025 ZZH RX 258: Performed by: PHYSICIAN ASSISTANT

## 2017-11-08 PROCEDURE — 83735 ASSAY OF MAGNESIUM: CPT | Performed by: STUDENT IN AN ORGANIZED HEALTH CARE EDUCATION/TRAINING PROGRAM

## 2017-11-08 PROCEDURE — 86850 RBC ANTIBODY SCREEN: CPT | Performed by: THORACIC SURGERY (CARDIOTHORACIC VASCULAR SURGERY)

## 2017-11-08 PROCEDURE — 97110 THERAPEUTIC EXERCISES: CPT | Mod: GP | Performed by: PHYSICAL THERAPIST

## 2017-11-08 PROCEDURE — 25000132 ZZH RX MED GY IP 250 OP 250 PS 637: Performed by: SURGERY

## 2017-11-08 PROCEDURE — 25000125 ZZHC RX 250: Performed by: NURSE PRACTITIONER

## 2017-11-08 PROCEDURE — 25000132 ZZH RX MED GY IP 250 OP 250 PS 637: Performed by: STUDENT IN AN ORGANIZED HEALTH CARE EDUCATION/TRAINING PROGRAM

## 2017-11-08 PROCEDURE — S0020 INJECTION, BUPIVICAINE HYDRO: HCPCS | Performed by: NURSE PRACTITIONER

## 2017-11-08 PROCEDURE — 25000132 ZZH RX MED GY IP 250 OP 250 PS 637

## 2017-11-08 PROCEDURE — 86900 BLOOD TYPING SEROLOGIC ABO: CPT | Performed by: THORACIC SURGERY (CARDIOTHORACIC VASCULAR SURGERY)

## 2017-11-08 PROCEDURE — 86901 BLOOD TYPING SEROLOGIC RH(D): CPT | Performed by: THORACIC SURGERY (CARDIOTHORACIC VASCULAR SURGERY)

## 2017-11-08 PROCEDURE — 40000193 ZZH STATISTIC PT WARD VISIT: Performed by: PHYSICAL THERAPIST

## 2017-11-08 PROCEDURE — 36592 COLLECT BLOOD FROM PICC: CPT | Performed by: STUDENT IN AN ORGANIZED HEALTH CARE EDUCATION/TRAINING PROGRAM

## 2017-11-08 RX ORDER — OXYCODONE HCL 5 MG/5 ML
5-10 SOLUTION, ORAL ORAL
Status: DISCONTINUED | OUTPATIENT
Start: 2017-11-08 | End: 2017-11-17 | Stop reason: HOSPADM

## 2017-11-08 RX ORDER — AMOXICILLIN 250 MG
2 CAPSULE ORAL 2 TIMES DAILY
Status: DISCONTINUED | OUTPATIENT
Start: 2017-11-08 | End: 2017-11-08

## 2017-11-08 RX ORDER — BISACODYL 10 MG
10 SUPPOSITORY, RECTAL RECTAL DAILY PRN
Status: DISCONTINUED | OUTPATIENT
Start: 2017-11-08 | End: 2017-11-17 | Stop reason: HOSPADM

## 2017-11-08 RX ORDER — METOPROLOL TARTRATE 1 MG/ML
5 INJECTION, SOLUTION INTRAVENOUS ONCE
Status: DISCONTINUED | OUTPATIENT
Start: 2017-11-08 | End: 2017-11-08

## 2017-11-08 RX ADMIN — Medication 10 ML: at 19:21

## 2017-11-08 RX ADMIN — BACITRACIN: 500 OINTMENT TOPICAL at 19:34

## 2017-11-08 RX ADMIN — ENOXAPARIN SODIUM 40 MG: 40 INJECTION SUBCUTANEOUS at 15:59

## 2017-11-08 RX ADMIN — OXYCODONE HYDROCHLORIDE 10 MG: 5 SOLUTION ORAL at 16:10

## 2017-11-08 RX ADMIN — MULTIVITAMIN 15 ML: LIQUID ORAL at 08:51

## 2017-11-08 RX ADMIN — Medication 15 ML: at 08:50

## 2017-11-08 RX ADMIN — MAGNESIUM HYDROXIDE 30 ML: 400 SUSPENSION ORAL at 21:12

## 2017-11-08 RX ADMIN — POTASSIUM CHLORIDE, DEXTROSE MONOHYDRATE AND SODIUM CHLORIDE: 150; 5; 450 INJECTION, SOLUTION INTRAVENOUS at 09:31

## 2017-11-08 RX ADMIN — POTASSIUM CHLORIDE, DEXTROSE MONOHYDRATE AND SODIUM CHLORIDE: 150; 5; 450 INJECTION, SOLUTION INTRAVENOUS at 00:35

## 2017-11-08 RX ADMIN — Medication 5 ML: at 21:12

## 2017-11-08 RX ADMIN — HYDROMORPHONE HYDROCHLORIDE: 10 INJECTION, SOLUTION INTRAMUSCULAR; INTRAVENOUS; SUBCUTANEOUS at 21:14

## 2017-11-08 RX ADMIN — OXYCODONE HYDROCHLORIDE 10 MG: 5 SOLUTION ORAL at 19:21

## 2017-11-08 RX ADMIN — OXYCODONE HYDROCHLORIDE 10 MG: 5 SOLUTION ORAL at 13:06

## 2017-11-08 RX ADMIN — HYDROMORPHONE HYDROCHLORIDE: 10 INJECTION, SOLUTION INTRAMUSCULAR; INTRAVENOUS; SUBCUTANEOUS at 06:07

## 2017-11-08 RX ADMIN — BACITRACIN: 500 OINTMENT TOPICAL at 09:12

## 2017-11-08 RX ADMIN — OXYCODONE HYDROCHLORIDE 10 MG: 5 SOLUTION ORAL at 08:51

## 2017-11-08 RX ADMIN — POTASSIUM CHLORIDE, DEXTROSE MONOHYDRATE AND SODIUM CHLORIDE 139 ML: 150; 5; 450 INJECTION, SOLUTION INTRAVENOUS at 09:13

## 2017-11-08 RX ADMIN — Medication 10 ML: at 08:50

## 2017-11-08 RX ADMIN — Medication 15 ML: at 19:34

## 2017-11-08 RX ADMIN — OXYCODONE HYDROCHLORIDE 10 MG: 5 SOLUTION ORAL at 22:15

## 2017-11-08 RX ADMIN — Medication 15 ML: at 14:33

## 2017-11-08 RX ADMIN — BUPIVACAINE HYDROCHLORIDE: 7.5 INJECTION, SOLUTION EPIDURAL; RETROBULBAR at 09:31

## 2017-11-08 RX ADMIN — CHLORASEPTIC 1 ML: 1.5 LIQUID ORAL at 08:51

## 2017-11-08 RX ADMIN — DOCUSATE SODIUM 10 MG: 50 LIQUID ORAL at 21:12

## 2017-11-08 ASSESSMENT — ACTIVITIES OF DAILY LIVING (ADL)
ADLS_ACUITY_SCORE: 11

## 2017-11-08 ASSESSMENT — PAIN DESCRIPTION - DESCRIPTORS
DESCRIPTORS: SHARP
DESCRIPTORS: SHARP
DESCRIPTORS: ACHING
DESCRIPTORS: SHARP

## 2017-11-08 NOTE — PROVIDER NOTIFICATION
Temp: 99.4  F (37.4  C) Temp src: Oral BP: 120/57 Pulse: 91 Heart Rate: 102 Resp: 14 SpO2: 98 % O2 Device: None (Room air)     Notified night cross cover MD of pt's new air leak in chest tube w/ crepitus. This RN noticed airleak when assessing pt around 0100 AM. Noticed in charting that no airleak had been documented previously. Some leaking and crepitus present.  Pt is vitally stable, denies shortness of breath.    MD stated that we will evaluate with AM chest XRay, update with any changes.

## 2017-11-08 NOTE — PLAN OF CARE
Problem: Patient Care Overview  Goal: Plan of Care/Patient Progress Review  PT 6B  Discharge Planner PT   Patient plan for discharge: home with assist  Current status: pt is able to perform all sit to stand transfers safely using his UE. Pt walked 480 feet without UE support at a slow pace but with safe balance. Pt was able to climb up and down 15 stairs using railing and with A from PT for lines and tubes. Pt fatigued  Barriers to return to prior living situation: none  Recommendations for discharge: pt may benefit from home PT at discharge.  tRationale for recommendations: pt remains deconditioned. He would do well with further PT for strengthening, balance, and mobility if he agrees.       Entered by: Fidelia Jeffries 11/08/2017 1:17 PM

## 2017-11-08 NOTE — PLAN OF CARE
Problem: Patient Care Overview  Goal: Plan of Care/Patient Progress Review  OT: Occupational therapy session cancelled today d/t multiple attempts to see patient when other providers were present

## 2017-11-08 NOTE — PROGRESS NOTES
Problem: Patient Care Overview  Goal: Plan of Care/Patient Progress Review  Outcome: No Change  Neuro: A&Ox4.   Cardiac: SR/ST. 90s-110s. VSS        Respiratory: Sating 98% on RA.   GI/: Acuna catheter in place, adequate output. No BM this shift  Diet/appetite: Tolerating NPO status. Tube feedings 20mL/hr via Jtube.    Activity: Up w/ SBA for line management.  Pain: Controlled with dilaudid PCA, epidural, and PRN oxycodone. No nausea reported.    Skin: All dressings CDI, no new deficits noted.   LDA's: CT to water seal, minimal output. Pharyngostomy to LIS.       Plan: Mg replaced per protocol. Continue with POC. Notify primary team with changes.

## 2017-11-08 NOTE — ANESTHESIA POSTPROCEDURE EVALUATION
Patient: Daniel Sandhu    Procedure(s):  diagnostic laparoscopy, right chest tube, total gastrectomy with distal esophagectomy, intrathoracic eveline-y esophago-jejunostomy, feeding jejunostomy, pharyngostomy, esophagogastroduodenoscopy, flexible bronchoscopy - Wound Class: I-Clean   - Wound Class: I-Clean   - Wound Class: II-Clean Contaminated   - Wound Class: II-Clean Contaminated   - Wound Class: II-Clean Contaminated   - Wound Class: II-Clean Contaminated    Diagnosis:Esophageal Cancer   Diagnosis Additional Information: No value filed.    Anesthesia Type:  ETT, General    Note:  Anesthesia Post Evaluation    Patient location during evaluation: PACU  Patient participation: Able to fully participate in evaluation  Level of consciousness: awake and alert  Pain management: adequate  Airway patency: patent  Cardiovascular status: acceptable  Respiratory status: acceptable  Hydration status: acceptable  PONV: none             Last vitals:  Vitals:    11/08/17 0845 11/08/17 1200 11/08/17 1534   BP: 129/71 132/60 126/66   Pulse:      Resp: 19 18 16   Temp: 37.3  C (99.1  F) 37  C (98.6  F) 37.1  C (98.7  F)   SpO2: 99% 97% 98%         Electronically Signed By: Lee Downing MD  November 8, 2017  3:58 PM

## 2017-11-08 NOTE — PROGRESS NOTES
REGIONAL ANESTHESIA PAIN SERVICE EPIDURAL NOTE  Interval History: Pt reports increased pain during the night because he wasn't awakened to take oxycodone, then had difficulty getting pain under control this AM to prepare for activity.  Pt and his wife expressed frustration with current analgesic plan, stating he would like to be awakened during the night so he doesn't miss getting oxycodone dose Q 4 hrs.  Denies any weakness, paresthesias, circumoral numbness, metallic taste or tinnitus.  Pt ambulating with standby assistance.  Patient is currently denies nausea, NPO, tolerating tube feedings        Anticoagulation:     enoxaparin (LOVENOX) injection 40 mg 40 mg, SC, Q24H Given: 11/07 1703     Medications related to Pain Management (Future)    Start     Dose/Rate Route Frequency Ordered Stop    11/08/17 1415  oxyCODONE (ROXICODONE) solution 5-10 mg      5-10 mg Oral EVERY 3 HOURS PRN 11/08/17 1402      11/07/17 1115  sennosides (SENOKOT) syrup 10 mL      10 mL Per J Tube 2 TIMES DAILY 11/07/17 1106      11/06/17 1215  bupivacaine (MARCAINE) 0.125 % in NaCl 0.9 % 250 mL EPIDURAL Infusion      12 mL/hr  EPIDURAL CONTINUOUS 11/06/17 1203      11/03/17 2045  HYDROmorphone (DILAUDID) PCA 1 mg/mL       Intravenous PCA 11/03/17 2032 11/03/17 2045  lidocaine (LIDODERM) 5 % Patch 1 patch      1 patch Transdermal EVERY 24 HOURS 2000 11/03/17 2032 11/03/17 2032  lidocaine (LIDODERM) patch in PLACE       Transdermal EVERY 8 HOURS 11/03/17 2032              OBJECTIVE:  Diagnostics:  Lab Results   Component Value Date    WBC 8.7 11/08/2017     Lab Results   Component Value Date    RBC 2.51 11/08/2017     Lab Results   Component Value Date    HGB 7.9 11/08/2017     Lab Results   Component Value Date    HCT 23.3 11/08/2017     Lab Results   Component Value Date     11/08/2017       Lab Results   Component Value Date    INR 1.14 07/29/2017    INR 1.12 06/09/2017       Vitals:    Temp:  [98.6  F (37  C)-99.4  F (37.4   C)] 98.6  F (37  C)  Pulse:  [81-91] 91  Heart Rate:  [] 106  Resp:  [12-19] 18  BP: (120-132)/(57-72) 132/60  SpO2:  [95 %-99 %] 97 %    Exam:    Strength 5/5 and symmetric grossly in bilateral LE   Epidural site with old dried blood under dressing so dressing changed using sterile technique, new tegaderm applied, no tenderness, erythema, heme, edema at insertion site      ASSESSMENT/PLAN:    Daniel Sandhu is a 37 year old male POD #4 s/p LAPAROSCOPY DIAGNOSTIC (GENERAL) LAPAROTOMY EXPLORATORY THORACOTOMY GASTRECTOMY PERCUTANEOUS INSERTION TUBE JEJUNOSTOMY PHARYNGOSTOMY COMBINED ESOPHAGOSCOPY, GASTROSCOPY, DUODENOSCOPY (EGD) and placement of T6-7 epidural for analgesia.  Pt receiving adequate analgesia with epidural infusion Bupivacaine 0.125% at 12 mL/hour, oxycodone Q 4 hr PRN and PCA Dilaudid PRN.  Pt ambulating without difficulty.  No weakness or paresthesias, adequate sensory block.  No evidence of adverse side effects related to local anesthetic.    Epidural T6-7 catheter placement 11/3/2017, POD #5, catheter day #5  - continue current epidural infusion Bupivacaine 0.125% at 12 mL/hour  - pt continues to adjust to transition to oral opioids so plan to remove catheter tomorrow 11/9/17 on catheter day #6.  Will remove catheter at least 10 hrs after last dose of enoxaparin administered SC daily  - contact RAPS prior to any changes in anticoagulation orders while epidural in place  - consider changing oxycodone orders from Q 4 hr prn to Q 3 hr prn pain - discussed with surgery service    - will continue to follow and adjust as needed  - discussed plan with attending anesthesiologist          GAVIN Garduno [facility]  Regional Anesthesia Pain Service  11/8/2017 3:13 PM    24 hour Job Code Pager.  For in-house use only.     Dial * * [phone] and  [place]:  [phone]  [place]: [phone]  Peds:  [phone]  Then enter call-back number  May text page using [employer], but NOT [employer].

## 2017-11-08 NOTE — PLAN OF CARE
Problem: Patient Care Overview  Goal: Plan of Care/Patient Progress Review  Outcome: Improving  Temp: 98.6  F (37  C) Temp src: Oral BP: 132/60 Pulse: 91 Heart Rate: 106 Resp: 18 SpO2: 97 % O2 Device: None (Room air)       Neuro: A&Ox4.   Cardiac: Sinus tach. VSS.       Respiratory: Sating 97% on RA.  GI/: Adequate urine output from barnes. Some flatus while on toilet this a.m.  Diet/appetite: Tolerating TF's at 20 ml/hr. No nausea. NPO  Activity:  Assist of 1, up to chair and in halls. Working with PT and OT.  Pain: At acceptable level on epidural, PCA, and oxycodone.  Skin: Surgical incisions WNL and JACOB. Right old chest tube site WNL and dressing changed. Left chest tube dressing clean, dry, and intact. Epidural dressing changed by anaesthesia today. PEG site WNL, cleaned, and dressing changed.   LDA's: Right chest port with D5 0.45 NS 20 KCL at 75 ml/hr with dilaudid PCA. Epidural with bupivicane at 12 ml/hr. PEJ with TF's at 20 ml/hr with 30 ml flushes q4h. Left chest tube with air leak and small amount of crepitus near site to water seal. Pharyngosotomy tube to LIS with 30 ml flushes q8h.     Plan: Continue with POC. Notify primary team with changes.

## 2017-11-08 NOTE — PROGRESS NOTES
"Thoracic Surgery Progress Note  Daniel Sandhu  [id number]  November 8, 2017    Subjective: Recovering well. Pain control improving. Reports dry mouth. Tolerating TF w/o nausea or emesis. Denies flatus or BM.    Objective:   /71 (BP Location: Right arm)  Pulse 91  Temp 99.1  F (37.3  C) (Oral)  Resp 19  Ht 1.73 m (5' 8.11\")  Wt 75 kg (165 lb 5 oz)  SpO2 99%  BMI 25.05 kg/m2    PE:  Gen: Awake, alert, NAD   CV: RRR  Resp: non-labored at rest, left CT with s/s output and no airleak  Abd: Soft, non-distended, NTTP  Ext: warm and well perfused  Incision: c/d/i  pharyngostomy: thin bilious    I/O last 3 completed shifts:  In: 3427.5 [I.V.:2235; NG/GT:560; IV Piggyback:212.5]  Out: 2665 [Urine:1450; Emesis/NG output:525; Chest Tube:690] - Last 24 hours      Labs/Imaging  Heme:  Recent Labs  Lab 11/08/17  0617 11/07/17  0541 11/06/17  1103 11/05/17  1112   WBC 8.7 7.9 8.8 12.0*   HGB 7.9* 7.5* 8.4* 9.3*    157 146* 154     Chem:  Recent Labs  Lab 11/08/17  0617 11/07/17  0541 11/06/17  0555 11/05/17  1112   POTASSIUM 3.7 3.9 4.0 4.5   CR 0.46* 0.60* 0.60* 0.67         A/P: Daniel Sandhu is a 37 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  --Continue TF to 20 cc/h  --MIVF @ 75 ml/h  --Replace lytes PRN for K<4, Mg <2  -- continue barnes  --CXR today to evaluate decompression of conduit.   --CT to w/s. Encourage IS, ambulation.  --Pharyngostomy tubes to LIS, flush 30cc water Qshift  --daily labs  --bowel regimen  --lovenox for PPX     Patient seen and discussed with the Fellow, Dr. Pavon.      Shital Grant MD  PGY-1, General Surgery      "

## 2017-11-08 NOTE — PLAN OF CARE
Problem: Patient Care Overview  Goal: Plan of Care/Patient Progress Review  Outcome: No Change  Temp: 99.4  F (37.4  C) Temp src: Oral BP: 120/57 Pulse: 91 Heart Rate: 102 Resp: 14 SpO2: 98 % O2 Device: None (Room air)      Neuro: A&Ox4. Easily arouses, slept intermittently between cares.  Cardiac: Sinus rhythm- sinus tach, rates 90s-100s. Denies chest pain. BP stable.  Respiratory: Sating >94% on room air. No dyspnea reported. Left chest tube to water seal.  GI/: Acuna for retention, adequate output. Hypo BS, no gas, LBM 11/3. Ambulation encouraged.  Diet/appetite: Strict NPO. TF @ 20/hr  Activity:  SB-1 assist. Uses walker at times. Motivated to ambulate  Pain: Managed with Dilaudid PCA 0.3/10min and Bupivicaine epidural at 12mg/hr. PRN Oxy available.  Skin: Midline and left chest incision, old R side chest tube site. Covered with primapore dressings.  LDA's: R chest portacath with D51/2NS+20K @ 75/hr with bolus q8h to equal 1/2 CT output. Epidural with Bupivicaine @ 12mg/hr, no deficits noted. Acuna catheter for retention. J tube with trickle tube feeds at 20ml/hr flush 30cc q4h. Left neck Pharyngostomy to LIS, flush with 30cc NS q8hr. Right chest tube to waterseal- NEW AIRLEAK DETECTED, pt vitally stable, no increase shortness of breath. Crepitus noted over left rib cage. MD notified, CXR this AM.      Plan: CXR, evaluate pharyngostomy and chest tube. Frequent ambulation to promote return of bowel function.

## 2017-11-09 ENCOUNTER — APPOINTMENT (OUTPATIENT)
Dept: SPEECH THERAPY | Facility: CLINIC | Age: 37
DRG: 327 | End: 2017-11-09
Attending: THORACIC SURGERY (CARDIOTHORACIC VASCULAR SURGERY)
Payer: COMMERCIAL

## 2017-11-09 ENCOUNTER — APPOINTMENT (OUTPATIENT)
Dept: GENERAL RADIOLOGY | Facility: CLINIC | Age: 37
DRG: 327 | End: 2017-11-09
Attending: THORACIC SURGERY (CARDIOTHORACIC VASCULAR SURGERY)
Payer: COMMERCIAL

## 2017-11-09 ENCOUNTER — ANESTHESIA EVENT (OUTPATIENT)
Dept: SURGERY | Facility: CLINIC | Age: 37
DRG: 327 | End: 2017-11-09
Payer: COMMERCIAL

## 2017-11-09 ENCOUNTER — ANESTHESIA (OUTPATIENT)
Dept: SURGERY | Facility: CLINIC | Age: 37
DRG: 327 | End: 2017-11-09
Payer: COMMERCIAL

## 2017-11-09 LAB
ABO + RH BLD: NORMAL
ABO + RH BLD: NORMAL
ANION GAP SERPL CALCULATED.3IONS-SCNC: 6 MMOL/L (ref 3–14)
BLD GP AB SCN SERPL QL: NORMAL
BLOOD BANK CMNT PATIENT-IMP: NORMAL
BUN SERPL-MCNC: 13 MG/DL (ref 7–30)
CALCIUM SERPL-MCNC: 7.8 MG/DL (ref 8.5–10.1)
CHLORIDE SERPL-SCNC: 96 MMOL/L (ref 94–109)
CO2 SERPL-SCNC: 32 MMOL/L (ref 20–32)
COPATH REPORT: NORMAL
CREAT SERPL-MCNC: 0.52 MG/DL (ref 0.66–1.25)
ERYTHROCYTE [DISTWIDTH] IN BLOOD BY AUTOMATED COUNT: 11.8 % (ref 10–15)
GFR SERPL CREATININE-BSD FRML MDRD: >90 ML/MIN/1.7M2
GLUCOSE SERPL-MCNC: 126 MG/DL (ref 70–99)
HCT VFR BLD AUTO: 23.9 % (ref 40–53)
HGB BLD-MCNC: 8 G/DL (ref 13.3–17.7)
MCH RBC QN AUTO: 30.8 PG (ref 26.5–33)
MCHC RBC AUTO-ENTMCNC: 33.5 G/DL (ref 31.5–36.5)
MCV RBC AUTO: 92 FL (ref 78–100)
PLATELET # BLD AUTO: 217 10E9/L (ref 150–450)
POTASSIUM SERPL-SCNC: 4 MMOL/L (ref 3.4–5.3)
RBC # BLD AUTO: 2.6 10E12/L (ref 4.4–5.9)
SODIUM SERPL-SCNC: 134 MMOL/L (ref 133–144)
SPECIMEN EXP DATE BLD: NORMAL
WBC # BLD AUTO: 10.8 10E9/L (ref 4–11)

## 2017-11-09 PROCEDURE — 12000006 ZZH R&B IMCU INTERMEDIATE UMMC

## 2017-11-09 PROCEDURE — 85027 COMPLETE CBC AUTOMATED: CPT | Performed by: STUDENT IN AN ORGANIZED HEALTH CARE EDUCATION/TRAINING PROGRAM

## 2017-11-09 PROCEDURE — S0020 INJECTION, BUPIVICAINE HYDRO: HCPCS | Performed by: NURSE PRACTITIONER

## 2017-11-09 PROCEDURE — 25000125 ZZHC RX 250: Performed by: NURSE PRACTITIONER

## 2017-11-09 PROCEDURE — 25000128 H RX IP 250 OP 636: Performed by: NURSE PRACTITIONER

## 2017-11-09 PROCEDURE — 25000132 ZZH RX MED GY IP 250 OP 250 PS 637: Performed by: SURGERY

## 2017-11-09 PROCEDURE — 25000132 ZZH RX MED GY IP 250 OP 250 PS 637: Performed by: STUDENT IN AN ORGANIZED HEALTH CARE EDUCATION/TRAINING PROGRAM

## 2017-11-09 PROCEDURE — 37000008 ZZH ANESTHESIA TECHNICAL FEE, 1ST 30 MIN: Performed by: THORACIC SURGERY (CARDIOTHORACIC VASCULAR SURGERY)

## 2017-11-09 PROCEDURE — 0DJ08ZZ INSPECTION OF UPPER INTESTINAL TRACT, VIA NATURAL OR ARTIFICIAL OPENING ENDOSCOPIC: ICD-10-PCS | Performed by: THORACIC SURGERY (CARDIOTHORACIC VASCULAR SURGERY)

## 2017-11-09 PROCEDURE — 27210443 ZZH NUTRITION PRODUCT SPECIALIZED PACKET

## 2017-11-09 PROCEDURE — 25000132 ZZH RX MED GY IP 250 OP 250 PS 637

## 2017-11-09 PROCEDURE — 74230 X-RAY XM SWLNG FUNCJ C+: CPT

## 2017-11-09 PROCEDURE — 25000125 ZZHC RX 250: Performed by: NURSE ANESTHETIST, CERTIFIED REGISTERED

## 2017-11-09 PROCEDURE — 25800025 ZZH RX 258: Performed by: SURGERY

## 2017-11-09 PROCEDURE — 25000128 H RX IP 250 OP 636: Performed by: NURSE ANESTHETIST, CERTIFIED REGISTERED

## 2017-11-09 PROCEDURE — 25000128 H RX IP 250 OP 636: Performed by: STUDENT IN AN ORGANIZED HEALTH CARE EDUCATION/TRAINING PROGRAM

## 2017-11-09 PROCEDURE — 71000014 ZZH RECOVERY PHASE 1 LEVEL 2 FIRST HR: Performed by: THORACIC SURGERY (CARDIOTHORACIC VASCULAR SURGERY)

## 2017-11-09 PROCEDURE — C9399 UNCLASSIFIED DRUGS OR BIOLOG: HCPCS | Performed by: NURSE ANESTHETIST, CERTIFIED REGISTERED

## 2017-11-09 PROCEDURE — 25000128 H RX IP 250 OP 636: Performed by: THORACIC SURGERY (CARDIOTHORACIC VASCULAR SURGERY)

## 2017-11-09 PROCEDURE — 40000225 ZZH STATISTIC SLP WARD VISIT: Performed by: SPEECH-LANGUAGE PATHOLOGIST

## 2017-11-09 PROCEDURE — 71020 XR CHEST 2 VW: CPT

## 2017-11-09 PROCEDURE — 25000132 ZZH RX MED GY IP 250 OP 250 PS 637: Performed by: THORACIC SURGERY (CARDIOTHORACIC VASCULAR SURGERY)

## 2017-11-09 PROCEDURE — 0WJ6XZZ INSPECTION OF NECK, EXTERNAL APPROACH: ICD-10-PCS | Performed by: THORACIC SURGERY (CARDIOTHORACIC VASCULAR SURGERY)

## 2017-11-09 PROCEDURE — 27210794 ZZH OR GENERAL SUPPLY STERILE: Performed by: THORACIC SURGERY (CARDIOTHORACIC VASCULAR SURGERY)

## 2017-11-09 PROCEDURE — 25000566 ZZH SEVOFLURANE, EA 15 MIN: Performed by: THORACIC SURGERY (CARDIOTHORACIC VASCULAR SURGERY)

## 2017-11-09 PROCEDURE — 92611 MOTION FLUOROSCOPY/SWALLOW: CPT | Mod: GN | Performed by: SPEECH-LANGUAGE PATHOLOGIST

## 2017-11-09 PROCEDURE — 71000015 ZZH RECOVERY PHASE 1 LEVEL 2 EA ADDTL HR: Performed by: THORACIC SURGERY (CARDIOTHORACIC VASCULAR SURGERY)

## 2017-11-09 PROCEDURE — 80048 BASIC METABOLIC PNL TOTAL CA: CPT | Performed by: STUDENT IN AN ORGANIZED HEALTH CARE EDUCATION/TRAINING PROGRAM

## 2017-11-09 PROCEDURE — 37000009 ZZH ANESTHESIA TECHNICAL FEE, EACH ADDTL 15 MIN: Performed by: THORACIC SURGERY (CARDIOTHORACIC VASCULAR SURGERY)

## 2017-11-09 PROCEDURE — 36000053 ZZH SURGERY LEVEL 2 EA 15 ADDTL MIN - UMMC: Performed by: THORACIC SURGERY (CARDIOTHORACIC VASCULAR SURGERY)

## 2017-11-09 PROCEDURE — 36000051 ZZH SURGERY LEVEL 2 1ST 30 MIN - UMMC: Performed by: THORACIC SURGERY (CARDIOTHORACIC VASCULAR SURGERY)

## 2017-11-09 PROCEDURE — 36592 COLLECT BLOOD FROM PICC: CPT | Performed by: STUDENT IN AN ORGANIZED HEALTH CARE EDUCATION/TRAINING PROGRAM

## 2017-11-09 RX ORDER — PROPOFOL 10 MG/ML
INJECTION, EMULSION INTRAVENOUS PRN
Status: DISCONTINUED | OUTPATIENT
Start: 2017-11-09 | End: 2017-11-09

## 2017-11-09 RX ORDER — FENTANYL CITRATE 50 UG/ML
25-50 INJECTION, SOLUTION INTRAMUSCULAR; INTRAVENOUS
Status: DISCONTINUED | OUTPATIENT
Start: 2017-11-09 | End: 2017-11-09 | Stop reason: HOSPADM

## 2017-11-09 RX ORDER — ONDANSETRON 4 MG/1
4 TABLET, ORALLY DISINTEGRATING ORAL EVERY 30 MIN PRN
Status: DISCONTINUED | OUTPATIENT
Start: 2017-11-09 | End: 2017-11-09 | Stop reason: HOSPADM

## 2017-11-09 RX ORDER — PIPERACILLIN SODIUM, TAZOBACTAM SODIUM 4; .5 G/20ML; G/20ML
INJECTION, POWDER, LYOPHILIZED, FOR SOLUTION INTRAVENOUS PRN
Status: DISCONTINUED | OUTPATIENT
Start: 2017-11-09 | End: 2017-11-09

## 2017-11-09 RX ORDER — SODIUM CHLORIDE, SODIUM LACTATE, POTASSIUM CHLORIDE, CALCIUM CHLORIDE 600; 310; 30; 20 MG/100ML; MG/100ML; MG/100ML; MG/100ML
INJECTION, SOLUTION INTRAVENOUS CONTINUOUS
Status: DISCONTINUED | OUTPATIENT
Start: 2017-11-09 | End: 2017-11-09 | Stop reason: HOSPADM

## 2017-11-09 RX ORDER — FENTANYL CITRATE 50 UG/ML
INJECTION, SOLUTION INTRAMUSCULAR; INTRAVENOUS PRN
Status: DISCONTINUED | OUTPATIENT
Start: 2017-11-09 | End: 2017-11-09

## 2017-11-09 RX ORDER — ONDANSETRON 2 MG/ML
4 INJECTION INTRAMUSCULAR; INTRAVENOUS EVERY 30 MIN PRN
Status: DISCONTINUED | OUTPATIENT
Start: 2017-11-09 | End: 2017-11-09 | Stop reason: HOSPADM

## 2017-11-09 RX ORDER — PIPERACILLIN SODIUM, TAZOBACTAM SODIUM 3; .375 G/15ML; G/15ML
3.38 INJECTION, POWDER, LYOPHILIZED, FOR SOLUTION INTRAVENOUS EVERY 6 HOURS
Status: DISCONTINUED | OUTPATIENT
Start: 2017-11-09 | End: 2017-11-15

## 2017-11-09 RX ORDER — FLUCONAZOLE 2 MG/ML
INJECTION, SOLUTION INTRAVENOUS PRN
Status: DISCONTINUED | OUTPATIENT
Start: 2017-11-09 | End: 2017-11-09

## 2017-11-09 RX ORDER — FLUCONAZOLE 2 MG/ML
200 INJECTION, SOLUTION INTRAVENOUS EVERY 24 HOURS
Status: DISCONTINUED | OUTPATIENT
Start: 2017-11-10 | End: 2017-11-15

## 2017-11-09 RX ORDER — ONDANSETRON 2 MG/ML
INJECTION INTRAMUSCULAR; INTRAVENOUS PRN
Status: DISCONTINUED | OUTPATIENT
Start: 2017-11-09 | End: 2017-11-09

## 2017-11-09 RX ORDER — SODIUM CHLORIDE, SODIUM LACTATE, POTASSIUM CHLORIDE, CALCIUM CHLORIDE 600; 310; 30; 20 MG/100ML; MG/100ML; MG/100ML; MG/100ML
INJECTION, SOLUTION INTRAVENOUS CONTINUOUS PRN
Status: DISCONTINUED | OUTPATIENT
Start: 2017-11-09 | End: 2017-11-09

## 2017-11-09 RX ORDER — HYDROMORPHONE HYDROCHLORIDE 1 MG/ML
.3-.5 INJECTION, SOLUTION INTRAMUSCULAR; INTRAVENOUS; SUBCUTANEOUS EVERY 5 MIN PRN
Status: DISCONTINUED | OUTPATIENT
Start: 2017-11-09 | End: 2017-11-09 | Stop reason: HOSPADM

## 2017-11-09 RX ORDER — GLYCOPYRROLATE 0.2 MG/ML
INJECTION, SOLUTION INTRAMUSCULAR; INTRAVENOUS PRN
Status: DISCONTINUED | OUTPATIENT
Start: 2017-11-09 | End: 2017-11-09

## 2017-11-09 RX ADMIN — FENTANYL CITRATE 50 MCG: 50 INJECTION, SOLUTION INTRAMUSCULAR; INTRAVENOUS at 07:41

## 2017-11-09 RX ADMIN — OXYCODONE HYDROCHLORIDE 5 MG: 5 SOLUTION ORAL at 11:15

## 2017-11-09 RX ADMIN — SUGAMMADEX 200 MG: 100 INJECTION, SOLUTION INTRAVENOUS at 07:53

## 2017-11-09 RX ADMIN — Medication 5 ML: at 11:15

## 2017-11-09 RX ADMIN — ONDANSETRON 4 MG: 2 INJECTION INTRAMUSCULAR; INTRAVENOUS at 07:52

## 2017-11-09 RX ADMIN — MENTHOL 1 PATCH: 205.5 PATCH TOPICAL at 17:07

## 2017-11-09 RX ADMIN — OXYCODONE HYDROCHLORIDE 5 MG: 5 SOLUTION ORAL at 20:31

## 2017-11-09 RX ADMIN — HYDROMORPHONE HYDROCHLORIDE: 10 INJECTION, SOLUTION INTRAMUSCULAR; INTRAVENOUS; SUBCUTANEOUS at 06:10

## 2017-11-09 RX ADMIN — ROCURONIUM BROMIDE 30 MG: 10 INJECTION INTRAVENOUS at 07:41

## 2017-11-09 RX ADMIN — DOCUSATE SODIUM 10 MG: 50 LIQUID ORAL at 11:12

## 2017-11-09 RX ADMIN — FLUCONAZOLE 400 MG: 2 INJECTION INTRAVENOUS at 07:47

## 2017-11-09 RX ADMIN — PHENYLEPHRINE HYDROCHLORIDE 200 MCG: 10 INJECTION, SOLUTION INTRAMUSCULAR; INTRAVENOUS; SUBCUTANEOUS at 07:54

## 2017-11-09 RX ADMIN — OXYCODONE HYDROCHLORIDE 5 MG: 5 SOLUTION ORAL at 03:46

## 2017-11-09 RX ADMIN — PIPERACILLIN SODIUM AND TAZOBACTAM SODIUM 4.5 G: .5; 4 INJECTION, POWDER, LYOPHILIZED, FOR SOLUTION INTRAVENOUS at 07:47

## 2017-11-09 RX ADMIN — POTASSIUM CHLORIDE, DEXTROSE MONOHYDRATE AND SODIUM CHLORIDE 135 ML: 150; 5; 450 INJECTION, SOLUTION INTRAVENOUS at 15:39

## 2017-11-09 RX ADMIN — LIDOCAINE 1 PATCH: 50 PATCH CUTANEOUS at 21:43

## 2017-11-09 RX ADMIN — Medication 5 ML: at 20:17

## 2017-11-09 RX ADMIN — OXYCODONE HYDROCHLORIDE 5 MG: 5 SOLUTION ORAL at 03:55

## 2017-11-09 RX ADMIN — PIPERACILLIN SODIUM AND TAZOBACTAM SODIUM 3.38 G: 3; .375 INJECTION, POWDER, LYOPHILIZED, FOR SOLUTION INTRAVENOUS at 20:18

## 2017-11-09 RX ADMIN — HYDROMORPHONE HYDROCHLORIDE: 10 INJECTION, SOLUTION INTRAMUSCULAR; INTRAVENOUS; SUBCUTANEOUS at 22:53

## 2017-11-09 RX ADMIN — PROPOFOL 120 MG: 10 INJECTION, EMULSION INTRAVENOUS at 07:32

## 2017-11-09 RX ADMIN — MAGNESIUM HYDROXIDE 30 ML: 400 SUSPENSION ORAL at 11:13

## 2017-11-09 RX ADMIN — ACETAMINOPHEN 650 MG: 325 SOLUTION ORAL at 03:46

## 2017-11-09 RX ADMIN — Medication 0.2 MG: at 07:24

## 2017-11-09 RX ADMIN — DEXMEDETOMIDINE HYDROCHLORIDE 1.2 MCG/KG/HR: 100 INJECTION, SOLUTION INTRAVENOUS at 07:23

## 2017-11-09 RX ADMIN — SODIUM CHLORIDE, POTASSIUM CHLORIDE, SODIUM LACTATE AND CALCIUM CHLORIDE: 600; 310; 30; 20 INJECTION, SOLUTION INTRAVENOUS at 07:16

## 2017-11-09 RX ADMIN — DOCUSATE SODIUM 10 MG: 50 LIQUID ORAL at 20:17

## 2017-11-09 RX ADMIN — MULTIVITAMIN 15 ML: LIQUID ORAL at 11:14

## 2017-11-09 RX ADMIN — PHENYLEPHRINE HYDROCHLORIDE 200 MCG: 10 INJECTION, SOLUTION INTRAMUSCULAR; INTRAVENOUS; SUBCUTANEOUS at 07:44

## 2017-11-09 RX ADMIN — POTASSIUM CHLORIDE, DEXTROSE MONOHYDRATE AND SODIUM CHLORIDE 100 ML: 150; 5; 450 INJECTION, SOLUTION INTRAVENOUS at 00:47

## 2017-11-09 RX ADMIN — PIPERACILLIN SODIUM AND TAZOBACTAM SODIUM 3.38 G: 3; .375 INJECTION, POWDER, LYOPHILIZED, FOR SOLUTION INTRAVENOUS at 13:00

## 2017-11-09 RX ADMIN — OXYCODONE HYDROCHLORIDE 5 MG: 5 SOLUTION ORAL at 17:45

## 2017-11-09 RX ADMIN — LIDOCAINE HYDROCHLORIDE 3 ML: 40 INJECTION, SOLUTION RETROBULBAR; TOPICAL at 07:23

## 2017-11-09 RX ADMIN — BUPIVACAINE HYDROCHLORIDE: 7.5 INJECTION, SOLUTION EPIDURAL; RETROBULBAR at 02:00

## 2017-11-09 RX ADMIN — OXYCODONE HYDROCHLORIDE 10 MG: 5 SOLUTION ORAL at 01:14

## 2017-11-09 RX ADMIN — MIDAZOLAM HYDROCHLORIDE 2 MG: 1 INJECTION, SOLUTION INTRAMUSCULAR; INTRAVENOUS at 07:39

## 2017-11-09 RX ADMIN — OXYCODONE HYDROCHLORIDE 5 MG: 5 SOLUTION ORAL at 14:36

## 2017-11-09 RX ADMIN — PROPOFOL 80 MG: 10 INJECTION, EMULSION INTRAVENOUS at 07:37

## 2017-11-09 ASSESSMENT — PAIN DESCRIPTION - DESCRIPTORS
DESCRIPTORS: CRAMPING
DESCRIPTORS: DISCOMFORT
DESCRIPTORS: SHARP
DESCRIPTORS: SHARP
DESCRIPTORS: DISCOMFORT

## 2017-11-09 ASSESSMENT — ACTIVITIES OF DAILY LIVING (ADL)
ADLS_ACUITY_SCORE: 11

## 2017-11-09 NOTE — OP NOTE
DATE OF PROCEDURE:  2017      PREOPERATIVE DIAGNOSIS:  Suspected neck hematoma associated with pharyngostomy tube.        PROCEDURE PERFORMED:  Oropharyngeal exploration and esophagojejunostomy.      SURGEON:  Randal Miller MD (present and participated in the entire procedure).      RESIDENT SURGEON:  Stevan Pavon MD.      ANESTHESIA:  General.      ESTIMATED BLOOD LOSS:  Zero.      FINDINGS:  There was no evidence of bleeding or hematoma.  The acute swelling on the left side of his neck was related to a phlegmon.  There was no abscess.  The anastomosis appeared intact at about 39-40 cm.      DESCRIPTION OF PROCEDURE:  With Abelardo Armas in supine position, he was intubated using fiberoptic intubation and the airway was secured.  We then explored the oropharynx and did see absolutely no evidence of bleeding.  We explored the wound and removed the pharyngostomy tube and again there was no bleeding and there was no purulent material either.  However, the area was swollen suggestive of a phlegmon.  We then performed the esophagojejunostomy and identified the anastomosis which was patent and intact and the jejunum appeared healthy.  For this reason, we did not replace the pharyngostomy or nasogastric tube.      The patient tolerated the procedure well.         RANDAL MILLER MD             D: 2017 08:01   T: 2017 08:17   MT: CG      Name:     ABELARDO ARMAS   MRN:      [id number]        Account:        [id number]   :      1980           Procedure Date: 2017      Document: [id number]       cc: [facility] Surgery Billing

## 2017-11-09 NOTE — PROGRESS NOTES
Patient transferred from PACU at 1034 via bed. Patient alert and orientated.  Declines any worsening pain.  Epidural in place.  IV Dilaudid PCA.  Wife present at bedside. Vital signs stable. 2 L of oxygen N/C.

## 2017-11-09 NOTE — PLAN OF CARE
Problem: Patient Care Overview  Goal: Plan of Care/Patient Progress Review  Discharge Planner SLP   Patient plan for discharge: Unknown  Current status: Video Swallow study completed per orders. Pt demonstrates severe oropharyngeal dysphagia as characterized by delayed initiation of the swallow, deep penetration on each trial with subsequent aspiration, decreased pharyngeal constriction, decreased epiglottic inversion with residual noted in the valleculae. Residual is decreased with subsequent swallows. Pt is unable to clear aspirated material with cued coughing. Unable to complete esphagram due to aspiration noted on initial sips of omnipaque. Recommend NPO with alternative methods of nutrition/hydration/medication. Pt may have ice chips for oral comfort/hydration. SLP to follow for pharyngeal strengthening exercises and to complete repeat VFSS on Monday 11/13/17 per throacic team.    Barriers to return to prior living situation: Current medical needs  Recommendations for discharge: Defer to PT/OT.   Rationale for recommendations: Pt will likely meet SLP goals prior to discharge.        Entered by: Nelly Nair 11/09/2017 4:14 PM

## 2017-11-09 NOTE — ADDENDUM NOTE
Addendum  created 11/09/17 0748 by John Monique MD    Cosign clinical note with attestation, Sign clinical note

## 2017-11-09 NOTE — PLAN OF CARE
"Problem: Patient Care Overview  Goal: Plan of Care/Patient Progress Review  /60  Pulse 91  Temp 98.7  F (37.1  C) (Oral)  Resp 18  Ht 1.73 m (5' 8.11\")  Wt 75 kg (165 lb 5 oz)  SpO2 100%  BMI 25.05 kg/m2     Neuro: A&Ox4, making needs known.   Cardiac: ST, -150s depending on activity level. VSS, t-max 100.2, resolved with no intervention.  Respiratory: Sating upper 90s on RA. Sleep apnea overnight desating to low 80s, 1.5L O2 applied.   GI/: Adequate urine output via barnes. BM X1  Diet/appetite: NPO, swish and spit for comfort. TF at 20mL/hr, tolerated well.  No nausea/vomitting.  Activity:  Stand by assist, up to chair and in room.  Pain: Adequate pain control with dilaudid PCA, epidural, PRN oxycodone and PRN tylenol. Generalized pain overnight.  Skin: Intact, no new deficits noted.   LDA's: swelling at pharyngostomy site, cross-cover notified.  This AM thoracic team took patient to OR to explore cause of swelling. Chest tube to water seal, half of output replaced with IVF.     Plan: Continue with POC. Notify primary team with changes.          "

## 2017-11-09 NOTE — BRIEF OP NOTE
[facility], [place]    Brief Operative Note    Pre-operative diagnosis: Neck Hematoma  Post-operative diagnosis Infection of neck tissue secondary to pharyngostomy  Procedure: Procedure(s):   - Wound Class: II-Clean Contaminated  Esophogastroduodenoscopy, take out pharangostomy tube - Wound Class: II-Clean Contaminated  Surgeon: Surgeon(s) and Role:     * Yunior Esteban MD - Primary     * Stevan Pavon MD - Assisting  Anesthesia: General   Estimated blood loss: Minimal  Drains: None  Specimens: * No specimens in log *  Findings:   No hematoma or bleeding, anastomosis looks well healed on EGD.  Complications: None.  Implants: None.

## 2017-11-09 NOTE — PROVIDER NOTIFICATION
Spoke with MD Librado and questioned if we can trial without barnes now that the epidural has been removed.   MD stated to wait Q 6 hours and remove barnes.    Barnes to be removed at this evening at 1900.

## 2017-11-09 NOTE — ANESTHESIA POSTPROCEDURE EVALUATION
Patient: Daniel Sandhu    Procedure(s):  Esophogastroduodenoscopy, take out pharangostomy tube - Wound Class: II-Clean Contaminated    Diagnosis:Neck Hematoma  Diagnosis Additional Information: No value filed.    Anesthesia Type:  No value filed.    Note:  Anesthesia Post Evaluation    Patient location during evaluation: PACU  Patient participation: Able to fully participate in evaluation  Level of consciousness: awake and alert  Pain management: adequate  Airway patency: patent  Cardiovascular status: acceptable and hemodynamically stable  Respiratory status: acceptable  Hydration status: acceptable  PONV: none     Anesthetic complications: None          Last vitals:  Vitals:    11/09/17 0930 11/09/17 0945 11/09/17 1000   BP: 117/66 110/59 109/58   Pulse:      Resp: 12 12 12   Temp:   36.8  C (98.3  F)   SpO2: 100% 100% 97%         Electronically Signed By: Kimo Johns MD  November 9, 2017  10:13 AM

## 2017-11-09 NOTE — PLAN OF CARE
Problem: Patient Care Overview  Goal: Plan of Care/Patient Progress Review  OT:  Pt in OR at time of attempt.  Will attempt to see pt tomorrow as appropriate.

## 2017-11-09 NOTE — PLAN OF CARE
"Problem: Patient Care Overview  Goal: Plan of Care/Patient Progress Review  Outcome: Improving  /58 (BP Location: Right arm)  Pulse 91  Temp 99  F (37.2  C) (Oral)  Resp 18  Ht 1.73 m (5' 8.11\")  Wt 75 kg (165 lb 5 oz)  SpO2 98%  BMI 25.05 kg/m2     Neuro: A&Ox4.   Cardiac: -110s. VSS        Respiratory: Sating 98% on RA.   GI/: Acuna catheter in place, adequate output. No BM this shift, added more medications to bowel regimen.   Diet/appetite: NPO. Tube feedings 20mL/hr via J tube.    Activity: Up w/ SBA, steady. Sat in chair all evening.   Pain: Controlled with dilaudid PCA, epidural, and PRN oxycodone 10mg q3h. No nausea reported.    Skin: All dressings CDI, no new deficits noted.   LDA's: Left CT to water seal, minimal sero sang output. No crepitus noted- No air leak. Pharyngostomy to LIS, 50-100ml out q4h, brown/bilious in color. Slight swelling at pharyngostomy site, unchanged since previous shift.   Will continue to monitor and follow plan of care       "

## 2017-11-09 NOTE — PROGRESS NOTES
REGIONAL ANESTHESIA PAIN SERVICE EPIDURAL NOTE  Interval History: Pt reports increased pain during the night because he wasn't awakened to take oxycodone, then had difficulty getting pain under control this AM to prepare for activity.  Pt and his wife expressed frustration with current analgesic plan, stating he would like to be awakened during the night so he doesn't miss getting oxycodone dose Q 4 hrs.  Denies any weakness, paresthesias, circumoral numbness, metallic taste or tinnitus.  Pt ambulating with standby assistance.  Patient is currently denies nausea, NPO, tolerating tube feedings           Anticoagulation:      enoxaparin (LOVENOX) injection 40 mg 40 mg, SC, Q24H Given: 11/07 1703      Medications related to Pain Management (Future)    Start       Dose/Rate Route Frequency Ordered Stop     11/08/17 1415   oxyCODONE (ROXICODONE) solution 5-10 mg       5-10 mg Oral EVERY 3 HOURS PRN 11/08/17 1402        11/07/17 1115   sennosides (SENOKOT) syrup 10 mL       10 mL Per J Tube 2 TIMES DAILY 11/07/17 1106        11/06/17 1215   bupivacaine (MARCAINE) 0.125 % in NaCl 0.9 % 250 mL EPIDURAL Infusion       12 mL/hr  EPIDURAL CONTINUOUS 11/06/17 1203        11/03/17 2045   HYDROmorphone (DILAUDID) PCA 1 mg/mL         Intravenous PCA 11/03/17 2032 11/03/17 2045   lidocaine (LIDODERM) 5 % Patch 1 patch       1 patch Transdermal EVERY 24 HOURS 2000 11/03/17 2032 11/03/17 2032   lidocaine (LIDODERM) patch in PLACE         Transdermal EVERY 8 HOURS 11/03/17 2032                  OBJECTIVE:  Diagnostics:        Lab Results   Component Value Date     WBC 8.7 11/08/2017            Lab Results   Component Value Date     RBC 2.51 11/08/2017            Lab Results   Component Value Date     HGB 7.9 11/08/2017            Lab Results   Component Value Date     HCT 23.3 11/08/2017            Lab Results   Component Value Date      11/08/2017               Lab Results   Component Value Date     INR 1.14  07/29/2017     INR 1.12 06/09/2017         Vitals:                        Temp:  [98.6  F (37  C)-99.4  F (37.4  C)] 98.6  F (37  C)  Pulse:  [81-91] 91  Heart Rate:  [] 106  Resp:  [12-19] 18  BP: (120-132)/(57-72) 132/60  SpO2:  [95 %-99 %] 97 %     Exam:                         Strength 5/5 and symmetric grossly in bilateral LE                        Epidural site with old dried blood under dressing so dressing changed using sterile technique, new tegaderm applied, no tenderness, erythema, heme, edema at insertion site        ASSESSMENT/PLAN:    Daniel Sandhu is a 37 year old male POD #4 s/p LAPAROSCOPY DIAGNOSTIC (GENERAL) LAPAROTOMY EXPLORATORY THORACOTOMY GASTRECTOMY PERCUTANEOUS INSERTION TUBE JEJUNOSTOMY PHARYNGOSTOMY COMBINED ESOPHAGOSCOPY, GASTROSCOPY, DUODENOSCOPY (EGD) and placement of T6-7 epidural for analgesia.  Pt receiving adequate analgesia with epidural infusion Bupivacaine 0.125% at 12 mL/hour, oxycodone Q 4 hr PRN and PCA Dilaudid PRN.  Pt ambulating without difficulty.  No weakness or paresthesias, adequate sensory block.  No evidence of adverse side effects related to local anesthetic.     Epidural T6-7 catheter placement 11/3/2017, POD #5, catheter day #5  - continue current epidural infusion Bupivacaine 0.125% at 12 mL/hour  - pt continues to adjust to transition to oral opioids so plan to remove catheter tomorrow 11/9/17 on catheter day #6.  Will remove catheter at least 10 hrs after last dose of enoxaparin administered SC daily  - contact [facility] prior to any changes in anticoagulation orders while epidural in place  - consider changing oxycodone orders from Q 4 hr prn to Q 3 hr prn pain - discussed with surgery service     - will continue to follow and adjust as needed    John Monique MD

## 2017-11-09 NOTE — ANESTHESIA POST-OP FOLLOW-UP NOTE
REGIONAL ANESTHESIA PAIN SERVICE EPIDURAL NOTE  Interval History: Pt returned to OR this AM, now back in room sleeping in recliner at bedside.  Pt reports pain adequately controlled and a little better since changed oxycodone to Q 3 hr PRN, also has PCA dilaudid and epidural infusion.  Denies any weakness, paresthesias, circumoral numbness, metallic taste or tinnitus.  Pt able to stand without assistance.  Patient NPO, denies nausea, tolerating TF     Anticoagulation:  Last dose enoxaparin administered 11/8/17 at 1559     Medications related to Pain Management (Future)    Start       Dose/Rate Route Frequency Ordered Stop     11/09/17 0217   acetaminophen (TYLENOL) solution 650 mg       650 mg Per Feeding Tube EVERY 8 HOURS PRN 11/09/17 0217 11/08/17 2045   sennosides (SENOKOT) syrup 5 mL       5 mL Oral 2 TIMES DAILY 11/08/17 2030 11/08/17 2045   docusate (COLACE) 50 MG/5ML liquid 10 mg       10 mg Oral 2 TIMES DAILY 11/08/17 2030 11/08/17 1945   magnesium hydroxide (MILK OF MAGNESIA) suspension 30 mL       30 mL Per Feeding Tube DAILY 11/08/17 1942 11/08/17 1941   bisacodyl (DULCOLAX) Suppository 10 mg       10 mg Rectal DAILY PRN 11/08/17 1941 11/08/17 1415   oxyCODONE (ROXICODONE) solution 5-10 mg       5-10 mg Oral EVERY 3 HOURS PRN 11/08/17 1402        11/06/17 1215   bupivacaine (MARCAINE) 0.125 % in NaCl 0.9 % 250 mL EPIDURAL Infusion       12 mL/hr  EPIDURAL CONTINUOUS 11/06/17 1203        11/03/17 2045   HYDROmorphone (DILAUDID) PCA 1 mg/mL         Intravenous PCA 11/03/17 2032 11/03/17 2045   lidocaine (LIDODERM) 5 % Patch 1 patch       1 patch Transdermal EVERY 24 HOURS 2000 11/03/17 2032 11/03/17 2032   lidocaine (LIDODERM) patch in PLACE         Transdermal EVERY 8 HOURS 11/03/17 2032                  OBJECTIVE:  CBC RESULTS:       Recent Labs   Lab Test  11/09/17   0701   WBC  10.8   RBC  2.60*   HGB  8.0*   HCT  23.9*   MCV  92   MCH  30.8   MCHC  33.5    RDW  11.8   PLT  217            Lab Results   Component Value Date     INR 1.14 07/29/2017     INR 1.12 06/09/2017         Vitals:                        Temp:  [97.3  F (36.3  C)-100.2  F (37.9  C)] 97.8  F (36.6  C)  Heart Rate:  [] 117  Resp:  [10-19] 19  BP: (101-133)/(53-66) 101/64  SpO2:  [97 %-100 %] 98 %     Exam:                         Strength 5/5 and symmetric grossly in bilateral LE                        Epidural site with small reddish purple bruise medial to insertion site, no tenderness, erythema, heme, edema        ASSESSMENT/PLAN:    Daniel Sandhu is a 37 year old male POD #6 s/p LAPAROSCOPY DIAGNOSTIC (GENERAL) LAPAROTOMY EXPLORATORY THORACOTOMY GASTRECTOMY PERCUTANEOUS INSERTION TUBE JEJUNOSTOMY PHARYNGOSTOMY COMBINED ESOPHAGOSCOPY, GASTROSCOPY, DUODENOSCOPY (EGD) and placement of T6-7 epidural for analgesia.  Pt returned to OR earlier today for neck hematoma s/p combined esophagoscopy, gastroscopy, duodenoscopy. Pt receiving adequate analgesia with epidural infusion Bupivacaine 0.125% at 12 mL/hour. Tolerating IV and oral analgesics.  No weakness or paresthesias, adequate sensory block.  No evidence of adverse side effects related to local anesthetic.     Epidural T6-7 catheter placement 11/3/2017, POD #6, catheter day #6  - epidural catheter removed at 1225 without complication, dark tip intact  - will sign off     Dwight Mondragon MD  November 9, 2017

## 2017-11-09 NOTE — ADDENDUM NOTE
Addendum  created 11/09/17 1422 by Dwight Mondragon MD    Anesthesia Event edited, Procedure Event Log accessed, Sign clinical note

## 2017-11-09 NOTE — PLAN OF CARE
Problem: Patient Care Overview  Goal: Plan of Care/Patient Progress Review  Patient send down to OR at 0715 this morning for left next swelling.  Post op Pharyngostomy removed.  Upon arrival patients vital signs were stable. Weaned back to room air.  Epidural removed.  Acuna in place.  To be removed this evening at 1900.  Patient A/O 4x.  Continue with PCA and PRN oxycodone.  Chest tube intact.  No air leak noted.  Pt went down for video swallow.  Awaiting results and further recommendations.  TF goal rate 100 ml/hr.  Currently infusing at 30 ml/hr.  To increase Q 8 hours.     Wife present at bedside t/o shift.

## 2017-11-09 NOTE — ANESTHESIA PREPROCEDURE EVALUATION
Anesthesia Evaluation     . Pt has had prior anesthetic. Type: General    No history of anesthetic complications          ROS/MED HX    ENT/Pulmonary: Comment: Concern for right neck hematoma at the site of the the patient's pharyngostomy tube.  Tracheal deviation noted on physical examination.        Neurologic:  - neg neurologic ROS     Cardiovascular:  - neg cardiovascular ROS   (+) ----. : . . . :. . Previous cardiac testing       METS/Exercise Tolerance:  >4 METS   Hematologic:     (+) Anemia, -      Musculoskeletal:  - neg musculoskeletal ROS       GI/Hepatic:  - neg GI/hepatic ROS       Renal/Genitourinary:  - ROS Renal section negative       Endo:  - neg endo ROS       Psychiatric:  - neg psychiatric ROS       Infectious Disease:  - neg infectious disease ROS       Malignancy:   (+) Malignancy History of GI  GI CA  Active status post Chemo and Surgery,         Other:    - neg other ROS                 Physical Exam  Normal systems: pulmonary and dental    Airway   Mallampati: II  TM distance: >3 FB  Neck ROM: limited    Dental     Cardiovascular   Rhythm and rate: regular and normal      Pulmonary     Other findings: Tracheal deviation noted on physical examination.  Patient does not appear to be in respiratory distress.  No inspiratory stridor noted on examination.                     Lab / Radiology Results:   Reviewed current labs when avail, see EMR for details.      BMP:  Recent Labs   Lab Test  11/09/17   0701   NA  134   POTASSIUM  4.0   CHLORIDE  96   CO2  32   BUN  13   CR  0.52*   GLC  126*   YOBANY  7.8*       LFTs:   Recent Labs   Lab Test  11/04/17   0356   PROTTOTAL  5.4*   ALBUMIN  3.1*   BILITOTAL  0.8   ALKPHOS  24*   AST  183*   ALT  205*       CBC:   Recent Labs   Lab Test  11/09/17   0701   WBC  10.8   HGB  8.0*   PLT  217       Coags:  Recent Labs   Lab Test  07/29/17   1453   INR  1.14       Blood Bank:  Lab Results   Component Value Date    ABO O 11/08/2017    RH Neg 11/08/2017    AS  Neg 11/08/2017       Studies:  See EMR for current studies, reviewed when available.     Anesthesia Plan      History & Physical Review  History and physical reviewed and following examination; no interval change.    ASA Status:  3 emergent.    NPO Status:  Waived due to emergency    Plan for General and ETT (Plan for awake fiberoptic intubation with difficult airway cart in room. )   PONV prophylaxis:  Ondansetron (or other 5HT-3)  Additional equipment: Videolaryngoscope, Difficult Airway Cart and Fiberoptic bronchoscope      Postoperative Care  Postoperative pain management:  Multi-modal analgesia.      Consents  Anesthetic plan, risks, benefits and alternatives discussed with:  Implied consent/emergency.  Use of blood products discussed: Yes.   Use of blood products discussed with Implied consent/Emergency.  .      Kimo Johns MD  Anesthesiologist  10:49 AM  November 9, 2017                        .

## 2017-11-09 NOTE — ANESTHESIA CARE TRANSFER NOTE
Patient: Daniel Sandhu    Procedure(s):  Esophogastroduodenoscopy, take out pharangostomy tube - Wound Class: II-Clean Contaminated    Diagnosis: Neck Hematoma  Diagnosis Additional Information: No value filed.    Anesthesia Type:   No value filed.     Note:  Airway :Face Mask  Patient transferred to:PACU  Comments: VSS. Report to RN. 100%. Handoff Report: Identifed the Patient, Identified the Reponsible Provider, Reviewed the pertinent medical history, Discussed the surgical course, Reviewed Intra-OP anesthesia mangement and issues during anesthesia, Set expectations for post-procedure period and Allowed opportunity for questions and acknowledgement of understanding      Vitals: (Last set prior to Anesthesia Care Transfer)    CRNA VITALS  11/9/2017 0759 - 11/9/2017 0838      11/9/2017             Pulse: 102    SpO2: 100 %                Electronically Signed By: GAVIN Ruiz CRNA  November 9, 2017  8:38 AM

## 2017-11-09 NOTE — PROVIDER NOTIFICATION
Time of notification: 4:30 AM  MD notified: surgery cross-cover  Patient status: swelling at pharyngostomy site appears to be increased, patient having some difficulty with swallowing, no difficulty with breathing.  Orders received: At this time we will continue to monitor patient, no interventions.

## 2017-11-09 NOTE — PROGRESS NOTES
REGIONAL ANESTHESIA PAIN SERVICE EPIDURAL NOTE  Interval History: Pt returned to OR this AM, now back in room sleeping in recliner at bedside.  Pt reports pain adequately controlled and a little better since changed oxycodone to Q 3 hr PRN, also has PCA dilaudid and epidural infusion.  Denies any weakness, paresthesias, circumoral numbness, metallic taste or tinnitus.  Pt able to stand without assistance.  Patient NPO, denies nausea, tolerating TF    Anticoagulation:  Last dose enoxaparin administered 11/8/17 at 1559    Medications related to Pain Management (Future)    Start     Dose/Rate Route Frequency Ordered Stop    11/09/17 0217  acetaminophen (TYLENOL) solution 650 mg      650 mg Per Feeding Tube EVERY 8 HOURS PRN 11/09/17 0217 11/08/17 2045  sennosides (SENOKOT) syrup 5 mL      5 mL Oral 2 TIMES DAILY 11/08/17 2030 11/08/17 2045  docusate (COLACE) 50 MG/5ML liquid 10 mg      10 mg Oral 2 TIMES DAILY 11/08/17 2030 11/08/17 1945  magnesium hydroxide (MILK OF MAGNESIA) suspension 30 mL      30 mL Per Feeding Tube DAILY 11/08/17 1942 11/08/17 1941  bisacodyl (DULCOLAX) Suppository 10 mg      10 mg Rectal DAILY PRN 11/08/17 1941 11/08/17 1415  oxyCODONE (ROXICODONE) solution 5-10 mg      5-10 mg Oral EVERY 3 HOURS PRN 11/08/17 1402      11/06/17 1215  bupivacaine (MARCAINE) 0.125 % in NaCl 0.9 % 250 mL EPIDURAL Infusion      12 mL/hr  EPIDURAL CONTINUOUS 11/06/17 1203      11/03/17 2045  HYDROmorphone (DILAUDID) PCA 1 mg/mL       Intravenous PCA 11/03/17 2032 11/03/17 2045  lidocaine (LIDODERM) 5 % Patch 1 patch      1 patch Transdermal EVERY 24 HOURS 2000 11/03/17 2032 11/03/17 2032  lidocaine (LIDODERM) patch in PLACE       Transdermal EVERY 8 HOURS 11/03/17 2032              OBJECTIVE:  CBC RESULTS:   Recent Labs   Lab Test  11/09/17   0701   WBC  10.8   RBC  2.60*   HGB  8.0*   HCT  23.9*   MCV  92   MCH  30.8   MCHC  33.5   RDW  11.8   PLT  217         Lab Results    Component Value Date    INR 1.14 07/29/2017    INR 1.12 06/09/2017       Vitals:    Temp:  [97.3  F (36.3  C)-100.2  F (37.9  C)] 97.8  F (36.6  C)  Heart Rate:  [] 117  Resp:  [10-19] 19  BP: (101-133)/(53-66) 101/64  SpO2:  [97 %-100 %] 98 %    Exam:    Strength 5/5 and symmetric grossly in bilateral LE   Epidural site with small reddish purple bruise medial to insertion site, no tenderness, erythema, heme, edema      ASSESSMENT/PLAN:    Daniel Sandhu is a 37 year old male POD #6 s/p LAPAROSCOPY DIAGNOSTIC (GENERAL) LAPAROTOMY EXPLORATORY THORACOTOMY GASTRECTOMY PERCUTANEOUS INSERTION TUBE JEJUNOSTOMY PHARYNGOSTOMY COMBINED ESOPHAGOSCOPY, GASTROSCOPY, DUODENOSCOPY (EGD) and placement of T6-7 epidural for analgesia.  Pt returned to OR earlier today for neck hematoma s/p combined esophagoscopy, gastroscopy, duodenoscopy. Pt receiving adequate analgesia with epidural infusion Bupivacaine 0.125% at 12 mL/hour. Tolerating IV and oral analgesics.  No weakness or paresthesias, adequate sensory block.  No evidence of adverse side effects related to local anesthetic.    Epidural T6-7 catheter placement 11/3/2017, POD #6, catheter day #6  - epidural catheter removed at 1225 without complication, dark tip intact  - will sign off    - discussed plan with attending anesthesiologist        GAVIN Garduno [facility]  Regional Anesthesia Pain Service  11/9/2017 11:59 AM    24 hour Job Code Pager.  For in-house use only.     Dial * * [phone] and  [place]:  [phone]  [place]: [phone]  Peds:  [phone]  Then enter call-back number  May text page using [employer], but NOT [employer].

## 2017-11-09 NOTE — PLAN OF CARE
Problem: Patient Care Overview  Goal: Plan of Care/Patient Progress Review  6B PT- Cancel, pt in OR. Will reschedule.

## 2017-11-09 NOTE — PROGRESS NOTES
11/09/17 1605   General Information   Onset Date 11/03/17   Start of Care Date 11/09/17   Referring Physician Dr Yunior Esteban   Patient Profile Review/OT: Additional Occupational Profile Info See Profile for full history and prior level of function   Patient/Family Goals Statement Pt states strong desire to eat/drink again.    Swallowing Evaluation Videofluoroscopic evaluation   Behaviorial Observations WFL (within functional limits)   Mode of current nutrition (Jtube)   Respiratory Status O2 Supply   Type of O2 supply Nasal cannula   Comments Orders received for STAT Video swallow study with esophagram. 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 Pt returned to OR earlier this date for neck swelling. Pharyngostomy tube removed. Pt cleared for Video Swallow study with esophagram to further assess for leaks at anastomosis.    Clinical Swallow Evaluation   Oral Musculature generally intact   Dentition present and adequate   Mucosal Quality good   Mandibular Strength and Mobility intact   Oral Labial Strength and Mobility WFL   Lingual Strength and Mobility WFL   Velar Elevation intact   Buccal Strength and Mobility intact   Laryngeal Function Cough;Throat clear;Swallow;Voicing initiated   VFSS Eval: Radiology   Radiologist Resident   Views Taken left lateral   Physical Location of Procedure [facility] Radiology room 5   VFSS Eval: Thin Liquid Texture Trial   Mode of Presentation, Thin Liquid cup;self-fed   Order of Presentation 1,2   Preparatory Phase WFL   Oral Phase, Thin Liquid Premature pharyngeal entry   Pharyngeal Phase, Thin Liquid Delayed swallow reflex;Residue in valleculae   Rosenbek's Penetration Aspiration Scale: Thin Liquid Trial Results 7 - contrast passes glottis, visible subglottic residue remains despite  patient's response (aspiration)   Response to Aspiration nonproductive volitional cough following clinician cue   Diagnostic Statement Pt demonstrated aspiration on each trail omnipaque constrast.    Swallow Compensations   Swallow Compensations Effortful swallow;Supraglottic swallow   Results Aspiration   Esophageal Phase of Swallow   Esophageal comments Unable to complete esophagram due to amount of aspiration during single sip trials.    General Therapy Interventions   Planned Therapy Interventions Dysphagia Treatment   Dysphagia treatment Oropharyngeal exercise training;Instruction of safe swallow strategies   Swallow Eval: Clinical Impressions   Skilled Criteria for Therapy Intervention Skilled criteria met.  Treatment indicated.   Functional Assessment Scale (FAS) 1   Treatment Diagnosis Severe oropharyngeal dysphagia   Diet texture recommendations NPO  (ice chips only at this time. )   Therapy Frequency daily   Predicted Duration of Therapy Intervention (days/wks) 3 weeks   Anticipated Discharge Disposition (Defer to PT/OT. )   Risks and Benefits of Treatment have been explained. Yes   Patient, family and/or staff in agreement with Plan of Care Yes   Clinical Impression Comments Video Swallow study completed per orders. Pt demonstrates severe oropharyngeal dysphagia as characterized by delayed initiation of the swallow, deep penetration on each trial with subsequent aspiration, decreased pharyngeal constriction, decreased epiglottic inversion with residual noted in the valleculae. Residual is decreased with subsequent swallows. Pt is unable to clear aspirated material with cued coughing. Unable to complete esphagram due to aspiration noted on initial sips of omnipaque. Recommend NPO with alternative methods of nutrition/hydration/medication. Pt may have ice chips for oral comfort/hydration. SLP to follow for pharyngeal strengthening exercises and to complete repeat VFSS on Monday 11/13/17 per throacic team.     Total Evaluation Time   Total Evaluation Time (Minutes) 20

## 2017-11-10 ENCOUNTER — APPOINTMENT (OUTPATIENT)
Dept: PHYSICAL THERAPY | Facility: CLINIC | Age: 37
DRG: 327 | End: 2017-11-10
Attending: THORACIC SURGERY (CARDIOTHORACIC VASCULAR SURGERY)
Payer: COMMERCIAL

## 2017-11-10 ENCOUNTER — APPOINTMENT (OUTPATIENT)
Dept: SPEECH THERAPY | Facility: CLINIC | Age: 37
DRG: 327 | End: 2017-11-10
Attending: THORACIC SURGERY (CARDIOTHORACIC VASCULAR SURGERY)
Payer: COMMERCIAL

## 2017-11-10 ENCOUNTER — APPOINTMENT (OUTPATIENT)
Dept: GENERAL RADIOLOGY | Facility: CLINIC | Age: 37
DRG: 327 | End: 2017-11-10
Attending: THORACIC SURGERY (CARDIOTHORACIC VASCULAR SURGERY)
Payer: COMMERCIAL

## 2017-11-10 LAB
ANION GAP SERPL CALCULATED.3IONS-SCNC: 4 MMOL/L (ref 3–14)
BUN SERPL-MCNC: 13 MG/DL (ref 7–30)
CALCIUM SERPL-MCNC: 7.6 MG/DL (ref 8.5–10.1)
CHLORIDE SERPL-SCNC: 98 MMOL/L (ref 94–109)
CO2 SERPL-SCNC: 33 MMOL/L (ref 20–32)
CREAT SERPL-MCNC: 0.63 MG/DL (ref 0.66–1.25)
ERYTHROCYTE [DISTWIDTH] IN BLOOD BY AUTOMATED COUNT: 11.9 % (ref 10–15)
GFR SERPL CREATININE-BSD FRML MDRD: >90 ML/MIN/1.7M2
GLUCOSE SERPL-MCNC: 118 MG/DL (ref 70–99)
HCT VFR BLD AUTO: 21.6 % (ref 40–53)
HGB BLD-MCNC: 7.4 G/DL (ref 13.3–17.7)
MCH RBC QN AUTO: 31.4 PG (ref 26.5–33)
MCHC RBC AUTO-ENTMCNC: 34.3 G/DL (ref 31.5–36.5)
MCV RBC AUTO: 92 FL (ref 78–100)
PLATELET # BLD AUTO: 235 10E9/L (ref 150–450)
POTASSIUM SERPL-SCNC: 3.8 MMOL/L (ref 3.4–5.3)
RBC # BLD AUTO: 2.36 10E12/L (ref 4.4–5.9)
SODIUM SERPL-SCNC: 134 MMOL/L (ref 133–144)
WBC # BLD AUTO: 7.6 10E9/L (ref 4–11)

## 2017-11-10 PROCEDURE — 71020 XR CHEST 2 VW: CPT

## 2017-11-10 PROCEDURE — 97110 THERAPEUTIC EXERCISES: CPT | Mod: GP

## 2017-11-10 PROCEDURE — 25000132 ZZH RX MED GY IP 250 OP 250 PS 637: Performed by: SURGERY

## 2017-11-10 PROCEDURE — 97530 THERAPEUTIC ACTIVITIES: CPT | Mod: GP

## 2017-11-10 PROCEDURE — 25800025 ZZH RX 258: Performed by: PHYSICIAN ASSISTANT

## 2017-11-10 PROCEDURE — 25000128 H RX IP 250 OP 636: Performed by: THORACIC SURGERY (CARDIOTHORACIC VASCULAR SURGERY)

## 2017-11-10 PROCEDURE — 40000225 ZZH STATISTIC SLP WARD VISIT: Performed by: SPEECH-LANGUAGE PATHOLOGIST

## 2017-11-10 PROCEDURE — 25000132 ZZH RX MED GY IP 250 OP 250 PS 637: Performed by: STUDENT IN AN ORGANIZED HEALTH CARE EDUCATION/TRAINING PROGRAM

## 2017-11-10 PROCEDURE — 27210443 ZZH NUTRITION PRODUCT SPECIALIZED PACKET

## 2017-11-10 PROCEDURE — 25000132 ZZH RX MED GY IP 250 OP 250 PS 637: Performed by: THORACIC SURGERY (CARDIOTHORACIC VASCULAR SURGERY)

## 2017-11-10 PROCEDURE — 80048 BASIC METABOLIC PNL TOTAL CA: CPT | Performed by: STUDENT IN AN ORGANIZED HEALTH CARE EDUCATION/TRAINING PROGRAM

## 2017-11-10 PROCEDURE — 12000006 ZZH R&B IMCU INTERMEDIATE UMMC

## 2017-11-10 PROCEDURE — 25000132 ZZH RX MED GY IP 250 OP 250 PS 637

## 2017-11-10 PROCEDURE — 85027 COMPLETE CBC AUTOMATED: CPT | Performed by: STUDENT IN AN ORGANIZED HEALTH CARE EDUCATION/TRAINING PROGRAM

## 2017-11-10 PROCEDURE — 36592 COLLECT BLOOD FROM PICC: CPT | Performed by: STUDENT IN AN ORGANIZED HEALTH CARE EDUCATION/TRAINING PROGRAM

## 2017-11-10 PROCEDURE — 92526 ORAL FUNCTION THERAPY: CPT | Mod: GN | Performed by: SPEECH-LANGUAGE PATHOLOGIST

## 2017-11-10 PROCEDURE — 25800025 ZZH RX 258: Performed by: SURGERY

## 2017-11-10 PROCEDURE — 40000193 ZZH STATISTIC PT WARD VISIT

## 2017-11-10 RX ORDER — CYCLOBENZAPRINE HCL 5 MG
5-10 TABLET ORAL 3 TIMES DAILY PRN
Status: DISCONTINUED | OUTPATIENT
Start: 2017-11-10 | End: 2017-11-17 | Stop reason: HOSPADM

## 2017-11-10 RX ADMIN — MULTIVITAMIN 15 ML: LIQUID ORAL at 09:02

## 2017-11-10 RX ADMIN — PIPERACILLIN SODIUM AND TAZOBACTAM SODIUM 3.38 G: 3; .375 INJECTION, POWDER, LYOPHILIZED, FOR SOLUTION INTRAVENOUS at 02:04

## 2017-11-10 RX ADMIN — LIDOCAINE 1 PATCH: 50 PATCH CUTANEOUS at 19:53

## 2017-11-10 RX ADMIN — POTASSIUM CHLORIDE, DEXTROSE MONOHYDRATE AND SODIUM CHLORIDE 50 ML: 150; 5; 450 INJECTION, SOLUTION INTRAVENOUS at 16:00

## 2017-11-10 RX ADMIN — DOCUSATE SODIUM 10 MG: 50 LIQUID ORAL at 19:44

## 2017-11-10 RX ADMIN — OXYCODONE HYDROCHLORIDE 5 MG: 5 SOLUTION ORAL at 21:57

## 2017-11-10 RX ADMIN — POTASSIUM CHLORIDE, DEXTROSE MONOHYDRATE AND SODIUM CHLORIDE 20 ML: 150; 5; 450 INJECTION, SOLUTION INTRAVENOUS at 00:10

## 2017-11-10 RX ADMIN — PIPERACILLIN SODIUM AND TAZOBACTAM SODIUM 3.38 G: 3; .375 INJECTION, POWDER, LYOPHILIZED, FOR SOLUTION INTRAVENOUS at 19:49

## 2017-11-10 RX ADMIN — HYDROMORPHONE HYDROCHLORIDE: 10 INJECTION, SOLUTION INTRAMUSCULAR; INTRAVENOUS; SUBCUTANEOUS at 13:01

## 2017-11-10 RX ADMIN — Medication 5 ML: at 09:02

## 2017-11-10 RX ADMIN — PIPERACILLIN SODIUM AND TAZOBACTAM SODIUM 3.38 G: 3; .375 INJECTION, POWDER, LYOPHILIZED, FOR SOLUTION INTRAVENOUS at 14:53

## 2017-11-10 RX ADMIN — POTASSIUM CHLORIDE, DEXTROSE MONOHYDRATE AND SODIUM CHLORIDE 70 ML: 150; 5; 450 INJECTION, SOLUTION INTRAVENOUS at 09:17

## 2017-11-10 RX ADMIN — FLUCONAZOLE 200 MG: 2 INJECTION INTRAVENOUS at 09:18

## 2017-11-10 RX ADMIN — Medication 5 ML: at 19:45

## 2017-11-10 RX ADMIN — OXYCODONE HYDROCHLORIDE 5 MG: 5 SOLUTION ORAL at 09:02

## 2017-11-10 RX ADMIN — POTASSIUM CHLORIDE 20 MEQ: 1.5 POWDER, FOR SOLUTION ORAL at 06:32

## 2017-11-10 RX ADMIN — OXYCODONE HYDROCHLORIDE 5 MG: 5 SOLUTION ORAL at 16:00

## 2017-11-10 RX ADMIN — POTASSIUM CHLORIDE, DEXTROSE MONOHYDRATE AND SODIUM CHLORIDE: 150; 5; 450 INJECTION, SOLUTION INTRAVENOUS at 06:49

## 2017-11-10 RX ADMIN — POTASSIUM CHLORIDE, DEXTROSE MONOHYDRATE AND SODIUM CHLORIDE: 150; 5; 450 INJECTION, SOLUTION INTRAVENOUS at 20:41

## 2017-11-10 RX ADMIN — DOCUSATE SODIUM 10 MG: 50 LIQUID ORAL at 09:02

## 2017-11-10 RX ADMIN — OXYCODONE HYDROCHLORIDE 5 MG: 5 SOLUTION ORAL at 12:14

## 2017-11-10 RX ADMIN — PIPERACILLIN SODIUM AND TAZOBACTAM SODIUM 3.38 G: 3; .375 INJECTION, POWDER, LYOPHILIZED, FOR SOLUTION INTRAVENOUS at 09:12

## 2017-11-10 RX ADMIN — MAGNESIUM HYDROXIDE 30 ML: 400 SUSPENSION ORAL at 09:02

## 2017-11-10 RX ADMIN — OXYCODONE HYDROCHLORIDE 5 MG: 5 SOLUTION ORAL at 00:12

## 2017-11-10 RX ADMIN — POTASSIUM CHLORIDE, DEXTROSE MONOHYDRATE AND SODIUM CHLORIDE 120 ML: 150; 5; 450 INJECTION, SOLUTION INTRAVENOUS at 23:55

## 2017-11-10 RX ADMIN — BISACODYL 10 MG: 10 SUPPOSITORY RECTAL at 04:58

## 2017-11-10 ASSESSMENT — ACTIVITIES OF DAILY LIVING (ADL)
ADLS_ACUITY_SCORE: 11

## 2017-11-10 ASSESSMENT — PAIN DESCRIPTION - DESCRIPTORS
DESCRIPTORS: ACHING

## 2017-11-10 NOTE — PLAN OF CARE
Problem: Patient Care Overview  Goal: Plan of Care/Patient Progress Review  Discharge Planner SLP   Patient plan for discharge: Unknown  Current status: Pt trained on swallowing exercises and risks of aspiration. Updated on plan of care to for repeating VFSS. Recommend pt continue NPO status with ice chips in moderation for oral hydration. SLP to complete repeat Video Swallow Study on Monday 11/13/17.   Barriers to return to prior living situation: Medical needs.   Recommendations for discharge: Defer to PT/OT.   Rationale for recommendations: Pt will likely meet SLP goals prior to discharge from the hospital.        Entered by: Nelly Nair 11/10/2017 11:24 AM

## 2017-11-10 NOTE — PLAN OF CARE
"Problem: Patient Care Overview  Goal: Plan of Care/Patient Progress Review  Outcome: Improving  Blood pressure 121/64, pulse 91, temperature 98.7  F (37.1  C), temperature source Oral, resp. rate 16, height 1.73 m (5' 8.11\"), weight 75 kg (165 lb 5 oz), SpO2 97 %.    Neuro: A&Ox4.   Cardiac: SR-ST. VSS. Pt tachy with activity but HR does settle when pt is resting.             Respiratory: Sating fine on RA.  GI/: barnes removed at 1930 pt due to void between 11:30-2:30AM pt has some swelling on shaft of his penis after removal of barnes. Pt ordered gel pillow to help elevate and relieve swelling. Pt educated on due to void status. Pt reports gas but no BM during shift.  Diet/appetite: Tolerating TF diet. At 40ml/hr 100mls is goal pt due to be turned up at 0700.  Activity:  SBA up to chair and in halls.  Pain: At acceptable level on current regimen. Pt on PCA 0.3 Q10 mins with oxy Q3hrs through G/J tube.  Skin: Intact, no new deficits noted.   LDA's: CT to waterseal. 60 mls out during my shift.     Plan: Continue with POC. Notify primary team with changes.     Icy hot patches removed and lidocaine patch placed on upper back.           "

## 2017-11-10 NOTE — PROGRESS NOTES
"Thoracic Surgery Progress Note  Daniel Sandhu  [id number]  November 10, 2017    Subjective: No acute issues overnight. Left neck swelling and tenderness improved. Endorses occasional acid reflux. Denies nausea or emesis. Dressing changed at bedside.    Objective:   /55 (BP Location: Right arm)  Pulse 91  Temp 98.4  F (36.9  C) (Oral)  Resp 15  Ht 1.73 m (5' 8.11\")  Wt 74.4 kg (164 lb)  SpO2 100%  BMI 24.86 kg/m2    PE:  Gen: Awake, alert, NAD   CV: RRR  Resp: non-labored at rest, CT with s/s drainage and no airleak  Abd: Soft, non-distended, NTTP  Ext: warm and well perfused  Incision: c/d/i. Neck wound packed with clean edged.  Chest tube: Serosanguinous    I/O last 3 completed shifts:  In: 3180 [I.V.:1745; NG/GT:340; IV Piggyback:205]  Out: 680 [Urine:400; Chest Tube:280] - Last 24 hours      Labs/Imaging  Heme:  Recent Labs  Lab 11/10/17  0530 11/09/17  0701 11/08/17  0617 11/07/17  0541   WBC 7.6 10.8 8.7 7.9   HGB 7.4* 8.0* 7.9* 7.5*    217 152 157     Chem:  Recent Labs  Lab 11/10/17  0530 11/09/17  0701 11/08/17  0617 11/07/17  0541   POTASSIUM 3.8 4.0 3.7 3.9   CR 0.63* 0.52* 0.46* 0.60*         A/P: Daniel Sandhu is a 37 year old male with GE junction adenocarcinoma now s/p  distal esophagectomy, total gastrectomy, Terrell-en-Y reconstruction, thoracotomy, bilateral chest tube placement, pharyngostomy tube placement, jejunostomy tube placement on 11/5/17 and oropharyngeal exploration and esophagojejunostomy on 11/9/17.    --PRN pain meds; dilaudid PCA  -- NPO except ice chips. Advance TF to goal.  --Daily labs. Replace lytes PRN for K<4, Mg <2, Phos <3  --CT to water seal. CXR today  --Restart lovenox in AM  --D/c barnes. If urinary retention, straight cath once. If continues to have urinary retention, replace barnes.    Patient seen and discussed with the Fellow, Dr. Pavon.      Shital Grant MD  PGY-1, General Surgery      "

## 2017-11-10 NOTE — PLAN OF CARE
Problem: Patient Care Overview  Goal: Plan of Care/Patient Progress Review  OT: OT to sign off and defer to PT. Per discussion with patient and interdisciplinary team, pt has no further OT needs. Please re-consult if changes arise.       Occupational Therapy Discharge Summary    Reason for therapy discharge:    All goals and outcomes met, no further needs identified.    Progress towards therapy goal(s). See goals on Care Plan in [facility] electronic health record for goal details.  Goals met    Therapy recommendation(s):    No further therapy is recommended.

## 2017-11-10 NOTE — PLAN OF CARE
Problem: Patient Care Overview  Goal: Plan of Care/Patient Progress Review  6B PT -      Discharge Planner PT   Patient plan for discharge: Home with assist  Current status: Pt walked 75' x2 with SBA, and is able to transfer sit<>stand independently and supine>sit with use of bed rails.  Pt ascends/descends 3 stairs x10 with use of railing. Pt peddled on Nu Step for 18 minutes with resistance ranging from 3-7.   Barriers to return to prior living situation: endurance, medical status  Recommendations for discharge: Home with assist  Rationale for recommendations: Pt is independent or modified independent with all functional mobility.           Entered by: Radha Romeo 11/10/2017 3:53 PM

## 2017-11-10 NOTE — PLAN OF CARE
"Problem: Patient Care Overview  Goal: Plan of Care/Patient Progress Review  /55 (BP Location: Right arm)  Pulse 91  Temp 98.6  F (37  C) (Oral)  Resp 14  Ht 1.73 m (5' 8.11\")  Wt 74.4 kg (164 lb)  SpO2 100%  BMI 24.86 kg/m2     Neuro: A&Ox4.   Cardiac: SR-ST with activity. VSS. Potassium replaced.   Respiratory: Sating 100% on RA. L chest tube to water seal. No air leak. Serous output. Coarse LS encouraged the use of IS and coughing.   GI/: Unmeassured 1 urine output after barnes got removed. No BM. C/O feeling constipated. Suppository given.   Diet/appetite: NPO. TF @ 50ml/hr. Goal is 100 ml/hr. Next increase by 10mls is @ 12pm. No c/o nausea.   Activity:  Stand by assist up to bathroom and chair.   Pain: C/o pain by chest tube site and incisions. Dilaudid PCA and oxy x2 given.   Skin: Intact, no new deficits noted. Dressings CDI. Pharyngostomy dressings reinforced.    LDA's: R port: D5 0.45 w 20kcl @ 75 ml/hr with q8 bolus.   J tube to continuous TF @ 50ml/hr.   CT to water seal      Plan: Continue with POC. Notify primary team with changes.          "

## 2017-11-10 NOTE — PLAN OF CARE
Problem: Patient Care Overview  Goal: Plan of Care/Patient Progress Review  [id number]  VSS except for intermittent tachycardia; reports well controlled pain with PCA Dilaudid @ 0.3; prn Oxycodone given X1; icy hot pain patch applied  bilaterally to upper back denies nausea/SOB, on RA, cont Capnography d/c; pt can have ice chips but should not swallow;  CT to water seal with serous drainage; no air leak; TF advanced to 40ml/hr at 1900, goal rate is 100ml/hr;  ML abdominal incision intact with staples and JACOB, chest incisions intact with liquid bandage and JACOB; pt has been up in chair rest of the evening; continue to monitor and with POC.    UPDATE: barnes catheter removed at 1925, pt DTV

## 2017-11-11 ENCOUNTER — APPOINTMENT (OUTPATIENT)
Dept: GENERAL RADIOLOGY | Facility: CLINIC | Age: 37
DRG: 327 | End: 2017-11-11
Attending: THORACIC SURGERY (CARDIOTHORACIC VASCULAR SURGERY)
Payer: COMMERCIAL

## 2017-11-11 ENCOUNTER — APPOINTMENT (OUTPATIENT)
Dept: SPEECH THERAPY | Facility: CLINIC | Age: 37
DRG: 327 | End: 2017-11-11
Attending: THORACIC SURGERY (CARDIOTHORACIC VASCULAR SURGERY)
Payer: COMMERCIAL

## 2017-11-11 LAB
ANION GAP SERPL CALCULATED.3IONS-SCNC: 7 MMOL/L (ref 3–14)
BUN SERPL-MCNC: 8 MG/DL (ref 7–30)
CALCIUM SERPL-MCNC: 7.8 MG/DL (ref 8.5–10.1)
CHLORIDE SERPL-SCNC: 99 MMOL/L (ref 94–109)
CO2 SERPL-SCNC: 29 MMOL/L (ref 20–32)
CREAT SERPL-MCNC: 0.56 MG/DL (ref 0.66–1.25)
ERYTHROCYTE [DISTWIDTH] IN BLOOD BY AUTOMATED COUNT: 12 % (ref 10–15)
GFR SERPL CREATININE-BSD FRML MDRD: >90 ML/MIN/1.7M2
GLUCOSE SERPL-MCNC: 117 MG/DL (ref 70–99)
HCT VFR BLD AUTO: 22 % (ref 40–53)
HGB BLD-MCNC: 7.3 G/DL (ref 13.3–17.7)
MCH RBC QN AUTO: 30.2 PG (ref 26.5–33)
MCHC RBC AUTO-ENTMCNC: 33.2 G/DL (ref 31.5–36.5)
MCV RBC AUTO: 91 FL (ref 78–100)
PLATELET # BLD AUTO: 281 10E9/L (ref 150–450)
POTASSIUM SERPL-SCNC: 3.9 MMOL/L (ref 3.4–5.3)
RBC # BLD AUTO: 2.42 10E12/L (ref 4.4–5.9)
SODIUM SERPL-SCNC: 135 MMOL/L (ref 133–144)
WBC # BLD AUTO: 5.6 10E9/L (ref 4–11)

## 2017-11-11 PROCEDURE — 27210443 ZZH NUTRITION PRODUCT SPECIALIZED PACKET

## 2017-11-11 PROCEDURE — 25800025 ZZH RX 258: Performed by: SURGERY

## 2017-11-11 PROCEDURE — 36592 COLLECT BLOOD FROM PICC: CPT | Performed by: STUDENT IN AN ORGANIZED HEALTH CARE EDUCATION/TRAINING PROGRAM

## 2017-11-11 PROCEDURE — 25000128 H RX IP 250 OP 636: Performed by: STUDENT IN AN ORGANIZED HEALTH CARE EDUCATION/TRAINING PROGRAM

## 2017-11-11 PROCEDURE — 25000132 ZZH RX MED GY IP 250 OP 250 PS 637

## 2017-11-11 PROCEDURE — 25000132 ZZH RX MED GY IP 250 OP 250 PS 637: Performed by: SURGERY

## 2017-11-11 PROCEDURE — 80048 BASIC METABOLIC PNL TOTAL CA: CPT | Performed by: STUDENT IN AN ORGANIZED HEALTH CARE EDUCATION/TRAINING PROGRAM

## 2017-11-11 PROCEDURE — 85027 COMPLETE CBC AUTOMATED: CPT | Performed by: STUDENT IN AN ORGANIZED HEALTH CARE EDUCATION/TRAINING PROGRAM

## 2017-11-11 PROCEDURE — 92526 ORAL FUNCTION THERAPY: CPT | Mod: GN

## 2017-11-11 PROCEDURE — 12000006 ZZH R&B IMCU INTERMEDIATE UMMC

## 2017-11-11 PROCEDURE — 25000132 ZZH RX MED GY IP 250 OP 250 PS 637: Performed by: THORACIC SURGERY (CARDIOTHORACIC VASCULAR SURGERY)

## 2017-11-11 PROCEDURE — 40000225 ZZH STATISTIC SLP WARD VISIT

## 2017-11-11 PROCEDURE — 71010 XR CHEST PORT 1 VW: CPT

## 2017-11-11 PROCEDURE — 25000132 ZZH RX MED GY IP 250 OP 250 PS 637: Performed by: STUDENT IN AN ORGANIZED HEALTH CARE EDUCATION/TRAINING PROGRAM

## 2017-11-11 PROCEDURE — 71020 XR CHEST 2 VW: CPT

## 2017-11-11 PROCEDURE — 25000128 H RX IP 250 OP 636: Performed by: THORACIC SURGERY (CARDIOTHORACIC VASCULAR SURGERY)

## 2017-11-11 RX ADMIN — PIPERACILLIN SODIUM AND TAZOBACTAM SODIUM 3.38 G: 3; .375 INJECTION, POWDER, LYOPHILIZED, FOR SOLUTION INTRAVENOUS at 15:03

## 2017-11-11 RX ADMIN — DOCUSATE SODIUM 10 MG: 50 LIQUID ORAL at 08:15

## 2017-11-11 RX ADMIN — ENOXAPARIN SODIUM 40 MG: 40 INJECTION SUBCUTANEOUS at 08:18

## 2017-11-11 RX ADMIN — Medication 5 ML: at 19:41

## 2017-11-11 RX ADMIN — LIDOCAINE 1 PATCH: 50 PATCH CUTANEOUS at 19:42

## 2017-11-11 RX ADMIN — CHLORASEPTIC 1 ML: 1.5 LIQUID ORAL at 22:01

## 2017-11-11 RX ADMIN — PIPERACILLIN SODIUM AND TAZOBACTAM SODIUM 3.38 G: 3; .375 INJECTION, POWDER, LYOPHILIZED, FOR SOLUTION INTRAVENOUS at 08:18

## 2017-11-11 RX ADMIN — HYDROMORPHONE HYDROCHLORIDE: 10 INJECTION, SOLUTION INTRAMUSCULAR; INTRAVENOUS; SUBCUTANEOUS at 13:22

## 2017-11-11 RX ADMIN — DOCUSATE SODIUM 10 MG: 50 LIQUID ORAL at 19:41

## 2017-11-11 RX ADMIN — MAGNESIUM HYDROXIDE 30 ML: 400 SUSPENSION ORAL at 08:15

## 2017-11-11 RX ADMIN — Medication 5 ML: at 08:16

## 2017-11-11 RX ADMIN — MULTIVITAMIN 15 ML: LIQUID ORAL at 08:15

## 2017-11-11 RX ADMIN — POTASSIUM CHLORIDE, DEXTROSE MONOHYDRATE AND SODIUM CHLORIDE 160 ML: 150; 5; 450 INJECTION, SOLUTION INTRAVENOUS at 10:43

## 2017-11-11 RX ADMIN — OXYCODONE HYDROCHLORIDE 5 MG: 5 SOLUTION ORAL at 08:34

## 2017-11-11 RX ADMIN — POTASSIUM CHLORIDE, DEXTROSE MONOHYDRATE AND SODIUM CHLORIDE 5 ML: 150; 5; 450 INJECTION, SOLUTION INTRAVENOUS at 16:54

## 2017-11-11 RX ADMIN — HYDROMORPHONE HYDROCHLORIDE: 10 INJECTION, SOLUTION INTRAMUSCULAR; INTRAVENOUS; SUBCUTANEOUS at 03:05

## 2017-11-11 RX ADMIN — PIPERACILLIN SODIUM AND TAZOBACTAM SODIUM 3.38 G: 3; .375 INJECTION, POWDER, LYOPHILIZED, FOR SOLUTION INTRAVENOUS at 19:41

## 2017-11-11 RX ADMIN — PIPERACILLIN SODIUM AND TAZOBACTAM SODIUM 3.38 G: 3; .375 INJECTION, POWDER, LYOPHILIZED, FOR SOLUTION INTRAVENOUS at 01:57

## 2017-11-11 RX ADMIN — FLUCONAZOLE 200 MG: 2 INJECTION INTRAVENOUS at 10:44

## 2017-11-11 ASSESSMENT — ACTIVITIES OF DAILY LIVING (ADL)
ADLS_ACUITY_SCORE: 11

## 2017-11-11 ASSESSMENT — PAIN DESCRIPTION - DESCRIPTORS
DESCRIPTORS: ACHING

## 2017-11-11 NOTE — PROVIDER NOTIFICATION
[facility] patient L chest tube was clogged and was leaking out around the site.  The site had increase edema from previous assessment. Patient complained of being a little more SOB than usual.   Kori came to bedside to assess the chest tube and ordered a STAT chest xray.

## 2017-11-11 NOTE — PLAN OF CARE
"Problem: Patient Care Overview  Goal: Plan of Care/Patient Progress Review  Outcome: No Change  Blood pressure 126/55, pulse 91, temperature 98.4  F (36.9  C), temperature source Oral, resp. rate 15, height 1.73 m (5' 8.11\"), weight 74.4 kg (164 lb), SpO2 100 %.    Alert and Oriented. Sinus/ST with activity. Lungs sounds coarse in basis, L chest tube to water seal without air leak. Dressings CDI. Pt is urinating without difficulty and was able to have a large bowel movement today. Incisions to chest are WDL. Dressing on neck was changed today by resident. Patient reports some swelling of his tongue beginning 1400, 11/09-MD aware.R port: D5 0.45 w 20kcl @ 75 ml/hr with q8 bolus.  Pt is receiving 0.3mg dilaudid as needed via PCA pain button, as well as oral oxycodone 5mg q3hr. Tube feeds advanced at 1200 to 60ml/hr, due to advance to 70ml at 2000. Patient was able to ambulate in room today several times with SBA.       "

## 2017-11-11 NOTE — PLAN OF CARE
Problem: Patient Care Overview  Goal: Plan of Care/Patient Progress Review  Discharge Planner SLP   Patient plan for discharge: unknown  Current status: Recommend continue NPO with alternate source of nutrition. Encourage completion of pharyngeal strengthening exercises. Plan to repeat VFSS Monday 11/13/17 as medically appropriate/  Barriers to return to prior living situation: none  Recommendations for discharge: defer  Rationale for recommendations: swallow function not impacting discharge disposition       Entered by: Natalia Crowley 11/11/2017 1:28 PM

## 2017-11-11 NOTE — PLAN OF CARE
"Problem: Patient Care Overview  Goal: Plan of Care/Patient Progress Review  /74 (BP Location: Right arm)  Pulse 91  Temp 97.8  F (36.6  C) (Oral)  Resp 18  Ht 1.73 m (5' 8.11\")  Wt 74.7 kg (164 lb 11.2 oz)  SpO2 100%  BMI 24.96 kg/m2     Neuro: A&Ox4.   Cardiac: SR-ST with activity. VSS.   Respiratory: Sating 100% on RA. L chest tube to water seal. No air leak. Serous output. Clot in chest tube (see MD notification note) Coarse LS encouraged the use of IS and coughing. Productive cough.   GI/: Adequate urine output. No BM.  Diet/appetite: NPO. TF @ 80ml/hr. Goal is 100 ml/hr. Next increase by 10mls is @ 12pm. No c/o nausea.   Activity:  Stand by assist up to bathroom and chair.   Pain: C/o pain by chest tube site and incisions. Dilaudid PCA and oxy x1 given.   Skin: Intact, no new deficits noted. Dressings CDI.    LDA's: R port: D5 0.45 w 20kcl @ 75 ml/hr with q8 bolus.   J tube to continuous TF @ 80ml/hr.   CT to water seal       Plan: Continue with POC. Notify primary team with changes.          "

## 2017-11-11 NOTE — PROGRESS NOTES
"Thoracic Surgery Progress Note  Daniel Sandhu  [id number]  November 11, 2017    Subjective: No acute issues overnight. Pain controlled. Fibrinous clot in chest tube with some improvement with stripping of tubing.    Objective:   /65 (BP Location: Right arm)  Pulse 91  Temp 98.5  F (36.9  C) (Oral)  Resp 16  Ht 1.73 m (5' 8.11\")  Wt 74.7 kg (164 lb 11.2 oz)  SpO2 100%  BMI 24.96 kg/m2    PE:  Gen: Awake, alert, NAD   CV: RRR  Resp: non-labored at rest, CT in place with s/s output and no airleak  Abd: Soft, non-distended, NTTP  Ext: warm and well perfused  Incision: c/d/i. Neck wound packed with clean edges.    I/O last 3 completed shifts:  In: 4090 [I.V.:2000; NG/GT:490; IV Piggyback:70]  Out: 1960 [Urine:1300; Chest Tube:660] - Last 24 hours      Labs/Imaging  Heme:  Recent Labs  Lab 11/11/17  0643 11/10/17  0530 11/09/17  0701 11/08/17  0617   WBC 5.6 7.6 10.8 8.7   HGB 7.3* 7.4* 8.0* 7.9*    235 217 152     Chem:  Recent Labs  Lab 11/11/17  0643 11/10/17  0530 11/09/17  0701 11/08/17  0617   POTASSIUM 3.9 3.8 4.0 3.7   CR 0.56* 0.63* 0.52* 0.46*         A/P: Daniel Sandhu is a 37 year old male with GE junction adenocarcinoma now s/p  distal esophagectomy, total gastrectomy, Terrell-en-Y reconstruction, thoracotomy, bilateral chest tube placement, pharyngostomy tube placement, jejunostomy tube placement on 11/5/17 and oropharyngeal exploration and esophagojejunostomy on 11/9/17.    NEURO Pain controlled on  Dilaudid PCA and PRN pain meds.  Changes:  None    CV HDS. Hydralazine PRN for SBP >160   PULM Aggressive pulmonary toilet and I/S. Accapella valve with RT. Duonebs PRN. CT to water seal. Daily CXR   FEN/GI D/c IVF Continue replacement fluids for CT output.  Continue NPO and TF. Swallow evaluation on Monday.    No acute issues, good UOP    HEME Hgb as above. Postop anemia expected for this surgery.  Continue to monitor. No transfusions indicated at this time   ID Afebrile, no leukocytosis.  "   Antibiotics: diflucan and zosyn    ENDO No issues   ACTIVITY Up as tolerated  Ambulate at least TID    PPx Prophylactic lovenox           Patient seen and discussed with the Fellow, Dr. Grimm.      Shital Grant MD  PGY-1, General Surgery

## 2017-11-12 ENCOUNTER — APPOINTMENT (OUTPATIENT)
Dept: GENERAL RADIOLOGY | Facility: CLINIC | Age: 37
DRG: 327 | End: 2017-11-12
Attending: THORACIC SURGERY (CARDIOTHORACIC VASCULAR SURGERY)
Payer: COMMERCIAL

## 2017-11-12 LAB
ANION GAP SERPL CALCULATED.3IONS-SCNC: 4 MMOL/L (ref 3–14)
BUN SERPL-MCNC: 8 MG/DL (ref 7–30)
CALCIUM SERPL-MCNC: 7.7 MG/DL (ref 8.5–10.1)
CHLORIDE SERPL-SCNC: 102 MMOL/L (ref 94–109)
CO2 SERPL-SCNC: 30 MMOL/L (ref 20–32)
CREAT SERPL-MCNC: 0.69 MG/DL (ref 0.66–1.25)
ERYTHROCYTE [DISTWIDTH] IN BLOOD BY AUTOMATED COUNT: 12.3 % (ref 10–15)
GFR SERPL CREATININE-BSD FRML MDRD: >90 ML/MIN/1.7M2
GLUCOSE SERPL-MCNC: 111 MG/DL (ref 70–99)
HCT VFR BLD AUTO: 23 % (ref 40–53)
HGB BLD-MCNC: 7.7 G/DL (ref 13.3–17.7)
MCH RBC QN AUTO: 30 PG (ref 26.5–33)
MCHC RBC AUTO-ENTMCNC: 33.5 G/DL (ref 31.5–36.5)
MCV RBC AUTO: 90 FL (ref 78–100)
PLATELET # BLD AUTO: 342 10E9/L (ref 150–450)
POTASSIUM SERPL-SCNC: 4.1 MMOL/L (ref 3.4–5.3)
RBC # BLD AUTO: 2.57 10E12/L (ref 4.4–5.9)
SODIUM SERPL-SCNC: 136 MMOL/L (ref 133–144)
WBC # BLD AUTO: 5.3 10E9/L (ref 4–11)

## 2017-11-12 PROCEDURE — 80048 BASIC METABOLIC PNL TOTAL CA: CPT | Performed by: STUDENT IN AN ORGANIZED HEALTH CARE EDUCATION/TRAINING PROGRAM

## 2017-11-12 PROCEDURE — 12000006 ZZH R&B IMCU INTERMEDIATE UMMC

## 2017-11-12 PROCEDURE — 25000128 H RX IP 250 OP 636: Performed by: THORACIC SURGERY (CARDIOTHORACIC VASCULAR SURGERY)

## 2017-11-12 PROCEDURE — 25000128 H RX IP 250 OP 636: Performed by: STUDENT IN AN ORGANIZED HEALTH CARE EDUCATION/TRAINING PROGRAM

## 2017-11-12 PROCEDURE — 25000132 ZZH RX MED GY IP 250 OP 250 PS 637: Performed by: STUDENT IN AN ORGANIZED HEALTH CARE EDUCATION/TRAINING PROGRAM

## 2017-11-12 PROCEDURE — 25000132 ZZH RX MED GY IP 250 OP 250 PS 637: Performed by: SURGERY

## 2017-11-12 PROCEDURE — 25800025 ZZH RX 258: Performed by: SURGERY

## 2017-11-12 PROCEDURE — 25000132 ZZH RX MED GY IP 250 OP 250 PS 637

## 2017-11-12 PROCEDURE — 36592 COLLECT BLOOD FROM PICC: CPT | Performed by: STUDENT IN AN ORGANIZED HEALTH CARE EDUCATION/TRAINING PROGRAM

## 2017-11-12 PROCEDURE — 27210443 ZZH NUTRITION PRODUCT SPECIALIZED PACKET

## 2017-11-12 PROCEDURE — 71020 XR CHEST 2 VW: CPT

## 2017-11-12 PROCEDURE — 85027 COMPLETE CBC AUTOMATED: CPT | Performed by: STUDENT IN AN ORGANIZED HEALTH CARE EDUCATION/TRAINING PROGRAM

## 2017-11-12 PROCEDURE — 25000132 ZZH RX MED GY IP 250 OP 250 PS 637: Performed by: THORACIC SURGERY (CARDIOTHORACIC VASCULAR SURGERY)

## 2017-11-12 RX ADMIN — DOCUSATE SODIUM 10 MG: 50 LIQUID ORAL at 07:58

## 2017-11-12 RX ADMIN — HYDROMORPHONE HYDROCHLORIDE: 10 INJECTION, SOLUTION INTRAMUSCULAR; INTRAVENOUS; SUBCUTANEOUS at 14:32

## 2017-11-12 RX ADMIN — PIPERACILLIN SODIUM AND TAZOBACTAM SODIUM 3.38 G: 3; .375 INJECTION, POWDER, LYOPHILIZED, FOR SOLUTION INTRAVENOUS at 19:56

## 2017-11-12 RX ADMIN — POTASSIUM CHLORIDE, DEXTROSE MONOHYDRATE AND SODIUM CHLORIDE 30 ML: 150; 5; 450 INJECTION, SOLUTION INTRAVENOUS at 08:06

## 2017-11-12 RX ADMIN — OXYCODONE HYDROCHLORIDE 5 MG: 5 SOLUTION ORAL at 07:58

## 2017-11-12 RX ADMIN — OXYCODONE HYDROCHLORIDE 5 MG: 5 SOLUTION ORAL at 00:21

## 2017-11-12 RX ADMIN — OXYCODONE HYDROCHLORIDE 5 MG: 5 SOLUTION ORAL at 15:40

## 2017-11-12 RX ADMIN — Medication 5 ML: at 19:58

## 2017-11-12 RX ADMIN — FLUCONAZOLE 200 MG: 2 INJECTION INTRAVENOUS at 08:05

## 2017-11-12 RX ADMIN — Medication 5 ML: at 07:58

## 2017-11-12 RX ADMIN — PIPERACILLIN SODIUM AND TAZOBACTAM SODIUM 3.38 G: 3; .375 INJECTION, POWDER, LYOPHILIZED, FOR SOLUTION INTRAVENOUS at 14:28

## 2017-11-12 RX ADMIN — OXYCODONE HYDROCHLORIDE 5 MG: 5 SOLUTION ORAL at 21:26

## 2017-11-12 RX ADMIN — POTASSIUM CHLORIDE, DEXTROSE MONOHYDRATE AND SODIUM CHLORIDE 30 ML: 150; 5; 450 INJECTION, SOLUTION INTRAVENOUS at 23:46

## 2017-11-12 RX ADMIN — DOCUSATE SODIUM 10 MG: 50 LIQUID ORAL at 19:57

## 2017-11-12 RX ADMIN — LIDOCAINE 1 PATCH: 50 PATCH CUTANEOUS at 20:07

## 2017-11-12 RX ADMIN — POTASSIUM CHLORIDE, DEXTROSE MONOHYDRATE AND SODIUM CHLORIDE 65 ML: 150; 5; 450 INJECTION, SOLUTION INTRAVENOUS at 15:36

## 2017-11-12 RX ADMIN — PIPERACILLIN SODIUM AND TAZOBACTAM SODIUM 3.38 G: 3; .375 INJECTION, POWDER, LYOPHILIZED, FOR SOLUTION INTRAVENOUS at 08:04

## 2017-11-12 RX ADMIN — MULTIVITAMIN 15 ML: LIQUID ORAL at 07:58

## 2017-11-12 RX ADMIN — MAGNESIUM HYDROXIDE 30 ML: 400 SUSPENSION ORAL at 07:58

## 2017-11-12 RX ADMIN — ENOXAPARIN SODIUM 40 MG: 40 INJECTION SUBCUTANEOUS at 08:05

## 2017-11-12 RX ADMIN — PIPERACILLIN SODIUM AND TAZOBACTAM SODIUM 3.38 G: 3; .375 INJECTION, POWDER, LYOPHILIZED, FOR SOLUTION INTRAVENOUS at 01:59

## 2017-11-12 ASSESSMENT — ACTIVITIES OF DAILY LIVING (ADL)
ADLS_ACUITY_SCORE: 11

## 2017-11-12 ASSESSMENT — PAIN DESCRIPTION - DESCRIPTORS
DESCRIPTORS: ACHING

## 2017-11-12 NOTE — PROGRESS NOTES
"Thoracic Surgery Progress Note  Daniel Sandhu  [id number]  November 12, 2017    Subjective: No acute issues overnight. No complaints this AM  Objective:   /77 (BP Location: Right arm)  Pulse 91  Temp 98.7  F (37.1  C) (Oral)  Resp 16  Ht 1.73 m (5' 8.11\")  Wt 72.9 kg (160 lb 12.8 oz)  SpO2 97%  BMI 24.37 kg/m2    PE:  Gen: Awake, alert, NAD   CV: RRR  Resp: non-labored at rest, CT in place with serous output and no airleak  Abd: Soft, non-distended, NTTP  Ext: warm and well perfused  Incision:c/d/i. Neck wound dressed and dry.       I/O last 3 completed shifts:  In: 2580 [I.V.:20; NG/GT:540]  Out: 1930 [Urine:1850; Chest Tube:80] - Last 24 hours      Labs/Imaging  Heme:  Recent Labs  Lab 11/12/17  0531 11/11/17  0643 11/10/17  0530 11/09/17  0701   WBC 5.3 5.6 7.6 10.8   HGB 7.7* 7.3* 7.4* 8.0*    281 235 217     Chem:  Recent Labs  Lab 11/12/17  0531 11/11/17  0643 11/10/17  0530 11/09/17  0701   POTASSIUM 4.1 3.9 3.8 4.0   CR 0.69 0.56* 0.63* 0.52*       11/12 CXR stable small pleural effusions and streaky left basilar opacity      A/P: Daniel Sandhu is a 37 year old male with GE junction adenocarcinoma now s/p  distal esophagectomy, total gastrectomy, Terrell-en-Y reconstruction, thoracotomy, bilateral chest tube placement, pharyngostomy tube placement, jejunostomy tube placement on 11/5/17 and oropharyngeal exploration and esophagojejunostomy on 11/9/17.     NEURO Pain controlled on  Dilaudid PCA and PRN pain meds.  Changes:  None    CV HDS. Hydralazine PRN for SBP >160   PULM Aggressive pulmonary toilet and I/S. Accapella valve with RT. Duonebs PRN. CT to water seal. Daily CXR   FEN/GI D/c IVF Continue replacement fluids for CT output.  Continue NPO and TF. Aggressive bowel regimen.  Swallow evaluation on Monday.    No acute issues, good UOP    HEME Hgb as above. Postop anemia expected for this surgery.  Continue to monitor. No transfusions indicated at this time   ID Afebrile, no " leukocytosis.    Antibiotics: diflucan and zosyn    ENDO No issues   ACTIVITY Up as tolerated  Ambulate at least TID    PPx Prophylactic lovenox       Patient seen and discussed with the Fellow, Dr. Grimm.      Shital Grant MD  PGY-1, General Surgery

## 2017-11-12 NOTE — PLAN OF CARE
"Problem: Patient Care Overview  Goal: Plan of Care/Patient Progress Review  Outcome: No Change  Blood pressure 127/73, pulse 91, temperature 98.6  F (37  C), temperature source Oral, resp. rate 16, height 1.73 m (5' 8.11\"), weight 74.7 kg (164 lb 11.2 oz), SpO2 99 %.    Neuro: A&Ox4.   Cardiac: SR, tachy with activity. VSS.   Respiratory: Sating 99% on RA.  GI/: Adequate urine output. No BM this shift  Diet/appetite: Tolerating NPO diet. Tube feeds at 90mL/hr, due to be increased to 100 mL @ 2000  Activity:  SBA, up to chair and in halls.  Pain: At acceptable level on current regimen. Dilaudid PCA at 0.3mg as needed q10 min. Oxy given x1.   Skin: Intact, no new deficits noted. All incisions cleaned and dressings changed.   LDA's: L chest tube to water seal, no air leak. L chest port with TKO running.      Plan: Continue with POC. Notify primary team with changes.      "

## 2017-11-12 NOTE — PLAN OF CARE
Problem: Patient Care Overview  Goal: Plan of Care/Patient Progress Review  ST 6B: Cx- Pt out of room at time of attempt.

## 2017-11-12 NOTE — PROGRESS NOTES
MD-Thoracic resident notified at this time DT minimal output via chest tube-10 ml's in 12 hours. Tube was stripped today x2 by thoracic team.

## 2017-11-12 NOTE — PLAN OF CARE
"Problem: Pain, Acute (Adult)  Goal: Identify Related Risk Factors and Signs and Symptoms  Related risk factors and signs and symptoms are identified upon initiation of Human Response Clinical Practice Guideline (CPG).   Outcome: No Change  /67 (BP Location: Right arm)  Pulse 91  Temp 98.8  F (37.1  C) (Oral)  Resp 16  Ht 1.73 m (5' 8.11\")  Wt 72.9 kg (160 lb 12.8 oz)  SpO2 97%  BMI 24.37 kg/m2     Neuro: A&Ox4.   Cardiac: SR-ST with activity. VSS.   Respiratory: Sating 100% on RA. L chest tube to water seal. No air leak. Serous output. Coarse LS encouraged the use of IS and coughing. Productive cough. Lots of sputum.   GI/: Adequate urine output. No BM.  Diet/appetite: NPO. TF @100ml/hr. No c/o nausea.   Activity:  Stand by assist up to bathroom and chair.   Pain: C/o pain by chest tube site and incisions. Dilaudid PCA @ 0.2mg and oxy x1 given.   Skin: Intact, no new deficits noted. Dressings CDI.    LDA's: R port: TKO. No bolus due to no output in CT.   J tube to continuous TF @ 100ml/hr.   CT to water seal       Plan: Continue with POC. Notify primary team with changes.      "

## 2017-11-13 ENCOUNTER — APPOINTMENT (OUTPATIENT)
Dept: GENERAL RADIOLOGY | Facility: CLINIC | Age: 37
DRG: 327 | End: 2017-11-13
Attending: THORACIC SURGERY (CARDIOTHORACIC VASCULAR SURGERY)
Payer: COMMERCIAL

## 2017-11-13 ENCOUNTER — APPOINTMENT (OUTPATIENT)
Dept: PHYSICAL THERAPY | Facility: CLINIC | Age: 37
DRG: 327 | End: 2017-11-13
Attending: THORACIC SURGERY (CARDIOTHORACIC VASCULAR SURGERY)
Payer: COMMERCIAL

## 2017-11-13 ENCOUNTER — APPOINTMENT (OUTPATIENT)
Dept: SPEECH THERAPY | Facility: CLINIC | Age: 37
DRG: 327 | End: 2017-11-13
Attending: THORACIC SURGERY (CARDIOTHORACIC VASCULAR SURGERY)
Payer: COMMERCIAL

## 2017-11-13 LAB
ANION GAP SERPL CALCULATED.3IONS-SCNC: 4 MMOL/L (ref 3–14)
BUN SERPL-MCNC: 11 MG/DL (ref 7–30)
CALCIUM SERPL-MCNC: 7.6 MG/DL (ref 8.5–10.1)
CHLORIDE SERPL-SCNC: 102 MMOL/L (ref 94–109)
CO2 SERPL-SCNC: 30 MMOL/L (ref 20–32)
COPATH REPORT: NORMAL
CREAT SERPL-MCNC: 0.71 MG/DL (ref 0.66–1.25)
CRP SERPL-MCNC: 17 MG/L (ref 0–8)
DLCOUNC-%PRED-PRE: 140 %
DLCOUNC-PRE: 46.27 ML/MIN/MMHG
DLCOUNC-PRED: 33.01 ML/MIN/MMHG
ERV-%PRED-PRE: 131 %
ERV-PRE: 2.04 L
ERV-PRED: 1.55 L
ERYTHROCYTE [DISTWIDTH] IN BLOOD BY AUTOMATED COUNT: 12.4 % (ref 10–15)
EXPTIME-PRE: 7.41 SEC
FEF2575-%PRED-PRE: 99 %
FEF2575-PRE: 4.05 L/SEC
FEF2575-PRED: 4.07 L/SEC
FEFMAX-%PRED-PRE: 124 %
FEFMAX-PRE: 12.19 L/SEC
FEFMAX-PRED: 9.79 L/SEC
FEV1-%PRED-PRE: 118 %
FEV1-PRE: 4.8 L
FEV1FEV6-PRE: 78 %
FEV1FEV6-PRED: 82 %
FEV1FVC-PRE: 78 %
FEV1FVC-PRED: 82 %
FEV1SVC-PRE: 80 %
FEV1SVC-PRED: 78 %
FIFMAX-PRE: 10.73 L/SEC
FRCPLETH-%PRED-PRE: 121 %
FRCPLETH-PRE: 3.97 L
FRCPLETH-PRED: 3.28 L
FVC-%PRED-PRE: 123 %
FVC-PRE: 6.13 L
FVC-PRED: 4.97 L
GFR SERPL CREATININE-BSD FRML MDRD: >90 ML/MIN/1.7M2
GLUCOSE SERPL-MCNC: 125 MG/DL (ref 70–99)
HCT VFR BLD AUTO: 22.7 % (ref 40–53)
HGB BLD-MCNC: 7.5 G/DL (ref 13.3–17.7)
IC-%PRED-PRE: 109 %
IC-PRE: 3.98 L
IC-PRED: 3.63 L
MCH RBC QN AUTO: 30.1 PG (ref 26.5–33)
MCHC RBC AUTO-ENTMCNC: 33 G/DL (ref 31.5–36.5)
MCV RBC AUTO: 91 FL (ref 78–100)
PLATELET # BLD AUTO: 280 10E9/L (ref 150–450)
POTASSIUM SERPL-SCNC: 4 MMOL/L (ref 3.4–5.3)
PREALB SERPL IA-MCNC: 13 MG/DL (ref 15–45)
RADIOLOGIST FLAGS: ABNORMAL
RBC # BLD AUTO: 2.49 10E12/L (ref 4.4–5.9)
RVPLETH-%PRED-PRE: 105 %
RVPLETH-PRE: 1.93 L
RVPLETH-PRED: 1.83 L
SODIUM SERPL-SCNC: 136 MMOL/L (ref 133–144)
TLCPLETH-%PRED-PRE: 118 %
TLCPLETH-PRE: 7.95 L
TLCPLETH-PRED: 6.72 L
VA-%PRED-PRE: 113 %
VA-PRE: 7.3 L
VC-%PRED-PRE: 116 %
VC-PRE: 6.01 L
VC-PRED: 5.18 L
WBC # BLD AUTO: 6.2 10E9/L (ref 4–11)

## 2017-11-13 PROCEDURE — 25000132 ZZH RX MED GY IP 250 OP 250 PS 637: Performed by: SURGERY

## 2017-11-13 PROCEDURE — 85027 COMPLETE CBC AUTOMATED: CPT | Performed by: STUDENT IN AN ORGANIZED HEALTH CARE EDUCATION/TRAINING PROGRAM

## 2017-11-13 PROCEDURE — 25000128 H RX IP 250 OP 636: Performed by: THORACIC SURGERY (CARDIOTHORACIC VASCULAR SURGERY)

## 2017-11-13 PROCEDURE — 71020 XR CHEST 2 VW: CPT

## 2017-11-13 PROCEDURE — 86140 C-REACTIVE PROTEIN: CPT | Performed by: STUDENT IN AN ORGANIZED HEALTH CARE EDUCATION/TRAINING PROGRAM

## 2017-11-13 PROCEDURE — 40000193 ZZH STATISTIC PT WARD VISIT

## 2017-11-13 PROCEDURE — 92611 MOTION FLUOROSCOPY/SWALLOW: CPT | Mod: GN | Performed by: SPEECH-LANGUAGE PATHOLOGIST

## 2017-11-13 PROCEDURE — 25800025 ZZH RX 258: Performed by: SURGERY

## 2017-11-13 PROCEDURE — 27210443 ZZH NUTRITION PRODUCT SPECIALIZED PACKET

## 2017-11-13 PROCEDURE — 25000132 ZZH RX MED GY IP 250 OP 250 PS 637: Performed by: STUDENT IN AN ORGANIZED HEALTH CARE EDUCATION/TRAINING PROGRAM

## 2017-11-13 PROCEDURE — 74230 X-RAY XM SWLNG FUNCJ C+: CPT

## 2017-11-13 PROCEDURE — 25000128 H RX IP 250 OP 636: Performed by: STUDENT IN AN ORGANIZED HEALTH CARE EDUCATION/TRAINING PROGRAM

## 2017-11-13 PROCEDURE — 25000132 ZZH RX MED GY IP 250 OP 250 PS 637: Performed by: THORACIC SURGERY (CARDIOTHORACIC VASCULAR SURGERY)

## 2017-11-13 PROCEDURE — 25000132 ZZH RX MED GY IP 250 OP 250 PS 637

## 2017-11-13 PROCEDURE — 84134 ASSAY OF PREALBUMIN: CPT | Performed by: STUDENT IN AN ORGANIZED HEALTH CARE EDUCATION/TRAINING PROGRAM

## 2017-11-13 PROCEDURE — 40000225 ZZH STATISTIC SLP WARD VISIT: Performed by: SPEECH-LANGUAGE PATHOLOGIST

## 2017-11-13 PROCEDURE — 97110 THERAPEUTIC EXERCISES: CPT | Mod: GP

## 2017-11-13 PROCEDURE — 80048 BASIC METABOLIC PNL TOTAL CA: CPT | Performed by: STUDENT IN AN ORGANIZED HEALTH CARE EDUCATION/TRAINING PROGRAM

## 2017-11-13 PROCEDURE — 36592 COLLECT BLOOD FROM PICC: CPT | Performed by: STUDENT IN AN ORGANIZED HEALTH CARE EDUCATION/TRAINING PROGRAM

## 2017-11-13 PROCEDURE — 12000006 ZZH R&B IMCU INTERMEDIATE UMMC

## 2017-11-13 RX ORDER — ALUMINA, MAGNESIA, AND SIMETHICONE 2400; 2400; 240 MG/30ML; MG/30ML; MG/30ML
30 SUSPENSION ORAL
Status: COMPLETED | OUTPATIENT
Start: 2017-11-13 | End: 2017-11-13

## 2017-11-13 RX ADMIN — PIPERACILLIN SODIUM AND TAZOBACTAM SODIUM 3.38 G: 3; .375 INJECTION, POWDER, LYOPHILIZED, FOR SOLUTION INTRAVENOUS at 13:16

## 2017-11-13 RX ADMIN — DOCUSATE SODIUM 10 MG: 50 LIQUID ORAL at 07:56

## 2017-11-13 RX ADMIN — ENOXAPARIN SODIUM 40 MG: 40 INJECTION SUBCUTANEOUS at 08:01

## 2017-11-13 RX ADMIN — Medication 10 ML: at 07:56

## 2017-11-13 RX ADMIN — POTASSIUM CHLORIDE, DEXTROSE MONOHYDRATE AND SODIUM CHLORIDE 28 ML: 150; 5; 450 INJECTION, SOLUTION INTRAVENOUS at 15:20

## 2017-11-13 RX ADMIN — OXYCODONE HYDROCHLORIDE 10 MG: 5 SOLUTION ORAL at 13:16

## 2017-11-13 RX ADMIN — LIDOCAINE 1 PATCH: 50 PATCH CUTANEOUS at 19:50

## 2017-11-13 RX ADMIN — POTASSIUM CHLORIDE, DEXTROSE MONOHYDRATE AND SODIUM CHLORIDE 55 ML: 150; 5; 450 INJECTION, SOLUTION INTRAVENOUS at 08:10

## 2017-11-13 RX ADMIN — PIPERACILLIN SODIUM AND TAZOBACTAM SODIUM 3.38 G: 3; .375 INJECTION, POWDER, LYOPHILIZED, FOR SOLUTION INTRAVENOUS at 19:54

## 2017-11-13 RX ADMIN — OXYCODONE HYDROCHLORIDE 5 MG: 5 SOLUTION ORAL at 06:12

## 2017-11-13 RX ADMIN — OXYCODONE HYDROCHLORIDE 5 MG: 5 SOLUTION ORAL at 17:15

## 2017-11-13 RX ADMIN — PIPERACILLIN SODIUM AND TAZOBACTAM SODIUM 3.38 G: 3; .375 INJECTION, POWDER, LYOPHILIZED, FOR SOLUTION INTRAVENOUS at 08:02

## 2017-11-13 RX ADMIN — HYDROMORPHONE HYDROCHLORIDE: 10 INJECTION, SOLUTION INTRAMUSCULAR; INTRAVENOUS; SUBCUTANEOUS at 06:12

## 2017-11-13 RX ADMIN — OXYCODONE HYDROCHLORIDE 5 MG: 5 SOLUTION ORAL at 21:04

## 2017-11-13 RX ADMIN — HYDROMORPHONE HYDROCHLORIDE: 10 INJECTION, SOLUTION INTRAMUSCULAR; INTRAVENOUS; SUBCUTANEOUS at 13:26

## 2017-11-13 RX ADMIN — MAGNESIUM HYDROXIDE 30 ML: 400 SUSPENSION ORAL at 07:55

## 2017-11-13 RX ADMIN — MULTIVITAMIN 15 ML: LIQUID ORAL at 07:56

## 2017-11-13 RX ADMIN — ALUMINUM HYDROXIDE, MAGNESIUM HYDROXIDE, AND DIMETHICONE 30 ML: 400; 400; 40 SUSPENSION ORAL at 04:28

## 2017-11-13 RX ADMIN — FLUCONAZOLE 200 MG: 2 INJECTION INTRAVENOUS at 08:04

## 2017-11-13 RX ADMIN — PIPERACILLIN SODIUM AND TAZOBACTAM SODIUM 3.38 G: 3; .375 INJECTION, POWDER, LYOPHILIZED, FOR SOLUTION INTRAVENOUS at 01:53

## 2017-11-13 ASSESSMENT — ACTIVITIES OF DAILY LIVING (ADL)
ADLS_ACUITY_SCORE: 11

## 2017-11-13 ASSESSMENT — PAIN DESCRIPTION - DESCRIPTORS: DESCRIPTORS: ACHING

## 2017-11-13 NOTE — PROGRESS NOTES
11/13/17 1142   General Information   Onset Date 11/03/17   Start of Care Date 11/09/17   Referring Physician Dr. Yunior Esteban   Patient Profile Review/OT: Additional Occupational Profile Info See Profile for full history and prior level of function   Patient/Family Goals Statement Pt states strong desire to eat/drink again.    Swallowing Evaluation Videofluoroscopic evaluation   Behaviorial Observations WFL (within functional limits)   Mode of current nutrition (Jtube)   Respiratory Status Room air   Comments Pt is a 37-year-old male with GE junction adenocarcinoma now s/p distal esophagectomy, total gastrectomy, Terrell-en-Y reconstruction, thoracotomy, bilateral chest tube placement, pharyngostomy tube placement, jejunostomy tube placement on 11/5/17 and oropharyngeal exploration and esophagojejunostomy on 11/9/17. Pt participated in a VFSS on 11/9/17 to further assess for leaks at anastomosis; he exhibited aspiration of thin liquids and was unable to participate in esophagram. Pt seen today in conjunction with radiology for VFSS and esophagram to re-assess swallow function and further assess for leaks at anastomosis.    Clinical Swallow Evaluation   Oral Musculature Comments Completed on previous exam. Pt exhibits deviation to (L) with tongue protrusion.    VFSS Eval: Radiology   Radiologist Resident   Views Taken left lateral   Physical Location of Procedure [facility] Radiology room 5   VFSS Eval: Thin Liquid Texture Trial   Mode of Presentation, Thin Liquid spoon;cup;straw   Order of Presentation 1, 2, 3, 4, 5, 6, 7, 8, 9, 10   Preparatory Phase WFL   Pharyngeal Phase, Thin Liquid Delayed swallow reflex   Rosenbek's Penetration Aspiration Scale: Thin Liquid Trial Results 5 - contrast contacts vocal cords, visible residue remains (penetration)   Response to Aspiration productive volitional cough following clinician cue   Successful Strategies Trialed During Procedure, Thin Liquid chin tuck;other (see  comments)  (modified supraglottic swallow technique)   Diagnostic Statement Pt exhibits improved bolus control as compared to previous study. Ongoing delayed swallow response approximately to level of valleculae resulting in penetration during swallow. 1x penetration to level of vocal folds with teaspoon of omnipaque contrast. Depth of penetration reduced with use of chin tuck. Pt able to clear residuals from laryngeal vestibule with use of volitional cough. No aspiration.    Swallow Compensations   Swallow Compensations Chin tuck;Supraglottic swallow   Results Other (see comments)  (penetration)   Esophageal Phase of Swallow   Esophageal sweep performed during today s vidofluoroscopic exam  Please refer to radiologist's report for details   Esophageal comments Esophagram completed after VFSS. Please refer to radiologist note for details.    General Therapy Interventions   Planned Therapy Interventions Dysphagia Treatment   Dysphagia treatment Instruction of safe swallow strategies;Compensatory strategies for swallowing;Oropharyngeal exercise training;Modified diet education   Swallow Eval: Clinical Impressions   Skilled Criteria for Therapy Intervention Skilled criteria met.  Treatment indicated.   Dysphagia Outcome Severity Scale (WAYNE) Level 5 - WAYNE   Treatment Diagnosis Mild pharyngeal dysphagia   Diet texture recommendations Clear liquid  (Pending medical team recommendations)   Recommended Feeding/Eating Techniques tuck chin during every swallow;other (see comments)  (modified supraglottic swallow, oral cares, slow pace)   Therapy Frequency 5 times/wk   Predicted Duration of Therapy Intervention (days/wks) 1 week   Anticipated Discharge Disposition other (see comments)  (defer to OT/PT)   Risks and Benefits of Treatment have been explained. Yes   Patient, family and/or staff in agreement with Plan of Care Yes   Clinical Impression Comments Video swallow study completed per MD request. Pt demonstrates  significant improvement in swallow function as compared to previous VFSS. He presents with mild pharyngeal dysphagia. Oral phase is WFL. Pharyngeal phase is characterized by ongoing delayed swallow response resulting in frequent penetration during swallow. Pt exhibits 1x deep penetration to level of vocal folds with teaspoon of omnipaque contrast. Reduced depth of penetration with use of chin tuck. Pt able to clear residuals from laryngeal vestibule with volitional cough. No aspiration. From a swallowing perspective, pt is safe to initiate a clear thin liquid diet with use of chin tuck and modified supraglottic swallow technique. Defer to medical team for recommendations on when to initiate diet. SLP will continue to follow to provide additional training on safe swallowing strategies and pharyngeal strengthening exercises.    Total Evaluation Time   Total Evaluation Time (Minutes) 20

## 2017-11-13 NOTE — PLAN OF CARE
Problem: Patient Care Overview  Goal: Plan of Care/Patient Progress Review  Outcome: No Change  Neuro: A&Ox4.   Cardiac: SR-ST. VSS.             Respiratory: Sating 99% on RA. Chest tube to water seal, no air leak. Lung sounds coarse in bases.   GI/: Adequate urine output. BM X1  Diet/appetite: Tolerating NPO diet. Tube feeds at goal 100ml/hr via J tube. Swallowing eval to be done tomorrow.   Activity:  SBA, up to chair and in halls.  Pain: At acceptable level on current regimen. Dilaudid PCA 0.2 mg available q10min. Oxy given x2.   Skin: Intact, no new deficits noted. Dressings changed.   LDA's: R chest port, TKO/PCA.      Plan: Continue with POC. Notify primary team with changes.

## 2017-11-13 NOTE — PLAN OF CARE
Problem: Patient Care Overview  Goal: Plan of Care/Patient Progress Review  Discharge Planner SLP   Patient plan for discharge: not stated  Current status: Video swallow study completed per MD request. Pt demonstrates significant improvement in swallow function as compared to previous VFSS on 11/9/17. He presents with mild pharyngeal dysphagia. Oral phase is WFL. Pharyngeal phase is characterized by ongoing delayed swallow response resulting in frequent penetration during swallow. Pt exhibits 1x deep penetration to level of vocal folds with teaspoon of omnipaque contrast. Reduced depth of penetration with use of chin tuck. Pt able to clear residuals from laryngeal vestibule with volitional cough. No aspiration. From a swallowing perspective, pt is safe to initiate a clear thin liquid diet with use of chin tuck and modified supraglottic swallow technique. Defer to medical team for recommendations on when to initiate diet. SLP will continue to follow to provide additional training on safe swallowing strategies and pharyngeal strengthening exercises.   Barriers to return to prior living situation: none  Recommendations for discharge: Pt likely to meet SLP goals prior to d/c. Defer to PT for d/c recommendations.  Rationale for recommendations: Pt close to meeting dysphagia goals.        Entered by: Cyndi Muir 11/13/2017 12:01 PM

## 2017-11-13 NOTE — PLAN OF CARE
Problem: Patient Care Overview  Goal: Plan of Care/Patient Progress Review  6B PT -      Discharge Planner PT   Patient plan for discharge: Home with assist  Current status: Pt is Ind or modified Ind for all mobility and transfers.  Able to bike on Nu Step for 14 minutes at level 5.   Barriers to return to prior living situation: Medical status, endurance  Recommendations for discharge: Home with assist  Rationale for recommendations: Pt's current functional status.          Entered by: Radha Romeo 11/13/2017 4:12 PM

## 2017-11-13 NOTE — PROGRESS NOTES
"Thoracic Surgery Progress Note  Daniel Sandhu  [id number]  November 13, 2017    Subjective: No acute issues overnight. No new complaints this AM.    Objective:   /64 (BP Location: Right arm)  Pulse 104  Temp 98  F (36.7  C) (Oral)  Resp 16  Ht 1.73 m (5' 8.11\")  Wt 72.3 kg (159 lb 6.4 oz)  SpO2 100%  BMI 24.16 kg/m2    PE:  Gen: Awake, alert, NAD   CV: RRR  Resp: non-labored at rest, CT in place with serous output and no airleak  Abd: Soft, non-distended, NTTP  Ext: warm and well perfused  Incision:c/d/i. Neck wound dressed and dry.    I/O last 3 completed shifts:  In: 2620 [NG/GT:340; IV Piggyback:55]  Out: 830 [Urine:650; Chest Tube:180] - Last 24 hours      Labs/Imaging  Heme:  Recent Labs  Lab 11/13/17  0650 11/12/17  0531 11/11/17  0643 11/10/17  0530   WBC 6.2 5.3 5.6 7.6   HGB 7.5* 7.7* 7.3* 7.4*    342 281 235     Chem:  Recent Labs  Lab 11/13/17  0650 11/12/17  0531 11/11/17  0643 11/10/17  0530   POTASSIUM 4.0 4.1 3.9 3.8   CR 0.71 0.69 0.56* 0.63*         A/P: Daniel Sandhu is a 37 year old male with GE junction adenocarcinoma now s/p  distal esophagectomy, total gastrectomy, Terrell-en-Y reconstruction, thoracotomy, bilateral chest tube placement, pharyngostomy tube placement, jejunostomy tube placement on 11/5/17 and oropharyngeal exploration and esophagojejunostomy on 11/9/17.      NEURO Pain controlled on  Dilaudid PCA and PRN pain meds.  Changes:  None    CV HDS. Hydralazine PRN for SBP >160   PULM Aggressive pulmonary toilet and I/S. Accapella valve with RT. Duonebs PRN. CT to water seal. Daily CXR   FEN/GI D/c IVF Continue replacement fluids for CT output.  Continue NPO and TF. Change TF formula to low-fat containing formula. Aggressive bowel regimen.  Swallow evaluation today.    No acute issues, good UOP    HEME Hgb as above. Postop anemia expected for this surgery.  Continue to monitor. No transfusions indicated at this time   ID Afebrile, no leukocytosis.    Antibiotics: " diflucan and zosyn    ENDO No issues   ACTIVITY Up as tolerated  Ambulate at least TID    PPx Prophylactic lovenox         Patient seen and discussed with the Fellow, Dr. Pavon.           Shital Grant MD  PGY-1, General Surgery

## 2017-11-13 NOTE — PROVIDER NOTIFICATION
Pt c/o abdominal fullness/discomfort and belching. Abdomen appears more distended and firm than previous assessments. One time dose of Mylanta/Maalox given- some relief of belching, but not fullness. Spoke with Dr. Mercedes who gave okay to stop tube feeds at 0500 and would call back with additional instructions after speaking with resident when they arrive.

## 2017-11-13 NOTE — PLAN OF CARE
Problem: Pain, Acute (Adult)  Goal: Identify Related Risk Factors and Signs and Symptoms  Related risk factors and signs and symptoms are identified upon initiation of Human Response Clinical Practice Guideline (CPG).   Outcome: No Change  Neuro: A&Ox4.   Cardiac: No tele orders; HR 70-90s. VSS. Afebrile.   Respiratory: Sating >98% on RA.  GI/: Adequate urine output. BM X1. C/o abdominal fullness- provider aware.  Diet/appetite: NPO. TF @ goal of 100 mL/hr via J tube.   Activity:  Up with SBA.  Pain: At acceptable level on current regimen- Dilaudid PCA and PRN oxycodone given X2.  Skin: Surgical incisions WDL with some erythema. L neck dressing from pharyngostomy CDI.   LDA's: L CT to water seal. R port accessed, SL'd.     Plan: Continue with POC. Plan for repeat swallow eval today. Notify primary team with changes.

## 2017-11-13 NOTE — PROGRESS NOTES
CLINICAL NUTRITION SERVICES - REASSESSMENT NOTE     Nutrition Prescription    RECOMMENDATIONS FOR MDs/PROVIDERS TO ORDER:  - Fluids and bowel regimen per MDs  - Per tolerance to additional fat from formula, ability to switch formula to fat-containing formula     Malnutrition Status:    Severe malnutrition in the context of acute on chronic illness    Recommendations already ordered by Registered Dietitian (RD):  - Per provider, RD to order 1 Ensure Plus (11 g fat) over 2 hours today to check for tolerance d/t possible chyle leak.    Future/Additional Recommendations:  - Once pt is able to have non-fat free formula, would recommend TF  Regimen as follows: Impact Peptide @ goal 70 ml/hr (1680 ml/day) to provide 2520 kcals (35 kcal/kg/day), 158 g PRO (2+ g/kg/day), 1294 ml free H2O, 108 g Fat (50% from MCTs), 235 g CHO and no Fiber daily.  - If pt is unable to change formulas from Vivonex, RD to add 2 packets Prosource daily ( 80kcals, 22 g pro)     EVALUATION OF THE PROGRESS TOWARD GOALS   Diet: NPO  Nutrition Support: Vivonex TEN: Goal TF @ 100ml/hr, 2000ml, 8 packets= yield 2400ml,  2400kcals, 92 g pro.  Intake: 967 kcals (13), 37g pro (.5), 3 g fat       NEW FINDINGS   Weight: ~5% wt loss in 10 days, ~2% in 1 week  Labs: CRP: 17 (H)   GI: abdominal fullness/discomfort, BM+ noted  Per SLP:  Pt able to clear residuals from laryngeal vestibule with volitional cough. No aspiration. From a swallowing perspective, pt is safe to initiate a clear thin liquid diet with use of chin tuck and modified supraglottic swallow technique. Defer to medical team for recommendations on when to initiate diet.     MALNUTRITION  % Intake: </= 50% for >/= 5 days (severe)  % Weight Loss: > 2% in 1 week (severe)  Subcutaneous Fat Loss: Facial region, Upper arm and Thoracic/intercostal: mild-moderate  Muscle Loss: Temporal, Facial & jaw region, Thoracic region (clavicle, acromium bone, deltoid, trapezius, pectoral  and Upper arm (bicep,  tricep): moderate  Fluid Accumulation/Edema: None noted  Malnutrition Diagnosis: Severe malnutrition in the context of acute on chronic illness    Previous Goals   Total avg nutritional intake to meet a minimum of 30 kcal/kg and 1.5 g PRO/kg daily (per dosing wt 74 kg).  Evaluation: Not met    Previous Nutrition Diagnosis  Inadequate protein-energy intake related to altered GI tract with esophagectomy and gastrectomy with RNY as evidenced by Jtube placement and need for nutrition support to provide 100% of nutritional needs.   Evaluation: Improving    CURRENT NUTRITION DIAGNOSIS  Inadequate protein-energy intake related to slow advancement of TF, TF interruptions and type of nonfat TF formula as evidenced by TF providing 13 kcals.kg and .5g/kg pro (not goal)    INTERVENTIONS  Implementation  Collaboration with other providers- PA and 6B rounds    Goals  Total avg nutritional intake to meet a minimum of 30 kcal/kg and 1.5 g PRO/kg daily (per dosing wt 74 kg).  Ability to switch to Impact Peptide from Vivonex TEN.     Monitoring/Evaluation  Progress toward goals will be monitored and evaluated per protocol.      Amy Pack, RD, MS, LD  6B- Pager: [phone]

## 2017-11-14 ENCOUNTER — APPOINTMENT (OUTPATIENT)
Dept: CT IMAGING | Facility: CLINIC | Age: 37
DRG: 327 | End: 2017-11-14
Attending: NURSE PRACTITIONER
Payer: COMMERCIAL

## 2017-11-14 ENCOUNTER — APPOINTMENT (OUTPATIENT)
Dept: SPEECH THERAPY | Facility: CLINIC | Age: 37
DRG: 327 | End: 2017-11-14
Attending: THORACIC SURGERY (CARDIOTHORACIC VASCULAR SURGERY)
Payer: COMMERCIAL

## 2017-11-14 LAB
ANION GAP SERPL CALCULATED.3IONS-SCNC: 7 MMOL/L (ref 3–14)
BUN SERPL-MCNC: 12 MG/DL (ref 7–30)
CALCIUM SERPL-MCNC: 7.7 MG/DL (ref 8.5–10.1)
CHLORIDE SERPL-SCNC: 100 MMOL/L (ref 94–109)
CO2 SERPL-SCNC: 27 MMOL/L (ref 20–32)
CREAT SERPL-MCNC: 0.74 MG/DL (ref 0.66–1.25)
ERYTHROCYTE [DISTWIDTH] IN BLOOD BY AUTOMATED COUNT: 12.8 % (ref 10–15)
GFR SERPL CREATININE-BSD FRML MDRD: >90 ML/MIN/1.7M2
GLUCOSE SERPL-MCNC: 100 MG/DL (ref 70–99)
HCT VFR BLD AUTO: 24.7 % (ref 40–53)
HGB BLD-MCNC: 8.1 G/DL (ref 13.3–17.7)
MCH RBC QN AUTO: 30 PG (ref 26.5–33)
MCHC RBC AUTO-ENTMCNC: 32.8 G/DL (ref 31.5–36.5)
MCV RBC AUTO: 92 FL (ref 78–100)
PLATELET # BLD AUTO: 426 10E9/L (ref 150–450)
POTASSIUM SERPL-SCNC: 3.8 MMOL/L (ref 3.4–5.3)
RBC # BLD AUTO: 2.7 10E12/L (ref 4.4–5.9)
SODIUM SERPL-SCNC: 135 MMOL/L (ref 133–144)
WBC # BLD AUTO: 5.7 10E9/L (ref 4–11)

## 2017-11-14 PROCEDURE — 36592 COLLECT BLOOD FROM PICC: CPT | Performed by: STUDENT IN AN ORGANIZED HEALTH CARE EDUCATION/TRAINING PROGRAM

## 2017-11-14 PROCEDURE — 40000225 ZZH STATISTIC SLP WARD VISIT: Performed by: SPEECH-LANGUAGE PATHOLOGIST

## 2017-11-14 PROCEDURE — 25000132 ZZH RX MED GY IP 250 OP 250 PS 637: Performed by: SURGERY

## 2017-11-14 PROCEDURE — 25000132 ZZH RX MED GY IP 250 OP 250 PS 637: Performed by: STUDENT IN AN ORGANIZED HEALTH CARE EDUCATION/TRAINING PROGRAM

## 2017-11-14 PROCEDURE — 25000128 H RX IP 250 OP 636: Performed by: THORACIC SURGERY (CARDIOTHORACIC VASCULAR SURGERY)

## 2017-11-14 PROCEDURE — 25800025 ZZH RX 258: Performed by: SURGERY

## 2017-11-14 PROCEDURE — 27210443 ZZH NUTRITION PRODUCT SPECIALIZED PACKET

## 2017-11-14 PROCEDURE — 80048 BASIC METABOLIC PNL TOTAL CA: CPT | Performed by: STUDENT IN AN ORGANIZED HEALTH CARE EDUCATION/TRAINING PROGRAM

## 2017-11-14 PROCEDURE — 25000132 ZZH RX MED GY IP 250 OP 250 PS 637: Performed by: THORACIC SURGERY (CARDIOTHORACIC VASCULAR SURGERY)

## 2017-11-14 PROCEDURE — 25000132 ZZH RX MED GY IP 250 OP 250 PS 637

## 2017-11-14 PROCEDURE — 12000006 ZZH R&B IMCU INTERMEDIATE UMMC

## 2017-11-14 PROCEDURE — 85027 COMPLETE CBC AUTOMATED: CPT | Performed by: STUDENT IN AN ORGANIZED HEALTH CARE EDUCATION/TRAINING PROGRAM

## 2017-11-14 PROCEDURE — 74176 CT ABD & PELVIS W/O CONTRAST: CPT

## 2017-11-14 PROCEDURE — 25000128 H RX IP 250 OP 636: Performed by: STUDENT IN AN ORGANIZED HEALTH CARE EDUCATION/TRAINING PROGRAM

## 2017-11-14 PROCEDURE — 92526 ORAL FUNCTION THERAPY: CPT | Mod: GN | Performed by: SPEECH-LANGUAGE PATHOLOGIST

## 2017-11-14 RX ORDER — HYDROMORPHONE HYDROCHLORIDE 1 MG/ML
.3-.5 INJECTION, SOLUTION INTRAMUSCULAR; INTRAVENOUS; SUBCUTANEOUS
Status: DISCONTINUED | OUTPATIENT
Start: 2017-11-14 | End: 2017-11-17 | Stop reason: HOSPADM

## 2017-11-14 RX ADMIN — PIPERACILLIN SODIUM AND TAZOBACTAM SODIUM 3.38 G: 3; .375 INJECTION, POWDER, LYOPHILIZED, FOR SOLUTION INTRAVENOUS at 13:06

## 2017-11-14 RX ADMIN — OXYCODONE HYDROCHLORIDE 5 MG: 5 SOLUTION ORAL at 06:53

## 2017-11-14 RX ADMIN — ENOXAPARIN SODIUM 40 MG: 40 INJECTION SUBCUTANEOUS at 07:48

## 2017-11-14 RX ADMIN — POTASSIUM CHLORIDE, DEXTROSE MONOHYDRATE AND SODIUM CHLORIDE 40 ML: 150; 5; 450 INJECTION, SOLUTION INTRAVENOUS at 00:06

## 2017-11-14 RX ADMIN — MAGNESIUM HYDROXIDE 30 ML: 400 SUSPENSION ORAL at 07:45

## 2017-11-14 RX ADMIN — OXYCODONE HYDROCHLORIDE 5 MG: 5 SOLUTION ORAL at 17:16

## 2017-11-14 RX ADMIN — OXYCODONE HYDROCHLORIDE 5 MG: 5 SOLUTION ORAL at 13:13

## 2017-11-14 RX ADMIN — PIPERACILLIN SODIUM AND TAZOBACTAM SODIUM 3.38 G: 3; .375 INJECTION, POWDER, LYOPHILIZED, FOR SOLUTION INTRAVENOUS at 19:50

## 2017-11-14 RX ADMIN — Medication 5 ML: at 07:44

## 2017-11-14 RX ADMIN — OXYCODONE HYDROCHLORIDE 5 MG: 5 SOLUTION ORAL at 03:54

## 2017-11-14 RX ADMIN — POTASSIUM CHLORIDE, DEXTROSE MONOHYDRATE AND SODIUM CHLORIDE 33 ML: 150; 5; 450 INJECTION, SOLUTION INTRAVENOUS at 07:38

## 2017-11-14 RX ADMIN — MULTIVITAMIN 15 ML: LIQUID ORAL at 07:50

## 2017-11-14 RX ADMIN — DOCUSATE SODIUM 10 MG: 50 LIQUID ORAL at 07:44

## 2017-11-14 RX ADMIN — POTASSIUM CHLORIDE, DEXTROSE MONOHYDRATE AND SODIUM CHLORIDE 50 ML: 150; 5; 450 INJECTION, SOLUTION INTRAVENOUS at 15:01

## 2017-11-14 RX ADMIN — LIDOCAINE 1 PATCH: 50 PATCH CUTANEOUS at 19:47

## 2017-11-14 RX ADMIN — MENTHOL 1 PATCH: 205.5 PATCH TOPICAL at 07:46

## 2017-11-14 RX ADMIN — PIPERACILLIN SODIUM AND TAZOBACTAM SODIUM 3.38 G: 3; .375 INJECTION, POWDER, LYOPHILIZED, FOR SOLUTION INTRAVENOUS at 07:40

## 2017-11-14 RX ADMIN — PIPERACILLIN SODIUM AND TAZOBACTAM SODIUM 3.38 G: 3; .375 INJECTION, POWDER, LYOPHILIZED, FOR SOLUTION INTRAVENOUS at 02:18

## 2017-11-14 RX ADMIN — OXYCODONE HYDROCHLORIDE 10 MG: 5 SOLUTION ORAL at 20:19

## 2017-11-14 RX ADMIN — FLUCONAZOLE 200 MG: 2 INJECTION INTRAVENOUS at 07:40

## 2017-11-14 RX ADMIN — OXYCODONE HYDROCHLORIDE 5 MG: 5 SOLUTION ORAL at 09:51

## 2017-11-14 ASSESSMENT — PAIN DESCRIPTION - DESCRIPTORS
DESCRIPTORS: ACHING
DESCRIPTORS: SHARP
DESCRIPTORS: ACHING

## 2017-11-14 ASSESSMENT — ACTIVITIES OF DAILY LIVING (ADL)
ADLS_ACUITY_SCORE: 11

## 2017-11-14 NOTE — PLAN OF CARE
Problem: Patient Care Overview  Goal: Plan of Care/Patient Progress Review  Patient alert and oriented x's 4. Vitals stable, afebrile, HR 90's, on RA. Dilaudid PCA and oxy for pain. Midline incision CDI. Left chest tube to waterseal, dressing changed. NPO with TF at 100 ml/hr. Different TF tried, no change in chest tube output appearance. Able to have sips and chips. Voiding adequately. 1 BM. Will continue to monitor.

## 2017-11-14 NOTE — PLAN OF CARE
Problem: Patient Care Overview  Goal: Plan of Care/Patient Progress Review  Outcome: No Change  Neuro: A&Ox4.   Cardiac: Off tele, apical pulse regular. HR 80s-90s.  Respiratory: Sating 95%+ on RA. No SOB. CT to water seal, ~40mls serous output overnight.   GI/: Adequate urine output, voids per urinal. No BM. No N/V. TF at 100ml/hr through J tube.   Diet/appetite: Tolerating TF and NPO diet.   Activity:  Assist of stand-by to ambulate with lines.  Pain: At acceptable level on current regimen. Minimal use of dilaudid PCA. Oxycodone 5mg given x2 through J tube. Lido patch in place.   Skin: Intact, no new deficits noted. L neck dressing intact where pharyngostomy removed. L chest incision WDL with generalized bruising around chest/flank. Old epidural site WDL and JACOB. Abd incision with staples JACOB.      Plan: Continue with POC. Notify primary team with changes.

## 2017-11-14 NOTE — PLAN OF CARE
Problem: Patient Care Overview  Goal: Plan of Care/Patient Progress Review  Discharge Planner SLP   Patient plan for discharge: home when medically stable  Current status: Per report, Pt is ok to have sips of water and ice chips only at this time. Thoracic to confirm with placing order.    Pt indep with using safe swallow strategies with small sips of thin water and completing oropharyngeal strengthening ex.  Will await thoracic surgery orders to allow pt to have further liquids as pt currently NPO per orders. ST ok with initiating PO with clear liquids with use of a chin tuck and modified supraglottic swallow tech.  ST to continue to follow for oropharyngeal strengthening ex and PO per thoracic surgery.  Pt encouraged to continue to complete oropharyngeal strengthening ex 3x per day 10xs each.  Pt in agreement w/ plan.      Barriers to return to prior living situation: none  Recommendations for discharge: Follow up with thoracic surgery and OP SLP as appropriate for diet advancement per thoracic  Rationale for recommendations: Once pt cleared for clear liquids, thoracic will provide guidelines for slowly advance diet.  OP SLP to address dysphagia related to adv diet as needed.      Entered by: Kiki Ohara 11/14/2017 11:20 AM

## 2017-11-14 NOTE — PROGRESS NOTES
THORACIC & FOREGUT SURGERY    S:  No acute overnight events.  Pt seen at bedside resting comfortably.  Pain is well managed with current regimen.       O:  Vitals:    11/14/17 0345 11/14/17 0613 11/14/17 0729 11/14/17 1115   BP: 113/60  112/65 112/64   BP Location: Right arm  Right arm Right arm   Pulse:   93    Resp: 16  16 16   Temp: 98.7  F (37.1  C)  98.2  F (36.8  C)    TempSrc: Oral  Oral    SpO2: 97%  98% 98%   Weight:  71.7 kg (158 lb 1.6 oz)     Height:           A&Ox3, NAD  Breathing non-labored  RRR  Soft, NDNT  Distal extremities warm.       Chest tube output 190 ml  today no airleak   Incisions clean and dry     BMP  Recent Labs  Lab 11/14/17  0548 11/13/17  0650 11/12/17  0531 11/11/17  0643    136 136 135   POTASSIUM 3.8 4.0 4.1 3.9   CHLORIDE 100 102 102 99   YOBANY 7.7* 7.6* 7.7* 7.8*   CO2 27 30 30 29   BUN 12 11 8 8   CR 0.74 0.71 0.69 0.56*   * 125* 111* 117*     CBC  Recent Labs  Lab 11/14/17  0548 11/13/17  0650 11/12/17  0531 11/11/17  0643   WBC 5.7 6.2 5.3 5.6   RBC 2.70* 2.49* 2.57* 2.42*   HGB 8.1* 7.5* 7.7* 7.3*   HCT 24.7* 22.7* 23.0* 22.0*   MCV 92 91 90 91   MCH 30.0 30.1 30.0 30.2   MCHC 32.8 33.0 33.5 33.2   RDW 12.8 12.4 12.3 12.0    280 342 281       Esophogram 11/13: with small  blind leak at RNY.   CT chest abdomen with oral contrast 11/14: reviewed with radiology--no anastomotic leak noted on the scan.        A/P: Daniel Sandhu is a 37 year old male with GE junction adenocarcinoma now s/p  distal esophagectomy, total gastrectomy, Terrell-en-Y reconstruction, thoracotomy, bilateral chest tube placement, pharyngostomy tube placement, jejunostomy tube placement on 11/5/17 and oropharyngeal exploration and esophagojejunostomy on 11/9/17.  Neuro:  Well managed pain. Has tylenol and Dilaudid solution per J tube.   Resp: Continue pulmonary toilet. Has PRN nebs. Encourage IS use.   CV:  No acute concerns.   GI/FEN: No evidence of anastomotic leak. Diet advanced. Will  change tube feeds.   Renal:  No acute  Endo: No acute   ID:  Currently on Zosyn and diflucan.   Heme: Hgb trending up today. Continue to monitor    Prophylaxis:  On lovenox qd.   Pathology: moderately differential adenocarcinoma.   Misc: Likely discharge tomorrow unless acute concerns arise.     Yajaira Ferrer APRN, CNP  Thoracic and Forgut Surgery  [facility]

## 2017-11-15 ENCOUNTER — APPOINTMENT (OUTPATIENT)
Dept: GENERAL RADIOLOGY | Facility: CLINIC | Age: 37
DRG: 327 | End: 2017-11-15
Attending: THORACIC SURGERY (CARDIOTHORACIC VASCULAR SURGERY)
Payer: COMMERCIAL

## 2017-11-15 LAB
ANION GAP SERPL CALCULATED.3IONS-SCNC: 6 MMOL/L (ref 3–14)
BUN SERPL-MCNC: 11 MG/DL (ref 7–30)
CALCIUM SERPL-MCNC: 7.7 MG/DL (ref 8.5–10.1)
CHLORIDE SERPL-SCNC: 102 MMOL/L (ref 94–109)
CO2 SERPL-SCNC: 29 MMOL/L (ref 20–32)
CREAT SERPL-MCNC: 0.73 MG/DL (ref 0.66–1.25)
ERYTHROCYTE [DISTWIDTH] IN BLOOD BY AUTOMATED COUNT: 12.8 % (ref 10–15)
GFR SERPL CREATININE-BSD FRML MDRD: >90 ML/MIN/1.7M2
GLUCOSE SERPL-MCNC: 116 MG/DL (ref 70–99)
HCT VFR BLD AUTO: 23.6 % (ref 40–53)
HGB BLD-MCNC: 7.7 G/DL (ref 13.3–17.7)
MCH RBC QN AUTO: 29.7 PG (ref 26.5–33)
MCHC RBC AUTO-ENTMCNC: 32.6 G/DL (ref 31.5–36.5)
MCV RBC AUTO: 91 FL (ref 78–100)
PLATELET # BLD AUTO: 330 10E9/L (ref 150–450)
POTASSIUM SERPL-SCNC: 3.8 MMOL/L (ref 3.4–5.3)
RBC # BLD AUTO: 2.59 10E12/L (ref 4.4–5.9)
SODIUM SERPL-SCNC: 136 MMOL/L (ref 133–144)
WBC # BLD AUTO: 4.4 10E9/L (ref 4–11)

## 2017-11-15 PROCEDURE — 25000132 ZZH RX MED GY IP 250 OP 250 PS 637: Performed by: SURGERY

## 2017-11-15 PROCEDURE — 12000008 ZZH R&B INTERMEDIATE UMMC

## 2017-11-15 PROCEDURE — 36592 COLLECT BLOOD FROM PICC: CPT | Performed by: STUDENT IN AN ORGANIZED HEALTH CARE EDUCATION/TRAINING PROGRAM

## 2017-11-15 PROCEDURE — 25000132 ZZH RX MED GY IP 250 OP 250 PS 637: Performed by: THORACIC SURGERY (CARDIOTHORACIC VASCULAR SURGERY)

## 2017-11-15 PROCEDURE — 85027 COMPLETE CBC AUTOMATED: CPT | Performed by: STUDENT IN AN ORGANIZED HEALTH CARE EDUCATION/TRAINING PROGRAM

## 2017-11-15 PROCEDURE — 80048 BASIC METABOLIC PNL TOTAL CA: CPT | Performed by: STUDENT IN AN ORGANIZED HEALTH CARE EDUCATION/TRAINING PROGRAM

## 2017-11-15 PROCEDURE — 27210437 ZZH NUTRITION PRODUCT SEMIELEM INTERMED LITER

## 2017-11-15 PROCEDURE — 25800025 ZZH RX 258: Performed by: SURGERY

## 2017-11-15 PROCEDURE — 25000128 H RX IP 250 OP 636: Performed by: THORACIC SURGERY (CARDIOTHORACIC VASCULAR SURGERY)

## 2017-11-15 PROCEDURE — 25000128 H RX IP 250 OP 636: Performed by: STUDENT IN AN ORGANIZED HEALTH CARE EDUCATION/TRAINING PROGRAM

## 2017-11-15 PROCEDURE — 71020 XR CHEST 2 VW: CPT

## 2017-11-15 PROCEDURE — 25000132 ZZH RX MED GY IP 250 OP 250 PS 637: Performed by: STUDENT IN AN ORGANIZED HEALTH CARE EDUCATION/TRAINING PROGRAM

## 2017-11-15 RX ADMIN — POTASSIUM CHLORIDE, DEXTROSE MONOHYDRATE AND SODIUM CHLORIDE 30 ML: 150; 5; 450 INJECTION, SOLUTION INTRAVENOUS at 00:13

## 2017-11-15 RX ADMIN — PIPERACILLIN SODIUM AND TAZOBACTAM SODIUM 3.38 G: 3; .375 INJECTION, POWDER, LYOPHILIZED, FOR SOLUTION INTRAVENOUS at 14:03

## 2017-11-15 RX ADMIN — OXYCODONE HYDROCHLORIDE 5 MG: 5 SOLUTION ORAL at 17:01

## 2017-11-15 RX ADMIN — OXYCODONE HYDROCHLORIDE 5 MG: 5 SOLUTION ORAL at 08:48

## 2017-11-15 RX ADMIN — PIPERACILLIN SODIUM AND TAZOBACTAM SODIUM 3.38 G: 3; .375 INJECTION, POWDER, LYOPHILIZED, FOR SOLUTION INTRAVENOUS at 08:23

## 2017-11-15 RX ADMIN — PIPERACILLIN SODIUM AND TAZOBACTAM SODIUM 3.38 G: 3; .375 INJECTION, POWDER, LYOPHILIZED, FOR SOLUTION INTRAVENOUS at 01:04

## 2017-11-15 RX ADMIN — POTASSIUM CHLORIDE, DEXTROSE MONOHYDRATE AND SODIUM CHLORIDE 30 ML: 150; 5; 450 INJECTION, SOLUTION INTRAVENOUS at 15:57

## 2017-11-15 RX ADMIN — ENOXAPARIN SODIUM 40 MG: 40 INJECTION SUBCUTANEOUS at 08:23

## 2017-11-15 RX ADMIN — POTASSIUM CHLORIDE, DEXTROSE MONOHYDRATE AND SODIUM CHLORIDE 50 ML: 150; 5; 450 INJECTION, SOLUTION INTRAVENOUS at 08:20

## 2017-11-15 RX ADMIN — FLUCONAZOLE 200 MG: 2 INJECTION INTRAVENOUS at 08:24

## 2017-11-15 RX ADMIN — LIDOCAINE 1 PATCH: 50 PATCH CUTANEOUS at 20:30

## 2017-11-15 RX ADMIN — OXYCODONE HYDROCHLORIDE 5 MG: 5 SOLUTION ORAL at 13:22

## 2017-11-15 RX ADMIN — OXYCODONE HYDROCHLORIDE 10 MG: 5 SOLUTION ORAL at 05:09

## 2017-11-15 RX ADMIN — OXYCODONE HYDROCHLORIDE 5 MG: 5 SOLUTION ORAL at 00:09

## 2017-11-15 RX ADMIN — MULTIVITAMIN 15 ML: LIQUID ORAL at 08:24

## 2017-11-15 RX ADMIN — OXYCODONE HYDROCHLORIDE 5 MG: 5 SOLUTION ORAL at 20:31

## 2017-11-15 RX ADMIN — MAGNESIUM HYDROXIDE 30 ML: 400 SUSPENSION ORAL at 08:25

## 2017-11-15 ASSESSMENT — ACTIVITIES OF DAILY LIVING (ADL)
ADLS_ACUITY_SCORE: 11

## 2017-11-15 ASSESSMENT — PAIN DESCRIPTION - DESCRIPTORS
DESCRIPTORS: ACHING
DESCRIPTORS: SHARP

## 2017-11-15 NOTE — PROGRESS NOTES
[facility], [place]   Antimicrobial Management Team (AMT) Note            To: Thoracic Surgery  Unit: 6B  Allergies: No known allergies      Brief Summary: Daniel Sandhu is a 38yo male with a PMH esophageal cancer admitted on 11/3 for total gastrectomy with distal esophagectomy, intrathoracic Terrell-en-Y reconstruction, and jejunostomy and pharyngostomy tube placement.  HPI:   Patient s initial operative course had no immediate complications. On 11/9 (POD #5), increased swelling at his pharyngostomy tube site was noted with concerns for a right neck hematoma vs. abscess. He returned to the OR and no evidence was found of a hematoma or abscess, but rather that the swelling was due to a phelgmon. At this time, his pharyngostomy tube was removed. 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 Zosyn and fluconazole were initiated after his second operation.    Assessment:  Gastric reconstruction with possible abscess  Patient s upper GI surgery warranted initiation of broad antibiotic and antifungal coverage. He remains on broad antimicrobial coverage and is on Day 6 of therapy (fluconazole started 11/10, Zosyn afternoon prior). Typical duration of therapy for upper GI repairs or perforations is 7 days after adequate repair. Would recommend discontinuation of antibiotics 7 days post-pharyngostomy tube removal + repair given no current concerns for leakage.    Recommendation/Interventions:  1). Discontinue Zosyn and fluconazole after 7 days of therapy (end 11/16/17).    Discussed w/ ID Staff-Dr. Twan Hidalgo MD and Janice Samson, PD4 Pharmacy Student      Current Antibiotic therapy:  Zosyn (11/9 - _____)  Fluconazole (11/10 - _____)    Previous  Antibiotic therapy:  Cefazolin (11/3)    Vital Signs and other clinical features:      Temperature:      Imagin/8 CXR:  Impression:   1. Pharyngostomy tube has been retracted and now projects with the tip  just below level the diaphragm and likely within small bowel given the  patient's reported history of gastrectomy.  2. Left basilar chest tube with a minimally increased small left  apical pneumothorax. No identified right pneumothorax.  3. Unchanged left lung base opacity, likely atelectasis.    11/10 CXR:  Impression:   1. Unchanged biapical pneumothoraces. Stable left basilar chest tube.  2. Stable postsurgical changes projecting over the superior abdomen  and mediastinum.     CXR:  Impression:   1. Stable small biapical pneumothoraces.  2. Stable small pleural effusions and streaky left basilar opacity.     CXR:  Impression:   1. New right apical pneumothorax without chest tube.  2. Stable left apical pneumothorax with left basilar chest tube.  3. Improved pleural effusion with streaky left basilar opacity, likely  Atelectasis.     CT Chest:  IMPRESSION:   1. No definitely visualized anastomotic leak involving esophagectomy  with intrathoracic Terrell-en-Y esophagojejunostomy. There is however gas  in the adjacent pleural space which may be postoperative. Possible  defect in the medial pleural surface of the right lower lobe could  represent a bronchopleural fistula. Clinical correlation would be  beneficial and short-term follow-up noncontrast chest CT could be  considered to assess for change/resolution.  2. Centrilobular nodularity in the right lung base may be infectious  or may relate to aspiration.  3. Left basilar chest tube tip with mass effect on the anterior left  lower lobe. Consider adjustment.  4. Small volume of free intraperitoneal fluid.      Culture Results: N/A

## 2017-11-15 NOTE — PLAN OF CARE
Problem: Patient Care Overview  Goal: Plan of Care/Patient Progress Review  Outcome: No Change  Neuro: A&Ox4.   Cardiac: Off tele, VSS. Apical HR regular. SBP 110s-120s.    Respiratory: Sating 95%+ on RA. No SOB. L chest tube to water seal with less than 100mls output.   GI/: Adequate urine output, no BM. No N/V. J tube to 100mls/hr TF.   Diet/appetite: Tolerating NPO and clears.   Activity:  Assist of stand-by to ambulate.   Pain: At acceptable level on current regimen. 5-10mg oxycodone given Q3 hours. No IV pain medicine needed.   Skin: Intact, no new deficits noted. L chest bruising/incision WDL. Midline abd incision JACOB without drainage. L neck dressing intact/unchanged.     Plan: Continue with POC. Notify primary team with changes.

## 2017-11-15 NOTE — PROGRESS NOTES
"Thoracic Surgery Progress Note  Daniel Sandhu  [id number]  11/15/2017    No acute events overnight. Pain well controlled. Tolerating clears. +BM.    /62 (BP Location: Right arm)  Pulse 93  Temp 98.3  F (36.8  C) (Oral)  Resp 16  Ht 1.73 m (5' 8.11\")  Wt 71.8 kg (158 lb 4.6 oz)  SpO2 100%  BMI 23.99 kg/m2  NAD, comfortable appearing  Non-labored breathing on room air  Chest tube in place, mildly turbid output but no quinton chyle  Abdomen soft, nontender, nondistended  Ext warm, well perfused    Chest tube with 240 cc output yesterday  Adequate UOP    Labs reviewed.  BMP WNL  WBC 4.4  Hgb 7.7  Plt 330    A/P: Daniel Sandhu is a 37 year old male with GE junction adenocarcinoma now POD#10 after total gastrectomy, distal esophagectomy with Terrell-en-Y reconstruction, thoracotomy, bilateral chest tube placement, pharyngostomy tube placement, jejunostomy tube placement. Pharyngostomy tube removed on POD#4 in OR due to concern for bleeding with a small pocket of infection identified.  -- Oxycodone/acetaminophen, PRN Dilaudid for pain control  -- Clear liquid diet  -- Transition to regular tube feed formula today with normal fat content, will continue to assess for possible chyle leak  -- Continue Zosyn/Diflucan  -- Aggressive pulmonary toilet  -- Lovenox  -- Transfer to     Damion Wiggins MD  PGY-3, General Surgery  [phone]  "

## 2017-11-15 NOTE — PLAN OF CARE
Problem: Patient Care Overview  Goal: Plan of Care/Patient Progress Review  Patient alert and oriented x's 4. Vitals stable, afebrile, HR 90's, on RA. Dilaudid PCA DC'ed, oxy 5 mg for pain given. Midline incision CDI. Left chest tube to waterseal, dressing changed. NPO with TF at 100 ml/hr, 3 ensures given through J tube. 3 ensure's given through J tube, no change in chest tube output appearance. Able to have clear liquids. Voiding adequately. 2 BM's. Will continue to monitor.

## 2017-11-15 NOTE — PROGRESS NOTES
CLINICAL NUTRITION SERVICES - BRIEF NOTE    Pt had 3 Ensure fat challenge yesterday with Chest tube in place, mildly turbid output but no quinton chyle. Pt is also tolerating clear liquids. Per Thoracic Surg resident: Transition to regular tube feed formula today with normal fat content, will continue to assess for possible chyle leak    INTERVENTIONS  Recommendations / Nutrition Prescription  Cont with CL diet, recommend beginning multivitamin    Implementation  1. Once new formula has arrived on unit, begin TF with Impact Peptide @ 10 ml/hr and adv by 10 ml Q8, ( ONLY advance if K+/mg++ WNL and Phos >1.9) to goal @ 70 ml/hr.       -  Impact Peptide @ goal 70 ml/hr (1680 ml/day) to provide 2520 kcals (35 kcal/kg/day), 158 g PRO (2+ g/kg/day), 1294 ml free H2O, 108 g Fat (50% from MCTs), 235 g CHO and no Fiber daily..      2. Monitor K+/Mg++/Phos daily with TF start and advancement to goal infusion to evaluate for refeeding risk, replace per protocol       3. Order free water flushes 30 ml every 4 hours for tube patency.      4.. Recommend Certavite (15 ml/day via FT) to ensure adequate micronutrient needs being met.      5. Once started TF, Order baseline (CRP) and (prealbumin), with Recheck every Monday to evaluate trend with immune-modulating TF therapy Received.    6. Order H2O flushes of 30 mL q 4 hrs (180 mL + TF = 1474 mL/day) for tube patency.  Note this does not meet pt's full hydration needs. (If TF to meet full hydration needs flush with 100 mL q3hrs or 70 mL q2hrs pending tolerance).      Amy Pack, RD, MS, LD  6B- Pager: [phone]

## 2017-11-16 ENCOUNTER — APPOINTMENT (OUTPATIENT)
Dept: PHYSICAL THERAPY | Facility: CLINIC | Age: 37
DRG: 327 | End: 2017-11-16
Attending: THORACIC SURGERY (CARDIOTHORACIC VASCULAR SURGERY)
Payer: COMMERCIAL

## 2017-11-16 ENCOUNTER — APPOINTMENT (OUTPATIENT)
Dept: SPEECH THERAPY | Facility: CLINIC | Age: 37
DRG: 327 | End: 2017-11-16
Attending: THORACIC SURGERY (CARDIOTHORACIC VASCULAR SURGERY)
Payer: COMMERCIAL

## 2017-11-16 ENCOUNTER — APPOINTMENT (OUTPATIENT)
Dept: GENERAL RADIOLOGY | Facility: CLINIC | Age: 37
DRG: 327 | End: 2017-11-16
Attending: THORACIC SURGERY (CARDIOTHORACIC VASCULAR SURGERY)
Payer: COMMERCIAL

## 2017-11-16 LAB
ANION GAP SERPL CALCULATED.3IONS-SCNC: 6 MMOL/L (ref 3–14)
BUN SERPL-MCNC: 15 MG/DL (ref 7–30)
CALCIUM SERPL-MCNC: 8 MG/DL (ref 8.5–10.1)
CHLORIDE SERPL-SCNC: 104 MMOL/L (ref 94–109)
CO2 SERPL-SCNC: 26 MMOL/L (ref 20–32)
CREAT SERPL-MCNC: 0.69 MG/DL (ref 0.66–1.25)
ERYTHROCYTE [DISTWIDTH] IN BLOOD BY AUTOMATED COUNT: 12.9 % (ref 10–15)
GFR SERPL CREATININE-BSD FRML MDRD: >90 ML/MIN/1.7M2
GLUCOSE SERPL-MCNC: 113 MG/DL (ref 70–99)
HCT VFR BLD AUTO: 27.7 % (ref 40–53)
HGB BLD-MCNC: 9 G/DL (ref 13.3–17.7)
MAGNESIUM SERPL-MCNC: 2.1 MG/DL (ref 1.6–2.3)
MCH RBC QN AUTO: 29.8 PG (ref 26.5–33)
MCHC RBC AUTO-ENTMCNC: 32.5 G/DL (ref 31.5–36.5)
MCV RBC AUTO: 92 FL (ref 78–100)
PHOSPHATE SERPL-MCNC: 2.7 MG/DL (ref 2.5–4.5)
PLATELET # BLD AUTO: 439 10E9/L (ref 150–450)
POTASSIUM SERPL-SCNC: 4 MMOL/L (ref 3.4–5.3)
RBC # BLD AUTO: 3.02 10E12/L (ref 4.4–5.9)
SODIUM SERPL-SCNC: 136 MMOL/L (ref 133–144)
SPECIMEN SOURCE FLD: NORMAL
TRIGL FLD-MCNC: 72 MG/DL
WBC # BLD AUTO: 6 10E9/L (ref 4–11)

## 2017-11-16 PROCEDURE — 84100 ASSAY OF PHOSPHORUS: CPT | Performed by: STUDENT IN AN ORGANIZED HEALTH CARE EDUCATION/TRAINING PROGRAM

## 2017-11-16 PROCEDURE — 84478 ASSAY OF TRIGLYCERIDES: CPT | Performed by: STUDENT IN AN ORGANIZED HEALTH CARE EDUCATION/TRAINING PROGRAM

## 2017-11-16 PROCEDURE — 97110 THERAPEUTIC EXERCISES: CPT | Mod: GP | Performed by: PHYSICAL THERAPIST

## 2017-11-16 PROCEDURE — 12000008 ZZH R&B INTERMEDIATE UMMC

## 2017-11-16 PROCEDURE — 27210437 ZZH NUTRITION PRODUCT SEMIELEM INTERMED LITER

## 2017-11-16 PROCEDURE — 92526 ORAL FUNCTION THERAPY: CPT | Mod: GN | Performed by: SPEECH-LANGUAGE PATHOLOGIST

## 2017-11-16 PROCEDURE — 25000132 ZZH RX MED GY IP 250 OP 250 PS 637

## 2017-11-16 PROCEDURE — 83735 ASSAY OF MAGNESIUM: CPT | Performed by: STUDENT IN AN ORGANIZED HEALTH CARE EDUCATION/TRAINING PROGRAM

## 2017-11-16 PROCEDURE — 27210429 ZZH NUTRITION PRODUCT INTERMEDIATE LITER

## 2017-11-16 PROCEDURE — 40000225 ZZH STATISTIC SLP WARD VISIT: Performed by: SPEECH-LANGUAGE PATHOLOGIST

## 2017-11-16 PROCEDURE — 25000132 ZZH RX MED GY IP 250 OP 250 PS 637: Performed by: THORACIC SURGERY (CARDIOTHORACIC VASCULAR SURGERY)

## 2017-11-16 PROCEDURE — 40000193 ZZH STATISTIC PT WARD VISIT: Performed by: PHYSICAL THERAPIST

## 2017-11-16 PROCEDURE — 71020 XR CHEST 2 VW: CPT

## 2017-11-16 PROCEDURE — 80048 BASIC METABOLIC PNL TOTAL CA: CPT | Performed by: STUDENT IN AN ORGANIZED HEALTH CARE EDUCATION/TRAINING PROGRAM

## 2017-11-16 PROCEDURE — 25000132 ZZH RX MED GY IP 250 OP 250 PS 637: Performed by: STUDENT IN AN ORGANIZED HEALTH CARE EDUCATION/TRAINING PROGRAM

## 2017-11-16 PROCEDURE — 36592 COLLECT BLOOD FROM PICC: CPT | Performed by: STUDENT IN AN ORGANIZED HEALTH CARE EDUCATION/TRAINING PROGRAM

## 2017-11-16 PROCEDURE — 85027 COMPLETE CBC AUTOMATED: CPT | Performed by: STUDENT IN AN ORGANIZED HEALTH CARE EDUCATION/TRAINING PROGRAM

## 2017-11-16 PROCEDURE — 25000128 H RX IP 250 OP 636: Performed by: STUDENT IN AN ORGANIZED HEALTH CARE EDUCATION/TRAINING PROGRAM

## 2017-11-16 RX ADMIN — MULTIVITAMIN 15 ML: LIQUID ORAL at 08:56

## 2017-11-16 RX ADMIN — OXYCODONE HYDROCHLORIDE 5 MG: 5 SOLUTION ORAL at 12:18

## 2017-11-16 RX ADMIN — OXYCODONE HYDROCHLORIDE 5 MG: 5 SOLUTION ORAL at 05:47

## 2017-11-16 RX ADMIN — DOCUSATE SODIUM 10 MG: 50 LIQUID ORAL at 21:08

## 2017-11-16 RX ADMIN — LIDOCAINE 1 PATCH: 50 PATCH CUTANEOUS at 21:09

## 2017-11-16 RX ADMIN — OXYCODONE HYDROCHLORIDE 5 MG: 5 SOLUTION ORAL at 08:56

## 2017-11-16 RX ADMIN — ENOXAPARIN SODIUM 40 MG: 40 INJECTION SUBCUTANEOUS at 08:56

## 2017-11-16 RX ADMIN — Medication 5 ML: at 21:08

## 2017-11-16 RX ADMIN — OXYCODONE HYDROCHLORIDE 10 MG: 5 SOLUTION ORAL at 16:18

## 2017-11-16 RX ADMIN — MENTHOL 1 PATCH: 205.5 PATCH TOPICAL at 08:54

## 2017-11-16 RX ADMIN — OXYCODONE HYDROCHLORIDE 10 MG: 5 SOLUTION ORAL at 21:07

## 2017-11-16 NOTE — PLAN OF CARE
Problem: Patient Care Overview  Goal: Plan of Care/Patient Progress Review  PT 7B: Discharge Planner PT   Patient plan for discharge:   Current status: Pt completes transfers and mobility with SBA. Ambulating a total of ~1000 ft with single UE on IV pole and SBA. Steady on feet. Also completes stairs and x10 min on Nustep bike for strengthening and aerobic endurance. Tolerates well. SpO2 99% on RA and  bpm with activity.   Barriers to return to prior living situation: medical needs, endurance  Recommendations for discharge: Home with assist as needed  Rationale for recommendations: Moving well - near baseline functional mobility       Entered by: Clara Velez 11/16/2017 11:32 AM

## 2017-11-16 NOTE — PROGRESS NOTES
"Thoracic Surgery Progress Note  Daniel Sandhu  [id number]  November 16, 2017    Subjective: No acute issues overnight. Doing well. Chest tube with increased OP since change to regular TF formula.    Objective:   /65 (BP Location: Left arm)  Pulse 93  Temp 98.6  F (37  C) (Oral)  Resp 18  Ht 1.73 m (5' 8.11\")  Wt 72.5 kg (159 lb 14.4 oz)  SpO2 100%  BMI 24.23 kg/m2    PE:  Gen: Awake, alert, NAD   CV: RRR  Resp: non-labored at rest, CT in place without airleak  Abd: Soft, non-distended, NTTP  Ext: warm and well perfused  Incision: c/d/i      I/O last 3 completed shifts:  In: 2605 [P.O.:960; NG/GT:170; IV Piggyback:55]  Out: 200 [Chest Tube:200] - Last 24 hours      Labs/Imaging  Heme:  Recent Labs  Lab 11/16/17  0826 11/15/17  0635 11/14/17  0548 11/13/17  0650   WBC 6.0 4.4 5.7 6.2   HGB 9.0* 7.7* 8.1* 7.5*    330 426 280     Chem:  Recent Labs  Lab 11/16/17  0826 11/15/17  0635 11/14/17  0548 11/13/17  0650   POTASSIUM 4.0 3.8 3.8 4.0   CR 0.69 0.73 0.74 0.71         A/P: Daniel Sandhu is a 37 year old male, who is POD # 13  following total gastrectomy, distal esophagectomy with eveline-en-y reconstruction, thoracotomy, b/l CT placement, pharyngostomy tube placement, j tube placement. Pharyngostomy removed on POD #4 in OR due to concern for bleeding with a small pocket of infection identified.     NEURO Pain well controlled on oxy/tylenol. PRN dilaudid.  Changes:  None    CV HDS.    PULM Aggressive pulmonary toilet and I/S. Continue CT cares. Daily CXR. Pleural fluid triglycerides to evaluate for chylous effusion.   FEN/GI   Advance to low fat full liquid diet. Cycle TF.      No acute issues, good UOP    HEME Hgb as above.  Continue to monitor. No transfusions indicated at this time   ID Afebrile, no leukocytosis.    Antibiotics: None    ENDO No issues   ACTIVITY Up as tolerated  PT/OT consulted  Ambulate at least TID    PPx Prophylactic lovenox   DISPO 7B, anticipated d/c to home in next 1-2 " days.       Patient seen and discussed with the Team.      Shital Grant MD  PGY-1, General Surgery

## 2017-11-16 NOTE — PLAN OF CARE
Problem: Patient Care Overview  Goal: Plan of Care/Patient Progress Review  Discharge Planner SLP   Patient plan for discharge: home over the next few days  Current status: Thoracic surgery has cleared patient for clear liquid diet. Pt tolerating thin liquids well w/o outward s/sx of aspiration with use of soft chin tuck.  Reviewed recommended diet and guidelines to remain on clear liquids until cleared by Thoracic to adv diet. Pt given information on next level of diet pt will move to as he discharges.  Pt demonstrated good understanding for all information.  Recommend continue with clear liquids per thoracic, pt ok to adv diet from speech standpoint.  Continue with use of chin tuck to reduce risks for aspiration and continue with small sips.  Barriers to return to prior living situation: none  Recommendations for discharge: home with OP SLP as needed of difficulties tolerating adv diet textures  Rationale for recommendations: Pt will likely tolerate adv diet textures w/o difficulty; however, rec. F/u with SLP if difficulties noted.       Entered by: Kiki Ohara 11/16/2017 12:38 PM

## 2017-11-16 NOTE — PLAN OF CARE
Problem: Patient Care Overview  Goal: Plan of Care/Patient Progress Review  Pt alert and oriented x4. VSS. Pt on RA and denies any shortness of breath. Pt ambulated several times in the hallway and sat in chair for most of the day. Oxycodone 5 mg given PO with decrease in pain. CT to water seal. Tube feeding formula changed, initiated at 1700. Report called to 7B. Belongings sent with patient. Will continue to monitor per plan of care.

## 2017-11-16 NOTE — PROGRESS NOTES
CLINICAL NUTRITION SERVICES - BRIEF NOTE  *See RD note on 11/13 for last nutrition reassessment details   Nutrition Prescription    Recommendations already ordered by Registered Dietitian (RD):  1. Mg++ and Phos add-ons     ADDENDUM @ 1138: Thoracic team request cycling TF.  Per chart, possible d/c in 1-2 days    2. Switch to Isosource 1.5 (standard TF formula given CRP only slightly elevated), start @ 70 mL/hr.   -- Once tolerates x 4 hours, adv rate by 10 mL Q4H as tolerated to 100 mL/hr.  Once tolerates new goal rate x 4 hours, can turn off TF and begin 16 hour cycled regimen from 4 PM - 8 AM    *Isosource 1.5 @ 100 mL/hr x 16 hours (1600 mL/day) will provide 2400 kcal (34 kcal/kg), 109 g protein (1.5 g/kg PRO), 282 g CHO, 94 g fat, 24 g fiber, and 1216 mL free water daily     Labs: K+ WNL today.  No Mg++ since 11/8 and no phos since 11/6.  TF has been advanced since then. --> Mg++ and Phos came back WNL.  CRP 17 (H) on 11/13, but near normal range.    Diet: Clear liquid diet.  Per MD note today, plan to advance to low fat full liquids.    Weight: Lowest weights this admit of ~72 kg (11/14 and 11/15). Will adjust dosing weight to 72 kg and reassess estimated nutrition needs below:    ASSESSED NUTRITION NEEDS (updated)  Estimated Energy Needs: 0780-4619 kcals/day (30 - 35 kcals/kg )  Justification: Increased needs and Post-op  Estimated Protein Needs: 107-142 grams protein/day (1.5 - 2 grams of pro/kg)  Justification: Increased needs and Post-op  Estimated Fluid Needs: 1 mL/kcal/day   Justification: Maintenance    Monitoring/Evaluation  Progress toward goals will be monitored and evaluated per protocol.     Irene Rebollar, RD, LD   7B RD Pager: [phone]

## 2017-11-16 NOTE — PLAN OF CARE
Vitals:    11/15/17 2010 11/15/17 2113 11/15/17 2212 11/16/17 0753   BP: 119/61  117/60 115/65   BP Location: Left arm  Left arm Left arm   Pulse:       Resp: 18 18 18   Temp: 98.5  F (36.9  C)  98.8  F (37.1  C) 98.6  F (37  C)   TempSrc: Oral  Oral Oral   SpO2: 100%  100% 100%   Weight:  72.5 kg (159 lb 14.4 oz)     Height:         Afebrile, 100% RA, other VSS. Oxycodone for pain control, effective. Denies nausea. Tolerating CLD, TF now to be cycled. J tube clamped, meds through J tube. Midline with staples, some erythema noted. R back/side incision dermabonded CDI. Neck dressing CDI. CT water seal, serous drainage, dressing changed. Family at bedside. Continue plan of care.

## 2017-11-16 NOTE — PROGRESS NOTES
Care Coordinator Progress Note     Admission Date/Time:  11/3/2017  Attending MD:  Yunior Esteban*     Data  Chart reviewed, discussed with interdisciplinary team.   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            Assessment  Patient transferred to floor from , this RNCC met with patient at bedside to discuss discharge planning, patient states he would most likely DC tomorrow, this CC spoke with resident who stated patient should be ready for discharge on Friday 11/17/17. Patient states he has extra family coming in to help as he has a 4 month old and 4 year old at home. Patients spouse will transport when ready for discharge, denied any significant concerns for discharge.      Plan  Anticipated Discharge Date:  11/17/17  Anticipated Discharge Plan:  Home with home care/enteral feed support.    Sue Live RN

## 2017-11-16 NOTE — PLAN OF CARE
"Problem: Pain, Acute (Adult)  Goal: Identify Related Risk Factors and Signs and Symptoms  Related risk factors and signs and symptoms are identified upon initiation of Human Response Clinical Practice Guideline (CPG).   Outcome: No Change  ./60 (BP Location: Left arm)  Pulse 93  Temp 98.8  F (37.1  C) (Oral)  Resp 18  Ht 1.73 m (5' 8.11\")  Wt 72.5 kg (159 lb 14.4 oz)  SpO2 100%  BMI 24.23 kg/m2     Patient receiving oxycodone solution via j-tube, TF infusing 60 mls/hr (increase to goal rate of 70 at 1100); CT to H2O with 90 mls (patient does not want to replace output until he speaks with team); midline incision stapled JACOB, left large incision derma bonded, lap sites CDI; up independently voiding and having BM's; appeared to sleep well      "

## 2017-11-16 NOTE — PLAN OF CARE
Problem: Patient Care Overview  Goal: Plan of Care/Patient Progress Review  Outcome: No Change  VSS on RA. Pain well managed with oxycodone. CT to water seal. TF running at 20ml/hr. Increase by 20ml/hr Q 6hrs. Pt CLD. Denied N/V.  CT incision CDI. Midline incison JACOB. Pt up ad gerald. Voiding adequately. LBM this afternoon. Continue POC.

## 2017-11-17 ENCOUNTER — TELEPHONE (OUTPATIENT)
Dept: ONCOLOGY | Facility: CLINIC | Age: 37
End: 2017-11-17

## 2017-11-17 ENCOUNTER — APPOINTMENT (OUTPATIENT)
Dept: GENERAL RADIOLOGY | Facility: CLINIC | Age: 37
DRG: 327 | End: 2017-11-17
Attending: THORACIC SURGERY (CARDIOTHORACIC VASCULAR SURGERY)
Payer: COMMERCIAL

## 2017-11-17 ENCOUNTER — HOME INFUSION (PRE-WILLOW HOME INFUSION) (OUTPATIENT)
Dept: PHARMACY | Facility: CLINIC | Age: 37
End: 2017-11-17

## 2017-11-17 VITALS
SYSTOLIC BLOOD PRESSURE: 98 MMHG | HEIGHT: 68 IN | TEMPERATURE: 98.3 F | WEIGHT: 159.9 LBS | DIASTOLIC BLOOD PRESSURE: 56 MMHG | OXYGEN SATURATION: 98 % | BODY MASS INDEX: 24.23 KG/M2 | HEART RATE: 93 BPM | RESPIRATION RATE: 18 BRPM

## 2017-11-17 LAB
ANION GAP SERPL CALCULATED.3IONS-SCNC: 6 MMOL/L (ref 3–14)
BUN SERPL-MCNC: 14 MG/DL (ref 7–30)
CALCIUM SERPL-MCNC: 7.7 MG/DL (ref 8.5–10.1)
CHLORIDE SERPL-SCNC: 103 MMOL/L (ref 94–109)
CO2 SERPL-SCNC: 27 MMOL/L (ref 20–32)
CREAT SERPL-MCNC: 0.71 MG/DL (ref 0.66–1.25)
ERYTHROCYTE [DISTWIDTH] IN BLOOD BY AUTOMATED COUNT: 12.8 % (ref 10–15)
GFR SERPL CREATININE-BSD FRML MDRD: >90 ML/MIN/1.7M2
GLUCOSE SERPL-MCNC: 126 MG/DL (ref 70–99)
HCT VFR BLD AUTO: 26.2 % (ref 40–53)
HGB BLD-MCNC: 8.5 G/DL (ref 13.3–17.7)
MCH RBC QN AUTO: 29.4 PG (ref 26.5–33)
MCHC RBC AUTO-ENTMCNC: 32.4 G/DL (ref 31.5–36.5)
MCV RBC AUTO: 91 FL (ref 78–100)
PLATELET # BLD AUTO: 383 10E9/L (ref 150–450)
POTASSIUM SERPL-SCNC: 3.8 MMOL/L (ref 3.4–5.3)
RADIOLOGIST FLAGS: ABNORMAL
RBC # BLD AUTO: 2.89 10E12/L (ref 4.4–5.9)
SODIUM SERPL-SCNC: 136 MMOL/L (ref 133–144)
WBC # BLD AUTO: 4.7 10E9/L (ref 4–11)

## 2017-11-17 PROCEDURE — 85027 COMPLETE CBC AUTOMATED: CPT | Performed by: STUDENT IN AN ORGANIZED HEALTH CARE EDUCATION/TRAINING PROGRAM

## 2017-11-17 PROCEDURE — 71020 XR CHEST 2 VW: CPT

## 2017-11-17 PROCEDURE — 71010 XR CHEST PORT 1 VW: CPT

## 2017-11-17 PROCEDURE — 25000132 ZZH RX MED GY IP 250 OP 250 PS 637: Performed by: SURGERY

## 2017-11-17 PROCEDURE — 36592 COLLECT BLOOD FROM PICC: CPT | Performed by: STUDENT IN AN ORGANIZED HEALTH CARE EDUCATION/TRAINING PROGRAM

## 2017-11-17 PROCEDURE — 25000128 H RX IP 250 OP 636: Performed by: PHYSICIAN ASSISTANT

## 2017-11-17 PROCEDURE — 80048 BASIC METABOLIC PNL TOTAL CA: CPT | Performed by: STUDENT IN AN ORGANIZED HEALTH CARE EDUCATION/TRAINING PROGRAM

## 2017-11-17 PROCEDURE — 25000132 ZZH RX MED GY IP 250 OP 250 PS 637: Performed by: STUDENT IN AN ORGANIZED HEALTH CARE EDUCATION/TRAINING PROGRAM

## 2017-11-17 PROCEDURE — 25000132 ZZH RX MED GY IP 250 OP 250 PS 637: Performed by: THORACIC SURGERY (CARDIOTHORACIC VASCULAR SURGERY)

## 2017-11-17 PROCEDURE — 27210429 ZZH NUTRITION PRODUCT INTERMEDIATE LITER

## 2017-11-17 PROCEDURE — 25000128 H RX IP 250 OP 636: Performed by: STUDENT IN AN ORGANIZED HEALTH CARE EDUCATION/TRAINING PROGRAM

## 2017-11-17 PROCEDURE — 25000132 ZZH RX MED GY IP 250 OP 250 PS 637

## 2017-11-17 RX ORDER — HEPARIN SODIUM (PORCINE) LOCK FLUSH IV SOLN 100 UNIT/ML 100 UNIT/ML
5 SOLUTION INTRAVENOUS
Status: DISCONTINUED | OUTPATIENT
Start: 2017-11-17 | End: 2017-11-17 | Stop reason: HOSPADM

## 2017-11-17 RX ORDER — OXYCODONE HCL 5 MG/5 ML
5-10 SOLUTION, ORAL ORAL EVERY 4 HOURS PRN
Qty: 300 ML | Refills: 0 | Status: SHIPPED | OUTPATIENT
Start: 2017-11-17 | End: 2018-03-16

## 2017-11-17 RX ORDER — HEPARIN SODIUM,PORCINE 10 UNIT/ML
5-10 VIAL (ML) INTRAVENOUS
Status: DISCONTINUED | OUTPATIENT
Start: 2017-11-17 | End: 2017-11-17 | Stop reason: HOSPADM

## 2017-11-17 RX ORDER — HEPARIN SODIUM,PORCINE 10 UNIT/ML
5-10 VIAL (ML) INTRAVENOUS EVERY 24 HOURS
Status: DISCONTINUED | OUTPATIENT
Start: 2017-11-17 | End: 2017-11-17 | Stop reason: HOSPADM

## 2017-11-17 RX ORDER — BISACODYL 10 MG
10 SUPPOSITORY, RECTAL RECTAL ONCE
Status: COMPLETED | OUTPATIENT
Start: 2017-11-17 | End: 2017-11-17

## 2017-11-17 RX ORDER — LIDOCAINE 40 MG/G
CREAM TOPICAL
Status: DISCONTINUED | OUTPATIENT
Start: 2017-11-17 | End: 2017-11-17 | Stop reason: HOSPADM

## 2017-11-17 RX ADMIN — BISACODYL 10 MG: 10 SUPPOSITORY RECTAL at 08:17

## 2017-11-17 RX ADMIN — MAGNESIUM HYDROXIDE 30 ML: 400 SUSPENSION ORAL at 08:15

## 2017-11-17 RX ADMIN — OXYCODONE HYDROCHLORIDE 5 MG: 5 SOLUTION ORAL at 06:55

## 2017-11-17 RX ADMIN — SODIUM CHLORIDE, PRESERVATIVE FREE 5 ML: 5 INJECTION INTRAVENOUS at 12:30

## 2017-11-17 RX ADMIN — MULTIVITAMIN 15 ML: LIQUID ORAL at 08:15

## 2017-11-17 RX ADMIN — MENTHOL 1 PATCH: 205.5 PATCH TOPICAL at 08:18

## 2017-11-17 RX ADMIN — DOCUSATE SODIUM 10 MG: 50 LIQUID ORAL at 08:16

## 2017-11-17 RX ADMIN — Medication 10 ML: at 08:15

## 2017-11-17 RX ADMIN — ENOXAPARIN SODIUM 40 MG: 40 INJECTION SUBCUTANEOUS at 08:15

## 2017-11-17 NOTE — PLAN OF CARE
Problem: Patient Care Overview  Goal: Plan of Care/Patient Progress Review  Outcome: Adequate for Discharge Date Met: 11/17/17  Vitals:    11/16/17 1500 11/16/17 1913 11/17/17 0019 11/17/17 0754   BP: 117/53 113/62 110/53 98/56   BP Location: Left arm Left arm Left arm Left arm   Pulse:       Resp: 18 18 18 18   Temp: 98.9  F (37.2  C) 98  F (36.7  C) 98.9  F (37.2  C) 98.3  F (36.8  C)   TempSrc: Oral Oral Oral Oral   SpO2: 100% 94% 98% 98%   Weight:       Height:         AVSS, pt A&O x 4,minimal complaints of pain, took PO oxy early this am, but refused prior to discharge. Pt adequate for discharge, teaching done and instructions given and signed. Pt discharging to home with home care.

## 2017-11-17 NOTE — PLAN OF CARE
Problem: Patient Care Overview  Goal: Plan of Care/Patient Progress Review  Speech Language Therapy Discharge Summary    Reason for therapy discharge:    Discharged to home.    Progress towards therapy goal(s). See goals on Care Plan in [facility] electronic health record for goal details.  Goals partially met.  Barriers to achieving goals:   discharge from facility.    Therapy recommendation(s):    No further therapy is recommended.     Pt to continue swallowing exercises and use of safe swallow precautions while on clear liquid diet. Pt to advance diet per thoracic team when medically appropriate.

## 2017-11-17 NOTE — TELEPHONE ENCOUNTER
Pt was discharged from hospital earlier today. His chest tube was pulled a short time prior to d/c and covered with Tegaderm.  Pt reports when he cough, air come out of the chest tube hole and forms an air bubble underneath the Tegaderm. He is worried the Tegaderm might eventually come off.    I spoke to on-call Dr Pavon who advises pt may reinforce the Tegaderm with gauze and foam tape.  If he becomes symptomatic-short of breath, increased pain or other troubles-he should come to ER.    The chest tube dressing can be removed after 48 hours.  Called pt back, offered to provide supplies for him.  He states he doesn't want to send anyone here to pick anything up. He has several extra Tegaderms and will use them to reinforce.  He was given the number to call over the weekend if he has issues/questions.

## 2017-11-17 NOTE — PROGRESS NOTES
Home Infusion  Daniel will be discharging today and going home on cycled enteral therapy.   He has never done home enteral therapy before but his wife has been to the [facility] for initial teaching.    Daniel would like some education at bedside for himself and refresher for his wife, Violeta.     Met with Daniel at bedside and provided instruction in enteral administration using the Michael pump.  Instructed him on programming the pump, set up and prime the tubing and use of  the backpack.   Instructed in 8 hr hang time for formula and to use a new bag q 24 hrs.  Provided information about supplies and supply delivery, storage of formula, administration schedule (will be 16 hrs) and 24/7 availability of [facility] staff.    Had Daniel flush his tube using the stopcock.    Daniel verbalized understanding of information given.  He demonstrated very good technique  and verbalized good understanding of administration set up process.   Stated he feels comfortable with administering his feeding independently later today.   [facility] will deliver formula and supplies to his home and [facility] nurse will contact Daniel about setting up home nursing visits if he feels he needs it.  Daniel is ready for discharge from [facility] perspective.    Keila CASTRO  [facility] Liaison  [phone] pager

## 2017-11-17 NOTE — PLAN OF CARE
Problem: Patient Care Overview  Goal: Plan of Care/Patient Progress Review  Outcome: No Change  VSS on RA. Pt c/o pain around CT site. Pain well managed with oxycodone x2. TF started at 1630 today at 70ml/hr via J tube. Increased to 80 at 2030. Pt stopped TF  at 2230. Pt c/o feeling bloating/fullness. Pt reporting feeling constipated. Loose BM x2 today. Requesting suppository. Cross cover # [id number] notified. CT to water seal with 10cc serous output. Lidocain patch on back. Incisions CDI. Pt up ad gerald. Voiding adequately. Port SL.Continue POC.

## 2017-11-17 NOTE — PROGRESS NOTES
CLINICAL NUTRITION SERVICES    Reason for Assessment:  Nutrition education for nutrition recommendations post-op esophagectomy (verbal request from Thoracic team)    Diet History:  Pt admits to not receiving formal nutrition education on recommendations post-op esophagectomy.    Nutrition Diagnosis:  Food- and nutrition-related knowledge deficit r/t no previous knowledge of nutrition recommendations after esophagectomy AEB pt report of not having nutrition education in the past regarding nutrition recommendations post-esophagectomy.    Interventions:  Nutrition Education  1.  Provided handout:  Diet After Esophagectomy  2.  Provided verbal instruction on anticipated discharge diet and nutrition support therapy.  Future diet advancement to be determined by the thoracic team - refer to handout for further details.       Goals:   1.  Patient will verbalize understanding at least three examples of clear liquids.    2.  Patient to recall nutrition plan for discharge (in regards to diet order and nutrition support therapy).    Follow-up:    Patient to ask any further nutrition-related questions before discharge.  In addition, pt may request outpatient RD appointment.    Irene Rebollar RD, LD   7B RD Pager: [phone]

## 2017-11-17 NOTE — DISCHARGE SUMMARY
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 Esophagogastroscopy  2. Diagnostic laparoscopy  3. Laparotomy with total gastrectomy and abdominal lymphadenectomy (D2)  4. LEFT thoracotomy with distal esophagectomy and intrathoracic Terrell-en-Y esophagojejunostomy  5. Feeding jejunostomy  6. LEFT pharyngostomy tube placement  7. RIGHT tube thoracostomy  8. Flexible bronchoscopy    ADDITIONAL PROCEDURES PERFORMED:  17 takeback to OR for I&D of pharyngostomy tube abscess    PATHOLOGY RESULTS: Esophageal adenocarcinoma    CULTURE RESULTS: None     INTRAOPERATIVE COMPLICATIONS: None     POSTOPERATIVE COMPLICATIONS: Pharyngostomy tube site infection with takeback to OR as above    DRAINS/TUBES PRESENT AT DISCHARGE:   Feeding jejunostomy- Isosource 1.5 @ 100 mL/hr x 16 hours.      DATE OF DISCHARGE:  2017     HOSPITAL COURSE: Daniel Sandhu is a 37 year old male who on 11/3/2017 underwent the above-named procedures.  He tolerated the operation well and postoperatively was transferred to the general post-surgical unit.  The remainder of his course was essentially uncomplicated with the exception of the above noted pharyngostomy tube infection.  Prior to discharge, his pain was controlled well, he was able to perform ADLs and ambulate independently without difficulty, and had full return of bowel and bladder function.  On 2017, he was discharged to home in stable condition with home care arrangements in place.    DISCHARGE EXAM:   A&O, NAD  Resp non-labored  Distal extremities warm    Incisions healing well     DISCHARGE INSTRUCTIONS:    Discharge Procedure Orders  XR Chest 2 Views   Standing Status: Future  Standing Exp. Date: 02/15/18   Order Specific Question Answer Comments   Priority Routine      Home infusion referral      Follow Up and recommended labs and tests   Order Comments: 1.) Follow up with primary care physician, Lencho Chan, in 1-2 weeks.  2.) Follow up with a thoracic surgery Clinical Nurse Specialist in Thoracic Surgery clinic in 1 month, prior to which a CXR should be performed.     Discharge Instructions   Order Comments: DIET: Clear liquid diet for 2 days, then full liquid diet for one week, then soft mechanical diet until follow up.  With the soft diet chew very well and take 4-5 small meals per day, or more if necessary.     You will continue tube feeds until follow up, so food by mouth is primarily for pleasure.  Transition to each new diet stage and introduce new foods slowly to  how well you tolerate them.    Follow instructions provided by the dietician and the speech therapist    Sleep with the head of your bed elevated to help prevent reflux, which is common after esophagectomy    If your travel plans upon discharge include prolonged periods of sitting (a lengthy car or plane ride), it is highly beneficial to get up and walk at least once per hour to help prevent swelling and blood clots.     You may remove chest tube dressing 48 hours after tube removal and bandage the site at your own discretion thereafter.  Small amounts of leakage are normal for 2-3 days after removal.  Feel free to call with questions.    You may get incision wet 2 days after operation. Do not submerge, soak, or scrub incision or swim until seen in follow-up.    Take incentive spirometer home for continued frequent use    Activity as tolerated, no strenous activity until seen in follow-up, no lifting greater than 10 pounds for the next 2-4 weeks.    Stay hydrated. Take over the counter fiber (metamucil or benefiber) and stool softeners (Miralax, docusate or senna) if becoming constipated.     Call for fever greater than 101.5, chills, increased size of incision, red skin around incision, vision changes, muscle strength  "changes, sensation changes, shortness of breath, or other concerns.    No driving while taking narcotic pain medication.    Transition to ibuprofen or tylenol/acetaminophen for pain control. Do not take tylenol/acetaminophen and acetaminophen containing narcotic (e.g., percocet or vicodin) at the same time. If you have known ulcer problems, or kidney trouble (elevated creatinine) do not take the ibuprofen.    In emergencies, call 911    For other Questions or Concerns;  A.) During weekday working hours (Monday through Friday 8am to 4:30pm)  call [phone] ([phone] and ask to speak to a clinical nurse specialist.    B.) At nights (after 4:30pm), on weekends, or if urgent call [phone] and  tell the  \"I would like to page job code [phone], the thoracic surgery  fellow on call, please.\"     Tubes   Order Comments: J TUBE INSTRUCTIONS:    Flush your feeding tube with 30cc of water;  1. After medication administration through the feeding tube  2. Before and after tube feeds  3. Every 8 hours while awake if you have not used the tube during that time      -If possible take medications by mouth or as solution via j tube (Do not put crushed pills through the tube if possible)    -Change the gauze around your tube daily or more often if soiled    -KEEP A FLEXI-TRACK (or other tube retention device) on your tube at all times to prevent incidental removal.     Adult Formula Drip Feeding   Order Comments: Isosource 1.5 @ 100 mL/hr x 16 hours         DISCHARGE MEDICATIONS:    Daniel Sandhu   Home Medication Instructions ED:[id number]    Printed on:11/17/17 1302   Medication Information                      acetaminophen (TYLENOL) 32 mg/mL solution  30.45 mLs (975 mg) by Per J Tube route 3 times daily             LORazepam (ATIVAN) 0.5 MG tablet  Take 1 tablet (0.5 mg) by mouth every 4 hours as needed (Anxiety, Nausea/Vomiting or Sleep)             multivitamins with minerals (CERTAVITE/CEROVITE) LIQD liquid  15 mLs " by Per J Tube route daily             oxyCODONE (ROXICODONE) 5 MG/5ML solution  Take 5-10 mLs (5-10 mg) by mouth every 4 hours as needed for moderate to severe pain             sennosides (SENOKOT) 8.8 MG/5ML syrup  10 mLs by Per J Tube route 2 times daily

## 2017-11-17 NOTE — PLAN OF CARE
Problem: Patient Care Overview  Goal: Plan of Care/Patient Progress Review  Outcome: Adequate for Discharge Date Met: 11/17/17  Physical Therapy Discharge Summary    Reason for therapy discharge:    Discharged to home.    Progress towards therapy goal(s). See goals on Care Plan in [facility] electronic health record for goal details.  Goals partially met.  Barriers to achieving goals:   discharge from facility.    Therapy recommendation(s):    No further therapy is recommended.  Continue home exercise program.

## 2017-11-18 ENCOUNTER — HOME INFUSION (PRE-WILLOW HOME INFUSION) (OUTPATIENT)
Dept: PHARMACY | Facility: CLINIC | Age: 37
End: 2017-11-18

## 2017-11-20 NOTE — PROGRESS NOTES
This is a recent snapshot of the patient's [facility] medical record.  For current drug dose and complete information and questions, call [phone] or In Basket pool, [facility] ([id number])  CSN Number:  [id number]

## 2017-11-21 ENCOUNTER — TELEPHONE (OUTPATIENT)
Dept: FAMILY MEDICINE | Facility: CLINIC | Age: 37
End: 2017-11-21

## 2017-11-21 ENCOUNTER — MEDICAL CORRESPONDENCE (OUTPATIENT)
Dept: HEALTH INFORMATION MANAGEMENT | Facility: CLINIC | Age: 37
End: 2017-11-21

## 2017-11-21 ENCOUNTER — HOME INFUSION (PRE-WILLOW HOME INFUSION) (OUTPATIENT)
Dept: PHARMACY | Facility: CLINIC | Age: 37
End: 2017-11-21

## 2017-11-21 NOTE — TELEPHONE ENCOUNTER
Forms received from: [facility]   Phone number listed: [phone]   Fax listed: [phone]  Date received: 11-21-17  Form description: treatment plan  Once forms are completed, please return to [facility] via fax.  Is patient requesting to be contacted when forms are completed: n/a  Form placed: provider desk  Kaia Boothe

## 2017-11-21 NOTE — PROGRESS NOTES
This is a recent snapshot of the patient's [facility] medical record.  For current drug dose and complete information and questions, call [phone] or In Basket pool, [facility] ([id number])  CSN Number:  [id number]

## 2017-11-22 ENCOUNTER — OFFICE VISIT (OUTPATIENT)
Dept: FAMILY MEDICINE | Facility: CLINIC | Age: 37
End: 2017-11-22
Payer: COMMERCIAL

## 2017-11-22 VITALS
WEIGHT: 156.6 LBS | DIASTOLIC BLOOD PRESSURE: 60 MMHG | OXYGEN SATURATION: 100 % | BODY MASS INDEX: 23.19 KG/M2 | HEIGHT: 69 IN | TEMPERATURE: 98.1 F | SYSTOLIC BLOOD PRESSURE: 110 MMHG | HEART RATE: 197 BPM

## 2017-11-22 DIAGNOSIS — Z90.3 S/P GASTRECTOMY: ICD-10-CM

## 2017-11-22 DIAGNOSIS — E53.8 B12 DEFICIENCY: Primary | ICD-10-CM

## 2017-11-22 DIAGNOSIS — C15.5 MALIGNANT NEOPLASM OF LOWER THIRD OF ESOPHAGUS (H): ICD-10-CM

## 2017-11-22 PROCEDURE — 99213 OFFICE O/P EST LOW 20 MIN: CPT | Performed by: FAMILY MEDICINE

## 2017-11-22 RX ORDER — CYANOCOBALAMIN (VITAMIN B-12) 2500 MCG
2500 TABLET, SUBLINGUAL SUBLINGUAL DAILY
Qty: 30 TABLET | Refills: 1 | Status: SHIPPED | OUTPATIENT
Start: 2017-11-22

## 2017-11-22 NOTE — PROGRESS NOTES
This is a recent snapshot of the patient's [facility] medical record.  For current drug dose and complete information and questions, call [phone] or In Basket pool, [facility] ([id number])  CSN Number:  [id number]

## 2017-11-22 NOTE — PATIENT INSTRUCTIONS
Start sublingual b12 pill daily    In about a month do lab only visit    Call / return to clinic as needed

## 2017-11-22 NOTE — PROGRESS NOTES
SUBJECTIVE:   Daniel Sandhu is a 37 year old male who presents to clinic today for the following health issues:      Pt is here for post op and will like B-12 shots.           Problem list and histories reviewed & adjusted, as indicated.  Additional history: as documented         Reviewed and updated as needed this visit by clinical staffTobacco  Allergies  Meds  Med Hx  Surg Hx  Fam Hx  Soc Hx      Reviewed and updated as needed this visit by Provider          left 5th toe feels numb/ tingly     Strength fine    On full liquids now, will advance to mechanical soft soon    On tube feeds    Now on no fiber formula    Had procedure on Nov 3    Almost 3 weeks ago    Not taken even tylenol for 2 days    Breathing improved in last couple days    [employer] player    Appointment with oncology next week    Had chemo for 9 weeks to shrink the cancer before surgery    Physical Exam  Heart and lungs normal    Mentation fine    Patient has long vertical midline abd wound, healing nicely    Also a curved left thoracotomy scar present, also healing well    Bandage over the hole where chest tube was removed    No infection/ drainage    Minimally tender    Has feeding tube present    ASSESSMENT / PLAN:  (E53.8) B12 deficiency  (primary encounter diagnosis)  Comment: patient likely not b12 deficient yet but soon would be without supplementation given his altered anatomy.  Plan: cyabnocobalamin (VITAMIN B-12) 2500 MCG         sublingual tablet, Vitamin B12        We will try sublingual B12, check level in a month and then a few months after that also to make sure the sublingual b12 is adequate. Patient agreed.     (C15.5) 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  Comment: reviewed dc summary.  Very complicated involved procedure.  Actually patient looks really good.  Plan: follow up with oncology and surgeons as planned.     (Z90.3) S/P gastrectomy  Comment: as above   Plan: patient getting most nutrition through  tube feeds.  Gradually to get some advancing of diet.      I reviewed the patient's medications, allergies, medical history, family history, and social history.    Lencho Chan MD

## 2017-11-22 NOTE — MR AVS SNAPSHOT
After Visit Summary   11/22/2017    Daniel Sandhu    MRN: [id number]           Patient Information     Date Of Birth          1980        Visit Information        Provider Department      11/22/2017 11:20 AM Lencho Chan MD [facility]        Today's Diagnoses     B12 deficiency    -  1      Care Instructions    Start sublingual b12 pill daily    In about a month do lab only visit    Call / return to clinic as needed          Follow-ups after your visit        Your next 10 appointments already scheduled     Nov 27, 2017 10:15 AM CST   (Arrive by 10:00 AM)   Return Visit with Laurence Hood MD   [facility] ([facility])    [address] [place] [address]  2nd Floor  [facility] [address] [phone]              Future tests that were ordered for you today     Open Future Orders        Priority Expected Expires Ordered    Vitamin B12 Routine  11/22/2018 11/22/2017            Who to contact     If you have questions or need follow up information about today's clinic visit or your schedule please contact [facility] directly at [phone].  Normal or non-critical lab and imaging results will be communicated to you by MyChart, letter or phone within 4 business days after the clinic has received the results. If you do not hear from us within 7 days, please contact the clinic through MyChart or phone. If you have a critical or abnormal lab result, we will notify you by phone as soon as possible.  Submit refill requests through Dom[employer] or call your pharmacy and they will forward the refill request to us. Please allow 3 business days for your refill to be completed.          Additional Information About Your Visit        MyChart Information     Domo gives you secure access to your electronic health record. If you see a primary care provider, you can also send messages to your care team and make  "appointments. If you have questions, please call your primary care clinic.  If you do not have a primary care provider, please call [phone] and they will assist you.        Care EveryWhere ID     This is your Care EveryWhere ID. This could be used by other organizations to access your [facility] medical records  [id number]        Your Vitals Were     Pulse Temperature Height Pulse Oximetry BMI (Body Mass Index)       197 98.1  F (36.7  C) (Oral) 5' 8.9\" (1.75 m) 100% 23.19 kg/m2        Blood Pressure from Last 3 Encounters:   11/22/17 110/60   11/17/17 98/56   11/01/17 125/76    Weight from Last 3 Encounters:   11/22/17 156 lb 9.6 oz (71 kg)   11/15/17 159 lb 14.4 oz (72.5 kg)   11/01/17 164 lb 0.4 oz (74.4 kg)                 Today's Medication Changes          These changes are accurate as of: 11/22/17 11:54 AM.  If you have any questions, ask your nurse or doctor.               Start taking these medicines.        Dose/Directions    cyabnocobalamin 2500 MCG sublingual tablet   Commonly known as:  VITAMIN B-12   Used for:  B12 deficiency   Started by:  Lencho Chan MD        Dose:  2500 mcg   Place 2,500 mcg under the tongue daily   Quantity:  30 tablet   Refills:  1            Where to get your medicines      These medications were sent to [facility] [place], [place] - [address]  [address] [facility] [address]     Phone:  [phone]     cyabnocobalamin 2500 MCG sublingual tablet                Primary Care Provider Office Phone # Fax #    Lencho Chan [phone]       [address] [facility] [address]        Equal Access to Services     SHAWN SWAIN AH: Judy Araujo, kelly matias, qaarvind kaalmada ross, alejandro arteaga. So [facility] [phone].    ATENCIÓN: Si habla español, tiene a zayas disposición servicios gratuitos de asistencia lingüística. Llame al " [phone].    We comply with applicable federal civil rights laws and [place] laws. We do not discriminate on the basis of race, color, national origin, age, disability, sex, sexual orientation, or gender identity.            Thank you!     Thank you for choosing [facility]  for your care. Our goal is always to provide you with excellent care. Hearing back from our patients is one way we can continue to improve our services. Please take a few minutes to complete the written survey that you may receive in the mail after your visit with us. Thank you!             Your Updated Medication List - Protect others around you: Learn how to safely use, store and throw away your medicines at www.disposemymeds.org.          This list is accurate as of: 11/22/17 11:54 AM.  Always use your most recent med list.                   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

## 2017-11-24 ENCOUNTER — TELEPHONE (OUTPATIENT)
Dept: FAMILY MEDICINE | Facility: CLINIC | Age: 37
End: 2017-11-24

## 2017-11-24 NOTE — TELEPHONE ENCOUNTER
Forms received from: [facility]   Phone number listed: [phone]   Fax listed: [phone]  Date received: 11/24/2017  Form description: Care Plan/Treatment Orders  Once forms are completed, please return to [facility] via fax.  Is patient requesting to be contacted when forms are completed: NA    Form placed: In provider's basket  Kerry May

## 2017-11-25 ENCOUNTER — MEDICAL CORRESPONDENCE (OUTPATIENT)
Dept: HEALTH INFORMATION MANAGEMENT | Facility: CLINIC | Age: 37
End: 2017-11-25

## 2017-11-27 ENCOUNTER — ONCOLOGY VISIT (OUTPATIENT)
Dept: ONCOLOGY | Facility: CLINIC | Age: 37
End: 2017-11-27
Attending: INTERNAL MEDICINE
Payer: COMMERCIAL

## 2017-11-27 VITALS
OXYGEN SATURATION: 100 % | HEIGHT: 69 IN | BODY MASS INDEX: 23.03 KG/M2 | WEIGHT: 155.5 LBS | RESPIRATION RATE: 16 BRPM | DIASTOLIC BLOOD PRESSURE: 66 MMHG | SYSTOLIC BLOOD PRESSURE: 107 MMHG | HEART RATE: 71 BPM | TEMPERATURE: 98 F

## 2017-11-27 DIAGNOSIS — C15.5 MALIGNANT NEOPLASM OF LOWER THIRD OF ESOPHAGUS (H): Primary | ICD-10-CM

## 2017-11-27 PROCEDURE — 99215 OFFICE O/P EST HI 40 MIN: CPT | Mod: ZP | Performed by: INTERNAL MEDICINE

## 2017-11-27 PROCEDURE — 99213 OFFICE O/P EST LOW 20 MIN: CPT | Mod: ZF

## 2017-11-27 ASSESSMENT — PAIN SCALES - GENERAL: PAINLEVEL: MODERATE PAIN (4)

## 2017-11-27 NOTE — PROGRESS NOTES
[facility]    Hematology/Oncology Established Patient Note      Today's Date: 11/27/17    Reason for Follow-up: adenocarcinoma of the GE junction      HISTORY OF PRESENT ILLNESS: Daniel Sandhu is a 37 year old male who presents with adenocarcinoma of the GE junction.  In early 2017, patient started noticing difficulty swallowing, and that food seems to take longer to go down.  It became more frequent, and he saw his PCP, and was referred for EGD, which showed an esophageal mass located at the GE junction, measuring 4 cm.  Biopsy showed poorly differentiated adenocarcinoma.  HER-2 was sent and is equivocal (2+).  CT c/a/p showed a mildly prominent lymph node in the gastrohepatic ligament, but there were no pathologically enlarged lymph nodes in the chest, abdomen, or pelvis.  There was otherwise no evidence of metastatic disease.  PET-CT showed thickening of the distal esophagus consistent with known esophageal adenocarcinoma, as well as mildly hypermetabolic 1 cm lymph node in the gastrohepatic ligament.  There was no evidence of distant metastatic disease.  He underwent EUS on 6/9/17, which showed the esophageal tumor in the lower third of the esophagus, staged T2NX.  There were 2 abnormal lymph nodes seen in the gastrohepatic ligament; pathology was suspicious for adenocarcinoma.  Celiac node was sampled as well, which was benign.  He was seen by surgery, Dr. Esteban, and recommended srinivas-operative chemotherapy.    Port was placed on 6/22/17.    Chemotherapy:  Epirubicin 50 mg/m2 IV on day 1  Oxaliplatin 130 mg/m2 IV on day 1  Capecitabine 625 mg/m2 po BID on days 1-21  Every 21 day cycles    C1D1: 6/26/17  C2D1: 7/17/17  C3D1: 8/7/17    On 11/3/17, he underwent laparotomy with total gastrectomy and abdominal lymphadenectomy, left thoracotomy with distal esophagectomy and intrathoracic Terrell-en-Y esophagojejunostomy, feeding jejunostomy, left pharyngostomy tube placement, right tube  thoracostomy, and flexible bronchoscopy.  On 11/9/17, he was taken back to OR for I&D of pharyngostomy tube abscess.  Pathology showed adenocarcinoma, moderate differentiated, extensive residual tumor with no evidence tumor regression, margins negative, perineural invasion present, 1 of 28 lymph nodes were involved with malignancy, stage [id number] (stage IIIA).  HER-2 is non-amplified.      INTERIM HISTORY: Daniel comes in for follow-up today.  He underwent surgery for resection of his esophageal tumor on 11/3/17.  He says that he feels okay.  He is continuing to recover.  He has a feeding tube in place.  He was able to start eating soft foods yesterday, so he had some mashed potatoes and stuffing for Thanksgiving, and it all went down okay.  His chest tube has been removed.          REVIEW OF SYSTEMS:   14 point ROS was reviewed and is negative other than as noted above in HPI.       HOME MEDICATIONS:  Current Outpatient Prescriptions   Medication Sig Dispense Refill     sennosides (SENOKOT) 8.8 MG/5ML syrup 10 mLs by Per J Tube route 2 times daily 400 mL 0     multivitamins with minerals (CERTAVITE/CEROVITE) LIQD liquid 15 mLs by Per J Tube route daily 450 mL 0     cyabnocobalamin (VITAMIN B-12) 2500 MCG sublingual tablet Place 2,500 mcg under the tongue daily (Patient not taking: Reported on 11/27/2017) 30 tablet 1     acetaminophen (TYLENOL) 32 mg/mL solution 30.45 mLs (975 mg) by Per J Tube route 3 times daily (Patient not taking: Reported on 11/22/2017) 400 mL 0     oxyCODONE (ROXICODONE) 5 MG/5ML solution Take 5-10 mLs (5-10 mg) by mouth every 4 hours as needed for moderate to severe pain (Patient not taking: Reported on 11/22/2017) 300 mL 0     LORazepam (ATIVAN) 0.5 MG tablet Take 1 tablet (0.5 mg) by mouth every 4 hours as needed (Anxiety, Nausea/Vomiting or Sleep) (Patient not taking: Reported on 8/8/2017) 30 tablet 5         ALLERGIES:  No Known Allergies      PAST MEDICAL HISTORY:  Past Medical History:    Diagnosis Date     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 6/5/2017         PAST SURGICAL HISTORY:  Past Surgical History:   Procedure Laterality Date     ESOPHAGOGASTRODUODENOSCOPY       ESOPHAGOSCOPY, GASTROSCOPY, DUODENOSCOPY (EGD), COMBINED N/A 6/9/2017    Procedure: COMBINED ENDOSCOPIC ULTRASOUND, ESOPHAGOSCOPY, GASTROSCOPY, DUODENOSCOPY (EGD), FINE NEEDLE ASPIRATE/BIOPSY;  Upper Endoscopic Ultrasound, fine needle aspirate/biopsy;  Surgeon: Guru Mark Avila MD;  Location: [facility] OR     ESOPHAGOSCOPY, GASTROSCOPY, DUODENOSCOPY (EGD), COMBINED N/A 11/3/2017    Procedure: COMBINED ESOPHAGOSCOPY, GASTROSCOPY, DUODENOSCOPY (EGD);;  Surgeon: Yunior Esteban MD;  Location: [facility] OR     ESOPHAGOSCOPY, GASTROSCOPY, DUODENOSCOPY (EGD), COMBINED N/A 11/9/2017    Procedure: COMBINED ESOPHAGOSCOPY, GASTROSCOPY, DUODENOSCOPY (EGD);  Esophogastroduodenoscopy, take out pharangostomy tube;  Surgeon: Yunior Esteban MD;  Location: [facility] OR     GASTRECTOMY N/A 11/3/2017    Procedure: GASTRECTOMY;;  Surgeon: Yunior Esteban MD;  Location: [facility] OR     HAND SURGERY      childhood, torn tendon     INSERT PORT VASCULAR ACCESS Right 6/22/2017    Procedure: INSERT PORT VASCULAR ACCESS;  Single Lumen Chest Power Port;  Surgeon: Iván Driver PA-C;  Location: [facility] OR     LAPAROSCOPY DIAGNOSTIC (GENERAL) N/A 11/3/2017    Procedure: LAPAROSCOPY DIAGNOSTIC (GENERAL);  diagnostic laparoscopy, right chest tube, total gastrectomy with distal esophagectomy, intrathoracic eveline-y esophago-jejunostomy, feeding jejunostomy, pharyngostomy, esophagogastroduodenoscopy, flexible bronchoscopy;  Surgeon: Yunior Esteban MD;  Location: [facility] OR     LAPAROTOMY EXPLORATORY N/A 11/3/2017    Procedure: LAPAROTOMY EXPLORATORY;;  Surgeon: Yunior Esteban MD;  Location: [facility] OR     PHARYNGOSTOMY N/A 11/3/2017    Procedure: PHARYNGOSTOMY;;  Surgeon: Yunior Esteban MD;  Location: [facility]  "OR     THORACOTOMY Left 11/3/2017    Procedure: THORACOTOMY;;  Surgeon: Yunior Esteban MD;  Location: [facility] OR         SOCIAL HISTORY:  Social History     Social History     Marital status:      Spouse name: N/A     Number of children: 1     Years of education: N/A     Occupational History     musician and teacher       Social History Main Topics     Smoking status: Never Smoker     Smokeless tobacco: Never Used     Alcohol use Yes      Comment: 1 beer daily     Drug use: Yes     Special: Marijuana      Comment: occasional     Sexual activity: Yes     Partners: Female     Birth control/ protection: Natural Family Planning     Other Topics Concern     Not on file     Social History Narrative     He works at [employer] in [place], 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  He denies smoking.  He drinks ~1 beer a day.  He denies illicit drug use, other than occasional marijuana.  He lives in [place] with his wife, and 4.5 year-old daughter.  His wife is currently pregnant with a boy, and is due in 6 weeks.  He has a half sister who  of breast cancer at age 32; she was diagnosed in her late 20's.  A paternal grandmother had breast cancer in her 70's      FAMILY HISTORY:  Family History   Problem Relation Age of Onset     Other - See Comments Father      sepsis     CANCER Sister      breast cancer  31 yo 1/2 sister      CANCER Paternal Grandmother      breast         PHYSICAL EXAM:  Vital signs:  /66  Pulse 71  Temp 98  F (36.7  C) (Oral)  Resp 16  Ht 1.75 m (5' 8.9\")  Wt 70.5 kg (155 lb 8 oz)  SpO2 100%  BMI 23.03 kg/m2   ECO  GENERAL/CONSTITUTIONAL: No acute distress. Accompanied by wife and baby son.  EYES: No scleral icterus.  MOUTH: No mucositis, no thrush seen.  RESPIRATORY: Clear to auscultation bilaterally. No crackles or wheezing.   CARDIOVASCULAR: Regular rate and rhythm without murmurs, gallops, or rubs.  GASTROINTESTINAL: No tenderness. The patient has " normal bowel sounds. No guarding.  No distention.  Feeding tube in place.  Scars healing well.    MUSCULOSKELETAL: Warm and well-perfused, no cyanosis, clubbing, or edema.  NEUROLOGIC: Alert, oriented, answers questions appropriately.  INTEGUMENTARY: No jaundice.  Port in place.        LABS:  None today.      PATHOLOGY:  11/3/17:  FINAL DIAGNOSIS:   A. SOFT TISSUE, SANA-ESOPHAGEAL TISSUE, EXCISION:   - Two lymph nodes with no evidence of malignancy (0/2)     B. LYMPH NODE, COMMON HEPATIC ARTERY, EXCISION:   - One reactive lymph node with no evidence of malignancy (0/1)     C. ESOPHAGUS, STOMACH, OMENTUM, ESOPHAGOGASTRECTOMY AND OMENTECTOMY:   - Adenocarcinoma, moderately differentiated status post neoadjuvant   chemotherapy   - Tumor size, at least 2.5 cm   - Tumor invades through the muscularis into the adventitia   - Perineural invasion identified   - Resection margins negative for involvement (tumor 0.1cm from closest   inked circumferential margin)   - Treatment effect: extensive residual tumor (poor response, score 3)   - Metastatic carcinoma in one out of twenty eight lymph nodes (1/28)     D. ESOPHAGUS, FINAL MARGIN, EXCISION:   - Esophageal wall with no evidence of malignancy   - Separate fragment of small intestinal wall with no evidence of   malignancy     SPECIMEN     Specimen:         - Esophagus         - Proximal stomach     Procedure:         - Esophagogastrectomy     TUMOR     Primary Tumor Site:         - Esophagogastric junction (EGJ)     Relationship of Tumor to Esophagogastric Junction:         - Tumor midpoint is located at the esophagogastric junction     Distance of Tumor Center from Esophagogastric Junction: Not applicable     Histologic Type:         - Adenocarcinoma     Histologic Grade:         - G2:  Moderately differentiated     Tumor Size: 2.5 x 1.7 x 1.2 cm     Tumor Extent       Site(s) of Direct Extent of Tumor:           - Distal esophagus (lower thoracic esophagus)           -  Esophagogastric junction (EGJ)           - Proximal stomach and esophagogastric junction       Microscopic Tumor Extension:           - Tumor invades through the muscularis propria into the   periesophageal           soft tissue (adventitia)     Accessory Tumor Findings       Treatment Effect:           - Extensive residual cancer with no evident tumor regression   (poor or no           response, score 3)       Lymph-Vascular Invasion:           - Not identified       Perineural Invasion:           - Present     MARGINS     Proximal Margin:         - Uninvolved by invasive carcinoma       Involvement by Dysplasia:           - Uninvolved by dysplasia     Distal Margin:         - Uninvolved by invasive carcinoma       Involvement by Dysplasia:           - Uninvolved by dysplasia     Circumferential (Adventitial) Margin (esophagectomy or   esophagogastrectomy     specimens) or Deep Margin (endoscopic resection specimens):         - Uninvolved by invasive carcinoma     Mucosal Margin:         - No endoscopic resection performed     LYMPH NODES     Number of Lymph Nodes Examined: 31     Number of Lymph Nodes Involved: 1     STAGE (PTNM)     TNM Descriptors:         - y (post-treatment)     Primary Tumor (pT):         - pT3: Tumor invades adventitia     Regional Lymph Nodes (pN):         - pN1: Regional lymph node metastasis involving 1 to 2 nodes     ADDITIONAL FINDINGS     Additional Pathologic Findings:         - Gastritis     INTERPRETATION:   The HER2/ALBAN 17 ratio of 1.8 in this case is interpreted as showing no   evidence of amplification      ASSESSMENT/PLAN:  Daniel Sandhu is a 37 year old male with:    1) Adenocarcinoma of the GE junction: now s/p 3 cycles of neoadjuvant chemotherapy with EOX, followed by surgical resection on 11/3/17.  Pathology showed adenocarcinoma, moderate differentiated, extensive residual tumor with no evidence tumor regression, margins negative, perineural invasion present, 1 of 28 lymph  nodes were involved with malignancy, stage [id number] (stage IIIA).  HER-2 is non-amplified.    Patient continues to recover from surgery.  He will continue to recover and have his post-op appointment with surgery.  We will obtain post-op scans in the next 1-2 weeks, with plans to resume adjuvant chemotherapy on Monday 12/11/17, if he is feeling well.      He did not tolerate Xeloda well during his neoadjuvant chemotherapy, with issues with palmar-plantar, and likely genital erythrodysesthesia.  We discussed changing to 5-FU, and Daniel would like to try this, as he may also have issues with taking pills and absorption after his surgery.      -echo every 3 months while on chemotherapy - next one should be in late December 2017 - will order at the next visit  -registered for medical cannabis, per patient request  -schedule post-op appointment with Dr. Esteban  -CT c/a/p and labs in the next 1-2 weeks  -plan to start cycle 4 of chemotherapy on 12/11/17 with epirubicin, oxaliplatin, and 5-FU  -RTC on 12/11/17    2) Genetics:  -genetic testing was negative    3) Chemotherapy-induced nausea/vomiting:   -Emend and Aloxi added to pre-meds  -he has Zofran ODT and Compazine, which are helpful    4) Oral thrush: he has much problems with this during his chemotherapy.  -will plan to give fluconazole prophylactically during his adjuvant chemotherapy        I spent a total of 40 minutes with the patient, with over >50% of the time in counseling and/or coordination of care.       Laurence Hood MD  Hematology/Oncology  [facility]

## 2017-11-27 NOTE — LETTER
11/27/2017       RE: Daniel Sandhu  [address] [facility] [address]     Dear Colleague,    Thank you for referring your patient, Daniel Sandhu, to the [facility]. Please see a copy of my visit note below.    [facility]    Hematology/Oncology Established Patient Note      Today's Date: 11/27/17    Reason for Follow-up: adenocarcinoma of the GE junction      HISTORY OF PRESENT ILLNESS: Daniel Sandhu is a 37 year old male who presents with adenocarcinoma of the GE junction.  In early 2017, patient started noticing difficulty swallowing, and that food seems to take longer to go down.  It became more frequent, and he saw his PCP, and was referred for EGD, which showed an esophageal mass located at the GE junction, measuring 4 cm.  Biopsy showed poorly differentiated adenocarcinoma.  HER-2 was sent and is equivocal (2+).  CT c/a/p showed a mildly prominent lymph node in the gastrohepatic ligament, but there were no pathologically enlarged lymph nodes in the chest, abdomen, or pelvis.  There was otherwise no evidence of metastatic disease.  PET-CT showed thickening of the distal esophagus consistent with known esophageal adenocarcinoma, as well as mildly hypermetabolic 1 cm lymph node in the gastrohepatic ligament.  There was no evidence of distant metastatic disease.  He underwent EUS on 6/9/17, which showed the esophageal tumor in the lower third of the esophagus, staged T2NX.  There were 2 abnormal lymph nodes seen in the gastrohepatic ligament; pathology was suspicious for adenocarcinoma.  Celiac node was sampled as well, which was benign.  He was seen by surgery, Dr. Esteban, and recommended srinivas-operative chemotherapy.    Port was placed on 6/22/17.    Chemotherapy:  Epirubicin 50 mg/m2 IV on day 1  Oxaliplatin 130 mg/m2 IV on day 1  Capecitabine 625 mg/m2 po BID on days 1-21  Every 21 day cycles    C1D1: 6/26/17  C2D1: 7/17/17  C3D1: 8/7/17    On  11/3/17, he underwent laparotomy with total gastrectomy and abdominal lymphadenectomy, left thoracotomy with distal esophagectomy and intrathoracic Terrell-en-Y esophagojejunostomy, feeding jejunostomy, left pharyngostomy tube placement, right tube thoracostomy, and flexible bronchoscopy.  On 11/9/17, he was taken back to OR for I&D of pharyngostomy tube abscess.  Pathology showed adenocarcinoma, moderate differentiated, extensive residual tumor with no evidence tumor regression, margins negative, perineural invasion present, 1 of 28 lymph nodes were involved with malignancy, stage [id number] (stage IIIA).  HER-2 is non-amplified.      INTERIM HISTORY: Daniel comes in for follow-up today.  He underwent surgery for resection of his esophageal tumor on 11/3/17.  He says that he feels okay.  He is continuing to recover.  He has a feeding tube in place.  He was able to start eating soft foods yesterday, so he had some mashed potatoes and stuffing for Thanksgiving, and it all went down okay.  His chest tube has been removed.          REVIEW OF SYSTEMS:   14 point ROS was reviewed and is negative other than as noted above in HPI.       HOME MEDICATIONS:  Current Outpatient Prescriptions   Medication Sig Dispense Refill     sennosides (SENOKOT) 8.8 MG/5ML syrup 10 mLs by Per J Tube route 2 times daily 400 mL 0     multivitamins with minerals (CERTAVITE/CEROVITE) LIQD liquid 15 mLs by Per J Tube route daily 450 mL 0     cyabnocobalamin (VITAMIN B-12) 2500 MCG sublingual tablet Place 2,500 mcg under the tongue daily (Patient not taking: Reported on 11/27/2017) 30 tablet 1     acetaminophen (TYLENOL) 32 mg/mL solution 30.45 mLs (975 mg) by Per J Tube route 3 times daily (Patient not taking: Reported on 11/22/2017) 400 mL 0     oxyCODONE (ROXICODONE) 5 MG/5ML solution Take 5-10 mLs (5-10 mg) by mouth every 4 hours as needed for moderate to severe pain (Patient not taking: Reported on 11/22/2017) 300 mL 0     LORazepam (ATIVAN) 0.5 MG  tablet Take 1 tablet (0.5 mg) by mouth every 4 hours as needed (Anxiety, Nausea/Vomiting or Sleep) (Patient not taking: Reported on 8/8/2017) 30 tablet 5         ALLERGIES:  No Known Allergies      PAST MEDICAL HISTORY:  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 6/5/2017         PAST SURGICAL HISTORY:  Past Surgical History:   Procedure Laterality Date     ESOPHAGOGASTRODUODENOSCOPY       ESOPHAGOSCOPY, GASTROSCOPY, DUODENOSCOPY (EGD), COMBINED N/A 6/9/2017    Procedure: COMBINED ENDOSCOPIC ULTRASOUND, ESOPHAGOSCOPY, GASTROSCOPY, DUODENOSCOPY (EGD), FINE NEEDLE ASPIRATE/BIOPSY;  Upper Endoscopic Ultrasound, fine needle aspirate/biopsy;  Surgeon: Guru Mark Avila MD;  Location: [facility] OR     ESOPHAGOSCOPY, GASTROSCOPY, DUODENOSCOPY (EGD), COMBINED N/A 11/3/2017    Procedure: COMBINED ESOPHAGOSCOPY, GASTROSCOPY, DUODENOSCOPY (EGD);;  Surgeon: Yunior Esteban MD;  Location: [facility] OR     ESOPHAGOSCOPY, GASTROSCOPY, DUODENOSCOPY (EGD), COMBINED N/A 11/9/2017    Procedure: COMBINED ESOPHAGOSCOPY, GASTROSCOPY, DUODENOSCOPY (EGD);  Esophogastroduodenoscopy, take out pharangostomy tube;  Surgeon: Yunior Esteban MD;  Location: [facility] OR     GASTRECTOMY N/A 11/3/2017    Procedure: GASTRECTOMY;;  Surgeon: Yunior Esteban MD;  Location: [facility] OR     HAND SURGERY      childhood, torn tendon     INSERT PORT VASCULAR ACCESS Right 6/22/2017    Procedure: INSERT PORT VASCULAR ACCESS;  Single Lumen Chest Power Port;  Surgeon: Iván Driver PA-C;  [facility]     LAPAROSCOPY DIAGNOSTIC (GENERAL) N/A 11/3/2017    Procedure: LAPAROSCOPY DIAGNOSTIC (GENERAL);  diagnostic laparoscopy, right chest tube, total gastrectomy with distal esophagectomy, intrathoracic eveline-y esophago-jejunostomy, feeding jejunostomy, pharyngostomy, esophagogastroduodenoscopy, flexible bronchoscopy;  Surgeon: Yunior Esteban MD;  Location: [facility] OR      "LAPAROTOMY EXPLORATORY N/A 11/3/2017    Procedure: LAPAROTOMY EXPLORATORY;;  Surgeon: Yunior Esteban MD;  Location: [facility] OR     PHARYNGOSTOMY N/A 11/3/2017    Procedure: PHARYNGOSTOMY;;  Surgeon: Yunior Esteban MD;  Location: [facility] OR     THORACOTOMY Left 11/3/2017    Procedure: THORACOTOMY;;  Surgeon: Yunior Esteban MD;  Location: [facility] OR         SOCIAL HISTORY:  Social History     Social History     Marital status:      Spouse name: N/A     Number of children: 1     Years of education: N/A     Occupational History     musician and teacher       Social History Main Topics     Smoking status: Never Smoker     Smokeless tobacco: Never Used     Alcohol use Yes      Comment: 1 beer daily     Drug use: Yes     Special: Marijuana      Comment: occasional     Sexual activity: Yes     Partners: Female     Birth control/ protection: Natural Family Planning     Other Topics Concern     Not on file     Social History Narrative     He works at [email or web address] in [place], 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  He denies smoking.  He drinks ~1 beer a day.  He denies illicit drug use, other than occasional marijuana.  He lives in [place] with his wife, and 4.5 year-old daughter.  His wife is currently pregnant with a boy, and is due in 6 weeks.  He has a half sister who  of breast cancer at age 32; she was diagnosed in her late 20's.  A paternal grandmother had breast cancer in her 70's      FAMILY HISTORY:  Family History   Problem Relation Age of Onset     Other - See Comments Father      sepsis     CANCER Sister      breast cancer  29 yo 1/2 sister      CANCER Paternal Grandmother      breast         PHYSICAL EXAM:  Vital signs:  /66  Pulse 71  Temp 98  F (36.7  C) (Oral)  Resp 16  Ht 1.75 m (5' 8.9\")  Wt 70.5 kg (155 lb 8 oz)  SpO2 100%  BMI 23.03 kg/m2   ECO  GENERAL/CONSTITUTIONAL: No acute distress. Accompanied by wife and baby son.  EYES: No " scleral icterus.  MOUTH: No mucositis, no thrush seen.  RESPIRATORY: Clear to auscultation bilaterally. No crackles or wheezing.   CARDIOVASCULAR: Regular rate and rhythm without murmurs, gallops, or rubs.  GASTROINTESTINAL: No tenderness. The patient has normal bowel sounds. No guarding.  No distention.  Feeding tube in place.  Scars healing well.    MUSCULOSKELETAL: Warm and well-perfused, no cyanosis, clubbing, or edema.  NEUROLOGIC: Alert, oriented, answers questions appropriately.  INTEGUMENTARY: No jaundice.  Port in place.        LABS:  None today.      PATHOLOGY:  11/3/17:  FINAL DIAGNOSIS:   A. SOFT TISSUE, SANA-ESOPHAGEAL TISSUE, EXCISION:   - Two lymph nodes with no evidence of malignancy (0/2)     B. LYMPH NODE, COMMON HEPATIC ARTERY, EXCISION:   - One reactive lymph node with no evidence of malignancy (0/1)     C. ESOPHAGUS, STOMACH, OMENTUM, ESOPHAGOGASTRECTOMY AND OMENTECTOMY:   - Adenocarcinoma, moderately differentiated status post neoadjuvant   chemotherapy   - Tumor size, at least 2.5 cm   - Tumor invades through the muscularis into the adventitia   - Perineural invasion identified   - Resection margins negative for involvement (tumor 0.1cm from closest   inked circumferential margin)   - Treatment effect: extensive residual tumor (poor response, score 3)   - Metastatic carcinoma in one out of twenty eight lymph nodes (1/28)     D. ESOPHAGUS, FINAL MARGIN, EXCISION:   - Esophageal wall with no evidence of malignancy   - Separate fragment of small intestinal wall with no evidence of   malignancy     SPECIMEN     Specimen:         - Esophagus         - Proximal stomach     Procedure:         - Esophagogastrectomy     TUMOR     Primary Tumor Site:         - Esophagogastric junction (EGJ)     Relationship of Tumor to Esophagogastric Junction:         - Tumor midpoint is located at the esophagogastric junction     Distance of Tumor Center from Esophagogastric Junction: Not applicable     Histologic  Type:         - Adenocarcinoma     Histologic Grade:         - G2:  Moderately differentiated     Tumor Size: 2.5 x 1.7 x 1.2 cm     Tumor Extent       Site(s) of Direct Extent of Tumor:           - Distal esophagus (lower thoracic esophagus)           - Esophagogastric junction (EGJ)           - Proximal stomach and esophagogastric junction       Microscopic Tumor Extension:           - Tumor invades through the muscularis propria into the   periesophageal           soft tissue (adventitia)     Accessory Tumor Findings       Treatment Effect:           - Extensive residual cancer with no evident tumor regression   (poor or no           response, score 3)       Lymph-Vascular Invasion:           - Not identified       Perineural Invasion:           - Present     MARGINS     Proximal Margin:         - Uninvolved by invasive carcinoma       Involvement by Dysplasia:           - Uninvolved by dysplasia     Distal Margin:         - Uninvolved by invasive carcinoma       Involvement by Dysplasia:           - Uninvolved by dysplasia     Circumferential (Adventitial) Margin (esophagectomy or   esophagogastrectomy     specimens) or Deep Margin (endoscopic resection specimens):         - Uninvolved by invasive carcinoma     Mucosal Margin:         - No endoscopic resection performed     LYMPH NODES     Number of Lymph Nodes Examined: 31     Number of Lymph Nodes Involved: 1     STAGE (PTNM)     TNM Descriptors:         - y (post-treatment)     Primary Tumor (pT):         - pT3: Tumor invades adventitia     Regional Lymph Nodes (pN):         - pN1: Regional lymph node metastasis involving 1 to 2 nodes     ADDITIONAL FINDINGS     Additional Pathologic Findings:         - Gastritis     INTERPRETATION:   The HER2/ALBAN 17 ratio of 1.8 in this case is interpreted as showing no   evidence of amplification      ASSESSMENT/PLAN:  Daniel Sandhu is a 37 year old male with:    1) Adenocarcinoma of the GE junction: now s/p 3 cycles of  neoadjuvant chemotherapy with EOX, followed by surgical resection on 11/3/17.  Pathology showed adenocarcinoma, moderate differentiated, extensive residual tumor with no evidence tumor regression, margins negative, perineural invasion present, 1 of 28 lymph nodes were involved with malignancy, stage [id number] (stage IIIA).  HER-2 is non-amplified.    Patient continues to recover from surgery.  He will continue to recover and have his post-op appointment with surgery.  We will obtain post-op scans in the next 1-2 weeks, with plans to resume adjuvant chemotherapy on Monday 12/11/17, if he is feeling well.      He did not tolerate Xeloda well during his neoadjuvant chemotherapy, with issues with palmar-plantar, and likely genital erythrodysesthesia.  We discussed changing to 5-FU, and Daniel would like to try this, as he may also have issues with taking pills and absorption after his surgery.      -echo every 3 months while on chemotherapy - next one should be in late December 2017 - will order at the next visit  -registered for medical cannabis, per patient request  -schedule post-op appointment with Dr. Esteban  -CT c/a/p and labs in the next 1-2 weeks  -plan to start cycle 4 of chemotherapy on 12/11/17 with epirubicin, oxaliplatin, and 5-FU  -RTC on 12/11/17    2) Genetics:  -genetic testing was negative    3) Chemotherapy-induced nausea/vomiting:   -Emend and Aloxi added to pre-meds  -he has Zofran ODT and Compazine, which are helpful    4) Oral thrush: he has much problems with this during his chemotherapy.  -will plan to give fluconazole prophylactically during his adjuvant chemotherapy        I spent a total of 40 minutes with the patient, with over >50% of the time in counseling and/or coordination of care.       Laurence Hood MD  Hematology/Oncology  [facility]

## 2017-11-27 NOTE — MR AVS SNAPSHOT
After Visit Summary   11/27/2017    Daniel Sandhu    MRN: [id number]           Patient Information     Date Of Birth          1980        Visit Information        Provider Department      11/27/2017 10:15 AM Laurence Hood MD [facility]        Today's Diagnoses     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    -  1       Follow-ups after your visit        Your next 10 appointments already scheduled     Dec 01, 2017  9:00 AM CST   [facility] Lab Draw with  [facility] LAB DRAW   [facility] ([facility])    [address] [place] [id number] [phone]            Dec 01, 2017  9:20 AM CST   (Arrive by 9:05 AM)   CT CHEST/ABDOMEN/PELVIS W CONTRAST with UCCT2   [facility] CT ([facility])    [address] [place] [id number] [phone]           Please bring any scans or X-rays taken at other hospitals, if similar tests were done. Also bring a list of your medicines, including vitamins, minerals and over-the-counter drugs. It is safest to leave personal items at home.  Be sure to tell your doctor:   If you have any allergies.   If there s any chance you are pregnant.   If you are breastfeeding.   If you have any special needs.  You may have contrast for this exam. To prepare:   Do not eat or drink for 2 hours before your exam. If you need to take medicine, you may take it with small sips of water. (We may ask you to take liquid medicine as well.)   The day before your exam, drink extra fluids at least six 8-ounce glasses (unless your doctor tells you to restrict your fluids).  Patients over 70 or patients with diabetes or kidney problems:   If you haven t had a blood test (creatinine test) within the last 30 days, go to your clinic or Diagnostic Imaging Department for this test.  If you have diabetes:   If your kidney function is  normal, continue taking your metformin (Avandamet, Glucophage, Glucovance, Metaglip) on the day of your exam.   If your kidney function is abnormal, wait 48 hours before restarting this medicine.  You will have oral contrast for this exam:   You will drink the contrast at home. Get this from your clinic or Diagnostic Imaging Department. Please follow the directions given.  Please wear loose clothing, such as a sweat suit or jogging clothes. Avoid snaps, zippers and other metal. We may ask you to undress and put on a hospital gown.  If you have any questions, please call the Imaging Department where you will have your exam.            Dec 06, 2017  9:30 AM CST   (Arrive by 9:15 AM)   Return Visit with Yunior Esteban MD   [facility] ([facility])    [address] [place] [id number]   [phone]            Dec 11, 2017  9:15 AM CST   [facility] Lab Draw with [facility] LAB DRAW   [facility] Lab Draw ([facility])    [address] [place] [id number]   [phone]            Dec 11, 2017  9:45 AM CST   (Arrive by 9:30 AM)   Return Visit with Laurence Hood MD   [facility] ([facility])    [address] [place] [id number]   [phone]            Dec 11, 2017 10:30 AM CST   Infusion 240 with  ONCOLOGY INFUSION, UC 21 ATC   [facility])    [address] [place] [id number]   [phone]              Future tests that were ordered for you today     Open Future Orders        Priority Expected Expires Ordered    CEA Routine 12/8/2017 11/27/2018 11/27/2017    CT Chest/Abdomen/Pelvis w Contrast Routine 12/8/2017 11/27/2018 11/27/2017    CBC with platelets differential Routine 12/8/2017 11/27/2018 11/27/2017    Comprehensive  "metabolic panel Routine 12/8/2017 11/27/2018 11/27/2017            Who to contact     If you have questions or need follow up information about today's clinic visit or your schedule please contact [facility] directly at [phone].  Normal or non-critical lab and imaging results will be communicated to you by MyChart, letter or phone within 4 business days after the clinic has received the results. If you do not hear from us within 7 days, please contact the clinic through [employer] or phone. If you have a critical or abnormal lab result, we will notify you by phone as soon as possible.  Submit refill requests through [employer] or call your pharmacy and they will forward the refill request to us. Please allow 3 business days for your refill to be completed.          Additional Information About Your Visit        [employer] Information     [employer] gives you secure access to your electronic health record. If you see a primary care provider, you can also send messages to your care team and make appointments. If you have questions, please call your primary care clinic.  If you do not have a primary care provider, please call [phone] and they will assist you.        Care EveryWhere ID     This is your Care EveryWhere ID. This could be used by other organizations to access your [facility] medical records  [id number]        Your Vitals Were     Pulse Temperature Respirations Height Pulse Oximetry BMI (Body Mass Index)    71 98  F (36.7  C) (Oral) 16 1.75 m (5' 8.9\") 100% 23.03 kg/m2       Blood Pressure from Last 3 Encounters:   11/27/17 107/66   11/22/17 110/60   11/17/17 98/56    Weight from Last 3 Encounters:   11/27/17 70.5 kg (155 lb 8 oz)   11/22/17 71 kg (156 lb 9.6 oz)   11/15/17 72.5 kg (159 lb 14.4 oz)               Primary Care Provider Office Phone # Fax #    Lencho Chan [phone] [address] [facility] [address]        Equal Access to Services     " SHAWN SWAIN : Hadii aad jaden mannie Araujo, waaxda luqadaha, qaybta kaalmada ross, alejandro miko haymarla akbarnailachaka brown . So [facility] [phone].    ATENCIÓN: Si habla español, tiene a zayas disposición servicios gratuitos de asistencia lingüística. Llame al [phone].    We comply with applicable federal civil rights laws and [place] laws. We do not discriminate on the basis of race, color, national origin, age, disability, sex, sexual orientation, or gender identity.            Thank you!     Thank you for choosing [facility]  for your care. Our goal is always to provide you with excellent care. Hearing back from our patients is one way we can continue to improve our services. Please take a few minutes to complete the written survey that you may receive in the mail after your visit with us. Thank you!             Your Updated Medication List - Protect others around you: Learn how to safely use, store and throw away your medicines at www.disposemymeds.org.          This list is accurate as of: 11/27/17 11:25 AM.  Always use your most recent med list.                   Brand Name Dispense Instructions for use Diagnosis    acetaminophen 32 mg/mL solution    TYLENOL    400 mL    30.45 mLs (975 mg) by Per J Tube route 3 times daily    Acute post-operative pain       cyabnocobalamin 2500 MCG sublingual tablet    VITAMIN B-12    30 tablet    Place 2,500 mcg under the tongue daily    B12 deficiency       LORazepam 0.5 MG tablet    ATIVAN    30 tablet    Take 1 tablet (0.5 mg) by mouth every 4 hours as needed (Anxiety, Nausea/Vomiting or Sleep)    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       multivitamins with minerals Liqd liquid     450 mL    15 mLs by Per J Tube route daily    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       oxyCODONE 5 MG/5ML solution    ROXICODONE    300 mL    Take 5-10 mLs (5-10 mg) by mouth every 4 hours as needed for moderate to severe pain     Acute post-operative pain       sennosides 8.8 MG/5ML syrup    SENOKOT    400 mL    10 mLs by Per J Tube route 2 times daily    Bowel habit changes

## 2017-11-27 NOTE — NURSING NOTE
"Oncology Rooming Note    November 27, 2017 9:59 AM   Daniel Sandhu is a 37 year old male who presents for:    Chief Complaint   Patient presents with     Oncology Clinic Visit     Post op visit ( esophageal ca)     Initial Vitals: /66  Pulse 71  Temp 98  F (36.7  C) (Oral)  Resp 16  Ht 1.75 m (5' 8.9\")  Wt 70.5 kg (155 lb 8 oz)  SpO2 100%  BMI 23.03 kg/m2 Estimated body mass index is 23.03 kg/(m^2) as calculated from the following:    Height as of this encounter: 1.75 m (5' 8.9\").    Weight as of this encounter: 70.5 kg (155 lb 8 oz). Body surface area is 1.85 meters squared.  Moderate Pain (4) Comment: Abdominal   No LMP for male patient.  Allergies reviewed: Yes  Medications reviewed: Yes    Medications: Medication refills not needed today.  Pharmacy name entered into EPIC: [facility] - [place], [place] - [address] [place] [address]    Clinical concerns: none  Dr Hood was NOT notified.    7 minutes for nursing intake (face to face time)     MARIE GIL LPN            "

## 2017-11-29 ENCOUNTER — HOME INFUSION (PRE-WILLOW HOME INFUSION) (OUTPATIENT)
Dept: PHARMACY | Facility: CLINIC | Age: 37
End: 2017-11-29

## 2017-11-30 NOTE — PROGRESS NOTES
This is a recent snapshot of the patient's [facility] medical record.  For current drug dose and complete information and questions, call [phone] or In Basket pool, [facility] ([id number])  CSN Number:  [id number]

## 2017-12-01 DIAGNOSIS — E53.8 B12 DEFICIENCY: ICD-10-CM

## 2017-12-01 DIAGNOSIS — C15.5 MALIGNANT NEOPLASM OF LOWER THIRD OF ESOPHAGUS (H): ICD-10-CM

## 2017-12-01 LAB
ALBUMIN SERPL-MCNC: 3.4 G/DL (ref 3.4–5)
ALP SERPL-CCNC: 60 U/L (ref 40–150)
ALT SERPL W P-5'-P-CCNC: 56 U/L (ref 0–70)
ANION GAP SERPL CALCULATED.3IONS-SCNC: 6 MMOL/L (ref 3–14)
AST SERPL W P-5'-P-CCNC: 24 U/L (ref 0–45)
BASOPHILS # BLD AUTO: 0 10E9/L (ref 0–0.2)
BASOPHILS NFR BLD AUTO: 0.8 %
BILIRUB SERPL-MCNC: 0.2 MG/DL (ref 0.2–1.3)
BUN SERPL-MCNC: 15 MG/DL (ref 7–30)
CALCIUM SERPL-MCNC: 8.8 MG/DL (ref 8.5–10.1)
CEA SERPL-MCNC: <0.5 UG/L (ref 0–2.5)
CHLORIDE SERPL-SCNC: 106 MMOL/L (ref 94–109)
CO2 SERPL-SCNC: 28 MMOL/L (ref 20–32)
CREAT SERPL-MCNC: 0.63 MG/DL (ref 0.66–1.25)
DIFFERENTIAL METHOD BLD: ABNORMAL
EOSINOPHIL # BLD AUTO: 0.2 10E9/L (ref 0–0.7)
EOSINOPHIL NFR BLD AUTO: 5.2 %
ERYTHROCYTE [DISTWIDTH] IN BLOOD BY AUTOMATED COUNT: 12.7 % (ref 10–15)
GFR SERPL CREATININE-BSD FRML MDRD: >90 ML/MIN/1.7M2
GLUCOSE SERPL-MCNC: 101 MG/DL (ref 70–99)
HCT VFR BLD AUTO: 30.7 % (ref 40–53)
HGB BLD-MCNC: 9.9 G/DL (ref 13.3–17.7)
IMM GRANULOCYTES # BLD: 0 10E9/L (ref 0–0.4)
IMM GRANULOCYTES NFR BLD: 0 %
LYMPHOCYTES # BLD AUTO: 0.8 10E9/L (ref 0.8–5.3)
LYMPHOCYTES NFR BLD AUTO: 22.4 %
MCH RBC QN AUTO: 28.2 PG (ref 26.5–33)
MCHC RBC AUTO-ENTMCNC: 32.2 G/DL (ref 31.5–36.5)
MCV RBC AUTO: 88 FL (ref 78–100)
MONOCYTES # BLD AUTO: 0.5 10E9/L (ref 0–1.3)
MONOCYTES NFR BLD AUTO: 12.4 %
NEUTROPHILS # BLD AUTO: 2.1 10E9/L (ref 1.6–8.3)
NEUTROPHILS NFR BLD AUTO: 59.2 %
NRBC # BLD AUTO: 0 10*3/UL
NRBC BLD AUTO-RTO: 0 /100
PLATELET # BLD AUTO: 253 10E9/L (ref 150–450)
POTASSIUM SERPL-SCNC: 4.2 MMOL/L (ref 3.4–5.3)
PROT SERPL-MCNC: 6.7 G/DL (ref 6.8–8.8)
RBC # BLD AUTO: 3.51 10E12/L (ref 4.4–5.9)
SODIUM SERPL-SCNC: 141 MMOL/L (ref 133–144)
VIT B12 SERPL-MCNC: 787 PG/ML (ref 193–986)
WBC # BLD AUTO: 3.6 10E9/L (ref 4–11)

## 2017-12-01 PROCEDURE — 82607 VITAMIN B-12: CPT | Performed by: FAMILY MEDICINE

## 2017-12-01 PROCEDURE — 82378 CARCINOEMBRYONIC ANTIGEN: CPT | Performed by: FAMILY MEDICINE

## 2017-12-01 PROCEDURE — 85025 COMPLETE CBC W/AUTO DIFF WBC: CPT | Performed by: FAMILY MEDICINE

## 2017-12-01 PROCEDURE — 80053 COMPREHEN METABOLIC PANEL: CPT | Performed by: FAMILY MEDICINE

## 2017-12-01 PROCEDURE — 25000128 H RX IP 250 OP 636: Performed by: INTERNAL MEDICINE

## 2017-12-01 RX ORDER — HEPARIN SODIUM (PORCINE) LOCK FLUSH IV SOLN 100 UNIT/ML 100 UNIT/ML
5 SOLUTION INTRAVENOUS ONCE
Status: COMPLETED | OUTPATIENT
Start: 2017-12-01 | End: 2017-12-01

## 2017-12-01 RX ADMIN — SODIUM CHLORIDE, PRESERVATIVE FREE 5 ML: 5 INJECTION INTRAVENOUS at 09:24

## 2017-12-01 NOTE — NURSING NOTE
"Chief Complaint   Patient presents with     Port Draw     Labs drawn from port by RN. Line flushed with saline and heparin. Vs taken and pt checked in for appt     Port accessed with 20g 3/4\" flat needle by RN, labs collected, line flushed with saline and heparin.  Vitals taken. Pt checked in for appointment(s).    Radha Grey RN  "

## 2017-12-02 NOTE — PROGRESS NOTES
Your B12 level is normal.  We can recheck in a few months.  Stay on the sublingual vitamin B12 for now.    Lencho Chan MD

## 2017-12-04 DIAGNOSIS — C15.5 MALIGNANT NEOPLASM OF LOWER THIRD OF ESOPHAGUS (H): Primary | ICD-10-CM

## 2017-12-06 ENCOUNTER — OFFICE VISIT (OUTPATIENT)
Dept: SURGERY | Facility: CLINIC | Age: 37
End: 2017-12-06
Attending: THORACIC SURGERY (CARDIOTHORACIC VASCULAR SURGERY)
Payer: COMMERCIAL

## 2017-12-06 VITALS
SYSTOLIC BLOOD PRESSURE: 112 MMHG | TEMPERATURE: 98 F | BODY MASS INDEX: 22.63 KG/M2 | WEIGHT: 152.78 LBS | DIASTOLIC BLOOD PRESSURE: 65 MMHG | RESPIRATION RATE: 16 BRPM | OXYGEN SATURATION: 100 % | HEART RATE: 79 BPM

## 2017-12-06 DIAGNOSIS — C15.5 MALIGNANT NEOPLASM OF LOWER THIRD OF ESOPHAGUS (H): Primary | ICD-10-CM

## 2017-12-06 PROCEDURE — 99212 OFFICE O/P EST SF 10 MIN: CPT | Mod: ZF

## 2017-12-06 ASSESSMENT — PAIN SCALES - GENERAL: PAINLEVEL: MILD PAIN (3)

## 2017-12-06 NOTE — PROGRESS NOTES
THORACIC SURGERY FOLLOW UP VISIT    Dear Dr. Chan,  I saw Mr. Sandhu in follow-up today. 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  11/9/17 - EGD, remove pharyngostomy tube    HISTOPATHOLOGY   [id number] (Stage IIIa) moderately differentiated adenocarcinoma  LN 1/31  HER2 negative    COMPLICATIONS  Phlegmon at site of pharyngostomy tube requiring wound exploration on 11/9/17.    INTERVAL STUDIES  12/1/17 CT CAP w/contrast  No evidence of metastatic disease seen.       ETOH rare  TOB no  BMI 22.63  Incisions healing well. J tube in place, site clean with no surrounding erythema.    SUBJECTIVE   He's doing well. He has some generalized soreness which is tolerable. He is eating soft foods without issues, tolerating TFs, having soft, and having formed bowel movements. He feels he is getting stronger every day and is increasing his activity level.    From a personal perspective, he lives in [place] with his wife Violeta and 5 year-old daughter. He also has a 5 month old (Mayo, born 7/2017). 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 Daniel is hoping to return to work at the end of January, which would coincide with the completion of his expected chemotherapy.    IMPRESSION   (C15.5) 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  (primary encounter diagnosis)    37 year-old man with stage IIIa ([id number]) esophageal adenocarcinoma at the GE junction s/p neoadjuvant chemothdistal esophagectomy and  intrathoracic Terrell-en-Y esophagojejunostomy on 11/3/17.    PLAN  I reviewed the plan as follows:  Follow up in 3 months with a CT scan. Agree with starting adjuvant chemotherapy next week.  All questions were answered and the patient and present family were in agreement with the plan.  I appreciate the opportunity to participate in the care of your patient and will keep you updated.  Sincerely,

## 2017-12-06 NOTE — NURSING NOTE
"Oncology Rooming Note    December 6, 2017 9:45 AM   Daniel Sandhu is a 37 year old male who presents for:    Chief Complaint   Patient presents with     Oncology Clinic Visit     Return for Post-op     Initial Vitals: /65  Pulse 79  Temp 98  F (36.7  C) (Oral)  Resp 16  Wt 69.3 kg (152 lb 12.5 oz)  SpO2 100%  BMI 22.63 kg/m2 Estimated body mass index is 22.63 kg/(m^2) as calculated from the following:    Height as of 11/27/17: 1.75 m (5' 8.9\").    Weight as of this encounter: 69.3 kg (152 lb 12.5 oz). Body surface area is 1.84 meters squared.  Mild Pain (3) Comment: Data Unavailable   No LMP for male patient.  Allergies reviewed: Yes  Medications reviewed: Yes    Medications: Medication refills not needed today.  Pharmacy name entered into EPIC: [facility] - [place], [place] - [address] [place] [address]    Clinical concerns: results CXR Esteban  was notified.    7 minutes for nursing intake (face to face time)     Christine Antonio MA              "

## 2017-12-06 NOTE — LETTER
12/6/2017      RE: Daniel Sandhu  [address] [address]       THORACIC SURGERY FOLLOW UP VISIT    Dear Dr. Chan,  I saw Mr. Sandhu in follow-up today. 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  11/9/17 - EGD, remove pharyngostomy tube    HISTOPATHOLOGY   [id number] (Stage IIIa) moderately differentiated adenocarcinoma  LN 1/31  HER2 negative    COMPLICATIONS  Phlegmon at site of pharyngostomy tube requiring wound exploration on 11/9/17.    INTERVAL STUDIES  12/1/17 CT CAP w/contrast  No evidence of metastatic disease seen.       ETOH rare  TOB no  BMI 22.63  Incisions healing well. J tube in place, site clean with no surrounding erythema.    SUBJECTIVE   He's doing well. He has some generalized soreness which is tolerable. He is eating soft foods without issues, tolerating TFs, having soft, and having formed bowel movements. He feels he is getting stronger every day and is increasing his activity level.    From a personal perspective, he lives in [place] with his wife Violeta and 5 year-old daughter. He also has a 5 month old (Mayo, born 7/2017). 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 Daniel is hoping to return to work at the end of January, which would coincide with the completion of his expected chemotherapy.    IMPRESSION   (C15.5) 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  (primary encounter diagnosis)    37 year-old man with stage IIIa ([id number])  esophageal adenocarcinoma at the GE junction s/p neoadjuvant chemothdistal esophagectomy and intrathoracic Terrell-en-Y esophagojejunostomy on 11/3/17.    PLAN  I reviewed the plan as follows:  Follow up in 3 months with a CT scan. Agree with starting adjuvant chemotherapy next week.  All questions were answered and the patient and present family were in agreement with the plan.  I appreciate the opportunity to participate in the care of your patient and will keep you updated.  Sincerely,      Yunior Esteban MD

## 2017-12-06 NOTE — MR AVS SNAPSHOT
After Visit Summary   12/6/2017    Daniel Sandhu    MRN: [id number]           Patient Information     Date Of Birth          1980        Visit Information        Provider Department      12/6/2017 9:30 AM Yunior Esteban MD [facility]        Today's Diagnoses     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    -  1       Follow-ups after your visit        Follow-up notes from your care team     Return in about 3 months (around 3/6/2018).      Your next 10 appointments already scheduled     Dec 11, 2017  9:15 AM CST   [facility] Draw with  [facility]   [facility] ([facility])    [address] [place] [address]  [address] [facility] [address]   [phone]            Dec 11, 2017  9:45 AM CST   (Arrive by 9:30 AM)   Return Visit with Laurence Hood MD   [facility] ([facility])    [address] [place] [id number]   [phone]            Dec 11, 2017 10:30 AM CST   Infusion 240 with  ONCOLOGY INFUSION, UC 21 ATC   [facility] ([facility])    [address] [place] [address]  [address] [facility] [address]   [phone]            Mar 07, 2018  1:40 PM CST   CT CHEST ABDOMEN PELVIS W/O CONTRAST with UCCT1   [facility] ([facility])    [address] [place] [address]  [address] [facility] [address]   [phone]           Please bring any scans or X-rays taken at other hospitals, if similar tests were done. Also bring a list of your medicines, including vitamins, minerals and over-the-counter drugs. It is safest to leave personal items at home.  Be sure to tell your doctor:   If you have any allergies.   If there s any chance you are pregnant.   If you are breastfeeding.   If you have any special needs.  You may have contrast for this exam. To prepare:   Do not eat  or drink for 2 hours before your exam. If you need to take medicine, you may take it with small sips of water. (We may ask you to take liquid medicine as well.)   The day before your exam, drink extra fluids at least six 8-ounce glasses (unless your doctor tells you to restrict your fluids).  Patients over 70 or patients with diabetes or kidney problems:   If you haven t had a blood test (creatinine test) within the last 30 days, go to your clinic or Diagnostic Imaging Department for this test.  If you have diabetes:   If your kidney function is normal, continue taking your metformin (Avandamet, Glucophage, Glucovance, Metaglip) on the day of your exam.   If your kidney function is abnormal, wait 48 hours before restarting this medicine.  You will have oral contrast for this exam:   You will drink the contrast at home. Get this from your clinic or Diagnostic Imaging Department. Please follow the directions given.  Please wear loose clothing, such as a sweat suit or jogging clothes. Avoid snaps, zippers and other metal. We may ask you to undress and put on a hospital gown.  If you have any questions, please call the Imaging Department where you will have your exam.            Mar 07, 2018  2:30 PM CST   (Arrive by 2:15 PM)   Return Visit with Yunior Esteban MD   [facility] ([facility])    [address] [place] [address] [phone]              Future tests that were ordered for you today     Open Future Orders        Priority Expected Expires Ordered    CT Chest Abdomen Pelvis w/o Contrast Routine  7/4/2018 12/6/2017            Who to contact     If you have questions or need follow up information about today's clinic visit or your schedule please contact [facility] directly at [phone].  Normal or non-critical lab and imaging results will be communicated to you by MyChart, letter or phone within 4 business  days after the clinic has received the results. If you do not hear from us within 7 days, please contact the clinic through [employer] or phone. If you have a critical or abnormal lab result, we will notify you by phone as soon as possible.  Submit refill requests through [employer] or call your pharmacy and they will forward the refill request to us. Please allow 3 business days for your refill to be completed.          Additional Information About Your Visit        [employer] Information     [employer] gives you secure access to your electronic health record. If you see a primary care provider, you can also send messages to your care team and make appointments. If you have questions, please call your primary care clinic.  If you do not have a primary care provider, please call [phone] and they will assist you.        Care EveryWhere ID     This is your Care EveryWhere ID. This could be used by other organizations to access your [facility] medical records  [id number]        Your Vitals Were     Pulse Temperature Respirations Pulse Oximetry BMI (Body Mass Index)       79 98  F (36.7  C) (Oral) 16 100% 22.63 kg/m2        Blood Pressure from Last 3 Encounters:   12/06/17 112/65   11/27/17 107/66   11/22/17 110/60    Weight from Last 3 Encounters:   12/06/17 69.3 kg (152 lb 12.5 oz)   11/27/17 70.5 kg (155 lb 8 oz)   11/22/17 71 kg (156 lb 9.6 oz)               Primary Care Provider Office Phone # Fax #    Lenhco Chan [phone]       [address] [facility] [address]        Equal Access to Services     [facility]: Hadii aad ku hadasho Soomaali, waaxda luqadaha, qaybta kaalmada alejandro hawkins . So [facility] [phone].    ATENCIÓN: Si habla español, tiene a zayas disposición servicios gratuitos de asistencia lingüística. Llame al [phone].    We comply with applicable federal civil rights laws and [place] laws. We do not discriminate on the basis of  race, color, national origin, age, disability, sex, sexual orientation, or gender identity.            Thank you!     Thank you for choosing [facility]  for your care. Our goal is always to provide you with excellent care. Hearing back from our patients is one way we can continue to improve our services. Please take a few minutes to complete the written survey that you may receive in the mail after your visit with us. Thank you!             Your Updated Medication List - Protect others around you: Learn how to safely use, store and throw away your medicines at www.disposemymeds.org.          This list is accurate as of: 12/6/17 12:24 PM.  Always use your most recent med list.                   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

## 2017-12-11 ENCOUNTER — APPOINTMENT (OUTPATIENT)
Dept: LAB | Facility: CLINIC | Age: 37
End: 2017-12-11
Attending: INTERNAL MEDICINE
Payer: COMMERCIAL

## 2017-12-11 ENCOUNTER — HOME INFUSION (PRE-WILLOW HOME INFUSION) (OUTPATIENT)
Dept: PHARMACY | Facility: CLINIC | Age: 37
End: 2017-12-11

## 2017-12-11 ENCOUNTER — INFUSION THERAPY VISIT (OUTPATIENT)
Dept: ONCOLOGY | Facility: CLINIC | Age: 37
End: 2017-12-11
Attending: INTERNAL MEDICINE
Payer: COMMERCIAL

## 2017-12-11 VITALS
OXYGEN SATURATION: 100 % | RESPIRATION RATE: 20 BRPM | SYSTOLIC BLOOD PRESSURE: 117 MMHG | HEIGHT: 69 IN | WEIGHT: 151 LBS | BODY MASS INDEX: 22.36 KG/M2 | HEART RATE: 96 BPM | TEMPERATURE: 98.6 F | DIASTOLIC BLOOD PRESSURE: 62 MMHG

## 2017-12-11 DIAGNOSIS — B37.0 THRUSH: ICD-10-CM

## 2017-12-11 DIAGNOSIS — C15.5 MALIGNANT NEOPLASM OF LOWER THIRD OF ESOPHAGUS (H): Primary | ICD-10-CM

## 2017-12-11 LAB
ALBUMIN SERPL-MCNC: 3.5 G/DL (ref 3.4–5)
ALP SERPL-CCNC: 76 U/L (ref 40–150)
ALT SERPL W P-5'-P-CCNC: 34 U/L (ref 0–70)
ANION GAP SERPL CALCULATED.3IONS-SCNC: 6 MMOL/L (ref 3–14)
AST SERPL W P-5'-P-CCNC: 21 U/L (ref 0–45)
BASOPHILS # BLD AUTO: 0 10E9/L (ref 0–0.2)
BASOPHILS NFR BLD AUTO: 0.1 %
BILIRUB SERPL-MCNC: 0.3 MG/DL (ref 0.2–1.3)
BUN SERPL-MCNC: 19 MG/DL (ref 7–30)
CALCIUM SERPL-MCNC: 8.5 MG/DL (ref 8.5–10.1)
CHLORIDE SERPL-SCNC: 105 MMOL/L (ref 94–109)
CO2 SERPL-SCNC: 29 MMOL/L (ref 20–32)
CREAT SERPL-MCNC: 0.6 MG/DL (ref 0.66–1.25)
DIFFERENTIAL METHOD BLD: ABNORMAL
EOSINOPHIL # BLD AUTO: 0.2 10E9/L (ref 0–0.7)
EOSINOPHIL NFR BLD AUTO: 1.7 %
ERYTHROCYTE [DISTWIDTH] IN BLOOD BY AUTOMATED COUNT: 13.6 % (ref 10–15)
GFR SERPL CREATININE-BSD FRML MDRD: >90 ML/MIN/1.7M2
GLUCOSE SERPL-MCNC: 101 MG/DL (ref 70–99)
HCT VFR BLD AUTO: 31 % (ref 40–53)
HGB BLD-MCNC: 10.2 G/DL (ref 13.3–17.7)
IMM GRANULOCYTES # BLD: 0 10E9/L (ref 0–0.4)
IMM GRANULOCYTES NFR BLD: 0.2 %
LYMPHOCYTES # BLD AUTO: 1 10E9/L (ref 0.8–5.3)
LYMPHOCYTES NFR BLD AUTO: 11.4 %
MCH RBC QN AUTO: 27 PG (ref 26.5–33)
MCHC RBC AUTO-ENTMCNC: 32.9 G/DL (ref 31.5–36.5)
MCV RBC AUTO: 82 FL (ref 78–100)
MONOCYTES # BLD AUTO: 0.8 10E9/L (ref 0–1.3)
MONOCYTES NFR BLD AUTO: 9.1 %
NEUTROPHILS # BLD AUTO: 6.9 10E9/L (ref 1.6–8.3)
NEUTROPHILS NFR BLD AUTO: 77.5 %
NRBC # BLD AUTO: 0 10*3/UL
NRBC BLD AUTO-RTO: 0 /100
PLATELET # BLD AUTO: 263 10E9/L (ref 150–450)
POTASSIUM SERPL-SCNC: 4 MMOL/L (ref 3.4–5.3)
PROT SERPL-MCNC: 7.4 G/DL (ref 6.8–8.8)
RBC # BLD AUTO: 3.78 10E12/L (ref 4.4–5.9)
SODIUM SERPL-SCNC: 139 MMOL/L (ref 133–144)
WBC # BLD AUTO: 8.9 10E9/L (ref 4–11)

## 2017-12-11 PROCEDURE — 80053 COMPREHEN METABOLIC PANEL: CPT | Performed by: INTERNAL MEDICINE

## 2017-12-11 PROCEDURE — 96367 TX/PROPH/DG ADDL SEQ IV INF: CPT

## 2017-12-11 PROCEDURE — 96375 TX/PRO/DX INJ NEW DRUG ADDON: CPT

## 2017-12-11 PROCEDURE — 96411 CHEMO IV PUSH ADDL DRUG: CPT

## 2017-12-11 PROCEDURE — 96413 CHEMO IV INFUSION 1 HR: CPT

## 2017-12-11 PROCEDURE — 99213 OFFICE O/P EST LOW 20 MIN: CPT | Mod: ZF

## 2017-12-11 PROCEDURE — 96415 CHEMO IV INFUSION ADDL HR: CPT

## 2017-12-11 PROCEDURE — 85025 COMPLETE CBC W/AUTO DIFF WBC: CPT | Performed by: INTERNAL MEDICINE

## 2017-12-11 PROCEDURE — 25000128 H RX IP 250 OP 636: Mod: ZF | Performed by: INTERNAL MEDICINE

## 2017-12-11 PROCEDURE — 96416 CHEMO PROLONG INFUSE W/PUMP: CPT

## 2017-12-11 PROCEDURE — 99215 OFFICE O/P EST HI 40 MIN: CPT | Mod: ZP | Performed by: INTERNAL MEDICINE

## 2017-12-11 RX ORDER — LORAZEPAM 2 MG/ML
0.5 INJECTION INTRAMUSCULAR EVERY 4 HOURS PRN
Status: CANCELLED
Start: 2017-12-11

## 2017-12-11 RX ORDER — METHYLPREDNISOLONE SODIUM SUCCINATE 125 MG/2ML
125 INJECTION, POWDER, LYOPHILIZED, FOR SOLUTION INTRAMUSCULAR; INTRAVENOUS
Status: CANCELLED
Start: 2017-12-11

## 2017-12-11 RX ORDER — ALBUTEROL SULFATE 90 UG/1
1-2 AEROSOL, METERED RESPIRATORY (INHALATION)
Status: CANCELLED
Start: 2017-12-11

## 2017-12-11 RX ORDER — ALBUTEROL SULFATE 0.83 MG/ML
2.5 SOLUTION RESPIRATORY (INHALATION)
Status: CANCELLED | OUTPATIENT
Start: 2017-12-11

## 2017-12-11 RX ORDER — EPINEPHRINE 0.3 MG/.3ML
0.3 INJECTION SUBCUTANEOUS EVERY 5 MIN PRN
Status: CANCELLED | OUTPATIENT
Start: 2017-12-11

## 2017-12-11 RX ORDER — FLUCONAZOLE 200 MG/1
200 TABLET ORAL DAILY
Qty: 30 TABLET | Refills: 1 | Status: SHIPPED | OUTPATIENT
Start: 2017-12-11 | End: 2018-03-16

## 2017-12-11 RX ORDER — SODIUM CHLORIDE 9 MG/ML
1000 INJECTION, SOLUTION INTRAVENOUS CONTINUOUS PRN
Status: CANCELLED
Start: 2017-12-11

## 2017-12-11 RX ORDER — HEPARIN SODIUM (PORCINE) LOCK FLUSH IV SOLN 100 UNIT/ML 100 UNIT/ML
5 SOLUTION INTRAVENOUS EVERY 8 HOURS
Status: DISCONTINUED | OUTPATIENT
Start: 2017-12-11 | End: 2017-12-11 | Stop reason: HOSPADM

## 2017-12-11 RX ORDER — DIPHENHYDRAMINE HYDROCHLORIDE 50 MG/ML
50 INJECTION INTRAMUSCULAR; INTRAVENOUS
Status: CANCELLED
Start: 2017-12-11

## 2017-12-11 RX ORDER — PALONOSETRON 0.05 MG/ML
0.25 INJECTION, SOLUTION INTRAVENOUS ONCE
Status: CANCELLED
Start: 2017-12-11

## 2017-12-11 RX ORDER — EPINEPHRINE 1 MG/ML
0.3 INJECTION, SOLUTION, CONCENTRATE INTRAVENOUS EVERY 5 MIN PRN
Status: CANCELLED | OUTPATIENT
Start: 2017-12-11

## 2017-12-11 RX ORDER — PALONOSETRON 0.05 MG/ML
0.25 INJECTION, SOLUTION INTRAVENOUS ONCE
Status: COMPLETED | OUTPATIENT
Start: 2017-12-11 | End: 2017-12-11

## 2017-12-11 RX ORDER — MEPERIDINE HYDROCHLORIDE 25 MG/ML
25 INJECTION INTRAMUSCULAR; INTRAVENOUS; SUBCUTANEOUS EVERY 30 MIN PRN
Status: CANCELLED | OUTPATIENT
Start: 2017-12-11

## 2017-12-11 RX ADMIN — EPIRUBICIN HYDROCHLORIDE 90 MG: 2 INJECTION, SOLUTION INTRAVENOUS at 12:45

## 2017-12-11 RX ADMIN — OXALIPLATIN 250 MG: 5 INJECTION, SOLUTION, CONCENTRATE INTRAVENOUS at 12:58

## 2017-12-11 RX ADMIN — SODIUM CHLORIDE, PRESERVATIVE FREE 5 ML: 5 INJECTION INTRAVENOUS at 09:58

## 2017-12-11 RX ADMIN — PALONOSETRON HYDROCHLORIDE 0.25 MG: 0.25 INJECTION INTRAVENOUS at 11:53

## 2017-12-11 RX ADMIN — DEXAMETHASONE SODIUM PHOSPHATE 150 MG: 10 INJECTION, SOLUTION INTRAMUSCULAR; INTRAVENOUS at 11:56

## 2017-12-11 ASSESSMENT — PAIN SCALES - GENERAL: PAINLEVEL: MILD PAIN (2)

## 2017-12-11 NOTE — PROGRESS NOTES
Infusion Nursing Note:  Daniel Sandhu presents today for C1D1 Epirubicin, Oxaliplaitn, Fluorouracil pump connect.    Patient seen by provider today: Yes: Dr. Hood    Note: Today was not the patient's first time receiving chemotherapy but is patient's first time receiving Fluorouracil.  This RN briefly reviewed potential side effects of Fluorouracil and instructed patient how to care for the CADD pump.  Patient verbalized understanding.  Patient to have weekly pump changes done at home with [facility] and patient aware that [facility] will be contacting him with further details on weekly pump changes.  Patient able to tolerate today's infusion without issue.     Intravenous Access:  Implanted Port.      Treatment Conditions:  Lab Results   Component Value Date    HGB 10.2 12/11/2017     Lab Results   Component Value Date    WBC 8.9 12/11/2017      Lab Results   Component Value Date    ANEU 6.9 12/11/2017     Lab Results   Component Value Date     12/11/2017      Lab Results   Component Value Date     12/11/2017                   Lab Results   Component Value Date    POTASSIUM 4.0 12/11/2017           Lab Results   Component Value Date    MAG 2.1 11/16/2017            Lab Results   Component Value Date    CR 0.60 12/11/2017                   Lab Results   Component Value Date    YOBANY 8.5 12/11/2017                Lab Results   Component Value Date    BILITOTAL 0.3 12/11/2017           Lab Results   Component Value Date    ALBUMIN 3.5 12/11/2017                    Lab Results   Component Value Date    ALT 34 12/11/2017           Lab Results   Component Value Date    AST 21 12/11/2017     Results reviewed, labs MET treatment parameters, ok to proceed with treatment.  ECHO/MUGA completed 9/25/17  EF 55-60%.          Post Infusion Assessment:  Patient tolerated infusion without incident.  Blood return noted pre and post infusion.  Blood return noted during Epirubicin administration every 2 cc.  Site  "patent and intact, free from redness, edema or discomfort.  No evidence of extravasations.    Prior to discharge: Port is secured in place with tegaderm and flushed with 10cc NS with positive blood return noted.  Continuous home infusion pump connected.    All connectors secured in place and clamps taped open.    Pump started, \"running\" noted on display (CADD): YES.  Patient instructed to call our clinic or [facility] with any questions or concerns at home.  Patient verbalized understanding.    Patient set up for pump disconnect at home with [facility] on 12/18/17.  HI aware of disconnect time and date.      Discharge Plan:   Prescription refills given for Diflucan.  Discharge instructions reviewed with: Patient.  Patient and/or family verbalized understanding of discharge instructions and all questions answered.  AVS to patient via [employer].  Patient will return 12/28/17 for an ECHO and on 1/2/18 to see Nannette Moody NP and receive C2D1 infusion for next appointment.   Patient discharged in stable condition accompanied by: self.  Departure Mode: Ambulatory.    Donna Soria RN                      "

## 2017-12-11 NOTE — PROGRESS NOTES
[facility]    Hematology/Oncology Established Patient Note      Today's Date: 12/11/17    Reason for Follow-up: adenocarcinoma of the GE junction      HISTORY OF PRESENT ILLNESS: Daniel Sandhu is a 37 year old male who presents with adenocarcinoma of the GE junction.  In early 2017, patient started noticing difficulty swallowing, and that food seems to take longer to go down.  It became more frequent, and he saw his PCP, and was referred for EGD, which showed an esophageal mass located at the GE junction, measuring 4 cm.  Biopsy showed poorly differentiated adenocarcinoma.  HER-2 was sent and is equivocal (2+).  CT c/a/p showed a mildly prominent lymph node in the gastrohepatic ligament, but there were no pathologically enlarged lymph nodes in the chest, abdomen, or pelvis.  There was otherwise no evidence of metastatic disease.  PET-CT showed thickening of the distal esophagus consistent with known esophageal adenocarcinoma, as well as mildly hypermetabolic 1 cm lymph node in the gastrohepatic ligament.  There was no evidence of distant metastatic disease.  He underwent EUS on 6/9/17, which showed the esophageal tumor in the lower third of the esophagus, staged [id number].  There were 2 abnormal lymph nodes seen in the gastrohepatic ligament; pathology was suspicious for adenocarcinoma.  Celiac node was sampled as well, which was benign.  He was seen by surgery, Dr. Esteban, and recommended srinivas-operative chemotherapy.    Port was placed on 6/22/17.    Chemotherapy:  Epirubicin 50 mg/m2 IV on day 1  Oxaliplatin 130 mg/m2 IV on day 1  Capecitabine 625 mg/m2 po BID on days 1-21  Every 21 day cycles    C1D1: 6/26/17  C2D1: 7/17/17  C3D1: 8/7/17    On 11/3/17, he underwent laparotomy with total gastrectomy and abdominal lymphadenectomy, left thoracotomy with distal esophagectomy and intrathoracic Terrell-en-Y esophagojejunostomy, feeding jejunostomy, left pharyngostomy tube placement, right tube  thoracostomy, and flexible bronchoscopy.  On 11/9/17, he was taken back to OR for I&D of pharyngostomy tube abscess.  Pathology showed adenocarcinoma, moderate differentiated, extensive residual tumor with no evidence tumor regression, margins negative, perineural invasion present, 1 of 28 lymph nodes were involved with malignancy, stage [id number] (stage IIIA).  HER-2 is non-amplified.    He resumed chemotherapy post-surgery, with plans to complete 3 more cycles.  He did not tolerate Xeloda well during his neoadjuvant chemotherapy, with issues with palmar-plantar, and likely genital erythrodysesthesia.  We discussed changing to 5-FU, and Daniel would like to try this, as he may also have issues with taking pills and absorption after his surgery.  He will receive epirubicin, oxaliplatin, and 5-FU x 3 cycles.    C4D1: 12/11/17  C5D1: anticipated for 1/2/18  C6D1: anticipated for 1/23/18        INTERIM HISTORY: Daniel comes in for follow-up today.  He is feeling better since his surgery.  He is still doing tube feeds, as he is still unable to each much solid food.  He says that he has been taking senna for constipation, and wants to be on top of it, as he now taking Zofran with chemo will make him constipated again.        REVIEW OF SYSTEMS:   14 point ROS was reviewed and is negative other than as noted above in HPI.       HOME MEDICATIONS:  Current Outpatient Prescriptions   Medication Sig Dispense Refill     cyabnocobalamin (VITAMIN B-12) 2500 MCG sublingual tablet Place 2,500 mcg under the tongue daily 30 tablet 1     acetaminophen (TYLENOL) 32 mg/mL solution 30.45 mLs (975 mg) by Per J Tube route 3 times daily 400 mL 0     sennosides (SENOKOT) 8.8 MG/5ML syrup 10 mLs by Per J Tube route 2 times daily 400 mL 0     multivitamins with minerals (CERTAVITE/CEROVITE) LIQD liquid 15 mLs by Per J Tube route daily 450 mL 0     oxyCODONE (ROXICODONE) 5 MG/5ML solution Take 5-10 mLs (5-10 mg) by mouth every 4 hours as needed for  moderate to severe pain 300 mL 0     LORazepam (ATIVAN) 0.5 MG tablet Take 1 tablet (0.5 mg) by mouth every 4 hours as needed (Anxiety, Nausea/Vomiting or Sleep) 30 tablet 5         ALLERGIES:  No Known Allergies      PAST MEDICAL HISTORY:  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 6/5/2017         PAST SURGICAL HISTORY:  Past Surgical History:   Procedure Laterality Date     ESOPHAGOGASTRODUODENOSCOPY       ESOPHAGOSCOPY, GASTROSCOPY, DUODENOSCOPY (EGD), COMBINED N/A 6/9/2017    Procedure: COMBINED ENDOSCOPIC ULTRASOUND, ESOPHAGOSCOPY, GASTROSCOPY, DUODENOSCOPY (EGD), FINE NEEDLE ASPIRATE/BIOPSY;  Upper Endoscopic Ultrasound, fine needle aspirate/biopsy;  Surgeon: Guru Mark Avila MD;  Location: [facility] OR     ESOPHAGOSCOPY, GASTROSCOPY, DUODENOSCOPY (EGD), COMBINED N/A 11/3/2017    Procedure: COMBINED ESOPHAGOSCOPY, GASTROSCOPY, DUODENOSCOPY (EGD);;  Surgeon: Yunior Esteban MD;  Location: [facility] OR     ESOPHAGOSCOPY, GASTROSCOPY, DUODENOSCOPY (EGD), COMBINED N/A 11/9/2017    Procedure: COMBINED ESOPHAGOSCOPY, GASTROSCOPY, DUODENOSCOPY (EGD);  Esophogastroduodenoscopy, take out pharangostomy tube;  Surgeon: Yunior Esteban MD;  Location: [facility] OR     GASTRECTOMY N/A 11/3/2017    Procedure: GASTRECTOMY;;  Surgeon: Yunior Esteban MD;  Location: [facility] OR     HAND SURGERY      childhood, torn tendon     INSERT PORT VASCULAR ACCESS Right 6/22/2017    Procedure: INSERT PORT VASCULAR ACCESS;  Single Lumen Chest Power Port;  Surgeon: Iván Driver PA-C;  Location: [facility] OR     LAPAROSCOPY DIAGNOSTIC (GENERAL) N/A 11/3/2017    Procedure: LAPAROSCOPY DIAGNOSTIC (GENERAL);  diagnostic laparoscopy, right chest tube, total gastrectomy with distal esophagectomy, intrathoracic eveline-y esophago-jejunostomy, feeding jejunostomy, pharyngostomy, esophagogastroduodenoscopy, flexible bronchoscopy;  Surgeon: Yunior Esteban MD;   "Location: [facility] OR     LAPAROTOMY EXPLORATORY N/A 11/3/2017    Procedure: LAPAROTOMY EXPLORATORY;;  Surgeon: Yunior Esteban MD;  Location: [facility] OR     PHARYNGOSTOMY N/A 11/3/2017    Procedure: PHARYNGOSTOMY;;  Surgeon: Yunior Esteban MD;  Location: [facility] OR     THORACOTOMY Left 11/3/2017    Procedure: THORACOTOMY;;  Surgeon: Yunior Esteban MD;  Location: [facility] OR         SOCIAL HISTORY:  Social History     Social History     Marital status:      Spouse name: N/A     Number of children: 1     Years of education: N/A     Occupational History     musician and teacher       Social History Main Topics     Smoking status: Never Smoker     Smokeless tobacco: Never Used     Alcohol use Yes      Comment: 1 beer daily     Drug use: Yes     Special: Marijuana      Comment: occasional     Sexual activity: Yes     Partners: Female     Birth control/ protection: Natural Family Planning     Other Topics Concern     Not on file     Social History Narrative     He works at [employer] in [place], 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  He denies smoking.  He drinks ~1 beer a day.  He denies illicit drug use, other than occasional marijuana.  He lives in [place] with his wife, and 4.5 year-old daughter.  His wife is currently pregnant with a boy, and is due in 6 weeks.  He has a half sister who  of breast cancer at age 32; she was diagnosed in her late 20's.  A paternal grandmother had breast cancer in her 70's      FAMILY HISTORY:  Family History   Problem Relation Age of Onset     Other - See Comments Father      sepsis     CANCER Sister      breast cancer  29 yo 1/2 sister      CANCER Paternal Grandmother      breast         PHYSICAL EXAM:  Vital signs:  /62 (BP Location: Right arm, Patient Position: Sitting)  Pulse 96  Temp 98.6  F (37  C) (Oral)  Resp 20  Ht 1.75 m (5' 8.9\")  Wt 68.5 kg (151 lb)  SpO2 100%  BMI 22.36 kg/m2   ECO  GENERAL/CONSTITUTIONAL: No " acute distress.   EYES: No scleral icterus.  RESPIRATORY: Clear to auscultation bilaterally. No crackles or wheezing.   CARDIOVASCULAR: Regular rate and rhythm without murmurs, gallops, or rubs.  GASTROINTESTINAL: No tenderness. The patient has normal bowel sounds. No guarding.  No distention.  Feeding tube in place.  Scars healing well.    MUSCULOSKELETAL: Warm and well-perfused, no cyanosis, clubbing, or edema.  NEUROLOGIC: Alert, oriented, answers questions appropriately.  INTEGUMENTARY: No jaundice.  Port in place.        LABS:  CBC RESULTS:   Recent Labs   Lab Test  12/11/17   1009   WBC  8.9   RBC  3.78*   HGB  10.2*   HCT  31.0*   MCV  82   MCH  27.0   MCHC  32.9   RDW  13.6   PLT  263     Recent Labs   Lab Test  12/11/17   1009  12/01/17   0922   NA  139  141   POTASSIUM  4.0  4.2   CHLORIDE  105  106   CO2  29  28   ANIONGAP  6  6   GLC  101*  101*   BUN  19  15   CR  0.60*  0.63*   YOBANY  8.5  8.8     Lab Results   Component Value Date    AST 21 12/11/2017     Lab Results   Component Value Date    ALT 34 12/11/2017     No results found for: BILICONJ   Lab Results   Component Value Date    BILITOTAL 0.3 12/11/2017     Lab Results   Component Value Date    ALBUMIN 3.5 12/11/2017     Lab Results   Component Value Date    PROTTOTAL 7.4 12/11/2017      Lab Results   Component Value Date    ALKPHOS 76 12/11/2017       Component      Latest Ref Rng & Units 12/1/2017   CEA      0 - 2.5 ug/L <0.5       IMAGING:  CT c/a/p 12/1/17:  1. Surgical changes of total gastrectomy and distal esophagectomy with  intrathoracic Terrell-Y esophagojejunostomy with resolution of the  previously seen pneumothoraces, trace remaining bilateral pleural  effusions, trace remaining mediastinal air (a single tiny focus  adjacent to the upper esophagus at the level of the thyroid gland),  and a small amount of fluid surrounding the intrathoracic jejunum  extending into the abdomen and pelvis.   2. No evidence of metastatic disease in the  chest, abdomen, or pelvis.  3. Unchanged nonspecific central mesenteric haziness. Recommend  attention on follow-up.      ASSESSMENT/PLAN:  Daniel Sandhu is a 37 year old male with:    1) Adenocarcinoma of the GE junction: now s/p 3 cycles of neoadjuvant chemotherapy with EOX, followed by surgical resection on 11/3/17.  Pathology showed adenocarcinoma, moderate differentiated, extensive residual tumor with no evidence tumor regression, margins negative, perineural invasion present, 1 of 28 lymph nodes were involved with malignancy, stage [id number] (stage IIIA).  HER-2 is non-amplified.    Post-op CT scans reviewed.  There are post-operative changes seen.  No evidence of metastatic disease seen on imaging.      He did not tolerate Xeloda well during his neoadjuvant chemotherapy, with issues with palmar-plantar, and likely genital erythrodysesthesia.  We discussed changing to 5-FU, and Daniel would like to try this, as he may also have issues with taking pills and absorption after his surgery.  He will receive chemotherapy with epirubicin, oxaliplatin, and 5-FU x 3 cycles, to complete total 6 cycles of chemotherapy.    -echo every 3 months while on chemotherapy - next one should be in late December 2017 - ordered  -registered for medical cannabis, per patient request  -he has follow-up appointment with Dr. Esteban in March 2018 with CT scans  -proceed with cycle 4 of chemotherapy today with epirubicin, oxaliplatin, and 5-FU  -RTC with PA on 1/2/18 and cycle 5 of chemotherapy same day.  Cycle 6 will be on 1/23/18 and appointment with PA same day.  I will see him back in clinic ~a month after completion of chemotherapy, around 3/12/18.      2) Genetics:  -genetic testing was negative    3) Chemotherapy-induced nausea/vomiting:   -Emend and Aloxi added to pre-meds  -he has Zofran ODT and Compazine, which are helpful    4) Oral thrush: he had much problems with this during his chemotherapy.  -will plan to give fluconazole  prophylactically during his adjuvant chemotherapy    5) Constipation:  -he takes Senna  -he can also try Miralax or milk of magnesia as needed    6) Fertility: Daniel and his wife have 2 children, including a baby boy just born around June 2017.  He is not planning for more children in the near future.      I spent a total of 40 minutes with the patient, with over >50% of the time in counseling and/or coordination of care.       Laurence Hood MD  Hematology/Oncology  [facility]

## 2017-12-11 NOTE — LETTER
12/11/2017       RE: Daniel Sandhu  [address] [facility] [address]     Dear Colleague,    Thank you for referring your patient, Daniel Sandhu, to the [facility]. Please see a copy of my visit note below.    [facility]    Hematology/Oncology Established Patient Note      Today's Date: 12/11/17    Reason for Follow-up: adenocarcinoma of the GE junction      HISTORY OF PRESENT ILLNESS: Daniel Sandhu is a 37 year old male who presents with adenocarcinoma of the GE junction.  In early 2017, patient started noticing difficulty swallowing, and that food seems to take longer to go down.  It became more frequent, and he saw his PCP, and was referred for EGD, which showed an esophageal mass located at the GE junction, measuring 4 cm.  Biopsy showed poorly differentiated adenocarcinoma.  HER-2 was sent and is equivocal (2+).  CT c/a/p showed a mildly prominent lymph node in the gastrohepatic ligament, but there were no pathologically enlarged lymph nodes in the chest, abdomen, or pelvis.  There was otherwise no evidence of metastatic disease.  PET-CT showed thickening of the distal esophagus consistent with known esophageal adenocarcinoma, as well as mildly hypermetabolic 1 cm lymph node in the gastrohepatic ligament.  There was no evidence of distant metastatic disease.  He underwent EUS on 6/9/17, which showed the esophageal tumor in the lower third of the esophagus, staged T2NX.  There were 2 abnormal lymph nodes seen in the gastrohepatic ligament; pathology was suspicious for adenocarcinoma.  Celiac node was sampled as well, which was benign.  He was seen by surgery, Dr. Esteban, and recommended srinivas-operative chemotherapy.    Port was placed on 6/22/17.    Chemotherapy:  Epirubicin 50 mg/m2 IV on day 1  Oxaliplatin 130 mg/m2 IV on day 1  Capecitabine 625 mg/m2 po BID on days 1-21  Every 21 day cycles    C1D1: 6/26/17  C2D1: 7/17/17  C3D1: 8/7/17    On  11/3/17, he underwent laparotomy with total gastrectomy and abdominal lymphadenectomy, left thoracotomy with distal esophagectomy and intrathoracic Terrell-en-Y esophagojejunostomy, feeding jejunostomy, left pharyngostomy tube placement, right tube thoracostomy, and flexible bronchoscopy.  On 11/9/17, he was taken back to OR for I&D of pharyngostomy tube abscess.  Pathology showed adenocarcinoma, moderate differentiated, extensive residual tumor with no evidence tumor regression, margins negative, perineural invasion present, 1 of 28 lymph nodes were involved with malignancy, stage [id number] (stage IIIA).  HER-2 is non-amplified.    He resumed chemotherapy post-surgery, with plans to complete 3 more cycles.  He did not tolerate Xeloda well during his neoadjuvant chemotherapy, with issues with palmar-plantar, and likely genital erythrodysesthesia.  We discussed changing to 5-FU, and Daniel would like to try this, as he may also have issues with taking pills and absorption after his surgery.  He will receive epirubicin, oxaliplatin, and 5-FU x 3 cycles.    C4D1: 12/11/17  C5D1: anticipated for 1/2/18  C6D1: anticipated for 1/23/18        INTERIM HISTORY: Daniel comes in for follow-up today.  He is feeling better since his surgery.  He is still doing tube feeds, as he is still unable to each much solid food.  He says that he has been taking senna for constipation, and wants to be on top of it, as he now taking Zofran with chemo will make him constipated again.        REVIEW OF SYSTEMS:   14 point ROS was reviewed and is negative other than as noted above in HPI.       HOME MEDICATIONS:  Current Outpatient Prescriptions   Medication Sig Dispense Refill     cyabnocobalamin (VITAMIN B-12) 2500 MCG sublingual tablet Place 2,500 mcg under the tongue daily 30 tablet 1     acetaminophen (TYLENOL) 32 mg/mL solution 30.45 mLs (975 mg) by Per J Tube route 3 times daily 400 mL 0     sennosides (SENOKOT) 8.8 MG/5ML syrup 10 mLs by Per J  Tube route 2 times daily 400 mL 0     multivitamins with minerals (CERTAVITE/CEROVITE) LIQD liquid 15 mLs by Per J Tube route daily 450 mL 0     oxyCODONE (ROXICODONE) 5 MG/5ML solution Take 5-10 mLs (5-10 mg) by mouth every 4 hours as needed for moderate to severe pain 300 mL 0     LORazepam (ATIVAN) 0.5 MG tablet Take 1 tablet (0.5 mg) by mouth every 4 hours as needed (Anxiety, Nausea/Vomiting or Sleep) 30 tablet 5         ALLERGIES:  No Known Allergies      PAST MEDICAL HISTORY:  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 6/5/2017         PAST SURGICAL HISTORY:  Past Surgical History:   Procedure Laterality Date     ESOPHAGOGASTRODUODENOSCOPY       ESOPHAGOSCOPY, GASTROSCOPY, DUODENOSCOPY (EGD), COMBINED N/A 6/9/2017    Procedure: COMBINED ENDOSCOPIC ULTRASOUND, ESOPHAGOSCOPY, GASTROSCOPY, DUODENOSCOPY (EGD), FINE NEEDLE ASPIRATE/BIOPSY;  Upper Endoscopic Ultrasound, fine needle aspirate/biopsy;  Surgeon: Guru aMrk Avila MD;  Location: [facility] OR     ESOPHAGOSCOPY, GASTROSCOPY, DUODENOSCOPY (EGD), COMBINED N/A 11/3/2017    Procedure: COMBINED ESOPHAGOSCOPY, GASTROSCOPY, DUODENOSCOPY (EGD);;  Surgeon: Yunior Esteban MD;  Location: [facility] OR     ESOPHAGOSCOPY, GASTROSCOPY, DUODENOSCOPY (EGD), COMBINED N/A 11/9/2017    Procedure: COMBINED ESOPHAGOSCOPY, GASTROSCOPY, DUODENOSCOPY (EGD);  Esophogastroduodenoscopy, take out pharangostomy tube;  Surgeon: Yunior Esteban MD;  Location: [facility] OR     GASTRECTOMY N/A 11/3/2017    Procedure: GASTRECTOMY;;  Surgeon: Yunior Esteban MD;  Location: [facility] OR     HAND SURGERY      childhood, torn tendon     INSERT PORT VASCULAR ACCESS Right 6/22/2017    Procedure: INSERT PORT VASCULAR ACCESS;  Single Lumen Chest Power Port;  Surgeon: Iván Driver PA-C;  [facility]     LAPAROSCOPY DIAGNOSTIC (GENERAL) N/A 11/3/2017    Procedure: LAPAROSCOPY DIAGNOSTIC (GENERAL);  diagnostic  laparoscopy, right chest tube, total gastrectomy with distal esophagectomy, intrathoracic eveline-y esophago-jejunostomy, feeding jejunostomy, pharyngostomy, esophagogastroduodenoscopy, flexible bronchoscopy;  Surgeon: Yunior Esteban MD;  Location: [facility] OR     LAPAROTOMY EXPLORATORY N/A 11/3/2017    Procedure: LAPAROTOMY EXPLORATORY;;  Surgeon: Yunior Esteban MD;  Location: [facility] OR     PHARYNGOSTOMY N/A 11/3/2017    Procedure: PHARYNGOSTOMY;;  Surgeon: Yunior Esteban MD;  Location: [facility] OR     THORACOTOMY Left 11/3/2017    Procedure: THORACOTOMY;;  Surgeon: Yunior Esteban MD;  Location: [facility] OR         SOCIAL HISTORY:  Social History     Social History     Marital status:      Spouse name: N/A     Number of children: 1     Years of education: N/A     Occupational History     musician and teacher       Social History Main Topics     Smoking status: Never Smoker     Smokeless tobacco: Never Used     Alcohol use Yes      Comment: 1 beer daily     Drug use: Yes     Special: Marijuana      Comment: occasional     Sexual activity: Yes     Partners: Female     Birth control/ protection: Natural Family Planning     Other Topics Concern     Not on file     Social History Narrative     He works at [employer] in [place], 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  He denies smoking.  He drinks ~1 beer a day.  He denies illicit drug use, other than occasional marijuana.  He lives in [place] with his wife, and 4.5 year-old daughter.  His wife is currently pregnant with a boy, and is due in 6 weeks.  He has a half sister who  of breast cancer at age 32; she was diagnosed in her late 20's.  A paternal grandmother had breast cancer in her 70's      FAMILY HISTORY:  Family History   Problem Relation Age of Onset     Other - See Comments Father      sepsis     CANCER Sister      breast cancer  29 yo 1/2 sister      CANCER Paternal Grandmother      breast  "        PHYSICAL EXAM:  Vital signs:  /62 (BP Location: Right arm, Patient Position: Sitting)  Pulse 96  Temp 98.6  F (37  C) (Oral)  Resp 20  Ht 1.75 m (5' 8.9\")  Wt 68.5 kg (151 lb)  SpO2 100%  BMI 22.36 kg/m2   ECO  GENERAL/CONSTITUTIONAL: No acute distress.   EYES: No scleral icterus.  RESPIRATORY: Clear to auscultation bilaterally. No crackles or wheezing.   CARDIOVASCULAR: Regular rate and rhythm without murmurs, gallops, or rubs.  GASTROINTESTINAL: No tenderness. The patient has normal bowel sounds. No guarding.  No distention.  Feeding tube in place.  Scars healing well.    MUSCULOSKELETAL: Warm and well-perfused, no cyanosis, clubbing, or edema.  NEUROLOGIC: Alert, oriented, answers questions appropriately.  INTEGUMENTARY: No jaundice.  Port in place.        LABS:  CBC RESULTS:   Recent Labs   Lab Test  17   1009   WBC  8.9   RBC  3.78*   HGB  10.2*   HCT  31.0*   MCV  82   MCH  27.0   MCHC  32.9   RDW  13.6   PLT  263     Recent Labs   Lab Test  17   1009  17   0922   NA  139  141   POTASSIUM  4.0  4.2   CHLORIDE  105  106   CO2  29  28   ANIONGAP  6  6   GLC  101*  101*   BUN  19  15   CR  0.60*  0.63*   YOBANY  8.5  8.8     Lab Results   Component Value Date    AST 21 2017     Lab Results   Component Value Date    ALT 34 2017     No results found for: BILICONJ   Lab Results   Component Value Date    BILITOTAL 0.3 2017     Lab Results   Component Value Date    ALBUMIN 3.5 2017     Lab Results   Component Value Date    PROTTOTAL 7.4 2017      Lab Results   Component Value Date    ALKPHOS 76 2017       Component      Latest Ref Rng & Units 2017   CEA      0 - 2.5 ug/L <0.5       IMAGING:  CT c/a/p 17:  1. Surgical changes of total gastrectomy and distal esophagectomy with  intrathoracic Trerell-Y esophagojejunostomy with resolution of the  previously seen pneumothoraces, trace remaining bilateral pleural  effusions, trace remaining " mediastinal air (a single tiny focus  adjacent to the upper esophagus at the level of the thyroid gland),  and a small amount of fluid surrounding the intrathoracic jejunum  extending into the abdomen and pelvis.   2. No evidence of metastatic disease in the chest, abdomen, or pelvis.  3. Unchanged nonspecific central mesenteric haziness. Recommend  attention on follow-up.      ASSESSMENT/PLAN:  Daniel Sandhu is a 37 year old male with:    1) Adenocarcinoma of the GE junction: now s/p 3 cycles of neoadjuvant chemotherapy with EOX, followed by surgical resection on 11/3/17.  Pathology showed adenocarcinoma, moderate differentiated, extensive residual tumor with no evidence tumor regression, margins negative, perineural invasion present, 1 of 28 lymph nodes were involved with malignancy, stage [id number] (stage IIIA).  HER-2 is non-amplified.    Post-op CT scans reviewed.  There are post-operative changes seen.  No evidence of metastatic disease seen on imaging.      He did not tolerate Xeloda well during his neoadjuvant chemotherapy, with issues with palmar-plantar, and likely genital erythrodysesthesia.  We discussed changing to 5-FU, and Daniel would like to try this, as he may also have issues with taking pills and absorption after his surgery.  He will receive chemotherapy with epirubicin, oxaliplatin, and 5-FU x 3 cycles, to complete total 6 cycles of chemotherapy.    -echo every 3 months while on chemotherapy - next one should be in late December 2017 - ordered  -registered for medical cannabis, per patient request  -he has follow-up appointment with Dr. Esteban in March 2018 with CT scans  -proceed with cycle 4 of chemotherapy today with epirubicin, oxaliplatin, and 5-FU  -RTC with PA on 1/2/18 and cycle 5 of chemotherapy same day.  Cycle 6 will be on 1/23/18 and appointment with PA same day.  I will see him back in clinic ~a month after completion of chemotherapy, around 3/12/18.      2) Genetics:  -genetic  testing was negative    3) Chemotherapy-induced nausea/vomiting:   -Emend and Aloxi added to pre-meds  -he has Zofran ODT and Compazine, which are helpful    4) Oral thrush: he had much problems with this during his chemotherapy.  -will plan to give fluconazole prophylactically during his adjuvant chemotherapy    5) Constipation:  -he takes Senna  -he can also try Miralax or milk of magnesia as needed    6) Fertility: Daniel and his wife have 2 children, including a baby boy just born around June 2017.  He is not planning for more children in the near future.      I spent a total of 40 minutes with the patient, with over >50% of the time in counseling and/or coordination of care.       Laurence Hood MD  Hematology/Oncology  [facility]

## 2017-12-11 NOTE — MR AVS SNAPSHOT
After Visit Summary   12/11/2017    Daniel Sandhu    MRN: [id number]           Patient Information     Date Of Birth          1980        Visit Information        Provider Department      12/11/2017 9:45 AM Laurence Hood MD [facility]        Today's Diagnoses     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    -  1    Thrush           Follow-ups after your visit        Your next 10 appointments already scheduled     Jan 02, 2018  9:45 AM CST   [facility] Lab Draw with [facility] LAB DRAW   [facility]    [address] [place] [address]   [phone]            Jan 02, 2018 10:30 AM CST   (Arrive by 10:15 AM)   Return Visit with GAVIN Lundy CNP   [facility] ([facility])    [address] [place] [address]   [phone]            Jan 02, 2018 11:30 AM CST   Infusion 240 with [facility] ONCOLOGY INFUSION, UC 17 ATC   [facility] ([facility])    [address] [place] [address]   [phone]            Jan 23, 2018  8:30 AM CST   [facility] Lab Draw with [facility] LAB DRAW   [facility] Masonic Lab Draw ([facility])    [address] [place] [address]   [phone]            Jan 23, 2018  8:50 AM CST   (Arrive by 8:35 AM)   Return Visit with GAVIN Lundy CNP   [facility] ([facility])    [address] [place] [address]   [phone]            Jan 23, 2018 10:00 AM CST   Infusion 240 with [facility] ONCOLOGY INFUSION, UC 17 ATC   [facility] ([facility])    [address] [place] [address]   [phone]            Mar 07, 2018  1:40 PM CST   CT CHEST ABDOMEN  PELVIS W/O CONTRAST with UCCT1   [facility] CT ([facility])    [address]  1st Floor  [facility] [address] [phone]           Please bring any scans or X-rays taken at other hospitals, if similar tests were done. Also bring a list of your medicines, including vitamins, minerals and over-the-counter drugs. It is safest to leave personal items at home.  Be sure to tell your doctor:   If you have any allergies.   If there s any chance you are pregnant.   If you are breastfeeding.   If you have any special needs.  You may have contrast for this exam. To prepare:   Do not eat or drink for 2 hours before your exam. If you need to take medicine, you may take it with small sips of water. (We may ask you to take liquid medicine as well.)   The day before your exam, drink extra fluids at least six 8-ounce glasses (unless your doctor tells you to restrict your fluids).  Patients over 70 or patients with diabetes or kidney problems:   If you haven t had a blood test (creatinine test) within the last 30 days, go to your clinic or Diagnostic Imaging Department for this test.  If you have diabetes:   If your kidney function is normal, continue taking your metformin (Avandamet, Glucophage, Glucovance, Metaglip) on the day of your exam.   If your kidney function is abnormal, wait 48 hours before restarting this medicine.  You will have oral contrast for this exam:   You will drink the contrast at home. Get this from your clinic or Diagnostic Imaging Department. Please follow the directions given.  Please wear loose clothing, such as a sweat suit or jogging clothes. Avoid snaps, zippers and other metal. We may ask you to undress and put on a hospital gown.  If you have any questions, please call the Imaging Department where you will have your exam.            Mar 07, 2018  2:30 PM CST   (Arrive by 2:15 PM)   Return Visit with Yunior Esteban MD   [facility]  "Cancer Clinic ([facility])    [address]  2nd [address]  [facility] [address] [phone]              Future tests that were ordered for you today     Open Future Orders        Priority Expected Expires Ordered    Echocardiogram Limited Routine  12/11/2018 12/11/2017            Who to contact     If you have questions or need follow up information about today's clinic visit or your schedule please contact [facility] directly at [phone].  Normal or non-critical lab and imaging results will be communicated to you by Choisrhart, letter or phone within 4 business days after the clinic has received the results. If you do not hear from us within 7 days, please contact the clinic through HelloSignt or phone. If you have a critical or abnormal lab result, we will notify you by phone as soon as possible.  Submit refill requests through Room or call your pharmacy and they will forward the refill request to us. Please allow 3 business days for your refill to be completed.          Additional Information About Your Visit        ChoisrharCureLauncher Information     Room gives you secure access to your electronic health record. If you see a primary care provider, you can also send messages to your care team and make appointments. If you have questions, please call your primary care clinic.  If you do not have a primary care provider, please call [phone] and they will assist you.        Care EveryWhere ID     This is your Care EveryWhere ID. This could be used by other organizations to access your [place] medical records  [id number]        Your Vitals Were     Pulse Temperature Respirations Height Pulse Oximetry BMI (Body Mass Index)    96 98.6  F (37  C) (Oral) 20 1.75 m (5' 8.9\") 100% 22.36 kg/m2       Blood Pressure from Last 3 Encounters:   12/11/17 117/62   12/06/17 112/65   11/27/17 107/66    Weight from Last 3 Encounters:   12/11/17 68.5 kg (151 lb)   12/06/17 69.3 " kg (152 lb 12.5 oz)   11/27/17 70.5 kg (155 lb 8 oz)                 Today's Medication Changes          These changes are accurate as of: 12/11/17 12:11 PM.  If you have any questions, ask your nurse or doctor.               Start taking these medicines.        Dose/Directions    fluconazole 200 MG tablet   Commonly known as:  DIFLUCAN   Used for:  Thrush   Started by:  Laurence Hood MD        Dose:  200 mg   Take 1 tablet (200 mg) by mouth daily   Quantity:  30 tablet   Refills:  1         Stop taking these medicines if you haven't already. Please contact your care team if you have questions.     LORazepam 0.5 MG tablet   Commonly known as:  ATIVAN   Stopped by:  Laurence Hood MD                Where to get your medicines      These medications were sent to [facility] [address]  [address] [place] [address] [address] [address]    Hours:  TRANSPLANT PHONE NUMBER [phone] Phone:  [phone]     fluconazole 200 MG tablet                Primary Care Provider Office Phone # Fax #    Lencho Chan [phone]       [address] [facility] [address]        Equal Access to Services     SHAWN SWAIN AH: Hadii garo stanley hadasho Soomaali, waaxda luqadaha, qaybta kaalmada adeegyada, alejandro arteaga. So [facility] [phone].    ATENCIÓN: Si habla español, tiene a zayas disposición servicios gratuitos de asistencia lingüística. Llame al [phone].    We comply with applicable federal civil rights laws and [place] laws. We do not discriminate on the basis of race, color, national origin, age, disability, sex, sexual orientation, or gender identity.            Thank you!     Thank you for choosing [facility]  for your care. Our goal is always to provide you with excellent care. Hearing back from our patients is one way we can continue to improve our services. Please  take a few minutes to complete the written survey that you may receive in the mail after your visit with us. Thank you!             Your Updated Medication List - Protect others around you: Learn how to safely use, store and throw away your medicines at www.disposemymeds.org.          This list is accurate as of: 12/11/17 12:11 PM.  Always use your most recent med list.                   Brand Name Dispense Instructions for use Diagnosis    acetaminophen 32 mg/mL solution    TYLENOL    400 mL    30.45 mLs (975 mg) by Per J Tube route 3 times daily    Acute post-operative pain       cyabnocobalamin 2500 MCG sublingual tablet    VITAMIN B-12    30 tablet    Place 2,500 mcg under the tongue daily    B12 deficiency       fluconazole 200 MG tablet    DIFLUCAN    30 tablet    Take 1 tablet (200 mg) by mouth daily    Thrush       multivitamins with minerals Liqd liquid     450 mL    15 mLs by Per J Tube route daily    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

## 2017-12-11 NOTE — PATIENT INSTRUCTIONS
Contact Numbers    [facility] Main Line: [phone]    Triage Nurse Line: [phone]      Please call the [facility] Nurse Triage line if you experience a temperature greater than or equal to 100.5, shaking chills, have uncontrolled nausea, vomiting and/or diarrhea, dizziness, shortness of breath, bleeding not relieved with pressure, or if you have any other questions or concerns.     If it is after hours, weekends, or holidays, please call the main hospital  at  [phone] and ask to speak to the adult Oncology doctor on call.     If you are having any concerning symptoms or wish to speak to a provider before your next infusion visit, please call your care coordinator or triage them so we can adequately serve you.      If you need to refill your narcotic prescription or other medication, please call triage before your infusion appointment.        December 2017 Sunday Monday Tuesday Wednesday Thursday Friday Saturday                            1     [facility] MASONIC LAB DRAW    9:00 AM   (15 min.)    MASONIC LAB DRAW   [facility] Lab Draw     CT CHEST/ABDOMEN/PELVIS W    9:05 AM   (20 min.)   UCCT2   [facility] CT 2       3     4     5     6     XR CHEST 2 VIEWS    8:45 AM   (15 min.)   UCXR1   [facility] Xray     UMP RETURN    9:15 AM   (30 min.)   Yunior Esteban MD   [facility] 7     8     9       10     11     P MASONIC LAB DRAW    9:15 AM   (15 min.)    MASONIC LAB DRAW   [facility] Lab Draw     UMP RETURN    9:30 AM   (30 min.)   Laurence Hood MD   [facility]     UMP ONC INFUSION 240   10:30 AM   (240 min.)    ONCOLOGY INFUSION   [facility] 12     13     14     15     16       17     18     UMP ONC INFUSION 60    2:00 PM   (60 min.)    ONCOLOGY INFUSION   [facility] 19     20     21     22     23       24     25     26     27     28      ECH LIMITED    9:30 AM   (60 min.)   UCECHCR1   [facility] Echo 29     30 31 January 2018 Sunday Monday Tuesday Wednesday Thursday Friday Saturday        1     2     UMP MASONIC LAB DRAW    9:45 AM   (15 min.)    MASONIC LAB DRAW   [facility] Masonic Lab Draw     UMP RETURN   10:15 AM   (50 min.)   Nannette Moody APRN CNP   [facility]     UMP ONC INFUSION 240   11:30 AM   (240 min.)    ONCOLOGY INFUSION   [facility] 3     4     5     6       7     8     9     10     11     12     13       14     15     16     17     18     19     20       21     22     23     UMP MASONIC LAB DRAW    8:30 AM   (15 min.)    MASONIC LAB DRAW   [facility] Masonic Lab Draw     UMP RETURN    8:35 AM   (50 min.)   Nannette Moody APRN CNP   [facility]     UMP ONC INFUSION 240   10:00 AM   (240 min.)    ONCOLOGY INFUSION   [facility] 24     25     26     27       28     29     30     31                                Recent Results (from the past 24 hour(s))   CBC with platelets differential    Collection Time: 12/11/17 10:09 AM   Result Value Ref Range    WBC 8.9 4.0 - 11.0 10e9/L    RBC Count 3.78 (L) 4.4 - 5.9 10e12/L    Hemoglobin 10.2 (L) 13.3 - 17.7 g/dL    Hematocrit 31.0 (L) 40.0 - 53.0 %    MCV 82 78 - 100 fl    MCH 27.0 26.5 - 33.0 pg    MCHC 32.9 31.5 - 36.5 g/dL    RDW 13.6 10.0 - 15.0 %    Platelet Count 263 150 - 450 10e9/L    Diff Method Automated Method     % Neutrophils 77.5 %    % Lymphocytes 11.4 %    % Monocytes 9.1 %    % Eosinophils 1.7 %    % Basophils 0.1 %    % Immature Granulocytes 0.2 %    Nucleated RBCs 0 0 /100    Absolute Neutrophil 6.9 1.6 - 8.3 10e9/L    Absolute Lymphocytes 1.0 0.8 - 5.3 10e9/L    Absolute Monocytes 0.8 0.0 - 1.3 10e9/L    Absolute Eosinophils 0.2 0.0 - 0.7 10e9/L    Absolute Basophils 0.0 0.0 - 0.2 10e9/L    Abs Immature Granulocytes 0.0 0 - 0.4 10e9/L     Absolute Nucleated RBC 0.0    Comprehensive metabolic panel    Collection Time: 12/11/17 10:09 AM   Result Value Ref Range    Sodium 139 133 - 144 mmol/L    Potassium 4.0 3.4 - 5.3 mmol/L    Chloride 105 94 - 109 mmol/L    Carbon Dioxide 29 20 - 32 mmol/L    Anion Gap 6 3 - 14 mmol/L    Glucose 101 (H) 70 - 99 mg/dL    Urea Nitrogen 19 7 - 30 mg/dL    Creatinine 0.60 (L) 0.66 - 1.25 mg/dL    GFR Estimate >90 >60 mL/min/1.7m2    GFR Estimate If Black >90 >60 mL/min/1.7m2    Calcium 8.5 8.5 - 10.1 mg/dL    Bilirubin Total 0.3 0.2 - 1.3 mg/dL    Albumin 3.5 3.4 - 5.0 g/dL    Protein Total 7.4 6.8 - 8.8 g/dL    Alkaline Phosphatase 76 40 - 150 U/L    ALT 34 0 - 70 U/L    AST 21 0 - 45 U/L

## 2017-12-11 NOTE — MR AVS SNAPSHOT
After Visit Summary   12/11/2017    Daniel Sandhu    MRN: [id number]           Patient Information     Date Of Birth          1980        Visit Information        Provider Department      12/11/2017 10:30 AM  21 ATC;  ONCOLOGY INFUSION [facility]        Today's Diagnoses     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    -  1      Care Instructions    Contact Numbers    [facility] Main Line: [phone]    Triage Nurse Line: [phone]      Please call the [facility] Nurse Triage line if you experience a temperature greater than or equal to 100.5, shaking chills, have uncontrolled nausea, vomiting and/or diarrhea, dizziness, shortness of breath, bleeding not relieved with pressure, or if you have any other questions or concerns.     If it is after hours, weekends, or holidays, please call the main hospital  at  [phone] and ask to speak to the adult Oncology doctor on call.     If you are having any concerning symptoms or wish to speak to a provider before your next infusion visit, please call your care coordinator or triage them so we can adequately serve you.      If you need to refill your narcotic prescription or other medication, please call triage before your infusion appointment.        December 2017 Sunday Monday Tuesday Wednesday Thursday Friday Saturday                            1     [facility] MASONIC LAB DRAW    9:00 AM   (15 min.)   UC MASONIC LAB DRAW   [facility] Lab Draw     CT CHEST/ABDOMEN/PELVIS W    9:05 AM   (20 min.)   UCCT2   [facility] CT 2       3     4     5     6     XR CHEST 2 VIEWS    8:45 AM   (15 min.)   UCXR1   [facility] Xray     UMP RETURN    9:15 AM   (30 min.)   Yunior Esteban MD   [facility] 7     8     9       10     11     P MASONIC LAB DRAW    9:15 AM   (15 min.)    MASONIC LAB DRAW   [facility] Lab Draw     UMP RETURN    9:30  AM   (30 min.)   Laurence Hood MD   [facility]     UMP ONC INFUSION 240   10:30 AM   (240 min.)   UC ONCOLOGY INFUSION   [facility] 12     13     14     15     16       17     18     UMP ONC INFUSION 60    2:00 PM   (60 min.)   UC ONCOLOGY INFUSION   [facility] 19     20     21     22     23       24     25     26     27     28     ECH LIMITED    9:30 AM   (60 min.)   UCECHCR1   [facility] Echo 29     30       31 January 2018 Sunday Monday Tuesday Wednesday Thursday Friday Saturday        1     2     UMP MASONIC LAB DRAW    9:45 AM   (15 min.)    MASONIC LAB DRAW   [facility] Lab Draw     UMP RETURN   10:15 AM   (50 min.)   Nannette Moody APRN CNP   [facility]     UMP ONC INFUSION 240   11:30 AM   (240 min.)    ONCOLOGY INFUSION   [facility] 3     4     5     6       7     8     9     10     11     12     13       14     15     16     17     18     19     20       21     22     23     UMP MASONIC LAB DRAW    8:30 AM   (15 min.)    MASONIC LAB DRAW   [facility] Lab Draw     UMP RETURN    8:35 AM   (50 min.)   Nannette Moody APRN CNP   [facility]     UMP ONC INFUSION 240   10:00 AM   (240 min.)    ONCOLOGY INFUSION   [facility] 24     25     26     27       28     29     30     31                                Recent Results (from the past 24 hour(s))   CBC with platelets differential    Collection Time: 12/11/17 10:09 AM   Result Value Ref Range    WBC 8.9 4.0 - 11.0 10e9/L    RBC Count 3.78 (L) 4.4 - 5.9 10e12/L    Hemoglobin 10.2 (L) 13.3 - 17.7 g/dL    Hematocrit 31.0 (L) 40.0 - 53.0 %    MCV 82 78 - 100 fl    MCH 27.0 26.5 - 33.0 pg    MCHC 32.9 31.5 - 36.5 g/dL    RDW 13.6 10.0 - 15.0 %    Platelet Count 263 150 - 450 10e9/L    Diff Method Automated Method     % Neutrophils 77.5 %    %  Lymphocytes 11.4 %    % Monocytes 9.1 %    % Eosinophils 1.7 %    % Basophils 0.1 %    % Immature Granulocytes 0.2 %    Nucleated RBCs 0 0 /100    Absolute Neutrophil 6.9 1.6 - 8.3 10e9/L    Absolute Lymphocytes 1.0 0.8 - 5.3 10e9/L    Absolute Monocytes 0.8 0.0 - 1.3 10e9/L    Absolute Eosinophils 0.2 0.0 - 0.7 10e9/L    Absolute Basophils 0.0 0.0 - 0.2 10e9/L    Abs Immature Granulocytes 0.0 0 - 0.4 10e9/L    Absolute Nucleated RBC 0.0    Comprehensive metabolic panel    Collection Time: 12/11/17 10:09 AM   Result Value Ref Range    Sodium 139 133 - 144 mmol/L    Potassium 4.0 3.4 - 5.3 mmol/L    Chloride 105 94 - 109 mmol/L    Carbon Dioxide 29 20 - 32 mmol/L    Anion Gap 6 3 - 14 mmol/L    Glucose 101 (H) 70 - 99 mg/dL    Urea Nitrogen 19 7 - 30 mg/dL    Creatinine 0.60 (L) 0.66 - 1.25 mg/dL    GFR Estimate >90 >60 mL/min/1.7m2    GFR Estimate If Black >90 >60 mL/min/1.7m2    Calcium 8.5 8.5 - 10.1 mg/dL    Bilirubin Total 0.3 0.2 - 1.3 mg/dL    Albumin 3.5 3.4 - 5.0 g/dL    Protein Total 7.4 6.8 - 8.8 g/dL    Alkaline Phosphatase 76 40 - 150 U/L    ALT 34 0 - 70 U/L    AST 21 0 - 45 U/L                 Follow-ups after your visit        Your next 10 appointments already scheduled     Dec 18, 2017  2:00 PM CST   Infusion 60 with  ONCOLOGY INFUSION, UC 11 ATC   [facility] ([facility])    [address]  2nd [address]  [facility] [address] [phone]            Dec 28, 2017  9:30 AM CST   Ech Limited with UCECHCR1   [facility] ([facility])    [address]  3rd Floor  [facility] [address] [phone]           1.  Please bring or wear a comfortable two-piece outfit. 2.  You may eat, drink and take your normal medicines. 3.  For any questions that cannot be answered, please contact the ordering physician            Jan 02, 2018  9:45 AM [facility]   Masonic Lab Draw with  MASONIC LAB DRAW   [facility] Masonic Lab Draw [facility]  [facility])    [address] [address]   [phone]            Jan 02, 2018 10:30 AM CST   (Arrive by 10:15 AM)   Return Visit with GAVIN Lundy CNP   [facility] ([facility])    [address] [place] [address]   [phone]            Jan 02, 2018 11:30 AM CST   Infusion 240 with UC ONCOLOGY INFUSION, UC 17 ATC   [facility] ([facility])    [address] [place] [address]   [phone]            Jan 23, 2018  8:30 AM CST   Masonic Lab Draw with UC MASONIC LAB DRAW   [facility] Lab Draw ([facility])    [address] [place] [address]   [phone]            Jan 23, 2018  8:50 AM CST   (Arrive by 8:35 AM)   Return Visit with GAVIN Lundy CNP   [facility] ([facility])    [address] [place] [address]   [phone]            Jan 23, 2018 10:00 AM CST   Infusion 240 with UC ONCOLOGY INFUSION   [facility] ([facility])    [address] [place] [address]   [phone]              Future tests that were ordered for you today     Open Future Orders        Priority Expected Expires Ordered    Echocardiogram Limited Routine  12/11/2018 12/11/2017            Who to contact     If you have questions or need follow up information about today's clinic visit or your schedule please contact [facility] directly at [phone].  Normal or non-critical lab and imaging results will be communicated to you by MyChart, letter or phone within 4 business days after the clinic has received the results. If you do not hear from us within 7 days, please contact the clinic through MyChart or phone. If  you have a critical or abnormal lab result, we will notify you by phone as soon as possible.  Submit refill requests through [employer] or call your pharmacy and they will forward the refill request to us. Please allow 3 business days for your refill to be completed.          Additional Information About Your Visit        [email or web address] Information     [employer] gives you secure access to your electronic health record. If you see a primary care provider, you can also send messages to your care team and make appointments. If you have questions, please call your primary care clinic.  If you do not have a primary care provider, please call [phone] and they will assist you.        Care EveryWhere ID     This is your Care EveryWhere ID. This could be used by other organizations to access your [facility] medical records  [id number]         Blood Pressure from Last 3 Encounters:   12/11/17 117/62   12/06/17 112/65   11/27/17 107/66    Weight from Last 3 Encounters:   12/11/17 68.5 kg (151 lb)   12/06/17 69.3 kg (152 lb 12.5 oz)   11/27/17 70.5 kg (155 lb 8 oz)              We Performed the Following     CBC with platelets differential     Comprehensive metabolic panel          Today's Medication Changes          These changes are accurate as of: 12/11/17  5:30 PM.  If you have any questions, ask your nurse or doctor.               Start taking these medicines.        Dose/Directions    fluconazole 200 MG tablet   Commonly known as:  DIFLUCAN   Used for:  Thrush   Started by:  Laurence Hood MD        Dose:  200 mg   Take 1 tablet (200 mg) by mouth daily   Quantity:  30 tablet   Refills:  1         Stop taking these medicines if you haven't already. Please contact your care team if you have questions.     LORazepam 0.5 MG tablet   Commonly known as:  ATIVAN   Stopped by:  Laurence Hood MD                Where to get your medicines      These medications were sent to [facility]  [place], [place] - [address]  [address], [facility] [address]    Hours:  TRANSPLANT PHONE NUMBER [phone] Phone:  [phone]     fluconazole 200 MG tablet                Primary Care Provider Office Phone # Fax #    Lencho Chan [phone]       [address] [facility] [address]        Equal Access to Services     SHAWN SWAIN : Hadii aad ku hadasho Soomaali, waaxda luqadaha, qaybta kaalmada adeegyada, waxay idiin hayaan adeeg naomienailachaka labello . So [facility] [phone].    ATENCIÓN: Si laryla ashley, tiene a zayas disposición servicios gratuitos de asistencia lingüística. Isaias al [phone].    We comply with applicable federal civil rights laws and [place] laws. We do not discriminate on the basis of race, color, national origin, age, disability, sex, sexual orientation, or gender identity.            Thank you!     Thank you for choosing [facility]  for your care. Our goal is always to provide you with excellent care. Hearing back from our patients is one way we can continue to improve our services. Please take a few minutes to complete the written survey that you may receive in the mail after your visit with us. Thank you!             Your Updated Medication List - Protect others around you: Learn how to safely use, store and throw away your medicines at www.disposemymeds.org.          This list is accurate as of: 12/11/17  5:30 PM.  Always use your most recent med list.                   Brand Name Dispense Instructions for use Diagnosis    acetaminophen 32 mg/mL solution    TYLENOL    400 mL    30.45 mLs (975 mg) by Per J Tube route 3 times daily    Acute post-operative pain       cyabnocobalamin 2500 MCG sublingual tablet    VITAMIN B-12    30 tablet    Place 2,500 mcg under the tongue daily    B12 deficiency       fluconazole 200 MG tablet    DIFLUCAN    30 tablet    Take 1 tablet (200 mg) by mouth daily    Thrush        multivitamins with minerals Liqd liquid     450 mL    15 mLs by Per J Tube route daily    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

## 2017-12-11 NOTE — NURSING NOTE
"Oncology Rooming Note    December 11, 2017 10:16 AM   Daniel Sandhu is a 37 year old male who presents for:    Chief Complaint   Patient presents with     Port Draw     Port labs collected by RN.     Oncology Clinic Visit     Esophageal ca     Initial Vitals: /62 (BP Location: Right arm, Patient Position: Sitting)  Pulse 96  Temp 98.6  F (37  C) (Oral)  Resp 20  Ht 1.75 m (5' 8.9\")  Wt 68.5 kg (151 lb)  SpO2 100%  BMI 22.36 kg/m2 Estimated body mass index is 22.36 kg/(m^2) as calculated from the following:    Height as of this encounter: 1.75 m (5' 8.9\").    Weight as of this encounter: 68.5 kg (151 lb). Body surface area is 1.82 meters squared.  Mild Pain (2) Comment: in incision sites and upper left rib area experiences some intermitent cramp lik   No LMP for male patient.  Allergies reviewed: Yes  Medications reviewed: Yes    Medications: May need more senna or other constipation meds.  Pharmacy name entered into [facility]: [facility] - [place], [place] - [address] [place] [address]    Clinical concerns: none Dr Hood was NOT notified.    10 minutes for nursing intake (face to face time)     MARIE GIL LPN            "

## 2017-12-12 ENCOUNTER — HOME INFUSION (PRE-WILLOW HOME INFUSION) (OUTPATIENT)
Dept: PHARMACY | Facility: CLINIC | Age: 37
End: 2017-12-12

## 2017-12-12 NOTE — PROGRESS NOTES
This is a recent snapshot of the patient's [facility] medical record.  For current drug dose and complete information and questions, call [phone] or In Basket pool, fv home infusion ([id number])  CSN Number:  [id number]

## 2017-12-13 NOTE — PROGRESS NOTES
This is a recent snapshot of the patient's [facility] medical record.  For current drug dose and complete information and questions, call [phone] or In Basket pool, fv home infusion ([id number])  CSN Number:  [id number]

## 2017-12-14 ENCOUNTER — TELEPHONE (OUTPATIENT)
Dept: ONCOLOGY | Facility: CLINIC | Age: 37
End: 2017-12-14

## 2017-12-14 ENCOUNTER — CARE COORDINATION (OUTPATIENT)
Dept: ONCOLOGY | Facility: CLINIC | Age: 37
End: 2017-12-14

## 2017-12-14 NOTE — PROGRESS NOTES
RN Care Coordination Note  F/u call to pt to let him know   -that Dr. Hood ordered ECHO and it has been scheduled.  -that the cycle 1 CI 5FU pump will be disconnected by home care on 1/1 and cycle 2 pump will start on 1/2 as scheduled in our infusion room. Oked per Dr. Hood.  Daniel reports that he was told by home care nurse that they would also do the 5FU 7-day pump changes on 12/18 and 12/25 at home, so we can cancel the 5FU pump change in clinic on 12/18.  -contacted Tao at [facility] to obtain confirmation of pump bag changes at home on both 12/18 and 12/25, as well as pump disconnect on 1/1 . Tao confirmed this plan. Request sent to [facility] scheduling to cancel 12/18 infusion appt here at [facility]  Daniel reports that he is feeling better today than yesterday, is taking antiemetics including ondansetron ODT per advice received via triage earlier today and feels this has been effective. His appetite remains poor, so he is giving more enteral feeds to compensate for decreased po intake. Recommended he call back with additional sx/concerns.  Pt voiced understanding of above instructions and information and denied further questions  Nannette Ovalles, RN, BSN, OCN  Care Coordinator  [facility]

## 2017-12-14 NOTE — TELEPHONE ENCOUNTER
Pt called into triage reporting nausea and fatigue since his C1D1 Epirubicin, Oxaliplaitn, Fluorouracil pump connect on 12/11 (Monday). He's had a gastrectomy so he does not vomit any emesis but is having dry heaves with yellow-clear phlegm. He is also hiccupping frequently. He has tube feeding at 100 ml/hour for 14 hours/day and water flushes. No solid food intake, sipping on water.  Denied diarrhea or abd pain, had a formed BM this morning. He has taken compazine 2x day and lorazepam once at bedtime. Stated compazine worked the last time he had nausea with chemo but not effective this time. He's not due for zofran until this afternoon. Denied fevers, chills, sob, lightheadedness.    Discussed with Alisa JARVIS. Pt ok to start zofran and alternate with compazine and lorazepam, 4 hours apart. Advised to stay on scheduled anti-emetics for a couple of days until symptoms lessen. Compazine should help with hiccups, after taking scheduled Compazine if still having hiccups, can switch to thorazine if pt prefers. Call back if this regimen is not working or has worsening symptoms such as lightheadedness, weakness or sob. Pt verbalized understanding.

## 2017-12-15 ENCOUNTER — MEDICAL CORRESPONDENCE (OUTPATIENT)
Dept: HEALTH INFORMATION MANAGEMENT | Facility: CLINIC | Age: 37
End: 2017-12-15

## 2017-12-18 ENCOUNTER — TELEPHONE (OUTPATIENT)
Dept: ONCOLOGY | Facility: CLINIC | Age: 37
End: 2017-12-18

## 2017-12-18 ENCOUNTER — HOME INFUSION (PRE-WILLOW HOME INFUSION) (OUTPATIENT)
Dept: PHARMACY | Facility: CLINIC | Age: 37
End: 2017-12-18

## 2017-12-18 DIAGNOSIS — T45.1X5A CHEMOTHERAPY-INDUCED NAUSEA: ICD-10-CM

## 2017-12-18 DIAGNOSIS — R11.0 CHEMOTHERAPY-INDUCED NAUSEA: ICD-10-CM

## 2017-12-18 DIAGNOSIS — C15.5 MALIGNANT NEOPLASM OF LOWER THIRD OF ESOPHAGUS (H): Primary | ICD-10-CM

## 2017-12-18 RX ORDER — OLANZAPINE 10 MG/1
10 TABLET ORAL AT BEDTIME
Qty: 30 TABLET | Refills: 1 | Status: SHIPPED | OUTPATIENT
Start: 2017-12-18 | End: 2018-03-16

## 2017-12-18 NOTE — TELEPHONE ENCOUNTER
Per Dr. Hood: can order zyprexa for nausea and look into IVF and IV Emend through [facility]. Per [facility]: okay to administer both. They can contact pt once orders are in.

## 2017-12-18 NOTE — TELEPHONE ENCOUNTER
Per Daniel: still nauseated today. Has been using zofran/compazine/ativan on a scheduled basis since 12/14. However on Saturday began running low on compazine and is having to ration them. He would like a refill on compazine and to discuss other alternative anti-emetics. Pump disconnect today by [facility], and lab draw. Daniel did not want to come in to see Dr. Hood today. Paged Dr. Hood with this update and for orders on anti-emetics.

## 2017-12-19 ENCOUNTER — HOME INFUSION (PRE-WILLOW HOME INFUSION) (OUTPATIENT)
Dept: PHARMACY | Facility: CLINIC | Age: 37
End: 2017-12-19

## 2017-12-19 NOTE — PROGRESS NOTES
This is a recent snapshot of the patient's [facility] medical record.  For current drug dose and complete information and questions, call [phone] or In Basket pool, fv home infusion ([id number])  CSN Number:  [id number]

## 2017-12-20 NOTE — PROGRESS NOTES
This is a recent snapshot of the patient's [facility] medical record.  For current drug dose and complete information and questions, call [phone] or In Basket pool, fv home infusion ([id number])  CSN Number:  [id number]

## 2017-12-21 ENCOUNTER — HOME INFUSION (PRE-WILLOW HOME INFUSION) (OUTPATIENT)
Dept: PHARMACY | Facility: CLINIC | Age: 37
End: 2017-12-21

## 2017-12-22 NOTE — PROGRESS NOTES
This is a recent snapshot of the patient's [facility] medical record.  For current drug dose and complete information and questions, call [phone] or In Basket pool, fv home infusion ([id number])  CSN Number:  [id number]

## 2017-12-25 ENCOUNTER — HOME INFUSION (PRE-WILLOW HOME INFUSION) (OUTPATIENT)
Dept: PHARMACY | Facility: CLINIC | Age: 37
End: 2017-12-25

## 2017-12-26 DIAGNOSIS — C15.9 MALIGNANT NEOPLASM OF ESOPHAGUS, UNSPECIFIED LOCATION (H): Primary | ICD-10-CM

## 2017-12-26 NOTE — PROGRESS NOTES
I talked to Daniel about his symptoms and tube concerns.   He said at rest, he has no pain, but with movement the tube site is tender.   There is a firmness under the flange and some granulation tissue with some brown drainage.   He wonders about a hernia, and said he has had a cough for a few weeks that seems to exacerbate the pain.         I will arrange for abd CT scan Thursday (he is coming for another test that day) and he will come to clinic to see me afterwards.

## 2017-12-27 NOTE — PROGRESS NOTES
This is a recent snapshot of the patient's [facility] medical record.  For current drug dose and complete information and questions, call [phone] or In Basket pool, fv home infusion ([id number])  CSN Number:  [id number]

## 2017-12-28 ENCOUNTER — RADIANT APPOINTMENT (OUTPATIENT)
Dept: CARDIOLOGY | Facility: CLINIC | Age: 37
End: 2017-12-28
Attending: INTERNAL MEDICINE
Payer: COMMERCIAL

## 2017-12-28 ENCOUNTER — OFFICE VISIT (OUTPATIENT)
Dept: SURGERY | Facility: CLINIC | Age: 37
End: 2017-12-28
Attending: CLINICAL NURSE SPECIALIST
Payer: COMMERCIAL

## 2017-12-28 ENCOUNTER — RADIANT APPOINTMENT (OUTPATIENT)
Dept: CT IMAGING | Facility: CLINIC | Age: 37
End: 2017-12-28
Attending: CLINICAL NURSE SPECIALIST
Payer: COMMERCIAL

## 2017-12-28 ENCOUNTER — HOME INFUSION (PRE-WILLOW HOME INFUSION) (OUTPATIENT)
Dept: PHARMACY | Facility: CLINIC | Age: 37
End: 2017-12-28

## 2017-12-28 DIAGNOSIS — C15.9 MALIGNANT NEOPLASM OF ESOPHAGUS, UNSPECIFIED LOCATION (H): Primary | ICD-10-CM

## 2017-12-28 DIAGNOSIS — C15.5 MALIGNANT NEOPLASM OF LOWER THIRD OF ESOPHAGUS (H): ICD-10-CM

## 2017-12-28 DIAGNOSIS — C15.9 MALIGNANT NEOPLASM OF ESOPHAGUS, UNSPECIFIED LOCATION (H): ICD-10-CM

## 2017-12-28 PROCEDURE — 40000114 ZZH STATISTIC NO CHARGE CLINIC VISIT

## 2017-12-28 NOTE — MR AVS SNAPSHOT
After Visit Summary   12/28/2017    Daniel Sandhu    MRN: [id number]           Patient Information     Date Of Birth          1980        Visit Information        Provider Department      12/28/2017 11:45 AM Varsha Chinchilla APRN CNS [facility]        Today's Diagnoses     Malignant neoplasm of esophagus, unspecified location (H)    -  1       Follow-ups after your visit        Follow-up notes from your care team     Return if symptoms worsen or fail to improve.      Your next 10 appointments already scheduled     Jan 02, 2018  9:45 AM CST   [facility] Lab Draw with [facility] LAB DRAW   [facility]    [address] [facility] [address] [facility]  [address] [facility] [address]   [phone]            Jan 02, 2018 10:20 AM CST   (Arrive by 10:05 AM)   Return Visit with GAVIN Lundy CNP   [facility] ([facility])    [address] [place] [address]   [phone]            Jan 02, 2018 11:30 AM CST   Infusion 240 with UC ONCOLOGY INFUSION, UC 17 ATC   [facility] ([facility])    [address] [place] [address]   [phone]            Jan 23, 2018  8:30 AM CST   [facility] Lab Draw with [facility] LAB DRAW   [facility])    [address] [place] [address]   [phone]            Jan 23, 2018  8:40 AM CST   (Arrive by 8:25 AM)   Return Visit with GAVIN Lundy CNP   [facility] ([facility])    [address] [place] [address]   [phone]            Jan 23, 2018 10:00 AM CST   Infusion 240 with UC ONCOLOGY INFUSION, UC 17 ATC   [facility] ([facility])    [address] [place] [address]  [facility] [address] [phone]            Mar 07, 2018  1:40 PM CST   CT CHEST ABDOMEN PELVIS W/O CONTRAST with UCCT1   [facility] CT ([facility])    [address]  [address]st [facility] [address] [phone]           Please bring any scans or X-rays taken at other hospitals, if similar tests were done. Also bring a list of your medicines, including vitamins, minerals and over-the-counter drugs. It is safest to leave personal items at home.  Be sure to tell your doctor:   If you have any allergies.   If there s any chance you are pregnant.   If you are breastfeeding.   If you have any special needs.  You may have contrast for this exam. To prepare:   Do not eat or drink for 2 hours before your exam. If you need to take medicine, you may take it with small sips of water. (We may ask you to take liquid medicine as well.)   The day before your exam, drink extra fluids at least six 8-ounce glasses (unless your doctor tells you to restrict your fluids).  Patients over 70 or patients with diabetes or kidney problems:   If you haven t had a blood test (creatinine test) within the last 30 days, go to your clinic or Diagnostic Imaging Department for this test.  If you have diabetes:   If your kidney function is normal, continue taking your metformin (Avandamet, Glucophage, Glucovance, Metaglip) on the day of your exam.   If your kidney function is abnormal, wait 48 hours before restarting this medicine.  You will have oral contrast for this exam:   You will drink the contrast at home. Get this from your clinic or Diagnostic Imaging Department. Please follow the directions given.  Please wear loose clothing, such as a sweat suit or jogging clothes. Avoid snaps, zippers and other metal. We may ask you to undress and put on a hospital gown.  If you have any questions, please call the Imaging Department where you will have your exam.            Mar 07, 2018   2:30 PM CST   (Arrive by 2:15 PM)   Return Visit with Yunior Esteban MD   [facility] ([facility])    [address] [address] [phone]              Future tests that were ordered for you today     Open Future Orders        Priority Expected Expires Ordered    *CBC with platelets differential Routine 1/2/2018 12/29/2018 12/29/2017    Comprehensive metabolic panel Routine 1/2/2018 12/29/2018 12/29/2017            Who to contact     If you have questions or need follow up information about today's clinic visit or your schedule please contact [facility] directly at [phone].  Normal or non-critical lab and imaging results will be communicated to you by MyChart, letter or phone within 4 business days after the clinic has received the results. If you do not hear from us within 7 days, please contact the clinic through [employer] or phone. If you have a critical or abnormal lab result, we will notify you by phone as soon as possible.  Submit refill requests through [employer] or call your pharmacy and they will forward the refill request to us. Please allow 3 business days for your refill to be completed.          Additional Information About Your Visit        [employer] Information     [employer] gives you secure access to your electronic health record. If you see a primary care provider, you can also send messages to your care team and make appointments. If you have questions, please call your primary care clinic.  If you do not have a primary care provider, please call [phone] and they will assist you.        Care EveryWhere ID     This is your Care EveryWhere ID. This could be used by other organizations to access your [place] medical records  [id number]         Blood Pressure from Last 3 Encounters:   12/11/17 117/62   12/06/17 112/65   11/27/17 107/66    Weight from Last 3 Encounters:   12/11/17 68.5 kg  (151 lb)   12/06/17 69.3 kg (152 lb 12.5 oz)   11/27/17 70.5 kg (155 lb 8 oz)              Today, you had the following     No orders found for display       Primary Care Provider Office Phone # Fax #    Lencho Chan [phone]       [address] [facility] [address]        Equal Access to Services     [facility]: Hadii aad ku hadasho Soomaali, waaxda luqadaha, qaybta kaalmada adeegyada, waxay idiin hayaan adeeg mariya la'aan . So [facility] [phone].    ATENCIÓN: Si habla esplori, tiene a zayas disposición servicios gratuitos de asistencia lingüística. Zoëame al [phone].    We comply with applicable federal civil rights laws and [place] laws. We do not discriminate on the basis of race, color, national origin, age, disability, sex, sexual orientation, or gender identity.            Thank you!     Thank you for choosing [facility]  for your care. Our goal is always to provide you with excellent care. Hearing back from our patients is one way we can continue to improve our services. Please take a few minutes to complete the written survey that you may receive in the mail after your visit with us. Thank you!             Your Updated Medication List - Protect others around you: Learn how to safely use, store and throw away your medicines at www.disposemymeds.org.          This list is accurate as of: 12/28/17 11:59 PM.  Always use your most recent med list.                   Brand Name Dispense Instructions for use Diagnosis    acetaminophen 32 mg/mL solution    TYLENOL    400 mL    30.45 mLs (975 mg) by Per J Tube route 3 times daily    Acute post-operative pain       cyabnocobalamin 2500 MCG sublingual tablet    VITAMIN B-12    30 tablet    Place 2,500 mcg under the tongue daily    B12 deficiency       fluconazole 200 MG tablet    DIFLUCAN    30 tablet    Take 1 tablet (200 mg) by mouth daily    Thrush       multivitamins with minerals Liqd liquid     450  mL    15 mLs by Per J Tube route daily    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       OLANZapine 10 MG tablet    zyPREXA    30 tablet    Take 1 tablet (10 mg) by mouth At Bedtime    Chemotherapy-induced nausea       oxyCODONE 5 MG/5ML solution    ROXICODONE    300 mL    Take 5-10 mLs (5-10 mg) by mouth every 4 hours as needed for moderate to severe pain    Acute post-operative pain       sennosides 8.8 MG/5ML syrup    SENOKOT    400 mL    10 mLs by Per J Tube route 2 times daily    Bowel habit changes

## 2017-12-28 NOTE — LETTER
12/28/2017     RE: Daniel Sandhu  [address] [address]     Dear Colleague,    Thank you for referring your patient, Daniel Sandhu, to the [facility]. Please see a copy of my visit note below.    REASON FOR VISIT:   J tube assessment    PROCEDURES PERFORMED:    1. Esophagogastroscopy  2. Diagnostic laparoscopy  3. Laparotomy with total gastrectomy and abdominal lymphadenectomy (D2)  4. LEFT thoracotomy with distal esophagectomy and intrathoracic Terrell-en-Y esophagojejunostomy  5. Feeding jejunostomy  6. LEFT pharyngostomy tube placement  7. RIGHT tube thoracostomy  8. Flexible bronchoscopy    DATE ABOVE PROCEDURES PERFORMED:  11/3/17    SURGEON:   Dr. 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   Denies fever or chills, drainage unchanged.   Site has some over-granulation tissue which I treated with silver nitrate.  No erythema noted.          Abdominal CT scan reviewed with Dr. Grace and I spoke to radiologist as no formal reading was available.   Confirmed that the J-tube and balloon were in correct position, no evidence of soft tissue abscess or anything that would explain the increased pain.       I anchored the J-tube with flexi-tape, removed suture which appeared to be irritated the area slightly.   Reassured patient.    Plan:   PRN if further tube issues.   Will f/u with Dr. Esteban as previously arranged.    Total time:  30 minutes    Again, thank you for allowing me to participate in the care of your patient.      Sincerely,    GAVIN Helton CNS

## 2017-12-29 NOTE — PROGRESS NOTES
This is a recent snapshot of the patient's [facility] medical record.  For current drug dose and complete information and questions, call [phone] or In Basket pool, [facility]v home infusion ([id number])  CSN Number:  [id number]

## 2017-12-29 NOTE — PROGRESS NOTES
REASON FOR VISIT:   J tube assessment    PROCEDURES PERFORMED:    1. Esophagogastroscopy  2. Diagnostic laparoscopy  3. Laparotomy with total gastrectomy and abdominal lymphadenectomy (D2)  4. LEFT thoracotomy with distal esophagectomy and intrathoracic Terrell-en-Y esophagojejunostomy  5. Feeding jejunostomy  6. LEFT pharyngostomy tube placement  7. RIGHT tube thoracostomy  8. Flexible bronchoscopy    DATE ABOVE PROCEDURES PERFORMED:  11/3/17    SURGEON:   Dr. 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   Denies fever or chills, drainage unchanged.   Site has some over-granulation tissue which I treated with silver nitrate.  No erythema noted.          Abdominal CT scan reviewed with Dr. Grace and I spoke to radiologist as no formal reading was available.   Confirmed that the J-tube and balloon were in correct position, no evidence of soft tissue abscess or anything that would explain the increased pain.       I anchored the J-tube with flexi-tape, removed suture which appeared to be irritated the area slightly.   Reassured patient.    Plan:   PRN if further tube issues.   Will f/u with Dr. Esteban as previously arranged.    Total time:  30 minutes

## 2018-01-01 ENCOUNTER — HOME INFUSION (PRE-WILLOW HOME INFUSION) (OUTPATIENT)
Dept: PHARMACY | Facility: CLINIC | Age: 38
End: 2018-01-01

## 2018-01-02 ENCOUNTER — APPOINTMENT (OUTPATIENT)
Dept: LAB | Facility: CLINIC | Age: 38
End: 2018-01-02
Attending: INTERNAL MEDICINE
Payer: COMMERCIAL

## 2018-01-02 ENCOUNTER — INFUSION THERAPY VISIT (OUTPATIENT)
Dept: ONCOLOGY | Facility: CLINIC | Age: 38
End: 2018-01-02
Attending: INTERNAL MEDICINE
Payer: COMMERCIAL

## 2018-01-02 ENCOUNTER — ONCOLOGY VISIT (OUTPATIENT)
Dept: ONCOLOGY | Facility: CLINIC | Age: 38
End: 2018-01-02
Attending: NURSE PRACTITIONER
Payer: COMMERCIAL

## 2018-01-02 ENCOUNTER — HOME INFUSION (PRE-WILLOW HOME INFUSION) (OUTPATIENT)
Dept: PHARMACY | Facility: CLINIC | Age: 38
End: 2018-01-02

## 2018-01-02 VITALS
HEART RATE: 86 BPM | RESPIRATION RATE: 16 BRPM | SYSTOLIC BLOOD PRESSURE: 114 MMHG | BODY MASS INDEX: 21.91 KG/M2 | DIASTOLIC BLOOD PRESSURE: 66 MMHG | HEIGHT: 69 IN | TEMPERATURE: 98.3 F | WEIGHT: 147.93 LBS | OXYGEN SATURATION: 100 %

## 2018-01-02 DIAGNOSIS — C15.5 MALIGNANT NEOPLASM OF LOWER THIRD OF ESOPHAGUS (H): ICD-10-CM

## 2018-01-02 DIAGNOSIS — C15.9 ESOPHAGEAL CANCER (H): Primary | ICD-10-CM

## 2018-01-02 DIAGNOSIS — C15.9 ESOPHAGEAL CANCER (H): ICD-10-CM

## 2018-01-02 LAB
ALBUMIN SERPL-MCNC: 2.9 G/DL (ref 3.4–5)
ALP SERPL-CCNC: 70 U/L (ref 40–150)
ALT SERPL W P-5'-P-CCNC: 22 U/L (ref 0–70)
ANION GAP SERPL CALCULATED.3IONS-SCNC: 5 MMOL/L (ref 3–14)
AST SERPL W P-5'-P-CCNC: 17 U/L (ref 0–45)
BASOPHILS # BLD AUTO: 0 10E9/L (ref 0–0.2)
BASOPHILS NFR BLD AUTO: 0.2 %
BILIRUB SERPL-MCNC: 0.2 MG/DL (ref 0.2–1.3)
BUN SERPL-MCNC: 14 MG/DL (ref 7–30)
CALCIUM SERPL-MCNC: 8.2 MG/DL (ref 8.5–10.1)
CHLORIDE SERPL-SCNC: 100 MMOL/L (ref 94–109)
CO2 SERPL-SCNC: 29 MMOL/L (ref 20–32)
CREAT SERPL-MCNC: 0.59 MG/DL (ref 0.66–1.25)
DIFFERENTIAL METHOD BLD: ABNORMAL
EOSINOPHIL # BLD AUTO: 0.1 10E9/L (ref 0–0.7)
EOSINOPHIL NFR BLD AUTO: 0.7 %
ERYTHROCYTE [DISTWIDTH] IN BLOOD BY AUTOMATED COUNT: 15.5 % (ref 10–15)
GFR SERPL CREATININE-BSD FRML MDRD: >90 ML/MIN/1.7M2
GLUCOSE SERPL-MCNC: 109 MG/DL (ref 70–99)
HCT VFR BLD AUTO: 32.9 % (ref 40–53)
HGB BLD-MCNC: 10.3 G/DL (ref 13.3–17.7)
IMM GRANULOCYTES # BLD: 0 10E9/L (ref 0–0.4)
IMM GRANULOCYTES NFR BLD: 0.3 %
LYMPHOCYTES # BLD AUTO: 1 10E9/L (ref 0.8–5.3)
LYMPHOCYTES NFR BLD AUTO: 9.8 %
MCH RBC QN AUTO: 25.2 PG (ref 26.5–33)
MCHC RBC AUTO-ENTMCNC: 31.3 G/DL (ref 31.5–36.5)
MCV RBC AUTO: 80 FL (ref 78–100)
MONOCYTES # BLD AUTO: 1.1 10E9/L (ref 0–1.3)
MONOCYTES NFR BLD AUTO: 11.2 %
NEUTROPHILS # BLD AUTO: 7.7 10E9/L (ref 1.6–8.3)
NEUTROPHILS NFR BLD AUTO: 77.8 %
NRBC # BLD AUTO: 0 10*3/UL
NRBC BLD AUTO-RTO: 0 /100
PLATELET # BLD AUTO: 313 10E9/L (ref 150–450)
POTASSIUM SERPL-SCNC: 4.4 MMOL/L (ref 3.4–5.3)
PROT SERPL-MCNC: 6.8 G/DL (ref 6.8–8.8)
RBC # BLD AUTO: 4.09 10E12/L (ref 4.4–5.9)
SODIUM SERPL-SCNC: 135 MMOL/L (ref 133–144)
WBC # BLD AUTO: 9.9 10E9/L (ref 4–11)

## 2018-01-02 PROCEDURE — 85025 COMPLETE CBC W/AUTO DIFF WBC: CPT | Performed by: INTERNAL MEDICINE

## 2018-01-02 PROCEDURE — 99214 OFFICE O/P EST MOD 30 MIN: CPT | Mod: ZP | Performed by: NURSE PRACTITIONER

## 2018-01-02 PROCEDURE — 96416 CHEMO PROLONG INFUSE W/PUMP: CPT

## 2018-01-02 PROCEDURE — 80053 COMPREHEN METABOLIC PANEL: CPT | Performed by: INTERNAL MEDICINE

## 2018-01-02 PROCEDURE — 96375 TX/PRO/DX INJ NEW DRUG ADDON: CPT

## 2018-01-02 PROCEDURE — 96413 CHEMO IV INFUSION 1 HR: CPT

## 2018-01-02 PROCEDURE — 25000128 H RX IP 250 OP 636: Mod: ZF | Performed by: NURSE PRACTITIONER

## 2018-01-02 PROCEDURE — 96415 CHEMO IV INFUSION ADDL HR: CPT

## 2018-01-02 PROCEDURE — G0463 HOSPITAL OUTPT CLINIC VISIT: HCPCS | Mod: ZF

## 2018-01-02 PROCEDURE — 96411 CHEMO IV PUSH ADDL DRUG: CPT

## 2018-01-02 PROCEDURE — 96367 TX/PROPH/DG ADDL SEQ IV INF: CPT

## 2018-01-02 RX ORDER — METHYLPREDNISOLONE SODIUM SUCCINATE 125 MG/2ML
125 INJECTION, POWDER, LYOPHILIZED, FOR SOLUTION INTRAMUSCULAR; INTRAVENOUS
Status: CANCELLED
Start: 2018-01-02

## 2018-01-02 RX ORDER — EPINEPHRINE 1 MG/ML
0.3 INJECTION, SOLUTION, CONCENTRATE INTRAVENOUS EVERY 5 MIN PRN
Status: CANCELLED | OUTPATIENT
Start: 2018-01-02

## 2018-01-02 RX ORDER — HEPARIN SODIUM (PORCINE) LOCK FLUSH IV SOLN 100 UNIT/ML 100 UNIT/ML
5 SOLUTION INTRAVENOUS ONCE
Status: COMPLETED | OUTPATIENT
Start: 2018-01-02 | End: 2018-01-02

## 2018-01-02 RX ORDER — EPINEPHRINE 0.3 MG/.3ML
0.3 INJECTION SUBCUTANEOUS EVERY 5 MIN PRN
Status: CANCELLED | OUTPATIENT
Start: 2018-01-02

## 2018-01-02 RX ORDER — PALONOSETRON 0.05 MG/ML
0.25 INJECTION, SOLUTION INTRAVENOUS ONCE
Status: CANCELLED
Start: 2018-01-02

## 2018-01-02 RX ORDER — SODIUM CHLORIDE 9 MG/ML
1000 INJECTION, SOLUTION INTRAVENOUS CONTINUOUS PRN
Status: CANCELLED
Start: 2018-01-02

## 2018-01-02 RX ORDER — MEPERIDINE HYDROCHLORIDE 25 MG/ML
25 INJECTION INTRAMUSCULAR; INTRAVENOUS; SUBCUTANEOUS EVERY 30 MIN PRN
Status: CANCELLED | OUTPATIENT
Start: 2018-01-02

## 2018-01-02 RX ORDER — ALBUTEROL SULFATE 90 UG/1
1-2 AEROSOL, METERED RESPIRATORY (INHALATION)
Status: CANCELLED
Start: 2018-01-02

## 2018-01-02 RX ORDER — LORAZEPAM 2 MG/ML
0.5 INJECTION INTRAMUSCULAR EVERY 4 HOURS PRN
Status: CANCELLED
Start: 2018-01-02

## 2018-01-02 RX ORDER — ALBUTEROL SULFATE 0.83 MG/ML
2.5 SOLUTION RESPIRATORY (INHALATION)
Status: CANCELLED | OUTPATIENT
Start: 2018-01-02

## 2018-01-02 RX ORDER — PALONOSETRON 0.05 MG/ML
0.25 INJECTION, SOLUTION INTRAVENOUS ONCE
Status: COMPLETED | OUTPATIENT
Start: 2018-01-02 | End: 2018-01-02

## 2018-01-02 RX ORDER — DIPHENHYDRAMINE HYDROCHLORIDE 50 MG/ML
50 INJECTION INTRAMUSCULAR; INTRAVENOUS
Status: CANCELLED
Start: 2018-01-02

## 2018-01-02 RX ADMIN — OXALIPLATIN 250 MG: 5 INJECTION INTRAVENOUS at 13:16

## 2018-01-02 RX ADMIN — SODIUM CHLORIDE, PRESERVATIVE FREE 5 ML: 5 INJECTION INTRAVENOUS at 09:42

## 2018-01-02 RX ADMIN — EPIRUBICIN HYDROCHLORIDE 90 MG: 2 INJECTION, SOLUTION INTRAVENOUS at 12:20

## 2018-01-02 RX ADMIN — PALONOSETRON HYDROCHLORIDE 0.25 MG: 0.25 INJECTION INTRAVENOUS at 11:44

## 2018-01-02 RX ADMIN — DEXAMETHASONE SODIUM PHOSPHATE 150 MG: 10 INJECTION, SOLUTION INTRAMUSCULAR; INTRAVENOUS at 11:52

## 2018-01-02 ASSESSMENT — PAIN SCALES - GENERAL
PAINLEVEL: MILD PAIN (2)
PAINLEVEL: MILD PAIN (2)

## 2018-01-02 NOTE — PATIENT INSTRUCTIONS
Contact Numbers    [facility] Main Line: [phone]  [facility] Triage:  [phone]    Call triage with chills and/or temperature greater than or equal to 100.5, uncontrolled nausea/vomiting, diarrhea, constipation, dizziness, shortness of breath, chest pain, bleeding, unexplained bruising, or any new/concerning symptoms, questions/concerns.     If you are having any concerning symptoms or wish to speak to a provider before your next infusion visit, please call your care coordinator or triage to notify them so we can adequately serve you.       After Hours: [phone]    If after hours, weekends, or holidays, call main hospital  and ask for Oncology doctor on call.             January 2018 Sunday Monday Tuesday Wednesday Thursday Friday Saturday        1     2     [facility] DRAW    9:45 AM   (15 min.)    [facility]     [facility] RETURN   10:05 AM   (50 min.)   Nannette Moody APRN CNP   [facility]     [facility] ONC INFUSION 240   11:30 AM   (240 min.)    ONCOLOGY INFUSION   [facility] 3     4     5     6       7     8     9     10     11     12     13       14     15     16     17     18     19     20       21     22     23     [facility] RETURN    8:25 AM   (50 min.)   Nannette Moody APRN CNP   [facility]    8:30 AM   (15 min.)    [facility]RAW   [facility]     [facility] ONC INFUSION 240   10:00 AM   (240 min.)    ONCOLOGY INFUSION   [facility] 24     25     26     27       28     29     30     31 February 2018 Sunday Monday Tuesday Wednesday Thursday Friday Saturday                       1     2     3       4     5     6     7     8     9     10       11     12     13     14     15     16     17       18     19     20     21     22     23     24       25     26     27     28                                Recent Results (from the past  24 hour(s))   *CBC with platelets differential    Collection Time: 01/02/18  9:50 AM   Result Value Ref Range    WBC 9.9 4.0 - 11.0 10e9/L    RBC Count 4.09 (L) 4.4 - 5.9 10e12/L    Hemoglobin 10.3 (L) 13.3 - 17.7 g/dL    Hematocrit 32.9 (L) 40.0 - 53.0 %    MCV 80 78 - 100 fl    MCH 25.2 (L) 26.5 - 33.0 pg    MCHC 31.3 (L) 31.5 - 36.5 g/dL    RDW 15.5 (H) 10.0 - 15.0 %    Platelet Count 313 150 - 450 10e9/L    Diff Method Automated Method     % Neutrophils 77.8 %    % Lymphocytes 9.8 %    % Monocytes 11.2 %    % Eosinophils 0.7 %    % Basophils 0.2 %    % Immature Granulocytes 0.3 %    Nucleated RBCs 0 0 /100    Absolute Neutrophil 7.7 1.6 - 8.3 10e9/L    Absolute Lymphocytes 1.0 0.8 - 5.3 10e9/L    Absolute Monocytes 1.1 0.0 - 1.3 10e9/L    Absolute Eosinophils 0.1 0.0 - 0.7 10e9/L    Absolute Basophils 0.0 0.0 - 0.2 10e9/L    Abs Immature Granulocytes 0.0 0 - 0.4 10e9/L    Absolute Nucleated RBC 0.0    Comprehensive metabolic panel    Collection Time: 01/02/18  9:50 AM   Result Value Ref Range    Sodium 135 133 - 144 mmol/L    Potassium 4.4 3.4 - 5.3 mmol/L    Chloride 100 94 - 109 mmol/L    Carbon Dioxide 29 20 - 32 mmol/L    Anion Gap 5 3 - 14 mmol/L    Glucose 109 (H) 70 - 99 mg/dL    Urea Nitrogen 14 7 - 30 mg/dL    Creatinine 0.59 (L) 0.66 - 1.25 mg/dL    GFR Estimate >90 >60 mL/min/1.7m2    GFR Estimate If Black >90 >60 mL/min/1.7m2    Calcium 8.2 (L) 8.5 - 10.1 mg/dL    Bilirubin Total 0.2 0.2 - 1.3 mg/dL    Albumin 2.9 (L) 3.4 - 5.0 g/dL    Protein Total 6.8 6.8 - 8.8 g/dL    Alkaline Phosphatase 70 40 - 150 U/L    ALT 22 0 - 70 U/L    AST 17 0 - 45 U/L

## 2018-01-02 NOTE — PROGRESS NOTES
Infusion Nursing Note:  Daniel Sandhu presents today for Cycle 2 Day 8 Epirubicin, Oxaliplatin, and Fluorouracil Pump.    Patient seen and examined by Nannette Moody NP in clinic prior infunsion.    Intravenous Access:  Implanted Port.    Treatment Conditions:  Lab Results   Component Value Date    HGB 10.3 01/02/2018     Lab Results   Component Value Date    WBC 9.9 01/02/2018      Lab Results   Component Value Date    ANEU 7.7 01/02/2018     Lab Results   Component Value Date     01/02/2018      Lab Results   Component Value Date     01/02/2018                   Lab Results   Component Value Date    POTASSIUM 4.4 01/02/2018           Lab Results   Component Value Date    MAG 2.1 11/16/2017            Lab Results   Component Value Date    CR 0.59 01/02/2018                   Lab Results   Component Value Date    YOBANY 8.2 01/02/2018                Lab Results   Component Value Date    BILITOTAL 0.2 01/02/2018           Lab Results   Component Value Date    ALBUMIN 2.9 01/02/2018                    Lab Results   Component Value Date    ALT 22 01/02/2018           Lab Results   Component Value Date    AST 17 01/02/2018     Results revieECHO/MUGA completed 12/28/17  EF 60-65%   labs MET treatment parameters, ok to proceed with treatment.      Note:    Fluorouracil continuous infusion pump connected at 1530.  Positive blood return from port.  Fluorouracil to infuse over 7 days at 1.5cc/hour.  Fluorouracil with infuse continuously for 21 days.  [facility]  will change patients bag to on 1/9/18 at 1530 and again on 1/16/18.  Theresa at [facility] is aware of date and time that bag will need to be changed.  This RN also told [facility] that pt will need additional bags of IV fluids today for tomorrow.  [facility] RN verified that clamps were open and connection sites taped appropriately.  Ade and this RN noted that pump was flashing RUNNING prior to pt's  discharge.        Post Infusion Assessment:  Patient tolerated infusion without incident.    Discharge Plan:   Patient declined prescription refills.  Copy of AVS reviewed with patient and/or family.  Patient will return 1/23/18 for cycle 3 day 1.  Face to Face time: 0.    Jodi Cedeno RN

## 2018-01-02 NOTE — PROGRESS NOTES
Oncology/Hematology Visit Note    January 2, 2018    Reason for visit: Follow up adenocarcinoma of the GE junction    Oncology HPI: Daniel Sandhu is a 37 year old male who presents with adenocarcinoma of the GE junction.  In early 2017, patient started noticing difficulty swallowing, and that food seems to take longer to go down.  It became more frequent, and he saw his PCP, and was referred for EGD, which showed an esophageal mass located at the GE junction, measuring 4 cm.  Biopsy showed poorly differentiated adenocarcinoma.  HER-2 was sent and is equivocal (2+).  CT c/a/p showed a mildly prominent lymph node in the gastrohepatic ligament, but there were no pathologically enlarged lymph nodes in the chest, abdomen, or pelvis.  There was otherwise no evidence of metastatic disease.  PET-CT showed thickening of the distal esophagus consistent with known esophageal adenocarcinoma, as well as mildly hypermetabolic 1 cm lymph node in the gastrohepatic ligament.  There was no evidence of distant metastatic disease.  He underwent EUS on 6/9/17, which showed the esophageal tumor in the lower third of the esophagus, staged T2NX.  There were 2 abnormal lymph nodes seen in the gastrohepatic ligament; pathology was suspicious for adenocarcinoma.  Celiac node was sampled as well, which was benign.  He was seen by surgery, Dr. Esteban, and recommended srinivas-operative chemotherapy.     Port was placed on 6/22/17.     Chemotherapy:  Epirubicin 50 mg/m2 IV on day 1  Oxaliplatin 130 mg/m2 IV on day 1  Capecitabine 625 mg/m2 po BID on days 1-21  Every 21 day cycles     C1D1: 6/26/17  C2D1: 7/17/17  C3D1: 8/7/17     On 11/3/17, he underwent laparotomy with total gastrectomy and abdominal lymphadenectomy, left thoracotomy with distal esophagectomy and intrathoracic Terrell-en-Y esophagojejunostomy, feeding jejunostomy, left pharyngostomy tube placement, right tube thoracostomy, and flexible bronchoscopy.  On 11/9/17, he was taken back to  Pt referred to the [facility] and [facility].    Pt has been admitted to [facility] with skilled nursing. Will dis-enroll from the Heart Failure Telephonic program at this time per program protocols.     OR for I&D of pharyngostomy tube abscess.  Pathology showed adenocarcinoma, moderate differentiated, extensive residual tumor with no evidence tumor regression, margins negative, perineural invasion present, 1 of 28 lymph nodes were involved with malignancy, stage [id number] (stage IIIA).  HER-2 is non-amplified.     He resumed chemotherapy post-surgery, with plans to complete 3 more cycles.  He did not tolerate Xeloda well during his neoadjuvant chemotherapy, with issues with palmar-plantar, and likely genital erythrodysesthesia.  We discussed changing to 5-FU, and Daniel would like to try this, as he may also have issues with taking pills and absorption after his surgery.  He will receive epirubicin, oxaliplatin, and 5-FU x 3 cycles.     C4D1: 12/11/17  C5D1: 1/2/18  C6D1: anticipated for 1/23/18    Interval History: Daniel arrives along today overall doing well and is here for cycle 5.  He is having a difficult time sleeping at night since he has a 6-month-old and unfortunately has been up several times each night.  He also admits to nasal congestion that keeps him up at night since he is a nose breather.  He has clear rhinorrhea with no headache or facial pain.  He admits to cough with clear sputum and slight sore throat, however he has been afebrile.  He has not tried anything to help with the congestion or his sleep.  He admits to weight loss including 1 pound per week and has been trying to increase his intake.  He currently does use the feeding tube as well as takes in 5-6 small meals per day.  He has been adding more dairy for increased protein and fat.  He states liquid or more difficults since his surgeries and cause more discomfort, however he denies any difficulty swallowing.  He denies any vomiting or diarrhea.  He has been putting in about 1000 mL of Osmolite through his feeding tube which equals to about 4 Curtains at night.  If he eats less during the day that he has another cart.  Denies any diarrhea or  "constipation.  Denies chest pain, shortness of breath, bleeding, abdominal pain or bloating.  He admits that he does have occasional pain in the scar of his abdomen, however nothing significant.  He is not needing anything for pain.  Denies any mouth sores, peripheral neuropathy or new rashes.  He has no other questions or concerns at this time.    Review of Systems: See interval hx. Denies fevers, chills, HA, dizziness, n/t, changes in vision, CP, SOB, abdominal pain, N/V, diarrhea, changes in urination, bleeding, bruising, rash.     PMHx and Social Hx reviewed per EPIC.      Medications:  Current Outpatient Prescriptions   Medication Sig Dispense Refill     fluconazole (DIFLUCAN) 200 MG tablet Take 1 tablet (200 mg) by mouth daily 30 tablet 1     cyabnocobalamin (VITAMIN B-12) 2500 MCG sublingual tablet Place 2,500 mcg under the tongue daily 30 tablet 1     OLANZapine (ZYPREXA) 10 MG tablet Take 1 tablet (10 mg) by mouth At Bedtime (Patient not taking: Reported on 1/2/2018) 30 tablet 1     acetaminophen (TYLENOL) 32 mg/mL solution 30.45 mLs (975 mg) by Per J Tube route 3 times daily (Patient not taking: Reported on 1/2/2018) 400 mL 0     sennosides (SENOKOT) 8.8 MG/5ML syrup 10 mLs by Per J Tube route 2 times daily (Patient not taking: Reported on 1/2/2018) 400 mL 0     multivitamins with minerals (CERTAVITE/CEROVITE) LIQD liquid 15 mLs by Per J Tube route daily (Patient not taking: Reported on 1/2/2018) 450 mL 0     oxyCODONE (ROXICODONE) 5 MG/5ML solution Take 5-10 mLs (5-10 mg) by mouth every 4 hours as needed for moderate to severe pain (Patient not taking: Reported on 1/2/2018) 300 mL 0       No Known Allergies      EXAM:    /66 (BP Location: Right arm, Patient Position: Sitting, Cuff Size: Adult Regular)  Pulse 86  Temp 98.3  F (36.8  C) (Oral)  Resp 16  Ht 1.75 m (5' 8.9\")  Wt 67.1 kg (147 lb 14.9 oz)  SpO2 100%  BMI 21.91 kg/m2    GENERAL:  male, in no acute distress.  Alert and oriented x3. "   HEENT:  Normocephalic, atraumatic.  PERRL, oropharynx clear with no sores or thrush.   LYMPH NODES:  No palpable pre/post-auricular, cervical, axillary lymphadenopathy appreciated.  CV:  RRR, No murmurs, gallops, or rubs.   LUNGS:  Clear to auscultation bilaterally.   ABDOMEN:  Soft, nontender and nondistended.  Bowel sounds heard x4.  No apparent hepatosplenomegaly.   EXTREMITIES:  No clubbing, cyanosis, or edema. No edema.   SKIN: No rash, port  ithout erythema, pus or tenderness.   PSYCH: mood stable      Labs: Results for ABELARDO ARMAS (MRN [id number]) as of 1/2/2018 15:27   1/2/2018 09:50   Sodium 135   Potassium 4.4   Chloride 100   Carbon Dioxide 29   Urea Nitrogen 14   Creatinine 0.59 (L)   GFR Estimate >90   GFR Estimate If Black >90   Calcium 8.2 (L)   Anion Gap 5   Albumin 2.9 (L)   Protein Total 6.8   Bilirubin Total 0.2   Alkaline Phosphatase 70   ALT 22   AST 17   Glucose 109 (H)   WBC 9.9   Hemoglobin 10.3 (L)   Hematocrit 32.9 (L)   Platelet Count 313   RBC Count 4.09 (L)   MCV 80   MCH 25.2 (L)   MCHC 31.3 (L)   RDW 15.5 (H)   Diff Method Automated Method   % Neutrophils 77.8   % Lymphocytes 9.8   % Monocytes 11.2   % Eosinophils 0.7   % Basophils 0.2   % Immature Granulocytes 0.3   Nucleated RBCs 0   Absolute Neutrophil 7.7   Absolute Lymphocytes 1.0   Absolute Monocytes 1.1   Absolute Eosinophils 0.1   Absolute Basophils 0.0   Abs Immature Granulocytes 0.0   Absolute Nucleated RBC 0.0     Impression/Plan:   1. Adenocarcinoma of the GE junction: Status post 3 cycles of the neoadjuvant chemotherapy of EOX and followed by surgical resection on 11/3/17.  EOX was discontinued due to toxicities and changed to EOF x 3 cycles, for a total of 6 cycles of chemotherapy.  Labs are within treatment parameters today and he will proceed with cycle 5.  Echocardiogram on 12/28/17 with ejection fraction of 60-65% and recommended every 3 months, next due March 2018.  Cycle 6, Nannette Moody scheduled on 1/23/18,  f/u with Dr Esteban on 3/7/18, re-staging CT CAP scheduled on 3/7/18.      2. Insomnia: Increased congestion and  at home.  See #3 discussion below.      3. Congestion: Suggested saline nasal spray, antihistamines, Sudafed and the patient also discussed trying neti-pot at home.    4. FEN: Hypoalbuminemia at 2.9 today and recommended increasing protein intake.  He will consider protein shakes as he feels he has increased his protein intake by mouth.  Weight loss continues at 1 pound per week and he is increasing calories as well.  Current weight today 147 and he was 152 one month ago.  We will continue to monitor.      Peyton Riley PA-C    The patient was seen in conjunction with Peyton Riley PA-C. I have reviewed the note and agree with the above findings and plan. TW

## 2018-01-02 NOTE — LETTER
1/2/2018       RE: Daniel Sandhu  [address] [place] [address]     Dear Colleague,    Thank you for referring your patient, Daniel Sandhu, to the [facility]. Please see a copy of my visit note below.    Oncology/Hematology Visit Note    January 2, 2018    Reason for visit: Follow up adenocarcinoma of the GE junction    Oncology HPI: Daniel Sandhu is a 37 year old male who presents with adenocarcinoma of the GE junction.  In early 2017, patient started noticing difficulty swallowing, and that food seems to take longer to go down.  It became more frequent, and he saw his PCP, and was referred for EGD, which showed an esophageal mass located at the GE junction, measuring 4 cm.  Biopsy showed poorly differentiated adenocarcinoma.  HER-2 was sent and is equivocal (2+).  CT c/a/p showed a mildly prominent lymph node in the gastrohepatic ligament, but there were no pathologically enlarged lymph nodes in the chest, abdomen, or pelvis.  There was otherwise no evidence of metastatic disease.  PET-CT showed thickening of the distal esophagus consistent with known esophageal adenocarcinoma, as well as mildly hypermetabolic 1 cm lymph node in the gastrohepatic ligament.  There was no evidence of distant metastatic disease.  He underwent EUS on 6/9/17, which showed the esophageal tumor in the lower third of the esophagus, staged T2NX.  There were 2 abnormal lymph nodes seen in the gastrohepatic ligament; pathology was suspicious for adenocarcinoma.  Celiac node was sampled as well, which was benign.  He was seen by surgery, Dr. Esteban, and recommended srinivas-operative chemotherapy.     Port was placed on 6/22/17.     Chemotherapy:  Epirubicin 50 mg/m2 IV on day 1  Oxaliplatin 130 mg/m2 IV on day 1  Capecitabine 625 mg/m2 po BID on days 1-21  Every 21 day cycles     C1D1: 6/26/17  C2D1: 7/17/17  C3D1: 8/7/17     On 11/3/17, he underwent laparotomy with total gastrectomy and abdominal  lymphadenectomy, left thoracotomy with distal esophagectomy and intrathoracic Terrell-en-Y esophagojejunostomy, feeding jejunostomy, left pharyngostomy tube placement, right tube thoracostomy, and flexible bronchoscopy.  On 11/9/17, he was taken back to OR for I&D of pharyngostomy tube abscess.  Pathology showed adenocarcinoma, moderate differentiated, extensive residual tumor with no evidence tumor regression, margins negative, perineural invasion present, 1 of 28 lymph nodes were involved with malignancy, stage [id number] (stage IIIA).  HER-2 is non-amplified.     He resumed chemotherapy post-surgery, with plans to complete 3 more cycles.  He did not tolerate Xeloda well during his neoadjuvant chemotherapy, with issues with palmar-plantar, and likely genital erythrodysesthesia.  We discussed changing to 5-FU, and Daniel would like to try this, as he may also have issues with taking pills and absorption after his surgery.  He will receive epirubicin, oxaliplatin, and 5-FU x 3 cycles.     C4D1: 12/11/17  C5D1: 1/2/18  C6D1: anticipated for 1/23/18    Interval History: Daniel arrives along today overall doing well and is here for cycle 5.  He is having a difficult time sleeping at night since he has a 6-month-old and unfortunately has been up several times each night.  He also admits to nasal congestion that keeps him up at night since he is a nose breather.  He has clear rhinorrhea with no headache or facial pain.  He admits to cough with clear sputum and slight sore throat, however he has been afebrile.  He has not tried anything to help with the congestion or his sleep.  He admits to weight loss including 1 pound per week and has been trying to increase his intake.  He currently does use the feeding tube as well as takes in 5-6 small meals per day.  He has been adding more dairy for increased protein and fat.  He states liquid or more difficults since his surgeries and cause more discomfort, however he denies any  difficulty swallowing.  He denies any vomiting or diarrhea.  He has been putting in about 1000 mL of Osmolite through his feeding tube which equals to about 4 Curtains at night.  If he eats less during the day that he has another cart.  Denies any diarrhea or constipation.  Denies chest pain, shortness of breath, bleeding, abdominal pain or bloating.  He admits that he does have occasional pain in the scar of his abdomen, however nothing significant.  He is not needing anything for pain.  Denies any mouth sores, peripheral neuropathy or new rashes.  He has no other questions or concerns at this time.    Review of Systems: See interval hx. Denies fevers, chills, HA, dizziness, n/t, changes in vision, CP, SOB, abdominal pain, N/V, diarrhea, changes in urination, bleeding, bruising, rash.     PMHx and Social Hx reviewed per EPIC.      Medications:  Current Outpatient Prescriptions   Medication Sig Dispense Refill     fluconazole (DIFLUCAN) 200 MG tablet Take 1 tablet (200 mg) by mouth daily 30 tablet 1     cyabnocobalamin (VITAMIN B-12) 2500 MCG sublingual tablet Place 2,500 mcg under the tongue daily 30 tablet 1     OLANZapine (ZYPREXA) 10 MG tablet Take 1 tablet (10 mg) by mouth At Bedtime (Patient not taking: Reported on 1/2/2018) 30 tablet 1     acetaminophen (TYLENOL) 32 mg/mL solution 30.45 mLs (975 mg) by Per J Tube route 3 times daily (Patient not taking: Reported on 1/2/2018) 400 mL 0     sennosides (SENOKOT) 8.8 MG/5ML syrup 10 mLs by Per J Tube route 2 times daily (Patient not taking: Reported on 1/2/2018) 400 mL 0     multivitamins with minerals (CERTAVITE/CEROVITE) LIQD liquid 15 mLs by Per J Tube route daily (Patient not taking: Reported on 1/2/2018) 450 mL 0     oxyCODONE (ROXICODONE) 5 MG/5ML solution Take 5-10 mLs (5-10 mg) by mouth every 4 hours as needed for moderate to severe pain (Patient not taking: Reported on 1/2/2018) 300 mL 0       No Known Allergies      EXAM:    /66 (BP Location: Right  "arm, Patient Position: Sitting, Cuff Size: Adult Regular)  Pulse 86  Temp 98.3  F (36.8  C) (Oral)  Resp 16  Ht 1.75 m (5' 8.9\")  Wt 67.1 kg (147 lb 14.9 oz)  SpO2 100%  BMI 21.91 kg/m2    GENERAL:  male, in no acute distress.  Alert and oriented x3.   HEENT:  Normocephalic, atraumatic.  PERRL, oropharynx clear with no sores or thrush.   LYMPH NODES:  No palpable pre/post-auricular, cervical, axillary lymphadenopathy appreciated.  CV:  RRR, No murmurs, gallops, or rubs.   LUNGS:  Clear to auscultation bilaterally.   ABDOMEN:  Soft, nontender and nondistended.  Bowel sounds heard x4.  No apparent hepatosplenomegaly.   EXTREMITIES:  No clubbing, cyanosis, or edema. No edema.   SKIN: No rash, port  ithout erythema, pus or tenderness.   PSYCH: mood stable      Labs: Results for ARMASABELARDO (MRN [id number]) as of 1/2/2018 15:27   1/2/2018 09:50   Sodium 135   Potassium 4.4   Chloride 100   Carbon Dioxide 29   Urea Nitrogen 14   Creatinine 0.59 (L)   GFR Estimate >90   GFR Estimate If Black >90   Calcium 8.2 (L)   Anion Gap 5   Albumin 2.9 (L)   Protein Total 6.8   Bilirubin Total 0.2   Alkaline Phosphatase 70   ALT 22   AST 17   Glucose 109 (H)   WBC 9.9   Hemoglobin 10.3 (L)   Hematocrit 32.9 (L)   Platelet Count 313   RBC Count 4.09 (L)   MCV 80   MCH 25.2 (L)   MCHC 31.3 (L)   RDW 15.5 (H)   Diff Method Automated Method   % Neutrophils 77.8   % Lymphocytes 9.8   % Monocytes 11.2   % Eosinophils 0.7   % Basophils 0.2   % Immature Granulocytes 0.3   Nucleated RBCs 0   Absolute Neutrophil 7.7   Absolute Lymphocytes 1.0   Absolute Monocytes 1.1   Absolute Eosinophils 0.1   Absolute Basophils 0.0   Abs Immature Granulocytes 0.0   Absolute Nucleated RBC 0.0     Impression/Plan:   1. Adenocarcinoma of the GE junction: Status post 3 cycles of the neoadjuvant chemotherapy of EOX and followed by surgical resection on 11/3/17.  EOX was discontinued due to toxicities and changed to EOF x 3 cycles, for a total of 6 " cycles of chemotherapy.  Labs are within treatment parameters today and he will proceed with cycle 5.  Echocardiogram on 17 with ejection fraction of 60-65% and recommended every 3 months, next due 2018.  Cycle 6, Nannette Moody scheduled on 18, f/u with Dr Esteban on 3/7/18, re-staging CT CAP scheduled on 3/7/18.      2. Insomnia: Increased congestion and  at home.  See #3 discussion below.      3. Congestion: Suggested saline nasal spray, antihistamines, Sudafed and the patient also discussed trying neti-pot at home.    4. FEN: Hypoalbuminemia at 2.9 today and recommended increasing protein intake.  He will consider protein shakes as he feels he has increased his protein intake by mouth.  Weight loss continues at 1 pound per week and he is increasing calories as well.  Current weight today 147 and he was 152 one month ago.  We will continue to monitor.      Peyton Riley PA-C    The patient was seen in conjunction with Peyton Riley PA-C. I have reviewed the note and agree with the above findings and plan. TW      Again, thank you for allowing me to participate in the care of your patient.      Sincerely,    GAVIN Copeland CNP

## 2018-01-02 NOTE — MR AVS SNAPSHOT
After Visit Summary   1/2/2018    Daniel Sandhu    MRN: [id number]           Patient Information     Date Of Birth          1980        Visit Information        Provider Department      1/2/2018 11:30 AM STEWART 17 ATC;  ONCOLOGY INFUSION [facility]        Today's Diagnoses     Esophageal cancer (H)    -  1    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          Care Instructions    Contact Numbers    [facility] Main Line: [phone]  [facility] Triage:  [phone]    Call triage with chills and/or temperature greater than or equal to 100.5, uncontrolled nausea/vomiting, diarrhea, constipation, dizziness, shortness of breath, chest pain, bleeding, unexplained bruising, or any new/concerning symptoms, questions/concerns.     If you are having any concerning symptoms or wish to speak to a provider before your next infusion visit, please call your care coordinator or triage to notify them so we can adequately serve you.       After Hours: [phone]    If after hours, weekends, or holidays, call main hospital  and ask for Oncology doctor on call.             January 2018 Sunday Monday Tuesday Wednesday Thursday Friday Saturday        1     2     [facility] LAB DRAW    9:45 AM   (15 min.)    [facility]     [facility] RETURN   10:05 AM   (50 min.)   Nannette Moody APRN CNP   M [facility]     [facility] ONC INFUSION 240   11:30 AM   (240 min.)    ONCOLOGY INFUSION   [facility] 3     4     5     6       7     8     9     10     11     12     13       14     15     16     17     18     19     20       21     22     23     [facility] RETURN    8:25 AM   (50 min.)   Nannette Moody APRN CNP   [facility]    8:30 AM   (15 min.)    [facility] LAB DRAW   [facility] Lab Draw     [facility] ONC INFUSION 240   10:00 AM   (240 min.)    ONCOLOGY INFUSION   [facility]  24     25     26     27       28     29     30     31 February 2018 Sunday Monday Tuesday Wednesday Thursday Friday Saturday                       1     2     3       4     5     6     7     8     9     10       11     12     13     14     15     16     17       18     19     20     21     22     23     24       25     26     27     28                                Recent Results (from the past 24 hour(s))   *CBC with platelets differential    Collection Time: 01/02/18  9:50 AM   Result Value Ref Range    WBC 9.9 4.0 - 11.0 10e9/L    RBC Count 4.09 (L) 4.4 - 5.9 10e12/L    Hemoglobin 10.3 (L) 13.3 - 17.7 g/dL    Hematocrit 32.9 (L) 40.0 - 53.0 %    MCV 80 78 - 100 fl    MCH 25.2 (L) 26.5 - 33.0 pg    MCHC 31.3 (L) 31.5 - 36.5 g/dL    RDW 15.5 (H) 10.0 - 15.0 %    Platelet Count 313 150 - 450 10e9/L    Diff Method Automated Method     % Neutrophils 77.8 %    % Lymphocytes 9.8 %    % Monocytes 11.2 %    % Eosinophils 0.7 %    % Basophils 0.2 %    % Immature Granulocytes 0.3 %    Nucleated RBCs 0 0 /100    Absolute Neutrophil 7.7 1.6 - 8.3 10e9/L    Absolute Lymphocytes 1.0 0.8 - 5.3 10e9/L    Absolute Monocytes 1.1 0.0 - 1.3 10e9/L    Absolute Eosinophils 0.1 0.0 - 0.7 10e9/L    Absolute Basophils 0.0 0.0 - 0.2 10e9/L    Abs Immature Granulocytes 0.0 0 - 0.4 10e9/L    Absolute Nucleated RBC 0.0    Comprehensive metabolic panel    Collection Time: 01/02/18  9:50 AM   Result Value Ref Range    Sodium 135 133 - 144 mmol/L    Potassium 4.4 3.4 - 5.3 mmol/L    Chloride 100 94 - 109 mmol/L    Carbon Dioxide 29 20 - 32 mmol/L    Anion Gap 5 3 - 14 mmol/L    Glucose 109 (H) 70 - 99 mg/dL    Urea Nitrogen 14 7 - 30 mg/dL    Creatinine 0.59 (L) 0.66 - 1.25 mg/dL    GFR Estimate >90 >60 mL/min/1.7m2    GFR Estimate If Black >90 >60 mL/min/1.7m2    Calcium 8.2 (L) 8.5 - 10.1 mg/dL    Bilirubin Total 0.2 0.2 - 1.3 mg/dL    Albumin 2.9 (L) 3.4 - 5.0 g/dL    Protein Total 6.8 6.8 - 8.8 g/dL     Alkaline Phosphatase 70 40 - 150 U/L    ALT 22 0 - 70 U/L    AST 17 0 - 45 U/L                 Follow-ups after your visit        Your next 10 appointments already scheduled     Jan 23, 2018  8:30 AM CST   [facility] Lab Draw with [facility] LAB DRAW   [facility] Lab Draw ([facility])    [address]  [address] [facility] [address]   [phone]            Jan 23, 2018  8:40 AM CST   (Arrive by 8:25 AM)   Return Visit with GAVIN Lundy CNP   [facility] ([facility])    [address]  [address] [facility] [address]   [phone]            Jan 23, 2018 10:00 AM CST   Infusion 240 with  ONCOLOGY INFUSION, UC 17 ATC   [facility] ([facility])    [address] [place] [address]   [phone]            Mar 07, 2018  1:40 PM CST   CT CHEST ABDOMEN PELVIS W/O CONTRAST with UCCT1   [facility] CT ([facility])    [address] [place] [address]  [address] [facility] [address]   [phone]           Please bring any scans or X-rays taken at other hospitals, if similar tests were done. Also bring a list of your medicines, including vitamins, minerals and over-the-counter drugs. It is safest to leave personal items at home.  Be sure to tell your doctor:   If you have any allergies.   If there s any chance you are pregnant.   If you are breastfeeding.   If you have any special needs.  You may have contrast for this exam. To prepare:   Do not eat or drink for 2 hours before your exam. If you need to take medicine, you may take it with small sips of water. (We may ask you to take liquid medicine as well.)   The day before your exam, drink extra fluids at least six 8-ounce glasses (unless your doctor tells you to restrict your fluids).  Patients over 70 or patients with diabetes or kidney problems:   If you haven t  had a blood test (creatinine test) within the last 30 days, go to your clinic or Diagnostic Imaging Department for this test.  If you have diabetes:   If your kidney function is normal, continue taking your metformin (Avandamet, Glucophage, Glucovance, Metaglip) on the day of your exam.   If your kidney function is abnormal, wait 48 hours before restarting this medicine.  You will have oral contrast for this exam:   You will drink the contrast at home. Get this from your clinic or Diagnostic Imaging Department. Please follow the directions given.  Please wear loose clothing, such as a sweat suit or jogging clothes. Avoid snaps, zippers and other metal. We may ask you to undress and put on a hospital gown.  If you have any questions, please call the Imaging Department where you will have your exam.            Mar 07, 2018  2:30 PM CST   (Arrive by 2:15 PM)   Return Visit with Yunior Esteban MD   [facility] ([facility])    [address] [address] [phone]              Who to contact     If you have questions or need follow up information about today's clinic visit or your schedule please contact [facility]CER [facility] directly at [phone].  Normal or non-critical lab and imaging results will be communicated to you by MyChart, letter or phone within 4 business days after the clinic has received the results. If you do not hear from us within 7 days, please contact the clinic through [facility] or phone. If you have a critical or abnormal lab result, we will notify you by phone as soon as possible.  Submit refill requests through [employer] or call your pharmacy and they will forward the refill request to us. Please allow 3 business days for your refill to be completed.          Additional Information About Your Visit        [employer] Information     [employer] gives you secure access to your electronic health record. If you  see a primary care provider, you can also send messages to your care team and make appointments. If you have questions, please call your primary care clinic.  If you do not have a primary care provider, please call [phone] and they will assist you.        Care EveryWhere ID     This is your Care EveryWhere ID. This could be used by other organizations to access your [place] medical records  [id number]         Blood Pressure from Last 3 Encounters:   01/02/18 114/66   12/11/17 117/62   12/06/17 112/65    Weight from Last 3 Encounters:   01/02/18 67.1 kg (147 lb 14.9 oz)   12/11/17 68.5 kg (151 lb)   12/06/17 69.3 kg (152 lb 12.5 oz)               Primary Care Provider Office Phone # Fax #    Lencho Chan [phone]       [address] [facility] [address]        Equal Access to Services     SHAWN SWAIN AH: Hadii aad ku hadasho Soomaali, waaxda luqadaha, qaybta kaalmada adeegyada, waxay florencioin hayewan madelyn brown . So [facility] [phone].    ATENCIÓN: Si laryla esplori, tiene a zayas disposición servicios gratuitos de asistencia lingüística. Llame al [phone].    We comply with applicable federal civil rights laws and [place] laws. We do not discriminate on the basis of race, color, national origin, age, disability, sex, sexual orientation, or gender identity.            Thank you!     Thank you for choosing [facility]  for your care. Our goal is always to provide you with excellent care. Hearing back from our patients is one way we can continue to improve our services. Please take a few minutes to complete the written survey that you may receive in the mail after your visit with us. Thank you!             Your Updated Medication List - Protect others around you: Learn how to safely use, store and throw away your medicines at www.disposemymeds.org.          This list is accurate as of: 1/2/18 12:13 PM.  Always use your most recent med list.                    Brand Name Dispense Instructions for use Diagnosis    acetaminophen 32 mg/mL solution    TYLENOL    400 mL    30.45 mLs (975 mg) by Per J Tube route 3 times daily    Acute post-operative pain       cyabnocobalamin 2500 MCG sublingual tablet    VITAMIN B-12    30 tablet    Place 2,500 mcg under the tongue daily    B12 deficiency       fluconazole 200 MG tablet    DIFLUCAN    30 tablet    Take 1 tablet (200 mg) by mouth daily    Thrush       multivitamins with minerals Liqd liquid     450 mL    15 mLs by Per J Tube route daily    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       OLANZapine 10 MG tablet    zyPREXA    30 tablet    Take 1 tablet (10 mg) by mouth At Bedtime    Chemotherapy-induced nausea       oxyCODONE 5 MG/5ML solution    ROXICODONE    300 mL    Take 5-10 mLs (5-10 mg) by mouth every 4 hours as needed for moderate to severe pain    Acute post-operative pain       sennosides 8.8 MG/5ML syrup    SENOKOT    400 mL    10 mLs by Per J Tube route 2 times daily    Bowel habit changes

## 2018-01-02 NOTE — MR AVS SNAPSHOT
After Visit Summary   1/2/2018    Daniel Sandhu    MRN: [id number]           Patient Information     Date Of Birth          1980        Visit Information        Provider Department      1/2/2018 10:20 AM Nannette Moody APRN CNP M [facility]        Today's Diagnoses     Esophageal cancer (H)        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           Follow-ups after your visit        Your next 10 appointments already scheduled     Jan 23, 2018  8:30 AM CST   Masonic Lab Draw with UC MASONIC LAB DRAW   [facility] Lab Draw ([facility])    [address]  [address]  [facility] [address]   [phone]            Jan 23, 2018  8:40 AM CST   (Arrive by 8:25 AM)   Return Visit with GAVIN Lundy CNP [facility] ([facility])    [address]  [address]  [facility] [address]   [phone]            Jan 23, 2018 10:00 AM CST   Infusion 240 with  ONCOLOGY INFUSION, UC 17 ATC   [facility] ([facility])    [address] [place] [address]  [address]  [facility] [address]   [phone]            Mar 07, 2018  1:40 PM CST   CT CHEST ABDOMEN PELVIS W/O CONTRAST with UCCT1   [facility] ([facility])    [address] [place] [address]  1st Floor  [facility] [address]   [phone]           Please bring any scans or X-rays taken at other hospitals, if similar tests were done. Also bring a list of your medicines, including vitamins, minerals and over-the-counter drugs. It is safest to leave personal items at home.  Be sure to tell your doctor:   If you have any allergies.   If there s any chance you are pregnant.   If you are breastfeeding.   If you have any special needs.  You may have contrast for this exam. To prepare:   Do not eat or drink for 2 hours before your exam. If you need to take medicine,  you may take it with small sips of water. (We may ask you to take liquid medicine as well.)   The day before your exam, drink extra fluids at least six 8-ounce glasses (unless your doctor tells you to restrict your fluids).  Patients over 70 or patients with diabetes or kidney problems:   If you haven t had a blood test (creatinine test) within the last 30 days, go to your clinic or Diagnostic Imaging Department for this test.  If you have diabetes:   If your kidney function is normal, continue taking your metformin (Avandamet, Glucophage, Glucovance, Metaglip) on the day of your exam.   If your kidney function is abnormal, wait 48 hours before restarting this medicine.  You will have oral contrast for this exam:   You will drink the contrast at home. Get this from your clinic or Diagnostic Imaging Department. Please follow the directions given.  Please wear loose clothing, such as a sweat suit or jogging clothes. Avoid snaps, zippers and other metal. We may ask you to undress and put on a hospital gown.  If you have any questions, please call the Imaging Department where you will have your exam.            Mar 07, 2018  2:30 PM CST   (Arrive by 2:15 PM)   Return Visit with Yunior Esteban MD   [facility] ([facility])    [address] [place] [address] [phone]              Future tests that were ordered for you today     Open Future Orders        Priority Expected Expires Ordered    XR Esophagram w Upper GI Routine 1/8/2018 1/8/2019 1/8/2018    Clostridium difficile toxin B PCR Routine 1/8/2018 1/8/2019 1/8/2018    CBC with platelets differential Routine 1/8/2018 1/8/2019 1/8/2018    Comprehensive metabolic panel Routine 1/8/2018 1/8/2019 1/8/2018            Who to contact     If you have questions or need follow up information about today's clinic visit or your schedule please contact [facility] directly at  "[phone].  Normal or non-critical lab and imaging results will be communicated to you by MyChart, letter or phone within 4 business days after the clinic has received the results. If you do not hear from us within 7 days, please contact the clinic through [employer] or phone. If you have a critical or abnormal lab result, we will notify you by phone as soon as possible.  Submit refill requests through [employer] or call your pharmacy and they will forward the refill request to us. Please allow 3 business days for your refill to be completed.          Additional Information About Your Visit        [employer] Information     [employer] gives you secure access to your electronic health record. If you see a primary care provider, you can also send messages to your care team and make appointments. If you have questions, please call your primary care clinic.  If you do not have a primary care provider, please call [phone] and they will assist you.        Care EveryWhere ID     This is your Care EveryWhere ID. This could be used by other organizations to access your [place] medical records  [id number]        Your Vitals Were     Pulse Temperature Respirations Height Pulse Oximetry BMI (Body Mass Index)    86 98.3  F (36.8  C) (Oral) 16 1.75 m (5' 8.9\") 100% 21.91 kg/m2       Blood Pressure from Last 3 Encounters:   01/02/18 114/66   12/11/17 117/62   12/06/17 112/65    Weight from Last 3 Encounters:   01/02/18 67.1 kg (147 lb 14.9 oz)   12/11/17 68.5 kg (151 lb)   12/06/17 69.3 kg (152 lb 12.5 oz)              We Performed the Following     *CBC with platelets differential     Comprehensive metabolic panel        Primary Care Provider Office Phone # Fax #    Lencho Chan [phone]       [address] [facility] [address]        Equal Access to Services     SHAWN SWAIN AH: Judy Araujo, waaxda luqadaha, qaybta kaalmajohnie hawkins, alejandro arteaga. " So [facility] [phone].    ATENCIÓN: Si laryla ashley, tiene a zayas disposición servicios gratuitos de asistencia lingüística. Isaias zavaleta [phone].    We comply with applicable federal civil rights laws and [place] laws. We do not discriminate on the basis of race, color, national origin, age, disability, sex, sexual orientation, or gender identity.            Thank you!     Thank you for choosing [facility] CL[facility]IC  for your care. Our goal is always to provide you with excellent care. Hearing back from our patients is one way we can continue to improve our services. Please take a few minutes to complete the written survey that you may receive in the mail after your visit with us. Thank you!             Your Updated Medication List - Protect others around you: Learn how to safely use, store and throw away your medicines at www.disposemymeds.org.          This list is accurate as of: 1/2/18 11:59 PM.  Always use your most recent med list.                   Brand Name Dispense Instructions for use Diagnosis    acetaminophen 32 mg/mL solution    TYLENOL    400 mL    30.45 mLs (975 mg) by Per J Tube route 3 times daily    Acute post-operative pain       cyabnocobalamin 2500 MCG sublingual tablet    VITAMIN B-12    30 tablet    Place 2,500 mcg under the tongue daily    B12 deficiency       fluconazole 200 MG tablet    DIFLUCAN    30 tablet    Take 1 tablet (200 mg) by mouth daily    Thrush       multivitamins with minerals Liqd liquid     450 mL    15 mLs by Per J Tube route daily    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       OLANZapine 10 MG tablet    zyPREXA    30 tablet    Take 1 tablet (10 mg) by mouth At Bedtime    Chemotherapy-induced nausea       oxyCODONE 5 MG/5ML solution    ROXICODONE    300 mL    Take 5-10 mLs (5-10 mg) by mouth every 4 hours as needed for moderate to severe pain    Acute post-operative pain       sennosides 8.8 MG/5ML syrup    SENOKOT    400 mL    10 mLs by Per J Tube  route 2 times daily    Bowel habit changes

## 2018-01-02 NOTE — NURSING NOTE
"Oncology Rooming Note    January 2, 2018 10:09 AM   Daniel Sandhu is a 37 year old male who presents for:    Chief Complaint   Patient presents with     Port Draw     Labs drawn from power port by RN. Line flushed with saline and heparin. Vs taken and pt checked in for appt     Oncology Clinic Visit     F/U Esophageal CA     Initial Vitals: /66 (BP Location: Right arm, Patient Position: Sitting, Cuff Size: Adult Regular)  Pulse 86  Temp 98.3  F (36.8  C) (Oral)  Resp 16  Ht 1.75 m (5' 8.9\")  Wt 67.1 kg (147 lb 14.9 oz)  SpO2 100%  BMI 21.91 kg/m2 Estimated body mass index is 21.91 kg/(m^2) as calculated from the following:    Height as of this encounter: 1.75 m (5' 8.9\").    Weight as of this encounter: 67.1 kg (147 lb 14.9 oz). Body surface area is 1.81 meters squared.  Mild Pain (2) Comment: At Surgical sites   No LMP for male patient.  Allergies reviewed: Yes  Medications reviewed: Yes    Medications: Medication refills not needed today.  Pharmacy name entered into [employer]:    [facility] - [place], [place] - [address] [facility] - [place], [place] - [address] [facility] [address]    Clinical concerns: yes, Patient complains of a productive cough and sinus congestionTanya was notified.    10 minutes for nursing intake (face to face time)     MARIE GIL LPN            "

## 2018-01-02 NOTE — PROGRESS NOTES
This is a recent snapshot of the patient's [facility] medical record.  For current drug dose and complete information and questions, call [phone] or In Basket pool, fv home infusion ([id number])  CSN Number:  [id number]

## 2018-01-02 NOTE — NURSING NOTE
"Chief Complaint   Patient presents with     Port Draw     Labs drawn from power port by RN. Line flushed with saline and heparin. Vs taken and pt checked in for appt     Port accessed with 20g 3/4\" flat needle by RN, labs collected, line flushed with saline and heparin.  Vitals taken. Pt checked in for appointment(s).    Radha Grey RN  "

## 2018-01-03 NOTE — PROGRESS NOTES
This is a recent snapshot of the patient's [facility] medical record.  For current drug dose and complete information and questions, call [phone] or In Basket pool, fv home infusion ([id number])  CSN Number:  [id number]

## 2018-01-04 ENCOUNTER — HOME INFUSION (PRE-WILLOW HOME INFUSION) (OUTPATIENT)
Dept: PHARMACY | Facility: CLINIC | Age: 38
End: 2018-01-04

## 2018-01-05 NOTE — PROGRESS NOTES
This is a recent snapshot of the patient's [facility] medical record.  For current drug dose and complete information and questions, call [phone] or In Basket pool, fv home infusion ([id number])  CSN Number:  [id number]

## 2018-01-07 ENCOUNTER — HOME INFUSION (PRE-WILLOW HOME INFUSION) (OUTPATIENT)
Dept: PHARMACY | Facility: CLINIC | Age: 38
End: 2018-01-07

## 2018-01-08 ENCOUNTER — CARE COORDINATION (OUTPATIENT)
Dept: ONCOLOGY | Facility: CLINIC | Age: 38
End: 2018-01-08

## 2018-01-08 ENCOUNTER — HOME INFUSION (PRE-WILLOW HOME INFUSION) (OUTPATIENT)
Dept: PHARMACY | Facility: CLINIC | Age: 38
End: 2018-01-08

## 2018-01-08 DIAGNOSIS — T45.1X5A CHEMOTHERAPY-INDUCED NAUSEA: Primary | ICD-10-CM

## 2018-01-08 DIAGNOSIS — R19.7 DIARRHEA: ICD-10-CM

## 2018-01-08 DIAGNOSIS — C15.9 MALIGNANT NEOPLASM OF ESOPHAGUS, UNSPECIFIED LOCATION (H): Primary | ICD-10-CM

## 2018-01-08 DIAGNOSIS — R11.0 CHEMOTHERAPY-INDUCED NAUSEA: Primary | ICD-10-CM

## 2018-01-08 DIAGNOSIS — C15.5 MALIGNANT NEOPLASM OF LOWER THIRD OF ESOPHAGUS (H): ICD-10-CM

## 2018-01-08 LAB
ALBUMIN SERPL-MCNC: 2.9 G/DL (ref 3.4–5)
ALP SERPL-CCNC: 57 U/L (ref 40–150)
ALT SERPL W P-5'-P-CCNC: 23 U/L (ref 0–70)
ANION GAP SERPL CALCULATED.3IONS-SCNC: 11 MMOL/L (ref 3–14)
AST SERPL W P-5'-P-CCNC: 18 U/L (ref 0–45)
BILIRUB SERPL-MCNC: 0.2 MG/DL (ref 0.2–1.3)
BUN SERPL-MCNC: 22 MG/DL (ref 7–30)
C DIFF TOX B STL QL: NEGATIVE
CALCIUM SERPL-MCNC: 8.8 MG/DL (ref 8.5–10.1)
CHLORIDE SERPL-SCNC: 105 MMOL/L (ref 94–109)
CO2 SERPL-SCNC: 22 MMOL/L (ref 20–32)
CREAT SERPL-MCNC: 0.81 MG/DL (ref 0.66–1.25)
ERYTHROCYTE [DISTWIDTH] IN BLOOD BY AUTOMATED COUNT: 16.4 % (ref 10–15)
GFR SERPL CREATININE-BSD FRML MDRD: >90 ML/MIN/1.7M2
GLUCOSE SERPL-MCNC: 109 MG/DL (ref 70–99)
HCT VFR BLD AUTO: 40.6 % (ref 40–53)
HGB BLD-MCNC: 13 G/DL (ref 13.3–17.7)
MCH RBC QN AUTO: 25.4 PG (ref 26.5–33)
MCHC RBC AUTO-ENTMCNC: 32 G/DL (ref 31.5–36.5)
MCV RBC AUTO: 80 FL (ref 78–100)
PLATELET # BLD AUTO: 316 10E9/L (ref 150–450)
POTASSIUM SERPL-SCNC: 3.8 MMOL/L (ref 3.4–5.3)
PROT SERPL-MCNC: 7 G/DL (ref 6.8–8.8)
RBC # BLD AUTO: 5.11 10E12/L (ref 4.4–5.9)
SODIUM SERPL-SCNC: 137 MMOL/L (ref 133–144)
SPECIMEN SOURCE: NORMAL
WBC # BLD AUTO: 1.9 10E9/L (ref 4–11)

## 2018-01-08 PROCEDURE — 87493 C DIFF AMPLIFIED PROBE: CPT | Performed by: INTERNAL MEDICINE

## 2018-01-08 PROCEDURE — 85027 COMPLETE CBC AUTOMATED: CPT | Performed by: INTERNAL MEDICINE

## 2018-01-08 PROCEDURE — 80053 COMPREHEN METABOLIC PANEL: CPT | Performed by: INTERNAL MEDICINE

## 2018-01-08 NOTE — PROGRESS NOTES
RN Care Coordination Note  See [employer] messages   Call to pt to triage sx  Pt reports that he got Emend yesterday with home infusion nurse yesterday, which has helped since. Also doing a bag of fluids daily starting yesterday  Also messaged Dr Esteban as well re: sx in esophagus  Having hiccups a lot lately that exacerbate sx  Having frequent BMs and reports that overnight they were liquid and frequent  No fever, but constantly feels cold   Feeling very weak and exhausted  Taking an Ativan at HS and can get 2 hours/night at first only  Very dry mouth, no appetite, able to eat applesauce this morning    Updated Dr. Hood re: above  TORB:  Laurence Hood MD / Nannette Ovalles, RN:  - CBC, CMP, stool culture for c.diff  - hold 5FU chemo pump today    Above called to ERIC Cardenas RN and [facility] pharmacist and they will call pt with appt for toady  Notified pt of above. He turned the 5FU chemo pump off himself during our call and agrees to plan and will call/go to ED if sx worsen and we will update him when lab results available, make plans for provider visit 1/15 (or sooner if needed). Hold put on Dr. Hood's schedule for 1/15 appt as per [email or web address] message, not scheduled yet    Nannette Ovalles, RN, BSN, OCN  Care Coordinator  [facility]

## 2018-01-09 ENCOUNTER — INFUSION THERAPY VISIT (OUTPATIENT)
Dept: ONCOLOGY | Facility: CLINIC | Age: 38
DRG: 380 | End: 2018-01-09
Attending: INTERNAL MEDICINE
Payer: COMMERCIAL

## 2018-01-09 ENCOUNTER — CARE COORDINATION (OUTPATIENT)
Dept: ONCOLOGY | Facility: CLINIC | Age: 38
End: 2018-01-09

## 2018-01-09 ENCOUNTER — HOSPITAL ENCOUNTER (INPATIENT)
Facility: CLINIC | Age: 38
LOS: 4 days | Discharge: HOME | DRG: 380 | End: 2018-01-13
Attending: INTERNAL MEDICINE | Admitting: INTERNAL MEDICINE
Payer: COMMERCIAL

## 2018-01-09 ENCOUNTER — ONCOLOGY VISIT (OUTPATIENT)
Dept: ONCOLOGY | Facility: CLINIC | Age: 38
DRG: 380 | End: 2018-01-09
Attending: PHYSICIAN ASSISTANT
Payer: COMMERCIAL

## 2018-01-09 VITALS
SYSTOLIC BLOOD PRESSURE: 120 MMHG | TEMPERATURE: 97.6 F | WEIGHT: 133.5 LBS | DIASTOLIC BLOOD PRESSURE: 76 MMHG | BODY MASS INDEX: 19.77 KG/M2 | OXYGEN SATURATION: 98 % | RESPIRATION RATE: 20 BRPM | HEIGHT: 69 IN | HEART RATE: 123 BPM

## 2018-01-09 DIAGNOSIS — R68.2 DRY MOUTH: ICD-10-CM

## 2018-01-09 DIAGNOSIS — C15.5 MALIGNANT NEOPLASM OF LOWER THIRD OF ESOPHAGUS (H): ICD-10-CM

## 2018-01-09 DIAGNOSIS — R11.0 CHEMOTHERAPY-INDUCED NAUSEA: ICD-10-CM

## 2018-01-09 DIAGNOSIS — T45.1X5A CHEMOTHERAPY-INDUCED NAUSEA: ICD-10-CM

## 2018-01-09 DIAGNOSIS — K12.30 MUCOSITIS: ICD-10-CM

## 2018-01-09 DIAGNOSIS — Z90.3 S/P GASTRECTOMY: ICD-10-CM

## 2018-01-09 DIAGNOSIS — R19.7 DIARRHEA: ICD-10-CM

## 2018-01-09 DIAGNOSIS — C15.5 MALIGNANT NEOPLASM OF LOWER THIRD OF ESOPHAGUS (H): Primary | ICD-10-CM

## 2018-01-09 DIAGNOSIS — C15.9 ESOPHAGEAL CANCER (H): ICD-10-CM

## 2018-01-09 DIAGNOSIS — E87.6 HYPOKALEMIA: ICD-10-CM

## 2018-01-09 DIAGNOSIS — C15.9 ESOPHAGEAL CANCER (H): Primary | ICD-10-CM

## 2018-01-09 DIAGNOSIS — R63.0 LOSS OF APPETITE: ICD-10-CM

## 2018-01-09 DIAGNOSIS — R19.7 DIARRHEA, UNSPECIFIED TYPE: ICD-10-CM

## 2018-01-09 PROBLEM — R53.1 WEAKNESS: Status: ACTIVE | Noted: 2018-01-09

## 2018-01-09 LAB
ALBUMIN SERPL-MCNC: 2.9 G/DL (ref 3.4–5)
ALP SERPL-CCNC: 55 U/L (ref 40–150)
ALT SERPL W P-5'-P-CCNC: 21 U/L (ref 0–70)
ANION GAP SERPL CALCULATED.3IONS-SCNC: 10 MMOL/L (ref 3–14)
ANISOCYTOSIS BLD QL SMEAR: SLIGHT
AST SERPL W P-5'-P-CCNC: 21 U/L (ref 0–45)
BASOPHILS # BLD AUTO: 0 10E9/L (ref 0–0.2)
BASOPHILS NFR BLD AUTO: 0 %
BILIRUB SERPL-MCNC: 0.4 MG/DL (ref 0.2–1.3)
BUN SERPL-MCNC: 24 MG/DL (ref 7–30)
BURR CELLS BLD QL SMEAR: ABNORMAL
C COLI+JEJUNI+LARI FUSA STL QL NAA+PROBE: NOT DETECTED
C DIFF TOX B STL QL: NEGATIVE
CALCIUM SERPL-MCNC: 8.6 MG/DL (ref 8.5–10.1)
CHLORIDE SERPL-SCNC: 102 MMOL/L (ref 94–109)
CO2 SERPL-SCNC: 20 MMOL/L (ref 20–32)
CREAT SERPL-MCNC: 0.77 MG/DL (ref 0.66–1.25)
DIFFERENTIAL METHOD BLD: ABNORMAL
EC STX1 GENE STL QL NAA+PROBE: NOT DETECTED
EC STX2 GENE STL QL NAA+PROBE: NOT DETECTED
ENTERIC PATHOGEN COMMENT: NORMAL
EOSINOPHIL # BLD AUTO: 0 10E9/L (ref 0–0.7)
EOSINOPHIL NFR BLD AUTO: 0 %
ERYTHROCYTE [DISTWIDTH] IN BLOOD BY AUTOMATED COUNT: 15.9 % (ref 10–15)
GFR SERPL CREATININE-BSD FRML MDRD: >90 ML/MIN/1.7M2
GLUCOSE SERPL-MCNC: 140 MG/DL (ref 70–99)
HCT VFR BLD AUTO: 42.4 % (ref 40–53)
HGB BLD-MCNC: 13.8 G/DL (ref 13.3–17.7)
LYMPHOCYTES # BLD AUTO: 0.4 10E9/L (ref 0.8–5.3)
LYMPHOCYTES NFR BLD AUTO: 23.2 %
MAGNESIUM SERPL-MCNC: 2.2 MG/DL (ref 1.6–2.3)
MCH RBC QN AUTO: 25.2 PG (ref 26.5–33)
MCHC RBC AUTO-ENTMCNC: 32.5 G/DL (ref 31.5–36.5)
MCV RBC AUTO: 78 FL (ref 78–100)
MONOCYTES # BLD AUTO: 0.2 10E9/L (ref 0–1.3)
MONOCYTES NFR BLD AUTO: 8.9 %
NEUTROPHILS # BLD AUTO: 1.3 10E9/L (ref 1.6–8.3)
NEUTROPHILS NFR BLD AUTO: 67.9 %
NOROV GI+II ORF1-ORF2 JNC STL QL NAA+PR: NOT DETECTED
OVALOCYTES BLD QL SMEAR: SLIGHT
PLATELET # BLD AUTO: 310 10E9/L (ref 150–450)
PLATELET # BLD EST: ABNORMAL 10*3/UL
POIKILOCYTOSIS BLD QL SMEAR: ABNORMAL
POTASSIUM SERPL-SCNC: 3.9 MMOL/L (ref 3.4–5.3)
PROT SERPL-MCNC: 7.4 G/DL (ref 6.8–8.8)
RBC # BLD AUTO: 5.47 10E12/L (ref 4.4–5.9)
RVA NSP5 STL QL NAA+PROBE: NOT DETECTED
SALMONELLA SP RPOD STL QL NAA+PROBE: NOT DETECTED
SHIGELLA SP+EIEC IPAH STL QL NAA+PROBE: NOT DETECTED
SODIUM SERPL-SCNC: 133 MMOL/L (ref 133–144)
SPECIMEN SOURCE: NORMAL
V CHOL+PARA RFBL+TRKH+TNAA STL QL NAA+PR: NOT DETECTED
WBC # BLD AUTO: 1.9 10E9/L (ref 4–11)
Y ENTERO RECN STL QL NAA+PROBE: NOT DETECTED

## 2018-01-09 PROCEDURE — 80053 COMPREHEN METABOLIC PANEL: CPT | Performed by: INTERNAL MEDICINE

## 2018-01-09 PROCEDURE — 96374 THER/PROPH/DIAG INJ IV PUSH: CPT

## 2018-01-09 PROCEDURE — 25000128 H RX IP 250 OP 636: Mod: ZF | Performed by: PHYSICIAN ASSISTANT

## 2018-01-09 PROCEDURE — 87493 C DIFF AMPLIFIED PROBE: CPT | Performed by: INTERNAL MEDICINE

## 2018-01-09 PROCEDURE — 96375 TX/PRO/DX INJ NEW DRUG ADDON: CPT

## 2018-01-09 PROCEDURE — 20000002 ZZH R&B BMT INTERMEDIATE

## 2018-01-09 PROCEDURE — 25800025 ZZH RX 258: Performed by: INTERNAL MEDICINE

## 2018-01-09 PROCEDURE — 87506 IADNA-DNA/RNA PROBE TQ 6-11: CPT | Performed by: PHYSICIAN ASSISTANT

## 2018-01-09 PROCEDURE — G0463 HOSPITAL OUTPT CLINIC VISIT: HCPCS | Mod: ZF

## 2018-01-09 PROCEDURE — 99215 OFFICE O/P EST HI 40 MIN: CPT | Mod: ZP | Performed by: PHYSICIAN ASSISTANT

## 2018-01-09 PROCEDURE — 96361 HYDRATE IV INFUSION ADD-ON: CPT

## 2018-01-09 PROCEDURE — 87040 BLOOD CULTURE FOR BACTERIA: CPT | Performed by: INTERNAL MEDICINE

## 2018-01-09 PROCEDURE — 83735 ASSAY OF MAGNESIUM: CPT | Performed by: INTERNAL MEDICINE

## 2018-01-09 PROCEDURE — 85025 COMPLETE CBC W/AUTO DIFF WBC: CPT | Performed by: INTERNAL MEDICINE

## 2018-01-09 PROCEDURE — 25000128 H RX IP 250 OP 636: Mod: ZF | Performed by: INTERNAL MEDICINE

## 2018-01-09 RX ORDER — ONDANSETRON 2 MG/ML
4 INJECTION INTRAMUSCULAR; INTRAVENOUS EVERY 6 HOURS PRN
Status: DISCONTINUED | OUTPATIENT
Start: 2018-01-09 | End: 2018-01-10

## 2018-01-09 RX ORDER — ONDANSETRON 2 MG/ML
8 INJECTION INTRAMUSCULAR; INTRAVENOUS ONCE
Status: CANCELLED
Start: 2018-01-09 | End: 2018-01-09

## 2018-01-09 RX ORDER — LOPERAMIDE HCL 2 MG
2 CAPSULE ORAL 4 TIMES DAILY PRN
Status: DISCONTINUED | OUTPATIENT
Start: 2018-01-09 | End: 2018-01-12

## 2018-01-09 RX ORDER — PALONOSETRON 0.05 MG/ML
0.25 INJECTION, SOLUTION INTRAVENOUS ONCE
Status: CANCELLED
Start: 2018-01-09 | End: 2018-01-09

## 2018-01-09 RX ORDER — LORAZEPAM 2 MG/ML
.5-1 INJECTION INTRAMUSCULAR EVERY 4 HOURS PRN
Status: DISCONTINUED | OUTPATIENT
Start: 2018-01-09 | End: 2018-01-13 | Stop reason: HOSPADM

## 2018-01-09 RX ORDER — FLUCONAZOLE 200 MG/1
200 TABLET ORAL DAILY
Status: DISCONTINUED | OUTPATIENT
Start: 2018-01-10 | End: 2018-01-13 | Stop reason: HOSPADM

## 2018-01-09 RX ORDER — NALOXONE HYDROCHLORIDE 0.4 MG/ML
.1-.4 INJECTION, SOLUTION INTRAMUSCULAR; INTRAVENOUS; SUBCUTANEOUS
Status: DISCONTINUED | OUTPATIENT
Start: 2018-01-09 | End: 2018-01-13 | Stop reason: HOSPADM

## 2018-01-09 RX ORDER — OXYCODONE HCL 5 MG/5 ML
5-10 SOLUTION, ORAL ORAL EVERY 4 HOURS PRN
Status: DISCONTINUED | OUTPATIENT
Start: 2018-01-09 | End: 2018-01-13 | Stop reason: HOSPADM

## 2018-01-09 RX ORDER — PROCHLORPERAZINE MALEATE 5 MG
5 TABLET ORAL EVERY 6 HOURS PRN
Status: DISCONTINUED | OUTPATIENT
Start: 2018-01-09 | End: 2018-01-13 | Stop reason: HOSPADM

## 2018-01-09 RX ORDER — ONDANSETRON 4 MG/1
4 TABLET, FILM COATED ORAL EVERY 6 HOURS PRN
Status: DISCONTINUED | OUTPATIENT
Start: 2018-01-09 | End: 2018-01-10

## 2018-01-09 RX ORDER — LORAZEPAM 0.5 MG/1
0.5 TABLET ORAL EVERY 4 HOURS PRN
Status: DISCONTINUED | OUTPATIENT
Start: 2018-01-09 | End: 2018-01-13 | Stop reason: HOSPADM

## 2018-01-09 RX ORDER — ONDANSETRON 2 MG/ML
8 INJECTION INTRAMUSCULAR; INTRAVENOUS ONCE
Status: COMPLETED | OUTPATIENT
Start: 2018-01-09 | End: 2018-01-09

## 2018-01-09 RX ORDER — OLANZAPINE 5 MG/1
10 TABLET ORAL AT BEDTIME
Status: DISCONTINUED | OUTPATIENT
Start: 2018-01-09 | End: 2018-01-09

## 2018-01-09 RX ORDER — HEPARIN SODIUM (PORCINE) LOCK FLUSH IV SOLN 100 UNIT/ML 100 UNIT/ML
5 SOLUTION INTRAVENOUS
Status: COMPLETED | OUTPATIENT
Start: 2018-01-09 | End: 2018-01-09

## 2018-01-09 RX ORDER — DEXTROSE MONOHYDRATE, SODIUM CHLORIDE, AND POTASSIUM CHLORIDE 50; 1.49; 9 G/1000ML; G/1000ML; G/1000ML
INJECTION, SOLUTION INTRAVENOUS CONTINUOUS
Status: DISCONTINUED | OUTPATIENT
Start: 2018-01-09 | End: 2018-01-10

## 2018-01-09 RX ORDER — HEPARIN SODIUM,PORCINE 10 UNIT/ML
3 VIAL (ML) INTRAVENOUS
Status: DISCONTINUED | OUTPATIENT
Start: 2018-01-09 | End: 2018-01-11 | Stop reason: HOSPADM

## 2018-01-09 RX ORDER — SODIUM CHLORIDE 9 MG/ML
INJECTION, SOLUTION INTRAVENOUS CONTINUOUS
Status: DISCONTINUED | OUTPATIENT
Start: 2018-01-09 | End: 2018-01-09

## 2018-01-09 RX ORDER — ONDANSETRON 2 MG/ML
4 INJECTION INTRAMUSCULAR; INTRAVENOUS EVERY 6 HOURS PRN
Status: DISCONTINUED | OUTPATIENT
Start: 2018-01-09 | End: 2018-01-09

## 2018-01-09 RX ADMIN — SODIUM CHLORIDE 1000 ML: 9 INJECTION, SOLUTION INTRAVENOUS at 15:09

## 2018-01-09 RX ADMIN — SODIUM CHLORIDE, PRESERVATIVE FREE 5 ML: 5 INJECTION INTRAVENOUS at 13:46

## 2018-01-09 RX ADMIN — SODIUM CHLORIDE, PRESERVATIVE FREE 3 ML: 5 INJECTION INTRAVENOUS at 17:31

## 2018-01-09 RX ADMIN — DEXAMETHASONE SODIUM PHOSPHATE 12 MG: 10 INJECTION, SOLUTION INTRAMUSCULAR; INTRAVENOUS at 15:33

## 2018-01-09 RX ADMIN — ONDANSETRON 8 MG: 2 INJECTION INTRAMUSCULAR; INTRAVENOUS at 15:29

## 2018-01-09 RX ADMIN — POTASSIUM CHLORIDE, DEXTROSE MONOHYDRATE AND SODIUM CHLORIDE: 150; 5; 900 INJECTION, SOLUTION INTRAVENOUS at 20:59

## 2018-01-09 ASSESSMENT — PAIN SCALES - GENERAL: PAINLEVEL: MILD PAIN (3)

## 2018-01-09 NOTE — PROGRESS NOTES
Report was called to unit 5C by MATHEUS Gomez.  Patient left on litter with [facility] at 1730.  Patient's wife was updated on the transfer.

## 2018-01-09 NOTE — MR AVS SNAPSHOT
After Visit Summary   1/9/2018    Daniel Sandhu    MRN: [id number]           Patient Information     Date Of Birth          1980        Visit Information        Provider Department      1/9/2018 2:30 PM UC 20 ATC;  ONCOLOGY INFUSION [facility]        Today's Diagnoses     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    -  1      Care Instructions    S-(situation): Diarrhea, nausea, inability to eat, weakness    B-(background): adenocarcinoma of the GE junction    A-(assessment): patient had one liter of IV fluids, zofran, and dexamethasone while in infusion.  Stool cultures were sent    R-(recommendations): Admit for treatment of viral gastroenteritis vs work up of mechanical issues with GI tract.    Contact Numbers    [facility] Main Line: [phone]  [facility] Triage:  [phone]    Call triage with chills and/or temperature greater than or equal to 100.5, uncontrolled nausea/vomiting, diarrhea, constipation, dizziness, shortness of breath, chest pain, bleeding, unexplained bruising, or any new/concerning symptoms, questions/concerns.     If you are having any concerning symptoms or wish to speak to a provider before your next infusion visit, please call your care coordinator or triage to notify them so we can adequately serve you.       After Hours: [phone]    If after hours, weekends, or holidays, call main hospital  and ask for Oncology doctor on call.           January 2018 Sunday Monday Tuesday Wednesday Thursday Friday Saturday        1     LAB    7:30 AM   (15 min.)   [facility] LAB HOME INFUSION   [facility], Laboratory Services 2     [facility] LAB DRAW    9:45 AM   (15 min.)    [facility] DRAW   [facility] Lab Draw     [facility] RETURN   10:05 AM   (50 min.)   Nannette Moody APRN CNP   [facility] ONC INFUSION 240   11:30 AM   (240 min.)    ONCOLOGY INFUSION   [facility] 3     4     5     6        7     8     9     [facility] DRAW    1:15 PM   (15 min.)    [facility] LAB DRAW   [facility] Lab Draw     [facility] RETURN    1:25 PM   (50 min.)   Alisa Otero PA-C   [facility]     [facility] ONC INFUSION 120    2:30 PM   (120 min.)    ONCOLOGY INFUSION   [facility]     10     11     12     XR ESOPHOGRAM W UPPER GI   11:00 AM   (30 min.)   UCXR2   [facility] Xray 13       14     15     [facility] DRAW    1:45 PM   (15 min.)    [facility] DRAW   [facility] Lab Draw     [facility] RETURN    2:00 PM   (30 min.)   Laurence Hood MD   [facility] 16     17     18     19     20       21     22     23     [facility] RETURN    8:25 AM   (50 min.)   Nannette Moody APRN CNP   [facility]     [facility] DRAW    8:30 AM   (15 min.)    [facility] LAB DRAW   [facility] Lab Draw     [facility] ONC INFUSION 240   10:00 AM   (240 min.)    ONCOLOGY INFUSION   [facility] 24     25     26     27       28     29     30     31 February 2018 Sunday Monday Tuesday Wednesday Thursday Friday Saturday                       1     2     3       4     5     6     7     8     9     10       11     12     13     14     15     16     17       18     19     20     21     22     23     24       25     26     27     28                                Recent Results (from the past 24 hour(s))   CBC with platelets    Collection Time: 01/08/18  5:15 PM   Result Value Ref Range    WBC 1.9 (L) 4.0 - 11.0 10e9/L    RBC Count 5.11 4.4 - 5.9 10e12/L    Hemoglobin 13.0 (L) 13.3 - 17.7 g/dL    Hematocrit 40.6 40.0 - 53.0 %    MCV 80 78 - 100 fl    MCH 25.4 (L) 26.5 - 33.0 pg    MCHC 32.0 31.5 - 36.5 g/dL    RDW 16.4 (H) 10.0 - 15.0 %    Platelet Count 316 150 - 450 10e9/L   Comprehensive metabolic panel    Collection Time: 01/08/18  5:15 PM   Result Value Ref Range    Sodium 137 133 - 144  mmol/L    Potassium 3.8 3.4 - 5.3 mmol/L    Chloride 105 94 - 109 mmol/L    Carbon Dioxide 22 20 - 32 mmol/L    Anion Gap 11 3 - 14 mmol/L    Glucose 109 (H) 70 - 99 mg/dL    Urea Nitrogen 22 7 - 30 mg/dL    Creatinine 0.81 0.66 - 1.25 mg/dL    GFR Estimate >90 >60 mL/min/1.7m2    GFR Estimate If Black >90 >60 mL/min/1.7m2    Calcium 8.8 8.5 - 10.1 mg/dL    Bilirubin Total 0.2 0.2 - 1.3 mg/dL    Albumin 2.9 (L) 3.4 - 5.0 g/dL    Protein Total 7.0 6.8 - 8.8 g/dL    Alkaline Phosphatase 57 40 - 150 U/L    ALT 23 0 - 70 U/L    AST 18 0 - 45 U/L   Clostridium difficile toxin B PCR    Collection Time: 01/08/18  5:15 PM   Result Value Ref Range    Specimen Description Feces     C Diff Toxin B PCR Negative NEG^Negative   CBC with platelets differential    Collection Time: 01/09/18  1:51 PM   Result Value Ref Range    WBC 1.9 (L) 4.0 - 11.0 10e9/L    RBC Count 5.47 4.4 - 5.9 10e12/L    Hemoglobin 13.8 13.3 - 17.7 g/dL    Hematocrit 42.4 40.0 - 53.0 %    MCV 78 78 - 100 fl    MCH 25.2 (L) 26.5 - 33.0 pg    MCHC 32.5 31.5 - 36.5 g/dL    RDW 15.9 (H) 10.0 - 15.0 %    Platelet Count 310 150 - 450 10e9/L    Diff Method Manual Differential     % Neutrophils 67.9 %    % Lymphocytes 23.2 %    % Monocytes 8.9 %    % Eosinophils 0.0 %    % Basophils 0.0 %    Absolute Neutrophil 1.3 (L) 1.6 - 8.3 10e9/L    Absolute Lymphocytes 0.4 (L) 0.8 - 5.3 10e9/L    Absolute Monocytes 0.2 0.0 - 1.3 10e9/L    Absolute Eosinophils 0.0 0.0 - 0.7 10e9/L    Absolute Basophils 0.0 0.0 - 0.2 10e9/L    Anisocytosis Slight     Poikilocytosis Marked     Ovalocytes Slight     Gustavo Cells Marked     Platelet Estimate Confirming automated cell count    Comprehensive metabolic panel    Collection Time: 01/09/18  1:51 PM   Result Value Ref Range    Sodium 133 133 - 144 mmol/L    Potassium 3.9 3.4 - 5.3 mmol/L    Chloride 102 94 - 109 mmol/L    Carbon Dioxide 20 20 - 32 mmol/L    Anion Gap 10 3 - 14 mmol/L    Glucose 140 (H) 70 - 99 mg/dL    Urea Nitrogen 24 7 -  30 mg/dL    Creatinine 0.77 0.66 - 1.25 mg/dL    GFR Estimate >90 >60 mL/min/1.7m2    GFR Estimate If Black >90 >60 mL/min/1.7m2    Calcium 8.6 8.5 - 10.1 mg/dL    Bilirubin Total 0.4 0.2 - 1.3 mg/dL    Albumin 2.9 (L) 3.4 - 5.0 g/dL    Protein Total 7.4 6.8 - 8.8 g/dL    Alkaline Phosphatase 55 40 - 150 U/L    ALT 21 0 - 70 U/L    AST 21 0 - 45 U/L   Magnesium    Collection Time: 01/09/18  1:51 PM   Result Value Ref Range    Magnesium 2.2 1.6 - 2.3 mg/dL                 Follow-ups after your visit        Your next 10 appointments already scheduled     Jan 12, 2018 11:00 AM CST   XR ESOPHAGRAM W UPPPER GI with UCXR2, UC GIGU RAD   [facility] Xray ([facility])    [address]  1st Floor  [facility] [address] [phone]           Please bring a list of your current medicines to your exam. (Include vitamins, minerals and over-the-counter medicines.) Leave your valuables at home.  Tell the doctor if there is a chance you could be pregnant.  Do not eat for 4 hours before the exam. Keep drinking clear liquids until 2 hours before the exam.  You may take pain medicine (with a sip of water) up to 4 hours before the exam.  Do not swallow any other medicines unless your doctor tells you to. Talk to your doctor to be sure it s safe to stop your medicines.  Please call the Imaging Department at your exam site with any questions.            Lucien 15, 2018  1:45 PM CST   [facility] Lab Draw with  [facility] DRAW   [facility] Lab Draw ([facility])    [address]  [address]  [facility] [address] [phone]            Lucien 15, 2018  2:15 PM CST   (Arrive by 2:00 PM)   Return Visit with Laurence Hood MD   [facility] ([facility])    [address]  [address]  [facility] [address] [phone]            Jan 23, 2018  8:30 AM CST   [facility] Lab Draw with  [facility]  DRAW   [facility] Lab Draw ([facility])    [address]  [address]  [facility] [address]   [phone]            Jan 23, 2018  8:40 AM CST   (Arrive by 8:25 AM)   Return Visit with GAVIN Lundy CNP   [facility] ([facility])    [address]  [address]  [facility] [address]   [phone]            Jan 23, 2018 10:00 AM CST   Infusion 240 with UC ONCOLOGY INFUSION, UC 17 ATC   [facility] ([facility])    [address]  [address]  [facility] [address]   [phone]            Mar 07, 2018  1:40 PM CST   CT CHEST ABDOMEN PELVIS W/O CONTRAST with UCCT1   [facility] CT ([facility])    [address]  1st Floor  [facility] [address]   [phone]           Please bring any scans or X-rays taken at other hospitals, if similar tests were done. Also bring a list of your medicines, including vitamins, minerals and over-the-counter drugs. It is safest to leave personal items at home.  Be sure to tell your doctor:   If you have any allergies.   If there s any chance you are pregnant.   If you are breastfeeding.   If you have any special needs.  You may have contrast for this exam. To prepare:   Do not eat or drink for 2 hours before your exam. If you need to take medicine, you may take it with small sips of water. (We may ask you to take liquid medicine as well.)   The day before your exam, drink extra fluids at least six 8-ounce glasses (unless your doctor tells you to restrict your fluids).  Patients over 70 or patients with diabetes or kidney problems:   If you haven t had a blood test (creatinine test) within the last 30 days, go to your clinic or Diagnostic Imaging Department for this test.  If you have diabetes:   If your kidney function is normal, continue taking your metformin (Avandamet, Glucophage,  Glucovance, Metaglip) on the day of your exam.   If your kidney function is abnormal, wait 48 hours before restarting this medicine.  You will have oral contrast for this exam:   You will drink the contrast at home. Get this from your clinic or Diagnostic Imaging Department. Please follow the directions given.  Please wear loose clothing, such as a sweat suit or jogging clothes. Avoid snaps, zippers and other metal. We may ask you to undress and put on a hospital gown.  If you have any questions, please call the Imaging Department where you will have your exam.            Mar 07, 2018  2:30 PM CST   (Arrive by 2:15 PM)   Return Visit with Yunior Esteban MD   [facility] ([facility])    [address] [place] [address]  [address] [place] [address] [phone]              Future tests that were ordered for you today     Open Future Orders        Priority Expected Expires Ordered    XR Esophagram w Upper GI Routine 1/8/2018 1/8/2019 1/8/2018            Who to contact     If you have questions or need follow up information about today's clinic visit or your schedule please contact [facility] directly at [phone].  Normal or non-critical lab and imaging results will be communicated to you by SSEVhart, letter or phone within 4 business days after the clinic has received the results. If you do not hear from us within 7 days, please contact the clinic through SSEVhart or phone. If you have a critical or abnormal lab result, we will notify you by phone as soon as possible.  Submit refill requests through [facility] or call your pharmacy and they will forward the refill request to us. Please allow 3 business days for your refill to be completed.          Additional Information About Your Visit        [facility] [facility] [employer] Information     [facility] [employer] gives you secure access to your electronic health record. If you see a primary care provider, you can also send messages to  your care team and make appointments. If you have questions, please call your primary care clinic.  If you do not have a primary care provider, please call [phone] and they will assist you.        Care EveryWhere ID     This is your Care EveryWhere ID. This could be used by other organizations to access your [place] medical records  [id number]         Blood Pressure from Last 3 Encounters:   01/09/18 120/76   01/02/18 114/66   12/11/17 117/62    Weight from Last 3 Encounters:   01/09/18 60.6 kg (133 lb 8 oz)   01/02/18 67.1 kg (147 lb 14.9 oz)   12/11/17 68.5 kg (151 lb)              We Performed the Following     Blood culture        Primary Care Provider Office Phone # Fax #    Lencho Chan [phone]       [address] [facility] [address]        Equal Access to Services     [facility]: Hadii aad ku hadasho Soomaali, waaxda luqadaha, qaybta kaalmada adeegyada, waxay florencioin hayewan madelyn romeron . So [facility] [phone].    ATENCIÓN: Si habla español, tiene a zayas disposición servicios gratuitos de asistencia lingüística. Isaias al [phone].    We comply with applicable federal civil rights laws and [place] laws. We do not discriminate on the basis of race, color, national origin, age, disability, sex, sexual orientation, or gender identity.            Thank you!     Thank you for choosing [facility]  for your care. Our goal is always to provide you with excellent care. Hearing back from our patients is one way we can continue to improve our services. Please take a few minutes to complete the written survey that you may receive in the mail after your visit with us. Thank you!             Your Updated Medication List - Protect others around you: Learn how to safely use, store and throw away your medicines at www.disposemymeds.org.          This list is accurate as of: 1/9/18  5:30 PM.  Always use your most recent med list.                    Brand Name Dispense Instructions for use Diagnosis    acetaminophen 32 mg/mL solution    TYLENOL    400 mL    30.45 mLs (975 mg) by Per J Tube route 3 times daily    Acute post-operative pain       ATIVAN PO      Take by mouth every 4 hours as needed for anxiety        COMPAZINE PO      Take by mouth every 6 hours as needed for nausea        cyabnocobalamin 2500 MCG sublingual tablet    VITAMIN B-12    30 tablet    Place 2,500 mcg under the tongue daily    B12 deficiency       fluconazole 200 MG tablet    DIFLUCAN    30 tablet    Take 1 tablet (200 mg) by mouth daily    Thrush       multivitamins with minerals Liqd liquid     450 mL    15 mLs by Per J Tube route daily    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       OLANZapine 10 MG tablet    zyPREXA    30 tablet    Take 1 tablet (10 mg) by mouth At Bedtime    Chemotherapy-induced nausea       oxyCODONE 5 MG/5ML solution    ROXICODONE    300 mL    Take 5-10 mLs (5-10 mg) by mouth every 4 hours as needed for moderate to severe pain    Acute post-operative pain       sennosides 8.8 MG/5ML syrup    SENOKOT    400 mL    10 mLs by Per J Tube route 2 times daily    Bowel habit changes       ZOFRAN PO      Take by mouth every 8 hours as needed for nausea or vomiting

## 2018-01-09 NOTE — IP AVS SNAPSHOT
"          UNIT 5C BMT [facility]: [phone]                                              INTERAGENCY TRANSFER FORM - LAB / IMAGING / EKG / EMG RESULTS   2018                    Hospital Admission Date: 2018  ABELARDO ARMAS   : 1980  Sex: Male        Attending Provider: Sam Dumont MD     Allergies:  No Known Allergies    Infection:  None   Service:  ONCOLOGY    Ht:  1.75 m (5' 8.9\")   Wt:  60.6 kg (133 lb 9.6 oz)   Admission Wt:  60.4 kg (133 lb 2.5 oz)    BMI:  19.79 kg/m 2   BSA:  1.72 m 2            Patient PCP Information     Provider PCP Type    Lencho Chan MD General         Lab Results - 3 Days      Blood culture [[id number]]  Resulted: 18 0331, Result status: Preliminary result    Ordering provider: Alisa Otero PA-C  18 1513 Resulting lab: [facility]    Specimen Information    Type Source Collected On   Blood  18 1505   Comment:  Unspecified Site          Components       Value Reference Range Flag Lab   Specimen Description Blood Unspecified Site      Culture Micro No growth after 4 days   75            Comprehensive metabolic panel [[id number]] (Abnormal)  Resulted: 18 0156, Result status: Final result    Ordering provider: Melody Reilly APRN CNP  18 2200 Resulting lab: [facility]    Specimen Information    Type Source Collected On   Blood  18 0109          Components       Value Reference Range Flag Lab   Sodium 134 133 - 144 mmol/L  51   Potassium 3.2 3.4 - 5.3 mmol/L L 51   Chloride 112 94 - 109 mmol/L H 51   Carbon Dioxide 14 20 - 32 mmol/L L 51   Anion Gap 8 3 - 14 mmol/L  51   Glucose 105 70 - 99 mg/dL H 51   Urea Nitrogen 10 7 - 30 mg/dL  51   Creatinine 0.76 0.66 - 1.25 mg/dL  51   GFR Estimate >90 >60 mL/min/1.7m2  51   Comment:  Non  GFR Calc   GFR Estimate If Black >90 >60 mL/min/1.7m2  51   Comment:  African American GFR " Calc   Calcium 7.6 8.5 - 10.1 mg/dL L 51   Bilirubin Total 0.2 0.2 - 1.3 mg/dL  51   Albumin 2.0 3.4 - 5.0 g/dL L 51   Protein Total 5.5 6.8 - 8.8 g/dL L 51   Alkaline Phosphatase 51 40 - 150 U/L  51   ALT 15 0 - 70 U/L  51   AST 12 0 - 45 U/L  51            Phosphorus [[id number]] (Abnormal)  Resulted: 01/13/18 0156, Result status: Final result    Ordering provider: Melody Reilly APRN CNP  01/12/18 2200 Resulting lab: [facility]    Specimen Information    Type Source Collected On   Blood  01/13/18 0109          Components       Value Reference Range Flag Lab   Phosphorus 2.1 2.5 - 4.5 mg/dL L 51            Magnesium [[id number]]  Resulted: 01/13/18 0156, Result status: Final result    Ordering provider: Sam Dumont MD  01/12/18 2200 Resulting lab: [facility]    Specimen Information    Type Source Collected On   Blood  01/13/18 0109          Components       Value Reference Range Flag Lab   Magnesium 2.0 1.6 - 2.3 mg/dL  51            CBC with platelets [[id number]] (Abnormal)  Resulted: 01/13/18 0135, Result status: Final result    Ordering provider: Melody Reilly APRN CNP  01/12/18 2200 Resulting lab: [facility]    Specimen Information    Type Source Collected On   Blood  01/13/18 0109          Components       Value Reference Range Flag Lab   WBC 3.8 4.0 - 11.0 10e9/L L 51   RBC Count 4.37 4.4 - 5.9 10e12/L L 51   Hemoglobin 10.9 13.3 - 17.7 g/dL L 51   Hematocrit 33.4 40.0 - 53.0 % L 51   MCV 76 78 - 100 fl L 51   MCH 24.9 26.5 - 33.0 pg L 51   MCHC 32.6 31.5 - 36.5 g/dL  51   RDW 15.9 10.0 - 15.0 % H 51   Platelet Count 191 150 - 450 10e9/L  51            Surgical pathology exam [[id number]]  Resulted: 01/12/18 1436, Result status: Final result    Ordering provider: Alessandro Mcgregor MD  01/11/18 1557 Resulting lab: [facility]    Specimen Information    Type Source Collected On   Biopsy Other  "01/11/18 1203          Components       Value Reference Range Flag Lab   Copath Report --      Result:         Patient Name: ABELARDO ARMAS  MR#: [id number]  Specimen #:   Collected: 1/11/2018  Received: 1/11/2018  Reported: 1/12/2018 14:35  Ordering Phy(s): KATHRIN CADENA    For improved result formatting, select 'View Enhanced Report Format' under   Linked Documents section.    SPECIMEN(S):  Esophagojejunostomy anastamosis biopsies    FINAL DIAGNOSIS:  ESOPHAGOJEJUNOSTOMY ANASTOMOSIS BIOPSIES:  - Intestinal type mucosa with focal ulceration, chronic inflammation and   reactive epithelial changes  - No evidence of dysplasia or malignancy    I have personally reviewed all specimens and/or slides, including the   listed special stains, and used them  with my medical judgement to determine or confirm the final diagnosis.    Electronically signed out by:    Sepideh Alex M.D., [facility]    CLINICAL HISTORY:   Esophageal adenocarcinoma  GROSS:  The specimen is received in formalin with proper patient identification,   labeled \"esophagojejunostomy  anastomosis biopsies\", and consists  of four tan, slightly friable soft   tissue fragments, ranging from 0.1 cm  to 0.2 cm in greatest dimension.  The specimen is wrapped and entirely   submitted in cassette A1. (Dictated by:  Carolyn Live 1/11/2018 04:45 PM)    MICROSCOPIC:  Microscopic examination is performed.    CPT Codes:  A: 87130-CZ4    TESTING LAB LOCATION:  [facility], [address]   [address]  [phone]    COLLECTION SITE:  Client: General acute hospital  Location: [facility] ([facility])                Potassium [[id number]]  Resulted: 01/12/18 1324, Result status: Final result    Ordering provider: Sam Dumont MD  01/12/18 1050 Resulting lab: [facility]    Specimen Information    Type Source Collected On "   Blood  01/12/18 1234          Components       Value Reference Range Flag Lab   Potassium 3.4 3.4 - 5.3 mmol/L  51            Comprehensive metabolic panel [[id number]] (Abnormal)  Resulted: 01/12/18 0338, Result status: Final result    Ordering provider: Melody Reilly APRN CNP  01/11/18 2200 Resulting lab: [facility]    Specimen Information    Type Source Collected On   Blood  01/12/18 0254          Components       Value Reference Range Flag Lab   Sodium 136 133 - 144 mmol/L  51   Potassium 3.1 3.4 - 5.3 mmol/L L 51   Chloride 110 94 - 109 mmol/L H 51   Carbon Dioxide 16 20 - 32 mmol/L L 51   Anion Gap 10 3 - 14 mmol/L  51   Glucose 99 70 - 99 mg/dL  51   Urea Nitrogen 16 7 - 30 mg/dL  51   Creatinine 0.73 0.66 - 1.25 mg/dL  51   GFR Estimate >90 >60 mL/min/1.7m2  51   Comment:  Non  GFR Calc   GFR Estimate If Black >90 >60 mL/min/1.7m2  51   Comment:  African American GFR Calc   Calcium 7.6 8.5 - 10.1 mg/dL L 51   Bilirubin Total 0.3 0.2 - 1.3 mg/dL  51   Albumin 2.0 3.4 - 5.0 g/dL L 51   Protein Total 5.2 6.8 - 8.8 g/dL L 51   Alkaline Phosphatase 46 40 - 150 U/L  51   ALT 14 0 - 70 U/L  51   AST 11 0 - 45 U/L  51            Phosphorus [[id number]]  Resulted: 01/12/18 0338, Result status: Final result    Ordering provider: Melody Reilly APRN CNP  01/11/18 2200 Resulting lab: [facility]    Specimen Information    Type Source Collected On   Blood  01/12/18 0254          Components       Value Reference Range Flag Lab   Phosphorus 2.5 2.5 - 4.5 mg/dL  51            Magnesium [[id number]]  Resulted: 01/12/18 0338, Result status: Final result    Ordering provider: Sam Dumont MD  01/12/18 6763 Resulting lab: [facility]    Specimen Information    Type Source Collected On   Blood  01/12/18 0254          Components       Value Reference Range Flag Lab   Magnesium 1.9 1.6 - 2.3 mg/dL   51            CBC with platelets [[id number]] (Abnormal)  Resulted: 01/12/18 0318, Result status: Final result    Ordering provider: Melody Reilly APRN CNP  01/11/18 2200 Resulting lab: [facility]    Specimen Information    Type Source Collected On   Blood  01/12/18 0254          Components       Value Reference Range Flag Lab   WBC 2.2 4.0 - 11.0 10e9/L L 51   RBC Count 4.43 4.4 - 5.9 10e12/L  51   Hemoglobin 10.9 13.3 - 17.7 g/dL L 51   Hematocrit 33.9 40.0 - 53.0 % L 51   MCV 77 78 - 100 fl L 51   MCH 24.6 26.5 - 33.0 pg L 51   MCHC 32.2 31.5 - 36.5 g/dL  51   RDW 16.0 10.0 - 15.0 % H 51   Platelet Count 232 150 - 450 10e9/L  51            Comprehensive metabolic panel [[id number]] (Abnormal)  Resulted: 01/11/18 0511, Result status: Final result    Ordering provider: Melody Reilly APRN CNP  01/10/18 2200 Resulting lab: [facility]    Specimen Information    Type Source Collected On   Blood  01/11/18 0432          Components       Value Reference Range Flag Lab   Sodium 138 133 - 144 mmol/L  51   Potassium 3.6 3.4 - 5.3 mmol/L  51   Chloride 111 94 - 109 mmol/L H 51   Carbon Dioxide 17 20 - 32 mmol/L L 51   Anion Gap 10 3 - 14 mmol/L  51   Glucose 107 70 - 99 mg/dL H 51   Urea Nitrogen 20 7 - 30 mg/dL  51   Creatinine 0.84 0.66 - 1.25 mg/dL  51   GFR Estimate >90 >60 mL/min/1.7m2  51   Comment:  Non  GFR Calc   GFR Estimate If Black >90 >60 mL/min/1.7m2  51   Comment:  African American GFR Calc   Calcium 8.2 8.5 - 10.1 mg/dL L 51   Bilirubin Total 0.3 0.2 - 1.3 mg/dL  51   Albumin 2.1 3.4 - 5.0 g/dL L 51   Protein Total 5.5 6.8 - 8.8 g/dL L 51   Alkaline Phosphatase 45 40 - 150 U/L  51   ALT 16 0 - 70 U/L  51   AST 11 0 - 45 U/L  51            CBC with platelets [[id number]] (Abnormal)  Resulted: 01/11/18 0452, Result status: Final result    Ordering provider: Melody Reilly APRN CNP  01/10/18 2200 Resulting lab:  [facility]    Specimen Information    Type Source Collected On   Blood  01/11/18 0432          Components       Value Reference Range Flag Lab   WBC 2.3 4.0 - 11.0 10e9/L L 51   RBC Count 4.50 4.4 - 5.9 10e12/L  51   Hemoglobin 11.3 13.3 - 17.7 g/dL L 51   Hematocrit 34.7 40.0 - 53.0 % L 51   MCV 77 78 - 100 fl L 51   MCH 25.1 26.5 - 33.0 pg L 51   MCHC 32.6 31.5 - 36.5 g/dL  51   RDW 16.1 10.0 - 15.0 % H 51   Platelet Count 237 150 - 450 10e9/L  51            Prealbumin [[id number]] (Abnormal)  Resulted: 01/10/18 1526, Result status: Final result    Ordering provider: Melody Reilly APRN CNP  01/10/18 1139 Resulting lab: [facility]    Specimen Information    Type Source Collected On   Blood  01/10/18 1200          Components       Value Reference Range Flag Lab   Prealbumin 13 15 - 45 mg/dL L 51            Basic metabolic panel [[id number]] (Abnormal)  Resulted: 01/10/18 0452, Result status: Final result    Ordering provider: Peyton Moody MD  01/09/18 2200 Resulting lab: [facility]    Specimen Information    Type Source Collected On   Blood  01/10/18 0331          Components       Value Reference Range Flag Lab   Sodium 136 133 - 144 mmol/L  51   Potassium 4.2 3.4 - 5.3 mmol/L  51   Chloride 109 94 - 109 mmol/L  51   Carbon Dioxide 19 20 - 32 mmol/L L 51   Anion Gap 9 3 - 14 mmol/L  51   Glucose 137 70 - 99 mg/dL H 51   Urea Nitrogen 22 7 - 30 mg/dL  51   Creatinine 0.70 0.66 - 1.25 mg/dL  51   GFR Estimate >90 >60 mL/min/1.7m2  51   Comment:  Non  GFR Calc   GFR Estimate If Black >90 >60 mL/min/1.7m2  51   Comment:  African American GFR Calc   Calcium 8.4 8.5 - 10.1 mg/dL L 51            Magnesium [[id number]]  Resulted: 01/10/18 0452, Result status: Final result    Ordering provider: Peyton Moody MD  01/09/18 2200 Resulting lab: [facility]     Specimen Information    Type Source Collected On   Blood  01/10/18 0331          Components       Value Reference Range Flag Lab   Magnesium 2.1 1.6 - 2.3 mg/dL  51            Phosphorus [[id number]]  Resulted: 01/10/18 0452, Result status: Final result    Ordering provider: Peyton Moody MD  01/09/18 2200 Resulting lab: [facility]    Specimen Information    Type Source Collected On   Blood  01/10/18 0331          Components       Value Reference Range Flag Lab   Phosphorus 3.2 2.5 - 4.5 mg/dL  51            CBC with platelets differential [[id number]] (Abnormal)  Resulted: 01/10/18 0435, Result status: Final result    Ordering provider: Peyton Moody MD  01/09/18 2200 Resulting lab: [facility]    Specimen Information    Type Source Collected On   Blood  01/10/18 0331          Components       Value Reference Range Flag Lab   WBC 1.7 4.0 - 11.0 10e9/L L 51   RBC Count 4.77 4.4 - 5.9 10e12/L  51   Hemoglobin 11.9 13.3 - 17.7 g/dL L 51   Hematocrit 36.7 40.0 - 53.0 % L 51   MCV 77 78 - 100 fl L 51   MCH 24.9 26.5 - 33.0 pg L 51   MCHC 32.4 31.5 - 36.5 g/dL  51   RDW 15.8 10.0 - 15.0 % H 51   Platelet Count 277 150 - 450 10e9/L  51   Diff Method Automated Method   51   % Neutrophils 47.6 %  51   % Lymphocytes 33.1 %  51   % Monocytes 17.5 %  51   % Eosinophils 0.0 %  51   % Basophils 0.0 %  51   % Immature Granulocytes 1.8 %  51   Nucleated RBCs 0 0 /100  51   Absolute Neutrophil 0.8 1.6 - 8.3 10e9/L L 51   Absolute Lymphocytes 0.6 0.8 - 5.3 10e9/L L 51   Absolute Monocytes 0.3 0.0 - 1.3 10e9/L  51   Absolute Eosinophils 0.0 0.0 - 0.7 10e9/L  51   Absolute Basophils 0.0 0.0 - 0.2 10e9/L  51   Abs Immature Granulocytes 0.0 0 - 0.4 10e9/L  51   Absolute Nucleated RBC 0.0   51            Testing Performed By     Lab - Abbreviation Name Director Address Valid Date Range    51 - Unknown [facility] Unknown [address]  [facility] [id number] 12/31/14 1010 - Present    75 - Unknown [facility] Unknown [address] [facility] [id number] 01/15/15 1019 - Present    88 - Unknown [facility] Unknown Unknown 10/30/02 0000 - Present            Unresulted Labs (24h ago through future)    Start       Ordered    01/13/18 0000  CBC with platelets and differential  Routine     Comments:  Last Lab Result: Hemoglobin (g/dL)       Date                     Value                 01/13/2018               10.9 (L)         ----------    01/13/18 1514    01/13/18 0000  Comprehensive metabolic panel (BMP + Alb, Alk Phos, ALT, AST, Total. Bili, TP)  Routine      01/13/18 1514    01/13/18 0000  Magnesium  Routine      01/13/18 1514    01/13/18 0000  Phosphorus  Routine      01/13/18 1514    01/12/18 0400  Phosphorus  AM DRAW,   Routine      01/11/18 1742    01/12/18 0400  Magnesium  AM DRAW,   Routine      01/12/18 0234    01/11/18 0400  Comprehensive metabolic panel  AM DRAW,   Routine      01/10/18 1406    01/11/18 0400  CBC with platelets  AM DRAW,   Routine     Comments:  Last Lab Result: Hemoglobin (g/dL)       Date                     Value                 01/10/2018               11.9 (L)         ----------    01/10/18 1406    Unscheduled  Blood culture  (Blood Culture - 2 Sites)  CONDITIONAL (SPECIFY),   Routine     Comments:  Site #1:  If temp is greater than 100.4    May repeat max of once per 24 hrs. (Stat Lab Collect with 1st site collected from Venipuncture and 2nd site from VAD by RN,  if no VAD then 2 peripheral sticks-2 venipuncture from different sites)    01/09/18 1828    Unscheduled  Blood culture  (Blood Culture - 2 Sites)  CONDITIONAL (SPECIFY),   Routine     Comments:  SITE #2: If temp is greater than 100.4    May repeat max of once per 24 hrs. (Stat Lab Collect with 1st site collected from Venipuncture and 2nd site from VAD by RN,  if no VAD then 2 peripheral sticks-2 venipuncture from different  "sites)    01/09/18 1828    Unscheduled  Potassium  (Potassium Replacement - \"Standard\" - For K levels less than 3.4 mmol/L - UU,UR,UA,RH,SH,PH,WY )  CONDITIONAL (SPECIFY),   Routine     Comments:  Obtain Potassium Level for these conditions:  *IF no potassium result within 24 hours before initiation of order set, draw potassium level with next lab collect.    *2 HOURS AFTER last IV potassium replacement dose and 4 hours after an oral replacement dose.  *Next morning after potassium dose.     Repeat Potassium Replacement if necessary.    01/11/18 1741    Unscheduled  Magnesium  (Magnesium Replacement -  Adult - \"Standard\" - Replacement for all levels less than 1.6 mg/dL )  CONDITIONAL (SPECIFY),   Routine     Comments:  Obtain Magnesium Level for these conditions:  *IF no magnesium result within 24 hrs before initiation of order set, draw magnesium level with next lab collect.    *2 HOURS AFTER last magnesium replacement dose when magnesium replacement given for level less than 1.6   *Next morning after magnesium dose.     Repeat Magnesium Replacement if necessary.    01/11/18 1741    Unscheduled  Phosphorus  (POTASSIUM Phosphate - \"Standard\" - Replacement for levels less than or equal to 2.4 mg/dL )  CONDITIONAL (SPECIFY),   Routine     Comments:  Obtain Phosphorus Level for these conditions:  *IF no phosphorus result within 24 hrs before initiation of order set, draw phosphorus level with next lab collect.    *2 HOURS AFTER last phosphorus replacement dose for levels less than 2.0.  *Next morning after phosphorus dose.     Repeat Phosphorus Replacement if necessary.    01/11/18 1741         Imaging Results - 3 Days      XR Abdomen Port 1 View [[id number]]  Resulted: 01/11/18 1111, Result status: Final result    Ordering provider: Andres Vila MD  01/11/18 0851 Resulted by: Geovany Izaguirre MD Evenson, Alissa Rae, MD    Performed: 01/11/18 0912 - 01/11/18 0954 Resulting lab: RADIOLOGY RESULTS    " Narrative:       Examination:  XR ABDOMEN PORT F1 VW 1/11/2018 9:54 AM     Comparison: CT abdomen and pelvis 12/28/2017.    History: tube check with GASTROGRAFFIN instillation prior to XR.;     Findings: 2 supine frontal views of the abdomen are obtained. Surgical  clips projecting over the epigastrium and left upper quadrant. J-tube  with tip projecting over the left side of the abdomen presumably  within the jejunum, similar in appearance compared to 12/28/2017 CT.  Air distended loops of small bowel and colon with contrast material  visualized in the small bowel extending to the mid transverse colon.  There is no pneumatosis or portal venous gas.       Impression:       Impression:   1.  Air distended loops of bowel with contrast visualized to the mid  transverse colon. No evidence of obstruction. Could consider obtaining  12 to 24 hour delayed imaging to watch for passage of contrast.  2.  Stable position of J-tube tip.    I have personally reviewed the examination and initial interpretation  and I agree with the findings.    CHEN LATHAM MD      XR Esophagram [[id number]]  Resulted: 01/10/18 1323, Result status: Final result    Ordering provider: Melody Reilly APRN CNP  01/10/18 0845 Resulted by: Judith Cota MD Evenson, Nicole Wilson MD    Performed: 01/10/18 1001 - 01/10/18 1032 Resulting lab: RADIOLOGY RESULTS    Narrative:       Esophagram dated 1/10/2018    COMPARISON:    Modified swallow study and limited esophagram  11/13/2017, multiple prior CT, most recent 12/28/2017    HISTORY:    Evaluate stricture, history of GE junction adenocarcinoma,  difficulty with gagging and nausea    Additional history obtained from EHR and patient: Status post total  gastrectomy with distal esophagectomy and intrathoracic Terrell-en-Y  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 Patient describes sensation of food getting stuck in the mid  chest, both liquids and solids, with  feeling of spasms. Denies emesis  or regurgitation of ingested food. Additionally, describes burning  sensation in the epigastrium which is not improved with antacids.    TECHNIQUE: Patient was given thin liquid barium to ingest. Patient was  evaluated with fluoroscopy, and multiple spot films were obtained.     Fluoro time: 3.7 minutes    FINDINGS:   The  radiograph shows right-sided PICC with tip projecting over  low SVC. Multiple surgical clips projecting over the lower mediastinum  and left upper quadrant. Heart size is within normal limits. Lungs are  clear.    Single contrast upper GI was performed. Examination of the esophagus  reveals adequate primary and secondary peristalsis. Post surgical  changes of distal esophagectomy and esophagojejunostomy. Contrast  material easily progresses across the esophagojejunal anastomosis into  the jejunum without evidence of focal stricture. Contrast was observed  to pool in a herniated/redundant portion of intrathoracic jejunum,  which empties secondary to cardiac pulsation. Questionable focal  mucosal abnormality just below the level of the the anastomosis along  the posterior and right lateral aspect of the jejunum at the level of  the diaphragm curve, with incomplete clearance on subsequent swallows.  At the conclusion of the study, contrast material was visualized in  the distal small bowel which were normal in caliber, without evidence  of obstruction or stricture.      Impression:       IMPRESSION:  1.  Postoperative changes of distal esophagectomy and  esophagojejunostomy without evidence of esophageal or anastomotic  stricture.  2.  Questionable focal mucosal abnormality of the jejunal lumen near  the anastomosis. Although this may represent normal jejunal fold,  perianastomotic jejunal ulcer is not entirely excluded especially in  the setting of epigastric pain and burning sensation as reported by  the patient.  3.  Normal transit of contrast through the  visualize small bowel  without evidence obstruction.    I have personally reviewed the examination and initial interpretation  and I agree with the findings.    NARCISA FIELDS MD      Testing Performed By     Lab - Abbreviation Name Director Address Valid Date Range    104 - Rad [facility] RADIOLOGY RESULTS Unknown Unknown 02/16/05 1553 - Present            Encounter-Level Documents:     There are no encounter-level documents.      Order-Level Documents:     There are no order-level documents.

## 2018-01-09 NOTE — LETTER
1/9/2018      RE: Daniel Sandhu  [address] [facility] [address]       Oncology/Hematology Visit Note  January 9, 2018      Reason for visit: Follow up adenocarcinoma of the GE junction    Oncology HPI: Daniel Sandhu is a 37 year old male who presents with adenocarcinoma of the GE junction.  In early 2017, patient started noticing difficulty swallowing, and that food seems to take longer to go down.  It became more frequent, and he saw his PCP, and was referred for EGD, which showed an esophageal mass located at the GE junction, measuring 4 cm.  Biopsy showed poorly differentiated adenocarcinoma.  HER-2 was sent and is equivocal (2+).  CT c/a/p showed a mildly prominent lymph node in the gastrohepatic ligament, but there were no pathologically enlarged lymph nodes in the chest, abdomen, or pelvis.  There was otherwise no evidence of metastatic disease.  PET-CT showed thickening of the distal esophagus consistent with known esophageal adenocarcinoma, as well as mildly hypermetabolic 1 cm lymph node in the gastrohepatic ligament.  There was no evidence of distant metastatic disease.  He underwent EUS on 6/9/17, which showed the esophageal tumor in the lower third of the esophagus, staged [id number].  There were 2 abnormal lymph nodes seen in the gastrohepatic ligament; pathology was suspicious for adenocarcinoma.  Celiac node was sampled as well, which was benign.  He was seen by surgery, Dr. Esteban, and recommended srinivas-operative chemotherapy.     Port was placed on 6/22/17.     Chemotherapy:  Epirubicin 50 mg/m2 IV on day 1  Oxaliplatin 130 mg/m2 IV on day 1  Capecitabine 625 mg/m2 po BID on days 1-21  Every 21 day cycles     C1D1: 6/26/17  C2D1: 7/17/17  C3D1: 8/7/17     On 11/3/17, he underwent laparotomy with total gastrectomy and abdominal lymphadenectomy, left thoracotomy with distal esophagectomy and intrathoracic Terrell-en-Y esophagojejunostomy, feeding jejunostomy, left pharyngostomy tube  "placement, right tube thoracostomy, and flexible bronchoscopy.  On 11/9/17, he was taken back to OR for I&D of pharyngostomy tube abscess.  Pathology showed adenocarcinoma, moderate differentiated, extensive residual tumor with no evidence tumor regression, margins negative, perineural invasion present, 1 of 28 lymph nodes were involved with malignancy, stage [id number] (stage IIIA).  HER-2 is non-amplified.     He resumed chemotherapy post-surgery, with plans to complete 3 more cycles.  He did not tolerate Xeloda well during his neoadjuvant chemotherapy, with issues with palmar-plantar, and likely genital erythrodysesthesia.  We discussed changing to 5-FU, and Daniel would like to try this, as he may also have issues with taking pills and absorption after his surgery.  He will receive epirubicin, oxaliplatin, and 5-FU x 3 cycles.     C4D1: 12/11/17  C5D1: 1/2/18  C6D1: anticipated for 1/23/18    Interval History: Daniel is here with his wife on an add-on basis for symptoms.    Starting around 1/4, he developed a few things.    1. Diarrhea. Started 1/4 at night, and has persisted since that time without any improvement or worsening. At least 5 large volume, watery stools overnight with occasional episodes during the day. He has not tried Imodium or any other anti-diarrheal agents. Of note, wife and daughter had a \"GI bug\" around Xmas.    2. Nausea/gagging/inability to eat: Prior to 1/4, he was eating at least one full meal daily in addition to the tube feeds overnight. He gradually has developed very dry mouth with thick oral secretions, which make it difficult to eat./ he feels he is gagging on these secretions and this causes nausea. Additionally, there is a sensation of a \"stricture\" in his esophagus area which does make him feel nauseated as well. He is using zofran and ativan at home, in addition to a liter of NS daily with [facility]. He has been eating very very little, can tolerate a small amount of apple sauce and " apple juice. These are the only two things that really are able to go down.    3. Extreme weakness: Since symptoms above started on 1/4., he has felt tremendously weak. He has difficulty at home with even the slightest physical activity. He describes having to rest at least 10-15 minutes after small amounts of activity, just to get his heart rate down. He is aware that he has a rapid pulse at times at home. He is in a wheelchair at clinic because an RN witness him unsteady on his feet while trying to take his jacket off. He denies dizziness. He has MIMS but no SOB at rest or chest pain.    Otherwise, no fevers, chills, abdominal pain, bleeding, or swelling.      PMHx and Social Hx reviewed per EPIC.      Medications:  Current Outpatient Prescriptions   Medication Sig Dispense Refill     OLANZapine (ZYPREXA) 10 MG tablet Take 1 tablet (10 mg) by mouth At Bedtime (Patient not taking: Reported on 1/2/2018) 30 tablet 1     fluconazole (DIFLUCAN) 200 MG tablet Take 1 tablet (200 mg) by mouth daily 30 tablet 1     cyabnocobalamin (VITAMIN B-12) 2500 MCG sublingual tablet Place 2,500 mcg under the tongue daily 30 tablet 1     acetaminophen (TYLENOL) 32 mg/mL solution 30.45 mLs (975 mg) by Per J Tube route 3 times daily (Patient not taking: Reported on 1/2/2018) 400 mL 0     sennosides (SENOKOT) 8.8 MG/5ML syrup 10 mLs by Per J Tube route 2 times daily (Patient not taking: Reported on 1/2/2018) 400 mL 0     multivitamins with minerals (CERTAVITE/CEROVITE) LIQD liquid 15 mLs by Per J Tube route daily (Patient not taking: Reported on 1/2/2018) 450 mL 0     oxyCODONE (ROXICODONE) 5 MG/5ML solution Take 5-10 mLs (5-10 mg) by mouth every 4 hours as needed for moderate to severe pain (Patient not taking: Reported on 1/2/2018) 300 mL 0       No Known Allergies      EXAM:    /76 (BP Location: Right arm, Patient Position: Sitting, Cuff Size: Adult Regular)  Pulse 123  Temp 97.6  F (36.4  C) (Oral)  Resp 20  Wt 60.6 kg (133  lb 8 oz)  SpO2 98%  BMI 19.77 kg/m2   Wt Readings from Last 10 Encounters:   01/09/18 60.6 kg (133 lb 8 oz)   01/02/18 67.1 kg (147 lb 14.9 oz)   12/11/17 68.5 kg (151 lb)   12/06/17 69.3 kg (152 lb 12.5 oz)   11/27/17 70.5 kg (155 lb 8 oz)   11/22/17 71 kg (156 lb 9.6 oz)   11/15/17 72.5 kg (159 lb 14.4 oz)   11/01/17 74.4 kg (164 lb 0.4 oz)   11/01/17 74.4 kg (164 lb)   09/25/17 73.2 kg (161 lb 6.4 oz)       GENERAL:  male, in no acute distress but appears tired. In a wheelchair. Alert and oriented x3.   HEENT:  Normocephalic, atraumatic.  PERRL, oropharynx clear with no sores or thrush.   LYMPH NODES:  No palpable pre/post-auricular, cervical, axillary lymphadenopathy appreciated.  CV:   Tachycardic., No murmurs, gallops, or rubs. Pulses are thready  LUNGS:  Clear to auscultation bilaterally.   ABDOMEN:  Soft, nontender and nondistended.  Bowel sounds heard x4.  No apparent hepatosplenomegaly.   EXTREMITIES:  No clubbing, cyanosis, or edema. No edema.   SKIN: No rash, port  ithout erythema, pus or tenderness.   PSYCH: mood stable      Labs:  Results for ABELARDO ARMAS (MRN [id number]) as of 1/9/2018 15:08   Ref. Range 1/9/2018 13:51   Sodium Latest Ref Range: 133 - 144 mmol/L 133   Potassium Latest Ref Range: 3.4 - 5.3 mmol/L 3.9   Chloride Latest Ref Range: 94 - 109 mmol/L 102   Carbon Dioxide Latest Ref Range: 20 - 32 mmol/L 20   Urea Nitrogen Latest Ref Range: 7 - 30 mg/dL 24   Creatinine Latest Ref Range: 0.66 - 1.25 mg/dL 0.77   GFR Estimate Latest Ref Range: >60 mL/min/1.7m2 >90   GFR Estimate If Black Latest Ref Range: >60 mL/min/1.7m2 >90   Calcium Latest Ref Range: 8.5 - 10.1 mg/dL 8.6   Anion Gap Latest Ref Range: 3 - 14 mmol/L 10   Magnesium Latest Ref Range: 1.6 - 2.3 mg/dL 2.2   Albumin Latest Ref Range: 3.4 - 5.0 g/dL 2.9 (L)   Protein Total Latest Ref Range: 6.8 - 8.8 g/dL 7.4   Bilirubin Total Latest Ref Range: 0.2 - 1.3 mg/dL 0.4   Alkaline Phosphatase Latest Ref Range: 40 - 150 U/L 55   ALT  "Latest Ref Range: 0 - 70 U/L 21   AST Latest Ref Range: 0 - 45 U/L 21   Glucose Latest Ref Range: 70 - 99 mg/dL 140 (H)   WBC Latest Ref Range: 4.0 - 11.0 10e9/L 1.9 (L)   Hemoglobin Latest Ref Range: 13.3 - 17.7 g/dL 13.8   Hematocrit Latest Ref Range: 40.0 - 53.0 % 42.4   Platelet Count Latest Ref Range: 150 - 450 10e9/L 310      Impression/Plan: Daniel Sandhu is a 37-year-old male with history of adenocarcinoma of the GE junction, status-post 3 cycles of neoadjuvant EOX followed by resection on 11/3/2017. He is now undergoing adjuvant chemotherapy with EOF x 3 cycles. He is here today on an add-on basis for concerns of weakness, diarrhea.    Profound weakness/nausea/diarrhea: Daniel started cycle 2 EOF on 1/2. He previously had tolerated cycle 1 well without significant issues and had started supplementing oral intake by mouth without difficulty. Around 1/4, he suddenly developed large volume, watery diarrhea at night with occasional episodes during the day, nausea, and profound weakness. Gradually over the last week or so he has had a sensation of a \"stricture\" and extremely dry mouth, which have also limited his intake. He has lost 14 lbs since 1/2. Tachy to 123 on arrival, pressure stable. On exam, he appears significantly weak compared to baseline. He is in a wheelchair, a change for him. With his weight loss, has has failure to thrive and I am concerned that he has either a viral gastroenteritis or something mechanical going on. Could be 2/2 chemo, though as above, he tolerated cycle one without these issues. Plan to admit today for management. Patient, wife both in agreement.  - Will try getting stool culture in clinic to eval for viral/bacterial pathogens (C diff negative 1/8)  - Fluids in clinic with IV zofran, Dex 12 mg  - Recommend esophogram XR while inpatient to eval for stricture  - Will err on side of caution and obtain a blood culture given the mild neutropenia    Adenocarcinoma of the GE junction: " Continues on adjuvant chemotherapy with epirubicin, oxaliplatin, 5FU (21-day continuous infusion). He started cycle 5 on 1/2/2017.The 5FU pump has been disconnected as of 1/8 due to the above symptoms.     I spent >40 minutes with the patient, with over 50% of the time spent counseling or coordinating their care as described above.      Alisa Otero PA-C

## 2018-01-09 NOTE — PATIENT INSTRUCTIONS
S-(situation): Diarrhea, nausea, inability to eat, weakness    B-(background): adenocarcinoma of the GE junction    A-(assessment): patient had one liter of IV fluids, zofran, and dexamethasone while in infusion.  Stool cultures were sent    R-(recommendations): Admit for treatment of viral gastroenteritis vs work up of mechanical issues with GI tract.    Contact Numbers    [facility] [place]: [phone]  [facility] [place]:  [phone]    Call triage with chills and/or temperature greater than or equal to 100.5, uncontrolled nausea/vomiting, diarrhea, constipation, dizziness, shortness of breath, chest pain, bleeding, unexplained bruising, or any new/concerning symptoms, questions/concerns.     If you are having any concerning symptoms or wish to speak to a provider before your next infusion visit, please call your care coordinator or triage to notify them so we can adequately serve you.       After Hours: [phone]    If after hours, weekends, or holidays, call main hospital  and ask for Oncology doctor on call.           January 2018 Sunday Monday Tuesday Wednesday Thursday Friday Saturday        1     LAB    7:30 AM   (15 min.)   [facility] HOME INFUSION   [facility] Laboratory Services 2     [facility]    9:45 AM   (15 min.)    [facility] DRAW   [facility] Lab Draw     [email or web address] RETURN   10:05 AM   (50 min.)   Nannette Moody, GAVIN CNP   [facility] ONC INFUSION 240   11:30 AM   (240 min.)    ONCOLOGY INFUSION   [facility] 3     4     5     6       7     8     9     [facility] [[email or web address] DRAW    1:15 PM   (15 min.)    [facility] LAB DRAW   [facility] Lab Draw     [email or web address] RETURN    1:25 PM   (50 min.)   Alisa Otero PA-C   [facility] ONC INFUSION 120    2:30 PM   (120 min.)    ONCOLOGY INFUSION   [facility]     10     11     12     XR ESOPHOGRAM W UPPER GI   11:00 AM   (30 min.)   UCXR2    [facility] Xray 13       14     15     [facility] LAB DRAW    1:45 PM   (15 min.)    [facility]   [facility]     [facility] RETURN    2:00 PM   (30 min.)   Laurence Hood MD   [facility] 16     17     18     19     20       21     22     23     [facility] RETURN    8:25 AM   (50 min.)   Nannette Moody APRN CNP   [facility]    8:30 AM   (15 min.)    [facility] LAB DRAW   [facility] Lab Draw     [facility] ONC INFUSION 240   10:00 AM   (240 min.)    ONCOLOGY INFUSION   [facility] 24     25     26     27       28     29     30     31 February 2018 Sunday Monday Tuesday Wednesday Thursday Friday Saturday                       1     2     3       4     5     6     7     8     9     10       11     12     13     14     15     16     17       18     19     20     21     22     23     24       25     26     27     28                                Recent Results (from the past 24 hour(s))   CBC with platelets    Collection Time: 01/08/18  5:15 PM   Result Value Ref Range    WBC 1.9 (L) 4.0 - 11.0 10e9/L    RBC Count 5.11 4.4 - 5.9 10e12/L    Hemoglobin 13.0 (L) 13.3 - 17.7 g/dL    Hematocrit 40.6 40.0 - 53.0 %    MCV 80 78 - 100 fl    MCH 25.4 (L) 26.5 - 33.0 pg    MCHC 32.0 31.5 - 36.5 g/dL    RDW 16.4 (H) 10.0 - 15.0 %    Platelet Count 316 150 - 450 10e9/L   Comprehensive metabolic panel    Collection Time: 01/08/18  5:15 PM   Result Value Ref Range    Sodium 137 133 - 144 mmol/L    Potassium 3.8 3.4 - 5.3 mmol/L    Chloride 105 94 - 109 mmol/L    Carbon Dioxide 22 20 - 32 mmol/L    Anion Gap 11 3 - 14 mmol/L    Glucose 109 (H) 70 - 99 mg/dL    Urea Nitrogen 22 7 - 30 mg/dL    Creatinine 0.81 0.66 - 1.25 mg/dL    GFR Estimate >90 >60 mL/min/1.7m2    GFR Estimate If Black >90 >60 mL/min/1.7m2    Calcium 8.8 8.5 - 10.1 mg/dL    Bilirubin Total 0.2 0.2 - 1.3 mg/dL     Albumin 2.9 (L) 3.4 - 5.0 g/dL    Protein Total 7.0 6.8 - 8.8 g/dL    Alkaline Phosphatase 57 40 - 150 U/L    ALT 23 0 - 70 U/L    AST 18 0 - 45 U/L   Clostridium difficile toxin B PCR    Collection Time: 01/08/18  5:15 PM   Result Value Ref Range    Specimen Description Feces     C Diff Toxin B PCR Negative NEG^Negative   CBC with platelets differential    Collection Time: 01/09/18  1:51 PM   Result Value Ref Range    WBC 1.9 (L) 4.0 - 11.0 10e9/L    RBC Count 5.47 4.4 - 5.9 10e12/L    Hemoglobin 13.8 13.3 - 17.7 g/dL    Hematocrit 42.4 40.0 - 53.0 %    MCV 78 78 - 100 fl    MCH 25.2 (L) 26.5 - 33.0 pg    MCHC 32.5 31.5 - 36.5 g/dL    RDW 15.9 (H) 10.0 - 15.0 %    Platelet Count 310 150 - 450 10e9/L    Diff Method Manual Differential     % Neutrophils 67.9 %    % Lymphocytes 23.2 %    % Monocytes 8.9 %    % Eosinophils 0.0 %    % Basophils 0.0 %    Absolute Neutrophil 1.3 (L) 1.6 - 8.3 10e9/L    Absolute Lymphocytes 0.4 (L) 0.8 - 5.3 10e9/L    Absolute Monocytes 0.2 0.0 - 1.3 10e9/L    Absolute Eosinophils 0.0 0.0 - 0.7 10e9/L    Absolute Basophils 0.0 0.0 - 0.2 10e9/L    Anisocytosis Slight     Poikilocytosis Marked     Ovalocytes Slight     Gustavo Cells Marked     Platelet Estimate Confirming automated cell count    Comprehensive metabolic panel    Collection Time: 01/09/18  1:51 PM   Result Value Ref Range    Sodium 133 133 - 144 mmol/L    Potassium 3.9 3.4 - 5.3 mmol/L    Chloride 102 94 - 109 mmol/L    Carbon Dioxide 20 20 - 32 mmol/L    Anion Gap 10 3 - 14 mmol/L    Glucose 140 (H) 70 - 99 mg/dL    Urea Nitrogen 24 7 - 30 mg/dL    Creatinine 0.77 0.66 - 1.25 mg/dL    GFR Estimate >90 >60 mL/min/1.7m2    GFR Estimate If Black >90 >60 mL/min/1.7m2    Calcium 8.6 8.5 - 10.1 mg/dL    Bilirubin Total 0.4 0.2 - 1.3 mg/dL    Albumin 2.9 (L) 3.4 - 5.0 g/dL    Protein Total 7.4 6.8 - 8.8 g/dL    Alkaline Phosphatase 55 40 - 150 U/L    ALT 21 0 - 70 U/L    AST 21 0 - 45 U/L   Magnesium    Collection Time: 01/09/18  1:51  PM   Result Value Ref Range    Magnesium 2.2 1.6 - 2.3 mg/dL

## 2018-01-09 NOTE — IP AVS SNAPSHOT
"` `     UNIT 5C BMT [facility]: [phone]                 INTERAGENCY TRANSFER FORM - NOTES (H&P, Discharge Summary, Consults, Procedures, Therapies)   2018                    Hospital Admission Date: 2018  DANIEL SANDHU   : 1980  Sex: Male        Patient PCP Information     Provider PCP Type    Lencho Chan MD General         History & Physicals      H&P by Peyton Moody MD at 2018  4:17 PM     Author:  Peyton Moody MD Service:  Hospitalist Author Type:  Physician    Filed:  2018  8:36 PM Date of Service:  2018  4:17 PM Creation Time:  2018  4:16 PM    Status:  Signed :  Peyton Moody MD (Physician)         [facility] History and Physical    Daniel Sandhu MRN# [id number]   Age: 37 year old YOB: 1980     Date of Admission[BK1.1]:[AW1.1] 2018[AW1.2]    Primary care provider: Lencho Chan[BK1.1]          Assessment and Plan:   Daniel Sandhu is a 37-year-old male with history of adenocarcinoma of the GE junction,[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 He is now undergoing adjuvant chemotherapy with EOF x 3 cycles. He[BK1.2] has been admitted to the hospital after being seen in clinic for evaluation of diarrhea, nausea, inability to eat, and extreme weakness.[AW1.1]     Profound weakness/nausea/diarrhea[BK1.2].[AW1.1]  Daniel started cycle 2 EOF on [BK1.2][AW1.1]. He previously had tolerated cycle 1 well without significant issues and had started supplementing oral intake by mouth without difficulty. Around [BK1.2][AW1.1], he suddenly developed large volume, watery diarrhea at night with occasional episodes during the day,[BK1.2] in addition to[AW1.3] nausea and profound weakness. Gradually over the last week or so he has had a sensation of a \"stricture\" and extremely dry mouth, which have also limited his[BK1.2] PO[AW1.3] intake. He has lost 14 lbs[BK1.2] " in the last week[AW1.1].[BK1.2] In clinic today, noted to be tachycardic[AW1.1] to 123[BK1.2], otherwise HD stable[AW1.1]. On exam, he appears significantly weak compared to baseline[BK1.2] (per outpatient provider)[AW1.1]. Could be[BK1.2] secondary to[AW1.1] chemo, though as above, he tolerated cycle one without these issues.[BK1.2] Viral gastroenteritis is als[AW1.1]o[AW1.3] on on the differential (given recent sick family members). He was given IV fluids, zofran and dexamethasone 12 mg in clinic.[AW1.1] On arrival to the hospital this evening, he reported feeling significantly better after the dexamethasone and even tolerated a small bowl of tomato soup.[AW1.3]  - Enteric pathogen panel is pending. C.diff is NEG.[AW1.1]  -[BK1.2] Planning[AW1.3] esophogram XR while inpatient to eval[BK1.2]uate[AW1.3] for stricture[BK1.2]. This was ordered and scheduled as an outpatient, so ideally this can be done while inpatient.  - M[AW1.3]aybe also a candidate for EGD.[AW1.1] However, would opt for esophagram first as this is much less invasive and would also be informative given his above complaints.[AW1.3]  - Hold tube feedings for now.  - Anti-emetics PRN.  - Follow-up blood culture drawn in clinic (though low suspicion for infection at this time).  - PT, OT and Nutrition consults.[AW1.1]     Adenocarcinoma of the GE junction[BK1.2].  He c[AW1.1]ontinues on adjuvant chemotherapy with epirubicin, oxaliplatin, 5FU (21-day continuous infusion). He started cycle 5 on 1/2/201[BK1.2]8[AW1.1]. The 5FU pump has been disconnected as of 1/8[BK1.2]/18[AW1.1] due to the above symptoms.[BK1.2]  - Cycle 6 is tentatively planned for 1/23/18, and he has follow-up with Dr. Esteban on 3/7/18 with re-staging CT CAP on that same day.    Insomnia.  -[AW1.1] Discontinue[AW1.3] home olanzepine QHS[AW1.1] (he has not taken this and does not want to start based on reading the drug information)[AW1.3].  - Add melatonin QHS PRN.    FEN: reg diet (per  "patient request),[AW1.1] D5[AW1.3] NS[AW1.1] + 20 mEq KCl[AW1.3] at 125 ml/hr overnight, replete lytes PRN  Code Status: FULL  Dispo: Admitted to the oncology service for work-up of N/V/D and weakness. Anticipate discharge to home in the next couple of days pending clinical improvement.    Peyton Moody MD/PhD  Heme/Onc/Transplant Hospitalist  Pager [phone][AW1.1]     [BK1.2]       Chief Complaint:[BK1.1]   Diarrhea, nausea, gagging, inability to eat, and extreme weakness. All started a few days ago.  -[AW1.1] History is obtained from the patient and electronic health record[BK1.2]    Daniel Sandhu[AW1.4] is a[AW1.1] 37 year old[AW1.4] man with history of adenocarcinoma of the GE junction. He presented to clinic today with his[AW1.1] wife on an add-on basis fo[BK1.2]r evaluation of diarrhea, nausea, gagging, inability to eat, and extreme weakness. He notes these symptoms all started around 1/4/18. Diar[AW1.1]yoandy[BK1.2] s[AW1.1]tarted[BK1.2] on[AW1.1] 1/4[BK1.2]/18[AW1.1] at night, and has persisted since that time without any improvement or worsening.[BK1.2] He has been having a[AW1.1]t least 5 large volume, watery stools[BK1.2] per day (even overnight). He has not tried Imodium or any other anti-diarrheal agents. Of note, wife and daughter had a \"GI bug\" around [place]. The nausea/gagging/inability to eat have been worsening for a few weeks. Prior to 1/4/18, he was eating at least one full meal daily in addition to the tube feeds overnight. He gradually has developed very dry mouth with thick oral secretions, which make it difficult to eat. He feels like he is gagging on these secretions and this causes nausea. Additionally, there is a sensation of a \"stricture\" in his esophagus which makes him feel nauseated as well. He is using zofran and ativan at home, in addition to a liter of NS daily with [facility]. He has been eating very little lately, noting that he can tolerate only a small amount of apple sauce " and apple juice. These are the only two things that really are able to go down. Regarding the extreme weakness, he notes this seemed to have start along with the nausea, diarrhea, and inability to eat started on 1/4/18. He has felt tremendously weak. He has difficulty at home with even the slightest physical activity. He describes having to rest at least 10-15 minutes after small amounts of activity, just to get his heart rate down. He is aware that he has a rapid pulse at times at home. He was in a wheelchair at clinic because an RN witnessed him unsteady on his feet while trying to take his jacket off. He denies dizziness. He has MIMS but no SOB at rest or chest pain. Otherwise no fevers, chills, abdominal pain, bleeding, or swelling.[AW1.1]         Cancer Treatment History:[BK1.1]   Daniel Sandhu is a 37 year old male who presents with adenocarcinoma of the GE junction.  In early 2017, patient started noticing difficulty swallowing, and that food seems to take longer to go down.  It became more frequent, and he saw his PCP, and was referred for EGD, which showed an esophageal mass located at the GE junction, measuring 4 cm.  Biopsy showed poorly differentiated adenocarcinoma.  HER-2 was sent and is equivocal (2+).  CT c/a/p showed a mildly prominent lymph node in the gastrohepatic ligament, but there were no pathologically enlarged lymph nodes in the chest, abdomen, or pelvis.  There was otherwise no evidence of metastatic disease.  PET-CT showed thickening of the distal esophagus consistent with known esophageal adenocarcinoma, as well as mildly hypermetabolic 1 cm lymph node in the gastrohepatic ligament.  There was no evidence of distant metastatic disease.  He underwent EUS on 6/9/17, which showed the esophageal tumor in the lower third of the esophagus, staged T2NX.  There were 2 abnormal lymph nodes seen in the gastrohepatic ligament; pathology was suspicious for adenocarcinoma.  Celiac node was sampled as  well, which was benign.  He was seen by surgery, Dr. Esteban, and recommended srinivas-operative chemotherapy.      Port was placed on 6/22/17.      Chemotherapy:  Epirubicin 50 mg/m2 IV on day 1  Oxaliplatin 130 mg/m2 IV on day 1  Capecitabine 625 mg/m2 po BID on days 1-21  Every 21 day cycles      C1D1: 6/26/17  C2D1: 7/17/17  C3D1: 8/7/17      On 11/3/17, he underwent laparotomy with total gastrectomy and abdominal lymphadenectomy, left thoracotomy with distal esophagectomy and intrathoracic Terrell-en-Y esophagojejunostomy, feeding jejunostomy, left pharyngostomy tube placement, right tube thoracostomy, and flexible bronchoscopy.  On 11/9/17, he was taken back to OR for I&D of pharyngostomy tube abscess.  Pathology showed adenocarcinoma, moderate differentiated, extensive residual tumor with no evidence tumor regression, margins negative, perineural invasion present, 1 of 28 lymph nodes were involved with malignancy, stage [id number] (stage IIIA).  HER-2 is non-amplified.      He resumed chemotherapy post-surgery, with plans to complete 3 more cycles.  He did not tolerate Xeloda well during his neoadjuvant chemotherapy, with issues with palmar-plantar, and likely genital erythrodysesthesia.  We discussed changing to 5-FU, and Daniel would like to try this, as he may also have issues with taking pills and absorption after his surgery.  He will receive epirubicin, oxaliplatin, and 5-FU x 3 cycles.      C4D1: 12/11/17  C5D1: 1/2/18  C6D1: anticipated for 1/23/18[BK1.2]         Past Medical History:[BK1.1]     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 6/5/2017[BK1.2]            Past Surgical History:[BK1.1]      Past Surgical History:   Procedure Laterality Date     ESOPHAGOGASTRODUODENOSCOPY       ESOPHAGOSCOPY, GASTROSCOPY, DUODENOSCOPY (EGD), COMBINED N/A 6/9/2017    Procedure: COMBINED ENDOSCOPIC ULTRASOUND, ESOPHAGOSCOPY, GASTROSCOPY, DUODENOSCOPY (EGD), FINE NEEDLE  ASPIRATE/BIOPSY;  Upper Endoscopic Ultrasound, fine needle aspirate/biopsy;  Surgeon: Guru Mark Avila MD;  Location: [facility] OR     ESOPHAGOSCOPY, GASTROSCOPY, DUODENOSCOPY (EGD), COMBINED N/A 11/3/2017    Procedure: COMBINED ESOPHAGOSCOPY, GASTROSCOPY, DUODENOSCOPY (EGD);;  Surgeon: Yunior Esteban MD;  Location: [facility] OR     ESOPHAGOSCOPY, GASTROSCOPY, DUODENOSCOPY (EGD), COMBINED N/A 11/9/2017    Procedure: COMBINED ESOPHAGOSCOPY, GASTROSCOPY, DUODENOSCOPY (EGD);  Esophogastroduodenoscopy, take out pharangostomy tube;  Surgeon: Yunior Esteban MD;  Location: [facility] OR     GASTRECTOMY N/A 11/3/2017    Procedure: GASTRECTOMY;;  Surgeon: Yunior Esteban MD;  Location: [facility] OR     HAND SURGERY      childhood, torn tendon     INSERT PORT VASCULAR ACCESS Right 6/22/2017    Procedure: INSERT PORT VASCULAR ACCESS;  Single Lumen Chest Power Port;  Surgeon: Iván Driver PA-C;  Location: [facility] OR     LAPAROSCOPY DIAGNOSTIC (GENERAL) N/A 11/3/2017    Procedure: LAPAROSCOPY DIAGNOSTIC (GENERAL);  diagnostic laparoscopy, right chest tube, total gastrectomy with distal esophagectomy, intrathoracic eveline-y esophago-jejunostomy, feeding jejunostomy, pharyngostomy, esophagogastroduodenoscopy, flexible bronchoscopy;  Surgeon: Yunior Esteban MD;  Location: [facility] OR     LAPAROTOMY EXPLORATORY N/A 11/3/2017    Procedure: LAPAROTOMY EXPLORATORY;;  Surgeon: Yunior Esteban MD;  Location: [facility]     PHARYNGOSTOMY N/A 11/3/2017    Procedure: PHARYNGOSTOMY;;  Surgeon: Yunior Esteban MD;  Location: [facility]     THORACOTOMY Left 11/3/2017    Procedure: THORACOTOMY;;  Surgeon: Yunior Esteban MD;  Location: [facility] OR[BK1.2]            Social History:[BK1.1]     Social History   Substance Use Topics     Smoking status: Never Smoker     Smokeless tobacco: Never Used     Alcohol use Yes      Comment: 1 beer daily[BK1.2]            Family History:[BK1.1]     Family History    Problem Relation Age of Onset     Other - See Comments Father      sepsis     CANCER Sister      breast cancer  29 yo 1/2 sister      CANCER Paternal Grandmother      breast[BK1.2]            Immunizations:[BK1.1]     Immunization History   Administered Date(s) Administered     Influenza Vaccine IM 3yrs+ 4 Valent IIV4 10/27/2016     TDAP Vaccine (Adacel) 2017[BK1.2]            Allergies:[BK1.1]   No Known Allergies[BK1.2]         Medications:[BK1.1]       Current Facility-Administered Medications on File Prior to Encounter:  [COMPLETED] heparin 100 UNIT/ML injection 5 mL   sodium chloride (PF) 0.9% PF flush 20 mL   [COMPLETED] 0.9% sodium chloride BOLUS   INFUSION HYPERSENSITIVITY   [COMPLETED] dexamethasone (DECADRON) 12 mg in NaCl 0.9 % intermittent infusion   [COMPLETED] ondansetron (ZOFRAN) injection 8 mg     Current Outpatient Prescriptions on File Prior to Encounter:  Ondansetron HCl (ZOFRAN PO) Take by mouth every 8 hours as needed for nausea or vomiting   Prochlorperazine Maleate (COMPAZINE PO) Take by mouth every 6 hours as needed for nausea   LORazepam (ATIVAN PO) Take by mouth every 4 hours as needed for anxiety   OLANZapine (ZYPREXA) 10 MG tablet Take 1 tablet (10 mg) by mouth At Bedtime   fluconazole (DIFLUCAN) 200 MG tablet Take 1 tablet (200 mg) by mouth daily   cyabnocobalamin (VITAMIN B-12) 2500 MCG sublingual tablet Place 2,500 mcg under the tongue daily   acetaminophen (TYLENOL) 32 mg/mL solution 30.45 mLs (975 mg) by Per J Tube route 3 times daily   sennosides (SENOKOT) 8.8 MG/5ML syrup 10 mLs by Per J Tube route 2 times daily   multivitamins with minerals (CERTAVITE/CEROVITE) LIQD liquid 15 mLs by Per J Tube route daily   oxyCODONE (ROXICODONE) 5 MG/5ML solution Take 5-10 mLs (5-10 mg) by mouth every 4 hours as needed for moderate to severe pain (Patient not taking: Reported on 2018)[BK1.2]              Review of Systems:[BK1.1]   A comprehensive ROS was performed with the  patient and found to be negative with the exception of that noted in the HPI above.[AW1.1]         Data:[BK1.1]     Lab Results   Component Value Date    WBC 1.9 (L) 01/09/2018    WBC 1.9 (L) 01/08/2018    WBC 9.9 01/02/2018    HGB 13.8 01/09/2018    HGB 13.0 (L) 01/08/2018    HGB 10.3 (L) 01/02/2018    HCT 42.4 01/09/2018    HCT 40.6 01/08/2018    HCT 32.9 (L) 01/02/2018     01/09/2018     01/08/2018     01/02/2018     01/09/2018     01/08/2018     01/02/2018    POTASSIUM 3.9 01/09/2018    POTASSIUM 3.8 01/08/2018    POTASSIUM 4.4 01/02/2018    CHLORIDE 102 01/09/2018    CHLORIDE 105 01/08/2018    CHLORIDE 100 01/02/2018    CO2 20 01/09/2018    CO2 22 01/08/2018    CO2 29 01/02/2018    BUN 24 01/09/2018    BUN 22 01/08/2018    BUN 14 01/02/2018    CR 0.77 01/09/2018    CR 0.81 01/08/2018    CR 0.59 (L) 01/02/2018     (H) 01/09/2018     (H) 01/08/2018     (H) 01/02/2018    AST 21 01/09/2018    AST 18 01/08/2018    AST 17 01/02/2018    ALT 21 01/09/2018    ALT 23 01/08/2018    ALT 22 01/02/2018    ALKPHOS 55 01/09/2018    ALKPHOS 57 01/08/2018    ALKPHOS 70 01/02/2018    BILITOTAL 0.4 01/09/2018    BILITOTAL 0.2 01/08/2018    BILITOTAL 0.2 01/02/2018    INR 1.14 07/29/2017    INR 1.12 06/09/2017[BK1.2]           Revision History        User Key Date/Time User Provider Type Action    > AW1.3 1/9/2018  8:36 PM Peyton Moody MD Physician Sign     AW1.2 1/9/2018  6:51 PM Peyton Moody MD Physician Share     AW1.4 1/9/2018  6:31 PM Peyton Moody MD Physician      AW1.1 1/9/2018  6:29 PM Peyton Moody MD Physician      BK1.2 1/9/2018  6:21 PM Chi Agudelo MD Physician Share     BK1.1 1/9/2018  4:16 PM Chi Agudelo MD Physician                   Discharge Summaries     No notes of this type exist for this encounter.         Consult Notes      Consults by Merlene Cook MD at 1/11/2018  7:30 AM      Author:  Merlene Cook MD Service:  Gastroenterology Author Type:  Fellow    Filed:  1/11/2018  1:46 PM Date of Service:  1/11/2018  7:30 AM Creation Time:  1/11/2018  7:30 AM    Status:  Attested :  Merlene Cook MD (Fellow)    Cosigner:  Alessandro Mcgregor MD at 1/11/2018  4:44 PM         Consult Orders:    1. GI LUMINAL ADULT IP CONSULT: Patient to be seen: Routine within 24 hrs; Call back #: Melody Melba, [id number]; hx of GE junction CA s/p resection 11/3.  Now with focal mucosal abnormality of the jejunal lumen near the anastomosis, need EGD and po... [[id number]] ordered by Melody Reilly APRN CNP at 01/10/18 1515           Attestation signed by Alessandro Mcgregor MD at 1/11/2018  4:44 PM        I performed a history and physical examination of the above patient and discussed the management with Dr. Cook on 1/11/2018. I reviewed the note and there are no changes to the past medical, family or social history.  A complete 10 point review of systems was obtained. Please see the HPI for pertinent positives and negatives. All other systems were reviewed and were found to be negative. I agree with the documented findings and plan of care as outlined.    Alessandro Mcgregor MD  GI Attending  Pager: [phone]                                     GASTROENTEROLOGY CONSULTATION      Date of Admission:  1/9/2018          ASSESSMENT AND RECOMMENDATIONS:   Assessment:[AL1.1]  Danieljeanette Sandhu is a[AL1.2] 37 year old male with a history of[AL1.1] GE junction adenocarcinoma on mayi-adjuvent chemotherapy s/p laparotomy with total gastrectomy, abdominal lymphadenectomy, esophagectomy and intrathoracic RNY esophagojejunostomy 11/3/17 presenting with complaints of diarrhea, nausea, and inability to keep oral intake with diarrheal loses. Anatomical evaluation with esophagram concerning for question of focal mucosal abnormality of jejunal lumen near anastomosis - possible srinivas-anastomotic ulceration. GI  is consulted for further evaluation and management and consideration of EGD.     Differential diagnosis of luminal irregularity includes post-surgical change, anastomotic ulcer vs cancer recurrence. Will plan on EGD today for further luminal evaluation. Cause of diarrhea likely multi-factorial with post-surgical anatomy (altered anatomy, absent stomach), chemotherapy and tube feeds.[AL1.2] Infectious studies negative (C diff, enteric panel).[AL1.3] Anticipate ongoing diarrhea given anatomical changes.[AL1.2]      Recommendations[AL1.1]:   - Perform EGD today   - Agree with plans for trial of adding fiber to tube feeds for bulking and trial of imodium   - GI will continue to follow[AL1.2]    Gastroenterology outpatient follow up recommendations:[AL1.1] Pending clinical course.[AL1.2]    Thank you for involving us in this patient's care. Please do not hesitate to contact the GI service with any questions or concerns.     Pt care plan discussed with [AL1.1] Balbir[AL1.2], GI staff physician.    Merlene Cook[AL1.1], MD  Gastroenterology Fellow[AL1.2]  -------------------------------------------------------------------------------------------------------------------          Chief Complaint:   We were asked by[AL1.1] GAVIN Wiggins[AL1.2] of[AL1.1] Heme/Onc[AL1.2] to evaluate this patient with[AL1.1] concern for anastomotic changes on esophagram.[AL1.2]    History is obtained from the patient and the medical record.          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 Anatomical evaluation with esophagram concerning for question of focal mucosal abnormality  of jejunal lumen near anastomosis - possible srinivas-anastomotic ulceration. GI is consulted for further evaluation and management and consideration of EGD.     Patient reports[AL1.2] diarrhea starting Thursday. He notes 5-7 episodes of diarrhea overnight Thursday and at least five episodes of diarrhea during the day. This is liquid stool. No melena or hematochezia. The diarrhea improves if he stops eating. He denies abdominal pain; He notes some cramping abdominal pain prior to bowel movement that is relieved with bowel movements. He has nausea and vomiting associated with his chemotherapy that is well controlled with his anti-emetic regimen. He reports losing 14 pounds in 10 days. He denies fevers, chills, shortness of breath, chest pain, lower extremity swelling, headache or dyspnea. No dysphagia - just some issues with swallowing due to dry mouth. No odynophagia.[AL1.3]             Past Medical History:   Reviewed and edited as appropriate  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 6/5/2017            Past Surgical History:   Reviewed and edited as appropriate   Past Surgical History:   Procedure Laterality Date     ESOPHAGOGASTRODUODENOSCOPY       ESOPHAGOSCOPY, GASTROSCOPY, DUODENOSCOPY (EGD), COMBINED N/A 6/9/2017    Procedure: COMBINED ENDOSCOPIC ULTRASOUND, ESOPHAGOSCOPY, GASTROSCOPY, DUODENOSCOPY (EGD), FINE NEEDLE ASPIRATE/BIOPSY;  Upper Endoscopic Ultrasound, fine needle aspirate/biopsy;  Surgeon: Guru Mark Avila MD;  Location:  OR     ESOPHAGOSCOPY, GASTROSCOPY, DUODENOSCOPY (EGD), COMBINED N/A 11/3/2017    Procedure: COMBINED ESOPHAGOSCOPY, GASTROSCOPY, DUODENOSCOPY (EGD);;  Surgeon: Yunior Esteban MD;  Location: [facility] OR     ESOPHAGOSCOPY, GASTROSCOPY, DUODENOSCOPY (EGD), COMBINED N/A 11/9/2017    Procedure: COMBINED ESOPHAGOSCOPY, GASTROSCOPY, DUODENOSCOPY (EGD);  Esophogastroduodenoscopy, take out pharangostomy tube;   Surgeon: Yunior Esteban MD;  Location: [facility] OR     GASTRECTOMY N/A 11/3/2017    Procedure: GASTRECTOMY;;  Surgeon: Yunior Esteban MD;  Location: [facility] OR     HAND SURGERY      childhood, torn tendon     INSERT PORT VASCULAR ACCESS Right 6/22/2017    Procedure: INSERT PORT VASCULAR ACCESS;  Single Lumen Chest Power Port;  Surgeon: Iván Driver PA-C;  Location: [facility]     LAPAROSCOPY DIAGNOSTIC (GENERAL) N/A 11/3/2017    Procedure: LAPAROSCOPY DIAGNOSTIC (GENERAL);  diagnostic laparoscopy, right chest tube, total gastrectomy with distal esophagectomy, intrathoracic eveline-y esophago-jejunostomy, feeding jejunostomy, pharyngostomy, esophagogastroduodenoscopy, flexible bronchoscopy;  Surgeon: Yunior Esteban MD;  Location: [facility] OR     LAPAROTOMY EXPLORATORY N/A 11/3/2017    Procedure: LAPAROTOMY EXPLORATORY;;  Surgeon: Yunior Esteban MD;  Location: [facility]     PHARYNGOSTOMY N/A 11/3/2017    Procedure: PHARYNGOSTOMY;;  Surgeon: Yunior Esteban MD;  Location: [facility]     THORACOTOMY Left 11/3/2017    Procedure: THORACOTOMY;;  Surgeon: Yunior Esteban MD;  Location: [facility] OR            Previous Endoscopy:   No results found for this or any previous visit.         Social History:   Reviewed and edited as appropriate  Social History     Social History     Marital status:      Spouse name: N/A     Number of children: 1     Years of education: N/A     Occupational History     musician and teacher       Social History Main Topics     Smoking status: Never Smoker     Smokeless tobacco: Never Used     Alcohol use Yes      Comment: 1 beer daily     Drug use: Yes     Special: Marijuana      Comment: occasional     Sexual activity: Yes     Partners: Female     Birth control/ protection: Natural Family Planning     Other Topics Concern     Not on file     Social History Narrative            Family History:   Reviewed and edited as appropriate  Family History   Problem  "Relation Age of Onset     Other - See Comments Father      sepsis     CANCER Sister      breast cancer  31 yo 1/2 sister      CANCER Paternal Grandmother      breast     No known history of colorectal cancer, liver disease, or inflammatory bowel disease.       Allergies:   Reviewed and edited as appropriate   No Known Allergies         Medications:[AL1.1]     Prescriptions Prior to Admission   Medication Sig Dispense Refill Last Dose     Ondansetron HCl (ZOFRAN PO) Take by mouth every 8 hours as needed for nausea or vomiting   Taking     Prochlorperazine Maleate (COMPAZINE PO) Take by mouth every 6 hours as needed for nausea   Taking     LORazepam (ATIVAN PO) Take by mouth every 4 hours as needed for anxiety   Taking     fluconazole (DIFLUCAN) 200 MG tablet Take 1 tablet (200 mg) by mouth daily 30 tablet 1 2018 at Unknown time     cyabnocobalamin (VITAMIN B-12) 2500 MCG sublingual tablet Place 2,500 mcg under the tongue daily 30 tablet 1 Taking     acetaminophen (TYLENOL) 32 mg/mL solution 30.45 mLs (975 mg) by Per J Tube route 3 times daily 400 mL 0 Taking     OLANZapine (ZYPREXA) 10 MG tablet Take 1 tablet (10 mg) by mouth At Bedtime 30 tablet 1 Unknown at Unknown time     sennosides (SENOKOT) 8.8 MG/5ML syrup 10 mLs by Per J Tube route 2 times daily 400 mL 0 Unknown at Unknown time     multivitamins with minerals (CERTAVITE/CEROVITE) LIQD liquid 15 mLs by Per J Tube route daily 450 mL 0 Unknown at Unknown time     oxyCODONE (ROXICODONE) 5 MG/5ML solution Take 5-10 mLs (5-10 mg) by mouth every 4 hours as needed for moderate to severe pain (Patient not taking: Reported on 2018) 300 mL 0 Unknown at Unknown time[AL1.4]             Review of Systems:   A complete review of systems was performed and is negative except as noted in the HPI           Physical Exam:   /72 (BP Location: Left arm, Cuff Size: Adult Regular)  Pulse 76  Temp 98.7  F (37.1  C)  Resp 16  Ht 1.75 m (5' 8.9\")  Wt 60.1 kg " (132 lb 7.9 oz)  SpO2 98%  BMI 19.62 kg/m2  Wt:   Wt Readings from Last 2 Encounters:   01/10/18 60.1 kg (132 lb 7.9 oz)   01/09/18 60.6 kg (133 lb 8 oz)      Constitutional: cooperative, pleasant, not dyspneic/diaphoretic, no acute distress   Eyes: Sclera anicteric  Ears/nose/mouth/throat: Normal oropharynx without ulcers or exudate, mucus membranes[AL1.1] dry[AL1.2], hearing intact  Neck: supple, thyroid normal size  CV: No edema  Respiratory: Unlabored breathing  Lymph: No axillary, submandibular, supraclavicular lymphadenopathy  Abd: Nondistended, +bs, no hepatosplenomegaly, nontender, no peritoneal signs[AL1.1]; + j tube; prior surgical scars well healing[AL1.2]  Skin: warm, perfused  Neuro: AAO x 3  Psych: Normal affect  MSK: No gross deformities         Data:   Labs and imaging below were independently reviewed and interpreted    BMP  Recent Labs  Lab 01/11/18  0432 01/10/18  0331 01/09/18  1351 01/08/18  1715    136 133 137   POTASSIUM 3.6 4.2 3.9 3.8   CHLORIDE 111* 109 102 105   YOBANY 8.2* 8.4* 8.6 8.8   CO2 17* 19* 20 22   BUN 20 22 24 22   CR 0.84 0.70 0.77 0.81   * 137* 140* 109*     CBC  Recent Labs  Lab 01/11/18  0432 01/10/18  0331 01/09/18  1351 01/08/18  1715   WBC 2.3* 1.7* 1.9* 1.9*   RBC 4.50 4.77 5.47 5.11   HGB 11.3* 11.9* 13.8 13.0*   HCT 34.7* 36.7* 42.4 40.6   MCV 77* 77* 78 80   MCH 25.1* 24.9* 25.2* 25.4*   MCHC 32.6 32.4 32.5 32.0   RDW 16.1* 15.8* 15.9* 16.4*    277 310 316     INRNo lab results found in last 7 days.  LFTs  Recent Labs  Lab 01/11/18  0432 01/09/18  1351 01/08/18  1715   ALKPHOS 45 55 57   AST 11 21 18   ALT 16 21 23   BILITOTAL 0.3 0.4 0.2   PROTTOTAL 5.5* 7.4 7.0   ALBUMIN 2.1* 2.9* 2.9*      PANCNo lab results found in last 7 days.    Imaging:[AL1.1]  Esophagram 1/10/18  1.  Postoperative changes of distal esophagectomy and  esophagojejunostomy without evidence of esophageal or anastomotic  stricture.  2.  Questionable focal mucosal abnormality of  the jejunal lumen near  the anastomosis. Although this may represent normal jejunal fold,  perianastomotic jejunal ulcer is not entirely excluded especially in  the setting of epigastric pain and burning sensation as reported by  the patient.  3.  Normal transit of contrast through the visualize small bowel  without evidence obstruction.[AL1.2]     Revision History        User Key Date/Time User Provider Type Action    > [N/A] 1/11/2018  1:46 PM Merlene Cook MD Fellow Sign     AL1.3 1/11/2018  1:46 PM Merlene Cook MD Fellow Sign     AL1.4 1/11/2018 11:40 AM Merlene Cook MD Fellow      AL1.2 1/11/2018 11:29 AM Merlene Cook MD Fellow      AL1.1 1/11/2018  7:30 AM Merlene Cook MD Fellow             Consults by Tim Adler MD at 1/10/2018 10:34 AM     Author:  Tim Adler MD Service:  Thoracic Surgery Author Type:  Resident    Filed:  1/10/2018  4:35 PM Date of Service:  1/10/2018 10:34 AM Creation Time:  1/10/2018 10:33 AM    Status:  Cosign Needed :  Tmi Adler MD (Resident)    Cosign Required:  Yes         Consult Orders:    1. Thoracic Surgery Adult IP Consult: Patient to be seen: Routine within 24 hrs; Call back #: Melody Lugo/[place] [phone]; hx GE adeno s/p surgery in Nov. FTT, limited PO intake, concern for stricture.; Consultant may enter orders: Yes [[id number]] ordered by Melody Reilly APRN CNP at 01/10/18 1038                THORACIC[JH1.1] SURGERY H&P/CONSULT  January 10, 2018    Daniel Sandhu  MRN: [id number]  male  37 year old      CHIEF COMPLAINT:    I saw Mr. 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, s/p (11/3/2017, Yunior Esteban) esophagogastroscopy, diagnostic  laparoscopy, laparotomy with total gastrectomy and abdominal lymphadenectomy (D2), LEFT thoracotomy with distal esophagectomy and intrathoracic Terrell-en-Y esophagojejunostomy, feeding jejunostomy, LEFT pharyngostomy tube placement, RIGHT tube thoracostomy, and flexible bronchoscopy c/b neck hematoma associated with pharyngostomy tube s/p (11/9/2017) oropharyngeal exploration and esophagojejunostomy who has been recovering well from surgery but now is admitted for failure to thrive, 14lb weight loss in 10 days, limited oral intake, and possible esophageal stricture. Hemodynamically stable, abdomen exam benign, able to tolerate full liquid diet.[JH1.1] Esophagram negative for stricture. No further investigation of jejunal ulcer or esophageal spasm is necessary at this time. These symptoms are likely secondary to chemotherapy.[JH1.3]     PLAN:    -[JH1.2] Nutrition consult for tube feeding optimization[JH1.3]    -[JH1.2] Pre-albumin lab[JH1.3]  -[JH1.2] Recommend discussion with oncology regarding risks and benefits of chemotherapy   - Thank you for consulting thoracic surgery. Please feel free to contact us with further questions.[JH1.3]     Patient seen, findings and plan discussed with[JH1.2] fellow[JH1.1], [JH1.2] Jessica[JH1.1].    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, s/p (11/3/2017, Yunior Esteban) esophagogastroscopy, diagnostic laparoscopy, laparotomy with total gastrectomy and abdominal lymphadenectomy (D2), LEFT thoracotomy with distal esophagectomy and intrathoracic Terrell-en-Y esophagojejunostomy, feeding jejunostomy, LEFT pharyngostomy tube placement, RIGHT tube thoracostomy, and flexible bronchoscopy c/b neck hematoma associated with pharyngostomy tube s/p (11/9/2017) oropharyngeal exploration and esophagojejunostomy who has been recovering  "well from surgery but now is admitted for failure to thrive, 14lb weight loss in 10 days, limited oral intake, and possible esophageal stricture.     Patient reports he tolerated neoadjuvant chemotherapy well but has not been tolerating adjuvant chemotherapy. During his second cycle of chemotherapy, he experienced severe nausea, emesis that kept him up at night, dry mouth, gastric acid reflux. He reports saliva and yogurt feels very heavy and thick. He often experienced \"chest tightness\" that he correlates to esophageal spasms not associated with eating. He is only able to tolerate apple juice, ginger ale, and tea. Anti-emetic medications has not been helpful. His last J-tube feeding of 1000ml has been two days ago but reports unable to keep feeding down. Reports fatigue, SOB associated with surgery, abdominal cramping, diarrhea (c.diff negative). No CP, hematuria, hematochezia. Able to tolerate applesauce, soft, liquid foods.     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 6/5/2017[JH1.4]       SURGICAL HISTORY:[JH1.2]  Past Surgical History:   Procedure Laterality Date     ESOPHAGOGASTRODUODENOSCOPY       ESOPHAGOSCOPY, GASTROSCOPY, DUODENOSCOPY (EGD), COMBINED N/A 6/9/2017    Procedure: COMBINED ENDOSCOPIC ULTRASOUND, ESOPHAGOSCOPY, GASTROSCOPY, DUODENOSCOPY (EGD), FINE NEEDLE ASPIRATE/BIOPSY;  Upper Endoscopic Ultrasound, fine needle aspirate/biopsy;  Surgeon: Guru Mark Avila MD;  Location:  OR     ESOPHAGOSCOPY, GASTROSCOPY, DUODENOSCOPY (EGD), COMBINED " N/A 11/3/2017    Procedure: COMBINED ESOPHAGOSCOPY, GASTROSCOPY, DUODENOSCOPY (EGD);;  Surgeon: Yunior Esteban MD;  Location: [facility] OR     ESOPHAGOSCOPY, GASTROSCOPY, DUODENOSCOPY (EGD), COMBINED N/A 11/9/2017    Procedure: COMBINED ESOPHAGOSCOPY, GASTROSCOPY, DUODENOSCOPY (EGD);  Esophogastroduodenoscopy, take out pharangostomy tube;  Surgeon: Yunior Esteban MD;  Location: [facility] OR     GASTRECTOMY N/A 11/3/2017    Procedure: GASTRECTOMY;;  Surgeon: Yunoir Esteban MD;  Location: [facility] OR     HAND SURGERY      childhood, torn tendon     INSERT PORT VASCULAR ACCESS Right 6/22/2017    Procedure: INSERT PORT VASCULAR ACCESS;  Single Lumen Chest Power Port;  Surgeon: Iván Driver PA-C;  Location: [facility] OR     LAPAROSCOPY DIAGNOSTIC (GENERAL) N/A 11/3/2017    Procedure: LAPAROSCOPY DIAGNOSTIC (GENERAL);  diagnostic laparoscopy, right chest tube, total gastrectomy with distal esophagectomy, intrathoracic eveline-y esophago-jejunostomy, feeding jejunostomy, pharyngostomy, esophagogastroduodenoscopy, flexible bronchoscopy;  Surgeon: Yunior Esteban MD;  Location: [facility] OR     LAPAROTOMY EXPLORATORY N/A 11/3/2017    Procedure: LAPAROTOMY EXPLORATORY;;  Surgeon: Yunior Esteban MD;  Location: [facility] OR     PHARYNGOSTOMY N/A 11/3/2017    Procedure: PHARYNGOSTOMY;;  Surgeon: Yunior Esteban MD;  Location: [facility] OR     THORACOTOMY Left 11/3/2017    Procedure: THORACOTOMY;;  Surgeon: Yunior Esteban MD;  Location: [facility] OR[JH1.4]        SOCIAL HISTORY:   ETOH[JH1.2] no[JH1.5]  TOB[JH1.2] no  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.[JH1.5]     Social History     Social History     Marital status:      Spouse name: N/A     Number of children: 1     Years of education: N/A     Occupational History     musician and teacher       Social History Main Topics     Smoking status: Never Smoker     Smokeless tobacco: Never Used     Alcohol use Yes      Comment: 1  beer daily     Drug use: Yes     Special: Marijuana      Comment: occasional     Sexual activity: Yes     Partners: Female     Birth control/ protection: Natural Family Planning     Other Topics Concern     Not on file     Social History Narrative[JH1.4]       FAMILY HISTORY:[JH1.2]   Family History   Problem Relation Age of Onset     Other - See Comments Father      sepsis     CANCER Sister      breast cancer  31 yo 1/2 sister      CANCER Paternal Grandmother      breast[JH1.4]       ALLERGIES:[JH1.2]    No Known Allergies[JH1.4]    MEDICATIONS:[JH1.2]     Current Facility-Administered Medications on File Prior to Encounter:  [COMPLETED] heparin 100 UNIT/ML injection 5 mL   [COMPLETED] 0.9% sodium chloride BOLUS   INFUSION HYPERSENSITIVITY   [COMPLETED] dexamethasone (DECADRON) 12 mg in NaCl 0.9 % intermittent infusion   [COMPLETED] ondansetron (ZOFRAN) injection 8 mg   heparin lock flush 10 UNIT/ML injection 3 mL   [COMPLETED] sodium chloride (PF) 0.9% PF flush 10 mL   [DISCONTINUED] sodium chloride (PF) 0.9% PF flush 20 mL     Current Outpatient Prescriptions on File Prior to Encounter:  Ondansetron HCl (ZOFRAN PO) Take by mouth every 8 hours as needed for nausea or vomiting   Prochlorperazine Maleate (COMPAZINE PO) Take by mouth every 6 hours as needed for nausea   LORazepam (ATIVAN PO) Take by mouth every 4 hours as needed for anxiety   fluconazole (DIFLUCAN) 200 MG tablet Take 1 tablet (200 mg) by mouth daily   cyabnocobalamin (VITAMIN B-12) 2500 MCG sublingual tablet Place 2,500 mcg under the tongue daily   acetaminophen (TYLENOL) 32 mg/mL solution 30.45 mLs (975 mg) by Per J Tube route 3 times daily   OLANZapine (ZYPREXA) 10 MG tablet Take 1 tablet (10 mg) by mouth At Bedtime   sennosides (SENOKOT) 8.8 MG/5ML syrup 10 mLs by Per J Tube route 2 times daily   multivitamins with minerals (CERTAVITE/CEROVITE) LIQD liquid 15 mLs by Per J Tube route daily   oxyCODONE (ROXICODONE) 5 MG/5ML solution Take 5-10  "mLs (5-10 mg) by mouth every 4 hours as needed for moderate to severe pain (Patient not taking: Reported on 1/2/2018)[JH1.4]       PHYSICAL EXAMINATION:[JH1.2]  /71  Pulse 79  Temp 98.3  F (36.8  C) (Oral)  Resp 16  Ht 1.75 m (5' 8.9\")  Wt 60.1 kg (132 lb 7.9 oz)  SpO2 97%  BMI 19.62 kg/m2  Body mass index is 19.62 kg/(m^2).[JH1.4]    GEN: Awake, alert, interactive, NAD   NEURO: CNs grossly intact  HEENT: EOMI, neck is supple without JVD  CV: RRR  PULM: Resp non-labored on RA  ABD: Soft, non-distended, non-tender to palpation, no rebound or guarding.[JH1.2]   Incisions: midabdomen, left thoracic well-healed, no sign of infection, c/d/i[JH1.5]  EXT: w/o edema, wwp  SKIN: No jaundice.     LABS: Reviewed.   Complete Blood Count[JH1.2]     Recent Labs  Lab 01/10/18  0331 01/09/18  1351 01/08/18  1715   WBC 1.7* 1.9* 1.9*   HGB 11.9* 13.8 13.0*    310 316[JH1.4]     Basic Metabolic Panel[JH1.2]    Recent Labs  Lab 01/10/18  0331 01/09/18  1351 01/08/18  1715    133 137   POTASSIUM 4.2 3.9 3.8   CHLORIDE 109 102 105   CO2 19* 20 22   BUN 22 24 22   CR 0.70 0.77 0.81   * 140* 109*[JH1.4]     Liver Function Tests[JH1.2]    Recent Labs  Lab 01/09/18  1351 01/08/18  1715   AST 21 18   ALT 21 23   ALKPHOS 55 57   BILITOTAL 0.4 0.2   ALBUMIN 2.9* 2.9*[JH1.4]       IMAGING:  Results for orders placed or performed in visit on 12/28/17   CT Abdomen Pelvis w/o Contrast    Narrative    EXAMINATION: CT ABDOMEN PELVIS W/O CONTRAST, 12/28/2017 11:11 AM    TECHNIQUE:  Helical CT images from the lung bases through the  symphysis pubis were obtained without IV contrast.     COMPARISON: CT chest abdomen pelvis 12/1/2017    HISTORY: Jejunostomy tube, increased tenderness, assess position s/p  esophagectomy; Malignant neoplasm of esophagus, unspecified location  (H)    FINDINGS:    Abdomen and pelvis: Percutaneous jejunostomy tube in place with tip  located in the jejunum in the left upper quadrant. There is " contrast  opacification of small bowel which is not dilated. No opacified  retention balloon identified. No herniated bowel along the soft tissue  track of the jejunostomy tube. No fluid collection along the tract.    Post surgical changes of distal esophagectomy and total gastrectomy  with intrathoracic esophagojejunostomy pull-through. Multiple surgical  clips. Again noted jejunojejunostomy anastomosis in the left  midabdomen. No abnormally dilated small or large bowel. Evaluation is  limited due to absence of intravenous contrast injection. Liver,  gallbladder, spleen, pancreas, adrenal glands and kidneys are  unremarkable. No abdominal aortic aneurysm. Decompressed urinary  bladder. Prostate calcifications. Symmetric seminal vesicles. Pelvic  phleboliths, right greater than left. No free air. No ascites. No  abdominal, pelvic or inguinal lymphadenopathy.    Lung bases: Clear    Bones and soft tissues: Mild degenerative changes of the spine. No  aggressive osseous lesions. Soft tissues are within normal limits.      Impression    IMPRESSION:   1. Percutaneous jejunostomy tube is in good position. No evidence for  hernia, fluid collection or significant inflammation along the  subcutaneous tract.   2. Stable postsurgical changes of distal esophagectomy and total  gastrectomy with intrathoracic esophagojejunostomy pull-through. No  evidence for obstruction.    I have personally reviewed the examination and initial interpretation  and I agree with the findings.    MILAGROS LOUISE MD         CO-MORBIDITIES:   No diagnosis found.[JH1.2]       Revision History        User Key Date/Time User Provider Type Action    > JH1.3 1/10/2018  4:35 PM Tim Adler MD Resident Sign     JH1.1 1/10/2018 12:54 PM Tim Adler MD Resident Share     JH1.5 1/10/2018 12:16 PM Tim Adler MD Resident Share     JH1.4 1/10/2018 10:35 AM Tim Adler MD Resident Share     JH1.2 1/10/2018 10:33 AM Tim Adler MD Resident                       Progress Notes - Physician (Notes from 01/10/18 through 01/13/18)      Progress Notes by Nannette Pascual at 1/13/2018  1:27 PM     Author:  Nannette Pascual Service:  Nutrition Author Type:  Registered Dietitian    Filed:  1/13/2018  1:30 PM Date of Service:  1/13/2018  1:27 PM Creation Time:  1/13/2018  1:27 PM    Status:  Signed :  Nannette Pascual ([place] Dietitian)         CLINICAL NUTRITION SERVICES    Contacted by team re: diarrhea (>6L) yesterday.  Per chart review, majority of medications are provided IV or by mouth.      Recommendations:  1) Ensure medications provided by mouth if able  2) Switch to Peptamen 1.5 (semi-elemental formula for possibly improved absorption) @ 60 ml/hr (same rate)  3) If formula switch ineffective, consider increasing fiber to 2 pkts TID    Nannette Pascual MS, RD, LD  W/E pager [phone][JT1.1]       Revision History        User Key Date/Time User Provider Type Action    > JT1.1 1/13/2018  1:30 PM Nannette Pascual Registered Dietitian Sign            Progress Notes by Sravani Sesay RD at 1/12/2018  1:49 PM     Author:  Sravani Sesay RD Service:  Nutrition Author Type:  Registered Dietitian    Filed:  1/12/2018  1:53 PM Date of Service:  1/12/2018  1:49 PM Creation Time:  1/12/2018  1:49 PM    Status:  Signed :  Sravani Sesay RD (Registered Dietitian)         Nutrition Services Brief Progress Note - please see note from 1/10/18 for full note    Diet: regular with ensure clear between meals and Nutrisource Fiber between meals   TF: Osmolite 1.5 at 60 ml/hr     Labs: (1/12): K+ 3.1 mmol/L (L)    Interventions  1. Discussed with nursing. Patient would like to try Beneprotein with meals for increased protein intake. Order placed     Unit RD will continue to monitor per protocol[KP1.1]  Sravani Sesay[KP1.2] MS/RD/LD/CNSC  BMT RD Pager: [phone][KP1.1]              Revision History        User Key Date/Time User Provider Type Action    > KP1.2 1/12/2018   "1:53 PM Sravani Sesay RD Registered Dietitian Sign     KP1.1 1/12/2018  1:49 PM Sravani Sesay RD Registered Dietitian             Progress Notes by Merlene Cook MD at 1/12/2018 12:39 PM     Author:  Merlene Cook MD Service:  Gastroenterology Author Type:  Fellow    Filed:  1/12/2018 12:43 PM Date of Service:  1/12/2018 12:39 PM Creation Time:  1/12/2018 12:39 PM    Status:  Signed :  Merlene Cook MD (Fellow)         Gastroenterology Inpatient Sign Off Note    Subjective: No acute events overnight. Patient feeling tired this morning. Ongoing diarrhea. No difficulty swallowing, nausea, or vomiting. No fevers or chills.     Objective:[AL1.1]   /69  Pulse 76  Temp 98.5  F (36.9  C) (Oral)  Resp 16  Ht 1.75 m (5' 8.9\")  Wt 60.6 kg (133 lb 9.6 oz)  SpO2 98%  BMI 19.79 kg/m2[AL1.2]  General: Alert, NAD  HEENT: NC/AT  Abd: Soft, NT, ND, + BS  Neuro: Alert and oriented    Impression/Recommendations:  Daniel Sandhu is a 37 year old male with a history of GE junction adenocarcinoma on mayi-adjuvent chemotherapy s/p laparotomy with total gastrectomy, abdominal lymphadenectomy, esophagectomy and intrathoracic RNY esophagojejunostomy 11/3/17 presenting with complaints of diarrhea, nausea, and inability to keep oral intake with diarrheal loses. Anatomical evaluation with esophagram concerning for question of focal mucosal abnormality of jejunal lumen near anastomosis - possible srinivas-anastomotic ulceration. GI is consulted for further evaluation and management and consideration of EGD.     EGD completed and notable for post-surgical change of esophagojejunostomy and total gastrectomy. Mild erosions/ulcerations throughout jejunum. Small ulceration near anastomosis. Small mucosal change at anastomosis, not malignant appearing, biopsies. Pathology pending.     Will await pathology results.    Inpatient GI consults service will sign off. No further recommendations at this " time. If primary team has addition questions, please page consult fellow listed in [facility].    Current GI Consult Staff: Dr. Homero Lambert     Follow up recommendations:   No outpatient GI clinic follow-up indicated. Follow-up with primary care provider at timing determined by discharge physician.    If outpatient GI follow-up is felt indicated by primary care, they may coordinate this by placing new GI referral and contacting: General GI clinic - ([phone])Plan of care discussed with Dr. Lambert.     Merlene Cook MD  Gastroenterology Fellow[AL1.1]     Revision History        User Key Date/Time User Provider Type Action    > AL1.2 1/12/2018 12:43 PM Merlene Cook MD Fellow Sign     AL1.1 1/12/2018 12:39 PM Merlene Cook MD Fellow             Progress Notes by Michelle Arita RN at 1/12/2018 11:20 AM     Author:  Michelle Arita RN Service:  Care Coordinator Author Type:  Care Coordinator    Filed:  1/12/2018 11:22 AM Date of Service:  1/12/2018 11:20 AM Creation Time:  1/12/2018 11:20 AM    Status:  Signed :  Michelle Arita RN (Care Coordinator)           Care Coordinator- Discharge Planning     Admission Date/Time:  1/9/2018  Attending MD:  Sam Dumont,*     Data  Date of initial CC assessment:  1/9  Chart reviewed, discussed with interdisciplinary team.   Patient was admitted for:   1. 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         Assessment  Concerns with insurance coverage for discharge needs: None.  Current Living Situation: Patient lives with spouse.  Support System: Supportive and Involved  Services Involved: Home Infusion  Transportation: Family or Friend will provide  Barriers to Discharge: none        Coordination of Care and Referrals: Provided patient/family with options for Home Infusion.    Currently receives home enterals per [facility].  Resumption instructions entered into AVS.      Plan  Anticipated Discharge Date:  1-2 days  Anticipated  Discharge Plan:  home    CTS Handoff completed: at d/c.  Michelle Arita RN   ________________________________________  Michelle Arita RN, BSN    7D/Oncology Care Coordinator  Pager  [phone]  Phone [phone][GB1.1]       Revision History        User Key Date/Time User Provider Type Action    > GB1.1 1/12/2018 11:22 AM Michelle Arita RN Care Coordinator Sign            Progress Notes by Dung Souza PA-C at 1/11/2018  3:23 PM     Author:  Dung Souza PA-C Service:  Thoracic Surgery Author Type:  Physician Assistant    Filed:  1/11/2018  3:30 PM Date of Service:  1/11/2018  3:23 PM Creation Time:  1/11/2018  3:23 PM    Status:  Addendum :  Dung Souza PA-C (Physician Assistant)         THORACIC & FOREGUT SURGERY    J tube replaced[JB1.1] with 12F HILDA G tube[JB1.2] at bedside, placement confirmed with sly khalil to use tube for feedings    Dung Souza PA-C  Thoracic Surgery[JB1.1]      Revision History        User Key Date/Time User Provider Type Action    > JB1.2 1/11/2018  3:30 PM Dung Souza PA-C Physician Assistant Addend     JB1.1 1/11/2018  3:25 PM Dung Souza PA-C Physician Assistant Sign            Progress Notes by Tricia Romero, RN at 1/9/2018  2:30 PM     Author:  Tricia Romero RN Service:  (none) Author Type:  Registered Nurse    Filed:  1/11/2018 11:14 AM Encounter Date:  1/9/2018 Status:  Signed    :  Tricia Romero RN (Registered Nurse)           Report was called to unit 5C by MATHEUS Gomez.  Patient left on litter with [facility] at 1730.  Patient's wife was updated on the transfer.[AB1.1]       Revision History        User Key Date/Time User Provider Type Action    > AB1.1 1/11/2018 11:14 AM Tricia Romero, RN Registered Nurse Sign            Progress Notes by Nafisa Quick RN at 1/9/2018  2:30 PM     Author:  Nafisa Quick RN Service:  (none) Author Type:  Registered Nurse    Filed:  1/11/2018 11:14 AM  Encounter Date:  1/9/2018 Status:  Signed    :  Nafisa Quick RN (Registered Nurse)           Infusion Nursing Note:  Daniel Sandhu presents today for IVF and antiemetics     Patient seen by provider today: Yes: Alisa JARVIS   present during visit today: Not Applicable.    Note: N/A.    Intravenous Access:  Implanted Port.    Treatment Conditions:  Lab Results   Component Value Date    HGB 13.8 01/09/2018     Lab Results   Component Value Date    WBC 1.9 01/09/2018      Lab Results   Component Value Date    ANEU 1.3 01/09/2018     Lab Results   Component Value Date     01/09/2018      Lab Results   Component Value Date     01/09/2018                   Lab Results   Component Value Date    POTASSIUM 3.9 01/09/2018           Lab Results   Component Value Date    MAG 2.2 01/09/2018            Lab Results   Component Value Date    CR 0.77 01/09/2018                   Lab Results   Component Value Date    YOBANY 8.6 01/09/2018                Lab Results   Component Value Date    BILITOTAL 0.4 01/09/2018           Lab Results   Component Value Date    ALBUMIN 2.9 01/09/2018                    Lab Results   Component Value Date    ALT 21 01/09/2018           Lab Results   Component Value Date    AST 21 01/09/2018     Blood Cultures and stool cx sent to lab.  Plan to admit to hospital.         Post Infusion Assessment:  Patient tolerated infusion without incident.  Blood return noted pre and post infusion.  Site patent and intact, free from redness, edema or discomfort.  No evidence of extravasations.  Access discontinued per protocol.    Discharge Plan:   Patient and/or family verbalized understanding of transfer instructions and all questions answered.  Patient transferred to [facility] via [facility] in stable condition accompanied by: self and wife.  Departure Mode: Wheelchair.    Nafisa Quick RN[CW1.1]                       Revision History        User Key  Date/Time User Provider Type Action    > CW1.1 1/11/2018 11:14 AM Nafisa Quick RN Registered Nurse Sign            Progress Notes by Lenard Cardenas PT at 1/11/2018 10:34 AM     Author:  Lenard Cardenas PT Service:  (none) Author Type:  Physical Therapist    Filed:  1/11/2018 10:34 AM Date of Service:  1/11/2018 10:34 AM Creation Time:  1/11/2018 10:34 AM    Status:  Signed :  Lenard Cardenas PT (Physical Therapist)          01/11/18 0955   Quick Adds   Type of Visit Initial PT Evaluation   Living Environment   Lives With child(janes), dependent;spouse   Living Arrangements house   Home Accessibility bed not on first floor;tub/shower is not walk in;stairs (1 railing present)   Number of Stairs to Enter Home 5   Number of Stairs Within Home 26   Stair Railings at Home inside, present on left side;outside, present on right side   Transportation Available family or friend will provide   Self-Care   Dominant Hand right   Usual Activity Tolerance excellent   Current Activity Tolerance poor   Regular Exercise yes   Activity/Exercise Type walking;strength training   Exercise Amount/Frequency 5-7 times/wk   Equipment Currently Used at Home none   Functional Level Prior   Ambulation 0-->independent   Transferring 0-->independent   Toileting 0-->independent   Bathing 0-->independent   Dressing 0-->independent   Eating 0-->independent   Communication 0-->understands/communicates without difficulty   Swallowing 0-->swallows foods/liquids without difficulty   Cognition 0 - no cognition issues reported   Fall history within last six months no   Which of the above functional risks had a recent onset or change? ambulation   General Information   Onset of Illness/Injury or Date of Surgery - Date 01/09/18   Referring Physician Peyton Moody MD   Patient/Family Goals Statement Return home, regain strength   Pertinent History of Current Problem (include personal factors and/or comorbidities that impact  the POC) Daniel Sandhu is a 37-year-old male with history of adenocarcinoma of the GE junction, s/p 3 cycles of neoadjuvant EOX followed by resection on 11/3/2017. He is now undergoing adjuvant chemotherapy with EOF x 3 cycles. He has been admitted to the hospital after being seen in clinic for evaluation of diarrhea, nausea, inability to eat, and extreme weakness.   Precautions/Limitations fall precautions   Cognitive Status Examination   Orientation orientation to person, place and time   Level of Consciousness alert   Follows Commands and Answers Questions 100% of the time   Personal Safety and Judgment intact   Memory intact   Pain Assessment   Patient Currently in Pain No   Integumentary/Edema   Integumentary/Edema no deficits were identifed   Posture    Posture Not impaired   Range of Motion (ROM)   ROM Comment LE ROM normal bilaterally. He self-limits left hip flexion to ~ 90 degrees due to abdominal ports.   Strength   Strength Comments LE strength is 5/5   Bed Mobility   Bed Mobility Comments Independent   Transfer Skills   Transfer Comments Independent   Gait   Gait Comments Ambulates without assistive device with normal gait pattern and balance. Decreased endurance limits ambulation to ~ 100'.   Balance   Balance Comments Normal   General Therapy Interventions   Planned Therapy Interventions strengthening;home program guidelines;progressive activity/exercise   Clinical Impression   Criteria for Skilled Therapeutic Intervention yes, treatment indicated   PT Diagnosis Decreased functional endurance   Influenced by the following impairments Medical condition, diarrhea, not eating   Functional limitations due to impairments Ambulation   Clinical Presentation Evolving/Changing   Clinical Presentation Rationale Anticipate pt will improve quickly as medical condition improves   Clinical Decision Making (Complexity) Low complexity   Therapy Frequency` daily   Predicted Duration of Therapy Intervention (days/wks) 1  "week   Anticipated Discharge Disposition Home   Risk & Benefits of therapy have been explained Yes   Patient, Family & other staff in agreement with plan of care Yes   [facility] AM-PAC TM \"6 Clicks\"   2016, Trustees of [facility], under license to [employer].  All rights reserved.   6 Clicks Short Forms Basic Mobility Inpatient Short Form   [facility] AM-PAC  \"6 Clicks\" V.2 Basic Mobility Inpatient Short Form   1. Turning from your back to your side while in a flat bed without using bedrails? 4 - None   2. Moving from lying on your back to sitting on the side of a flat bed without using bedrails? 4 - None   3. Moving to and from a bed to a chair (including a wheelchair)? 4 - None   4. Standing up from a chair using your arms (e.g., wheelchair, or bedside chair)? 4 - None   5. To walk in hospital room? 4 - None   6. Climbing 3-5 steps with a railing? 3 - A Little   Basic Mobility Raw Score (Score out of 24.Lower scores equate to lower levels of function) 23   Total Evaluation Time   Total Evaluation Time (Minutes) 31[RS1.1]        Revision History        User Key Date/Time User Provider Type Action    > RS1.1 1/11/2018 10:34 AM Lenard Cardenas PT Physical Therapist Sign            Progress Notes by Bertin Bryant RN at 1/11/2018  7:52 AM     Author:  Bertin Bryant RN Service:  (none) Author Type:  Registered Nurse    Filed:  1/11/2018  7:53 AM Date of Service:  1/11/2018  7:52 AM Creation Time:  1/11/2018  7:52 AM    Status:  Signed :  Bertin Bryant RN (Registered Nurse)         PEG tube  Called in this morning by the patient. Assessing the site, tube was already out. Some drainage, dressing applied, PA notified.[EO1.1]     Revision History        User Key Date/Time User Provider Type Action    > EO1.1 1/11/2018  7:53 AM Bertin Bryant RN Registered Nurse Sign            Progress Notes by Kadi Flores RD at 1/10/2018  7:56 AM     Author:  Kadi Flores RD " Service:  Nutrition Author Type:  Registered Dietitian    Filed:  1/10/2018  5:20 PM Date of Service:  1/10/2018  7:56 AM Creation Time:  1/10/2018  7:56 AM    Status:  Signed :  Kadi Flores RD (Registered Dietitian)         CLINICAL NUTRITION SERVICES - ASSESSMENT NOTE     Nutrition Prescription    RECOMMENDATIONS FOR MDs/PROVIDERS TO ORDER:[KK1.1]  - For help managing TF please consult Nutrition to Assess and Manage TF per MNT protocol  - Recommend restarting tube feeds within 1-2 days[KK1.2]     Malnutrition Status:[KK1.1]    Severe malnutrition in the context of acute illness or injury.[KK1.2]    Recommendations already ordered by Registered Dietitian (RD):[KK1.1]  - Ordered Ensure clear for pt to try: Pt instructed to poor over ice and take small sips[KK1.2]     Future/Additional Recommendations:[KK1.1]  Please have dietitian consulted for Nutrition to Assess and Manage TF per MNT protocol: if available use Osmolite   - Osmolite 1.5  @ goal 60 ml/hr (1440 ml/day) to provide 2160 kcals (36 kcal/kg/day), 90 g PRO (1.5 g/kg/day), 1097 ml free H2O, and ) 0 g Fiber daily.   - Initiate TF @ 10 ml/hr and advance by 10 ml Q8hrs to goal on 60 ml/hr. Only advance if pt tolerates TF and if K+/Mg++/Phos are WNL.   - Flush 30 ml of free H2O Q4hr for tube patency  - Continue with Certavite 1x daily.    If Osmolite is not available please Initiate TF with Peptamen 1.5 @ goal 60 ml/hr (1440 ml/day) to provide 2160 kcals (36 kcal/kg/day), 98 g PRO (1.6 g/kg/day), 1109 ml free H2O, 81 g Fat (70% from MCTs), 271 g CHO and no Fiber daily.  - Initiate TF @ 10 ml/hr and advance by 10 ml Q8hrs to goal on 60 ml/hr. Only advance if pt tolerates TF and if K+/Mg++/Phos are WNL.   - Flush 30 ml of free H2O Q4hr for tube patency  - Continue with Certavite 1x daily.[KK1.2]     REASON FOR ASSESSMENT  Daniel Sandhu is a/an 37 year old male assessed by the dietitian for Admission Nutrition Risk Screen for unintentional loss of  "10# or more in the past two months and Patient/Family Request    NUTRITION HISTORY[KK1.1]  Per H&P pt was admitted for evaluation: diarrhea, nausea, inability to eat and weakness. Currently undergoing chemotherapy.[KK1.3]     Recent PO intake[KK1.2]   He has recently only been tolerating small amount of apple sauce and apple juice. Team is holding on TF for now.[KK1.4]     Prior to all of his GI symptoms starting around 1/4/18 pt reports that he was doing fine at home. In November he left the hospital on Isosource 1.5 with goal rate @100 ml/hr cycled at 16 hours. His home infusion switched him to Osmolite 1.5 because the pt's was consistently bloated and nauseous. Pt reports receiving 1 L IV fluids from home infusion, lately they have been daily with the amount of diarrhea he has been having.     Home TF regimen  - Pt reports at home he runs 6 cans (1440 ml/day) of Osmolite 1.5 @100 ml/hr for 16 hours. This provides 2130 kcals, 89 g Pro, 1086 ml free H2O, and 0 g of Fiber daily.  - Pt states that he does not water flush as much as he should through his tube.  - Pt reports he has not been consistent with the Certavite at home       Answered pt's questions and discussed foods that may be appropriate to try and tolerate. Also discussed importance of PRO.[KK1.2]     CURRENT NUTRITION ORDERS  Diet: Regular  Intake/Tolerance:[KK1.1] Pt currently barely tolerating apple sauce. He is motivated to keep trying PO intake.[KK1.2]     LABS  Labs reviewed    MEDICATIONS  Medications reviewed  Cyanocobalamin 2500 mcg daily.[KK1.1]  Certavite through J-tube[KK1.3]  ANTHROPOMETRICS  Height: 175 cm (5' 8.898\")  Most Recent Weight: 60.4 kg (133 lb 2.5 oz)    IBW:[KK1.1]72.7 kg[KK1.4]  BMI:[KK1.1] Normal BMI: current BMI 19.72[KK1.4]  Weight History:[KK1.1]   Wt Readings from Last 20 Encounters:   01/09/18 60.4 kg (133 lb 2.5 oz)   01/02/18 67.1 kg (147 lb 14.9 oz)   12/11/17 68.5 kg (151 lb)   12/06/17 69.3 kg (152 lb 12.5 oz) "   11/27/17 70.5 kg (155 lb 8 oz)   11/22/17 71 kg (156 lb 9.6 oz)   11/15/17 72.5 kg (159 lb 14.4 oz)   11/01/17 74.4 kg (164 lb 0.4 oz)   09/25/17 73.2 kg (161 lb 6.4 oz)   08/25/17 71.6 kg (157 lb 14.4 oz)   08/08/17 72.1 kg (158 lb 14.4 oz)   07/29/17 69.3 kg (152 lb 12.5 oz)   07/24/17 69.4 kg (153 lb)   07/17/17 72 kg (158 lb 12.8 oz)   06/27/17 69.6 kg (153 lb 6.4 oz)   06/19/17 70.6 kg (155 lb 9.6 oz)   06/14/17 71.4 kg (157 lb 6.4 oz)[KK1.5]   Noted: pt has had a 17% weight loss over the past 3 months. More recent weight loss has been 9 % weight loss in the past week.[KK1.2]     Dosing Weight:[KK1.1] 60[KK1.4] kg[KK1.1] (actual) - based on lowest weight since admission (60.4 kg on 1/9)[KK1.4]    ASSESSED NUTRITION NEEDS  Estimated Energy Needs:[KK1.1] 1252-6239+[KK1.2] kcals/day ([KK1.1]30 - 35 kcals/kg[KK1.2] )  Justification:[KK1.1] Repletion[KK1.2]  Estimated Protein Needs:[KK1.1] [KK1.2] grams protein/day ([KK1.1]1.5 - 2 grams of pro/kg[KK1.2])  Justification:[KK1.1] Repletion[KK1.2]  Estimated Fluid Needs:([KK1.1]1 mL/kcal[KK1.2])[KK1.1] or Per provider pending fluid status[KK1.2]  Justification:[KK1.1] Maintenance[KK1.2]    PHYSICAL FINDINGS  See malnutrition section below.     MALNUTRITION  % Intake:[KK1.1] </= 50% for >/= 5 days (severe): Note that this is less that what the pt was eating after his surgery in November[KK1.2]  % Weight Loss:[KK1.1] > 7.5% in 3 months (severe)[KK1.2]  Subcutaneous Fat Loss:[KK1.1] Facial region: Mild[KK1.2]  Muscle Loss:[KK1.1] Temporal, Facial & jaw region, Scapular bone, Thoracic region (clavicle, acromium bone, deltoid, trapezius, pectoral) and Posterior calf:  Severe[KK1.2]  Fluid Accumulation/Edema:[KK1.1] None noted[KK1.2]  Malnutrition Diagnosis:[KK1.1] Severe malnutrition in the context of acute illness or injury. - currently at 3 month barbara--> will likely turn into chronic malnutrition.[KK1.2]    NUTRITION DIAGNOSIS[KK1.1]  Inadequate protein-energy  intake[KK1.2] related to[KK1.1] decreased appetite and recently not tolerating home TF secondary to N/V, diarrhea and difficulty swallowing[KK1.2]  as evidenced by[KK1.1] pt report and 9% weight loss over the past week.[KK1.2]        INTERVENTIONS  Implementation[KK1.1]  Nutrition Education: Importance of protein, and discussed options for foods that he may tolerate    Enteral Nutrition - Recommendation[KK1.2]      Goals[KK1.1]  1. Pt to restart nutrition support within 1-2 days  2. Pt's weight to not < 60 kg[KK1.2]      Monitoring/Evaluation  Progress toward goals will be monitored and evaluated per protocol.[KK1.1]    Kadi Flores RD, LD  5C pgr: [id number][KK1.2]     Revision History        User Key Date/Time User Provider Type Action    > KK1.2 1/10/2018  5:20 PM Kadi Flores RD Registered Dietitian Sign     KK1.5 1/10/2018  8:52 AM Kadi Flores RD Registered Dietitian      KK1.4 1/10/2018  8:37 AM Kadi Flores RD Registered Dietitian      KK1.3 1/10/2018  8:28 AM Kadi Flores RD Registered Dietitian      KK1.1 1/10/2018  7:56 AM Kadi Flores RD Registered Dietitian             Progress Notes by Melody Reilly APRN CNP at 1/10/2018  2:48 PM     Author:  Melody Reilly APRN CNP Service:  Hem/Onc Author Type:  Nurse Practitioner    Filed:  1/10/2018  3:50 PM Date of Service:  1/10/2018  2:48 PM Creation Time:  1/10/2018  2:48 PM    Status:  Attested :  Melody Reilly APRN CNP (Nurse Practitioner)    Cosigner:  Sam Dumont MD at 1/10/2018  4:01 PM        Attestation signed by Sam Dumont MD at 1/10/2018  4:01 PM        Attestation:  Physician Attestation   ISam, saw and evaluated Daniel Sandhu as part of a shared visit.  I have reviewed and discussed with the advanced practice provider their history, physical and plan.    I personally reviewed the vital signs, medications, labs and imaging.    My key history or physical  "exam findings: Mr. Sandhu is a 37 year old man with GE junction adenocarcinoma, status post 3 cycle neoadjuvant EOX, surgical resection, and 2 cycles of EOF. He is admitted for poor PO intake and nausea and gagging. He has also had large volume watery diarrhea and c. Diff was obtained and found to be negative. He had esophogram to evaluate for stricture this morning.    Key management decisions made by me: OK to start 2 mg dexamethasone daily for appetite stimulation. Enteric pathogen panel. Follow-up results of esophagram and consider GI consult for endoscopy. For now will continue holding tube feeds, but consider reinitiation at earliest possible time. Support with antiemetics and IV fluids.    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  He is now undergoing adjuvant chemotherapy with EOF x 3 cycles. He has been admitted to the hospital after being seen in clinic for evaluation of diarrhea, nausea, inability to eat, and extreme weakness.    #Nausea  #Diarrhea:  #Weakness:  #Failure to thrive:  Daniel started cycle 2 EOF on 1/2/18. He previously had tolerated cycle 1 well without significant issues and had started supplementing oral intake by mouth without difficulty. Around 1/4/18, he suddenly developed large volume, watery diarrhea at night with occasional episodes during the day, in addition to nausea and profound weakness. Gradually over the last week or so he has had a sensation of a \"stricture\" with gagging and extremely dry mouth, which have also limited his PO intake. He has " "lost 14 lbs in the last week.   - Differential includes structural abnormality vs chemotherapy vs viral gastroenteritis  (given recent sick family members). He was given IV fluids, zofran and dexamethasone 12 mg in clinic and he improved, and tolerated food last night.   - Start 2mg dexamethasone daily for appetite and nausea  - Enteric pathogen and C.diff is NEG. Can have imodium PRN  - Esophogram XR completed this am to evaluate for stricture; No stricture seen, however \"Questionable focal mucosal abnormality of the jejunal lumen near the anastomosis. Although this may represent normal jejunal fold, perianastomotic jejunal ulcer is not entirely excluded\".  - Thoracic surgery consulted, awaiting final recs.   - GI consulted for EGD and possible biopsy of questionable focal mucosal lesion.   - Hold tube feedings for now given possible EGD; Awaiting recs from nutrition- PO intake as tolerated until MN.  - Schedule zofran for now. Compazine and ativan PRN.  - Follow-up blood culture drawn in clinic (though low suspicion for infection at this time).  - PT, OT and Nutrition consults.    #Dry mouth:  - biotene QID  - nasal saline spray PRN    #Adenocarcinoma of the GE junction.  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 Esteban): esophagogastroscopy, diagnostic laparoscopy, laparotomy with total gastrectomy and abdominal lymphadenectomy (D2), LEFT thoracotomy with distal esophagectomy and intrathoracic Terrell-en-Y esophagojejunostomy, feeding jejunostomy, LEFT pharyngostomy tube placement, RIGHT tube thoracostomy, and flexible bronchoscopy c/b neck hematoma associated with pharyngostomy tube s/p (11/9/2017) oropharyngeal exploration and esophagojejunostomy.   - He continues on adjuvant chemotherapy with epirubicin, oxaliplatin, 5FU (21-day continuous infusion). He started cycle 5 on 1/2/2018. The 5FU pump " has been disconnected as of 1/8/18 due to the above symptoms.  - Cycle 6 is tentatively planned for 1/23/18, and he has follow-up with Dr. Esteban on 3/7/18 with re-staging CT CAP on that same day.    OTHER    #FEN:   NS @ 100 ml/hr  Monitor electrolytes daily  Diet: Hold tube feedings for now given possible EGD; Awaiting recs from nutrition- PO intake as tolerated until MN.    #PPx  - Hold DVT prophylaxis due to possible invasive procedures    #Dispo: 2-4 days for management of acute issues (possible discharge 1/12).     Above plan discussed with staff physician, Dr. Jones.    Melody Reilly, [facility]  Hematology/Oncology  #[id number][KM1.1]      Melody Reilly    Interval History[KM1.2]   Daniel reports he finally had an appetite last night after the steroids- he had tomato soup. He was able to eat some jello. He reports he has abdominal cramping with eating, and with the notable diarrhea. He had 5 bouts of diarrhea overnight, no imodium trialed yet.  He does have nausea when antiemetics wear off. He extremely nervous given his significant weight loss ~14 lbs in 10 days. He knows he will need tube feeds to regain his weight. His last tube feeding was 1/8PM and he did the entire 1L tube feeding.  He reports his mouth is very dry, and that he when he eats everything feels like peanut butter in his mouth.  He has not tried anything for dry mouth. No headaches or vision changes. No fevers or chills. He reports he is nervious about this. He denies history of anxiety or depression- he notes he may have some PTSD from after his surgery. No SI/HI.[KM1.1]     Physical Exam   Temp: 98.3  F (36.8  C) Temp src: Oral BP: 125/80 Pulse: 79 Heart Rate: 84 Resp: 16 SpO2: 100 % O2 Device: None (Room air)    Vitals:    01/09/18 1809 01/10/18 0901   Weight: 60.4 kg (133 lb 2.5 oz) 60.1 kg (132 lb 7.9 oz)[KM1.2]     Vital Signs with Ranges[KM1.1]  Temp:  [97.5  F (36.4  C)-98.4  F (36.9  C)] 98.3  F (36.8  C)  Pulse:  [78-86] 79  Heart Rate:   [78-84] 84  Resp:  [16-18] 16  BP: (110-125)/(71-84) 125/80  SpO2:  [97 %-100 %] 100 %  I/O last 3 completed shifts:  In: 625 [I.V.:625]  Out: 1000 [Stool:1000][KM1.2]     Constitutional: Pleasant male seen resting comfortably in bed in NAD. Alert and interactive.   HEENT: NC/AT, sclera clear, conjunctiva normal, OP with MMM, no mucositis  Respiratory: Non-labored breathing, CTAB. No crackles or wheezing.   Cardiovascular: RRR. No murmur or rub.   GI: Hyperactive BS in BLQ. Abdomen soft, non-distended, and NT. No palpable masses or HSM. No acute abdomen. Several scars from surgery; J-tube in place, no drainage or tenderness surrounding this.    Skin:  Warm, dry, well-perfused. No bruising, bleeding, rashes, or lesions on limited exam.  Musculoskeletal: Extremities grossly normal, non-tender, no edema.  Good strength and ROM.  Neurologic: A&O. Answers questions appropriately. Moves all extremities spontaneously.  Neuropsychiatric: Mentation and affect appear normal/appropriate.[KM1.1]    Medications[KM1.2]     NaCl[KM1.3]         dexamethasone  2 mg Oral Daily     artificial saliva  10 mL Swish & Spit 4x Daily     ondansetron  4 mg Intravenous Q6H     cyanocobalamin  2,500 mcg Sublingual Daily     fluconazole  200 mg Oral Daily     multivitamins with minerals  15 mL Per J Tube Daily   sodium chloride, oxyCODONE, prochlorperazine **OR** prochlorperazine, naloxone, melatonin, LORazepam **OR** LORazepam, acetaminophen, loperamide[KM1.4]     Data[KM1.2]   Results for orders placed or performed during the hospital encounter of 01/09/18 (from the past 24 hour(s))   CBC with platelets differential   Result Value Ref Range    WBC 1.7 (L) 4.0 - 11.0 10e9/L    RBC Count 4.77 4.4 - 5.9 10e12/L    Hemoglobin 11.9 (L) 13.3 - 17.7 g/dL    Hematocrit 36.7 (L) 40.0 - 53.0 %    MCV 77 (L) 78 - 100 fl    MCH 24.9 (L) 26.5 - 33.0 pg    MCHC 32.4 31.5 - 36.5 g/dL    RDW 15.8 (H) 10.0 - 15.0 %    Platelet Count 277 150 - 450 10e9/L     Diff Method Automated Method     % Neutrophils 47.6 %    % Lymphocytes 33.1 %    % Monocytes 17.5 %    % Eosinophils 0.0 %    % Basophils 0.0 %    % Immature Granulocytes 1.8 %    Nucleated RBCs 0 0 /100    Absolute Neutrophil 0.8 (L) 1.6 - 8.3 10e9/L    Absolute Lymphocytes 0.6 (L) 0.8 - 5.3 10e9/L    Absolute Monocytes 0.3 0.0 - 1.3 10e9/L    Absolute Eosinophils 0.0 0.0 - 0.7 10e9/L    Absolute Basophils 0.0 0.0 - 0.2 10e9/L    Abs Immature Granulocytes 0.0 0 - 0.4 10e9/L    Absolute Nucleated RBC 0.0    Basic metabolic panel   Result Value Ref Range    Sodium 136 133 - 144 mmol/L    Potassium 4.2 3.4 - 5.3 mmol/L    Chloride 109 94 - 109 mmol/L    Carbon Dioxide 19 (L) 20 - 32 mmol/L    Anion Gap 9 3 - 14 mmol/L    Glucose 137 (H) 70 - 99 mg/dL    Urea Nitrogen 22 7 - 30 mg/dL    Creatinine 0.70 0.66 - 1.25 mg/dL    GFR Estimate >90 >60 mL/min/1.7m2    GFR Estimate If Black >90 >60 mL/min/1.7m2    Calcium 8.4 (L) 8.5 - 10.1 mg/dL   Magnesium   Result Value Ref Range    Magnesium 2.1 1.6 - 2.3 mg/dL   Phosphorus   Result Value Ref Range    Phosphorus 3.2 2.5 - 4.5 mg/dL   XR Esophagram    Narrative    Esophagram dated 1/10/2018    COMPARISON:    Modified swallow study and limited esophagram  11/13/2017, multiple prior CT, most recent 12/28/2017    HISTORY:    Evaluate stricture, history of GE junction adenocarcinoma,  difficulty with gagging and nausea    Additional history obtained from EHR and patient: Status post total  gastrectomy with distal esophagectomy and intrathoracic Terrell-en-Y  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 Patient describes sensation of food getting stuck in the mid  chest, both liquids and solids, with feeling of spasms. Denies emesis  or regurgitation of ingested food. Additionally, describes burning  sensation in the epigastrium which is not improved with antacids.    TECHNIQUE: Patient was given thin liquid barium to ingest. Patient was  evaluated  with fluoroscopy, and multiple spot films were obtained.     Fluoro time: 3.7 minutes    FINDINGS:   The  radiograph shows right-sided PICC with tip projecting over  low SVC. Multiple surgical clips projecting over the lower mediastinum  and left upper quadrant. Heart size is within normal limits. Lungs are  clear.    Single contrast upper GI was performed. Examination of the esophagus  reveals adequate primary and secondary peristalsis. Post surgical  changes of distal esophagectomy and esophagojejunostomy. Contrast  material easily progresses across the esophagojejunal anastomosis into  the jejunum without evidence of focal stricture. Contrast was observed  to pool in a herniated/redundant portion of intrathoracic jejunum,  which empties secondary to cardiac pulsation. Questionable focal  mucosal abnormality just below the level of the the anastomosis along  the posterior and right lateral aspect of the jejunum at the level of  the diaphragm curve, with incomplete clearance on subsequent swallows.  At the conclusion of the study, contrast material was visualized in  the distal small bowel which were normal in caliber, without evidence  of obstruction or stricture.      Impression    IMPRESSION:  1.  Postoperative changes of distal esophagectomy and  esophagojejunostomy without evidence of esophageal or anastomotic  stricture.  2.  Questionable focal mucosal abnormality of the jejunal lumen near  the anastomosis. Although this may represent normal jejunal fold,  perianastomotic jejunal ulcer is not entirely excluded especially in  the setting of epigastric pain and burning sensation as reported by  the patient.  3.  Normal transit of contrast through the visualize small bowel  without evidence obstruction.    I have personally reviewed the examination and initial interpretation  and I agree with the findings.    NARCISA FIELDS MD   Prealbumin   Result Value Ref Range    Prealbumin 13 (L) 15 - 45 mg/dL[KM1.4]            Revision History        User Key Date/Time User Provider Type Action    > KM1.4 1/10/2018  3:50 PM Melody Reilly APRN CNP Nurse Practitioner Sign     KM1.3 1/10/2018  3:49 PM Melody Reilly APRN CNP Nurse Practitioner      KM1.2 1/10/2018  2:50 PM Melody Reilly, APRN CNP Nurse Practitioner      KM1.1 1/10/2018  2:48 PM Melody Reilly APRN CNP Nurse Practitioner                      Procedure Notes      Procedures by Merlene Cook MD at 1/11/2018  1:33 PM     Author:  Merlene Cook MD Service:  Gastroenterology Author Type:  Fellow    Filed:  1/11/2018  1:41 PM Date of Service:  1/11/2018  1:33 PM Creation Time:  1/11/2018  1:31 PM    Status:  Signed :  Merlene Cook MD (Fellow)    Cosigner:  Alessandro Mcgregor MD at 1/11/2018  4:43 PM         Pre-procedure Diagnoses:    1. Status post total gastrectomy and Terrell-en-Y esophagojejunal anastomosis [Z90.3, Z98.0]           Post-procedure Diagnoses:    1. Status post total gastrectomy and Terrell-en-Y esophagojejunal anastomosis [Z90.3, Z98.0]           Procedures:    1. UPPER GI ENDOSCOPY [EXAMENDO (Custom)]                   Gastroenterology End[facility] Brief Operative Note    Procedure:  Upper endoscopy   Post-operative diagnosis:  s/p Terrell-en-Y esophagojejunostomy with total gastrectomy   Staff Physician:  Dr. Alessandro Mcgregor   Fellow/Assistant(s):  Dr. Merlene Cook    Specimens:  Please see final procedure note for further details.   Findings:  Terrell-en-Y esophagojejunostomy with total gastrectomy anatomy was found. Multiple small erosions were found in the Terrell limb of the jejunum. There was one small ulcer (<1 cm) near the anastomosis. There was a small area of mucosal change near the anastomosis that was non-malignant appearing; this was biopsied for histology.   Complications:  None.   Condition:  Stable   Recommendations  Diet:  Return to previous  diet  PPI:  N/A  Anti-coagulants/platelets:  N/A  Octreotide:  N/A  Discharge Planning:   Per primary service; no need for GI follow-up[AL1.1]           Revision History        User Key Date/Time User Provider Type Action    > AL1.1 1/11/2018  1:41 PM Merlene Cook MD Fellow Sign                     Progress Notes - Therapies (Notes from 01/10/18 through 01/13/18)      Progress Notes by Lenard Cardenas PT at 1/11/2018 10:34 AM     Author:  Lenard Cardenas PT Service:  (none) Author Type:  Physical Therapist    Filed:  1/11/2018 10:34 AM Date of Service:  1/11/2018 10:34 AM Creation Time:  1/11/2018 10:34 AM    Status:  Signed :  Lenard Cardenas PT (Physical Therapist)          01/11/18 0955   Quick Adds   Type of Visit Initial PT Evaluation   Living Environment   Lives With child(janes), dependent;spouse   Living Arrangements house   Home Accessibility bed not on first floor;tub/shower is not walk in;stairs (1 railing present)   Number of Stairs to Enter Home 5   Number of Stairs Within Home 26   Stair Railings at Home inside, present on left side;outside, present on right side   Transportation Available family or friend will provide   Self-Care   Dominant Hand right   Usual Activity Tolerance excellent   Current Activity Tolerance poor   Regular Exercise yes   Activity/Exercise Type walking;strength training   Exercise Amount/Frequency 5-7 times/wk   Equipment Currently Used at Home none   Functional Level Prior   Ambulation 0-->independent   Transferring 0-->independent   Toileting 0-->independent   Bathing 0-->independent   Dressing 0-->independent   Eating 0-->independent   Communication 0-->understands/communicates without difficulty   Swallowing 0-->swallows foods/liquids without difficulty   Cognition 0 - no cognition issues reported   Fall history within last six months no   Which of the above functional risks had a recent onset or change? ambulation   General Information   Onset of  Illness/Injury or Date of Surgery - Date 01/09/18   Referring Physician Peyton Moody MD   Patient/Family Goals Statement Return home, regain strength   Pertinent History of Current Problem (include personal factors and/or comorbidities that impact the POC) Daniel Sandhu is a 37-year-old male with history of adenocarcinoma of the GE junction, s/p 3 cycles of neoadjuvant EOX followed by resection on 11/3/2017. He is now undergoing adjuvant chemotherapy with EOF x 3 cycles. He has been admitted to the hospital after being seen in clinic for evaluation of diarrhea, nausea, inability to eat, and extreme weakness.   Precautions/Limitations fall precautions   Cognitive Status Examination   Orientation orientation to person, place and time   Level of Consciousness alert   Follows Commands and Answers Questions 100% of the time   Personal Safety and Judgment intact   Memory intact   Pain Assessment   Patient Currently in Pain No   Integumentary/Edema   Integumentary/Edema no deficits were identifed   Posture    Posture Not impaired   Range of Motion (ROM)   ROM Comment LE ROM normal bilaterally. He self-limits left hip flexion to ~ 90 degrees due to abdominal ports.   Strength   Strength Comments LE strength is 5/5   Bed Mobility   Bed Mobility Comments Independent   Transfer Skills   Transfer Comments Independent   Gait   Gait Comments Ambulates without assistive device with normal gait pattern and balance. Decreased endurance limits ambulation to ~ 100'.   Balance   Balance Comments Normal   General Therapy Interventions   Planned Therapy Interventions strengthening;home program guidelines;progressive activity/exercise   Clinical Impression   Criteria for Skilled Therapeutic Intervention yes, treatment indicated   PT Diagnosis Decreased functional endurance   Influenced by the following impairments Medical condition, diarrhea, not eating   Functional limitations due to impairments Ambulation   Clinical  "Presentation Evolving/Changing   Clinical Presentation Rationale Anticipate pt will improve quickly as medical condition improves   Clinical Decision Making (Complexity) Low complexity   Therapy Frequency` daily   Predicted Duration of Therapy Intervention (days/wks) 1 week   Anticipated Discharge Disposition Home   Risk & Benefits of therapy have been explained Yes   Patient, Family & other staff in agreement with plan of care Yes   [facility] TM \"6 Clicks\"   2016, Trustees of [facility], under license to [employer].  All rights reserved.   6 Clicks Short Forms Basic Mobility Inpatient Short Form   [facility]  \"6 Clicks\" V.2 Basic Mobility Inpatient Short Form   1. Turning from your back to your side while in a flat bed without using bedrails? 4 - None   2. Moving from lying on your back to sitting on the side of a flat bed without using bedrails? 4 - None   3. Moving to and from a bed to a chair (including a wheelchair)? 4 - None   4. Standing up from a chair using your arms (e.g., wheelchair, or bedside chair)? 4 - None   5. To walk in hospital room? 4 - None   6. Climbing 3-5 steps with a railing? 3 - A Little   Basic Mobility Raw Score (Score out of 24.Lower scores equate to lower levels of function) 23   Total Evaluation Time   Total Evaluation Time (Minutes) 31[RS1.1]        Revision History        User Key Date/Time User Provider Type Action    > RS1.1 1/11/2018 10:34 AM Lenard Cardenas PT Physical Therapist Sign            "

## 2018-01-09 NOTE — IP AVS SNAPSHOT
MRN:[id number]                      After Visit Summary   1/9/2018    Daniel Sandhu    MRN: [id number]           Thank you!     Thank you for choosing [facility] for your care. Our goal is always to provide you with excellent care. Hearing back from our patients is one way we can continue to improve our services. Please take a few minutes to complete the written survey that you may receive in the mail after you visit with us. Thank you!        Patient Information     Date Of Birth          1980        Designated Caregiver       Most Recent Value    Caregiver    Will someone help with your care after discharge? yes    Name of designated caregiver Violeta Sandhu    Phone number of caregiver [phone]    Caregiver address [address] [place] [address]      About your hospital stay     You were admitted on:  January 9, 2018 You last received care in the:  [address] [facility]    You were discharged on:  January 13, 2018        Reason for your hospital stay       Admitted for diarrhea, weakness, and nausea.                  Who to Call     For medical emergencies, please call 911.  For non-urgent questions about your medical care, please call your primary care provider or clinic, [phone]  For questions related to your surgery, please call your surgery clinic        Attending Provider     Provider Specialty    Sam Dumont MD Hematology & Oncology       Primary Care Provider Office Phone # Fax #    Lencho Chan [phone]       When to contact your care team       [facility] triage line at [phone] for temp >100.4, uncontrolled nausea/vomiting/diarrhea/constipation, unrelieved pain, bleeding not relieved with pressure, dizziness, chest pain, shortness of breath, loss of consciousness, and any new or concerning symptoms.                  After Care Instructions     Activity       Your activity upon discharge: activity as  tolerated            Diet       Follow this diet upon discharge: Orders Placed This Encounter      Snacks/Supplements Adult: Nutrisource Fiber; Between Meals, Ensure Clear; Between Meals, Beneprotein; With Meals      Adult Formula Drip Feeding: Continuous with peptamin 1.5; Jejunostomy; Goal Rate: 60; mL/hr; Medication - Tube Feeding Flush Frequency: At least 15-30 mL water before and after medication administration and with tube clogging                  Follow-up Appointments     Follow Up and recommended labs and tests       Follow up as scheduled below:                  Your next 10 appointments already scheduled     Lucien 15, 2018  1:45 PM CST   [facility] with  [facility]   [facility] ([facility])    [address]  [address]  [facility] [address]   [phone]            Lucien 15, 2018  2:15 PM CST   (Arrive by 2:00 PM)   Return Visit with Laurence Hood MD   [facility] ([facility])    [address]  [address]  [facility] [address]   [phone]            Jan 23, 2018  8:30 AM CST   [facility] Draw with  [facility] DRAW   [facility] ([facility])    [address]  [address]  [facility] [address]   [phone]            Jan 23, 2018  8:40 AM CST   (Arrive by 8:25 AM)   Return Visit with GAVIN Lundy CNP   [facility] ([facility])    [address]  [address]  [facility] [address]   [phone]            Jan 23, 2018 10:00 AM CST   Infusion 240 with UC ONCOLOGY INFUSION, UC 17 ATC   [facility] ([facility])    [address] [place] [address]  [address]  [facility] [address]   [phone]            Mar 07, 2018  1:40 PM CST   CT CHEST ABDOMEN PELVIS W/O CONTRAST with UCCT1   [facility] ([facility])    [address] [place] [address] [phone]           Please bring any scans or X-rays taken at other hospitals, if similar tests were done. Also bring a list of your medicines, including vitamins, minerals and over-the-counter drugs. It is safest to leave personal items at home.  Be sure to tell your doctor:   If you have any allergies.   If there s any chance you are pregnant.   If you are breastfeeding.   If you have any special needs.  You may have contrast for this exam. To prepare:   Do not eat or drink for 2 hours before your exam. If you need to take medicine, you may take it with small sips of water. (We may ask you to take liquid medicine as well.)   The day before your exam, drink extra fluids at least six 8-ounce glasses (unless your doctor tells you to restrict your fluids).  Patients over 70 or patients with diabetes or kidney problems:   If you haven t had a blood test (creatinine test) within the last 30 days, go to your clinic or Diagnostic Imaging Department for this test.  If you have diabetes:   If your kidney function is normal, continue taking your metformin (Avandamet, Glucophage, Glucovance, Metaglip) on the day of your exam.   If your kidney function is abnormal, wait 48 hours before restarting this medicine.  You will have oral contrast for this exam:   You will drink the contrast at home. Get this from your clinic or Diagnostic Imaging Department. Please follow the directions given.  Please wear loose clothing, such as a sweat suit or jogging clothes. Avoid snaps, zippers and other metal. We may ask you to undress and put on a hospital gown.  If you have any questions, please call the Imaging Department where you will have your exam.            Mar 07, 2018  2:30 PM CST   (Arrive by 2:15 PM)   Return Visit with Yunior Esteban MD   [facility] ([facility])    [address] [place] [address]  [address]  [facility] [address] [phone]              Additional Services     Home infusion referral       Resume home enterals.    --------------------------    Gastrostomy/Enterostomy Jejunostomy LUQ     Osmolite 1.5 , 100cc/hour for 16 hours.    _______________________________  [facility]  Phone  [phone]  Fax  [phone]  ______________________________                  Future tests that were ordered for you     CBC with platelets and differential       Last Lab Result: Hemoglobin (g/dL)       Date                     Value                 01/13/2018               10.9 (L)         ----------            Comprehensive metabolic panel (BMP + Alb, Alk Phos, ALT, AST, Total. Bili, TP)           Magnesium           Phosphorus                 Pending Results     Date and Time Order Name Status Description    1/9/2018 1513 Blood culture Preliminary             Statement of Approval     Ordered          01/13/18 1515  I have reviewed and agree with all the recommendations and orders detailed in this document.  EFFECTIVE NOW     Approved and electronically signed by:  Tiffani Burks PA-C             Admission Information     Date & Time Provider Department Dept. Phone    1/9/2018 Sam Dumont MD [address] B[facility] [place] [facility] [phone]      Your Vitals Were     Blood Pressure Pulse Temperature Respirations Height Weight    108/72 (BP Location: Left arm) 94 98.5  F (36.9  C) (Oral) 16 1.75 m (5' 8.9\") 60.6 kg (133 lb 9.6 oz)    Pulse Oximetry BMI (Body Mass Index)                100% 19.79 kg/m2          MyChart Information     [employer] gives you secure access to your electronic health record. If you see a primary care provider, you can also send messages to your care team and make appointments. If you have questions, please call your primary care clinic.  If you do not have a primary care provider, please call [phone] and they will assist you.        Care EveryWhere ID     " This is your Care EveryWhere ID. This could be used by other organizations to access your [facility] medical records  [id number]        Equal Access to Services     SHAWN SWAIN : Hadii aad ku hadjesus albertonancy Araujo, kelly guillerminamadisonha, laurent kaderek hawkins, alejandro irving niecyciara romerorobina ovidio So [facility] [phone].    ATENCIÓN: Si habla español, tiene a zayas disposición servicios gratuitos de asistencia lingüística. Llame al [phone].    We comply with applicable federal civil rights laws and [place] laws. We do not discriminate on the basis of race, color, national origin, age, disability, sex, sexual orientation, or gender identity.               Review of your medicines      START taking        Dose / Directions    artificial saliva Liqd liquid   Used for:  Dry mouth        Dose:  10 mL   Swish and spit 10 mLs in mouth 4 times daily   Quantity:  473 mL   Refills:  0       dexamethasone 2 MG tablet   Commonly known as:  DECADRON   Used for:  Chemotherapy-induced nausea, Loss of appetite        Dose:  2 mg   Start taking on:  1/14/2018   Take 1 tablet (2 mg) by mouth daily   Quantity:  30 tablet   Refills:  0       diphenoxylate-atropine 2.5-0.025 MG per tablet   Commonly known as:  LOMOTIL   Used for:  Diarrhea, unspecified type        Dose:  1 tablet   Take 1 tablet by mouth 4 times daily as needed for diarrhea   Quantity:  40 tablet   Refills:  0       fiber modular packet   Used for:  Diarrhea, unspecified type        Dose:  1 packet   1 packet by Per Feeding Tube route 3 times daily   Quantity:  90 packet   Refills:  0       loperamide 2 MG capsule   Commonly known as:  IMODIUM   Used for:  S/P gastrectomy, Diarrhea, unspecified type        Dose:  4 mg   Take 2 capsules (4 mg) by mouth every 6 hours (max dose of 16 mg daily).   Quantity:  120 capsule   Refills:  0       magic mouthwash suspension (diphenhydrAMINE, lidocaine, aluminum-magnesium & simethicone) Susp compounding kit   Used for:  Mucositis         Dose:  5-10 mL   Swish and swallow 5-10 mLs in mouth every 6 hours as needed for mouth sores   Quantity:  237 mL   Refills:  1       potassium chloride 20 MEQ Packet   Commonly known as:  KLOR-CON   Used for:  Hypokalemia        Dose:  20 mEq   Take 20 mEq by mouth 2 times daily   Quantity:  60 packet   Refills:  0         CONTINUE these medicines which may have CHANGED, or have new prescriptions. If we are uncertain of the size of tablets/capsules you have at home, strength may be listed as something that might have changed.        Dose / Directions    ondansetron 4 MG tablet   Commonly known as:  ZOFRAN   This may have changed:    - medication strength  - how much to take  - when to take this  - reasons to take this   Used for:  Chemotherapy-induced nausea        Dose:  4 mg   Take 1 tablet (4 mg) by mouth every 6 hours   Quantity:  40 tablet   Refills:  0         CONTINUE these medicines which have NOT CHANGED        Dose / Directions    acetaminophen 32 mg/mL solution   Commonly known as:  TYLENOL   Used for:  Acute post-operative pain        Dose:  975 mg   30.45 mLs (975 mg) by Per J Tube route 3 times daily   Quantity:  400 mL   Refills:  0       ATIVAN PO        Take by mouth every 4 hours as needed for anxiety   Refills:  0       COMPAZINE PO        Take by mouth every 6 hours as needed for nausea   Refills:  0       cyabnocobalamin 2500 MCG sublingual tablet   Commonly known as:  VITAMIN B-12   Used for:  B12 deficiency        Dose:  2500 mcg   Place 2,500 mcg under the tongue daily   Quantity:  30 tablet   Refills:  1       fluconazole 200 MG tablet   Commonly known as:  DIFLUCAN   Used for:  Thrush        Dose:  200 mg   Take 1 tablet (200 mg) by mouth daily   Quantity:  30 tablet   Refills:  1       multivitamins with minerals Liqd liquid   Used for:  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        Dose:  15 mL   15 mLs by Per J Tube route daily   Quantity:  450 mL   Refills:  0        OLANZapine 10 MG tablet   Commonly known as:  zyPREXA   Used for:  Chemotherapy-induced nausea        Dose:  10 mg   Take 1 tablet (10 mg) by mouth At Bedtime   Quantity:  30 tablet   Refills:  1       oxyCODONE 5 MG/5ML solution   Commonly known as:  ROXICODONE   Used for:  Acute post-operative pain        Dose:  5-10 mg   Take 5-10 mLs (5-10 mg) by mouth every 4 hours as needed for moderate to severe pain   Quantity:  300 mL   Refills:  0         STOP taking     sennosides 8.8 MG/5ML syrup   Commonly known as:  SENOKOT                Where to get your medicines      These medications were sent to [facility] [place], [place] - [address]  [address] [address]     Phone:  [phone]     artificial saliva Liqd liquid    dexamethasone 2 MG tablet    fiber modular packet    loperamide 2 MG capsule    ondansetron 4 MG tablet    potassium chloride 20 MEQ Packet         Some of these will need a paper prescription and others can be bought over the counter. Ask your nurse if you have questions.     Bring a paper prescription for each of these medications     diphenoxylate-atropine 2.5-0.025 MG per tablet    magic mouthwash suspension (diphenhydrAMINE, lidocaine, aluminum-magnesium & simethicone) Susp compounding kit                Protect others around you: Learn how to safely use, store and throw away your medicines at www.disposemymeds.org.             Medication List: This is a list of all your medications and when to take them. Check marks below indicate your daily home schedule. Keep this list as a reference.      Medications           Morning Afternoon Evening Bedtime As Needed    acetaminophen 32 mg/mL solution   Commonly known as:  TYLENOL   30.45 mLs (975 mg) by Per J Tube route 3 times daily                                artificial saliva Liqd liquid   Swish and spit 10 mLs in mouth 4 times daily   Last time this was given:  10 mLs on 1/13/2018  1:09 PM                                 ATIVAN PO   Take by mouth every 4 hours as needed for anxiety                                COMPAZINE PO   Take by mouth every 6 hours as needed for nausea   Last time this was given:  5 mg on 1/13/2018  2:26 PM                                cyabnocobalamin 2500 MCG sublingual tablet   Commonly known as:  VITAMIN B-12   Place 2,500 mcg under the tongue daily   Last time this was given:  2,500 mcg on 1/13/2018  8:29 AM                                dexamethasone 2 MG tablet   Commonly known as:  DECADRON   Take 1 tablet (2 mg) by mouth daily   Start taking on:  1/14/2018   Last time this was given:  2 mg on 1/13/2018  8:27 AM                                diphenoxylate-atropine 2.5-0.025 MG per tablet   Commonly known as:  LOMOTIL   Take 1 tablet by mouth 4 times daily as needed for diarrhea   Last time this was given:  1 tablet on 1/13/2018  2:34 PM                                fiber modular packet   1 packet by Per Feeding Tube route 3 times daily   Last time this was given:  1 packet on 1/13/2018  1:08 PM                                fluconazole 200 MG tablet   Commonly known as:  DIFLUCAN   Take 1 tablet (200 mg) by mouth daily   Last time this was given:  200 mg on 1/13/2018  8:27 AM                                loperamide 2 MG capsule   Commonly known as:  IMODIUM   Take 2 capsules (4 mg) by mouth every 6 hours (max dose of 16 mg daily).   Last time this was given:  2 mg on 1/13/2018  4:22 AM                                magic mouthwash suspension (diphenhydrAMINE, lidocaine, aluminum-magnesium & simethicone) Susp compounding kit   Swish and swallow 5-10 mLs in mouth every 6 hours as needed for mouth sores                                multivitamins with minerals Liqd liquid   15 mLs by Per J Tube route daily   Last time this was given:  15 mLs on 1/13/2018  8:28 AM                                OLANZapine 10 MG tablet   Commonly known as:  zyPREXA   Take 1 tablet (10 mg) by  mouth At Bedtime                                ondansetron 4 MG tablet   Commonly known as:  ZOFRAN   Take 1 tablet (4 mg) by mouth every 6 hours                                oxyCODONE 5 MG/5ML solution   Commonly known as:  ROXICODONE   Take 5-10 mLs (5-10 mg) by mouth every 4 hours as needed for moderate to severe pain   Last time this was given:  5 mg on 1/10/2018 12:35 PM                                potassium chloride 20 MEQ Packet   Commonly known as:  KLOR-CON   Take 20 mEq by mouth 2 times daily   Last time this was given:  20 mEq on 1/13/2018  8:28 AM

## 2018-01-09 NOTE — MR AVS SNAPSHOT
After Visit Summary   1/9/2018    Daniel Sandhu    MRN: [id number]           Patient Information     Date Of Birth          1980        Visit Information        Provider Department      1/9/2018 1:40 PM Alisa Otero PA-C [facility]        Today's Diagnoses     Chemotherapy-induced nausea        Diarrhea        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        Esophageal cancer (H)           Follow-ups after your visit        Your next 10 appointments already scheduled     Jan 12, 2018 11:00 AM CST   XR ESOPHAGRAM W UPPPER GI with UCXR2, UC GIGU RAD   [facility] ([facility])    [address]  1st Floor  [facility] [id number] [phone]           Please bring a list of your current medicines to your exam. (Include vitamins, minerals and over-the-counter medicines.) Leave your valuables at home.  Tell the doctor if there is a chance you could be pregnant.  Do not eat for 4 hours before the exam. Keep drinking clear liquids until 2 hours before the exam.  You may take pain medicine (with a sip of water) up to 4 hours before the exam.  Do not swallow any other medicines unless your doctor tells you to. Talk to your doctor to be sure it s safe to stop your medicines.  Please call the Imaging Department at your exam site with any questions.            Lucien 15, 2018  1:45 PM CST   [facility] Draw with  [facility] DRAW   [facility]    [address] [place] [address]  [address]  [facility] [id number] [phone]            Lucien 15, 2018  2:15 PM CST   (Arrive by 2:00 PM)   Return Visit with Laurence Hood MD   [facility] ([facility])    [address]  [address]  [facility] [id number] [phone]            Jan 23, 2018  8:30 AM CST   [facility] Lab Draw with  [facility] LAB DRAW   [facility] ([facility])    [address]  [address]  [facility] [address]   [phone]            Jan 23, 2018  8:40 AM CST   (Arrive by 8:25 AM)   Return Visit with GAVIN Lundy CNP   [facility] ([facility])    [address]  [address]  [facility] [address]   [phone]            Jan 23, 2018 10:00 AM CST   Infusion 240 with UC ONCOLOGY INFUSION, UC 17 ATC   [facility] ([facility])    [address]  [address]  [facility] [address]   [phone]            Mar 07, 2018  1:40 PM CST   CT CHEST ABDOMEN PELVIS W/O CONTRAST with UCCT1   [facility] CT ([facility])    [address]  1st Floor  [facility] [address]   [phone]           Please bring any scans or X-rays taken at other hospitals, if similar tests were done. Also bring a list of your medicines, including vitamins, minerals and over-the-counter drugs. It is safest to leave personal items at home.  Be sure to tell your doctor:   If you have any allergies.   If there s any chance you are pregnant.   If you are breastfeeding.   If you have any special needs.  You may have contrast for this exam. To prepare:   Do not eat or drink for 2 hours before your exam. If you need to take medicine, you may take it with small sips of water. (We may ask you to take liquid medicine as well.)   The day before your exam, drink extra fluids at least six 8-ounce glasses (unless your doctor tells you to restrict your fluids).  Patients over 70 or patients with diabetes or kidney problems:   If you haven t had a blood test (creatinine test) within the last 30 days, go to your clinic or Diagnostic Imaging Department for this test.  If you have diabetes:   If your kidney function is normal, continue taking your metformin (Avandamet, Glucophage, Glucovance,  Metaglip) on the day of your exam.   If your kidney function is abnormal, wait 48 hours before restarting this medicine.  You will have oral contrast for this exam:   You will drink the contrast at home. Get this from your clinic or Diagnostic Imaging Department. Please follow the directions given.  Please wear loose clothing, such as a sweat suit or jogging clothes. Avoid snaps, zippers and other metal. We may ask you to undress and put on a hospital gown.  If you have any questions, please call the Imaging Department where you will have your exam.            Mar 07, 2018  2:30 PM CST   (Arrive by 2:15 PM)   Return Visit with Yunior Esteban MD   [facility] ([facility])    [address]  [address] [place] [address] [phone]              Future tests that were ordered for you today     Open Future Orders        Priority Expected Expires Ordered    XR Esophagram w Upper GI Routine 1/8/2018 1/8/2019 1/8/2018            Who to contact     If you have questions or need follow up information about today's clinic visit or your schedule please contact [facility] directly at [phone].  Normal or non-critical lab and imaging results will be communicated to you by MyChart, letter or phone within 4 business days after the clinic has received the results. If you do not hear from us within 7 days, please contact the clinic through [employer] or phone. If you have a critical or abnormal lab result, we will notify you by phone as soon as possible.  Submit refill requests through [employer] or call your pharmacy and they will forward the refill request to us. Please allow 3 business days for your refill to be completed.          Additional Information About Your Visit        VISENZE Information     [employer] gives you secure access to your electronic health record. If you see a primary care provider, you can also send messages to your care  "team and make appointments. If you have questions, please call your primary care clinic.  If you do not have a primary care provider, please call [phone] and they will assist you.        Care EveryWhere ID     This is your Care EveryWhere ID. This could be used by other organizations to access your [place] medical records  [id number]        Your Vitals Were     Pulse Temperature Respirations Height Pulse Oximetry BMI (Body Mass Index)    123 97.6  F (36.4  C) (Oral) 20 1.75 m (5' 8.9\") 98% 19.77 kg/m2       Blood Pressure from Last 3 Encounters:   01/09/18 120/76   01/02/18 114/66   12/11/17 117/62    Weight from Last 3 Encounters:   01/09/18 60.6 kg (133 lb 8 oz)   01/02/18 67.1 kg (147 lb 14.9 oz)   12/11/17 68.5 kg (151 lb)              We Performed the Following     CBC with platelets differential     Comprehensive metabolic panel     Magnesium        Primary Care Provider Office Phone # Fax #    Lencho Chan [phone]       [address] [facility] [address]        Equal Access to Services     [facility]: Hadii aad ku hadasho Solanreali, waaxda luqadaha, qaybta kaalmada adeegyada, alejandro brown ah. So [facility] [phone].    ATENCIÓN: Si habla español, tiene a zayas disposición servicios gratuitos de asistencia lingüística. Llame al [phone].    We comply with applicable federal civil rights laws and [place] laws. We do not discriminate on the basis of race, color, national origin, age, disability, sex, sexual orientation, or gender identity.            Thank you!     Thank you for choosing [facility]  for your care. Our goal is always to provide you with excellent care. Hearing back from our patients is one way we can continue to improve our services. Please take a few minutes to complete the written survey that you may receive in the mail after your visit with us. Thank you!             Your Updated Medication List - " Protect others around you: Learn how to safely use, store and throw away your medicines at www.disposemymeds.org.          This list is accurate as of: 1/9/18  6:00 PM.  Always use your most recent med list.                   Brand Name Dispense Instructions for use Diagnosis    acetaminophen 32 mg/mL solution    TYLENOL    400 mL    30.45 mLs (975 mg) by Per J Tube route 3 times daily    Acute post-operative pain       ATIVAN PO      Take by mouth every 4 hours as needed for anxiety        COMPAZINE PO      Take by mouth every 6 hours as needed for nausea        cyabnocobalamin 2500 MCG sublingual tablet    VITAMIN B-12    30 tablet    Place 2,500 mcg under the tongue daily    B12 deficiency       fluconazole 200 MG tablet    DIFLUCAN    30 tablet    Take 1 tablet (200 mg) by mouth daily    Thrush       multivitamins with minerals Liqd liquid     450 mL    15 mLs by Per J Tube route daily    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       OLANZapine 10 MG tablet    zyPREXA    30 tablet    Take 1 tablet (10 mg) by mouth At Bedtime    Chemotherapy-induced nausea       oxyCODONE 5 MG/5ML solution    ROXICODONE    300 mL    Take 5-10 mLs (5-10 mg) by mouth every 4 hours as needed for moderate to severe pain    Acute post-operative pain       sennosides 8.8 MG/5ML syrup    SENOKOT    400 mL    10 mLs by Per J Tube route 2 times daily    Bowel habit changes       ZOFRAN PO      Take by mouth every 8 hours as needed for nausea or vomiting

## 2018-01-09 NOTE — NURSING NOTE
"Oncology Rooming Note    January 9, 2018 2:01 PM   Daniel Sandhu is a 37 year old male who presents for:    Chief Complaint   Patient presents with     Oncology Clinic Visit     Return visit related to Esophageal Cancer     Port Draw     labs drawn from port by rn.  vs taken     Initial Vitals: /76 (BP Location: Right arm, Patient Position: Sitting, Cuff Size: Adult Regular)  Pulse 123  Temp 97.6  F (36.4  C) (Oral)  Resp 20  Ht 1.75 m (5' 8.9\")  Wt 60.6 kg (133 lb 8 oz)  SpO2 98%  BMI 19.77 kg/m2 Estimated body mass index is 19.77 kg/(m^2) as calculated from the following:    Height as of this encounter: 1.75 m (5' 8.9\").    Weight as of this encounter: 60.6 kg (133 lb 8 oz). Body surface area is 1.72 meters squared.  Mild Pain (3) Comment: Feeding tube enterance site   No LMP for male patient.  Allergies reviewed: Yes  Medications reviewed: Yes    Medications: Medication refills not needed today.  Pharmacy name entered into [facility]:    [facility] - [place], [place] - [address] [facility] [place], [place] - [address]    Clinical concerns: Patient would like prescription for Ativan, Compazine.  Provider was notified.    10 minutes for nursing intake (face to face time)     Xochitl Tovar LPN            "

## 2018-01-09 NOTE — NURSING NOTE
"Chief Complaint   Patient presents with     Oncology Clinic Visit     Return visit related to Esophageal Cancer     Port Draw     labs drawn from port by rn.  vs taken     Good blood return noted from already-accessed port.  Labs drawn by rn.  Port flushed with NS and heparin.  Pt tolerated well.  VS taken; pt states 15 lb wt loss in last wk--enteral feeding \"just alberto' right through me\"--this relayed to provider.  Pt checked in for next appt.  Fidelia Massey RN    "

## 2018-01-09 NOTE — PROGRESS NOTES
This is a recent snapshot of the patient's [facility] medical record.  For current drug dose and complete information and questions, call [phone] or In Basket pool, fv home infusion ([id number])  CSN Number:  [id number]

## 2018-01-09 NOTE — PROGRESS NOTES
RN Care Coordination Note  Reason for Outgoing Call:   Received voicemail from pt's wife that pt is feeling worse not better  Reviewed lab results from home care yesterday, including negative c.diff and diff not not done on CBC as requested  Patient Questions/Concerns:   Return call to pt's wife: Pt is so exhausted and weak that he has trouble moving around in house, no dizziness. BP with home care nurse yesterday was 110's over 70s per wife. No fever, but he has a runny nose, no cough, sore throat or other congestion. Loose stools continue, feeling restriction sensation in chest - pt notified Dr. Esteban's team of this yesterday and they ordered esophagram, not scheduled yet.  5FU pump was discontinued yesterday and has not been restarted  Nursing Action/Patient Instruction:  Notified Dr. Hood of above and she feels pt should be seen in clinic or ED based on how weak he is. Pt's wife and I agree that clinic appt this afternoon is preferable, but understands that if he feels worse they should got to ED instead  Discussed above with Alisa:  TORB:  MATHEUS Gomez / Nannette Ovalles RN:  CBC, CMP, Mg today followed by clinic visit today at 1:40 pm, infusion appt if available, ok to see in infusion  See if esophagram can be scheduled as per Carlito's order    Per infusion charge, Aleyda Cedeno RN, Alisa should call for add-on to infusion after seeing pt if he needs hydration, no appts available currently to schedule in advance.  Patient Response/Evaluation:   Pt's wife voiced understanding of above instructions and information and denied further questions      Nannette Ovalles RN, BSN, OCN  Care Coordinator  [facility]

## 2018-01-09 NOTE — H&P
"[facility] History and Physical    Daniel Sandhu MRN# [id number]   Age: 37 year old YOB: 1980     Date of Admission: 01/09/2018    Primary care provider: Lencho Chan          Assessment and Plan:   Daniel Sandhu is a 37-year-old male with history of adenocarcinoma of the GE junction, s/p 3 cycles of neoadjuvant EOX followed by resection on 11/3/2017. He is now undergoing adjuvant chemotherapy with EOF x 3 cycles. He has been admitted to the hospital after being seen in clinic for evaluation of diarrhea, nausea, inability to eat, and extreme weakness.     Profound weakness/nausea/diarrhea.  Daniel started cycle 2 EOF on 1/2/18. He previously had tolerated cycle 1 well without significant issues and had started supplementing oral intake by mouth without difficulty. Around 1/4/18, he suddenly developed large volume, watery diarrhea at night with occasional episodes during the day, in addition to nausea and profound weakness. Gradually over the last week or so he has had a sensation of a \"stricture\" and extremely dry mouth, which have also limited his PO intake. He has lost 14 lbs in the last week. In clinic today, noted to be tachycardic to 123, otherwise HD stable. On exam, he appears significantly weak compared to baseline (per outpatient provider). Could be secondary to chemo, though as above, he tolerated cycle one without these issues. Viral gastroenteritis is also on on the differential (given recent sick family members). He was given IV fluids, zofran and dexamethasone 12 mg in clinic. On arrival to the hospital this evening, he reported feeling significantly better after the dexamethasone and even tolerated a small bowl of tomato soup.  - Enteric pathogen panel is pending. C.diff is NEG.  - Planning esophogram XR while inpatient to evaluate for stricture. This was ordered and scheduled as an outpatient, so ideally this can be done while inpatient.  - Maybe also a candidate for EGD. " However, would opt for esophagram first as this is much less invasive and would also be informative given his above complaints.  - Hold tube feedings for now.  - Anti-emetics PRN.  - Follow-up blood culture drawn in clinic (though low suspicion for infection at this time).  - PT, OT and Nutrition consults.     Adenocarcinoma of the GE junction.  He continues on adjuvant chemotherapy with epirubicin, oxaliplatin, 5FU (21-day continuous infusion). He started cycle 5 on 1/2/2018. The 5FU pump has been disconnected as of 1/8/18 due to the above symptoms.  - Cycle 6 is tentatively planned for 1/23/18, and he has follow-up with Dr. Esteban on 3/7/18 with re-staging CT CAP on that same day.    Insomnia.  - Discontinue home olanzepine QHS (he has not taken this and does not want to start based on reading the drug information).  - Add melatonin QHS PRN.    FEN: reg diet (per patient request), D5 NS + 20 mEq KCl at 125 ml/hr overnight, replete lytes PRN  Code Status: FULL  Dispo: Admitted to the oncology service for work-up of N/V/D and weakness. Anticipate discharge to home in the next couple of days pending clinical improvement.    Peyton Moody MD/PhD  Heme/Onc/Transplant Hospitalist  Pager [phone]            Chief Complaint:   Diarrhea, nausea, gagging, inability to eat, and extreme weakness. All started a few days ago.  - History is obtained from the patient and electronic health record    Daniel Sandhu is a 37 year old man with history of adenocarcinoma of the GE junction. He presented to clinic today with his wife on an add-on basis for evaluation of diarrhea, nausea, gagging, inability to eat, and extreme weakness. He notes these symptoms all started around 1/4/18. Diarrhea started on 1/4/18 at night, and has persisted since that time without any improvement or worsening. He has been having at least 5 large volume, watery stools per day (even overnight). He has not tried Imodium or any other anti-diarrheal  "agents. Of note, wife and daughter had a \"GI bug\" around [place]. The nausea/gagging/inability to eat have been worsening for a few weeks. Prior to 1/4/18, he was eating at least one full meal daily in addition to the tube feeds overnight. He gradually has developed very dry mouth with thick oral secretions, which make it difficult to eat. He feels like he is gagging on these secretions and this causes nausea. Additionally, there is a sensation of a \"stricture\" in his esophagus which makes him feel nauseated as well. He is using zofran and ativan at home, in addition to a liter of NS daily with [facility]. He has been eating very little lately, noting that he can tolerate only a small amount of apple sauce and apple juice. These are the only two things that really are able to go down. Regarding the extreme weakness, he notes this seemed to have start along with the nausea, diarrhea, and inability to eat started on 1/4/18. He has felt tremendously weak. He has difficulty at home with even the slightest physical activity. He describes having to rest at least 10-15 minutes after small amounts of activity, just to get his heart rate down. He is aware that he has a rapid pulse at times at home. He was in a wheelchair at clinic because an RN witnessed him unsteady on his feet while trying to take his jacket off. He denies dizziness. He has MIMS but no SOB at rest or chest pain. Otherwise no fevers, chills, abdominal pain, bleeding, or swelling.         Cancer Treatment History:   Daniel Sandhu is a 37 year old male who presents with adenocarcinoma of the GE junction.  In early 2017, patient started noticing difficulty swallowing, and that food seems to take longer to go down.  It became more frequent, and he saw his PCP, and was referred for EGD, which showed an esophageal mass located at the GE junction, measuring 4 cm.  Biopsy showed poorly differentiated adenocarcinoma.  HER-2 was sent and is equivocal (2+).  CT c/a/p " showed a mildly prominent lymph node in the gastrohepatic ligament, but there were no pathologically enlarged lymph nodes in the chest, abdomen, or pelvis.  There was otherwise no evidence of metastatic disease.  PET-CT showed thickening of the distal esophagus consistent with known esophageal adenocarcinoma, as well as mildly hypermetabolic 1 cm lymph node in the gastrohepatic ligament.  There was no evidence of distant metastatic disease.  He underwent EUS on 6/9/17, which showed the esophageal tumor in the lower third of the esophagus, staged T2NX.  There were 2 abnormal lymph nodes seen in the gastrohepatic ligament; pathology was suspicious for adenocarcinoma.  Celiac node was sampled as well, which was benign.  He was seen by surgery, Dr. Esteban, and recommended srinivas-operative chemotherapy.      Port was placed on 6/22/17.      Chemotherapy:  Epirubicin 50 mg/m2 IV on day 1  Oxaliplatin 130 mg/m2 IV on day 1  Capecitabine 625 mg/m2 po BID on days 1-21  Every 21 day cycles      C1D1: 6/26/17  C2D1: 7/17/17  C3D1: 8/7/17      On 11/3/17, he underwent laparotomy with total gastrectomy and abdominal lymphadenectomy, left thoracotomy with distal esophagectomy and intrathoracic Terrell-en-Y esophagojejunostomy, feeding jejunostomy, left pharyngostomy tube placement, right tube thoracostomy, and flexible bronchoscopy.  On 11/9/17, he was taken back to OR for I&D of pharyngostomy tube abscess.  Pathology showed adenocarcinoma, moderate differentiated, extensive residual tumor with no evidence tumor regression, margins negative, perineural invasion present, 1 of 28 lymph nodes were involved with malignancy, stage [id number] (stage IIIA).  HER-2 is non-amplified.      He resumed chemotherapy post-surgery, with plans to complete 3 more cycles.  He did not tolerate Xeloda well during his neoadjuvant chemotherapy, with issues with palmar-plantar, and likely genital erythrodysesthesia.  We discussed changing to 5-FU, and Daniel  would like to try this, as he may also have issues with taking pills and absorption after his surgery.  He will receive epirubicin, oxaliplatin, and 5-FU x 3 cycles.      C4D1: 12/11/17  C5D1: 1/2/18  C6D1: anticipated for 1/23/18         Past Medical History:     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 6/5/2017            Past Surgical History:      Past Surgical History:   Procedure Laterality Date     ESOPHAGOGASTRODUODENOSCOPY       ESOPHAGOSCOPY, GASTROSCOPY, DUODENOSCOPY (EGD), COMBINED N/A 6/9/2017    Procedure: COMBINED ENDOSCOPIC ULTRASOUND, ESOPHAGOSCOPY, GASTROSCOPY, DUODENOSCOPY (EGD), FINE NEEDLE ASPIRATE/BIOPSY;  Upper Endoscopic Ultrasound, fine needle aspirate/biopsy;  Surgeon: Guru Mark Avila MD;  Location: [facility] OR     ESOPHAGOSCOPY, GASTROSCOPY, DUODENOSCOPY (EGD), COMBINED N/A 11/3/2017    Procedure: COMBINED ESOPHAGOSCOPY, GASTROSCOPY, DUODENOSCOPY (EGD);;  Surgeon: Yunior Esteban MD;  Location: [facility] OR     ESOPHAGOSCOPY, GASTROSCOPY, DUODENOSCOPY (EGD), COMBINED N/A 11/9/2017    Procedure: COMBINED ESOPHAGOSCOPY, GASTROSCOPY, DUODENOSCOPY (EGD);  Esophogastroduodenoscopy, take out pharangostomy tube;  Surgeon: Yunior Esteban MD;  Location: [facility] OR     GASTRECTOMY N/A 11/3/2017    Procedure: GASTRECTOMY;;  Surgeon: Ynuior Esteban MD;  Location: [facility] OR     HAND SURGERY      childhood, torn tendon     INSERT PORT VASCULAR ACCESS Right 6/22/2017    Procedure: INSERT PORT VASCULAR ACCESS;  Single Lumen Chest Power Port;  Surgeon: Iván Driver PA-C;  Location: [facility] OR     LAPAROSCOPY DIAGNOSTIC (GENERAL) N/A 11/3/2017    Procedure: LAPAROSCOPY DIAGNOSTIC (GENERAL);  diagnostic laparoscopy, right chest tube, total gastrectomy with distal esophagectomy, intrathoracic eveline-y esophago-jejunostomy, feeding jejunostomy, pharyngostomy, esophagogastroduodenoscopy, flexible bronchoscopy;  Surgeon:  Yunior Esteban MD;  Location: [facility] OR     LAPAROTOMY EXPLORATORY N/A 11/3/2017    Procedure: LAPAROTOMY EXPLORATORY;;  Surgeon: Yunior Esteban MD;  Location: [facility] OR     PHARYNGOSTOMY N/A 11/3/2017    Procedure: PHARYNGOSTOMY;;  Surgeon: Yunior Esteban MD;  Location: [facility] OR     THORACOTOMY Left 11/3/2017    Procedure: THORACOTOMY;;  Surgeon: Yunior Esteban MD;  Location: [facility] OR            Social History:     Social History   Substance Use Topics     Smoking status: Never Smoker     Smokeless tobacco: Never Used     Alcohol use Yes      Comment: 1 beer daily            Family History:     Family History   Problem Relation Age of Onset     Other - See Comments Father      sepsis     CANCER Sister      breast cancer  29 yo 1/2 sister      CANCER Paternal Grandmother      breast            Immunizations:     Immunization History   Administered Date(s) Administered     Influenza Vaccine IM 3yrs+ 4 Valent IIV4 10/27/2016     TDAP Vaccine (Adacel) 2017            Allergies:   No Known Allergies         Medications:       Current Facility-Administered Medications on File Prior to Encounter:  [COMPLETED] heparin 100 UNIT/ML injection 5 mL   sodium chloride (PF) 0.9% PF flush 20 mL   [COMPLETED] 0.9% sodium chloride BOLUS   INFUSION HYPERSENSITIVITY   [COMPLETED] dexamethasone (DECADRON) 12 mg in NaCl 0.9 % intermittent infusion   [COMPLETED] ondansetron (ZOFRAN) injection 8 mg     Current Outpatient Prescriptions on File Prior to Encounter:  Ondansetron HCl (ZOFRAN PO) Take by mouth every 8 hours as needed for nausea or vomiting   Prochlorperazine Maleate (COMPAZINE PO) Take by mouth every 6 hours as needed for nausea   LORazepam (ATIVAN PO) Take by mouth every 4 hours as needed for anxiety   OLANZapine (ZYPREXA) 10 MG tablet Take 1 tablet (10 mg) by mouth At Bedtime   fluconazole (DIFLUCAN) 200 MG tablet Take 1 tablet (200 mg) by mouth daily   cyabnocobalamin (VITAMIN B-12)  2500 MCG sublingual tablet Place 2,500 mcg under the tongue daily   acetaminophen (TYLENOL) 32 mg/mL solution 30.45 mLs (975 mg) by Per J Tube route 3 times daily   sennosides (SENOKOT) 8.8 MG/5ML syrup 10 mLs by Per J Tube route 2 times daily   multivitamins with minerals (CERTAVITE/CEROVITE) LIQD liquid 15 mLs by Per J Tube route daily   oxyCODONE (ROXICODONE) 5 MG/5ML solution Take 5-10 mLs (5-10 mg) by mouth every 4 hours as needed for moderate to severe pain (Patient not taking: Reported on 1/2/2018)              Review of Systems:   A comprehensive ROS was performed with the patient and found to be negative with the exception of that noted in the HPI above.         Data:     Lab Results   Component Value Date    WBC 1.9 (L) 01/09/2018    WBC 1.9 (L) 01/08/2018    WBC 9.9 01/02/2018    HGB 13.8 01/09/2018    HGB 13.0 (L) 01/08/2018    HGB 10.3 (L) 01/02/2018    HCT 42.4 01/09/2018    HCT 40.6 01/08/2018    HCT 32.9 (L) 01/02/2018     01/09/2018     01/08/2018     01/02/2018     01/09/2018     01/08/2018     01/02/2018    POTASSIUM 3.9 01/09/2018    POTASSIUM 3.8 01/08/2018    POTASSIUM 4.4 01/02/2018    CHLORIDE 102 01/09/2018    CHLORIDE 105 01/08/2018    CHLORIDE 100 01/02/2018    CO2 20 01/09/2018    CO2 22 01/08/2018    CO2 29 01/02/2018    BUN 24 01/09/2018    BUN 22 01/08/2018    BUN 14 01/02/2018    CR 0.77 01/09/2018    CR 0.81 01/08/2018    CR 0.59 (L) 01/02/2018     (H) 01/09/2018     (H) 01/08/2018     (H) 01/02/2018    AST 21 01/09/2018    AST 18 01/08/2018    AST 17 01/02/2018    ALT 21 01/09/2018    ALT 23 01/08/2018    ALT 22 01/02/2018    ALKPHOS 55 01/09/2018    ALKPHOS 57 01/08/2018    ALKPHOS 70 01/02/2018    BILITOTAL 0.4 01/09/2018    BILITOTAL 0.2 01/08/2018    BILITOTAL 0.2 01/02/2018    INR 1.14 07/29/2017    INR 1.12 06/09/2017

## 2018-01-09 NOTE — PROGRESS NOTES
Infusion Nursing Note:  Daniel Sandhu presents today for IVF and antiemetics     Patient seen by provider today: Yes: Alisa JARVIS   present during visit today: Not Applicable.    Note: N/A.    Intravenous Access:  Implanted Port.    Treatment Conditions:  Lab Results   Component Value Date    HGB 13.8 01/09/2018     Lab Results   Component Value Date    WBC 1.9 01/09/2018      Lab Results   Component Value Date    ANEU 1.3 01/09/2018     Lab Results   Component Value Date     01/09/2018      Lab Results   Component Value Date     01/09/2018                   Lab Results   Component Value Date    POTASSIUM 3.9 01/09/2018           Lab Results   Component Value Date    MAG 2.2 01/09/2018            Lab Results   Component Value Date    CR 0.77 01/09/2018                   Lab Results   Component Value Date    YOBANY 8.6 01/09/2018                Lab Results   Component Value Date    BILITOTAL 0.4 01/09/2018           Lab Results   Component Value Date    ALBUMIN 2.9 01/09/2018                    Lab Results   Component Value Date    ALT 21 01/09/2018           Lab Results   Component Value Date    AST 21 01/09/2018     Blood Cultures and stool cx sent to lab.  Plan to admit to hospital.         Post Infusion Assessment:  Patient tolerated infusion without incident.  Blood return noted pre and post infusion.  Site patent and intact, free from redness, edema or discomfort.  No evidence of extravasations.  Access discontinued per protocol.    Discharge Plan:   Patient and/or family verbalized understanding of transfer instructions and all questions answered.  Patient transferred to [facility] via [facility] in stable condition accompanied by: self and wife.  Departure Mode: Wheelchair.    Nafisa Quick RN

## 2018-01-09 NOTE — IP AVS SNAPSHOT
[address] [address]    Phone:  [phone]    Fax:  [phone]                                       After Visit Summary   1/9/2018    Daniel Sandhu    MRN: [id number]           After Visit Summary Signature Page     I have received my discharge instructions, and my questions have been answered. I have discussed any challenges I see with this plan with the nurse or doctor.    ..........................................................................................................................................  Patient/Patient Representative Signature      ..........................................................................................................................................  Patient Representative Print Name and Relationship to Patient    ..................................................               ................................................  Date                                            Time    ..........................................................................................................................................  Reviewed by Signature/Title    ...................................................              ..............................................  Date                                                            Time

## 2018-01-09 NOTE — IP AVS SNAPSHOT
Daniel Sandhu #[id number] (CSN: [id number])  (37 year old M)  (Adm: 18)     [id number]               [address] [place] [address]: [phone]            Patient Demographics     Patient Name Sex          Age SSN Address Phone    Daniel Sandhu Male 1980 (37 year old)  [address] [facility] [address] [phone] (Home)  [phone] (Mobile)      Emergency Contact(s)     Name Relation Home Work Mobile    Violeta Sandhu Spouse   [phone]      Admission Information     Attending Provider Admitting Provider Admission Type Admission Date/Time    Sam Dumont MD Domingo Musibay, Evidio, MD Urgent 18  1753    Discharge Date Hospital Service Auth/Cert Status Service Area     Oncology [facility]    Unit Room/Bed Admission Status       [address] 5415/5415-01 Admission (Confirmed)       Admission     Complaint    Failure to thrive, GE junction Cancer, Weakness, Possible anastomotic ulcer      Hospital Account     Name Acct ID Class Status Primary Coverage    Daniel Sandhu [id number] Inpatient Open [employer] Information - [facility] OPEN ACCESS FULLY INSURED            Guarantor Account (for Hospital Account #[id number])     Name Relation to Pt Service Area Active? Acct Type    Daniel Sandhu Self FCS Yes Personal/Family    Address Phone          [address] [place] [address] [phone](H)              Coverage Information (for Hospital Account #[id number])     F/O Payor/Plan Precert #    [employer] OPEN ACCESS FULLY INSURED     Subscriber Subscriber #    Daniel Sandhu [id number]    Address Phone    [address]  [place] [address] [phone]                                                INTERAGENCY TRANSFER FORM - PHYSICIAN ORDERS   2018                       [address] [place] [address]: [phone]            Attending Provider: Sam Dumont MD     Allergies:  No Known Allergies  "   Infection:  None   Service:  ONCOLOGY    Ht:  1.75 m (5' 8.9\")   Wt:  60.6 kg (133 lb 9.6 oz)   Admission Wt:  60.4 kg (133 lb 2.5 oz)    BMI:  19.79 kg/m 2   BSA:  1.72 m 2            ED Clinical Impression     Diagnosis Description Comment Added By Time Added    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 [C15.5] 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 [C15.5]  Michelle Arita RN 1/11/2018  1:49 PM    S/P gastrectomy [Z90.3] S/P gastrectomy [Z90.3]  Tiffani Burks PA-C 1/13/2018  2:00 PM    Diarrhea, unspecified type [R19.7] Diarrhea, unspecified type [R19.7]  Tiffani Burks PA-C 1/13/2018  2:00 PM    Chemotherapy-induced nausea [R11.0, T45.1X5A] Chemotherapy-induced nausea [R11.0, T45.1X5A]  Tiffani Burks PA-C 1/13/2018  2:01 PM    Loss of appetite [R63.0] Loss of appetite [R63.0]  Tiffani Burks PA-C 1/13/2018  2:01 PM    Hypokalemia [E87.6] Hypokalemia [E87.6]  Tiffani Burks PA-C 1/13/2018  2:05 PM    Dry mouth [R68.2] Dry mouth [R68.2]  Tiffani Burks PA-C 1/13/2018  2:06 PM    Mucositis [K12.30] Mucositis [K12.30]  Tiffani Burks PA-C 1/13/2018  2:09 PM      Hospital Problems as of 1/13/2018              Priority Class Noted POA    Weakness Medium  1/9/2018 Yes      Non-Hospital Problems as of 1/13/2018              Priority Class Noted    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 Medium  6/5/2017    Thrush Medium  9/25/2017    Esophageal cancer (H) Medium  11/3/2017    S/P gastrectomy Medium  11/22/2017    Chemotherapy-induced nausea Medium  12/18/2017      Code Status History     Date Active Date Inactive Code Status Order ID Comments User Context    1/13/2018  3:14 PM  Full Code [id number]  Tiffani Burks PA-C Outpatient    1/9/2018  6:28 PM 1/13/2018  3:14 PM Full Code [id number]  Peyton Moody MD Inpatient    11/12/2017  9:34 AM 11/17/2017  4:40 PM Full Code [id number]  Shital Grant MD Inpatient      Current Code Status     Date " Active Code Status Order ID Comments User Context       Prior      Summary of Visit     Reason for your hospital stay       Admitted for diarrhea, weakness, and nausea.                Medication Review      START taking        Dose / Directions Comments    artificial saliva Liqd liquid   Used for:  Dry mouth        Dose:  10 mL   Swish and spit 10 mLs in mouth 4 times daily   Quantity:  473 mL   Refills:  0        dexamethasone 2 MG tablet   Commonly known as:  DECADRON   Used for:  Chemotherapy-induced nausea, Loss of appetite        Dose:  2 mg   Start taking on:  1/14/2018   Take 1 tablet (2 mg) by mouth daily   Quantity:  30 tablet   Refills:  0        diphenoxylate-atropine 2.5-0.025 MG per tablet   Commonly known as:  LOMOTIL   Used for:  Diarrhea, unspecified type        Dose:  1 tablet   Take 1 tablet by mouth 4 times daily as needed for diarrhea   Quantity:  40 tablet   Refills:  0        fiber modular packet   Used for:  Diarrhea, unspecified type        Dose:  1 packet   1 packet by Per Feeding Tube route 3 times daily   Quantity:  90 packet   Refills:  0        loperamide 2 MG capsule   Commonly known as:  IMODIUM   Used for:  S/P gastrectomy, Diarrhea, unspecified type        Dose:  4 mg   Take 2 capsules (4 mg) by mouth every 6 hours (max dose of 16 mg daily).   Quantity:  120 capsule   Refills:  0        magic mouthwash suspension (diphenhydrAMINE, lidocaine, aluminum-magnesium & simethicone) Susp compounding kit   Used for:  Mucositis        Dose:  5-10 mL   Swish and swallow 5-10 mLs in mouth every 6 hours as needed for mouth sores   Quantity:  237 mL   Refills:  1        potassium chloride 20 MEQ Packet   Commonly known as:  KLOR-CON   Used for:  Hypokalemia        Dose:  20 mEq   Take 20 mEq by mouth 2 times daily   Quantity:  60 packet   Refills:  0          CONTINUE these medications which may have CHANGED, or have new prescriptions. If we are uncertain of the size of tablets/capsules you have  at home, strength may be listed as something that might have changed.        Dose / Directions Comments    ondansetron 4 MG tablet   Commonly known as:  ZOFRAN   This may have changed:    - medication strength  - how much to take  - when to take this  - reasons to take this   Used for:  Chemotherapy-induced nausea        Dose:  4 mg   Take 1 tablet (4 mg) by mouth every 6 hours   Quantity:  40 tablet   Refills:  0          CONTINUE these medications which have NOT CHANGED        Dose / Directions Comments    acetaminophen 32 mg/mL solution   Commonly known as:  TYLENOL   Used for:  Acute post-operative pain        Dose:  975 mg   30.45 mLs (975 mg) by Per J Tube route 3 times daily   Quantity:  400 mL   Refills:  0        ATIVAN PO        Take by mouth every 4 hours as needed for anxiety   Refills:  0        COMPAZINE PO        Take by mouth every 6 hours as needed for nausea   Refills:  0        cyabnocobalamin 2500 MCG sublingual tablet   Commonly known as:  VITAMIN B-12   Used for:  B12 deficiency        Dose:  2500 mcg   Place 2,500 mcg under the tongue daily   Quantity:  30 tablet   Refills:  1        fluconazole 200 MG tablet   Commonly known as:  DIFLUCAN   Used for:  Thrush        Dose:  200 mg   Take 1 tablet (200 mg) by mouth daily   Quantity:  30 tablet   Refills:  1        multivitamins with minerals Liqd liquid   Used for:  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        Dose:  15 mL   15 mLs by Per J Tube route daily   Quantity:  450 mL   Refills:  0        OLANZapine 10 MG tablet   Commonly known as:  zyPREXA   Used for:  Chemotherapy-induced nausea        Dose:  10 mg   Take 1 tablet (10 mg) by mouth At Bedtime   Quantity:  30 tablet   Refills:  1        oxyCODONE 5 MG/5ML solution   Commonly known as:  ROXICODONE   Used for:  Acute post-operative pain        Dose:  5-10 mg   Take 5-10 mLs (5-10 mg) by mouth every 4 hours as needed for moderate to severe pain   Quantity:  300 mL   Refills:   0          STOP taking     sennosides 8.8 MG/5ML syrup   Commonly known as:  SENOKOT                   After Care     Activity       Your activity upon discharge: activity as tolerated       Diet       Follow this diet upon discharge: Orders Placed This Encounter      Snacks/Supplements Adult: Nutrisource Fiber; Between Meals, Ensure Clear; Between Meals, Beneprotein; With Meals      Adult Formula Drip Feeding: Continuous with peptamin 1.5; Jejunostomy; Goal Rate: 60; mL/hr; Medication - Tube Feeding Flush Frequency: At least 15-30 mL water before and after medication administration and with tube clogging             Lab Orders     CBC with platelets and differential       Last Lab Result: Hemoglobin (g/dL)       Date                     Value                 01/13/2018               10.9 (L)         ----------       Comprehensive metabolic panel (BMP + Alb, Alk Phos, ALT, AST, Total. Bili, TP)           Magnesium           Phosphorus                 Referrals     Home infusion referral       Resume home enterals.    --------------------------    Gastrostomy/Enterostomy Jejunostomy LUQ     Osmolite 1.5 , 100cc/hour for 16 hours.    _______________________________  [facility]  Phone  [phone]  Fax  [phone]  ______________________________             Follow-Up Appointment Instructions     Follow Up and recommended labs and tests       Follow up as scheduled below:             Your next 10 appointments already scheduled     Lucien 15, 2018  1:45 PM CST   [facility] Lab Draw with  [facility] LAB DRAW   [facility])    [address] [place] [address]  [address]  [facility] [address] [phone]            Lucien 15, 2018  2:15 PM CST   (Arrive by 2:00 PM)   Return Visit with Laurence Hood MD   [facility] ([facility])    [address] [place] [address]  [address]  [facility] [address] [phone]             Jan 23, 2018  8:30 AM CST   [facility] Lab Draw with  MASONIC LAB DRAW   [facility] Lab Draw ([facility])    [address]  [address]  [facility] [address]   [phone]            Jan 23, 2018  8:40 AM CST   (Arrive by 8:25 AM)   Return Visit with GAVIN Lundy CNP   [facility] ([facility])    [address]  [address]  [facility] [address]   [phone]            Jan 23, 2018 10:00 AM CST   Infusion 240 with  ONCOLOGY INFUSION, UC 17 ATC   [facility] ([facility])    [address]  [address]  [facility] [address]   [phone]            Mar 07, 2018  1:40 PM CST   CT CHEST ABDOMEN PELVIS W/O CONTRAST with UCCT1   [facility] CT ([facility])    [address]  1st Floor  [facility] [address] [phone]           Please bring any scans or X-rays taken at other hospitals, if similar tests were done. Also bring a list of your medicines, including vitamins, minerals and over-the-counter drugs. It is safest to leave personal items at home.  Be sure to tell your doctor:   If you have any allergies.   If there s any chance you are pregnant.   If you are breastfeeding.   If you have any special needs.  You may have contrast for this exam. To prepare:   Do not eat or drink for 2 hours before your exam. If you need to take medicine, you may take it with small sips of water. (We may ask you to take liquid medicine as well.)   The day before your exam, drink extra fluids at least six 8-ounce glasses (unless your doctor tells you to restrict your fluids).  Patients over 70 or patients with diabetes or kidney problems:   If you haven t had a blood test (creatinine test) within the last 30 days, go to your clinic or Diagnostic Imaging Department for this test.  If you have diabetes:   If your kidney function  "is normal, continue taking your metformin (Avandamet, Glucophage, Glucovance, Metaglip) on the day of your exam.   If your kidney function is abnormal, wait 48 hours before restarting this medicine.  You will have oral contrast for this exam:   You will drink the contrast at home. Get this from your clinic or Diagnostic Imaging Department. Please follow the directions given.  Please wear loose clothing, such as a sweat suit or jogging clothes. Avoid snaps, zippers and other metal. We may ask you to undress and put on a hospital gown.  If you have any questions, please call the Imaging Department where you will have your exam.            Mar 07, 2018  2:30 PM CST   (Arrive by 2:15 PM)   Return Visit with Yunior Esteban MD   [facility] ([facility])    [address] [place] [address]  [address] [place] [address]   [phone]              Statement of Approval     Ordered          01/13/18 1515  I have reviewed and agree with all the recommendations and orders detailed in this document.  EFFECTIVE NOW     Approved and electronically signed by:  Tiffani Burks PA-C                                                 INTERAGENCY TRANSFER FORM - NURSING   1/9/2018                       UNIT [address] BMT [facility] BANK: [phone]            Attending Provider: Sam Dumont MD     Allergies:  No Known Allergies    Infection:  None   Service:  ONCOLOGY    Ht:  1.75 m (5' 8.9\")   Wt:  60.6 kg (133 lb 9.6 oz)   Admission Wt:  60.4 kg (133 lb 2.5 oz)    BMI:  19.79 kg/m 2   BSA:  1.72 m 2            Advance Directives        Does patient have a scanned Advance Directive/ACP document in EPIC?           Yes        Immunizations     Name Date      Influenza Vaccine IM 3yrs+ 4 Valent IIV4 10/27/16     TDAP Vaccine (Adacel) 05/22/17       ASSESSMENT     Discharge Profile Flowsheet     DISCHARGE NEEDS ASSESSMENT     FINAL RESOURCES      Equipment Currently Used at Home  " "none 01/11/18 1001   Resources List  Home Infusion 01/11/18 1350    Transportation Available  family or friend will provide 01/11/18 1001   Home Infusion Provider  [facility] [phone], Fax: [phone] (enterals) 01/11/18 1350    GASTROINTESTINAL (ADULT,PEDIATRIC,OB)     SKIN      GI WDL  ex 01/13/18 1011   Inspection of bony prominences  Full 01/13/18 1011    Last Bowel Movement  01/13/18 01/13/18 1011   Inspection under devices  Full 01/13/18 1011    GI Signs/Symptoms  diarrhea 01/13/18 1011   Skin WDL  WDL 01/13/18 1011    Passing flatus  yes 01/13/18 0137   SAFETY      COMMUNICATION ASSESSMENT     Safety WDL  WDL 01/13/18 1011    Patient's communication style  spoken language (English or Bilingual) 01/09/18 1819   All Alarms  none present 01/12/18 0417                 Assessment WDL (Within Defined Limits) Definitions           Safety WDL     Effective: 09/28/15    Row Information: <b>WDL Definition:</b> Bed in low position, wheels locked; call light in reach; upper side rails up x 2; ID band on<br> <font color=\"gray\"><i>Item=AS safety wdl>>List=AS safety wdl>>Version=F14</i></font>      Skin WDL     Effective: 09/28/15    Row Information: <b>WDL Definition:</b> Warm; dry; intact; elastic; without discoloration; pressure points without redness<br> <font color=\"gray\"><i>Item=AS skin wdl>>List=AS skin wdl>>Version=F14</i></font>      Vitals     Vital Signs Flowsheet     VITAL SIGNS     Pain Control  partially effective 01/10/18 1239    Temp  98.5  F (36.9  C) 01/13/18 1602   Functioning  can do most things, but pain gets in the way of some 01/10/18 1239    Temp src  Oral 01/13/18 1602   HEIGHT AND WEIGHT      Resp  16 01/13/18 1602   Height  1.75 m (5' 8.9\") 01/09/18 1810    Pulse  94 01/12/18 1931   Weight  60.6 kg (133 lb 9.6 oz) 01/12/18 1157    Heart Rate  75 01/13/18 1602   Weight Method  Standing scale 01/12/18 1157    Pulse/Heart Rate Source  Monitor 01/13/18 0943   BSA (Calculated - sq m)  " 1.71 01/09/18 1810    BP  108/72 01/13/18 1602   BMI (Calculated)  19.76 01/09/18 1810    BP Location  Left arm 01/13/18 1602   POSITIONING      Patient Position  Sitting 01/11/18 1213   Body Position  independently positioning 01/13/18 1133    OXYGEN THERAPY     Head of Bed (HOB)  HOB at 30 degrees 01/13/18 1133    SpO2  100 % 01/13/18 1602   Chair  Upright in chair 01/10/18 1038    O2 Device  None (Room air) 01/13/18 1602   DAILY CARE      PAIN/COMFORT     Activity Management  activity adjusted per tolerance;activity encouraged 01/13/18 1133    Patient Currently in Pain  denies 01/13/18 0824   Activity Assistance Provided  independent 01/13/18 1133    Preferred Pain Scale  CAPA (Clinically Aligned Pain Assessment) ([facility] Adults Only) 01/13/18 0824   ECG      Pain Location  Abdomen 01/10/18 1239   ECG Rhythm  Normal sinus rhythm 01/11/18 1053    Pain Descriptors  Cramping 01/10/18 1239   Ectopy  None 01/11/18 1053    Pain Intervention(s)  Medication (See eMAR) 01/10/18 1239   Lead Monitored  Lead II 01/11/18 1053    CLINICALLY ALIGNED PAIN ASSESSMENT (CAPA) ([facility] ADULTS ONLY)     RESPIRATORY MONITORING      Comfort  comfortably manageable 01/11/18 0429   Respiratory Monitoring (EtCO2)  0 mmHg 01/11/18 1227    Change in Pain  getting worse 01/10/18 1239                 Patient Lines/Drains/Airways Status    Active LINES/DRAINS/AIRWAYS     Name: Placement date: Placement time: Site: Days: Last dressing change:    Chest Tube 1 Left Pleural 28 Congolese 11/03/17   1550   Pleural   71     Gastrostomy/Enterostomy Jejunostomy LLQ 01/11/18   1500   LLQ   2     Incision/Surgical Site 06/22/17 Right Chest 06/22/17   0941    205     Incision/Surgical Site 06/22/17 Right Neck 06/22/17   0941    205     Incision/Surgical Site 11/03/17 Bilateral Abdomen 11/03/17   1631    71     Incision/Surgical Site 11/03/17 Midline;Anterior Abdomen 11/03/17   1350    71     Incision/Surgical Site 11/03/17  Left Chest 11/03/17   1629    71     Incision/Surgical Site 11/09/17 Left Neck 11/09/17   0751    65     Incision/Surgical Site 11/09/17 Mid Back 11/09/17   1247    65             Patient Lines/Drains/Airways Status    Active PICC/CVC     Name: Placement date: Placement time: Site: Days: Additional Info Last dressing change:    Port A Cath Single 06/22/17 Right Chest wall 06/22/17   0934   Chest wall   205 Orientation: Right            Power Port: Yes            Inserted by: Madhavi Driver PA-C            Total Catheter Length (cm): 23 cm            Line Flushed (See MAR): Yes            MRI Compatible: Yes            Diameter Kiswahili Size: 6 Fr            Tip location: RA            Lot #: [id number]               Intake/Output Detail Report     Date Intake         Output         Net    Shift P.O. I.V. NG/GT IV Piggyback Enteral Total Urine Emesis/NG output Other Stool Blood Total       Deneen 01/12/18 0700 - 01/12/18 1459 960 150 60 -- 290 1460 -- -- -- 1800 -- 1800 -340    Noc 01/12/18 1500 - 01/12/18 2359 600 -- 30  -- -- -- 3350 -- 3350 -1520    Day 01/13/18 0000 - 01/13/18 0659 -- -- 60 150 420 630 -- -- -- 500 -- 500 130    Deneen 01/13/18 0700 - 01/13/18 1459 200 -- 120 -- 420 740 -- -- -- -- -- -- 740    Noc 01/13/18 1500 - 01/13/18 2359 1600 -- -- -- 60 1660 -- -- -- -- -- -- 1660      Last Void/BM       Most Recent Value    Urine Occurrence 1 at 01/13/2018 1200    Stool Occurrence 1 at 01/13/2018 0800      Case Management/Discharge Planning     Case Management/Discharge Planning Flowsheet     LIVING ENVIRONMENT     MH/BH CAREGIVER      Lives With  child(janes), dependent;spouse 01/11/18 1001   Filed Complexity Screen Score  6 01/11/18 1350    Living Arrangements  house 01/11/18 1001   ABUSE RISK SCREEN      COPING/STRESS     QUESTION TO PATIENT:  Has a member of your family or a partner(now or in the past) intimidated, hurt, manipulated, or controlled you in any way?  no 01/09/18 1827    Major  Change/Loss/Stressor  hospitalization 01/09/18 1835   QUESTION TO PATIENT: Do you feel safe going back to the place where you are living?  yes 01/09/18 1827    DISCHARGE PLANNING     OBSERVATION: Is there reason to believe there has been maltreatment of a vulnerable adult (ie. Physical/Sexual/Emotional abuse, self neglect, lack of adequate food, shelter, medical care, or financial exploitation)?  no 01/09/18 1827    Transportation Available  family or friend will provide 01/11/18 1001   (R) MENTAL HEALTH SUICIDE RISK      FINAL RESOURCES     Are you depressed or being treated for depression?  No 01/09/18 1827    Equipment Currently Used at Home  none 01/11/18 1001   HOMICIDE RISK      Resources List  Home Infusion 01/11/18 1350   Feels Like Hurting Others  no 01/09/18 1827    Home Infusion Provider  [facility] [phone], Fax: [phone] (enterals) 01/11/18 1350                       [address] BMT [facility] BANK: [phone]            Medication Administration Report for Daniel Sandhu as of 01/13/18 1624   Legend:    Given Hold Not Given Due Canceled Entry Other Actions    Time Time (Time) Time  Time-Action       Inactive    Active    Linked        Medications 01/07/18 01/08/18 01/09/18 01/10/18 01/11/18 01/12/18 01/13/18    acetaminophen (TYLENOL) solution 975 mg  Dose: 975 mg Freq: EVERY 8 HOURS PRN Route: PER J TUBE  PRN Reasons: mild pain,fever  Start: 01/09/18 1956   Admin Instructions: Maximum acetaminophen dose from all sources= 75 mg/kg/day not to exceed 4 grams/day.               artificial saliva (BIOTENE DRY MOUTHWASH) liquid 10 mL  Dose: 10 mL Freq: 4 TIMES DAILY Route: SWISH & SPIT  Start: 01/10/18 1200       1235 (10 mL)-Given       1558 (10 mL)-Given       2038 (10 mL)-Given        0757 (10 mL)-Given       1408 (10 mL)-Given       1641 (10 mL)-Given       1958 (10 mL)-Given        0818 (10 mL)-Given       1228 (10 mL)-Given       1525 (10 mL)-Given       2040 (10 mL)-Given         0840 (10 mL)-Given       1309 (10 mL)-Given       [ ] 1600       [ ] 2000           cyanocobalamin sublingual tablet 2,500 mcg  Dose: 2,500 mcg Freq: DAILY Route: SL  Start: 01/10/18 0800       0926 (2,500 mcg)-Given        (0758)-Not Given [C]        (0817)-Not Given        0829 (2,500 mcg)-Given           dexamethasone (DECADRON) tablet 2 mg  Dose: 2 mg Freq: DAILY Route: PO  Start: 01/10/18 1045       1122 (2 mg)-Given        0758 (2 mg)-Given        0817 (2 mg)-Given        0827 (2 mg)-Given           diphenoxylate-atropine (LOMOTIL) 2.5-0.025 MG per tablet 1 tablet  Dose: 1 tablet Freq: 4 TIMES DAILY PRN Route: PO  PRN Reason: diarrhea  Start: 01/12/18 1742         1916 (1 tablet)-Given        0852 (1 tablet)-Given       1434 (1 tablet)-Given           fiber modular (NUTRISOURCE FIBER) packet 1 packet  Dose: 1 packet Freq: 3 TIMES DAILY Route: PER FEEDING   Start: 01/12/18 1500   Admin Instructions: Mix each packet with 60 mL water before administering. SUPPLIED BY NUTRITION DEPARTMENT.          1525 (1 packet)-Given       2041 (1 packet)-Given        0829 (1 packet)-Given       1308 (1 packet)-Given       [ ] 2000           fluconazole (DIFLUCAN) tablet 200 mg  Dose: 200 mg Freq: DAILY Route: PO  Indications of Use: FUNGAL INFECTION PROPHYLAXIS  Start: 01/10/18 0800       0926 (200 mg)-Given        0758 (200 mg)-Given        0817 (200 mg)-Given        0827 (200 mg)-Given           heparin 100 UNIT/ML injection 500 Units  Dose: 500 Units Freq: EVERY 8 HOURS Route: IK  Start: 01/13/18 1615          [ ] 1615           heparin lock flush 10 UNIT/ML injection 5 mL  Dose: 5 mL Freq: DAILY Route: IK  Start: 01/13/18 0800          (0848)-Not Given           loperamide (IMODIUM) capsule 2 mg  Dose: 2 mg Freq: EVERY 2 HOURS PRN Route: PO  PRN Reason: diarrhea  Start: 01/12/18 1745   Admin Instructions: Daily max of 8 tabs          2230 (2 mg)-Given        0422 (2 mg)-Given           LORazepam (ATIVAN) tablet 0.5  mg  Dose: 0.5 mg Freq: EVERY 4 HOURS PRN Route: PO  PRN Reasons: anxiety,nausea,vomiting  Start: 01/09/18 1852             Or  LORazepam (ATIVAN) injection 0.5-1 mg  Dose: 0.5-1 mg Freq: EVERY 4 HOURS PRN Route: IV  PRN Reasons: anxiety,nausea,vomiting  Start: 01/09/18 1852   Admin Instructions: For IV PUSH: Dilute with equal volume of NS. For ordered doses up to 4 mg give IV Push. Administer each 2mg over 1-5 minutes.               magnesium sulfate 4 g in 100 mL sterile water (premade)  Dose: 4 g Freq: EVERY 4 HOURS PRN Route: IV  PRN Reason: magnesium supplementation  Start: 01/11/18 1741   Admin Instructions: For serum Mg++ less than 1.6 mg/dL  Give 4 g and recheck magnesium level 2 hours after dose, and next AM.               melatonin liquid 3 mg  Dose: 3 mg Freq: AT BEDTIME PRN Route: ORAL OR FEED  PRN Reason: sleep  Start: 01/09/18 1845              meperidine (DEMEROL) injection 12.5 mg  Dose: 12.5 mg Freq: EVERY 15 MIN PRN Route: IV  PRN Reason: post anesthesia shivering  Start: 01/11/18 1224   Admin Instructions: Give IV Push undiluted. 10-40 mg over 2-3 minutes, up to 125 mg over 3-15 minutes               multivitamins with minerals (CERTAVITE/CEROVITE) liquid 15 mL  Dose: 15 mL Freq: DAILY Route: PER J TUBE  Start: 01/10/18 0800       1109 (15 mL)-Given        (0754)-Not Given        0818 (15 mL)-Given        0828 (15 mL)-Given           naloxone (NARCAN) injection 0.1-0.4 mg  Dose: 0.1-0.4 mg Freq: EVERY 2 MIN PRN Route: IV  PRN Reason: opioid reversal  Start: 01/09/18 1845   Admin Instructions: For respiratory rate LESS than or EQUAL to 8.  Partial reversal dose:  0.1 mg titrated q 2 minutes for Analgesia Side Effects Monitoring Sedation Level of 3 (frequently drowsy, arousable, drifts to sleep during conversation).Full reversal dose:  0.4 mg bolus for Analgesia Side Effects Monitoring Sedation Level of 4 (somnolent, minimal or no response to stimulation).  For ordered doses up to 2mg give IVP. Give  each 0.4mg over 15 seconds in emergency situations. For non-emergent situations further dilute in 9mL of NS to facilitate titration of response.               ondansetron (ZOFRAN) injection 4 mg  Dose: 4 mg Freq: EVERY 6 HOURS Route: IV  Start: 01/10/18 1800   Admin Instructions: Irritant. For ordered doses up to 4 mg, give IV Push undiluted over 2-5 minutes.        1734 (4 mg)-Given        0018 (4 mg)-Given       0636 (4 mg)-Given       1151 (4 mg)-Given       1807 (4 mg)-Given        0017 (4 mg)-Given       0608 (4 mg)-Given       1228 (4 mg)-Given       1916 (4 mg)-Given        0022 (4 mg)-Given       0635 (4 mg)-Given       1308 (4 mg)-Given       [ ] 1800           oxyCODONE (ROXICODONE) solution 5-10 mg  Dose: 5-10 mg Freq: EVERY 4 HOURS PRN Route: PO  PRN Reason: moderate to severe pain  Start: 01/09/18 1823       1235 (5 mg)-Given              potassium chloride (KLOR-CON) Packet 20 mEq  Dose: 20 mEq Freq: 2 TIMES DAILY Route: PO  Start: 01/12/18 2000   Admin Instructions: Dissolve packet contents in 4-8 ounces of cold water or juice.          2041 (20 mEq)-Given        0828 (20 mEq)-Given       [ ] 2000           potassium chloride (KLOR-CON) Packet 20-40 mEq  Dose: 20-40 mEq Freq: EVERY 2 HOURS PRN Route: ORAL OR FEED  PRN Reason: potassium supplementation  Start: 01/11/18 1741   Admin Instructions: Use if unable to tolerate tablets.  If Serum K+ 3.0-3.3, dose = 60 mEq po total dose (40 mEq x1 followed in 2 hours by 20 mEq x1). Recheck K+ level 4 hours after dose and the next AM.  If Serum K+ 2.5-2.9, dose = 80 mEq po total dose (40 mEq Q2H x2). Recheck K+ level 4 hours after dose and the next AM.  If Serum K+ less than 2.5, See IV order.  Dissolve packet contents in 4-8 ounces of cold water or juice.               potassium chloride 10 mEq in 100 mL intermittent infusion with 10 mg lidocaine  Dose: 10 mEq Freq: EVERY 1 HOUR PRN Route: IV  PRN Reason: potassium supplementation  Start: 01/11/18 1741   Admin  Instructions: Infuse via PERIPHERAL LINE. Use potassium with lidocaine for pain with peripheral administration.  If Serum K+ 3.0-3.3, dose = 10 mEq/hr x4 doses (40 mEq IV total dose). Recheck K+ level 2 hours after dose and the next AM.  If Serum K+ less than 3.0, dose = 10 mEq/hr x6 doses (60 mEq IV total dose). Recheck K+ level 2 hours after dose and the next AM.               potassium chloride 10 mEq in 100 mL sterile water intermittent infusion (premix)  Dose: 10 mEq Freq: EVERY 1 HOUR PRN Route: IV  PRN Reason: potassium supplementation  Start: 01/11/18 1741   Admin Instructions: Infuse via PERIPHERAL LINE or CENTRAL LINE. Use for central line replacement if patient weight less than 65 kg, if patient is on TPN with high potassium content or if unit does not stock 20 mEq bags.   If Serum K+ 3.0-3.3, dose = 10 mEq/hr x4 doses (40 mEq IV total dose). Recheck K+ level 2 hours after dose and the next AM.   If Serum K+ less than 3.0, dose = 10 mEq/hr x6 doses (60 mEq IV total dose). Recheck K+ level 2 hours after dose and the next AM.               potassium chloride 20 mEq in 50 mL intermittent infusion  Dose: 20 mEq Freq: EVERY 1 HOUR PRN Route: IV  PRN Reason: potassium supplementation  Last Dose: 20 mEq (01/13/18 0641)  Start: 01/11/18 1741   Admin Instructions: Infuse via CENTRAL LINE Only. May need EKG if less than 65 kg or on TPN - Max rate is 0.3 mEq/kg/hr for patients not on EKG monitoring.   If Serum K+ 3.0-3.3, dose = 20 mEq/hr x2 doses (40 mEq IV total dose). Recheck K+ level 2 hours after dose and the next AM.  If Serum K+ less than 3.0, dose = 20 mEq/hr x3 doses (60 mEq IV total dose). Recheck K+ level 2 hours after dose and the next AM.          0608 (20 mEq)-New Bag       0814 (20 mEq)-New Bag        0427 (20 mEq)-New Bag       0641 (20 mEq)-New Bag           potassium chloride SA (K-DUR/KLOR-CON M) CR tablet 20-40 mEq  Dose: 20-40 mEq Freq: EVERY 2 HOURS PRN Route: PO  PRN Reason: potassium  supplementation  Start: 01/11/18 1741   Admin Instructions: Use if able to take PO.   If Serum K+ 3.0-3.3, dose = 60 mEq po total dose (40 mEq x1 followed in 2 hours by 20 mEq x1). Recheck K+ level 4 hours after dose and the next AM.  If Serum K+ 2.5-2.9, dose = 80 mEq po total dose (40 mEq Q2H x2). Recheck K+ level 4 hours after dose and the next AM.  If Serum K+ less than 2.5, See IV order.  DO NOT CRUSH.               potassium phosphate 15 mmol in NaCl 0.9 % 250 mL intermittent infusion  Dose: 15 mmol Freq: DAILY PRN Route: IV  PRN Reason: phosphorous supplementation  Start: 01/11/18 1741   Admin Instructions: For serum phosphorus level 2-2.4  Do not infuse Phosphorus in the same line as TPN.   Give 15 mmol and recheck phosphorus level next AM.           0843 (15 mmol)-New Bag           potassium phosphate 20 mmol in NaCl 0.9 % 250 mL intermittent infusion  Dose: 20 mmol Freq: EVERY 6 HOURS PRN Route: IV  PRN Reason: phosphorous supplementation  Start: 01/11/18 1741   Admin Instructions: For serum phosphorus level 1.1-1.9  For CENTRAL Line ONLY  Do not infuse Phosphorus in the same line as TPN.   Give 20 mmol and recheck phosphorus level 2 hours after last dose and next AM.               potassium phosphate 20 mmol in NaCl 0.9 % 500 mL intermittent infusion  Dose: 20 mmol Freq: EVERY 6 HOURS PRN Route: IV  PRN Reason: phosphorous supplementation  Start: 01/11/18 1741   Admin Instructions: For serum phosphorus level 1.1-1.9  For Peripheral Line  Do not infuse Phosphorus in the same line as TPN.   Give 20 mmol and recheck phosphorus level 2 hours after last dose and next AM.               potassium phosphate 25 mmol in NaCl 0.9 % 500 mL intermittent infusion  Dose: 25 mmol Freq: EVERY 8 HOURS PRN Route: IV  PRN Reason: phosphorous supplementation  Start: 01/11/18 1741   Admin Instructions: For serum phosphorus level less than 1.1  Do not infuse Phosphorus in the same line as TPN.   Give 25 mmol and recheck  phosphorus level 2 hours after last dose and next AM.               prochlorperazine (COMPAZINE) injection 10 mg  Dose: 10 mg Freq: EVERY 6 HOURS PRN Route: IV  PRN Reasons: nausea,vomiting  Start: 01/11/18 1224   Admin Instructions: This is Step 2 of nausea and vomiting management.   If nausea not resolved in 15 minutes, give metoclopramide (REGLAN) if ordered [step 3 of nausea and vomiting management].  For ordered doses up to 10 mg, give IV Push undiluted. Each 5mg over 1 minute.         1808 (10 mg)-Given        1227 (10 mg)-Given            prochlorperazine (COMPAZINE) tablet 5 mg  Dose: 5 mg Freq: EVERY 6 HOURS PRN Route: PO  PRN Reasons: nausea,vomiting  Start: 01/09/18 1827   Admin Instructions: Offer first                                                    1916 (5 mg)-Given        0636 (5 mg)-Given       1426 (5 mg)-Given          Or  prochlorperazine (COMPAZINE) injection 5 mg  Dose: 5 mg Freq: EVERY 6 HOURS PRN Route: IV  PRN Reasons: nausea,vomiting  Start: 01/09/18 1827   Admin Instructions: Offer first  For ordered doses up to 10 mg, give IV Push undiluted. Each 5mg over 1 minute.        1109 (5 mg)-Given       1735 (5 mg)-Given        0018 (5 mg)-Given       0636 (5 mg)-Given        0018 (5 mg)-Given       0608 (5 mg)-Given                                 sodium chloride (OCEAN) 0.65 % nasal spray 1 spray  Dose: 1 spray Freq: EVERY 1 HOUR PRN Route: BOTH NOSTRIL  PRN Reason: congestion  Start: 01/10/18 1141       1235 (1 spray)-Given             Completed Medications  Medications 01/07/18 01/08/18 01/09/18 01/10/18 01/11/18 01/12/18 01/13/18         Dose: 1,000 mL Freq: ONCE Route: IV  Last Dose: 1,000 mL (01/12/18 2052)  Start: 01/12/18 1500   End: 01/13/18 0652         1524 (1,000 mL)-New Bag       2052 (1,000 mL)-New Bag              Dose: 120 mL Freq: ONCE Route: PO  Start: 01/11/18 0915   End: 01/11/18 0953        0953 (30 mL)-Given            Discontinued Medications  Medications 01/07/18  01/08/18 01/09/18 01/10/18 01/11/18 01/12/18 01/13/18         Rate: 100 mL/hr Freq: CONTINUOUS Route: IV  Last Dose: Stopped (01/12/18 1524)  Start: 01/10/18 1545   End: 01/12/18 1449       1600 ( )-New Bag       2038 ( )-Rate/Dose Verify        0636 ( )-Rate/Dose Verify       1408 ( )-New Bag       1807 ( )-Rate/Dose Verify       1958 ( )-Rate/Dose Verify        1449-Med Discontinued  1524-Stopped              Rate: 100 mL/hr Freq: CONTINUOUS Route: IV  Start: 01/11/18 1230   End: 01/11/18 1345   Admin Instructions: Continue until IV catheter is weaned         1345-Med Discontinued                 Dose: 2 mg Freq: 4 TIMES DAILY PRN Route: PO  PRN Reason: diarrhea  Start: 01/09/18 2341   End: 01/12/18 1742       0926 (2 mg)-Given       1735 (2 mg)-Given        1408 (2 mg)-Given       1640 (2 mg)-Given       2004 (2 mg)-Given        0325 (2 mg)-Given       0827 (2 mg)-Given       1742-Med Discontinued          Dose: 0.1-0.4 mg Freq: EVERY 2 MIN PRN Route: IV  PRN Reason: opioid reversal  Start: 01/11/18 1224   End: 01/12/18 1223   Admin Instructions: For apnea or imminent respiratory arrest: give 0.4 mg IV undiluted Q 2 minutes PRN until desired degree of reversal is obtained, stop opioid and notify provider. Continue monitoring until discharge are criteria met for a minimum of 2 hours.  For severe sedation, decrease in respiratory depth, quality or Respiratory Rate greater than 8: give 0.1 mg IV Q 2 minutes x 3 doses, stop opioid and notify provider.  Try to minimize reversal of analgesia especially in end-of-life patients.  Continue monitoring until discharge criteria are met for a minimum of 2 hours.  For ordered doses up to 2mg give IVP. Give each 0.4mg over 15 seconds in emergency situations. For non-emergent situations further dilute in 9mL of NS to facilitate titration of response.          1223-Med Discontinued          Dose: 4 mg Freq: EVERY 30 MIN PRN Route: PO  PRN Reasons: nausea,vomiting  Start: 01/11/18  1224   End: 01/11/18 1345   Admin Instructions: MAX total dose = 8 mg, including OR dosing. This is step 1 of nausea and vomiting management.  If not resolved in 15 minutes, then go to step 2 [prochlorperazine (COMPAZINE) if ordered].  With dry hands, peel back foil backing and gently remove tablet; do not push oral disintegrating tablet through foil backing; administer immediately on tongue and oral disintegrating tablet dissolves in seconds; then swallow with saliva; liquid not required.         1345-Med Discontinued        Or    Dose: 4 mg Freq: EVERY 30 MIN PRN Route: IV  PRN Reasons: nausea,vomiting  Start: 01/11/18 1224   End: 01/11/18 1345   Admin Instructions: MAX total dose = 8 mg, including OR dosing. This is step 1 of nausea and vomiting management.  If not resolved in 15 minutes, then go to step 2 [prochlorperazine (COMPAZINE) if ordered].  Irritant. For ordered doses up to 4 mg, give IV Push undiluted over 2-5 minutes.         1345-Med Discontinued      Medications 01/07/18 01/08/18 01/09/18 01/10/18 01/11/18 01/12/18 01/13/18               INTERAGENCY TRANSFER FORM - NOTES (H&P, Discharge Summary, Consults, Procedures, Therapies)   1/9/2018                       [facility] BANK: [phone]               History & Physicals      H&P by Peyton Moody MD at 1/9/2018  4:17 PM     Author:  Peyton Moody MD Service:  Hospitalist Author Type:  Physician    Filed:  1/9/2018  8:36 PM Date of Service:  1/9/2018  4:17 PM Creation Time:  1/9/2018  4:16 PM    Status:  Signed :  Peyton Moody MD (Physician)         [facility] History and Physical    Daniel Sandhu MRN# [id number]   Age: 37 year old YOB: 1980     Date of Admission[BK1.1]:[AW1.1] 01/09/2018[AW1.2]    Primary care provider: Lencho Chan[BK1.1]          Assessment and Plan:   Daniel Sandhu is a 37-year-old male with history of adenocarcinoma of the GE junction,[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 He is now undergoing adjuvant chemotherapy with EOF x 3 cycles. He[BK1.2] has been admitted to the hospital after being seen in clinic for evaluation of diarrhea, nausea, inability to eat, and extreme weakness.[AW1.1]     Profound weakness/nausea/diarrhea[BK1.2].[AW1.1]  Daniel started cycle 2 EOF on 1/2[BK1.2]/18[AW1.1]. He previously had tolerated cycle 1 well without significant issues and had started supplementing oral intake by mouth without difficulty. Around 1/4[BK1.2]/18[AW1.1], he suddenly developed large volume, watery diarrhea at night with occasional episodes during the day,[BK1.2] in addition to[AW1.3] nausea and profound weakness. Gradually over the last week or so he has had a sensation of a \"stricture\" and extremely dry mouth, which have also limited his[BK1.2] PO[AW1.3] intake. He has lost 14 lbs[BK1.2] in the last week[AW1.1].[BK1.2] In clinic today, noted to be tachycardic[AW1.1] to 123[BK1.2], otherwise HD stable[AW1.1]. On exam, he appears significantly weak compared to baseline[BK1.2] (per outpatient provider)[AW1.1]. Could be[BK1.2] secondary to[AW1.1] chemo, though as above, he tolerated cycle one without these issues.[BK1.2] Viral gastroenteritis is als[AW1.1]o[AW1.3] on on the differential (given recent sick family members). He was given IV fluids, zofran and dexamethasone 12 mg in clinic.[AW1.1] On arrival to the hospital this evening, he reported feeling significantly better after the dexamethasone and even tolerated a small bowl of tomato soup.[AW1.3]  - Enteric pathogen panel is pending. C.diff is NEG.[AW1.1]  -[BK1.2] Planning[AW1.3] esophogram XR while inpatient to eval[BK1.2]uate[AW1.3] for stricture[BK1.2]. This was ordered and scheduled as an outpatient, so ideally this can be done while inpatient.  - M[AW1.3]aybe also a candidate for EGD.[AW1.1] However, would opt for esophagram first as this is much less invasive and " would also be informative given his above complaints.[AW1.3]  - Hold tube feedings for now.  - Anti-emetics PRN.  - Follow-up blood culture drawn in clinic (though low suspicion for infection at this time).  - PT, OT and Nutrition consults.[AW1.1]     Adenocarcinoma of the GE junction[BK1.2].  He c[AW1.1]ontinues on adjuvant chemotherapy with epirubicin, oxaliplatin, 5FU (21-day continuous infusion). He started cycle 5 on 1/2/201[BK1.2]8[AW1.1]. The 5FU pump has been disconnected as of 1/8[BK1.2]/18[AW1.1] due to the above symptoms.[BK1.2]  - Cycle 6 is tentatively planned for 1/23/18, and he has follow-up with Dr. Esteban on 3/7/18 with re-staging CT CAP on that same day.    Insomnia.  -[AW1.1] Discontinue[AW1.3] home olanzepine QHS[AW1.1] (he has not taken this and does not want to start based on reading the drug information)[AW1.3].  - Add melatonin QHS PRN.    FEN: reg diet (per patient request),[AW1.1] D5[AW1.3] NS[AW1.1] + 20 mEq KCl[AW1.3] at 125 ml/hr overnight, replete lytes PRN  Code Status: FULL  Dispo: Admitted to the oncology service for work-up of N/V/D and weakness. Anticipate discharge to home in the next couple of days pending clinical improvement.    Peyton Moody MD/PhD  Heme/Onc/Transplant Hospitalist  Pager [phone][AW1.1]     [BK1.2]       Chief Complaint:[BK1.1]   Diarrhea, nausea, gagging, inability to eat, and extreme weakness. All started a few days ago.  -[AW1.1] History is obtained from the patient and electronic health record[BK1.2]    Daniel Sandhu[AW1.4] is a[AW1.1] 37 year old[AW1.4] man with history of adenocarcinoma of the GE junction. He presented to clinic today with his[AW1.1] wife on an add-on basis fo[BK1.2]r evaluation of diarrhea, nausea, gagging, inability to eat, and extreme weakness. He notes these symptoms all started around 1/4/18. Diar[AW1.1]yoandy[BK1.2] s[AW1.1]tarted[BK1.2] on[AW1.1] 1/4[BK1.2]/18[AW1.1] at night, and has persisted since that time without any  "improvement or worsening.[BK1.2] He has been having a[AW1.1]t least 5 large volume, watery stools[BK1.2] per day (even overnight). He has not tried Imodium or any other anti-diarrheal agents. Of note, wife and daughter had a \"GI bug\" around [place]. The nausea/gagging/inability to eat have been worsening for a few weeks. Prior to 1/4/18, he was eating at least one full meal daily in addition to the tube feeds overnight. He gradually has developed very dry mouth with thick oral secretions, which make it difficult to eat. He feels like he is gagging on these secretions and this causes nausea. Additionally, there is a sensation of a \"stricture\" in his esophagus which makes him feel nauseated as well. He is using zofran and ativan at home, in addition to a liter of NS daily with [facility]. He has been eating very little lately, noting that he can tolerate only a small amount of apple sauce and apple juice. These are the only two things that really are able to go down. Regarding the extreme weakness, he notes this seemed to have start along with the nausea, diarrhea, and inability to eat started on 1/4/18. He has felt tremendously weak. He has difficulty at home with even the slightest physical activity. He describes having to rest at least 10-15 minutes after small amounts of activity, just to get his heart rate down. He is aware that he has a rapid pulse at times at home. He was in a wheelchair at clinic because an RN witnessed him unsteady on his feet while trying to take his jacket off. He denies dizziness. He has MIMS but no SOB at rest or chest pain. Otherwise no fevers, chills, abdominal pain, bleeding, or swelling.[AW1.1]         Cancer Treatment History:[BK1.1]   Daniel Sandhu is a 37 year old male who presents with adenocarcinoma of the GE junction.  In early 2017, patient started noticing difficulty swallowing, and that food seems to take longer to go down.  It became more frequent, and he saw his PCP, and was " referred for EGD, which showed an esophageal mass located at the GE junction, measuring 4 cm.  Biopsy showed poorly differentiated adenocarcinoma.  HER-2 was sent and is equivocal (2+).  CT c/a/p showed a mildly prominent lymph node in the gastrohepatic ligament, but there were no pathologically enlarged lymph nodes in the chest, abdomen, or pelvis.  There was otherwise no evidence of metastatic disease.  PET-CT showed thickening of the distal esophagus consistent with known esophageal adenocarcinoma, as well as mildly hypermetabolic 1 cm lymph node in the gastrohepatic ligament.  There was no evidence of distant metastatic disease.  He underwent EUS on 6/9/17, which showed the esophageal tumor in the lower third of the esophagus, staged [id number].  There were 2 abnormal lymph nodes seen in the gastrohepatic ligament; pathology was suspicious for adenocarcinoma.  Celiac node was sampled as well, which was benign.  He was seen by surgery, Dr. Esteban, and recommended srinivas-operative chemotherapy.      Port was placed on 6/22/17.      Chemotherapy:  Epirubicin 50 mg/m2 IV on day 1  Oxaliplatin 130 mg/m2 IV on day 1  Capecitabine 625 mg/m2 po BID on days 1-21  Every 21 day cycles      C1D1: 6/26/17  C2D1: 7/17/17  C3D1: 8/7/17      On 11/3/17, he underwent laparotomy with total gastrectomy and abdominal lymphadenectomy, left thoracotomy with distal esophagectomy and intrathoracic Terrell-en-Y esophagojejunostomy, feeding jejunostomy, left pharyngostomy tube placement, right tube thoracostomy, and flexible bronchoscopy.  On 11/9/17, he was taken back to OR for I&D of pharyngostomy tube abscess.  Pathology showed adenocarcinoma, moderate differentiated, extensive residual tumor with no evidence tumor regression, margins negative, perineural invasion present, 1 of 28 lymph nodes were involved with malignancy, stage [id number] (stage IIIA).  HER-2 is non-amplified.      He resumed chemotherapy post-surgery, with plans to complete 3  more cycles.  He did not tolerate Xeloda well during his neoadjuvant chemotherapy, with issues with palmar-plantar, and likely genital erythrodysesthesia.  We discussed changing to 5-FU, and Daniel would like to try this, as he may also have issues with taking pills and absorption after his surgery.  He will receive epirubicin, oxaliplatin, and 5-FU x 3 cycles.      C4D1: 12/11/17  C5D1: 1/2/18  C6D1: anticipated for 1/23/18[BK1.2]         Past Medical History:[BK1.1]     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 6/5/2017[BK1.2]            Past Surgical History:[BK1.1]      Past Surgical History:   Procedure Laterality Date     ESOPHAGOGASTRODUODENOSCOPY       ESOPHAGOSCOPY, GASTROSCOPY, DUODENOSCOPY (EGD), COMBINED N/A 6/9/2017    Procedure: COMBINED ENDOSCOPIC ULTRASOUND, ESOPHAGOSCOPY, GASTROSCOPY, DUODENOSCOPY (EGD), FINE NEEDLE ASPIRATE/BIOPSY;  Upper Endoscopic Ultrasound, fine needle aspirate/biopsy;  Surgeon: Guru Mark Avila MD;  Location: [facility] OR     ESOPHAGOSCOPY, GASTROSCOPY, DUODENOSCOPY (EGD), COMBINED N/A 11/3/2017    Procedure: COMBINED ESOPHAGOSCOPY, GASTROSCOPY, DUODENOSCOPY (EGD);;  Surgeon: Yunior Esteban MD;  Location: [facility] OR     ESOPHAGOSCOPY, GASTROSCOPY, DUODENOSCOPY (EGD), COMBINED N/A 11/9/2017    Procedure: COMBINED ESOPHAGOSCOPY, GASTROSCOPY, DUODENOSCOPY (EGD);  Esophogastroduodenoscopy, take out pharangostomy tube;  Surgeon: Yunior Esteban MD;  Location: [facility] OR     GASTRECTOMY N/A 11/3/2017    Procedure: GASTRECTOMY;;  Surgeon: Yunior Esteban MD;  Location: [facility] OR     HAND SURGERY      childhood, torn tendon     INSERT PORT VASCULAR ACCESS Right 6/22/2017    Procedure: INSERT PORT VASCULAR ACCESS;  Single Lumen Chest Power Port;  Surgeon: Iván Driver PA-C;  Location: [facility] OR     LAPAROSCOPY DIAGNOSTIC (GENERAL) N/A 11/3/2017    Procedure: LAPAROSCOPY DIAGNOSTIC (GENERAL);   diagnostic laparoscopy, right chest tube, total gastrectomy with distal esophagectomy, intrathoracic eveline-y esophago-jejunostomy, feeding jejunostomy, pharyngostomy, esophagogastroduodenoscopy, flexible bronchoscopy;  Surgeon: Yunior Esteban MD;  Location: [facility] OR     LAPAROTOMY EXPLORATORY N/A 11/3/2017    Procedure: LAPAROTOMY EXPLORATORY;;  Surgeon: Yunior Esteban MD;  Location: [facility] OR     PHARYNGOSTOMY N/A 11/3/2017    Procedure: PHARYNGOSTOMY;;  Surgeon: Yunior Esteban MD;  Location: [facility] OR     THORACOTOMY Left 11/3/2017    Procedure: THORACOTOMY;;  Surgeon: Yunior Esteban MD;  Location: [facility] OR[BK1.2]            Social History:[BK1.1]     Social History   Substance Use Topics     Smoking status: Never Smoker     Smokeless tobacco: Never Used     Alcohol use Yes      Comment: 1 beer daily[BK1.2]            Family History:[BK1.1]     Family History   Problem Relation Age of Onset     Other - See Comments Father      sepsis     CANCER Sister      breast cancer  31 yo 1/2 sister      CANCER Paternal Grandmother      breast[BK1.2]            Immunizations:[BK1.1]     Immunization History   Administered Date(s) Administered     Influenza Vaccine IM 3yrs+ 4 Valent IIV4 10/27/2016     TDAP Vaccine (Adacel) 2017[BK1.2]            Allergies:[BK1.1]   No Known Allergies[BK1.2]         Medications:[BK1.1]       Current Facility-Administered Medications on File Prior to Encounter:  [COMPLETED] heparin 100 UNIT/ML injection 5 mL   sodium chloride (PF) 0.9% PF flush 20 mL   [COMPLETED] 0.9% sodium chloride BOLUS   INFUSION HYPERSENSITIVITY   [COMPLETED] dexamethasone (DECADRON) 12 mg in NaCl 0.9 % intermittent infusion   [COMPLETED] ondansetron (ZOFRAN) injection 8 mg     Current Outpatient Prescriptions on File Prior to Encounter:  Ondansetron HCl (ZOFRAN PO) Take by mouth every 8 hours as needed for nausea or vomiting   Prochlorperazine Maleate (COMPAZINE PO) Take by mouth  every 6 hours as needed for nausea   LORazepam (ATIVAN PO) Take by mouth every 4 hours as needed for anxiety   OLANZapine (ZYPREXA) 10 MG tablet Take 1 tablet (10 mg) by mouth At Bedtime   fluconazole (DIFLUCAN) 200 MG tablet Take 1 tablet (200 mg) by mouth daily   cyabnocobalamin (VITAMIN B-12) 2500 MCG sublingual tablet Place 2,500 mcg under the tongue daily   acetaminophen (TYLENOL) 32 mg/mL solution 30.45 mLs (975 mg) by Per J Tube route 3 times daily   sennosides (SENOKOT) 8.8 MG/5ML syrup 10 mLs by Per J Tube route 2 times daily   multivitamins with minerals (CERTAVITE/CEROVITE) LIQD liquid 15 mLs by Per J Tube route daily   oxyCODONE (ROXICODONE) 5 MG/5ML solution Take 5-10 mLs (5-10 mg) by mouth every 4 hours as needed for moderate to severe pain (Patient not taking: Reported on 1/2/2018)[BK1.2]              Review of Systems:[BK1.1]   A comprehensive ROS was performed with the patient and found to be negative with the exception of that noted in the HPI above.[AW1.1]         Data:[BK1.1]     Lab Results   Component Value Date    WBC 1.9 (L) 01/09/2018    WBC 1.9 (L) 01/08/2018    WBC 9.9 01/02/2018    HGB 13.8 01/09/2018    HGB 13.0 (L) 01/08/2018    HGB 10.3 (L) 01/02/2018    HCT 42.4 01/09/2018    HCT 40.6 01/08/2018    HCT 32.9 (L) 01/02/2018     01/09/2018     01/08/2018     01/02/2018     01/09/2018     01/08/2018     01/02/2018    POTASSIUM 3.9 01/09/2018    POTASSIUM 3.8 01/08/2018    POTASSIUM 4.4 01/02/2018    CHLORIDE 102 01/09/2018    CHLORIDE 105 01/08/2018    CHLORIDE 100 01/02/2018    CO2 20 01/09/2018    CO2 22 01/08/2018    CO2 29 01/02/2018    BUN 24 01/09/2018    BUN 22 01/08/2018    BUN 14 01/02/2018    CR 0.77 01/09/2018    CR 0.81 01/08/2018    CR 0.59 (L) 01/02/2018     (H) 01/09/2018     (H) 01/08/2018     (H) 01/02/2018    AST 21 01/09/2018    AST 18 01/08/2018    AST 17 01/02/2018    ALT 21 01/09/2018    ALT 23 01/08/2018     ALT 22 01/02/2018    ALKPHOS 55 01/09/2018    ALKPHOS 57 01/08/2018    ALKPHOS 70 01/02/2018    BILITOTAL 0.4 01/09/2018    BILITOTAL 0.2 01/08/2018    BILITOTAL 0.2 01/02/2018    INR 1.14 07/29/2017    INR 1.12 06/09/2017[BK1.2]           Revision History        User Key Date/Time User Provider Type Action    > AW1.3 1/9/2018  8:36 PM Peyton Moody MD Physician Sign     AW1.2 1/9/2018  6:51 PM Peyton Moody MD Physician Share     AW1.4 1/9/2018  6:31 PM Peyton Moody MD Physician      AW1.1 1/9/2018  6:29 PM Peyton Moody MD Physician      BK1.2 1/9/2018  6:21 PM Chi Agudelo MD Physician Share     BK1.1 1/9/2018  4:16 PM Chi Agudelo MD Physician                   Discharge Summaries     No notes of this type exist for this encounter.         Consult Notes      Consults by Merlene Cook MD at 1/11/2018  7:30 AM     Author:  Merlene Cook MD Service:  Gastroenterology Author Type:  Fellow    Filed:  1/11/2018  1:46 PM Date of Service:  1/11/2018  7:30 AM Creation Time:  1/11/2018  7:30 AM    Status:  Attested :  Merlene Cook MD (Fellow)    Cosigner:  Alessandro Mcgregor MD at 1/11/2018  4:44 PM         Consult Orders:    1. GI LUMINAL ADULT IP CONSULT: Patient to be seen: Routine within 24 hrs; Call back #: Melody Reilly, [phone]; hx of GE junction CA s/p resection 11/3.  Now with focal mucosal abnormality of the jejunal lumen near the anastomosis, need EGD and po... [[id number]] ordered by Melody Reilly APRN CNP at 01/10/18 1515           Attestation signed by Alessandro Mcgregor MD at 1/11/2018  4:44 PM        I performed a history and physical examination of the above patient and discussed the management with Dr. Cook on 1/11/2018. I reviewed the note and there are no changes to the past medical, family or social history.  A complete 10 point review of systems was obtained. Please see the HPI for  pertinent positives and negatives. All other systems were reviewed and were found to be negative. I agree with the documented findings and plan of care as outlined.    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 Anatomical evaluation with esophagram concerning for question of focal mucosal abnormality of jejunal lumen near anastomosis - possible srinivas-anastomotic ulceration. GI is consulted for further evaluation and management and consideration of EGD.     Differential diagnosis of luminal irregularity includes post-surgical change, anastomotic ulcer vs cancer recurrence. Will plan on EGD today for further luminal evaluation. Cause of diarrhea likely multi-factorial with post-surgical anatomy (altered anatomy, absent stomach), chemotherapy and tube feeds.[AL1.2] Infectious studies negative (C diff, enteric panel).[AL1.3] Anticipate ongoing diarrhea given anatomical changes.[AL1.2]      Recommendations[AL1.1]:   - Perform EGD today   - Agree with plans for trial of adding fiber to tube feeds for bulking and trial of imodium   - GI will continue to follow[AL1.2]    Gastroenterology outpatient follow up recommendations:[AL1.1] Pending clinical course.[AL1.2]    Thank you for involving us in this patient's care. Please do not hesitate to contact the GI service with any questions or concerns.     Pt care plan discussed with .[AL1.1] Balbir[AL1.2], GI staff physician.    Merlene Cook[AL1.1],  MD  Gastroenterology Fellow[AL1.2]  -------------------------------------------------------------------------------------------------------------------          Chief Complaint:   We were asked by[AL1.1] GAVIN Wiggins[AL1.2] of[AL1.1] Heme/Onc[AL1.2] to evaluate this patient with[AL1.1] concern for anastomotic changes on esophagram.[AL1.2]    History is obtained from the patient and the medical record.          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 Anatomical evaluation with esophagram concerning for question of focal mucosal abnormality of jejunal lumen near anastomosis - possible srinivas-anastomotic ulceration. GI is consulted for further evaluation and management and consideration of EGD.     Patient reports[AL1.2] diarrhea starting Thursday. He notes 5-7 episodes of diarrhea overnight Thursday and at least five episodes of diarrhea during the day. This is liquid stool. No melena or hematochezia. The diarrhea improves if he stops eating. He denies abdominal pain; He notes some cramping abdominal pain prior to bowel movement that is relieved with bowel movements. He has nausea and vomiting associated with his chemotherapy that is well controlled with his anti-emetic regimen. He reports losing 14 pounds in 10 days. He denies fevers, chills, shortness of breath, chest pain, lower extremity swelling, headache or dyspnea. No dysphagia - just some issues with swallowing due to dry mouth. 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) 6/5/2017            Past Surgical History:   Reviewed and edited as appropriate   Past Surgical History:   Procedure Laterality Date     ESOPHAGOGASTRODUODENOSCOPY       ESOPHAGOSCOPY, GASTROSCOPY, DUODENOSCOPY (EGD), COMBINED N/A 6/9/2017    Procedure: COMBINED ENDOSCOPIC ULTRASOUND, ESOPHAGOSCOPY, GASTROSCOPY, DUODENOSCOPY (EGD), FINE NEEDLE ASPIRATE/BIOPSY;  Upper Endoscopic Ultrasound, fine needle aspirate/biopsy;  Surgeon: Guru Mark Avila MD;  Location: [facility]     ESOPHAGOSCOPY, GASTROSCOPY, DUODENOSCOPY (EGD), COMBINED N/A 11/3/2017    Procedure: COMBINED ESOPHAGOSCOPY, GASTROSCOPY, DUODENOSCOPY (EGD);;  Surgeon: Yunior Esteban MD;  Location: [facility]     ESOPHAGOSCOPY, GASTROSCOPY, DUODENOSCOPY (EGD), COMBINED N/A 11/9/2017    Procedure: COMBINED ESOPHAGOSCOPY, GASTROSCOPY, DUODENOSCOPY (EGD);  Esophogastroduodenoscopy, take out pharangostomy tube;  Surgeon: Yunior Esteban MD;  Location: [facility]     GASTRECTOMY N/A 11/3/2017    Procedure: GASTRECTOMY;;  Surgeon: Yunior Esteban MD;  Location: [facility]     HAND SURGERY      childhood, torn tendon     INSERT PORT VASCULAR ACCESS Right 6/22/2017    Procedure: INSERT PORT VASCULAR ACCESS;  Single Lumen Chest Power Port;  Surgeon: Iván Driver PA-C;  Location: [facility]     LAPAROSCOPY DIAGNOSTIC (GENERAL) N/A 11/3/2017    Procedure: LAPAROSCOPY DIAGNOSTIC (GENERAL);  diagnostic laparoscopy, right chest tube, total gastrectomy with distal esophagectomy, intrathoracic eveline-y esophago-jejunostomy, feeding jejunostomy, pharyngostomy, esophagogastroduodenoscopy, flexible bronchoscopy;  Surgeon: Yunior Esteban MD;  Location: [facility]     LAPAROTOMY EXPLORATORY N/A 11/3/2017    Procedure: LAPAROTOMY EXPLORATORY;;  Surgeon: Yunior Esteban MD;  Location: [facility]     PHARYNGOSTOMY N/A 11/3/2017    Procedure: PHARYNGOSTOMY;;  Surgeon: Yunior Esteban MD;  Location: [facility]     THORACOTOMY  Left 11/3/2017    Procedure: THORACOTOMY;;  Surgeon: Yunior Esteban MD;  Location: [facility]            Previous Endoscopy:   No results found for this or any previous visit.         Social History:   Reviewed and edited as appropriate  Social History     Social History     Marital status:      Spouse name: N/A     Number of children: 1     Years of education: N/A     Occupational History     musician and teacher       Social History Main Topics     Smoking status: Never Smoker     Smokeless tobacco: Never Used     Alcohol use Yes      Comment: 1 beer daily     Drug use: Yes     Special: Marijuana      Comment: occasional     Sexual activity: Yes     Partners: Female     Birth control/ protection: Natural Family Planning     Other Topics Concern     Not on file     Social History Narrative            Family History:   Reviewed and edited as appropriate  Family History   Problem Relation Age of Onset     Other - See Comments Father      sepsis     CANCER Sister      breast cancer  31 yo 1/2 sister      CANCER Paternal Grandmother      breast     No known history of colorectal cancer, liver disease, or inflammatory bowel disease.       Allergies:   Reviewed and edited as appropriate   No Known Allergies         Medications:[AL1.1]     Prescriptions Prior to Admission   Medication Sig Dispense Refill Last Dose     Ondansetron HCl (ZOFRAN PO) Take by mouth every 8 hours as needed for nausea or vomiting   Taking     Prochlorperazine Maleate (COMPAZINE PO) Take by mouth every 6 hours as needed for nausea   Taking     LORazepam (ATIVAN PO) Take by mouth every 4 hours as needed for anxiety   Taking     fluconazole (DIFLUCAN) 200 MG tablet Take 1 tablet (200 mg) by mouth daily 30 tablet 1 2018 at Unknown time     cyabnocobalamin (VITAMIN B-12) 2500 MCG sublingual tablet Place 2,500 mcg under the tongue daily 30 tablet 1 Taking     acetaminophen (TYLENOL) 32 mg/mL solution 30.45 mLs (975 mg) by Per J  "Tube route 3 times daily 400 mL 0 Taking     OLANZapine (ZYPREXA) 10 MG tablet Take 1 tablet (10 mg) by mouth At Bedtime 30 tablet 1 Unknown at Unknown time     sennosides (SENOKOT) 8.8 MG/5ML syrup 10 mLs by Per J Tube route 2 times daily 400 mL 0 Unknown at Unknown time     multivitamins with minerals (CERTAVITE/CEROVITE) LIQD liquid 15 mLs by Per J Tube route daily 450 mL 0 Unknown at Unknown time     oxyCODONE (ROXICODONE) 5 MG/5ML solution Take 5-10 mLs (5-10 mg) by mouth every 4 hours as needed for moderate to severe pain (Patient not taking: Reported on 1/2/2018) 300 mL 0 Unknown at Unknown time[AL1.4]             Review of Systems:   A complete review of systems was performed and is negative except as noted in the HPI           Physical Exam:   /72 (BP Location: Left arm, Cuff Size: Adult Regular)  Pulse 76  Temp 98.7  F (37.1  C)  Resp 16  Ht 1.75 m (5' 8.9\")  Wt 60.1 kg (132 lb 7.9 oz)  SpO2 98%  BMI 19.62 kg/m2  Wt:   Wt Readings from Last 2 Encounters:   01/10/18 60.1 kg (132 lb 7.9 oz)   01/09/18 60.6 kg (133 lb 8 oz)      Constitutional: cooperative, pleasant, not dyspneic/diaphoretic, no acute distress   Eyes: Sclera anicteric  Ears/nose/mouth/throat: Normal oropharynx without ulcers or exudate, mucus membranes[AL1.1] dry[AL1.2], hearing intact  Neck: supple, thyroid normal size  CV: No edema  Respiratory: Unlabored breathing  Lymph: No axillary, submandibular, supraclavicular lymphadenopathy  Abd: Nondistended, +bs, no hepatosplenomegaly, nontender, no peritoneal signs[AL1.1]; + j tube; prior surgical scars well healing[AL1.2]  Skin: warm, perfused  Neuro: AAO x 3  Psych: Normal affect  MSK: No gross deformities         Data:   Labs and imaging below were independently reviewed and interpreted    BMP  Recent Labs  Lab 01/11/18  0432 01/10/18  0331 01/09/18  1351 01/08/18  1715    136 133 137   POTASSIUM 3.6 4.2 3.9 3.8   CHLORIDE 111* 109 102 105   YOBANY 8.2* 8.4* 8.6 8.8   CO2 17* " 19* 20 22   BUN 20 22 24 22   CR 0.84 0.70 0.77 0.81   * 137* 140* 109*     CBC  Recent Labs  Lab 01/11/18  0432 01/10/18  0331 01/09/18  1351 01/08/18  1715   WBC 2.3* 1.7* 1.9* 1.9*   RBC 4.50 4.77 5.47 5.11   HGB 11.3* 11.9* 13.8 13.0*   HCT 34.7* 36.7* 42.4 40.6   MCV 77* 77* 78 80   MCH 25.1* 24.9* 25.2* 25.4*   MCHC 32.6 32.4 32.5 32.0   RDW 16.1* 15.8* 15.9* 16.4*    277 310 316     INRNo lab results found in last 7 days.  LFTs  Recent Labs  Lab 01/11/18  0432 01/09/18  1351 01/08/18  1715   ALKPHOS 45 55 57   AST 11 21 18   ALT 16 21 23   BILITOTAL 0.3 0.4 0.2   PROTTOTAL 5.5* 7.4 7.0   ALBUMIN 2.1* 2.9* 2.9*      PANCNo lab results found in last 7 days.    Imaging:[AL1.1]  Esophagram 1/10/18  1.  Postoperative changes of distal esophagectomy and  esophagojejunostomy without evidence of esophageal or anastomotic  stricture.  2.  Questionable focal mucosal abnormality of the jejunal lumen near  the anastomosis. Although this may represent normal jejunal fold,  perianastomotic jejunal ulcer is not entirely excluded especially in  the setting of epigastric pain and burning sensation as reported by  the patient.  3.  Normal transit of contrast through the visualize small bowel  without evidence obstruction.[AL1.2]     Revision History        User Key Date/Time User Provider Type Action    > [N/A] 1/11/2018  1:46 PM Merlene Cook MD Fellow Sign     AL1.3 1/11/2018  1:46 PM Merlene Cook MD Fellow Sign     AL1.4 1/11/2018 11:40 AM Merlene Cook MD Fellow      AL1.2 1/11/2018 11:29 AM Merlene Cook MD Fellow      AL1.1 1/11/2018  7:30 AM Merlene Cook MD Fellow             Consults by Tim Adler MD at 1/10/2018 10:34 AM     Author:  Tim Adler MD Service:  Thoracic Surgery Author Type:  Resident    Filed:  1/10/2018  4:35 PM Date of Service:  1/10/2018 10:34 AM Creation Time:  1/10/2018 10:33 AM    Status:  Cosign Needed :  Tim Adler MD  (Resident)    Cosign Required:  Yes         Consult Orders:    1. Thoracic Surgery Adult IP Consult: Patient to be seen: Routine within 24 hrs; Call back #: [place] Hem[place] [phone]; hx GE adeno s/p surgery in Nov. FTT, limited PO intake, concern for stricture.; Consultant may enter orders: Yes [[id number]] ordered by Melody Reilly, APRN CNP at 01/10/18 1038                THORACIC[JH1.1] SURGERY H&P/CONSULT  January 10, 2018    Daniel Sandhu  MRN: [id number]  male  37 year old      CHIEF COMPLAINT:    I saw Mr. 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, s/p (11/3/2017, Yunior Esteban) esophagogastroscopy, diagnostic laparoscopy, laparotomy with total gastrectomy and abdominal lymphadenectomy (D2), LEFT thoracotomy with distal esophagectomy and intrathoracic Terrell-en-Y esophagojejunostomy, feeding jejunostomy, LEFT pharyngostomy tube placement, RIGHT tube thoracostomy, and flexible bronchoscopy c/b neck hematoma associated with pharyngostomy tube s/p (11/9/2017) oropharyngeal exploration and esophagojejunostomy who has been recovering well from surgery but now is admitted for failure to thrive, 14lb weight loss in 10 days, limited oral intake, and possible esophageal stricture. Hemodynamically stable, abdomen exam benign, able to tolerate full liquid diet.[JH1.1] Esophagram negative for stricture. No further investigation of jejunal ulcer or esophageal spasm is necessary at this time. These symptoms are likely secondary to chemotherapy.[JH1.3]     PLAN:    -[JH1.2] Nutrition consult for tube feeding optimization[JH1.3]    -[JH1.2] Pre-albumin lab[JH1.3]  -[JH1.2] Recommend discussion with oncology regarding risks and benefits of chemotherapy   -  "Thank you for consulting thoracic surgery. Please feel free to contact us with further questions.[JH1.3]     Patient seen, findings and plan discussed with[JH1.2] fellow[JH1.1], [JH1.2] Jessica[JH1.1].    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, s/p (11/3/2017, Yunior Esteban) esophagogastroscopy, diagnostic laparoscopy, laparotomy with total gastrectomy and abdominal lymphadenectomy (D2), LEFT thoracotomy with distal esophagectomy and intrathoracic Terrell-en-Y esophagojejunostomy, feeding jejunostomy, LEFT pharyngostomy tube placement, RIGHT tube thoracostomy, and flexible bronchoscopy c/b neck hematoma associated with pharyngostomy tube s/p (11/9/2017) oropharyngeal exploration and esophagojejunostomy who has been recovering well from surgery but now is admitted for failure to thrive, 14lb weight loss in 10 days, limited oral intake, and possible esophageal stricture.     Patient reports he tolerated neoadjuvant chemotherapy well but has not been tolerating adjuvant chemotherapy. During his second cycle of chemotherapy, he experienced severe nausea, emesis that kept him up at night, dry mouth, gastric acid reflux. He reports saliva and yogurt feels very heavy and thick. He often experienced \"chest tightness\" that he correlates to esophageal spasms not associated with eating. He is only able to tolerate apple juice, ginger ale, and tea. Anti-emetic medications has not been helpful. His last J-tube feeding of 1000ml has been two days ago but reports unable to keep feeding down. Reports fatigue, SOB associated with surgery, abdominal cramping, diarrhea (c.diff negative). No CP, hematuria, hematochezia. Able to tolerate applesauce, soft, liquid foods.     His main question is to figure out why he is not absorbing nutrients.[JH1.1]       REVIEW OF SYSTEMS: 10 " pt ROS negative other than above.[JH1.2]      Patient Active Problem List   Diagnosis     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     Thrush     Esophageal cancer (H)     S/P gastrectomy     Chemotherapy-induced nausea     Weakness[JH1.4]         PAST MEDICAL HISTORY:[JH1.2]  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 6/5/2017[JH1.4]       SURGICAL HISTORY:[JH1.2]  Past Surgical History:   Procedure Laterality Date     ESOPHAGOGASTRODUODENOSCOPY       ESOPHAGOSCOPY, GASTROSCOPY, DUODENOSCOPY (EGD), COMBINED N/A 6/9/2017    Procedure: COMBINED ENDOSCOPIC ULTRASOUND, ESOPHAGOSCOPY, GASTROSCOPY, DUODENOSCOPY (EGD), FINE NEEDLE ASPIRATE/BIOPSY;  Upper Endoscopic Ultrasound, fine needle aspirate/biopsy;  Surgeon: Guru Mark Avila MD;  Location: [facility]     ESOPHAGOSCOPY, GASTROSCOPY, DUODENOSCOPY (EGD), COMBINED N/A 11/3/2017    Procedure: COMBINED ESOPHAGOSCOPY, GASTROSCOPY, DUODENOSCOPY (EGD);;  Surgeon: Yunior Esteban MD;  Location: [facility]     ESOPHAGOSCOPY, GASTROSCOPY, DUODENOSCOPY (EGD), COMBINED N/A 11/9/2017    Procedure: COMBINED ESOPHAGOSCOPY, GASTROSCOPY, DUODENOSCOPY (EGD);  Esophogastroduodenoscopy, take out pharangostomy tube;  Surgeon: Yunior Esteban MD;  Location: [facility]     GASTRECTOMY N/A 11/3/2017    Procedure: GASTRECTOMY;;  Surgeon: Yunior Esteban MD;  Location: [facility]     HAND SURGERY      childhood, torn tendon     INSERT PORT VASCULAR ACCESS Right 6/22/2017    Procedure: INSERT PORT VASCULAR ACCESS;  Single Lumen Chest Power Port;  Surgeon: Iván Driver PA-C;  Location: [facility]     LAPAROSCOPY DIAGNOSTIC (GENERAL) N/A 11/3/2017    Procedure: LAPAROSCOPY DIAGNOSTIC (GENERAL);  diagnostic laparoscopy, right chest tube, total gastrectomy with distal esophagectomy, intrathoracic eveline-y esophago-jejunostomy, feeding jejunostomy, pharyngostomy, esophagogastroduodenoscopy, flexible  bronchoscopy;  Surgeon: Yunior Esteban MD;  Location: [facility]     LAPAROTOMY EXPLORATORY N/A 11/3/2017    Procedure: LAPAROTOMY EXPLORATORY;;  Surgeon: Yunior Esteban MD;  Location: [facility]     PHARYNGOSTOMY N/A 11/3/2017    Procedure: PHARYNGOSTOMY;;  Surgeon: Yunior Esteban MD;  Location: [facility]     THORACOTOMY Left 11/3/2017    Procedure: THORACOTOMY;;  Surgeon: Yunior Esteban MD;  Location: [facility][JH1.4]        SOCIAL HISTORY:   ETOH[JH1.2] no[JH1.5]  TOB[JH1.2] no  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.5]     Social History     Social History     Marital status:      Spouse name: N/A     Number of children: 1     Years of education: N/A     Occupational History     musician and teacher       Social History Main Topics     Smoking status: Never Smoker     Smokeless tobacco: Never Used     Alcohol use Yes      Comment: 1 beer daily     Drug use: Yes     Special: Marijuana      Comment: occasional     Sexual activity: Yes     Partners: Female     Birth control/ protection: Natural Family Planning     Other Topics Concern     Not on file     Social History Narrative[JH1.4]       FAMILY HISTORY:[JH1.2]   Family History   Problem Relation Age of Onset     Other - See Comments Father      sepsis     CANCER Sister      breast cancer  31 yo 1/2 sister      CANCER Paternal Grandmother      breast[JH1.4]       ALLERGIES:[JH1.2]    No Known Allergies[JH1.4]    MEDICATIONS:[JH1.2]     Current Facility-Administered Medications on File Prior to Encounter:  [COMPLETED] heparin 100 UNIT/ML injection 5 mL   [COMPLETED] 0.9% sodium chloride BOLUS   INFUSION HYPERSENSITIVITY   [COMPLETED] dexamethasone (DECADRON) 12 mg in NaCl 0.9 % intermittent infusion   [COMPLETED] ondansetron (ZOFRAN) injection 8 mg   heparin lock flush 10 UNIT/ML injection 3 mL   [COMPLETED] sodium chloride (PF) 0.9% PF flush 10 mL   [DISCONTINUED] sodium chloride (PF) 0.9% PF flush 20  "mL     Current Outpatient Prescriptions on File Prior to Encounter:  Ondansetron HCl (ZOFRAN PO) Take by mouth every 8 hours as needed for nausea or vomiting   Prochlorperazine Maleate (COMPAZINE PO) Take by mouth every 6 hours as needed for nausea   LORazepam (ATIVAN PO) Take by mouth every 4 hours as needed for anxiety   fluconazole (DIFLUCAN) 200 MG tablet Take 1 tablet (200 mg) by mouth daily   cyabnocobalamin (VITAMIN B-12) 2500 MCG sublingual tablet Place 2,500 mcg under the tongue daily   acetaminophen (TYLENOL) 32 mg/mL solution 30.45 mLs (975 mg) by Per J Tube route 3 times daily   OLANZapine (ZYPREXA) 10 MG tablet Take 1 tablet (10 mg) by mouth At Bedtime   sennosides (SENOKOT) 8.8 MG/5ML syrup 10 mLs by Per J Tube route 2 times daily   multivitamins with minerals (CERTAVITE/CEROVITE) LIQD liquid 15 mLs by Per J Tube route daily   oxyCODONE (ROXICODONE) 5 MG/5ML solution Take 5-10 mLs (5-10 mg) by mouth every 4 hours as needed for moderate to severe pain (Patient not taking: Reported on 1/2/2018)[JH1.4]       PHYSICAL EXAMINATION:[JH1.2]  /71  Pulse 79  Temp 98.3  F (36.8  C) (Oral)  Resp 16  Ht 1.75 m (5' 8.9\")  Wt 60.1 kg (132 lb 7.9 oz)  SpO2 97%  BMI 19.62 kg/m2  Body mass index is 19.62 kg/(m^2).[JH1.4]    GEN: Awake, alert, interactive, NAD   NEURO: CNs grossly intact  HEENT: EOMI, neck is supple without JVD  CV: RRR  PULM: Resp non-labored on RA  ABD: Soft, non-distended, non-tender to palpation, no rebound or guarding.[JH1.2]   Incisions: midabdomen, left thoracic well-healed, no sign of infection, c/d/i[JH1.5]  EXT: w/o edema, wwp  SKIN: No jaundice.     LABS: Reviewed.   Complete Blood Count[JH1.2]     Recent Labs  Lab 01/10/18  0331 01/09/18  1351 01/08/18  1715   WBC 1.7* 1.9* 1.9*   HGB 11.9* 13.8 13.0*    310 316[JH1.4]     Basic Metabolic Panel[JH1.2]    Recent Labs  Lab 01/10/18  0331 01/09/18  1351 01/08/18  1715    133 137   POTASSIUM 4.2 3.9 3.8   CHLORIDE 109 " 102 105   CO2 19* 20 22   BUN 22 24 22   CR 0.70 0.77 0.81   * 140* 109*[JH1.4]     Liver Function Tests[JH1.2]    Recent Labs  Lab 01/09/18  1351 01/08/18  1715   AST 21 18   ALT 21 23   ALKPHOS 55 57   BILITOTAL 0.4 0.2   ALBUMIN 2.9* 2.9*[JH1.4]       IMAGING:  Results for orders placed or performed in visit on 12/28/17   CT Abdomen Pelvis w/o Contrast    Narrative    EXAMINATION: CT ABDOMEN PELVIS W/O CONTRAST, 12/28/2017 11:11 AM    TECHNIQUE:  Helical CT images from the lung bases through the  symphysis pubis were obtained without IV contrast.     COMPARISON: CT chest abdomen pelvis 12/1/2017    HISTORY: Jejunostomy tube, increased tenderness, assess position s/p  esophagectomy; Malignant neoplasm of esophagus, unspecified location  (H)    FINDINGS:    Abdomen and pelvis: Percutaneous jejunostomy tube in place with tip  located in the jejunum in the left upper quadrant. There is contrast  opacification of small bowel which is not dilated. No opacified  retention balloon identified. No herniated bowel along the soft tissue  track of the jejunostomy tube. No fluid collection along the tract.    Post surgical changes of distal esophagectomy and total gastrectomy  with intrathoracic esophagojejunostomy pull-through. Multiple surgical  clips. Again noted jejunojejunostomy anastomosis in the left  midabdomen. No abnormally dilated small or large bowel. Evaluation is  limited due to absence of intravenous contrast injection. Liver,  gallbladder, spleen, pancreas, adrenal glands and kidneys are  unremarkable. No abdominal aortic aneurysm. Decompressed urinary  bladder. Prostate calcifications. Symmetric seminal vesicles. Pelvic  phleboliths, right greater than left. No free air. No ascites. No  abdominal, pelvic or inguinal lymphadenopathy.    Lung bases: Clear    Bones and soft tissues: Mild degenerative changes of the spine. No  aggressive osseous lesions. Soft tissues are within normal limits.       "Impression    IMPRESSION:   1. Percutaneous jejunostomy tube is in good position. No evidence for  hernia, fluid collection or significant inflammation along the  subcutaneous tract.   2. Stable postsurgical changes of distal esophagectomy and total  gastrectomy with intrathoracic esophagojejunostomy pull-through. No  evidence for obstruction.    I have personally reviewed the examination and initial interpretation  and I agree with the findings.    MILAGROS LOUISE MD         CO-MORBIDITIES:   No diagnosis found.[JH1.2]       Revision History        User Key Date/Time User Provider Type Action    > JH1.3 1/10/2018  4:35 PM Tim Adler MD Resident Sign     JH1.1 1/10/2018 12:54 PM Tim Adler MD Resident Share     JH1.5 1/10/2018 12:16 PM Tim Adler MD Resident Share     JH1.4 1/10/2018 10:35 AM Tim Adler MD Resident Share     JH1.2 1/10/2018 10:33 AM Tim Adler MD Resident                      Progress Notes - Physician (Notes for yesterday and today)      Progress Notes by Merlene Cook MD at 1/12/2018 12:39 PM     Author:  Merlene Cook MD Service:  Gastroenterology Author Type:  Fellow    Filed:  1/12/2018 12:43 PM Date of Service:  1/12/2018 12:39 PM Creation Time:  1/12/2018 12:39 PM    Status:  Signed :  Merlene Cook MD (Fellow)         Gastroenterology Inpatient Sign Off Note    Subjective: No acute events overnight. Patient feeling tired this morning. Ongoing diarrhea. No difficulty swallowing, nausea, or vomiting. No fevers or chills.     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 Anatomical evaluation with esophagram concerning for question of focal mucosal abnormality of jejunal lumen near anastomosis - possible srinivas-anastomotic ulceration. GI is consulted for further evaluation and management and consideration of EGD.     EGD completed and notable for post-surgical change of esophagojejunostomy and total gastrectomy. Mild erosions/ulcerations throughout jejunum. Small ulceration near anastomosis. Small mucosal change at anastomosis, not malignant appearing, biopsies. Pathology pending.     Will await pathology results.    Inpatient GI consults service will sign off. No further recommendations at this time. If primary team has addition questions, please page consult fellow listed in Amion.    Current GI Consult Staff: Dr. Homero Lambert     Follow up recommendations:   No outpatient GI clinic follow-up indicated. Follow-up with primary care provider at timing determined by discharge physician.    If outpatient GI follow-up is felt indicated by primary care, they may coordinate this by placing new GI referral and contacting: General GI clinic - [phone])Plan of care discussed with Dr. Lambert.     Merlene Cook MD  Gastroenterology Fellow[AL1.1]     Revision History        User Key Date/Time User Provider Type Action    > AL1.2 1/12/2018 12:43 PM Merlene Cook MD Fellow Sign     AL1.1 1/12/2018 12:39 PM Merlene Cook MD Fellow                      Procedure Notes      Procedures by Merlene Cook MD at 1/11/2018  1:33 PM     Author:  Merlene Cook MD Service:  Gastroenterology Author Type:  Fellow    Filed:  1/11/2018  1:41 PM Date of Service:  1/11/2018  1:33 PM Creation Time:  1/11/2018  1:31 PM    Status:  Signed :  Merlene Cook  MD Rossy (Fellow)    Cosigner:  Alessandro Mcgregor MD at 1/11/2018  4:43 PM         Pre-procedure Diagnoses:    1. Status post total gastrectomy and Terrell-en-Y esophagojejunal anastomosis [Z90.3, Z98.0]           Post-procedure Diagnoses:    1. Status post total gastrectomy and Terrell-en-Y esophagojejunal anastomosis [Z90.3, Z98.0]           Procedures:    1. UPPER GI ENDOSCOPY [EXAMENDO (Custom)]                   Gastroenterology Endoscopy Suite Brief Operative Note    Procedure:  Upper endoscopy   Post-operative diagnosis:  s/p Terrell-en-Y esophagojejunostomy with total gastrectomy   Staff Physician:  Dr. Alessandro Mcgregor   Fellow/Assistant(s):  Dr. Merlene Cook    Specimens:  Please see final procedure note for further details.   Findings:  Terrell-en-Y esophagojejunostomy with total gastrectomy anatomy was found. Multiple small erosions were found in the Terrell limb of the jejunum. There was one small ulcer (<1 cm) near the anastomosis. There was a small area of mucosal change near the anastomosis that was non-malignant appearing; this was biopsied for histology.   Complications:  None.   Condition:  Stable   Recommendations  Diet:  Return to previous diet  PPI:  N/A  Anti-coagulants/platelets:  N/A  Octreotide:  N/A  Discharge Planning:   Per primary service; no need for GI follow-up[AL1.1]           Revision History        User Key Date/Time User Provider Type Action    > AL1.1 1/11/2018  1:41 PM Merlene Cook MD Fellow Sign                     Progress Notes - Therapies (Notes from 01/10/18 through 01/13/18)      Progress Notes by Lenard Cardenas, PT at 1/11/2018 10:34 AM     Author:  Lenard Cardenas PT Service:  (none) Author Type:  Physical Therapist    Filed:  1/11/2018 10:34 AM Date of Service:  1/11/2018 10:34 AM Creation Time:  1/11/2018 10:34 AM    Status:  Signed :  Lenard Cardenas PT (Physical Therapist)          01/11/18 0955   Quick Adds   Type of Visit Initial PT Evaluation    Living Environment   Lives With child(janes), dependent;spouse   Living Arrangements house   Home Accessibility bed not on first floor;tub/shower is not walk in;stairs (1 railing present)   Number of Stairs to Enter Home 5   Number of Stairs Within Home 26   Stair Railings at Home inside, present on left side;outside, present on right side   Transportation Available family or friend will provide   Self-Care   Dominant Hand right   Usual Activity Tolerance excellent   Current Activity Tolerance poor   Regular Exercise yes   Activity/Exercise Type walking;strength training   Exercise Amount/Frequency 5-7 times/wk   Equipment Currently Used at Home none   Functional Level Prior   Ambulation 0-->independent   Transferring 0-->independent   Toileting 0-->independent   Bathing 0-->independent   Dressing 0-->independent   Eating 0-->independent   Communication 0-->understands/communicates without difficulty   Swallowing 0-->swallows foods/liquids without difficulty   Cognition 0 - no cognition issues reported   Fall history within last six months no   Which of the above functional risks had a recent onset or change? ambulation   General Information   Onset of Illness/Injury or Date of Surgery - Date 01/09/18   Referring Physician Peyton Moody MD   Patient/Family Goals Statement Return home, regain strength   Pertinent History of Current Problem (include personal factors and/or comorbidities that impact the POC) Daniel Sandhu is a 37-year-old male with history of adenocarcinoma of the GE junction, s/p 3 cycles of neoadjuvant EOX followed by resection on 11/3/2017. He is now undergoing adjuvant chemotherapy with EOF x 3 cycles. He has been admitted to the hospital after being seen in clinic for evaluation of diarrhea, nausea, inability to eat, and extreme weakness.   Precautions/Limitations fall precautions   Cognitive Status Examination   Orientation orientation to person, place and time   Level of Consciousness  "alert   Follows Commands and Answers Questions 100% of the time   Personal Safety and Judgment intact   Memory intact   Pain Assessment   Patient Currently in Pain No   Integumentary/Edema   Integumentary/Edema no deficits were identifed   Posture    Posture Not impaired   Range of Motion (ROM)   ROM Comment LE ROM normal bilaterally. He self-limits left hip flexion to ~ 90 degrees due to abdominal ports.   Strength   Strength Comments LE strength is 5/5   Bed Mobility   Bed Mobility Comments Independent   Transfer Skills   Transfer Comments Independent   Gait   Gait Comments Ambulates without assistive device with normal gait pattern and balance. Decreased endurance limits ambulation to ~ 100'.   Balance   Balance Comments Normal   General Therapy Interventions   Planned Therapy Interventions strengthening;home program guidelines;progressive activity/exercise   Clinical Impression   Criteria for Skilled Therapeutic Intervention yes, treatment indicated   PT Diagnosis Decreased functional endurance   Influenced by the following impairments Medical condition, diarrhea, not eating   Functional limitations due to impairments Ambulation   Clinical Presentation Evolving/Changing   Clinical Presentation Rationale Anticipate pt will improve quickly as medical condition improves   Clinical Decision Making (Complexity) Low complexity   Therapy Frequency` daily   Predicted Duration of Therapy Intervention (days/wks) 1 week   Anticipated Discharge Disposition Home   Risk & Benefits of therapy have been explained Yes   Patient, Family & other staff in agreement with plan of care Yes   [employer] TM \"6 Clicks\"   2016, Trustees of [employer], under license to [employer].  All rights reserved.   6 Clicks Short Forms Basic Mobility Inpatient Short Form   [employer]  \"6 Clicks\" V.2 Basic Mobility Inpatient Short Form   1. Turning from your back to your side while in a flat bed without using " bedrails? 4 - None   2. Moving from lying on your back to sitting on the side of a flat bed without using bedrails? 4 - None   3. Moving to and from a bed to a chair (including a wheelchair)? 4 - None   4. Standing up from a chair using your arms (e.g., wheelchair, or bedside chair)? 4 - None   5. To walk in hospital room? 4 - None   6. Climbing 3-5 steps with a railing? 3 - A Little   Basic Mobility Raw Score (Score out of 24.Lower scores equate to lower levels of function) 23   Total Evaluation Time   Total Evaluation Time (Minutes) 31[RS1.1]        Revision History        User Key Date/Time User Provider Type Action    > RS1.1 1/11/2018 10:34 AM Lenard Cardenas PT Physical Therapist Sign                                                      INTERAGENCY TRANSFER FORM - LAB / IMAGING / EKG / EMG RESULTS   1/9/2018                       UNIT 5C BMT [facility] BANK: [phone]            Unresulted Labs (24h ago through future)    Start       Ordered    01/13/18 0000  CBC with platelets and differential  Routine     Comments:  Last Lab Result: Hemoglobin (g/dL)       Date                     Value                 01/13/2018               10.9 (L)         ----------    01/13/18 1514    01/13/18 0000  Comprehensive metabolic panel (BMP + Alb, Alk Phos, ALT, AST, Total. Bili, TP)  Routine      01/13/18 1514    01/13/18 0000  Magnesium  Routine      01/13/18 1514    01/13/18 0000  Phosphorus  Routine      01/13/18 1514    01/12/18 0400  Phosphorus  AM DRAW,   Routine      01/11/18 1742    01/12/18 0400  Magnesium  AM DRAW,   Routine      01/12/18 0234    01/11/18 0400  Comprehensive metabolic panel  AM DRAW,   Routine      01/10/18 1406    01/11/18 0400  CBC with platelets  AM DRAW,   Routine     Comments:  Last Lab Result: Hemoglobin (g/dL)       Date                     Value                 01/10/2018               11.9 (L)         ----------    01/10/18 1406    Unscheduled  Blood culture  (Blood Culture - 2  "Sites)  CONDITIONAL (SPECIFY),   Routine     Comments:  Site #1:  If temp is greater than 100.4    May repeat max of once per 24 hrs. (Stat Lab Collect with 1st site collected from Venipuncture and 2nd site from VAD by RN,  if no VAD then 2 peripheral sticks-2 venipuncture from different sites)    01/09/18 1828    Unscheduled  Blood culture  (Blood Culture - 2 Sites)  CONDITIONAL (SPECIFY),   Routine     Comments:  SITE #2: If temp is greater than 100.4    May repeat max of once per 24 hrs. (Stat Lab Collect with 1st site collected from Venipuncture and 2nd site from VAD by RN,  if no VAD then 2 peripheral sticks-2 venipuncture from different sites)    01/09/18 1828    Unscheduled  Potassium  (Potassium Replacement - \"Standard\" - For K levels less than 3.4 mmol/L - UU,UR,UA,RH,SH,PH,WY )  CONDITIONAL (SPECIFY),   Routine     Comments:  Obtain Potassium Level for these conditions:  *IF no potassium result within 24 hours before initiation of order set, draw potassium level with next lab collect.    *2 HOURS AFTER last IV potassium replacement dose and 4 hours after an oral replacement dose.  *Next morning after potassium dose.     Repeat Potassium Replacement if necessary.    01/11/18 1741    Unscheduled  Magnesium  (Magnesium Replacement -  Adult - \"Standard\" - Replacement for all levels less than 1.6 mg/dL )  CONDITIONAL (SPECIFY),   Routine     Comments:  Obtain Magnesium Level for these conditions:  *IF no magnesium result within 24 hrs before initiation of order set, draw magnesium level with next lab collect.    *2 HOURS AFTER last magnesium replacement dose when magnesium replacement given for level less than 1.6   *Next morning after magnesium dose.     Repeat Magnesium Replacement if necessary.    01/11/18 1741    Unscheduled  Phosphorus  (POTASSIUM Phosphate - \"Standard\" - Replacement for levels less than or equal to 2.4 mg/dL )  CONDITIONAL (SPECIFY),   Routine     Comments:  Obtain Phosphorus Level for " these conditions:  *IF no phosphorus result within 24 hrs before initiation of order set, draw phosphorus level with next lab collect.    *2 HOURS AFTER last phosphorus replacement dose for levels less than 2.0.  *Next morning after phosphorus dose.     Repeat Phosphorus Replacement if necessary.    01/11/18 1741         Lab Results - 3 Days      Blood culture [[id number]]  Resulted: 01/13/18 0331, Result status: Preliminary result    Ordering provider: Alisa Otero PA-C  01/09/18 1513 Resulting lab: [facility]    Specimen Information    Type Source Collected On   Blood  01/09/18 1505   Comment:  Unspecified Site          Components       Value Reference Range Flag Lab   Specimen Description Blood Unspecified Site      Culture Micro No growth after 4 days   75            Comprehensive metabolic panel [[id number]] (Abnormal)  Resulted: 01/13/18 0156, Result status: Final result    Ordering provider: Melody Reilly APRN CNP  01/12/18 2200 Resulting lab: [facility]    Specimen Information    Type Source Collected On   Blood  01/13/18 0109          Components       Value Reference Range Flag Lab   Sodium 134 133 - 144 mmol/L  51   Potassium 3.2 3.4 - 5.3 mmol/L L 51   Chloride 112 94 - 109 mmol/L H 51   Carbon Dioxide 14 20 - 32 mmol/L L 51   Anion Gap 8 3 - 14 mmol/L  51   Glucose 105 70 - 99 mg/dL H 51   Urea Nitrogen 10 7 - 30 mg/dL  51   Creatinine 0.76 0.66 - 1.25 mg/dL  51   GFR Estimate >90 >60 mL/min/1.7m2  51   Comment:  Non  GFR Calc   GFR Estimate If Black >90 >60 mL/min/1.7m2  51   Comment:  African American GFR Calc   Calcium 7.6 8.5 - 10.1 mg/dL L 51   Bilirubin Total 0.2 0.2 - 1.3 mg/dL  51   Albumin 2.0 3.4 - 5.0 g/dL L 51   Protein Total 5.5 6.8 - 8.8 g/dL L 51   Alkaline Phosphatase 51 40 - 150 U/L  51   ALT 15 0 - 70 U/L  51   AST 12 0 - 45 U/L  51            Phosphorus [[id number]] (Abnormal)  Resulted:  01/13/18 0156, Result status: Final result    Ordering provider: Melody Reilly APRN CNP  01/12/18 2200 Resulting lab: [facility]    Specimen Information    Type Source Collected On   Blood  01/13/18 0109          Components       Value Reference Range Flag Lab   Phosphorus 2.1 2.5 - 4.5 mg/dL L 51            Magnesium [[id number]]  Resulted: 01/13/18 0156, Result status: Final result    Ordering provider: Sam Dumont MD  01/12/18 2200 Resulting lab: [facility]    Specimen Information    Type Source Collected On   Blood  01/13/18 0109          Components       Value Reference Range Flag Lab   Magnesium 2.0 1.6 - 2.3 mg/dL  51            CBC with platelets [[id number]] (Abnormal)  Resulted: 01/13/18 0135, Result status: Final result    Ordering provider: Melody Reilly APRN CNP  01/12/18 2200 Resulting lab: [facility]    Specimen Information    Type Source Collected On   Blood  01/13/18 0109          Components       Value Reference Range Flag Lab   WBC 3.8 4.0 - 11.0 10e9/L L 51   RBC Count 4.37 4.4 - 5.9 10e12/L L 51   Hemoglobin 10.9 13.3 - 17.7 g/dL L 51   Hematocrit 33.4 40.0 - 53.0 % L 51   MCV 76 78 - 100 fl L 51   MCH 24.9 26.5 - 33.0 pg L 51   MCHC 32.6 31.5 - 36.5 g/dL  51   RDW 15.9 10.0 - 15.0 % H 51   Platelet Count 191 150 - 450 10e9/L  51            Surgical pathology exam [[id number]]  Resulted: 01/12/18 1436, Result status: Final result    Ordering provider: Kathrin Mcgregor MD  01/11/18 1205 Resulting lab: [facility]    Specimen Information    Type Source Collected On   Biopsy Other 01/11/18 1203          Components       Value Reference Range Flag Lab   Copath Report --      Result:         Patient Name: ABELARDO ARMAS  MR#: [id number]  Specimen #:   Collected: 1/11/2018  Received: 1/11/2018  Reported: 1/12/2018 14:35  Ordering Phy(s): KATHRIN MCGREGOR    For improved  "result formatting, select 'View Enhanced Report Format' under   Linked Documents section.    SPECIMEN(S):  Esophagojejunostomy anastamosis biopsies    FINAL DIAGNOSIS:  ESOPHAGOJEJUNOSTOMY ANASTOMOSIS BIOPSIES:  - Intestinal type mucosa with focal ulceration, chronic inflammation and   reactive epithelial changes  - No evidence of dysplasia or malignancy    I have personally reviewed all specimens and/or slides, including the   listed special stains, and used them  with my medical judgement to determine or confirm the final diagnosis.    Electronically signed out by:    Sepideh Alex M.D., [facility]    CLINICAL HISTORY:   Esophageal adenocarcinoma  GROSS:  The specimen is received in formalin with proper patient identification,   labeled \"esophagojejunostomy  anastomosis biopsies\", and consists  of four tan, slightly friable soft   tissue fragments, ranging from 0.1 cm  to 0.2 cm in greatest dimension.  The specimen is wrapped and entirely   submitted in cassette A1. (Dictated by:  Carolyn Live 1/11/2018 04:45 PM)    MICROSCOPIC:  Microscopic examination is performed.    CPT Codes:  A: 19994-SK6    TESTING LAB LOCATION:  [address]   [address]  [phone]    COLLECTION SITE:  Client: [facility]  Location: [facility] (B)                Potassium [[id number]]  Resulted: 01/12/18 1324, Result status: Final result    Ordering provider: Sam Dumont MD  01/12/18 1050 Resulting lab: [facility]    Specimen Information    Type Source Collected On   Blood  01/12/18 1234          Components       Value Reference Range Flag Lab   Potassium 3.4 3.4 - 5.3 mmol/L  51            Comprehensive metabolic panel [[id number]] (Abnormal)  Resulted: 01/12/18 0338, Result status: Final result    Ordering provider: Melody Reilly APRN CNP  01/11/18 2200 " Resulting lab: [facility]    Specimen Information    Type Source Collected On   Blood  01/12/18 0254          Components       Value Reference Range Flag Lab   Sodium 136 133 - 144 mmol/L  51   Potassium 3.1 3.4 - 5.3 mmol/L L 51   Chloride 110 94 - 109 mmol/L H 51   Carbon Dioxide 16 20 - 32 mmol/L L 51   Anion Gap 10 3 - 14 mmol/L  51   Glucose 99 70 - 99 mg/dL  51   Urea Nitrogen 16 7 - 30 mg/dL  51   Creatinine 0.73 0.66 - 1.25 mg/dL  51   GFR Estimate >90 >60 mL/min/1.7m2  51   Comment:  Non  GFR Calc   GFR Estimate If Black >90 >60 mL/min/1.7m2  51   Comment:  African American GFR Calc   Calcium 7.6 8.5 - 10.1 mg/dL L 51   Bilirubin Total 0.3 0.2 - 1.3 mg/dL  51   Albumin 2.0 3.4 - 5.0 g/dL L 51   Protein Total 5.2 6.8 - 8.8 g/dL L 51   Alkaline Phosphatase 46 40 - 150 U/L  51   ALT 14 0 - 70 U/L  51   AST 11 0 - 45 U/L  51            Phosphorus [[id number]]  Resulted: 01/12/18 0338, Result status: Final result    Ordering provider: Melody Reilly APRN CNP  01/11/18 2200 Resulting lab: [facility]    Specimen Information    Type Source Collected On   Blood  01/12/18 0254          Components       Value Reference Range Flag Lab   Phosphorus 2.5 2.5 - 4.5 mg/dL  51            Magnesium [[id number]]  Resulted: 01/12/18 0338, Result status: Final result    Ordering provider: Sam Dumont MD  01/12/18 0234 Resulting lab: [facility]    Specimen Information    Type Source Collected On   Blood  01/12/18 0254          Components       Value Reference Range Flag Lab   Magnesium 1.9 1.6 - 2.3 mg/dL  51            CBC with platelets [[id number]] (Abnormal)  Resulted: 01/12/18 0318, Result status: Final result    Ordering provider: Melody Reilly APRN CNP  01/11/18 2200 Resulting lab: [facility]    Specimen Information    Type Source Collected On   Blood   01/12/18 0254          Components       Value Reference Range Flag Lab   WBC 2.2 4.0 - 11.0 10e9/L L 51   RBC Count 4.43 4.4 - 5.9 10e12/L  51   Hemoglobin 10.9 13.3 - 17.7 g/dL L 51   Hematocrit 33.9 40.0 - 53.0 % L 51   MCV 77 78 - 100 fl L 51   MCH 24.6 26.5 - 33.0 pg L 51   MCHC 32.2 31.5 - 36.5 g/dL  51   RDW 16.0 10.0 - 15.0 % H 51   Platelet Count 232 150 - 450 10e9/L  51            Comprehensive metabolic panel [[id number]] (Abnormal)  Resulted: 01/11/18 0511, Result status: Final result    Ordering provider: Melody Reilly APRN CNP  01/10/18 2200 Resulting lab: [facility]    Specimen Information    Type Source Collected On   Blood  01/11/18 0432          Components       Value Reference Range Flag Lab   Sodium 138 133 - 144 mmol/L  51   Potassium 3.6 3.4 - 5.3 mmol/L  51   Chloride 111 94 - 109 mmol/L H 51   Carbon Dioxide 17 20 - 32 mmol/L L 51   Anion Gap 10 3 - 14 mmol/L  51   Glucose 107 70 - 99 mg/dL H 51   Urea Nitrogen 20 7 - 30 mg/dL  51   Creatinine 0.84 0.66 - 1.25 mg/dL  51   GFR Estimate >90 >60 mL/min/1.7m2  51   Comment:  Non  GFR Calc   GFR Estimate If Black >90 >60 mL/min/1.7m2  51   Comment:  African American GFR Calc   Calcium 8.2 8.5 - 10.1 mg/dL L 51   Bilirubin Total 0.3 0.2 - 1.3 mg/dL  51   Albumin 2.1 3.4 - 5.0 g/dL L 51   Protein Total 5.5 6.8 - 8.8 g/dL L 51   Alkaline Phosphatase 45 40 - 150 U/L  51   ALT 16 0 - 70 U/L  51   AST 11 0 - 45 U/L  51            CBC with platelets [[id number]] (Abnormal)  Resulted: 01/11/18 0452, Result status: Final result    Ordering provider: Melody Reilly APRN CNP  01/10/18 2200 Resulting lab: [facility]    Specimen Information    Type Source Collected On   Blood  01/11/18 0432          Components       Value Reference Range Flag Lab   WBC 2.3 4.0 - 11.0 10e9/L L 51   RBC Count 4.50 4.4 - 5.9 10e12/L  51   Hemoglobin 11.3 13.3 - 17.7 g/dL L 51   Hematocrit  34.7 40.0 - 53.0 % L 51   MCV 77 78 - 100 fl L 51   MCH 25.1 26.5 - 33.0 pg L 51   MCHC 32.6 31.5 - 36.5 g/dL  51   RDW 16.1 10.0 - 15.0 % H 51   Platelet Count 237 150 - 450 10e9/L  51            Prealbumin [[id number]] (Abnormal)  Resulted: 01/10/18 1526, Result status: Final result    Ordering provider: Melody Reilly APRN CNP  01/10/18 1139 Resulting lab: [facility]    Specimen Information    Type Source Collected On   Blood  01/10/18 1200          Components       Value Reference Range Flag Lab   Prealbumin 13 15 - 45 mg/dL L 51            Basic metabolic panel [[id number]] (Abnormal)  Resulted: 01/10/18 0452, Result status: Final result    Ordering provider: Peyton Moody MD  01/09/18 2200 Resulting lab: [facility]    Specimen Information    Type Source Collected On   Blood  01/10/18 0331          Components       Value Reference Range Flag Lab   Sodium 136 133 - 144 mmol/L  51   Potassium 4.2 3.4 - 5.3 mmol/L  51   Chloride 109 94 - 109 mmol/L  51   Carbon Dioxide 19 20 - 32 mmol/L L 51   Anion Gap 9 3 - 14 mmol/L  51   Glucose 137 70 - 99 mg/dL H 51   Urea Nitrogen 22 7 - 30 mg/dL  51   Creatinine 0.70 0.66 - 1.25 mg/dL  51   GFR Estimate >90 >60 mL/min/1.7m2  51   Comment:  Non  GFR Calc   GFR Estimate If Black >90 >60 mL/min/1.7m2  51   Comment:  African American GFR Calc   Calcium 8.4 8.5 - 10.1 mg/dL L 51            Magnesium [[id number]]  Resulted: 01/10/18 0452, Result status: Final result    Ordering provider: Peyton Moody MD  01/09/18 2200 Resulting lab: [facility]    Specimen Information    Type Source Collected On   Blood  01/10/18 0331          Components       Value Reference Range Flag Lab   Magnesium 2.1 1.6 - 2.3 mg/dL  51            Phosphorus [[id number]]  Resulted: 01/10/18 0452, Result status: Final result    Ordering provider: Peyton Moody MD   01/09/18 2200 Resulting lab: [facility]    Specimen Information    Type Source Collected On   Blood  01/10/18 0331          Components       Value Reference Range Flag Lab   Phosphorus 3.2 2.5 - 4.5 mg/dL  51            CBC with platelets differential [[id number]] (Abnormal)  Resulted: 01/10/18 0435, Result status: Final result    Ordering provider: Peyton Moody MD  01/09/18 2200 Resulting lab: [facility]    Specimen Information    Type Source Collected On   Blood  01/10/18 0331          Components       Value Reference Range Flag Lab   WBC 1.7 4.0 - 11.0 10e9/L L 51   RBC Count 4.77 4.4 - 5.9 10e12/L  51   Hemoglobin 11.9 13.3 - 17.7 g/dL L 51   Hematocrit 36.7 40.0 - 53.0 % L 51   MCV 77 78 - 100 fl L 51   MCH 24.9 26.5 - 33.0 pg L 51   MCHC 32.4 31.5 - 36.5 g/dL  51   RDW 15.8 10.0 - 15.0 % H 51   Platelet Count 277 150 - 450 10e9/L  51   Diff Method Automated Method   51   % Neutrophils 47.6 %  51   % Lymphocytes 33.1 %  51   % Monocytes 17.5 %  51   % Eosinophils 0.0 %  51   % Basophils 0.0 %  51   % Immature Granulocytes 1.8 %  51   Nucleated RBCs 0 0 /100  51   Absolute Neutrophil 0.8 1.6 - 8.3 10e9/L L 51   Absolute Lymphocytes 0.6 0.8 - 5.3 10e9/L L 51   Absolute Monocytes 0.3 0.0 - 1.3 10e9/L  51   Absolute Eosinophils 0.0 0.0 - 0.7 10e9/L  51   Absolute Basophils 0.0 0.0 - 0.2 10e9/L  51   Abs Immature Granulocytes 0.0 0 - 0.4 10e9/L  51   Absolute Nucleated RBC 0.0   51            Testing Performed By     Lab - Abbreviation Name Director Address Valid Date Range    51 - Unknown [facility] Unknown [address] [facility] [address] 12/31/14 1010 - Present    75 - Unknown [facility] Unknown [address] [facility] [address] 01/15/15 1019 - Present    88 - Unknown [facility] Unknown Unknown 10/30/02 0000 - Present               Imaging Results - 3 Days      XR Abdomen  Port 1 View [[id number]]  Resulted: 01/11/18 1111, Result status: Final result    Ordering provider: Andres Vila MD  01/11/18 0851 Resulted by: Geovany Latham MD Evenson, Nicole Wilson MD    Performed: 01/11/18 0912 - 01/11/18 0954 Resulting lab: RADIOLOGY RESULTS    Narrative:       Examination:  XR ABDOMEN PORT F1 VW 1/11/2018 9:54 AM     Comparison: CT abdomen and pelvis 12/28/2017.    History: tube check with GASTROGRAFFIN instillation prior to XR.;     Findings: 2 supine frontal views of the abdomen are obtained. Surgical  clips projecting over the epigastrium and left upper quadrant. J-tube  with tip projecting over the left side of the abdomen presumably  within the jejunum, similar in appearance compared to 12/28/2017 CT.  Air distended loops of small bowel and colon with contrast material  visualized in the small bowel extending to the mid transverse colon.  There is no pneumatosis or portal venous gas.       Impression:       Impression:   1.  Air distended loops of bowel with contrast visualized to the mid  transverse colon. No evidence of obstruction. Could consider obtaining  12 to 24 hour delayed imaging to watch for passage of contrast.  2.  Stable position of J-tube tip.    I have personally reviewed the examination and initial interpretation  and I agree with the findings.    GEOVANY LATHAM MD      XR Esophagram [[id number]]  Resulted: 01/10/18 1323, Result status: Final result    Ordering provider: Melody Reilly APRN CNP  01/10/18 0845 Resulted by: Judith Cota MD Evenson, Nicole Wilson MD    Performed: 01/10/18 1001 - 01/10/18 1032 Resulting lab: RADIOLOGY RESULTS    Narrative:       Esophagram dated 1/10/2018    COMPARISON:    Modified swallow study and limited esophagram  11/13/2017, multiple prior CT, most recent 12/28/2017    HISTORY:    Evaluate stricture, history of GE junction adenocarcinoma,  difficulty with gagging and nausea    Additional history obtained  from EHR and patient: Status post total  gastrectomy with distal esophagectomy and intrathoracic Terrell-en-Y  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 Patient describes sensation of food getting stuck in the mid  chest, both liquids and solids, with feeling of spasms. Denies emesis  or regurgitation of ingested food. Additionally, describes burning  sensation in the epigastrium which is not improved with antacids.    TECHNIQUE: Patient was given thin liquid barium to ingest. Patient was  evaluated with fluoroscopy, and multiple spot films were obtained.     Fluoro time: 3.7 minutes    FINDINGS:   The  radiograph shows right-sided PICC with tip projecting over  low SVC. Multiple surgical clips projecting over the lower mediastinum  and left upper quadrant. Heart size is within normal limits. Lungs are  clear.    Single contrast upper GI was performed. Examination of the esophagus  reveals adequate primary and secondary peristalsis. Post surgical  changes of distal esophagectomy and esophagojejunostomy. Contrast  material easily progresses across the esophagojejunal anastomosis into  the jejunum without evidence of focal stricture. Contrast was observed  to pool in a herniated/redundant portion of intrathoracic jejunum,  which empties secondary to cardiac pulsation. Questionable focal  mucosal abnormality just below the level of the the anastomosis along  the posterior and right lateral aspect of the jejunum at the level of  the diaphragm curve, with incomplete clearance on subsequent swallows.  At the conclusion of the study, contrast material was visualized in  the distal small bowel which were normal in caliber, without evidence  of obstruction or stricture.      Impression:       IMPRESSION:  1.  Postoperative changes of distal esophagectomy and  esophagojejunostomy without evidence of esophageal or anastomotic  stricture.  2.  Questionable focal mucosal abnormality of the  jejunal lumen near  the anastomosis. Although this may represent normal jejunal fold,  perianastomotic jejunal ulcer is not entirely excluded especially in  the setting of epigastric pain and burning sensation as reported by  the patient.  3.  Normal transit of contrast through the visualize small bowel  without evidence obstruction.    I have personally reviewed the examination and initial interpretation  and I agree with the findings.    NARCISA FIELDS MD      Testing Performed By     Lab - Abbreviation Name Director Address Valid Date Range    104 - Rad Rslts RADIOLOGY RESULTS Unknown Unknown 02/16/05 1553 - Present            Encounter-Level Documents:     There are no encounter-level documents.      Order-Level Documents:     There are no order-level documents.

## 2018-01-09 NOTE — IP AVS SNAPSHOT
` ` Patient Information     Patient Name Sex     Daniel Sandhu ([id number]) Male 1980       Room Bed    5415 5415-01      Patient Demographics     Address Phone E-mail Address    [address] [place] [id number] [phone] (Home)  [phone] (Mobile) [email or web address]      Patient Ethnicity & Race     Ethnic Group Patient Race    American White      Emergency Contact(s)     Name Relation Home Work Mobile    Violeta Sandhu Spouse   [phone]      Documents on File        Status Date Received Description       Documents for the Patient    Affiliate Privacy placeholder   phase3    Consent for EHR Access Received 10/27/16     Insurance Card Received 10/27/16     External Medication Information Consent Accepted 10/27/16     Patient ID Received 17 on file    [facility] Specified Other       Consent for Services/Privacy Notice - Hospital/Clinic Received () 10/27/16     Privacy Notice - [place] Received 10/27/16     Insurance Card Received 17     Consent to Communicate  17 AUTHORIZATION TO VERBALLY DISCUSS PROTECTED HEALTH INFORMATION 17    Consent for Services - [facility]       Advance Directives and Living Will Received 17 Health Care Directive 2017    Advance Directives and Living Will Not Received  Validation of AD 2017    Business/Insurance/Care Coordination/Health Form - Patient  10/17/17 APPROVAL OF  MYOCARDIAL IMAGING    Care Everywhere Prospective Auth Received 17     Consent for Services/Privacy Notice - Hospital/Clinic Received 17     Business/Insurance/Care Coordination/Health Form - Patient  17 [facility] SYSTEM FMLA FORMS 17    Research  17 CONSENT FOR ACQUISITION OF TISSUE SPECIMENS    HIM ILIANA Authorization  17       Admission Information     Attending Provider Admitting Provider Admission Type Admission Date/Time    Sam Dumont MD Domingo Musibay, Evidio, MD Urgent 18   1753    Discharge Date Hospital Service Auth/Cert Status Service Area     Oncology Incomplete [facility]    Unit Room/Bed Admission Status       UU U5C BMT 5415/5415-01 Admission (Confirmed)       Admission     Complaint    Failure to thrive, GE junction Cancer, Weakness, Possible anastomotic ulcer      Hospital Account     Name Acct ID Class Status Primary Coverage    Daniel Sandhu [id number] Inpatient Open [employer] [place] OPEN ACCESS FULLY INSURED            Guarantor Account (for Hospital Account #[id number])     Name Relation to Pt Service Area Active? Acct Type    Daniel Sandhu Self FCS Yes Personal/Family    Address Phone          [address] [place] [id number] [phone](H)              Coverage Information (for Hospital Account #[id number])     F/O Payor/Plan Precert #    [employer] OPEN ACCESS FULLY INSURED     Subscriber Subscriber #    Daniel Sandhu [id number]    Address Phone    [address]  [place] [address] [phone]

## 2018-01-10 ENCOUNTER — APPOINTMENT (OUTPATIENT)
Dept: GENERAL RADIOLOGY | Facility: CLINIC | Age: 38
DRG: 380 | End: 2018-01-10
Attending: NURSE PRACTITIONER
Payer: COMMERCIAL

## 2018-01-10 LAB
ANION GAP SERPL CALCULATED.3IONS-SCNC: 9 MMOL/L (ref 3–14)
BASOPHILS # BLD AUTO: 0 10E9/L (ref 0–0.2)
BASOPHILS NFR BLD AUTO: 0 %
BUN SERPL-MCNC: 22 MG/DL (ref 7–30)
CALCIUM SERPL-MCNC: 8.4 MG/DL (ref 8.5–10.1)
CHLORIDE SERPL-SCNC: 109 MMOL/L (ref 94–109)
CO2 SERPL-SCNC: 19 MMOL/L (ref 20–32)
CREAT SERPL-MCNC: 0.7 MG/DL (ref 0.66–1.25)
DIFFERENTIAL METHOD BLD: ABNORMAL
EOSINOPHIL # BLD AUTO: 0 10E9/L (ref 0–0.7)
EOSINOPHIL NFR BLD AUTO: 0 %
ERYTHROCYTE [DISTWIDTH] IN BLOOD BY AUTOMATED COUNT: 15.8 % (ref 10–15)
GFR SERPL CREATININE-BSD FRML MDRD: >90 ML/MIN/1.7M2
GLUCOSE SERPL-MCNC: 137 MG/DL (ref 70–99)
HCT VFR BLD AUTO: 36.7 % (ref 40–53)
HGB BLD-MCNC: 11.9 G/DL (ref 13.3–17.7)
IMM GRANULOCYTES # BLD: 0 10E9/L (ref 0–0.4)
IMM GRANULOCYTES NFR BLD: 1.8 %
LYMPHOCYTES # BLD AUTO: 0.6 10E9/L (ref 0.8–5.3)
LYMPHOCYTES NFR BLD AUTO: 33.1 %
MAGNESIUM SERPL-MCNC: 2.1 MG/DL (ref 1.6–2.3)
MCH RBC QN AUTO: 24.9 PG (ref 26.5–33)
MCHC RBC AUTO-ENTMCNC: 32.4 G/DL (ref 31.5–36.5)
MCV RBC AUTO: 77 FL (ref 78–100)
MONOCYTES # BLD AUTO: 0.3 10E9/L (ref 0–1.3)
MONOCYTES NFR BLD AUTO: 17.5 %
NEUTROPHILS # BLD AUTO: 0.8 10E9/L (ref 1.6–8.3)
NEUTROPHILS NFR BLD AUTO: 47.6 %
NRBC # BLD AUTO: 0 10*3/UL
NRBC BLD AUTO-RTO: 0 /100
PHOSPHATE SERPL-MCNC: 3.2 MG/DL (ref 2.5–4.5)
PLATELET # BLD AUTO: 277 10E9/L (ref 150–450)
POTASSIUM SERPL-SCNC: 4.2 MMOL/L (ref 3.4–5.3)
PREALB SERPL IA-MCNC: 13 MG/DL (ref 15–45)
RBC # BLD AUTO: 4.77 10E12/L (ref 4.4–5.9)
SODIUM SERPL-SCNC: 136 MMOL/L (ref 133–144)
WBC # BLD AUTO: 1.7 10E9/L (ref 4–11)

## 2018-01-10 PROCEDURE — 83735 ASSAY OF MAGNESIUM: CPT | Performed by: INTERNAL MEDICINE

## 2018-01-10 PROCEDURE — 84134 ASSAY OF PREALBUMIN: CPT | Performed by: NURSE PRACTITIONER

## 2018-01-10 PROCEDURE — 25000128 H RX IP 250 OP 636: Performed by: INTERNAL MEDICINE

## 2018-01-10 PROCEDURE — 74220 X-RAY XM ESOPHAGUS 1CNTRST: CPT

## 2018-01-10 PROCEDURE — 80048 BASIC METABOLIC PNL TOTAL CA: CPT | Performed by: INTERNAL MEDICINE

## 2018-01-10 PROCEDURE — 25000128 H RX IP 250 OP 636: Performed by: NURSE PRACTITIONER

## 2018-01-10 PROCEDURE — 25000132 ZZH RX MED GY IP 250 OP 250 PS 637: Performed by: NURSE PRACTITIONER

## 2018-01-10 PROCEDURE — 85025 COMPLETE CBC W/AUTO DIFF WBC: CPT | Performed by: INTERNAL MEDICINE

## 2018-01-10 PROCEDURE — 25000132 ZZH RX MED GY IP 250 OP 250 PS 637: Performed by: INTERNAL MEDICINE

## 2018-01-10 PROCEDURE — 25000131 ZZH RX MED GY IP 250 OP 636 PS 637: Performed by: NURSE PRACTITIONER

## 2018-01-10 PROCEDURE — 20000002 ZZH R&B BMT INTERMEDIATE

## 2018-01-10 PROCEDURE — 25800025 ZZH RX 258: Performed by: INTERNAL MEDICINE

## 2018-01-10 PROCEDURE — 84100 ASSAY OF PHOSPHORUS: CPT | Performed by: INTERNAL MEDICINE

## 2018-01-10 PROCEDURE — 99233 SBSQ HOSP IP/OBS HIGH 50: CPT | Performed by: INTERNAL MEDICINE

## 2018-01-10 RX ORDER — SODIUM CHLORIDE 9 MG/ML
INJECTION, SOLUTION INTRAVENOUS CONTINUOUS
Status: DISCONTINUED | OUTPATIENT
Start: 2018-01-10 | End: 2018-01-12

## 2018-01-10 RX ORDER — ONDANSETRON 2 MG/ML
4 INJECTION INTRAMUSCULAR; INTRAVENOUS EVERY 6 HOURS
Status: DISCONTINUED | OUTPATIENT
Start: 2018-01-10 | End: 2018-01-13 | Stop reason: HOSPADM

## 2018-01-10 RX ORDER — FLUORIDE TOOTHPASTE
10 TOOTHPASTE DENTAL 4 TIMES DAILY
Status: DISCONTINUED | OUTPATIENT
Start: 2018-01-10 | End: 2018-01-13 | Stop reason: HOSPADM

## 2018-01-10 RX ORDER — DEXAMETHASONE 2 MG/1
2 TABLET ORAL DAILY
Status: DISCONTINUED | OUTPATIENT
Start: 2018-01-10 | End: 2018-01-13 | Stop reason: HOSPADM

## 2018-01-10 RX ADMIN — ONDANSETRON 4 MG: 2 INJECTION INTRAMUSCULAR; INTRAVENOUS at 11:23

## 2018-01-10 RX ADMIN — LOPERAMIDE HYDROCHLORIDE 2 MG: 2 CAPSULE ORAL at 17:35

## 2018-01-10 RX ADMIN — Medication 10 ML: at 15:58

## 2018-01-10 RX ADMIN — SODIUM CHLORIDE: 9 INJECTION, SOLUTION INTRAVENOUS at 16:00

## 2018-01-10 RX ADMIN — PROCHLORPERAZINE EDISYLATE 5 MG: 5 INJECTION INTRAMUSCULAR; INTRAVENOUS at 17:35

## 2018-01-10 RX ADMIN — LOPERAMIDE HYDROCHLORIDE 2 MG: 2 CAPSULE ORAL at 09:26

## 2018-01-10 RX ADMIN — Medication 2500 MCG: at 09:26

## 2018-01-10 RX ADMIN — OXYCODONE HYDROCHLORIDE 5 MG: 5 SOLUTION ORAL at 12:35

## 2018-01-10 RX ADMIN — FLUCONAZOLE 200 MG: 200 TABLET ORAL at 09:26

## 2018-01-10 RX ADMIN — SALINE NASAL SPRAY 1 SPRAY: 1.5 SOLUTION NASAL at 12:35

## 2018-01-10 RX ADMIN — POTASSIUM CHLORIDE, DEXTROSE MONOHYDRATE AND SODIUM CHLORIDE: 150; 5; 900 INJECTION, SOLUTION INTRAVENOUS at 04:21

## 2018-01-10 RX ADMIN — PROCHLORPERAZINE EDISYLATE 5 MG: 5 INJECTION INTRAMUSCULAR; INTRAVENOUS at 11:09

## 2018-01-10 RX ADMIN — ONDANSETRON 4 MG: 2 INJECTION INTRAMUSCULAR; INTRAVENOUS at 17:34

## 2018-01-10 RX ADMIN — MULTIVITAMIN 15 ML: LIQUID ORAL at 11:09

## 2018-01-10 RX ADMIN — Medication 10 ML: at 20:38

## 2018-01-10 RX ADMIN — DEXAMETHASONE 2 MG: 2 TABLET ORAL at 11:22

## 2018-01-10 RX ADMIN — Medication 10 ML: at 12:35

## 2018-01-10 ASSESSMENT — PAIN DESCRIPTION - DESCRIPTORS: DESCRIPTORS: CRAMPING

## 2018-01-10 NOTE — PROGRESS NOTES
CLINICAL NUTRITION SERVICES - ASSESSMENT NOTE     Nutrition Prescription    RECOMMENDATIONS FOR MDs/PROVIDERS TO ORDER:  - For help managing TF please consult Nutrition to Assess and Manage TF per MNT protocol  - Recommend restarting tube feeds within 1-2 days     Malnutrition Status:    Severe malnutrition in the context of acute illness or injury.    Recommendations already ordered by Registered Dietitian (RD):  - Ordered Ensure clear for pt to try: Pt instructed to poor over ice and take small sips     Future/Additional Recommendations:  Please have dietitian consulted for Nutrition to Assess and Manage TF per MNT protocol: if available use Osmolite   - Osmolite 1.5  @ goal 60 ml/hr (1440 ml/day) to provide 2160 kcals (36 kcal/kg/day), 90 g PRO (1.5 g/kg/day), 1097 ml free H2O, and ) 0 g Fiber daily.   - Initiate TF @ 10 ml/hr and advance by 10 ml Q8hrs to goal on 60 ml/hr. Only advance if pt tolerates TF and if K+/Mg++/Phos are WNL.   - Flush 30 ml of free H2O Q4hr for tube patency  - Continue with Certavite 1x daily.    If Osmolite is not available please Initiate TF with Peptamen 1.5 @ goal 60 ml/hr (1440 ml/day) to provide 2160 kcals (36 kcal/kg/day), 98 g PRO (1.6 g/kg/day), 1109 ml free H2O, 81 g Fat (70% from MCTs), 271 g CHO and no Fiber daily.  - Initiate TF @ 10 ml/hr and advance by 10 ml Q8hrs to goal on 60 ml/hr. Only advance if pt tolerates TF and if K+/Mg++/Phos are WNL.   - Flush 30 ml of free H2O Q4hr for tube patency  - Continue with Certavite 1x daily.     REASON FOR ASSESSMENT  Daniel Sandhu is a/an 37 year old male assessed by the dietitian for Admission Nutrition Risk Screen for unintentional loss of 10# or more in the past two months and Patient/Family Request    NUTRITION HISTORY  Per H&P pt was admitted for evaluation: diarrhea, nausea, inability to eat and weakness. Currently undergoing chemotherapy.     Recent PO intake   He has recently only been tolerating small amount of apple sauce  "and apple juice. Team is holding on TF for now.     Prior to all of his GI symptoms starting around 1/4/18 pt reports that he was doing fine at home. In November he left the hospital on Isosource 1.5 with goal rate @100 ml/hr cycled at 16 hours. His home infusion switched him to Osmolite 1.5 because the pt's was consistently bloated and nauseous. Pt reports receiving 1 L IV fluids from home infusion, lately they have been daily with the amount of diarrhea he has been having.     Home TF regimen  - Pt reports at home he runs 6 cans (1440 ml/day) of Osmolite 1.5 @100 ml/hr for 16 hours. This provides 2130 kcals, 89 g Pro, 1086 ml free H2O, and 0 g of Fiber daily.  - Pt states that he does not water flush as much as he should through his tube.  - Pt reports he has not been consistent with the Certavite at home       Answered pt's questions and discussed foods that may be appropriate to try and tolerate. Also discussed importance of PRO.     CURRENT NUTRITION ORDERS  Diet: Regular  Intake/Tolerance: Pt currently barely tolerating apple sauce. He is motivated to keep trying PO intake.     LABS  Labs reviewed    MEDICATIONS  Medications reviewed  Cyanocobalamin 2500 mcg daily.  Certavite through J-tube  ANTHROPOMETRICS  Height: 175 cm (5' 8.898\")  Most Recent Weight: 60.4 kg (133 lb 2.5 oz)    IBW:72.7 kg  BMI: Normal BMI: current BMI 19.72  Weight History:   Wt Readings from Last 20 Encounters:   01/09/18 60.4 kg (133 lb 2.5 oz)   01/02/18 67.1 kg (147 lb 14.9 oz)   12/11/17 68.5 kg (151 lb)   12/06/17 69.3 kg (152 lb 12.5 oz)   11/27/17 70.5 kg (155 lb 8 oz)   11/22/17 71 kg (156 lb 9.6 oz)   11/15/17 72.5 kg (159 lb 14.4 oz)   11/01/17 74.4 kg (164 lb 0.4 oz)   09/25/17 73.2 kg (161 lb 6.4 oz)   08/25/17 71.6 kg (157 lb 14.4 oz)   08/08/17 72.1 kg (158 lb 14.4 oz)   07/29/17 69.3 kg (152 lb 12.5 oz)   07/24/17 69.4 kg (153 lb)   07/17/17 72 kg (158 lb 12.8 oz)   06/27/17 69.6 kg (153 lb 6.4 oz)   06/19/17 70.6 kg (155 " lb 9.6 oz)   06/14/17 71.4 kg (157 lb 6.4 oz)   Noted: pt has had a 17% weight loss over the past 3 months. More recent weight loss has been 9 % weight loss in the past week.     Dosing Weight: 60 kg (actual) - based on lowest weight since admission (60.4 kg on 1/9)    ASSESSED NUTRITION NEEDS  Estimated Energy Needs: 5215-6001+ kcals/day (30 - 35 kcals/kg )  Justification: Repletion  Estimated Protein Needs:  grams protein/day (1.5 - 2 grams of pro/kg)  Justification: Repletion  Estimated Fluid Needs:(1 mL/kcal) or Per provider pending fluid status  Justification: Maintenance    PHYSICAL FINDINGS  See malnutrition section below.     MALNUTRITION  % Intake: </= 50% for >/= 5 days (severe): Note that this is less that what the pt was eating after his surgery in November  % Weight Loss: > 7.5% in 3 months (severe)  Subcutaneous Fat Loss: Facial region: Mild  Muscle Loss: Temporal, Facial & jaw region, Scapular bone, Thoracic region (clavicle, acromium bone, deltoid, trapezius, pectoral) and Posterior calf:  Severe  Fluid Accumulation/Edema: None noted  Malnutrition Diagnosis: Severe malnutrition in the context of acute illness or injury. - currently at 3 month barbara--> will likely turn into chronic malnutrition.    NUTRITION DIAGNOSIS  Inadequate protein-energy intake related to decreased appetite and recently not tolerating home TF secondary to N/V, diarrhea and difficulty swallowing  as evidenced by pt report and 9% weight loss over the past week.        INTERVENTIONS  Implementation  Nutrition Education: Importance of protein, and discussed options for foods that he may tolerate    Enteral Nutrition - Recommendation      Goals  1. Pt to restart nutrition support within 1-2 days  2. Pt's weight to not < 60 kg      Monitoring/Evaluation  Progress toward goals will be monitored and evaluated per protocol.    Kadi Flores RD, LD  5C pgr: [id number]

## 2018-01-10 NOTE — PROGRESS NOTES
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  He is now undergoing adjuvant chemotherapy with EOF x 3 cycles. He has been admitted to the hospital after being seen in clinic for evaluation of diarrhea, nausea, inability to eat, and extreme weakness.    #Nausea  #Diarrhea:  #Weakness:  #Failure to thrive:  Daniel started cycle 2 EOF on 1/2/18. He previously had tolerated cycle 1 well without significant issues and had started supplementing oral intake by mouth without difficulty. Around 1/4/18, he suddenly developed large volume, watery diarrhea at night with occasional episodes during the day, in addition to nausea and profound weakness. Gradually over the last week or so he has had a sensation of a \"stricture\" with gagging and extremely dry mouth, which have also limited his PO intake. He has lost 14 lbs in the last week.   - Differential includes structural abnormality vs chemotherapy vs viral gastroenteritis  (given recent sick family members). He was given IV fluids, zofran and dexamethasone 12 mg in clinic and he improved, and tolerated food last night.   - Start 2mg dexamethasone daily for appetite and nausea  - Enteric pathogen and C.diff is NEG. Can have imodium PRN  - Esophogram XR completed this am to evaluate for stricture; No stricture seen, however \"Questionable focal mucosal abnormality of the jejunal lumen near the anastomosis. Although this may represent normal jejunal fold, perianastomotic jejunal ulcer is not entirely excluded\".  - Thoracic surgery consulted, awaiting final recs.   - GI consulted for EGD and possible biopsy of questionable focal mucosal lesion.   - Hold tube feedings " for now given possible EGD; Awaiting recs from nutrition- PO intake as tolerated until MN.  - Schedule zofran for now. Compazine and ativan PRN.  - Follow-up blood culture drawn in clinic (though low suspicion for infection at this time).  - PT, OT and Nutrition consults.    #Dry mouth:  - biotene QID  - nasal saline spray PRN    #Adenocarcinoma of the GE junction.  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 Esteban): esophagogastroscopy, diagnostic laparoscopy, laparotomy with total gastrectomy and abdominal lymphadenectomy (D2), LEFT thoracotomy with distal esophagectomy and intrathoracic Terrell-en-Y esophagojejunostomy, feeding jejunostomy, LEFT pharyngostomy tube placement, RIGHT tube thoracostomy, and flexible bronchoscopy c/b neck hematoma associated with pharyngostomy tube s/p (11/9/2017) oropharyngeal exploration and esophagojejunostomy.   - He continues on adjuvant chemotherapy with epirubicin, oxaliplatin, 5FU (21-day continuous infusion). He started cycle 5 on 1/2/2018. The 5FU pump has been disconnected as of 1/8/18 due to the above symptoms.  - Cycle 6 is tentatively planned for 1/23/18, and he has follow-up with Dr. Esteban on 3/7/18 with re-staging CT CAP on that same day.    OTHER    #FEN:   NS @ 100 ml/hr  Monitor electrolytes daily  Diet: Hold tube feedings for now given possible EGD; Awaiting recs from nutrition- PO intake as tolerated until MN.    #PPx  - Hold DVT prophylaxis due to possible invasive procedures    #Dispo: 2-4 days for management of acute issues (possible discharge 1/12).     Above plan discussed with staff physician, Dr. Jones.    Melody Reilly, [facility]  Hematology/Oncology  #[id number]      Melody Reilly    Interval History   Daniel reports he finally had an appetite last night after the steroids- he had tomato soup. He was able to eat some jello. He reports he has  abdominal cramping with eating, and with the notable diarrhea. He had 5 bouts of diarrhea overnight, no imodium trialed yet.  He does have nausea when antiemetics wear off. He extremely nervous given his significant weight loss ~14 lbs in 10 days. He knows he will need tube feeds to regain his weight. His last tube feeding was 1/8PM and he did the entire 1L tube feeding.  He reports his mouth is very dry, and that he when he eats everything feels like peanut butter in his mouth.  He has not tried anything for dry mouth. No headaches or vision changes. No fevers or chills. He reports he is nervious about this. He denies history of anxiety or depression- he notes he may have some PTSD from after his surgery. No SI/HI.     Physical Exam   Temp: 98.3  F (36.8  C) Temp src: Oral BP: 125/80 Pulse: 79 Heart Rate: 84 Resp: 16 SpO2: 100 % O2 Device: None (Room air)    Vitals:    01/09/18 1809 01/10/18 0901   Weight: 60.4 kg (133 lb 2.5 oz) 60.1 kg (132 lb 7.9 oz)     Vital Signs with Ranges  Temp:  [97.5  F (36.4  C)-98.4  F (36.9  C)] 98.3  F (36.8  C)  Pulse:  [78-86] 79  Heart Rate:  [78-84] 84  Resp:  [16-18] 16  BP: (110-125)/(71-84) 125/80  SpO2:  [97 %-100 %] 100 %  I/O last 3 completed shifts:  In: 625 [I.V.:625]  Out: 1000 [Stool:1000]     Constitutional: Pleasant male seen resting comfortably in bed in NAD. Alert and interactive.   HEENT: NC/AT, sclera clear, conjunctiva normal, OP with MMM, no mucositis  Respiratory: Non-labored breathing, CTAB. No crackles or wheezing.   Cardiovascular: RRR. No murmur or rub.   GI: Hyperactive BS in BLQ. Abdomen soft, non-distended, and NT. No palpable masses or HSM. No acute abdomen. Several scars from surgery; J-tube in place, no drainage or tenderness surrounding this.    Skin:  Warm, dry, well-perfused. No bruising, bleeding, rashes, or lesions on limited exam.  Musculoskeletal: Extremities grossly normal, non-tender, no edema.  Good strength and ROM.  Neurologic: A&O.  Answers questions appropriately. Moves all extremities spontaneously.  Neuropsychiatric: Mentation and affect appear normal/appropriate.    Medications     NaCl         dexamethasone  2 mg Oral Daily     artificial saliva  10 mL Swish & Spit 4x Daily     ondansetron  4 mg Intravenous Q6H     cyanocobalamin  2,500 mcg Sublingual Daily     fluconazole  200 mg Oral Daily     multivitamins with minerals  15 mL Per J Tube Daily   sodium chloride, oxyCODONE, prochlorperazine **OR** prochlorperazine, naloxone, melatonin, LORazepam **OR** LORazepam, acetaminophen, loperamide     Data   Results for orders placed or performed during the hospital encounter of 01/09/18 (from the past 24 hour(s))   CBC with platelets differential   Result Value Ref Range    WBC 1.7 (L) 4.0 - 11.0 10e9/L    RBC Count 4.77 4.4 - 5.9 10e12/L    Hemoglobin 11.9 (L) 13.3 - 17.7 g/dL    Hematocrit 36.7 (L) 40.0 - 53.0 %    MCV 77 (L) 78 - 100 fl    MCH 24.9 (L) 26.5 - 33.0 pg    MCHC 32.4 31.5 - 36.5 g/dL    RDW 15.8 (H) 10.0 - 15.0 %    Platelet Count 277 150 - 450 10e9/L    Diff Method Automated Method     % Neutrophils 47.6 %    % Lymphocytes 33.1 %    % Monocytes 17.5 %    % Eosinophils 0.0 %    % Basophils 0.0 %    % Immature Granulocytes 1.8 %    Nucleated RBCs 0 0 /100    Absolute Neutrophil 0.8 (L) 1.6 - 8.3 10e9/L    Absolute Lymphocytes 0.6 (L) 0.8 - 5.3 10e9/L    Absolute Monocytes 0.3 0.0 - 1.3 10e9/L    Absolute Eosinophils 0.0 0.0 - 0.7 10e9/L    Absolute Basophils 0.0 0.0 - 0.2 10e9/L    Abs Immature Granulocytes 0.0 0 - 0.4 10e9/L    Absolute Nucleated RBC 0.0    Basic metabolic panel   Result Value Ref Range    Sodium 136 133 - 144 mmol/L    Potassium 4.2 3.4 - 5.3 mmol/L    Chloride 109 94 - 109 mmol/L    Carbon Dioxide 19 (L) 20 - 32 mmol/L    Anion Gap 9 3 - 14 mmol/L    Glucose 137 (H) 70 - 99 mg/dL    Urea Nitrogen 22 7 - 30 mg/dL    Creatinine 0.70 0.66 - 1.25 mg/dL    GFR Estimate >90 >60 mL/min/1.7m2    GFR Estimate If Black  >90 >60 mL/min/1.7m2    Calcium 8.4 (L) 8.5 - 10.1 mg/dL   Magnesium   Result Value Ref Range    Magnesium 2.1 1.6 - 2.3 mg/dL   Phosphorus   Result Value Ref Range    Phosphorus 3.2 2.5 - 4.5 mg/dL   XR Esophagram    Narrative    Esophagram dated 1/10/2018    COMPARISON:    Modified swallow study and limited esophagram  11/13/2017, multiple prior CT, most recent 12/28/2017    HISTORY:    Evaluate stricture, history of GE junction adenocarcinoma,  difficulty with gagging and nausea    Additional history obtained from EHR and patient: Status post total  gastrectomy with distal esophagectomy and intrathoracic Terrell-en-Y  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 Patient describes sensation of food getting stuck in the mid  chest, both liquids and solids, with feeling of spasms. Denies emesis  or regurgitation of ingested food. Additionally, describes burning  sensation in the epigastrium which is not improved with antacids.    TECHNIQUE: Patient was given thin liquid barium to ingest. Patient was  evaluated with fluoroscopy, and multiple spot films were obtained.     Fluoro time: 3.7 minutes    FINDINGS:   The  radiograph shows right-sided PICC with tip projecting over  low SVC. Multiple surgical clips projecting over the lower mediastinum  and left upper quadrant. Heart size is within normal limits. Lungs are  clear.    Single contrast upper GI was performed. Examination of the esophagus  reveals adequate primary and secondary peristalsis. Post surgical  changes of distal esophagectomy and esophagojejunostomy. Contrast  material easily progresses across the esophagojejunal anastomosis into  the jejunum without evidence of focal stricture. Contrast was observed  to pool in a herniated/redundant portion of intrathoracic jejunum,  which empties secondary to cardiac pulsation. Questionable focal  mucosal abnormality just below the level of the the anastomosis along  the posterior  and right lateral aspect of the jejunum at the level of  the diaphragm curve, with incomplete clearance on subsequent swallows.  At the conclusion of the study, contrast material was visualized in  the distal small bowel which were normal in caliber, without evidence  of obstruction or stricture.      Impression    IMPRESSION:  1.  Postoperative changes of distal esophagectomy and  esophagojejunostomy without evidence of esophageal or anastomotic  stricture.  2.  Questionable focal mucosal abnormality of the jejunal lumen near  the anastomosis. Although this may represent normal jejunal fold,  perianastomotic jejunal ulcer is not entirely excluded especially in  the setting of epigastric pain and burning sensation as reported by  the patient.  3.  Normal transit of contrast through the visualize small bowel  without evidence obstruction.    I have personally reviewed the examination and initial interpretation  and I agree with the findings.    NARCISA FIELDS MD   Prealbumin   Result Value Ref Range    Prealbumin 13 (L) 15 - 45 mg/dL

## 2018-01-10 NOTE — PROGRESS NOTES
Patient admitted to: [address]   Admitted from: [facility]  Arrived by: Kiera   Reason for admission: Failure to thrive, n/v/d  Patient accompanied by: self  Belongings: all belongings in patients room  Teaching: Admission teaching done. Medication reviewed. Staff routine reviewed.

## 2018-01-10 NOTE — CONSULTS
THORACIC SURGERY H&P/CONSULT  January 10, 2018    Daniel Sandhu  MRN: [id number]  male  37 year old      CHIEF COMPLAINT:    I saw Mr. Sandhu at Heme/Onc request in consultation for the evaluation and treatment of FTT, limited PO intake, concern for stricture.      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 s/p neoadjuvant chemotherapy, s/p (11/3/2017, Yunior Esteban) esophagogastroscopy, diagnostic laparoscopy, laparotomy with total gastrectomy and abdominal lymphadenectomy (D2), LEFT thoracotomy with distal esophagectomy and intrathoracic Terrell-en-Y esophagojejunostomy, feeding jejunostomy, LEFT pharyngostomy tube placement, RIGHT tube thoracostomy, and flexible bronchoscopy c/b neck hematoma associated with pharyngostomy tube s/p (11/9/2017) oropharyngeal exploration and esophagojejunostomy who has been recovering well from surgery but now is admitted for failure to thrive, 14lb weight loss in 10 days, limited oral intake, and possible esophageal stricture. Hemodynamically stable, abdomen exam benign, able to tolerate full liquid diet. Esophagram negative for stricture. No further investigation of jejunal ulcer or esophageal spasm is necessary at this time. These symptoms are likely secondary to chemotherapy.     PLAN:    - Nutrition consult for tube feeding optimization    - Pre-albumin lab  - Recommend discussion with oncology regarding risks and benefits of chemotherapy   - Thank you for consulting thoracic surgery. Please feel free to contact us with further questions.     Patient seen, findings and plan discussed with fellow, Dr. Lopez.    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 s/p neoadjuvant chemotherapy, s/p (11/3/2017, Yunior Esteban) esophagogastroscopy, diagnostic laparoscopy, laparotomy with total  "gastrectomy and abdominal lymphadenectomy (D2), LEFT thoracotomy with distal esophagectomy and intrathoracic Terrell-en-Y esophagojejunostomy, feeding jejunostomy, LEFT pharyngostomy tube placement, RIGHT tube thoracostomy, and flexible bronchoscopy c/b neck hematoma associated with pharyngostomy tube s/p (11/9/2017) oropharyngeal exploration and esophagojejunostomy who has been recovering well from surgery but now is admitted for failure to thrive, 14lb weight loss in 10 days, limited oral intake, and possible esophageal stricture.     Patient reports he tolerated neoadjuvant chemotherapy well but has not been tolerating adjuvant chemotherapy. During his second cycle of chemotherapy, he experienced severe nausea, emesis that kept him up at night, dry mouth, gastric acid reflux. He reports saliva and yogurt feels very heavy and thick. He often experienced \"chest tightness\" that he correlates to esophageal spasms not associated with eating. He is only able to tolerate apple juice, ginger ale, and tea. Anti-emetic medications has not been helpful. His last J-tube feeding of 1000ml has been two days ago but reports unable to keep feeding down. Reports fatigue, SOB associated with surgery, abdominal cramping, diarrhea (c.diff negative). No CP, hematuria, hematochezia. Able to tolerate applesauce, soft, liquid foods.     His main question is to figure out why he is not absorbing nutrients.       REVIEW OF SYSTEMS: 10 pt ROS negative other than above.      Patient Active Problem List   Diagnosis     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     Thrush     Esophageal cancer (H)     S/P gastrectomy     Chemotherapy-induced nausea     Weakness         PAST MEDICAL HISTORY:  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 6/5/2017       SURGICAL HISTORY:  Past Surgical History:   Procedure Laterality Date     ESOPHAGOGASTRODUODENOSCOPY       ESOPHAGOSCOPY, GASTROSCOPY, " DUODENOSCOPY (EGD), COMBINED N/A 6/9/2017    Procedure: COMBINED ENDOSCOPIC ULTRASOUND, ESOPHAGOSCOPY, GASTROSCOPY, DUODENOSCOPY (EGD), FINE NEEDLE ASPIRATE/BIOPSY;  Upper Endoscopic Ultrasound, fine needle aspirate/biopsy;  Surgeon: Guru Mark Avila MD;  Location: [facility] OR     ESOPHAGOSCOPY, GASTROSCOPY, DUODENOSCOPY (EGD), COMBINED N/A 11/3/2017    Procedure: COMBINED ESOPHAGOSCOPY, GASTROSCOPY, DUODENOSCOPY (EGD);;  Surgeon: Yunior Esteban MD;  Location: [facility] OR     ESOPHAGOSCOPY, GASTROSCOPY, DUODENOSCOPY (EGD), COMBINED N/A 11/9/2017    Procedure: COMBINED ESOPHAGOSCOPY, GASTROSCOPY, DUODENOSCOPY (EGD);  Esophogastroduodenoscopy, take out pharangostomy tube;  Surgeon: Yunior Esteban MD;  Location: [facility] OR     GASTRECTOMY N/A 11/3/2017    Procedure: GASTRECTOMY;;  Surgeon: Yunior Esteban MD;  Location: [facility] OR     HAND SURGERY      childhood, torn tendon     INSERT PORT VASCULAR ACCESS Right 6/22/2017    Procedure: INSERT PORT VASCULAR ACCESS;  Single Lumen Chest Power Port;  Surgeon: Iván Driver PA-C;  Location: [facility] OR     LAPAROSCOPY DIAGNOSTIC (GENERAL) N/A 11/3/2017    Procedure: LAPAROSCOPY DIAGNOSTIC (GENERAL);  diagnostic laparoscopy, right chest tube, total gastrectomy with distal esophagectomy, intrathoracic eveline-y esophago-jejunostomy, feeding jejunostomy, pharyngostomy, esophagogastroduodenoscopy, flexible bronchoscopy;  Surgeon: Yunior Esteban MD;  Location: [facility] OR     LAPAROTOMY EXPLORATORY N/A 11/3/2017    Procedure: LAPAROTOMY EXPLORATORY;;  Surgeon: Yunior Esteban MD;  Location: [facility] OR     PHARYNGOSTOMY N/A 11/3/2017    Procedure: PHARYNGOSTOMY;;  Surgeon: Yunior Esteban MD;  Location: [facility] OR     THORACOTOMY Left 11/3/2017    Procedure: THORACOTOMY;;  Surgeon: Yunior Esteban MD;  Location: [facility] OR        SOCIAL HISTORY:   ETOH no  TOB no  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     Social  History     Social History     Marital status:      Spouse name: N/A     Number of children: 1     Years of education: N/A     Occupational History     musician and teacher       Social History Main Topics     Smoking status: Never Smoker     Smokeless tobacco: Never Used     Alcohol use Yes      Comment: 1 beer daily     Drug use: Yes     Special: Marijuana      Comment: occasional     Sexual activity: Yes     Partners: Female     Birth control/ protection: Natural Family Planning     Other Topics Concern     Not on file     Social History Narrative       FAMILY HISTORY:   Family History   Problem Relation Age of Onset     Other - See Comments Father      sepsis     CANCER Sister      breast cancer  31 yo 1/2 sister      CANCER Paternal Grandmother      breast       ALLERGIES:    No Known Allergies    MEDICATIONS:     Current Facility-Administered Medications on File Prior to Encounter:  [COMPLETED] heparin 100 UNIT/ML injection 5 mL   [COMPLETED] 0.9% sodium chloride BOLUS   INFUSION HYPERSENSITIVITY   [COMPLETED] dexamethasone (DECADRON) 12 mg in NaCl 0.9 % intermittent infusion   [COMPLETED] ondansetron (ZOFRAN) injection 8 mg   heparin lock flush 10 UNIT/ML injection 3 mL   [COMPLETED] sodium chloride (PF) 0.9% PF flush 10 mL   [DISCONTINUED] sodium chloride (PF) 0.9% PF flush 20 mL     Current Outpatient Prescriptions on File Prior to Encounter:  Ondansetron HCl (ZOFRAN PO) Take by mouth every 8 hours as needed for nausea or vomiting   Prochlorperazine Maleate (COMPAZINE PO) Take by mouth every 6 hours as needed for nausea   LORazepam (ATIVAN PO) Take by mouth every 4 hours as needed for anxiety   fluconazole (DIFLUCAN) 200 MG tablet Take 1 tablet (200 mg) by mouth daily   cyabnocobalamin (VITAMIN B-12) 2500 MCG sublingual tablet Place 2,500 mcg under the tongue daily   acetaminophen (TYLENOL) 32 mg/mL solution 30.45 mLs (975 mg) by Per J Tube route 3 times daily   OLANZapine (ZYPREXA) 10 MG tablet  "Take 1 tablet (10 mg) by mouth At Bedtime   sennosides (SENOKOT) 8.8 MG/5ML syrup 10 mLs by Per J Tube route 2 times daily   multivitamins with minerals (CERTAVITE/CEROVITE) LIQD liquid 15 mLs by Per J Tube route daily   oxyCODONE (ROXICODONE) 5 MG/5ML solution Take 5-10 mLs (5-10 mg) by mouth every 4 hours as needed for moderate to severe pain (Patient not taking: Reported on 1/2/2018)       PHYSICAL EXAMINATION:  /71  Pulse 79  Temp 98.3  F (36.8  C) (Oral)  Resp 16  Ht 1.75 m (5' 8.9\")  Wt 60.1 kg (132 lb 7.9 oz)  SpO2 97%  BMI 19.62 kg/m2  Body mass index is 19.62 kg/(m^2).    GEN: Awake, alert, interactive, NAD   NEURO: CNs grossly intact  HEENT: EOMI, neck is supple without JVD  CV: RRR  PULM: Resp non-labored on RA  ABD: Soft, non-distended, non-tender to palpation, no rebound or guarding.   Incisions: midabdomen, left thoracic well-healed, no sign of infection, c/d/i  EXT: w/o edema, wwp  SKIN: No jaundice.     LABS: Reviewed.   Complete Blood Count     Recent Labs  Lab 01/10/18  0331 01/09/18  1351 01/08/18  1715   WBC 1.7* 1.9* 1.9*   HGB 11.9* 13.8 13.0*    310 316     Basic Metabolic Panel    Recent Labs  Lab 01/10/18  0331 01/09/18  1351 01/08/18  1715    133 137   POTASSIUM 4.2 3.9 3.8   CHLORIDE 109 102 105   CO2 19* 20 22   BUN 22 24 22   CR 0.70 0.77 0.81   * 140* 109*     Liver Function Tests    Recent Labs  Lab 01/09/18  1351 01/08/18  1715   AST 21 18   ALT 21 23   ALKPHOS 55 57   BILITOTAL 0.4 0.2   ALBUMIN 2.9* 2.9*       IMAGING:  Results for orders placed or performed in visit on 12/28/17   CT Abdomen Pelvis w/o Contrast    Narrative    EXAMINATION: CT ABDOMEN PELVIS W/O CONTRAST, 12/28/2017 11:11 AM    TECHNIQUE:  Helical CT images from the lung bases through the  symphysis pubis were obtained without IV contrast.     COMPARISON: CT chest abdomen pelvis 12/1/2017    HISTORY: Jejunostomy tube, increased tenderness, assess position s/p  esophagectomy; Malignant " neoplasm of esophagus, unspecified location  (H)    FINDINGS:    Abdomen and pelvis: Percutaneous jejunostomy tube in place with tip  located in the jejunum in the left upper quadrant. There is contrast  opacification of small bowel which is not dilated. No opacified  retention balloon identified. No herniated bowel along the soft tissue  track of the jejunostomy tube. No fluid collection along the tract.    Post surgical changes of distal esophagectomy and total gastrectomy  with intrathoracic esophagojejunostomy pull-through. Multiple surgical  clips. Again noted jejunojejunostomy anastomosis in the left  midabdomen. No abnormally dilated small or large bowel. Evaluation is  limited due to absence of intravenous contrast injection. Liver,  gallbladder, spleen, pancreas, adrenal glands and kidneys are  unremarkable. No abdominal aortic aneurysm. Decompressed urinary  bladder. Prostate calcifications. Symmetric seminal vesicles. Pelvic  phleboliths, right greater than left. No free air. No ascites. No  abdominal, pelvic or inguinal lymphadenopathy.    Lung bases: Clear    Bones and soft tissues: Mild degenerative changes of the spine. No  aggressive osseous lesions. Soft tissues are within normal limits.      Impression    IMPRESSION:   1. Percutaneous jejunostomy tube is in good position. No evidence for  hernia, fluid collection or significant inflammation along the  subcutaneous tract.   2. Stable postsurgical changes of distal esophagectomy and total  gastrectomy with intrathoracic esophagojejunostomy pull-through. No  evidence for obstruction.    I have personally reviewed the examination and initial interpretation  and I agree with the findings.    MILAGROS LOUISE MD         CO-MORBIDITIES:   No diagnosis found.

## 2018-01-11 ENCOUNTER — ANESTHESIA (OUTPATIENT)
Dept: GASTROENTEROLOGY | Facility: CLINIC | Age: 38
DRG: 380 | End: 2018-01-11
Payer: COMMERCIAL

## 2018-01-11 ENCOUNTER — ANESTHESIA EVENT (OUTPATIENT)
Dept: GASTROENTEROLOGY | Facility: CLINIC | Age: 38
DRG: 380 | End: 2018-01-11
Payer: COMMERCIAL

## 2018-01-11 ENCOUNTER — APPOINTMENT (OUTPATIENT)
Dept: PHYSICAL THERAPY | Facility: CLINIC | Age: 38
DRG: 380 | End: 2018-01-11
Attending: INTERNAL MEDICINE
Payer: COMMERCIAL

## 2018-01-11 ENCOUNTER — APPOINTMENT (OUTPATIENT)
Dept: GENERAL RADIOLOGY | Facility: CLINIC | Age: 38
DRG: 380 | End: 2018-01-11
Attending: INTERNAL MEDICINE
Payer: COMMERCIAL

## 2018-01-11 LAB
ALBUMIN SERPL-MCNC: 2.1 G/DL (ref 3.4–5)
ALP SERPL-CCNC: 45 U/L (ref 40–150)
ALT SERPL W P-5'-P-CCNC: 16 U/L (ref 0–70)
ANION GAP SERPL CALCULATED.3IONS-SCNC: 10 MMOL/L (ref 3–14)
AST SERPL W P-5'-P-CCNC: 11 U/L (ref 0–45)
BILIRUB SERPL-MCNC: 0.3 MG/DL (ref 0.2–1.3)
BUN SERPL-MCNC: 20 MG/DL (ref 7–30)
CALCIUM SERPL-MCNC: 8.2 MG/DL (ref 8.5–10.1)
CHLORIDE SERPL-SCNC: 111 MMOL/L (ref 94–109)
CO2 SERPL-SCNC: 17 MMOL/L (ref 20–32)
CREAT SERPL-MCNC: 0.84 MG/DL (ref 0.66–1.25)
ERYTHROCYTE [DISTWIDTH] IN BLOOD BY AUTOMATED COUNT: 16.1 % (ref 10–15)
GFR SERPL CREATININE-BSD FRML MDRD: >90 ML/MIN/1.7M2
GLUCOSE SERPL-MCNC: 107 MG/DL (ref 70–99)
HCT VFR BLD AUTO: 34.7 % (ref 40–53)
HGB BLD-MCNC: 11.3 G/DL (ref 13.3–17.7)
MCH RBC QN AUTO: 25.1 PG (ref 26.5–33)
MCHC RBC AUTO-ENTMCNC: 32.6 G/DL (ref 31.5–36.5)
MCV RBC AUTO: 77 FL (ref 78–100)
PLATELET # BLD AUTO: 237 10E9/L (ref 150–450)
POTASSIUM SERPL-SCNC: 3.6 MMOL/L (ref 3.4–5.3)
PROT SERPL-MCNC: 5.5 G/DL (ref 6.8–8.8)
RBC # BLD AUTO: 4.5 10E12/L (ref 4.4–5.9)
SODIUM SERPL-SCNC: 138 MMOL/L (ref 133–144)
UPPER GI ENDOSCOPY: NORMAL
WBC # BLD AUTO: 2.3 10E9/L (ref 4–11)

## 2018-01-11 PROCEDURE — 97161 PT EVAL LOW COMPLEX 20 MIN: CPT | Mod: GP

## 2018-01-11 PROCEDURE — 25000132 ZZH RX MED GY IP 250 OP 250 PS 637: Performed by: INTERNAL MEDICINE

## 2018-01-11 PROCEDURE — 25000128 H RX IP 250 OP 636: Performed by: NURSE PRACTITIONER

## 2018-01-11 PROCEDURE — 25000128 H RX IP 250 OP 636: Performed by: INTERNAL MEDICINE

## 2018-01-11 PROCEDURE — 37000008 ZZH ANESTHESIA TECHNICAL FEE, 1ST 30 MIN: Performed by: INTERNAL MEDICINE

## 2018-01-11 PROCEDURE — 25000125 ZZHC RX 250: Performed by: NURSE ANESTHETIST, CERTIFIED REGISTERED

## 2018-01-11 PROCEDURE — 43239 EGD BIOPSY SINGLE/MULTIPLE: CPT | Performed by: INTERNAL MEDICINE

## 2018-01-11 PROCEDURE — 25000128 H RX IP 250 OP 636: Performed by: NURSE ANESTHETIST, CERTIFIED REGISTERED

## 2018-01-11 PROCEDURE — 88305 TISSUE EXAM BY PATHOLOGIST: CPT | Performed by: INTERNAL MEDICINE

## 2018-01-11 PROCEDURE — 97530 THERAPEUTIC ACTIVITIES: CPT | Mod: GP

## 2018-01-11 PROCEDURE — 97110 THERAPEUTIC EXERCISES: CPT | Mod: GP

## 2018-01-11 PROCEDURE — 20000002 ZZH R&B BMT INTERMEDIATE

## 2018-01-11 PROCEDURE — 80053 COMPREHEN METABOLIC PANEL: CPT | Performed by: NURSE PRACTITIONER

## 2018-01-11 PROCEDURE — 74018 RADEX ABDOMEN 1 VIEW: CPT

## 2018-01-11 PROCEDURE — 0DB48ZX EXCISION OF ESOPHAGOGASTRIC JUNCTION, VIA NATURAL OR ARTIFICIAL OPENING ENDOSCOPIC, DIAGNOSTIC: ICD-10-PCS | Performed by: INTERNAL MEDICINE

## 2018-01-11 PROCEDURE — 85027 COMPLETE CBC AUTOMATED: CPT | Performed by: NURSE PRACTITIONER

## 2018-01-11 PROCEDURE — 25000128 H RX IP 250 OP 636: Performed by: ANESTHESIOLOGY

## 2018-01-11 PROCEDURE — 40000193 ZZH STATISTIC PT WARD VISIT

## 2018-01-11 PROCEDURE — 25000131 ZZH RX MED GY IP 250 OP 636 PS 637: Performed by: NURSE PRACTITIONER

## 2018-01-11 PROCEDURE — 27210437 ZZH NUTRITION PRODUCT SEMIELEM INTERMED LITER

## 2018-01-11 PROCEDURE — 0DHA3UZ INSERTION OF FEEDING DEVICE INTO JEJUNUM, PERCUTANEOUS APPROACH: ICD-10-PCS | Performed by: INTERNAL MEDICINE

## 2018-01-11 PROCEDURE — 37000009 ZZH ANESTHESIA TECHNICAL FEE, EACH ADDTL 15 MIN: Performed by: INTERNAL MEDICINE

## 2018-01-11 RX ORDER — ONDANSETRON 4 MG/1
4 TABLET, ORALLY DISINTEGRATING ORAL EVERY 30 MIN PRN
Status: DISCONTINUED | OUTPATIENT
Start: 2018-01-11 | End: 2018-01-11

## 2018-01-11 RX ORDER — SODIUM CHLORIDE, SODIUM LACTATE, POTASSIUM CHLORIDE, CALCIUM CHLORIDE 600; 310; 30; 20 MG/100ML; MG/100ML; MG/100ML; MG/100ML
INJECTION, SOLUTION INTRAVENOUS CONTINUOUS
Status: DISCONTINUED | OUTPATIENT
Start: 2018-01-11 | End: 2018-01-11

## 2018-01-11 RX ORDER — ONDANSETRON 2 MG/ML
4 INJECTION INTRAMUSCULAR; INTRAVENOUS EVERY 30 MIN PRN
Status: DISCONTINUED | OUTPATIENT
Start: 2018-01-11 | End: 2018-01-11

## 2018-01-11 RX ORDER — POTASSIUM CL/LIDO/0.9 % NACL 10MEQ/0.1L
10 INTRAVENOUS SOLUTION, PIGGYBACK (ML) INTRAVENOUS
Status: DISCONTINUED | OUTPATIENT
Start: 2018-01-11 | End: 2018-01-13 | Stop reason: HOSPADM

## 2018-01-11 RX ORDER — FENTANYL CITRATE 50 UG/ML
INJECTION, SOLUTION INTRAMUSCULAR; INTRAVENOUS PRN
Status: DISCONTINUED | OUTPATIENT
Start: 2018-01-11 | End: 2018-01-11

## 2018-01-11 RX ORDER — GLYCOPYRROLATE 0.2 MG/ML
INJECTION, SOLUTION INTRAMUSCULAR; INTRAVENOUS PRN
Status: DISCONTINUED | OUTPATIENT
Start: 2018-01-11 | End: 2018-01-11

## 2018-01-11 RX ORDER — POTASSIUM CHLORIDE 750 MG/1
20-40 TABLET, EXTENDED RELEASE ORAL
Status: DISCONTINUED | OUTPATIENT
Start: 2018-01-11 | End: 2018-01-13 | Stop reason: HOSPADM

## 2018-01-11 RX ORDER — PROPOFOL 10 MG/ML
INJECTION, EMULSION INTRAVENOUS CONTINUOUS PRN
Status: DISCONTINUED | OUTPATIENT
Start: 2018-01-11 | End: 2018-01-11

## 2018-01-11 RX ORDER — SODIUM CHLORIDE 9 MG/ML
INJECTION, SOLUTION INTRAVENOUS CONTINUOUS PRN
Status: DISCONTINUED | OUTPATIENT
Start: 2018-01-11 | End: 2018-01-11

## 2018-01-11 RX ORDER — POTASSIUM CHLORIDE 29.8 MG/ML
20 INJECTION INTRAVENOUS
Status: DISCONTINUED | OUTPATIENT
Start: 2018-01-11 | End: 2018-01-13 | Stop reason: HOSPADM

## 2018-01-11 RX ORDER — POTASSIUM CHLORIDE 1.5 G/1.58G
20-40 POWDER, FOR SOLUTION ORAL
Status: DISCONTINUED | OUTPATIENT
Start: 2018-01-11 | End: 2018-01-13 | Stop reason: HOSPADM

## 2018-01-11 RX ORDER — MEPERIDINE HYDROCHLORIDE 25 MG/ML
12.5 INJECTION INTRAMUSCULAR; INTRAVENOUS; SUBCUTANEOUS
Status: DISCONTINUED | OUTPATIENT
Start: 2018-01-11 | End: 2018-01-13 | Stop reason: HOSPADM

## 2018-01-11 RX ORDER — NALOXONE HYDROCHLORIDE 0.4 MG/ML
.1-.4 INJECTION, SOLUTION INTRAMUSCULAR; INTRAVENOUS; SUBCUTANEOUS
Status: ACTIVE | OUTPATIENT
Start: 2018-01-11 | End: 2018-01-12

## 2018-01-11 RX ORDER — POTASSIUM CHLORIDE 7.45 MG/ML
10 INJECTION INTRAVENOUS
Status: DISCONTINUED | OUTPATIENT
Start: 2018-01-11 | End: 2018-01-13 | Stop reason: HOSPADM

## 2018-01-11 RX ORDER — MAGNESIUM SULFATE HEPTAHYDRATE 40 MG/ML
4 INJECTION, SOLUTION INTRAVENOUS EVERY 4 HOURS PRN
Status: DISCONTINUED | OUTPATIENT
Start: 2018-01-11 | End: 2018-01-13 | Stop reason: HOSPADM

## 2018-01-11 RX ADMIN — MIDAZOLAM 2 MG: 1 INJECTION INTRAMUSCULAR; INTRAVENOUS at 11:35

## 2018-01-11 RX ADMIN — FLUCONAZOLE 200 MG: 200 TABLET ORAL at 07:58

## 2018-01-11 RX ADMIN — Medication 10 ML: at 16:41

## 2018-01-11 RX ADMIN — BENZOCAINE 1 EACH: 220 SPRAY, METERED PERIODONTAL at 11:43

## 2018-01-11 RX ADMIN — ONDANSETRON 4 MG: 2 INJECTION INTRAMUSCULAR; INTRAVENOUS at 00:18

## 2018-01-11 RX ADMIN — Medication 10 ML: at 14:08

## 2018-01-11 RX ADMIN — PROCHLORPERAZINE EDISYLATE 5 MG: 5 INJECTION INTRAMUSCULAR; INTRAVENOUS at 06:36

## 2018-01-11 RX ADMIN — ONDANSETRON 4 MG: 2 INJECTION INTRAMUSCULAR; INTRAVENOUS at 18:07

## 2018-01-11 RX ADMIN — DIATRIZOATE MEGLUMINE AND DIATRIZOATE SODIUM 30 ML: 660; 100 SOLUTION ORAL; RECTAL at 09:53

## 2018-01-11 RX ADMIN — PROCHLORPERAZINE EDISYLATE 5 MG: 5 INJECTION INTRAMUSCULAR; INTRAVENOUS at 00:18

## 2018-01-11 RX ADMIN — Medication 10 ML: at 19:58

## 2018-01-11 RX ADMIN — PROCHLORPERAZINE EDISYLATE 10 MG: 5 INJECTION INTRAMUSCULAR; INTRAVENOUS at 18:08

## 2018-01-11 RX ADMIN — DEXAMETHASONE 2 MG: 2 TABLET ORAL at 07:58

## 2018-01-11 RX ADMIN — PROPOFOL 100 MCG/KG/MIN: 10 INJECTION, EMULSION INTRAVENOUS at 11:43

## 2018-01-11 RX ADMIN — FENTANYL CITRATE 25 MCG: 50 INJECTION, SOLUTION INTRAMUSCULAR; INTRAVENOUS at 11:40

## 2018-01-11 RX ADMIN — LOPERAMIDE HYDROCHLORIDE 2 MG: 2 CAPSULE ORAL at 16:40

## 2018-01-11 RX ADMIN — ONDANSETRON 4 MG: 2 INJECTION INTRAMUSCULAR; INTRAVENOUS at 06:36

## 2018-01-11 RX ADMIN — LOPERAMIDE HYDROCHLORIDE 2 MG: 2 CAPSULE ORAL at 14:08

## 2018-01-11 RX ADMIN — FENTANYL CITRATE 25 MCG: 50 INJECTION, SOLUTION INTRAMUSCULAR; INTRAVENOUS at 11:43

## 2018-01-11 RX ADMIN — GLYCOPYRROLATE 0.2 MG: 0.2 INJECTION, SOLUTION INTRAMUSCULAR; INTRAVENOUS at 11:43

## 2018-01-11 RX ADMIN — LOPERAMIDE HYDROCHLORIDE 2 MG: 2 CAPSULE ORAL at 20:04

## 2018-01-11 RX ADMIN — ONDANSETRON 4 MG: 2 INJECTION INTRAMUSCULAR; INTRAVENOUS at 11:51

## 2018-01-11 RX ADMIN — Medication 10 ML: at 07:57

## 2018-01-11 RX ADMIN — SODIUM CHLORIDE: 9 INJECTION, SOLUTION INTRAVENOUS at 14:08

## 2018-01-11 RX ADMIN — SODIUM CHLORIDE: 9 INJECTION, SOLUTION INTRAVENOUS at 11:30

## 2018-01-11 RX ADMIN — BENZOCAINE 1 EACH: 220 SPRAY, METERED PERIODONTAL at 11:35

## 2018-01-11 NOTE — ANESTHESIA PREPROCEDURE EVALUATION
Anesthesia Evaluation         Anesthesia Plan      History & Physical Review  History and physical reviewed and following examination; no interval change.    ASA Status:  3 .    NPO Status:  > 6 hours    Plan for MAC with Intravenous induction. Maintenance will be TIVA.  Reason for MAC:  Difficulty with conscious sedation (QS)         Postoperative Care      Consents  Anesthetic plan, risks, benefits and alternatives discussed with:  Patient..          ANESTHESIA PREOP EVALUATION    NPO Status: Room Service  NPO per Anesthesia Guidelines for Procedure/Surgery Except for: Meds Yes, NPO    Procedure: Procedure(s):  COMBINED ESOPHAGOSCOPY, GASTROSCOPY, DUODENOSCOPY (EGD) - Wound Class: II-Clean Contaminated    HPI: Presents for EGD to evaluate for possible GE anastomotic stricture or ulcer.      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       PAST SURGICAL HISTORY:   Past Surgical History:   Procedure Laterality Date     ESOPHAGOGASTRODUODENOSCOPY       ESOPHAGOSCOPY, GASTROSCOPY, DUODENOSCOPY (EGD), COMBINED N/A 6/9/2017    Procedure: COMBINED ENDOSCOPIC ULTRASOUND, ESOPHAGOSCOPY, GASTROSCOPY, DUODENOSCOPY (EGD), FINE NEEDLE ASPIRATE/BIOPSY;  Upper Endoscopic Ultrasound, fine needle aspirate/biopsy;  Surgeon: Guru Mark Avila MD;  Location: [facility] OR     ESOPHAGOSCOPY, GASTROSCOPY, DUODENOSCOPY (EGD), COMBINED N/A 11/3/2017    Procedure: COMBINED ESOPHAGOSCOPY, GASTROSCOPY, DUODENOSCOPY (EGD);;  Surgeon: Yunior Esteban MD;  Location: [facility] OR     ESOPHAGOSCOPY, GASTROSCOPY, DUODENOSCOPY (EGD), COMBINED N/A 11/9/2017    Procedure: COMBINED ESOPHAGOSCOPY, GASTROSCOPY, DUODENOSCOPY (EGD);  Esophogastroduodenoscopy, take out pharangostomy tube;  Surgeon: Yunior Esteban MD;  Location: [facility] OR     GASTRECTOMY N/A 11/3/2017    Procedure: GASTRECTOMY;;  Surgeon: Yunior Esteban MD;   Location: [facility] OR     HAND SURGERY      childhood, torn tendon     INSERT PORT VASCULAR ACCESS Right 2017    Procedure: INSERT PORT VASCULAR ACCESS;  Single Lumen Chest Power Port;  Surgeon: Iván Driver PA-C;  Location: [facility]     LAPAROSCOPY DIAGNOSTIC (GENERAL) N/A 11/3/2017    Procedure: LAPAROSCOPY DIAGNOSTIC (GENERAL);  diagnostic laparoscopy, right chest tube, total gastrectomy with distal esophagectomy, intrathoracic eveline-y esophago-jejunostomy, feeding jejunostomy, pharyngostomy, esophagogastroduodenoscopy, flexible bronchoscopy;  Surgeon: Yunior Esteban MD;  Location: [facility]     LAPAROTOMY EXPLORATORY N/A 11/3/2017    Procedure: LAPAROTOMY EXPLORATORY;;  Surgeon: Yunior Esteban MD;  Location: [facility]     PHARYNGOSTOMY N/A 11/3/2017    Procedure: PHARYNGOSTOMY;;  Surgeon: Yunior Esteban MD;  Location: [facility]     THORACOTOMY Left 11/3/2017    Procedure: THORACOTOMY;;  Surgeon: Yunior Esteban MD;  Location: [facility]       FAMILY HISTORY:   Family History   Problem Relation Age of Onset     Other - See Comments Father      sepsis     CANCER Sister      breast cancer  31 yo 1/2 sister      CANCER Paternal Grandmother      breast         Past Anes Hx: No personal or family h/o anesthesia problems    Soc Hx:   Tobacco:   EtOH:     Allergies: No Known Allergies    Meds:   Prescriptions Prior to Admission   Medication Sig Dispense Refill Last Dose     Ondansetron HCl (ZOFRAN PO) Take by mouth every 8 hours as needed for nausea or vomiting   Taking     Prochlorperazine Maleate (COMPAZINE PO) Take by mouth every 6 hours as needed for nausea   Taking     LORazepam (ATIVAN PO) Take by mouth every 4 hours as needed for anxiety   Taking     fluconazole (DIFLUCAN) 200 MG tablet Take 1 tablet (200 mg) by mouth daily 30 tablet 1 2018 at Unknown time     cyabnocobalamin (VITAMIN B-12) 2500 MCG sublingual tablet Place 2,500 mcg under the tongue daily 30 tablet 1 Taking      acetaminophen (TYLENOL) 32 mg/mL solution 30.45 mLs (975 mg) by Per J Tube route 3 times daily 400 mL 0 Taking     OLANZapine (ZYPREXA) 10 MG tablet Take 1 tablet (10 mg) by mouth At Bedtime 30 tablet 1 Unknown at Unknown time     sennosides (SENOKOT) 8.8 MG/5ML syrup 10 mLs by Per J Tube route 2 times daily 400 mL 0 Unknown at Unknown time     multivitamins with minerals (CERTAVITE/CEROVITE) LIQD liquid 15 mLs by Per J Tube route daily 450 mL 0 Unknown at Unknown time     oxyCODONE (ROXICODONE) 5 MG/5ML solution Take 5-10 mLs (5-10 mg) by mouth every 4 hours as needed for moderate to severe pain (Patient not taking: Reported on 1/2/2018) 300 mL 0 Unknown at Unknown time       No current outpatient prescriptions on file.       Physical Exam:  VS: T 98.1, P 87, /79, R 16, SpO2 99% Weight   Wt Readings from Last 2 Encounters:   01/11/18 59.8 kg (131 lb 13.4 oz)   01/09/18 60.6 kg (133 lb 8 oz)         Airway: MP 2, TM>3FB, Neck full ROM  Dentition: no loose teeth  Heart: RRR  Lungs: CTAB      BMP:  Lab Results   Component Value Date     01/11/2018      Lab Results   Component Value Date    POTASSIUM 3.6 01/11/2018     Lab Results   Component Value Date    CHLORIDE 111 01/11/2018     Lab Results   Component Value Date    YOBANY 8.2 01/11/2018     Lab Results   Component Value Date    CO2 17 01/11/2018     Lab Results   Component Value Date    BUN 20 01/11/2018     Lab Results   Component Value Date    CR 0.84 01/11/2018     Lab Results   Component Value Date     01/11/2018        CBC:  Lab Results   Component Value Date    WBC 2.3 01/11/2018     Lab Results   Component Value Date    HGB 11.3 01/11/2018     Lab Results   Component Value Date    HCT 34.7 01/11/2018     Lab Results   Component Value Date     01/11/2018        Coags/Type and Screen  Lab Results   Component Value Date    INR 1.14 07/29/2017     No results found for: PT  Type and Screen:      Assessment/Plan:  - ASA 3  - MAC with  standard ASA monitors, IV induction, balanced anesthetic  - PIV  - Antibiotics per surgery  - PONV prophylaxis  - Blood products available, possible administration discussed with patient    Magdaleno James MD    1/11/2018  10:53 AM

## 2018-01-11 NOTE — PROGRESS NOTES
PEG tube  Called in this morning by the patient. Assessing the site, tube was already out. Some drainage, dressing applied, PA notified.

## 2018-01-11 NOTE — PROGRESS NOTES
01/11/18 0955   Quick Adds   Type of Visit Initial PT Evaluation   Living Environment   Lives With child(janes), dependent;spouse   Living Arrangements house   Home Accessibility bed not on first floor;tub/shower is not walk in;stairs (1 railing present)   Number of Stairs to Enter Home 5   Number of Stairs Within Home 26   Stair Railings at Home inside, present on left side;outside, present on right side   Transportation Available family or friend will provide   Self-Care   Dominant Hand right   Usual Activity Tolerance excellent   Current Activity Tolerance poor   Regular Exercise yes   Activity/Exercise Type walking;strength training   Exercise Amount/Frequency 5-7 times/wk   Equipment Currently Used at Home none   Functional Level Prior   Ambulation 0-->independent   Transferring 0-->independent   Toileting 0-->independent   Bathing 0-->independent   Dressing 0-->independent   Eating 0-->independent   Communication 0-->understands/communicates without difficulty   Swallowing 0-->swallows foods/liquids without difficulty   Cognition 0 - no cognition issues reported   Fall history within last six months no   Which of the above functional risks had a recent onset or change? ambulation   General Information   Onset of Illness/Injury or Date of Surgery - Date 01/09/18   Referring Physician Peyton Moody MD   Patient/Family Goals Statement Return home, regain strength   Pertinent History of Current Problem (include personal factors and/or comorbidities that impact the POC) Daniel Sandhu is a 37-year-old male with history of adenocarcinoma of the GE junction, s/p 3 cycles of neoadjuvant EOX followed by resection on 11/3/2017. He is now undergoing adjuvant chemotherapy with EOF x 3 cycles. He has been admitted to the hospital after being seen in clinic for evaluation of diarrhea, nausea, inability to eat, and extreme weakness.   Precautions/Limitations fall precautions   Cognitive Status Examination   Orientation  "orientation to person, place and time   Level of Consciousness alert   Follows Commands and Answers Questions 100% of the time   Personal Safety and Judgment intact   Memory intact   Pain Assessment   Patient Currently in Pain No   Integumentary/Edema   Integumentary/Edema no deficits were identifed   Posture    Posture Not impaired   Range of Motion (ROM)   ROM Comment LE ROM normal bilaterally. He self-limits left hip flexion to ~ 90 degrees due to abdominal ports.   Strength   Strength Comments LE strength is 5/5   Bed Mobility   Bed Mobility Comments Independent   Transfer Skills   Transfer Comments Independent   Gait   Gait Comments Ambulates without assistive device with normal gait pattern and balance. Decreased endurance limits ambulation to ~ 100'.   Balance   Balance Comments Normal   General Therapy Interventions   Planned Therapy Interventions strengthening;home program guidelines;progressive activity/exercise   Clinical Impression   Criteria for Skilled Therapeutic Intervention yes, treatment indicated   PT Diagnosis Decreased functional endurance   Influenced by the following impairments Medical condition, diarrhea, not eating   Functional limitations due to impairments Ambulation   Clinical Presentation Evolving/Changing   Clinical Presentation Rationale Anticipate pt will improve quickly as medical condition improves   Clinical Decision Making (Complexity) Low complexity   Therapy Frequency` daily   Predicted Duration of Therapy Intervention (days/wks) 1 week   Anticipated Discharge Disposition Home   Risk & Benefits of therapy have been explained Yes   Patient, Family & other staff in agreement with plan of care Yes   [facility] [employer] TM \"6 Clicks\"   2016, Trustees of [facility], under license to [employer].  All rights reserved.   6 Clicks Short Forms Basic Mobility Inpatient Short Form   [facility] AM-PAC  \"6 Clicks\" V.2 Basic Mobility Inpatient Short Form   1. Turning from " your back to your side while in a flat bed without using bedrails? 4 - None   2. Moving from lying on your back to sitting on the side of a flat bed without using bedrails? 4 - None   3. Moving to and from a bed to a chair (including a wheelchair)? 4 - None   4. Standing up from a chair using your arms (e.g., wheelchair, or bedside chair)? 4 - None   5. To walk in hospital room? 4 - None   6. Climbing 3-5 steps with a railing? 3 - A Little   Basic Mobility Raw Score (Score out of 24.Lower scores equate to lower levels of function) 23   Total Evaluation Time   Total Evaluation Time (Minutes) 31

## 2018-01-11 NOTE — PROCEDURES
Gastroenterology Endoscopy Suite Brief Operative Note    Procedure:  Upper endoscopy   Post-operative diagnosis:  s/p Terrell-en-Y esophagojejunostomy with total gastrectomy   Staff Physician:  Dr. Alessandro Mcgregor   Fellow/Assistant(s):  Dr. Merlene Cook    Specimens:  Please see final procedure note for further details.   Findings:  Terrell-en-Y esophagojejunostomy with total gastrectomy anatomy was found. Multiple small erosions were found in the Terrell limb of the jejunum. There was one small ulcer (<1 cm) near the anastomosis. There was a small area of mucosal change near the anastomosis that was non-malignant appearing; this was biopsied for histology.   Complications:  None.   Condition:  Stable   Recommendations  Diet:  Return to previous diet  PPI:  N/A  Anti-coagulants/platelets:  N/A  Octreotide:  N/A  Discharge Planning:   Per primary service; no need for GI follow-up

## 2018-01-11 NOTE — PROGRESS NOTES
THORACIC & FOREGUT SURGERY    J tube replaced with 12F HILDA G tube at bedside, placement confirmed with sly khalil to use tube for feedings    Dung Souza PA-C  Thoracic Surgery

## 2018-01-11 NOTE — PROGRESS NOTES
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  He is now undergoing adjuvant chemotherapy with EOF x 3 cycles. He has been admitted to the hospital after being seen in clinic for evaluation of diarrhea, nausea, inability to eat, and extreme weakness.    #Nausea  #Diarrhea:  #Weakness:  #Failure to thrive:  Daniel started cycle 2 EOF on 1/2/18. He previously had tolerated cycle 1 well without significant issues and had started supplementing oral intake by mouth without difficulty. Around 1/4/18, he suddenly developed large volume, watery diarrhea at night with occasional episodes during the day, in addition to nausea and profound weakness. Gradually over the last week or so he has had a sensation of a \"stricture\" with gagging and extremely dry mouth, which have also limited his PO intake. He has lost 14 lbs in the last week.   - Differential includes structural abnormality vs chemotherapy vs viral gastroenteritis  (given recent sick family members). He was given IV fluids, zofran and dexamethasone 12 mg in clinic and he improved, and tolerated food last night.   - Start 2mg dexamethasone daily for appetite and nausea  - Enteric pathogen and C.diff is NEG. Can have imodium PRN  - Esophogram XR completed this am to evaluate for stricture; No stricture seen, however \"Questionable focal mucosal abnormality of the jejunal lumen near the anastomosis. Although this may represent normal jejunal fold, perianastomotic jejunal ulcer is not entirely excluded\".  - Thoracic surgery consulted, awaiting final recs.   - GI consulted; EDG done 1/11 reassuring for only ulcers, no recurrence. One area biopsied.    - J-tube " accidentally removed last night. Thoracic replaced it immediately this AM, OK for use.   - Restart tube feedings per below.    - Schedule zofran for now. Compazine and ativan PRN.  - Blood culture drawn in clinic NGTD  - PT, OT consults.    #Severe Malnutrition in the context of acute illness   -\% Intake: </= 50% for >/= 5 days (severe): Present; Note that this is less that what the pt was eating after his surgery in November  % Weight Loss: > 7.5% in 3 months (severe) - 9% weight loss in past week.   Subcutaneous Fat Loss: Facial region: Mild  Muscle Loss: Temporal, Facial & jaw region, Scapular bone, Thoracic region (clavicle, acromium bone, deltoid, trapezius, pectoral) and Posterior calf:  Severe  - Nutrition consulted and appreciate nutritional and TF recommendations.  - J-tube accidentally removed last night. Thoracic replaced it immediately this AM, OK for use.   - Regular diet as tolerated  - Restart tube feedings, titrate up to tolerability.     #Dry mouth:  - biotene QID  - nasal saline spray PRN    #Adenocarcinoma of the GE junction.  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 Esteban): esophagogastroscopy, diagnostic laparoscopy, laparotomy with total gastrectomy and abdominal lymphadenectomy (D2), LEFT thoracotomy with distal esophagectomy and intrathoracic Terrell-en-Y esophagojejunostomy, feeding jejunostomy, LEFT pharyngostomy tube placement, RIGHT tube thoracostomy, and flexible bronchoscopy c/b neck hematoma associated with pharyngostomy tube s/p (11/9/2017) oropharyngeal exploration and esophagojejunostomy.   - He continues on adjuvant chemotherapy with epirubicin, oxaliplatin, 5FU (21-day continuous infusion). He started cycle 5 on 1/2/2018. The 5FU pump has been disconnected as of 1/8/18 due to the above symptoms.  - Cycle 6 is tentatively planned for 1/23/18, and he has follow-up  with Dr. Esteban on 3/7/18 with re-staging CT CAP on that same day.    OTHER    #FEN:   NS @ 100 ml/hr  Monitor electrolytes daily  Diet: per above    #PPx  - Hold DVT prophylaxis due to possible invasive procedures    #Dispo: 2-4 days for management of acute issues (possible discharge 1/12).     Above plan discussed with staff physician, Dr. Jones.    Melody Reilly, [facility]  Hematology/Oncology  #[phone]      Melody Reilly    Interval History   Daniel reports he has an appetite and wants to eat, but is NPO for EGD. He denies any pain right now. Nausea well controlled with scheduled meds, but has not eaten. He is upset his J-tube accidentally got pulled out last night, and is glad thoracic was able to replace it this AM.  He denies headaches. Dry mouth worse as NPO.  He denies CP, SOB, cough. He has occasional abdominal cramping. Loose liquid diarrhea today.      Physical Exam   Temp: 98.7  F (37.1  C) Temp src: Oral BP: 110/72 Pulse: 76 Heart Rate: 76 Resp: 16 SpO2: 98 % O2 Device: None (Room air)    Vitals:    01/09/18 1809 01/10/18 0901   Weight: 60.4 kg (133 lb 2.5 oz) 60.1 kg (132 lb 7.9 oz)     Vital Signs with Ranges  Temp:  [98.2  F (36.8  C)-98.7  F (37.1  C)] 98.7  F (37.1  C)  Pulse:  [76-99] 76  Heart Rate:  [76-96] 76  Resp:  [15-16] 16  BP: (110-125)/(71-83) 110/72  SpO2:  [97 %-100 %] 98 %  I/O last 3 completed shifts:  In: 1258 [I.V.:1200; NG/GT:58]  Out: 3500 [Urine:200; Stool:3300]     Constitutional: Pleasant male seen resting comfortably in bed in NAD. Alert and interactive.   HEENT: NC/AT, sclera clear, conjunctiva normal, OP with MMM, no mucositis  Respiratory: Non-labored breathing, CTAB. No crackles or wheezing.   Cardiovascular: RRR. No murmur or rub.   GI: Hyperactive BS in BLQ. Abdomen soft, non-distended, and NT. No palpable masses or HSM. No acute abdomen. Several scars from surgery; J-tube in place, no drainage or tenderness surrounding this.    Skin:  Warm, dry, well-perfused. No  bruising, bleeding, rashes, or lesions on limited exam.  Musculoskeletal: Extremities grossly normal, non-tender, no edema.  Good strength and ROM.  Neurologic: A&O. Answers questions appropriately. Moves all extremities spontaneously.  Neuropsychiatric: Mentation and affect appear normal/appropriate.    Medications     NaCl 100 mL/hr at 01/11/18 0636       dexamethasone  2 mg Oral Daily     artificial saliva  10 mL Swish & Spit 4x Daily     ondansetron  4 mg Intravenous Q6H     cyanocobalamin  2,500 mcg Sublingual Daily     fluconazole  200 mg Oral Daily     multivitamins with minerals  15 mL Per J Tube Daily   sodium chloride, oxyCODONE, prochlorperazine **OR** prochlorperazine, naloxone, melatonin, LORazepam **OR** LORazepam, acetaminophen, loperamide     Data   Results for orders placed or performed during the hospital encounter of 01/09/18 (from the past 24 hour(s))   XR Esophagram    Narrative    Esophagram dated 1/10/2018    COMPARISON:    Modified swallow study and limited esophagram  11/13/2017, multiple prior CT, most recent 12/28/2017    HISTORY:    Evaluate stricture, history of GE junction adenocarcinoma,  difficulty with gagging and nausea    Additional history obtained from EHR and patient: Status post total  gastrectomy with distal esophagectomy and intrathoracic Terrell-en-Y  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 Patient describes sensation of food getting stuck in the mid  chest, both liquids and solids, with feeling of spasms. Denies emesis  or regurgitation of ingested food. Additionally, describes burning  sensation in the epigastrium which is not improved with antacids.    TECHNIQUE: Patient was given thin liquid barium to ingest. Patient was  evaluated with fluoroscopy, and multiple spot films were obtained.     Fluoro time: 3.7 minutes    FINDINGS:   The  radiograph shows right-sided PICC with tip projecting over  low SVC. Multiple surgical clips  projecting over the lower mediastinum  and left upper quadrant. Heart size is within normal limits. Lungs are  clear.    Single contrast upper GI was performed. Examination of the esophagus  reveals adequate primary and secondary peristalsis. Post surgical  changes of distal esophagectomy and esophagojejunostomy. Contrast  material easily progresses across the esophagojejunal anastomosis into  the jejunum without evidence of focal stricture. Contrast was observed  to pool in a herniated/redundant portion of intrathoracic jejunum,  which empties secondary to cardiac pulsation. Questionable focal  mucosal abnormality just below the level of the the anastomosis along  the posterior and right lateral aspect of the jejunum at the level of  the diaphragm curve, with incomplete clearance on subsequent swallows.  At the conclusion of the study, contrast material was visualized in  the distal small bowel which were normal in caliber, without evidence  of obstruction or stricture.      Impression    IMPRESSION:  1.  Postoperative changes of distal esophagectomy and  esophagojejunostomy without evidence of esophageal or anastomotic  stricture.  2.  Questionable focal mucosal abnormality of the jejunal lumen near  the anastomosis. Although this may represent normal jejunal fold,  perianastomotic jejunal ulcer is not entirely excluded especially in  the setting of epigastric pain and burning sensation as reported by  the patient.  3.  Normal transit of contrast through the visualize small bowel  without evidence obstruction.    I have personally reviewed the examination and initial interpretation  and I agree with the findings.    NARCISA FIELDS MD   Thoracic Surgery Adult IP Consult: Patient to be seen: Routine within 24 hrs; Call back #: Melody Lugo/Onc [place] [phone]; hx GE adeno s/p surgery in Nov. FTT, limited PO intake, concern for stricture.; Consultant may enter orders: Yes    Narrative    Tim Adler MD     1/10/2018  4:35  PM  THORACIC SURGERY H&P/CONSULT  January 10, 2018    Daniel Sandhu  MRN: [id number]  male  37 year old      CHIEF COMPLAINT:    I saw Mr. Sandhu at Heme/Onc request in consultation for the   evaluation and treatment of FTT, limited PO intake, concern for   stricture.      ASSESSMENT: Daniel Sandhu is a 37 year old male hx of Siewert   type III adenocarcinoma of the gastroesophageal junction s/p   neoadjuvant chemotherapy, s/p (11/3/2017, Yunior Esteban)   esophagogastroscopy, diagnostic laparoscopy, laparotomy with   total gastrectomy and abdominal lymphadenectomy (D2), LEFT   thoracotomy with distal esophagectomy and intrathoracic Terrell-en-Y   esophagojejunostomy, feeding jejunostomy, LEFT pharyngostomy tube   placement, RIGHT tube thoracostomy, and flexible bronchoscopy c/b   neck hematoma associated with pharyngostomy tube s/p (11/9/2017)   oropharyngeal exploration and esophagojejunostomy who has been   recovering well from surgery but now is admitted for failure to   thrive, 14lb weight loss in 10 days, limited oral intake, and   possible esophageal stricture. Hemodynamically stable, abdomen   exam benign, able to tolerate full liquid diet. Esophagram   negative for stricture. No further investigation of jejunal ulcer   or esophageal spasm is necessary at this time. These symptoms are   likely secondary to chemotherapy.     PLAN:    - Nutrition consult for tube feeding optimization    - Pre-albumin lab  - Recommend discussion with oncology regarding risks and benefits   of chemotherapy   - Thank you for consulting thoracic surgery. Please feel free to   contact us with further questions.     Patient seen, findings and plan discussed with fellow, Dr. Lopez.    Tim Adler MD  Surgery PGY1    HISTORY PRESENTING ILLNESS: Daniel Sandhu is a 37 year old male   hx of Siewert type III adenocarcinoma of the gastroesophageal   junction s/p neoadjuvant chemotherapy, s/p (11/3/2017, Yunior Esteban)  "esophagogastroscopy, diagnostic laparoscopy, laparotomy   with total gastrectomy and abdominal lymphadenectomy (D2), LEFT   thoracotomy with distal esophagectomy and intrathoracic Terrell-en-Y   esophagojejunostomy, feeding jejunostomy, LEFT pharyngostomy tube   placement, RIGHT tube thoracostomy, and flexible bronchoscopy c/b   neck hematoma associated with pharyngostomy tube s/p (11/9/2017)   oropharyngeal exploration and esophagojejunostomy who has been   recovering well from surgery but now is admitted for failure to   thrive, 14lb weight loss in 10 days, limited oral intake, and   possible esophageal stricture.     Patient reports he tolerated neoadjuvant chemotherapy well but   has not been tolerating adjuvant chemotherapy. During his second   cycle of chemotherapy, he experienced severe nausea, emesis that   kept him up at night, dry mouth, gastric acid reflux. He reports   saliva and yogurt feels very heavy and thick. He often   experienced \"chest tightness\" that he correlates to esophageal   spasms not associated with eating. He is only able to tolerate   apple juice, ginger ale, and tea. Anti-emetic medications has not   been helpful. His last J-tube feeding of 1000ml has been two days   ago but reports unable to keep feeding down. Reports fatigue, SOB   associated with surgery, abdominal cramping, diarrhea (c.diff   negative). No CP, hematuria, hematochezia. Able to tolerate   applesauce, soft, liquid foods.     His main question is to figure out why he is not absorbing   nutrients.       REVIEW OF SYSTEMS: 10 pt ROS negative other than above.      Patient Active Problem List   Diagnosis     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     Thrush     Esophageal cancer (H)     S/P gastrectomy     Chemotherapy-induced nausea     Weakness         PAST MEDICAL HISTORY:  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 6/5/2017       SURGICAL HISTORY:  Past " Surgical History:   Procedure Laterality Date     ESOPHAGOGASTRODUODENOSCOPY       ESOPHAGOSCOPY, GASTROSCOPY, DUODENOSCOPY (EGD), COMBINED N/A   6/9/2017    Procedure: COMBINED ENDOSCOPIC ULTRASOUND, ESOPHAGOSCOPY,   GASTROSCOPY, DUODENOSCOPY (EGD), FINE NEEDLE ASPIRATE/BIOPSY;    Upper Endoscopic Ultrasound, fine needle aspirate/biopsy;    Surgeon: Guru Mark Avila MD;  Location: [facility]   OR     ESOPHAGOSCOPY, GASTROSCOPY, DUODENOSCOPY (EGD), COMBINED N/A   11/3/2017    Procedure: COMBINED ESOPHAGOSCOPY, GASTROSCOPY, DUODENOSCOPY   (EGD);;  Surgeon: Yunior Esteban MD;  Location: [facility] OR     ESOPHAGOSCOPY, GASTROSCOPY, DUODENOSCOPY (EGD), COMBINED N/A   11/9/2017    Procedure: COMBINED ESOPHAGOSCOPY, GASTROSCOPY, DUODENOSCOPY   (EGD);  Esophogastroduodenoscopy, take out pharangostomy tube;    Surgeon: Yunior Esteban MD;  Location: [facility] OR     GASTRECTOMY N/A 11/3/2017    Procedure: GASTRECTOMY;;  Surgeon: Yunior Esteban MD;    Location: [facility] OR     HAND SURGERY      childhood, torn tendon     INSERT PORT VASCULAR ACCESS Right 6/22/2017    Procedure: INSERT PORT VASCULAR ACCESS;  Single Lumen Chest   Power Port;  Surgeon: Iván Driver PA-C;  Location: [facility] OR       LAPAROSCOPY DIAGNOSTIC (GENERAL) N/A 11/3/2017    Procedure: LAPAROSCOPY DIAGNOSTIC (GENERAL);  diagnostic   laparoscopy, right chest tube, total gastrectomy with distal   esophagectomy, intrathoracic eveline-y esophago-jejunostomy, feeding   jejunostomy, pharyngostomy, esophagogastroduodenoscopy, flexible   bronchoscopy;  Surgeon: Yunior Esteban MD;  Location:   [facility] OR     LAPAROTOMY EXPLORATORY N/A 11/3/2017    Procedure: LAPAROTOMY EXPLORATORY;;  Surgeon: Yunior Esteban MD;  Location: [facility] OR     PHARYNGOSTOMY N/A 11/3/2017    Procedure: PHARYNGOSTOMY;;  Surgeon: Yunior Esteban MD;  Location: [facility] OR     THORACOTOMY Left 11/3/2017    Procedure: THORACOTOMY;;  Surgeon:  Yunior Esteban MD;    Location:  OR        SOCIAL HISTORY:   ETOH no  TOB no  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     Social History     Social History     Marital status:      Spouse name: N/A     Number of children: 1     Years of education: N/A     Occupational History     musician and teacher       Social History Main Topics     Smoking status: Never Smoker     Smokeless tobacco: Never Used     Alcohol use Yes      Comment: 1 beer daily     Drug use: Yes     Special: Marijuana      Comment: occasional     Sexual activity: Yes     Partners: Female     Birth control/ protection: Natural Family Planning     Other Topics Concern     Not on file     Social History Narrative       FAMILY HISTORY:   Family History   Problem Relation Age of Onset     Other - See Comments Father      sepsis     CANCER Sister      breast cancer  29 yo 1/2 sister      CANCER Paternal Grandmother      breast       ALLERGIES:    No Known Allergies    MEDICATIONS:     Current Facility-Administered Medications on File Prior to   Encounter:  [COMPLETED] heparin 100 UNIT/ML injection 5 mL   [COMPLETED] 0.9% sodium chloride BOLUS   INFUSION HYPERSENSITIVITY   [COMPLETED] dexamethasone (DECADRON) 12 mg in NaCl 0.9 %   intermittent infusion   [COMPLETED] ondansetron (ZOFRAN) injection 8 mg   heparin lock flush 10 UNIT/ML injection 3 mL   [COMPLETED] sodium chloride (PF) 0.9% PF flush 10 mL   [DISCONTINUED] sodium chloride (PF) 0.9% PF flush 20 mL     Current Outpatient Prescriptions on File Prior to Encounter:  Ondansetron HCl (ZOFRAN PO) Take by mouth every 8 hours as needed   for nausea or vomiting   Prochlorperazine Maleate (COMPAZINE PO) Take by mouth every 6   hours as needed for nausea   LORazepam (ATIVAN PO) Take by mouth every 4 hours as needed for   anxiety   fluconazole (DIFLUCAN) 200 MG tablet Take 1 tablet (200 mg) by   mouth daily   cyabnocobalamin (VITAMIN B-12) 2500 MCG sublingual tablet  "Place   2,500 mcg under the tongue daily   acetaminophen (TYLENOL) 32 mg/mL solution 30.45 mLs (975 mg) by   Per J Tube route 3 times daily   OLANZapine (ZYPREXA) 10 MG tablet Take 1 tablet (10 mg) by mouth   At Bedtime   sennosides (SENOKOT) 8.8 MG/5ML syrup 10 mLs by Per J Tube route   2 times daily   multivitamins with minerals (CERTAVITE/CEROVITE) LIQD liquid 15   mLs by Per J Tube route daily   oxyCODONE (ROXICODONE) 5 MG/5ML solution Take 5-10 mLs (5-10 mg)   by mouth every 4 hours as needed for moderate to severe pain   (Patient not taking: Reported on 1/2/2018)       PHYSICAL EXAMINATION:  /71  Pulse 79  Temp 98.3  F (36.8  C) (Oral)  Resp 16    Ht 1.75 m (5' 8.9\")  Wt 60.1 kg (132 lb 7.9 oz)  SpO2 97%  BMI   19.62 kg/m2  Body mass index is 19.62 kg/(m^2).    GEN: Awake, alert, interactive, NAD   NEURO: CNs grossly intact  HEENT: EOMI, neck is supple without JVD  CV: RRR  PULM: Resp non-labored on RA  ABD: Soft, non-distended, non-tender to palpation, no rebound or   guarding.   Incisions: midabdomen, left thoracic well-healed, no sign of   infection, c/d/i  EXT: w/o edema, wwp  SKIN: No jaundice.     LABS: Reviewed.   Complete Blood Count     Recent Labs  Lab 01/10/18  0331 01/09/18  1351 01/08/18  1715   WBC 1.7* 1.9* 1.9*   HGB 11.9* 13.8 13.0*    310 316     Basic Metabolic Panel    Recent Labs  Lab 01/10/18  0331 01/09/18  1351 01/08/18  1715    133 137   POTASSIUM 4.2 3.9 3.8   CHLORIDE 109 102 105   CO2 19* 20 22   BUN 22 24 22   CR 0.70 0.77 0.81   * 140* 109*     Liver Function Tests    Recent Labs  Lab 01/09/18  1351 01/08/18  1715   AST 21 18   ALT 21 23   ALKPHOS 55 57   BILITOTAL 0.4 0.2   ALBUMIN 2.9* 2.9*       IMAGING:  Results for orders placed or performed in visit on 12/28/17   CT Abdomen Pelvis w/o Contrast    Narrative    EXAMINATION: CT ABDOMEN PELVIS W/O CONTRAST, 12/28/2017 11:11 AM    TECHNIQUE:  Helical CT images from the lung bases through " the  symphysis pubis were obtained without IV contrast.     COMPARISON: CT chest abdomen pelvis 12/1/2017    HISTORY: Jejunostomy tube, increased tenderness, assess position   s/p  esophagectomy; Malignant neoplasm of esophagus, unspecified   location  (H)    FINDINGS:    Abdomen and pelvis: Percutaneous jejunostomy tube in place with   tip  located in the jejunum in the left upper quadrant. There is   contrast  opacification of small bowel which is not dilated. No opacified  retention balloon identified. No herniated bowel along the soft   tissue  track of the jejunostomy tube. No fluid collection along the   tract.    Post surgical changes of distal esophagectomy and total   gastrectomy  with intrathoracic esophagojejunostomy pull-through. Multiple   surgical  clips. Again noted jejunojejunostomy anastomosis in the left  midabdomen. No abnormally dilated small or large bowel.   Evaluation is  limited due to absence of intravenous contrast injection. Liver,  gallbladder, spleen, pancreas, adrenal glands and kidneys are  unremarkable. No abdominal aortic aneurysm. Decompressed urinary  bladder. Prostate calcifications. Symmetric seminal vesicles.   Pelvic  phleboliths, right greater than left. No free air. No ascites. No  abdominal, pelvic or inguinal lymphadenopathy.    Lung bases: Clear    Bones and soft tissues: Mild degenerative changes of the spine.   No  aggressive osseous lesions. Soft tissues are within normal   limits.      Impression    IMPRESSION:   1. Percutaneous jejunostomy tube is in good position. No evidence   for  hernia, fluid collection or significant inflammation along the  subcutaneous tract.   2. Stable postsurgical changes of distal esophagectomy and total  gastrectomy with intrathoracic esophagojejunostomy pull-through.   No  evidence for obstruction.    I have personally reviewed the examination and initial   interpretation  and I agree with the findings.    MILAGROS LOUISE MD          CO-MORBIDITIES:   No diagnosis found.     Prealbumin   Result Value Ref Range    Prealbumin 13 (L) 15 - 45 mg/dL   Comprehensive metabolic panel   Result Value Ref Range    Sodium 138 133 - 144 mmol/L    Potassium 3.6 3.4 - 5.3 mmol/L    Chloride 111 (H) 94 - 109 mmol/L    Carbon Dioxide 17 (L) 20 - 32 mmol/L    Anion Gap 10 3 - 14 mmol/L    Glucose 107 (H) 70 - 99 mg/dL    Urea Nitrogen 20 7 - 30 mg/dL    Creatinine 0.84 0.66 - 1.25 mg/dL    GFR Estimate >90 >60 mL/min/1.7m2    GFR Estimate If Black >90 >60 mL/min/1.7m2    Calcium 8.2 (L) 8.5 - 10.1 mg/dL    Bilirubin Total 0.3 0.2 - 1.3 mg/dL    Albumin 2.1 (L) 3.4 - 5.0 g/dL    Protein Total 5.5 (L) 6.8 - 8.8 g/dL    Alkaline Phosphatase 45 40 - 150 U/L    ALT 16 0 - 70 U/L    AST 11 0 - 45 U/L   CBC with platelets   Result Value Ref Range    WBC 2.3 (L) 4.0 - 11.0 10e9/L    RBC Count 4.50 4.4 - 5.9 10e12/L    Hemoglobin 11.3 (L) 13.3 - 17.7 g/dL    Hematocrit 34.7 (L) 40.0 - 53.0 %    MCV 77 (L) 78 - 100 fl    MCH 25.1 (L) 26.5 - 33.0 pg    MCHC 32.6 31.5 - 36.5 g/dL    RDW 16.1 (H) 10.0 - 15.0 %    Platelet Count 237 150 - 450 10e9/L

## 2018-01-11 NOTE — ANESTHESIA POSTPROCEDURE EVALUATION
Patient: Daniel Sandhu    Procedure(s):  COMBINED ESOPHAGOSCOPY, GASTROSCOPY, DUODENOSCOPY (EGD) - Wound Class: II-Clean Contaminated    Diagnosis:Possible anastomotic ulcer  Diagnosis Additional Information: No value filed.    Anesthesia Type:  MAC    Note:  Anesthesia Post Evaluation    Patient location during evaluation: PACU  Patient participation: Able to fully participate in evaluation  Level of consciousness: awake  Pain management: adequate  Airway patency: patent  Cardiovascular status: acceptable  Respiratory status: acceptable  Hydration status: acceptable  PONV: none     Anesthetic complications: None          Last vitals:  Vitals:    01/11/18 1218 01/11/18 1220 01/11/18 1300   BP:  105/61 117/81   Pulse:      Resp: 19 12 16   Temp: 36.5  C (97.7  F)  36.3  C (97.4  F)   SpO2: 99% 100% 100%         Electronically Signed By: Magdaleno James MD  January 11, 2018  3:05 PM

## 2018-01-12 ENCOUNTER — APPOINTMENT (OUTPATIENT)
Dept: PHYSICAL THERAPY | Facility: CLINIC | Age: 38
DRG: 380 | End: 2018-01-12
Attending: INTERNAL MEDICINE
Payer: COMMERCIAL

## 2018-01-12 LAB
ALBUMIN SERPL-MCNC: 2 G/DL (ref 3.4–5)
ALP SERPL-CCNC: 46 U/L (ref 40–150)
ALT SERPL W P-5'-P-CCNC: 14 U/L (ref 0–70)
ANION GAP SERPL CALCULATED.3IONS-SCNC: 10 MMOL/L (ref 3–14)
AST SERPL W P-5'-P-CCNC: 11 U/L (ref 0–45)
BILIRUB SERPL-MCNC: 0.3 MG/DL (ref 0.2–1.3)
BUN SERPL-MCNC: 16 MG/DL (ref 7–30)
CALCIUM SERPL-MCNC: 7.6 MG/DL (ref 8.5–10.1)
CHLORIDE SERPL-SCNC: 110 MMOL/L (ref 94–109)
CO2 SERPL-SCNC: 16 MMOL/L (ref 20–32)
COPATH REPORT: NORMAL
CREAT SERPL-MCNC: 0.73 MG/DL (ref 0.66–1.25)
ERYTHROCYTE [DISTWIDTH] IN BLOOD BY AUTOMATED COUNT: 16 % (ref 10–15)
GFR SERPL CREATININE-BSD FRML MDRD: >90 ML/MIN/1.7M2
GLUCOSE SERPL-MCNC: 99 MG/DL (ref 70–99)
HCT VFR BLD AUTO: 33.9 % (ref 40–53)
HGB BLD-MCNC: 10.9 G/DL (ref 13.3–17.7)
MAGNESIUM SERPL-MCNC: 1.9 MG/DL (ref 1.6–2.3)
MCH RBC QN AUTO: 24.6 PG (ref 26.5–33)
MCHC RBC AUTO-ENTMCNC: 32.2 G/DL (ref 31.5–36.5)
MCV RBC AUTO: 77 FL (ref 78–100)
PHOSPHATE SERPL-MCNC: 2.5 MG/DL (ref 2.5–4.5)
PLATELET # BLD AUTO: 232 10E9/L (ref 150–450)
POTASSIUM SERPL-SCNC: 3.1 MMOL/L (ref 3.4–5.3)
POTASSIUM SERPL-SCNC: 3.4 MMOL/L (ref 3.4–5.3)
PROT SERPL-MCNC: 5.2 G/DL (ref 6.8–8.8)
RBC # BLD AUTO: 4.43 10E12/L (ref 4.4–5.9)
SODIUM SERPL-SCNC: 136 MMOL/L (ref 133–144)
WBC # BLD AUTO: 2.2 10E9/L (ref 4–11)

## 2018-01-12 PROCEDURE — 85027 COMPLETE CBC AUTOMATED: CPT | Performed by: NURSE PRACTITIONER

## 2018-01-12 PROCEDURE — 25000132 ZZH RX MED GY IP 250 OP 250 PS 637: Performed by: NURSE PRACTITIONER

## 2018-01-12 PROCEDURE — 25000128 H RX IP 250 OP 636: Performed by: ANESTHESIOLOGY

## 2018-01-12 PROCEDURE — 84100 ASSAY OF PHOSPHORUS: CPT | Performed by: NURSE PRACTITIONER

## 2018-01-12 PROCEDURE — 40000193 ZZH STATISTIC PT WARD VISIT

## 2018-01-12 PROCEDURE — 25000128 H RX IP 250 OP 636: Performed by: NURSE PRACTITIONER

## 2018-01-12 PROCEDURE — 20000002 ZZH R&B BMT INTERMEDIATE

## 2018-01-12 PROCEDURE — 80053 COMPREHEN METABOLIC PANEL: CPT | Performed by: NURSE PRACTITIONER

## 2018-01-12 PROCEDURE — 25000132 ZZH RX MED GY IP 250 OP 250 PS 637: Performed by: INTERNAL MEDICINE

## 2018-01-12 PROCEDURE — 97530 THERAPEUTIC ACTIVITIES: CPT | Mod: GP

## 2018-01-12 PROCEDURE — 25000131 ZZH RX MED GY IP 250 OP 636 PS 637: Performed by: NURSE PRACTITIONER

## 2018-01-12 PROCEDURE — 84132 ASSAY OF SERUM POTASSIUM: CPT | Performed by: INTERNAL MEDICINE

## 2018-01-12 PROCEDURE — 83735 ASSAY OF MAGNESIUM: CPT | Performed by: NURSE PRACTITIONER

## 2018-01-12 PROCEDURE — 25000128 H RX IP 250 OP 636: Performed by: INTERNAL MEDICINE

## 2018-01-12 RX ORDER — DIPHENOXYLATE HCL/ATROPINE 2.5-.025MG
1 TABLET ORAL 4 TIMES DAILY PRN
Status: DISCONTINUED | OUTPATIENT
Start: 2018-01-12 | End: 2018-01-13 | Stop reason: HOSPADM

## 2018-01-12 RX ORDER — GUAR GUM
1 PACKET (EA) ORAL 3 TIMES DAILY
Status: DISCONTINUED | OUTPATIENT
Start: 2018-01-12 | End: 2018-01-13 | Stop reason: HOSPADM

## 2018-01-12 RX ORDER — POTASSIUM CHLORIDE 1.5 G/1.58G
20 POWDER, FOR SOLUTION ORAL 2 TIMES DAILY
Status: DISCONTINUED | OUTPATIENT
Start: 2018-01-12 | End: 2018-01-13 | Stop reason: HOSPADM

## 2018-01-12 RX ORDER — LOPERAMIDE HCL 2 MG
2 CAPSULE ORAL
Status: DISCONTINUED | OUTPATIENT
Start: 2018-01-12 | End: 2018-01-13 | Stop reason: HOSPADM

## 2018-01-12 RX ADMIN — Medication 1 PACKET: at 20:41

## 2018-01-12 RX ADMIN — Medication 10 ML: at 08:18

## 2018-01-12 RX ADMIN — LOPERAMIDE HYDROCHLORIDE 2 MG: 2 CAPSULE ORAL at 08:27

## 2018-01-12 RX ADMIN — LOPERAMIDE HYDROCHLORIDE 2 MG: 2 CAPSULE ORAL at 22:30

## 2018-01-12 RX ADMIN — PROCHLORPERAZINE MALEATE 5 MG: 5 TABLET, FILM COATED ORAL at 19:16

## 2018-01-12 RX ADMIN — PROCHLORPERAZINE EDISYLATE 10 MG: 5 INJECTION INTRAMUSCULAR; INTRAVENOUS at 12:27

## 2018-01-12 RX ADMIN — PROCHLORPERAZINE EDISYLATE 5 MG: 5 INJECTION INTRAMUSCULAR; INTRAVENOUS at 06:08

## 2018-01-12 RX ADMIN — POTASSIUM CHLORIDE 20 MEQ: 400 INJECTION, SOLUTION INTRAVENOUS at 06:08

## 2018-01-12 RX ADMIN — SODIUM CHLORIDE 1000 ML: 9 INJECTION, SOLUTION INTRAVENOUS at 20:52

## 2018-01-12 RX ADMIN — ONDANSETRON 4 MG: 2 INJECTION INTRAMUSCULAR; INTRAVENOUS at 12:28

## 2018-01-12 RX ADMIN — ONDANSETRON 4 MG: 2 INJECTION INTRAMUSCULAR; INTRAVENOUS at 06:08

## 2018-01-12 RX ADMIN — Medication 1 PACKET: at 15:25

## 2018-01-12 RX ADMIN — SODIUM CHLORIDE 1000 ML: 9 INJECTION, SOLUTION INTRAVENOUS at 15:24

## 2018-01-12 RX ADMIN — Medication 10 ML: at 12:28

## 2018-01-12 RX ADMIN — ONDANSETRON 4 MG: 2 INJECTION INTRAMUSCULAR; INTRAVENOUS at 19:16

## 2018-01-12 RX ADMIN — LOPERAMIDE HYDROCHLORIDE 2 MG: 2 CAPSULE ORAL at 03:25

## 2018-01-12 RX ADMIN — POTASSIUM CHLORIDE 20 MEQ: 1.5 POWDER, FOR SOLUTION ORAL at 20:41

## 2018-01-12 RX ADMIN — Medication 10 ML: at 20:40

## 2018-01-12 RX ADMIN — FLUCONAZOLE 200 MG: 200 TABLET ORAL at 08:17

## 2018-01-12 RX ADMIN — MULTIVITAMIN 15 ML: LIQUID ORAL at 08:18

## 2018-01-12 RX ADMIN — DIPHENOXYLATE HYDROCHLORIDE AND ATROPINE SULFATE 1 TABLET: 2.5; .025 TABLET ORAL at 19:16

## 2018-01-12 RX ADMIN — ONDANSETRON 4 MG: 2 INJECTION INTRAMUSCULAR; INTRAVENOUS at 00:17

## 2018-01-12 RX ADMIN — DEXAMETHASONE 2 MG: 2 TABLET ORAL at 08:17

## 2018-01-12 RX ADMIN — Medication 10 ML: at 15:25

## 2018-01-12 RX ADMIN — PROCHLORPERAZINE EDISYLATE 5 MG: 5 INJECTION INTRAMUSCULAR; INTRAVENOUS at 00:18

## 2018-01-12 RX ADMIN — POTASSIUM CHLORIDE 20 MEQ: 400 INJECTION, SOLUTION INTRAVENOUS at 08:14

## 2018-01-12 NOTE — PROGRESS NOTES
"Gastroenterology Inpatient Sign Off Note    Subjective: No acute events overnight. Patient feeling tired this morning. Ongoing diarrhea. No difficulty swallowing, nausea, or vomiting. No fevers or chills.     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 Anatomical evaluation with esophagram concerning for question of focal mucosal abnormality of jejunal lumen near anastomosis - possible srinivas-anastomotic ulceration. GI is consulted for further evaluation and management and consideration of EGD.     EGD completed and notable for post-surgical change of esophagojejunostomy and total gastrectomy. Mild erosions/ulcerations throughout jejunum. Small ulceration near anastomosis. Small mucosal change at anastomosis, not malignant appearing, biopsies. Pathology pending.     Will await pathology results.    Inpatient GI consults service will sign off. No further recommendations at this time. If primary team has addition questions, please page consult fellow listed in [facility].    Current GI Consult Staff: Dr. Homero Lambert     Follow up recommendations:   No outpatient GI clinic follow-up indicated. Follow-up with primary care provider at timing determined by discharge physician.    If outpatient GI follow-up is felt indicated by primary care, they may coordinate this by placing new GI referral and contacting: General GI clinic - ([phone])Plan of care " discussed with Dr. Lambert.     Merlene Cook MD  Gastroenterology Fellow

## 2018-01-12 NOTE — PROGRESS NOTES
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  He is now undergoing adjuvant chemotherapy with EOF x 3 cycles. He has been admitted to the hospital after being seen in clinic for evaluation of diarrhea, nausea, inability to eat, and extreme weakness.    #Nausea  #Diarrhea:  #Weakness:  #Failure to thrive:  Daniel started cycle 2 EOF on 1/2/18. He previously had tolerated cycle 1 well without significant issues and had started supplementing oral intake by mouth without difficulty. Around 1/4/18, he suddenly developed large volume, watery diarrhea at night with occasional episodes during the day, in addition to nausea and profound weakness. Gradually over the last week or so he has had a sensation of a \"stricture\" with gagging and extremely dry mouth, which have also limited his PO intake. He has lost 14 lbs in the last week.   - Differential includes structural abnormality vs chemotherapy vs viral gastroenteritis  (given recent sick family members). He was given IV fluids, zofran and dexamethasone 12 mg in clinic and he improved, and tolerated food last night.   - Continue 2mg dexamethasone daily for appetite and nausea  - Enteric pathogen and C.diff is NEG. Can have imodium PRN  - Esophogram XR completed this am to evaluate for stricture; No stricture seen, however \"Questionable focal mucosal abnormality of the jejunal lumen near the anastomosis. Although this may represent normal jejunal fold, perianastomotic jejunal ulcer is not entirely excluded\".  - Thoracic surgery consulted, awaiting final recs.   - GI consulted; EDG done 1/11 reassuring for only ulcers, no recurrence. One area biopsied, pending.  "   - J-tube accidentally removed last night. Thoracic replaced it immediately this AM, OK for use.   - Restart tube feedings per below.    - Schedule zofran for now. Compazine and ativan PRN.  - Blood culture drawn in clinic NGTD  - PT, OT consults.  - Fiber scheduled TID for diarrhea; attempted to talk to GI regarding large volume liquid stools (500ml-1L every BM, about 2-5 times a day). They did not return my page.    - Imodium PRN, daily max of 8 tabs. Add lomotil 4x/day PRN.      #Severe Malnutrition in the context of acute illness   -\% Intake: </= 50% for >/= 5 days (severe): Present; Note that this is less that what the pt was eating after his surgery in November  % Weight Loss: > 7.5% in 3 months (severe) - 9% weight loss in past week.   Subcutaneous Fat Loss: Facial region: Mild  Muscle Loss: Temporal, Facial & jaw region, Scapular bone, Thoracic region (clavicle, acromium bone, deltoid, trapezius, pectoral) and Posterior calf:  Severe  - Nutrition consulted and appreciate nutritional and TF recommendations.  - J-tube accidentally removed; Thoracic replaced it immediately1/11 in AM, OK for use.   - Regular diet as tolerated  - Restart tube feedings, titrate up to tolerability.     #Dry mouth:  - biotene QID  - nasal saline spray PRN    #Adenocarcinoma of the GE junction.  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 Esteban): esophagogastroscopy, diagnostic laparoscopy, laparotomy with total gastrectomy and abdominal lymphadenectomy (D2), LEFT thoracotomy with distal esophagectomy and intrathoracic Terrell-en-Y esophagojejunostomy, feeding jejunostomy, LEFT pharyngostomy tube placement, RIGHT tube thoracostomy, and flexible bronchoscopy c/b neck hematoma associated with pharyngostomy tube s/p (11/9/2017) oropharyngeal exploration and esophagojejunostomy.   - He continues on adjuvant chemotherapy  with epirubicin, oxaliplatin, 5FU (21-day continuous infusion). He started cycle 5 on 1/2/2018. The 5FU pump has been disconnected as of 1/8/18 due to the above symptoms.  - Cycle 6 is tentatively planned for 1/23/18, and he has follow-up with Dr. Esteban on 3/7/18 with re-staging CT CAP on that same day.    OTHER    #FEN:   NS decreased to @ 50 ml/hr x 1L; Want to ensure he can manage his fluid status without IV supplement.    Monitor electrolytes daily; start KCl 20meq BID given diarrhea  Diet: per above    #PPx  - Hold DVT prophylaxis due to possible invasive procedures    #Dispo: 2-4 days for management of acute issues (possible discharge 1/13).     Above plan discussed with staff physician, Dr. Jones.    Melody Reilly, [facility]  Hematology/Oncology  #[id number]      Melody Reilly    Interval History   Daniel reports is much more fatigued today. He has a hard time stating he feels better then when he was admitted. He is worried about his significant output of diarrhea, overflowing the hat with almost every BM.  He has occasional abdominal cramping.  He denies headaches. Dry mouth worse as NPO.  He denies CP, SOB, cough.    Physical Exam   Temp: 97.4  F (36.3  C) Temp src: Oral BP: 112/77 Pulse: 84 Heart Rate: 81 Resp: 16 SpO2: 99 % O2 Device: None (Room air)    Vitals:    01/10/18 0901 01/11/18 0828 01/12/18 1200   Weight: 60.1 kg (132 lb 7.9 oz) 59.8 kg (131 lb 13.4 oz) 60.6 kg (133 lb 9.6 oz)     Vital Signs with Ranges  Temp:  [97.4  F (36.3  C)-99.9  F (37.7  C)] 97.4  F (36.3  C)  Pulse:  [84] 84  Heart Rate:  [81-89] 81  Resp:  [16] 16  BP: (108-125)/(69-80) 112/77  SpO2:  [98 %-100 %] 99 %  I/O last 3 completed shifts:  In: 2590 [P.O.:600; I.V.:1600; NG/GT:30]  Out: 4820 [Other:900; Stool:3920]     Constitutional: Pleasant male seen resting comfortably in bed in NAD. Alert and interactive.   HEENT: NC/AT, sclera clear, conjunctiva normal, OP with MMM, no mucositis  Respiratory: Non-labored breathing, CTAB. No  crackles or wheezing.   Cardiovascular: RRR. No murmur or rub.   GI: Hyperactive BS in BLQ. Abdomen soft, non-distended, and NT. No palpable masses or HSM. No acute abdomen. Several scars from surgery; J-tube in place, no drainage or tenderness surrounding this.    Skin:  Warm, dry, well-perfused. No bruising, bleeding, rashes, or lesions on limited exam.  Musculoskeletal: Extremities grossly normal, non-tender, no edema.  Good strength and ROM.  Neurologic: A&O. Answers questions appropriately. Moves all extremities spontaneously.  Neuropsychiatric: Mentation and affect appear normal/appropriate.    Medications        fiber modular  1 packet Per Feeding Tube TID     sodium chloride 0.9%  1,000 mL Intravenous Once     potassium chloride  20 mEq Oral BID     dexamethasone  2 mg Oral Daily     artificial saliva  10 mL Swish & Spit 4x Daily     ondansetron  4 mg Intravenous Q6H     cyanocobalamin  2,500 mcg Sublingual Daily     fluconazole  200 mg Oral Daily     multivitamins with minerals  15 mL Per J Tube Daily   prochlorperazine, meperidine, potassium chloride, potassium chloride, potassium chloride, potassium chloride with lidocaine, potassium chloride, magnesium sulfate, potassium phosphate (KPHOS) in D5W IV, potassium phosphate (KPHOS) in D5W IV, potassium phosphate (KPHOS) in D5W IV, potassium phosphate (KPHOS) in D5W IV, sodium chloride, oxyCODONE, prochlorperazine **OR** prochlorperazine, naloxone, melatonin, LORazepam **OR** LORazepam, acetaminophen, loperamide     Data   Results for orders placed or performed during the hospital encounter of 01/09/18 (from the past 24 hour(s))   Comprehensive metabolic panel   Result Value Ref Range    Sodium 136 133 - 144 mmol/L    Potassium 3.1 (L) 3.4 - 5.3 mmol/L    Chloride 110 (H) 94 - 109 mmol/L    Carbon Dioxide 16 (L) 20 - 32 mmol/L    Anion Gap 10 3 - 14 mmol/L    Glucose 99 70 - 99 mg/dL    Urea Nitrogen 16 7 - 30 mg/dL    Creatinine 0.73 0.66 - 1.25 mg/dL    GFR  Estimate >90 >60 mL/min/1.7m2    GFR Estimate If Black >90 >60 mL/min/1.7m2    Calcium 7.6 (L) 8.5 - 10.1 mg/dL    Bilirubin Total 0.3 0.2 - 1.3 mg/dL    Albumin 2.0 (L) 3.4 - 5.0 g/dL    Protein Total 5.2 (L) 6.8 - 8.8 g/dL    Alkaline Phosphatase 46 40 - 150 U/L    ALT 14 0 - 70 U/L    AST 11 0 - 45 U/L   CBC with platelets   Result Value Ref Range    WBC 2.2 (L) 4.0 - 11.0 10e9/L    RBC Count 4.43 4.4 - 5.9 10e12/L    Hemoglobin 10.9 (L) 13.3 - 17.7 g/dL    Hematocrit 33.9 (L) 40.0 - 53.0 %    MCV 77 (L) 78 - 100 fl    MCH 24.6 (L) 26.5 - 33.0 pg    MCHC 32.2 31.5 - 36.5 g/dL    RDW 16.0 (H) 10.0 - 15.0 %    Platelet Count 232 150 - 450 10e9/L   Phosphorus   Result Value Ref Range    Phosphorus 2.5 2.5 - 4.5 mg/dL   Magnesium   Result Value Ref Range    Magnesium 1.9 1.6 - 2.3 mg/dL   Potassium   Result Value Ref Range    Potassium 3.4 3.4 - 5.3 mmol/L

## 2018-01-12 NOTE — PROGRESS NOTES
Care Coordinator- Discharge Planning     Admission Date/Time:  1/9/2018  Attending MD:  Sam Dumont,*     Data  Date of initial CC assessment:  1/9  Chart reviewed, discussed with interdisciplinary team.   Patient was admitted for:   1. 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         Assessment  Concerns with insurance coverage for discharge needs: None.  Current Living Situation: Patient lives with spouse.  Support System: Supportive and Involved  Services Involved: Home Infusion  Transportation: Family or Friend will provide  Barriers to Discharge: none        Coordination of Care and Referrals: Provided patient/family with options for Home Infusion.    Currently receives home enterals per [place].  Resumption instructions entered into AVS.      Plan  Anticipated Discharge Date:  1-2 days  Anticipated Discharge Plan:  home    CTS Handoff completed: at d/c.  Michelle Arita RN   ________________________________________  Michelle Arita RN, BSN    7D/Oncology Care Coordinator  Pager  [phone]  Phone [phone]

## 2018-01-12 NOTE — PROGRESS NOTES
Nutrition Services Brief Progress Note - please see note from 1/10/18 for full note    Diet: regular with ensure clear between meals and Nutrisource Fiber between meals   TF: Osmolite 1.5 at 60 ml/hr     Labs: (1/12): K+ 3.1 mmol/L (L)    Interventions  1. Discussed with nursing. Patient would like to try Beneprotein with meals for increased protein intake. Order placed     Unit RD will continue to monitor per protocol  Sravani Sesay MS/RD/LD/CNSC  BMT RD Pager: [phone]

## 2018-01-13 ENCOUNTER — APPOINTMENT (OUTPATIENT)
Dept: PHYSICAL THERAPY | Facility: CLINIC | Age: 38
DRG: 380 | End: 2018-01-13
Attending: INTERNAL MEDICINE
Payer: COMMERCIAL

## 2018-01-13 VITALS
HEART RATE: 94 BPM | WEIGHT: 130 LBS | OXYGEN SATURATION: 100 % | TEMPERATURE: 98.5 F | DIASTOLIC BLOOD PRESSURE: 72 MMHG | RESPIRATION RATE: 16 BRPM | HEIGHT: 69 IN | BODY MASS INDEX: 19.26 KG/M2 | SYSTOLIC BLOOD PRESSURE: 108 MMHG

## 2018-01-13 VITALS
RESPIRATION RATE: 16 BRPM | TEMPERATURE: 97.6 F | SYSTOLIC BLOOD PRESSURE: 104 MMHG | OXYGEN SATURATION: 100 % | DIASTOLIC BLOOD PRESSURE: 76 MMHG

## 2018-01-13 LAB
ALBUMIN SERPL-MCNC: 2 G/DL (ref 3.4–5)
ALP SERPL-CCNC: 51 U/L (ref 40–150)
ALT SERPL W P-5'-P-CCNC: 15 U/L (ref 0–70)
ANION GAP SERPL CALCULATED.3IONS-SCNC: 8 MMOL/L (ref 3–14)
AST SERPL W P-5'-P-CCNC: 12 U/L (ref 0–45)
BILIRUB SERPL-MCNC: 0.2 MG/DL (ref 0.2–1.3)
BUN SERPL-MCNC: 10 MG/DL (ref 7–30)
CALCIUM SERPL-MCNC: 7.6 MG/DL (ref 8.5–10.1)
CHLORIDE SERPL-SCNC: 112 MMOL/L (ref 94–109)
CO2 SERPL-SCNC: 14 MMOL/L (ref 20–32)
CREAT SERPL-MCNC: 0.76 MG/DL (ref 0.66–1.25)
ERYTHROCYTE [DISTWIDTH] IN BLOOD BY AUTOMATED COUNT: 15.9 % (ref 10–15)
GFR SERPL CREATININE-BSD FRML MDRD: >90 ML/MIN/1.7M2
GLUCOSE SERPL-MCNC: 105 MG/DL (ref 70–99)
HCT VFR BLD AUTO: 33.4 % (ref 40–53)
HGB BLD-MCNC: 10.9 G/DL (ref 13.3–17.7)
MAGNESIUM SERPL-MCNC: 2 MG/DL (ref 1.6–2.3)
MCH RBC QN AUTO: 24.9 PG (ref 26.5–33)
MCHC RBC AUTO-ENTMCNC: 32.6 G/DL (ref 31.5–36.5)
MCV RBC AUTO: 76 FL (ref 78–100)
PHOSPHATE SERPL-MCNC: 2.1 MG/DL (ref 2.5–4.5)
PLATELET # BLD AUTO: 191 10E9/L (ref 150–450)
POTASSIUM SERPL-SCNC: 3.2 MMOL/L (ref 3.4–5.3)
POTASSIUM SERPL-SCNC: 4 MMOL/L (ref 3.4–5.3)
PROT SERPL-MCNC: 5.5 G/DL (ref 6.8–8.8)
RBC # BLD AUTO: 4.37 10E12/L (ref 4.4–5.9)
SODIUM SERPL-SCNC: 134 MMOL/L (ref 133–144)
WBC # BLD AUTO: 3.8 10E9/L (ref 4–11)

## 2018-01-13 PROCEDURE — 83735 ASSAY OF MAGNESIUM: CPT | Performed by: NURSE PRACTITIONER

## 2018-01-13 PROCEDURE — 25000132 ZZH RX MED GY IP 250 OP 250 PS 637: Performed by: NURSE PRACTITIONER

## 2018-01-13 PROCEDURE — 40000193 ZZH STATISTIC PT WARD VISIT

## 2018-01-13 PROCEDURE — 97530 THERAPEUTIC ACTIVITIES: CPT | Mod: GP

## 2018-01-13 PROCEDURE — 97110 THERAPEUTIC EXERCISES: CPT | Mod: GP

## 2018-01-13 PROCEDURE — 25000128 H RX IP 250 OP 636: Performed by: INTERNAL MEDICINE

## 2018-01-13 PROCEDURE — 25000125 ZZHC RX 250: Performed by: NURSE PRACTITIONER

## 2018-01-13 PROCEDURE — 25000132 ZZH RX MED GY IP 250 OP 250 PS 637: Performed by: INTERNAL MEDICINE

## 2018-01-13 PROCEDURE — 84132 ASSAY OF SERUM POTASSIUM: CPT | Performed by: NURSE PRACTITIONER

## 2018-01-13 PROCEDURE — 25000128 H RX IP 250 OP 636: Performed by: NURSE PRACTITIONER

## 2018-01-13 PROCEDURE — 25000131 ZZH RX MED GY IP 250 OP 636 PS 637: Performed by: NURSE PRACTITIONER

## 2018-01-13 PROCEDURE — 80053 COMPREHEN METABOLIC PANEL: CPT | Performed by: NURSE PRACTITIONER

## 2018-01-13 PROCEDURE — 85027 COMPLETE CBC AUTOMATED: CPT | Performed by: NURSE PRACTITIONER

## 2018-01-13 PROCEDURE — 84100 ASSAY OF PHOSPHORUS: CPT | Performed by: NURSE PRACTITIONER

## 2018-01-13 RX ORDER — FLUORIDE TOOTHPASTE
10 TOOTHPASTE DENTAL 4 TIMES DAILY
Qty: 473 ML | Refills: 0 | Status: SHIPPED | OUTPATIENT
Start: 2018-01-13 | End: 2018-03-16

## 2018-01-13 RX ORDER — DEXAMETHASONE 2 MG/1
2 TABLET ORAL DAILY
Qty: 30 TABLET | Refills: 0 | Status: SHIPPED | OUTPATIENT
Start: 2018-01-14 | End: 2018-03-16

## 2018-01-13 RX ORDER — HEPARIN SODIUM,PORCINE 10 UNIT/ML
5 VIAL (ML) INTRAVENOUS DAILY
Status: DISCONTINUED | OUTPATIENT
Start: 2018-01-13 | End: 2018-01-13 | Stop reason: HOSPADM

## 2018-01-13 RX ORDER — DIPHENOXYLATE HCL/ATROPINE 2.5-.025MG
1 TABLET ORAL 4 TIMES DAILY PRN
Qty: 40 TABLET | Refills: 0 | Status: SHIPPED | OUTPATIENT
Start: 2018-01-13 | End: 2018-03-16

## 2018-01-13 RX ORDER — GUAR GUM
1 PACKET (EA) ORAL 3 TIMES DAILY
Qty: 90 PACKET | Refills: 0 | Status: SHIPPED | OUTPATIENT
Start: 2018-01-13 | End: 2018-03-16

## 2018-01-13 RX ORDER — POTASSIUM CHLORIDE 1.5 G/1.58G
20 POWDER, FOR SOLUTION ORAL 2 TIMES DAILY
Qty: 60 PACKET | Refills: 0 | Status: SHIPPED | OUTPATIENT
Start: 2018-01-13 | End: 2018-03-16

## 2018-01-13 RX ORDER — ONDANSETRON 4 MG/1
4 TABLET, FILM COATED ORAL EVERY 6 HOURS
Qty: 40 TABLET | Refills: 0 | Status: SHIPPED | OUTPATIENT
Start: 2018-01-13 | End: 2018-03-16

## 2018-01-13 RX ORDER — LOPERAMIDE HCL 2 MG
4 CAPSULE ORAL EVERY 6 HOURS
Qty: 120 CAPSULE | Refills: 0 | Status: SHIPPED | OUTPATIENT
Start: 2018-01-13 | End: 2018-03-16

## 2018-01-13 RX ORDER — HEPARIN SODIUM (PORCINE) LOCK FLUSH IV SOLN 100 UNIT/ML 100 UNIT/ML
500 SOLUTION INTRAVENOUS EVERY 8 HOURS
Status: DISCONTINUED | OUTPATIENT
Start: 2018-01-13 | End: 2018-01-13 | Stop reason: HOSPADM

## 2018-01-13 RX ORDER — DIPHENHYDRAMINE HYDROCHLORIDE AND LIDOCAINE HYDROCHLORIDE AND ALUMINUM HYDROXIDE AND MAGNESIUM HYDRO
5-10 KIT EVERY 6 HOURS PRN
Qty: 237 ML | Refills: 1 | Status: SHIPPED | OUTPATIENT
Start: 2018-01-13 | End: 2018-03-16

## 2018-01-13 RX ADMIN — Medication 2500 MCG: at 08:29

## 2018-01-13 RX ADMIN — DEXAMETHASONE 2 MG: 2 TABLET ORAL at 08:27

## 2018-01-13 RX ADMIN — DIPHENOXYLATE HYDROCHLORIDE AND ATROPINE SULFATE 1 TABLET: 2.5; .025 TABLET ORAL at 14:34

## 2018-01-13 RX ADMIN — PROCHLORPERAZINE MALEATE 5 MG: 5 TABLET, FILM COATED ORAL at 14:26

## 2018-01-13 RX ADMIN — PROCHLORPERAZINE MALEATE 5 MG: 5 TABLET, FILM COATED ORAL at 06:36

## 2018-01-13 RX ADMIN — ONDANSETRON 4 MG: 2 INJECTION INTRAMUSCULAR; INTRAVENOUS at 06:35

## 2018-01-13 RX ADMIN — ONDANSETRON 4 MG: 2 INJECTION INTRAMUSCULAR; INTRAVENOUS at 13:08

## 2018-01-13 RX ADMIN — MULTIVITAMIN 15 ML: LIQUID ORAL at 08:28

## 2018-01-13 RX ADMIN — Medication 1 PACKET: at 13:08

## 2018-01-13 RX ADMIN — Medication 10 ML: at 16:30

## 2018-01-13 RX ADMIN — POTASSIUM CHLORIDE 20 MEQ: 400 INJECTION, SOLUTION INTRAVENOUS at 04:27

## 2018-01-13 RX ADMIN — POTASSIUM CHLORIDE 20 MEQ: 400 INJECTION, SOLUTION INTRAVENOUS at 06:41

## 2018-01-13 RX ADMIN — Medication 10 ML: at 08:40

## 2018-01-13 RX ADMIN — LOPERAMIDE HYDROCHLORIDE 2 MG: 2 CAPSULE ORAL at 04:22

## 2018-01-13 RX ADMIN — POTASSIUM CHLORIDE 20 MEQ: 1.5 POWDER, FOR SOLUTION ORAL at 08:28

## 2018-01-13 RX ADMIN — ONDANSETRON 4 MG: 2 INJECTION INTRAMUSCULAR; INTRAVENOUS at 00:22

## 2018-01-13 RX ADMIN — POTASSIUM PHOSPHATE, MONOBASIC AND POTASSIUM PHOSPHATE, DIBASIC 15 MMOL: 224; 236 INJECTION, SOLUTION INTRAVENOUS at 08:43

## 2018-01-13 RX ADMIN — ONDANSETRON 4 MG: 2 INJECTION INTRAMUSCULAR; INTRAVENOUS at 17:42

## 2018-01-13 RX ADMIN — FLUCONAZOLE 200 MG: 200 TABLET ORAL at 08:27

## 2018-01-13 RX ADMIN — Medication 1 PACKET: at 08:29

## 2018-01-13 RX ADMIN — DIPHENOXYLATE HYDROCHLORIDE AND ATROPINE SULFATE 1 TABLET: 2.5; .025 TABLET ORAL at 08:52

## 2018-01-13 RX ADMIN — SODIUM CHLORIDE, PRESERVATIVE FREE 500 UNITS: 5 INJECTION INTRAVENOUS at 17:56

## 2018-01-13 RX ADMIN — Medication 10 ML: at 13:09

## 2018-01-13 RX ADMIN — LOPERAMIDE HYDROCHLORIDE 2 MG: 2 CAPSULE ORAL at 16:29

## 2018-01-13 NOTE — PROGRESS NOTES
CLINICAL NUTRITION SERVICES    Contacted by team re: diarrhea (>6L) yesterday.  Per chart review, majority of medications are provided IV or by mouth.      Recommendations:  1) Ensure medications provided by mouth if able  2) Switch to Peptamen 1.5 (semi-elemental formula for possibly improved absorption) @ 60 ml/hr (same rate)  3) If formula switch ineffective, consider increasing fiber to 2 pkts TID    Nannette Pascual MS, RD, LD  W/E pager [phone]

## 2018-01-14 ENCOUNTER — HOME INFUSION (PRE-WILLOW HOME INFUSION) (OUTPATIENT)
Dept: PHARMACY | Facility: CLINIC | Age: 38
End: 2018-01-14

## 2018-01-14 NOTE — PROGRESS NOTES
Pt left via wheelchair to awaiting car and wife downstairs.  All belongings with pt.  Went through meds and discharge instructions.  Sent with pill box and thermometer.  Antidiarrheal medication given to pt at 1630.  Portacath heparin locked and de-accessed.  Enough tube feeding given to pt for several days - per home health's request.  Dressing change done for PEG tube - due to leaking yellow serous fluid.

## 2018-01-15 ENCOUNTER — APPOINTMENT (OUTPATIENT)
Dept: LAB | Facility: CLINIC | Age: 38
End: 2018-01-15
Attending: INTERNAL MEDICINE
Payer: COMMERCIAL

## 2018-01-15 ENCOUNTER — TELEPHONE (OUTPATIENT)
Dept: FAMILY MEDICINE | Facility: CLINIC | Age: 38
End: 2018-01-15

## 2018-01-15 ENCOUNTER — CARE COORDINATION (OUTPATIENT)
Dept: ONCOLOGY | Facility: CLINIC | Age: 38
End: 2018-01-15

## 2018-01-15 ENCOUNTER — HOME INFUSION (PRE-WILLOW HOME INFUSION) (OUTPATIENT)
Dept: PHARMACY | Facility: CLINIC | Age: 38
End: 2018-01-15

## 2018-01-15 ENCOUNTER — ONCOLOGY VISIT (OUTPATIENT)
Dept: ONCOLOGY | Facility: CLINIC | Age: 38
End: 2018-01-15
Attending: INTERNAL MEDICINE
Payer: COMMERCIAL

## 2018-01-15 VITALS
WEIGHT: 128.8 LBS | SYSTOLIC BLOOD PRESSURE: 118 MMHG | RESPIRATION RATE: 16 BRPM | DIASTOLIC BLOOD PRESSURE: 74 MMHG | BODY MASS INDEX: 19.08 KG/M2 | HEART RATE: 104 BPM | OXYGEN SATURATION: 99 % | TEMPERATURE: 97.4 F

## 2018-01-15 DIAGNOSIS — C15.9 ESOPHAGEAL CANCER (H): Primary | ICD-10-CM

## 2018-01-15 DIAGNOSIS — R11.0 CHEMOTHERAPY-INDUCED NAUSEA: Primary | ICD-10-CM

## 2018-01-15 DIAGNOSIS — C15.9 ESOPHAGEAL CANCER (H): ICD-10-CM

## 2018-01-15 DIAGNOSIS — C15.5 MALIGNANT NEOPLASM OF LOWER THIRD OF ESOPHAGUS (H): ICD-10-CM

## 2018-01-15 DIAGNOSIS — T45.1X5A CHEMOTHERAPY-INDUCED NAUSEA: Primary | ICD-10-CM

## 2018-01-15 DIAGNOSIS — E87.1 HYPONATREMIA: Primary | ICD-10-CM

## 2018-01-15 LAB
ALBUMIN SERPL-MCNC: 2.6 G/DL (ref 3.4–5)
ALP SERPL-CCNC: 73 U/L (ref 40–150)
ALT SERPL W P-5'-P-CCNC: 21 U/L (ref 0–70)
ANION GAP SERPL CALCULATED.3IONS-SCNC: 12 MMOL/L (ref 3–14)
AST SERPL W P-5'-P-CCNC: 15 U/L (ref 0–45)
BACTERIA SPEC CULT: NO GROWTH
BASOPHILS # BLD AUTO: 0 10E9/L (ref 0–0.2)
BASOPHILS NFR BLD AUTO: 0.2 %
BILIRUB SERPL-MCNC: 0.7 MG/DL (ref 0.2–1.3)
BUN SERPL-MCNC: 14 MG/DL (ref 7–30)
CALCIUM SERPL-MCNC: 8.1 MG/DL (ref 8.5–10.1)
CHLORIDE SERPL-SCNC: 113 MMOL/L (ref 94–109)
CO2 SERPL-SCNC: 9 MMOL/L (ref 20–32)
CREAT SERPL-MCNC: 0.8 MG/DL (ref 0.66–1.25)
DIFFERENTIAL METHOD BLD: ABNORMAL
EOSINOPHIL # BLD AUTO: 0 10E9/L (ref 0–0.7)
EOSINOPHIL NFR BLD AUTO: 0 %
ERYTHROCYTE [DISTWIDTH] IN BLOOD BY AUTOMATED COUNT: 15.6 % (ref 10–15)
GFR SERPL CREATININE-BSD FRML MDRD: >90 ML/MIN/1.7M2
GLUCOSE SERPL-MCNC: 126 MG/DL (ref 70–99)
HCT VFR BLD AUTO: 41.6 % (ref 40–53)
HGB BLD-MCNC: 13.3 G/DL (ref 13.3–17.7)
IMM GRANULOCYTES # BLD: 0.1 10E9/L (ref 0–0.4)
IMM GRANULOCYTES NFR BLD: 1.2 %
LYMPHOCYTES # BLD AUTO: 0.3 10E9/L (ref 0.8–5.3)
LYMPHOCYTES NFR BLD AUTO: 3.5 %
MAGNESIUM SERPL-MCNC: 2.4 MG/DL (ref 1.6–2.3)
MCH RBC QN AUTO: 25.1 PG (ref 26.5–33)
MCHC RBC AUTO-ENTMCNC: 32 G/DL (ref 31.5–36.5)
MCV RBC AUTO: 79 FL (ref 78–100)
MONOCYTES # BLD AUTO: 1.2 10E9/L (ref 0–1.3)
MONOCYTES NFR BLD AUTO: 13.9 %
NEUTROPHILS # BLD AUTO: 6.7 10E9/L (ref 1.6–8.3)
NEUTROPHILS NFR BLD AUTO: 81.2 %
NRBC # BLD AUTO: 0 10*3/UL
NRBC BLD AUTO-RTO: 0 /100
PHOSPHATE SERPL-MCNC: 2.3 MG/DL (ref 2.5–4.5)
PLATELET # BLD AUTO: 432 10E9/L (ref 150–450)
PLATELET # BLD EST: ABNORMAL 10*3/UL
POTASSIUM SERPL-SCNC: 2.4 MMOL/L (ref 3.4–5.3)
PROT SERPL-MCNC: 7 G/DL (ref 6.8–8.8)
RBC # BLD AUTO: 5.3 10E12/L (ref 4.4–5.9)
SODIUM SERPL-SCNC: 134 MMOL/L (ref 133–144)
SPECIMEN SOURCE: NORMAL
WBC # BLD AUTO: 8.3 10E9/L (ref 4–11)

## 2018-01-15 PROCEDURE — 25000128 H RX IP 250 OP 636: Mod: ZF | Performed by: INTERNAL MEDICINE

## 2018-01-15 PROCEDURE — 83735 ASSAY OF MAGNESIUM: CPT | Performed by: PHYSICIAN ASSISTANT

## 2018-01-15 PROCEDURE — 99215 OFFICE O/P EST HI 40 MIN: CPT | Mod: ZP | Performed by: INTERNAL MEDICINE

## 2018-01-15 PROCEDURE — 96365 THER/PROPH/DIAG IV INF INIT: CPT

## 2018-01-15 PROCEDURE — 84100 ASSAY OF PHOSPHORUS: CPT | Performed by: PHYSICIAN ASSISTANT

## 2018-01-15 PROCEDURE — 80053 COMPREHEN METABOLIC PANEL: CPT | Performed by: PHYSICIAN ASSISTANT

## 2018-01-15 PROCEDURE — 85025 COMPLETE CBC W/AUTO DIFF WBC: CPT | Performed by: PHYSICIAN ASSISTANT

## 2018-01-15 RX ORDER — HEPARIN SODIUM (PORCINE) LOCK FLUSH IV SOLN 100 UNIT/ML 100 UNIT/ML
500 SOLUTION INTRAVENOUS EVERY 8 HOURS
Status: DISCONTINUED | OUTPATIENT
Start: 2018-01-15 | End: 2018-01-23 | Stop reason: HOSPADM

## 2018-01-15 RX ORDER — LORAZEPAM 0.5 MG/1
0.5 TABLET ORAL EVERY 4 HOURS PRN
Qty: 60 TABLET | Refills: 1 | Status: SHIPPED | OUTPATIENT
Start: 2018-01-15 | End: 2018-03-16

## 2018-01-15 RX ORDER — PROCHLORPERAZINE MALEATE 10 MG
10 TABLET ORAL EVERY 6 HOURS PRN
Qty: 90 TABLET | Refills: 3 | Status: SHIPPED | OUTPATIENT
Start: 2018-01-15 | End: 2018-03-16

## 2018-01-15 RX ORDER — HEPARIN SODIUM (PORCINE) LOCK FLUSH IV SOLN 100 UNIT/ML 100 UNIT/ML
5 SOLUTION INTRAVENOUS
Status: COMPLETED | OUTPATIENT
Start: 2018-01-15 | End: 2018-01-15

## 2018-01-15 RX ADMIN — SODIUM CHLORIDE, PRESERVATIVE FREE 500 UNITS: 5 INJECTION INTRAVENOUS at 17:15

## 2018-01-15 RX ADMIN — SODIUM CHLORIDE, PRESERVATIVE FREE 5 ML: 5 INJECTION INTRAVENOUS at 14:14

## 2018-01-15 RX ADMIN — POTASSIUM CHLORIDE AND SODIUM CHLORIDE: 900; 150 INJECTION, SOLUTION INTRAVENOUS at 15:58

## 2018-01-15 ASSESSMENT — PAIN SCALES - GENERAL: PAINLEVEL: MILD PAIN (2)

## 2018-01-15 NOTE — PROGRESS NOTES
[facility]    Hematology/Oncology Established Patient Note      Today's Date: 1/15/18    Reason for Follow-up: adenocarcinoma of the GE junction      HISTORY OF PRESENT ILLNESS: Daniel Sandhu is a 37 year old male who presents with adenocarcinoma of the GE junction.  In early 2017, patient started noticing difficulty swallowing, and that food seems to take longer to go down.  It became more frequent, and he saw his PCP, and was referred for EGD, which showed an esophageal mass located at the GE junction, measuring 4 cm.  Biopsy showed poorly differentiated adenocarcinoma.  HER-2 was sent and is equivocal (2+).  CT c/a/p showed a mildly prominent lymph node in the gastrohepatic ligament, but there were no pathologically enlarged lymph nodes in the chest, abdomen, or pelvis.  There was otherwise no evidence of metastatic disease.  PET-CT showed thickening of the distal esophagus consistent with known esophageal adenocarcinoma, as well as mildly hypermetabolic 1 cm lymph node in the gastrohepatic ligament.  There was no evidence of distant metastatic disease.  He underwent EUS on 6/9/17, which showed the esophageal tumor in the lower third of the esophagus, staged T2NX.  There were 2 abnormal lymph nodes seen in the gastrohepatic ligament; pathology was suspicious for adenocarcinoma.  Celiac node was sampled as well, which was benign.  He was seen by surgery, Dr. Esteban, and recommended srinivas-operative chemotherapy.    Port was placed on 6/22/17.    Chemotherapy:  Epirubicin 50 mg/m2 IV on day 1  Oxaliplatin 130 mg/m2 IV on day 1  Capecitabine 625 mg/m2 po BID on days 1-21  Every 21 day cycles    C1D1: 6/26/17  C2D1: 7/17/17  C3D1: 8/7/17    On 11/3/17, he underwent laparotomy with total gastrectomy and abdominal lymphadenectomy, left thoracotomy with distal esophagectomy and intrathoracic Terrell-en-Y esophagojejunostomy, feeding jejunostomy, left pharyngostomy tube placement, right tube  thoracostomy, and flexible bronchoscopy.  On 11/9/17, he was taken back to OR for I&D of pharyngostomy tube abscess.  Pathology showed adenocarcinoma, moderate differentiated, extensive residual tumor with no evidence tumor regression, margins negative, perineural invasion present, 1 of 28 lymph nodes were involved with malignancy, stage [id number] (stage IIIA).  HER-2 is non-amplified.    He resumed chemotherapy post-surgery, with plans to complete 3 more cycles.  He did not tolerate Xeloda well during his neoadjuvant chemotherapy, with issues with palmar-plantar, and likely genital erythrodysesthesia.  We discussed changing to 5-FU, and Daniel would like to try this, as he may also have issues with taking pills and absorption after his surgery.  He will receive epirubicin, oxaliplatin, and 5-FU x 3 cycles.    C4D1: 12/11/17  C5D1: 1/2/18    He was admitted to the hospital 1/9/18-1/13/18 for nausea, diarrhea, failure to thrive, severe malnutrition, generalized weakness.  His infusional chemotherapy was stopped on 1/8/18.      Chemotherapy is stopped after 5 cycles due to intolerance.      INTERIM HISTORY: Daniel comes in for follow-up today.  He was admitted to the hospital 1/9/18-1/13/18 due to nausea, diarrhea, generalized weakness, malnutrition, failure to thrive.  His 5-FU pump was stopped on 1/8/18.  The nausea and diarrhea improved in the hospital.  He underwent EGD in the hospital on 1/11/18, which showed ulcers, and no evidence of recurrence of his cancer.  One area biopsied showed inflammation, no evidence of malignancy.    Today, Daniel remains feeling weak.  He ways that the diarrhea is better with Imodium and Lomotil.  Nausea is better controlled now.  He is now on continuous tube feeds.  He denies pain.  It is still difficult for him to eat, mainly due to mouth sores.  He uses Magic Mouthwash, which helps.          REVIEW OF SYSTEMS:   14 point ROS was reviewed and is negative other than as noted above in  HPI.       HOME MEDICATIONS:  Current Outpatient Prescriptions   Medication Sig Dispense Refill     prochlorperazine (COMPAZINE) 10 MG tablet Take 1 tablet (10 mg) by mouth every 6 hours as needed for nausea or vomiting 90 tablet 3     LORazepam (ATIVAN) 0.5 MG tablet Take 1 tablet (0.5 mg) by mouth every 4 hours as needed for anxiety 60 tablet 1     loperamide (IMODIUM) 2 MG capsule Take 2 capsules (4 mg) by mouth every 6 hours (max dose of 16 mg daily). 120 capsule 0     diphenoxylate-atropine (LOMOTIL) 2.5-0.025 MG per tablet Take 1 tablet by mouth 4 times daily as needed for diarrhea 40 tablet 0     dexamethasone (DECADRON) 2 MG tablet Take 1 tablet (2 mg) by mouth daily 30 tablet 0     fiber modular (NUTRISOURCE FIBER) packet 1 packet by Per Feeding Tube route 3 times daily 90 packet 0     potassium chloride (KLOR-CON) 20 MEQ Packet Take 20 mEq by mouth 2 times daily 60 packet 0     artificial saliva (BIOTENE DRY MOUTHWASH) LIQD liquid Swish and spit 10 mLs in mouth 4 times daily 473 mL 0     magic mouthwash suspension (diphenhydramine, lidocaine, aluminum-magnesium & simethicone) Swish and swallow 5-10 mLs in mouth every 6 hours as needed for mouth sores 237 mL 1     ondansetron (ZOFRAN) 4 MG tablet Take 1 tablet (4 mg) by mouth every 6 hours 40 tablet 0     OLANZapine (ZYPREXA) 10 MG tablet Take 1 tablet (10 mg) by mouth At Bedtime 30 tablet 1     fluconazole (DIFLUCAN) 200 MG tablet Take 1 tablet (200 mg) by mouth daily 30 tablet 1     cyabnocobalamin (VITAMIN B-12) 2500 MCG sublingual tablet Place 2,500 mcg under the tongue daily 30 tablet 1     acetaminophen (TYLENOL) 32 mg/mL solution 30.45 mLs (975 mg) by Per J Tube route 3 times daily 400 mL 0     multivitamins with minerals (CERTAVITE/CEROVITE) LIQD liquid 15 mLs by Per J Tube route daily 450 mL 0     oxyCODONE (ROXICODONE) 5 MG/5ML solution Take 5-10 mLs (5-10 mg) by mouth every 4 hours as needed for moderate to severe pain (Patient not taking:  Reported on 1/2/2018) 300 mL 0         ALLERGIES:  No Known Allergies      PAST MEDICAL HISTORY:  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 6/5/2017         PAST SURGICAL HISTORY:  Past Surgical History:   Procedure Laterality Date     ESOPHAGOGASTRODUODENOSCOPY       ESOPHAGOSCOPY, GASTROSCOPY, DUODENOSCOPY (EGD), COMBINED N/A 6/9/2017    Procedure: COMBINED ENDOSCOPIC ULTRASOUND, ESOPHAGOSCOPY, GASTROSCOPY, DUODENOSCOPY (EGD), FINE NEEDLE ASPIRATE/BIOPSY;  Upper Endoscopic Ultrasound, fine needle aspirate/biopsy;  Surgeon: Guru Mark Avila MD;  Location: [facility] OR     ESOPHAGOSCOPY, GASTROSCOPY, DUODENOSCOPY (EGD), COMBINED N/A 11/3/2017    Procedure: COMBINED ESOPHAGOSCOPY, GASTROSCOPY, DUODENOSCOPY (EGD);;  Surgeon: Yunior Esteban MD;  Location: [facility] OR     ESOPHAGOSCOPY, GASTROSCOPY, DUODENOSCOPY (EGD), COMBINED N/A 11/9/2017    Procedure: COMBINED ESOPHAGOSCOPY, GASTROSCOPY, DUODENOSCOPY (EGD);  Esophogastroduodenoscopy, take out pharangostomy tube;  Surgeon: Yunior Esteban MD;  Location: [facility] OR     ESOPHAGOSCOPY, GASTROSCOPY, DUODENOSCOPY (EGD), COMBINED N/A 1/11/2018    Procedure: COMBINED ESOPHAGOSCOPY, GASTROSCOPY, DUODENOSCOPY (EGD), BIOPSY SINGLE OR MULTIPLE;  COMBINED ESOPHAGOSCOPY, GASTROSCOPY, DUODENOSCOPY (EGD);  Surgeon: Alessandro Mcgregor MD;  Location: [facility] GI     GASTRECTOMY N/A 11/3/2017    Procedure: GASTRECTOMY;;  Surgeon: Yunior Esteban MD;  Location: [facility] OR     HAND SURGERY      childhood, torn tendon     INSERT PORT VASCULAR ACCESS Right 6/22/2017    Procedure: INSERT PORT VASCULAR ACCESS;  Single Lumen Chest Power Port;  Surgeon: Iván Driver PA-C;  Location: [facility] OR     LAPAROSCOPY DIAGNOSTIC (GENERAL) N/A 11/3/2017    Procedure: LAPAROSCOPY DIAGNOSTIC (GENERAL);  diagnostic laparoscopy, right chest tube, total gastrectomy with distal esophagectomy, intrathoracic eveline-y  esophago-jejunostomy, feeding jejunostomy, pharyngostomy, esophagogastroduodenoscopy, flexible bronchoscopy;  Surgeon: Yunior Esteban MD;  Location: [facility] OR     LAPAROTOMY EXPLORATORY N/A 11/3/2017    Procedure: LAPAROTOMY EXPLORATORY;;  Surgeon: Yunior Esteban MD;  Location: [facility] OR     PHARYNGOSTOMY N/A 11/3/2017    Procedure: PHARYNGOSTOMY;;  Surgeon: Yunior Esteban MD;  Location: [facility] OR     THORACOTOMY Left 11/3/2017    Procedure: THORACOTOMY;;  Surgeon: Yunior Esteban MD;  Location: [facility] OR         SOCIAL HISTORY:  Social History     Social History     Marital status:      Spouse name: N/A     Number of children: 1     Years of education: N/A     Occupational History     musician and teacher       Social History Main Topics     Smoking status: Never Smoker     Smokeless tobacco: Never Used     Alcohol use Yes      Comment: 1 beer daily     Drug use: Yes     Special: Marijuana      Comment: occasional     Sexual activity: Yes     Partners: Female     Birth control/ protection: Natural Family Planning     Other Topics Concern     Not on file     Social History Narrative     He works at [employer] in [place], 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  He denies smoking.  He drinks ~1 beer a day.  He denies illicit drug use, other than occasional marijuana.  He lives in [place] with his wife, and 4.5 year-old daughter.  His wife is currently pregnant with a boy, and is due in 6 weeks.  He has a half sister who  of breast cancer at age 32; she was diagnosed in her late 20's.  A paternal grandmother had breast cancer in her 70's      FAMILY HISTORY:  Family History   Problem Relation Age of Onset     Other - See Comments Father      sepsis     CANCER Sister      breast cancer  29 yo 1/2 sister      CANCER Paternal Grandmother      breast         PHYSICAL EXAM:  Vital signs:  /74 (BP Location: Right arm, Patient Position: Sitting, Cuff Size:  Adult Regular)  Pulse 104  Temp 97.4  F (36.3  C) (Axillary)  Resp 16  Wt 58.4 kg (128 lb 12.8 oz)  SpO2 99%  BMI 19.08 kg/m2   ECO  GENERAL/CONSTITUTIONAL: No acute distress.  Appears malnourished, gaunt.  Accompanied by wife.    EYES: No scleral icterus.  RESPIRATORY: Clear to auscultation bilaterally. No crackles or wheezing.   CARDIOVASCULAR: Regular rate and rhythm without murmurs, gallops, or rubs.  GASTROINTESTINAL: No tenderness. The patient has normal bowel sounds. No guarding.  No distention.  Feeding tube in place.    MUSCULOSKELETAL: Warm and well-perfused, no cyanosis, clubbing, or edema.  NEUROLOGIC: Alert, oriented, answers questions appropriately.  INTEGUMENTARY: No jaundice.  Port in place.        LABS:  CBC RESULTS:   Recent Labs   Lab Test  01/15/18   1420   WBC  8.3   RBC  5.30   HGB  13.3   HCT  41.6   MCV  79   MCH  25.1*   MCHC  32.0   RDW  15.6*   PLT  432     Recent Labs   Lab Test  01/15/18   1420  18   1600  18   0109   NA  134   --   134   POTASSIUM  2.4*  4.0  3.2*   CHLORIDE  113*   --   112*   CO2  9*   --   14*   ANIONGAP  12   --   8   GLC  126*   --   105*   BUN  14   --   10   CR  0.80   --   0.76   YOBANY  8.1*   --   7.6*     Lab Results   Component Value Date    AST 15 01/15/2018     Lab Results   Component Value Date    ALT 21 01/15/2018     No results found for: BILICONJ   Lab Results   Component Value Date    BILITOTAL 0.7 01/15/2018     Lab Results   Component Value Date    ALBUMIN 2.6 01/15/2018     Lab Results   Component Value Date    PROTTOTAL 7.0 01/15/2018      Lab Results   Component Value Date    ALKPHOS 73 01/15/2018     Component      Latest Ref Rng & Units 2017   CEA      0 - 2.5 ug/L <0.5       IMAGING:  CT c/a/p 17:  1. Surgical changes of total gastrectomy and distal esophagectomy with  intrathoracic Terrell-Y esophagojejunostomy with resolution of the  previously seen pneumothoraces, trace remaining bilateral pleural  effusions, trace  remaining mediastinal air (a single tiny focus  adjacent to the upper esophagus at the level of the thyroid gland),  and a small amount of fluid surrounding the intrathoracic jejunum  extending into the abdomen and pelvis.   2. No evidence of metastatic disease in the chest, abdomen, or pelvis.  3. Unchanged nonspecific central mesenteric haziness. Recommend  attention on follow-up.    CT abdomen/pelvis 12/28/17:  1. Percutaneous jejunostomy tube is in good position. No evidence for  hernia, fluid collection or significant inflammation along the  subcutaneous tract.   2. Stable postsurgical changes of distal esophagectomy and total  gastrectomy with intrathoracic esophagojejunostomy pull-through. No  evidence for obstruction.      ASSESSMENT/PLAN:  Daniel Sandhu is a 37 year old male with:    1) Adenocarcinoma of the GE junction: now s/p 3 cycles of neoadjuvant chemotherapy with EOX, followed by surgical resection on 11/3/17.  Pathology showed adenocarcinoma, moderate differentiated, extensive residual tumor with no evidence tumor regression, margins negative, perineural invasion present, 1 of 28 lymph nodes were involved with malignancy, stage [id number] (stage IIIA).  HER-2 is non-amplified.    Post-op CT scans reviewed.  There are post-operative changes seen.  No evidence of metastatic disease seen on imaging.      He has received EOF x 2 cycles after surgery, and he has not tolerated well.  The 5-FU on cycle 5 was discontinued early. We discussed stopping chemotherapy at this point, as I do not think he can safely tolerate any more chemotherapy.  Daniel is agreeable with this.       -stop chemotherapy  -he has follow-up appointment with Dr. Esteban in March 2018 with CT scans  -I asked him to follow-up next week with PA, but he declined and refers to call if he is not feeling well.  I will see him back in clinic when I return on 2/5/18 with labs.    2) Genetics:  -genetic testing was negative    3)  Chemotherapy-induced nausea/vomiting:   -he has Zofran ODT and Compazine, which are helpful.  Compazine prescription renewed today.  Ativan prescription also renewed today.  -he is certified for medical [facility]    4) Mucositis:  -he uses Magic Mouthwash and salt rinses    5) Diarrhea:  Infectious evaluation was negative in the hospital, including C.diff.    -Lomotil and Imodium prn has been helping  -IV fluids today    6) Hypokalemia: likely due to losses from diarrhea  -he will increase potassium to 40 mEq twice a day for now, and repeat BMP on Thursday, 1/18/18  -he will also get potassium replacement today, per protocol, in the infusion room    7) Fertility: Daniel and his wife have 2 children, including a baby boy just born around June 2017.  He is not planning for more children in the near future.    8) Nutrition: He is on tube feeds.  He is able to eat some by mouth, but mucositis is making this more difficult.        I spent a total of 40 minutes with the patient, with over >50% of the time in counseling and/or coordination of care.       Laurence Hood MD  Hematology/Oncology  [facility]

## 2018-01-15 NOTE — TELEPHONE ENCOUNTER
ED / Discharge Outreach Protocol    Patient Contact    Attempt # 1    Was call answered?  No.  Left message on voicemail with information to call me back.    Mari Grigsby RN  [facility]

## 2018-01-15 NOTE — PROGRESS NOTES
Infusion Nursing Note:  Daniel Sandhu presents today for NS + 20KCL.    Patient seen by provider today: Yes: Dr. Hood   present during visit today: Not Applicable.    Patient added on to schedule today for potassium replacement for K of 2.4. Per Nannette Hogue RNCC and Dr. Hood, patient only needs 20meq of IV potassium replaced today.     Intravenous Access:  Implanted Port.    Treatment Conditions:  Lab Results   Component Value Date     01/15/2018                   Lab Results   Component Value Date    POTASSIUM 2.4 01/15/2018           Lab Results   Component Value Date    MAG 2.4 01/15/2018            Lab Results   Component Value Date    CR 0.80 01/15/2018                   Lab Results   Component Value Date    YOBANY 8.1 01/15/2018                Lab Results   Component Value Date    BILITOTAL 0.7 01/15/2018           Lab Results   Component Value Date    ALBUMIN 2.6 01/15/2018                    Lab Results   Component Value Date    ALT 21 01/15/2018           Lab Results   Component Value Date    AST 15 01/15/2018       Post Infusion Assessment:  Patient tolerated infusion without incident.  Blood return noted pre and post infusion.  Site patent and intact, free from redness, edema or discomfort.  No evidence of extravasations.  Access discontinued per protocol.    Discharge Plan:   Prescription refills given for Ativan, Compazine.  AVS to patient via [employer].  Patient will return 2/5/18 for next appointment (patient will have home labs drawn to f/u with K).   Patient discharged in stable condition accompanied by: wife.  Departure Mode: Ambulatory.    Stephanie Guerrero RN

## 2018-01-15 NOTE — TELEPHONE ENCOUNTER
This patient was discharged from [facility] on 1-13-18.    Discharge Diagnosis:   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  - Primary C15.5    S/P gastrectomy  Z90.3    Diarrhea, unspecified type  R19.7    Chemotherapy-induced nausea  R11.0, T45.1X5A    Loss of appetite  R63.0    Hypokalemia  E87.6    Dry mouth  R68.2    Mucositis  K12.30      Follow-up instructions: See notes    A follow-up visit has not been scheduled.      Please follow-up with patient.

## 2018-01-15 NOTE — NURSING NOTE
"Chief Complaint   Patient presents with     Oncology Clinic Visit     F/U Esophageal ca     Port Draw     labs drawn from port by rn  vs taken     Port accessed with 20 gauge 3/4\" gripper needle and labs drawn by rn.  Port flushed with NS and heparin; port de-accessed.  Pt tolerated well.  VS taken.  Pt checked in for next appt.  Fidelia Massey RN      "

## 2018-01-15 NOTE — PATIENT INSTRUCTIONS
Your potassium was very low in clinic today.  You will receive 20 meq of potassium in the IV today.  You will take 3 more 20 meq packets of potassium tonight, ok to spread them out.  You will increase the dose of potassium to 4 packets until we recheck it at home on Thursday.  We will call you with the lab results on Thursday.   If you do not hear from Nannette by 3 pm on Thursday , pls call [phone].

## 2018-01-15 NOTE — DISCHARGE SUMMARY
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  He is now undergoing adjuvant chemotherapy with EOF x 3 cycles. He has been admitted to the hospital after being seen in clinic for evaluation of diarrhea, nausea, inability to eat, and extreme weakness.    --Adopted from the H&P--  \"Daniel Sandhu is a 37 year old man with history of adenocarcinoma of the GE junction. He presented to clinic today with his wife on an add-on basis for evaluation of diarrhea, nausea, gagging, inability to eat, and extreme weakness. He notes these symptoms all started around 1/4/18. Diarrhea started on 1/4/18 at night, and has persisted since that time without any improvement or worsening. He has been having at least 5 large volume, watery stools per day (even overnight). He has not tried Imodium or any other anti-diarrheal agents. Of note, wife and daughter had a \"GI bug\" around [place]. The nausea/gagging/inability to eat have been worsening for a few weeks. Prior to 1/4/18, he was eating at least one full meal daily in addition to the tube feeds overnight. He gradually has developed very dry mouth with thick oral secretions, which make it difficult to eat. He feels like he is gagging on these secretions and this causes nausea. Additionally, there is a sensation of a \"stricture\" in his esophagus which makes him feel nauseated as well. He is using zofran and ativan at home, in " "addition to a liter of NS daily with [facility]. He has been eating very little lately, noting that he can tolerate only a small amount of apple sauce and apple juice. These are the only two things that really are able to go down. Regarding the extreme weakness, he notes this seemed to have start along with the nausea, diarrhea, and inability to eat started on 1/4/18. He has felt tremendously weak. He has difficulty at home with even the slightest physical activity. He describes having to rest at least 10-15 minutes after small amounts of activity, just to get his heart rate down. He is aware that he has a rapid pulse at times at home. He was in a wheelchair at clinic because an RN witnessed him unsteady on his feet while trying to take his jacket off. He denies dizziness. He has MIMS but no SOB at rest or chest pain. Otherwise no fevers, chills, abdominal pain, bleeding, or swelling.\"     Hospital Course   Daniel Sandhu was admitted on 1/9/2018.  The following problems were addressed during his hospitalization:    #Nausea-improved.  #Diarrhea-improving.  #Weakness.  #Failure to thrive.  Daniel started cycle 2 EOF on 1/2/18. He previously had tolerated cycle 1 well without significant issues and had started supplementing oral intake by mouth without difficulty. Around 1/4/18, he suddenly developed large volume, watery diarrhea at night with occasional episodes during the day, in addition to nausea and profound weakness. Gradually over the last week or so PTA he has had a sensation of a \"stricture\" with gagging and extremely dry mouth, which have also limited his PO intake. He has lost 14 lbs in the last week. He was given IV fluids, zofran and dexamethasone 12 mg in clinic and he improved, and tolerated food last night.   - Continued 2mg dexamethasone daily for appetite and nausea  - Enteric pathogen and C.diff is NEG. Discharged on scheduled imodium qid and lomotil prn.  - Esophogram XR completed this am to evaluate for " "stricture; No stricture seen, however \"Questionable focal mucosal abnormality of the jejunal lumen near the anastomosis. Although this may represent normal jejunal fold, perianastomotic jejunal ulcer is not entirely excluded\".  - Thoracic surgery consulted, no procedures offered.  - GI consulted; EDG done 1/11 reassuring for only ulcers, no recurrence. One area biopsied demonstrating inflammation, no e/o malignancy.      - Nausea: Schedule zofran, Compazine and ativan PRN.  - Fiber scheduled TID for diarrhea, per nutrition recommend Peptamin 1.5 mg formula to help aid in more absorption and hopefully decrease diarrhea. (initiated on day of discharge, sent patient with enough supply to get him through Monday, [facility] home infusion to supply new formula to patient).      #Severe Malnutrition in the context of acute illness   -\% Intake: </= 50% for >/= 5 days (severe): Present; Note that this is less that what the pt was eating after his surgery in November. % Weight Loss: > 7.5% in 3 months (severe) - 9% weight loss in past week. Subcutaneous Fat Loss: Facial region: Mild Muscle Loss: Temporal, Facial & jaw region, Scapular bone, Thoracic region (clavicle, acromium bone, deltoid, trapezius, pectoral) and Posterior calf:  Severe  - Nutrition consulted and appreciate nutritional and TF recommendations (see above). Titrated up to goal rate.  - J-tube accidentally removed; Thoracic replaced it immediately1/11 in AM, OK for use.   - Regular diet as tolerated      #Dry mouth/mucositis:  - biotene QID  - nasal saline spray PRN     #Adenocarcinoma of the GE junction.  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 Esteban): esophagogastroscopy, diagnostic laparoscopy, laparotomy with total gastrectomy and abdominal lymphadenectomy (D2), LEFT thoracotomy with distal esophagectomy and intrathoracic Terrell-en-Y " esophagojejunostomy, feeding jejunostomy, LEFT pharyngostomy tube placement, RIGHT tube thoracostomy, and flexible bronchoscopy c/b neck hematoma associated with pharyngostomy tube s/p (11/9/2017) oropharyngeal exploration and esophagojejunostomy.   - He continues on adjuvant chemotherapy with epirubicin, oxaliplatin, 5FU (21-day continuous infusion). He started cycle 5 on 1/2/2018. The 5FU pump has been disconnected as of 1/8/18 due to the above symptoms.  - Cycle 6 is tentatively planned for 1/23/18, and he has follow-up with Dr. Esteban on 3/7/18 with re-staging CT CAP on that same day.     OTHER     #FEN:     -started KCl 20meq BID given diarrhea  Diet: per above     #PPx  - Hold DVT prophylaxis due to possible invasive procedures    Patient and plan discussed with Dr. Galan.    Tiffani Burks PA-C  Hematology/Oncology  Pager #[phone]    Significant Results and Procedures   See above.    Pending Results   These results will be followed up by Dr. Hood.  Unresulted Labs Ordered in the Past 30 Days of this Admission     Date and Time Order Name Status Description    1/9/2018 1513 Blood culture Preliminary         Code Status   Full Code    Primary Care Physician   Lencho Chan    Physical Exam                      Vitals:    01/11/18 0828 01/12/18 1200 01/13/18 1656   Weight: 59.8 kg (131 lb 13.4 oz) 60.6 kg (133 lb 9.6 oz) 59 kg (130 lb)     Vital Signs with Ranges     I/O last 3 completed shifts:  In: 5560 [P.O.:1760; I.V.:900; NG/GT:270; IV Piggyback:1000]  Out: 7150 [Stool:7150]     Constitutional: Pleasant male seen sitting up in bed, in NAD. Alert and interactive.   HEENT: NCAT, anicteric sclerae, conjunctiva clear. Moist mucous membranes with mild mucositis, white sores on each side of tongue. Dentition intact/well cared for.  Respiratory: Non-labored breathing, good air exchange. Lungs are clear to auscultation bilaterally, without wheezing, crackles or rhonchi. No cough noted.   Cardiovascular:  Regular rate and rhythm with no murmur, rub or gallop.  GI: Normoactive BS. Abdomen is soft, non-distended, and non-tender to palpation. No rigidity or guarding. Feeding tube in place without erythema or drainage.  Skin: Warm and dry. No rashes or lesions on exposed surfaces.  Musculoskeletal: Extremities grossly normal. No tenderness or edema present.   Neurologic: A &O x3, speech normal, answering questions appropriately. Moves all extremities spontaneously. Grossly non-focal.  Neuropsychiatric: Mentation and affect normal/appropriate.    Discharge Disposition   Discharged to home  Condition at discharge: Stable    Consultations This Hospital Stay   NUTRITION SERVICES ADULT IP CONSULT  PHYSICAL THERAPY ADULT IP CONSULT  OCCUPATIONAL THERAPY ADULT IP CONSULT  THORACIC SURGERY ADULT IP CONSULT  GI LUMINAL ADULT IP CONSULT    Discharge Orders     CBC with platelets and differential   Last Lab Result: Hemoglobin (g/dL)      Date                     Value                01/13/2018               10.9 (L)         ----------     Comprehensive metabolic panel (BMP + Alb, Alk Phos, ALT, AST, Total. Bili, TP)     Magnesium     Phosphorus     Home infusion referral     Reason for your hospital stay   Admitted for diarrhea, weakness, and nausea.     Follow Up and recommended labs and tests   Follow up as scheduled below:     Activity   Your activity upon discharge: activity as tolerated     When to contact your care team   [facility] triage line at [phone] for temp >100.4, uncontrolled nausea/vomiting/diarrhea/constipation, unrelieved pain, bleeding not relieved with pressure, dizziness, chest pain, shortness of breath, loss of consciousness, and any new or concerning symptoms.     Full Code     Diet   Follow this diet upon discharge: Orders Placed This Encounter     Snacks/Supplements Adult: Nutrisource Fiber; Between Meals, Ensure Clear; Between Meals, Beneprotein; With Meals     Adult Formula Drip  Feeding: Continuous with peptamin 1.5; Jejunostomy; Goal Rate: 60; mL/hr; Medication - Tube Feeding Flush Frequency: At least 15-30 mL water before and after medication administration and with tube clogging       Discharge Medications   Discharge Medication List as of 1/13/2018  4:24 PM      START taking these medications    Details   loperamide (IMODIUM) 2 MG capsule Take 2 capsules (4 mg) by mouth every 6 hours (max dose of 16 mg daily)., Disp-120 capsule, R-0, E-Prescribe      diphenoxylate-atropine (LOMOTIL) 2.5-0.025 MG per tablet Take 1 tablet by mouth 4 times daily as needed for diarrhea, Disp-40 tablet, R-0, Local Print      dexamethasone (DECADRON) 2 MG tablet Take 1 tablet (2 mg) by mouth daily, Disp-30 tablet, R-0, E-Prescribe      fiber modular (NUTRISOURCE FIBER) packet 1 packet by Per Feeding Tube route 3 times daily, Disp-90 packet, R-0, E-Prescribe      potassium chloride (KLOR-CON) 20 MEQ Packet Take 20 mEq by mouth 2 times daily, Disp-60 packet, R-0, E-Prescribe      artificial saliva (BIOTENE DRY MOUTHWASH) LIQD liquid Swish and spit 10 mLs in mouth 4 times daily, Disp-473 mL, R-0, E-Prescribe      magic mouthwash suspension (diphenhydramine, lidocaine, aluminum-magnesium & simethicone) Swish and swallow 5-10 mLs in mouth every 6 hours as needed for mouth sores, Disp-237 mL, R-1, Local Print         CONTINUE these medications which have CHANGED    Details   ondansetron (ZOFRAN) 4 MG tablet Take 1 tablet (4 mg) by mouth every 6 hours, Disp-40 tablet, R-0, E-Prescribe         CONTINUE these medications which have NOT CHANGED    Details   Prochlorperazine Maleate (COMPAZINE PO) Take by mouth every 6 hours as needed for nausea, Historical      LORazepam (ATIVAN PO) Take by mouth every 4 hours as needed for anxiety, Historical      fluconazole (DIFLUCAN) 200 MG tablet Take 1 tablet (200 mg) by mouth daily, Disp-30 tablet, R-1, E-Prescribe      cyabnocobalamin (VITAMIN B-12) 2500 MCG sublingual tablet  Place 2,500 mcg under the tongue daily, Disp-30 tablet, R-1, E-Prescribe      acetaminophen (TYLENOL) 32 mg/mL solution 30.45 mLs (975 mg) by Per J Tube route 3 times daily, Disp-400 mL, R-0, E-Prescribe      OLANZapine (ZYPREXA) 10 MG tablet Take 1 tablet (10 mg) by mouth At Bedtime, Disp-30 tablet, R-1, E-Prescribe      multivitamins with minerals (CERTAVITE/CEROVITE) LIQD liquid 15 mLs by Per J Tube route daily, Disp-450 mL, R-0, E-Prescribe      oxyCODONE (ROXICODONE) 5 MG/5ML solution Take 5-10 mLs (5-10 mg) by mouth every 4 hours as needed for moderate to severe pain, Disp-300 mL, R-0, Local Print         STOP taking these medications       sennosides (SENOKOT) 8.8 MG/5ML syrup Comments:   Reason for Stopping:             Allergies   No Known Allergies  Data   Most Recent 3 CBC's:  Recent Labs   Lab Test  01/13/18   0109 01/12/18   0254  01/11/18   0432   WBC  3.8*  2.2*  2.3*   HGB  10.9*  10.9*  11.3*   MCV  76*  77*  77*   PLT  191  232  237      Most Recent 3 BMP's:  Recent Labs   Lab Test  01/13/18   1600  01/13/18   0109  01/12/18   1234  01/12/18   0254  01/11/18   0432   NA   --   134   --   136  138   POTASSIUM  4.0  3.2*  3.4  3.1*  3.6   CHLORIDE   --   112*   --   110*  111*   CO2   --   14*   --   16*  17*   BUN   --   10   --   16  20   CR   --   0.76   --   0.73  0.84   ANIONGAP   --   8   --   10  10   YOBANY   --   7.6*   --   7.6*  8.2*   GLC   --   105*   --   99  107*     Most Recent 2 LFT's:  Recent Labs   Lab Test  01/13/18   0109 01/12/18   0254   AST  12  11   ALT  15  14   ALKPHOS  51  46   BILITOTAL  0.2  0.3     Most Recent INR's and Anticoagulation Dosing History:  Anticoagulation Dose History     Recent Dosing and Labs Latest Ref Rng & Units 6/9/2017 7/29/2017    INR 0.86 - 1.14 1.12 1.14        Most Recent 3 Troponin's:No lab results found.  Most Recent Cholesterol Panel:No lab results found.  Most Recent 6 Bacteria Isolates From Any Culture (See EPIC Reports for Culture  Details):  Recent Labs   Lab Test  01/09/18   1505   CULT  No growth after 5 days     Most Recent TSH, T4 and A1c Labs:No lab results found.  Results for orders placed or performed during the hospital encounter of 01/09/18   XR Esophagram    Narrative    Esophagram dated 1/10/2018    COMPARISON:    Modified swallow study and limited esophagram  11/13/2017, multiple prior CT, most recent 12/28/2017    HISTORY:    Evaluate stricture, history of GE junction adenocarcinoma,  difficulty with gagging and nausea    Additional history obtained from EHR and patient: Status post total  gastrectomy with distal esophagectomy and intrathoracic Terrell-en-Y  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 Patient describes sensation of food getting stuck in the mid  chest, both liquids and solids, with feeling of spasms. Denies emesis  or regurgitation of ingested food. Additionally, describes burning  sensation in the epigastrium which is not improved with antacids.    TECHNIQUE: Patient was given thin liquid barium to ingest. Patient was  evaluated with fluoroscopy, and multiple spot films were obtained.     Fluoro time: 3.7 minutes    FINDINGS:   The  radiograph shows right-sided PICC with tip projecting over  low SVC. Multiple surgical clips projecting over the lower mediastinum  and left upper quadrant. Heart size is within normal limits. Lungs are  clear.    Single contrast upper GI was performed. Examination of the esophagus  reveals adequate primary and secondary peristalsis. Post surgical  changes of distal esophagectomy and esophagojejunostomy. Contrast  material easily progresses across the esophagojejunal anastomosis into  the jejunum without evidence of focal stricture. Contrast was observed  to pool in a herniated/redundant portion of intrathoracic jejunum,  which empties secondary to cardiac pulsation. Questionable focal  mucosal abnormality just below the level of the the  anastomosis along  the posterior and right lateral aspect of the jejunum at the level of  the diaphragm curve, with incomplete clearance on subsequent swallows.  At the conclusion of the study, contrast material was visualized in  the distal small bowel which were normal in caliber, without evidence  of obstruction or stricture.      Impression    IMPRESSION:  1.  Postoperative changes of distal esophagectomy and  esophagojejunostomy without evidence of esophageal or anastomotic  stricture.  2.  Questionable focal mucosal abnormality of the jejunal lumen near  the anastomosis. Although this may represent normal jejunal fold,  perianastomotic jejunal ulcer is not entirely excluded especially in  the setting of epigastric pain and burning sensation as reported by  the patient.  3.  Normal transit of contrast through the visualize small bowel  without evidence obstruction.    I have personally reviewed the examination and initial interpretation  and I agree with the findings.    NARCISA FIELDS MD   XR Abdomen Port 1 View    Narrative    Examination:  XR ABDOMEN PORT F1 VW 1/11/2018 9:54 AM     Comparison: CT abdomen and pelvis 12/28/2017.    History: tube check with GASTROGRAFFIN instillation prior to XR.;     Findings: 2 supine frontal views of the abdomen are obtained. Surgical  clips projecting over the epigastrium and left upper quadrant. J-tube  with tip projecting over the left side of the abdomen presumably  within the jejunum, similar in appearance compared to 12/28/2017 CT.  Air distended loops of small bowel and colon with contrast material  visualized in the small bowel extending to the mid transverse colon.  There is no pneumatosis or portal venous gas.       Impression    Impression:   1.  Air distended loops of bowel with contrast visualized to the mid  transverse colon. No evidence of obstruction. Could consider obtaining  12 to 24 hour delayed imaging to watch for passage of contrast.  2.  Stable  position of J-tube tip.    I have personally reviewed the examination and initial interpretation  and I agree with the findings.    CHEN LATHAM MD

## 2018-01-15 NOTE — PROGRESS NOTES
RN Care Coordination Note    TORB:  Laurnece Hood MD / Nannette Ovalles RN:  -1literNS+20meqKCl today in infusion  -Pt has been taking 20 meq KCl packets bid, and needs to increase to 40 meq bid x 3 days   -recheck KCl with [facility] at home on 1/18/18 - order called to [facility]  Met with pt and his wife after clinic visit today.   Reviewed above plans with pt and his wife. They verbalize understanding and also understand to call if pt sx do not continue to improve.   Transported pt to infusion in wheelchair, accompanied by wife. Report given to MELECIO Brown.  Nannette Ovalles RN, BSN, OCN  Care Coordinator  [facility]

## 2018-01-15 NOTE — MR AVS SNAPSHOT
After Visit Summary   1/15/2018    Daniel Sandhu    MRN: [id number]           Patient Information     Date Of Birth          1980        Visit Information        Provider Department      1/15/2018 2:15 PM Laurence Hood MD [facility]        Today's Diagnoses     Chemotherapy-induced nausea    -  1    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           Follow-ups after your visit        Your next 10 appointments already scheduled     Feb 05, 2018  1:15 PM CST   [facility] Draw with [facility] LAB DRAW   [facility] Lab Draw ([facility])    [address]  [address]  [place] [address]   [phone]            Feb 05, 2018  1:45 PM CST   (Arrive by 1:30 PM)   Return Visit with Laurence Hood MD   [facility] ([facility])    [address]  [address]  [place] [address]   [phone]            Mar 07, 2018  1:40 PM CST   CT CHEST ABDOMEN PELVIS W/O CONTRAST with UCCT1   [facility] ([facility])    [address]  1[address]  [place] [address] [phone]           Please bring any scans or X-rays taken at other hospitals, if similar tests were done. Also bring a list of your medicines, including vitamins, minerals and over-the-counter drugs. It is safest to leave personal items at home.  Be sure to tell your doctor:   If you have any allergies.   If there s any chance you are pregnant.   If you are breastfeeding.   If you have any special needs.  You may have contrast for this exam. To prepare:   Do not eat or drink for 2 hours before your exam. If you need to take medicine, you may take it with small sips of water. (We may ask you to take liquid medicine as well.)   The day before your exam, drink extra fluids at least six 8-ounce glasses (unless your doctor tells you to restrict your  fluids).  Patients over 70 or patients with diabetes or kidney problems:   If you haven t had a blood test (creatinine test) within the last 30 days, go to your clinic or Diagnostic Imaging Department for this test.  If you have diabetes:   If your kidney function is normal, continue taking your metformin (Avandamet, Glucophage, Glucovance, Metaglip) on the day of your exam.   If your kidney function is abnormal, wait 48 hours before restarting this medicine.  You will have oral contrast for this exam:   You will drink the contrast at home. Get this from your clinic or Diagnostic Imaging Department. Please follow the directions given.  Please wear loose clothing, such as a sweat suit or jogging clothes. Avoid snaps, zippers and other metal. We may ask you to undress and put on a hospital gown.  If you have any questions, please call the Imaging Department where you will have your exam.            Mar 07, 2018  2:30 PM CST   (Arrive by 2:15 PM)   Return Visit with Yunior Esteban MD   [facility] [place] [facility] ([facility] [place])    [address] [place] [address]  [address] [place] [address] [phone]              Future tests that were ordered for you today     Open Future Orders        Priority Expected Expires Ordered    Basic metabolic panel Routine 1/18/2018 1/15/2019 1/15/2018    CBC with platelets differential Routine 2/5/2018 1/15/2019 1/15/2018    Comprehensive metabolic panel Routine 2/5/2018 1/15/2019 1/15/2018    Magnesium Routine 2/5/2018 1/15/2019 1/15/2018            Who to contact     If you have questions or need follow up information about today's clinic visit or your schedule please contact [facility] directly at [phone].  Normal or non-critical lab and imaging results will be communicated to you by MyChart, letter or phone within 4 business days after the clinic has received the results. If you do not hear from us within 7 days, please  contact the clinic through [employer] or phone. If you have a critical or abnormal lab result, we will notify you by phone as soon as possible.  Submit refill requests through [employer] or call your pharmacy and they will forward the refill request to us. Please allow 3 business days for your refill to be completed.          Additional Information About Your Visit        [employer] Information     [employer] gives you secure access to your electronic health record. If you see a primary care provider, you can also send messages to your care team and make appointments. If you have questions, please call your primary care clinic.  If you do not have a primary care provider, please call [phone] and they will assist you.        Care EveryWhere ID     This is your Care EveryWhere ID. This could be used by other organizations to access your [facility] medical records  [id number]        Your Vitals Were     Pulse Temperature Respirations Pulse Oximetry BMI (Body Mass Index)       104 97.4  F (36.3  C) (Axillary) 16 99% 19.08 kg/m2        Blood Pressure from Last 3 Encounters:   01/15/18 118/74   01/13/18 108/72   01/09/18 120/76    Weight from Last 3 Encounters:   01/15/18 58.4 kg (128 lb 12.8 oz)   01/13/18 59 kg (130 lb)   01/09/18 60.6 kg (133 lb 8 oz)              We Performed the Following     CBC with platelets and differential     Comprehensive metabolic panel (BMP + Alb, Alk Phos, ALT, AST, Total. Bili, TP)     Magnesium     Phosphorus          Today's Medication Changes          These changes are accurate as of: 1/15/18  5:05 PM.  If you have any questions, ask your nurse or doctor.               These medicines have changed or have updated prescriptions.        Dose/Directions    LORazepam 0.5 MG tablet   Commonly known as:  ATIVAN   This may have changed:    - medication strength  - how much to take   Used for:  Chemotherapy-induced nausea   Changed by:  Laurence Hood MD        Dose:  0.5 mg   Take 1 tablet  (0.5 mg) by mouth every 4 hours as needed for anxiety   Quantity:  60 tablet   Refills:  1       prochlorperazine 10 MG tablet   Commonly known as:  COMPAZINE   This may have changed:    - medication strength  - how much to take  - reasons to take this   Used for:  Chemotherapy-induced nausea   Changed by:  Laurence Hood MD        Dose:  10 mg   Take 1 tablet (10 mg) by mouth every 6 hours as needed for nausea or vomiting   Quantity:  90 tablet   Refills:  3            Where to get your medicines      These medications were sent to [place], [place] - [address]  [address], [facility] [address]    Hours:  TRANSPLANT PHONE NUMBER [phone] Phone:  [phone]     prochlorperazine 10 MG tablet         Some of these will need a paper prescription and others can be bought over the counter.  Ask your nurse if you have questions.     Bring a paper prescription for each of these medications     LORazepam 0.5 MG tablet                Primary Care Provider Office Phone # Fax #    Lencho Chan [phone]       [address] [place] [address]        Equal Access to Services     [facility] AH: Hadii garo stanley hadjesus albertoo Sotee, waaxda luqadaha, qaybta kaalmada adeegyada, alejandro brown . So [facility] [phone].    ATENCIÓN: Si habla español, tiene a zayas disposición servicios gratuitos de asistencia lingüística. Ll[facility] [phone].    We comply with applicable federal civil rights laws and [place] laws. We do not discriminate on the basis of race, color, national origin, age, disability, sex, sexual orientation, or gender identity.            Thank you!     Thank you for choosing [facility]  for your care. Our goal is always to provide you with excellent care. Hearing back from our patients is one way we can continue to improve our services. Please take a few  minutes to complete the written survey that you may receive in the mail after your visit with us. Thank you!             Your Updated Medication List - Protect others around you: Learn how to safely use, store and throw away your medicines at www.disposemymeds.org.          This list is accurate as of: 1/15/18  5:05 PM.  Always use your most recent med list.                   Brand Name Dispense Instructions for use Diagnosis    acetaminophen 32 mg/mL solution    TYLENOL    400 mL    30.45 mLs (975 mg) by Per J Tube route 3 times daily    Acute post-operative pain       artificial saliva Liqd liquid     473 mL    Swish and spit 10 mLs in mouth 4 times daily    Dry mouth       cyabnocobalamin 2500 MCG sublingual tablet    VITAMIN B-12    30 tablet    Place 2,500 mcg under the tongue daily    B12 deficiency       dexamethasone 2 MG tablet    DECADRON    30 tablet    Take 1 tablet (2 mg) by mouth daily    Chemotherapy-induced nausea, Loss of appetite       diphenoxylate-atropine 2.5-0.025 MG per tablet    LOMOTIL    40 tablet    Take 1 tablet by mouth 4 times daily as needed for diarrhea    Diarrhea, unspecified type       fiber modular packet     90 packet    1 packet by Per Feeding Tube route 3 times daily    Diarrhea, unspecified type       fluconazole 200 MG tablet    DIFLUCAN    30 tablet    Take 1 tablet (200 mg) by mouth daily    Thrush       loperamide 2 MG capsule    IMODIUM    120 capsule    Take 2 capsules (4 mg) by mouth every 6 hours (max dose of 16 mg daily).    S/P gastrectomy, Diarrhea, unspecified type       LORazepam 0.5 MG tablet    ATIVAN    60 tablet    Take 1 tablet (0.5 mg) by mouth every 4 hours as needed for anxiety    Chemotherapy-induced nausea       magic mouthwash suspension (diphenhydrAMINE, lidocaine, aluminum-magnesium & simethicone) Susp compounding kit     237 mL    Swish and swallow 5-10 mLs in mouth every 6 hours as needed for mouth sores    Mucositis       multivitamins with  minerals Liqd liquid     450 mL    15 mLs by Per J Tube route daily    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       OLANZapine 10 MG tablet    zyPREXA    30 tablet    Take 1 tablet (10 mg) by mouth At Bedtime    Chemotherapy-induced nausea       ondansetron 4 MG tablet    ZOFRAN    40 tablet    Take 1 tablet (4 mg) by mouth every 6 hours    Chemotherapy-induced nausea       oxyCODONE 5 MG/5ML solution    ROXICODONE    300 mL    Take 5-10 mLs (5-10 mg) by mouth every 4 hours as needed for moderate to severe pain    Acute post-operative pain       potassium chloride 20 MEQ Packet    KLOR-CON    60 packet    Take 20 mEq by mouth 2 times daily    Hypokalemia       prochlorperazine 10 MG tablet    COMPAZINE    90 tablet    Take 1 tablet (10 mg) by mouth every 6 hours as needed for nausea or vomiting    Chemotherapy-induced nausea

## 2018-01-15 NOTE — NURSING NOTE
"Oncology Rooming Note    January 15, 2018 2:35 PM   Daniel Sandhu is a 37 year old male who presents for:    Chief Complaint   Patient presents with     Oncology Clinic Visit     F/U Esophageal ca     Port Draw     labs drawn from port by rn  vs taken     Initial Vitals: /74 (BP Location: Right arm, Patient Position: Sitting, Cuff Size: Adult Regular)  Pulse 104  Temp 97.4  F (36.3  C) (Axillary)  Resp 16  Wt 58.4 kg (128 lb 12.8 oz)  SpO2 99%  BMI 19.08 kg/m2 Estimated body mass index is 19.08 kg/(m^2) as calculated from the following:    Height as of 1/9/18: 1.75 m (5' 8.9\").    Weight as of this encounter: 58.4 kg (128 lb 12.8 oz). Body surface area is 1.68 meters squared.  Mild Pain (2) Comment: Data Unavailable   No LMP for male patient.  Allergies reviewed: Yes  Medications reviewed: Yes    Medications: Medication refills not needed today.  Pharmacy name entered into [facility]:    [facility] - [place], [place] - [address] [facility] [facility] - [place], [place] - [address]    Clinical concerns: No concerns  provider was NOT notified.    7 minutes for nursing intake (face to face time)     Fiorella Ferguson              "

## 2018-01-15 NOTE — PROGRESS NOTES
This is a recent snapshot of the patient's [facility] medical record.  For current drug dose and complete information and questions, call [phone] or In Basket pool, [facility] home infusion ([id number])  CSN Number:  [id number]

## 2018-01-15 NOTE — LETTER
1/15/2018       RE: Daniel Sandhu  [address] [facility] [address]     Dear Colleague,    Thank you for referring your patient, Daniel Sandhu, to the [facility]. Please see a copy of my visit note below.    [facility]    Hematology/Oncology Established Patient Note      Today's Date: 1/15/18    Reason for Follow-up: adenocarcinoma of the GE junction      HISTORY OF PRESENT ILLNESS: Daniel Sandhu is a 37 year old male who presents with adenocarcinoma of the GE junction.  In early 2017, patient started noticing difficulty swallowing, and that food seems to take longer to go down.  It became more frequent, and he saw his PCP, and was referred for EGD, which showed an esophageal mass located at the GE junction, measuring 4 cm.  Biopsy showed poorly differentiated adenocarcinoma.  HER-2 was sent and is equivocal (2+).  CT c/a/p showed a mildly prominent lymph node in the gastrohepatic ligament, but there were no pathologically enlarged lymph nodes in the chest, abdomen, or pelvis.  There was otherwise no evidence of metastatic disease.  PET-CT showed thickening of the distal esophagus consistent with known esophageal adenocarcinoma, as well as mildly hypermetabolic 1 cm lymph node in the gastrohepatic ligament.  There was no evidence of distant metastatic disease.  He underwent EUS on 6/9/17, which showed the esophageal tumor in the lower third of the esophagus, staged T2NX.  There were 2 abnormal lymph nodes seen in the gastrohepatic ligament; pathology was suspicious for adenocarcinoma.  Celiac node was sampled as well, which was benign.  He was seen by surgery, Dr. Esteban, and recommended srinvias-operative chemotherapy.    Port was placed on 6/22/17.    Chemotherapy:  Epirubicin 50 mg/m2 IV on day 1  Oxaliplatin 130 mg/m2 IV on day 1  Capecitabine 625 mg/m2 po BID on days 1-21  Every 21 day cycles    C1D1: 6/26/17  C2D1: 7/17/17  C3D1: 8/7/17    On  11/3/17, he underwent laparotomy with total gastrectomy and abdominal lymphadenectomy, left thoracotomy with distal esophagectomy and intrathoracic Terrell-en-Y esophagojejunostomy, feeding jejunostomy, left pharyngostomy tube placement, right tube thoracostomy, and flexible bronchoscopy.  On 11/9/17, he was taken back to OR for I&D of pharyngostomy tube abscess.  Pathology showed adenocarcinoma, moderate differentiated, extensive residual tumor with no evidence tumor regression, margins negative, perineural invasion present, 1 of 28 lymph nodes were involved with malignancy, stage [id number] (stage IIIA).  HER-2 is non-amplified.    He resumed chemotherapy post-surgery, with plans to complete 3 more cycles.  He did not tolerate Xeloda well during his neoadjuvant chemotherapy, with issues with palmar-plantar, and likely genital erythrodysesthesia.  We discussed changing to 5-FU, and Daniel would like to try this, as he may also have issues with taking pills and absorption after his surgery.  He will receive epirubicin, oxaliplatin, and 5-FU x 3 cycles.    C4D1: 12/11/17  C5D1: 1/2/18    He was admitted to the hospital 1/9/18-1/13/18 for nausea, diarrhea, failure to thrive, severe malnutrition, generalized weakness.  His infusional chemotherapy was stopped on 1/8/18.      Chemotherapy is stopped after 5 cycles due to intolerance.      INTERIM HISTORY: Daniel comes in for follow-up today.  He was admitted to the hospital 1/9/18-1/13/18 due to nausea, diarrhea, generalized weakness, malnutrition, failure to thrive.  His 5-FU pump was stopped on 1/8/18.  The nausea and diarrhea improved in the hospital.  He underwent EGD in the hospital on 1/11/18, which showed ulcers, and no evidence of recurrence of his cancer.  One area biopsied showed inflammation, no evidence of malignancy.    Today, Daniel remains feeling weak.  He ways that the diarrhea is better with Imodium and Lomotil.  Nausea is better controlled now.  He is now on  continuous tube feeds.  He denies pain.  It is still difficult for him to eat, mainly due to mouth sores.  He uses Magic Mouthwash, which helps.          REVIEW OF SYSTEMS:   14 point ROS was reviewed and is negative other than as noted above in HPI.       HOME MEDICATIONS:  Current Outpatient Prescriptions   Medication Sig Dispense Refill     prochlorperazine (COMPAZINE) 10 MG tablet Take 1 tablet (10 mg) by mouth every 6 hours as needed for nausea or vomiting 90 tablet 3     LORazepam (ATIVAN) 0.5 MG tablet Take 1 tablet (0.5 mg) by mouth every 4 hours as needed for anxiety 60 tablet 1     loperamide (IMODIUM) 2 MG capsule Take 2 capsules (4 mg) by mouth every 6 hours (max dose of 16 mg daily). 120 capsule 0     diphenoxylate-atropine (LOMOTIL) 2.5-0.025 MG per tablet Take 1 tablet by mouth 4 times daily as needed for diarrhea 40 tablet 0     dexamethasone (DECADRON) 2 MG tablet Take 1 tablet (2 mg) by mouth daily 30 tablet 0     fiber modular (NUTRISOURCE FIBER) packet 1 packet by Per Feeding Tube route 3 times daily 90 packet 0     potassium chloride (KLOR-CON) 20 MEQ Packet Take 20 mEq by mouth 2 times daily 60 packet 0     artificial saliva (BIOTENE DRY MOUTHWASH) LIQD liquid Swish and spit 10 mLs in mouth 4 times daily 473 mL 0     magic mouthwash suspension (diphenhydramine, lidocaine, aluminum-magnesium & simethicone) Swish and swallow 5-10 mLs in mouth every 6 hours as needed for mouth sores 237 mL 1     ondansetron (ZOFRAN) 4 MG tablet Take 1 tablet (4 mg) by mouth every 6 hours 40 tablet 0     OLANZapine (ZYPREXA) 10 MG tablet Take 1 tablet (10 mg) by mouth At Bedtime 30 tablet 1     fluconazole (DIFLUCAN) 200 MG tablet Take 1 tablet (200 mg) by mouth daily 30 tablet 1     cyabnocobalamin (VITAMIN B-12) 2500 MCG sublingual tablet Place 2,500 mcg under the tongue daily 30 tablet 1     acetaminophen (TYLENOL) 32 mg/mL solution 30.45 mLs (975 mg) by Per J Tube route 3 times daily 400 mL 0     multivitamins  with minerals (CERTAVITE/CEROVITE) LIQD liquid 15 mLs by Per J Tube route daily 450 mL 0     oxyCODONE (ROXICODONE) 5 MG/5ML solution Take 5-10 mLs (5-10 mg) by mouth every 4 hours as needed for moderate to severe pain (Patient not taking: Reported on 1/2/2018) 300 mL 0         ALLERGIES:  No Known Allergies      PAST MEDICAL HISTORY:  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 6/5/2017         PAST SURGICAL HISTORY:  Past Surgical History:   Procedure Laterality Date     ESOPHAGOGASTRODUODENOSCOPY       ESOPHAGOSCOPY, GASTROSCOPY, DUODENOSCOPY (EGD), COMBINED N/A 6/9/2017    Procedure: COMBINED ENDOSCOPIC ULTRASOUND, ESOPHAGOSCOPY, GASTROSCOPY, DUODENOSCOPY (EGD), FINE NEEDLE ASPIRATE/BIOPSY;  Upper Endoscopic Ultrasound, fine needle aspirate/biopsy;  Surgeon: Guru Mark Avila MD;  Location: [facility] OR     ESOPHAGOSCOPY, GASTROSCOPY, DUODENOSCOPY (EGD), COMBINED N/A 11/3/2017    Procedure: COMBINED ESOPHAGOSCOPY, GASTROSCOPY, DUODENOSCOPY (EGD);;  Surgeon: Yunior Esteban MD;  Location: [facility] OR     ESOPHAGOSCOPY, GASTROSCOPY, DUODENOSCOPY (EGD), COMBINED N/A 11/9/2017    Procedure: COMBINED ESOPHAGOSCOPY, GASTROSCOPY, DUODENOSCOPY (EGD);  Esophogastroduodenoscopy, take out pharangostomy tube;  Surgeon: Yunior Esteban MD;  Location: [facility] OR     ESOPHAGOSCOPY, GASTROSCOPY, DUODENOSCOPY (EGD), COMBINED N/A 1/11/2018    Procedure: COMBINED ESOPHAGOSCOPY, GASTROSCOPY, DUODENOSCOPY (EGD), BIOPSY SINGLE OR MULTIPLE;  COMBINED ESOPHAGOSCOPY, GASTROSCOPY, DUODENOSCOPY (EGD);  Surgeon: Alessandro Mcgregor MD;  Location: [facility] GI     GASTRECTOMY N/A 11/3/2017    Procedure: GASTRECTOMY;;  Surgeon: Yunior Esteban MD;  Location: [facility] OR     HAND SURGERY      childhood, torn tendon     INSERT PORT VASCULAR ACCESS Right 6/22/2017    Procedure: INSERT PORT VASCULAR ACCESS;  Single Lumen Chest Power Port;  Surgeon: Iván Driver PA-C;   Location: [facility] OR     LAPAROSCOPY DIAGNOSTIC (GENERAL) N/A 11/3/2017    Procedure: LAPAROSCOPY DIAGNOSTIC (GENERAL);  diagnostic laparoscopy, right chest tube, total gastrectomy with distal esophagectomy, intrathoracic eveline-y esophago-jejunostomy, feeding jejunostomy, pharyngostomy, esophagogastroduodenoscopy, flexible bronchoscopy;  Surgeon: Yunior Esteban MD;  Location: [facility] OR     LAPAROTOMY EXPLORATORY N/A 11/3/2017    Procedure: LAPAROTOMY EXPLORATORY;;  Surgeon: Yunior Esteban MD;  Location: [facility] OR     PHARYNGOSTOMY N/A 11/3/2017    Procedure: PHARYNGOSTOMY;;  Surgeon: Yunior Esteban MD;  Location: [facility] OR     THORACOTOMY Left 11/3/2017    Procedure: THORACOTOMY;;  Surgeon: Yunior Esteban MD;  Location: [facility] OR         SOCIAL HISTORY:  Social History     Social History     Marital status:      Spouse name: N/A     Number of children: 1     Years of education: N/A     Occupational History     musician and teacher       Social History Main Topics     Smoking status: Never Smoker     Smokeless tobacco: Never Used     Alcohol use Yes      Comment: 1 beer daily     Drug use: Yes     Special: Marijuana      Comment: occasional     Sexual activity: Yes     Partners: Female     Birth control/ protection: Natural Family Planning     Other Topics Concern     Not on file     Social History Narrative     He works at [employer] in [place], 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  He denies smoking.  He drinks ~1 beer a day.  He denies illicit drug use, other than occasional marijuana.  He lives in [place] with his wife, and 4.5 year-old daughter.  His wife is currently pregnant with a boy, and is due in 6 weeks.  He has a half sister who  of breast cancer at age 32; she was diagnosed in her late 20's.  A paternal grandmother had breast cancer in her 70's      FAMILY HISTORY:  Family History   Problem Relation Age of Onset     Other - See Comments Father       sepsis     CANCER Sister      breast cancer  29 yo 1/2 sister      CANCER Paternal Grandmother      breast         PHYSICAL EXAM:  Vital signs:  /74 (BP Location: Right arm, Patient Position: Sitting, Cuff Size: Adult Regular)  Pulse 104  Temp 97.4  F (36.3  C) (Axillary)  Resp 16  Wt 58.4 kg (128 lb 12.8 oz)  SpO2 99%  BMI 19.08 kg/m2   ECO  GENERAL/CONSTITUTIONAL: No acute distress.  Appears malnourished, gaunt.  Accompanied by wife.    EYES: No scleral icterus.  RESPIRATORY: Clear to auscultation bilaterally. No crackles or wheezing.   CARDIOVASCULAR: Regular rate and rhythm without murmurs, gallops, or rubs.  GASTROINTESTINAL: No tenderness. The patient has normal bowel sounds. No guarding.  No distention.  Feeding tube in place.    MUSCULOSKELETAL: Warm and well-perfused, no cyanosis, clubbing, or edema.  NEUROLOGIC: Alert, oriented, answers questions appropriately.  INTEGUMENTARY: No jaundice.  Port in place.        LABS:  CBC RESULTS:   Recent Labs   Lab Test  01/15/18   1420   WBC  8.3   RBC  5.30   HGB  13.3   HCT  41.6   MCV  79   MCH  25.1*   MCHC  32.0   RDW  15.6*   PLT  432     Recent Labs   Lab Test  01/15/18   1420  18   1600  18   0109   NA  134   --   134   POTASSIUM  2.4*  4.0  3.2*   CHLORIDE  113*   --   112*   CO2  9*   --   14*   ANIONGAP  12   --   8   GLC  126*   --   105*   BUN  14   --   10   CR  0.80   --   0.76   YOBANY  8.1*   --   7.6*     Lab Results   Component Value Date    AST 15 01/15/2018     Lab Results   Component Value Date    ALT 21 01/15/2018     No results found for: BILICONJ   Lab Results   Component Value Date    BILITOTAL 0.7 01/15/2018     Lab Results   Component Value Date    ALBUMIN 2.6 01/15/2018     Lab Results   Component Value Date    PROTTOTAL 7.0 01/15/2018      Lab Results   Component Value Date    ALKPHOS 73 01/15/2018     Component      Latest Ref Rng & Units 2017   CEA      0 - 2.5 ug/L <0.5       IMAGING:  CT c/a/p  12/1/17:  1. Surgical changes of total gastrectomy and distal esophagectomy with  intrathoracic Terrell-Y esophagojejunostomy with resolution of the  previously seen pneumothoraces, trace remaining bilateral pleural  effusions, trace remaining mediastinal air (a single tiny focus  adjacent to the upper esophagus at the level of the thyroid gland),  and a small amount of fluid surrounding the intrathoracic jejunum  extending into the abdomen and pelvis.   2. No evidence of metastatic disease in the chest, abdomen, or pelvis.  3. Unchanged nonspecific central mesenteric haziness. Recommend  attention on follow-up.    CT abdomen/pelvis 12/28/17:  1. Percutaneous jejunostomy tube is in good position. No evidence for  hernia, fluid collection or significant inflammation along the  subcutaneous tract.   2. Stable postsurgical changes of distal esophagectomy and total  gastrectomy with intrathoracic esophagojejunostomy pull-through. No  evidence for obstruction.      ASSESSMENT/PLAN:  Daniel Sandhu is a 37 year old male with:    1) Adenocarcinoma of the GE junction: now s/p 3 cycles of neoadjuvant chemotherapy with EOX, followed by surgical resection on 11/3/17.  Pathology showed adenocarcinoma, moderate differentiated, extensive residual tumor with no evidence tumor regression, margins negative, perineural invasion present, 1 of 28 lymph nodes were involved with malignancy, stage [id number] (stage IIIA).  HER-2 is non-amplified.    Post-op CT scans reviewed.  There are post-operative changes seen.  No evidence of metastatic disease seen on imaging.      He has received EOF x 2 cycles after surgery, and he has not tolerated well.  The 5-FU on cycle 5 was discontinued early. We discussed stopping chemotherapy at this point, as I do not think he can safely tolerate any more chemotherapy.  Daniel is agreeable with this.       -stop chemotherapy  -he has follow-up appointment with Dr. Esteban in March 2018 with CT scans  -I asked  him to follow-up next week with PA, but he declined and refers to call if he is not feeling well.  I will see him back in clinic when I return on 2/5/18 with labs.    2) Genetics:  -genetic testing was negative    3) Chemotherapy-induced nausea/vomiting:   -he has Zofran ODT and Compazine, which are helpful.  Compazine prescription renewed today.  Ativan prescription also renewed today.  -he is certified for medical mariuana    4) Mucositis:  -he uses Magic Mouthwash and salt rinses    5) Diarrhea:  Infectious evaluation was negative in the hospital, including C.diff.    -Lomotil and Imodium prn has been helping  -IV fluids today    6) Hypokalemia: likely due to losses from diarrhea  -he will increase potassium to 40 mEq twice a day for now, and repeat BMP on Thursday, 1/18/18  -he will also get potassium replacement today, per protocol, in the infusion room    7) Fertility: Daniel and his wife have 2 children, including a baby boy just born around June 2017.  He is not planning for more children in the near future.    8) Nutrition: He is on tube feeds.  He is able to eat some by mouth, but mucositis is making this more difficult.        I spent a total of 40 minutes with the patient, with over >50% of the time in counseling and/or coordination of care.       Laurence Hood MD  Hematology/Oncology  [facility]

## 2018-01-15 NOTE — MR AVS SNAPSHOT
After Visit Summary   1/15/2018    Daniel Sandhu    MRN: [id number]           Patient Information     Date Of Birth          1980        Visit Information        Provider Department      1/15/2018 3:30 PM UC 27 ATC; [facility]        Today's Diagnoses     Esophageal cancer (H)    -  1       Follow-ups after your visit        Your next 10 appointments already scheduled     Jan 25, 2018  9:45 AM CST   [facility] Lab Draw with  [facility] DRAW   [facility] ([facility])    [address] [place] [address]  [address]  [facility] [address]   [phone]            Jan 25, 2018 10:20 AM CST   (Arrive by 10:05 AM)   Return Visit with GAVIN Lundy CNP   [facility] ([facility])    [address] [place] [address]  [address]  [facility] [address]   [phone]            Feb 05, 2018  1:15 PM CST   [facility] Lab Draw with  [facility] LAB DRAW   [facility] ([facility])    [address] [place] [address]  [address]  [facility] [address]   [phone]            Feb 05, 2018  1:45 PM CST   (Arrive by 1:30 PM)   Return Visit with Laurence Hood MD   [facility] ([facility])    [address] [place] [address]  [address]  [facility] [address]   [phone]            Mar 07, 2018  1:40 PM CST   CT CHEST ABDOMEN PELVIS W/O CONTRAST with UCCT1   [facility] [place] [facility] ([facility])    [address] [place] [address]  1st Floor  [facility] [address]   [phone]           Please bring any scans or X-rays taken at other hospitals, if similar tests were done. Also bring a list of your medicines, including vitamins, minerals and over-the-counter drugs. It is safest to leave personal items at home.  Be sure to tell your doctor:   If you have any allergies.   If there s any chance you are pregnant.    If you are breastfeeding.   If you have any special needs.  You may have contrast for this exam. To prepare:   Do not eat or drink for 2 hours before your exam. If you need to take medicine, you may take it with small sips of water. (We may ask you to take liquid medicine as well.)   The day before your exam, drink extra fluids at least six 8-ounce glasses (unless your doctor tells you to restrict your fluids).  Patients over 70 or patients with diabetes or kidney problems:   If you haven t had a blood test (creatinine test) within the last 30 days, go to your clinic or Diagnostic Imaging Department for this test.  If you have diabetes:   If your kidney function is normal, continue taking your metformin (Avandamet, Glucophage, Glucovance, Metaglip) on the day of your exam.   If your kidney function is abnormal, wait 48 hours before restarting this medicine.  You will have oral contrast for this exam:   You will drink the contrast at home. Get this from your clinic or Diagnostic Imaging Department. Please follow the directions given.  Please wear loose clothing, such as a sweat suit or jogging clothes. Avoid snaps, zippers and other metal. We may ask you to undress and put on a hospital gown.  If you have any questions, please call the Imaging Department where you will have your exam.            Mar 07, 2018  2:30 PM CST   (Arrive by 2:15 PM)   Return Visit with Yunior Esteban MD   [facility] ([facility])    [address]  [address]  [facility] [address] [phone]              Who to contact     If you have questions or need follow up information about today's clinic visit or your schedule please contact [facility] directly at [phone].  Normal or non-critical lab and imaging results will be communicated to you by MyChart, letter or phone within 4 business days after the clinic has received the results. If you do not  hear from us within 7 days, please contact the clinic through [employer] or phone. If you have a critical or abnormal lab result, we will notify you by phone as soon as possible.  Submit refill requests through [employer] or call your pharmacy and they will forward the refill request to us. Please allow 3 business days for your refill to be completed.          Additional Information About Your Visit        [employer] Information     [employer] gives you secure access to your electronic health record. If you see a primary care provider, you can also send messages to your care team and make appointments. If you have questions, please call your primary care clinic.  If you do not have a primary care provider, please call [phone] and they will assist you.        Care EveryWhere ID     This is your Care EveryWhere ID. This could be used by other organizations to access your [place] medical records  [id number]         Blood Pressure from Last 3 Encounters:   No data found for BP    Weight from Last 3 Encounters:   No data found for Wt              Today, you had the following     No orders found for display         Today's Medication Changes          These changes are accurate as of: 1/15/18 11:59 PM.  If you have any questions, ask your nurse or doctor.               These medicines have changed or have updated prescriptions.        Dose/Directions    LORazepam 0.5 MG tablet   Commonly known as:  ATIVAN   This may have changed:    - medication strength  - how much to take   Used for:  Chemotherapy-induced nausea   Changed by:  Laurence Hood MD        Dose:  0.5 mg   Take 1 tablet (0.5 mg) by mouth every 4 hours as needed for anxiety   Quantity:  60 tablet   Refills:  1       prochlorperazine 10 MG tablet   Commonly known as:  COMPAZINE   This may have changed:    - medication strength  - how much to take  - reasons to take this   Used for:  Chemotherapy-induced nausea   Changed by:  Laurence Hood MD         Dose:  10 mg   Take 1 tablet (10 mg) by mouth every 6 hours as needed for nausea or vomiting   Quantity:  90 tablet   Refills:  3            Where to get your medicines      These medications were sent to [place], [place] - [address]  [address], [facility] [place] [address]    Hours:  TRANSPLANT PHONE NUMBER [phone] Phone:  [phone]     prochlorperazine 10 MG tablet         Some of these will need a paper prescription and others can be bought over the counter.  Ask your nurse if you have questions.     Bring a paper prescription for each of these medications     LORazepam 0.5 MG tablet                Primary Care Provider Office Phone # Fax #    Lencho Chan [phone]       [address] [facility] [address]        Equal Access to Services     SHAWN SWAIN : Judy stanley hadasho Sotee, waaxda luqadaha, qaybta kaalmada adeciarayajohnie, alejandro brown . So [facility] [phone].    ATENCIÓN: Si habla español, tiene a zayas disposición servicios gratuitos de asistencia lingüística. Isaias al [phone].    We comply with applicable federal civil rights laws and [place] laws. We do not discriminate on the basis of race, color, national origin, age, disability, sex, sexual orientation, or gender identity.            Thank you!     Thank you for choosing [facility]  for your care. Our goal is always to provide you with excellent care. Hearing back from our patients is one way we can continue to improve our services. Please take a few minutes to complete the written survey that you may receive in the mail after your visit with us. Thank you!             Your Updated Medication List - Protect others around you: Learn how to safely use, store and throw away your medicines at www.disposemymeds.org.          This list is accurate as of: 1/15/18 11:59 PM.  Always use your most  recent med list.                   Brand Name Dispense Instructions for use Diagnosis    acetaminophen 32 mg/mL solution    TYLENOL    400 mL    30.45 mLs (975 mg) by Per J Tube route 3 times daily    Acute post-operative pain       artificial saliva Liqd liquid     473 mL    Swish and spit 10 mLs in mouth 4 times daily    Dry mouth       cyabnocobalamin 2500 MCG sublingual tablet    VITAMIN B-12    30 tablet    Place 2,500 mcg under the tongue daily    B12 deficiency       dexamethasone 2 MG tablet    DECADRON    30 tablet    Take 1 tablet (2 mg) by mouth daily    Chemotherapy-induced nausea, Loss of appetite       diphenoxylate-atropine 2.5-0.025 MG per tablet    LOMOTIL    40 tablet    Take 1 tablet by mouth 4 times daily as needed for diarrhea    Diarrhea, unspecified type       fiber modular packet     90 packet    1 packet by Per Feeding Tube route 3 times daily    Diarrhea, unspecified type       fluconazole 200 MG tablet    DIFLUCAN    30 tablet    Take 1 tablet (200 mg) by mouth daily    Thrush       loperamide 2 MG capsule    IMODIUM    120 capsule    Take 2 capsules (4 mg) by mouth every 6 hours (max dose of 16 mg daily).    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       OLANZapine 10 MG tablet    zyPREXA    30 tablet    Take 1 tablet (10 mg) by mouth At Bedtime    Chemotherapy-induced nausea       ondansetron 4 MG tablet    ZOFRAN    40 tablet    Take 1 tablet (4 mg) by mouth every 6 hours     Chemotherapy-induced nausea       oxyCODONE 5 MG/5ML solution    ROXICODONE    300 mL    Take 5-10 mLs (5-10 mg) by mouth every 4 hours as needed for moderate to severe pain    Acute post-operative pain       potassium chloride 20 MEQ Packet    KLOR-CON    60 packet    Take 20 mEq by mouth 2 times daily    Hypokalemia       prochlorperazine 10 MG tablet    COMPAZINE    90 tablet    Take 1 tablet (10 mg) by mouth every 6 hours as needed for nausea or vomiting    Chemotherapy-induced nausea

## 2018-01-16 ENCOUNTER — TELEPHONE (OUTPATIENT)
Dept: ONCOLOGY | Facility: CLINIC | Age: 38
End: 2018-01-16

## 2018-01-16 NOTE — TELEPHONE ENCOUNTER
Oncology Distress Screening   Clinical Nutrition  [facility]     Identified Concern and Score From Distress Screening: Concern with his ability to eat (10) and concern with his weight (10), pt request to speak with a dietitian.      Date of Distress Screenin18, 1/15/18 - pt was admitted from -     Data: Esophageal cancer - currently on EN (Peptamen 1.5 @ 60mL/hr x 24 hours), meeting 100% of nutrition needs.      Intervention: Called Daniel today. Indicated reason for the phone call. Advised pt to return call to RD at his convenience.  RD familiar with patient as he was seen in 2017 for consult.       Claudette Pineda RD, LD  Oncology Dietitian  [phone]  Pager: [phone]

## 2018-01-16 NOTE — PROGRESS NOTES
This is a recent snapshot of the patient's [facility] medical record.  For current drug dose and complete information and questions, call [phone] or In Basket pool, fv home infusion ([id number])  CSN Number:  [id number]

## 2018-01-17 NOTE — TELEPHONE ENCOUNTER
Advise chewing gum and sucking on hard candies/ lozenges to help stimulate saliva production.    Please inform patient.    Lencho Chan MD

## 2018-01-17 NOTE — TELEPHONE ENCOUNTER
"ED / Discharge Outreach Protocol    Patient Contact    Attempt # 2    [phone]    Was call answered?  Yes.  \"May I please speak with <patient name>\"  Is patient available?   Yes, patient's oncologist stopped chemo due to significant weight loss. Did see a dietician while in hospital to control the diarrhea which has seemed to help dramatically using toilet less often, is only eating soup, is using Biotene for extreme dry mouth, declining appointment. States would appreciate Dr. Rodriguez thoughts on stimulating saliva and dry mouth?    Routing to PCP to review and advise.    Mari Grigsby RN  [facility]        "

## 2018-01-17 NOTE — TELEPHONE ENCOUNTER
Attempt # 1  Called patient at home number.  Was call answered?  Yes, relayed below information from provider - patient verbalized understanding - had no other questions.    Mari Grigsby RN  [facility]

## 2018-01-18 ENCOUNTER — CARE COORDINATION (OUTPATIENT)
Dept: ONCOLOGY | Facility: CLINIC | Age: 38
End: 2018-01-18

## 2018-01-18 ENCOUNTER — HOME INFUSION (PRE-WILLOW HOME INFUSION) (OUTPATIENT)
Dept: PHARMACY | Facility: CLINIC | Age: 38
End: 2018-01-18

## 2018-01-18 DIAGNOSIS — C15.5 MALIGNANT NEOPLASM OF LOWER THIRD OF ESOPHAGUS (H): Primary | ICD-10-CM

## 2018-01-18 DIAGNOSIS — E87.6 HYPOKALEMIA: ICD-10-CM

## 2018-01-18 LAB
ANION GAP SERPL CALCULATED.3IONS-SCNC: 7 MMOL/L (ref 3–14)
BUN SERPL-MCNC: 25 MG/DL (ref 7–30)
CALCIUM SERPL-MCNC: 7.5 MG/DL (ref 8.5–10.1)
CHLORIDE SERPL-SCNC: 106 MMOL/L (ref 94–109)
CO2 SERPL-SCNC: 26 MMOL/L (ref 20–32)
CREAT SERPL-MCNC: 0.58 MG/DL (ref 0.66–1.25)
GFR SERPL CREATININE-BSD FRML MDRD: >90 ML/MIN/1.7M2
GLUCOSE SERPL-MCNC: 77 MG/DL (ref 70–99)
POTASSIUM SERPL-SCNC: 2.9 MMOL/L (ref 3.4–5.3)
SODIUM SERPL-SCNC: 139 MMOL/L (ref 133–144)

## 2018-01-18 PROCEDURE — 80048 BASIC METABOLIC PNL TOTAL CA: CPT | Performed by: INTERNAL MEDICINE

## 2018-01-18 NOTE — PROGRESS NOTES
Addendum:  Notified pt ok potassium level today and that Dr. Hood advises he continue the potassium supplements at home 40 mEq twice a day for the next week, and repeat BMP in 1 week.    We agreed to schedule a lab draw at home  with [facility] will be done on 1/24 (order placed and called to [facility] nursing staff) with reschedule of [facility]OMEO appt in 1 week with Nannette Moody NP in clinic the next morning (unable to make any of the morning appts to see Alisa that are available due to childcare timing). He had cancelled this appt earlier this week, but now agrees that close follow up is a good idea and that we can decide to cancel it (and/or lab appt 1/25) next week based on how he is doing and how labs look on 1/24.     Reinforced pt to continue mouth cares (salt/soda rinses, MMW PRN, hard candies for dry mouth) and to weigh daily on scale at home and that we will need to know if his weight does not continue to increase.  Pt voiced understanding of above instructions and information and denied further questions today.

## 2018-01-18 NOTE — PROGRESS NOTES
"RN Care Coordination Note  Reason for Outgoing Call:   To f/u on pt's sx. Lab draw at home with [facility] planned for today for BMP.  Patient Questions/Concerns:   Daniel reports his sx have \"improved dramatically\" during the last 24 hours: had a normal formed soft stool at 3 am and 8 am today. Only had bm x3 yesterday. Weight yesterday at home 129.4 pounds  Daniel reports that the home infusion nurse is there now to get lab draw as ordered  Daniel reports that he increased his tube feeds to 70 ml/hr x last 24 hours and is tolerating well. Eating soups and Italian ices but they hurt the mouth sore despite MMW. Using salt/soda rinses as well. Dry mouth still really bothering him.  Nursing Action/Patient Instruction:  - advised pt to continue mouth cares and to weigh daily on scale at home and that we will need to know if his weight does not continue to increase.  - advised pt that we will call him later today with further recs based on labs today  Patient Response/Evaluation:   Pt voiced understanding of above instructions and information and denied further questions    Nannette Ovalles, RN, BSN, OCN  Care Coordinator  [facility]        "

## 2018-01-19 ENCOUNTER — HOME INFUSION (PRE-WILLOW HOME INFUSION) (OUTPATIENT)
Dept: PHARMACY | Facility: CLINIC | Age: 38
End: 2018-01-19

## 2018-01-19 ENCOUNTER — TELEPHONE (OUTPATIENT)
Dept: ONCOLOGY | Facility: CLINIC | Age: 38
End: 2018-01-19

## 2018-01-19 NOTE — TELEPHONE ENCOUNTER
Nutrition Services:     Received return call from Daniel per his request to speak with RD on onc screening.    He reports that he has been trying to increase his food choices.    His barriers to eating at this time are primarily mucositis and dysgeusia.   He has been on EN since post gastrectomy (Peptamen 1.5 60mL/hr x 24 hours) meeting 100 % of his nutrition needs.   He would like food choice ideas for mucositis and dysgeusia.  He has been using Magic Mouthwash at the time of PO  He is able to eat 1 cup tomato soup w/o difficulty while talking to RD.     Interventions:   Reviewed post gastrectomy diet in brief.  Reviewed tips for coping with mucositis and dysgeusia.   Encouraged Daniel to try savory foods such as Asian cuisines, sour or tart as tolerated.    Suggested he try Cream of Wheat - he has Sturdiwheat brand on hand - plans to try this.     Advised pt to call RD with further nutrition questions/concerns.      Claudette Pineda RD, LD  [facility]  [phone]  Pager: [phone]

## 2018-01-19 NOTE — PROGRESS NOTES
This is a recent snapshot of the patient's [facility] medical record.  For current drug dose and complete information and questions, call [phone] or In Basket pool, fv home infusion ([id number])  CSN Number:  [id number]

## 2018-01-20 ENCOUNTER — HOME INFUSION (PRE-WILLOW HOME INFUSION) (OUTPATIENT)
Dept: PHARMACY | Facility: CLINIC | Age: 38
End: 2018-01-20

## 2018-01-22 ENCOUNTER — HOME INFUSION (PRE-WILLOW HOME INFUSION) (OUTPATIENT)
Dept: PHARMACY | Facility: CLINIC | Age: 38
End: 2018-01-22

## 2018-01-22 NOTE — PROGRESS NOTES
This is a recent snapshot of the patient's [facility] medical record.  For current drug dose and complete information and questions, call [phone] or In Basket pool, fv home infusion ([id number])  CSN Number:  [id number]

## 2018-01-23 NOTE — PROGRESS NOTES
This is a recent snapshot of the patient's [facility] medical record.  For current drug dose and complete information and questions, call [phone] or In Basket pool, fv home infusion ([id number])  CSN Number:  [id number]

## 2018-01-23 NOTE — PROGRESS NOTES
This is a recent snapshot of the patient's [facility] medical record.  For current drug dose and complete information and questions, call [phone] or In Basket pool, fv home infusion ([id number])  CSN Number:  [id number]

## 2018-01-24 ENCOUNTER — MYC MEDICAL ADVICE (OUTPATIENT)
Dept: ONCOLOGY | Facility: CLINIC | Age: 38
End: 2018-01-24

## 2018-01-24 ENCOUNTER — HOME INFUSION (PRE-WILLOW HOME INFUSION) (OUTPATIENT)
Dept: PHARMACY | Facility: CLINIC | Age: 38
End: 2018-01-24

## 2018-01-24 LAB
ANION GAP SERPL CALCULATED.3IONS-SCNC: 3 MMOL/L (ref 3–14)
BUN SERPL-MCNC: 18 MG/DL (ref 7–30)
CALCIUM SERPL-MCNC: 8 MG/DL (ref 8.5–10.1)
CHLORIDE SERPL-SCNC: 103 MMOL/L (ref 94–109)
CO2 SERPL-SCNC: 33 MMOL/L (ref 20–32)
CREAT SERPL-MCNC: 0.49 MG/DL (ref 0.66–1.25)
GFR SERPL CREATININE-BSD FRML MDRD: >90 ML/MIN/1.7M2
GLUCOSE SERPL-MCNC: 88 MG/DL (ref 70–99)
POTASSIUM SERPL-SCNC: 4.6 MMOL/L (ref 3.4–5.3)
SODIUM SERPL-SCNC: 139 MMOL/L (ref 133–144)

## 2018-01-24 PROCEDURE — 80048 BASIC METABOLIC PNL TOTAL CA: CPT | Performed by: INTERNAL MEDICINE

## 2018-01-24 NOTE — LETTER
January 25, 2018      RE: Daniel Sandhu  [address] [address]         TO [facility]    Daniel has experienced complications of treatment for esophageal cancer that have prolonged his anticipated recovery period. He will not be well enough enough to travel in March 2018 and has been advised to avoid distant travel for the next 3 months (approximately through April 2018) as he continues to recover.      Sincerely,        Alisa Otero PA-C

## 2018-01-25 ENCOUNTER — HOME INFUSION (PRE-WILLOW HOME INFUSION) (OUTPATIENT)
Dept: PHARMACY | Facility: CLINIC | Age: 38
End: 2018-01-25

## 2018-01-25 NOTE — PROGRESS NOTES
This is a recent snapshot of the patient's [facility] medical record.  For current drug dose and complete information and questions, call [phone] or In Basket pool, fv home infusion ([id number])  CSN Number:  [id number]

## 2018-01-26 NOTE — TELEPHONE ENCOUNTER
Pt has not read Bocada reply I sent yesterday. Left detailed message on his cell with same exact information and asked him to reply via phone or Yaphart and I will arrange the home lab draw accordingly with [facility].

## 2018-01-26 NOTE — PROGRESS NOTES
This is a recent snapshot of the patient's [facility] medical record.  For current drug dose and complete information and questions, call [phone] or In Basket pool, fv home infusion ([id number])  CSN Number:  [id number]

## 2018-01-29 ENCOUNTER — HOME INFUSION (PRE-WILLOW HOME INFUSION) (OUTPATIENT)
Dept: PHARMACY | Facility: CLINIC | Age: 38
End: 2018-01-29

## 2018-01-29 NOTE — TELEPHONE ENCOUNTER
"Spoke with pt who confirms that he did stop potassium supplement due to normal BMs as per MyChart instructions he received from me on 1/25/17.   We agreed upon home lab draw for repeat BMP tomorrow and orders placed and called to [facility] nurse Sue.  Pt voiced understanding of above instructions and information and understands that we will notify him of lab results after received / reviewed tomorrow.   He has \"plenty\" of potassium 20 meq packets at home, will not need a new prescription if he needs to restart.    "

## 2018-01-30 ENCOUNTER — HOME INFUSION (PRE-WILLOW HOME INFUSION) (OUTPATIENT)
Dept: PHARMACY | Facility: CLINIC | Age: 38
End: 2018-01-30

## 2018-01-30 LAB
ANION GAP SERPL CALCULATED.3IONS-SCNC: 1 MMOL/L (ref 3–14)
BUN SERPL-MCNC: 17 MG/DL (ref 7–30)
CALCIUM SERPL-MCNC: 8 MG/DL (ref 8.5–10.1)
CHLORIDE SERPL-SCNC: 105 MMOL/L (ref 94–109)
CO2 SERPL-SCNC: 34 MMOL/L (ref 20–32)
CREAT SERPL-MCNC: 0.55 MG/DL (ref 0.66–1.25)
GFR SERPL CREATININE-BSD FRML MDRD: >90 ML/MIN/1.7M2
GLUCOSE SERPL-MCNC: 81 MG/DL (ref 70–99)
POTASSIUM SERPL-SCNC: 4.3 MMOL/L (ref 3.4–5.3)
SODIUM SERPL-SCNC: 140 MMOL/L (ref 133–144)

## 2018-01-30 PROCEDURE — 80048 BASIC METABOLIC PNL TOTAL CA: CPT | Performed by: INTERNAL MEDICINE

## 2018-01-30 NOTE — PROGRESS NOTES
This is a recent snapshot of the patient's [facility] medical record.  For current drug dose and complete information and questions, call [phone] or In Basket pool, fv home infusion ([id number])  CSN Number:  [id number]

## 2018-01-31 NOTE — PROGRESS NOTES
This is a recent snapshot of the patient's [facility] medical record.  For current drug dose and complete information and questions, call [phone] or In Basket pool, fv home infusion ([id number])  CSN Number:  [id number]

## 2018-02-02 ENCOUNTER — MEDICAL CORRESPONDENCE (OUTPATIENT)
Dept: HEALTH INFORMATION MANAGEMENT | Facility: CLINIC | Age: 38
End: 2018-02-02

## 2018-02-05 ENCOUNTER — APPOINTMENT (OUTPATIENT)
Dept: LAB | Facility: CLINIC | Age: 38
End: 2018-02-05
Attending: INTERNAL MEDICINE
Payer: COMMERCIAL

## 2018-02-05 ENCOUNTER — ONCOLOGY VISIT (OUTPATIENT)
Dept: ONCOLOGY | Facility: CLINIC | Age: 38
End: 2018-02-05
Attending: INTERNAL MEDICINE
Payer: COMMERCIAL

## 2018-02-05 VITALS
RESPIRATION RATE: 16 BRPM | HEART RATE: 80 BPM | TEMPERATURE: 98 F | DIASTOLIC BLOOD PRESSURE: 59 MMHG | SYSTOLIC BLOOD PRESSURE: 100 MMHG | OXYGEN SATURATION: 100 % | WEIGHT: 145.3 LBS | BODY MASS INDEX: 22.02 KG/M2 | HEIGHT: 68 IN

## 2018-02-05 DIAGNOSIS — T45.1X5A CHEMOTHERAPY-INDUCED NAUSEA: ICD-10-CM

## 2018-02-05 DIAGNOSIS — R11.0 CHEMOTHERAPY-INDUCED NAUSEA: ICD-10-CM

## 2018-02-05 DIAGNOSIS — C15.5 MALIGNANT NEOPLASM OF LOWER THIRD OF ESOPHAGUS (H): ICD-10-CM

## 2018-02-05 LAB
ALBUMIN SERPL-MCNC: 3.3 G/DL (ref 3.4–5)
ALP SERPL-CCNC: 63 U/L (ref 40–150)
ALT SERPL W P-5'-P-CCNC: 40 U/L (ref 0–70)
ANION GAP SERPL CALCULATED.3IONS-SCNC: 6 MMOL/L (ref 3–14)
AST SERPL W P-5'-P-CCNC: 26 U/L (ref 0–45)
BASOPHILS # BLD AUTO: 0 10E9/L (ref 0–0.2)
BASOPHILS NFR BLD AUTO: 0.5 %
BILIRUB SERPL-MCNC: 0.2 MG/DL (ref 0.2–1.3)
BUN SERPL-MCNC: 16 MG/DL (ref 7–30)
CALCIUM SERPL-MCNC: 8.2 MG/DL (ref 8.5–10.1)
CHLORIDE SERPL-SCNC: 104 MMOL/L (ref 94–109)
CO2 SERPL-SCNC: 28 MMOL/L (ref 20–32)
CREAT SERPL-MCNC: 0.56 MG/DL (ref 0.66–1.25)
DIFFERENTIAL METHOD BLD: ABNORMAL
EOSINOPHIL # BLD AUTO: 0.1 10E9/L (ref 0–0.7)
EOSINOPHIL NFR BLD AUTO: 3.3 %
ERYTHROCYTE [DISTWIDTH] IN BLOOD BY AUTOMATED COUNT: 20.2 % (ref 10–15)
GFR SERPL CREATININE-BSD FRML MDRD: >90 ML/MIN/1.7M2
GLUCOSE SERPL-MCNC: 126 MG/DL (ref 70–99)
HCT VFR BLD AUTO: 33.9 % (ref 40–53)
HGB BLD-MCNC: 10.6 G/DL (ref 13.3–17.7)
IMM GRANULOCYTES # BLD: 0 10E9/L (ref 0–0.4)
IMM GRANULOCYTES NFR BLD: 0 %
LYMPHOCYTES # BLD AUTO: 1.2 10E9/L (ref 0.8–5.3)
LYMPHOCYTES NFR BLD AUTO: 27.8 %
MAGNESIUM SERPL-MCNC: 2 MG/DL (ref 1.6–2.3)
MCH RBC QN AUTO: 25.4 PG (ref 26.5–33)
MCHC RBC AUTO-ENTMCNC: 31.3 G/DL (ref 31.5–36.5)
MCV RBC AUTO: 81 FL (ref 78–100)
MONOCYTES # BLD AUTO: 0.5 10E9/L (ref 0–1.3)
MONOCYTES NFR BLD AUTO: 11.3 %
NEUTROPHILS # BLD AUTO: 2.4 10E9/L (ref 1.6–8.3)
NEUTROPHILS NFR BLD AUTO: 57.1 %
NRBC # BLD AUTO: 0 10*3/UL
NRBC BLD AUTO-RTO: 0 /100
PLATELET # BLD AUTO: 291 10E9/L (ref 150–450)
POTASSIUM SERPL-SCNC: 3.9 MMOL/L (ref 3.4–5.3)
PROT SERPL-MCNC: 6.8 G/DL (ref 6.8–8.8)
RBC # BLD AUTO: 4.17 10E12/L (ref 4.4–5.9)
SODIUM SERPL-SCNC: 137 MMOL/L (ref 133–144)
WBC # BLD AUTO: 4.2 10E9/L (ref 4–11)

## 2018-02-05 PROCEDURE — 25000128 H RX IP 250 OP 636: Mod: ZF | Performed by: INTERNAL MEDICINE

## 2018-02-05 PROCEDURE — 36591 DRAW BLOOD OFF VENOUS DEVICE: CPT

## 2018-02-05 PROCEDURE — 85025 COMPLETE CBC W/AUTO DIFF WBC: CPT | Performed by: INTERNAL MEDICINE

## 2018-02-05 PROCEDURE — 99215 OFFICE O/P EST HI 40 MIN: CPT | Mod: ZP | Performed by: INTERNAL MEDICINE

## 2018-02-05 PROCEDURE — G0463 HOSPITAL OUTPT CLINIC VISIT: HCPCS | Mod: ZF

## 2018-02-05 PROCEDURE — 83735 ASSAY OF MAGNESIUM: CPT | Performed by: INTERNAL MEDICINE

## 2018-02-05 PROCEDURE — 80053 COMPREHEN METABOLIC PANEL: CPT | Performed by: INTERNAL MEDICINE

## 2018-02-05 RX ORDER — HEPARIN SODIUM (PORCINE) LOCK FLUSH IV SOLN 100 UNIT/ML 100 UNIT/ML
5 SOLUTION INTRAVENOUS
Status: COMPLETED | OUTPATIENT
Start: 2018-02-05 | End: 2018-02-05

## 2018-02-05 RX ADMIN — SODIUM CHLORIDE, PRESERVATIVE FREE 5 ML: 5 INJECTION INTRAVENOUS at 13:21

## 2018-02-05 ASSESSMENT — PAIN SCALES - GENERAL: PAINLEVEL: NO PAIN (0)

## 2018-02-05 NOTE — NURSING NOTE
"Oncology Rooming Note    February 5, 2018 1:42 PM   Daniel Sandhu is a 37 year old male who presents for:    Chief Complaint   Patient presents with     Port Draw     port accessed and labs drawn by rn.  vs taken.     Oncology Clinic Visit     Return: Esophageal Cancer     Initial Vitals: /59 (BP Location: Right arm, Patient Position: Sitting, Cuff Size: Adult Regular)  Pulse 80  Temp 98  F (36.7  C) (Oral)  Resp 16  Ht 1.727 m (5' 8\")  Wt 65.9 kg (145 lb 4.8 oz)  SpO2 100%  BMI 22.09 kg/m2 Estimated body mass index is 22.09 kg/(m^2) as calculated from the following:    Height as of this encounter: 1.727 m (5' 8\").    Weight as of this encounter: 65.9 kg (145 lb 4.8 oz). Body surface area is 1.78 meters squared.  No Pain (0) Comment: Data Unavailable   No LMP for male patient.  Allergies reviewed: Yes  Medications reviewed: Yes    Medications: Medication refills not needed today.  Pharmacy name entered into [facility]:    [facility] - [place], [place] - [address] [facility] [address] [facility] [place], [place] - [address]    Clinical concerns: No New Concerns    5 minutes for nursing intake (face to face time)     ADITI Morgan      "

## 2018-02-05 NOTE — MR AVS SNAPSHOT
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)    [address] [address] [phone]           Please bring any scans or X-rays taken at other hospitals, if similar tests were done. Also bring a list of your medicines, including vitamins, minerals and over-the-counter drugs. It is safest to leave personal items at home.  Be sure to tell your doctor:   If you have any allergies.   If there s any chance you are pregnant.   If you are breastfeeding.   If you have any special needs.  You may have contrast for this exam. To prepare:   Do not eat or drink for 2 hours before your exam. If you need to take medicine, you may take it with small sips of water. (We may ask you to take liquid medicine as well.)   The day before your exam, drink extra fluids at least six 8-ounce glasses (unless your doctor tells you to restrict your fluids).  Patients over 70 or patients with diabetes or kidney problems:   If you haven t had a blood test (creatinine test) within the last 30 days, go to your clinic or Diagnostic Imaging Department for this test.  If you have diabetes:   If your kidney function is normal, continue taking your metformin (Avandamet, Glucophage, Glucovance, Metaglip) on the day of your exam.   If your kidney function is abnormal, wait 48 hours before restarting this medicine.  You will have oral contrast  for this exam:   You will drink the contrast at home. Get this from your clinic or Diagnostic Imaging Department. Please follow the directions given.  Please wear loose clothing, such as a sweat suit or jogging clothes. Avoid snaps, zippers and other metal. We may ask you to undress and put on a hospital gown.  If you have any questions, please call the Imaging Department where you will have your exam.            Mar 07, 2018  2:30 PM CST   (Arrive by 2:15 PM)   Return Visit with Yunior Esteban MD   [facility] ([facility])    [address] [place] [address]  [address]  [facility] [id number] [phone]            Mar 12, 2018  9:15 AM CDT   (Arrive by 9:00 AM)   Return Visit with Laurence Hood MD   [facility] ([facility])    [address] [place] [address]  [address]  [facility] [id number] [phone]              Future tests that were ordered for you today     Open Future Orders        Priority Expected Expires Ordered    CBC with platelets differential Routine 3/7/2018 2/5/2019 2/5/2018    Comprehensive metabolic panel Routine 3/7/2018 2/5/2019 2/5/2018    Magnesium Routine 3/7/2018 2/5/2019 2/5/2018    Iron and iron binding capacity Routine 3/7/2018 2/5/2019 2/5/2018    Ferritin Routine 3/7/2018 2/5/2019 2/5/2018    Vitamin B12 Routine 3/7/2018 2/5/2019 2/5/2018            Who to contact     If you have questions or need follow up information about today's clinic visit or your schedule please contact [facility] directly at [phone].  Normal or non-critical lab and imaging results will be communicated to you by MyChart, letter or phone within 4 business days after the clinic has received the results. If you do not hear from us within 7 days, please contact the clinic through MyChart or phone. If you have a critical or abnormal lab result, we will notify you by phone as soon as  "possible.  Submit refill requests through [employer] or call your pharmacy and they will forward the refill request to us. Please allow 3 business days for your refill to be completed.          Additional Information About Your Visit        [employer] Information     [employer] gives you secure access to your electronic health record. If you see a primary care provider, you can also send messages to your care team and make appointments. If you have questions, please call your primary care clinic.  If you do not have a primary care provider, please call [phone] and they will assist you.        Care EveryWhere ID     This is your Care EveryWhere ID. This could be used by other organizations to access your [place] medical records  [id number]        Your Vitals Were     Pulse Temperature Respirations Height Pulse Oximetry BMI (Body Mass Index)    80 98  F (36.7  C) (Oral) 16 1.727 m (5' 8\") 100% 22.09 kg/m2       Blood Pressure from Last 3 Encounters:   02/05/18 100/59   01/15/18 118/74   01/13/18 108/72    Weight from Last 3 Encounters:   02/05/18 65.9 kg (145 lb 4.8 oz)   01/15/18 58.4 kg (128 lb 12.8 oz)   01/13/18 59 kg (130 lb)              We Performed the Following     CBC with platelets differential     Comprehensive metabolic panel     Magnesium        Primary Care Provider Office Phone # Fax #    Lencho Chan [phone]       [address] [facility] [address]        Equal Access to Services     [facility] Hospital[facility]OS[facility] ANTONIO : Hadii garo mayo Sotee, waaxda luqadaha, qaybta kaalmada adeegyada, alejandro brown . So [facility] [phone].    ATENCIÓN: Si habla español, tiene a zayas disposición servicios gratuitos de asistencia lingüística. Llame al [phone].    We comply with applicable federal civil rights laws and [place] laws. We do not discriminate on the basis of race, color, national origin, age, disability, sex, sexual orientation, or gender identity.       "      Thank you!     Thank you for choosing [facility]  for your care. Our goal is always to provide you with excellent care. Hearing back from our patients is one way we can continue to improve our services. Please take a few minutes to complete the written survey that you may receive in the mail after your visit with us. Thank you!             Your Updated Medication List - Protect others around you: Learn how to safely use, store and throw away your medicines at www.disposemymeds.org.          This list is accurate as of 2/5/18  2:30 PM.  Always use your most recent med list.                   Brand Name Dispense Instructions for use Diagnosis    acetaminophen 32 mg/mL solution    TYLENOL    400 mL    30.45 mLs (975 mg) by Per J Tube route 3 times daily    Acute post-operative pain       artificial saliva Liqd liquid     473 mL    Swish and spit 10 mLs in mouth 4 times daily    Dry mouth       cyanocobalamin 2500 MCG sublingual tablet    VITAMIN B-12    30 tablet    Place 2,500 mcg under the tongue daily    B12 deficiency       dexamethasone 2 MG tablet    DECADRON    30 tablet    Take 1 tablet (2 mg) by mouth daily    Chemotherapy-induced nausea, Loss of appetite       diphenoxylate-atropine 2.5-0.025 MG per tablet    LOMOTIL    40 tablet    Take 1 tablet by mouth 4 times daily as needed for diarrhea    Diarrhea, unspecified type       fiber modular packet     90 packet    1 packet by Per Feeding Tube route 3 times daily    Diarrhea, unspecified type       fluconazole 200 MG tablet    DIFLUCAN    30 tablet    Take 1 tablet (200 mg) by mouth daily    Thrush       loperamide 2 MG capsule    IMODIUM    120 capsule    Take 2 capsules (4 mg) by mouth every 6 hours (max dose of 16 mg daily).    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       OLANZapine 10 MG tablet    zyPREXA    30 tablet    Take 1 tablet (10 mg) by mouth At Bedtime    Chemotherapy-induced nausea       ondansetron 4 MG tablet    ZOFRAN    40 tablet    Take 1 tablet (4 mg) by mouth every 6 hours    Chemotherapy-induced nausea       oxyCODONE 5 MG/5ML solution    ROXICODONE    300 mL    Take 5-10 mLs (5-10 mg) by mouth every 4 hours as needed for moderate to severe pain    Acute post-operative pain       potassium chloride 20 MEQ Packet    KLOR-CON    60 packet    Take 20 mEq by mouth 2 times daily    Hypokalemia       prochlorperazine 10 MG tablet    COMPAZINE    90 tablet    Take 1 tablet (10 mg) by mouth every 6 hours as needed for nausea or vomiting    Chemotherapy-induced nausea

## 2018-02-05 NOTE — PROGRESS NOTES
[facility]    Hematology/Oncology Established Patient Note      Today's Date: 2/05/18    Reason for Follow-up: adenocarcinoma of the GE junction      HISTORY OF PRESENT ILLNESS: Daniel Sandhu is a 37 year old male who presents with adenocarcinoma of the GE junction.  In early 2017, patient started noticing difficulty swallowing, and that food seems to take longer to go down.  It became more frequent, and he saw his PCP, and was referred for EGD, which showed an esophageal mass located at the GE junction, measuring 4 cm.  Biopsy showed poorly differentiated adenocarcinoma.  HER-2 was sent and is equivocal (2+).  CT c/a/p showed a mildly prominent lymph node in the gastrohepatic ligament, but there were no pathologically enlarged lymph nodes in the chest, abdomen, or pelvis.  There was otherwise no evidence of metastatic disease.  PET-CT showed thickening of the distal esophagus consistent with known esophageal adenocarcinoma, as well as mildly hypermetabolic 1 cm lymph node in the gastrohepatic ligament.  There was no evidence of distant metastatic disease.  He underwent EUS on 6/9/17, which showed the esophageal tumor in the lower third of the esophagus, staged T2NX.  There were 2 abnormal lymph nodes seen in the gastrohepatic ligament; pathology was suspicious for adenocarcinoma.  Celiac node was sampled as well, which was benign.  He was seen by surgery, Dr. Esteban, and recommended srinivas-operative chemotherapy.    Port was placed on 6/22/17.     He underwent chemotherapy with epirubicin, oxaliplatin, and capecitabine x 3 cycles 6/26/17-8/7/17.      On 11/3/17, he underwent laparotomy with total gastrectomy and abdominal lymphadenectomy, left thoracotomy with distal esophagectomy and intrathoracic Terrell-en-Y esophagojejunostomy, feeding jejunostomy, left pharyngostomy tube placement, right tube thoracostomy, and flexible bronchoscopy.  On 11/9/17, he was taken back to OR for I&D of  pharyngostomy tube abscess.  Pathology showed adenocarcinoma, moderate differentiated, extensive residual tumor with no evidence tumor regression, margins negative, perineural invasion present, 1 of 28 lymph nodes were involved with malignancy, stage [id number] (stage IIIA).  HER-2 is non-amplified.    He resumed chemotherapy post-surgery, with plans to complete 3 more cycles, with 5-FU, epirubicin, oxaliplatin.  However, he only completed 2 more cycles, due to poor tolerance.  He was admitted to the hospital 1/9/18-1/13/18 for nausea, diarrhea, failure to thrive, severe malnutrition, generalized weakness.  His infusional chemotherapy was stopped on 1/8/18.     He underwent EGD in the hospital on 1/11/18, which showed ulcers, and no evidence of recurrence of his cancer.  One area biopsied showed inflammation, no evidence of malignancy.      INTERIM HISTORY: Daniel comes in for follow-up today.  He has been feeling better since his last visit.  His diarrhea has resolved.  He has been able to eat more food by mouth. He continues to do tube feeding.  He has been gaining weight, although still below his baseline weight.  He denies pain.  He is slowly able to do more activity, such as walk up the stairs.  His oral thrush and mouth sores are resolved.          REVIEW OF SYSTEMS:   14 point ROS was reviewed and is negative other than as noted above in HPI.       HOME MEDICATIONS:  Current Outpatient Prescriptions   Medication Sig Dispense Refill     prochlorperazine (COMPAZINE) 10 MG tablet Take 1 tablet (10 mg) by mouth every 6 hours as needed for nausea or vomiting 90 tablet 3     LORazepam (ATIVAN) 0.5 MG tablet Take 1 tablet (0.5 mg) by mouth every 4 hours as needed for anxiety 60 tablet 1     loperamide (IMODIUM) 2 MG capsule Take 2 capsules (4 mg) by mouth every 6 hours (max dose of 16 mg daily). 120 capsule 0     diphenoxylate-atropine (LOMOTIL) 2.5-0.025 MG per tablet Take 1 tablet by mouth 4 times daily as needed for  diarrhea 40 tablet 0     dexamethasone (DECADRON) 2 MG tablet Take 1 tablet (2 mg) by mouth daily 30 tablet 0     fiber modular (NUTRISOURCE FIBER) packet 1 packet by Per Feeding Tube route 3 times daily 90 packet 0     potassium chloride (KLOR-CON) 20 MEQ Packet Take 20 mEq by mouth 2 times daily 60 packet 0     artificial saliva (BIOTENE DRY MOUTHWASH) LIQD liquid Swish and spit 10 mLs in mouth 4 times daily 473 mL 0     magic mouthwash suspension (diphenhydramine, lidocaine, aluminum-magnesium & simethicone) Swish and swallow 5-10 mLs in mouth every 6 hours as needed for mouth sores 237 mL 1     ondansetron (ZOFRAN) 4 MG tablet Take 1 tablet (4 mg) by mouth every 6 hours 40 tablet 0     OLANZapine (ZYPREXA) 10 MG tablet Take 1 tablet (10 mg) by mouth At Bedtime 30 tablet 1     fluconazole (DIFLUCAN) 200 MG tablet Take 1 tablet (200 mg) by mouth daily 30 tablet 1     cyabnocobalamin (VITAMIN B-12) 2500 MCG sublingual tablet Place 2,500 mcg under the tongue daily 30 tablet 1     acetaminophen (TYLENOL) 32 mg/mL solution 30.45 mLs (975 mg) by Per J Tube route 3 times daily 400 mL 0     multivitamins with minerals (CERTAVITE/CEROVITE) LIQD liquid 15 mLs by Per J Tube route daily 450 mL 0     oxyCODONE (ROXICODONE) 5 MG/5ML solution Take 5-10 mLs (5-10 mg) by mouth every 4 hours as needed for moderate to severe pain (Patient not taking: Reported on 1/2/2018) 300 mL 0         ALLERGIES:  No Known Allergies      PAST MEDICAL HISTORY:  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 6/5/2017         PAST SURGICAL HISTORY:  Past Surgical History:   Procedure Laterality Date     ESOPHAGOGASTRODUODENOSCOPY       ESOPHAGOSCOPY, GASTROSCOPY, DUODENOSCOPY (EGD), COMBINED N/A 6/9/2017    Procedure: COMBINED ENDOSCOPIC ULTRASOUND, ESOPHAGOSCOPY, GASTROSCOPY, DUODENOSCOPY (EGD), FINE NEEDLE ASPIRATE/BIOPSY;  Upper Endoscopic Ultrasound, fine needle aspirate/biopsy;  Surgeon:  Guru Mark Avila MD;  Location: [facility] OR     ESOPHAGOSCOPY, GASTROSCOPY, DUODENOSCOPY (EGD), COMBINED N/A 11/3/2017    Procedure: COMBINED ESOPHAGOSCOPY, GASTROSCOPY, DUODENOSCOPY (EGD);;  Surgeon: Yunior Esteban MD;  Location: [facility] OR     ESOPHAGOSCOPY, GASTROSCOPY, DUODENOSCOPY (EGD), COMBINED N/A 11/9/2017    Procedure: COMBINED ESOPHAGOSCOPY, GASTROSCOPY, DUODENOSCOPY (EGD);  Esophogastroduodenoscopy, take out pharangostomy tube;  Surgeon: Yunior Esteban MD;  Location: [facility] OR     ESOPHAGOSCOPY, GASTROSCOPY, DUODENOSCOPY (EGD), COMBINED N/A 1/11/2018    Procedure: COMBINED ESOPHAGOSCOPY, GASTROSCOPY, DUODENOSCOPY (EGD), BIOPSY SINGLE OR MULTIPLE;  COMBINED ESOPHAGOSCOPY, GASTROSCOPY, DUODENOSCOPY (EGD);  Surgeon: Alessandro Mcgregor MD;  Location: [facility] GI     GASTRECTOMY N/A 11/3/2017    Procedure: GASTRECTOMY;;  Surgeon: Yunior Esteban MD;  Location: [facility] OR     HAND SURGERY      childhood, torn tendon     INSERT PORT VASCULAR ACCESS Right 6/22/2017    Procedure: INSERT PORT VASCULAR ACCESS;  Single Lumen Chest Power Port;  Surgeon: Iván Driver PA-C;  Location: [facility] OR     LAPAROSCOPY DIAGNOSTIC (GENERAL) N/A 11/3/2017    Procedure: LAPAROSCOPY DIAGNOSTIC (GENERAL);  diagnostic laparoscopy, right chest tube, total gastrectomy with distal esophagectomy, intrathoracic eveline-y esophago-jejunostomy, feeding jejunostomy, pharyngostomy, esophagogastroduodenoscopy, flexible bronchoscopy;  Surgeon: Yunior Esteban MD;  Location: [facility] OR     LAPAROTOMY EXPLORATORY N/A 11/3/2017    Procedure: LAPAROTOMY EXPLORATORY;;  Surgeon: Yunior Esteban MD;  Location: [facility] OR     PHARYNGOSTOMY N/A 11/3/2017    Procedure: PHARYNGOSTOMY;;  Surgeon: Yunior Esteban MD;  Location: [facility] OR     THORACOTOMY Left 11/3/2017    Procedure: THORACOTOMY;;  Surgeon: Yunior Esteban MD;  Location:  OR         SOCIAL HISTORY:  Social History     Social History      Marital status:      Spouse name: N/A     Number of children: 1     Years of education: N/A     Occupational History     musician and teacher       Social History Main Topics     Smoking status: Never Smoker     Smokeless tobacco: Never Used     Alcohol use Yes      Comment: 1 beer daily     Drug use: Yes     Special: Marijuana      Comment: occasional     Sexual activity: Yes     Partners: Female     Birth control/ protection: Natural Family Planning     Other Topics Concern     Not on file     Social History Narrative     He works at [employer] in [place], 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  He denies smoking.  He drinks ~1 beer a day.  He denies illicit drug use, other than occasional marijuana.  He lives in [place] with his wife, and 4.5 year-old daughter.  His wife is currently pregnant with a boy, and is due in 6 weeks.  He has a half sister who  of breast cancer at age 32; she was diagnosed in her late 20's.  A paternal grandmother had breast cancer in her 70's      FAMILY HISTORY:  Family History   Problem Relation Age of Onset     Other - See Comments Father      sepsis     CANCER Sister      breast cancer  29 yo 1/2 sister      CANCER Paternal Grandmother      breast         PHYSICAL EXAM:  Vital signs:  /59 (BP Location: Right arm, Patient Position: Sitting, Cuff Size: Adult Regular)  Pulse 80  Temp 98  F (36.7  C) (Oral)  Resp 16  Wt 65.9 kg (145 lb 4.8 oz)  SpO2 100%  BMI 21.52 kg/m2   ECO  GENERAL/CONSTITUTIONAL: No acute distress.  Appears less gaunt today.  EYES: No scleral icterus.  RESPIRATORY: Clear to auscultation bilaterally. No crackles or wheezing.   CARDIOVASCULAR: Regular rate and rhythm without murmurs, gallops, or rubs.  GASTROINTESTINAL: No tenderness. The patient has normal bowel sounds. No guarding.  No distention.  Feeding tube in place.    MUSCULOSKELETAL: Warm and well-perfused, no cyanosis, clubbing, or edema.  NEUROLOGIC:  Alert, oriented, answers questions appropriately.  INTEGUMENTARY: No jaundice.  Port in place.        LABS:  CBC RESULTS:   Recent Labs   Lab Test  02/05/18   1328   WBC  4.2   RBC  4.17*   HGB  10.6*   HCT  33.9*   MCV  81   MCH  25.4*   MCHC  31.3*   RDW  20.2*   PLT  291     Recent Labs   Lab Test  02/05/18   1328  01/30/18   1005   NA  137  140   POTASSIUM  3.9  4.3   CHLORIDE  104  105   CO2  28  34*   ANIONGAP  6  1*   GLC  126*  81   BUN  16  17   CR  0.56*  0.55*   YOBANY  8.2*  8.0*     Lab Results   Component Value Date    AST 26 02/05/2018     Lab Results   Component Value Date    ALT 40 02/05/2018     No results found for: BILICONJ   Lab Results   Component Value Date    BILITOTAL 0.2 02/05/2018     Lab Results   Component Value Date    ALBUMIN 3.3 02/05/2018     Lab Results   Component Value Date    PROTTOTAL 6.8 02/05/2018      Lab Results   Component Value Date    ALKPHOS 63 02/05/2018     Magnesium 2.0    Component      Latest Ref Rng & Units 12/1/2017   CEA      0 - 2.5 ug/L <0.5         IMAGING:  CT c/a/p 12/1/17:  1. Surgical changes of total gastrectomy and distal esophagectomy with  intrathoracic Terrell-Y esophagojejunostomy with resolution of the  previously seen pneumothoraces, trace remaining bilateral pleural  effusions, trace remaining mediastinal air (a single tiny focus  adjacent to the upper esophagus at the level of the thyroid gland),  and a small amount of fluid surrounding the intrathoracic jejunum  extending into the abdomen and pelvis.   2. No evidence of metastatic disease in the chest, abdomen, or pelvis.  3. Unchanged nonspecific central mesenteric haziness. Recommend  attention on follow-up.    CT abdomen/pelvis 12/28/17:  1. Percutaneous jejunostomy tube is in good position. No evidence for  hernia, fluid collection or significant inflammation along the  subcutaneous tract.   2. Stable postsurgical changes of distal esophagectomy and total  gastrectomy with intrathoracic  esophagojejunostomy pull-through. No  evidence for obstruction.      ASSESSMENT/PLAN:  Daniel Sandhu is a 37 year old male with:    1) Adenocarcinoma of the GE junction: now s/p 3 cycles of neoadjuvant chemotherapy with EOX, followed by surgical resection on 11/3/17.  Pathology showed adenocarcinoma, moderate differentiated, extensive residual tumor with no evidence tumor regression, margins negative, perineural invasion present, 1 of 28 lymph nodes were involved with malignancy, stage [id number] (stage IIIA).  HER-2 is non-amplified.    Post-op CT scans reviewed.  There are post-operative changes seen.  No evidence of metastatic disease seen on imaging.      He has received EOF x 2 cycles after surgery, and he has not tolerated well.  The 5-FU on cycle 5 was discontinued early. Chemotherapy was stopped at that point due to poor tolerance.    He has been doing better since stopping chemotherapy, eating more and gaining some weight back.    -he has follow-up appointment with Dr. Esteban in March 2018 with CT scans  -RTC on 3/12/18 with labs on 3/7/18    2) Genetics:  -genetic testing was negative    3) Chemotherapy-induced nausea/vomiting: Resolved since stopping chemotherapy  -he has Zofran ODT and Compazine as needed  -[facility]    4) Mucositis: Resolved since stopping chemotherapy.    5) Diarrhea:  Infectious evaluation was negative in the hospital, including C.diff. Diarrhea is now resolved.    6) Hypokalemia: This has resolved since his diarrhea resolved.  He no longer needs potassium supplementation.    7) Fertility: Daniel and his wife have 2 children, including a baby boy just born around June 2017.  He is not planning for more children in the near future.    8) Nutrition: He is on tube feeds, but has been able to take in more oral intake now.  His albumin is improving.      9) Anemia:   -will check iron levels and vitamin B12 levels with his next labs  -monitor CBC      I spent a  total of 40 minutes with the patient, with over >50% of the time in counseling and/or coordination of care.       Laurence Hood MD  Hematology/Oncology  [facility]

## 2018-02-05 NOTE — LETTER
2/5/2018       RE: Daniel Sandhu  [address] [facility] [address]     Dear Colleague,    Thank you for referring your patient, Daniel Sandhu, to the [facility]. Please see a copy of my visit note below.    [facility]    Hematology/Oncology Established Patient Note      Today's Date: 2/05/18    Reason for Follow-up: adenocarcinoma of the GE junction      HISTORY OF PRESENT ILLNESS: Daniel Sandhu is a 37 year old male who presents with adenocarcinoma of the GE junction.  In early 2017, patient started noticing difficulty swallowing, and that food seems to take longer to go down.  It became more frequent, and he saw his PCP, and was referred for EGD, which showed an esophageal mass located at the GE junction, measuring 4 cm.  Biopsy showed poorly differentiated adenocarcinoma.  HER-2 was sent and is equivocal (2+).  CT c/a/p showed a mildly prominent lymph node in the gastrohepatic ligament, but there were no pathologically enlarged lymph nodes in the chest, abdomen, or pelvis.  There was otherwise no evidence of metastatic disease.  PET-CT showed thickening of the distal esophagus consistent with known esophageal adenocarcinoma, as well as mildly hypermetabolic 1 cm lymph node in the gastrohepatic ligament.  There was no evidence of distant metastatic disease.  He underwent EUS on 6/9/17, which showed the esophageal tumor in the lower third of the esophagus, staged T2NX.  There were 2 abnormal lymph nodes seen in the gastrohepatic ligament; pathology was suspicious for adenocarcinoma.  Celiac node was sampled as well, which was benign.  He was seen by surgery, Dr. Esteban, and recommended srinivas-operative chemotherapy.    Port was placed on 6/22/17.     He underwent chemotherapy with epirubicin, oxaliplatin, and capecitabine x 3 cycles 6/26/17-8/7/17.      On 11/3/17, he underwent laparotomy with total gastrectomy and abdominal lymphadenectomy, left  thoracotomy with distal esophagectomy and intrathoracic Terrell-en-Y esophagojejunostomy, feeding jejunostomy, left pharyngostomy tube placement, right tube thoracostomy, and flexible bronchoscopy.  On 11/9/17, he was taken back to OR for I&D of pharyngostomy tube abscess.  Pathology showed adenocarcinoma, moderate differentiated, extensive residual tumor with no evidence tumor regression, margins negative, perineural invasion present, 1 of 28 lymph nodes were involved with malignancy, stage [id number] (stage IIIA).  HER-2 is non-amplified.    He resumed chemotherapy post-surgery, with plans to complete 3 more cycles, with 5-FU, epirubicin, oxaliplatin.  However, he only completed 2 more cycles, due to poor tolerance.  He was admitted to the hospital 1/9/18-1/13/18 for nausea, diarrhea, failure to thrive, severe malnutrition, generalized weakness.  His infusional chemotherapy was stopped on 1/8/18.     He underwent EGD in the hospital on 1/11/18, which showed ulcers, and no evidence of recurrence of his cancer.  One area biopsied showed inflammation, no evidence of malignancy.      INTERIM HISTORY: Daniel comes in for follow-up today.  He has been feeling better since his last visit.  His diarrhea has resolved.  He has been able to eat more food by mouth. He continues to do tube feeding.  He has been gaining weight, although still below his baseline weight.  He denies pain.  He is slowly able to do more activity, such as walk up the stairs.  His oral thrush and mouth sores are resolved.          REVIEW OF SYSTEMS:   14 point ROS was reviewed and is negative other than as noted above in HPI.       HOME MEDICATIONS:  Current Outpatient Prescriptions   Medication Sig Dispense Refill     prochlorperazine (COMPAZINE) 10 MG tablet Take 1 tablet (10 mg) by mouth every 6 hours as needed for nausea or vomiting 90 tablet 3     LORazepam (ATIVAN) 0.5 MG tablet Take 1 tablet (0.5 mg) by mouth every 4 hours as needed for anxiety 60  tablet 1     loperamide (IMODIUM) 2 MG capsule Take 2 capsules (4 mg) by mouth every 6 hours (max dose of 16 mg daily). 120 capsule 0     diphenoxylate-atropine (LOMOTIL) 2.5-0.025 MG per tablet Take 1 tablet by mouth 4 times daily as needed for diarrhea 40 tablet 0     dexamethasone (DECADRON) 2 MG tablet Take 1 tablet (2 mg) by mouth daily 30 tablet 0     fiber modular (NUTRISOURCE FIBER) packet 1 packet by Per Feeding Tube route 3 times daily 90 packet 0     potassium chloride (KLOR-CON) 20 MEQ Packet Take 20 mEq by mouth 2 times daily 60 packet 0     artificial saliva (BIOTENE DRY MOUTHWASH) LIQD liquid Swish and spit 10 mLs in mouth 4 times daily 473 mL 0     magic mouthwash suspension (diphenhydramine, lidocaine, aluminum-magnesium & simethicone) Swish and swallow 5-10 mLs in mouth every 6 hours as needed for mouth sores 237 mL 1     ondansetron (ZOFRAN) 4 MG tablet Take 1 tablet (4 mg) by mouth every 6 hours 40 tablet 0     OLANZapine (ZYPREXA) 10 MG tablet Take 1 tablet (10 mg) by mouth At Bedtime 30 tablet 1     fluconazole (DIFLUCAN) 200 MG tablet Take 1 tablet (200 mg) by mouth daily 30 tablet 1     cyabnocobalamin (VITAMIN B-12) 2500 MCG sublingual tablet Place 2,500 mcg under the tongue daily 30 tablet 1     acetaminophen (TYLENOL) 32 mg/mL solution 30.45 mLs (975 mg) by Per J Tube route 3 times daily 400 mL 0     multivitamins with minerals (CERTAVITE/CEROVITE) LIQD liquid 15 mLs by Per J Tube route daily 450 mL 0     oxyCODONE (ROXICODONE) 5 MG/5ML solution Take 5-10 mLs (5-10 mg) by mouth every 4 hours as needed for moderate to severe pain (Patient not taking: Reported on 1/2/2018) 300 mL 0         ALLERGIES:  No Known Allergies      PAST MEDICAL HISTORY:  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 6/5/2017         PAST SURGICAL HISTORY:  Past Surgical History:   Procedure Laterality Date     ESOPHAGOGASTRODUODENOSCOPY       ESOPHAGOSCOPY,  GASTROSCOPY, DUODENOSCOPY (EGD), COMBINED N/A 6/9/2017    Procedure: COMBINED ENDOSCOPIC ULTRASOUND, ESOPHAGOSCOPY, GASTROSCOPY, DUODENOSCOPY (EGD), FINE NEEDLE ASPIRATE/BIOPSY;  Upper Endoscopic Ultrasound, fine needle aspirate/biopsy;  Surgeon: Guru Mark Avila MD;  Location: [facility] OR     ESOPHAGOSCOPY, GASTROSCOPY, DUODENOSCOPY (EGD), COMBINED N/A 11/3/2017    Procedure: COMBINED ESOPHAGOSCOPY, GASTROSCOPY, DUODENOSCOPY (EGD);;  Surgeon: Yunior Esteban MD;  Location: [facility] OR     ESOPHAGOSCOPY, GASTROSCOPY, DUODENOSCOPY (EGD), COMBINED N/A 11/9/2017    Procedure: COMBINED ESOPHAGOSCOPY, GASTROSCOPY, DUODENOSCOPY (EGD);  Esophogastroduodenoscopy, take out pharangostomy tube;  Surgeon: Yunior Esteban MD;  Location: [facility] OR     ESOPHAGOSCOPY, GASTROSCOPY, DUODENOSCOPY (EGD), COMBINED N/A 1/11/2018    Procedure: COMBINED ESOPHAGOSCOPY, GASTROSCOPY, DUODENOSCOPY (EGD), BIOPSY SINGLE OR MULTIPLE;  COMBINED ESOPHAGOSCOPY, GASTROSCOPY, DUODENOSCOPY (EGD);  Surgeon: Alessandro Mcgregor MD;  Location: [facility] GI     GASTRECTOMY N/A 11/3/2017    Procedure: GASTRECTOMY;;  Surgeon: Yunior Esteban MD;  Location: [facility] OR     HAND SURGERY      childhood, torn tendon     INSERT PORT VASCULAR ACCESS Right 6/22/2017    Procedure: INSERT PORT VASCULAR ACCESS;  Single Lumen Chest Power Port;  Surgeon: Iván Driver PA-C;  Location: [facility] OR     LAPAROSCOPY DIAGNOSTIC (GENERAL) N/A 11/3/2017    Procedure: LAPAROSCOPY DIAGNOSTIC (GENERAL);  diagnostic laparoscopy, right chest tube, total gastrectomy with distal esophagectomy, intrathoracic eveline-y esophago-jejunostomy, feeding jejunostomy, pharyngostomy, esophagogastroduodenoscopy, flexible bronchoscopy;  Surgeon: Yunior Esteban MD;  Location: [facility] OR     LAPAROTOMY EXPLORATORY N/A 11/3/2017    Procedure: LAPAROTOMY EXPLORATORY;;  Surgeon: Yunior Esteban MD;  Location: [facility]     PHARYNGOSTOMY N/A 11/3/2017    Procedure:  PHARYNGOSTOMY;;  Surgeon: Yunior Esteban MD;  Location: [facility] OR     THORACOTOMY Left 11/3/2017    Procedure: THORACOTOMY;;  Surgeon: Yunior Esteban MD;  Location: [facility] OR         SOCIAL HISTORY:  Social History     Social History     Marital status:      Spouse name: N/A     Number of children: 1     Years of education: N/A     Occupational History     musician and teacher       Social History Main Topics     Smoking status: Never Smoker     Smokeless tobacco: Never Used     Alcohol use Yes      Comment: 1 beer daily     Drug use: Yes     Special: Marijuana      Comment: occasional     Sexual activity: Yes     Partners: Female     Birth control/ protection: Natural Family Planning     Other Topics Concern     Not on file     Social History Narrative     He works at [employer] in [place], 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  He denies smoking.  He drinks ~1 beer a day.  He denies illicit drug use, other than occasional marijuana.  He lives in [place] with his wife, and 4.5 year-old daughter.  His wife is currently pregnant with a boy, and is due in 6 weeks.  He has a half sister who  of breast cancer at age 32; she was diagnosed in her late 20's.  A paternal grandmother had breast cancer in her 70's      FAMILY HISTORY:  Family History   Problem Relation Age of Onset     Other - See Comments Father      sepsis     CANCER Sister      breast cancer  29 yo 1/2 sister      CANCER Paternal Grandmother      breast         PHYSICAL EXAM:  Vital signs:  /59 (BP Location: Right arm, Patient Position: Sitting, Cuff Size: Adult Regular)  Pulse 80  Temp 98  F (36.7  C) (Oral)  Resp 16  Wt 65.9 kg (145 lb 4.8 oz)  SpO2 100%  BMI 21.52 kg/m2   ECO  GENERAL/CONSTITUTIONAL: No acute distress.  Appears less gaunt today.  EYES: No scleral icterus.  RESPIRATORY: Clear to auscultation bilaterally. No crackles or wheezing.   CARDIOVASCULAR: Regular rate and rhythm  without murmurs, gallops, or rubs.  GASTROINTESTINAL: No tenderness. The patient has normal bowel sounds. No guarding.  No distention.  Feeding tube in place.    MUSCULOSKELETAL: Warm and well-perfused, no cyanosis, clubbing, or edema.  NEUROLOGIC: Alert, oriented, answers questions appropriately.  INTEGUMENTARY: No jaundice.  Port in place.        LABS:  CBC RESULTS:   Recent Labs   Lab Test  02/05/18   1328   WBC  4.2   RBC  4.17*   HGB  10.6*   HCT  33.9*   MCV  81   MCH  25.4*   MCHC  31.3*   RDW  20.2*   PLT  291     Recent Labs   Lab Test  02/05/18   1328  01/30/18   1005   NA  137  140   POTASSIUM  3.9  4.3   CHLORIDE  104  105   CO2  28  34*   ANIONGAP  6  1*   GLC  126*  81   BUN  16  17   CR  0.56*  0.55*   YOBANY  8.2*  8.0*     Lab Results   Component Value Date    AST 26 02/05/2018     Lab Results   Component Value Date    ALT 40 02/05/2018     No results found for: BILICONJ   Lab Results   Component Value Date    BILITOTAL 0.2 02/05/2018     Lab Results   Component Value Date    ALBUMIN 3.3 02/05/2018     Lab Results   Component Value Date    PROTTOTAL 6.8 02/05/2018      Lab Results   Component Value Date    ALKPHOS 63 02/05/2018     Magnesium 2.0    Component      Latest Ref Rng & Units 12/1/2017   CEA      0 - 2.5 ug/L <0.5         IMAGING:  CT c/a/p 12/1/17:  1. Surgical changes of total gastrectomy and distal esophagectomy with  intrathoracic Terrell-Y esophagojejunostomy with resolution of the  previously seen pneumothoraces, trace remaining bilateral pleural  effusions, trace remaining mediastinal air (a single tiny focus  adjacent to the upper esophagus at the level of the thyroid gland),  and a small amount of fluid surrounding the intrathoracic jejunum  extending into the abdomen and pelvis.   2. No evidence of metastatic disease in the chest, abdomen, or pelvis.  3. Unchanged nonspecific central mesenteric haziness. Recommend  attention on follow-up.    CT abdomen/pelvis 12/28/17:  1. Percutaneous  jejunostomy tube is in good position. No evidence for  hernia, fluid collection or significant inflammation along the  subcutaneous tract.   2. Stable postsurgical changes of distal esophagectomy and total  gastrectomy with intrathoracic esophagojejunostomy pull-through. No  evidence for obstruction.      ASSESSMENT/PLAN:  Daniel Sandhu is a 37 year old male with:    1) Adenocarcinoma of the GE junction: now s/p 3 cycles of neoadjuvant chemotherapy with EOX, followed by surgical resection on 11/3/17.  Pathology showed adenocarcinoma, moderate differentiated, extensive residual tumor with no evidence tumor regression, margins negative, perineural invasion present, 1 of 28 lymph nodes were involved with malignancy, stage [id number] (stage IIIA).  HER-2 is non-amplified.    Post-op CT scans reviewed.  There are post-operative changes seen.  No evidence of metastatic disease seen on imaging.      He has received EOF x 2 cycles after surgery, and he has not tolerated well.  The 5-FU on cycle 5 was discontinued early. Chemotherapy was stopped at that point due to poor tolerance.    He has been doing better since stopping chemotherapy, eating more and gaining some weight back.    -he has follow-up appointment with Dr. Esteban in March 2018 with CT scans  -RTC on 3/12/18 with labs on 3/7/18    2) Genetics:  -genetic testing was negative    3) Chemotherapy-induced nausea/vomiting: Resolved since stopping chemotherapy  -he has Zofran ODT and Compazine as needed  -he is certified for medical mariuana    4) Mucositis: Resolved since stopping chemotherapy.    5) Diarrhea:  Infectious evaluation was negative in the hospital, including C.diff. Diarrhea is now resolved.    6) Hypokalemia: This has resolved since his diarrhea resolved.  He no longer needs potassium supplementation.    7) Fertility: Daniel and his wife have 2 children, including a baby boy just born around June 2017.  He is not planning for more children in the near  future.    8) Nutrition: He is on tube feeds, but has been able to take in more oral intake now.  His albumin is improving.      9) Anemia:   -will check iron levels and vitamin B12 levels with his next labs  -monitor CBC      I spent a total of 40 minutes with the patient, with over >50% of the time in counseling and/or coordination of care.       Laurence Hood MD  Hematology/Oncology  [facility]

## 2018-02-05 NOTE — NURSING NOTE
"Chief Complaint   Patient presents with     Port Draw     port accessed and labs drawn by rn.  vs taken.     Port accessed with 20g 3/4\" gripper needle, labs drawn, port flushed with saline and heparin, vitals checked, port de-accessed.    Génesis Tripp RN    "

## 2018-02-14 ENCOUNTER — TELEPHONE (OUTPATIENT)
Dept: ONCOLOGY | Facility: CLINIC | Age: 38
End: 2018-02-14

## 2018-02-14 NOTE — TELEPHONE ENCOUNTER
"Oncology Distress Screening Follow-up  Clinical Social Work  [facility]    Identified Concern and Score From Distress Screening: Patient triggered oncology distress screening for concerns about work and home life issues (8) and resources (10).    Date of Distress Screenin2018    Data: Patient is a 37 year old male with esophageal cancer.    Intervention: SW spoke with patient over the phone to follow up on distress screening.  He reported that at time of screening, he \"wasn't doing well physically\" and had subsequently been hospitalized for several days.  He also reported that due to his weakness, his wife had been stressed caring for their children and household needs which he felt contributed to their screening trigger att he time. Patiet indicated that since the screening, his physical status has drastically improved and stated the family is \"in a much better place.\" He reported no other needs or concerns at this time.    Education Provided: SW availability and contact information.    Follow-up Required: SW continues to be available to assist with any needs, concerns or questions.    Soo Yeon Han, MSW, [facility]  Pager: [phone]  Phone: [phone]    "

## 2018-03-07 ENCOUNTER — OFFICE VISIT (OUTPATIENT)
Dept: SURGERY | Facility: CLINIC | Age: 38
End: 2018-03-07
Attending: THORACIC SURGERY (CARDIOTHORACIC VASCULAR SURGERY)
Payer: COMMERCIAL

## 2018-03-07 ENCOUNTER — MYC MEDICAL ADVICE (OUTPATIENT)
Dept: SURGERY | Facility: CLINIC | Age: 38
End: 2018-03-07

## 2018-03-07 ENCOUNTER — RADIANT APPOINTMENT (OUTPATIENT)
Dept: CT IMAGING | Facility: CLINIC | Age: 38
End: 2018-03-07
Attending: NURSE PRACTITIONER
Payer: COMMERCIAL

## 2018-03-07 ENCOUNTER — APPOINTMENT (OUTPATIENT)
Dept: LAB | Facility: CLINIC | Age: 38
End: 2018-03-07
Attending: INTERNAL MEDICINE
Payer: COMMERCIAL

## 2018-03-07 VITALS
SYSTOLIC BLOOD PRESSURE: 102 MMHG | HEART RATE: 86 BPM | TEMPERATURE: 97.6 F | OXYGEN SATURATION: 100 % | RESPIRATION RATE: 18 BRPM | BODY MASS INDEX: 22.82 KG/M2 | DIASTOLIC BLOOD PRESSURE: 60 MMHG | WEIGHT: 150.1 LBS

## 2018-03-07 DIAGNOSIS — Z98.0 S/P TOTAL GASTRECTOMY AND ROUX-EN-Y ESOPHAGOJEJUNAL ANASTOMOSIS: Primary | ICD-10-CM

## 2018-03-07 DIAGNOSIS — Z90.3 S/P TOTAL GASTRECTOMY AND ROUX-EN-Y ESOPHAGOJEJUNAL ANASTOMOSIS: Primary | ICD-10-CM

## 2018-03-07 DIAGNOSIS — C15.5 MALIGNANT NEOPLASM OF LOWER THIRD OF ESOPHAGUS (H): ICD-10-CM

## 2018-03-07 LAB
ALBUMIN SERPL-MCNC: 3.7 G/DL (ref 3.4–5)
ALP SERPL-CCNC: 74 U/L (ref 40–150)
ALT SERPL W P-5'-P-CCNC: 25 U/L (ref 0–70)
ANION GAP SERPL CALCULATED.3IONS-SCNC: 6 MMOL/L (ref 3–14)
AST SERPL W P-5'-P-CCNC: 16 U/L (ref 0–45)
BASOPHILS # BLD AUTO: 0 10E9/L (ref 0–0.2)
BASOPHILS NFR BLD AUTO: 0.2 %
BILIRUB SERPL-MCNC: 0.4 MG/DL (ref 0.2–1.3)
BUN SERPL-MCNC: 18 MG/DL (ref 7–30)
CALCIUM SERPL-MCNC: 8.7 MG/DL (ref 8.5–10.1)
CHLORIDE SERPL-SCNC: 104 MMOL/L (ref 94–109)
CO2 SERPL-SCNC: 27 MMOL/L (ref 20–32)
CREAT SERPL-MCNC: 0.56 MG/DL (ref 0.66–1.25)
DIFFERENTIAL METHOD BLD: ABNORMAL
EOSINOPHIL # BLD AUTO: 0.1 10E9/L (ref 0–0.7)
EOSINOPHIL NFR BLD AUTO: 1.1 %
ERYTHROCYTE [DISTWIDTH] IN BLOOD BY AUTOMATED COUNT: 18.6 % (ref 10–15)
FERRITIN SERPL-MCNC: 8 NG/ML (ref 26–388)
GFR SERPL CREATININE-BSD FRML MDRD: >90 ML/MIN/1.7M2
GLUCOSE SERPL-MCNC: 95 MG/DL (ref 70–99)
HCT VFR BLD AUTO: 34 % (ref 40–53)
HGB BLD-MCNC: 10.4 G/DL (ref 13.3–17.7)
IMM GRANULOCYTES # BLD: 0 10E9/L (ref 0–0.4)
IMM GRANULOCYTES NFR BLD: 0.2 %
IRON SATN MFR SERPL: 5 % (ref 15–46)
IRON SERPL-MCNC: 22 UG/DL (ref 35–180)
LYMPHOCYTES # BLD AUTO: 1.3 10E9/L (ref 0.8–5.3)
LYMPHOCYTES NFR BLD AUTO: 16 %
MAGNESIUM SERPL-MCNC: 2.2 MG/DL (ref 1.6–2.3)
MCH RBC QN AUTO: 24.5 PG (ref 26.5–33)
MCHC RBC AUTO-ENTMCNC: 30.6 G/DL (ref 31.5–36.5)
MCV RBC AUTO: 80 FL (ref 78–100)
MONOCYTES # BLD AUTO: 0.7 10E9/L (ref 0–1.3)
MONOCYTES NFR BLD AUTO: 8.2 %
NEUTROPHILS # BLD AUTO: 6.1 10E9/L (ref 1.6–8.3)
NEUTROPHILS NFR BLD AUTO: 74.3 %
NRBC # BLD AUTO: 0 10*3/UL
NRBC BLD AUTO-RTO: 0 /100
PLATELET # BLD AUTO: 257 10E9/L (ref 150–450)
POTASSIUM SERPL-SCNC: 3.8 MMOL/L (ref 3.4–5.3)
PROT SERPL-MCNC: 7.2 G/DL (ref 6.8–8.8)
RBC # BLD AUTO: 4.24 10E12/L (ref 4.4–5.9)
SODIUM SERPL-SCNC: 138 MMOL/L (ref 133–144)
TIBC SERPL-MCNC: 422 UG/DL (ref 240–430)
VIT B12 SERPL-MCNC: 820 PG/ML (ref 193–986)
WBC # BLD AUTO: 8.2 10E9/L (ref 4–11)

## 2018-03-07 PROCEDURE — 82728 ASSAY OF FERRITIN: CPT | Performed by: INTERNAL MEDICINE

## 2018-03-07 PROCEDURE — G0463 HOSPITAL OUTPT CLINIC VISIT: HCPCS | Mod: ZF

## 2018-03-07 PROCEDURE — 80053 COMPREHEN METABOLIC PANEL: CPT | Performed by: INTERNAL MEDICINE

## 2018-03-07 PROCEDURE — 82607 VITAMIN B-12: CPT | Performed by: INTERNAL MEDICINE

## 2018-03-07 PROCEDURE — 85025 COMPLETE CBC W/AUTO DIFF WBC: CPT | Performed by: INTERNAL MEDICINE

## 2018-03-07 PROCEDURE — 83735 ASSAY OF MAGNESIUM: CPT | Performed by: INTERNAL MEDICINE

## 2018-03-07 PROCEDURE — 83540 ASSAY OF IRON: CPT | Performed by: INTERNAL MEDICINE

## 2018-03-07 PROCEDURE — 83550 IRON BINDING TEST: CPT | Performed by: INTERNAL MEDICINE

## 2018-03-07 PROCEDURE — 36591 DRAW BLOOD OFF VENOUS DEVICE: CPT

## 2018-03-07 RX ORDER — HEPARIN SODIUM (PORCINE) LOCK FLUSH IV SOLN 100 UNIT/ML 100 UNIT/ML
5 SOLUTION INTRAVENOUS ONCE
Status: COMPLETED | OUTPATIENT
Start: 2018-03-07 | End: 2018-03-07

## 2018-03-07 RX ORDER — HEPARIN SODIUM (PORCINE) LOCK FLUSH IV SOLN 100 UNIT/ML 100 UNIT/ML
5 SOLUTION INTRAVENOUS EVERY 8 HOURS
Status: DISCONTINUED | OUTPATIENT
Start: 2018-03-07 | End: 2018-03-07 | Stop reason: HOSPADM

## 2018-03-07 RX ORDER — IOPAMIDOL 755 MG/ML
92 INJECTION, SOLUTION INTRAVASCULAR ONCE
Status: COMPLETED | OUTPATIENT
Start: 2018-03-07 | End: 2018-03-07

## 2018-03-07 RX ADMIN — IOPAMIDOL 92 ML: 755 INJECTION, SOLUTION INTRAVASCULAR at 13:56

## 2018-03-07 RX ADMIN — HEPARIN SODIUM (PORCINE) LOCK FLUSH IV SOLN 100 UNIT/ML 5 ML: 100 SOLUTION at 14:17

## 2018-03-07 ASSESSMENT — PAIN SCALES - GENERAL: PAINLEVEL: NO PAIN (1)

## 2018-03-07 NOTE — PROGRESS NOTES
THORACIC SURGERY FOLLOW UP VISIT    Dear Dr. Chan,  I saw Mr. Sandhu in follow-up today. 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  11/9/17 - EGD, remove pharyngostomy tube     HISTOPATHOLOGY   [id number] (Stage IIIa) moderately differentiated adenocarcinoma  LN 1/31  HER2 negative     COMPLICATIONS  Phlegmon at site of pharyngostomy tube requiring wound exploration on 11/9/17.    INTERVAL STUDIES  3/7/18 CT CAP w/ contrast: no evidence of recurrent or metastatic disease      ETOH rare  TOB no  BMI 22.63    Exam notable for well-healed incisions. J tube in place, site clean with no surrounding erythema.    SUBJECTIVE   Mr. Sandhu has been doing well since his readmission to the hospital in January for nausea, diarrhea, and failure to thrive. He is no longer on chemotherapy and has gained 22 lbs since mid-January. His primary source of nutrition is oral, though he does supplement with 3 cans of tube feeds each night. 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    From a personal perspective, he lives in [place] with his wife Violeta and 5 year-old daughter. He also has a son (Mayo, born 7/2017). 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     IMPRESSION   (Z90.3,  Z98.0) S/P total gastrectomy and Terrell-en-Y esophagojejunal anastomosis  (primary encounter diagnosis)  (C15.5) 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    37 year-old male 4 months S/P total gastrectomy, distal  esophagectomy and intrathoracic Terrell-en-Y esophagojejunostomy for stage IIIa ([id number]) Siewert III adenocarcinoma    PLAN  I spent a total of 15 minutes with Mr. Daniel Sandhu, more than 50% of which were spent in counseling, coordination of care, and face-to-face time. I reviewed the plan as follows:  Mr. Sandhu was instructed that he can stop using his J tube for supplemental feedings. When he has not used his J-tube for 3 weeks, he will call to schedule an appointment with Yajaira Ferrer for removal of his J tube.   Long-term follow-up with Dr. Hood  All questions were answered and the patient and present family were in agreement with the plan.  I appreciate the opportunity to participate in the care of your patient and will keep you updated.  Sincerely,

## 2018-03-07 NOTE — MR AVS SNAPSHOT
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 [address] [phone]              Who to contact     If you have questions or need follow up information about today's clinic visit or your schedule please contact [facility] directly at [phone].  Normal or non-critical lab and imaging results will be communicated to you by [employer], letter or phone within 4 business days after the clinic has received the results. If you do not hear from us within 7 days, please contact the clinic through [email or web address] or phone. If you have a critical or abnormal lab result, we will notify you by phone as soon as possible.  Submit refill requests through [employer] or call your pharmacy and they will forward the refill request to us. Please allow 3 business days for your refill to be completed.          Additional Information About Your Visit        Tag [employer] Seehart Information     [employer] gives you secure access to your electronic health record. If you see a primary care provider, you can also send messages to your care team and make appointments. If you have questions, please call your primary care clinic.  If you do not have a primary care provider, please call [phone] and they will assist you.        Care EveryWhere ID     This  is your Care EveryWhere ID. This could be used by other organizations to access your [place] medical records  [id number]        Your Vitals Were     Pulse Temperature Respirations Pulse Oximetry BMI (Body Mass Index)       86 97.6  F (36.4  C) (Oral) 18 100% 22.82 kg/m2        Blood Pressure from Last 3 Encounters:   03/07/18 102/60   02/05/18 100/59   01/15/18 118/74    Weight from Last 3 Encounters:   03/07/18 68.1 kg (150 lb 1.6 oz)   02/05/18 65.9 kg (145 lb 4.8 oz)   01/15/18 58.4 kg (128 lb 12.8 oz)              We Performed the Following     CBC with platelets differential     Comprehensive metabolic panel     Ferritin     Iron and iron binding capacity     Magnesium     Vitamin B12        Primary Care Provider Office Phone # Fax #    Lencho Chan [phone]       [address] [facility] [address]        Equal Access to Services     JERE SWAIN : Hadii garo ku hadasho Soomaali, waaxda luqadaha, qaybta kaalmada adeegyada, alejandro brown . So [facility] [phone].    ATENCIÓN: Si laryla ashley, tiene a zayas disposición servicios gratuitos de asistencia lingüística. Llame al [phone].    We comply with applicable federal civil rights laws and [place] laws. We do not discriminate on the basis of race, color, national origin, age, disability, sex, sexual orientation, or gender identity.            Thank you!     Thank you for choosing [facility]  for your care. Our goal is always to provide you with excellent care. Hearing back from our patients is one way we can continue to improve our services. Please take a few minutes to complete the written survey that you may receive in the mail after your visit with us. Thank you!             Your Updated Medication List - Protect others around you: Learn how to safely use, store and throw away your medicines at www.disposemymeds.org.          This list is accurate as of 3/7/18  4:00 PM.   Always use your most recent med list.                   Brand Name Dispense Instructions for use Diagnosis    acetaminophen 32 mg/mL solution    TYLENOL    400 mL    30.45 mLs (975 mg) by Per J Tube route 3 times daily    Acute post-operative pain       artificial saliva Liqd liquid     473 mL    Swish and spit 10 mLs in mouth 4 times daily    Dry mouth       cyanocobalamin 2500 MCG sublingual tablet    VITAMIN B-12    30 tablet    Place 2,500 mcg under the tongue daily    B12 deficiency       dexamethasone 2 MG tablet    DECADRON    30 tablet    Take 1 tablet (2 mg) by mouth daily    Chemotherapy-induced nausea, Loss of appetite       diphenoxylate-atropine 2.5-0.025 MG per tablet    LOMOTIL    40 tablet    Take 1 tablet by mouth 4 times daily as needed for diarrhea    Diarrhea, unspecified type       fiber modular packet     90 packet    1 packet by Per Feeding Tube route 3 times daily    Diarrhea, unspecified type       fluconazole 200 MG tablet    DIFLUCAN    30 tablet    Take 1 tablet (200 mg) by mouth daily    Thrush       loperamide 2 MG capsule    IMODIUM    120 capsule    Take 2 capsules (4 mg) by mouth every 6 hours (max dose of 16 mg daily).    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       OLANZapine 10 MG tablet    zyPREXA    30 tablet    Take 1 tablet (10 mg) by mouth At Bedtime    Chemotherapy-induced nausea       ondansetron 4 MG tablet    ZOFRAN    40 tablet    Take 1 tablet (4 mg) by  mouth every 6 hours    Chemotherapy-induced nausea       oxyCODONE 5 MG/5ML solution    ROXICODONE    300 mL    Take 5-10 mLs (5-10 mg) by mouth every 4 hours as needed for moderate to severe pain    Acute post-operative pain       potassium chloride 20 MEQ Packet    KLOR-CON    60 packet    Take 20 mEq by mouth 2 times daily    Hypokalemia       prochlorperazine 10 MG tablet    COMPAZINE    90 tablet    Take 1 tablet (10 mg) by mouth every 6 hours as needed for nausea or vomiting    Chemotherapy-induced nausea

## 2018-03-07 NOTE — DISCHARGE INSTRUCTIONS

## 2018-03-07 NOTE — NURSING NOTE
Chief Complaint   Patient presents with     Port Draw     Right power port accessed, labs drawn and snet, flushed with saline/heparin, left accessed for CT scan today, vitals completed, checked into next appointment.   Xochitl DiopRN

## 2018-03-07 NOTE — NURSING NOTE
"Oncology Rooming Note    March 7, 2018 2:13 PM   Daniel Sandhu is a 37 year old male who presents for:    Chief Complaint   Patient presents with     Port Draw     Right power port accessed, labs drawn and snet, flushed with saline/heparin, left accessed for CT scan today, vitals completed, checked into next appointment.     Oncology Clinic Visit     Adenocarconoma of the GE Function     Initial Vitals: /60 (BP Location: Right arm, Patient Position: Sitting, Cuff Size: Adult Regular)  Pulse 86  Temp 97.6  F (36.4  C) (Oral)  Resp 18  Wt 68.1 kg (150 lb 1.6 oz)  SpO2 100%  BMI 22.82 kg/m2 Estimated body mass index is 22.82 kg/(m^2) as calculated from the following:    Height as of 2/5/18: 1.727 m (5' 8\").    Weight as of this encounter: 68.1 kg (150 lb 1.6 oz). Body surface area is 1.81 meters squared.  No Pain (1) Comment: Data Unavailable   No LMP for male patient.  Allergies reviewed: Yes  Medications reviewed: Yes    Medications: Medication refills not needed today.  Pharmacy name entered into PingMD:    [facility] [place], [place] - [address] [facility] [place], [place] - [address]    Clinical concerns: None. Dr Esteban was NOT notified.    7 minutes for nursing intake (face to face time)     Leticia Ochoa LPN              "

## 2018-03-07 NOTE — LETTER
3/7/2018      RE: Daniel Sandhu  [address] [address]       THORACIC SURGERY FOLLOW UP VISIT    Dear Dr. Chan,  I saw Mr. Sandhu in follow-up today. 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  11/9/17 - EGD, remove pharyngostomy tube     HISTOPATHOLOGY   [id number] (Stage IIIa) moderately differentiated adenocarcinoma  LN 1/31  HER2 negative     COMPLICATIONS  Phlegmon at site of pharyngostomy tube requiring wound exploration on 11/9/17.    INTERVAL STUDIES  3/7/18 CT CAP w/ contrast: no evidence of recurrent or metastatic disease      ETOH rare  TOB no  BMI 22.63    Exam notable for well-healed incisions. J tube in place, site clean with no surrounding erythema.    SUBJECTIVE   Mr. Sandhu has been doing well since his readmission to the hospital in January for nausea, diarrhea, and failure to thrive. He is no longer on chemotherapy and has gained 22 lbs since mid-January. His primary source of nutrition is oral, though he does supplement with 3 cans of tube feeds each night. 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    From a personal perspective, he lives in [place] with his wife Violeta and 5 year-old daughter. He also has a son (Mayo, born 7/2017). 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     IMPRESSION   (Z90.3,  Z98.0) S/P total gastrectomy and Terrell-en-Y esophagojejunal anastomosis  (primary encounter diagnosis)  (C15.5) Malignant neoplasm of  lower third of esophagus (H)    37 year-old male 4 months S/P total gastrectomy, distal esophagectomy and intrathoracic Terrell-en-Y esophagojejunostomy for stage IIIa ([id number]) Siewert III adenocarcinoma    PLAN  I spent a total of 15 minutes with Mr. Daniel Sandhu, more than 50% of which were spent in counseling, coordination of care, and face-to-face time. I reviewed the plan as follows:  Mr. Sandhu was instructed that he can stop using his J tube for supplemental feedings. When he has not used his J-tube for 3 weeks, he will call to schedule an appointment with Yajaira Ferrer for removal of his J tube.   Long-term follow-up with Dr. Hood  All questions were answered and the patient and present family were in agreement with the plan.  I appreciate the opportunity to participate in the care of your patient and will keep you updated.  Sincerely,        Yunior Esteban MD

## 2018-03-12 ENCOUNTER — ONCOLOGY VISIT (OUTPATIENT)
Dept: ONCOLOGY | Facility: CLINIC | Age: 38
End: 2018-03-12
Attending: INTERNAL MEDICINE
Payer: COMMERCIAL

## 2018-03-12 VITALS
TEMPERATURE: 97.3 F | HEIGHT: 68 IN | RESPIRATION RATE: 18 BRPM | WEIGHT: 145 LBS | HEART RATE: 109 BPM | OXYGEN SATURATION: 100 % | DIASTOLIC BLOOD PRESSURE: 62 MMHG | BODY MASS INDEX: 21.98 KG/M2 | SYSTOLIC BLOOD PRESSURE: 107 MMHG

## 2018-03-12 DIAGNOSIS — C15.5 MALIGNANT NEOPLASM OF LOWER THIRD OF ESOPHAGUS (H): Primary | ICD-10-CM

## 2018-03-12 PROCEDURE — 99214 OFFICE O/P EST MOD 30 MIN: CPT | Mod: ZP | Performed by: INTERNAL MEDICINE

## 2018-03-12 PROCEDURE — G0463 HOSPITAL OUTPT CLINIC VISIT: HCPCS | Mod: ZF

## 2018-03-12 ASSESSMENT — PAIN SCALES - GENERAL: PAINLEVEL: NO PAIN (0)

## 2018-03-12 NOTE — PROGRESS NOTES
[facility]    Hematology/Oncology Established Patient Note      Today's Date: 3/12/18    Reason for Follow-up: adenocarcinoma of the GE junction      HISTORY OF PRESENT ILLNESS: Daniel Sandhu is a 37 year old male who presents with adenocarcinoma of the GE junction.  In early 2017, patient started noticing difficulty swallowing, and that food seems to take longer to go down.  It became more frequent, and he saw his PCP, and was referred for EGD, which showed an esophageal mass located at the GE junction, measuring 4 cm.  Biopsy showed poorly differentiated adenocarcinoma.  HER-2 was sent and is equivocal (2+).  CT c/a/p showed a mildly prominent lymph node in the gastrohepatic ligament, but there were no pathologically enlarged lymph nodes in the chest, abdomen, or pelvis.  There was otherwise no evidence of metastatic disease.  PET-CT showed thickening of the distal esophagus consistent with known esophageal adenocarcinoma, as well as mildly hypermetabolic 1 cm lymph node in the gastrohepatic ligament.  There was no evidence of distant metastatic disease.  He underwent EUS on 6/9/17, which showed the esophageal tumor in the lower third of the esophagus, staged T2NX.  There were 2 abnormal lymph nodes seen in the gastrohepatic ligament; pathology was suspicious for adenocarcinoma.  Celiac node was sampled as well, which was benign.  He was seen by surgery, Dr. Esteban, and recommended srinivas-operative chemotherapy.    Port was placed on 6/22/17.     He underwent chemotherapy with epirubicin, oxaliplatin, and capecitabine x 3 cycles 6/26/17-8/7/17.      On 11/3/17, he underwent laparotomy with total gastrectomy and abdominal lymphadenectomy, left thoracotomy with distal esophagectomy and intrathoracic Terrell-en-Y esophagojejunostomy, feeding jejunostomy, left pharyngostomy tube placement, right tube thoracostomy, and flexible bronchoscopy.  On 11/9/17, he was taken back to OR for I&D of  pharyngostomy tube abscess.  Pathology showed adenocarcinoma, moderate differentiated, extensive residual tumor with no evidence tumor regression, margins negative, perineural invasion present, 1 of 28 lymph nodes were involved with malignancy, stage [id number] (stage IIIA).  HER-2 is non-amplified.    He resumed chemotherapy post-surgery, with plans to complete 3 more cycles, with 5-FU, epirubicin, oxaliplatin.  However, he only completed 2 more cycles, due to poor tolerance.  He was admitted to the hospital 1/9/18-1/13/18 for nausea, diarrhea, failure to thrive, severe malnutrition, generalized weakness.  His infusional chemotherapy was stopped on 1/8/18.  He underwent EGD in the hospital on 1/11/18, which showed ulcers, and no evidence of recurrence of his cancer.  One area biopsied showed inflammation, no evidence of malignancy.      INTERIM HISTORY: Daniel comes in for follow-up today.  He has been doing much better.  He is eating full meals now, trying to increase his weight.  His feeding fell out last week.  He denies pain.  He has a cold that he got from his baby.  Otherwise, he feels like he has good energy, and he is back to work.        REVIEW OF SYSTEMS:   14 point ROS was reviewed and is negative other than as noted above in HPI.       HOME MEDICATIONS:  Current Outpatient Prescriptions   Medication Sig Dispense Refill     cyabnocobalamin (VITAMIN B-12) 2500 MCG sublingual tablet Place 2,500 mcg under the tongue daily 30 tablet 1     prochlorperazine (COMPAZINE) 10 MG tablet Take 1 tablet (10 mg) by mouth every 6 hours as needed for nausea or vomiting (Patient not taking: Reported on 3/7/2018) 90 tablet 3     LORazepam (ATIVAN) 0.5 MG tablet Take 1 tablet (0.5 mg) by mouth every 4 hours as needed for anxiety (Patient not taking: Reported on 3/7/2018) 60 tablet 1     loperamide (IMODIUM) 2 MG capsule Take 2 capsules (4 mg) by mouth every 6 hours (max dose of 16 mg daily). (Patient not taking: Reported on  3/7/2018) 120 capsule 0     diphenoxylate-atropine (LOMOTIL) 2.5-0.025 MG per tablet Take 1 tablet by mouth 4 times daily as needed for diarrhea (Patient not taking: Reported on 3/7/2018) 40 tablet 0     dexamethasone (DECADRON) 2 MG tablet Take 1 tablet (2 mg) by mouth daily (Patient not taking: Reported on 3/7/2018) 30 tablet 0     fiber modular (NUTRISOURCE FIBER) packet 1 packet by Per Feeding Tube route 3 times daily (Patient not taking: Reported on 3/7/2018) 90 packet 0     potassium chloride (KLOR-CON) 20 MEQ Packet Take 20 mEq by mouth 2 times daily (Patient not taking: Reported on 3/7/2018) 60 packet 0     artificial saliva (BIOTENE DRY MOUTHWASH) LIQD liquid Swish and spit 10 mLs in mouth 4 times daily (Patient not taking: Reported on 3/7/2018) 473 mL 0     magic mouthwash suspension (diphenhydramine, lidocaine, aluminum-magnesium & simethicone) Swish and swallow 5-10 mLs in mouth every 6 hours as needed for mouth sores (Patient not taking: Reported on 3/7/2018) 237 mL 1     ondansetron (ZOFRAN) 4 MG tablet Take 1 tablet (4 mg) by mouth every 6 hours (Patient not taking: Reported on 3/7/2018) 40 tablet 0     OLANZapine (ZYPREXA) 10 MG tablet Take 1 tablet (10 mg) by mouth At Bedtime (Patient not taking: Reported on 3/7/2018) 30 tablet 1     fluconazole (DIFLUCAN) 200 MG tablet Take 1 tablet (200 mg) by mouth daily (Patient not taking: Reported on 3/7/2018) 30 tablet 1     acetaminophen (TYLENOL) 32 mg/mL solution 30.45 mLs (975 mg) by Per J Tube route 3 times daily (Patient not taking: Reported on 3/7/2018) 400 mL 0     multivitamins with minerals (CERTAVITE/CEROVITE) LIQD liquid 15 mLs by Per J Tube route daily (Patient not taking: Reported on 3/7/2018) 450 mL 0     oxyCODONE (ROXICODONE) 5 MG/5ML solution Take 5-10 mLs (5-10 mg) by mouth every 4 hours as needed for moderate to severe pain (Patient not taking: Reported on 1/2/2018) 300 mL 0         ALLERGIES:  No Known Allergies      PAST MEDICAL  HISTORY:  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 6/5/2017         PAST SURGICAL HISTORY:  Past Surgical History:   Procedure Laterality Date     ESOPHAGOGASTRODUODENOSCOPY       ESOPHAGOSCOPY, GASTROSCOPY, DUODENOSCOPY (EGD), COMBINED N/A 6/9/2017    Procedure: COMBINED ENDOSCOPIC ULTRASOUND, ESOPHAGOSCOPY, GASTROSCOPY, DUODENOSCOPY (EGD), FINE NEEDLE ASPIRATE/BIOPSY;  Upper Endoscopic Ultrasound, fine needle aspirate/biopsy;  Surgeon: Guru Mark Avila MD;  Location: [facility] OR     ESOPHAGOSCOPY, GASTROSCOPY, DUODENOSCOPY (EGD), COMBINED N/A 11/3/2017    Procedure: COMBINED ESOPHAGOSCOPY, GASTROSCOPY, DUODENOSCOPY (EGD);;  Surgeon: Yunior Esteban MD;  Location: [facility] OR     ESOPHAGOSCOPY, GASTROSCOPY, DUODENOSCOPY (EGD), COMBINED N/A 11/9/2017    Procedure: COMBINED ESOPHAGOSCOPY, GASTROSCOPY, DUODENOSCOPY (EGD);  Esophogastroduodenoscopy, take out pharangostomy tube;  Surgeon: Yunior Esteban MD;  Location: [facility] OR     ESOPHAGOSCOPY, GASTROSCOPY, DUODENOSCOPY (EGD), COMBINED N/A 1/11/2018    Procedure: COMBINED ESOPHAGOSCOPY, GASTROSCOPY, DUODENOSCOPY (EGD), BIOPSY SINGLE OR MULTIPLE;  COMBINED ESOPHAGOSCOPY, GASTROSCOPY, DUODENOSCOPY (EGD);  Surgeon: Alessandro Mcgregor MD;  Location: [facility] GI     GASTRECTOMY N/A 11/3/2017    Procedure: GASTRECTOMY;;  Surgeon: Yunior Esteban MD;  Location: [facility] OR     HAND SURGERY      childhood, torn tendon     INSERT PORT VASCULAR ACCESS Right 6/22/2017    Procedure: INSERT PORT VASCULAR ACCESS;  Single Lumen Chest Power Port;  Surgeon: Iván Driver PA-C;  Location: [facility] OR     LAPAROSCOPY DIAGNOSTIC (GENERAL) N/A 11/3/2017    Procedure: LAPAROSCOPY DIAGNOSTIC (GENERAL);  diagnostic laparoscopy, right chest tube, total gastrectomy with distal esophagectomy, intrathoracic eveline-y esophago-jejunostomy, feeding jejunostomy, pharyngostomy, esophagogastroduodenoscopy, flexible  "bronchoscopy;  Surgeon: Yunior Esteban MD;  Location: [facility] OR     LAPAROTOMY EXPLORATORY N/A 11/3/2017    Procedure: LAPAROTOMY EXPLORATORY;;  Surgeon: Yunior Esteban MD;  Location: [facility] OR     PHARYNGOSTOMY N/A 11/3/2017    Procedure: PHARYNGOSTOMY;;  Surgeon: Yunior Esteban MD;  Location: [facility] OR     THORACOTOMY Left 11/3/2017    Procedure: THORACOTOMY;;  Surgeon: Yunior Esteban MD;  Location: [facility] OR         SOCIAL HISTORY:  Social History     Social History     Marital status:      Spouse name: N/A     Number of children: 1     Years of education: N/A     Occupational History     musician and teacher       Social History Main Topics     Smoking status: Never Smoker     Smokeless tobacco: Never Used     Alcohol use Yes      Comment: 1 beer daily     Drug use: Yes     Special: Marijuana      Comment: occasional     Sexual activity: Yes     Partners: Female     Birth control/ protection: Natural Family Planning     Other Topics Concern     Not on file     Social History Narrative     He works at [employer] in [place], 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  He denies smoking.  He drinks ~1 beer a day.  He denies illicit drug use, other than occasional marijuana.  He lives in [place] with his wife, and 4.5 year-old daughter.  His wife is currently pregnant with a boy, and is due in 6 weeks.  He has a half sister who  of breast cancer at age 32; she was diagnosed in her late 20's.  A paternal grandmother had breast cancer in her 70's      FAMILY HISTORY:  Family History   Problem Relation Age of Onset     Other - See Comments Father      sepsis     CANCER Sister      breast cancer  29 yo 1/2 sister      CANCER Paternal Grandmother      breast         PHYSICAL EXAM:  Vital signs:  /62 (BP Location: Left arm, Patient Position: Sitting, Cuff Size: Adult Regular)  Pulse 109  Temp 97.3  F (36.3  C) (Tympanic)  Resp 18  Ht 1.727 m (5' 7.99\") "  Wt 65.8 kg (145 lb)  SpO2 100%  BMI 22.05 kg/m2   ECO  GENERAL/CONSTITUTIONAL: No acute distress.    EYES: No scleral icterus.  RESPIRATORY: Clear to auscultation bilaterally. No crackles or wheezing.   CARDIOVASCULAR: Regular rate and rhythm without murmurs, gallops, or rubs.  GASTROINTESTINAL: No tenderness. The patient has normal bowel sounds. No guarding.  No distention.  Feeding tube has been removed.    MUSCULOSKELETAL: Warm and well-perfused, no cyanosis, clubbing, or edema.  NEUROLOGIC: Alert, oriented, answers questions appropriately.  INTEGUMENTARY: No jaundice.  Port in place.        LABS:  CBC RESULTS:   Recent Labs   Lab Test  18   1335   WBC  8.2   RBC  4.24*   HGB  10.4*   HCT  34.0*   MCV  80   MCH  24.5*   MCHC  30.6*   RDW  18.6*   PLT  257     Recent Labs   Lab Test  18   1335  18   1328   NA  138  137   POTASSIUM  3.8  3.9   CHLORIDE  104  104   CO2  27  28   ANIONGAP  6  6   GLC  95  126*   BUN  18  16   CR  0.56*  0.56*   YOBANY  8.7  8.2*     Lab Results   Component Value Date    AST 16 2018     Lab Results   Component Value Date    ALT 25 2018     No results found for: BILICONJ   Lab Results   Component Value Date    BILITOTAL 0.4 2018     Lab Results   Component Value Date    ALBUMIN 3.7 2018     Lab Results   Component Value Date    PROTTOTAL 7.2 2018      Lab Results   Component Value Date    ALKPHOS 74 2018         Component      Latest Ref Rng & Units 3/7/2018   Iron      35 - 180 ug/dL 22 (L)   Iron Binding Cap      240 - 430 ug/dL 422   Iron Saturation Index      15 - 46 % 5 (L)   Magnesium      1.6 - 2.3 mg/dL 2.2   Ferritin      26 - 388 ng/mL 8 (L)   Vitamin B12      193 - 986 pg/mL 820       IMAGING:  CT c/a/p 3/7/18:  FINDINGS:     Lines and tubes: Right IJ portacatheter with tip in high right atrium.  Percutaneous jejunostomy tube in the left upper quadrant     Mediastinum/Neck Base: No thyroid nodules. Central  tracheobronchial  tree is patent. Heart size is normal. No pericardial effusion.  Normal  thoracic vasculature. No thoracic lymphadenopathy.      Lungs: No consolidation. No pleural effusion or pneumothorax.  Subsegmental atelectasis/scarring in the lingula. Subpleural 3 mm  nodule on series 6 image 152 in the right lower lobe, appears new  compared to the prior. No other new or enlarging nodules.     Liver: No suspicious liver lesions. Very small to characterize  hypodensity in the liver on coronal image 41 and series 3 image 339.  Portal veins appear patent. No biliary tree dilation.  Gallbladder: No gallstones. No evidence of acute cholecystitis.  Spleen: Normal size.  Pancreas: No suspicious pancreatic lesions. The pancreatic duct is not  dilated.  Adrenal glands: No adrenal nodules.  Kidneys: No hydronephrosis or obstructing renal stones. Very small to  characterize left renal upper pole hypodensity.  Bladder / Pelvic organs: Prostatomegaly. Partially distended urinary  bladder. Prostate calcifications. Pelvic phleboliths.  Bowel: Patient status post distal esophagectomy/gastrectomy, Terrell-en-Y  esophagojejunostomy.  Lymph nodes: No retroperitoneal, mesenteric, or pelvic  lymphadenopathy.  Peritoneum / Retroperitoneum: Increased mesenteric hypodensity (series  2 images 73 through 93) is stable since prior scans.  Vessels: No infrarenal aortic aneurysm.      Bones and soft tissues: No suspicious bone lesion         IMPRESSION: In a patient with a history of esophageal cancer, status  post surgery:  1. No evidence of residual/recurrent disease.  2. Stable to slightly increased mesenteric and periventricular  hypodensity since 12/1/2017. Recommend attention on follow-up.       ASSESSMENT/PLAN:  Daniel Sandhu is a 37 year old male with:    1) Adenocarcinoma of the GE junction: now s/p 3 cycles of neoadjuvant chemotherapy with EOX, followed by surgical resection on 11/3/17.  Pathology showed adenocarcinoma, moderate  differentiated, extensive residual tumor with no evidence tumor regression, margins negative, perineural invasion present, 1 of 28 lymph nodes were involved with malignancy, stage [id number] (stage IIIA).  HER-2 is non-amplified.    He has received EOF x 2 cycles after surgery, and he has not tolerated well.  The 5-FU on cycle 5 was discontinued early. Chemotherapy was stopped at that point due to poor tolerance.    CT scans from 3/7/18 reviewed.  There is no evidence of residual/recurrent disease.  Stable to slightly increased mesenteric and periventricular hypodensity since 12/1/17, which will be followed.      He continues to improve since stopping chemotherapy.  His feeding tube fell out on its own.  He is eating and drinking well now.      -surveillance CT scans every 4 months for the first year, and then every 6 months for the next 2 years after that.    -he will keep port in for now, at least until the next scan.  If next scan is stable, then he might consider having port removed.  In the meantime, he will continue port flushes every ~4-6 weeks.  -RTC in 4 months with labs and CT scan results    2) Genetics:  -genetic testing was negative    3) Fertility: Daniel and his wife have 2 children, including a baby boy just born around June 2017.  He is not planning for more children in the near future.    4) Nutrition: He is finished with tube feeds and his feeding tube fell out on its own.  He is eating and drinking better now.  His albumin has improved to normal.    5) Iron-deficiency anemia:   -he will try oral iron first  -he may have issues with absorption due to his surgery.  If there is no improvement, or if he gets side effects with oral iron, we can try IV iron  -repeat CBC and iron/ferritin labs at the next visit      I spent a total of 25 minutes with the patient, with over >50% of the time in counseling and/or coordination of care.       Laurence Hood MD  Hematology/Oncology  [facility]  Physicians

## 2018-03-12 NOTE — MR AVS SNAPSHOT
After Visit Summary   3/12/2018    Daniel Sandhu    MRN: [id number]           Patient Information     Date Of Birth          1980        Visit Information        Provider Department      3/12/2018 9:15 AM Laurence Hood MD [facility]        Today's Diagnoses     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    -  1       Follow-ups after your visit        Your next 10 appointments already scheduled     Apr 06, 2018  9:30 AM CDT   [facility] with [facility]   [facility] ([place] [facility])    [address]  [address]  [place] [address]   [phone]            May 04, 2018  9:30 AM CDT   [facility] with [facility]   [facility] ([place] [facility])    [address]  [address]  [place] [address]   [phone]            Jun 01, 2018  9:30 AM CDT   [facility] Draw with [facility]   [facility] ([place] [facility])    [address]  [address]  [place] [address]   [phone]            Jul 13, 2018 12:30 PM CDT   [facility] Draw with [facility]   [facility] ([place] [facility])    [address]  [address]  [place] [address]   [phone]            Jul 13, 2018  1:00 PM CDT   (Arrive by 12:45 PM)   CT CHEST/ABDOMEN/PELVIS W CONTRAST with [facility]   [facility] [place] [facility] ([place] [facility])    [address] [place] [address]  1st Floor  [facility] [address]   [phone]           Please bring any scans or X-rays taken at other hospitals, if similar tests were done. Also bring a list of your medicines, including vitamins, minerals and over-the-counter drugs. It is safest to leave personal items at home.  Be sure to tell your doctor:   If you have any allergies.   If there s any chance you are pregnant.   If you are  breastfeeding.  You may have contrast for this exam. To prepare:   Do not eat or drink for 2 hours before your exam. If you need to take medicine, you may take it with small sips of water. (We may ask you to take liquid medicine as well.)   The day before your exam, drink extra fluids at least six 8-ounce glasses (unless your doctor tells you to restrict your fluids).   You will be given instructions on how to drink the contrast.  Patients over 70 or patients with diabetes or kidney problems:   If you haven t had a blood test (creatinine test) within the last 30 days, the Cardiologist/Radiologist may require you to get this test prior to your exam.  If you have diabetes:   Continue to take your metformin medication on the day of your exam  Please wear loose clothing, such as a sweat suit or jogging clothes. Avoid snaps, zippers and other metal. We may ask you to undress and put on a hospital gown.  If you have any questions, please call the Imaging Department where you will have your exam.            Jul 16, 2018  9:45 AM CDT   (Arrive by 9:30 AM)   Return Visit with Laurence Hood MD   [facility] [place] [facility] ([facility])    [address]  [address]  [facility] [address] [phone]              Who to contact     If you have questions or need follow up information about today's clinic visit or your schedule please contact [facility] directly at [phone].  Normal or non-critical lab and imaging results will be communicated to you by MyChart, letter or phone within 4 business days after the clinic has received the results. If you do not hear from us within 7 days, please contact the clinic through MyChart or phone. If you have a critical or abnormal lab result, we will notify you by phone as soon as possible.  Submit refill requests through [email or web address] or call your pharmacy and they will forward the refill request to us. Please allow 3  "business days for your refill to be completed.          Additional Information About Your Visit        MyChart Information     [employer] gives you secure access to your electronic health record. If you see a primary care provider, you can also send messages to your care team and make appointments. If you have questions, please call your primary care clinic.  If you do not have a primary care provider, please call [phone] and they will assist you.        Care EveryWhere ID     This is your Care EveryWhere ID. This could be used by other organizations to access your [place] medical records  [id number]        Your Vitals Were     Pulse Temperature Respirations Height Pulse Oximetry BMI (Body Mass Index)    109 97.3  F (36.3  C) (Tympanic) 18 1.727 m (5' 7.99\") 100% 22.05 kg/m2       Blood Pressure from Last 3 Encounters:   03/12/18 107/62   03/07/18 102/60   02/05/18 100/59    Weight from Last 3 Encounters:   03/12/18 65.8 kg (145 lb)   03/07/18 68.1 kg (150 lb 1.6 oz)   02/05/18 65.9 kg (145 lb 4.8 oz)               Primary Care Provider Office Phone # Fax #    Lencho Chan [phone]       [address] [address] [facility] [address]        Equal Access to Services     [facility]: Hadii aad ku hadasho Soomaali, waaxda luqadaha, qaybta kaalmada adeegyada, waxkeith crouch haymarla brown . So [facility] [phone].    ATENCIÓN: Si habla español, tiene a zayas disposición servicios gratuitos de asistencia lingüística. Llame al [phone].    We comply with applicable federal civil rights laws and [place] laws. We do not discriminate on the basis of race, color, national origin, age, disability, sex, sexual orientation, or gender identity.            Thank you!     Thank you for choosing [facility]  for your care. Our goal is always to provide you with excellent care. Hearing back from our patients is one way we can continue to improve our services. Please take a " few minutes to complete the written survey that you may receive in the mail after your visit with us. Thank you!             Your Updated Medication List - Protect others around you: Learn how to safely use, store and throw away your medicines at www.disposemymeds.org.          This list is accurate as of 3/12/18 11:59 PM.  Always use your most recent med list.                   Brand Name Dispense Instructions for use Diagnosis    acetaminophen 32 mg/mL solution    TYLENOL    400 mL    30.45 mLs (975 mg) by Per J Tube route 3 times daily    Acute post-operative pain       artificial saliva Liqd liquid     473 mL    Swish and spit 10 mLs in mouth 4 times daily    Dry mouth       cyanocobalamin 2500 MCG sublingual tablet    VITAMIN B-12    30 tablet    Place 2,500 mcg under the tongue daily    B12 deficiency       dexamethasone 2 MG tablet    DECADRON    30 tablet    Take 1 tablet (2 mg) by mouth daily    Chemotherapy-induced nausea, Loss of appetite       diphenoxylate-atropine 2.5-0.025 MG per tablet    LOMOTIL    40 tablet    Take 1 tablet by mouth 4 times daily as needed for diarrhea    Diarrhea, unspecified type       fiber modular packet     90 packet    1 packet by Per Feeding Tube route 3 times daily    Diarrhea, unspecified type       fluconazole 200 MG tablet    DIFLUCAN    30 tablet    Take 1 tablet (200 mg) by mouth daily    Thrush       loperamide 2 MG capsule    IMODIUM    120 capsule    Take 2 capsules (4 mg) by mouth every 6 hours (max dose of 16 mg daily).    S/P gastrectomy, Diarrhea, unspecified type       LORazepam 0.5 MG tablet    ATIVAN    60 tablet    Take 1 tablet (0.5 mg) by mouth every 4 hours as needed for anxiety    Chemotherapy-induced nausea       magic mouthwash suspension (diphenhydrAMINE, lidocaine, aluminum-magnesium & simethicone) Susp compounding kit     237 mL    Swish and swallow 5-10 mLs in mouth every 6 hours as needed for mouth sores    Mucositis       multivitamins with  minerals Liqd liquid     450 mL    15 mLs by Per J Tube route daily    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       OLANZapine 10 MG tablet    zyPREXA    30 tablet    Take 1 tablet (10 mg) by mouth At Bedtime    Chemotherapy-induced nausea       ondansetron 4 MG tablet    ZOFRAN    40 tablet    Take 1 tablet (4 mg) by mouth every 6 hours    Chemotherapy-induced nausea       oxyCODONE 5 MG/5ML solution    ROXICODONE    300 mL    Take 5-10 mLs (5-10 mg) by mouth every 4 hours as needed for moderate to severe pain    Acute post-operative pain       potassium chloride 20 MEQ Packet    KLOR-CON    60 packet    Take 20 mEq by mouth 2 times daily    Hypokalemia       prochlorperazine 10 MG tablet    COMPAZINE    90 tablet    Take 1 tablet (10 mg) by mouth every 6 hours as needed for nausea or vomiting    Chemotherapy-induced nausea

## 2018-03-12 NOTE — LETTER
3/12/2018       RE: Daniel Sandhu  [address] [facility] [address]     Dear Colleague,    Thank you for referring your patient, Daniel Sandhu, to the [facility]. Please see a copy of my visit note below.    [facility]    Hematology/Oncology Established Patient Note      Today's Date: 3/12/18    Reason for Follow-up: adenocarcinoma of the GE junction      HISTORY OF PRESENT ILLNESS: Daniel Sandhu is a 37 year old male who presents with adenocarcinoma of the GE junction.  In early 2017, patient started noticing difficulty swallowing, and that food seems to take longer to go down.  It became more frequent, and he saw his PCP, and was referred for EGD, which showed an esophageal mass located at the GE junction, measuring 4 cm.  Biopsy showed poorly differentiated adenocarcinoma.  HER-2 was sent and is equivocal (2+).  CT c/a/p showed a mildly prominent lymph node in the gastrohepatic ligament, but there were no pathologically enlarged lymph nodes in the chest, abdomen, or pelvis.  There was otherwise no evidence of metastatic disease.  PET-CT showed thickening of the distal esophagus consistent with known esophageal adenocarcinoma, as well as mildly hypermetabolic 1 cm lymph node in the gastrohepatic ligament.  There was no evidence of distant metastatic disease.  He underwent EUS on 6/9/17, which showed the esophageal tumor in the lower third of the esophagus, staged T2NX.  There were 2 abnormal lymph nodes seen in the gastrohepatic ligament; pathology was suspicious for adenocarcinoma.  Celiac node was sampled as well, which was benign.  He was seen by surgery, Dr. Esteban, and recommended srinivas-operative chemotherapy.    Port was placed on 6/22/17.     He underwent chemotherapy with epirubicin, oxaliplatin, and capecitabine x 3 cycles 6/26/17-8/7/17.      On 11/3/17, he underwent laparotomy with total gastrectomy and abdominal lymphadenectomy, left  thoracotomy with distal esophagectomy and intrathoracic Terrell-en-Y esophagojejunostomy, feeding jejunostomy, left pharyngostomy tube placement, right tube thoracostomy, and flexible bronchoscopy.  On 11/9/17, he was taken back to OR for I&D of pharyngostomy tube abscess.  Pathology showed adenocarcinoma, moderate differentiated, extensive residual tumor with no evidence tumor regression, margins negative, perineural invasion present, 1 of 28 lymph nodes were involved with malignancy, stage [id number] (stage IIIA).  HER-2 is non-amplified.    He resumed chemotherapy post-surgery, with plans to complete 3 more cycles, with 5-FU, epirubicin, oxaliplatin.  However, he only completed 2 more cycles, due to poor tolerance.  He was admitted to the hospital 1/9/18-1/13/18 for nausea, diarrhea, failure to thrive, severe malnutrition, generalized weakness.  His infusional chemotherapy was stopped on 1/8/18.  He underwent EGD in the hospital on 1/11/18, which showed ulcers, and no evidence of recurrence of his cancer.  One area biopsied showed inflammation, no evidence of malignancy.      INTERIM HISTORY: Daniel comes in for follow-up today.  He has been doing much better.  He is eating full meals now, trying to increase his weight.  His feeding fell out last week.  He denies pain.  He has a cold that he got from his baby.  Otherwise, he feels like he has good energy, and he is back to work.        REVIEW OF SYSTEMS:   14 point ROS was reviewed and is negative other than as noted above in HPI.       HOME MEDICATIONS:  Current Outpatient Prescriptions   Medication Sig Dispense Refill     cyabnocobalamin (VITAMIN B-12) 2500 MCG sublingual tablet Place 2,500 mcg under the tongue daily 30 tablet 1     prochlorperazine (COMPAZINE) 10 MG tablet Take 1 tablet (10 mg) by mouth every 6 hours as needed for nausea or vomiting (Patient not taking: Reported on 3/7/2018) 90 tablet 3     LORazepam (ATIVAN) 0.5 MG tablet Take 1 tablet (0.5 mg) by  mouth every 4 hours as needed for anxiety (Patient not taking: Reported on 3/7/2018) 60 tablet 1     loperamide (IMODIUM) 2 MG capsule Take 2 capsules (4 mg) by mouth every 6 hours (max dose of 16 mg daily). (Patient not taking: Reported on 3/7/2018) 120 capsule 0     diphenoxylate-atropine (LOMOTIL) 2.5-0.025 MG per tablet Take 1 tablet by mouth 4 times daily as needed for diarrhea (Patient not taking: Reported on 3/7/2018) 40 tablet 0     dexamethasone (DECADRON) 2 MG tablet Take 1 tablet (2 mg) by mouth daily (Patient not taking: Reported on 3/7/2018) 30 tablet 0     fiber modular (NUTRISOURCE FIBER) packet 1 packet by Per Feeding Tube route 3 times daily (Patient not taking: Reported on 3/7/2018) 90 packet 0     potassium chloride (KLOR-CON) 20 MEQ Packet Take 20 mEq by mouth 2 times daily (Patient not taking: Reported on 3/7/2018) 60 packet 0     artificial saliva (BIOTENE DRY MOUTHWASH) LIQD liquid Swish and spit 10 mLs in mouth 4 times daily (Patient not taking: Reported on 3/7/2018) 473 mL 0     magic mouthwash suspension (diphenhydramine, lidocaine, aluminum-magnesium & simethicone) Swish and swallow 5-10 mLs in mouth every 6 hours as needed for mouth sores (Patient not taking: Reported on 3/7/2018) 237 mL 1     ondansetron (ZOFRAN) 4 MG tablet Take 1 tablet (4 mg) by mouth every 6 hours (Patient not taking: Reported on 3/7/2018) 40 tablet 0     OLANZapine (ZYPREXA) 10 MG tablet Take 1 tablet (10 mg) by mouth At Bedtime (Patient not taking: Reported on 3/7/2018) 30 tablet 1     fluconazole (DIFLUCAN) 200 MG tablet Take 1 tablet (200 mg) by mouth daily (Patient not taking: Reported on 3/7/2018) 30 tablet 1     acetaminophen (TYLENOL) 32 mg/mL solution 30.45 mLs (975 mg) by Per J Tube route 3 times daily (Patient not taking: Reported on 3/7/2018) 400 mL 0     multivitamins with minerals (CERTAVITE/CEROVITE) LIQD liquid 15 mLs by Per J Tube route daily (Patient not taking: Reported on 3/7/2018) 450 mL 0      oxyCODONE (ROXICODONE) 5 MG/5ML solution Take 5-10 mLs (5-10 mg) by mouth every 4 hours as needed for moderate to severe pain (Patient not taking: Reported on 1/2/2018) 300 mL 0         ALLERGIES:  No Known Allergies      PAST MEDICAL HISTORY:  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 6/5/2017         PAST SURGICAL HISTORY:  Past Surgical History:   Procedure Laterality Date     ESOPHAGOGASTRODUODENOSCOPY       ESOPHAGOSCOPY, GASTROSCOPY, DUODENOSCOPY (EGD), COMBINED N/A 6/9/2017    Procedure: COMBINED ENDOSCOPIC ULTRASOUND, ESOPHAGOSCOPY, GASTROSCOPY, DUODENOSCOPY (EGD), FINE NEEDLE ASPIRATE/BIOPSY;  Upper Endoscopic Ultrasound, fine needle aspirate/biopsy;  Surgeon: Guru Mark Avila MD;  Location: [facility] OR     ESOPHAGOSCOPY, GASTROSCOPY, DUODENOSCOPY (EGD), COMBINED N/A 11/3/2017    Procedure: COMBINED ESOPHAGOSCOPY, GASTROSCOPY, DUODENOSCOPY (EGD);;  Surgeon: Yunior Esteban MD;  Location: [facility] OR     ESOPHAGOSCOPY, GASTROSCOPY, DUODENOSCOPY (EGD), COMBINED N/A 11/9/2017    Procedure: COMBINED ESOPHAGOSCOPY, GASTROSCOPY, DUODENOSCOPY (EGD);  Esophogastroduodenoscopy, take out pharangostomy tube;  Surgeon: Yunior Esteban MD;  Location: [facility] OR     ESOPHAGOSCOPY, GASTROSCOPY, DUODENOSCOPY (EGD), COMBINED N/A 1/11/2018    Procedure: COMBINED ESOPHAGOSCOPY, GASTROSCOPY, DUODENOSCOPY (EGD), BIOPSY SINGLE OR MULTIPLE;  COMBINED ESOPHAGOSCOPY, GASTROSCOPY, DUODENOSCOPY (EGD);  Surgeon: Alessandro Mcgregor MD;  Location: [facility] GI     GASTRECTOMY N/A 11/3/2017    Procedure: GASTRECTOMY;;  Surgeon: Yunior Esteban MD;  Location: [facility] OR     HAND SURGERY      childhood, torn tendon     INSERT PORT VASCULAR ACCESS Right 6/22/2017    Procedure: INSERT PORT VASCULAR ACCESS;  Single Lumen Chest Power Port;  Surgeon: Iván Driver PA-C;  Location: [facility] OR     LAPAROSCOPY DIAGNOSTIC (GENERAL) N/A 11/3/2017    Procedure: LAPAROSCOPY  DIAGNOSTIC (GENERAL);  diagnostic laparoscopy, right chest tube, total gastrectomy with distal esophagectomy, intrathoracic eveline-y esophago-jejunostomy, feeding jejunostomy, pharyngostomy, esophagogastroduodenoscopy, flexible bronchoscopy;  Surgeon: Yunior Esteban MD;  Location: [facility] OR     LAPAROTOMY EXPLORATORY N/A 11/3/2017    Procedure: LAPAROTOMY EXPLORATORY;;  Surgeon: Yunior Esteban MD;  Location: [facility] OR     PHARYNGOSTOMY N/A 11/3/2017    Procedure: PHARYNGOSTOMY;;  Surgeon: Yunior Esteban MD;  Location: [facility] OR     THORACOTOMY Left 11/3/2017    Procedure: THORACOTOMY;;  Surgeon: Yunior Esteban MD;  Location: [facility] OR         SOCIAL HISTORY:  Social History     Social History     Marital status:      Spouse name: N/A     Number of children: 1     Years of education: N/A     Occupational History     musician and teacher       Social History Main Topics     Smoking status: Never Smoker     Smokeless tobacco: Never Used     Alcohol use Yes      Comment: 1 beer daily     Drug use: Yes     Special: Marijuana      Comment: occasional     Sexual activity: Yes     Partners: Female     Birth control/ protection: Natural Family Planning     Other Topics Concern     Not on file     Social History Narrative     He works at [employer] in [place], 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  He denies smoking.  He drinks ~1 beer a day.  He denies illicit drug use, other than occasional marijuana.  He lives in [place] with his wife, and 4.5 year-old daughter.  His wife is currently pregnant with a boy, and is due in 6 weeks.  He has a half sister who  of breast cancer at age 32; she was diagnosed in her late 20's.  A paternal grandmother had breast cancer in her 70's      FAMILY HISTORY:  Family History   Problem Relation Age of Onset     Other - See Comments Father      sepsis     CANCER Sister      breast cancer  29 yo 1/2 sister      CANCER Paternal  "Grandmother      breast         PHYSICAL EXAM:  Vital signs:  /62 (BP Location: Left arm, Patient Position: Sitting, Cuff Size: Adult Regular)  Pulse 109  Temp 97.3  F (36.3  C) (Tympanic)  Resp 18  Ht 1.727 m (5' 7.99\")  Wt 65.8 kg (145 lb)  SpO2 100%  BMI 22.05 kg/m2   ECO  GENERAL/CONSTITUTIONAL: No acute distress.    EYES: No scleral icterus.  RESPIRATORY: Clear to auscultation bilaterally. No crackles or wheezing.   CARDIOVASCULAR: Regular rate and rhythm without murmurs, gallops, or rubs.  GASTROINTESTINAL: No tenderness. The patient has normal bowel sounds. No guarding.  No distention.  Feeding tube has been removed.    MUSCULOSKELETAL: Warm and well-perfused, no cyanosis, clubbing, or edema.  NEUROLOGIC: Alert, oriented, answers questions appropriately.  INTEGUMENTARY: No jaundice.  Port in place.        LABS:  CBC RESULTS:   Recent Labs   Lab Test  18   1335   WBC  8.2   RBC  4.24*   HGB  10.4*   HCT  34.0*   MCV  80   MCH  24.5*   MCHC  30.6*   RDW  18.6*   PLT  257     Recent Labs   Lab Test  18   1335  18   1328   NA  138  137   POTASSIUM  3.8  3.9   CHLORIDE  104  104   CO2  27  28   ANIONGAP  6  6   GLC  95  126*   BUN  18  16   CR  0.56*  0.56*   YOABNY  8.7  8.2*     Lab Results   Component Value Date    AST 16 2018     Lab Results   Component Value Date    ALT 25 2018     No results found for: BILICONJ   Lab Results   Component Value Date    BILITOTAL 0.4 2018     Lab Results   Component Value Date    ALBUMIN 3.7 2018     Lab Results   Component Value Date    PROTTOTAL 7.2 2018      Lab Results   Component Value Date    ALKPHOS 74 2018         Component      Latest Ref Rng & Units 3/7/2018   Iron      35 - 180 ug/dL 22 (L)   Iron Binding Cap      240 - 430 ug/dL 422   Iron Saturation Index      15 - 46 % 5 (L)   Magnesium      1.6 - 2.3 mg/dL 2.2   Ferritin      26 - 388 ng/mL 8 (L)   Vitamin B12      193 - 986 pg/mL 820 "       IMAGING:  CT c/a/p 3/7/18:  FINDINGS:     Lines and tubes: Right IJ portacatheter with tip in high right atrium.  Percutaneous jejunostomy tube in the left upper quadrant     Mediastinum/Neck Base: No thyroid nodules. Central tracheobronchial  tree is patent. Heart size is normal. No pericardial effusion.  Normal  thoracic vasculature. No thoracic lymphadenopathy.      Lungs: No consolidation. No pleural effusion or pneumothorax.  Subsegmental atelectasis/scarring in the lingula. Subpleural 3 mm  nodule on series 6 image 152 in the right lower lobe, appears new  compared to the prior. No other new or enlarging nodules.     Liver: No suspicious liver lesions. Very small to characterize  hypodensity in the liver on coronal image 41 and series 3 image 339.  Portal veins appear patent. No biliary tree dilation.  Gallbladder: No gallstones. No evidence of acute cholecystitis.  Spleen: Normal size.  Pancreas: No suspicious pancreatic lesions. The pancreatic duct is not  dilated.  Adrenal glands: No adrenal nodules.  Kidneys: No hydronephrosis or obstructing renal stones. Very small to  characterize left renal upper pole hypodensity.  Bladder / Pelvic organs: Prostatomegaly. Partially distended urinary  bladder. Prostate calcifications. Pelvic phleboliths.  Bowel: Patient status post distal esophagectomy/gastrectomy, Terrell-en-Y  esophagojejunostomy.  Lymph nodes: No retroperitoneal, mesenteric, or pelvic  lymphadenopathy.  Peritoneum / Retroperitoneum: Increased mesenteric hypodensity (series  2 images 73 through 93) is stable since prior scans.  Vessels: No infrarenal aortic aneurysm.      Bones and soft tissues: No suspicious bone lesion         IMPRESSION: In a patient with a history of esophageal cancer, status  post surgery:  1. No evidence of residual/recurrent disease.  2. Stable to slightly increased mesenteric and periventricular  hypodensity since 12/1/2017. Recommend attention on follow-up.        ASSESSMENT/PLAN:  Daniel Sandhu is a 37 year old male with:    1) Adenocarcinoma of the GE junction: now s/p 3 cycles of neoadjuvant chemotherapy with EOX, followed by surgical resection on 11/3/17.  Pathology showed adenocarcinoma, moderate differentiated, extensive residual tumor with no evidence tumor regression, margins negative, perineural invasion present, 1 of 28 lymph nodes were involved with malignancy, stage [id number] (stage IIIA).  HER-2 is non-amplified.    He has received EOF x 2 cycles after surgery, and he has not tolerated well.  The 5-FU on cycle 5 was discontinued early. Chemotherapy was stopped at that point due to poor tolerance.    CT scans from 3/7/18 reviewed.  There is no evidence of residual/recurrent disease.  Stable to slightly increased mesenteric and periventricular hypodensity since 12/1/17, which will be followed.      He continues to improve since stopping chemotherapy.  His feeding tube fell out on its own.  He is eating and drinking well now.      -surveillance CT scans every 4 months for the first year, and then every 6 months for the next 2 years after that.    -he will keep port in for now, at least until the next scan.  If next scan is stable, then he might consider having port removed.  In the meantime, he will continue port flushes every ~4-6 weeks.  -RTC in 4 months with labs and CT scan results    2) Genetics:  -genetic testing was negative    3) Fertility: Daniel and his wife have 2 children, including a baby boy just born around June 2017.  He is not planning for more children in the near future.    4) Nutrition: He is finished with tube feeds and his feeding tube fell out on its own.  He is eating and drinking better now.  His albumin has improved to normal.    5) Iron-deficiency anemia:   -he will try oral iron first  -he may have issues with absorption due to his surgery.  If there is no improvement, or if he gets side effects with oral iron, we can try IV  iron  -repeat CBC and iron/ferritin labs at the next visit      I spent a total of 25 minutes with the patient, with over >50% of the time in counseling and/or coordination of care.       Laurence Hood MD  Hematology/Oncology  [facility]

## 2018-03-12 NOTE — NURSING NOTE
"Oncology Rooming Note    March 12, 2018 9:13 AM   Daniel Sandhu is a 37 year old male who presents for:    Chief Complaint   Patient presents with     Oncology Clinic Visit     Return visit realated to Esophageal Cancer     Initial Vitals: /62 (BP Location: Left arm, Patient Position: Sitting, Cuff Size: Adult Regular)  Pulse 109  Temp 97.3  F (36.3  C) (Tympanic)  Resp 18  Ht 1.727 m (5' 7.99\")  Wt 65.8 kg (145 lb)  SpO2 100%  BMI 22.05 kg/m2 Estimated body mass index is 22.05 kg/(m^2) as calculated from the following:    Height as of this encounter: 1.727 m (5' 7.99\").    Weight as of this encounter: 65.8 kg (145 lb). Body surface area is 1.78 meters squared.  No Pain (0) Comment: Data Unavailable   No LMP for male patient.  Allergies reviewed: Yes  Medications reviewed: Yes    Medications: Medication refills not needed today.  Pharmacy name entered into [facility]:    [facility] - [place], [place] - [address] [facility] - [place], [place] - [address]    Clinical concerns: No new concerns. Provider was notified.    10 minutes for nursing intake (face to face time)     Xochitl Tovar LPN            "

## 2018-03-15 DIAGNOSIS — C15.5 MALIGNANT NEOPLASM OF LOWER THIRD OF ESOPHAGUS (H): Primary | ICD-10-CM

## 2018-03-19 ENCOUNTER — HOSPITAL ENCOUNTER (OUTPATIENT)
Facility: AMBULATORY SURGERY CENTER | Age: 38
End: 2018-03-19
Attending: PHYSICIAN ASSISTANT
Payer: COMMERCIAL

## 2018-03-19 ENCOUNTER — SURGERY (OUTPATIENT)
Age: 38
End: 2018-03-19

## 2018-03-19 ENCOUNTER — RADIANT APPOINTMENT (OUTPATIENT)
Dept: RADIOLOGY | Facility: AMBULATORY SURGERY CENTER | Age: 38
End: 2018-03-19
Attending: INTERNAL MEDICINE
Payer: COMMERCIAL

## 2018-03-19 VITALS
BODY MASS INDEX: 21.98 KG/M2 | WEIGHT: 145 LBS | RESPIRATION RATE: 16 BRPM | HEIGHT: 68 IN | SYSTOLIC BLOOD PRESSURE: 110 MMHG | OXYGEN SATURATION: 100 % | TEMPERATURE: 98.2 F | DIASTOLIC BLOOD PRESSURE: 69 MMHG

## 2018-03-19 DIAGNOSIS — C15.5 MALIGNANT NEOPLASM OF LOWER THIRD OF ESOPHAGUS (H): ICD-10-CM

## 2018-03-19 LAB — INR PPP: 1.19 (ref 0.86–1.14)

## 2018-03-19 RX ORDER — SODIUM CHLORIDE 9 MG/ML
INJECTION, SOLUTION INTRAVENOUS CONTINUOUS
Status: DISCONTINUED | OUTPATIENT
Start: 2018-03-19 | End: 2018-03-20 | Stop reason: HOSPADM

## 2018-03-19 RX ADMIN — Medication 17 ML: at 09:42

## 2018-03-19 RX ADMIN — SODIUM CHLORIDE: 9 INJECTION, SOLUTION INTRAVENOUS at 08:56

## 2018-03-19 NOTE — PROCEDURES
Interventional Radiology Brief Post Procedure Note    Procedure: Chest Port Removal    Proceduralist: Krysten Hopper MS, PA-C    Assistant: None    Time Out: Prior to the start of the procedure and with procedural staff participation, I verbally confirmed the patient s identity using two indicators, relevant allergies, that the procedure was appropriate and matched the consent or emergent situation, and that the correct equipment/implants were available. Immediately prior to starting the procedure I conducted the Time Out with the procedural staff and re-confirmed the patient s name, procedure, and site/side. (The Joint Commission universal protocol was followed.)  Yes      Sedation: None. Local Anesthestic used    Findings: Completed removal of port in its entirety from right chest. No complication.      Estimated Blood Loss: Less than 10 ml    Fluoroscopy Time:  minute(s)    SPECIMENS: None    Complications: 1. None     Condition: Stable    Plan: Follow up per primary team.     Comments: See dictated procedure note for full details.    Krysten Hopper PA-C

## 2018-03-19 NOTE — DISCHARGE INSTRUCTIONS
A collaboration between [facility] and [facility]  Experts in minimally invasive, targeted treatments performed using imaging guidance    Venous Access Device, Port Catheter or Tunneled Central Line Removal    Today you had your existing venous access device removed, either because it was no longer needed or because there was malfunction or infection issues.    One of our Radiology PAs performed this procedure for you today:    ? Krysten Hopper, MS, PA-C      After you go home:  - Drink plenty of fluids.  Generally 6-8 (8 ounce) glasses a day is recommended.  - Resume your regular diet unless otherwise ordered by a medical provider.  - Keep any applied tape/gauze dressings clean and dry.  Change tape/gauze dressings if they get wet or soiled.  - You may shower the following day after procedure, however cover and protect from moisture any tape/gauze dressings.  You may let water hit and run over dried skin glue, but do not scrub.  Pat the area dry after showering.  - Port removal incisions are closed with absorbable suture, meaning they do not need to be removed at a later date, and a topical skin adhesive (skin glue).  This glue will wear off in 7-14 days.  Do not remove before this time.  If 14 days have passed and residual glue is present, you may gently remove it.  - You may remove tape/gauze dressings after 5 days if the site looks closed and in the process of healing.  - Do not apply gels, lotions, or ointments to the glue site for the first 10 days as this may cause the glue to prematurely soften and fail.  - Do not perform strenuous activities or lift greater than 10 pounds for the next three days.  - If there is bleeding or oozing from the procedure site, apply firm pressure to the area for 5-10 minutes.  If the bleeding continues seek medical advice at the numbers below.  - Mild procedure site discomfort can be treated with an ice pack and over-the-counter  pain relievers.              For 24 hours after any sedation used:  - Relax and take it easy.  No strenuous activities.  - Do not drive or operate machines at home or at work.  - No alcohol consumption.  - Do not make any important or legal decisions.    Call our Interventional Radiology (IR) service if:  - If you start bleeding from the procedure site.  If you do start to bleed from the site, lie down and hold some pressure on the site.  Our radiology provider can help you decide if you need to return to the hospital.  - If you have new or worsening pain related to the procedure.  - If you have concerning swelling at the procedure site.  - If you develop persistent nausea or vomiting.  - If you develop hives or a rash or any unexplained itching.  - If you have a fever of greater than 100.5  F and chills in the first 5 days after procedure.  - Any other concerns related to your procedure.      [facility]  Interventional Radiology (IR)  [address] [place] [address]    Contact Number:  [phone]  (IR control desk)  - Monday - Friday 8:00 am - 4:30 pm    After hours for urgent concerns:  [phone]  - After 4:30 pm Monday - Friday, Weekends and Holidays.   - Ask for Interventional Radiology on-call.  Someone is available 24 hours a day.  - [facility] toll free number:  [phone]    [facility]  Home Care Following Your Procedure  Call a doctor if you have signs of infection (fever, growing tenderness at the surgery site, a large amount of drainage or bleeding, severe pain, foul-smelling drainage, redness, swelling).         Tylenol/Acetaminophen Consumption  To help encourage the safe use of acetaminophen, the makers of TYLENOL  have lowered the maximum daily dose for single-ingredient Extra Strength TYLENOL  (acetaminophen) products sold in the [place] from 8 pills per day (4,000 mg) to 6 pills per day (3,000  mg). The dosing interval has also changed from 2 pills every 4-6 hours to 2 pills every 6 hours.    If you feel your pain relief is insufficient, you may take Tylenol/Acetaminophen in addition to your narcotic pain medication.     Be careful not to exceed 3,000 mg of Tylenol/Acetaminophen in a 24 hour period from all sources.    If you are taking extra strength Tylenol/acetaminophen (500 mg), the maximum dose is 6 tablets in 24 hours.    If you are taking regular strength acetaminophen (325 mg), the maximum dose is 9 tablets in 24 hours.

## 2018-03-19 NOTE — IP AVS SNAPSHOT
[facility]    [address] [place] [id number]    Phone:  [phone]    Fax:  [phone]                                       After Visit Summary   3/19/2018    Daniel Sandhu    MRN: [id number]           After Visit Summary Signature Page     I have received my discharge instructions, and my questions have been answered. I have discussed any challenges I see with this plan with the nurse or doctor.    ..........................................................................................................................................  Patient/Patient Representative Signature      ..........................................................................................................................................  Patient Representative Print Name and Relationship to Patient    ..................................................               ................................................  Date                                            Time    ..........................................................................................................................................  Reviewed by Signature/Title    ...................................................              ..............................................  Date                                                            Time

## 2018-03-19 NOTE — IP AVS SNAPSHOT
MRN:[id number]                      After Visit Summary   3/19/2018    Daniel Sandhu    MRN: [id number]           Thank you!     Thank you for choosing [facility] for your care. Our goal is always to provide you with excellent care. Hearing back from our patients is one way we can continue to improve our services. Please take a few minutes to complete the written survey that you may receive in the mail after you visit with us. Thank you!        Patient Information     Date Of Birth          1980        About your hospital stay     You were admitted on:  March 19, 2018 You last received care in the:  [facility]    You were discharged on:  March 19, 2018       Who to Call     For medical emergencies, please call 911.  For non-urgent questions about your medical care, please call your primary care provider or clinic, [phone]  For questions related to your surgery, please call your surgery clinic        Attending Provider     Provider Krysten Daily PA-C Physician Assistant       Primary Care Provider Office Phone # Fax #    Lencho Chan [phone]      Your next 10 appointments already scheduled     Apr 06, 2018  9:30 AM CDT   [facility] with [facility]   [facility] ([facility])    [address] [place] [address]  [address]  [facility] [address]   [phone]            May 04, 2018  9:30 AM CDT   [facility] Draw with [facility]   [facility] ([facility])    [address]  [address]  [facility] [address]   [phone]            Jun 01, 2018  9:30 AM CDT   [facility] Draw with [facility]   [facility] ([facility])    [address]  [address]  [facility] [address]   [phone]            Jul 13, 2018 12:30 PM CDT   [facility] Draw with [facility]     [facility] ([facility])    [address]  [address]  [facility] [id number] [phone]            Jul 13, 2018  1:00 PM CDT   (Arrive by 12:45 PM)   CT CHEST/ABDOMEN/PELVIS W CONTRAST with [facility]   [facility] CT ([facility])    [address]  1st Floor  [facility] [id number] [phone]           Please bring any scans or X-rays taken at other hospitals, if similar tests were done. Also bring a list of your medicines, including vitamins, minerals and over-the-counter drugs. It is safest to leave personal items at home.  Be sure to tell your doctor:   If you have any allergies.   If there s any chance you are pregnant.   If you are breastfeeding.  You may have contrast for this exam. To prepare:   Do not eat or drink for 2 hours before your exam. If you need to take medicine, you may take it with small sips of water. (We may ask you to take liquid medicine as well.)   The day before your exam, drink extra fluids at least six 8-ounce glasses (unless your doctor tells you to restrict your fluids).   You will be given instructions on how to drink the contrast.  Patients over 70 or patients with diabetes or kidney problems:   If you haven t had a blood test (creatinine test) within the last 30 days, the Cardiologist/Radiologist may require you to get this test prior to your exam.  If you have diabetes:   Continue to take your metformin medication on the day of your exam  Please wear loose clothing, such as a sweat suit or jogging clothes. Avoid snaps, zippers and other metal. We may ask you to undress and put on a hospital gown.  If you have any questions, please call the Imaging Department where you will have your exam.            Jul 16, 2018  9:45 AM CDT   (Arrive by 9:30 AM)   Return Visit with Laurence Hood MD   [facility] ([facility])    [address] [place] [address]  [address]  [address]  [facility] [address] [phone]              Further instructions from your care team         A collaboration between [facility] and [facility]  Experts in minimally invasive, targeted treatments performed using imaging guidance    Venous Access Device, Port Catheter or Tunneled Central Line Removal    Today you had your existing venous access device removed, either because it was no longer needed or because there was malfunction or infection issues.    One of our Radiology PAs performed this procedure for you today:    ? Krysten Hopper, MS, ARMANDO      After you go home:  - Drink plenty of fluids.  Generally 6-8 (8 ounce) glasses a day is recommended.  - Resume your regular diet unless otherwise ordered by a medical provider.  - Keep any applied tape/gauze dressings clean and dry.  Change tape/gauze dressings if they get wet or soiled.  - You may shower the following day after procedure, however cover and protect from moisture any tape/gauze dressings.  You may let water hit and run over dried skin glue, but do not scrub.  Pat the area dry after showering.  - Port removal incisions are closed with absorbable suture, meaning they do not need to be removed at a later date, and a topical skin adhesive (skin glue).  This glue will wear off in 7-14 days.  Do not remove before this time.  If 14 days have passed and residual glue is present, you may gently remove it.  - You may remove tape/gauze dressings after 5 days if the site looks closed and in the process of healing.  - Do not apply gels, lotions, or ointments to the glue site for the first 10 days as this may cause the glue to prematurely soften and fail.  - Do not perform strenuous activities or lift greater than 10 pounds for the next three days.  - If there is bleeding or oozing from the procedure site, apply firm pressure to the area for 5-10 minutes.  If the bleeding continues seek medical  advice at the numbers below.  - Mild procedure site discomfort can be treated with an ice pack and over-the-counter pain relievers.              For 24 hours after any sedation used:  - Relax and take it easy.  No strenuous activities.  - Do not drive or operate machines at home or at work.  - No alcohol consumption.  - Do not make any important or legal decisions.    Call our Interventional Radiology (IR) service if:  - If you start bleeding from the procedure site.  If you do start to bleed from the site, lie down and hold some pressure on the site.  Our radiology provider can help you decide if you need to return to the hospital.  - If you have new or worsening pain related to the procedure.  - If you have concerning swelling at the procedure site.  - If you develop persistent nausea or vomiting.  - If you develop hives or a rash or any unexplained itching.  - If you have a fever of greater than 100.5  F and chills in the first 5 days after procedure.  - Any other concerns related to your procedure.      [facility]  Interventional Radiology (IR)  [address] [address]    Contact Number:  [phone]  (IR control desk)  - Monday - Friday 8:00 am - 4:30 pm    After hours for urgent concerns:  [phone]  - After 4:30 pm Monday - Friday, Weekends and Holidays.   - Ask for Interventional Radiology on-call.  Someone is available 24 hours a day.  - [facility] toll free number:  [phone]    [facility]  Home Care Following Your Procedure  Call a doctor if you have signs of infection (fever, growing tenderness at the surgery site, a large amount of drainage or bleeding, severe pain, foul-smelling drainage, redness, swelling).         Tylenol/Acetaminophen Consumption  To help encourage the safe use of acetaminophen, the makers of TYLENOL  have lowered the maximum daily dose for single-ingredient Extra  "Strength TYLENOL  (acetaminophen) products sold in the [place] from 8 pills per day (4,000 mg) to 6 pills per day (3,000 mg). The dosing interval has also changed from 2 pills every 4-6 hours to 2 pills every 6 hours.    If you feel your pain relief is insufficient, you may take Tylenol/Acetaminophen in addition to your narcotic pain medication.     Be careful not to exceed 3,000 mg of Tylenol/Acetaminophen in a 24 hour period from all sources.    If you are taking extra strength Tylenol/acetaminophen (500 mg), the maximum dose is 6 tablets in 24 hours.    If you are taking regular strength acetaminophen (325 mg), the maximum dose is 9 tablets in 24 hours.                Pending Results     Date and Time Order Name Status Description    3/19/2018 0908 IR PORT REMOVAL RIGHT In process             Admission Information     Date & Time Provider Department Dept. Phone    3/19/2018 Krysten Hopper PA-C [facility] [phone]      Your Vitals Were     Blood Pressure Temperature Respirations Height Weight Pulse Oximetry    111/65 98.1  F (36.7  C) (Oral) 16 1.727 m (5' 8\") 65.8 kg (145 lb) 100%    BMI (Body Mass Index)                   22.05 kg/m2           [employer] Information     [employer] gives you secure access to your electronic health record. If you see a primary care provider, you can also send messages to your care team and make appointments. If you have questions, please call your primary care clinic.  If you do not have a primary care provider, please call [phone] and they will assist you.      [employer] is an electronic gateway that provides easy, online access to your medical records. With [employer], you can request a clinic appointment, read your test results, renew a prescription or communicate with your care team.     To access your existing account, please contact your [facility] or call [phone] for assistance.        Care EveryWhere ID     This is " your Care EveryWhere ID. This could be used by other organizations to access your [facility] medical records  [id number]        Equal Access to Services     SHAWN SWAIN : Hadii aad ku hadjesus albertonancy Araujo, wakarlda luqjese, qaybta kaalmada ross, alejandro florencioin hayaan niecyciara meraz lacoryrobina ovidio C[facility] [phone].    ATENCIÓN: Si habla español, tiene a zayas disposición servicios gratuitos de asistencia lingüística. Llame al [phone].    We comply with applicable federal civil rights laws and [place] laws. We do not discriminate on the basis of race, color, national origin, age, disability, sex, sexual orientation, or gender identity.               Review of your medicines      UNREVIEWED medicines. 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             Medication List: This is a list of all your medications and when to take them. Check marks below indicate your daily home schedule. Keep this list as a reference.      Medications           Morning Afternoon Evening Bedtime As Needed    cyanocobalamin 2500 MCG sublingual tablet   Commonly known as:  VITAMIN B-12   Place 2,500 mcg under the tongue daily                                multivitamins with minerals Liqd liquid   15 mLs by Per J Tube route daily

## 2018-04-06 ENCOUNTER — MEDICAL CORRESPONDENCE (OUTPATIENT)
Dept: HEALTH INFORMATION MANAGEMENT | Facility: CLINIC | Age: 38
End: 2018-04-06

## 2018-04-17 ENCOUNTER — MEDICAL CORRESPONDENCE (OUTPATIENT)
Dept: HEALTH INFORMATION MANAGEMENT | Facility: CLINIC | Age: 38
End: 2018-04-17

## 2018-04-24 ENCOUNTER — MYC MEDICAL ADVICE (OUTPATIENT)
Dept: SURGERY | Facility: CLINIC | Age: 38
End: 2018-04-24

## 2018-04-25 NOTE — TELEPHONE ENCOUNTER
Good Afternoon Daniel.    I had Dr. Esteban look at the picture you sent.  He said that this looks fine.  He said you can pack it if needed, but was not worried about it and said there wasn't anything that needs to be done.  Just give the healing process more time.    Yajaira Ferrer, CNP

## 2018-06-15 ENCOUNTER — MYC MEDICAL ADVICE (OUTPATIENT)
Dept: SURGERY | Facility: CLINIC | Age: 38
End: 2018-06-15

## 2018-07-13 ENCOUNTER — RADIANT APPOINTMENT (OUTPATIENT)
Dept: CT IMAGING | Facility: CLINIC | Age: 38
End: 2018-07-13
Attending: INTERNAL MEDICINE
Payer: COMMERCIAL

## 2018-07-13 DIAGNOSIS — C15.5 MALIGNANT NEOPLASM OF LOWER THIRD OF ESOPHAGUS (H): ICD-10-CM

## 2018-07-13 LAB
ALBUMIN SERPL-MCNC: 4 G/DL (ref 3.4–5)
ALP SERPL-CCNC: 84 U/L (ref 40–150)
ALT SERPL W P-5'-P-CCNC: 27 U/L (ref 0–70)
ANION GAP SERPL CALCULATED.3IONS-SCNC: 5 MMOL/L (ref 3–14)
AST SERPL W P-5'-P-CCNC: 22 U/L (ref 0–45)
BASOPHILS # BLD AUTO: 0 10E9/L (ref 0–0.2)
BASOPHILS NFR BLD AUTO: 0.8 %
BILIRUB SERPL-MCNC: 0.4 MG/DL (ref 0.2–1.3)
BUN SERPL-MCNC: 14 MG/DL (ref 7–30)
CALCIUM SERPL-MCNC: 8.7 MG/DL (ref 8.5–10.1)
CHLORIDE SERPL-SCNC: 106 MMOL/L (ref 94–109)
CO2 SERPL-SCNC: 27 MMOL/L (ref 20–32)
CREAT SERPL-MCNC: 0.69 MG/DL (ref 0.66–1.25)
DIFFERENTIAL METHOD BLD: ABNORMAL
EOSINOPHIL # BLD AUTO: 0.1 10E9/L (ref 0–0.7)
EOSINOPHIL NFR BLD AUTO: 1.6 %
ERYTHROCYTE [DISTWIDTH] IN BLOOD BY AUTOMATED COUNT: 14.5 % (ref 10–15)
FERRITIN SERPL-MCNC: 9 NG/ML (ref 26–388)
GFR SERPL CREATININE-BSD FRML MDRD: >90 ML/MIN/1.7M2
GLUCOSE SERPL-MCNC: 108 MG/DL (ref 70–99)
HCT VFR BLD AUTO: 39.6 % (ref 40–53)
HGB BLD-MCNC: 13 G/DL (ref 13.3–17.7)
IMM GRANULOCYTES # BLD: 0 10E9/L (ref 0–0.4)
IMM GRANULOCYTES NFR BLD: 0.2 %
IRON SATN MFR SERPL: 16 % (ref 15–46)
IRON SERPL-MCNC: 72 UG/DL (ref 35–180)
LYMPHOCYTES # BLD AUTO: 1.5 10E9/L (ref 0.8–5.3)
LYMPHOCYTES NFR BLD AUTO: 29.6 %
MCH RBC QN AUTO: 27.1 PG (ref 26.5–33)
MCHC RBC AUTO-ENTMCNC: 32.8 G/DL (ref 31.5–36.5)
MCV RBC AUTO: 83 FL (ref 78–100)
MONOCYTES # BLD AUTO: 0.6 10E9/L (ref 0–1.3)
MONOCYTES NFR BLD AUTO: 11.1 %
NEUTROPHILS # BLD AUTO: 2.8 10E9/L (ref 1.6–8.3)
NEUTROPHILS NFR BLD AUTO: 56.7 %
NRBC # BLD AUTO: 0 10*3/UL
NRBC BLD AUTO-RTO: 0 /100
PLATELET # BLD AUTO: 196 10E9/L (ref 150–450)
POTASSIUM SERPL-SCNC: 3.9 MMOL/L (ref 3.4–5.3)
PROT SERPL-MCNC: 7.9 G/DL (ref 6.8–8.8)
RBC # BLD AUTO: 4.79 10E12/L (ref 4.4–5.9)
SODIUM SERPL-SCNC: 138 MMOL/L (ref 133–144)
TIBC SERPL-MCNC: 454 UG/DL (ref 240–430)
WBC # BLD AUTO: 4.9 10E9/L (ref 4–11)

## 2018-07-13 PROCEDURE — 82728 ASSAY OF FERRITIN: CPT | Performed by: INTERNAL MEDICINE

## 2018-07-13 PROCEDURE — 85025 COMPLETE CBC W/AUTO DIFF WBC: CPT | Performed by: INTERNAL MEDICINE

## 2018-07-13 PROCEDURE — 83550 IRON BINDING TEST: CPT | Performed by: INTERNAL MEDICINE

## 2018-07-13 PROCEDURE — 80053 COMPREHEN METABOLIC PANEL: CPT | Performed by: INTERNAL MEDICINE

## 2018-07-13 PROCEDURE — 83540 ASSAY OF IRON: CPT | Performed by: INTERNAL MEDICINE

## 2018-07-13 RX ORDER — IOPAMIDOL 755 MG/ML
89 INJECTION, SOLUTION INTRAVASCULAR ONCE
Status: COMPLETED | OUTPATIENT
Start: 2018-07-13 | End: 2018-07-13

## 2018-07-13 RX ADMIN — IOPAMIDOL 89 ML: 755 INJECTION, SOLUTION INTRAVASCULAR at 13:37

## 2018-07-13 NOTE — DISCHARGE INSTRUCTIONS

## 2018-07-13 NOTE — NURSING NOTE
Chief Complaint   Patient presents with     Blood Draw     Pt is here for labs and IV placement      Tova Peña MA

## 2018-07-16 ENCOUNTER — ONCOLOGY VISIT (OUTPATIENT)
Dept: ONCOLOGY | Facility: CLINIC | Age: 38
End: 2018-07-16
Attending: INTERNAL MEDICINE
Payer: COMMERCIAL

## 2018-07-16 VITALS
HEIGHT: 68 IN | SYSTOLIC BLOOD PRESSURE: 110 MMHG | WEIGHT: 145.06 LBS | OXYGEN SATURATION: 100 % | TEMPERATURE: 98.2 F | RESPIRATION RATE: 18 BRPM | DIASTOLIC BLOOD PRESSURE: 69 MMHG | BODY MASS INDEX: 21.99 KG/M2 | HEART RATE: 86 BPM

## 2018-07-16 DIAGNOSIS — C15.5 MALIGNANT NEOPLASM OF LOWER THIRD OF ESOPHAGUS (H): Primary | ICD-10-CM

## 2018-07-16 PROCEDURE — G0463 HOSPITAL OUTPT CLINIC VISIT: HCPCS | Mod: ZF

## 2018-07-16 PROCEDURE — 99214 OFFICE O/P EST MOD 30 MIN: CPT | Mod: ZP | Performed by: INTERNAL MEDICINE

## 2018-07-16 RX ORDER — MULTIPLE VITAMINS W/ MINERALS TAB 9MG-400MCG
1 TAB ORAL DAILY
COMMUNITY

## 2018-07-16 ASSESSMENT — PAIN SCALES - GENERAL: PAINLEVEL: NO PAIN (0)

## 2018-07-16 NOTE — PROGRESS NOTES
[facility]    Hematology/Oncology Established Patient Note      Today's Date: 7/16/18    Reason for Follow-up: adenocarcinoma of the GE junction      HISTORY OF PRESENT ILLNESS: Daniel Sandhu is a 38 year old male who presents with adenocarcinoma of the GE junction.  In early 2017, patient started noticing difficulty swallowing, and that food seems to take longer to go down.  It became more frequent, and he saw his PCP, and was referred for EGD, which showed an esophageal mass located at the GE junction, measuring 4 cm.  Biopsy showed poorly differentiated adenocarcinoma.  HER-2 was sent and is equivocal (2+).  CT c/a/p showed a mildly prominent lymph node in the gastrohepatic ligament, but there were no pathologically enlarged lymph nodes in the chest, abdomen, or pelvis.  There was otherwise no evidence of metastatic disease.  PET-CT showed thickening of the distal esophagus consistent with known esophageal adenocarcinoma, as well as mildly hypermetabolic 1 cm lymph node in the gastrohepatic ligament.  There was no evidence of distant metastatic disease.  He underwent EUS on 6/9/17, which showed the esophageal tumor in the lower third of the esophagus, staged [id number].  There were 2 abnormal lymph nodes seen in the gastrohepatic ligament; pathology was suspicious for adenocarcinoma.  Celiac node was sampled as well, which was benign.  He was seen by surgery, Dr. Esteban, and recommended srinivas-operative chemotherapy.    Port was placed on 6/22/17.  It was removed on 3/19/18.    He underwent chemotherapy with epirubicin, oxaliplatin, and capecitabine x 3 cycles 6/26/17-8/7/17.      On 11/3/17, he underwent laparotomy with total gastrectomy and abdominal lymphadenectomy, left thoracotomy with distal esophagectomy and intrathoracic Terrell-en-Y esophagojejunostomy, feeding jejunostomy, left pharyngostomy tube placement, right tube thoracostomy, and flexible bronchoscopy.  On 11/9/17, he was taken back  to OR for I&D of pharyngostomy tube abscess.  Pathology showed adenocarcinoma, moderate differentiated, extensive residual tumor with no evidence tumor regression, margins negative, perineural invasion present, 1 of 28 lymph nodes were involved with malignancy, stage [id number] (stage IIIA).  HER-2 is non-amplified.    He resumed chemotherapy post-surgery, with plans to complete 3 more cycles, with 5-FU, epirubicin, oxaliplatin.  However, he only completed 2 more cycles, due to poor tolerance.  He was admitted to the hospital 1/9/18-1/13/18 for nausea, diarrhea, failure to thrive, severe malnutrition, generalized weakness.  His infusional chemotherapy was stopped on 1/8/18.  He underwent EGD in the hospital on 1/11/18, which showed ulcers, and no evidence of recurrence of his cancer.  One area biopsied showed inflammation, no evidence of malignancy.      INTERIM HISTORY: Daniel comes in for follow-up today. He has been doing well.  He has remained busy with work and his family.  He denies pain, nausea/vomiting.        REVIEW OF SYSTEMS:   14 point ROS was reviewed and is negative other than as noted above in HPI.       HOME MEDICATIONS:  Current Outpatient Prescriptions   Medication Sig Dispense Refill     cyabnocobalamin (VITAMIN B-12) 2500 MCG sublingual tablet Place 2,500 mcg under the tongue daily 30 tablet 1     multivitamin, therapeutic with minerals (MULTI-VITAMIN) TABS tablet Take 1 tablet by mouth daily Generic [employer] Vitamin           ALLERGIES:  No Known Allergies      PAST MEDICAL HISTORY:  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 6/5/2017         PAST SURGICAL HISTORY:  Past Surgical History:   Procedure Laterality Date     ESOPHAGOGASTRODUODENOSCOPY       ESOPHAGOSCOPY, GASTROSCOPY, DUODENOSCOPY (EGD), COMBINED N/A 6/9/2017    Procedure: COMBINED ENDOSCOPIC ULTRASOUND, ESOPHAGOSCOPY, GASTROSCOPY, DUODENOSCOPY (EGD), FINE NEEDLE ASPIRATE/BIOPSY;   Upper Endoscopic Ultrasound, fine needle aspirate/biopsy;  Surgeon: Guru Mark Avila MD;  Location: [facility] OR     ESOPHAGOSCOPY, GASTROSCOPY, DUODENOSCOPY (EGD), COMBINED N/A 11/3/2017    Procedure: COMBINED ESOPHAGOSCOPY, GASTROSCOPY, DUODENOSCOPY (EGD);;  Surgeon: Yunior Esteban MD;  Location: [facility] OR     ESOPHAGOSCOPY, GASTROSCOPY, DUODENOSCOPY (EGD), COMBINED N/A 11/9/2017    Procedure: COMBINED ESOPHAGOSCOPY, GASTROSCOPY, DUODENOSCOPY (EGD);  Esophogastroduodenoscopy, take out pharangostomy tube;  Surgeon: Yunior Esteban MD;  Location: [facility] OR     ESOPHAGOSCOPY, GASTROSCOPY, DUODENOSCOPY (EGD), COMBINED N/A 1/11/2018    Procedure: COMBINED ESOPHAGOSCOPY, GASTROSCOPY, DUODENOSCOPY (EGD), BIOPSY SINGLE OR MULTIPLE;  COMBINED ESOPHAGOSCOPY, GASTROSCOPY, DUODENOSCOPY (EGD);  Surgeon: Alessandro Mcgregor MD;  Location: [facility]     GASTRECTOMY N/A 11/3/2017    Procedure: GASTRECTOMY;;  Surgeon: Yunior Esteban MD;  Location: [facility] OR     HAND SURGERY      childhood, torn tendon     INSERT PORT VASCULAR ACCESS Right 6/22/2017    Procedure: INSERT PORT VASCULAR ACCESS;  Single Lumen Chest Power Port;  Surgeon: Iván Driver PA-C;  Location: [facility]     LAPAROSCOPY DIAGNOSTIC (GENERAL) N/A 11/3/2017    Procedure: LAPAROSCOPY DIAGNOSTIC (GENERAL);  diagnostic laparoscopy, right chest tube, total gastrectomy with distal esophagectomy, intrathoracic eveline-y esophago-jejunostomy, feeding jejunostomy, pharyngostomy, esophagogastroduodenoscopy, flexible bronchoscopy;  Surgeon: Yunior Esteban MD;  Location: [facility]     LAPAROTOMY EXPLORATORY N/A 11/3/2017    Procedure: LAPAROTOMY EXPLORATORY;;  Surgeon: Yunior Esteban MD;  Location: [facility]     PHARYNGOSTOMY N/A 11/3/2017    Procedure: PHARYNGOSTOMY;;  Surgeon: Yunior Esteban MD;  Location: [facility]     REMOVE PORT VASCULAR ACCESS Right 3/19/2018    Procedure: REMOVE PORT VASCULAR ACCESS;  Right Port  "Removal;  Surgeon: Krysten Hopper PA-C;  Location: [facility]     THORACOTOMY Left 11/3/2017    Procedure: THORACOTOMY;;  Surgeon: Yunior Esteban MD;  [facility]         SOCIAL HISTORY:  Social History     Social History     Marital status:      Spouse name: N/A     Number of children: 1     Years of education: N/A     Occupational History     musician and teacher       Social History Main Topics     Smoking status: Never Smoker     Smokeless tobacco: Never Used     Alcohol use Yes      Comment: 1 beer daily     Drug use: Yes     Special: Marijuana      Comment: occasional     Sexual activity: Yes     Partners: Female     Birth control/ protection: Natural Family Planning     Other Topics Concern     Not on file     Social History Narrative     He works at [employer] in [place], 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  He denies smoking.  He drinks ~1 beer a day.  He denies illicit drug use, other than occasional marijuana.  He lives in [place] with his wife, and 4.5 year-old daughter.  His wife is currently pregnant with a boy, and is due in 6 weeks.  He has a half sister who  of breast cancer at age 32; she was diagnosed in her late 20's.  A paternal grandmother had breast cancer in her 70's      FAMILY HISTORY:  Family History   Problem Relation Age of Onset     Other - See Comments Father      sepsis     Cancer Sister      breast cancer  29 yo 1/2 sister      Cancer Paternal Grandmother      breast         PHYSICAL EXAM:  Vital signs:  /69 (BP Location: Right arm, Patient Position: Sitting, Cuff Size: Adult Regular)  Pulse 86  Temp 98.2  F (36.8  C) (Oral)  Resp 18  Ht 1.727 m (5' 7.99\")  Wt 65.8 kg (145 lb 1 oz)  SpO2 100%  BMI 22.06 kg/m2   ECO  GENERAL/CONSTITUTIONAL: No acute distress.    EYES: No scleral icterus.  GASTROINTESTINAL: No distention.  Feeding tube has been removed.    MUSCULOSKELETAL: Warm and well-perfused, no cyanosis, clubbing, " or edema.  NEUROLOGIC: Alert, oriented, answers questions appropriately.  INTEGUMENTARY: No jaundice.  Port has been removed.      LABS:  CBC RESULTS:   Recent Labs   Lab Test  07/13/18   1309   WBC  4.9   RBC  4.79   HGB  13.0*   HCT  39.6*   MCV  83   MCH  27.1   MCHC  32.8   RDW  14.5   PLT  196     Recent Labs   Lab Test  07/13/18   1309  03/07/18   1335   NA  138  138   POTASSIUM  3.9  3.8   CHLORIDE  106  104   CO2  27  27   ANIONGAP  5  6   GLC  108*  95   BUN  14  18   CR  0.69  0.56*   YOBANY  8.7  8.7     Lab Results   Component Value Date    AST 22 07/13/2018     Lab Results   Component Value Date    ALT 27 07/13/2018     No results found for: BILICONJ   Lab Results   Component Value Date    BILITOTAL 0.4 07/13/2018     Lab Results   Component Value Date    ALBUMIN 4.0 07/13/2018     Lab Results   Component Value Date    PROTTOTAL 7.9 07/13/2018      Lab Results   Component Value Date    ALKPHOS 84 07/13/2018     Component      Latest Ref Rng & Units 12/1/2017 3/7/2018 7/13/2018   Iron      35 - 180 ug/dL  22 (L) 72   Iron Binding Cap      240 - 430 ug/dL  422 454 (H)   Iron Saturation Index      15 - 46 %  5 (L) 16   Vitamin B12      193 - 986 pg/mL 787 820    Ferritin      26 - 388 ng/mL  8 (L) 9 (L)           IMAGING:  CT c/a/p 7/13/18:  FINDINGS:     LUNGS: Biapical pleural thickening/scarring. Mild bilateral lower  lobes dependent atelectasis, left greater than right as well as  minimal atelectasis in the lingula. Previously seen 3 mm pulmonary  nodule in the right lower lobe has resolved. No suspicious, new or  enlarging pulmonary nodules. No acute airspace opacities.     Chest: Partially visualized thyroid gland is unremarkable. Central  tracheobronchial tree is patent. Thoracic aorta and main pulmonary  artery have normal diameter. No central pulmonary embolism.  Postoperative changes at the gastroesophageal junction of the distal  esophagectomy/gastrectomy and Terrell-en-Y esophagojejunostomy.  Mild  patulous proximal and mid esophagus. No thoracic lymphadenopathy.  Cardiac size within normal limits. No pleural effusions. No  pneumothorax. Interval removal of the previously seen right IJ  Port-A-Cath.     Abdomen and pelvis: Stable subcentimeter hypodensity in hepatic  segment 7, too small to characterize. No new or enlarging liver  lesions. Patent hepatic vasculature. No biliary tree dilation.  Unremarkable gallbladder. Homogeneous pancreatic parenchyma. Main  pancreatic duct is not dilated. The spleen is not enlarged. No focal  splenic lesions. Unremarkable adrenal glands. Subcentimeter cortical  hypodensities in both kidneys, too small to characterize, stable,  likely representing renal cysts. No renal stones or hydronephrosis.  Unremarkable urinary bladder. Symmetric seminal vesicles. Pelvic  phleboliths. Prostate calcifications. No inguinal lymphadenopathy.  Small amount of free fluid in the pelvis. No free air. No suspicious  pelvic lymph nodes. Subcentimeter retroperitoneal and mesenteric lymph  nodes, not enlarged by size criteria. No abdominal aortic aneurysm. No  dilated loops of bowel. No bowel wall thickening. Interval removal of  the previously seen percutaneous jejunostomy tube. Stable mesenteric  hypodensity, series 3 image [phone], likely postoperative changes.     Bones: No aggressive bone lesions. Scattered Schmorl's nodes.         IMPRESSION:  1. Postoperative changes off distal esophagectomy/gastrectomy and  Terrell-en-Y esophagojejunostomy. No evidence for residual/recurrent  disease.  2. Stable mesenteric hypodensity, likely due to postoperative changes.  No convincing evidence of metastatic disease within the chest,  abdomen, pelvis and bones.    ASSESSMENT/PLAN:  Daniel Sandhu is a 38 year old male with:    1) Adenocarcinoma of the GE junction: now s/p 3 cycles of neoadjuvant chemotherapy with EOX, followed by surgical resection on 11/3/17.  Pathology showed adenocarcinoma, moderate  differentiated, extensive residual tumor with no evidence tumor regression, margins negative, perineural invasion present, 1 of 28 lymph nodes were involved with malignancy, stage [id number] (stage IIIA).  HER-2 is non-amplified.    He has received EOF x 2 cycles after surgery, and he has not tolerated well.  The 5-FU on cycle 5 was discontinued early. Chemotherapy was stopped at that point due to poor tolerance.    CT scans from 7/13/18 reviewed with Daniel.  There is no evidence of residual/recurrent disease.  There is stable mesenteric hypodensity, likely due to post-operative changes.  There is no convincing evidence of metastatic disease in the chest, abdomen, pelvis, or bones.  Stable subcentimeter hypodensity in hepatic segment 7, too small to characterize.  No new or enlarging liver lesions.      -surveillance CT scans every 4 months for the first year, and then every 6 months for the next 2 years after that.    -RTC in 4 months with labs and CT scan results    2) Genetics:  -genetic testing was negative    3) Fertility: Daniel and his wife have 2 children, including a baby boy just born around June 2017.  He is not planning for more children in the near future.    4) Iron-deficiency anemia:   -he is on oral iron  -he may have issues with absorption due to his surgery.  If there is no improvement, or if he gets side effects with oral iron, we can try IV iron  -Ferritin remains low, but hemoglobin is improved to near normal.  Will continue oral iron for now, as he is tolerating.    -repeat CBC and iron/ferritin labs at the next visit    5) Nutrition: Albumin now back to normal.  His feeding tube had fallen out on its own.      I spent a total of 25 minutes with the patient, with over >50% of the time in counseling and/or coordination of care.       Laurence Hood MD  Hematology/Oncology  [facility]

## 2018-07-16 NOTE — LETTER
7/16/2018       RE: Daniel Sandhu  [address] [facility] [address]     Dear Colleague,    Thank you for referring your patient, Daniel Sandhu, to the [facility]. Please see a copy of my visit note below.    [facility]    Hematology/Oncology Established Patient Note      Today's Date: 7/16/18    Reason for Follow-up: adenocarcinoma of the GE junction      HISTORY OF PRESENT ILLNESS: Daniel Sandhu is a 38 year old male who presents with adenocarcinoma of the GE junction.  In early 2017, patient started noticing difficulty swallowing, and that food seems to take longer to go down.  It became more frequent, and he saw his PCP, and was referred for EGD, which showed an esophageal mass located at the GE junction, measuring 4 cm.  Biopsy showed poorly differentiated adenocarcinoma.  HER-2 was sent and is equivocal (2+).  CT c/a/p showed a mildly prominent lymph node in the gastrohepatic ligament, but there were no pathologically enlarged lymph nodes in the chest, abdomen, or pelvis.  There was otherwise no evidence of metastatic disease.  PET-CT showed thickening of the distal esophagus consistent with known esophageal adenocarcinoma, as well as mildly hypermetabolic 1 cm lymph node in the gastrohepatic ligament.  There was no evidence of distant metastatic disease.  He underwent EUS on 6/9/17, which showed the esophageal tumor in the lower third of the esophagus, staged T2NX.  There were 2 abnormal lymph nodes seen in the gastrohepatic ligament; pathology was suspicious for adenocarcinoma.  Celiac node was sampled as well, which was benign.  He was seen by surgery, Dr. Esteban, and recommended srinivas-operative chemotherapy.    Port was placed on 6/22/17.  It was removed on 3/19/18.    He underwent chemotherapy with epirubicin, oxaliplatin, and capecitabine x 3 cycles 6/26/17-8/7/17.      On 11/3/17, he underwent laparotomy with total gastrectomy and abdominal  lymphadenectomy, left thoracotomy with distal esophagectomy and intrathoracic Terrell-en-Y esophagojejunostomy, feeding jejunostomy, left pharyngostomy tube placement, right tube thoracostomy, and flexible bronchoscopy.  On 11/9/17, he was taken back to OR for I&D of pharyngostomy tube abscess.  Pathology showed adenocarcinoma, moderate differentiated, extensive residual tumor with no evidence tumor regression, margins negative, perineural invasion present, 1 of 28 lymph nodes were involved with malignancy, stage [id number] (stage IIIA).  HER-2 is non-amplified.    He resumed chemotherapy post-surgery, with plans to complete 3 more cycles, with 5-FU, epirubicin, oxaliplatin.  However, he only completed 2 more cycles, due to poor tolerance.  He was admitted to the hospital 1/9/18-1/13/18 for nausea, diarrhea, failure to thrive, severe malnutrition, generalized weakness.  His infusional chemotherapy was stopped on 1/8/18.  He underwent EGD in the hospital on 1/11/18, which showed ulcers, and no evidence of recurrence of his cancer.  One area biopsied showed inflammation, no evidence of malignancy.      INTERIM HISTORY: Daniel comes in for follow-up today. He has been doing well.  He has remained busy with work and his family.  He denies pain, nausea/vomiting.        REVIEW OF SYSTEMS:   14 point ROS was reviewed and is negative other than as noted above in HPI.       HOME MEDICATIONS:  Current Outpatient Prescriptions   Medication Sig Dispense Refill     cyabnocobalamin (VITAMIN B-12) 2500 MCG sublingual tablet Place 2,500 mcg under the tongue daily 30 tablet 1     multivitamin, therapeutic with minerals (MULTI-VITAMIN) TABS tablet Take 1 tablet by mouth daily Generic [place] Vitamin           ALLERGIES:  No Known Allergies      PAST MEDICAL HISTORY:  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 6/5/2017         PAST SURGICAL HISTORY:  Past Surgical History:   Procedure  Laterality Date     ESOPHAGOGASTRODUODENOSCOPY       ESOPHAGOSCOPY, GASTROSCOPY, DUODENOSCOPY (EGD), COMBINED N/A 6/9/2017    Procedure: COMBINED ENDOSCOPIC ULTRASOUND, ESOPHAGOSCOPY, GASTROSCOPY, DUODENOSCOPY (EGD), FINE NEEDLE ASPIRATE/BIOPSY;  Upper Endoscopic Ultrasound, fine needle aspirate/biopsy;  Surgeon: Guru Mark Avila MD;  [facility]: [facility]     ESOPHAGOSCOPY, GASTROSCOPY, DUODENOSCOPY (EGD), COMBINED N/A 11/3/2017    Procedure: COMBINED ESOPHAGOSCOPY, GASTROSCOPY, DUODENOSCOPY (EGD);;  Surgeon: Yunior Esteban MD;  Location: [facility] OR     ESOPHAGOSCOPY, GASTROSCOPY, DUODENOSCOPY (EGD), COMBINED N/A 11/9/2017    Procedure: COMBINED ESOPHAGOSCOPY, GASTROSCOPY, DUODENOSCOPY (EGD);  Esophogastroduodenoscopy, take out pharangostomy tube;  Surgeon: Yunior Esteban MD;  Location: [facility] OR     ESOPHAGOSCOPY, GASTROSCOPY, DUODENOSCOPY (EGD), COMBINED N/A 1/11/2018    Procedure: COMBINED ESOPHAGOSCOPY, GASTROSCOPY, DUODENOSCOPY (EGD), BIOPSY SINGLE OR MULTIPLE;  COMBINED ESOPHAGOSCOPY, GASTROSCOPY, DUODENOSCOPY (EGD);  Surgeon: Alessandro Mcgregor MD;  Location: [facility] GI     GASTRECTOMY N/A 11/3/2017    Procedure: GASTRECTOMY;;  Surgeon: Yunior Esteban MD;  Location: [facility] OR     HAND SURGERY      childhood, torn tendon     INSERT PORT VASCULAR ACCESS Right 6/22/2017    Procedure: INSERT PORT VASCULAR ACCESS;  Single Lumen Chest Power Port;  Surgeon: Iván Driver PA-C;  [facility]     LAPAROSCOPY DIAGNOSTIC (GENERAL) N/A 11/3/2017    Procedure: LAPAROSCOPY DIAGNOSTIC (GENERAL);  diagnostic laparoscopy, right chest tube, total gastrectomy with distal esophagectomy, intrathoracic eveline-y esophago-jejunostomy, feeding jejunostomy, pharyngostomy, esophagogastroduodenoscopy, flexible bronchoscopy;  Surgeon: Yunior Esteban MD;  Location: [facility] OR     LAPAROTOMY EXPLORATORY N/A 11/3/2017    Procedure: LAPAROTOMY EXPLORATORY;;  Surgeon: Yunior Esteban,  "MD;  Location: [facility] OR     PHARYNGOSTOMY N/A 11/3/2017    Procedure: PHARYNGOSTOMY;;  Surgeon: Yunior Esteban MD;  Location: [facility] OR     REMOVE PORT VASCULAR ACCESS Right 3/19/2018    Procedure: REMOVE PORT VASCULAR ACCESS;  Right Port Removal;  Surgeon: Krysten Hopper PA-C;  [facility]     THORACOTOMY Left 11/3/2017    Procedure: THORACOTOMY;;  Surgeon: Yunior Esteban MD;  Location: [facility] OR         SOCIAL HISTORY:  Social History     Social History     Marital status:      Spouse name: N/A     Number of children: 1     Years of education: N/A     Occupational History     musician and teacher       Social History Main Topics     Smoking status: Never Smoker     Smokeless tobacco: Never Used     Alcohol use Yes      Comment: 1 beer daily     Drug use: Yes     Special: Marijuana      Comment: occasional     Sexual activity: Yes     Partners: Female     Birth control/ protection: Natural Family Planning     Other Topics Concern     Not on file     Social History Narrative     He works at [employer] in [place], 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  He denies smoking.  He drinks ~1 beer a day.  He denies illicit drug use, other than occasional marijuana.  He lives in [place] with his wife, and 4.5 year-old daughter.  His wife is currently pregnant with a boy, and is due in 6 weeks.  He has a half sister who  of breast cancer at age 32; she was diagnosed in her late 20's.  A paternal grandmother had breast cancer in her 70's      FAMILY HISTORY:  Family History   Problem Relation Age of Onset     Other - See Comments Father      sepsis     Cancer Sister      breast cancer  29 yo 1/2 sister      Cancer Paternal Grandmother      breast         PHYSICAL EXAM:  Vital signs:  /69 (BP Location: Right arm, Patient Position: Sitting, Cuff Size: Adult Regular)  Pulse 86  Temp 98.2  F (36.8  C) (Oral)  Resp 18  Ht 1.727 m (5' 7.99\")  Wt 65.8 kg (145 lb 1 oz)  " SpO2 100%  BMI 22.06 kg/m2   ECO  GENERAL/CONSTITUTIONAL: No acute distress.    EYES: No scleral icterus.  GASTROINTESTINAL: No distention.  Feeding tube has been removed.    MUSCULOSKELETAL: Warm and well-perfused, no cyanosis, clubbing, or edema.  NEUROLOGIC: Alert, oriented, answers questions appropriately.  INTEGUMENTARY: No jaundice.  Port has been removed.      LABS:  CBC RESULTS:   Recent Labs   Lab Test  18   1309   WBC  4.9   RBC  4.79   HGB  13.0*   HCT  39.6*   MCV  83   MCH  27.1   MCHC  32.8   RDW  14.5   PLT  196     Recent Labs   Lab Test  18   1309  18   1335   NA  138  138   POTASSIUM  3.9  3.8   CHLORIDE  106  104   CO2  27  27   ANIONGAP  5  6   GLC  108*  95   BUN  14  18   CR  0.69  0.56*   YOBANY  8.7  8.7     Lab Results   Component Value Date    AST 22 2018     Lab Results   Component Value Date    ALT 27 2018     No results found for: BILICONJ   Lab Results   Component Value Date    BILITOTAL 0.4 2018     Lab Results   Component Value Date    ALBUMIN 4.0 2018     Lab Results   Component Value Date    PROTTOTAL 7.9 2018      Lab Results   Component Value Date    ALKPHOS 84 2018     Component      Latest Ref Rng & Units 2017 3/7/2018 2018   Iron      35 - 180 ug/dL  22 (L) 72   Iron Binding Cap      240 - 430 ug/dL  422 454 (H)   Iron Saturation Index      15 - 46 %  5 (L) 16   Vitamin B12      193 - 986 pg/mL 787 820    Ferritin      26 - 388 ng/mL  8 (L) 9 (L)           IMAGING:  CT c/a/p 18:  FINDINGS:     LUNGS: Biapical pleural thickening/scarring. Mild bilateral lower  lobes dependent atelectasis, left greater than right as well as  minimal atelectasis in the lingula. Previously seen 3 mm pulmonary  nodule in the right lower lobe has resolved. No suspicious, new or  enlarging pulmonary nodules. No acute airspace opacities.     Chest: Partially visualized thyroid gland is unremarkable. Central  tracheobronchial tree is  patent. Thoracic aorta and main pulmonary  artery have normal diameter. No central pulmonary embolism.  Postoperative changes at the gastroesophageal junction of the distal  esophagectomy/gastrectomy and Terrell-en-Y esophagojejunostomy. Mild  patulous proximal and mid esophagus. No thoracic lymphadenopathy.  Cardiac size within normal limits. No pleural effusions. No  pneumothorax. Interval removal of the previously seen right IJ  Port-A-Cath.     Abdomen and pelvis: Stable subcentimeter hypodensity in hepatic  segment 7, too small to characterize. No new or enlarging liver  lesions. Patent hepatic vasculature. No biliary tree dilation.  Unremarkable gallbladder. Homogeneous pancreatic parenchyma. Main  pancreatic duct is not dilated. The spleen is not enlarged. No focal  splenic lesions. Unremarkable adrenal glands. Subcentimeter cortical  hypodensities in both kidneys, too small to characterize, stable,  likely representing renal cysts. No renal stones or hydronephrosis.  Unremarkable urinary bladder. Symmetric seminal vesicles. Pelvic  phleboliths. Prostate calcifications. No inguinal lymphadenopathy.  Small amount of free fluid in the pelvis. No free air. No suspicious  pelvic lymph nodes. Subcentimeter retroperitoneal and mesenteric lymph  nodes, not enlarged by size criteria. No abdominal aortic aneurysm. No  dilated loops of bowel. No bowel wall thickening. Interval removal of  the previously seen percutaneous jejunostomy tube. Stable mesenteric  hypodensity, series 3 image [phone], likely postoperative changes.     Bones: No aggressive bone lesions. Scattered Schmorl's nodes.         IMPRESSION:  1. Postoperative changes off distal esophagectomy/gastrectomy and  Terrell-en-Y esophagojejunostomy. No evidence for residual/recurrent  disease.  2. Stable mesenteric hypodensity, likely due to postoperative changes.  No convincing evidence of metastatic disease within the chest,  abdomen, pelvis and  bones.    ASSESSMENT/PLAN:  Daniel Sandhu is a 38 year old male with:    1) Adenocarcinoma of the GE junction: now s/p 3 cycles of neoadjuvant chemotherapy with EOX, followed by surgical resection on 11/3/17.  Pathology showed adenocarcinoma, moderate differentiated, extensive residual tumor with no evidence tumor regression, margins negative, perineural invasion present, 1 of 28 lymph nodes were involved with malignancy, stage [id number] (stage IIIA).  HER-2 is non-amplified.    He has received EOF x 2 cycles after surgery, and he has not tolerated well.  The 5-FU on cycle 5 was discontinued early. Chemotherapy was stopped at that point due to poor tolerance.    CT scans from 7/13/18 reviewed with Daniel.  There is no evidence of residual/recurrent disease.  There is stable mesenteric hypodensity, likely due to post-operative changes.  There is no convincing evidence of metastatic disease in the chest, abdomen, pelvis, or bones.  Stable subcentimeter hypodensity in hepatic segment 7, too small to characterize.  No new or enlarging liver lesions.      -surveillance CT scans every 4 months for the first year, and then every 6 months for the next 2 years after that.    -RTC in 4 months with labs and CT scan results    2) Genetics:  -genetic testing was negative    3) Fertility: Daniel and his wife have 2 children, including a baby boy just born around June 2017.  He is not planning for more children in the near future.    4) Iron-deficiency anemia:   -he is on oral iron  -he may have issues with absorption due to his surgery.  If there is no improvement, or if he gets side effects with oral iron, we can try IV iron  -Ferritin remains low, but hemoglobin is improved to near normal.  Will continue oral iron for now, as he is tolerating.    -repeat CBC and iron/ferritin labs at the next visit    5) Nutrition: Albumin now back to normal.  His feeding tube had fallen out on its own.      I spent a total of 25 minutes with the  patient, with over >50% of the time in counseling and/or coordination of care.       Laurence Hood MD  Hematology/Oncology  [facility]

## 2018-07-16 NOTE — MR AVS SNAPSHOT
After Visit Summary   7/16/2018    Daniel Sandhu    MRN: [id number]           Patient Information     Date Of Birth          1980        Visit Information        Provider Department      7/16/2018 9:45 AM Laurence Hood MD [facility]        Today's Diagnoses     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    -  1       Follow-ups after your visit        Your next 10 appointments already scheduled     Nov 16, 2018 10:15 AM CST   LAB with  LAB   [facility] ([facility])    [address] [place] [id number] [phone]           Please do not eat 10-12 hours before your appointment if you are coming in fasting for labs on lipids, cholesterol, or glucose (sugar). This does not apply to pregnant women. Water, hot tea and black coffee (with nothing added) are okay. Do not drink other fluids, diet soda or chew gum.            Nov 16, 2018 11:00 AM CST   CT CHEST/ABDOMEN/PELVIS W CONTRAST with UCCT2   [facility] [place] [facility] ([facility])    [address] [place] [address] [id number] [phone]           Please bring any scans or X-rays taken at other hospitals, if similar tests were done. Also bring a list of your medicines, including vitamins, minerals and over-the-counter drugs. It is safest to leave personal items at home.  Be sure to tell your doctor:   If you have any allergies.   If there s any chance you are pregnant.   If you are breastfeeding.  How to prepare:   Do not eat or drink for 2 hours before your exam. If you need to take medicine, you may take it with small sips of water. (We may ask you to take liquid medicine as well.)   Please wear loose clothing, such as a sweat suit or jogging clothes. Avoid snaps, zippers and other metal. We may ask you to undress and put on a hospital gown.  Please arrive 30 minutes early for your CT. Once in the  department you might be asked to drink water 15-20 minutes prior to your exam.  If indicated you may be asked to drink an oral contrast in advance of your CT.  If this is the case, the imaging team will let you know or be in contact with you prior to your appointment  Patients over 70 or patients with diabetes or kidney problems:   If you haven t had a blood test (creatinine test) within the last 30 days, the Cardiologist/Radiologist may require you to get this test prior to your exam.  If you have diabetes:   Continue to take your metformin medication on the day of your exam  If you have any questions, please call the Imaging Department where you will have your exam.            Nov 19, 2018  9:15 AM CST   (Arrive by 9:00 AM)   Return Visit with Laurence Hood MD   [facility] ([facility])    [address] [place] [address]  [address] [place] [id number] [phone]              Future tests that were ordered for you today     Open Future Orders        Priority Expected Expires Ordered    CBC with platelets differential Routine 11/16/2018 7/16/2019 7/16/2018    Comprehensive metabolic panel Routine 11/16/2018 7/16/2019 7/16/2018    Ferritin Routine 11/16/2018 7/16/2019 7/16/2018    Iron and iron binding capacity Routine 11/16/2018 7/16/2019 7/16/2018    CT Chest/Abdomen/Pelvis w Contrast Routine 11/16/2018 7/16/2019 7/16/2018            Who to contact     If you have questions or need follow up information about today's clinic visit or your schedule please contact [facility] directly at [phone].  Normal or non-critical lab and imaging results will be communicated to you by MyChart, letter or phone within 4 business days after the clinic has received the results. If you do not hear from us within 7 days, please contact the clinic through MyChart or phone. If you have a critical or abnormal lab result, we will notify you by phone as soon as  "possible.  Submit refill requests through [email or web address] or call your pharmacy and they will forward the refill request to us. Please allow 3 business days for your refill to be completed.          Additional Information About Your Visit        [email or web address] Information     [email or web address] gives you secure access to your electronic health record. If you see a primary care provider, you can also send messages to your care team and make appointments. If you have questions, please call your primary care clinic.  If you do not have a primary care provider, please call [phone] and they will assist you.        Care EveryWhere ID     This is your Care EveryWhere ID. This could be used by other organizations to access your [facility] medical records  [id number]        Your Vitals Were     Pulse Temperature Respirations Height Pulse Oximetry BMI (Body Mass Index)    86 98.2  F (36.8  C) (Oral) 18 1.727 m (5' 7.99\") 100% 22.06 kg/m2       Blood Pressure from Last 3 Encounters:   07/16/18 110/69   03/19/18 110/69   03/12/18 107/62    Weight from Last 3 Encounters:   07/16/18 65.8 kg (145 lb 1 oz)   03/19/18 65.8 kg (145 lb)   03/12/18 65.8 kg (145 lb)               Primary Care Provider Office Phone # Fax #    Lencho Chan [phone]       [address] [facility] [address]        Equal Access to Services     [facility]: Hadii aad ku hadasho Soomaali, waaxda luqadaha, qaybta kaalmada adeciarayada, alejandro brown . So [facility] [phone].    ATENCIÓN: Si habla español, tiene a zayas disposición servicios gratuitos de asistencia lingüística. Isaias al [phone].    We comply with applicable federal civil rights laws and [place] laws. We do not discriminate on the basis of race, color, national origin, age, disability, sex, sexual orientation, or gender identity.            Thank you!     Thank you for choosing [facility]  for your care. Our goal is always to provide " you with excellent care. Hearing back from our patients is one way we can continue to improve our services. Please take a few minutes to complete the written survey that you may receive in the mail after your visit with us. Thank you!             Your Updated Medication List - Protect others around you: Learn how to safely use, store and throw away your medicines at www.disposemymeds.org.          This list is accurate as of 7/16/18 10:23 AM.  Always use your most recent med list.                   Brand Name Dispense Instructions for use Diagnosis    cyanocobalamin 2500 MCG sublingual tablet    VITAMIN B-12    30 tablet    Place 2,500 mcg under the tongue daily    B12 deficiency       Multi-vitamin Tabs tablet      Take 1 tablet by mouth daily Generic [place] Vitamin    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

## 2018-07-16 NOTE — NURSING NOTE
"Oncology Rooming Note    July 16, 2018 9:49 AM   Daniel Sandhu is a 38 year old male who presents for:    Chief Complaint   Patient presents with     Oncology Clinic Visit     Return visit related to Esophageal Cancer     Initial Vitals: /69 (BP Location: Right arm, Patient Position: Sitting, Cuff Size: Adult Regular)  Pulse 86  Temp 98.2  F (36.8  C) (Oral)  Resp 18  Ht 1.727 m (5' 7.99\")  Wt 65.8 kg (145 lb 1 oz)  SpO2 100%  BMI 22.06 kg/m2 Estimated body mass index is 22.06 kg/(m^2) as calculated from the following:    Height as of this encounter: 1.727 m (5' 7.99\").    Weight as of this encounter: 65.8 kg (145 lb 1 oz). Body surface area is 1.78 meters squared.  No Pain (0) Comment: Data Unavailable   No LMP for male patient.  Allergies reviewed: Yes  Medications reviewed: Yes    Medications: Medication refills not needed today.  Pharmacy name entered into [facility]:    [facility] - [place], [place] - [address] [facility] - [place], [place] - [address]    Clinical concerns: No new concerns. Provider was notified.    10 minutes for nursing intake (face to face time)     Xochitl Tovar LPN            "

## 2018-10-12 ENCOUNTER — ALLIED HEALTH/NURSE VISIT (OUTPATIENT)
Dept: NURSING | Facility: CLINIC | Age: 38
End: 2018-10-12
Payer: COMMERCIAL

## 2018-10-12 DIAGNOSIS — Z23 NEED FOR PROPHYLACTIC VACCINATION AND INOCULATION AGAINST INFLUENZA: Primary | ICD-10-CM

## 2018-10-12 PROCEDURE — 99207 ZZC NO CHARGE NURSE ONLY: CPT

## 2018-10-12 PROCEDURE — 90471 IMMUNIZATION ADMIN: CPT

## 2018-10-12 PROCEDURE — 90686 IIV4 VACC NO PRSV 0.5 ML IM: CPT

## 2018-10-12 NOTE — PROGRESS NOTES
Injectable Influenza Immunization Documentation    1.  Is the person to be vaccinated sick today?   No    2. Does the person to be vaccinated have an allergy to a component   of the vaccine?   No  Egg Allergy Algorithm Link    3. Has the person to be vaccinated ever had a serious reaction   to influenza vaccine in the past?   No    4. Has the person to be vaccinated ever had Guillain-Barré syndrome?   No    Due to injection administration, patient instructed to remain in clinic for 15 minutes  afterwards, and to report any adverse reaction to me immediately.    Vaccine information supplied.     Form completed by [employer] MA

## 2018-10-12 NOTE — MR AVS SNAPSHOT
After Visit Summary   10/12/2018    Daniel Sandhu    MRN: [id number]           Patient Information     Date Of Birth          1980        Visit Information        Provider Department      10/12/2018 2:00 PM CP ANCILLARY [facility]        Today's Diagnoses     Need for prophylactic vaccination and inoculation against influenza    -  1       Follow-ups after your visit        Your next 10 appointments already scheduled     Oct 24, 2018  8:40 AM CDT   SHORT with Lencho Chan MD   [facility] ([facility])    [address] [facility] [id number] [phone]            Nov 16, 2018 10:15 AM CST   LAB with  LAB   [facility] ([facility])    [address] [place] [id number] [phone]           Please do not eat 10-12 hours before your appointment if you are coming in fasting for labs on lipids, cholesterol, or glucose (sugar). This does not apply to pregnant women. Water, hot tea and black coffee (with nothing added) are okay. Do not drink other fluids, diet soda or chew gum.            Nov 16, 2018 11:00 AM CST   CT CHEST/ABDOMEN/PELVIS W CONTRAST with UCCT2   [facility] CT ([facility])    [address] [place] [id number] [phone]           How do I prepare for my exam? (Food and drink instructions) To prepare: Do not eat or drink for 2 hours before your exam. If you need to take medicine, you may take it with small sips of water. (We may ask you to take liquid medicine as well.)  How do I prepare for my exam? (Other instructions) Please arrive 30 minutes early for your CT.  Once in the department you might be asked to drink water 15-20 minutes prior to your exam.  If indicated you may be asked to drink an oral contrast in advance of your CT.  If this is the case, the imaging  team will let you know or be in contact with you prior to your appointment  Patients over 70 or patients with diabetes or kidney problems: If you haven t had a blood test (creatinine test) within the last 30 days, the Cardiologist/Radiologist may require you to get this test prior to your exam.  If you have diabetes:  Continue to take your metformin medication on the day of your exam  What should I wear: Please wear loose clothing, such as a sweat suit or jogging clothes. Avoid snaps, zippers and other metal. We may ask you to undress and put on a hospital gown.  How long does the exam take: Most scans take less than 20 minutes.  What should I bring: Please bring any scans or X-rays taken at other hospitals, if similar tests were done. Also bring a list of your medicines, including vitamins, minerals and over-the-counter drugs. It is safest to leave personal items at home.  Do I need a : No  is needed.  What do I need to tell my doctor? Be sure to tell your doctor: * If you have any allergies. * If there s any chance you are pregnant. * If you are breastfeeding.  What should I do after the exam: No restrictions, You may resume normal activities.  What is this test: A CT (computed tomography) scan is a series of pictures that allows us to look inside your body. The scanner creates images of the body in cross sections, much like slices of bread. This helps us see any problems more clearly. You may receive contrast (X-ray dye) before or during your scan. You will be asked to drink the contrast.  Who should I call with questions: If you have any questions, please call the Imaging Department where you will have your exam. Directions, parking instructions, and other information is available on our website, [email or web address].            Nov 19, 2018  9:15 AM CST   (Arrive by 9:00 AM)   Return Visit with Laurence Hood MD   [facility] ([facility])    [address]  [address]  [facility] [address] [phone]              Who to contact     If you have questions or need follow up information about today's clinic visit or your schedule please contact [facility] directly at [phone].  Normal or non-critical lab and imaging results will be communicated to you by MyChart, letter or phone within 4 business days after the clinic has received the results. If you do not hear from us within 7 days, please contact the clinic through MyChart or phone. If you have a critical or abnormal lab result, we will notify you by phone as soon as possible.  Submit refill requests through [employer] or call your pharmacy and they will forward the refill request to us. Please allow 3 business days for your refill to be completed.          Additional Information About Your Visit        MyChart Information     [employer] gives you secure access to your electronic health record. If you see a primary care provider, you can also send messages to your care team and make appointments. If you have questions, please call your primary care clinic.  If you do not have a primary care provider, please call [phone] and they will assist you.        Care EveryWhere ID     This is your Care EveryWhere ID. This could be used by other organizations to access your [place] medical records  [id number]         Blood Pressure from Last 3 Encounters:   07/16/18 110/69   03/19/18 110/69   03/12/18 107/62    Weight from Last 3 Encounters:   07/16/18 145 lb 1 oz (65.8 kg)   03/19/18 145 lb (65.8 kg)   03/12/18 145 lb (65.8 kg)              We Performed the Following     FLU VACCINE, SPLIT VIRUS, IM (QUADRIVALENT) [55499]- >3 YRS     Vaccine Administration, Initial [45243]        Primary Care Provider Office Phone # Fax #    Lencho Chan [phone]       [address] [facility] [address]        Equal Access to Services     SHAWN SWAIN AH:  Hadii garo chand Solanreali, waaxda luqadaha, qaybta kaalmada ross, alejandro florencioin hayaarobina pembertonciara meraz lacoryrobina chandler. So [facility] [phone].    ATENCIÓN: Si habla ashley, tiene a zayas disposición servicios gratuitos de asistencia lingüística. Llame al [phone].    We comply with applicable federal civil rights laws and [place] laws. We do not discriminate on the basis of race, color, national origin, age, disability, sex, sexual orientation, or gender identity.            Thank you!     Thank you for choosing [facility]  for your care. Our goal is always to provide you with excellent care. Hearing back from our patients is one way we can continue to improve our services. Please take a few minutes to complete the written survey that you may receive in the mail after your visit with us. Thank you!             Your Updated Medication List - Protect others around you: Learn how to safely use, store and throw away your medicines at www.disposemymeds.org.          This list is accurate as of 10/12/18  4:04 PM.  Always use your most recent med list.                   Brand Name Dispense Instructions for use Diagnosis    cyanocobalamin 2500 MCG sublingual tablet    VITAMIN B-12    30 tablet    Place 2,500 mcg under the tongue daily    B12 deficiency       Multi-vitamin Tabs tablet      Take 1 tablet by mouth daily Generic [place] Vitamin    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

## 2018-10-24 ENCOUNTER — OFFICE VISIT (OUTPATIENT)
Dept: FAMILY MEDICINE | Facility: CLINIC | Age: 38
End: 2018-10-24
Payer: COMMERCIAL

## 2018-10-24 VITALS
DIASTOLIC BLOOD PRESSURE: 58 MMHG | BODY MASS INDEX: 21.92 KG/M2 | WEIGHT: 144.13 LBS | TEMPERATURE: 97.4 F | SYSTOLIC BLOOD PRESSURE: 96 MMHG | HEART RATE: 69 BPM

## 2018-10-24 DIAGNOSIS — Z90.3 S/P GASTRECTOMY: ICD-10-CM

## 2018-10-24 DIAGNOSIS — Z85.01 HISTORY OF ESOPHAGEAL CANCER: ICD-10-CM

## 2018-10-24 DIAGNOSIS — J34.89 IRRITATION OF NOSE: Primary | ICD-10-CM

## 2018-10-24 PROCEDURE — 99213 OFFICE O/P EST LOW 20 MIN: CPT | Performed by: FAMILY MEDICINE

## 2018-10-24 ASSESSMENT — PAIN SCALES - GENERAL: PAINLEVEL: NO PAIN (0)

## 2018-10-24 NOTE — PROGRESS NOTES
SUBJECTIVE:   Daniel Sandhu is a 38 year old male who presents to clinic today for the following health issues:       Issues with the inside of his nasal cavity    none    Problem list and histories reviewed & adjusted, as indicated.  Additional history: as documented         Reviewed and updated as needed this visit by clinical staff       Reviewed and updated as needed this visit by Provider          last Nov was surgery for the cancer    Not hydrating well enough    Inside of nose painful and crusty and sore    Feels like skin inside is thin     No overt nosebleeds    Some crust nasal disch with blood    Feel dry in there but also runny    Not much history of allergies    Can move air okay, not blocked    Had chemo before surgery and some after but none since January  Mucositis from that     Diarrhea resolved    Has the meralgia parasthetica    Swallowing okay    Liquids more of a challenge     gatorade or milk better than water    Normal adult wt mid 150s    Now stable 140s    Portion size is increasing    bready foods are uncomfortable    No reflux        Physical Exam   Constitutional: He is oriented to person, place, and time and well-developed, well-nourished, and in no distress. No distress.   HENT:   Head: Normocephalic and atraumatic.   Eyes: Conjunctivae are normal.   Neck: Carotid bruit is not present.   Cardiovascular: Normal rate, regular rhythm, normal heart sounds and intact distal pulses.  Exam reveals no gallop and no friction rub.    No murmur heard.  Pulmonary/Chest: Effort normal and breath sounds normal. No respiratory distress. He has no wheezes. He has no rales.   Abdominal: Soft. There is tenderness (mild subj discomfort on palpation.  pt states this is the new normal for him.  scars present.).   Musculoskeletal: He exhibits no edema.   Neurological: He is alert and oriented to person, place, and time.   Skin: He is not diaphoretic.   Psychiatric: Mood and affect normal.     Nasal mucosa  appear normal   Skin over nose appears normal    No sinus/ submandib tenderness    ASSESSMENT / PLAN:  (J34.89) Irritation of nose  (primary encounter diagnosis)  Comment: exam here is reassuring.  Plan: OTOLARYNGOLOGY REFERRAL        Patient to start regular usage of nasal saline and prn vaseline.  If not improving symptoms, then see ent    (Z85.01) History of esophageal cancer  Comment: Overall patient appear stable s/p surgery and chemo.    Plan: Vitamin B12        Patient wanted b12 checked with next blood draw.  Added this as future lab    (Z90.3) S/P gastrectomy  Comment: as above   Plan: discussed maintaining good liquid and food intake       I reviewed the patient's medications, allergies, medical history, family history, and social history.    Lencho Chan MD

## 2018-10-24 NOTE — PATIENT INSTRUCTIONS
Use nasal saline spray on regular basis, several times daily    Also use vaseline type ointment as needed to dry irritated areas    If symptoms not improving then see ENT

## 2018-10-24 NOTE — MR AVS SNAPSHOT
After Visit Summary   10/24/2018    Daniel Sandhu    MRN: [id number]           Patient Information     Date Of Birth          1980        Visit Information        Provider Department      10/24/2018 8:40 AM Lencho Chan MD [facility]        Today's Diagnoses     Irritation of nose    -  1    History of esophageal cancer          Care Instructions    Use nasal saline spray on regular basis, several times daily    Also use vaseline type ointment as needed to dry irritated areas    If symptoms not improving then see ENT                  Follow-ups after your visit        Additional Services     OTOLARYNGOLOGY REFERRAL       Your provider has referred you to: [facility]: [facility] [phone]   [email or web address]    Please be aware that coverage of these services is subject to the terms and limitations of your health insurance plan.  Call member services at your health plan with any benefit or coverage questions.      Please bring the following with you to your appointment:    (1) Any X-Rays, CTs or MRIs which have been performed.  Contact the facility where they were done to arrange for  prior to your scheduled appointment.   (2) List of current medications  (3) This referral request   (4) Any documents/labs given to you for this referral                  Your next 10 appointments already scheduled     Nov 16, 2018 10:15 AM CST   LAB with [facility] ([facility])    [address] [place] [address] [phone]           Please do not eat 10-12 hours before your appointment if you are coming in fasting for labs on lipids, cholesterol, or glucose (sugar). This does not apply to pregnant women. Water, hot tea and black coffee (with nothing added) are okay. Do not drink other fluids, diet soda or chew gum.            Nov 16, 2018 11:00 AM CST   CT  CHEST/ABDOMEN/PELVIS W CONTRAST with UCCT2   [facility] CT ([facility])    [address]  [address]  [facility] [address] [phone]           How do I prepare for my exam? (Food and drink instructions) To prepare: Do not eat or drink for 2 hours before your exam. If you need to take medicine, you may take it with small sips of water. (We may ask you to take liquid medicine as well.)  How do I prepare for my exam? (Other instructions) Please arrive 30 minutes early for your CT.  Once in the department you might be asked to drink water 15-20 minutes prior to your exam.  If indicated you may be asked to drink an oral contrast in advance of your CT.  If this is the case, the imaging team will let you know or be in contact with you prior to your appointment  Patients over 70 or patients with diabetes or kidney problems: If you haven t had a blood test (creatinine test) within the last 30 days, the Cardiologist/Radiologist may require you to get this test prior to your exam.  If you have diabetes:  Continue to take your metformin medication on the day of your exam  What should I wear: Please wear loose clothing, such as a sweat suit or jogging clothes. Avoid snaps, zippers and other metal. We may ask you to undress and put on a hospital gown.  How long does the exam take: Most scans take less than 20 minutes.  What should I bring: Please bring any scans or X-rays taken at other hospitals, if similar tests were done. Also bring a list of your medicines, including vitamins, minerals and over-the-counter drugs. It is safest to leave personal items at home.  Do I need a : No  is needed.  What do I need to tell my doctor? Be sure to tell your doctor: * If you have any allergies. * If there s any chance you are pregnant. * If you are breastfeeding.  What should I do after the exam: No restrictions, You may resume normal activities.  What is this test: A CT  (computed tomography) scan is a series of pictures that allows us to look inside your body. The scanner creates images of the body in cross sections, much like slices of bread. This helps us see any problems more clearly. You may receive contrast (X-ray dye) before or during your scan. You will be asked to drink the contrast.  Who should I call with questions: If you have any questions, please call the Imaging Department where you will have your exam. Directions, parking instructions, and other information is available on our website, [email or web address].            Nov 19, 2018  9:15 AM CST   (Arrive by 9:00 AM)   Return Visit with Laurence Hood MD   [facility] ([facility])    [address]  [address]  [facility] [address] [phone]              Who to contact     If you have questions or need follow up information about today's clinic visit or your schedule please contact [facility] directly at [phone].  Normal or non-critical lab and imaging results will be communicated to you by MyChart, letter or phone within 4 business days after the clinic has received the results. If you do not hear from us within 7 days, please contact the clinic through MyChart or phone. If you have a critical or abnormal lab result, we will notify you by phone as soon as possible.  Submit refill requests through [employer] or call your pharmacy and they will forward the refill request to us. Please allow 3 business days for your refill to be completed.          Additional Information About Your Visit        [id number] Information     [employer] gives you secure access to your electronic health record. If you see a primary care provider, you can also send messages to your care team and make appointments. If you have questions, please call your primary care clinic.  If you do not have a primary care provider, please call [phone] and they will  assist you.        Care EveryWhere ID     This is your Care EveryWhere ID. This could be used by other organizations to access your [place] medical records  [id number]        Your Vitals Were     Pulse Temperature BMI (Body Mass Index)             69 97.4  F (36.3  C) (Oral) 21.92 kg/m2          Blood Pressure from Last 3 Encounters:   10/24/18 96/58   07/16/18 110/69   03/19/18 110/69    Weight from Last 3 Encounters:   10/24/18 144 lb 2 oz (65.4 kg)   07/16/18 145 lb 1 oz (65.8 kg)   03/19/18 145 lb (65.8 kg)              We Performed the Following     OTOLARYNGOLOGY REFERRAL        Primary Care Provider Office Phone # Fax #    Lencho Chan [phone]       [address] [address] [facility] [address]        Equal Access to Services     [facility]: Hadii aad ku hadasho Soomaali, waaxda luqadaha, qaybta kaalmada adeegyada, waxay florencioin hayaan adeciara brown . So [facility] [phone].    ATENCIÓN: Si habla español, tiene a zayas disposición servicios gratuitos de asistencia lingüística. Llame al [phone].    We comply with applicable federal civil rights laws and [place] laws. We do not discriminate on the basis of race, color, national origin, age, disability, sex, sexual orientation, or gender identity.            Thank you!     Thank you for choosing [facility]  for your care. Our goal is always to provide you with excellent care. Hearing back from our patients is one way we can continue to improve our services. Please take a few minutes to complete the written survey that you may receive in the mail after your visit with us. Thank you!             Your Updated Medication List - Protect others around you: Learn how to safely use, store and throw away your medicines at www.disposemymeds.org.          This list is accurate as of 10/24/18  9:28 AM.  Always use your most recent med list.                   Brand Name Dispense Instructions for use Diagnosis     cyanocobalamin 2500 MCG sublingual tablet    VITAMIN B-12    30 tablet    Place 2,500 mcg under the tongue daily    B12 deficiency       Multi-vitamin Tabs tablet      Take 1 tablet by mouth daily Generic [place] Vitamin    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

## 2018-11-16 ENCOUNTER — RADIANT APPOINTMENT (OUTPATIENT)
Dept: CT IMAGING | Facility: CLINIC | Age: 38
End: 2018-11-16
Attending: INTERNAL MEDICINE
Payer: COMMERCIAL

## 2018-11-16 DIAGNOSIS — C15.5 MALIGNANT NEOPLASM OF LOWER THIRD OF ESOPHAGUS (H): ICD-10-CM

## 2018-11-16 DIAGNOSIS — Z85.01 HISTORY OF ESOPHAGEAL CANCER: ICD-10-CM

## 2018-11-16 LAB
ALBUMIN SERPL-MCNC: 3.2 G/DL (ref 3.4–5)
ALP SERPL-CCNC: 62 U/L (ref 40–150)
ALT SERPL W P-5'-P-CCNC: 24 U/L (ref 0–70)
ANION GAP SERPL CALCULATED.3IONS-SCNC: 6 MMOL/L (ref 3–14)
AST SERPL W P-5'-P-CCNC: 33 U/L (ref 0–45)
BASOPHILS # BLD AUTO: 0 10E9/L (ref 0–0.2)
BASOPHILS NFR BLD AUTO: 0.4 %
BILIRUB SERPL-MCNC: 0.5 MG/DL (ref 0.2–1.3)
BUN SERPL-MCNC: 21 MG/DL (ref 7–30)
CALCIUM SERPL-MCNC: 8.2 MG/DL (ref 8.5–10.1)
CHLORIDE SERPL-SCNC: 104 MMOL/L (ref 94–109)
CO2 SERPL-SCNC: 27 MMOL/L (ref 20–32)
CREAT SERPL-MCNC: 0.75 MG/DL (ref 0.66–1.25)
DIFFERENTIAL METHOD BLD: ABNORMAL
EOSINOPHIL # BLD AUTO: 0.1 10E9/L (ref 0–0.7)
EOSINOPHIL NFR BLD AUTO: 1.9 %
ERYTHROCYTE [DISTWIDTH] IN BLOOD BY AUTOMATED COUNT: 13.1 % (ref 10–15)
FERRITIN SERPL-MCNC: 13 NG/ML (ref 26–388)
GFR SERPL CREATININE-BSD FRML MDRD: >90 ML/MIN/1.7M2
GLUCOSE SERPL-MCNC: 87 MG/DL (ref 70–99)
HCT VFR BLD AUTO: 34.1 % (ref 40–53)
HGB BLD-MCNC: 11.3 G/DL (ref 13.3–17.7)
IMM GRANULOCYTES # BLD: 0 10E9/L (ref 0–0.4)
IMM GRANULOCYTES NFR BLD: 0.2 %
IRON SATN MFR SERPL: 25 % (ref 15–46)
IRON SERPL-MCNC: 92 UG/DL (ref 35–180)
LYMPHOCYTES # BLD AUTO: 1.2 10E9/L (ref 0.8–5.3)
LYMPHOCYTES NFR BLD AUTO: 23.6 %
MCH RBC QN AUTO: 28.9 PG (ref 26.5–33)
MCHC RBC AUTO-ENTMCNC: 33.1 G/DL (ref 31.5–36.5)
MCV RBC AUTO: 87 FL (ref 78–100)
MONOCYTES # BLD AUTO: 0.5 10E9/L (ref 0–1.3)
MONOCYTES NFR BLD AUTO: 8.9 %
NEUTROPHILS # BLD AUTO: 3.4 10E9/L (ref 1.6–8.3)
NEUTROPHILS NFR BLD AUTO: 65 %
NRBC # BLD AUTO: 0 10*3/UL
NRBC BLD AUTO-RTO: 0 /100
PLATELET # BLD AUTO: 160 10E9/L (ref 150–450)
POTASSIUM SERPL-SCNC: 4.7 MMOL/L (ref 3.4–5.3)
PROT SERPL-MCNC: 6.4 G/DL (ref 6.8–8.8)
RBC # BLD AUTO: 3.91 10E12/L (ref 4.4–5.9)
SODIUM SERPL-SCNC: 137 MMOL/L (ref 133–144)
TIBC SERPL-MCNC: 363 UG/DL (ref 240–430)
VIT B12 SERPL-MCNC: 710 PG/ML (ref 193–986)
WBC # BLD AUTO: 5.3 10E9/L (ref 4–11)

## 2018-11-16 RX ORDER — IOPAMIDOL 755 MG/ML
88 INJECTION, SOLUTION INTRAVASCULAR ONCE
Status: COMPLETED | OUTPATIENT
Start: 2018-11-16 | End: 2018-11-16

## 2018-11-16 RX ADMIN — IOPAMIDOL 88 ML: 755 INJECTION, SOLUTION INTRAVASCULAR at 07:19

## 2018-11-16 NOTE — DISCHARGE INSTRUCTIONS

## 2018-11-17 NOTE — PROGRESS NOTES
Your B12 level is fine.  I believe you are taking a B12 med.  Stay on same dose.    Lencho Chan MD

## 2018-11-19 ENCOUNTER — ONCOLOGY VISIT (OUTPATIENT)
Dept: ONCOLOGY | Facility: CLINIC | Age: 38
End: 2018-11-19
Attending: INTERNAL MEDICINE
Payer: COMMERCIAL

## 2018-11-19 VITALS
OXYGEN SATURATION: 100 % | DIASTOLIC BLOOD PRESSURE: 81 MMHG | HEART RATE: 88 BPM | HEIGHT: 67 IN | BODY MASS INDEX: 22.66 KG/M2 | TEMPERATURE: 98.5 F | RESPIRATION RATE: 16 BRPM | WEIGHT: 144.4 LBS | SYSTOLIC BLOOD PRESSURE: 116 MMHG

## 2018-11-19 DIAGNOSIS — C15.5 MALIGNANT NEOPLASM OF LOWER THIRD OF ESOPHAGUS (H): Primary | ICD-10-CM

## 2018-11-19 PROCEDURE — G0463 HOSPITAL OUTPT CLINIC VISIT: HCPCS | Mod: ZF

## 2018-11-19 PROCEDURE — 99214 OFFICE O/P EST MOD 30 MIN: CPT | Mod: ZP | Performed by: INTERNAL MEDICINE

## 2018-11-19 ASSESSMENT — PAIN SCALES - GENERAL: PAINLEVEL: NO PAIN (0)

## 2018-11-19 NOTE — LETTER
11/19/2018       RE: Daniel Sandhu  [address] [facility] [address]     Dear Colleague,    Thank you for referring your patient, Daniel Sandhu, to the [facility]. Please see a copy of my visit note below.    [facility]    Hematology/Oncology Established Patient Note      Today's Date: 11/19/18    Reason for Follow-up: adenocarcinoma of the GE junction      HISTORY OF PRESENT ILLNESS: Daniel Sandhu is a 38 year old male who presents with adenocarcinoma of the GE junction.  In early 2017, patient started noticing difficulty swallowing, and that food seems to take longer to go down.  It became more frequent, and he saw his PCP, and was referred for EGD, which showed an esophageal mass located at the GE junction, measuring 4 cm.  Biopsy showed poorly differentiated adenocarcinoma.  HER-2 was sent and is equivocal (2+).  CT c/a/p showed a mildly prominent lymph node in the gastrohepatic ligament, but there were no pathologically enlarged lymph nodes in the chest, abdomen, or pelvis.  There was otherwise no evidence of metastatic disease.  PET-CT showed thickening of the distal esophagus consistent with known esophageal adenocarcinoma, as well as mildly hypermetabolic 1 cm lymph node in the gastrohepatic ligament.  There was no evidence of distant metastatic disease.  He underwent EUS on 6/9/17, which showed the esophageal tumor in the lower third of the esophagus, staged T2NX.  There were 2 abnormal lymph nodes seen in the gastrohepatic ligament; pathology was suspicious for adenocarcinoma.  Celiac node was sampled as well, which was benign.  He was seen by surgery, Dr. Esteban, and recommended srinivas-operative chemotherapy.    Port was placed on 6/22/17.  It was removed on 3/19/18.    He underwent chemotherapy with epirubicin, oxaliplatin, and capecitabine x 3 cycles 6/26/17-8/7/17.      On 11/3/17, he underwent laparotomy with total gastrectomy and  abdominal lymphadenectomy, left thoracotomy with distal esophagectomy and intrathoracic Terrell-en-Y esophagojejunostomy, feeding jejunostomy, left pharyngostomy tube placement, right tube thoracostomy, and flexible bronchoscopy.  On 11/9/17, he was taken back to OR for I&D of pharyngostomy tube abscess.  Pathology showed adenocarcinoma, moderate differentiated, extensive residual tumor with no evidence tumor regression, margins negative, perineural invasion present, 1 of 28 lymph nodes were involved with malignancy, stage [id number] (stage IIIA).  HER-2 is non-amplified.    He resumed chemotherapy post-surgery, with plans to complete 3 more cycles, with 5-FU, epirubicin, oxaliplatin.  However, he only completed 2 more cycles, due to poor tolerance.  He was admitted to the hospital 1/9/18-1/13/18 for nausea, diarrhea, failure to thrive, severe malnutrition, generalized weakness.  His infusional chemotherapy was stopped on 1/8/18.  He underwent EGD in the hospital on 1/11/18, which showed ulcers, and no evidence of recurrence of his cancer.  One area biopsied showed inflammation, no evidence of malignancy.      INTERIM HISTORY: Daniel comes in for follow-up today. He says that he has been doing well.  He brought his 16 month-old son with him today.  His appetite is getting better and putting on some weight, although he'd like to put on more.  He is working full time and staying very busy.        REVIEW OF SYSTEMS:   14 point ROS was reviewed and is negative other than as noted above in HPI.       HOME MEDICATIONS:  Current Outpatient Prescriptions   Medication Sig Dispense Refill     cyabnocobalamin (VITAMIN B-12) 2500 MCG sublingual tablet Place 2,500 mcg under the tongue daily 30 tablet 1     multivitamin, therapeutic with minerals (MULTI-VITAMIN) TABS tablet Take 1 tablet by mouth daily Generic [place] Vitamin           ALLERGIES:  No Known Allergies      PAST MEDICAL HISTORY:  Past Medical History:   Diagnosis Date      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 6/5/2017         PAST SURGICAL HISTORY:  Past Surgical History:   Procedure Laterality Date     ESOPHAGOGASTRODUODENOSCOPY       ESOPHAGOSCOPY, GASTROSCOPY, DUODENOSCOPY (EGD), COMBINED N/A 6/9/2017    Procedure: COMBINED ENDOSCOPIC ULTRASOUND, ESOPHAGOSCOPY, GASTROSCOPY, DUODENOSCOPY (EGD), FINE NEEDLE ASPIRATE/BIOPSY;  Upper Endoscopic Ultrasound, fine needle aspirate/biopsy;  Surgeon: Guru Mark Avila MD;  [facility]     ESOPHAGOSCOPY, GASTROSCOPY, DUODENOSCOPY (EGD), COMBINED N/A 11/3/2017    Procedure: COMBINED ESOPHAGOSCOPY, GASTROSCOPY, DUODENOSCOPY (EGD);;  Surgeon: Yunior Esteban MD;  [facility] OR     ESOPHAGOSCOPY, GASTROSCOPY, DUODENOSCOPY (EGD), COMBINED N/A 11/9/2017    Procedure: COMBINED ESOPHAGOSCOPY, GASTROSCOPY, DUODENOSCOPY (EGD);  Esophogastroduodenoscopy, take out pharangostomy tube;  Surgeon: Yunior Esteban MD;  [facility]     ESOPHAGOSCOPY, GASTROSCOPY, DUODENOSCOPY (EGD), COMBINED N/A 1/11/2018    Procedure: COMBINED ESOPHAGOSCOPY, GASTROSCOPY, DUODENOSCOPY (EGD), BIOPSY SINGLE OR MULTIPLE;  COMBINED ESOPHAGOSCOPY, GASTROSCOPY, DUODENOSCOPY (EGD);  Surgeon: Alessandro Mcgregor MD;  [facility]     GASTRECTOMY N/A 11/3/2017    Procedure: GASTRECTOMY;;  Surgeon: Yunior Esteban MD;  [facility]     HAND SURGERY      childhood, torn tendon     INSERT PORT VASCULAR ACCESS Right 6/22/2017    Procedure: INSERT PORT VASCULAR ACCESS;  Single Lumen Chest Power Port;  Surgeon: Iván Driver PA-C;  [facility]     LAPAROSCOPY DIAGNOSTIC (GENERAL) N/A 11/3/2017    Procedure: LAPAROSCOPY DIAGNOSTIC (GENERAL);  diagnostic laparoscopy, right chest tube, total gastrectomy with distal esophagectomy, intrathoracic eveline-y esophago-jejunostomy, feeding jejunostomy, pharyngostomy, esophagogastroduodenoscopy, flexible bronchoscopy;  Surgeon: Yunior Esteban MD;   [facility]     LAPAROTOMY EXPLORATORY N/A 11/3/2017    Procedure: LAPAROTOMY EXPLORATORY;;  Surgeon: Yunior Esteban MD;  [facility]     PHARYNGOSTOMY N/A 11/3/2017    Procedure: PHARYNGOSTOMY;;  Surgeon: Yunior Esteban MD;  [facility]     REMOVE PORT VASCULAR ACCESS Right 3/19/2018    Procedure: REMOVE PORT VASCULAR ACCESS;  Right Port Removal;  Surgeon: Kyrsten Hopper PA-C;  [facility]     THORACOTOMY Left 11/3/2017    Procedure: THORACOTOMY;;  Surgeon: Yunior Esteban MD;  [facility]         SOCIAL HISTORY:  Social History     Social History     Marital status:      Spouse name: N/A     Number of children: 1     Years of education: N/A     Occupational History     musician and teacher       Social History Main Topics     Smoking status: Never Smoker     Smokeless tobacco: Never Used     Alcohol use Yes      Comment: 1 beer daily     Drug use: Yes     Special: Marijuana      Comment: occasional     Sexual activity: Yes     Partners: Female     Birth control/ protection: Natural Family Planning     Other Topics Concern     Not on file     Social History Narrative     He works at [employer] in [place], 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  He denies smoking.  He drinks ~1 beer a day.  He denies illicit drug use, other than occasional marijuana.  He lives in [place] with his wife, and 4.5 year-old daughter.  His wife is currently pregnant with a boy, and is due in 6 weeks.  He has a half sister who  of breast cancer at age 32; she was diagnosed in her late 20's.  A paternal grandmother had breast cancer in her 70's      FAMILY HISTORY:  Family History   Problem Relation Age of Onset     Other - See Comments Father      sepsis     Cancer Sister      breast cancer  29 yo 1/2 sister      Cancer Paternal Grandmother      breast         PHYSICAL EXAM:  Vital signs:  /81  Pulse 88  Temp 98.5  F (36.9  C) (Oral)  Resp 16  " Ht 1.702 m (5' 7\")  Wt 65.5 kg (144 lb 6.4 oz)  SpO2 100%  BMI 22.62 kg/m2   ECO  GENERAL/CONSTITUTIONAL: No acute distress.   Accompanied by son.  EYES: No scleral icterus.  RESPIRATORY: Clear to auscultation bilaterally.  CARDIOVASCULAR: Regular rate and rhythm.  GASTROINTESTINAL: Bowel sounds present.  Non-tender.  No distention.    MUSCULOSKELETAL: Warm and well-perfused, no cyanosis, clubbing, or edema.  NEUROLOGIC: Alert, oriented, answers questions appropriately.  INTEGUMENTARY: No jaundice.  Port has been removed.      LABS:  CBC RESULTS:   Recent Labs   Lab Test  18   0725   WBC  5.3   RBC  3.91*   HGB  11.3*   HCT  34.1*   MCV  87   MCH  28.9   MCHC  33.1   RDW  13.1   PLT  160     Recent Labs   Lab Test  18   0725  18   1309   NA  137  138   POTASSIUM  4.7  3.9   CHLORIDE  104  106   CO2  27  27   ANIONGAP  6  5   GLC  87  108*   BUN  21  14   CR  0.75  0.69   YOBANY  8.2*  8.7     Lab Results   Component Value Date    AST 33 2018     Lab Results   Component Value Date    ALT 24 2018     No results found for: BILICONJ   Lab Results   Component Value Date    BILITOTAL 0.5 2018     Lab Results   Component Value Date    ALBUMIN 3.2 2018     Lab Results   Component Value Date    PROTTOTAL 6.4 2018      Lab Results   Component Value Date    ALKPHOS 62 2018     Component      Latest Ref Rng & Units 2018   Iron      35 - 180 ug/dL 92   Iron Binding Cap      240 - 430 ug/dL 363   Iron Saturation Index      15 - 46 % 25   Ferritin      26 - 388 ng/mL 13 (L)   Vitamin B12      193 - 986 pg/mL 710           IMAGING:  CT c/a/p 18:  1. Postoperative changes of distal esophagectomy/gastrostomy and  Terrell-en-Y esophagojejunostomy. No convincing evidence for  residual/recurrent or metastatic disease in the chest, abdomen, or  pelvis.  2. Left upper quadrant small bowel small bowel intussusception, likely  transient.         ASSESSMENT/PLAN:  Daniel MONTIEL" Edson is a 38 year old male with:    1) Adenocarcinoma of the GE junction: now s/p 3 cycles of neoadjuvant chemotherapy with EOX, followed by surgical resection on 11/3/17.  Pathology showed adenocarcinoma, moderate differentiated, extensive residual tumor with no evidence tumor regression, margins negative, perineural invasion present, 1 of 28 lymph nodes were involved with malignancy, stage [id number] (stage IIIA).  HER-2 is non-amplified.    He has received EOF x 2 cycles after surgery, and he has not tolerated well.  The 5-FU on cycle 5 was discontinued early. Chemotherapy was stopped at that point due to poor tolerance.    CT scans from 11/16/18 reviewed with Daniel.  There are post-operative changes with no evidence for recurrent or metastatic disease in the chest, abdomen, or pelvis.    -surveillance CT scans every 4 months for the first year, and then every 6 months for the next 2 years after that.    -RTC in 6 months with labs and CT scan results    2) Genetics:  -genetic testing was negative    3) Fertility: Daniel and his wife have 2 children, including a baby boy just born around June 2017.  He is not planning for more children in the near future.    4) Iron-deficiency anemia:   -he may have issues with absorption due to his surgery.  If there is no improvement, or if he gets side effects with oral iron, we can try IV iron  -ferritin and hemoglobin remain mildly low.  He has only been taking a children's multivitamin with iron and doesn't always remember to take it.  He is going to take iron supplement now and try to remember to take it daily.  We discussed IV iron, but he prefers to take oral for now.    -repeat labs at next visit    5) Nutrition:  His feeding tube had fallen out on its own.  His diet is improving now.    6) He already had flu shot this season.      I spent a total of 25 minutes with the patient, with over >50% of the time in counseling and/or coordination of care.       Laurence Hood,  MD  Hematology/Oncology  [facility]

## 2018-11-19 NOTE — PROGRESS NOTES
[facility]    Hematology/Oncology Established Patient Note      Today's Date: 11/19/18    Reason for Follow-up: adenocarcinoma of the GE junction      HISTORY OF PRESENT ILLNESS: Daniel Sandhu is a 38 year old male who presents with adenocarcinoma of the GE junction.  In early 2017, patient started noticing difficulty swallowing, and that food seems to take longer to go down.  It became more frequent, and he saw his PCP, and was referred for EGD, which showed an esophageal mass located at the GE junction, measuring 4 cm.  Biopsy showed poorly differentiated adenocarcinoma.  HER-2 was sent and is equivocal (2+).  CT c/a/p showed a mildly prominent lymph node in the gastrohepatic ligament, but there were no pathologically enlarged lymph nodes in the chest, abdomen, or pelvis.  There was otherwise no evidence of metastatic disease.  PET-CT showed thickening of the distal esophagus consistent with known esophageal adenocarcinoma, as well as mildly hypermetabolic 1 cm lymph node in the gastrohepatic ligament.  There was no evidence of distant metastatic disease.  He underwent EUS on 6/9/17, which showed the esophageal tumor in the lower third of the esophagus, staged T2NX.  There were 2 abnormal lymph nodes seen in the gastrohepatic ligament; pathology was suspicious for adenocarcinoma.  Celiac node was sampled as well, which was benign.  He was seen by surgery, Dr. Esteban, and recommended srinivas-operative chemotherapy.    Port was placed on 6/22/17.  It was removed on 3/19/18.    He underwent chemotherapy with epirubicin, oxaliplatin, and capecitabine x 3 cycles 6/26/17-8/7/17.      On 11/3/17, he underwent laparotomy with total gastrectomy and abdominal lymphadenectomy, left thoracotomy with distal esophagectomy and intrathoracic Terrell-en-Y esophagojejunostomy, feeding jejunostomy, left pharyngostomy tube placement, right tube thoracostomy, and flexible bronchoscopy.  On 11/9/17, he was taken  back to OR for I&D of pharyngostomy tube abscess.  Pathology showed adenocarcinoma, moderate differentiated, extensive residual tumor with no evidence tumor regression, margins negative, perineural invasion present, 1 of 28 lymph nodes were involved with malignancy, stage [id number] (stage IIIA).  HER-2 is non-amplified.    He resumed chemotherapy post-surgery, with plans to complete 3 more cycles, with 5-FU, epirubicin, oxaliplatin.  However, he only completed 2 more cycles, due to poor tolerance.  He was admitted to the hospital 1/9/18-1/13/18 for nausea, diarrhea, failure to thrive, severe malnutrition, generalized weakness.  His infusional chemotherapy was stopped on 1/8/18.  He underwent EGD in the hospital on 1/11/18, which showed ulcers, and no evidence of recurrence of his cancer.  One area biopsied showed inflammation, no evidence of malignancy.      INTERIM HISTORY: Daniel comes in for follow-up today. He says that he has been doing well.  He brought his 16 month-old son with him today.  His appetite is getting better and putting on some weight, although he'd like to put on more.  He is working full time and staying very busy.        REVIEW OF SYSTEMS:   14 point ROS was reviewed and is negative other than as noted above in HPI.       HOME MEDICATIONS:  Current Outpatient Prescriptions   Medication Sig Dispense Refill     cyabnocobalamin (VITAMIN B-12) 2500 MCG sublingual tablet Place 2,500 mcg under the tongue daily 30 tablet 1     multivitamin, therapeutic with minerals (MULTI-VITAMIN) TABS tablet Take 1 tablet by mouth daily Generic [place] Vitamin           ALLERGIES:  No Known Allergies      PAST MEDICAL HISTORY:  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 6/5/2017         PAST SURGICAL HISTORY:  Past Surgical History:   Procedure Laterality Date     ESOPHAGOGASTRODUODENOSCOPY       ESOPHAGOSCOPY, GASTROSCOPY, DUODENOSCOPY (EGD), COMBINED N/A 6/9/2017     Procedure: COMBINED ENDOSCOPIC ULTRASOUND, ESOPHAGOSCOPY, GASTROSCOPY, DUODENOSCOPY (EGD), FINE NEEDLE ASPIRATE/BIOPSY;  Upper Endoscopic Ultrasound, fine needle aspirate/biopsy;  Surgeon: Guru Mark Avila MD;  [facility]     ESOPHAGOSCOPY, GASTROSCOPY, DUODENOSCOPY (EGD), COMBINED N/A 11/3/2017    Procedure: COMBINED ESOPHAGOSCOPY, GASTROSCOPY, DUODENOSCOPY (EGD);;  Surgeon: Yunior Esteban MD;  [facility]     ESOPHAGOSCOPY, GASTROSCOPY, DUODENOSCOPY (EGD), COMBINED N/A 11/9/2017    Procedure: COMBINED ESOPHAGOSCOPY, GASTROSCOPY, DUODENOSCOPY (EGD);  Esophogastroduodenoscopy, take out pharangostomy tube;  Surgeon: Yunior Esteban MD;  [facility]     ESOPHAGOSCOPY, GASTROSCOPY, DUODENOSCOPY (EGD), COMBINED N/A 1/11/2018    Procedure: COMBINED ESOPHAGOSCOPY, GASTROSCOPY, DUODENOSCOPY (EGD), BIOPSY SINGLE OR MULTIPLE;  COMBINED ESOPHAGOSCOPY, GASTROSCOPY, DUODENOSCOPY (EGD);  Surgeon: Alessandro Mcgregor MD;  [facility]     GASTRECTOMY N/A 11/3/2017    Procedure: GASTRECTOMY;;  Surgeon: Yunior Esteban MD;  [facility]     HAND SURGERY      childhood, torn tendon     INSERT PORT VASCULAR ACCESS Right 6/22/2017    Procedure: INSERT PORT VASCULAR ACCESS;  Single Lumen Chest Power Port;  Surgeon: Iván Driver PA-C;  [facility]     LAPAROSCOPY DIAGNOSTIC (GENERAL) N/A 11/3/2017    Procedure: LAPAROSCOPY DIAGNOSTIC (GENERAL);  diagnostic laparoscopy, right chest tube, total gastrectomy with distal esophagectomy, intrathoracic eveline-y esophago-jejunostomy, feeding jejunostomy, pharyngostomy, esophagogastroduodenoscopy, flexible bronchoscopy;  Surgeon: Yunior Esteban MD;  [facility]     LAPAROTOMY EXPLORATORY N/A 11/3/2017    Procedure: LAPAROTOMY EXPLORATORY;;  Surgeon: Yunior Esteban MD;  [facility]     PHARYNGOSTOMY N/A 11/3/2017    Procedure: PHARYNGOSTOMY;;  Surgeon: Yunior Esteban MD;   "[facility]     REMOVE PORT VASCULAR ACCESS Right 3/19/2018    Procedure: REMOVE PORT VASCULAR ACCESS;  Right Port Removal;  Surgeon: Krysten Hopper PA-C;  [facility]     THORACOTOMY Left 11/3/2017    Procedure: THORACOTOMY;;  Surgeon: Yunior Esteban MD;  [facility]         SOCIAL HISTORY:  Social History     Social History     Marital status:      Spouse name: N/A     Number of children: 1     Years of education: N/A     Occupational History     musician and teacher       Social History Main Topics     Smoking status: Never Smoker     Smokeless tobacco: Never Used     Alcohol use Yes      Comment: 1 beer daily     Drug use: Yes     Special: Marijuana      Comment: occasional     Sexual activity: Yes     Partners: Female     Birth control/ protection: Natural Family Planning     Other Topics Concern     Not on file     Social History Narrative     He works at [employer] in [place], 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  He denies smoking.  He drinks ~1 beer a day.  He denies illicit drug use, other than occasional marijuana.  He lives in [place] with his wife, and 4.5 year-old daughter.  His wife is currently pregnant with a boy, and is due in 6 weeks.  He has a half sister who  of breast cancer at age 32; she was diagnosed in her late 20's.  A paternal grandmother had breast cancer in her 70's      FAMILY HISTORY:  Family History   Problem Relation Age of Onset     Other - See Comments Father      sepsis     Cancer Sister      breast cancer  29 yo 1/2 sister      Cancer Paternal Grandmother      breast         PHYSICAL EXAM:  Vital signs:  /81  Pulse 88  Temp 98.5  F (36.9  C) (Oral)  Resp 16  Ht 1.702 m (5' 7\")  Wt 65.5 kg (144 lb 6.4 oz)  SpO2 100%  BMI 22.62 kg/m2   ECO  GENERAL/CONSTITUTIONAL: No acute distress.   Accompanied by son.  EYES: No scleral icterus.  RESPIRATORY: Clear to auscultation bilaterally.  CARDIOVASCULAR: Regular " rate and rhythm.  GASTROINTESTINAL: Bowel sounds present.  Non-tender.  No distention.    MUSCULOSKELETAL: Warm and well-perfused, no cyanosis, clubbing, or edema.  NEUROLOGIC: Alert, oriented, answers questions appropriately.  INTEGUMENTARY: No jaundice.  Port has been removed.      LABS:  CBC RESULTS:   Recent Labs   Lab Test  11/16/18   0725   WBC  5.3   RBC  3.91*   HGB  11.3*   HCT  34.1*   MCV  87   MCH  28.9   MCHC  33.1   RDW  13.1   PLT  160     Recent Labs   Lab Test  11/16/18   0725  07/13/18   1309   NA  137  138   POTASSIUM  4.7  3.9   CHLORIDE  104  106   CO2  27  27   ANIONGAP  6  5   GLC  87  108*   BUN  21  14   CR  0.75  0.69   YOBANY  8.2*  8.7     Lab Results   Component Value Date    AST 33 11/16/2018     Lab Results   Component Value Date    ALT 24 11/16/2018     No results found for: BILICONJ   Lab Results   Component Value Date    BILITOTAL 0.5 11/16/2018     Lab Results   Component Value Date    ALBUMIN 3.2 11/16/2018     Lab Results   Component Value Date    PROTTOTAL 6.4 11/16/2018      Lab Results   Component Value Date    ALKPHOS 62 11/16/2018     Component      Latest Ref Rng & Units 11/16/2018   Iron      35 - 180 ug/dL 92   Iron Binding Cap      240 - 430 ug/dL 363   Iron Saturation Index      15 - 46 % 25   Ferritin      26 - 388 ng/mL 13 (L)   Vitamin B12      193 - 986 pg/mL 710           IMAGING:  CT c/a/p 11/16/18:  1. Postoperative changes of distal esophagectomy/gastrostomy and  Terrell-en-Y esophagojejunostomy. No convincing evidence for  residual/recurrent or metastatic disease in the chest, abdomen, or  pelvis.  2. Left upper quadrant small bowel small bowel intussusception, likely  transient.         ASSESSMENT/PLAN:  Daniel Sandhu is a 38 year old male with:    1) Adenocarcinoma of the GE junction: now s/p 3 cycles of neoadjuvant chemotherapy with EOX, followed by surgical resection on 11/3/17.  Pathology showed adenocarcinoma, moderate differentiated, extensive residual tumor  with no evidence tumor regression, margins negative, perineural invasion present, 1 of 28 lymph nodes were involved with malignancy, stage [id number] (stage IIIA).  HER-2 is non-amplified.    He has received EOF x 2 cycles after surgery, and he has not tolerated well.  The 5-FU on cycle 5 was discontinued early. Chemotherapy was stopped at that point due to poor tolerance.    CT scans from 11/16/18 reviewed with Daniel.  There are post-operative changes with no evidence for recurrent or metastatic disease in the chest, abdomen, or pelvis.    -surveillance CT scans every 4 months for the first year, and then every 6 months for the next 2 years after that.    -RTC in 6 months with labs and CT scan results    2) Genetics:  -genetic testing was negative    3) Fertility: Daniel and his wife have 2 children, including a baby boy just born around June 2017.  He is not planning for more children in the near future.    4) Iron-deficiency anemia:   -he may have issues with absorption due to his surgery.  If there is no improvement, or if he gets side effects with oral iron, we can try IV iron  -ferritin and hemoglobin remain mildly low.  He has only been taking a children's multivitamin with iron and doesn't always remember to take it.  He is going to take iron supplement now and try to remember to take it daily.  We discussed IV iron, but he prefers to take oral for now.    -repeat labs at next visit    5) Nutrition:  His feeding tube had fallen out on its own.  His diet is improving now.    6) He already had flu shot this season.      I spent a total of 25 minutes with the patient, with over >50% of the time in counseling and/or coordination of care.       Laurence Hood MD  Hematology/Oncology  [facility]

## 2018-11-19 NOTE — NURSING NOTE
"Oncology Rooming Note    November 19, 2018 9:24 AM   Daniel Sandhu is a 38 year old male who presents for:    Chief Complaint   Patient presents with     Oncology Clinic Visit     Esophageal Cancer     Initial Vitals: /81  Pulse 88  Temp 98.5  F (36.9  C) (Oral)  Resp 16  Ht 1.702 m (5' 7\")  Wt 65.5 kg (144 lb 6.4 oz)  SpO2 100%  BMI 22.62 kg/m2 Estimated body mass index is 22.62 kg/(m^2) as calculated from the following:    Height as of this encounter: 1.702 m (5' 7\").    Weight as of this encounter: 65.5 kg (144 lb 6.4 oz). Body surface area is 1.76 meters squared.  No Pain (0) Comment: Data Unavailable   No LMP for male patient.  Allergies reviewed: Yes  Medications reviewed: Yes    Medications: Medication refills not needed today.  Pharmacy name entered into [employer]:    [facility] - [place], [place] - [address] [facility] - [place], [place] - [address]    Clinical concerns: No New Concerns    5 minutes for nursing intake (face to face time)     ADITI Collier      "

## 2018-11-19 NOTE — MR AVS SNAPSHOT
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 (Food and drink instructions) To prepare: Do not eat or drink for 2 hours before your exam. If you need to take medicine, you may take it with small sips of water. (We may ask you to take liquid medicine as well.)  How do I prepare for my exam? (Other instructions) Please arrive 30 minutes early for your CT.  Once in the department you might be asked to drink water 15-20 minutes prior to your exam.  If indicated you may be asked to drink an oral contrast in advance of your CT.  If this is the case, the imaging team will let you know or be in contact with you prior to your appointment  Patients over 70 or patients with diabetes or kidney problems: If you haven t had a blood test (creatinine test) within the last 30 days, the Cardiologist/Radiologist may require you to get this test prior to your exam.  If you have diabetes:  Continue to take your metformin medication on the day of your exam  What should I wear: Please wear loose clothing, such as a sweat suit or jogging clothes. Avoid snaps, zippers and  other metal. We may ask you to undress and put on a hospital gown.  How long does the exam take: Most scans take less than 20 minutes.  What should I bring: Please bring any scans or X-rays taken at other hospitals, if similar tests were done. Also bring a list of your medicines, including vitamins, minerals and over-the-counter drugs. It is safest to leave personal items at home.  Do I need a : No  is needed.  What do I need to tell my doctor? Be sure to tell your doctor: * If you have any allergies. * If there s any chance you are pregnant. * If you are breastfeeding.  What should I do after the exam: No restrictions, You may resume normal activities.  What is this test: A CT (computed tomography) scan is a series of pictures that allows us to look inside your body. The scanner creates images of the body in cross sections, much like slices of bread. This helps us see any problems more clearly. You may receive contrast (X-ray dye) before or during your scan. You will be asked to drink the contrast.  Who should I call with questions: If you have any questions, please call the Imaging Department where you will have your exam. Directions, parking instructions, and other information is available on our website, [email or web address].            May 20, 2019  9:15 AM CDT   (Arrive by 9:00 AM)   Return Visit with Laurence Hood MD   [facility] ([facility])    [address] [place] [address]  [address] [place] [id number] [phone]              Who to contact     If you have questions or need follow up information about today's clinic visit or your schedule please contact [facility] directly at [phone].  Normal or non-critical lab and imaging results will be communicated to you by MyChart, letter or phone within 4 business days after the clinic has received the results. If you do not hear from us within 7 days, please contact the  "clinic through [employer] or phone. If you have a critical or abnormal lab result, we will notify you by phone as soon as possible.  Submit refill requests through [employer] or call your pharmacy and they will forward the refill request to us. Please allow 3 business days for your refill to be completed.          Additional Information About Your Visit        [employer] Information     [employer] gives you secure access to your electronic health record. If you see a primary care provider, you can also send messages to your care team and make appointments. If you have questions, please call your primary care clinic.  If you do not have a primary care provider, please call [phone] and they will assist you.        Care EveryWhere ID     This is your Care EveryWhere ID. This could be used by other organizations to access your [place] medical records  [id number]        Your Vitals Were     Pulse Temperature Respirations Height Pulse Oximetry BMI (Body Mass Index)    88 98.5  F (36.9  C) (Oral) 16 1.702 m (5' 7\") 100% 22.62 kg/m2       Blood Pressure from Last 3 Encounters:   11/19/18 116/81   10/24/18 96/58   07/16/18 110/69    Weight from Last 3 Encounters:   11/19/18 65.5 kg (144 lb 6.4 oz)   10/24/18 65.4 kg (144 lb 2 oz)   07/16/18 65.8 kg (145 lb 1 oz)               Primary Care Provider Office Phone # Fax #    Lencho Chan [phone]       [address] [address]        Equal Access to Services     [facility] : Hadii aad ku hadasho Soomaali, waaxda luqadaha, qaybta kaalmada adeegyada, alejandro brown . So [facility] [phone].    ATENCIÓN: Si habla español, tiene a zayas disposición servicios gratuitos de asistencia lingüística. Llame al [phone].    We comply with applicable federal civil rights laws and [place] laws. We do not discriminate on the basis of race, color, national origin, age, disability, sex, sexual orientation, or gender " identity.            Thank you!     Thank you for choosing [facility]  for your care. Our goal is always to provide you with excellent care. Hearing back from our patients is one way we can continue to improve our services. Please take a few minutes to complete the written survey that you may receive in the mail after your visit with us. Thank you!             Your Updated Medication List - Protect others around you: Learn how to safely use, store and throw away your medicines at www.disposemymeds.org.          This list is accurate as of 11/19/18 11:59 PM.  Always use your most recent med list.                   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

## 2019-03-04 DIAGNOSIS — C15.5 MALIGNANT NEOPLASM OF LOWER THIRD OF ESOPHAGUS (H): Primary | ICD-10-CM

## 2019-03-04 NOTE — PROGRESS NOTES
"Message sent to me re:  C/o pain and discomfort around former J-tube site for past week.  Former CT scan done in November mentioned \"likely transient\" intussuception involving loop of left upper quadrant jejunum.          He will come in Wednesday for CT and appt with Dr. Esteban to make sure this is not recurring or hernia developed, etc.      "

## 2019-03-06 ENCOUNTER — ANCILLARY PROCEDURE (OUTPATIENT)
Dept: CT IMAGING | Facility: CLINIC | Age: 39
End: 2019-03-06
Attending: CLINICAL NURSE SPECIALIST
Payer: COMMERCIAL

## 2019-03-06 ENCOUNTER — OFFICE VISIT (OUTPATIENT)
Dept: SURGERY | Facility: CLINIC | Age: 39
End: 2019-03-06
Attending: THORACIC SURGERY (CARDIOTHORACIC VASCULAR SURGERY)
Payer: COMMERCIAL

## 2019-03-06 VITALS
HEART RATE: 87 BPM | TEMPERATURE: 97.8 F | WEIGHT: 147 LBS | RESPIRATION RATE: 16 BRPM | HEIGHT: 67 IN | OXYGEN SATURATION: 99 % | SYSTOLIC BLOOD PRESSURE: 100 MMHG | BODY MASS INDEX: 23.07 KG/M2 | DIASTOLIC BLOOD PRESSURE: 62 MMHG

## 2019-03-06 DIAGNOSIS — Z90.49 H/O ESOPHAGECTOMY: Primary | ICD-10-CM

## 2019-03-06 DIAGNOSIS — C16.0 SIEWERT TYPE III ADENOCARCINOMA OF ESOPHAGOGASTRIC JUNCTION (H): ICD-10-CM

## 2019-03-06 DIAGNOSIS — Z98.890 H/O ESOPHAGECTOMY: Primary | ICD-10-CM

## 2019-03-06 DIAGNOSIS — C15.5 MALIGNANT NEOPLASM OF LOWER THIRD OF ESOPHAGUS (H): ICD-10-CM

## 2019-03-06 DIAGNOSIS — Z90.3 H/O RESECTION OF STOMACH: ICD-10-CM

## 2019-03-06 PROCEDURE — G0463 HOSPITAL OUTPT CLINIC VISIT: HCPCS | Mod: ZF

## 2019-03-06 RX ORDER — IOPAMIDOL 755 MG/ML
88 INJECTION, SOLUTION INTRAVASCULAR ONCE
Status: COMPLETED | OUTPATIENT
Start: 2019-03-06 | End: 2019-03-06

## 2019-03-06 RX ORDER — FERROUS GLUCONATE 324(38)MG
324 TABLET ORAL
COMMUNITY

## 2019-03-06 RX ADMIN — IOPAMIDOL 88 ML: 755 INJECTION, SOLUTION INTRAVASCULAR at 17:06

## 2019-03-06 ASSESSMENT — MIFFLIN-ST. JEOR: SCORE: 1550.54

## 2019-03-06 ASSESSMENT — PAIN SCALES - GENERAL: PAINLEVEL: MILD PAIN (2)

## 2019-03-06 NOTE — NURSING NOTE
"Oncology Rooming Note    March 6, 2019 5:31 PM   Daniel Sandhu is a 38 year old male who presents for:    Chief Complaint   Patient presents with     RECHECK     ONC Adenocarcinoma of the GEjunction     Initial Vitals: /62 (BP Location: Right arm, Patient Position: Sitting, Cuff Size: Adult Regular)   Pulse 87   Temp 97.8  F (36.6  C) (Oral)   Resp 16   Ht 1.702 m (5' 7.01\")   Wt 66.7 kg (147 lb)   SpO2 99%   BMI 23.02 kg/m   Estimated body mass index is 23.02 kg/m  as calculated from the following:    Height as of this encounter: 1.702 m (5' 7.01\").    Weight as of this encounter: 66.7 kg (147 lb). Body surface area is 1.78 meters squared.  Mild Pain (2) Comment: Data Unavailable   No LMP for male patient.  Allergies reviewed: Yes  Medications reviewed: Yes    Medications: Medication refills not needed today.  Pharmacy name entered into "[employer]:    [facility] - [place], [place] - [address] [facility] - [place], [place] - [address]    Clinical concerns: none        Jaycee Sanjeev, CMA              "

## 2019-03-06 NOTE — DISCHARGE INSTRUCTIONS

## 2019-03-06 NOTE — PROGRESS NOTES
THORACIC SURGERY FOLLOW UP VISIT     Dear Dr. Chan,     I saw Mr. Sandhu in follow-up today. 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  11/9/17 - EGD, remove pharyngostomy tube     HISTOPATHOLOGY   [id number] (Stage IIIa) moderately differentiated adenocarcinoma  LN 1/31  HER2 negative     COMPLICATIONS  Phlegmon at site of pharyngostomy tube requiring wound exploration on 11/9/17.     INTERVAL STUDIES  CT abdomen today. No abnormalities or recurrence to my review.    ETOH negative  TOB negative  BMI 23     SUBJECTIVE   Mr. Sandhu is doing well, he does complain of pain around J tube site over last few weeks. No palpable hernia.     From a personal perspective, he is here with Violeta. They brought me a picture of their 2 kids, Vamsi and Mike, and some chocolates.     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 I reviewed the plan as follows:    Follow-up in May as scheduled. I reassured him that we cannot see anything on the CT scan, and on exam there is only abdominal wall scarring,  but no hernia.     They had all their questions answered and were in agreement with the plan.  I appreciate the opportunity to participate in the care of your patient and will keep you updated.    Sincerely,

## 2019-03-06 NOTE — LETTER
3/6/2019    RE: Daniel Sandhu  [address] [address]     THORACIC SURGERY FOLLOW UP VISIT     Dear Dr. Chan,     I saw Mr. Sandhu in follow-up today. 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  11/9/17 - EGD, remove pharyngostomy tube     HISTOPATHOLOGY   [id number] (Stage IIIa) moderately differentiated adenocarcinoma  LN 1/31  HER2 negative     COMPLICATIONS  Phlegmon at site of pharyngostomy tube requiring wound exploration on 11/9/17.     INTERVAL STUDIES  CT abdomen today. No abnormalities or recurrence to my review.    ETOH negative  TOB negative  BMI 23     SUBJECTIVE   Mr. Sandhu is doing well, he does complain of pain around J tube site over last few weeks. No palpable hernia.     From a personal perspective, he is here with Violeta. They brought me a picture of their 2 kids, Vamsi and Mike, and some chocolates.     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 I reviewed the plan as follows:    Follow-up in May as scheduled. I reassured him that we  cannot see anything on the CT scan, and on exam there is only abdominal wall scarring, but no hernia.     They had all their questions answered and were in agreement with the plan.  I appreciate the opportunity to participate in the care of your patient and will keep you updated.    Sincerely,      Yunior Esteban MD

## 2019-03-07 LAB — RADIOLOGIST FLAGS: ABNORMAL

## 2019-03-08 DIAGNOSIS — R10.9 LEFT SIDED ABDOMINAL PAIN: Primary | ICD-10-CM

## 2019-03-08 NOTE — PROGRESS NOTES
I called Daniel to discuss the formal radiology report from his recent abd/pelvis CT scan:  1. 3.1 cm ill-defined hypoattenuating lesion in hepatic segment 3 is  concerning for possible phlegmon/abscess and dehiscence given its  proximity to a surgical staple line and the presence of a punctate  focus of air within the posterior aspect, an adjacent small air and  fluid containing collection, and trace air within the joseph hepatis,    He will get an ultrasound of the abdomen tomorrow.   I told him Nelly or I would look for results Monday and review them with Dr. Esteban.  He would like us to call him once this is done.

## 2019-03-09 ENCOUNTER — ANCILLARY PROCEDURE (OUTPATIENT)
Dept: ULTRASOUND IMAGING | Facility: CLINIC | Age: 39
End: 2019-03-09
Attending: CLINICAL NURSE SPECIALIST
Payer: COMMERCIAL

## 2019-03-09 DIAGNOSIS — R10.9 LEFT SIDED ABDOMINAL PAIN: ICD-10-CM

## 2019-03-11 ENCOUNTER — TELEPHONE (OUTPATIENT)
Dept: SURGERY | Facility: CLINIC | Age: 39
End: 2019-03-11

## 2019-03-11 NOTE — TELEPHONE ENCOUNTER
Call to venice to discuss his US results. There was no answer. Message left with my colleague Varsha Chinchilla's number as I will be gone the rest of the week.

## 2019-03-12 ENCOUNTER — TELEPHONE (OUTPATIENT)
Dept: SURGERY | Facility: CLINIC | Age: 39
End: 2019-03-12

## 2019-03-12 DIAGNOSIS — K76.9 LIVER LESION: Primary | ICD-10-CM

## 2019-03-12 NOTE — TELEPHONE ENCOUNTER
I reached Daniel to discuss his ultrasound abdomen results and plan from Dr. Esteban.   I explained that Dr. Esteban wants him to get a liver biopsy done by IR, and I had arranged an IR clinic appointment later this week.   I had released the scan results on [employer] and we were reviewing the findings.  While Daniel has a lot of anxiety over this outcome, he agrees this is the right route to take in order to get some answers.     I will watch for results and communicate with him at that time.

## 2019-03-15 ENCOUNTER — OFFICE VISIT (OUTPATIENT)
Dept: INTERVENTIONAL RADIOLOGY/VASCULAR | Facility: CLINIC | Age: 39
End: 2019-03-15
Payer: COMMERCIAL

## 2019-03-15 VITALS
OXYGEN SATURATION: 100 % | SYSTOLIC BLOOD PRESSURE: 102 MMHG | BODY MASS INDEX: 23.11 KG/M2 | WEIGHT: 147.6 LBS | HEART RATE: 85 BPM | DIASTOLIC BLOOD PRESSURE: 61 MMHG

## 2019-03-15 DIAGNOSIS — Z85.01 HISTORY OF ESOPHAGEAL CANCER: ICD-10-CM

## 2019-03-15 DIAGNOSIS — R16.0 LIVER MASS, LEFT LOBE: ICD-10-CM

## 2019-03-15 DIAGNOSIS — R16.0 LIVER MASS, LEFT LOBE: Primary | ICD-10-CM

## 2019-03-15 LAB
ERYTHROCYTE [DISTWIDTH] IN BLOOD BY AUTOMATED COUNT: 12.4 % (ref 10–15)
HCT VFR BLD AUTO: 41 % (ref 40–53)
HGB BLD-MCNC: 13.8 G/DL (ref 13.3–17.7)
INR PPP: 1.12 (ref 0.86–1.14)
MCH RBC QN AUTO: 29.3 PG (ref 26.5–33)
MCHC RBC AUTO-ENTMCNC: 33.7 G/DL (ref 31.5–36.5)
MCV RBC AUTO: 87 FL (ref 78–100)
PLATELET # BLD AUTO: 174 10E9/L (ref 150–450)
RBC # BLD AUTO: 4.71 10E12/L (ref 4.4–5.9)
WBC # BLD AUTO: 6.1 10E9/L (ref 4–11)

## 2019-03-15 ASSESSMENT — ENCOUNTER SYMPTOMS
HALLUCINATIONS: 0
BOWEL INCONTINENCE: 0
NAUSEA: 0
DECREASED APPETITE: 0
FEVER: 0
FATIGUE: 0
WEIGHT GAIN: 0
ABDOMINAL PAIN: 1
CHILLS: 0
VOMITING: 0
ALTERED TEMPERATURE REGULATION: 0
JAUNDICE: 0
NIGHT SWEATS: 0
POLYDIPSIA: 0
WEIGHT LOSS: 0
DIARRHEA: 0
RECTAL PAIN: 0
CONSTIPATION: 0
POLYPHAGIA: 0
INCREASED ENERGY: 0
BLOOD IN STOOL: 0
BLOATING: 0
HEARTBURN: 0

## 2019-03-15 NOTE — PROGRESS NOTES
First Name: Daniel   Age: 38 year old   Referring Physician: Dr. 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  He is s/p chemotherapy and surgical resection.  He has done well since then.  Recent imaging shows a mass in segment 3 of the liver.  Biopsy is requested to evaluate for malignancy, Dr. Easton also wants samples sent for cultures to rule out an abscess.    Plan:  Image guided biopsy of liver mass, in addition to pathology send for cultures (tissue culture, anaerobic, fungal and KOH prep)  Blood work today    HPI: This is a patient who was diagnosed in May 2017 with adenocarcinoma of the GE junction, measures 4 cm on EGD.  Clinical stage T2N1M0 (stage IIB), based on PET-CT and EUS.  A gastrohepatic lymph node was biopsied and suspicious for adenocarcinoma.  He was seen by Dr. Esteban, and with the degree of extension into the stomach, his inclination is to treat him as a gastric cancer, and so he received perioperative chemotherapy.  11/30/2017 he underwent a total gastrectomy, abdominal lymphadenectomy, distal esophagectomy and intrathorac Terrell-en-Y esophagojejunostomy.  He also received chemotherapy postsurgery.  Since that time Daniel has been under CT surveillance.  The CT scan from 3/6/2019 showed:  3.1 cm ill-definedhypoattenuating lesion with an associated punctate focus of air in  hepatic segment 3 adjacent to a surgical staple line in the epigastric region. Just below this is a well-circumscribed fluid attenuation collection measuring 1.8 x 1.3 x 1.7 cm which now contains a small amount of antidependent air.  This mass was also evaluated with ultrasound.  On ultrasound it does seem more solid.  Dr. Easton from  reviewed the imaging and approved the biopsy, under ultrasound guidance and recommends to send tissue samples for cultures as well.    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 6/5/2017     PAST SURGICAL HISTORY:   Past Surgical History:   Procedure Laterality Date     ESOPHAGOGASTRODUODENOSCOPY       ESOPHAGOSCOPY, GASTROSCOPY, DUODENOSCOPY (EGD), COMBINED N/A 6/9/2017    Procedure: COMBINED ENDOSCOPIC ULTRASOUND, ESOPHAGOSCOPY, GASTROSCOPY, DUODENOSCOPY (EGD), FINE NEEDLE ASPIRATE/BIOPSY;  Upper Endoscopic Ultrasound, fine needle aspirate/biopsy;  Surgeon: Guru Mark Avila MD;  [facility]     ESOPHAGOSCOPY, GASTROSCOPY, DUODENOSCOPY (EGD), COMBINED N/A 11/3/2017    Procedure: COMBINED ESOPHAGOSCOPY, GASTROSCOPY, DUODENOSCOPY (EGD);;  Surgeon: Yunior Esteban MD;  [facility]     ESOPHAGOSCOPY, GASTROSCOPY, DUODENOSCOPY (EGD), COMBINED N/A 11/9/2017    Procedure: COMBINED ESOPHAGOSCOPY, GASTROSCOPY, DUODENOSCOPY (EGD);  Esophogastroduodenoscopy, take out pharangostomy tube;  Surgeon: Yunior Esteban MD;  [facility]     ESOPHAGOSCOPY, GASTROSCOPY, DUODENOSCOPY (EGD), COMBINED N/A 1/11/2018    Procedure: COMBINED ESOPHAGOSCOPY, GASTROSCOPY, DUODENOSCOPY (EGD), BIOPSY SINGLE OR MULTIPLE;  COMBINED ESOPHAGOSCOPY, GASTROSCOPY, DUODENOSCOPY (EGD);  Surgeon: Alessandro Mcgregor MD;  [facility]     GASTRECTOMY N/A 11/3/2017    Procedure: GASTRECTOMY;;  Surgeon: Yunior Esteban MD;  [facility]     HAND SURGERY      childhood, torn tendon     INSERT PORT VASCULAR ACCESS Right 6/22/2017    Procedure: INSERT PORT VASCULAR ACCESS;  Single Lumen Chest Power Port;  Surgeon: Iván Driver PA-C;  [facility]     LAPAROSCOPY DIAGNOSTIC (GENERAL) N/A 11/3/2017    Procedure: LAPAROSCOPY DIAGNOSTIC (GENERAL);  diagnostic laparoscopy, right chest tube, total gastrectomy with distal esophagectomy, intrathoracic eveline-y esophago-jejunostomy, feeding jejunostomy, pharyngostomy, esophagogastroduodenoscopy, flexible bronchoscopy;  Surgeon: Carlito  Yunior Guardado MD;  [facility]     LAPAROTOMY EXPLORATORY N/A 11/3/2017    Procedure: LAPAROTOMY EXPLORATORY;;  Surgeon: Yunior Esteban MD;  [facility]     PHARYNGOSTOMY N/A 11/3/2017    Procedure: PHARYNGOSTOMY;;  Surgeon: Yunior Esteban MD;  [facility]     REMOVE PORT VASCULAR ACCESS Right 3/19/2018    Procedure: REMOVE PORT VASCULAR ACCESS;  Right Port Removal;  Surgeon: Krysten Hopper PA-C;  [facility]     THORACOTOMY Left 11/3/2017    Procedure: THORACOTOMY;;  Surgeon: Yunior Esteban MD;  [facility]     FAMILY HISTORY:   Family History   Problem Relation Age of Onset     Other - See Comments Father         sepsis     Cancer Sister         breast cancer  29 yo 1/2 sister      Cancer Paternal Grandmother         breast     SOCIAL HISTORY:   Social History     Tobacco Use     Smoking status: Never Smoker     Smokeless tobacco: Never Used   Substance Use Topics     Alcohol use: Yes     Comment: 1 beer daily     PROBLEM LIST:   Patient Active Problem List    Diagnosis Date Noted     History of esophageal cancer 10/24/2018     Priority: Medium     Weakness 2018     Priority: Medium     Chemotherapy-induced nausea 2017     Priority: Medium     S/P gastrectomy 2017     Priority: Medium     Esophageal cancer (H) 2017     Priority: Medium     Thrush 2017     Priority: Medium     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    Sig: Place 2,500 mcg under the tongue daily    Class: E-Prescribe    Route: Sublingual    ferrous gluconate (FERGON) 324 (38 Fe) MG tablet            Sig:  Take 324 mg by mouth daily (with breakfast)    Class: Historical    Route: Oral    multivitamin, therapeutic with minerals (MULTI-VITAMIN) TABS tablet            Sig: Take 1 tablet by mouth daily Generic [place] Vitamin    Class: Historical    Route: Oral        ALLERGIES: Patient has no known allergies.  VITALS: /61   Pulse 85   Wt 67 kg (147 lb 9.6 oz)   SpO2 100%   BMI 23.11 kg/m      ROS:  Answers for HPI/ROS submitted by the patient on 3/15/2019   General Symptoms: Yes  Skin Symptoms: No  HENT Symptoms: No  EYE SYMPTOMS: No  HEART SYMPTOMS: No  LUNG SYMPTOMS: No  INTESTINAL SYMPTOMS: Yes  URINARY SYMPTOMS: No  REPRODUCTIVE SYMPTOMS: No  SKELETAL SYMPTOMS: No  BLOOD SYMPTOMS: No  NERVOUS SYSTEM SYMPTOMS: No  MENTAL HEALTH SYMPTOMS: No  Fever: No  Loss of appetite: No  Weight loss: No  Weight gain: No  Fatigue: No  Night sweats: No  Chills: No  Increased stress: Yes  Excessive hunger: No  Excessive thirst: No  Feeling hot or cold when others believe the temperature is normal: No  Loss of height: No  Post-operative complications: No  Surgical site pain: Yes  Hallucinations: No  Change in or Loss of Energy: No  Hyperactivity: No  Confusion: No  Heart burn or indigestion: No  Nausea: No  Vomiting: No  Abdominal pain: Yes  Bloating: No  Constipation: No  Diarrhea: No  Blood in stool: No  Black stools: No  Rectal or Anal pain: No  Fecal incontinence: No  Yellowing of skin or eyes: No  Vomit with blood: No  Change in stools: No    Physical Examination: Vital signs are reviewed and they are stable  Constitutional: Pleasant, middle aged gentleman, in no acute physical distress, came alone to his appt  Cardiovascular: negative, RRR  Respiratory: negative findings: normal respiratory rate and rhythm, lungs clear to auscultation  Musculoskeletal: extremities normal- no gross deformities noted, gait normal and normal muscle tone  Skin: no suspicious lesions or rashes  Neurologic: negative  Psychiatric: anxious  and mentation appears normal.    BMP RESULTS:  Lab Results   Component Value Date     11/16/2018    POTASSIUM 4.7 11/16/2018    CHLORIDE 104 11/16/2018    CO2 27 11/16/2018    ANIONGAP 6 11/16/2018    GLC 87 11/16/2018    BUN 21 11/16/2018    CR 0.75 11/16/2018    GFRESTIMATED >90 11/16/2018    GFRESTBLACK >90 11/16/2018    YOBANY 8.2 (L) 11/16/2018        CBC RESULTS:  Lab Results   Component Value Date    WBC 6.1 03/15/2019    RBC 4.71 03/15/2019    HGB 13.8 03/15/2019    HCT 41.0 03/15/2019    MCV 87 03/15/2019    MCH 29.3 03/15/2019    MCHC 33.7 03/15/2019    RDW 12.4 03/15/2019     03/15/2019       INR/PTT:  Lab Results   Component Value Date    INR 1.12 03/15/2019       Diagnostic studies: see CT scan and ultrasound    PROVIDER NOTE:  I explained the procedure to Daniel  This included:  Preparing for the procedure, the actual procedure and recovery.  I explained the risks of the liver biopsy:  Bleeding, infection, potential for puncturing the lung, and death related to the procedure.  I explained that usually the results return after three to four business days.    I explained that he/she would be contacted by someone from 's office following this to determine a future plan.  Thank you for involving us in the care of your patient.  CC  Patient Care Team:  Lencho Chan MD as PCP - General (Family Practice)  Laurence Hood MD as MD (Hematology & Oncology)  Nannette Ovalles, RN as Nurse Coordinator (Hematology & Oncology)  Claudia Vega, MELECIO as Registered Nurse (Pancreas/Bili)  Lencho Chan MD as Assigned PCP  RANDAL MILLER

## 2019-03-15 NOTE — LETTER
3/15/2019       RE: Daniel Sandhu  [address] [address] [facility] [address]     Dear Colleague,    Thank you for referring your patient, Daniel Sandhu, to the [facility] INTERVENTIONAL RADIOLOGY at [facility]. Please see a copy of my visit note below.    First Name: Daniel   Age: 38 year old   Referring Physician: Dr. 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  He is s/p chemotherapy and surgical resection.  He has done well since then.  Recent imaging shows a mass in segment 3 of the liver.  Biopsy is requested to evaluate for malignancy, Dr. Easton also wants samples sent for cultures to rule out an abscess.    Plan:  Image guided biopsy of liver mass, in addition to pathology send for cultures (tissue culture, anaerobic, fungal and KOH prep)  Blood work today    HPI: This is a patient who was diagnosed in May 2017 with adenocarcinoma of the GE junction, measures 4 cm on EGD.  Clinical stage T2N1M0 (stage IIB), based on PET-CT and EUS.  A gastrohepatic lymph node was biopsied and suspicious for adenocarcinoma.  He was seen by Dr. Esteban, and with the degree of extension into the stomach, his inclination is to treat him as a gastric cancer, and so he received perioperative chemotherapy.  11/30/2017 he underwent a total gastrectomy, abdominal lymphadenectomy, distal esophagectomy and intrathorac Terrell-en-Y esophagojejunostomy.  He also received chemotherapy postsurgery.  Since that time Daniel has been under CT surveillance.  The CT scan from 3/6/2019 showed:  3.1 cm ill-definedhypoattenuating lesion with an associated punctate focus of air in  hepatic segment 3 adjacent to a surgical staple line in the epigastric region. Just below this is a well-circumscribed fluid attenuation collection measuring 1.8 x 1.3 x 1.7 cm which now contains a small amount of  antidependent air.  This mass was also evaluated with ultrasound.  On ultrasound it does seem more solid.  Dr. Easton from  reviewed the imaging and approved the biopsy, under ultrasound guidance and recommends to send tissue samples for cultures as well.    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     PAST SURGICAL HISTORY:   Past Surgical History:   Procedure Laterality Date     ESOPHAGOGASTRODUODENOSCOPY       ESOPHAGOSCOPY, GASTROSCOPY, DUODENOSCOPY (EGD), COMBINED N/A 6/9/2017    Procedure: COMBINED ENDOSCOPIC ULTRASOUND, ESOPHAGOSCOPY, GASTROSCOPY, DUODENOSCOPY (EGD), FINE NEEDLE ASPIRATE/BIOPSY;  Upper Endoscopic Ultrasound, fine needle aspirate/biopsy;  Surgeon: Guru Mark Avila MD;  [facility]     ESOPHAGOSCOPY, GASTROSCOPY, DUODENOSCOPY (EGD), COMBINED N/A 11/3/2017    Procedure: COMBINED ESOPHAGOSCOPY, GASTROSCOPY, DUODENOSCOPY (EGD);;  Surgeon: Yunior Esteban MD;  [facility]     ESOPHAGOSCOPY, GASTROSCOPY, DUODENOSCOPY (EGD), COMBINED N/A 11/9/2017    Procedure: COMBINED ESOPHAGOSCOPY, GASTROSCOPY, DUODENOSCOPY (EGD);  Esophogastroduodenoscopy, take out pharangostomy tube;  Surgeon: Yunior Esteban MD;  [facility]     ESOPHAGOSCOPY, GASTROSCOPY, DUODENOSCOPY (EGD), COMBINED N/A 1/11/2018    Procedure: COMBINED ESOPHAGOSCOPY, GASTROSCOPY, DUODENOSCOPY (EGD), BIOPSY SINGLE OR MULTIPLE;  COMBINED ESOPHAGOSCOPY, GASTROSCOPY, DUODENOSCOPY (EGD);  Surgeon: Alessandro Mcgregor MD;  [facility]     GASTRECTOMY N/A 11/3/2017    Procedure: GASTRECTOMY;;  Surgeon: Yunior Esteban MD;  [facility]     HAND SURGERY      childhood, torn tendon     INSERT PORT VASCULAR ACCESS Right 6/22/2017    Procedure: INSERT PORT VASCULAR ACCESS;  Single Lumen Chest Power Port;  Surgeon: Iván Driver PA-C;  [facility]     LAPAROSCOPY DIAGNOSTIC (GENERAL)  N/A 11/3/2017    Procedure: LAPAROSCOPY DIAGNOSTIC (GENERAL);  diagnostic laparoscopy, right chest tube, total gastrectomy with distal esophagectomy, intrathoracic eveline-y esophago-jejunostomy, feeding jejunostomy, pharyngostomy, esophagogastroduodenoscopy, flexible bronchoscopy;  Surgeon: Yunior Etseban MD;  [facility]     LAPAROTOMY EXPLORATORY N/A 11/3/2017    Procedure: LAPAROTOMY EXPLORATORY;;  Surgeon: Yunior Esteban MD;  [facility]     PHARYNGOSTOMY N/A 11/3/2017    Procedure: PHARYNGOSTOMY;;  Surgeon: Yunior Esteban MD;  [facility]     REMOVE PORT VASCULAR ACCESS Right 3/19/2018    Procedure: REMOVE PORT VASCULAR ACCESS;  Right Port Removal;  Surgeon: Krysten Hopper PA-C;  [facility]     THORACOTOMY Left 11/3/2017    Procedure: THORACOTOMY;;  Surgeon: Yunior Esteban MD;  [facility]     FAMILY HISTORY:   Family History   Problem Relation Age of Onset     Other - See Comments Father         sepsis     Cancer Sister         breast cancer  31 yo 1/2 sister      Cancer Paternal Grandmother         breast     SOCIAL HISTORY:   Social History     Tobacco Use     Smoking status: Never Smoker     Smokeless tobacco: Never Used   Substance Use Topics     Alcohol use: Yes     Comment: 1 beer daily     PROBLEM LIST:   Patient Active Problem List    Diagnosis Date Noted     History of esophageal cancer 10/24/2018     Priority: Medium     Weakness 2018     Priority: Medium     Chemotherapy-induced nausea 2017     Priority: Medium     S/P gastrectomy 2017     Priority: Medium     Esophageal cancer (H) 2017     Priority: Medium     Thrush 2017     Priority: Medium     Malignant neoplasm of lower third of esophagus (H) 2017     Priority: Medium     MEDICATIONS:   Prescription Medications as of 3/15/2019       Rx Number Disp Refills Start End Last Dispensed Date Next Fill Date Owning Pharmacy    cyabnocobalamin (VITAMIN  B-12) 2500 MCG sublingual tablet  30 tablet 1 11/22/2017    [facility] - [place], [place] - [address]    Sig: Place 2,500 mcg under the tongue daily    Class: E-Prescribe    Route: Sublingual    ferrous gluconate (FERGON) 324 (38 Fe) MG tablet            Sig: Take 324 mg by mouth daily (with breakfast)    Class: Historical    Route: Oral    multivitamin, therapeutic with minerals (MULTI-VITAMIN) TABS tablet            Sig: Take 1 tablet by mouth daily Generic [place] Vitamin    Class: Historical    Route: Oral        ALLERGIES: Patient has no known allergies.  VITALS: /61   Pulse 85   Wt 67 kg (147 lb 9.6 oz)   SpO2 100%   BMI 23.11 kg/m         Physical Examination: Vital signs are reviewed and they are stable  Constitutional: Pleasant, middle aged gentleman, in no acute physical distress, came alone to his appt  Cardiovascular: negative, RRR  Respiratory: negative findings: normal respiratory rate and rhythm, lungs clear to auscultation  Musculoskeletal: extremities normal- no gross deformities noted, gait normal and normal muscle tone  Skin: no suspicious lesions or rashes  Neurologic: negative  Psychiatric: anxious and mentation appears normal.    BMP RESULTS:  Lab Results   Component Value Date     11/16/2018    POTASSIUM 4.7 11/16/2018    CHLORIDE 104 11/16/2018    CO2 27 11/16/2018    ANIONGAP 6 11/16/2018    GLC 87 11/16/2018    BUN 21 11/16/2018    CR 0.75 11/16/2018    GFRESTIMATED >90 11/16/2018    GFRESTBLACK >90 11/16/2018    YOBANY 8.2 (L) 11/16/2018        CBC RESULTS:  Lab Results   Component Value Date    WBC 6.1 03/15/2019    RBC 4.71 03/15/2019    HGB 13.8 03/15/2019    HCT 41.0 03/15/2019    MCV 87 03/15/2019    MCH 29.3 03/15/2019    MCHC 33.7 03/15/2019    RDW 12.4 03/15/2019     03/15/2019       INR/PTT:  Lab Results   Component Value Date    INR 1.12 03/15/2019       Diagnostic studies: see CT scan and ultrasound    PROVIDER  NOTE:  I explained the procedure to Daniel  This included:  Preparing for the procedure, the actual procedure and recovery.  I explained the risks of the liver biopsy:  Bleeding, infection, potential for puncturing the lung, and death related to the procedure.  I explained that usually the results return after three to four business days.    I explained that he/she would be contacted by someone from 's office following this to determine a future plan.    Thank you for involving us in the care of your patient.    GAVIN Lopez CNS    CC  Patient Care Team:  Lencho Chan MD as PCP - General (Family Practice)  Laurence Hood MD as MD (Hematology & Oncology)  Nannette Ovalles, RN as Nurse Coordinator (Hematology & Oncology)  Claudia Vega, RN as Registered Nurse (Pancreas/Bili)  Lencho Chan MD as Assigned PCP  RANDAL MILLER

## 2019-03-15 NOTE — PATIENT INSTRUCTIONS
You are scheduled for your biopsy on Wednesday, March 20, 2019  Report to the [facility] Waiting room at 8:30 AM  The [facility] Waiting Room is located on the 2nd floor (street level) of the [address]  Your procedure is scheduled to start at approximately 10:00 AM    No solid foods or milk products for 6 hours prior on the day of the procedure 4:00 AM  You may have clear liquids until 2 hours prior on the day of the procedure.(water, apple juice, broth, coffee or tea without milk or sugar, jell-o, white grape juice)  8:00 AM    You will need a     If you have any questions you may call the Radiology Nurse Line [phone]

## 2019-03-18 ENCOUNTER — HOSPITAL ENCOUNTER (OUTPATIENT)
Facility: CLINIC | Age: 39
End: 2019-03-18
Attending: RADIOLOGY | Admitting: RADIOLOGY
Payer: COMMERCIAL

## 2019-03-19 RX ORDER — SODIUM CHLORIDE 9 MG/ML
INJECTION, SOLUTION INTRAVENOUS CONTINUOUS
Status: CANCELLED | OUTPATIENT
Start: 2019-03-19

## 2019-03-19 RX ORDER — LIDOCAINE 40 MG/G
CREAM TOPICAL
Status: CANCELLED | OUTPATIENT
Start: 2019-03-19

## 2019-03-20 ENCOUNTER — TRANSFERRED RECORDS (OUTPATIENT)
Dept: HEALTH INFORMATION MANAGEMENT | Facility: CLINIC | Age: 39
End: 2019-03-20

## 2019-03-20 ENCOUNTER — APPOINTMENT (OUTPATIENT)
Dept: INTERVENTIONAL RADIOLOGY/VASCULAR | Facility: CLINIC | Age: 39
End: 2019-03-20
Attending: CLINICAL NURSE SPECIALIST
Payer: COMMERCIAL

## 2019-03-20 ENCOUNTER — APPOINTMENT (OUTPATIENT)
Dept: MEDSURG UNIT | Facility: CLINIC | Age: 39
End: 2019-03-20
Attending: RADIOLOGY
Payer: COMMERCIAL

## 2019-03-20 ENCOUNTER — HOSPITAL ENCOUNTER (OUTPATIENT)
Facility: CLINIC | Age: 39
Discharge: HOME | End: 2019-03-20
Attending: RADIOLOGY | Admitting: RADIOLOGY
Payer: COMMERCIAL

## 2019-03-20 ENCOUNTER — RESULTS ONLY (OUTPATIENT)
Dept: ONCOLOGY | Facility: CLINIC | Age: 39
End: 2019-03-20

## 2019-03-20 VITALS
OXYGEN SATURATION: 96 % | DIASTOLIC BLOOD PRESSURE: 61 MMHG | BODY MASS INDEX: 21.77 KG/M2 | HEART RATE: 77 BPM | WEIGHT: 147 LBS | HEIGHT: 69 IN | RESPIRATION RATE: 16 BRPM | TEMPERATURE: 97.9 F | SYSTOLIC BLOOD PRESSURE: 102 MMHG

## 2019-03-20 DIAGNOSIS — R16.0 LIVER MASS, LEFT LOBE: ICD-10-CM

## 2019-03-20 DIAGNOSIS — Z85.01 HISTORY OF ESOPHAGEAL CANCER: ICD-10-CM

## 2019-03-20 LAB
KOH PREP SPEC: NORMAL
SPECIMEN SOURCE: NORMAL

## 2019-03-20 PROCEDURE — 76942 ECHO GUIDE FOR BIOPSY: CPT

## 2019-03-20 PROCEDURE — 99152 MOD SED SAME PHYS/QHP 5/>YRS: CPT

## 2019-03-20 PROCEDURE — 88307 TISSUE EXAM BY PATHOLOGIST: CPT | Performed by: THORACIC SURGERY (CARDIOTHORACIC VASCULAR SURGERY)

## 2019-03-20 PROCEDURE — 87210 SMEAR WET MOUNT SALINE/INK: CPT | Performed by: THORACIC SURGERY (CARDIOTHORACIC VASCULAR SURGERY)

## 2019-03-20 PROCEDURE — 88341 IMHCHEM/IMCYTCHM EA ADD ANTB: CPT | Performed by: THORACIC SURGERY (CARDIOTHORACIC VASCULAR SURGERY)

## 2019-03-20 PROCEDURE — 87176 TISSUE HOMOGENIZATION CULTR: CPT | Performed by: THORACIC SURGERY (CARDIOTHORACIC VASCULAR SURGERY)

## 2019-03-20 PROCEDURE — 87075 CULTR BACTERIA EXCEPT BLOOD: CPT | Performed by: THORACIC SURGERY (CARDIOTHORACIC VASCULAR SURGERY)

## 2019-03-20 PROCEDURE — 88377 M/PHMTRC ALYS ISHQUANT/SEMIQ: CPT | Performed by: PATHOLOGY

## 2019-03-20 PROCEDURE — 87070 CULTURE OTHR SPECIMN AEROBIC: CPT | Performed by: THORACIC SURGERY (CARDIOTHORACIC VASCULAR SURGERY)

## 2019-03-20 PROCEDURE — 40000168 ZZH STATISTIC PP CARE STAGE 3

## 2019-03-20 PROCEDURE — 00000158 ZZHCL STATISTIC H-FISH PROCESS B/S: Performed by: THORACIC SURGERY (CARDIOTHORACIC VASCULAR SURGERY)

## 2019-03-20 PROCEDURE — 87102 FUNGUS ISOLATION CULTURE: CPT | Performed by: THORACIC SURGERY (CARDIOTHORACIC VASCULAR SURGERY)

## 2019-03-20 PROCEDURE — 25800030 ZZH RX IP 258 OP 636: Performed by: PHYSICIAN ASSISTANT

## 2019-03-20 PROCEDURE — 00000159 ZZHCL STATISTIC H-SEND OUTS PREP: Performed by: THORACIC SURGERY (CARDIOTHORACIC VASCULAR SURGERY)

## 2019-03-20 PROCEDURE — 88342 IMHCHEM/IMCYTCHM 1ST ANTB: CPT | Performed by: THORACIC SURGERY (CARDIOTHORACIC VASCULAR SURGERY)

## 2019-03-20 PROCEDURE — 88333 PATH CONSLTJ SURG CYTO XM 1: CPT | Performed by: THORACIC SURGERY (CARDIOTHORACIC VASCULAR SURGERY)

## 2019-03-20 PROCEDURE — 88360 TUMOR IMMUNOHISTOCHEM/MANUAL: CPT | Performed by: THORACIC SURGERY (CARDIOTHORACIC VASCULAR SURGERY)

## 2019-03-20 PROCEDURE — 25000125 ZZHC RX 250: Performed by: STUDENT IN AN ORGANIZED HEALTH CARE EDUCATION/TRAINING PROGRAM

## 2019-03-20 PROCEDURE — 25000128 H RX IP 250 OP 636: Performed by: STUDENT IN AN ORGANIZED HEALTH CARE EDUCATION/TRAINING PROGRAM

## 2019-03-20 RX ORDER — DEXTROSE MONOHYDRATE 25 G/50ML
25-50 INJECTION, SOLUTION INTRAVENOUS
Status: DISCONTINUED | OUTPATIENT
Start: 2019-03-20 | End: 2019-03-20 | Stop reason: HOSPADM

## 2019-03-20 RX ORDER — SODIUM CHLORIDE 9 MG/ML
INJECTION, SOLUTION INTRAVENOUS CONTINUOUS
Status: DISCONTINUED | OUTPATIENT
Start: 2019-03-20 | End: 2019-03-20 | Stop reason: HOSPADM

## 2019-03-20 RX ORDER — FENTANYL CITRATE 50 UG/ML
25-50 INJECTION, SOLUTION INTRAMUSCULAR; INTRAVENOUS EVERY 5 MIN PRN
Status: DISCONTINUED | OUTPATIENT
Start: 2019-03-20 | End: 2019-03-20 | Stop reason: HOSPADM

## 2019-03-20 RX ORDER — NICOTINE POLACRILEX 4 MG
15-30 LOZENGE BUCCAL
Status: DISCONTINUED | OUTPATIENT
Start: 2019-03-20 | End: 2019-03-20 | Stop reason: HOSPADM

## 2019-03-20 RX ORDER — FLUMAZENIL 0.1 MG/ML
0.2 INJECTION, SOLUTION INTRAVENOUS
Status: DISCONTINUED | OUTPATIENT
Start: 2019-03-20 | End: 2019-03-20 | Stop reason: HOSPADM

## 2019-03-20 RX ORDER — LIDOCAINE 40 MG/G
CREAM TOPICAL
Status: DISCONTINUED | OUTPATIENT
Start: 2019-03-20 | End: 2019-03-20 | Stop reason: HOSPADM

## 2019-03-20 RX ORDER — NALOXONE HYDROCHLORIDE 0.4 MG/ML
.1-.4 INJECTION, SOLUTION INTRAMUSCULAR; INTRAVENOUS; SUBCUTANEOUS
Status: DISCONTINUED | OUTPATIENT
Start: 2019-03-20 | End: 2019-03-20 | Stop reason: HOSPADM

## 2019-03-20 RX ADMIN — FENTANYL CITRATE 100 MCG: 50 INJECTION INTRAMUSCULAR; INTRAVENOUS at 10:38

## 2019-03-20 RX ADMIN — SODIUM CHLORIDE: 9 INJECTION, SOLUTION INTRAVENOUS at 09:39

## 2019-03-20 RX ADMIN — LIDOCAINE HYDROCHLORIDE 10 ML: 10 INJECTION, SOLUTION EPIDURAL; INFILTRATION; INTRACAUDAL; PERINEURAL at 10:39

## 2019-03-20 RX ADMIN — MIDAZOLAM 2 MG: 1 INJECTION INTRAMUSCULAR; INTRAVENOUS at 10:39

## 2019-03-20 ASSESSMENT — MIFFLIN-ST. JEOR: SCORE: 1582.17

## 2019-03-20 NOTE — PRE-PROCEDURE
Interventional Radiology Pre-Procedure Sedation Assessment   Time of Assessment: 9:23 AM    Expected Level: Moderate Sedation    Indication: Sedation is required for the following type of Procedure: Biopsy    Sedation and procedural consent: Risks, benefits and alternatives were discussed with Patient    PO Intake: Appropriately NPO for procedure    ASA Class: Class 3 - SEVERE SYSTEMIC DISEASE, DEFINITE FUNCTIONAL LIMITATIONS.    Mallampati: Grade 2:  Soft palate, base of uvula, tonsillar pillars, and portion of posterior pharyngeal wall visible    Lungs: Lungs Clear with good breath sounds bilaterally    Heart: Normal heart sounds and rate    History and physical reviewed and no updates needed. I have reviewed the lab findings, diagnostic data, medications, and the plan for sedation. I have determined this patient to be an appropriate candidate for the planned sedation and procedure and have reassessed the patient IMMEDIATELY PRIOR to sedation and procedure.    Vidal Kohler MD

## 2019-03-20 NOTE — PROGRESS NOTES
Pt up walking, steady on his feet; tolerated PO both food and drink. Dressing is dry and intact. Ice to site, pt reports pain has improved. Voided. IV discontinued, catheter intact. Discharge instructions reviewed with copy to pt. Pt stated understanding. Discharged to home per wheelchair with wife.

## 2019-03-20 NOTE — DISCHARGE INSTRUCTIONS
[facility]    Interventional Radiology  Patient Instructions Following Liver Biopsy    AFTER YOU GO HOME  ? If you were given sedation DO NOT drive or operate machinery at home or at work for at least 24 hours  ? DO relax and take it easy for 48 hours, no strenuous activity for 24 hours  ? DO drink plenty of fluids  ? DO resume your regular diet, unless otherwise instructed by your Primary Physician  ? Keep the dressing dry and in place for 24 hours.  ? DO NOT SMOKE FOR AT LEAST 24 HOURS, if you have been given any medications that were to help you relax or sedate you during your procedure  ? DO NOT drink alcoholic beverages the day of your procedure  ? DO NOT do any strenuous exercise or lifting (> 10 lbs) for at least 7 days following your procedure  ? DO NOT take a bath or shower for at least 12 hours following your procedure  ? Remove dressing after shower the next day. Replace with Band aid for 2 days.  Never leave a wet dressing in place.  ? DO NOT make any important or legal decisions for 24 hours following your procedure  ? There should be minimum drainage from the biopsy site    CALL THE PHYSICIAN IF:  ? You start bleeding from the procedure site.  If you do start to bleed from that site, lie down flat and hold pressure on the site for a minimum of 10 minutes.  Your physician will tell you if you need to return to the hospital  ? You develop nausea or vomiting  ? You have excessive swelling, redness, or tenderness at the site  ? You have drainage that looks like it is infected.  ? You experience severe pain  ? You develop hives or a rash or unexplained itching  ? You develop shortness of breath  ? You develop a temperature of 101 degrees F or greater    [facility] INTERVENTIONAL RADIOLOGY DEPARTMENT  Procedure Physician:   Dr Caceres                                    Date of procedure:   March 20, 2019  Telephone Numbers: [phone] Monday-Friday 8:00 am to 4:30 pm  [phone] After 4:30  pm Monday-Friday, Weekends & Holidays.   Ask for the Interventional Radiologist on call.  Someone is on call 24 hrs/day  [facility] toll free number: [phone] Monday-Friday 8:00 am to 4:30 pm  [facility] Emergency Dept: [phone]

## 2019-03-20 NOTE — BRIEF OP NOTE
Interventional Radiology Brief Post Procedure Note    Procedure: IR LIVER BIOPSY PERCUTANEOUS    Proceduralist: Vidal Kohler MD    Attending: Damian Caceres MD    Time Out: Prior to the start of the procedure and with procedural staff participation, I verbally confirmed the patient s identity using two indicators, relevant allergies, that the procedure was appropriate and matched the consent or emergent situation, and that the correct equipment/implants were available. Immediately prior to starting the procedure I conducted the Time Out with the procedural staff and re-confirmed the patient s name, procedure, and site/side. (The Joint Commission universal protocol was followed.)  Yes    Medications   Medication Event Details Admin User Admin Time   fentaNYL (PF) (SUBLIMAZE) injection 25-50 mcg Medication Given Dose: 100 mcg; Route: Intravenous Kaia Simpson RN 3/20/2019 10:38 AM       Sedation: IR Nurse Monitored Care   Post Procedure Summary:  Prior to the start of the procedure and with procedural staff participation, I verbally confirmed the patient s identity using two indicators, relevant allergies, that the procedure was appropriate and matched the consent or emergent situation, and that the correct equipment/implants were available. Immediately prior to starting the procedure I conducted the Time Out with the procedural staff and re-confirmed the patient s name, procedure, and site/side. (The Joint Commission universal protocol was followed.)  Yes       Sedatives: Fentanyl and Midazolam (Versed)    Vital signs, airway and pulse oximetry were monitored and remained stable throughout the procedure and sedation was maintained until the procedure was complete.  The patient was monitored by staff until sedation discharge criteria were met.    Patient tolerance: Patient tolerated the procedure well with no immediate complications.    Time of sedation in minutes: 30 Minutes minutes from beginning to  end of physician one to one monitoring.          Findings: posterior left hepatic lobe lesion, successful US guided percutaneous biopsy with 8 core tissue samples (6 for pathology, 2 for culture) obtained and submitted for laboratory/pathologic analysis.    Estimated Blood Loss: Minimal    SPECIMENS: None    Complications: 1. Abscess     Condition: Stable    Plan:   Post sedation observation on 2A  Bedrest with bathroom privileges for 2 hours.  Cores to pathology/lab    Comments: See dictated procedure note for full details.    Vidal Kohler MD

## 2019-03-20 NOTE — PROGRESS NOTES
Patient Name: Daniel Sandhu  Medical Record Number: [id number]  Today's Date: 3/20/2019    Procedure: Percutaneous liver biopsy.  Proceduralist: MD. Khadar, MD. Jose F, ARMANDO Driver.    Sedation start time: 1007  Sedation end time: 1040  Sedation medications administered: Fentanyl:100 mcg Versed: 2mg       Procedure start time: 1007  Puncture time: 1010  Procedure end time: 1040    Report given to: MELECIO Mcdaniel  : n/a.    Other Notes: Pt arrived to IR room 6 from . Consent reviewed. Pt denies any questions or concerns regarding procedure. Pt positioned supine and monitored per protocol.  Pathology present for procedure. 6 cores sent to lab and cultures sent to lab. Pt tolerated procedure without any noted complications. Pt transferred back to .    Kaia Simpson RN

## 2019-03-20 NOTE — IP AVS SNAPSHOT
[address] [address]                                    After Visit Summary   3/20/2019    Daniel Sandhu    MRN: [id number]           After Visit Summary Signature Page    I have received my discharge instructions, and my questions have been answered. I have discussed any challenges I see with this plan with the nurse or doctor.    ..........................................................................................................................................  Patient/Patient Representative Signature      ..........................................................................................................................................  Patient Representative Print Name and Relationship to Patient    ..................................................               ................................................  Date                                   Time    ..........................................................................................................................................  Reviewed by Signature/Title    ...................................................              ..............................................  Date                                               Time          22EPIC Rev 08/18

## 2019-03-20 NOTE — PROGRESS NOTES
Prep and teaching complete for Liver Bx; Wife, Violeta at bedside, will drive pt home, phone:  [phone]. Pt awake and alert; reports ache pain left lower abdomen. Consent current; H and P is current. IV in place. Pt ready, IR notified.

## 2019-03-21 LAB
BACTERIA SPEC CULT: NORMAL
SPECIMEN SOURCE: NORMAL

## 2019-03-22 ENCOUNTER — CARE COORDINATION (OUTPATIENT)
Dept: ONCOLOGY | Facility: CLINIC | Age: 39
End: 2019-03-22

## 2019-03-22 ENCOUNTER — TELEPHONE (OUTPATIENT)
Dept: SURGERY | Facility: CLINIC | Age: 39
End: 2019-03-22

## 2019-03-22 DIAGNOSIS — C79.9 METASTASIS FROM ESOPHAGEAL CANCER (H): ICD-10-CM

## 2019-03-22 DIAGNOSIS — C15.9 METASTASIS FROM ESOPHAGEAL CANCER (H): ICD-10-CM

## 2019-03-22 DIAGNOSIS — C15.5 MALIGNANT NEOPLASM OF LOWER THIRD OF ESOPHAGUS (H): Primary | ICD-10-CM

## 2019-03-22 NOTE — PROGRESS NOTES
MyChart message sent to pt re: need to coordinate lab/PET next week and 4/1 MD visit, inbasket message sent to [facility] scheduling pool re: same.

## 2019-03-22 NOTE — TELEPHONE ENCOUNTER
Call to Daniel to discuss liver pathology results. Daniel was at home and verbally consented to discussing results over the phone. We discussed the results which are positive for metastatic disease from his esophageal cancer. While he is understandably upset by this news, he did tell me that he was expecting this to be the case.    I did let him know that I spoke with his oncologist, Dr. Hood to make sure she was aware of the pathology. She and her nurse, Nannette, are working to get Daniel in to see her with a PET/CT as soon as possible.    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 I will share this information with Dr. Hood and Nannette.    Daniel verbalized understanding of the plan and stated his questions were answered at this time. He does have our contact information should anything come up and will reach out if needed.

## 2019-03-25 LAB
BACTERIA SPEC CULT: NO GROWTH
SPECIMEN SOURCE: NORMAL

## 2019-03-26 LAB — COPATH REPORT: NORMAL

## 2019-03-27 LAB
BACTERIA SPEC CULT: NORMAL
SPECIMEN SOURCE: NORMAL

## 2019-03-29 ENCOUNTER — HOSPITAL ENCOUNTER (OUTPATIENT)
Dept: PET IMAGING | Facility: CLINIC | Age: 39
Discharge: HOME | End: 2019-03-29
Attending: INTERNAL MEDICINE | Admitting: INTERNAL MEDICINE
Payer: COMMERCIAL

## 2019-03-29 ENCOUNTER — HOSPITAL ENCOUNTER (OUTPATIENT)
Dept: PET IMAGING | Facility: CLINIC | Age: 39
End: 2019-03-29
Attending: INTERNAL MEDICINE
Payer: COMMERCIAL

## 2019-03-29 DIAGNOSIS — C79.9 METASTASIS FROM ESOPHAGEAL CANCER (H): ICD-10-CM

## 2019-03-29 DIAGNOSIS — C15.9 METASTASIS FROM ESOPHAGEAL CANCER (H): ICD-10-CM

## 2019-03-29 DIAGNOSIS — C15.5 MALIGNANT NEOPLASM OF LOWER THIRD OF ESOPHAGUS (H): ICD-10-CM

## 2019-03-29 LAB — GLUCOSE BLDC GLUCOMTR-MCNC: 98 MG/DL (ref 70–99)

## 2019-03-29 PROCEDURE — 40000929 ZZHCL STATISTIC FOUNDATION ONE GENE PANEL: Performed by: INTERNAL MEDICINE

## 2019-03-29 PROCEDURE — 82962 GLUCOSE BLOOD TEST: CPT

## 2019-03-29 PROCEDURE — A9552 F18 FDG: HCPCS | Performed by: INTERNAL MEDICINE

## 2019-03-29 PROCEDURE — 25000128 H RX IP 250 OP 636: Performed by: INTERNAL MEDICINE

## 2019-03-29 PROCEDURE — 70491 CT SOFT TISSUE NECK W/DYE: CPT

## 2019-03-29 PROCEDURE — 34300033 ZZH RX 343: Performed by: INTERNAL MEDICINE

## 2019-03-29 PROCEDURE — 78816 PET IMAGE W/CT FULL BODY: CPT | Mod: PS

## 2019-03-29 PROCEDURE — 71260 CT THORAX DX C+: CPT

## 2019-03-29 RX ORDER — IOPAMIDOL 755 MG/ML
45-135 INJECTION, SOLUTION INTRAVASCULAR ONCE
Status: COMPLETED | OUTPATIENT
Start: 2019-03-29 | End: 2019-03-29

## 2019-03-29 RX ADMIN — IOPAMIDOL 91 ML: 755 INJECTION, SOLUTION INTRAVENOUS at 08:18

## 2019-03-29 RX ADMIN — FLUDEOXYGLUCOSE F-18 10.24 MCI.: 500 INJECTION, SOLUTION INTRAVENOUS at 07:30

## 2019-04-01 ENCOUNTER — CARE COORDINATION (OUTPATIENT)
Dept: ONCOLOGY | Facility: CLINIC | Age: 39
End: 2019-04-01

## 2019-04-01 ENCOUNTER — ONCOLOGY VISIT (OUTPATIENT)
Dept: ONCOLOGY | Facility: CLINIC | Age: 39
End: 2019-04-01
Attending: INTERNAL MEDICINE
Payer: COMMERCIAL

## 2019-04-01 VITALS
OXYGEN SATURATION: 99 % | RESPIRATION RATE: 16 BRPM | WEIGHT: 148 LBS | SYSTOLIC BLOOD PRESSURE: 111 MMHG | DIASTOLIC BLOOD PRESSURE: 59 MMHG | HEART RATE: 81 BPM | BODY MASS INDEX: 21.92 KG/M2 | HEIGHT: 69 IN | TEMPERATURE: 98.3 F

## 2019-04-01 DIAGNOSIS — C15.5 MALIGNANT NEOPLASM OF LOWER THIRD OF ESOPHAGUS (H): Primary | ICD-10-CM

## 2019-04-01 DIAGNOSIS — C15.5 MALIGNANT NEOPLASM OF LOWER THIRD OF ESOPHAGUS (H): ICD-10-CM

## 2019-04-01 LAB
ALBUMIN SERPL-MCNC: 4.1 G/DL (ref 3.4–5)
ALP SERPL-CCNC: 80 U/L (ref 40–150)
ALT SERPL W P-5'-P-CCNC: 25 U/L (ref 0–70)
ANION GAP SERPL CALCULATED.3IONS-SCNC: 4 MMOL/L (ref 3–14)
AST SERPL W P-5'-P-CCNC: 20 U/L (ref 0–45)
BASOPHILS # BLD AUTO: 0.1 10E9/L (ref 0–0.2)
BASOPHILS NFR BLD AUTO: 0.9 %
BILIRUB SERPL-MCNC: 0.4 MG/DL (ref 0.2–1.3)
BUN SERPL-MCNC: 20 MG/DL (ref 7–30)
CALCIUM SERPL-MCNC: 9.3 MG/DL (ref 8.5–10.1)
CHLORIDE SERPL-SCNC: 104 MMOL/L (ref 94–109)
CO2 SERPL-SCNC: 28 MMOL/L (ref 20–32)
CREAT SERPL-MCNC: 0.68 MG/DL (ref 0.66–1.25)
DIFFERENTIAL METHOD BLD: NORMAL
EOSINOPHIL # BLD AUTO: 0.2 10E9/L (ref 0–0.7)
EOSINOPHIL NFR BLD AUTO: 2.9 %
ERYTHROCYTE [DISTWIDTH] IN BLOOD BY AUTOMATED COUNT: 12.3 % (ref 10–15)
FERRITIN SERPL-MCNC: 39 NG/ML (ref 26–388)
GFR SERPL CREATININE-BSD FRML MDRD: >90 ML/MIN/{1.73_M2}
GLUCOSE SERPL-MCNC: 86 MG/DL (ref 70–99)
HCT VFR BLD AUTO: 41.1 % (ref 40–53)
HGB BLD-MCNC: 13.6 G/DL (ref 13.3–17.7)
IMM GRANULOCYTES # BLD: 0 10E9/L (ref 0–0.4)
IMM GRANULOCYTES NFR BLD: 0.3 %
IRON SATN MFR SERPL: 32 % (ref 15–46)
IRON SERPL-MCNC: 114 UG/DL (ref 35–180)
LYMPHOCYTES # BLD AUTO: 1.3 10E9/L (ref 0.8–5.3)
LYMPHOCYTES NFR BLD AUTO: 22.7 %
MCH RBC QN AUTO: 28.9 PG (ref 26.5–33)
MCHC RBC AUTO-ENTMCNC: 33.1 G/DL (ref 31.5–36.5)
MCV RBC AUTO: 87 FL (ref 78–100)
MONOCYTES # BLD AUTO: 0.5 10E9/L (ref 0–1.3)
MONOCYTES NFR BLD AUTO: 8.8 %
NEUTROPHILS # BLD AUTO: 3.7 10E9/L (ref 1.6–8.3)
NEUTROPHILS NFR BLD AUTO: 64.4 %
NRBC # BLD AUTO: 0 10*3/UL
NRBC BLD AUTO-RTO: 0 /100
PLATELET # BLD AUTO: 189 10E9/L (ref 150–450)
POTASSIUM SERPL-SCNC: 4.2 MMOL/L (ref 3.4–5.3)
PROT SERPL-MCNC: 7.7 G/DL (ref 6.8–8.8)
RBC # BLD AUTO: 4.71 10E12/L (ref 4.4–5.9)
SODIUM SERPL-SCNC: 137 MMOL/L (ref 133–144)
TIBC SERPL-MCNC: 356 UG/DL (ref 240–430)
VIT B12 SERPL-MCNC: 510 PG/ML (ref 193–986)
WBC # BLD AUTO: 5.8 10E9/L (ref 4–11)

## 2019-04-01 PROCEDURE — 83550 IRON BINDING TEST: CPT | Performed by: INTERNAL MEDICINE

## 2019-04-01 PROCEDURE — 85025 COMPLETE CBC W/AUTO DIFF WBC: CPT | Performed by: INTERNAL MEDICINE

## 2019-04-01 PROCEDURE — 99215 OFFICE O/P EST HI 40 MIN: CPT | Mod: ZP | Performed by: INTERNAL MEDICINE

## 2019-04-01 PROCEDURE — 82607 VITAMIN B-12: CPT | Performed by: INTERNAL MEDICINE

## 2019-04-01 PROCEDURE — 82728 ASSAY OF FERRITIN: CPT | Performed by: INTERNAL MEDICINE

## 2019-04-01 PROCEDURE — 83540 ASSAY OF IRON: CPT | Performed by: INTERNAL MEDICINE

## 2019-04-01 PROCEDURE — G0463 HOSPITAL OUTPT CLINIC VISIT: HCPCS | Mod: ZF

## 2019-04-01 PROCEDURE — 80053 COMPREHEN METABOLIC PANEL: CPT | Performed by: INTERNAL MEDICINE

## 2019-04-01 PROCEDURE — 36415 COLL VENOUS BLD VENIPUNCTURE: CPT | Performed by: INTERNAL MEDICINE

## 2019-04-01 RX ORDER — METHYLPREDNISOLONE SODIUM SUCCINATE 125 MG/2ML
125 INJECTION, POWDER, LYOPHILIZED, FOR SOLUTION INTRAMUSCULAR; INTRAVENOUS
Status: CANCELLED
Start: 2019-04-10

## 2019-04-01 RX ORDER — DIPHENHYDRAMINE HCL 25 MG
50 CAPSULE ORAL ONCE
Status: CANCELLED
Start: 2019-04-24

## 2019-04-01 RX ORDER — SODIUM CHLORIDE 9 MG/ML
1000 INJECTION, SOLUTION INTRAVENOUS CONTINUOUS PRN
Status: CANCELLED
Start: 2019-04-24

## 2019-04-01 RX ORDER — LORAZEPAM 2 MG/ML
0.5 INJECTION INTRAMUSCULAR EVERY 4 HOURS PRN
Status: CANCELLED
Start: 2019-04-17

## 2019-04-01 RX ORDER — LORAZEPAM 2 MG/ML
0.5 INJECTION INTRAMUSCULAR EVERY 4 HOURS PRN
Status: CANCELLED
Start: 2019-04-24

## 2019-04-01 RX ORDER — ALBUTEROL SULFATE 0.83 MG/ML
2.5 SOLUTION RESPIRATORY (INHALATION)
Status: CANCELLED | OUTPATIENT
Start: 2019-04-24

## 2019-04-01 RX ORDER — ALBUTEROL SULFATE 90 UG/1
1-2 AEROSOL, METERED RESPIRATORY (INHALATION)
Status: CANCELLED
Start: 2019-04-10

## 2019-04-01 RX ORDER — SODIUM CHLORIDE 9 MG/ML
1000 INJECTION, SOLUTION INTRAVENOUS CONTINUOUS PRN
Status: CANCELLED
Start: 2019-04-17

## 2019-04-01 RX ORDER — DIPHENHYDRAMINE HYDROCHLORIDE 50 MG/ML
50 INJECTION INTRAMUSCULAR; INTRAVENOUS
Status: CANCELLED
Start: 2019-04-10

## 2019-04-01 RX ORDER — DIPHENHYDRAMINE HYDROCHLORIDE 50 MG/ML
50 INJECTION INTRAMUSCULAR; INTRAVENOUS
Status: CANCELLED
Start: 2019-04-17

## 2019-04-01 RX ORDER — ALBUTEROL SULFATE 0.83 MG/ML
2.5 SOLUTION RESPIRATORY (INHALATION)
Status: CANCELLED | OUTPATIENT
Start: 2019-04-10

## 2019-04-01 RX ORDER — ALBUTEROL SULFATE 90 UG/1
1-2 AEROSOL, METERED RESPIRATORY (INHALATION)
Status: CANCELLED
Start: 2019-04-17

## 2019-04-01 RX ORDER — EPINEPHRINE 0.3 MG/.3ML
0.3 INJECTION SUBCUTANEOUS EVERY 5 MIN PRN
Status: CANCELLED | OUTPATIENT
Start: 2019-04-17

## 2019-04-01 RX ORDER — MEPERIDINE HYDROCHLORIDE 25 MG/ML
25 INJECTION INTRAMUSCULAR; INTRAVENOUS; SUBCUTANEOUS EVERY 30 MIN PRN
Status: CANCELLED | OUTPATIENT
Start: 2019-04-24

## 2019-04-01 RX ORDER — DIPHENHYDRAMINE HYDROCHLORIDE 50 MG/ML
50 INJECTION INTRAMUSCULAR; INTRAVENOUS
Status: CANCELLED
Start: 2019-04-24

## 2019-04-01 RX ORDER — METHYLPREDNISOLONE SODIUM SUCCINATE 125 MG/2ML
125 INJECTION, POWDER, LYOPHILIZED, FOR SOLUTION INTRAMUSCULAR; INTRAVENOUS
Status: CANCELLED
Start: 2019-04-24

## 2019-04-01 RX ORDER — EPINEPHRINE 1 MG/ML
0.3 INJECTION, SOLUTION INTRAMUSCULAR; SUBCUTANEOUS EVERY 5 MIN PRN
Status: CANCELLED | OUTPATIENT
Start: 2019-04-17

## 2019-04-01 RX ORDER — ALBUTEROL SULFATE 0.83 MG/ML
2.5 SOLUTION RESPIRATORY (INHALATION)
Status: CANCELLED | OUTPATIENT
Start: 2019-04-17

## 2019-04-01 RX ORDER — EPINEPHRINE 0.3 MG/.3ML
0.3 INJECTION SUBCUTANEOUS EVERY 5 MIN PRN
Status: CANCELLED | OUTPATIENT
Start: 2019-04-24

## 2019-04-01 RX ORDER — MEPERIDINE HYDROCHLORIDE 25 MG/ML
25 INJECTION INTRAMUSCULAR; INTRAVENOUS; SUBCUTANEOUS EVERY 30 MIN PRN
Status: CANCELLED | OUTPATIENT
Start: 2019-04-17

## 2019-04-01 RX ORDER — LORAZEPAM 2 MG/ML
0.5 INJECTION INTRAMUSCULAR EVERY 4 HOURS PRN
Status: CANCELLED
Start: 2019-04-10

## 2019-04-01 RX ORDER — DIPHENHYDRAMINE HCL 25 MG
50 CAPSULE ORAL ONCE
Status: CANCELLED
Start: 2019-04-10

## 2019-04-01 RX ORDER — METHYLPREDNISOLONE SODIUM SUCCINATE 125 MG/2ML
125 INJECTION, POWDER, LYOPHILIZED, FOR SOLUTION INTRAMUSCULAR; INTRAVENOUS
Status: CANCELLED
Start: 2019-04-17

## 2019-04-01 RX ORDER — EPINEPHRINE 1 MG/ML
0.3 INJECTION, SOLUTION INTRAMUSCULAR; SUBCUTANEOUS EVERY 5 MIN PRN
Status: CANCELLED | OUTPATIENT
Start: 2019-04-24

## 2019-04-01 RX ORDER — ALBUTEROL SULFATE 90 UG/1
1-2 AEROSOL, METERED RESPIRATORY (INHALATION)
Status: CANCELLED
Start: 2019-04-24

## 2019-04-01 RX ORDER — EPINEPHRINE 0.3 MG/.3ML
0.3 INJECTION SUBCUTANEOUS EVERY 5 MIN PRN
Status: CANCELLED | OUTPATIENT
Start: 2019-04-10

## 2019-04-01 RX ORDER — SODIUM CHLORIDE 9 MG/ML
1000 INJECTION, SOLUTION INTRAVENOUS CONTINUOUS PRN
Status: CANCELLED
Start: 2019-04-10

## 2019-04-01 RX ORDER — DIPHENHYDRAMINE HCL 25 MG
50 CAPSULE ORAL ONCE
Status: CANCELLED
Start: 2019-04-17

## 2019-04-01 RX ORDER — EPINEPHRINE 1 MG/ML
0.3 INJECTION, SOLUTION INTRAMUSCULAR; SUBCUTANEOUS EVERY 5 MIN PRN
Status: CANCELLED | OUTPATIENT
Start: 2019-04-10

## 2019-04-01 RX ORDER — MEPERIDINE HYDROCHLORIDE 25 MG/ML
25 INJECTION INTRAMUSCULAR; INTRAVENOUS; SUBCUTANEOUS EVERY 30 MIN PRN
Status: CANCELLED | OUTPATIENT
Start: 2019-04-10

## 2019-04-01 ASSESSMENT — PAIN SCALES - GENERAL: PAINLEVEL: NO PAIN (0)

## 2019-04-01 ASSESSMENT — MIFFLIN-ST. JEOR: SCORE: 1586.7

## 2019-04-01 NOTE — LETTER
4/1/2019       RE: Daniel Sandhu  [address] [facility] [address]     Dear Colleague,    Thank you for referring your patient, Daniel Sandhu, to the [facility]. Please see a copy of my visit note below.    [facility]    Hematology/Oncology Established Patient Note      Today's Date: 4/01/19    Reason for Follow-up: adenocarcinoma of the GE junction      HISTORY OF PRESENT ILLNESS: Daniel Sandhu is a 38 year old male who presents with adenocarcinoma of the GE junction.  In early 2017, patient started noticing difficulty swallowing, and that food seems to take longer to go down.  It became more frequent, and he saw his PCP, and was referred for EGD, which showed an esophageal mass located at the GE junction, measuring 4 cm.  Biopsy showed poorly differentiated adenocarcinoma.  HER-2 was sent and is equivocal (2+).  CT c/a/p showed a mildly prominent lymph node in the gastrohepatic ligament, but there were no pathologically enlarged lymph nodes in the chest, abdomen, or pelvis.  There was otherwise no evidence of metastatic disease.  PET-CT showed thickening of the distal esophagus consistent with known esophageal adenocarcinoma, as well as mildly hypermetabolic 1 cm lymph node in the gastrohepatic ligament.  There was no evidence of distant metastatic disease.  He underwent EUS on 6/9/17, which showed the esophageal tumor in the lower third of the esophagus, staged T2NX.  There were 2 abnormal lymph nodes seen in the gastrohepatic ligament; pathology was suspicious for adenocarcinoma.  Celiac node was sampled as well, which was benign.  He was seen by surgery, Dr. Esteban, and recommended srinivas-operative chemotherapy.    Port was placed on 6/22/17.  It was removed on 3/19/18.    He underwent chemotherapy with epirubicin, oxaliplatin, and capecitabine x 3 cycles 6/26/17-8/7/17.      On 11/3/17, he underwent laparotomy with total gastrectomy and abdominal  lymphadenectomy, left thoracotomy with distal esophagectomy and intrathoracic Terrell-en-Y esophagojejunostomy, feeding jejunostomy, left pharyngostomy tube placement, right tube thoracostomy, and flexible bronchoscopy.  On 11/9/17, he was taken back to OR for I&D of pharyngostomy tube abscess.  Pathology showed adenocarcinoma, moderate differentiated, extensive residual tumor with no evidence tumor regression, margins negative, perineural invasion present, 1 of 28 lymph nodes were involved with malignancy, stage [id number] (stage IIIA).  HER-2 is non-amplified.    He resumed chemotherapy post-surgery, with plans to complete 3 more cycles, with 5-FU, epirubicin, oxaliplatin.  However, he only completed 2 more cycles, due to poor tolerance.  He was admitted to the hospital 1/9/18-1/13/18 for nausea, diarrhea, failure to thrive, severe malnutrition, generalized weakness.  His infusional chemotherapy was stopped on 1/8/18.  He underwent EGD in the hospital on 1/11/18, which showed ulcers, and no evidence of recurrence of his cancer.  One area biopsied showed inflammation, no evidence of malignancy.    He saw Dr. Esteban on 3/6/19 and had CT abdomen/pelvis done, which showed a 3.1 cm lesion in hepatic segment 3.  He underwent biopsy on 3/20/19 by IR, which showed moderately differentiated adenocarcinoma consistent with metastasis from primary esophageal carcinoma.  HER-2 is non-amplified.  Restaging PET scan on 3/29/19 showed the 4.4 cm left lob liver metastasis.  There is nodular foci of hypermetabolic uptake in the left mid abdomen in relation to the small bowel loops, without definite underlying lesion on CT.  This is favored to represent metastatic disease unless proven otherwise.      INTERIM HISTORY: Daniel comes in for follow-up today.  He says that he has actually been feeling fine physically.  He had gone in to see Dr. Esteban in March 2019 due to pain around his former feeding tube site.  That actually resolved on  its own completely.  However, on the CT scan, there showed a liver lesion that was concerning with metastatic disease.  It was biopsied and confirmed metastatic esophagus cancer.  He says that his appetite has been good.  He denies nausea/vomiting.        REVIEW OF SYSTEMS:   14 point ROS was reviewed and is negative other than as noted above in HPI.       HOME MEDICATIONS:  Current Outpatient Medications   Medication Sig Dispense Refill     cyabnocobalamin (VITAMIN B-12) 2500 MCG sublingual tablet Place 2,500 mcg under the tongue daily 30 tablet 1     ferrous gluconate (FERGON) 324 (38 Fe) MG tablet Take 324 mg by mouth daily (with breakfast)       multivitamin, therapeutic with minerals (MULTI-VITAMIN) TABS tablet Take 1 tablet by mouth daily Generic [place] Vitamin           ALLERGIES:  No Known Allergies      PAST MEDICAL HISTORY:  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) 6/5/2017         PAST SURGICAL HISTORY:  Past Surgical History:   Procedure Laterality Date     ESOPHAGOGASTRODUODENOSCOPY       ESOPHAGOSCOPY, GASTROSCOPY, DUODENOSCOPY (EGD), COMBINED N/A 6/9/2017    Procedure: COMBINED ENDOSCOPIC ULTRASOUND, ESOPHAGOSCOPY, GASTROSCOPY, DUODENOSCOPY (EGD), FINE NEEDLE ASPIRATE/BIOPSY;  Upper Endoscopic Ultrasound, fine needle aspirate/biopsy;  Surgeon: Guru Mark Avila MD;  Location: [facility] OR     ESOPHAGOSCOPY, GASTROSCOPY, DUODENOSCOPY (EGD), COMBINED N/A 11/3/2017    Procedure: COMBINED ESOPHAGOSCOPY, GASTROSCOPY, DUODENOSCOPY (EGD);;  Surgeon: Yunior Esteban MD;  Location: [facility] OR     ESOPHAGOSCOPY, GASTROSCOPY, DUODENOSCOPY (EGD), COMBINED N/A 11/9/2017    Procedure: COMBINED ESOPHAGOSCOPY, GASTROSCOPY, DUODENOSCOPY (EGD);  Esophogastroduodenoscopy, take out pharangostomy tube;  Surgeon: Yunior Esteban MD;  Location: [facility] OR     ESOPHAGOSCOPY, GASTROSCOPY, DUODENOSCOPY (EGD), COMBINED N/A 1/11/2018    Procedure:  COMBINED ESOPHAGOSCOPY, GASTROSCOPY, DUODENOSCOPY (EGD), BIOPSY SINGLE OR MULTIPLE;  COMBINED ESOPHAGOSCOPY, GASTROSCOPY, DUODENOSCOPY (EGD);  Surgeon: Alessandro Mcgregor MD;  Location: [facility] GI     GASTRECTOMY N/A 11/3/2017    Procedure: GASTRECTOMY;;  Surgeon: Yunior Esteban MD;  Location: [facility] OR     HAND SURGERY      childhood, torn tendon     INSERT PORT VASCULAR ACCESS Right 6/22/2017    Procedure: INSERT PORT VASCULAR ACCESS;  Single Lumen Chest Power Port;  Surgeon: Iván Driver PA-C;  Location: [facility] OR     IR LIVER BIOPSY PERCUTANEOUS  3/20/2019     LAPAROSCOPY DIAGNOSTIC (GENERAL) N/A 11/3/2017    Procedure: LAPAROSCOPY DIAGNOSTIC (GENERAL);  diagnostic laparoscopy, right chest tube, total gastrectomy with distal esophagectomy, intrathoracic eveline-y esophago-jejunostomy, feeding jejunostomy, pharyngostomy, esophagogastroduodenoscopy, flexible bronchoscopy;  Surgeon: Yunior Esteban MD;  Location: [facility] OR     LAPAROTOMY EXPLORATORY N/A 11/3/2017    Procedure: LAPAROTOMY EXPLORATORY;;  Surgeon: Yunior Esteban MD;  Location: [facility]     PHARYNGOSTOMY N/A 11/3/2017    Procedure: PHARYNGOSTOMY;;  Surgeon: Yunior Esteban MD;  Location: [facility] OR     REMOVE PORT VASCULAR ACCESS Right 3/19/2018    Procedure: REMOVE PORT VASCULAR ACCESS;  Right Port Removal;  Surgeon: Krysten Hopper PA-C;  [facility]     THORACOTOMY Left 11/3/2017    Procedure: THORACOTOMY;;  Surgeon: Yunior Esteban MD;  Location: [facility] OR         SOCIAL HISTORY:  Social History     Socioeconomic History     Marital status:      Spouse name: Not on file     Number of children: 1     Years of education: Not on file     Highest education level: Not on file   Occupational History     Occupation: musician and teacher    Social Needs     Financial resource strain: Not on file     Food insecurity:     Worry: Not on file     Inability: Not on file     Transportation needs:     Medical: Not on file  "    Non-medical: Not on file   Tobacco Use     Smoking status: Never Smoker     Smokeless tobacco: Never Used   Substance and Sexual Activity     Alcohol use: Yes     Comment: 1 beer daily     Drug use: Yes     Types: Marijuana     Comment: occasional     Sexual activity: Yes     Partners: Female     Birth control/protection: Natural Family Planning   Lifestyle     Physical activity:     Days per week: Not on file     Minutes per session: Not on file     Stress: Not on file   Relationships     Social connections:     Talks on phone: Not on file     Gets together: Not on file     Attends [employer] service: Not on file     Active member of club or organization: Not on file     Attends meetings of clubs or organizations: Not on file     Relationship status: Not on file     Intimate partner violence:     Fear of current or ex partner: Not on file     Emotionally abused: Not on file     Physically abused: Not on file     Forced sexual activity: Not on file   Other Topics Concern     Parent/sibling w/ CABG, MI or angioplasty before 65F 55M? 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  He denies smoking.  He drinks ~1 beer a day.  He denies illicit drug use, other than occasional marijuana.  He lives in [place] with his wife, and 4.5 year-old daughter.  His wife is currently pregnant with a boy, and is due in 6 weeks.  He has a half sister who  of breast cancer at age 32; she was diagnosed in her late 20's.  A paternal grandmother had breast cancer in her 70's      FAMILY HISTORY:  Family History   Problem Relation Age of Onset     Other - See Comments Father         sepsis     Cancer Sister         breast cancer  29 yo 1/2 sister      Cancer Paternal Grandmother         breast         PHYSICAL EXAM:  Vital signs:  /59   Pulse 81   Temp 98.3  F (36.8  C) (Oral)   Resp 16   Ht 1.753 m (5' 9\")   Wt " 67.1 kg (148 lb)   SpO2 99%   BMI 21.86 kg/m      ECO  GENERAL/CONSTITUTIONAL: No acute distress.   Accompanied by wife.  EYES: No scleral icterus.  RESPIRATORY: Clear to auscultation bilaterally.  CARDIOVASCULAR: Regular rate and rhythm.  GASTROINTESTINAL: Bowel sounds present.  Non-tender.  No distention.    MUSCULOSKELETAL: Warm and well-perfused, no cyanosis, clubbing, or edema.  NEUROLOGIC: Alert, oriented, answers questions appropriately.  INTEGUMENTARY: No jaundice.  Port has been removed.      LABS:  CBC RESULTS:   Recent Labs   Lab Test 19  1532   WBC 5.8   RBC 4.71   HGB 13.6   HCT 41.1   MCV 87   MCH 28.9   MCHC 33.1   RDW 12.3        Recent Labs   Lab Test 19  1532 18  0725    137   POTASSIUM 4.2 4.7   CHLORIDE 104 104   CO2 28 27   ANIONGAP 4 6   GLC 86 87   BUN 20 21   CR 0.68 0.75   YOBANY 9.3 8.2*     Lab Results   Component Value Date    AST 20 2019     Lab Results   Component Value Date    ALT 25 2019     No results found for: BILICONJ   Lab Results   Component Value Date    BILITOTAL 0.4 2019     Lab Results   Component Value Date    ALBUMIN 4.1 2019     Lab Results   Component Value Date    PROTTOTAL 7.7 2019      Lab Results   Component Value Date    ALKPHOS 80 2019       PATHOLOGY:  3/20/19:  FINAL DIAGNOSIS:   Liver, needle biopsy   - Moderately differentiated adenocarcinoma consistent with metastasis from    primary esophageal adenocarcinoma.     HER-2 non-amplified.      IMAGING:  PET-CT 3/29/19:  1. See dedicated neuroradiology report for the results of the high  resolution PET CT of the neck.   2. 4.4 cm left lobe liver metastasis.  3. Nodular foci of hypermetabolic uptake in the left mid abdomen in  relation to the small bowel loops, without definite underlying lesion  on CT. This is favored to represent metastatic disease unless proven  otherwise.  4. Other incidental findings as described in the body.      CT neck  3/29/19:  1. On the fusion PET CT, there is no abnormal metabolic activity in  the mucosal space, soft tissues, or cervical aris chains.   2. No evidence of mucosal, aris, or soft tissue abnormality on  contrast enhanced neck CT.  3. Please refer to the whole body PET CT performed as a separate  report, for the findings of the remainder of the body.      ASSESSMENT/PLAN:  Daniel Sandhu is a 38 year old male with:    1) Adenocarcinoma of the GE junction: now s/p 3 cycles of neoadjuvant chemotherapy with EOX, followed by surgical resection on 11/3/17.  Pathology showed adenocarcinoma, moderate differentiated, extensive residual tumor with no evidence tumor regression, margins negative, perineural invasion present, 1 of 28 lymph nodes were involved with malignancy, stage [id number] (stage IIIA).  HER-2 is non-amplified.    He has received EOF x 2 cycles after surgery, and he has not tolerated well.  The 5-FU on cycle 5 was discontinued early. Chemotherapy was stopped at that point due to poor tolerance.    Patient now has biopsy-confirmed metastatic disease to the liver and possibly peritoneum.  He is asymptomatic currently.  We reviewed the PET-CT images on the computer together and reviewed the pathology result.      We discussed next line chemotherapy, but with palliative intent this time.  Daniel was understandably emotional and disappointed, but was not surprised to hear it, as he has been doing research on the internet.  He is motivated to proceed with chemotherapy.      -will plan on next line chemotherapy with paclitaxel+ramucirumab:    Ramucirumab 8 mg/kg on days 1, 15  Paclitaxel 80 mg/m2 on days, 1, 8, 15  Every 28 day cycles    -chemotherapy teaching today  -side effects may include, but not limited to: fever/chills, infection, pain, organ failure, hair loss, fever/chills, fatigue, myelosuppression, anemia, bleeding, neuropathy, nausea/vomiting, diarrhea/constipation, edema, rash  -I discussed the schedule,  regimen, dose, possible side effects, the benefits and risks, and patient agrees to treatment plan.  -port placement by IR  -plan to start chemotherapy next week  -will send tumor for [facility], MSI, PD-L1  -he will see ROMEO on day 15  -I will seen him back in clinic at the start of cycle 2   -will plan to get repeat PET-CT after 3 cycles of chemotherapy to evaluate response    I discussed additional support, including palliative care, , oncology psychology.  Daniel and his wife are understanding having difficulty coping and were teary today.  They opt to wait for now and may be open to additional support later on.      2) Genetics:  -genetic testing was negative    3) Fertility: Daniel and his wife have 2 children, including a baby boy just born around June 2017.  He is not planning for more children in the near future.    4) Nutrition:  His feeding tube had fallen out on its own.  His diet is improving now.     5) Patient talked about medical cannabis, and I offered to certify him, but he says that the program is too expensive for him.          I spent a total of 40 minutes with the patient, with over >50% of the time in counseling and/or coordination of care.       Laurence Hood MD  Hematology/Oncology  [facility]

## 2019-04-01 NOTE — NURSING NOTE
"Oncology Rooming Note    April 1, 2019 3:44 PM   Daniel Sandhu is a 38 year old male who presents for:    Chief Complaint   Patient presents with     Oncology Clinic Visit     Esophageal Cancer     Initial Vitals: /59   Pulse 81   Temp 98.3  F (36.8  C) (Oral)   Resp 16   Ht 1.753 m (5' 9\")   Wt 67.1 kg (148 lb)   SpO2 99%   BMI 21.86 kg/m   Estimated body mass index is 21.86 kg/m  as calculated from the following:    Height as of this encounter: 1.753 m (5' 9\").    Weight as of this encounter: 67.1 kg (148 lb). Body surface area is 1.81 meters squared.  No Pain (0) Comment: Data Unavailable   No LMP for male patient.  Allergies reviewed: Yes  Medications reviewed: Yes    Medications: Medication refills not needed today.  Pharmacy name entered into [employer]:    [facility] - [place], [place] - [address] [facility] - [place], [place] - [address]    Clinical concerns: No New Concerns    ADITI Collier  "

## 2019-04-01 NOTE — PROGRESS NOTES
RNCC met with patient and his wife to discuss Pacitaxol and Ramucirumab.  Patient states he already knows quite a bit about this combination from support groups and research he has done on his own.  Writer discussed side effects with patient; he states he is most concerned about diarrhea as that has been his biggest side effect since his gastrectomy.  Per Dr. Hood patient is to continue to try and put weight on.  Patient has been eating a high protein and complex carb diet, states he has met with dietician and has been working on this for 2 years since his gastrectomy.      Patient is scheduled for a port placement as well, he denies any questions or concerns regarding this as he has had a port in the past.      Writer provided patient the via oncology handouts on Pacitaxol (Taxol) and Ramucirumab (Cyramza).  Advised to call Nannette Ovalles his RNCC with any questions or concerns.     Tonya Vanegas RN BSN   [facility]  Nurse Coordinator

## 2019-04-01 NOTE — PROGRESS NOTES
[facility]    Hematology/Oncology Established Patient Note      Today's Date: 4/01/19    Reason for Follow-up: adenocarcinoma of the GE junction      HISTORY OF PRESENT ILLNESS: Daniel Sandhu is a 38 year old male who presents with adenocarcinoma of the GE junction.  In early 2017, patient started noticing difficulty swallowing, and that food seems to take longer to go down.  It became more frequent, and he saw his PCP, and was referred for EGD, which showed an esophageal mass located at the GE junction, measuring 4 cm.  Biopsy showed poorly differentiated adenocarcinoma.  HER-2 was sent and is equivocal (2+).  CT c/a/p showed a mildly prominent lymph node in the gastrohepatic ligament, but there were no pathologically enlarged lymph nodes in the chest, abdomen, or pelvis.  There was otherwise no evidence of metastatic disease.  PET-CT showed thickening of the distal esophagus consistent with known esophageal adenocarcinoma, as well as mildly hypermetabolic 1 cm lymph node in the gastrohepatic ligament.  There was no evidence of distant metastatic disease.  He underwent EUS on 6/9/17, which showed the esophageal tumor in the lower third of the esophagus, staged T2NX.  There were 2 abnormal lymph nodes seen in the gastrohepatic ligament; pathology was suspicious for adenocarcinoma.  Celiac node was sampled as well, which was benign.  He was seen by surgery, Dr. Esteban, and recommended srinivas-operative chemotherapy.    Port was placed on 6/22/17.  It was removed on 3/19/18.    He underwent chemotherapy with epirubicin, oxaliplatin, and capecitabine x 3 cycles 6/26/17-8/7/17.      On 11/3/17, he underwent laparotomy with total gastrectomy and abdominal lymphadenectomy, left thoracotomy with distal esophagectomy and intrathoracic Terrell-en-Y esophagojejunostomy, feeding jejunostomy, left pharyngostomy tube placement, right tube thoracostomy, and flexible bronchoscopy.  On 11/9/17, he was taken back  to OR for I&D of pharyngostomy tube abscess.  Pathology showed adenocarcinoma, moderate differentiated, extensive residual tumor with no evidence tumor regression, margins negative, perineural invasion present, 1 of 28 lymph nodes were involved with malignancy, stage [id number] (stage IIIA).  HER-2 is non-amplified.    He resumed chemotherapy post-surgery, with plans to complete 3 more cycles, with 5-FU, epirubicin, oxaliplatin.  However, he only completed 2 more cycles, due to poor tolerance.  He was admitted to the hospital 1/9/18-1/13/18 for nausea, diarrhea, failure to thrive, severe malnutrition, generalized weakness.  His infusional chemotherapy was stopped on 1/8/18.  He underwent EGD in the hospital on 1/11/18, which showed ulcers, and no evidence of recurrence of his cancer.  One area biopsied showed inflammation, no evidence of malignancy.    He saw Dr. Esteban on 3/6/19 and had CT abdomen/pelvis done, which showed a 3.1 cm lesion in hepatic segment 3.  He underwent biopsy on 3/20/19 by IR, which showed moderately differentiated adenocarcinoma consistent with metastasis from primary esophageal carcinoma.  HER-2 is non-amplified.  Restaging PET scan on 3/29/19 showed the 4.4 cm left lob liver metastasis.  There is nodular foci of hypermetabolic uptake in the left mid abdomen in relation to the small bowel loops, without definite underlying lesion on CT.  This is favored to represent metastatic disease unless proven otherwise.      INTERIM HISTORY: Daniel comes in for follow-up today.  He says that he has actually been feeling fine physically.  He had gone in to see Dr. Esteban in March 2019 due to pain around his former feeding tube site.  That actually resolved on its own completely.  However, on the CT scan, there showed a liver lesion that was concerning with metastatic disease.  It was biopsied and confirmed metastatic esophagus cancer.  He says that his appetite has been good.  He denies  nausea/vomiting.        REVIEW OF SYSTEMS:   14 point ROS was reviewed and is negative other than as noted above in HPI.       HOME MEDICATIONS:  Current Outpatient Medications   Medication Sig Dispense Refill     cyabnocobalamin (VITAMIN B-12) 2500 MCG sublingual tablet Place 2,500 mcg under the tongue daily 30 tablet 1     ferrous gluconate (FERGON) 324 (38 Fe) MG tablet Take 324 mg by mouth daily (with breakfast)       multivitamin, therapeutic with minerals (MULTI-VITAMIN) TABS tablet Take 1 tablet by mouth daily Generic [place] Vitamin           ALLERGIES:  No Known Allergies      PAST MEDICAL HISTORY:  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) 6/5/2017         PAST SURGICAL HISTORY:  Past Surgical History:   Procedure Laterality Date     ESOPHAGOGASTRODUODENOSCOPY       ESOPHAGOSCOPY, GASTROSCOPY, DUODENOSCOPY (EGD), COMBINED N/A 6/9/2017    Procedure: COMBINED ENDOSCOPIC ULTRASOUND, ESOPHAGOSCOPY, GASTROSCOPY, DUODENOSCOPY (EGD), FINE NEEDLE ASPIRATE/BIOPSY;  Upper Endoscopic Ultrasound, fine needle aspirate/biopsy;  Surgeon: Guru Mark Avila MD;  Location: [facility] OR     ESOPHAGOSCOPY, GASTROSCOPY, DUODENOSCOPY (EGD), COMBINED N/A 11/3/2017    Procedure: COMBINED ESOPHAGOSCOPY, GASTROSCOPY, DUODENOSCOPY (EGD);;  Surgeon: Yunior Esteban MD;  Location: [facility] OR     ESOPHAGOSCOPY, GASTROSCOPY, DUODENOSCOPY (EGD), COMBINED N/A 11/9/2017    Procedure: COMBINED ESOPHAGOSCOPY, GASTROSCOPY, DUODENOSCOPY (EGD);  Esophogastroduodenoscopy, take out pharangostomy tube;  Surgeon: Yunior Esteban MD;  Location: [facility] OR     ESOPHAGOSCOPY, GASTROSCOPY, DUODENOSCOPY (EGD), COMBINED N/A 1/11/2018    Procedure: COMBINED ESOPHAGOSCOPY, GASTROSCOPY, DUODENOSCOPY (EGD), BIOPSY SINGLE OR MULTIPLE;  COMBINED ESOPHAGOSCOPY, GASTROSCOPY, DUODENOSCOPY (EGD);  Surgeon: Alessandro Mcgregor MD;  Location: [facility] GI     GASTRECTOMY N/A 11/3/2017     Procedure: GASTRECTOMY;;  Surgeon: Yunior Esteban MD;  Location: [facility] OR     HAND SURGERY      childhood, torn tendon     INSERT PORT VASCULAR ACCESS Right 6/22/2017    Procedure: INSERT PORT VASCULAR ACCESS;  Single Lumen Chest Power Port;  Surgeon: Iván Driver PA-C;  Location: [facility] OR     IR LIVER BIOPSY PERCUTANEOUS  3/20/2019     LAPAROSCOPY DIAGNOSTIC (GENERAL) N/A 11/3/2017    Procedure: LAPAROSCOPY DIAGNOSTIC (GENERAL);  diagnostic laparoscopy, right chest tube, total gastrectomy with distal esophagectomy, intrathoracic eveline-y esophago-jejunostomy, feeding jejunostomy, pharyngostomy, esophagogastroduodenoscopy, flexible bronchoscopy;  Surgeon: Yunior Esteban MD;  Location: [facility]     LAPAROTOMY EXPLORATORY N/A 11/3/2017    Procedure: LAPAROTOMY EXPLORATORY;;  Surgeon: Yunior Esteban MD;  Location: [facility]     PHARYNGOSTOMY N/A 11/3/2017    Procedure: PHARYNGOSTOMY;;  Surgeon: Yunior Esteban MD;  Location: [facility]     REMOVE PORT VASCULAR ACCESS Right 3/19/2018    Procedure: REMOVE PORT VASCULAR ACCESS;  Right Port Removal;  Surgeon: Krysten Hopper PA-C;  Location: [facility]     THORACOTOMY Left 11/3/2017    Procedure: THORACOTOMY;;  Surgeon: Yunior Esteban MD;  Location: [facility] OR         SOCIAL HISTORY:  Social History     Socioeconomic History     Marital status:      Spouse name: Not on file     Number of children: 1     Years of education: Not on file     Highest education level: Not on file   Occupational History     Occupation: musician and teacher    Social Needs     Financial resource strain: Not on file     Food insecurity:     Worry: Not on file     Inability: Not on file     Transportation needs:     Medical: Not on file     Non-medical: Not on file   Tobacco Use     Smoking status: Never Smoker     Smokeless tobacco: Never Used   Substance and Sexual Activity     Alcohol use: Yes     Comment: 1 beer daily     Drug use: Yes     Types: Marijuana  "    Comment: occasional     Sexual activity: Yes     Partners: Female     Birth control/protection: Natural Family Planning   Lifestyle     Physical activity:     Days per week: Not on file     Minutes per session: Not on file     Stress: Not on file   Relationships     Social connections:     Talks on phone: Not on file     Gets together: Not on file     Attends Rastafari service: Not on file     Active member of club or organization: Not on file     Attends meetings of clubs or organizations: Not on file     Relationship status: Not on file     Intimate partner violence:     Fear of current or ex partner: Not on file     Emotionally abused: Not on file     Physically abused: Not on file     Forced sexual activity: Not on file   Other Topics Concern     Parent/sibling w/ CABG, MI or angioplasty before 65F 55M? 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  He denies smoking.  He drinks ~1 beer a day.  He denies illicit drug use, other than occasional marijuana.  He lives in [place] with his wife, and 4.5 year-old daughter.  His wife is currently pregnant with a boy, and is due in 6 weeks.  He has a half sister who  of breast cancer at age 32; she was diagnosed in her late 20's.  A paternal grandmother had breast cancer in her 70's      FAMILY HISTORY:  Family History   Problem Relation Age of Onset     Other - See Comments Father         sepsis     Cancer Sister         breast cancer  29 yo 1/2 sister      Cancer Paternal Grandmother         breast         PHYSICAL EXAM:  Vital signs:  /59   Pulse 81   Temp 98.3  F (36.8  C) (Oral)   Resp 16   Ht 1.753 m (5' 9\")   Wt 67.1 kg (148 lb)   SpO2 99%   BMI 21.86 kg/m     ECO  GENERAL/CONSTITUTIONAL: No acute distress.   Accompanied by wife.  EYES: No scleral icterus.  RESPIRATORY: Clear to auscultation bilaterally.  CARDIOVASCULAR: Regular " rate and rhythm.  GASTROINTESTINAL: Bowel sounds present.  Non-tender.  No distention.    MUSCULOSKELETAL: Warm and well-perfused, no cyanosis, clubbing, or edema.  NEUROLOGIC: Alert, oriented, answers questions appropriately.  INTEGUMENTARY: No jaundice.  Port has been removed.      LABS:  CBC RESULTS:   Recent Labs   Lab Test 04/01/19  1532   WBC 5.8   RBC 4.71   HGB 13.6   HCT 41.1   MCV 87   MCH 28.9   MCHC 33.1   RDW 12.3        Recent Labs   Lab Test 04/01/19  1532 11/16/18  0725    137   POTASSIUM 4.2 4.7   CHLORIDE 104 104   CO2 28 27   ANIONGAP 4 6   GLC 86 87   BUN 20 21   CR 0.68 0.75   YOBANY 9.3 8.2*     Lab Results   Component Value Date    AST 20 04/01/2019     Lab Results   Component Value Date    ALT 25 04/01/2019     No results found for: BILICONJ   Lab Results   Component Value Date    BILITOTAL 0.4 04/01/2019     Lab Results   Component Value Date    ALBUMIN 4.1 04/01/2019     Lab Results   Component Value Date    PROTTOTAL 7.7 04/01/2019      Lab Results   Component Value Date    ALKPHOS 80 04/01/2019       PATHOLOGY:  3/20/19:  FINAL DIAGNOSIS:   Liver, needle biopsy   - Moderately differentiated adenocarcinoma consistent with metastasis from    primary esophageal adenocarcinoma.     HER-2 non-amplified.      IMAGING:  PET-CT 3/29/19:  1. See dedicated neuroradiology report for the results of the high  resolution PET CT of the neck.   2. 4.4 cm left lobe liver metastasis.  3. Nodular foci of hypermetabolic uptake in the left mid abdomen in  relation to the small bowel loops, without definite underlying lesion  on CT. This is favored to represent metastatic disease unless proven  otherwise.  4. Other incidental findings as described in the body.      CT neck 3/29/19:  1. On the fusion PET CT, there is no abnormal metabolic activity in  the mucosal space, soft tissues, or cervical aris chains.   2. No evidence of mucosal, aris, or soft tissue abnormality on  contrast enhanced neck  CT.  3. Please refer to the whole body PET CT performed as a separate  report, for the findings of the remainder of the body.      ASSESSMENT/PLAN:  Daniel Sandhu is a 38 year old male with:    1) Adenocarcinoma of the GE junction: now s/p 3 cycles of neoadjuvant chemotherapy with EOX, followed by surgical resection on 11/3/17.  Pathology showed adenocarcinoma, moderate differentiated, extensive residual tumor with no evidence tumor regression, margins negative, perineural invasion present, 1 of 28 lymph nodes were involved with malignancy, stage [id number] (stage IIIA).  HER-2 is non-amplified.    He has received EOF x 2 cycles after surgery, and he has not tolerated well.  The 5-FU on cycle 5 was discontinued early. Chemotherapy was stopped at that point due to poor tolerance.    Patient now has biopsy-confirmed metastatic disease to the liver and possibly peritoneum.  He is asymptomatic currently.  We reviewed the PET-CT images on the computer together and reviewed the pathology result.      We discussed next line chemotherapy, but with palliative intent this time.  Daniel was understandably emotional and disappointed, but was not surprised to hear it, as he has been doing research on the internet.  He is motivated to proceed with chemotherapy.      -will plan on next line chemotherapy with paclitaxel+ramucirumab:    Ramucirumab 8 mg/kg on days 1, 15  Paclitaxel 80 mg/m2 on days, 1, 8, 15  Every 28 day cycles    -chemotherapy teaching today  -side effects may include, but not limited to: fever/chills, infection, pain, organ failure, hair loss, fever/chills, fatigue, myelosuppression, anemia, bleeding, neuropathy, nausea/vomiting, diarrhea/constipation, edema, rash  -I discussed the schedule, regimen, dose, possible side effects, the benefits and risks, and patient agrees to treatment plan.  -port placement by IR  -plan to start chemotherapy next week  -will send tumor for [facility], MSI, PD-L1  -he will see ROMEO  on day 15  -I will seen him back in clinic at the start of cycle 2   -will plan to get repeat PET-CT after 3 cycles of chemotherapy to evaluate response    I discussed additional support, including palliative care, , oncology psychology.  Daniel and his wife are understanding having difficulty coping and were teary today.  They opt to wait for now and may be open to additional support later on.      2) Genetics:  -genetic testing was negative    3) Fertility: Daniel and his wife have 2 children, including a baby boy just born around June 2017.  He is not planning for more children in the near future.    4) Nutrition:  His feeding tube had fallen out on its own.  His diet is improving now.     5) Patient talked about medical cannabis, and I offered to certify him, but he says that the program is too expensive for him.          I spent a total of 40 minutes with the patient, with over >50% of the time in counseling and/or coordination of care.       Laurence Hood MD  Hematology/Oncology  [facility]

## 2019-04-02 DIAGNOSIS — C15.5 MALIGNANT NEOPLASM OF LOWER THIRD OF ESOPHAGUS (H): ICD-10-CM

## 2019-04-02 LAB — LAB SCANNED RESULT: NORMAL

## 2019-04-05 ENCOUNTER — ANESTHESIA EVENT (OUTPATIENT)
Dept: SURGERY | Facility: AMBULATORY SURGERY CENTER | Age: 39
End: 2019-04-05

## 2019-04-05 ENCOUNTER — RESULTS ONLY (OUTPATIENT)
Dept: ONCOLOGY | Facility: CLINIC | Age: 39
End: 2019-04-05

## 2019-04-08 ENCOUNTER — ANCILLARY PROCEDURE (OUTPATIENT)
Dept: RADIOLOGY | Facility: AMBULATORY SURGERY CENTER | Age: 39
End: 2019-04-08
Attending: INTERNAL MEDICINE
Payer: COMMERCIAL

## 2019-04-08 ENCOUNTER — HOSPITAL ENCOUNTER (OUTPATIENT)
Facility: AMBULATORY SURGERY CENTER | Age: 39
End: 2019-04-08
Attending: PHYSICIAN ASSISTANT
Payer: COMMERCIAL

## 2019-04-08 ENCOUNTER — ANESTHESIA (OUTPATIENT)
Dept: SURGERY | Facility: AMBULATORY SURGERY CENTER | Age: 39
End: 2019-04-08

## 2019-04-08 ENCOUNTER — ALLIED HEALTH/NURSE VISIT (OUTPATIENT)
Dept: CARE COORDINATION | Facility: CLINIC | Age: 39
End: 2019-04-08

## 2019-04-08 ENCOUNTER — MYC MEDICAL ADVICE (OUTPATIENT)
Dept: ONCOLOGY | Facility: CLINIC | Age: 39
End: 2019-04-08

## 2019-04-08 VITALS
SYSTOLIC BLOOD PRESSURE: 94 MMHG | HEIGHT: 69 IN | RESPIRATION RATE: 14 BRPM | WEIGHT: 145 LBS | TEMPERATURE: 97.4 F | BODY MASS INDEX: 21.48 KG/M2 | HEART RATE: 62 BPM | DIASTOLIC BLOOD PRESSURE: 56 MMHG | OXYGEN SATURATION: 100 %

## 2019-04-08 DIAGNOSIS — Z71.9 VISIT FOR COUNSELING: Primary | ICD-10-CM

## 2019-04-08 DIAGNOSIS — C15.5 MALIGNANT NEOPLASM OF LOWER THIRD OF ESOPHAGUS (H): ICD-10-CM

## 2019-04-08 DEVICE — CATH PORT POWERPORT CLEARVUE SLIM 6FR 5616000: Type: IMPLANTABLE DEVICE | Site: CHEST  WALL | Status: FUNCTIONAL

## 2019-04-08 RX ORDER — LIDOCAINE HYDROCHLORIDE 20 MG/ML
INJECTION, SOLUTION INFILTRATION; PERINEURAL PRN
Status: DISCONTINUED | OUTPATIENT
Start: 2019-04-08 | End: 2019-04-08

## 2019-04-08 RX ORDER — ONDANSETRON 2 MG/ML
4 INJECTION INTRAMUSCULAR; INTRAVENOUS EVERY 30 MIN PRN
Status: DISCONTINUED | OUTPATIENT
Start: 2019-04-08 | End: 2019-04-09 | Stop reason: HOSPADM

## 2019-04-08 RX ORDER — MEPERIDINE HYDROCHLORIDE 25 MG/ML
12.5 INJECTION INTRAMUSCULAR; INTRAVENOUS; SUBCUTANEOUS
Status: DISCONTINUED | OUTPATIENT
Start: 2019-04-08 | End: 2019-04-09 | Stop reason: HOSPADM

## 2019-04-08 RX ORDER — PROPOFOL 10 MG/ML
INJECTION, EMULSION INTRAVENOUS PRN
Status: DISCONTINUED | OUTPATIENT
Start: 2019-04-08 | End: 2019-04-08

## 2019-04-08 RX ORDER — ONDANSETRON 4 MG/1
4 TABLET, ORALLY DISINTEGRATING ORAL EVERY 30 MIN PRN
Status: DISCONTINUED | OUTPATIENT
Start: 2019-04-08 | End: 2019-04-09 | Stop reason: HOSPADM

## 2019-04-08 RX ORDER — LIDOCAINE 40 MG/G
CREAM TOPICAL
Status: DISCONTINUED | OUTPATIENT
Start: 2019-04-08 | End: 2019-04-09 | Stop reason: HOSPADM

## 2019-04-08 RX ORDER — PROPOFOL 10 MG/ML
INJECTION, EMULSION INTRAVENOUS CONTINUOUS PRN
Status: DISCONTINUED | OUTPATIENT
Start: 2019-04-08 | End: 2019-04-08

## 2019-04-08 RX ORDER — NALOXONE HYDROCHLORIDE 0.4 MG/ML
.1-.4 INJECTION, SOLUTION INTRAMUSCULAR; INTRAVENOUS; SUBCUTANEOUS
Status: DISCONTINUED | OUTPATIENT
Start: 2019-04-08 | End: 2019-04-09 | Stop reason: HOSPADM

## 2019-04-08 RX ORDER — CEFAZOLIN SODIUM 2 G/50ML
2 SOLUTION INTRAVENOUS
Status: COMPLETED | OUTPATIENT
Start: 2019-04-08 | End: 2019-04-08

## 2019-04-08 RX ORDER — HEPARIN SODIUM (PORCINE) LOCK FLUSH IV SOLN 100 UNIT/ML 100 UNIT/ML
5 SOLUTION INTRAVENOUS
Status: CANCELLED | OUTPATIENT
Start: 2019-04-08

## 2019-04-08 RX ORDER — SODIUM CHLORIDE 9 MG/ML
INJECTION, SOLUTION INTRAVENOUS CONTINUOUS
Status: DISCONTINUED | OUTPATIENT
Start: 2019-04-08 | End: 2019-04-09 | Stop reason: HOSPADM

## 2019-04-08 RX ORDER — SODIUM CHLORIDE, SODIUM LACTATE, POTASSIUM CHLORIDE, CALCIUM CHLORIDE 600; 310; 30; 20 MG/100ML; MG/100ML; MG/100ML; MG/100ML
INJECTION, SOLUTION INTRAVENOUS CONTINUOUS
Status: DISCONTINUED | OUTPATIENT
Start: 2019-04-08 | End: 2019-04-09 | Stop reason: HOSPADM

## 2019-04-08 RX ORDER — FENTANYL CITRATE 50 UG/ML
25-50 INJECTION, SOLUTION INTRAMUSCULAR; INTRAVENOUS
Status: DISCONTINUED | OUTPATIENT
Start: 2019-04-08 | End: 2019-04-09 | Stop reason: HOSPADM

## 2019-04-08 RX ORDER — HEPARIN SODIUM,PORCINE 10 UNIT/ML
5 VIAL (ML) INTRAVENOUS EVERY 24 HOURS
Status: CANCELLED | OUTPATIENT
Start: 2019-04-08

## 2019-04-08 RX ORDER — ACETAMINOPHEN 325 MG/1
975 TABLET ORAL ONCE
Status: COMPLETED | OUTPATIENT
Start: 2019-04-08 | End: 2019-04-08

## 2019-04-08 RX ADMIN — ACETAMINOPHEN 975 MG: 325 TABLET ORAL at 07:43

## 2019-04-08 RX ADMIN — PROPOFOL: 10 INJECTION, EMULSION INTRAVENOUS at 08:59

## 2019-04-08 RX ADMIN — SODIUM CHLORIDE: 9 INJECTION, SOLUTION INTRAVENOUS at 08:32

## 2019-04-08 RX ADMIN — CEFAZOLIN SODIUM 2 G: 2 SOLUTION INTRAVENOUS at 08:50

## 2019-04-08 RX ADMIN — LIDOCAINE HYDROCHLORIDE 50 MG: 20 INJECTION, SOLUTION INFILTRATION; PERINEURAL at 08:43

## 2019-04-08 RX ADMIN — PROPOFOL 125 MCG/KG/MIN: 10 INJECTION, EMULSION INTRAVENOUS at 08:43

## 2019-04-08 RX ADMIN — PROPOFOL 30 MG: 10 INJECTION, EMULSION INTRAVENOUS at 08:52

## 2019-04-08 ASSESSMENT — MIFFLIN-ST. JEOR: SCORE: 1573.1

## 2019-04-08 NOTE — ANESTHESIA POSTPROCEDURE EVALUATION
Anesthesia POST Procedure Evaluation    Patient: Daniel Sandhu   MRN:     [id number] Gender:   male   Age:    38 year old :      1980        Preoperative Diagnosis: Malignant Neoplasm of Lower Third of Esophagus   Procedure(s):  Single Lumen Chest Port Placement   Postop Comments: No value filed.       Anesthesia Type:  MAC    Reportable Event: NO     PAIN: Uncomplicated   Sign Out status: Comfortable, Well controlled pain     PONV: No PONV   Sign Out status:  No Nausea or Vomiting     Neuro/Psych: Uneventful perioperative course   Sign Out Status: Preoperative baseline; Age appropriate mentation     Airway/Resp.: Uneventful perioperative course   Sign Out Status: Non labored breathing, age appropriate RR; Resp. Status within EXPECTED Parameters     CV: Uneventful perioperative course   Sign Out status: Appropriate BP and perfusion indices; Appropriate HR/Rhythm     Disposition:   Sign Out in:  Phase II  Disposition:  Home  Recovery Course: Uneventful  Follow-Up: Not required           Last Anesthesia Record Vitals:  CR[facility] VITALS  2019 0903 - 2019 1003      2019             Resp Rate (set):  10          Last PACU Vitals:  Vitals Value Taken Time   BP     Temp     Pulse     Resp     SpO2     Temp src     NIBP 98/50 2019  9:26 AM   Pulse 59 2019  9:29 AM   SpO2 100 % 2019  9:29 AM   Resp     Temp     Ht Rate 61 2019  9:26 AM   Temp 2           Electronically Signed By: Selvin Brown MD, 2019, 10:28 AM

## 2019-04-08 NOTE — ANESTHESIA CARE TRANSFER NOTE
Patient: Daniel Sandhu    Procedure(s):  Single Lumen Chest Port Placement    Diagnosis: Malignant Neoplasm of Lower Third of Esophagus  Diagnosis Additional Information: No value filed.    Anesthesia Type:   No value filed.     Note:  Airway :Room Air  Patient transferred to:Phase II  Handoff Report: Identifed the Patient, Identified the Reponsible Provider, Reviewed the pertinent medical history, Discussed the surgical course, Reviewed Intra-OP anesthesia mangement and issues during anesthesia, Set expectations for post-procedure period and Allowed opportunity for questions and acknowledgement of understanding      Vitals: (Last set prior to Anesthesia Care Transfer)    CR[facility] VITALS  4/8/2019 0903 - 4/8/2019 0939      4/8/2019             Resp Rate (set):  10                Electronically Signed By: GAVIN Shelton CRNA  April 8, 2019  9:39 AM

## 2019-04-08 NOTE — PROCEDURES
Interventional Radiology Brief Post Procedure Note    Procedure: Chest port placement    Proceduralist: Krysten Ballard PA-C    Assistant: None    Time Out: Prior to the start of the procedure and with procedural staff participation, I verbally confirmed the patient s identity using two indicators, relevant allergies, that the procedure was appropriate and matched the consent or emergent situation, and that the correct equipment/implants were available. Immediately prior to starting the procedure I conducted the Time Out with the procedural staff and re-confirmed the patient s name, procedure, and site/side. (The Joint Commission universal protocol was followed.)  Yes        Sedation: Monitored Anesthesia Care (MAC) administered and documented by Anesthesia Care Provider    Findings: Completed image-guided placement of 6 [place] 25.5 cm single lumen power-injectable central venous chest port via right IJ. Aspirates and flushes freely, heparin locked and is ready for immediate use.    Estimated Blood Loss: Less than 10 ml    Fluoroscopy Time:  less than 1 minute(s)    SPECIMENS: None    Complications: 1. None     Condition: Stable    Plan: Ready for immediate use. Follow up per primary team.     Comments: See dictated procedure note for full details.    Krysten Ballard PA-C

## 2019-04-08 NOTE — DISCHARGE INSTRUCTIONS
[facility]  Home Care Following Anesthesia  For 24 hours after surgery:  1. Get plenty of rest.  A responsible adult must stay with you for at least 24 hours after you leave the surgery center.  2. Do not drive or use heavy equipment.  If you have weakness or tingling, don't drive or use heavy equipment until this feeling goes away.   3. Do not drink alcohol.   4. Avoid strenuous or risky activities.  Ask for help when climbing stairs.  5. You may feel lightheaded.  IF so, sit for a few minutes before standing.  Have someone help you get up.   6. If you have nausea (feel sick to your stomach): Drink only clear liquids such as apple juice, ginger ale, broth or 7-Up.  Rest may also help.  Be sure to drink enough fluids.  Move to a regular diet as you feel able.   7. You may have a slight fever.  Call the doctor if your fever is over 100 F (37.7 C) (taken under the tongue) or lasts longer than 24 hours.  8. You may have a dry mouth, a sore throat, muscle aches or trouble sleeping. These should go away after 24 hours.  9. Do not make important or legal decisions.               Tips for taking pain medications  To get the best pain relief possible, remember these points:    Take pain medications as directed, before pain becomes severe.    Pain medication can upset your stomach: taking it with food may help.    Constipation is a common side effect of pain medication. Drink plenty of  fluids.    Eat foods high in fiber. Take a stool softener if recommended by your doctor or pharmacist.    Do not drink alcohol, drive or operate machinery while taking pain medications.    Ask about other ways to control pain, such as with heat, ice or relaxation.    Tylenol/Acetaminophen Consumption  To help encourage the safe use of acetaminophen, the makers of TYLENOL  have lowered the maximum daily dose for single-ingredient Extra Strength TYLENOL  (acetaminophen) products sold in the [place] from 8  pills per day (4,000 mg) to 6 pills per day (3,000 mg). The dosing interval has also changed from 2 pills every 4-6 hours to 2 pills every 6 hours.    If you feel your pain relief is insufficient, you may take Tylenol/Acetaminophen in addition to your narcotic pain medication.     Be careful not to exceed 3,000 mg of Tylenol/Acetaminophen in a 24 hour period from all sources.    If you are taking extra strength Tylenol/acetaminophen (500 mg), the maximum dose is 6 tablets in 24 hours.    If you are taking regular strength acetaminophen (325 mg), the maximum dose is 9 tablets in 24 hours.    Call a doctor for any of the followin. Signs of infection (fever, growing tenderness at the surgery site, a large amount of drainage or bleeding, severe pain, foul-smelling drainage, redness, swelling).  2. It has been over 8 to 10 hours since surgery and you are still not able to urinate (pass water).  3. Headache for over 24 hours.  4. Numbness, tingling or weakness the day after surgery (if you had spinal anesthesia).  Your doctor is:                         Or dial [phone] and ask for the resident on call for:  Interventional Radiology    A collaboration between [facility] and [facility]  Experts in minimally invasive, targeted treatments performed using imaging guidance    Venous Access Device,  Port Catheter or Tunneled or Non-Tunneled Central Line Placement    Today you had a procedure today to install a venous access device; either a tunneled central vein catheter or a subcutaneous port catheter.    One of our Radiology PAs performed this procedure for you today:  ? Rod Villalpando PA-C  ? IGOR Fraser PA-C  ? Dereje Driver PA-C  ? Krysten Ballard PA-C   ? Jayesh Bland PA-C    After you go home:  - Drink plenty of fluids.  Generally 6-8 (8 ounce) glasses a day is recommended.  - Resume your regular diet unless otherwise ordered by a medical  provider.  - Keep any applied tape/gauze dressings clean and dry.  Change tape/gauze dressings if they get wet or soiled.  - You may shower the following day after procedure, however cover and protect from moisture any tape/gauze dressings.  You may let water hit and run over dried skin glue, but do not scrub.  Pat the area dry after showering.  - Port placement incisions are closed with absorbable suture, meaning they do not need to be removed at a later date, and a topical skin adhesive (skin glue).  This glue will wear off in 7-14 days.  Do not remove before this time.  If 14 days have passed and residual glue is present, you may gently remove it.  - Do not apply gels, lotions, or ointments to the glue site for the first 10 days as this may cause the glue to prematurely soften and fail.  - Do not perform strenuous activities or lift greater than 10 pounds for the next three days.  - If there is bleeding or oozing from the procedure site, apply firm pressure to the area for 5-10 minutes.  If the bleeding continues seek medical advice at the numbers below.  - Mild procedure site discomfort can be treated with an ice pack and over-the-counter pain relievers.        For 24 hours after any sedation used:  - Relax and take it easy.  No strenuous activities.  - Do not drive or operate machines at home or at work.  - No alcohol consumption.  - Do not make any important or legal decisions.    Call our Interventional Radiology (IR) service if:  - If you start bleeding from the procedure site.  If you do start to bleed from the site, lie down and hold some pressure on the site.  Our radiology provider can help you decide if you need to return to the hospital.  - If you have new or worsening pain related to the procedure.  - If you have concerning swelling at the procedure site.  - If you develop persistent nausea or vomiting.  - If you develop hives or a rash or any unexplained itching.  - If you have a fever of greater  than 100.5  F and chills in the first 5 days after procedure.  - Any other concerns related to your procedure.      [facility]  Interventional Radiology (IR)  [address]  [address], [facility] [place] [address]    Contact Number:  [phone]  (IR control desk)  - Monday - Friday 8:00 am - 4:30 pm    After hours for urgent concerns:  [phone]  - After 4:30 pm Monday - Friday, Weekends and Holidays.   - Ask for Interventional Radiology on-call.  Someone is available 24 hours a day.  - [facility] toll free number:  [phone]

## 2019-04-08 NOTE — ANESTHESIA PREPROCEDURE EVALUATION
Anesthesia Pre-Procedure Evaluation    Patient: Daniel Sandhu   MRN:     [id number] Gender:   male   Age:    38 year old :      1980        Preoperative Diagnosis: Malignant Neoplasm of Lower Third of Esophagus   Procedure(s):  Single Lumen Chest Port Placement     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) 2017      Past Surgical History:   Procedure Laterality Date     ESOPHAGOGASTRODUODENOSCOPY       ESOPHAGOSCOPY, GASTROSCOPY, DUODENOSCOPY (EGD), COMBINED N/A 2017    Procedure: COMBINED ENDOSCOPIC ULTRASOUND, ESOPHAGOSCOPY, GASTROSCOPY, DUODENOSCOPY (EGD), FINE NEEDLE ASPIRATE/BIOPSY;  Upper Endoscopic Ultrasound, fine needle aspirate/biopsy;  Surgeon: Guru Mark Avila MD;  Location: [facility] OR     ESOPHAGOSCOPY, GASTROSCOPY, DUODENOSCOPY (EGD), COMBINED N/A 11/3/2017    Procedure: COMBINED ESOPHAGOSCOPY, GASTROSCOPY, DUODENOSCOPY (EGD);;  Surgeon: Yunior Esteban MD;  Location: [facility] OR     ESOPHAGOSCOPY, GASTROSCOPY, DUODENOSCOPY (EGD), COMBINED N/A 2017    Procedure: COMBINED ESOPHAGOSCOPY, GASTROSCOPY, DUODENOSCOPY (EGD);  Esophogastroduodenoscopy, take out pharangostomy tube;  Surgeon: Yunior Esteban MD;  Location: [facility] OR     ESOPHAGOSCOPY, GASTROSCOPY, DUODENOSCOPY (EGD), COMBINED N/A 2018    Procedure: COMBINED ESOPHAGOSCOPY, GASTROSCOPY, DUODENOSCOPY (EGD), BIOPSY SINGLE OR MULTIPLE;  COMBINED ESOPHAGOSCOPY, GASTROSCOPY, DUODENOSCOPY (EGD);  Surgeon: Alessandro Mcgregor MD;  Location: [facility] GI     GASTRECTOMY N/A 11/3/2017    Procedure: GASTRECTOMY;;  Surgeon: Yunior Esteban MD;  Location: [facility] OR     HAND SURGERY      childhood, torn tendon     INSERT PORT VASCULAR ACCESS Right 2017    Procedure: INSERT PORT VASCULAR ACCESS;  Single Lumen Chest Power Port;  Surgeon: Iván Driver PA-C;  [facility] OR     IR LIVER BIOPSY PERCUTANEOUS  3/20/2019     LAPAROSCOPY DIAGNOSTIC  (GENERAL) N/A 11/3/2017    Procedure: LAPAROSCOPY DIAGNOSTIC (GENERAL);  diagnostic laparoscopy, right chest tube, total gastrectomy with distal esophagectomy, intrathoracic eveline-y esophago-jejunostomy, feeding jejunostomy, pharyngostomy, esophagogastroduodenoscopy, flexible bronchoscopy;  Surgeon: Yunior Esteban MD;  Location: [facility] OR     LAPAROTOMY EXPLORATORY N/A 11/3/2017    Procedure: LAPAROTOMY EXPLORATORY;;  Surgeon: Yunior Esteban MD;  [facility]: [facility] OR     PHARYNGOSTOMY N/A 11/3/2017    Procedure: PHARYNGOSTOMY;;  Surgeon: Yunior Esteban MD;  [facility]: [facility] OR     REMOVE PORT VASCULAR ACCESS Right 3/19/2018    Procedure: REMOVE PORT VASCULAR ACCESS;  Right Port Removal;  Surgeon: Krysten Hopper PA-C;  [facility]     THORACOTOMY Left 11/3/2017    Procedure: THORACOTOMY;;  Surgeon: Yunior Esteban MD;  Location: [facility] OR          Anesthesia Evaluation     . Pt has had prior anesthetic. Type: General and MAC    No history of anesthetic complications          ROS/MED HX    ENT/Pulmonary:  - neg pulmonary ROS     Neurologic:  - neg neurologic ROS     Cardiovascular:  - neg cardiovascular ROS   (+) ----. : . . . :. . Previous cardiac testing Echodate:9/25/17results:Interpretation Summary  Global and regional left ventricular function is normal with an EF of 55-60%. Global peak LV longitudinal strain is averaged at -20.3%. This is within reported normal limits (normal <-18%). Right ventricular function, chamber size, wall motion, and thickness are normal. No pericardial effusion is present. There has been no change.    date: results: date: results: date: results:          METS/Exercise Tolerance:  >4 METS   Hematologic:  - neg hematologic  ROS       Musculoskeletal:   (+)  other musculoskeletal- low back pain      GI/Hepatic:     (+) GERD Asymptomatic on medication,       Renal/Genitourinary:  - ROS Renal section negative       Endo:  - neg endo ROS       Psychiatric:  - neg  "psychiatric ROS       Infectious Disease:  - neg infectious disease ROS       Malignancy:   (+) Malignancy History of GI  GI CA status post Surgery and Chemo, esophageal cancer        Other:    (+) No chance of pregnancy C-spine cleared: N/A, no H/O Chronic Pain,no other significant disability                        PHYSICAL EXAM:   Mental Status/Neuro: A/A/O   Airway: Facies: Feasible  Mallampati: II  Mouth/Opening: Full  TM distance: > 6 cm  Neck ROM: Full   Respiratory:   Resp. Rate: Normal     Resp. Effort: Normal      CV:    Comments:      Dental: Normal                  Lab Results   Component Value Date    WBC 5.8 04/01/2019    HGB 13.6 04/01/2019    HCT 41.1 04/01/2019     04/01/2019    CRP 17.0 (H) 11/13/2017     04/01/2019    POTASSIUM 4.2 04/01/2019    CHLORIDE 104 04/01/2019    CO2 28 04/01/2019    BUN 20 04/01/2019    CR 0.68 04/01/2019    GLC 86 04/01/2019    YOBANY 9.3 04/01/2019    PHOS 2.3 (L) 01/15/2018    MAG 2.2 03/07/2018    ALBUMIN 4.1 04/01/2019    PROTTOTAL 7.7 04/01/2019    ALT 25 04/01/2019    AST 20 04/01/2019    ALKPHOS 80 04/01/2019    BILITOTAL 0.4 04/01/2019    INR 1.12 03/15/2019       Preop Vitals  BP Readings from Last 3 Encounters:   04/08/19 104/51   04/01/19 111/59   03/20/19 102/61    Pulse Readings from Last 3 Encounters:   04/08/19 62   04/01/19 81   03/20/19 77      Resp Readings from Last 3 Encounters:   04/08/19 16   04/01/19 16   03/20/19 16    SpO2 Readings from Last 3 Encounters:   04/08/19 100%   04/01/19 99%   03/20/19 96%      Temp Readings from Last 1 Encounters:   04/08/19 36.6  C (97.9  F) (Oral)    Ht Readings from Last 1 Encounters:   04/08/19 1.753 m (5' 9\")      Wt Readings from Last 1 Encounters:   04/08/19 65.8 kg (145 lb)    Estimated body mass index is 21.41 kg/m  as calculated from the following:    Height as of this encounter: 1.753 m (5' 9\").    Weight as of this encounter: 65.8 kg (145 lb).     LDA:  Peripheral IV 04/08/19 Right Lower forearm " (Active)   Site Assessment WDL 4/8/2019  7:37 AM   Line Status Infusing 4/8/2019  7:37 AM   Infiltration Scale 0 4/8/2019  7:37 AM   Number of days: 0       Chest Tube 1 Left Pleural 28 Lao (Active)   Number of days: 521       Gastrostomy/Enterostomy Jejunostomy LLQ (Active)   Number of days: 452            Assessment:   ASA SCORE: 2    NPO Status: > 6 hours since completed Solid Foods; > 2 hours since completed Clear Liquids   Documentation: H&P complete; Preop Testing complete; Consents complete   Proceeding: Proceed without further delay  Tobacco Use:  NO Active use of Tobacco/UNKNOWN Tobacco use status     Plan:   Anes. Type:  MAC   Pre-Induction: None   Induction:  IV (Standard)   Airway: Native Airway   Access/Monitoring: PIV   Maintenance: Propofol; IV   Emergence: Procedure Site   Logistics: Same Day Surgery     Postop Pain/Sedation Strategy:  Standard-Options: Opioids PRN     PONV Management:  Adult Risk Factors:, Non-Smoker, Postop Opioids  Prevention: Propofol Infusion; Ondansetron     CONSENT: Direct conversation   Plan and risks discussed with: Patient   Blood Products: Consent Deferred (Minimal Blood Loss)                         Selvin Brown MD

## 2019-04-09 RX ORDER — LORAZEPAM 0.5 MG/1
0.5 TABLET ORAL EVERY 4 HOURS PRN
Qty: 30 TABLET | Refills: 2 | Status: SHIPPED | OUTPATIENT
Start: 2019-04-09 | End: 2020-04-20

## 2019-04-09 RX ORDER — PROCHLORPERAZINE MALEATE 10 MG
10 TABLET ORAL EVERY 6 HOURS PRN
Qty: 30 TABLET | Refills: 2 | Status: SHIPPED | OUTPATIENT
Start: 2019-04-09 | End: 2020-04-20

## 2019-04-10 ENCOUNTER — ALLIED HEALTH/NURSE VISIT (OUTPATIENT)
Dept: CARE COORDINATION | Facility: CLINIC | Age: 39
End: 2019-04-10

## 2019-04-10 ENCOUNTER — APPOINTMENT (OUTPATIENT)
Dept: LAB | Facility: CLINIC | Age: 39
End: 2019-04-10
Attending: INTERNAL MEDICINE
Payer: COMMERCIAL

## 2019-04-10 ENCOUNTER — INFUSION THERAPY VISIT (OUTPATIENT)
Dept: ONCOLOGY | Facility: CLINIC | Age: 39
End: 2019-04-10
Attending: INTERNAL MEDICINE
Payer: COMMERCIAL

## 2019-04-10 VITALS
BODY MASS INDEX: 21.69 KG/M2 | HEART RATE: 92 BPM | RESPIRATION RATE: 16 BRPM | OXYGEN SATURATION: 100 % | WEIGHT: 146.9 LBS | TEMPERATURE: 98.3 F | DIASTOLIC BLOOD PRESSURE: 68 MMHG | SYSTOLIC BLOOD PRESSURE: 125 MMHG

## 2019-04-10 DIAGNOSIS — Z71.9 VISIT FOR COUNSELING: Primary | ICD-10-CM

## 2019-04-10 DIAGNOSIS — C15.5 MALIGNANT NEOPLASM OF LOWER THIRD OF ESOPHAGUS (H): Primary | ICD-10-CM

## 2019-04-10 LAB
ALBUMIN SERPL-MCNC: 3.8 G/DL (ref 3.4–5)
ALBUMIN UR-MCNC: ABNORMAL MG/DL
ALP SERPL-CCNC: 72 U/L (ref 40–150)
ALT SERPL W P-5'-P-CCNC: 25 U/L (ref 0–70)
AST SERPL W P-5'-P-CCNC: 18 U/L (ref 0–45)
BASOPHILS # BLD AUTO: 0 10E9/L (ref 0–0.2)
BASOPHILS NFR BLD AUTO: 0.4 %
BILIRUB DIRECT SERPL-MCNC: 0.1 MG/DL (ref 0–0.2)
BILIRUB SERPL-MCNC: 0.5 MG/DL (ref 0.2–1.3)
CEA SERPL-MCNC: 2.1 UG/L (ref 0–2.5)
COPATH REPORT: NORMAL
DIFFERENTIAL METHOD BLD: NORMAL
EOSINOPHIL # BLD AUTO: 0.1 10E9/L (ref 0–0.7)
EOSINOPHIL NFR BLD AUTO: 3.1 %
ERYTHROCYTE [DISTWIDTH] IN BLOOD BY AUTOMATED COUNT: 12.6 % (ref 10–15)
HCT VFR BLD AUTO: 40 % (ref 40–53)
HGB BLD-MCNC: 13.7 G/DL (ref 13.3–17.7)
IMM GRANULOCYTES # BLD: 0 10E9/L (ref 0–0.4)
IMM GRANULOCYTES NFR BLD: 0 %
LYMPHOCYTES # BLD AUTO: 1.1 10E9/L (ref 0.8–5.3)
LYMPHOCYTES NFR BLD AUTO: 24.7 %
MCH RBC QN AUTO: 29.7 PG (ref 26.5–33)
MCHC RBC AUTO-ENTMCNC: 34.3 G/DL (ref 31.5–36.5)
MCV RBC AUTO: 87 FL (ref 78–100)
MONOCYTES # BLD AUTO: 0.5 10E9/L (ref 0–1.3)
MONOCYTES NFR BLD AUTO: 10.7 %
NEUTROPHILS # BLD AUTO: 2.8 10E9/L (ref 1.6–8.3)
NEUTROPHILS NFR BLD AUTO: 61.1 %
NRBC # BLD AUTO: 0 10*3/UL
NRBC BLD AUTO-RTO: 0 /100
PLATELET # BLD AUTO: 185 10E9/L (ref 150–450)
PROT SERPL-MCNC: 7.1 G/DL (ref 6.8–8.8)
RBC # BLD AUTO: 4.62 10E12/L (ref 4.4–5.9)
WBC # BLD AUTO: 4.6 10E9/L (ref 4–11)

## 2019-04-10 PROCEDURE — 96417 CHEMO IV INFUS EACH ADDL SEQ: CPT

## 2019-04-10 PROCEDURE — 80076 HEPATIC FUNCTION PANEL: CPT | Performed by: INTERNAL MEDICINE

## 2019-04-10 PROCEDURE — 96413 CHEMO IV INFUSION 1 HR: CPT

## 2019-04-10 PROCEDURE — 82378 CARCINOEMBRYONIC ANTIGEN: CPT | Performed by: INTERNAL MEDICINE

## 2019-04-10 PROCEDURE — 96375 TX/PRO/DX INJ NEW DRUG ADDON: CPT

## 2019-04-10 PROCEDURE — 96367 TX/PROPH/DG ADDL SEQ IV INF: CPT

## 2019-04-10 PROCEDURE — 81003 URINALYSIS AUTO W/O SCOPE: CPT | Performed by: INTERNAL MEDICINE

## 2019-04-10 PROCEDURE — 25000128 H RX IP 250 OP 636: Mod: ZF | Performed by: INTERNAL MEDICINE

## 2019-04-10 PROCEDURE — 25800030 ZZH RX IP 258 OP 636: Mod: ZF | Performed by: INTERNAL MEDICINE

## 2019-04-10 PROCEDURE — 85025 COMPLETE CBC W/AUTO DIFF WBC: CPT | Performed by: INTERNAL MEDICINE

## 2019-04-10 PROCEDURE — 25000125 ZZHC RX 250: Mod: ZF | Performed by: INTERNAL MEDICINE

## 2019-04-10 RX ORDER — HEPARIN SODIUM (PORCINE) LOCK FLUSH IV SOLN 100 UNIT/ML 100 UNIT/ML
5 SOLUTION INTRAVENOUS ONCE
Status: COMPLETED | OUTPATIENT
Start: 2019-04-10 | End: 2019-04-10

## 2019-04-10 RX ORDER — HEPARIN SODIUM (PORCINE) LOCK FLUSH IV SOLN 100 UNIT/ML 100 UNIT/ML
500 SOLUTION INTRAVENOUS ONCE
Status: COMPLETED | OUTPATIENT
Start: 2019-04-10 | End: 2019-04-10

## 2019-04-10 RX ADMIN — FAMOTIDINE 20 MG: 10 INJECTION INTRAVENOUS at 08:51

## 2019-04-10 RX ADMIN — PACLITAXEL 145 MG: 6 INJECTION, SOLUTION INTRAVENOUS at 11:02

## 2019-04-10 RX ADMIN — SODIUM CHLORIDE 250 ML: 9 INJECTION, SOLUTION INTRAVENOUS at 08:51

## 2019-04-10 RX ADMIN — HEPARIN SODIUM (PORCINE) LOCK FLUSH IV SOLN 100 UNIT/ML 5 ML: 100 SOLUTION at 07:54

## 2019-04-10 RX ADMIN — DIPHENHYDRAMINE HYDROCHLORIDE 50 MG: 50 INJECTION INTRAMUSCULAR; INTRAVENOUS at 09:17

## 2019-04-10 RX ADMIN — SODIUM CHLORIDE 500 MG: 9 INJECTION, SOLUTION INTRAVENOUS at 09:42

## 2019-04-10 RX ADMIN — HEPARIN SODIUM (PORCINE) LOCK FLUSH IV SOLN 100 UNIT/ML 500 UNITS: 100 SOLUTION at 12:27

## 2019-04-10 RX ADMIN — DEXAMETHASONE SODIUM PHOSPHATE 20 MG: 10 INJECTION, SOLUTION INTRAMUSCULAR; INTRAVENOUS at 08:57

## 2019-04-10 ASSESSMENT — PAIN SCALES - GENERAL: PAINLEVEL: NO PAIN (0)

## 2019-04-10 NOTE — PROGRESS NOTES
Infusion Nursing Note:  Daniel Sandhu presents for C1D1 Taxol/Cyramza    Note: Teaching done on new chemo regimen. Patient's questions answered and he verbalized understanding of treatment; written information on Taxol and Cyramza given to pt.    Pain: Denies    Treatment Conditions:  Lab Results   Component Value Date    HGB 13.7 04/10/2019     Lab Results   Component Value Date    WBC 4.6 04/10/2019      Lab Results   Component Value Date    ANEU 2.8 04/10/2019     Lab Results   Component Value Date     04/10/2019      Lab Results   Component Value Date     04/01/2019                   Lab Results   Component Value Date    POTASSIUM 4.2 04/01/2019           Lab Results   Component Value Date    MAG 2.2 03/07/2018            Lab Results   Component Value Date    CR 0.68 04/01/2019                   Lab Results   Component Value Date    YOBANY 9.3 04/01/2019                Lab Results   Component Value Date    BILITOTAL 0.5 04/10/2019           Lab Results   Component Value Date    ALBUMIN 3.8 04/10/2019                    Lab Results   Component Value Date    ALT 25 04/10/2019           Lab Results   Component Value Date    AST 18 04/10/2019       Results reviewed, labs MET treatment parameters, ok to proceed with treatment.  Urine protein (trace), BP (125/68).    Intravenous Access:  Implanted Port.    Post Infusion Assessment:  Patient tolerated infusion without incident.  Blood return noted pre and post infusion.  Site patent and intact, free from redness, edema or discomfort.  No evidence of extravasations.  Access discontinued per protocol.    Discharge Plan:   Prescription refills given for Compazine and Ativan by pharmacy staff.  Discharge instructions reviewed with: Patient.  Patient and/or family verbalized understanding of discharge instructions and all questions answered.  AVS to patient via [employer].  Patient will return 4/17 for next appointment.   Patient discharged in stable condition  accompanied by: self.  Departure Mode: Ambulatory.  .    Saqib Arthur RN

## 2019-04-10 NOTE — NURSING NOTE
"Chief Complaint   Patient presents with     Port Draw     port accessed and labs drawn by rn.  vs taken.     Port accessed by RN in lab with 20g 3/4\" gripper needle, labs drawn, port flushed with saline and heparin, vitals checked, pt checked in for next appointment.  Génesis Tripp RN    "

## 2019-04-10 NOTE — PATIENT INSTRUCTIONS
Contact numbers:  Triage Main/After hours Line: [phone]    Main (scheduling) line: [phone]    Call with chills and/or temperature greater than or equal to 100.5 and questions or concerns.    If after hours, weekends, or holidays, call main hospital  at  [phone] and ask for Oncology doctor on call.           April 2019 Sunday Monday Tuesday Wednesday Thursday Friday Saturday        1    LAB WITH  CLINIC   3:15 PM   (15 min.)    LAB   [facility] Lab    [facility] RETURN   3:30 PM   (30 min.)   Laurence Hood MD   [facility] 2     3     4     5     6       7     8    IR CHEST PORT PLACEMENT >5 YRS   6:45 AM   (75 min.)   [id number]   [facility] Imaging    Outpatient Visit   7:03 AM   [facility]    INSERT PORT VASCULAR ACCESS   8:15 AM   Krysten Ballard PA-C   [facility] OR 9     10    [facility] DRAW   7:30 AM   (15 min.)    [facility] DRAW   [facility] Lab Draw    [facility] ONC INFUSION 180   8:00 AM   (180 min.)    ONCOLOGY INFUSION   [facility] 11     12     13       14     15     16     17    [facility] DRAW  10:00 AM   (15 min.)    [facility] DRAW   [facility] Lab Draw    [facility] ONC INFUSION 180  10:30 AM   (180 min.)    ONCOLOGY INFUSION   [facility] 18     19     20       21     22     23     24    [facility] DRAW   6:30 AM   (15 min.)    [facility] LAB DRAW   [facility] Lab Draw    [facility] ONC INFUSION 180   7:00 AM   (180 min.)    ONCOLOGY INFUSION   [facility]    [facility] RETURN  10:05 AM   (50 min.)   Violeta Coto PA-C   [facility] 25     26     27       28     29     30                                     May 2019      [place] Monday Tuesday Wednesday Thursday Friday Saturday                  1     2     3     4       5     6    [facility] RETURN   8:30 AM   (30 min.)   Laurence Hood MD   [facility]  [facility] 7     8    [facility] DRAW   7:30 AM   (15 min.)    [facility] LAB DRAW   [facility] Draw    [facility] ONC INFUSION 180   8:00 AM   (180 min.)    ONCOLOGY INFUSION   [facility] 9     10     11       12     13     14     15    [facility] DRAW   8:30 AM   (15 min.)    [facility] LAB DRAW   [facility] Lab Draw    [facility] ONC INFUSION 180   9:00 AM   (180 min.)    ONCOLOGY INFUSION   [facility] 16     17     18       19     20    [facility] RETURN   9:00 AM   (30 min.)   Laurence Hood MD   [facility] 21     22    P [facility] LAB DRAW  10:00 AM   (15 min.)    [facility] LAB DRAW   [facility] Lab Draw    [facility] ONC INFUSION 180  10:30 AM   (180 min.)    ONCOLOGY INFUSION   [facility] 23     24     25       26     27     28     29     30     31                          Lab Results:  Recent Results (from the past 12 hour(s))   CBC with platelets differential    Collection Time: 04/10/19  7:59 AM   Result Value Ref Range    WBC 4.6 4.0 - 11.0 10e9/L    RBC Count 4.62 4.4 - 5.9 10e12/L    Hemoglobin 13.7 13.3 - 17.7 g/dL    Hematocrit 40.0 40.0 - 53.0 %    MCV 87 78 - 100 fl    MCH 29.7 26.5 - 33.0 pg    MCHC 34.3 31.5 - 36.5 g/dL    RDW 12.6 10.0 - 15.0 %    Platelet Count 185 150 - 450 10e9/L    Diff Method Automated Method     % Neutrophils 61.1 %    % Lymphocytes 24.7 %    % Monocytes 10.7 %    % Eosinophils 3.1 %    % Basophils 0.4 %    % Immature Granulocytes 0.0 %    Nucleated RBCs 0 0 /100    Absolute Neutrophil 2.8 1.6 - 8.3 10e9/L    Absolute Lymphocytes 1.1 0.8 - 5.3 10e9/L    Absolute Monocytes 0.5 0.0 - 1.3 10e9/L    Absolute Eosinophils 0.1 0.0 - 0.7 10e9/L    Absolute Basophils 0.0 0.0 - 0.2 10e9/L    Abs Immature Granulocytes 0.0 0 - 0.4 10e9/L    Absolute Nucleated RBC 0.0    Hepatic panel    Collection Time: 04/10/19  7:59 AM   Result Value Ref Range    Bilirubin Direct 0.1 0.0 - 0.2 mg/dL     Bilirubin Total 0.5 0.2 - 1.3 mg/dL    Albumin 3.8 3.4 - 5.0 g/dL    Protein Total 7.1 6.8 - 8.8 g/dL    Alkaline Phosphatase 72 40 - 150 U/L    ALT 25 0 - 70 U/L    AST 18 0 - 45 U/L   Protein qualitative urine    Collection Time: 04/10/19  8:01 AM   Result Value Ref Range    Protein Albumin Urine Trace (A) NEG^Negative mg/dL

## 2019-04-10 NOTE — PROGRESS NOTES
Social Work Follow-Up Encounter Visit  Oncology Clinic    Data/Intervention:  Patient Name:  Daniel Sandhu  /Age:  1980 (38 year old)    Reason for Follow-Up:  SW followed up with patient during infusion regarding resources discussed with his wife earlier this week.    Collaborated With:    -Patient     SW had intended to follow up with patient during infusion appointment regarding use of [employer] funds to assist with household expense costs and had previously indicated family would need to get a copy of bills to this worker for submission.  SW checked in with patient during infusion to inquire whether this paperwork was ready.  Patient indicated not yet having bills.  SW reviewed discussion that had occurred with his wife earlier to go over what types of grants/financial assistance may be available to family and answered all questions asked.      Resources Provided:  General education regarding financial resources.    Assessment:  Patient appeared to be coping adequately with new cancer treatment.     Plan:  Previously provided patient/family with writer's contact information and availability.   SW available to assist with any other needs.     Soo Yeon Han, MSW, [facility] Center[facility]  Pager: [phone]  Phone: [phone]

## 2019-04-10 NOTE — PROGRESS NOTES
Social Work Follow-Up Encounter Visit  Oncology Clinic    Data/Intervention:  Patient Name:  Daniel Sandhu  /Age:  1980 (38 year old)    Reason for Follow-Up:  Patient's wife, Violeta, requested to meet with SW    Collaborated With:    -Wife, Violeta    SW met with patient's wife during procedure appointment. She indicated primarily having questions about financial resources as she is continuing to work at this time but they are concerned regarding patient's upcoming treatment needs.  Patient has applied for [employer] general ervin in the past and Violeta indicated understanding this is a one time only benefit.  SW explain [employer] ervin through [employer] and application was submitted at this time.  SW also talked with wife about disability, SSDI.  Education about application process and brochure from social security administration was given.  SW also discussed possible use of [employer] to help cover family's household bills and SW requested that pt or wife bring copy of bill to this worker in the future for completing this application.  SW also provided one time clinic gift cards to wife to use.  Violeta stated another expense the family is concerned about is childcare as they have two young children in the home.  Violeta reported having also connected with [employer] and their older six year old will be attending camp with the organization during the summer.  SW provided [employer] for younger child.  SW also indicated this worker would look into available childcare assistance programs and follow up with patient at his next appointment about findings.  Wife Violeta reported understanding of education given and appreciation for SW assistance.    Resources Provided:  AF [employer] submitted  AF [employer]  [employer] application given (medical verification completed, family needs to complete patient portion)  SSDI application information  Clinic gift  cards    Assessment:  Family planning for needs appropriately.  Wife appeared to be focused on accomplishing tasks and indicated feeling very concerned about their financial situation due to necessity for further cancer treatment.    Plan:  Previously provided patient/family with writer's contact information and availability.   SW available to assist with any other identified needs, questions or concerns.     Soo Yeon Han, MSW, [facility] Center[facility]  Pager: [phone]  Phone: [phone]

## 2019-04-17 ENCOUNTER — APPOINTMENT (OUTPATIENT)
Dept: LAB | Facility: CLINIC | Age: 39
End: 2019-04-17
Attending: INTERNAL MEDICINE
Payer: COMMERCIAL

## 2019-04-17 ENCOUNTER — ALLIED HEALTH/NURSE VISIT (OUTPATIENT)
Dept: CARE COORDINATION | Facility: CLINIC | Age: 39
End: 2019-04-17

## 2019-04-17 ENCOUNTER — INFUSION THERAPY VISIT (OUTPATIENT)
Dept: ONCOLOGY | Facility: CLINIC | Age: 39
End: 2019-04-17
Attending: INTERNAL MEDICINE
Payer: COMMERCIAL

## 2019-04-17 VITALS
DIASTOLIC BLOOD PRESSURE: 57 MMHG | SYSTOLIC BLOOD PRESSURE: 103 MMHG | HEART RATE: 82 BPM | RESPIRATION RATE: 18 BRPM | WEIGHT: 147 LBS | OXYGEN SATURATION: 98 % | TEMPERATURE: 97.6 F | BODY MASS INDEX: 21.71 KG/M2

## 2019-04-17 DIAGNOSIS — C15.5 MALIGNANT NEOPLASM OF LOWER THIRD OF ESOPHAGUS (H): Primary | ICD-10-CM

## 2019-04-17 DIAGNOSIS — Z71.9 VISIT FOR COUNSELING: Primary | ICD-10-CM

## 2019-04-17 LAB
BASOPHILS # BLD AUTO: 0 10E9/L (ref 0–0.2)
BASOPHILS NFR BLD AUTO: 0.6 %
DIFFERENTIAL METHOD BLD: ABNORMAL
EOSINOPHIL # BLD AUTO: 0.1 10E9/L (ref 0–0.7)
EOSINOPHIL NFR BLD AUTO: 2.4 %
ERYTHROCYTE [DISTWIDTH] IN BLOOD BY AUTOMATED COUNT: 12.4 % (ref 10–15)
FUNGUS SPEC CULT: NORMAL
HCT VFR BLD AUTO: 39.4 % (ref 40–53)
HGB BLD-MCNC: 13.3 G/DL (ref 13.3–17.7)
IMM GRANULOCYTES # BLD: 0 10E9/L (ref 0–0.4)
IMM GRANULOCYTES NFR BLD: 0.4 %
LYMPHOCYTES # BLD AUTO: 1 10E9/L (ref 0.8–5.3)
LYMPHOCYTES NFR BLD AUTO: 18.9 %
MCH RBC QN AUTO: 29.8 PG (ref 26.5–33)
MCHC RBC AUTO-ENTMCNC: 33.8 G/DL (ref 31.5–36.5)
MCV RBC AUTO: 88 FL (ref 78–100)
MONOCYTES # BLD AUTO: 0.3 10E9/L (ref 0–1.3)
MONOCYTES NFR BLD AUTO: 5.9 %
NEUTROPHILS # BLD AUTO: 3.9 10E9/L (ref 1.6–8.3)
NEUTROPHILS NFR BLD AUTO: 71.8 %
NRBC # BLD AUTO: 0 10*3/UL
NRBC BLD AUTO-RTO: 0 /100
PLATELET # BLD AUTO: 166 10E9/L (ref 150–450)
RBC # BLD AUTO: 4.47 10E12/L (ref 4.4–5.9)
SPECIMEN SOURCE: NORMAL
WBC # BLD AUTO: 5.4 10E9/L (ref 4–11)

## 2019-04-17 PROCEDURE — 85025 COMPLETE CBC W/AUTO DIFF WBC: CPT | Performed by: INTERNAL MEDICINE

## 2019-04-17 PROCEDURE — 25800030 ZZH RX IP 258 OP 636: Mod: ZF | Performed by: INTERNAL MEDICINE

## 2019-04-17 PROCEDURE — 25000128 H RX IP 250 OP 636: Mod: ZF | Performed by: INTERNAL MEDICINE

## 2019-04-17 PROCEDURE — 25000125 ZZHC RX 250: Mod: ZF | Performed by: INTERNAL MEDICINE

## 2019-04-17 PROCEDURE — 96375 TX/PRO/DX INJ NEW DRUG ADDON: CPT

## 2019-04-17 PROCEDURE — 96413 CHEMO IV INFUSION 1 HR: CPT

## 2019-04-17 RX ORDER — HEPARIN SODIUM (PORCINE) LOCK FLUSH IV SOLN 100 UNIT/ML 100 UNIT/ML
500 SOLUTION INTRAVENOUS ONCE
Status: COMPLETED | OUTPATIENT
Start: 2019-04-17 | End: 2019-04-17

## 2019-04-17 RX ORDER — HEPARIN SODIUM (PORCINE) LOCK FLUSH IV SOLN 100 UNIT/ML 100 UNIT/ML
5 SOLUTION INTRAVENOUS EVERY 8 HOURS
Status: DISCONTINUED | OUTPATIENT
Start: 2019-04-17 | End: 2019-04-17 | Stop reason: HOSPADM

## 2019-04-17 RX ADMIN — SODIUM CHLORIDE 250 ML: 9 INJECTION, SOLUTION INTRAVENOUS at 11:17

## 2019-04-17 RX ADMIN — PACLITAXEL 145 MG: 6 INJECTION, SOLUTION INTRAVENOUS at 11:50

## 2019-04-17 RX ADMIN — HEPARIN SODIUM (PORCINE) LOCK FLUSH IV SOLN 100 UNIT/ML 500 UNITS: 100 SOLUTION at 13:05

## 2019-04-17 RX ADMIN — FAMOTIDINE 20 MG: 10 INJECTION INTRAVENOUS at 11:17

## 2019-04-17 RX ADMIN — HEPARIN 5 ML: 100 SYRINGE at 10:22

## 2019-04-17 RX ADMIN — DIPHENHYDRAMINE HYDROCHLORIDE 50 MG: 50 INJECTION, SOLUTION INTRAMUSCULAR; INTRAVENOUS at 11:33

## 2019-04-17 RX ADMIN — DEXAMETHASONE SODIUM PHOSPHATE 20 MG: 10 INJECTION, SOLUTION INTRAMUSCULAR; INTRAVENOUS at 11:19

## 2019-04-17 ASSESSMENT — PAIN SCALES - GENERAL: PAINLEVEL: MILD PAIN (2)

## 2019-04-17 NOTE — NURSING NOTE
Chief Complaint   Patient presents with     Port Draw     Labs drawn via PORT by RN in lab. Line flushed with saline and heparin. VS taken.      Carlos Andrade RN

## 2019-04-17 NOTE — PROGRESS NOTES
Infusion Nursing Note:    Patient presents today for Cycle 1 Day 8 Taxol.      Lab Results   Component Value Date    HGB 13.3 04/17/2019     Lab Results   Component Value Date    WBC 5.4 04/17/2019      Lab Results   Component Value Date    ANEU 3.9 04/17/2019     Lab Results   Component Value Date     04/17/2019      Results reviewed, labs MET treatment parameters, ok to proceed with treatment.    Note: Pt arrives feeling well for infusion today. No concerns to report.     Intravenous Access:  Implanted Port.    Post Infusion Assessment:  Patient tolerated infusion without incident.  Blood return noted pre and post infusion.  Access discontinued per protocol.    Discharge Plan:   Patient declined prescription refills.  AVS to patient via [employer].  Patient will return 4/24 for next appointment.   Patient discharged in stable condition accompanied by: self.  Departure Mode: Ambulatory.

## 2019-04-17 NOTE — PATIENT INSTRUCTIONS
Contact Numbers    [facility] [place]: [phone]  [facility] Triage and after hours / weekends / holidays:  [phone]      Please call the triage or after hours line if you experience a temperature greater than or equal to 100.5, shaking chills, have uncontrolled nausea, vomiting and/or diarrhea, dizziness, shortness of breath, chest pain, bleeding, unexplained bruising, or if you have any other new/concerning symptoms, questions or concerns.      If you are having any concerning symptoms or wish to speak to a provider before your next infusion visit, please call your care coordinator or triage to notify them so we can adequately serve you.     If you need a refill on a narcotic prescription or other medication, please call before your infusion appointment.

## 2019-04-18 LAB — PD-L1 BY IMMUNOHISTOCHEMISTRY: NORMAL

## 2019-04-19 LAB — COPATH REPORT: NORMAL

## 2019-04-19 NOTE — PROGRESS NOTES
Social Work Follow-Up Encounter Visit  Oncology Clinic    Data/Intervention:  Patient Name:  Daniel Sandhu  /Age:  1980 (38 year old)    Reason for Follow-Up:  Patient requested to meet with social work during infusion    Collaborated With:    -patient     SW met patient during infusion treatment to follow up on earlier conversations about financial resources.  Patient provided household bills that SW indicated would be used to apply for [employer] for assistance.  SW completed and submitted application.  Patient also provided completed [employer] application that SW will fax to organization.  [employer] will follow up with patient directly about setting up services.  Patient indicated no other needs at this time.  SW also gave update that [employer] lam through [employer] was approved and AF will reach out to patient directly about fund distribution.    Resources Provided:  [employer] lam submitted  [employer] lam submitted    Assessment:  Coping appropriately and preparing for needs.     Plan:  Previously provided patient/family with writer's contact information and availability.   SW available to assist with needs.     Soo Yeon Han, MSW, LICSW  Pager: [phone]  Phone: [phone]

## 2019-04-23 LAB — LAB SCANNED RESULT: NORMAL

## 2019-04-24 ENCOUNTER — INFUSION THERAPY VISIT (OUTPATIENT)
Dept: ONCOLOGY | Facility: CLINIC | Age: 39
End: 2019-04-24
Attending: INTERNAL MEDICINE
Payer: COMMERCIAL

## 2019-04-24 VITALS
HEART RATE: 77 BPM | TEMPERATURE: 98.2 F | BODY MASS INDEX: 22 KG/M2 | DIASTOLIC BLOOD PRESSURE: 71 MMHG | OXYGEN SATURATION: 100 % | RESPIRATION RATE: 16 BRPM | SYSTOLIC BLOOD PRESSURE: 118 MMHG | WEIGHT: 149 LBS

## 2019-04-24 DIAGNOSIS — C79.9 METASTASIS FROM ESOPHAGEAL CANCER (H): ICD-10-CM

## 2019-04-24 DIAGNOSIS — C15.9 METASTASIS FROM ESOPHAGEAL CANCER (H): ICD-10-CM

## 2019-04-24 DIAGNOSIS — C15.5 MALIGNANT NEOPLASM OF LOWER THIRD OF ESOPHAGUS (H): Primary | ICD-10-CM

## 2019-04-24 LAB
ALBUMIN UR-MCNC: NEGATIVE MG/DL
BASOPHILS # BLD AUTO: 0 10E9/L (ref 0–0.2)
BASOPHILS NFR BLD AUTO: 0.9 %
DIFFERENTIAL METHOD BLD: ABNORMAL
EOSINOPHIL # BLD AUTO: 0.2 10E9/L (ref 0–0.7)
EOSINOPHIL NFR BLD AUTO: 4.9 %
ERYTHROCYTE [DISTWIDTH] IN BLOOD BY AUTOMATED COUNT: 12.5 % (ref 10–15)
HCT VFR BLD AUTO: 38.1 % (ref 40–53)
HGB BLD-MCNC: 13 G/DL (ref 13.3–17.7)
IMM GRANULOCYTES # BLD: 0 10E9/L (ref 0–0.4)
IMM GRANULOCYTES NFR BLD: 0.3 %
LYMPHOCYTES # BLD AUTO: 0.9 10E9/L (ref 0.8–5.3)
LYMPHOCYTES NFR BLD AUTO: 24.9 %
MCH RBC QN AUTO: 29.8 PG (ref 26.5–33)
MCHC RBC AUTO-ENTMCNC: 34.1 G/DL (ref 31.5–36.5)
MCV RBC AUTO: 87 FL (ref 78–100)
MONOCYTES # BLD AUTO: 0.2 10E9/L (ref 0–1.3)
MONOCYTES NFR BLD AUTO: 6.3 %
NEUTROPHILS # BLD AUTO: 2.2 10E9/L (ref 1.6–8.3)
NEUTROPHILS NFR BLD AUTO: 62.7 %
NRBC # BLD AUTO: 0 10*3/UL
NRBC BLD AUTO-RTO: 0 /100
PLATELET # BLD AUTO: 174 10E9/L (ref 150–450)
RBC # BLD AUTO: 4.36 10E12/L (ref 4.4–5.9)
WBC # BLD AUTO: 3.5 10E9/L (ref 4–11)

## 2019-04-24 PROCEDURE — 85025 COMPLETE CBC W/AUTO DIFF WBC: CPT | Performed by: INTERNAL MEDICINE

## 2019-04-24 PROCEDURE — 25000128 H RX IP 250 OP 636: Mod: ZF | Performed by: INTERNAL MEDICINE

## 2019-04-24 PROCEDURE — 81003 URINALYSIS AUTO W/O SCOPE: CPT | Performed by: INTERNAL MEDICINE

## 2019-04-24 PROCEDURE — 25000125 ZZHC RX 250: Mod: ZF | Performed by: INTERNAL MEDICINE

## 2019-04-24 PROCEDURE — 25800030 ZZH RX IP 258 OP 636: Mod: ZF | Performed by: INTERNAL MEDICINE

## 2019-04-24 PROCEDURE — 99214 OFFICE O/P EST MOD 30 MIN: CPT | Mod: ZP | Performed by: PHYSICIAN ASSISTANT

## 2019-04-24 PROCEDURE — 96413 CHEMO IV INFUSION 1 HR: CPT

## 2019-04-24 PROCEDURE — 96375 TX/PRO/DX INJ NEW DRUG ADDON: CPT

## 2019-04-24 PROCEDURE — 96367 TX/PROPH/DG ADDL SEQ IV INF: CPT

## 2019-04-24 PROCEDURE — 96417 CHEMO IV INFUS EACH ADDL SEQ: CPT

## 2019-04-24 RX ORDER — HEPARIN SODIUM (PORCINE) LOCK FLUSH IV SOLN 100 UNIT/ML 100 UNIT/ML
5 SOLUTION INTRAVENOUS ONCE
Status: COMPLETED | OUTPATIENT
Start: 2019-04-24 | End: 2019-04-24

## 2019-04-24 RX ORDER — HEPARIN SODIUM (PORCINE) LOCK FLUSH IV SOLN 100 UNIT/ML 100 UNIT/ML
500 SOLUTION INTRAVENOUS ONCE
Status: COMPLETED | OUTPATIENT
Start: 2019-04-24 | End: 2019-04-24

## 2019-04-24 RX ADMIN — DIPHENHYDRAMINE HYDROCHLORIDE 50 MG: 50 INJECTION INTRAMUSCULAR; INTRAVENOUS at 08:08

## 2019-04-24 RX ADMIN — PACLITAXEL 145 MG: 6 INJECTION, SOLUTION INTRAVENOUS at 09:55

## 2019-04-24 RX ADMIN — HEPARIN 500 UNITS: 100 SYRINGE at 10:59

## 2019-04-24 RX ADMIN — HEPARIN 5 ML: 100 SYRINGE at 07:02

## 2019-04-24 RX ADMIN — FAMOTIDINE 20 MG: 10 INJECTION INTRAVENOUS at 08:04

## 2019-04-24 RX ADMIN — DEXAMETHASONE SODIUM PHOSPHATE 20 MG: 10 INJECTION, SOLUTION INTRAMUSCULAR; INTRAVENOUS at 08:24

## 2019-04-24 RX ADMIN — SODIUM CHLORIDE 250 ML: 9 INJECTION, SOLUTION INTRAVENOUS at 08:04

## 2019-04-24 RX ADMIN — SODIUM CHLORIDE 500 MG: 9 INJECTION, SOLUTION INTRAVENOUS at 08:51

## 2019-04-24 ASSESSMENT — PAIN SCALES - GENERAL: PAINLEVEL: NO PAIN (0)

## 2019-04-24 NOTE — LETTER
4/24/2019      RE: Daniel Sandhu  [address] [address]       [facility]  Hematology/Oncology Established Patient Note    Today's Date: Apr 24, 2019    Reason for Follow-up: adenocarcinoma of the GE junction      HISTORY OF PRESENT ILLNESS: Daniel Sandhu is a 38 year old male who presents with adenocarcinoma of the GE junction.  In early 2017, patient started noticing difficulty swallowing, and that food seems to take longer to go down.  It became more frequent, and he saw his PCP, and was referred for EGD, which showed an esophageal mass located at the GE junction, measuring 4 cm.  Biopsy showed poorly differentiated adenocarcinoma.  HER-2 was sent and is equivocal (2+).  CT c/a/p showed a mildly prominent lymph node in the gastrohepatic ligament, but there were no pathologically enlarged lymph nodes in the chest, abdomen, or pelvis.  There was otherwise no evidence of metastatic disease.  PET-CT showed thickening of the distal esophagus consistent with known esophageal adenocarcinoma, as well as mildly hypermetabolic 1 cm lymph node in the gastrohepatic ligament.  There was no evidence of distant metastatic disease.  He underwent EUS on 6/9/17, which showed the esophageal tumor in the lower third of the esophagus, staged T2NX.  There were 2 abnormal lymph nodes seen in the gastrohepatic ligament; pathology was suspicious for adenocarcinoma.  Celiac node was sampled as well, which was benign.  He was seen by surgery, Dr. Esteban, and recommended srinivas-operative chemotherapy.    Port was placed on 6/22/17.  It was removed on 3/19/18.    He underwent chemotherapy with epirubicin, oxaliplatin, and capecitabine x 3 cycles 6/26/17-8/7/17.      On 11/3/17, he underwent laparotomy with total gastrectomy and abdominal lymphadenectomy, left thoracotomy with distal esophagectomy and intrathoracic Terrell-en-Y esophagojejunostomy, feeding jejunostomy, left pharyngostomy tube  placement, right tube thoracostomy, and flexible bronchoscopy.  On 11/9/17, he was taken back to OR for I&D of pharyngostomy tube abscess.  Pathology showed adenocarcinoma, moderate differentiated, extensive residual tumor with no evidence tumor regression, margins negative, perineural invasion present, 1 of 28 lymph nodes were involved with malignancy, stage [id number] (stage IIIA).  HER-2 is non-amplified.    He resumed chemotherapy post-surgery, with plans to complete 3 more cycles, with 5-FU, epirubicin, oxaliplatin.  However, he only completed 2 more cycles, due to poor tolerance.  He was admitted to the hospital 1/9/18-1/13/18 for nausea, diarrhea, failure to thrive, severe malnutrition, generalized weakness.  His infusional chemotherapy was stopped on 1/8/18.  He underwent EGD in the hospital on 1/11/18, which showed ulcers, and no evidence of recurrence of his cancer.  One area biopsied showed inflammation, no evidence of malignancy.    He saw Dr. Esteban on 3/6/19 and had CT abdomen/pelvis done, which showed a 3.1 cm lesion in hepatic segment 3.  He underwent biopsy on 3/20/19 by IR, which showed moderately differentiated adenocarcinoma consistent with metastasis from primary esophageal carcinoma.  HER-2 is non-amplified.  Restaging PET scan on 3/29/19 showed the 4.4 cm left lob liver metastasis.  There is nodular foci of hypermetabolic uptake in the left mid abdomen in relation to the small bowel loops, without definite underlying lesion on CT.  This is favored to represent metastatic disease unless proven otherwise. He started on palliative chemotherapy with Taxol and Cyramza on 4/10/19. He comes in today for routine follow up prior to cycle 1 day 15.     INTERIM HISTORY: Daniel comes in for follow-up today.  Patient denies any side effects from treatment thus far other than that he has started to lose hair.  He reports eating well and that things taste fine.  He does have ongoing difficulty with sleep and  does use marijuana edibles or vapes marijuana to help him sleep.  He has occasional mild headaches and does have some phlegm in the mornings which he thinks may be related to a mild cold as 1 of his children recently had a cold.  He denies other concerns.     REVIEW OF SYSTEMS:   Patient denies any of the following except if noted above: fevers, chills, difficulty with energy, vision or hearing changes, chest pain, dyspnea, abdominal pain, nausea, vomiting, diarrhea, constipation, urinary concerns, headaches, numbness, tingling, or issues with mood.    HOME MEDICATIONS:  Current Outpatient Medications   Medication Sig Dispense Refill     cyabnocobalamin (VITAMIN B-12) 2500 MCG sublingual tablet Place 2,500 mcg under the tongue daily 30 tablet 1     ferrous gluconate (FERGON) 324 (38 Fe) MG tablet Take 324 mg by mouth daily (with breakfast)       LORazepam (ATIVAN) 0.5 MG tablet Take 1 tablet (0.5 mg) by mouth every 4 hours as needed (Anxiety, Nausea/Vomiting or Sleep) 30 tablet 2     multivitamin, therapeutic with minerals (MULTI-VITAMIN) TABS tablet Take 1 tablet by mouth daily Generic [place] Vitamin       prochlorperazine (COMPAZINE) 10 MG tablet Take 1 tablet (10 mg) by mouth every 6 hours as needed (Nausea/Vomiting) 30 tablet 2     ALLERGIES:  No Known Allergies    PAST MEDICAL HISTORY:  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) 6/5/2017     PAST SURGICAL HISTORY:  Past Surgical History:   Procedure Laterality Date     ESOPHAGOGASTRODUODENOSCOPY       ESOPHAGOSCOPY, GASTROSCOPY, DUODENOSCOPY (EGD), COMBINED N/A 6/9/2017    Procedure: COMBINED ENDOSCOPIC ULTRASOUND, ESOPHAGOSCOPY, GASTROSCOPY, DUODENOSCOPY (EGD), FINE NEEDLE ASPIRATE/BIOPSY;  Upper Endoscopic Ultrasound, fine needle aspirate/biopsy;  Surgeon: Guru Mark Avila MD;  Location:  OR     ESOPHAGOSCOPY, GASTROSCOPY, DUODENOSCOPY (EGD), COMBINED N/A 11/3/2017    Procedure:  COMBINED ESOPHAGOSCOPY, GASTROSCOPY, DUODENOSCOPY (EGD);;  Surgeon: Yunior Esteban MD;  Location: [facility] OR     ESOPHAGOSCOPY, GASTROSCOPY, DUODENOSCOPY (EGD), COMBINED N/A 11/9/2017    Procedure: COMBINED ESOPHAGOSCOPY, GASTROSCOPY, DUODENOSCOPY (EGD);  Esophogastroduodenoscopy, take out pharangostomy tube;  Surgeon: Yunior Esteban MD;  Location: [facility] OR     ESOPHAGOSCOPY, GASTROSCOPY, DUODENOSCOPY (EGD), COMBINED N/A 1/11/2018    Procedure: COMBINED ESOPHAGOSCOPY, GASTROSCOPY, DUODENOSCOPY (EGD), BIOPSY SINGLE OR MULTIPLE;  COMBINED ESOPHAGOSCOPY, GASTROSCOPY, DUODENOSCOPY (EGD);  Surgeon: Alessandro Mcgregor MD;  Location: [facility] GI     GASTRECTOMY N/A 11/3/2017    Procedure: GASTRECTOMY;;  Surgeon: Yunior Esteban MD;  Location: [facility] OR     HAND SURGERY      childhood, torn tendon     INSERT PORT VASCULAR ACCESS Right 6/22/2017    Procedure: INSERT PORT VASCULAR ACCESS;  Single Lumen Chest Power Port;  Surgeon: Iván Driver PA-C;  Location: [facility] OR     INSERT PORT VASCULAR ACCESS Right 4/8/2019    Procedure: Single Lumen Chest Port Placement;  Surgeon: Krysten Ballard PA-C;  Location: [facility] OR     IR CHEST PORT PLACEMENT > 5 YRS OF AGE  4/8/2019     IR LIVER BIOPSY PERCUTANEOUS  3/20/2019     LAPAROSCOPY DIAGNOSTIC (GENERAL) N/A 11/3/2017    Procedure: LAPAROSCOPY DIAGNOSTIC (GENERAL);  diagnostic laparoscopy, right chest tube, total gastrectomy with distal esophagectomy, intrathoracic eveline-y esophago-jejunostomy, feeding jejunostomy, pharyngostomy, esophagogastroduodenoscopy, flexible bronchoscopy;  Surgeon: Yunior Esteban MD;  Location: [facility] OR     LAPAROTOMY EXPLORATORY N/A 11/3/2017    Procedure: LAPAROTOMY EXPLORATORY;;  Surgeon: Yunior Esteban MD;  [facility]: [facility]     PHARYNGOSTOMY N/A 11/3/2017    Procedure: PHARYNGOSTOMY;;  Surgeon: Yunior Esteban MD;  Location: [facility] OR     REMOVE PORT VASCULAR ACCESS Right 3/19/2018    Procedure: REMOVE PORT VASCULAR  ACCESS;  Right Port Removal;  Surgeon: Krysten Hopper PA-C;  [facility]     THORACOTOMY Left 11/3/2017    Procedure: THORACOTOMY;;  Surgeon: Yunior Esteban MD;  Location:  OR     SOCIAL HISTORY:  Social History     Socioeconomic History     Marital status:      Spouse name: Not on file     Number of children: 1     Years of education: Not on file     Highest education level: Not on file   Occupational History     Occupation: musician and teacher    Social Needs     Financial resource strain: Not on file     Food insecurity:     Worry: Not on file     Inability: Not on file     Transportation needs:     Medical: Not on file     Non-medical: Not on file   Tobacco Use     Smoking status: Never Smoker     Smokeless tobacco: Never Used   Substance and Sexual Activity     Alcohol use: Yes     Comment: 1 beer daily     Drug use: Yes     Types: Marijuana     Comment: occasional     Sexual activity: Yes     Partners: Female     Birth control/protection: Natural Family Planning   Lifestyle     Physical activity:     Days per week: Not on file     Minutes per session: Not on file     Stress: Not on file   Relationships     Social connections:     Talks on phone: Not on file     Gets together: Not on file     Attends Catholic service: Not on file     Active member of club or organization: Not on file     Attends meetings of clubs or organizations: Not on file     Relationship status: Not on file     Intimate partner violence:     Fear of current or ex partner: Not on file     Emotionally abused: Not on file     Physically abused: Not on file     Forced sexual activity: Not on file   Other Topics Concern     Parent/sibling w/ CABG, MI or angioplasty before 65F 55M? 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  He denies smoking.  He drinks ~1 beer a day.  He denies illicit drug use, other than occasional  marijuana.  He lives in [place] with his wife, 6 year old daughter and 2 year old son. He has a half sister who  of breast cancer at age 32; she was diagnosed in her late 20's.  A paternal grandmother had breast cancer in her 70's    FAMILY HISTORY:  Family History   Problem Relation Age of Onset     Other - See Comments Father         sepsis     Cancer Sister         breast cancer  31 yo 1/2 sister      Cancer Paternal Grandmother         breast     PHYSICAL EXAM:  General: The patient is a pleasant male in no acute distress. He is seen in infusion today.   Vital Signs 2019   Systolic 118   Diastolic 71   Pulse 77   Temperature 98.2   Respirations 16   Weight (LB) 149 lb   O2 100     Wt Readings from Last 10 Encounters:   19 67.6 kg (149 lb)   19 66.7 kg (147 lb)   04/10/19 66.6 kg (146 lb 14.4 oz)   19 65.8 kg (145 lb)   19 67.1 kg (148 lb)   19 66.7 kg (147 lb)   03/15/19 67 kg (147 lb 9.6 oz)   19 66.7 kg (147 lb)   18 65.5 kg (144 lb 6.4 oz)   10/24/18 65.4 kg (144 lb 2 oz)   HEENT: EOMI, PERRL. Sclerae are anicteric. Oral mucosa is pink and moist with no lesions or thrush.   Lymph: Neck is supple with no lymphadenopathy in the cervical or supraclavicular areas.   Heart: Regular rate and rhythm.   Lungs: Clear to auscultation bilaterally.   Abdomen: Bowel sounds present, soft, nontender with no palpable hepatosplenomegaly or masses.   Extremities: No lower extremity edema noted bilaterally.   Neuro: Cranial nerves II through XII are grossly intact.  Skin: No rashes, petechiae, or bruising noted on exposed skin.    LABS:   2019 07:15   WBC 3.5 (L)   Hemoglobin 13.0 (L)   Hematocrit 38.1 (L)   Platelet Count 174   RBC Count 4.36 (L)   MCV 87   MCH 29.8   MCHC 34.1   RDW 12.5   Diff Method Automated Method   % Neutrophils 62.7   % Lymphocytes 24.9   % Monocytes 6.3   % Eosinophils 4.9   % Basophils 0.9   % Immature Granulocytes 0.3   Nucleated  RBCs 0   Absolute Neutrophil 2.2   Absolute Lymphocytes 0.9   Absolute Monocytes 0.2   Absolute Eosinophils 0.2   Absolute Basophils 0.0   Abs Immature Granulocytes 0.0   Absolute Nucleated RBC 0.0   Protein Albumin Urine Negative     ASSESSMENT/PLAN:      Adenocarcinoma of the GE junction. s/p 3 cycles of neoadjuvant chemotherapy with EOX, followed by surgical resection on 11/3/17.  Pathology showed adenocarcinoma, moderate differentiated, extensive residual tumor with no evidence tumor regression, margins negative, perineural invasion present, 1 of 28 lymph nodes were involved with malignancy, stage [id number] (stage IIIA).  HER-2 is non-amplified.  He received EOF x 2 cycles after surgery, which he did not tolerate well.  The 5-FU on cycle 5 was discontinued early. Chemotherapy was stopped at that point due to poor tolerance. He had a liver biopsy on 3/20/19 moderately differentiated adenocarcinoma consistent with metastasis from primary esophageal adenocarcinoma that is MSI stable and PD-L1 negative. He also has possible metastatic disease in the peritoneum. He started on palliative chemotherapy with Taxol and Cyramza on 4/10/19. He is tolerating treatment very well thus far and will continue with cycle 1 day 15 today. He will see Dr. Hood in 2 weeks prior to consideration of cycle 2. We could consider tapering down his IV pre-meds moving forward, as we do with Taxol in breast cancer patients. He has further questions regarding targeted treatment to the liver lesion, which I will discuss with Dr. Hood. Foundation One testing shows MS-stable, TMB-low (4 mut/Mb), ERBB2 amplification equivocal, and TP53 H214R alteration. Will plan to get repeat PET-CT after 3 cycles of chemotherapy to evaluate response    2) Genetics:  -previous genetic testing was negative     Violeta Coto PA-C  [facility]  [address] [place] [address] [id number] [phone]    Addendum: I reviewed patient's case with Dr. Hood,  Dr. Franco, and Dr. Avalos regarding surgical options to manage the liver lesion. Based on that discussion, the recommendation is to continue with chemotherapy and potentially consider surgery or targeted treatment to the liver if he has stable disease for at least 6 months. I called the patient with this information and he expressed understanding of the plan.       Violeta Coto PA-C

## 2019-04-24 NOTE — NURSING NOTE
Chief Complaint   Patient presents with     Port Draw     labs drawn via port by RN in lab. VS taken. Pt checked in for next appt     Port accessed with 20g gripper needle by RN, labs collected, line flushed with saline and heparin.  Vitals taken. Pt checked in for appointment(s).    Yue ORDAZ RN PHN BSN  BMT/Oncology Lab

## 2019-04-24 NOTE — PROGRESS NOTES
Infusion Nursing Note:  Daniel Sandhu presents today for Cycle 1 Day 15 of Cyramza and Taxol.    Patient seen by provider today: Yes: MATHEUS Card while in infusion   present during visit today: Not Applicable.    Note: Patient arrives to infusion feeling well today. He reports a mild productive cough that started about a week ago, but is getting better. Denies any chills, fevers, or shortness of breath.     Intravenous Access:  Implanted Port.    Treatment Conditions:  Lab Results   Component Value Date    HGB 13.0 04/24/2019     Lab Results   Component Value Date    WBC 3.5 04/24/2019      Lab Results   Component Value Date    ANEU 2.2 04/24/2019     Lab Results   Component Value Date     04/24/2019      Results reviewed, labs MET treatment parameters, ok to proceed with treatment.  Urine protein negative.  /71      Post Infusion Assessment:  Patient tolerated infusion without incident.  Blood return noted pre and post infusion.  Site patent and intact, free from redness, edema or discomfort.  No evidence of extravasations.  Access discontinued per protocol.       Discharge Plan:   Patient declined prescription refills.  Discharge instructions reviewed with: Patient.  Patient and/or family verbalized understanding of discharge instructions and all questions answered.  AVS to patient via [employer].  Patient will return 5/6/19 for next appointment.   Patient discharged in stable condition accompanied by: self.  Departure Mode: Ambulatory.    Neva Lewis RN

## 2019-04-24 NOTE — PROGRESS NOTES
[facility]  Hematology/Oncology Established Patient Note    Today's Date: Apr 24, 2019    Reason for Follow-up: adenocarcinoma of the GE junction      HISTORY OF PRESENT ILLNESS: Daniel Sandhu is a 38 year old male who presents with adenocarcinoma of the GE junction.  In early 2017, patient started noticing difficulty swallowing, and that food seems to take longer to go down.  It became more frequent, and he saw his PCP, and was referred for EGD, which showed an esophageal mass located at the GE junction, measuring 4 cm.  Biopsy showed poorly differentiated adenocarcinoma.  HER-2 was sent and is equivocal (2+).  CT c/a/p showed a mildly prominent lymph node in the gastrohepatic ligament, but there were no pathologically enlarged lymph nodes in the chest, abdomen, or pelvis.  There was otherwise no evidence of metastatic disease.  PET-CT showed thickening of the distal esophagus consistent with known esophageal adenocarcinoma, as well as mildly hypermetabolic 1 cm lymph node in the gastrohepatic ligament.  There was no evidence of distant metastatic disease.  He underwent EUS on 6/9/17, which showed the esophageal tumor in the lower third of the esophagus, staged T2NX.  There were 2 abnormal lymph nodes seen in the gastrohepatic ligament; pathology was suspicious for adenocarcinoma.  Celiac node was sampled as well, which was benign.  He was seen by surgery, Dr. Esteban, and recommended srinivas-operative chemotherapy.    Port was placed on 6/22/17.  It was removed on 3/19/18.    He underwent chemotherapy with epirubicin, oxaliplatin, and capecitabine x 3 cycles 6/26/17-8/7/17.      On 11/3/17, he underwent laparotomy with total gastrectomy and abdominal lymphadenectomy, left thoracotomy with distal esophagectomy and intrathoracic Terrell-en-Y esophagojejunostomy, feeding jejunostomy, left pharyngostomy tube placement, right tube thoracostomy, and flexible bronchoscopy.  On 11/9/17, he was taken  back to OR for I&D of pharyngostomy tube abscess.  Pathology showed adenocarcinoma, moderate differentiated, extensive residual tumor with no evidence tumor regression, margins negative, perineural invasion present, 1 of 28 lymph nodes were involved with malignancy, stage [id number] (stage IIIA).  HER-2 is non-amplified.    He resumed chemotherapy post-surgery, with plans to complete 3 more cycles, with 5-FU, epirubicin, oxaliplatin.  However, he only completed 2 more cycles, due to poor tolerance.  He was admitted to the hospital 1/9/18-1/13/18 for nausea, diarrhea, failure to thrive, severe malnutrition, generalized weakness.  His infusional chemotherapy was stopped on 1/8/18.  He underwent EGD in the hospital on 1/11/18, which showed ulcers, and no evidence of recurrence of his cancer.  One area biopsied showed inflammation, no evidence of malignancy.    He saw Dr. Esteban on 3/6/19 and had CT abdomen/pelvis done, which showed a 3.1 cm lesion in hepatic segment 3.  He underwent biopsy on 3/20/19 by IR, which showed moderately differentiated adenocarcinoma consistent with metastasis from primary esophageal carcinoma.  HER-2 is non-amplified.  Restaging PET scan on 3/29/19 showed the 4.4 cm left lob liver metastasis.  There is nodular foci of hypermetabolic uptake in the left mid abdomen in relation to the small bowel loops, without definite underlying lesion on CT.  This is favored to represent metastatic disease unless proven otherwise. He started on palliative chemotherapy with Taxol and Cyramza on 4/10/19. He comes in today for routine follow up prior to cycle 1 day 15.     INTERIM HISTORY: Daniel comes in for follow-up today.  Patient denies any side effects from treatment thus far other than that he has started to lose hair.  He reports eating well and that things taste fine.  He does have ongoing difficulty with sleep and does use marijuana edibles or vapes marijuana to help him sleep.  He has occasional mild  headaches and does have some phlegm in the mornings which he thinks may be related to a mild cold as 1 of his children recently had a cold.  He denies other concerns.     REVIEW OF SYSTEMS:   Patient denies any of the following except if noted above: fevers, chills, difficulty with energy, vision or hearing changes, chest pain, dyspnea, abdominal pain, nausea, vomiting, diarrhea, constipation, urinary concerns, headaches, numbness, tingling, or issues with mood.    HOME MEDICATIONS:  Current Outpatient Medications   Medication Sig Dispense Refill     cyabnocobalamin (VITAMIN B-12) 2500 MCG sublingual tablet Place 2,500 mcg under the tongue daily 30 tablet 1     ferrous gluconate (FERGON) 324 (38 Fe) MG tablet Take 324 mg by mouth daily (with breakfast)       LORazepam (ATIVAN) 0.5 MG tablet Take 1 tablet (0.5 mg) by mouth every 4 hours as needed (Anxiety, Nausea/Vomiting or Sleep) 30 tablet 2     multivitamin, therapeutic with minerals (MULTI-VITAMIN) TABS tablet Take 1 tablet by mouth daily Generic [facility] Vitamin       prochlorperazine (COMPAZINE) 10 MG tablet Take 1 tablet (10 mg) by mouth every 6 hours as needed (Nausea/Vomiting) 30 tablet 2     ALLERGIES:  No Known Allergies    PAST MEDICAL HISTORY:  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) 6/5/2017     PAST SURGICAL HISTORY:  Past Surgical History:   Procedure Laterality Date     ESOPHAGOGASTRODUODENOSCOPY       ESOPHAGOSCOPY, GASTROSCOPY, DUODENOSCOPY (EGD), COMBINED N/A 6/9/2017    Procedure: COMBINED ENDOSCOPIC ULTRASOUND, ESOPHAGOSCOPY, GASTROSCOPY, DUODENOSCOPY (EGD), FINE NEEDLE ASPIRATE/BIOPSY;  Upper Endoscopic Ultrasound, fine needle aspirate/biopsy;  Surgeon: Guru Mark Avila MD;  [facility]     ESOPHAGOSCOPY, GASTROSCOPY, DUODENOSCOPY (EGD), COMBINED N/A 11/3/2017    Procedure: COMBINED ESOPHAGOSCOPY, GASTROSCOPY, DUODENOSCOPY (EGD);;  Surgeon: Yunior Esteban,  MD;  [facility]: [facility]     ESOPHAGOSCOPY, GASTROSCOPY, DUODENOSCOPY (EGD), COMBINED N/A 11/9/2017    Procedure: COMBINED ESOPHAGOSCOPY, GASTROSCOPY, DUODENOSCOPY (EGD);  Esophogastroduodenoscopy, take out pharangostomy tube;  Surgeon: Yunior Esteban MD;  [facility]     ESOPHAGOSCOPY, GASTROSCOPY, DUODENOSCOPY (EGD), COMBINED N/A 1/11/2018    Procedure: COMBINED ESOPHAGOSCOPY, GASTROSCOPY, DUODENOSCOPY (EGD), BIOPSY SINGLE OR MULTIPLE;  COMBINED ESOPHAGOSCOPY, GASTROSCOPY, DUODENOSCOPY (EGD);  Surgeon: Alessandro Mcgregor MD;  [facility]     GASTRECTOMY N/A 11/3/2017    Procedure: GASTRECTOMY;;  Surgeon: Yunior Esteban MD;  [facility]     HAND SURGERY      childhood, torn tendon     INSERT PORT VASCULAR ACCESS Right 6/22/2017    Procedure: INSERT PORT VASCULAR ACCESS;  Single Lumen Chest Power Port;  Surgeon: Iván Driver PA-C;  [facility]: [facility]     INSERT PORT VASCULAR ACCESS Right 4/8/2019    Procedure: Single Lumen Chest Port Placement;  Surgeon: Krysten Ballard PA-C;  Location: [facility]     IR CHEST PORT PLACEMENT > 5 YRS OF AGE  4/8/2019     IR LIVER BIOPSY PERCUTANEOUS  3/20/2019     LAPAROSCOPY DIAGNOSTIC (GENERAL) N/A 11/3/2017    Procedure: LAPAROSCOPY DIAGNOSTIC (GENERAL);  diagnostic laparoscopy, right chest tube, total gastrectomy with distal esophagectomy, intrathoracic eveline-y esophago-jejunostomy, feeding jejunostomy, pharyngostomy, esophagogastroduodenoscopy, flexible bronchoscopy;  Surgeon: Yunior Esteban MD;  [facility]     LAPAROTOMY EXPLORATORY N/A 11/3/2017    Procedure: LAPAROTOMY EXPLORATORY;;  Surgeon: Yunior Esteban MD;  [facility]     PHARYNGOSTOMY N/A 11/3/2017    Procedure: PHARYNGOSTOMY;;  Surgeon: Yunior Esteban MD;  [facility]     REMOVE PORT VASCULAR ACCESS Right 3/19/2018    Procedure: REMOVE PORT VASCULAR ACCESS;  Right Port Removal;  Surgeon: Krysten Hopper PA-C;  [facility]     THORACOTOMY  Left 11/3/2017    Procedure: THORACOTOMY;;  Surgeon: Yunior Esteban MD;  Location:  OR     SOCIAL HISTORY:  Social History     Socioeconomic History     Marital status:      Spouse name: Not on file     Number of children: 1     Years of education: Not on file     Highest education level: Not on file   Occupational History     Occupation: musician and teacher    Social Needs     Financial resource strain: Not on file     Food insecurity:     Worry: Not on file     Inability: Not on file     Transportation needs:     Medical: Not on file     Non-medical: Not on file   Tobacco Use     Smoking status: Never Smoker     Smokeless tobacco: Never Used   Substance and Sexual Activity     Alcohol use: Yes     Comment: 1 beer daily     Drug use: Yes     Types: Marijuana     Comment: occasional     Sexual activity: Yes     Partners: Female     Birth control/protection: Natural Family Planning   Lifestyle     Physical activity:     Days per week: Not on file     Minutes per session: Not on file     Stress: Not on file   Relationships     Social connections:     Talks on phone: Not on file     Gets together: Not on file     Attends Restorationism service: Not on file     Active member of club or organization: Not on file     Attends meetings of clubs or organizations: Not on file     Relationship status: Not on file     Intimate partner violence:     Fear of current or ex partner: Not on file     Emotionally abused: Not on file     Physically abused: Not on file     Forced sexual activity: Not on file   Other Topics Concern     Parent/sibling w/ CABG, MI or angioplasty before 65F 55M? 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  He denies smoking.  He drinks ~1 beer a day.  He denies illicit drug use, other than occasional marijuana.  He lives in [place] with his wife, 6 year old daughter and 2 year old son.  He has a half sister who  of breast cancer at age 32; she was diagnosed in her late 20's.  A paternal grandmother had breast cancer in her 70's    FAMILY HISTORY:  Family History   Problem Relation Age of Onset     Other - See Comments Father         sepsis     Cancer Sister         breast cancer  29 yo 1/2 sister      Cancer Paternal Grandmother         breast     PHYSICAL EXAM:  General: The patient is a pleasant male in no acute distress. He is seen in infusion today.   Vital Signs 2019   Systolic 118   Diastolic 71   Pulse 77   Temperature 98.2   Respirations 16   Weight (LB) 149 lb   O2 100     Wt Readings from Last 10 Encounters:   19 67.6 kg (149 lb)   19 66.7 kg (147 lb)   04/10/19 66.6 kg (146 lb 14.4 oz)   19 65.8 kg (145 lb)   19 67.1 kg (148 lb)   19 66.7 kg (147 lb)   03/15/19 67 kg (147 lb 9.6 oz)   19 66.7 kg (147 lb)   18 65.5 kg (144 lb 6.4 oz)   10/24/18 65.4 kg (144 lb 2 oz)   HEENT: EOMI, PERRL. Sclerae are anicteric. Oral mucosa is pink and moist with no lesions or thrush.   Lymph: Neck is supple with no lymphadenopathy in the cervical or supraclavicular areas.   Heart: Regular rate and rhythm.   Lungs: Clear to auscultation bilaterally.   Abdomen: Bowel sounds present, soft, nontender with no palpable hepatosplenomegaly or masses.   Extremities: No lower extremity edema noted bilaterally.   Neuro: Cranial nerves II through XII are grossly intact.  Skin: No rashes, petechiae, or bruising noted on exposed skin.    LABS:   2019 07:15   WBC 3.5 (L)   Hemoglobin 13.0 (L)   Hematocrit 38.1 (L)   Platelet Count 174   RBC Count 4.36 (L)   MCV 87   MCH 29.8   MCHC 34.1   RDW 12.5   Diff Method Automated Method   % Neutrophils 62.7   % Lymphocytes 24.9   % Monocytes 6.3   % Eosinophils 4.9   % Basophils 0.9   % Immature Granulocytes 0.3   Nucleated RBCs 0   Absolute Neutrophil 2.2   Absolute Lymphocytes 0.9   Absolute Monocytes 0.2   Absolute  Eosinophils 0.2   Absolute Basophils 0.0   Abs Immature Granulocytes 0.0   Absolute Nucleated RBC 0.0   Protein Albumin Urine Negative     ASSESSMENT/PLAN:      Adenocarcinoma of the GE junction. s/p 3 cycles of neoadjuvant chemotherapy with EOX, followed by surgical resection on 11/3/17.  Pathology showed adenocarcinoma, moderate differentiated, extensive residual tumor with no evidence tumor regression, margins negative, perineural invasion present, 1 of 28 lymph nodes were involved with malignancy, stage [id number] (stage IIIA).  HER-2 is non-amplified.  He received EOF x 2 cycles after surgery, which he did not tolerate well.  The 5-FU on cycle 5 was discontinued early. Chemotherapy was stopped at that point due to poor tolerance. He had a liver biopsy on 3/20/19 moderately differentiated adenocarcinoma consistent with metastasis from primary esophageal adenocarcinoma that is MSI stable and PD-L1 negative. He also has possible metastatic disease in the peritoneum. He started on palliative chemotherapy with Taxol and Cyramza on 4/10/19. He is tolerating treatment very well thus far and will continue with cycle 1 day 15 today. He will see Dr. Hood in 2 weeks prior to consideration of cycle 2. We could consider tapering down his IV pre-meds moving forward, as we do with Taxol in breast cancer patients. He has further questions regarding targeted treatment to the liver lesion, which I will discuss with Dr. Hood. [facility] testing shows MS-stable, TMB-low (4 mut/Mb), ERBB2 amplification equivocal, and TP53 H214R alteration. Will plan to get repeat PET-CT after 3 cycles of chemotherapy to evaluate response    2) Genetics:  -previous genetic testing was negative     Violeta Coto PA-C  [facility]  [address] [place] [address] [id number] [phone]    Addendum: I reviewed patient's case with Dr. Hood, Dr. Franco, and Dr. Avalos regarding surgical options to manage the liver lesion. Based on that  discussion, the recommendation is to continue with chemotherapy and potentially consider surgery or targeted treatment to the liver if he has stable disease for at least 6 months. I called the patient with this information and he expressed understanding of the plan.

## 2019-05-06 ENCOUNTER — ONCOLOGY VISIT (OUTPATIENT)
Dept: ONCOLOGY | Facility: CLINIC | Age: 39
End: 2019-05-06
Attending: INTERNAL MEDICINE
Payer: COMMERCIAL

## 2019-05-06 VITALS
RESPIRATION RATE: 14 BRPM | TEMPERATURE: 97.5 F | DIASTOLIC BLOOD PRESSURE: 67 MMHG | BODY MASS INDEX: 21.77 KG/M2 | HEART RATE: 66 BPM | OXYGEN SATURATION: 100 % | WEIGHT: 147 LBS | SYSTOLIC BLOOD PRESSURE: 115 MMHG | HEIGHT: 69 IN

## 2019-05-06 DIAGNOSIS — C15.5 MALIGNANT NEOPLASM OF LOWER THIRD OF ESOPHAGUS (H): Primary | ICD-10-CM

## 2019-05-06 PROCEDURE — G0463 HOSPITAL OUTPT CLINIC VISIT: HCPCS | Mod: ZF

## 2019-05-06 PROCEDURE — 99215 OFFICE O/P EST HI 40 MIN: CPT | Mod: ZP | Performed by: INTERNAL MEDICINE

## 2019-05-06 RX ORDER — ALBUTEROL SULFATE 90 UG/1
1-2 AEROSOL, METERED RESPIRATORY (INHALATION)
Status: CANCELLED
Start: 2019-05-22

## 2019-05-06 RX ORDER — LORAZEPAM 2 MG/ML
0.5 INJECTION INTRAMUSCULAR EVERY 4 HOURS PRN
Status: CANCELLED
Start: 2019-05-08

## 2019-05-06 RX ORDER — SODIUM CHLORIDE 9 MG/ML
1000 INJECTION, SOLUTION INTRAVENOUS CONTINUOUS PRN
Status: CANCELLED
Start: 2019-05-22

## 2019-05-06 RX ORDER — DIPHENHYDRAMINE HYDROCHLORIDE 50 MG/ML
50 INJECTION INTRAMUSCULAR; INTRAVENOUS
Status: CANCELLED
Start: 2019-05-08

## 2019-05-06 RX ORDER — EPINEPHRINE 0.3 MG/.3ML
0.3 INJECTION SUBCUTANEOUS EVERY 5 MIN PRN
Status: CANCELLED | OUTPATIENT
Start: 2019-05-08

## 2019-05-06 RX ORDER — DIPHENHYDRAMINE HYDROCHLORIDE 50 MG/ML
50 INJECTION INTRAMUSCULAR; INTRAVENOUS
Status: CANCELLED
Start: 2019-05-15

## 2019-05-06 RX ORDER — METHYLPREDNISOLONE SODIUM SUCCINATE 125 MG/2ML
125 INJECTION, POWDER, LYOPHILIZED, FOR SOLUTION INTRAMUSCULAR; INTRAVENOUS
Status: CANCELLED
Start: 2019-05-15

## 2019-05-06 RX ORDER — DIPHENHYDRAMINE HCL 25 MG
50 CAPSULE ORAL ONCE
Status: CANCELLED
Start: 2019-05-15

## 2019-05-06 RX ORDER — EPINEPHRINE 1 MG/ML
0.3 INJECTION, SOLUTION INTRAMUSCULAR; SUBCUTANEOUS EVERY 5 MIN PRN
Status: CANCELLED | OUTPATIENT
Start: 2019-05-22

## 2019-05-06 RX ORDER — DIPHENHYDRAMINE HCL 25 MG
50 CAPSULE ORAL ONCE
Status: CANCELLED
Start: 2019-05-22

## 2019-05-06 RX ORDER — SODIUM CHLORIDE 9 MG/ML
1000 INJECTION, SOLUTION INTRAVENOUS CONTINUOUS PRN
Status: CANCELLED
Start: 2019-05-08

## 2019-05-06 RX ORDER — EPINEPHRINE 1 MG/ML
0.3 INJECTION, SOLUTION INTRAMUSCULAR; SUBCUTANEOUS EVERY 5 MIN PRN
Status: CANCELLED | OUTPATIENT
Start: 2019-05-08

## 2019-05-06 RX ORDER — METHYLPREDNISOLONE SODIUM SUCCINATE 125 MG/2ML
125 INJECTION, POWDER, LYOPHILIZED, FOR SOLUTION INTRAMUSCULAR; INTRAVENOUS
Status: CANCELLED
Start: 2019-05-22

## 2019-05-06 RX ORDER — EPINEPHRINE 0.3 MG/.3ML
0.3 INJECTION SUBCUTANEOUS EVERY 5 MIN PRN
Status: CANCELLED | OUTPATIENT
Start: 2019-05-22

## 2019-05-06 RX ORDER — METHYLPREDNISOLONE SODIUM SUCCINATE 125 MG/2ML
125 INJECTION, POWDER, LYOPHILIZED, FOR SOLUTION INTRAMUSCULAR; INTRAVENOUS
Status: CANCELLED
Start: 2019-05-08

## 2019-05-06 RX ORDER — SODIUM CHLORIDE 9 MG/ML
1000 INJECTION, SOLUTION INTRAVENOUS CONTINUOUS PRN
Status: CANCELLED
Start: 2019-05-15

## 2019-05-06 RX ORDER — MEPERIDINE HYDROCHLORIDE 25 MG/ML
25 INJECTION INTRAMUSCULAR; INTRAVENOUS; SUBCUTANEOUS EVERY 30 MIN PRN
Status: CANCELLED | OUTPATIENT
Start: 2019-05-15

## 2019-05-06 RX ORDER — LORAZEPAM 2 MG/ML
0.5 INJECTION INTRAMUSCULAR EVERY 4 HOURS PRN
Status: CANCELLED
Start: 2019-05-15

## 2019-05-06 RX ORDER — ALBUTEROL SULFATE 0.83 MG/ML
2.5 SOLUTION RESPIRATORY (INHALATION)
Status: CANCELLED | OUTPATIENT
Start: 2019-05-08

## 2019-05-06 RX ORDER — DIPHENHYDRAMINE HCL 25 MG
50 CAPSULE ORAL ONCE
Status: CANCELLED
Start: 2019-05-08

## 2019-05-06 RX ORDER — ALBUTEROL SULFATE 0.83 MG/ML
2.5 SOLUTION RESPIRATORY (INHALATION)
Status: CANCELLED | OUTPATIENT
Start: 2019-05-15

## 2019-05-06 RX ORDER — LORAZEPAM 2 MG/ML
0.5 INJECTION INTRAMUSCULAR EVERY 4 HOURS PRN
Status: CANCELLED
Start: 2019-05-22

## 2019-05-06 RX ORDER — HEPARIN SODIUM (PORCINE) LOCK FLUSH IV SOLN 100 UNIT/ML 100 UNIT/ML
500 SOLUTION INTRAVENOUS ONCE
Status: CANCELLED
Start: 2019-05-22

## 2019-05-06 RX ORDER — HEPARIN SODIUM (PORCINE) LOCK FLUSH IV SOLN 100 UNIT/ML 100 UNIT/ML
500 SOLUTION INTRAVENOUS ONCE
Status: CANCELLED
Start: 2019-05-08

## 2019-05-06 RX ORDER — ALBUTEROL SULFATE 0.83 MG/ML
2.5 SOLUTION RESPIRATORY (INHALATION)
Status: CANCELLED | OUTPATIENT
Start: 2019-05-22

## 2019-05-06 RX ORDER — SACCHAROMYCES BOULARDII 250 MG
250 CAPSULE ORAL 2 TIMES DAILY
COMMUNITY

## 2019-05-06 RX ORDER — ALBUTEROL SULFATE 90 UG/1
1-2 AEROSOL, METERED RESPIRATORY (INHALATION)
Status: CANCELLED
Start: 2019-05-15

## 2019-05-06 RX ORDER — DIPHENHYDRAMINE HYDROCHLORIDE 50 MG/ML
50 INJECTION INTRAMUSCULAR; INTRAVENOUS
Status: CANCELLED
Start: 2019-05-22

## 2019-05-06 RX ORDER — ALBUTEROL SULFATE 90 UG/1
1-2 AEROSOL, METERED RESPIRATORY (INHALATION)
Status: CANCELLED
Start: 2019-05-08

## 2019-05-06 RX ORDER — MEPERIDINE HYDROCHLORIDE 25 MG/ML
25 INJECTION INTRAMUSCULAR; INTRAVENOUS; SUBCUTANEOUS EVERY 30 MIN PRN
Status: CANCELLED | OUTPATIENT
Start: 2019-05-08

## 2019-05-06 RX ORDER — EPINEPHRINE 0.3 MG/.3ML
0.3 INJECTION SUBCUTANEOUS EVERY 5 MIN PRN
Status: CANCELLED | OUTPATIENT
Start: 2019-05-15

## 2019-05-06 RX ORDER — HEPARIN SODIUM (PORCINE) LOCK FLUSH IV SOLN 100 UNIT/ML 100 UNIT/ML
500 SOLUTION INTRAVENOUS ONCE
Status: CANCELLED
Start: 2019-05-15

## 2019-05-06 RX ORDER — MEPERIDINE HYDROCHLORIDE 25 MG/ML
25 INJECTION INTRAMUSCULAR; INTRAVENOUS; SUBCUTANEOUS EVERY 30 MIN PRN
Status: CANCELLED | OUTPATIENT
Start: 2019-05-22

## 2019-05-06 RX ORDER — EPINEPHRINE 1 MG/ML
0.3 INJECTION, SOLUTION INTRAMUSCULAR; SUBCUTANEOUS EVERY 5 MIN PRN
Status: CANCELLED | OUTPATIENT
Start: 2019-05-15

## 2019-05-06 ASSESSMENT — PAIN SCALES - GENERAL: PAINLEVEL: NO PAIN (0)

## 2019-05-06 ASSESSMENT — MIFFLIN-ST. JEOR: SCORE: 1582.42

## 2019-05-06 NOTE — NURSING NOTE
"Oncology Rooming Note    May 6, 2019 8:59 AM   Daniel Sandhu is a 38 year old male who presents for:    Chief Complaint   Patient presents with     Oncology Clinic Visit     Follow Up Appt for Esophageal Cancer     Initial Vitals: /67 (BP Location: Right arm, Patient Position: Right side, Cuff Size: Adult Regular)   Pulse 66   Temp 97.5  F (36.4  C) (Oral)   Resp 14   Ht 1.753 m (5' 9.02\")   Wt 66.7 kg (147 lb)   SpO2 100%   BMI 21.70 kg/m   Estimated body mass index is 21.7 kg/m  as calculated from the following:    Height as of this encounter: 1.753 m (5' 9.02\").    Weight as of this encounter: 66.7 kg (147 lb). Body surface area is 1.8 meters squared.  No Pain (0) Comment: Data Unavailable   No LMP for male patient.  Allergies reviewed: Yes  Medications reviewed: Yes    Medications: Medication refills not needed today.  Pharmacy name entered into [employer]:    [facility] - [place], [place] - [address] [facility] - [place], [place] - [address] [facility] [place], [place] - [address] [place] [address] [phone]    Clinical concerns: patient wants to know what the status of his genetic testing is & what is his eligibility for research & clinical trials.    Patient states he is feeling really good & this current chemo is really good compared to his prior course of chemo which was very hard on him physically.    Hood was notified.      Emely Xavier RN              "

## 2019-05-06 NOTE — PROGRESS NOTES
[facility]    Hematology/Oncology Established Patient Note      Today's Date: 5/06/19    Reason for Follow-up: adenocarcinoma of the GE junction      HISTORY OF PRESENT ILLNESS: Daniel Sandhu is a 38 year old male who presents with adenocarcinoma of the GE junction.  In early 2017, patient started noticing difficulty swallowing, and that food seems to take longer to go down.  It became more frequent, and he saw his PCP, and was referred for EGD, which showed an esophageal mass located at the GE junction, measuring 4 cm.  Biopsy showed poorly differentiated adenocarcinoma.  HER-2 was sent and is equivocal (2+).  CT c/a/p showed a mildly prominent lymph node in the gastrohepatic ligament, but there were no pathologically enlarged lymph nodes in the chest, abdomen, or pelvis.  There was otherwise no evidence of metastatic disease.  PET-CT showed thickening of the distal esophagus consistent with known esophageal adenocarcinoma, as well as mildly hypermetabolic 1 cm lymph node in the gastrohepatic ligament.  There was no evidence of distant metastatic disease.  He underwent EUS on 6/9/17, which showed the esophageal tumor in the lower third of the esophagus, staged T2NX.  There were 2 abnormal lymph nodes seen in the gastrohepatic ligament; pathology was suspicious for adenocarcinoma.  Celiac node was sampled as well, which was benign.  He was seen by surgery, Dr. Esteban, and recommended srinivas-operative chemotherapy.    Port was placed on 6/22/17.  It was removed on 3/19/18.    He underwent chemotherapy with epirubicin, oxaliplatin, and capecitabine x 3 cycles 6/26/17-8/7/17.      On 11/3/17, he underwent laparotomy with total gastrectomy and abdominal lymphadenectomy, left thoracotomy with distal esophagectomy and intrathoracic Terrell-en-Y esophagojejunostomy, feeding jejunostomy, left pharyngostomy tube placement, right tube thoracostomy, and flexible bronchoscopy.  On 11/9/17, he was taken back  to OR for I&D of pharyngostomy tube abscess.  Pathology showed adenocarcinoma, moderate differentiated, extensive residual tumor with no evidence tumor regression, margins negative, perineural invasion present, 1 of 28 lymph nodes were involved with malignancy, stage [id number] (stage IIIA).  HER-2 is non-amplified.    He resumed chemotherapy post-surgery, with plans to complete 3 more cycles, with 5-FU, epirubicin, oxaliplatin.  However, he only completed 2 more cycles, due to poor tolerance.  He was admitted to the hospital 1/9/18-1/13/18 for nausea, diarrhea, failure to thrive, severe malnutrition, generalized weakness.  His infusional chemotherapy was stopped on 1/8/18.  He underwent EGD in the hospital on 1/11/18, which showed ulcers, and no evidence of recurrence of his cancer.  One area biopsied showed inflammation, no evidence of malignancy.    He saw Dr. Esteban on 3/6/19 and had CT abdomen/pelvis done, which showed a 3.1 cm lesion in hepatic segment 3.  He underwent biopsy on 3/20/19 by IR, which showed moderately differentiated adenocarcinoma consistent with metastasis from primary esophageal carcinoma.  HER-2 is non-amplified.  Restaging PET scan on 3/29/19 showed the 4.4 cm left lobe liver metastasis.  There is nodular foci of hypermetabolic uptake in the left mid abdomen in relation to the small bowel loops, without definite underlying lesion on CT.  This is favored to represent metastatic disease unless proven otherwise.    He started on chemotherapy with paclitaxel and ramucirumab on 4/10/19.      INTERIM HISTORY: Daniel comes in for follow-up today.  He says that he is doing well.  He is tolerating chemotherapy well with hardly any side effects.  He notes hair thinning, so he is going to cut his hair.          REVIEW OF SYSTEMS:   14 point ROS was reviewed and is negative other than as noted above in HPI.       HOME MEDICATIONS:  Current Outpatient Medications   Medication Sig Dispense Refill      cyabnocobalamin (VITAMIN B-12) 2500 MCG sublingual tablet Place 2,500 mcg under the tongue daily 30 tablet 1     ferrous gluconate (FERGON) 324 (38 Fe) MG tablet Take 324 mg by mouth daily (with breakfast)       multivitamin, therapeutic with minerals (MULTI-VITAMIN) TABS tablet Take 1 tablet by mouth daily Generic [place] Vitamin       saccharomyces boulardii (FLORASTOR) 250 MG capsule Take 250 mg by mouth 2 times daily       LORazepam (ATIVAN) 0.5 MG tablet Take 1 tablet (0.5 mg) by mouth every 4 hours as needed (Anxiety, Nausea/Vomiting or Sleep) (Patient not taking: Reported on 5/6/2019) 30 tablet 2     prochlorperazine (COMPAZINE) 10 MG tablet Take 1 tablet (10 mg) by mouth every 6 hours as needed (Nausea/Vomiting) (Patient not taking: Reported on 5/6/2019) 30 tablet 2         ALLERGIES:  No Known Allergies      PAST MEDICAL HISTORY:  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) 6/5/2017         PAST SURGICAL HISTORY:  Past Surgical History:   Procedure Laterality Date     ESOPHAGOGASTRODUODENOSCOPY       ESOPHAGOSCOPY, GASTROSCOPY, DUODENOSCOPY (EGD), COMBINED N/A 6/9/2017    Procedure: COMBINED ENDOSCOPIC ULTRASOUND, ESOPHAGOSCOPY, GASTROSCOPY, DUODENOSCOPY (EGD), FINE NEEDLE ASPIRATE/BIOPSY;  Upper Endoscopic Ultrasound, fine needle aspirate/biopsy;  Surgeon: Guru Mark Avila MD;  Location: [facility] OR     ESOPHAGOSCOPY, GASTROSCOPY, DUODENOSCOPY (EGD), COMBINED N/A 11/3/2017    Procedure: COMBINED ESOPHAGOSCOPY, GASTROSCOPY, DUODENOSCOPY (EGD);;  Surgeon: Yunior Esteban MD;  Location: [facility] OR     ESOPHAGOSCOPY, GASTROSCOPY, DUODENOSCOPY (EGD), COMBINED N/A 11/9/2017    Procedure: COMBINED ESOPHAGOSCOPY, GASTROSCOPY, DUODENOSCOPY (EGD);  Esophogastroduodenoscopy, take out pharangostomy tube;  Surgeon: Yunior Esteban MD;  Location: [facility] OR     ESOPHAGOSCOPY, GASTROSCOPY, DUODENOSCOPY (EGD), COMBINED N/A 1/11/2018    Procedure:  COMBINED ESOPHAGOSCOPY, GASTROSCOPY, DUODENOSCOPY (EGD), BIOPSY SINGLE OR MULTIPLE;  COMBINED ESOPHAGOSCOPY, GASTROSCOPY, DUODENOSCOPY (EGD);  Surgeon: Alessandro Mcgregor MD;  Location: [facility]     GASTRECTOMY N/A 11/3/2017    Procedure: GASTRECTOMY;;  Surgeon: Yunior Esteban MD;  Location: [facility] OR     HAND SURGERY      childhood, torn tendon     INSERT PORT VASCULAR ACCESS Right 6/22/2017    Procedure: INSERT PORT VASCULAR ACCESS;  Single Lumen Chest Power Port;  Surgeon: Iván Driver PA-C;  Location: [facility]     INSERT PORT VASCULAR ACCESS Right 4/8/2019    Procedure: Single Lumen Chest Port Placement;  Surgeon: Kyrsten Ballard PA-C;  Location: [facility]     IR CHEST PORT PLACEMENT > 5 YRS OF AGE  4/8/2019     IR LIVER BIOPSY PERCUTANEOUS  3/20/2019     LAPAROSCOPY DIAGNOSTIC (GENERAL) N/A 11/3/2017    Procedure: LAPAROSCOPY DIAGNOSTIC (GENERAL);  diagnostic laparoscopy, right chest tube, total gastrectomy with distal esophagectomy, intrathoracic eevline-y esophago-jejunostomy, feeding jejunostomy, pharyngostomy, esophagogastroduodenoscopy, flexible bronchoscopy;  Surgeon: Yunior Esteban MD;  Location: [facility]     LAPAROTOMY EXPLORATORY N/A 11/3/2017    Procedure: LAPAROTOMY EXPLORATORY;;  Surgeon: Yunior Esteban MD;  Location: [facility]     PHARYNGOSTOMY N/A 11/3/2017    Procedure: PHARYNGOSTOMY;;  Surgeon: Yunior Esteban MD;  Location: [facility]     REMOVE PORT VASCULAR ACCESS Right 3/19/2018    Procedure: REMOVE PORT VASCULAR ACCESS;  Right Port Removal;  Surgeon: Krysten Hopper PA-C;  [facility]     THORACOTOMY Left 11/3/2017    Procedure: THORACOTOMY;;  Surgeon: Yunior Esteban MD;  Location: [facility]         SOCIAL HISTORY:  Social History     Socioeconomic History     Marital status:      Spouse name: Not on file     Number of children: 1     Years of education: Not on file     Highest education level: Not on file   Occupational History     Occupation:  musician and teacher    Social Needs     Financial resource strain: Not on file     Food insecurity:     Worry: Not on file     Inability: Not on file     Transportation needs:     Medical: Not on file     Non-medical: Not on file   Tobacco Use     Smoking status: Never Smoker     Smokeless tobacco: Never Used   Substance and Sexual Activity     Alcohol use: Yes     Comment: 1 beer daily     Drug use: Yes     Types: Marijuana     Comment: occasional     Sexual activity: Yes     Partners: Female     Birth control/protection: Natural Family Planning   Lifestyle     Physical activity:     Days per week: Not on file     Minutes per session: Not on file     Stress: Not on file   Relationships     Social connections:     Talks on phone: Not on file     Gets together: Not on file     Attends Spiritism service: Not on file     Active member of club or organization: Not on file     Attends meetings of clubs or organizations: Not on file     Relationship status: Not on file     Intimate partner violence:     Fear of current or ex partner: Not on file     Emotionally abused: Not on file     Physically abused: Not on file     Forced sexual activity: Not on file   Other Topics Concern     Parent/sibling w/ CABG, MI or angioplasty before 65F 55M? 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  He denies smoking.  He drinks ~1 beer a day.  He denies illicit drug use, other than occasional marijuana.  He lives in [place] with his wife, and 4.5 year-old daughter.  His wife is currently pregnant with a boy, and is due in 6 weeks.  He has a half sister who  of breast cancer at age 32; she was diagnosed in her late 20's.  A paternal grandmother had breast cancer in her 70's      FAMILY HISTORY:  Family History   Problem Relation Age of Onset     Other - See Comments Father         sepsis     Cancer Sister         breast cancer  " 31 yo 1/2 sister      Cancer Paternal Grandmother         breast         PHYSICAL EXAM:  Vital signs:  /67 (BP Location: Right arm, Patient Position: Right side, Cuff Size: Adult Regular)   Pulse 66   Temp 97.5  F (36.4  C) (Oral)   Resp 14   Ht 1.753 m (5' 9.02\")   Wt 66.7 kg (147 lb)   SpO2 100%   BMI 21.70 kg/m     ECO  GENERAL/CONSTITUTIONAL: No acute distress.   Accompanied by wife.  EYES: No scleral icterus.  RESPIRATORY: Clear to auscultation bilaterally.  CARDIOVASCULAR: Regular rate and rhythm.  GASTROINTESTINAL: Bowel sounds present.  Non-tender.  No distention.    MUSCULOSKELETAL: Warm and well-perfused, no cyanosis, clubbing, or edema.  NEUROLOGIC: Alert, oriented, answers questions appropriately.  INTEGUMENTARY: No jaundice.  Port in place at the right upper chest.      LABS:  None today.      PATHOLOGY:  3/20/19:  FINAL DIAGNOSIS:   Liver, needle biopsy   - Moderately differentiated adenocarcinoma consistent with metastasis from    primary esophageal adenocarcinoma.     HER-2 non-amplified.      IMAGING:  PET-CT 3/29/19:  1. See dedicated neuroradiology report for the results of the high  resolution PET CT of the neck.   2. 4.4 cm left lobe liver metastasis.  3. Nodular foci of hypermetabolic uptake in the left mid abdomen in  relation to the small bowel loops, without definite underlying lesion  on CT. This is favored to represent metastatic disease unless proven  otherwise.  4. Other incidental findings as described in the body.      CT neck 3/29/19:  1. On the fusion PET CT, there is no abnormal metabolic activity in  the mucosal space, soft tissues, or cervical aris chains.   2. No evidence of mucosal, aris, or soft tissue abnormality on  contrast enhanced neck CT.  3. Please refer to the whole body PET CT performed as a separate  report, for the findings of the remainder of the body.      ASSESSMENT/PLAN:  Daniel Sandhu is a 38 year old male with:    1) Adenocarcinoma of " the GE junction: now s/p 3 cycles of neoadjuvant chemotherapy with EOX, followed by surgical resection on 11/3/17.  Pathology showed adenocarcinoma, moderate differentiated, extensive residual tumor with no evidence tumor regression, margins negative, perineural invasion present, 1 of 28 lymph nodes were involved with malignancy, stage [id number] (stage IIIA).  HER-2 is non-amplified.    He has received EOF x 2 cycles after surgery, and he has not tolerated well.  The 5-FU on cycle 5 was discontinued early. Chemotherapy was stopped at that point due to poor tolerance.    Patient now has biopsy-confirmed metastatic disease to the liver and possibly peritoneum.  He is asymptomatic currently.      [facility] testing shows MS-stable, TMB-low (4 mut/Mb), ERBB2 amplification equivocal, and TP53 H214R alteration.  PD-L1 was negative (0%).  We review the results together and possible clinical trials, and possible second opinion at [facility].    Will plan to get repeat PET-CT after 3 cycles of chemotherapy to evaluate response    He has been tolerating chemotherapy with paclitaxel+ramucirumab very well so far.    -he will proceed with C2D1 of chemotherapy on 5/8/19.  -he will see ROMEO prior to cycle 3  -will plan to get repeat PET-CT after 3 cycles of chemotherapy to evaluate response - ordered  -I will see him back in clinic after PET-CT is done (prior to cycle 4 of chemotherapy)    I discussed additional support, including palliative care, , oncology psychology.  Daniel and his wife are understanding having difficulty coping and were teary today.  They opt to wait for now and may be open to additional support later on.      Patient has addition questions regarding liver directed treatment to the liver lesion.  We have discussed that the data on metastatectomy on esophageal/gastric tumors are scant, and appears to have no overall survival benefit.  I did discuss with Dr. Avalos, and he agrees that the data is  poor.  However, if patient has stable disease for at least 6 months, and no evidence of peritoneal disease, then he may consider surgery or targeted treatment to the liver.  Daniel expressed understanding and satisfaction with this.    2) Genetics:  -genetic testing was negative    3) Fertility: Daniel and his wife have 2 children, including a baby boy just born around June 2017.  He is not planning for more children in the near future.    4) Patient talked about medical cannabis, and I offered to certify him, but he says that the program is too expensive for him.  He does use marijuana edibles or vapes marijuana, which is helpful.        I spent a total of 40 minutes with the patient, with over >50% of the time in counseling and/or coordination of care.       Laurence Hood MD  Hematology/Oncology  [facility]

## 2019-05-06 NOTE — LETTER
5/6/2019       RE: Daniel Sandhu  [address] [address]     Dear Colleague,    Thank you for referring your patient, Daniel Sandhu, to the [facility]. Please see a copy of my visit note below.    [facility]    Hematology/Oncology Established Patient Note      Today's Date: 5/06/19    Reason for Follow-up: adenocarcinoma of the GE junction      HISTORY OF PRESENT ILLNESS: Daniel Sandhu is a 38 year old male who presents with adenocarcinoma of the GE junction.  In early 2017, patient started noticing difficulty swallowing, and that food seems to take longer to go down.  It became more frequent, and he saw his PCP, and was referred for EGD, which showed an esophageal mass located at the GE junction, measuring 4 cm.  Biopsy showed poorly differentiated adenocarcinoma.  HER-2 was sent and is equivocal (2+).  CT c/a/p showed a mildly prominent lymph node in the gastrohepatic ligament, but there were no pathologically enlarged lymph nodes in the chest, abdomen, or pelvis.  There was otherwise no evidence of metastatic disease.  PET-CT showed thickening of the distal esophagus consistent with known esophageal adenocarcinoma, as well as mildly hypermetabolic 1 cm lymph node in the gastrohepatic ligament.  There was no evidence of distant metastatic disease.  He underwent EUS on 6/9/17, which showed the esophageal tumor in the lower third of the esophagus, staged T2NX.  There were 2 abnormal lymph nodes seen in the gastrohepatic ligament; pathology was suspicious for adenocarcinoma.  Celiac node was sampled as well, which was benign.  He was seen by surgery, Dr. Esteban, and recommended srinivas-operative chemotherapy.    Port was placed on 6/22/17.  It was removed on 3/19/18.    He underwent chemotherapy with epirubicin, oxaliplatin, and capecitabine x 3 cycles 6/26/17-8/7/17.      On 11/3/17, he underwent laparotomy with total gastrectomy and abdominal  lymphadenectomy, left thoracotomy with distal esophagectomy and intrathoracic Terrell-en-Y esophagojejunostomy, feeding jejunostomy, left pharyngostomy tube placement, right tube thoracostomy, and flexible bronchoscopy.  On 11/9/17, he was taken back to OR for I&D of pharyngostomy tube abscess.  Pathology showed adenocarcinoma, moderate differentiated, extensive residual tumor with no evidence tumor regression, margins negative, perineural invasion present, 1 of 28 lymph nodes were involved with malignancy, stage [id number] (stage IIIA).  HER-2 is non-amplified.    He resumed chemotherapy post-surgery, with plans to complete 3 more cycles, with 5-FU, epirubicin, oxaliplatin.  However, he only completed 2 more cycles, due to poor tolerance.  He was admitted to the hospital 1/9/18-1/13/18 for nausea, diarrhea, failure to thrive, severe malnutrition, generalized weakness.  His infusional chemotherapy was stopped on 1/8/18.  He underwent EGD in the hospital on 1/11/18, which showed ulcers, and no evidence of recurrence of his cancer.  One area biopsied showed inflammation, no evidence of malignancy.    He saw Dr. Esteban on 3/6/19 and had CT abdomen/pelvis done, which showed a 3.1 cm lesion in hepatic segment 3.  He underwent biopsy on 3/20/19 by IR, which showed moderately differentiated adenocarcinoma consistent with metastasis from primary esophageal carcinoma.  HER-2 is non-amplified.  Restaging PET scan on 3/29/19 showed the 4.4 cm left lobe liver metastasis.  There is nodular foci of hypermetabolic uptake in the left mid abdomen in relation to the small bowel loops, without definite underlying lesion on CT.  This is favored to represent metastatic disease unless proven otherwise.    He started on chemotherapy with paclitaxel and ramucirumab on 4/10/19.      INTERIM HISTORY: Daniel comes in for follow-up today.  He says that he is doing well.  He is tolerating chemotherapy well with hardly any side effects.  He notes hair  thinning, so he is going to cut his hair.          REVIEW OF SYSTEMS:   14 point ROS was reviewed and is negative other than as noted above in HPI.       HOME MEDICATIONS:  Current Outpatient Medications   Medication Sig Dispense Refill     cyabnocobalamin (VITAMIN B-12) 2500 MCG sublingual tablet Place 2,500 mcg under the tongue daily 30 tablet 1     ferrous gluconate (FERGON) 324 (38 Fe) MG tablet Take 324 mg by mouth daily (with breakfast)       multivitamin, therapeutic with minerals (MULTI-VITAMIN) TABS tablet Take 1 tablet by mouth daily Generic Sesser Vitamin       saccharomyces boulardii (FLORASTOR) 250 MG capsule Take 250 mg by mouth 2 times daily       LORazepam (ATIVAN) 0.5 MG tablet Take 1 tablet (0.5 mg) by mouth every 4 hours as needed (Anxiety, Nausea/Vomiting or Sleep) (Patient not taking: Reported on 5/6/2019) 30 tablet 2     prochlorperazine (COMPAZINE) 10 MG tablet Take 1 tablet (10 mg) by mouth every 6 hours as needed (Nausea/Vomiting) (Patient not taking: Reported on 5/6/2019) 30 tablet 2         ALLERGIES:  No Known Allergies      PAST MEDICAL HISTORY:  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) 6/5/2017         PAST SURGICAL HISTORY:  Past Surgical History:   Procedure Laterality Date     ESOPHAGOGASTRODUODENOSCOPY       ESOPHAGOSCOPY, GASTROSCOPY, DUODENOSCOPY (EGD), COMBINED N/A 6/9/2017    Procedure: COMBINED ENDOSCOPIC ULTRASOUND, ESOPHAGOSCOPY, GASTROSCOPY, DUODENOSCOPY (EGD), FINE NEEDLE ASPIRATE/BIOPSY;  Upper Endoscopic Ultrasound, fine needle aspirate/biopsy;  Surgeon: Guru Mark Avila MD;  Location: [facility] OR     ESOPHAGOSCOPY, GASTROSCOPY, DUODENOSCOPY (EGD), COMBINED N/A 11/3/2017    Procedure: COMBINED ESOPHAGOSCOPY, GASTROSCOPY, DUODENOSCOPY (EGD);;  Surgeon: Yunior Esteban MD;  Location: [facility] OR     ESOPHAGOSCOPY, GASTROSCOPY, DUODENOSCOPY (EGD), COMBINED N/A 11/9/2017    Procedure: COMBINED  ESOPHAGOSCOPY, GASTROSCOPY, DUODENOSCOPY (EGD);  Esophogastroduodenoscopy, take out pharangostomy tube;  Surgeon: Yunior Esteban MD;  Location: [facility] OR     ESOPHAGOSCOPY, GASTROSCOPY, DUODENOSCOPY (EGD), COMBINED N/A 1/11/2018    Procedure: COMBINED ESOPHAGOSCOPY, GASTROSCOPY, DUODENOSCOPY (EGD), BIOPSY SINGLE OR MULTIPLE;  COMBINED ESOPHAGOSCOPY, GASTROSCOPY, DUODENOSCOPY (EGD);  Surgeon: Alessandro Mcgregor MD;  Location: [facility]     GASTRECTOMY N/A 11/3/2017    Procedure: GASTRECTOMY;;  Surgeon: Yunior Esteban MD;  Location: [facility] OR     HAND SURGERY      childhood, torn tendon     INSERT PORT VASCULAR ACCESS Right 6/22/2017    Procedure: INSERT PORT VASCULAR ACCESS;  Single Lumen Chest Power Port;  Surgeon: Iván Driver PA-C;  [facility]: [facility]     INSERT PORT VASCULAR ACCESS Right 4/8/2019    Procedure: Single Lumen Chest Port Placement;  Surgeon: Krysten Ballard PA-C;  Location: [facility]     IR CHEST PORT PLACEMENT > 5 YRS OF AGE  4/8/2019     IR LIVER BIOPSY PERCUTANEOUS  3/20/2019     LAPAROSCOPY DIAGNOSTIC (GENERAL) N/A 11/3/2017    Procedure: LAPAROSCOPY DIAGNOSTIC (GENERAL);  diagnostic laparoscopy, right chest tube, total gastrectomy with distal esophagectomy, intrathoracic eveline-y esophago-jejunostomy, feeding jejunostomy, pharyngostomy, esophagogastroduodenoscopy, flexible bronchoscopy;  Surgeon: Yunior Esteban MD;  Location: [facility]     LAPAROTOMY EXPLORATORY N/A 11/3/2017    Procedure: LAPAROTOMY EXPLORATORY;;  Surgeon: Yunior Esteban MD;  [facility]     PHARYNGOSTOMY N/A 11/3/2017    Procedure: PHARYNGOSTOMY;;  Surgeon: Yunior Esteban MD;  Location: [facility]     REMOVE PORT VASCULAR ACCESS Right 3/19/2018    Procedure: REMOVE PORT VASCULAR ACCESS;  Right Port Removal;  Surgeon: Krysten Hopper PA-C;  [facility]     THORACOTOMY Left 11/3/2017    Procedure: THORACOTOMY;;  Surgeon: Yunior Esteban MD;  Location: [facility]         SOCIAL  HISTORY:  Social History     Socioeconomic History     Marital status:      Spouse name: Not on file     Number of children: 1     Years of education: Not on file     Highest education level: Not on file   Occupational History     Occupation: musician and teacher    Social Needs     Financial resource strain: Not on file     Food insecurity:     Worry: Not on file     Inability: Not on file     Transportation needs:     Medical: Not on file     Non-medical: Not on file   Tobacco Use     Smoking status: Never Smoker     Smokeless tobacco: Never Used   Substance and Sexual Activity     Alcohol use: Yes     Comment: 1 beer daily     Drug use: Yes     Types: Marijuana     Comment: occasional     Sexual activity: Yes     Partners: Female     Birth control/protection: Natural Family Planning   Lifestyle     Physical activity:     Days per week: Not on file     Minutes per session: Not on file     Stress: Not on file   Relationships     Social connections:     Talks on phone: Not on file     Gets together: Not on file     Attends Restorationist service: Not on file     Active member of club or organization: Not on file     Attends meetings of clubs or organizations: Not on file     Relationship status: Not on file     Intimate partner violence:     Fear of current or ex partner: Not on file     Emotionally abused: Not on file     Physically abused: Not on file     Forced sexual activity: Not on file   Other Topics Concern     Parent/sibling w/ CABG, MI or angioplasty before 65F 55M? 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  He denies smoking.  He drinks ~1 beer a day.  He denies illicit drug use, other than occasional marijuana.  He lives in [place] with his wife, and 4.5 year-old daughter.  His wife is currently pregnant with a boy, and is due in 6 weeks.  He has a half sister who  of breast cancer at age  "32; she was diagnosed in her late 20's.  A paternal grandmother had breast cancer in her 70's      FAMILY HISTORY:  Family History   Problem Relation Age of Onset     Other - See Comments Father         sepsis     Cancer Sister         breast cancer  31 yo 1/2 sister      Cancer Paternal Grandmother         breast         PHYSICAL EXAM:  Vital signs:  /67 (BP Location: Right arm, Patient Position: Right side, Cuff Size: Adult Regular)   Pulse 66   Temp 97.5  F (36.4  C) (Oral)   Resp 14   Ht 1.753 m (5' 9.02\")   Wt 66.7 kg (147 lb)   SpO2 100%   BMI 21.70 kg/m      ECO  GENERAL/CONSTITUTIONAL: No acute distress.   Accompanied by wife.  EYES: No scleral icterus.  RESPIRATORY: Clear to auscultation bilaterally.  CARDIOVASCULAR: Regular rate and rhythm.  GASTROINTESTINAL: Bowel sounds present.  Non-tender.  No distention.    MUSCULOSKELETAL: Warm and well-perfused, no cyanosis, clubbing, or edema.  NEUROLOGIC: Alert, oriented, answers questions appropriately.  INTEGUMENTARY: No jaundice.  Port in place at the right upper chest.      LABS:  None today.      PATHOLOGY:  3/20/19:  FINAL DIAGNOSIS:   Liver, needle biopsy   - Moderately differentiated adenocarcinoma consistent with metastasis from    primary esophageal adenocarcinoma.     HER-2 non-amplified.      IMAGING:  PET-CT 3/29/19:  1. See dedicated neuroradiology report for the results of the high  resolution PET CT of the neck.   2. 4.4 cm left lobe liver metastasis.  3. Nodular foci of hypermetabolic uptake in the left mid abdomen in  relation to the small bowel loops, without definite underlying lesion  on CT. This is favored to represent metastatic disease unless proven  otherwise.  4. Other incidental findings as described in the body.      CT neck 3/29/19:  1. On the fusion PET CT, there is no abnormal metabolic activity in  the mucosal space, soft tissues, or cervical aris chains.   2. No evidence of mucosal, aris, or soft tissue " abnormality on  contrast enhanced neck CT.  3. Please refer to the whole body PET CT performed as a separate  report, for the findings of the remainder of the body.      ASSESSMENT/PLAN:  Daniel Sandhu is a 38 year old male with:    1) Adenocarcinoma of the GE junction: now s/p 3 cycles of neoadjuvant chemotherapy with EOX, followed by surgical resection on 11/3/17.  Pathology showed adenocarcinoma, moderate differentiated, extensive residual tumor with no evidence tumor regression, margins negative, perineural invasion present, 1 of 28 lymph nodes were involved with malignancy, stage [id number] (stage IIIA).  HER-2 is non-amplified.    He has received EOF x 2 cycles after surgery, and he has not tolerated well.  The 5-FU on cycle 5 was discontinued early. Chemotherapy was stopped at that point due to poor tolerance.    Patient now has biopsy-confirmed metastatic disease to the liver and possibly peritoneum.  He is asymptomatic currently.      [employer] [facility] testing shows MS-stable, TMB-low (4 mut/Mb), ERBB2 amplification equivocal, and TP53 H214R alteration.  PD-L1 was negative (0%).  We review the results together and possible clinical trials, and possible second opinion at [facility].    Will plan to get repeat PET-CT after 3 cycles of chemotherapy to evaluate response    He has been tolerating chemotherapy with paclitaxel+ramucirumab very well so far.    -he will proceed with C2D1 of chemotherapy on 5/8/19.  -he will see ROMEO prior to cycle 3  -will plan to get repeat PET-CT after 3 cycles of chemotherapy to evaluate response - ordered  -I will see him back in clinic after PET-CT is done (prior to cycle 4 of chemotherapy)    I discussed additional support, including palliative care, , oncology psychology.  Daniel and his wife are understanding having difficulty coping and were teary today.  They opt to wait for now and may be open to additional support later on.      Patient has addition questions  regarding liver directed treatment to the liver lesion.  We have discussed that the data on metastatectomy on esophageal/gastric tumors are scant, and appears to have no overall survival benefit.  I did discuss with Dr. Avalos, and he agrees that the data is poor.  However, if patient has stable disease for at least 6 months, and no evidence of peritoneal disease, then he may consider surgery or targeted treatment to the liver.  Daniel expressed understanding and satisfaction with this.    2) Genetics:  -genetic testing was negative    3) Fertility: Daniel and his wife have 2 children, including a baby boy just born around June 2017.  He is not planning for more children in the near future.    4) Patient talked about medical cannabis, and I offered to certify him, but he says that the program is too expensive for him.  He does use marijuana edibles or vapes marijuana, which is helpful.        I spent a total of 40 minutes with the patient, with over >50% of the time in counseling and/or coordination of care.       Laurence Hood MD  Hematology/Oncology  [facility]

## 2019-05-08 ENCOUNTER — INFUSION THERAPY VISIT (OUTPATIENT)
Dept: ONCOLOGY | Facility: CLINIC | Age: 39
End: 2019-05-08
Attending: INTERNAL MEDICINE
Payer: COMMERCIAL

## 2019-05-08 ENCOUNTER — APPOINTMENT (OUTPATIENT)
Dept: LAB | Facility: CLINIC | Age: 39
End: 2019-05-08
Attending: INTERNAL MEDICINE
Payer: COMMERCIAL

## 2019-05-08 VITALS
BODY MASS INDEX: 22.05 KG/M2 | DIASTOLIC BLOOD PRESSURE: 68 MMHG | SYSTOLIC BLOOD PRESSURE: 112 MMHG | TEMPERATURE: 97.8 F | HEART RATE: 80 BPM | RESPIRATION RATE: 16 BRPM | OXYGEN SATURATION: 99 % | WEIGHT: 149.4 LBS

## 2019-05-08 DIAGNOSIS — C15.5 MALIGNANT NEOPLASM OF LOWER THIRD OF ESOPHAGUS (H): Primary | ICD-10-CM

## 2019-05-08 LAB
ALBUMIN SERPL-MCNC: 3.7 G/DL (ref 3.4–5)
ALBUMIN UR-MCNC: NEGATIVE MG/DL
ALP SERPL-CCNC: 70 U/L (ref 40–150)
ALT SERPL W P-5'-P-CCNC: 42 U/L (ref 0–70)
AST SERPL W P-5'-P-CCNC: 21 U/L (ref 0–45)
BASOPHILS # BLD AUTO: 0 10E9/L (ref 0–0.2)
BASOPHILS NFR BLD AUTO: 0.9 %
BILIRUB DIRECT SERPL-MCNC: 0.1 MG/DL (ref 0–0.2)
BILIRUB SERPL-MCNC: 0.5 MG/DL (ref 0.2–1.3)
CEA SERPL-MCNC: 1.5 UG/L (ref 0–2.5)
DIFFERENTIAL METHOD BLD: ABNORMAL
EOSINOPHIL # BLD AUTO: 0.1 10E9/L (ref 0–0.7)
EOSINOPHIL NFR BLD AUTO: 2 %
ERYTHROCYTE [DISTWIDTH] IN BLOOD BY AUTOMATED COUNT: 13 % (ref 10–15)
HCT VFR BLD AUTO: 37.5 % (ref 40–53)
HGB BLD-MCNC: 12.9 G/DL (ref 13.3–17.7)
IMM GRANULOCYTES # BLD: 0 10E9/L (ref 0–0.4)
IMM GRANULOCYTES NFR BLD: 0 %
LYMPHOCYTES # BLD AUTO: 1.1 10E9/L (ref 0.8–5.3)
LYMPHOCYTES NFR BLD AUTO: 30.1 %
MCH RBC QN AUTO: 29.9 PG (ref 26.5–33)
MCHC RBC AUTO-ENTMCNC: 34.4 G/DL (ref 31.5–36.5)
MCV RBC AUTO: 87 FL (ref 78–100)
MONOCYTES # BLD AUTO: 0.5 10E9/L (ref 0–1.3)
MONOCYTES NFR BLD AUTO: 14.3 %
NEUTROPHILS # BLD AUTO: 1.8 10E9/L (ref 1.6–8.3)
NEUTROPHILS NFR BLD AUTO: 52.7 %
NRBC # BLD AUTO: 0 10*3/UL
NRBC BLD AUTO-RTO: 0 /100
PLATELET # BLD AUTO: 192 10E9/L (ref 150–450)
PROT SERPL-MCNC: 6.8 G/DL (ref 6.8–8.8)
RBC # BLD AUTO: 4.32 10E12/L (ref 4.4–5.9)
WBC # BLD AUTO: 3.5 10E9/L (ref 4–11)

## 2019-05-08 PROCEDURE — 81003 URINALYSIS AUTO W/O SCOPE: CPT | Performed by: INTERNAL MEDICINE

## 2019-05-08 PROCEDURE — 85025 COMPLETE CBC W/AUTO DIFF WBC: CPT | Performed by: INTERNAL MEDICINE

## 2019-05-08 PROCEDURE — 80076 HEPATIC FUNCTION PANEL: CPT | Performed by: INTERNAL MEDICINE

## 2019-05-08 PROCEDURE — 96413 CHEMO IV INFUSION 1 HR: CPT

## 2019-05-08 PROCEDURE — 82378 CARCINOEMBRYONIC ANTIGEN: CPT | Performed by: INTERNAL MEDICINE

## 2019-05-08 PROCEDURE — 96417 CHEMO IV INFUS EACH ADDL SEQ: CPT

## 2019-05-08 PROCEDURE — 25000128 H RX IP 250 OP 636: Mod: ZF | Performed by: INTERNAL MEDICINE

## 2019-05-08 PROCEDURE — 96375 TX/PRO/DX INJ NEW DRUG ADDON: CPT

## 2019-05-08 PROCEDURE — 25000125 ZZHC RX 250: Mod: ZF | Performed by: INTERNAL MEDICINE

## 2019-05-08 PROCEDURE — 25800030 ZZH RX IP 258 OP 636: Mod: ZF | Performed by: INTERNAL MEDICINE

## 2019-05-08 RX ORDER — HEPARIN SODIUM (PORCINE) LOCK FLUSH IV SOLN 100 UNIT/ML 100 UNIT/ML
500 SOLUTION INTRAVENOUS ONCE
Status: COMPLETED | OUTPATIENT
Start: 2019-05-08 | End: 2019-05-08

## 2019-05-08 RX ORDER — HEPARIN SODIUM (PORCINE) LOCK FLUSH IV SOLN 100 UNIT/ML 100 UNIT/ML
5 SOLUTION INTRAVENOUS ONCE
Status: COMPLETED | OUTPATIENT
Start: 2019-05-08 | End: 2019-05-08

## 2019-05-08 RX ADMIN — FAMOTIDINE 20 MG: 10 INJECTION INTRAVENOUS at 09:05

## 2019-05-08 RX ADMIN — DIPHENHYDRAMINE HYDROCHLORIDE 25 MG: 50 INJECTION INTRAMUSCULAR; INTRAVENOUS at 09:19

## 2019-05-08 RX ADMIN — SODIUM CHLORIDE 250 ML: 9 INJECTION, SOLUTION INTRAVENOUS at 09:01

## 2019-05-08 RX ADMIN — SODIUM CHLORIDE 500 MG: 9 INJECTION, SOLUTION INTRAVENOUS at 09:57

## 2019-05-08 RX ADMIN — PACLITAXEL 145 MG: 6 INJECTION, SOLUTION INTRAVENOUS at 11:03

## 2019-05-08 RX ADMIN — Medication 5 ML: at 08:06

## 2019-05-08 RX ADMIN — DEXAMETHASONE SODIUM PHOSPHATE 20 MG: 10 INJECTION, SOLUTION INTRAMUSCULAR; INTRAVENOUS at 09:36

## 2019-05-08 RX ADMIN — HEPARIN SODIUM (PORCINE) LOCK FLUSH IV SOLN 100 UNIT/ML 500 UNITS: 100 SOLUTION at 12:11

## 2019-05-08 ASSESSMENT — PAIN SCALES - GENERAL: PAINLEVEL: NO PAIN (0)

## 2019-05-08 NOTE — NURSING NOTE
"Chief Complaint   Patient presents with     Port Draw     port accessed and labs drawn by rn.  vital signs taken.     Port accessed by RN in lab with 20g 3/4\" gripper needle, labs drawn, port flushed with saline and heparin, vitals checked, pt checked in for next appointment.  Génesis Tripp RN    "

## 2019-05-08 NOTE — PATIENT INSTRUCTIONS
Contact Numbers  [facility]: [phone]    After Hours:  [phone]  Triage: [phone]    Please call the [facility] Triage line if you experience a temperature greater than or equal to 100.5, shaking chills, have uncontrolled nausea, vomiting and/or diarrhea, dizziness, shortness of breath, chest pain, bleeding, unexplained bruising, or if you have any other new/concerning symptoms, questions or concerns.     If it is after hours, weekends, or holidays, please call the main hospital  at  [phone] and ask to speak to the Oncology doctor on call.     If you are having any concerning symptoms or wish to speak to a provider before your next infusion visit, please call your care coordinator or triage to notify them so we can adequately serve you.     If you need a refill on a narcotic prescription or other medication, please call triage before your infusion appointment.

## 2019-05-08 NOTE — PROGRESS NOTES
Infusion Nursing Note:  Daniel Sandhu presents today for Cycle 2 Day 1 of Cyramza and Taxol.    Patient seen by provider today: No   present during visit today: Not Applicable.    Note: Patient presents to infusion feeling well today. He denies any chills or fevers, but reports that he does still have a mild cough that he has been recovering from. Pt reports occasionally going back and forth between having constipation and diarrhea. Pt patient it is manageable at home and not unusual for him. No other new issues or complaints.     TORB: Neva Lewis RN/ Dr. Hood on 5/8/19 at 8:30AM. Patient asking about getting less benadryl today. Per Dr. Hood, okay to give 25 mg of benadryl today instead of 50mg.    Intravenous Access:  Implanted Port.    Treatment Conditions:  Lab Results   Component Value Date    HGB 12.9 05/08/2019     Lab Results   Component Value Date    WBC 3.5 05/08/2019      Lab Results   Component Value Date    ANEU 1.8 05/08/2019     Lab Results   Component Value Date     05/08/2019               Lab Results   Component Value Date    CR 0.68 04/01/2019                   Lab Results   Component Value Date    YOBANY 9.3 04/01/2019                Lab Results   Component Value Date    BILITOTAL 0.5 05/08/2019           Lab Results   Component Value Date    ALBUMIN 3.7 05/08/2019                    Lab Results   Component Value Date    ALT 42 05/08/2019           Lab Results   Component Value Date    AST 21 05/08/2019       Results reviewed, labs MET treatment parameters, ok to proceed with treatment.  Urine protein negative.  /68      Post Infusion Assessment:  Patient tolerated infusion without incident.  Blood return noted pre and post infusion.  Site patent and intact, free from redness, edema or discomfort.  No evidence of extravasations.  Access discontinued per protocol.       Discharge Plan:   Patient declined prescription refills.  Discharge instructions reviewed with:  Patient.  Patient and/or family verbalized understanding of discharge instructions and all questions answered.  AVS to patient via [employer].  Patient will return 5/15/19 for next appointment.   Patient discharged in stable condition accompanied by: self.  Departure Mode: Ambulatory.    Neva Lewis RN

## 2019-05-15 ENCOUNTER — APPOINTMENT (OUTPATIENT)
Dept: LAB | Facility: CLINIC | Age: 39
End: 2019-05-15
Attending: INTERNAL MEDICINE
Payer: COMMERCIAL

## 2019-05-15 ENCOUNTER — INFUSION THERAPY VISIT (OUTPATIENT)
Dept: ONCOLOGY | Facility: CLINIC | Age: 39
End: 2019-05-15
Attending: INTERNAL MEDICINE
Payer: COMMERCIAL

## 2019-05-15 VITALS
OXYGEN SATURATION: 100 % | DIASTOLIC BLOOD PRESSURE: 60 MMHG | BODY MASS INDEX: 21.9 KG/M2 | TEMPERATURE: 97.5 F | SYSTOLIC BLOOD PRESSURE: 107 MMHG | HEART RATE: 68 BPM | RESPIRATION RATE: 16 BRPM | WEIGHT: 148.4 LBS

## 2019-05-15 DIAGNOSIS — C15.5 MALIGNANT NEOPLASM OF LOWER THIRD OF ESOPHAGUS (H): Primary | ICD-10-CM

## 2019-05-15 LAB
BASOPHILS # BLD AUTO: 0 10E9/L (ref 0–0.2)
BASOPHILS NFR BLD AUTO: 0.8 %
DIFFERENTIAL METHOD BLD: ABNORMAL
EOSINOPHIL # BLD AUTO: 0.1 10E9/L (ref 0–0.7)
EOSINOPHIL NFR BLD AUTO: 2.4 %
ERYTHROCYTE [DISTWIDTH] IN BLOOD BY AUTOMATED COUNT: 13 % (ref 10–15)
HCT VFR BLD AUTO: 37 % (ref 40–53)
HGB BLD-MCNC: 12.6 G/DL (ref 13.3–17.7)
IMM GRANULOCYTES # BLD: 0 10E9/L (ref 0–0.4)
IMM GRANULOCYTES NFR BLD: 0.3 %
LYMPHOCYTES # BLD AUTO: 1.2 10E9/L (ref 0.8–5.3)
LYMPHOCYTES NFR BLD AUTO: 31.3 %
MCH RBC QN AUTO: 30 PG (ref 26.5–33)
MCHC RBC AUTO-ENTMCNC: 34.1 G/DL (ref 31.5–36.5)
MCV RBC AUTO: 88 FL (ref 78–100)
MONOCYTES # BLD AUTO: 0.3 10E9/L (ref 0–1.3)
MONOCYTES NFR BLD AUTO: 8.5 %
NEUTROPHILS # BLD AUTO: 2.1 10E9/L (ref 1.6–8.3)
NEUTROPHILS NFR BLD AUTO: 56.7 %
NRBC # BLD AUTO: 0 10*3/UL
NRBC BLD AUTO-RTO: 0 /100
PLATELET # BLD AUTO: 163 10E9/L (ref 150–450)
RBC # BLD AUTO: 4.2 10E12/L (ref 4.4–5.9)
WBC # BLD AUTO: 3.8 10E9/L (ref 4–11)

## 2019-05-15 PROCEDURE — 25000125 ZZHC RX 250: Mod: ZF | Performed by: INTERNAL MEDICINE

## 2019-05-15 PROCEDURE — 85025 COMPLETE CBC W/AUTO DIFF WBC: CPT | Performed by: INTERNAL MEDICINE

## 2019-05-15 PROCEDURE — 96413 CHEMO IV INFUSION 1 HR: CPT

## 2019-05-15 PROCEDURE — 96375 TX/PRO/DX INJ NEW DRUG ADDON: CPT

## 2019-05-15 PROCEDURE — 25000128 H RX IP 250 OP 636: Mod: ZF | Performed by: INTERNAL MEDICINE

## 2019-05-15 PROCEDURE — 25800030 ZZH RX IP 258 OP 636: Mod: ZF | Performed by: INTERNAL MEDICINE

## 2019-05-15 RX ORDER — DIPHENHYDRAMINE HCL 25 MG
25 CAPSULE ORAL ONCE
Status: CANCELLED
Start: 2019-05-15

## 2019-05-15 RX ORDER — HEPARIN SODIUM (PORCINE) LOCK FLUSH IV SOLN 100 UNIT/ML 100 UNIT/ML
5 SOLUTION INTRAVENOUS EVERY 8 HOURS
Status: DISCONTINUED | OUTPATIENT
Start: 2019-05-15 | End: 2019-05-15 | Stop reason: HOSPADM

## 2019-05-15 RX ORDER — HEPARIN SODIUM (PORCINE) LOCK FLUSH IV SOLN 100 UNIT/ML 100 UNIT/ML
500 SOLUTION INTRAVENOUS ONCE
Status: COMPLETED | OUTPATIENT
Start: 2019-05-15 | End: 2019-05-15

## 2019-05-15 RX ORDER — DIPHENHYDRAMINE HCL 25 MG
25 CAPSULE ORAL ONCE
Status: CANCELLED
Start: 2019-05-22

## 2019-05-15 RX ADMIN — HEPARIN 5 ML: 100 SYRINGE at 08:34

## 2019-05-15 RX ADMIN — DEXAMETHASONE SODIUM PHOSPHATE 20 MG: 10 INJECTION, SOLUTION INTRAMUSCULAR; INTRAVENOUS at 09:08

## 2019-05-15 RX ADMIN — SODIUM CHLORIDE 250 ML: 9 INJECTION, SOLUTION INTRAVENOUS at 09:05

## 2019-05-15 RX ADMIN — HEPARIN SODIUM (PORCINE) LOCK FLUSH IV SOLN 100 UNIT/ML 500 UNITS: 100 SOLUTION at 10:36

## 2019-05-15 RX ADMIN — DIPHENHYDRAMINE HYDROCHLORIDE 25 MG: 50 INJECTION INTRAMUSCULAR; INTRAVENOUS at 09:21

## 2019-05-15 RX ADMIN — PACLITAXEL 145 MG: 6 INJECTION, SOLUTION INTRAVENOUS at 09:35

## 2019-05-15 RX ADMIN — FAMOTIDINE 20 MG: 10 INJECTION INTRAVENOUS at 09:07

## 2019-05-15 ASSESSMENT — PAIN SCALES - GENERAL: PAINLEVEL: NO PAIN (0)

## 2019-05-15 NOTE — PROGRESS NOTES
Infusion Nursing Note:  Daniel Sandhu presents today for cycle 2, day 8 taxol.    Patient seen by provider today: No   present during visit today: Not Applicable.    Note: Patient reports some ongoing fatigue but otherwise he is doing well.  He has had some blood in his nose intermittently, no quinton nose bleeds. He states this has been an ongoing issue for some time now, he will continue to monitor.    Intravenous Access:  Implanted Port.    Treatment Conditions:  Lab Results   Component Value Date    HGB 12.6 05/15/2019     Lab Results   Component Value Date    WBC 3.8 05/15/2019      Lab Results   Component Value Date    ANEU 2.1 05/15/2019     Lab Results   Component Value Date     05/15/2019      Results reviewed, labs MET treatment parameters, ok to proceed with treatment.      Post Infusion Assessment:  Patient tolerated infusion without incident.  Blood return noted pre and post infusion.  Site patent and intact, free from redness, edema or discomfort.  No evidence of extravasations.  Access discontinued per protocol.       Discharge Plan:   Patient declined prescription refills.  Discharge instructions reviewed with: Patient.  Patient and/or family verbalized understanding of discharge instructions and all questions answered.  AVS to patient via [employer].  Patient will return 5/23 for next appointment.   Patient discharged in stable condition accompanied by: self.  Departure Mode: Ambulatory.    Tricia Romero RN

## 2019-05-15 NOTE — NURSING NOTE
Chief Complaint   Patient presents with     Port Draw     accessed with power needle, heparin lcoked, vitals checked     Rhonda Ramirez RN on 5/15/2019 at 8:43 AM

## 2019-05-15 NOTE — PATIENT INSTRUCTIONS
Contact Numbers    [facility] Main Line: [phone]  [facility] Triage and after hours / weekends / holidays:  [phone]      Please call the triage or after hours line if you experience a temperature greater than or equal to 100.5, shaking chills, have uncontrolled nausea, vomiting and/or diarrhea, dizziness, shortness of breath, chest pain, bleeding, unexplained bruising, or if you have any other new/concerning symptoms, questions or concerns.      If you are having any concerning symptoms or wish to speak to a provider before your next infusion visit, please call your care coordinator or triage to notify them so we can adequately serve you.     If you need a refill on a narcotic prescription or other medication, please call before your infusion appointment.           May 2019      [place] Monday Tuesday Wednesday Thursday Friday Saturday                  1     2     3     4       5     6    [facility] RETURN   8:30 AM   (30 min.)   Laurence Hood MD   [facility] 7     8    [facility] DRAW   7:30 AM   (15 min.)    [facility] DRAW   [facility] Draw    [facility] ONC INFUSION 180   8:00 AM   (180 min.)    ONCOLOGY INFUSION   [facility] 9     10     11       12     13     14     15    [facility] DRAW   8:30 AM   (15 min.)    [facility] DRAW   [facility] Draw    [facility] ONC INFUSION 180   9:00 AM   (180 min.)    ONCOLOGY INFUSION   [facility] 16     17     18       19     20     21     22    [facility] DRAW  10:00 AM   (15 min.)    [facility] DRAW   [facility] Draw    [facility] ONC INFUSION 180  10:30 AM   (180 min.)    ONCOLOGY INFUSION   [facility] 23     24     25       26     27     28     29     30     31 June 2019 Sunday Monday Tuesday Wednesday Thursday Friday Saturday                                 1       2     3     4     5     6    [facility] DRAW   7:15 AM    (15 min.)    [facility] LAB DRAW   [facility] Lab Draw    [facility] RETURN   7:35 AM   (50 min.)   Violeta Coto PA-C   [facility]    [facility] ONC INFUSION 180   9:00 AM   (180 min.)   [facility] ONCOLOGY INFUSION   [facility] 7     8       9     10     11     12     13    P [facility] LAB DRAW   8:30 AM   (15 min.)    [facility] DRAW   [facility]    P ONC INFUSION 180   9:00 AM   (180 min.)    ONCOLOGY INFUSION   [facility] 14     15       16     17     18     19     20    [facility] LAB DRAW   8:30 AM   (15 min.)    MASONIC LAB DRAW   [facility] Lab Draw    P ONC INFUSION 180   9:00 AM   (180 min.)    ONCOLOGY INFUSION   [facility] 21    PE EYE/TH ONCOLOGY   7:30 AM   (45 min.)   UUPET1   [facility] PET CT 22       23     24     25     26     27  Happy Birthday!     28     29       30                                                  Recent Results (from the past 24 hour(s))   CBC with platelets differential    Collection Time: 05/15/19  8:40 AM   Result Value Ref Range    WBC 3.8 (L) 4.0 - 11.0 10e9/L    RBC Count 4.20 (L) 4.4 - 5.9 10e12/L    Hemoglobin 12.6 (L) 13.3 - 17.7 g/dL    Hematocrit 37.0 (L) 40.0 - 53.0 %    MCV 88 78 - 100 fl    MCH 30.0 26.5 - 33.0 pg    MCHC 34.1 31.5 - 36.5 g/dL    RDW 13.0 10.0 - 15.0 %    Platelet Count 163 150 - 450 10e9/L    Diff Method Automated Method     % Neutrophils 56.7 %    % Lymphocytes 31.3 %    % Monocytes 8.5 %    % Eosinophils 2.4 %    % Basophils 0.8 %    % Immature Granulocytes 0.3 %    Nucleated RBCs 0 0 /100    Absolute Neutrophil 2.1 1.6 - 8.3 10e9/L    Absolute Lymphocytes 1.2 0.8 - 5.3 10e9/L    Absolute Monocytes 0.3 0.0 - 1.3 10e9/L    Absolute Eosinophils 0.1 0.0 - 0.7 10e9/L    Absolute Basophils 0.0 0.0 - 0.2 10e9/L    Abs Immature Granulocytes 0.0 0 - 0.4 10e9/L    Absolute Nucleated RBC 0.0

## 2019-05-22 ENCOUNTER — APPOINTMENT (OUTPATIENT)
Dept: LAB | Facility: CLINIC | Age: 39
End: 2019-05-22
Attending: INTERNAL MEDICINE
Payer: COMMERCIAL

## 2019-05-22 ENCOUNTER — INFUSION THERAPY VISIT (OUTPATIENT)
Dept: ONCOLOGY | Facility: CLINIC | Age: 39
End: 2019-05-22
Attending: INTERNAL MEDICINE
Payer: COMMERCIAL

## 2019-05-22 VITALS
HEART RATE: 77 BPM | DIASTOLIC BLOOD PRESSURE: 64 MMHG | RESPIRATION RATE: 16 BRPM | SYSTOLIC BLOOD PRESSURE: 112 MMHG | OXYGEN SATURATION: 100 % | WEIGHT: 148.4 LBS | BODY MASS INDEX: 21.9 KG/M2 | TEMPERATURE: 97.3 F

## 2019-05-22 DIAGNOSIS — C15.5 MALIGNANT NEOPLASM OF LOWER THIRD OF ESOPHAGUS (H): Primary | ICD-10-CM

## 2019-05-22 LAB
ALBUMIN UR-MCNC: NEGATIVE MG/DL
BASOPHILS # BLD AUTO: 0 10E9/L (ref 0–0.2)
BASOPHILS NFR BLD AUTO: 0.6 %
DIFFERENTIAL METHOD BLD: ABNORMAL
EOSINOPHIL # BLD AUTO: 0.1 10E9/L (ref 0–0.7)
EOSINOPHIL NFR BLD AUTO: 2.6 %
ERYTHROCYTE [DISTWIDTH] IN BLOOD BY AUTOMATED COUNT: 12.8 % (ref 10–15)
HCT VFR BLD AUTO: 37.7 % (ref 40–53)
HGB BLD-MCNC: 12.8 G/DL (ref 13.3–17.7)
IMM GRANULOCYTES # BLD: 0 10E9/L (ref 0–0.4)
IMM GRANULOCYTES NFR BLD: 0.3 %
LYMPHOCYTES # BLD AUTO: 0.9 10E9/L (ref 0.8–5.3)
LYMPHOCYTES NFR BLD AUTO: 30.1 %
MCH RBC QN AUTO: 30 PG (ref 26.5–33)
MCHC RBC AUTO-ENTMCNC: 34 G/DL (ref 31.5–36.5)
MCV RBC AUTO: 89 FL (ref 78–100)
MONOCYTES # BLD AUTO: 0.2 10E9/L (ref 0–1.3)
MONOCYTES NFR BLD AUTO: 5.4 %
NEUTROPHILS # BLD AUTO: 1.9 10E9/L (ref 1.6–8.3)
NEUTROPHILS NFR BLD AUTO: 61 %
NRBC # BLD AUTO: 0 10*3/UL
NRBC BLD AUTO-RTO: 0 /100
PLATELET # BLD AUTO: 159 10E9/L (ref 150–450)
RBC # BLD AUTO: 4.26 10E12/L (ref 4.4–5.9)
WBC # BLD AUTO: 3.1 10E9/L (ref 4–11)

## 2019-05-22 PROCEDURE — 96367 TX/PROPH/DG ADDL SEQ IV INF: CPT

## 2019-05-22 PROCEDURE — 25000128 H RX IP 250 OP 636: Mod: ZF | Performed by: INTERNAL MEDICINE

## 2019-05-22 PROCEDURE — 25000125 ZZHC RX 250: Mod: ZF | Performed by: INTERNAL MEDICINE

## 2019-05-22 PROCEDURE — 96413 CHEMO IV INFUSION 1 HR: CPT

## 2019-05-22 PROCEDURE — 81003 URINALYSIS AUTO W/O SCOPE: CPT | Performed by: INTERNAL MEDICINE

## 2019-05-22 PROCEDURE — 25800030 ZZH RX IP 258 OP 636: Mod: ZF | Performed by: INTERNAL MEDICINE

## 2019-05-22 PROCEDURE — 96417 CHEMO IV INFUS EACH ADDL SEQ: CPT

## 2019-05-22 PROCEDURE — 96375 TX/PRO/DX INJ NEW DRUG ADDON: CPT

## 2019-05-22 PROCEDURE — 85025 COMPLETE CBC W/AUTO DIFF WBC: CPT | Performed by: INTERNAL MEDICINE

## 2019-05-22 RX ORDER — HEPARIN SODIUM (PORCINE) LOCK FLUSH IV SOLN 100 UNIT/ML 100 UNIT/ML
5 SOLUTION INTRAVENOUS ONCE
Status: COMPLETED | OUTPATIENT
Start: 2019-05-22 | End: 2019-05-22

## 2019-05-22 RX ORDER — HEPARIN SODIUM (PORCINE) LOCK FLUSH IV SOLN 100 UNIT/ML 100 UNIT/ML
500 SOLUTION INTRAVENOUS ONCE
Status: COMPLETED | OUTPATIENT
Start: 2019-05-22 | End: 2019-05-22

## 2019-05-22 RX ADMIN — SODIUM CHLORIDE 1000 ML: 9 INJECTION, SOLUTION INTRAVENOUS at 11:07

## 2019-05-22 RX ADMIN — FAMOTIDINE 20 MG: 10 INJECTION INTRAVENOUS at 11:10

## 2019-05-22 RX ADMIN — DIPHENHYDRAMINE HYDROCHLORIDE 25 MG: 50 INJECTION INTRAMUSCULAR; INTRAVENOUS at 11:37

## 2019-05-22 RX ADMIN — HEPARIN 5 ML: 100 SYRINGE at 10:12

## 2019-05-22 RX ADMIN — DEXAMETHASONE SODIUM PHOSPHATE 20 MG: 10 INJECTION, SOLUTION INTRAMUSCULAR; INTRAVENOUS at 11:14

## 2019-05-22 RX ADMIN — HEPARIN 500 UNITS: 100 SYRINGE at 14:15

## 2019-05-22 RX ADMIN — SODIUM CHLORIDE 500 MG: 9 INJECTION, SOLUTION INTRAVENOUS at 11:59

## 2019-05-22 RX ADMIN — PACLITAXEL 145 MG: 6 INJECTION, SOLUTION INTRAVENOUS at 13:06

## 2019-05-22 ASSESSMENT — PAIN SCALES - GENERAL: PAINLEVEL: MILD PAIN (3)

## 2019-05-22 NOTE — PATIENT INSTRUCTIONS
Contact Numbers  [facility]: [phone] (for scheduling changes)  Triage: [phone] (for symptoms)    Please call the [facility] Triage line if you experience a temperature greater than or equal to 100.4, shaking chills, have uncontrolled nausea, vomiting and/or diarrhea, dizziness, shortness of breath, chest pain, bleeding, unexplained bruising, or if you have any other new/concerning symptoms, questions or concerns.     If you are having any concerning symptoms or wish to speak to a provider before your next infusion visit, please call your care coordinator or triage to notify them so we can adequately serve you.     If you need a refill on a narcotic prescription or other medication, please call triage before your infusion appointment.

## 2019-05-22 NOTE — PROGRESS NOTES
Infusion Nursing Note:  Daniel Sandhu presents today for Cycle 15 Day 2 Cyramza and Taxol.    Patient seen by provider today: No   present during visit today: Not Applicable.    Note: Patient has a mild headache.  He thinks it is because he is a little dehydrated and did not sleep well last night.  Patient states for a month he has an increased frequency of bloody nasal discharge.  This most happens in the morning.  Patient has had this in the past but the frequency of occurrence has increased. Reviewed patient with Dr. Laurence Hood.    TORB Dr. Hood/Eusebia Garcia RN at 1100 on 5/22/19  OK to proceed with Cycle 15 Day 2 Cyramza and Taxol today.  Give 1 L NS bolus today.    Intravenous Access:  Implanted Port.    Treatment Conditions:  Lab Results   Component Value Date    HGB 12.8 05/22/2019     Lab Results   Component Value Date    WBC 3.1 05/22/2019      Lab Results   Component Value Date    ANEU 1.9 05/22/2019     Lab Results   Component Value Date     05/22/2019      Results reviewed, labs MET treatment parameters, ok to proceed with treatment.  Urine protein: negative.  BP: 112/64.      Post Infusion Assessment:  Patient tolerated infusion without incident.  Blood return noted pre and post infusion.  Site patent and intact, free from redness, edema or discomfort.  No evidence of extravasations.  Access discontinued per protocol.       Discharge Plan:   Patient declined prescription refills.  Discharge instructions reviewed with: Patient.  Patient and/or family verbalized understanding of discharge instructions and all questions answered.  AVS to patient via [employer].  Patient will return 6/6/19 for next appointment.   Patient discharged in stable condition accompanied by: self.  Departure Mode: Ambulatory.    Eusebia Garcia RN

## 2019-06-05 NOTE — PROGRESS NOTES
[facility]    Hematology/Oncology Established Patient Note    Today's Date: Jun 6, 2019    Reason for Follow-up: adenocarcinoma of the GE junction      HISTORY OF PRESENT ILLNESS: Daniel Sandhu is a 38 year old male who presents with adenocarcinoma of the GE junction.  In early 2017, patient started noticing difficulty swallowing, and that food seems to take longer to go down.  It became more frequent, and he saw his PCP, and was referred for EGD, which showed an esophageal mass located at the GE junction, measuring 4 cm.  Biopsy showed poorly differentiated adenocarcinoma.  HER-2 was sent and is equivocal (2+).  CT c/a/p showed a mildly prominent lymph node in the gastrohepatic ligament, but there were no pathologically enlarged lymph nodes in the chest, abdomen, or pelvis.  There was otherwise no evidence of metastatic disease.  PET-CT showed thickening of the distal esophagus consistent with known esophageal adenocarcinoma, as well as mildly hypermetabolic 1 cm lymph node in the gastrohepatic ligament.  There was no evidence of distant metastatic disease.  He underwent EUS on 6/9/17, which showed the esophageal tumor in the lower third of the esophagus, staged T2NX.  There were 2 abnormal lymph nodes seen in the gastrohepatic ligament; pathology was suspicious for adenocarcinoma.  Celiac node was sampled as well, which was benign.  He was seen by surgery, Dr. Esteban, and recommended srinivas-operative chemotherapy.    Port was placed on 6/22/17.  It was removed on 3/19/18.    He underwent chemotherapy with epirubicin, oxaliplatin, and capecitabine x 3 cycles 6/26/17-8/7/17.      On 11/3/17, he underwent laparotomy with total gastrectomy and abdominal lymphadenectomy, left thoracotomy with distal esophagectomy and intrathoracic Terrell-en-Y esophagojejunostomy, feeding jejunostomy, left pharyngostomy tube placement, right tube thoracostomy, and flexible bronchoscopy.  On 11/9/17, he was taken  back to OR for I&D of pharyngostomy tube abscess.  Pathology showed adenocarcinoma, moderate differentiated, extensive residual tumor with no evidence tumor regression, margins negative, perineural invasion present, 1 of 28 lymph nodes were involved with malignancy, stage [id number] (stage IIIA).  HER-2 is non-amplified.    He resumed chemotherapy post-surgery, with plans to complete 3 more cycles, with 5-FU, epirubicin, oxaliplatin.  However, he only completed 2 more cycles, due to poor tolerance.  He was admitted to the hospital 1/9/18-1/13/18 for nausea, diarrhea, failure to thrive, severe malnutrition, generalized weakness.  His infusional chemotherapy was stopped on 1/8/18.  He underwent EGD in the hospital on 1/11/18, which showed ulcers, and no evidence of recurrence of his cancer.  One area biopsied showed inflammation, no evidence of malignancy.    He saw Dr. Esteban on 3/6/19 and had CT abdomen/pelvis done, which showed a 3.1 cm lesion in hepatic segment 3.  He underwent biopsy on 3/20/19 by IR, which showed moderately differentiated adenocarcinoma consistent with metastasis from primary esophageal carcinoma.  HER-2 is non-amplified.  Restaging PET scan on 3/29/19 showed the 4.4 cm left lobe liver metastasis.  There is nodular foci of hypermetabolic uptake in the left mid abdomen in relation to the small bowel loops, without definite underlying lesion on CT.  This is favored to represent metastatic disease unless proven otherwise.    He started on chemotherapy with paclitaxel and ramucirumab on 4/10/19. He is here today for routine follow up prior to cycle 3.    INTERIM HISTORY: Daniel comes in for follow-up today.  Patient denies any real side effects related to the chemotherapy.  He does have some fatigue the day of and day after his infusion but he thinks that may be related to the Benadryl.  He has noticed some mild hair loss but not enough that he is going to shave his head at this point.  He has had  ongoing blood mixed with nasal mucus since his surgery.  He notices it more from the left than the right side.  He has occasionally tried a Kerri pot and also tried Vaseline without improvement in his symptoms.  He reports eating and drinking okay.  He reports normal bowel movements.  He denies other concerns.    HOME MEDICATIONS:  Current Outpatient Medications   Medication Sig Dispense Refill     cyabnocobalamin (VITAMIN B-12) 2500 MCG sublingual tablet Place 2,500 mcg under the tongue daily 30 tablet 1     ferrous gluconate (FERGON) 324 (38 Fe) MG tablet Take 324 mg by mouth daily (with breakfast)       multivitamin, therapeutic with minerals (MULTI-VITAMIN) TABS tablet Take 1 tablet by mouth daily Generic [place] Vitamin       saccharomyces boulardii (FLORASTOR) 250 MG capsule Take 250 mg by mouth 2 times daily       LORazepam (ATIVAN) 0.5 MG tablet Take 1 tablet (0.5 mg) by mouth every 4 hours as needed (Anxiety, Nausea/Vomiting or Sleep) (Patient not taking: Reported on 6/6/2019) 30 tablet 2     prochlorperazine (COMPAZINE) 10 MG tablet Take 1 tablet (10 mg) by mouth every 6 hours as needed (Nausea/Vomiting) (Patient not taking: Reported on 5/6/2019) 30 tablet 2         ALLERGIES:  No Known Allergies      PAST MEDICAL HISTORY:  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) 6/5/2017         PAST SURGICAL HISTORY:  Past Surgical History:   Procedure Laterality Date     ESOPHAGOGASTRODUODENOSCOPY       ESOPHAGOSCOPY, GASTROSCOPY, DUODENOSCOPY (EGD), COMBINED N/A 6/9/2017    Procedure: COMBINED ENDOSCOPIC ULTRASOUND, ESOPHAGOSCOPY, GASTROSCOPY, DUODENOSCOPY (EGD), FINE NEEDLE ASPIRATE/BIOPSY;  Upper Endoscopic Ultrasound, fine needle aspirate/biopsy;  Surgeon: Guru Mark Avila MD;  Location: [facility] OR     ESOPHAGOSCOPY, GASTROSCOPY, DUODENOSCOPY (EGD), COMBINED N/A 11/3/2017    Procedure: COMBINED ESOPHAGOSCOPY, GASTROSCOPY, DUODENOSCOPY (EGD);;   Surgeon: Yunior Esteban MD;  Location: [facility] OR     ESOPHAGOSCOPY, GASTROSCOPY, DUODENOSCOPY (EGD), COMBINED N/A 11/9/2017    Procedure: COMBINED ESOPHAGOSCOPY, GASTROSCOPY, DUODENOSCOPY (EGD);  Esophogastroduodenoscopy, take out pharangostomy tube;  Surgeon: Yunior Esteban MD;  Location: [facility] OR     ESOPHAGOSCOPY, GASTROSCOPY, DUODENOSCOPY (EGD), COMBINED N/A 1/11/2018    Procedure: COMBINED ESOPHAGOSCOPY, GASTROSCOPY, DUODENOSCOPY (EGD), BIOPSY SINGLE OR MULTIPLE;  COMBINED ESOPHAGOSCOPY, GASTROSCOPY, DUODENOSCOPY (EGD);  Surgeon: Alessandro Mcgregor MD;  Location: [facility] GI     GASTRECTOMY N/A 11/3/2017    Procedure: GASTRECTOMY;;  Surgeon: Yunior Esteban MD;  Location: [facility] OR     HAND SURGERY      childhood, torn tendon     INSERT PORT VASCULAR ACCESS Right 6/22/2017    Procedure: INSERT PORT VASCULAR ACCESS;  Single Lumen Chest Power Port;  Surgeon: Iván Driver PA-C;  [facility]     INSERT PORT VASCULAR ACCESS Right 4/8/2019    Procedure: Single Lumen Chest Port Placement;  Surgeon: Krysten Ballard PA-C;  [facility] OR     IR CHEST PORT PLACEMENT > 5 YRS OF AGE  4/8/2019     IR LIVER BIOPSY PERCUTANEOUS  3/20/2019     LAPAROSCOPY DIAGNOSTIC (GENERAL) N/A 11/3/2017    Procedure: LAPAROSCOPY DIAGNOSTIC (GENERAL);  diagnostic laparoscopy, right chest tube, total gastrectomy with distal esophagectomy, intrathoracic eveline-y esophago-jejunostomy, feeding jejunostomy, pharyngostomy, esophagogastroduodenoscopy, flexible bronchoscopy;  Surgeon: Yunior Esteban MD;  Location: [facility] OR     LAPAROTOMY EXPLORATORY N/A 11/3/2017    Procedure: LAPAROTOMY EXPLORATORY;;  Surgeon: Yunior Esteban MD;  [facility]: [facility] OR     PHARYNGOSTOMY N/A 11/3/2017    Procedure: PHARYNGOSTOMY;;  Surgeon: Yunior Esteban MD;  Location: [facility] OR     REMOVE PORT VASCULAR ACCESS Right 3/19/2018    Procedure: REMOVE PORT VASCULAR ACCESS;  Right Port Removal;  Surgeon: Krysten Hopper,  ARMANDO;  [facility]     THORACOTOMY Left 11/3/2017    Procedure: THORACOTOMY;;  Surgeon: Yunior Esteban MD;  Location:  OR         SOCIAL HISTORY:  Social History     Socioeconomic History     Marital status:      Spouse name: Not on file     Number of children: 1     Years of education: Not on file     Highest education level: Not on file   Occupational History     Occupation: musician and teacher    Social Needs     Financial resource strain: Not on file     Food insecurity:     Worry: Not on file     Inability: Not on file     Transportation needs:     Medical: Not on file     Non-medical: Not on file   Tobacco Use     Smoking status: Never Smoker     Smokeless tobacco: Never Used   Substance and Sexual Activity     Alcohol use: Yes     Comment: 1 beer daily     Drug use: Yes     Types: Marijuana     Comment: occasional     Sexual activity: Yes     Partners: Female     Birth control/protection: Natural Family Planning   Lifestyle     Physical activity:     Days per week: Not on file     Minutes per session: Not on file     Stress: Not on file   Relationships     Social connections:     Talks on phone: Not on file     Gets together: Not on file     Attends Jainism service: Not on file     Active member of club or organization: Not on file     Attends meetings of clubs or organizations: Not on file     Relationship status: Not on file     Intimate partner violence:     Fear of current or ex partner: Not on file     Emotionally abused: Not on file     Physically abused: Not on file     Forced sexual activity: Not on file   Other Topics Concern     Parent/sibling w/ CABG, MI or angioplasty before 65F 55M? 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  He denies smoking.  He drinks ~1 beer a day.  He denies illicit drug use, other than occasional marijuana.  He lives in [place] with his  "wife, 6 year old daughter and 2 year old son. He has a half sister who  of breast cancer at age 32; she was diagnosed in her late 20's.  A paternal grandmother had breast cancer in her 70's    FAMILY HISTORY:  Family History   Problem Relation Age of Onset     Other - See Comments Father         sepsis     Cancer Sister         breast cancer  29 yo 1/2 sister      Cancer Paternal Grandmother         breast     PHYSICAL EXAM:  General: The patient is a pleasant male in no acute distress.  /65 (BP Location: Right arm, Patient Position: Sitting, Cuff Size: Adult Regular)   Pulse 66   Temp 98.3  F (36.8  C) (Oral)   Resp 16   Ht 1.753 m (5' 9.02\")   Wt 68.9 kg (151 lb 12.8 oz)   SpO2 100%   BMI 22.41 kg/m    Wt Readings from Last 10 Encounters:   19 68.9 kg (151 lb 12.8 oz)   19 67.3 kg (148 lb 6.4 oz)   05/15/19 67.3 kg (148 lb 6.4 oz)   19 67.8 kg (149 lb 6.4 oz)   19 66.7 kg (147 lb)   19 67.6 kg (149 lb)   19 66.7 kg (147 lb)   04/10/19 66.6 kg (146 lb 14.4 oz)   19 65.8 kg (145 lb)   19 67.1 kg (148 lb)   HEENT: EOMI, PERRL. Sclerae are anicteric. Oral mucosa is pink and moist with no lesions or thrush.   Lymph: Neck is supple with no lymphadenopathy in the cervical or supraclavicular areas.   Heart: Regular rate and rhythm.   Lungs: Clear to auscultation bilaterally.   Abdomen: Bowel sounds present, soft, nontender with no palpable hepatosplenomegaly or masses.   Extremities: No lower extremity edema noted bilaterally.   Neuro: Cranial nerves II through XII are grossly intact.  Skin: No rashes, petechiae, or bruising noted on exposed skin.    LABS:   2019 08:10   Albumin 3.6   Protein Total 6.7 (L)   Bilirubin Total 0.4   Alkaline Phosphatase 60   ALT 38   AST 27   Bilirubin Direct 0.1   WBC 3.6 (L)   Hemoglobin 12.3 (L)   Hematocrit 36.0 (L)   Platelet Count 158   RBC Count 4.03 (L)   MCV 89   MCH 30.5   MCHC 34.2   RDW 13.3   Diff Method " Automated Method   % Neutrophils 52.9   % Lymphocytes 29.2   % Monocytes 14.8   % Eosinophils 2.2   % Basophils 0.6   % Immature Granulocytes 0.3   Nucleated RBCs 1 (H)   Absolute Neutrophil 1.9   Absolute Lymphocytes 1.1   Absolute Monocytes 0.5   Absolute Eosinophils 0.1   Absolute Basophils 0.0   Abs Immature Granulocytes 0.0   Absolute Nucleated RBC 0.0     ASSESSMENT/PLAN:  Daniel Sandhu is a 38 year old male with:    1) Adenocarcinoma of the GE junction: now s/p 3 cycles of neoadjuvant chemotherapy with EOX, followed by surgical resection on 11/3/17.  Pathology showed adenocarcinoma, moderate differentiated, extensive residual tumor with no evidence tumor regression, margins negative, perineural invasion present, 1 of 28 lymph nodes were involved with malignancy, stage [id number] (stage IIIA).  HER-2 is non-amplified.    He has received EOF x 2 cycles after surgery, and he has not tolerated well.  The 5-FU on cycle 5 was discontinued early. Chemotherapy was stopped at that point due to poor tolerance.    Patient now has biopsy-confirmed metastatic disease to the liver and possibly peritoneum.  He is asymptomatic currently.      [facility] testing shows MS-stable, TMB-low (4 mut/Mb), ERBB2 amplification equivocal, and TP53 H214R alteration.  PD-L1 was negative (0%).  He is planning to get a second opinion at [facility] on 7/5.      He is doing well today and will continue with cycle 3 of  paclitaxel+ramucirumab. He will have a PET/CT after this cycle and follow up with Dr. Ochoa to review.     2) Genetics:  -genetic testing was negative    3) Fertility: Daniel and his wife have 2 children, including a baby boy born around June 2017.  He is not planning for more children in the near future.    4) Epistaxis: Mild. Requests referral to ENT. I have made the referral today. Discussed okay to try an OTC antihistamine like Claritin, Zyrtec,and Allegra.     Violeta Coto PA-C  [facility]  [address] [place] [address]  [place] [address]  [phone]

## 2019-06-06 ENCOUNTER — INFUSION THERAPY VISIT (OUTPATIENT)
Dept: ONCOLOGY | Facility: CLINIC | Age: 39
End: 2019-06-06
Attending: PHYSICIAN ASSISTANT
Payer: COMMERCIAL

## 2019-06-06 ENCOUNTER — APPOINTMENT (OUTPATIENT)
Dept: LAB | Facility: CLINIC | Age: 39
End: 2019-06-06
Attending: PHYSICIAN ASSISTANT
Payer: COMMERCIAL

## 2019-06-06 VITALS
OXYGEN SATURATION: 100 % | HEIGHT: 69 IN | RESPIRATION RATE: 16 BRPM | SYSTOLIC BLOOD PRESSURE: 107 MMHG | HEART RATE: 66 BPM | BODY MASS INDEX: 22.48 KG/M2 | DIASTOLIC BLOOD PRESSURE: 65 MMHG | WEIGHT: 151.8 LBS | TEMPERATURE: 98.3 F

## 2019-06-06 DIAGNOSIS — C15.5 MALIGNANT NEOPLASM OF LOWER THIRD OF ESOPHAGUS (H): Primary | ICD-10-CM

## 2019-06-06 DIAGNOSIS — R04.0 EPISTAXIS: ICD-10-CM

## 2019-06-06 LAB
ALBUMIN SERPL-MCNC: 3.6 G/DL (ref 3.4–5)
ALBUMIN UR-MCNC: NEGATIVE MG/DL
ALP SERPL-CCNC: 60 U/L (ref 40–150)
ALT SERPL W P-5'-P-CCNC: 38 U/L (ref 0–70)
AST SERPL W P-5'-P-CCNC: 27 U/L (ref 0–45)
BASOPHILS # BLD AUTO: 0 10E9/L (ref 0–0.2)
BASOPHILS NFR BLD AUTO: 0.6 %
BILIRUB DIRECT SERPL-MCNC: 0.1 MG/DL (ref 0–0.2)
BILIRUB SERPL-MCNC: 0.4 MG/DL (ref 0.2–1.3)
CEA SERPL-MCNC: 0.8 UG/L (ref 0–2.5)
DIFFERENTIAL METHOD BLD: ABNORMAL
EOSINOPHIL # BLD AUTO: 0.1 10E9/L (ref 0–0.7)
EOSINOPHIL NFR BLD AUTO: 2.2 %
ERYTHROCYTE [DISTWIDTH] IN BLOOD BY AUTOMATED COUNT: 13.3 % (ref 10–15)
HCT VFR BLD AUTO: 36 % (ref 40–53)
HGB BLD-MCNC: 12.3 G/DL (ref 13.3–17.7)
IMM GRANULOCYTES # BLD: 0 10E9/L (ref 0–0.4)
IMM GRANULOCYTES NFR BLD: 0.3 %
LYMPHOCYTES # BLD AUTO: 1.1 10E9/L (ref 0.8–5.3)
LYMPHOCYTES NFR BLD AUTO: 29.2 %
MCH RBC QN AUTO: 30.5 PG (ref 26.5–33)
MCHC RBC AUTO-ENTMCNC: 34.2 G/DL (ref 31.5–36.5)
MCV RBC AUTO: 89 FL (ref 78–100)
MONOCYTES # BLD AUTO: 0.5 10E9/L (ref 0–1.3)
MONOCYTES NFR BLD AUTO: 14.8 %
NEUTROPHILS # BLD AUTO: 1.9 10E9/L (ref 1.6–8.3)
NEUTROPHILS NFR BLD AUTO: 52.9 %
NRBC # BLD AUTO: 0 10*3/UL
NRBC BLD AUTO-RTO: 1 /100
PLATELET # BLD AUTO: 158 10E9/L (ref 150–450)
PROT SERPL-MCNC: 6.7 G/DL (ref 6.8–8.8)
RBC # BLD AUTO: 4.03 10E12/L (ref 4.4–5.9)
WBC # BLD AUTO: 3.6 10E9/L (ref 4–11)

## 2019-06-06 PROCEDURE — 25000132 ZZH RX MED GY IP 250 OP 250 PS 637: Mod: ZF | Performed by: PHYSICIAN ASSISTANT

## 2019-06-06 PROCEDURE — 96375 TX/PRO/DX INJ NEW DRUG ADDON: CPT

## 2019-06-06 PROCEDURE — G0463 HOSPITAL OUTPT CLINIC VISIT: HCPCS | Mod: ZF

## 2019-06-06 PROCEDURE — 81003 URINALYSIS AUTO W/O SCOPE: CPT | Performed by: PHYSICIAN ASSISTANT

## 2019-06-06 PROCEDURE — 80076 HEPATIC FUNCTION PANEL: CPT | Performed by: PHYSICIAN ASSISTANT

## 2019-06-06 PROCEDURE — 25000128 H RX IP 250 OP 636: Mod: ZF | Performed by: PHYSICIAN ASSISTANT

## 2019-06-06 PROCEDURE — 82378 CARCINOEMBRYONIC ANTIGEN: CPT | Performed by: PHYSICIAN ASSISTANT

## 2019-06-06 PROCEDURE — 25000125 ZZHC RX 250: Mod: ZF | Performed by: PHYSICIAN ASSISTANT

## 2019-06-06 PROCEDURE — 96417 CHEMO IV INFUS EACH ADDL SEQ: CPT

## 2019-06-06 PROCEDURE — 25800030 ZZH RX IP 258 OP 636: Mod: ZF | Performed by: PHYSICIAN ASSISTANT

## 2019-06-06 PROCEDURE — 85025 COMPLETE CBC W/AUTO DIFF WBC: CPT | Performed by: PHYSICIAN ASSISTANT

## 2019-06-06 PROCEDURE — 99214 OFFICE O/P EST MOD 30 MIN: CPT | Mod: ZP | Performed by: PHYSICIAN ASSISTANT

## 2019-06-06 PROCEDURE — 96413 CHEMO IV INFUSION 1 HR: CPT

## 2019-06-06 RX ORDER — ALBUTEROL SULFATE 90 UG/1
1-2 AEROSOL, METERED RESPIRATORY (INHALATION)
Status: CANCELLED
Start: 2019-06-13

## 2019-06-06 RX ORDER — HEPARIN SODIUM (PORCINE) LOCK FLUSH IV SOLN 100 UNIT/ML 100 UNIT/ML
5 SOLUTION INTRAVENOUS
Status: COMPLETED | OUTPATIENT
Start: 2019-06-06 | End: 2019-06-06

## 2019-06-06 RX ORDER — DIPHENHYDRAMINE HCL 25 MG
25 CAPSULE ORAL ONCE
Status: CANCELLED
Start: 2019-06-20

## 2019-06-06 RX ORDER — MEPERIDINE HYDROCHLORIDE 25 MG/ML
25 INJECTION INTRAMUSCULAR; INTRAVENOUS; SUBCUTANEOUS EVERY 30 MIN PRN
Status: CANCELLED | OUTPATIENT
Start: 2019-06-13

## 2019-06-06 RX ORDER — HEPARIN SODIUM (PORCINE) LOCK FLUSH IV SOLN 100 UNIT/ML 100 UNIT/ML
500 SOLUTION INTRAVENOUS ONCE
Status: COMPLETED | OUTPATIENT
Start: 2019-06-06 | End: 2019-06-06

## 2019-06-06 RX ORDER — ALBUTEROL SULFATE 0.83 MG/ML
2.5 SOLUTION RESPIRATORY (INHALATION)
Status: CANCELLED | OUTPATIENT
Start: 2019-06-20

## 2019-06-06 RX ORDER — DIPHENHYDRAMINE HCL 25 MG
25 CAPSULE ORAL ONCE
Status: CANCELLED
Start: 2019-06-06

## 2019-06-06 RX ORDER — HEPARIN SODIUM (PORCINE) LOCK FLUSH IV SOLN 100 UNIT/ML 100 UNIT/ML
500 SOLUTION INTRAVENOUS ONCE
Status: CANCELLED
Start: 2019-06-13

## 2019-06-06 RX ORDER — MEPERIDINE HYDROCHLORIDE 25 MG/ML
25 INJECTION INTRAMUSCULAR; INTRAVENOUS; SUBCUTANEOUS EVERY 30 MIN PRN
Status: CANCELLED | OUTPATIENT
Start: 2019-06-06

## 2019-06-06 RX ORDER — DIPHENHYDRAMINE HCL 25 MG
25 CAPSULE ORAL ONCE
Status: CANCELLED
Start: 2019-06-13

## 2019-06-06 RX ORDER — LORAZEPAM 2 MG/ML
0.5 INJECTION INTRAMUSCULAR EVERY 4 HOURS PRN
Status: CANCELLED
Start: 2019-06-06

## 2019-06-06 RX ORDER — METHYLPREDNISOLONE SODIUM SUCCINATE 125 MG/2ML
125 INJECTION, POWDER, LYOPHILIZED, FOR SOLUTION INTRAMUSCULAR; INTRAVENOUS
Status: CANCELLED
Start: 2019-06-13

## 2019-06-06 RX ORDER — DIPHENHYDRAMINE HYDROCHLORIDE 50 MG/ML
50 INJECTION INTRAMUSCULAR; INTRAVENOUS
Status: CANCELLED
Start: 2019-06-13

## 2019-06-06 RX ORDER — SODIUM CHLORIDE 9 MG/ML
1000 INJECTION, SOLUTION INTRAVENOUS CONTINUOUS PRN
Status: CANCELLED
Start: 2019-06-20

## 2019-06-06 RX ORDER — ALBUTEROL SULFATE 90 UG/1
1-2 AEROSOL, METERED RESPIRATORY (INHALATION)
Status: CANCELLED
Start: 2019-06-20

## 2019-06-06 RX ORDER — EPINEPHRINE 0.3 MG/.3ML
0.3 INJECTION SUBCUTANEOUS EVERY 5 MIN PRN
Status: CANCELLED | OUTPATIENT
Start: 2019-06-06

## 2019-06-06 RX ORDER — DIPHENHYDRAMINE HCL 25 MG
25 CAPSULE ORAL ONCE
Status: COMPLETED | OUTPATIENT
Start: 2019-06-06 | End: 2019-06-06

## 2019-06-06 RX ORDER — DIPHENHYDRAMINE HYDROCHLORIDE 50 MG/ML
50 INJECTION INTRAMUSCULAR; INTRAVENOUS
Status: CANCELLED
Start: 2019-06-06

## 2019-06-06 RX ORDER — LORAZEPAM 2 MG/ML
0.5 INJECTION INTRAMUSCULAR EVERY 4 HOURS PRN
Status: CANCELLED
Start: 2019-06-20

## 2019-06-06 RX ORDER — EPINEPHRINE 1 MG/ML
0.3 INJECTION, SOLUTION INTRAMUSCULAR; SUBCUTANEOUS EVERY 5 MIN PRN
Status: CANCELLED | OUTPATIENT
Start: 2019-06-06

## 2019-06-06 RX ORDER — HEPARIN SODIUM (PORCINE) LOCK FLUSH IV SOLN 100 UNIT/ML 100 UNIT/ML
500 SOLUTION INTRAVENOUS ONCE
Status: CANCELLED
Start: 2019-06-06

## 2019-06-06 RX ORDER — ALBUTEROL SULFATE 0.83 MG/ML
2.5 SOLUTION RESPIRATORY (INHALATION)
Status: CANCELLED | OUTPATIENT
Start: 2019-06-13

## 2019-06-06 RX ORDER — LORAZEPAM 2 MG/ML
0.5 INJECTION INTRAMUSCULAR EVERY 4 HOURS PRN
Status: CANCELLED
Start: 2019-06-13

## 2019-06-06 RX ORDER — SODIUM CHLORIDE 9 MG/ML
1000 INJECTION, SOLUTION INTRAVENOUS CONTINUOUS PRN
Status: CANCELLED
Start: 2019-06-13

## 2019-06-06 RX ORDER — MEPERIDINE HYDROCHLORIDE 25 MG/ML
25 INJECTION INTRAMUSCULAR; INTRAVENOUS; SUBCUTANEOUS EVERY 30 MIN PRN
Status: CANCELLED | OUTPATIENT
Start: 2019-06-20

## 2019-06-06 RX ORDER — EPINEPHRINE 0.3 MG/.3ML
0.3 INJECTION SUBCUTANEOUS EVERY 5 MIN PRN
Status: CANCELLED | OUTPATIENT
Start: 2019-06-20

## 2019-06-06 RX ORDER — DIPHENHYDRAMINE HYDROCHLORIDE 50 MG/ML
50 INJECTION INTRAMUSCULAR; INTRAVENOUS
Status: CANCELLED
Start: 2019-06-20

## 2019-06-06 RX ORDER — ALBUTEROL SULFATE 0.83 MG/ML
2.5 SOLUTION RESPIRATORY (INHALATION)
Status: CANCELLED | OUTPATIENT
Start: 2019-06-06

## 2019-06-06 RX ORDER — HEPARIN SODIUM (PORCINE) LOCK FLUSH IV SOLN 100 UNIT/ML 100 UNIT/ML
500 SOLUTION INTRAVENOUS ONCE
Status: CANCELLED
Start: 2019-06-20

## 2019-06-06 RX ORDER — EPINEPHRINE 0.3 MG/.3ML
0.3 INJECTION SUBCUTANEOUS EVERY 5 MIN PRN
Status: CANCELLED | OUTPATIENT
Start: 2019-06-13

## 2019-06-06 RX ORDER — METHYLPREDNISOLONE SODIUM SUCCINATE 125 MG/2ML
125 INJECTION, POWDER, LYOPHILIZED, FOR SOLUTION INTRAMUSCULAR; INTRAVENOUS
Status: CANCELLED
Start: 2019-06-06

## 2019-06-06 RX ORDER — EPINEPHRINE 1 MG/ML
0.3 INJECTION, SOLUTION INTRAMUSCULAR; SUBCUTANEOUS EVERY 5 MIN PRN
Status: CANCELLED | OUTPATIENT
Start: 2019-06-20

## 2019-06-06 RX ORDER — SODIUM CHLORIDE 9 MG/ML
1000 INJECTION, SOLUTION INTRAVENOUS CONTINUOUS PRN
Status: CANCELLED
Start: 2019-06-06

## 2019-06-06 RX ORDER — EPINEPHRINE 1 MG/ML
0.3 INJECTION, SOLUTION INTRAMUSCULAR; SUBCUTANEOUS EVERY 5 MIN PRN
Status: CANCELLED | OUTPATIENT
Start: 2019-06-13

## 2019-06-06 RX ORDER — ALBUTEROL SULFATE 90 UG/1
1-2 AEROSOL, METERED RESPIRATORY (INHALATION)
Status: CANCELLED
Start: 2019-06-06

## 2019-06-06 RX ORDER — METHYLPREDNISOLONE SODIUM SUCCINATE 125 MG/2ML
125 INJECTION, POWDER, LYOPHILIZED, FOR SOLUTION INTRAMUSCULAR; INTRAVENOUS
Status: CANCELLED
Start: 2019-06-20

## 2019-06-06 RX ADMIN — PACLITAXEL 145 MG: 6 INJECTION, SOLUTION INTRAVENOUS at 10:53

## 2019-06-06 RX ADMIN — HEPARIN 500 UNITS: 100 SYRINGE at 11:59

## 2019-06-06 RX ADMIN — SODIUM CHLORIDE 250 ML: 9 INJECTION, SOLUTION INTRAVENOUS at 09:14

## 2019-06-06 RX ADMIN — SODIUM CHLORIDE 537 MG: 9 INJECTION, SOLUTION INTRAVENOUS at 09:41

## 2019-06-06 RX ADMIN — HEPARIN 5 ML: 100 SYRINGE at 08:03

## 2019-06-06 RX ADMIN — DIPHENHYDRAMINE HYDROCHLORIDE 25 MG: 25 CAPSULE ORAL at 09:14

## 2019-06-06 RX ADMIN — DEXAMETHASONE SODIUM PHOSPHATE 20 MG: 10 INJECTION, SOLUTION INTRAMUSCULAR; INTRAVENOUS at 09:24

## 2019-06-06 RX ADMIN — FAMOTIDINE 20 MG: 10 INJECTION INTRAVENOUS at 09:14

## 2019-06-06 ASSESSMENT — PAIN SCALES - GENERAL: PAINLEVEL: NO PAIN (0)

## 2019-06-06 ASSESSMENT — MIFFLIN-ST. JEOR: SCORE: 1604.19

## 2019-06-06 NOTE — PATIENT INSTRUCTIONS
Contact Numbers    [facility] Main Line: [phone]  [facility] Triage and after hours / weekends / holidays:  [phone]      Please call the triage or after hours line if you experience a temperature greater than or equal to 100.5, shaking chills, have uncontrolled nausea, vomiting and/or diarrhea, dizziness, shortness of breath, chest pain, bleeding, unexplained bruising, or if you have any other new/concerning symptoms, questions or concerns.      If you are having any concerning symptoms or wish to speak to a provider before your next infusion visit, please call your care coordinator or triage to notify them so we can adequately serve you.     If you need a refill on a narcotic prescription or other medication, please call before your infusion appointment.

## 2019-06-06 NOTE — LETTER
6/6/2019      RE: Daniel Sandhu  [address] [facility] [address]       [facility]    Hematology/Oncology Established Patient Note    Today's Date: Jun 6, 2019    Reason for Follow-up: adenocarcinoma of the GE junction      HISTORY OF PRESENT ILLNESS: Daniel Sandhu is a 38 year old male who presents with adenocarcinoma of the GE junction.  In early 2017, patient started noticing difficulty swallowing, and that food seems to take longer to go down.  It became more frequent, and he saw his PCP, and was referred for EGD, which showed an esophageal mass located at the GE junction, measuring 4 cm.  Biopsy showed poorly differentiated adenocarcinoma.  HER-2 was sent and is equivocal (2+).  CT c/a/p showed a mildly prominent lymph node in the gastrohepatic ligament, but there were no pathologically enlarged lymph nodes in the chest, abdomen, or pelvis.  There was otherwise no evidence of metastatic disease.  PET-CT showed thickening of the distal esophagus consistent with known esophageal adenocarcinoma, as well as mildly hypermetabolic 1 cm lymph node in the gastrohepatic ligament.  There was no evidence of distant metastatic disease.  He underwent EUS on 6/9/17, which showed the esophageal tumor in the lower third of the esophagus, staged T2NX.  There were 2 abnormal lymph nodes seen in the gastrohepatic ligament; pathology was suspicious for adenocarcinoma.  Celiac node was sampled as well, which was benign.  He was seen by surgery, Dr. Esteban, and recommended srinivas-operative chemotherapy.    Port was placed on 6/22/17.  It was removed on 3/19/18.    He underwent chemotherapy with epirubicin, oxaliplatin, and capecitabine x 3 cycles 6/26/17-8/7/17.      On 11/3/17, he underwent laparotomy with total gastrectomy and abdominal lymphadenectomy, left thoracotomy with distal esophagectomy and intrathoracic Terrell-en-Y esophagojejunostomy, feeding jejunostomy, left pharyngostomy tube  placement, right tube thoracostomy, and flexible bronchoscopy.  On 11/9/17, he was taken back to OR for I&D of pharyngostomy tube abscess.  Pathology showed adenocarcinoma, moderate differentiated, extensive residual tumor with no evidence tumor regression, margins negative, perineural invasion present, 1 of 28 lymph nodes were involved with malignancy, stage [id number] (stage IIIA).  HER-2 is non-amplified.    He resumed chemotherapy post-surgery, with plans to complete 3 more cycles, with 5-FU, epirubicin, oxaliplatin.  However, he only completed 2 more cycles, due to poor tolerance.  He was admitted to the hospital 1/9/18-1/13/18 for nausea, diarrhea, failure to thrive, severe malnutrition, generalized weakness.  His infusional chemotherapy was stopped on 1/8/18.  He underwent EGD in the hospital on 1/11/18, which showed ulcers, and no evidence of recurrence of his cancer.  One area biopsied showed inflammation, no evidence of malignancy.    He saw Dr. Esteban on 3/6/19 and had CT abdomen/pelvis done, which showed a 3.1 cm lesion in hepatic segment 3.  He underwent biopsy on 3/20/19 by IR, which showed moderately differentiated adenocarcinoma consistent with metastasis from primary esophageal carcinoma.  HER-2 is non-amplified.  Restaging PET scan on 3/29/19 showed the 4.4 cm left lobe liver metastasis.  There is nodular foci of hypermetabolic uptake in the left mid abdomen in relation to the small bowel loops, without definite underlying lesion on CT.  This is favored to represent metastatic disease unless proven otherwise.    He started on chemotherapy with paclitaxel and ramucirumab on 4/10/19. He is here today for routine follow up prior to cycle 3.    INTERIM HISTORY: Daniel comes in for follow-up today.  Patient denies any real side effects related to the chemotherapy.  He does have some fatigue the day of and day after his infusion but he thinks that may be related to the Benadryl.  He has noticed some mild  hair loss but not enough that he is going to shave his head at this point.  He has had ongoing blood mixed with nasal mucus since his surgery.  He notices it more from the left than the right side.  He has occasionally tried a Kerri pot and also tried Vaseline without improvement in his symptoms.  He reports eating and drinking okay.  He reports normal bowel movements.  He denies other concerns.    HOME MEDICATIONS:  Current Outpatient Medications   Medication Sig Dispense Refill     cyabnocobalamin (VITAMIN B-12) 2500 MCG sublingual tablet Place 2,500 mcg under the tongue daily 30 tablet 1     ferrous gluconate (FERGON) 324 (38 Fe) MG tablet Take 324 mg by mouth daily (with breakfast)       multivitamin, therapeutic with minerals (MULTI-VITAMIN) TABS tablet Take 1 tablet by mouth daily Generic [place] Vitamin       saccharomyces boulardii (FLORASTOR) 250 MG capsule Take 250 mg by mouth 2 times daily       LORazepam (ATIVAN) 0.5 MG tablet Take 1 tablet (0.5 mg) by mouth every 4 hours as needed (Anxiety, Nausea/Vomiting or Sleep) (Patient not taking: Reported on 6/6/2019) 30 tablet 2     prochlorperazine (COMPAZINE) 10 MG tablet Take 1 tablet (10 mg) by mouth every 6 hours as needed (Nausea/Vomiting) (Patient not taking: Reported on 5/6/2019) 30 tablet 2         ALLERGIES:  No Known Allergies      PAST MEDICAL HISTORY:  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) 6/5/2017         PAST SURGICAL HISTORY:  Past Surgical History:   Procedure Laterality Date     ESOPHAGOGASTRODUODENOSCOPY       ESOPHAGOSCOPY, GASTROSCOPY, DUODENOSCOPY (EGD), COMBINED N/A 6/9/2017    Procedure: COMBINED ENDOSCOPIC ULTRASOUND, ESOPHAGOSCOPY, GASTROSCOPY, DUODENOSCOPY (EGD), FINE NEEDLE ASPIRATE/BIOPSY;  Upper Endoscopic Ultrasound, fine needle aspirate/biopsy;  Surgeon: Guru Mark Avila MD;  Location: [facility] OR     ESOPHAGOSCOPY, GASTROSCOPY, DUODENOSCOPY (EGD), COMBINED  N/A 11/3/2017    Procedure: COMBINED ESOPHAGOSCOPY, GASTROSCOPY, DUODENOSCOPY (EGD);;  Surgeon: Yunior Esteban MD;  Location: [facility] OR     ESOPHAGOSCOPY, GASTROSCOPY, DUODENOSCOPY (EGD), COMBINED N/A 11/9/2017    Procedure: COMBINED ESOPHAGOSCOPY, GASTROSCOPY, DUODENOSCOPY (EGD);  Esophogastroduodenoscopy, take out pharangostomy tube;  Surgeon: Yunior Esteban MD;  Location: [facility] OR     ESOPHAGOSCOPY, GASTROSCOPY, DUODENOSCOPY (EGD), COMBINED N/A 1/11/2018    Procedure: COMBINED ESOPHAGOSCOPY, GASTROSCOPY, DUODENOSCOPY (EGD), BIOPSY SINGLE OR MULTIPLE;  COMBINED ESOPHAGOSCOPY, GASTROSCOPY, DUODENOSCOPY (EGD);  Surgeon: Alessandro Mcgregor MD;  Location: [facility] GI     GASTRECTOMY N/A 11/3/2017    Procedure: GASTRECTOMY;;  Surgeon: Yunior Esteban MD;  Location: [facility] OR     HAND SURGERY      childhood, torn tendon     INSERT PORT VASCULAR ACCESS Right 6/22/2017    Procedure: INSERT PORT VASCULAR ACCESS;  Single Lumen Chest Power Port;  Surgeon: Iván Driver PA-C;  [facility]     INSERT PORT VASCULAR ACCESS Right 4/8/2019    Procedure: Single Lumen Chest Port Placement;  Surgeon: Krysten Ballard PA-C;  [facility]     IR CHEST PORT PLACEMENT > 5 YRS OF AGE  4/8/2019     IR LIVER BIOPSY PERCUTANEOUS  3/20/2019     LAPAROSCOPY DIAGNOSTIC (GENERAL) N/A 11/3/2017    Procedure: LAPAROSCOPY DIAGNOSTIC (GENERAL);  diagnostic laparoscopy, right chest tube, total gastrectomy with distal esophagectomy, intrathoracic eveline-y esophago-jejunostomy, feeding jejunostomy, pharyngostomy, esophagogastroduodenoscopy, flexible bronchoscopy;  Surgeon: Yunior Esteban MD;  Location: [facility] OR     LAPAROTOMY EXPLORATORY N/A 11/3/2017    Procedure: LAPAROTOMY EXPLORATORY;;  Surgeon: Yunior Esteban MD;  [facility]     PHARYNGOSTOMY N/A 11/3/2017    Procedure: PHARYNGOSTOMY;;  Surgeon: Yunior Esteban MD;  [facility]     REMOVE PORT VASCULAR ACCESS Right 3/19/2018     Procedure: REMOVE PORT VASCULAR ACCESS;  Right Port Removal;  Surgeon: Krysten Hopper PA-C;  [facility]     THORACOTOMY Left 11/3/2017    Procedure: THORACOTOMY;;  Surgeon: Yunior Esteban MD;  Location:  OR         SOCIAL HISTORY:  Social History     Socioeconomic History     Marital status:      Spouse name: Not on file     Number of children: 1     Years of education: Not on file     Highest education level: Not on file   Occupational History     Occupation: musician and teacher    Social Needs     Financial resource strain: Not on file     Food insecurity:     Worry: Not on file     Inability: Not on file     Transportation needs:     Medical: Not on file     Non-medical: Not on file   Tobacco Use     Smoking status: Never Smoker     Smokeless tobacco: Never Used   Substance and Sexual Activity     Alcohol use: Yes     Comment: 1 beer daily     Drug use: Yes     Types: Marijuana     Comment: occasional     Sexual activity: Yes     Partners: Female     Birth control/protection: Natural Family Planning   Lifestyle     Physical activity:     Days per week: Not on file     Minutes per session: Not on file     Stress: Not on file   Relationships     Social connections:     Talks on phone: Not on file     Gets together: Not on file     Attends Islam service: Not on file     Active member of club or organization: Not on file     Attends meetings of clubs or organizations: Not on file     Relationship status: Not on file     Intimate partner violence:     Fear of current or ex partner: Not on file     Emotionally abused: Not on file     Physically abused: Not on file     Forced sexual activity: Not on file   Other Topics Concern     Parent/sibling w/ CABG, MI or angioplasty before 65F 55M? 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  He denies smoking.  He drinks ~1 beer a day.  He denies  "illicit drug use, other than occasional marijuana.  He lives in [place] with his wife, 6 year old daughter and 2 year old son. He has a half sister who  of breast cancer at age 32; she was diagnosed in her late 20's.  A paternal grandmother had breast cancer in her 70's    FAMILY HISTORY:  Family History   Problem Relation Age of Onset     Other - See Comments Father         sepsis     Cancer Sister         breast cancer  31 yo 1/2 sister      Cancer Paternal Grandmother         breast     PHYSICAL EXAM:  General: The patient is a pleasant male in no acute distress.  /65 (BP Location: Right arm, Patient Position: Sitting, Cuff Size: Adult Regular)   Pulse 66   Temp 98.3  F (36.8  C) (Oral)   Resp 16   Ht 1.753 m (5' 9.02\")   Wt 68.9 kg (151 lb 12.8 oz)   SpO2 100%   BMI 22.41 kg/m     Wt Readings from Last 10 Encounters:   19 68.9 kg (151 lb 12.8 oz)   19 67.3 kg (148 lb 6.4 oz)   05/15/19 67.3 kg (148 lb 6.4 oz)   19 67.8 kg (149 lb 6.4 oz)   19 66.7 kg (147 lb)   19 67.6 kg (149 lb)   19 66.7 kg (147 lb)   04/10/19 66.6 kg (146 lb 14.4 oz)   19 65.8 kg (145 lb)   19 67.1 kg (148 lb)   HEENT: EOMI, PERRL. Sclerae are anicteric. Oral mucosa is pink and moist with no lesions or thrush.   Lymph: Neck is supple with no lymphadenopathy in the cervical or supraclavicular areas.   Heart: Regular rate and rhythm.   Lungs: Clear to auscultation bilaterally.   Abdomen: Bowel sounds present, soft, nontender with no palpable hepatosplenomegaly or masses.   Extremities: No lower extremity edema noted bilaterally.   Neuro: Cranial nerves II through XII are grossly intact.  Skin: No rashes, petechiae, or bruising noted on exposed skin.    LABS:   2019 08:10   Albumin 3.6   Protein Total 6.7 (L)   Bilirubin Total 0.4   Alkaline Phosphatase 60   ALT 38   AST 27   Bilirubin Direct 0.1   WBC 3.6 (L)   Hemoglobin 12.3 (L)   Hematocrit 36.0 (L)   Platelet " Count 158   RBC Count 4.03 (L)   MCV 89   MCH 30.5   MCHC 34.2   RDW 13.3   Diff Method Automated Method   % Neutrophils 52.9   % Lymphocytes 29.2   % Monocytes 14.8   % Eosinophils 2.2   % Basophils 0.6   % Immature Granulocytes 0.3   Nucleated RBCs 1 (H)   Absolute Neutrophil 1.9   Absolute Lymphocytes 1.1   Absolute Monocytes 0.5   Absolute Eosinophils 0.1   Absolute Basophils 0.0   Abs Immature Granulocytes 0.0   Absolute Nucleated RBC 0.0     ASSESSMENT/PLAN:  Daniel Sandhu is a 38 year old male with:    1) Adenocarcinoma of the GE junction: now s/p 3 cycles of neoadjuvant chemotherapy with EOX, followed by surgical resection on 11/3/17.  Pathology showed adenocarcinoma, moderate differentiated, extensive residual tumor with no evidence tumor regression, margins negative, perineural invasion present, 1 of 28 lymph nodes were involved with malignancy, stage [id number] (stage IIIA).  HER-2 is non-amplified.    He has received EOF x 2 cycles after surgery, and he has not tolerated well.  The 5-FU on cycle 5 was discontinued early. Chemotherapy was stopped at that point due to poor tolerance.    Patient now has biopsy-confirmed metastatic disease to the liver and possibly peritoneum.  He is asymptomatic currently.      [facility] testing shows MS-stable, TMB-low (4 mut/Mb), ERBB2 amplification equivocal, and TP53 H214R alteration.  PD-L1 was negative (0%).  He is planning to get a second opinion at [facility] on 7/5.      He is doing well today and will continue with cycle 3 of  paclitaxel+ramucirumab. He will have a PET/CT after this cycle and follow up with Dr. Ochoa to review.     2) Genetics:  -genetic testing was negative    3) Fertility: Daniel and his wife have 2 children, including a baby boy born around June 2017.  He is not planning for more children in the near future.    4) Epistaxis: Mild. Requests referral to ENT. I have made the referral today. Discussed okay to try an OTC antihistamine like  Claritin, Zyrtec,and Allegra.     Violeta Coto PA-C  [facility]  [address] [place] [address] [phone]

## 2019-06-06 NOTE — PROGRESS NOTES
Infusion Nursing Note:  Daniel Sandhu presents today for Cycle 3 Day 1 Cyramza and Taxol.    Patient seen and examined by Violeta Coto in clinic prior to infusion.      Intravenous Access:  Implanted Port.    Treatment Conditions:  Lab Results   Component Value Date    HGB 12.3 06/06/2019     Lab Results   Component Value Date    WBC 3.6 06/06/2019      Lab Results   Component Value Date    ANEU 1.9 06/06/2019     Lab Results   Component Value Date     06/06/2019      Lab Results   Component Value Date     04/01/2019                   Lab Results   Component Value Date    POTASSIUM 4.2 04/01/2019           Lab Results   Component Value Date    MAG 2.2 03/07/2018            Lab Results   Component Value Date    CR 0.68 04/01/2019                   Lab Results   Component Value Date    YOBANY 9.3 04/01/2019                Lab Results   Component Value Date    BILITOTAL 0.4 06/06/2019           Lab Results   Component Value Date    ALBUMIN 3.6 06/06/2019                    Lab Results   Component Value Date    ALT 38 06/06/2019           Lab Results   Component Value Date    AST 27 06/06/2019     Urine negative for protein  Blood Pressure: 107/68  Results reviewed, labs MET treatment parameters, ok to proceed with treatment.        Post Infusion Assessment:  Patient tolerated infusion without incident.       Discharge Plan:   Patient declined prescription refills.    AVS to patient via [employer].  Patient will return 6/13/18 for cycle 3 day 8.   Face to Face time: 0.    Jodi Cedeno RN

## 2019-06-06 NOTE — NURSING NOTE
"Oncology Rooming Note    June 6, 2019 8:22 AM   Daniel Sandhu is a 38 year old male who presents for:    Chief Complaint   Patient presents with     Port Draw     labs drawn from port by rn.  vs taken     Oncology Clinic Visit     Return visit related to Esophageal Cancer     Initial Vitals: /65 (BP Location: Right arm, Patient Position: Sitting, Cuff Size: Adult Regular)   Pulse 66   Temp 98.3  F (36.8  C) (Oral)   Resp 16   Ht 1.753 m (5' 9.02\")   Wt 68.9 kg (151 lb 12.8 oz)   SpO2 100%   BMI 22.41 kg/m   Estimated body mass index is 22.41 kg/m  as calculated from the following:    Height as of this encounter: 1.753 m (5' 9.02\").    Weight as of this encounter: 68.9 kg (151 lb 12.8 oz). Body surface area is 1.83 meters squared.  No Pain (0) Comment: Data Unavailable   No LMP for male patient.  Allergies reviewed: Yes  Medications reviewed: Yes    Medications: Medication refills not needed today.  Pharmacy name entered into EPIC:    [facility] - [place], [place] - [address] [facility] [place], [place] - [address] [facility] [place], [place] - [address] [facility] [address] [facility] [id number]    Clinical concerns: No new concerns. Provider was notified.      Xochitl Tovar LPN            "

## 2019-06-06 NOTE — NURSING NOTE
"Chief Complaint   Patient presents with     Port Draw     labs drawn from port by rn.  vs taken     Port accessed with 20 gauge 3/4\" gripper needle and labs drawn by rn; urine collected and sent as well.  Port flushed with NS and heparin.  Pt tolerated well.  VS taken.  Pt checked in for next appt.    Fidelia Massey RN      "

## 2019-06-13 ENCOUNTER — APPOINTMENT (OUTPATIENT)
Dept: LAB | Facility: CLINIC | Age: 39
End: 2019-06-13
Attending: INTERNAL MEDICINE
Payer: COMMERCIAL

## 2019-06-13 ENCOUNTER — INFUSION THERAPY VISIT (OUTPATIENT)
Dept: ONCOLOGY | Facility: CLINIC | Age: 39
End: 2019-06-13
Attending: INTERNAL MEDICINE
Payer: COMMERCIAL

## 2019-06-13 VITALS
WEIGHT: 149.7 LBS | HEART RATE: 66 BPM | RESPIRATION RATE: 18 BRPM | OXYGEN SATURATION: 100 % | BODY MASS INDEX: 22.1 KG/M2 | TEMPERATURE: 98 F | SYSTOLIC BLOOD PRESSURE: 111 MMHG | DIASTOLIC BLOOD PRESSURE: 73 MMHG

## 2019-06-13 DIAGNOSIS — C15.5 MALIGNANT NEOPLASM OF LOWER THIRD OF ESOPHAGUS (H): Primary | ICD-10-CM

## 2019-06-13 LAB
BASOPHILS # BLD AUTO: 0 10E9/L (ref 0–0.2)
BASOPHILS NFR BLD AUTO: 0.8 %
DIFFERENTIAL METHOD BLD: ABNORMAL
EOSINOPHIL # BLD AUTO: 0.1 10E9/L (ref 0–0.7)
EOSINOPHIL NFR BLD AUTO: 3.2 %
ERYTHROCYTE [DISTWIDTH] IN BLOOD BY AUTOMATED COUNT: 13 % (ref 10–15)
HCT VFR BLD AUTO: 38.1 % (ref 40–53)
HGB BLD-MCNC: 13 G/DL (ref 13.3–17.7)
IMM GRANULOCYTES # BLD: 0 10E9/L (ref 0–0.4)
IMM GRANULOCYTES NFR BLD: 0.3 %
LYMPHOCYTES # BLD AUTO: 1.2 10E9/L (ref 0.8–5.3)
LYMPHOCYTES NFR BLD AUTO: 32.9 %
MCH RBC QN AUTO: 30.5 PG (ref 26.5–33)
MCHC RBC AUTO-ENTMCNC: 34.1 G/DL (ref 31.5–36.5)
MCV RBC AUTO: 89 FL (ref 78–100)
MONOCYTES # BLD AUTO: 0.3 10E9/L (ref 0–1.3)
MONOCYTES NFR BLD AUTO: 7.5 %
NEUTROPHILS # BLD AUTO: 2.1 10E9/L (ref 1.6–8.3)
NEUTROPHILS NFR BLD AUTO: 55.3 %
NRBC # BLD AUTO: 0 10*3/UL
NRBC BLD AUTO-RTO: 0 /100
PLATELET # BLD AUTO: 149 10E9/L (ref 150–450)
RBC # BLD AUTO: 4.26 10E12/L (ref 4.4–5.9)
WBC # BLD AUTO: 3.7 10E9/L (ref 4–11)

## 2019-06-13 PROCEDURE — 25000128 H RX IP 250 OP 636: Mod: ZF | Performed by: PHYSICIAN ASSISTANT

## 2019-06-13 PROCEDURE — 96413 CHEMO IV INFUSION 1 HR: CPT

## 2019-06-13 PROCEDURE — 25000125 ZZHC RX 250: Mod: ZF | Performed by: PHYSICIAN ASSISTANT

## 2019-06-13 PROCEDURE — 85025 COMPLETE CBC W/AUTO DIFF WBC: CPT | Performed by: PHYSICIAN ASSISTANT

## 2019-06-13 PROCEDURE — 25000128 H RX IP 250 OP 636: Mod: ZF | Performed by: INTERNAL MEDICINE

## 2019-06-13 PROCEDURE — 25800030 ZZH RX IP 258 OP 636: Mod: ZF | Performed by: PHYSICIAN ASSISTANT

## 2019-06-13 PROCEDURE — 25000132 ZZH RX MED GY IP 250 OP 250 PS 637: Mod: ZF | Performed by: PHYSICIAN ASSISTANT

## 2019-06-13 PROCEDURE — 96375 TX/PRO/DX INJ NEW DRUG ADDON: CPT

## 2019-06-13 RX ORDER — HEPARIN SODIUM (PORCINE) LOCK FLUSH IV SOLN 100 UNIT/ML 100 UNIT/ML
500 SOLUTION INTRAVENOUS ONCE
Status: COMPLETED | OUTPATIENT
Start: 2019-06-13 | End: 2019-06-13

## 2019-06-13 RX ORDER — HEPARIN SODIUM (PORCINE) LOCK FLUSH IV SOLN 100 UNIT/ML 100 UNIT/ML
5 SOLUTION INTRAVENOUS EVERY 8 HOURS PRN
Status: DISCONTINUED | OUTPATIENT
Start: 2019-06-13 | End: 2019-06-13 | Stop reason: HOSPADM

## 2019-06-13 RX ORDER — DIPHENHYDRAMINE HCL 25 MG
25 CAPSULE ORAL ONCE
Status: COMPLETED | OUTPATIENT
Start: 2019-06-13 | End: 2019-06-13

## 2019-06-13 RX ADMIN — HEPARIN 500 UNITS: 100 SYRINGE at 10:52

## 2019-06-13 RX ADMIN — PACLITAXEL 145 MG: 6 INJECTION, SOLUTION INTRAVENOUS at 09:48

## 2019-06-13 RX ADMIN — DEXAMETHASONE SODIUM PHOSPHATE 20 MG: 10 INJECTION, SOLUTION INTRAMUSCULAR; INTRAVENOUS at 09:29

## 2019-06-13 RX ADMIN — HEPARIN 5 ML: 100 SYRINGE at 08:46

## 2019-06-13 RX ADMIN — SODIUM CHLORIDE 250 ML: 9 INJECTION, SOLUTION INTRAVENOUS at 09:22

## 2019-06-13 RX ADMIN — DIPHENHYDRAMINE HYDROCHLORIDE 25 MG: 25 CAPSULE ORAL at 09:22

## 2019-06-13 RX ADMIN — FAMOTIDINE 20 MG: 10 INJECTION INTRAVENOUS at 09:23

## 2019-06-13 ASSESSMENT — PAIN SCALES - GENERAL: PAINLEVEL: NO PAIN (0)

## 2019-06-13 NOTE — PROGRESS NOTES
Infusion Nursing Note:  Daniel Sandhu presents today for C3D8 Taxol.    Patient seen by provider today: NO   present during visit today: Not Applicable.    Note: Patient feels well. Endorses had dental cleaning with no extraction nor filling last Tuesday. No complaints made. Otherwise well.    Intravenous Access:  Implanted Port.    Treatment Conditions:  Lab Results   Component Value Date    HGB 13.0 06/13/2019     Lab Results   Component Value Date    WBC 3.7 06/13/2019      Lab Results   Component Value Date    ANEU 2.1 06/13/2019     Lab Results   Component Value Date     06/13/2019      Lab Results   Component Value Date     04/01/2019                   Lab Results   Component Value Date    POTASSIUM 4.2 04/01/2019           Lab Results   Component Value Date    MAG 2.2 03/07/2018            Lab Results   Component Value Date    CR 0.68 04/01/2019                   Lab Results   Component Value Date    YOBANY 9.3 04/01/2019                Lab Results   Component Value Date    BILITOTAL 0.4 06/06/2019           Lab Results   Component Value Date    ALBUMIN 3.6 06/06/2019                    Lab Results   Component Value Date    ALT 38 06/06/2019           Lab Results   Component Value Date    AST 27 06/06/2019       Results reviewed, labs MET treatment parameters, ok to proceed with treatment.      Post Infusion Assessment:  Patient tolerated infusion without incident.  Blood return noted pre and post infusion.  Site patent and intact, free from redness, edema or discomfort.  No evidence of extravasations.  Access discontinued per protocol.       Discharge Plan:   Patient declined prescription refills.  Discharge instructions reviewed with: Patient.  Patient and/or family verbalized understanding of discharge instructions and all questions answered.  AVS to patient via RiseT.  Patient will return 6/20/19 for next appointment.   Patient discharged in stable condition accompanied by:  self.  Departure Mode: Ambulatory.  Face to Face time: 5.    VIDYA DÍAZ, RN

## 2019-06-13 NOTE — PATIENT INSTRUCTIONS
Contact Numbers  [facility]: [phone]    After Hours:  [phone]  Triage: [phone]    Please call the [facility] Triage line if you experience a temperature greater than or equal to 100.5, shaking chills, have uncontrolled nausea, vomiting and/or diarrhea, dizziness, shortness of breath, chest pain, bleeding, unexplained bruising, or if you have any other new/concerning symptoms, questions or concerns.     If it is after hours, weekends, or holidays, please call the main hospital  at  [phone] and ask to speak to the Oncology doctor on call.     If you are having any concerning symptoms or wish to speak to a provider before your next infusion visit, please call your care coordinator or triage to notify them so we can adequately serve you.     If you need a refill on a narcotic prescription or other medication, please call triage before your infusion appointment.         June 2019 Sunday Monday Tuesday Wednesday Thursday Friday Saturday                                 1       2     3     4     5     6    [facility] DRAW   7:15 AM   (15 min.)    [facility] LAB DRAW   [facility] Lab Draw    P RETURN   7:35 AM   (50 min.)   Violeta Coto PA-C   [facility] ONC INFUSION 180   9:00 AM   (180 min.)    ONCOLOGY INFUSION   [facility] 7     8       9     10     11     12     13    [facility] DRAW   8:30 AM   (15 min.)   [facility]   [facility] Lab Draw    [facility] ONC INFUSION 180   9:00 AM   (180 min.)    ONCOLOGY INFUSION   [facility] 14     15       16     17     18     19     20    [facility] DRAW   8:30 AM   (15 min.)    [facility] LAB DRAW   [facility] Lab Draw    P ONC INFUSION 180   9:00 AM   (180 min.)    ONCOLOGY INFUSION   [facility] 21    PE EYE/TH ONCOLOGY   7:30 AM   (45 min.)   [id number]   [facility] PET CT 22       23     24     25      26     27  Happy Birthday!     28 29 30 July 2019 Sunday Monday Tuesday Wednesday Thursday Friday Saturday        1    [facility] RETURN   9:00 AM   (30 min.)   Laurence Hood MD   [facility] 2     3     4     5     6       7     8     9     10     11    [facility] NEW   8:15 AM   (30 min.)   Rod Barrios MD   [facility] Nose [facility] Thro[facility] 12     13       14     15     16     17     18     19     20       21     22     23     24     25     26     27       28     29     30     31                                    Lab Results:  Recent Results (from the past 12 hour(s))   CBC with platelets differential    Collection Time: 06/13/19  8:49 AM   Result Value Ref Range    WBC 3.7 (L) 4.0 - 11.0 10e9/L    RBC Count 4.26 (L) 4.4 - 5.9 10e12/L    Hemoglobin 13.0 (L) 13.3 - 17.7 g/dL    Hematocrit 38.1 (L) 40.0 - 53.0 %    MCV 89 78 - 100 fl    MCH 30.5 26.5 - 33.0 pg    MCHC 34.1 31.5 - 36.5 g/dL    RDW 13.0 10.0 - 15.0 %    Platelet Count 149 (L) 150 - 450 10e9/L    Diff Method Automated Method     % Neutrophils 55.3 %    % Lymphocytes 32.9 %    % Monocytes 7.5 %    % Eosinophils 3.2 %    % Basophils 0.8 %    % Immature Granulocytes 0.3 %    Nucleated RBCs 0 0 /100    Absolute Neutrophil 2.1 1.6 - 8.3 10e9/L    Absolute Lymphocytes 1.2 0.8 - 5.3 10e9/L    Absolute Monocytes 0.3 0.0 - 1.3 10e9/L    Absolute Eosinophils 0.1 0.0 - 0.7 10e9/L    Absolute Basophils 0.0 0.0 - 0.2 10e9/L    Abs Immature Granulocytes 0.0 0 - 0.4 10e9/L    Absolute Nucleated RBC 0.0

## 2019-06-13 NOTE — NURSING NOTE
Chief Complaint   Patient presents with     Port Draw     Labs drawn via port by RN in lab. Line flushed and hep locked. VS taken.     Sivan Bartholomew RN

## 2019-06-20 ENCOUNTER — INFUSION THERAPY VISIT (OUTPATIENT)
Dept: ONCOLOGY | Facility: CLINIC | Age: 39
End: 2019-06-20
Attending: INTERNAL MEDICINE
Payer: COMMERCIAL

## 2019-06-20 ENCOUNTER — APPOINTMENT (OUTPATIENT)
Dept: LAB | Facility: CLINIC | Age: 39
End: 2019-06-20
Attending: INTERNAL MEDICINE
Payer: COMMERCIAL

## 2019-06-20 VITALS
WEIGHT: 149.1 LBS | TEMPERATURE: 98 F | SYSTOLIC BLOOD PRESSURE: 103 MMHG | HEART RATE: 73 BPM | OXYGEN SATURATION: 99 % | DIASTOLIC BLOOD PRESSURE: 59 MMHG | BODY MASS INDEX: 22.01 KG/M2

## 2019-06-20 DIAGNOSIS — C15.5 MALIGNANT NEOPLASM OF LOWER THIRD OF ESOPHAGUS (H): Primary | ICD-10-CM

## 2019-06-20 LAB
ALBUMIN UR-MCNC: 10 MG/DL
BASOPHILS # BLD AUTO: 0 10E9/L (ref 0–0.2)
BASOPHILS NFR BLD AUTO: 0.9 %
DIFFERENTIAL METHOD BLD: ABNORMAL
EOSINOPHIL # BLD AUTO: 0.1 10E9/L (ref 0–0.7)
EOSINOPHIL NFR BLD AUTO: 2.4 %
ERYTHROCYTE [DISTWIDTH] IN BLOOD BY AUTOMATED COUNT: 13.1 % (ref 10–15)
HCT VFR BLD AUTO: 34.9 % (ref 40–53)
HGB BLD-MCNC: 11.7 G/DL (ref 13.3–17.7)
IMM GRANULOCYTES # BLD: 0 10E9/L (ref 0–0.4)
IMM GRANULOCYTES NFR BLD: 0.3 %
LYMPHOCYTES # BLD AUTO: 1.1 10E9/L (ref 0.8–5.3)
LYMPHOCYTES NFR BLD AUTO: 33.8 %
MCH RBC QN AUTO: 30.5 PG (ref 26.5–33)
MCHC RBC AUTO-ENTMCNC: 33.5 G/DL (ref 31.5–36.5)
MCV RBC AUTO: 91 FL (ref 78–100)
MONOCYTES # BLD AUTO: 0.3 10E9/L (ref 0–1.3)
MONOCYTES NFR BLD AUTO: 7.6 %
NEUTROPHILS # BLD AUTO: 1.8 10E9/L (ref 1.6–8.3)
NEUTROPHILS NFR BLD AUTO: 55 %
NRBC # BLD AUTO: 0 10*3/UL
NRBC BLD AUTO-RTO: 0 /100
PLATELET # BLD AUTO: 146 10E9/L (ref 150–450)
RBC # BLD AUTO: 3.83 10E12/L (ref 4.4–5.9)
WBC # BLD AUTO: 3.3 10E9/L (ref 4–11)

## 2019-06-20 PROCEDURE — 25000128 H RX IP 250 OP 636: Mod: ZF | Performed by: PHYSICIAN ASSISTANT

## 2019-06-20 PROCEDURE — 25800030 ZZH RX IP 258 OP 636: Mod: ZF | Performed by: PHYSICIAN ASSISTANT

## 2019-06-20 PROCEDURE — 25000132 ZZH RX MED GY IP 250 OP 250 PS 637: Mod: ZF | Performed by: PHYSICIAN ASSISTANT

## 2019-06-20 PROCEDURE — 25000125 ZZHC RX 250: Mod: ZF | Performed by: PHYSICIAN ASSISTANT

## 2019-06-20 PROCEDURE — 25000128 H RX IP 250 OP 636: Mod: ZF | Performed by: INTERNAL MEDICINE

## 2019-06-20 PROCEDURE — 85025 COMPLETE CBC W/AUTO DIFF WBC: CPT | Performed by: PHYSICIAN ASSISTANT

## 2019-06-20 PROCEDURE — 81003 URINALYSIS AUTO W/O SCOPE: CPT | Performed by: PHYSICIAN ASSISTANT

## 2019-06-20 PROCEDURE — 96375 TX/PRO/DX INJ NEW DRUG ADDON: CPT

## 2019-06-20 PROCEDURE — 96417 CHEMO IV INFUS EACH ADDL SEQ: CPT

## 2019-06-20 PROCEDURE — 96413 CHEMO IV INFUSION 1 HR: CPT

## 2019-06-20 RX ORDER — HEPARIN SODIUM (PORCINE) LOCK FLUSH IV SOLN 100 UNIT/ML 100 UNIT/ML
500 SOLUTION INTRAVENOUS ONCE
Status: COMPLETED | OUTPATIENT
Start: 2019-06-20 | End: 2019-06-20

## 2019-06-20 RX ORDER — DIPHENHYDRAMINE HCL 25 MG
25 CAPSULE ORAL ONCE
Status: COMPLETED | OUTPATIENT
Start: 2019-06-20 | End: 2019-06-20

## 2019-06-20 RX ORDER — HEPARIN SODIUM (PORCINE) LOCK FLUSH IV SOLN 100 UNIT/ML 100 UNIT/ML
5 SOLUTION INTRAVENOUS EVERY 8 HOURS
Status: DISCONTINUED | OUTPATIENT
Start: 2019-06-20 | End: 2019-06-20 | Stop reason: HOSPADM

## 2019-06-20 RX ADMIN — HEPARIN 500 UNITS: 100 SYRINGE at 12:19

## 2019-06-20 RX ADMIN — FAMOTIDINE 20 MG: 10 INJECTION INTRAVENOUS at 09:45

## 2019-06-20 RX ADMIN — HEPARIN 5 ML: 100 SYRINGE at 08:42

## 2019-06-20 RX ADMIN — DEXAMETHASONE SODIUM PHOSPHATE 20 MG: 10 INJECTION, SOLUTION INTRAMUSCULAR; INTRAVENOUS at 09:48

## 2019-06-20 RX ADMIN — SODIUM CHLORIDE 250 ML: 9 INJECTION, SOLUTION INTRAVENOUS at 09:45

## 2019-06-20 RX ADMIN — DIPHENHYDRAMINE HYDROCHLORIDE 25 MG: 25 CAPSULE ORAL at 09:44

## 2019-06-20 RX ADMIN — PACLITAXEL 145 MG: 6 INJECTION, SOLUTION INTRAVENOUS at 11:14

## 2019-06-20 RX ADMIN — SODIUM CHLORIDE 537 MG: 9 INJECTION, SOLUTION INTRAVENOUS at 10:03

## 2019-06-20 ASSESSMENT — PAIN SCALES - GENERAL: PAINLEVEL: NO PAIN (0)

## 2019-06-20 NOTE — PATIENT INSTRUCTIONS
Contact Numbers    [facility] [place]: [phone]  [facility] [place] and after hours / weekends / holidays:  [phone]      Please call the triage or after hours line if you experience a temperature greater than or equal to 100.5, shaking chills, have uncontrolled nausea, vomiting and/or diarrhea, dizziness, shortness of breath, chest pain, bleeding, unexplained bruising, or if you have any other new/concerning symptoms, questions or concerns.      If you are having any concerning symptoms or wish to speak to a provider before your next infusion visit, please call your care coordinator or triage to notify them so we can adequately serve you.     If you need a refill on a narcotic prescription or other medication, please call before your infusion appointment.                 June 2019 Sunday Monday Tuesday Wednesday Thursday Friday Saturday                                 1       2     3     4     5     6    [facility] DRAW   7:15 AM   (15 min.)    [facility] DRAW   [facility]    [facility] RETURN   7:35 AM   (50 min.)   Violeta Coto PA-C   [facility]    [facility] ONC INFUSION 180   9:00 AM   (180 min.)    ONCOLOGY INFUSION   [facility] 7     8       9     10     11     12     13    [facility] DRAW   8:30 AM   (15 min.)    [facility] LAB DRAW   [facility] Draw    P ONC INFUSION 180   9:00 AM   (180 min.)    ONCOLOGY INFUSION   [facility] 14     15       16     17     18     19     20    [facility] DRAW   8:30 AM   (15 min.)    [facility] LAB DRAW   [facility] Draw    [facility] ONC INFUSION 180   9:00 AM   (180 min.)    ONCOLOGY INFUSION   [facility] 21    [facility]/ ONCOLOGY   7:15 AM   (45 min.)   UUPET1   [facility] PET CT 22       23     24     25     26     27  Happy Birthday!     28 29 30 July 2019 Sunday Monday Tuesday  Wednesday Thursday Friday Saturday        1    [facility] RETURN   9:00 AM   (30 min.)   Laurence Hood MD   [facility] 2     3     4     5     6       7     8     9     10     11    [facility] NEW   8:15 AM   (30 min.)   Rod Barrios MD   [facility] 12     13       14     15     16     17     18     19     20       21     22     23     24     25     26     27       28     29     30     31                                   Recent Results (from the past 24 hour(s))   CBC with platelets differential    Collection Time: 06/20/19  8:47 AM   Result Value Ref Range    WBC 3.3 (L) 4.0 - 11.0 10e9/L    RBC Count 3.83 (L) 4.4 - 5.9 10e12/L    Hemoglobin 11.7 (L) 13.3 - 17.7 g/dL    Hematocrit 34.9 (L) 40.0 - 53.0 %    MCV 91 78 - 100 fl    MCH 30.5 26.5 - 33.0 pg    MCHC 33.5 31.5 - 36.5 g/dL    RDW 13.1 10.0 - 15.0 %    Platelet Count 146 (L) 150 - 450 10e9/L    Diff Method Automated Method     % Neutrophils 55.0 %    % Lymphocytes 33.8 %    % Monocytes 7.6 %    % Eosinophils 2.4 %    % Basophils 0.9 %    % Immature Granulocytes 0.3 %    Nucleated RBCs 0 0 /100    Absolute Neutrophil 1.8 1.6 - 8.3 10e9/L    Absolute Lymphocytes 1.1 0.8 - 5.3 10e9/L    Absolute Monocytes 0.3 0.0 - 1.3 10e9/L    Absolute Eosinophils 0.1 0.0 - 0.7 10e9/L    Absolute Basophils 0.0 0.0 - 0.2 10e9/L    Abs Immature Granulocytes 0.0 0 - 0.4 10e9/L    Absolute Nucleated RBC 0.0    Protein qualitative urine    Collection Time: 06/20/19  8:47 AM   Result Value Ref Range    Protein Albumin Urine 10 (A) NEG^Negative mg/dL

## 2019-06-20 NOTE — PROGRESS NOTES
Infusion Nursing Note:  Daniel Sandhu presents today for Cycle 3 Day 15 Cyramza/Taxol.    Patient seen by provider today: No   present during visit today: Not Applicable.    Note: pt presents to infusion room today feeling well with no new complaints. Pt will see ENT in July for ongoing bloody drainage from nose; pt will also discuss a dry spot in the left throat with ENT. Pt offers no other concerns at this time.     Intravenous Access:  Implanted Port.    Treatment Conditions:  Lab Results   Component Value Date    HGB 11.7 06/20/2019     Lab Results   Component Value Date    WBC 3.3 06/20/2019      Lab Results   Component Value Date    ANEU 1.8 06/20/2019     Lab Results   Component Value Date     06/20/2019        Results reviewed, labs MET treatment parameters, ok to proceed with treatment.  Urine protein: 10.      Post Infusion Assessment:  Patient tolerated infusion without incident.  Blood return noted pre and post infusion.  Site patent and intact, free from redness, edema or discomfort.  No evidence of extravasations.  Access discontinued per protocol.       Discharge Plan:   Patient declined prescription refills.  AVS to patient via [employer].  Patient will return 6/21 for PET scan and 7/1 for follow up with Dr. Hood. Pt states wanting to wait for visit with Dr. Hood before scheduling additional chemotherapy.   Patient discharged in stable condition accompanied by: self.  Departure Mode: Ambulatory.    KAM HACKETT RN

## 2019-06-21 ENCOUNTER — HOSPITAL ENCOUNTER (OUTPATIENT)
Dept: PET IMAGING | Facility: CLINIC | Age: 39
Discharge: HOME | End: 2019-06-21
Attending: INTERNAL MEDICINE | Admitting: INTERNAL MEDICINE
Payer: COMMERCIAL

## 2019-06-21 DIAGNOSIS — C15.5 MALIGNANT NEOPLASM OF LOWER THIRD OF ESOPHAGUS (H): ICD-10-CM

## 2019-06-21 LAB — GLUCOSE BLDC GLUCOMTR-MCNC: 85 MG/DL (ref 70–99)

## 2019-06-21 PROCEDURE — 82962 GLUCOSE BLOOD TEST: CPT

## 2019-06-21 PROCEDURE — 25000128 H RX IP 250 OP 636: Performed by: INTERNAL MEDICINE

## 2019-06-21 PROCEDURE — 34300033 ZZH RX 343: Performed by: INTERNAL MEDICINE

## 2019-06-21 PROCEDURE — A9552 F18 FDG: HCPCS | Performed by: INTERNAL MEDICINE

## 2019-06-21 PROCEDURE — 74177 CT ABD & PELVIS W/CONTRAST: CPT

## 2019-06-21 PROCEDURE — 71260 CT THORAX DX C+: CPT

## 2019-06-21 RX ORDER — HEPARIN SODIUM (PORCINE) LOCK FLUSH IV SOLN 100 UNIT/ML 100 UNIT/ML
500 SOLUTION INTRAVENOUS ONCE
Status: COMPLETED | OUTPATIENT
Start: 2019-06-21 | End: 2019-06-21

## 2019-06-21 RX ORDER — IOPAMIDOL 755 MG/ML
45-150 INJECTION, SOLUTION INTRAVASCULAR ONCE
Status: COMPLETED | OUTPATIENT
Start: 2019-06-21 | End: 2019-06-21

## 2019-06-21 RX ADMIN — Medication 500 UNITS: at 09:03

## 2019-06-21 RX ADMIN — IOPAMIDOL 92 ML: 755 INJECTION, SOLUTION INTRAVENOUS at 08:57

## 2019-06-21 RX ADMIN — FLUDEOXYGLUCOSE F-18 10.02 MCI.: 500 INJECTION, SOLUTION INTRAVENOUS at 07:45

## 2019-06-24 ENCOUNTER — TRANSFERRED RECORDS (OUTPATIENT)
Dept: HEALTH INFORMATION MANAGEMENT | Facility: CLINIC | Age: 39
End: 2019-06-24

## 2019-06-25 NOTE — TELEPHONE ENCOUNTER
FUTURE VISIT INFORMATION      FUTURE VISIT INFORMATION:    Date: 7/11/19    Time: 8:30am    Location: [facility]  REFERRAL INFORMATION:    Referring provider:  Violeta Coto PA-C    Referring providers clinic: [facility]    Reason for visit/diagnosis  Epistaxis    RECORDS REQUESTED FROM:       Clinic name Comments Records Status Imaging Status   [facility] Office visit with Violeta Coto-6/6/19 Epic

## 2019-07-01 ENCOUNTER — ONCOLOGY VISIT (OUTPATIENT)
Dept: ONCOLOGY | Facility: CLINIC | Age: 39
End: 2019-07-01
Attending: INTERNAL MEDICINE
Payer: COMMERCIAL

## 2019-07-01 ENCOUNTER — TELEPHONE (OUTPATIENT)
Dept: ONCOLOGY | Facility: CLINIC | Age: 39
End: 2019-07-01

## 2019-07-01 VITALS
SYSTOLIC BLOOD PRESSURE: 112 MMHG | WEIGHT: 150.79 LBS | RESPIRATION RATE: 14 BRPM | OXYGEN SATURATION: 99 % | BODY MASS INDEX: 22.26 KG/M2 | DIASTOLIC BLOOD PRESSURE: 71 MMHG | HEART RATE: 72 BPM | TEMPERATURE: 98.3 F

## 2019-07-01 DIAGNOSIS — C15.5 MALIGNANT NEOPLASM OF LOWER THIRD OF ESOPHAGUS (H): ICD-10-CM

## 2019-07-01 PROCEDURE — G0463 HOSPITAL OUTPT CLINIC VISIT: HCPCS | Mod: ZF

## 2019-07-01 PROCEDURE — 99215 OFFICE O/P EST HI 40 MIN: CPT | Mod: ZP | Performed by: INTERNAL MEDICINE

## 2019-07-01 RX ORDER — EPINEPHRINE 0.3 MG/.3ML
0.3 INJECTION SUBCUTANEOUS EVERY 5 MIN PRN
Status: CANCELLED | OUTPATIENT
Start: 2019-07-11

## 2019-07-01 RX ORDER — SODIUM CHLORIDE 9 MG/ML
1000 INJECTION, SOLUTION INTRAVENOUS CONTINUOUS PRN
Status: CANCELLED
Start: 2019-07-25

## 2019-07-01 RX ORDER — LORAZEPAM 2 MG/ML
0.5 INJECTION INTRAMUSCULAR EVERY 4 HOURS PRN
Status: CANCELLED
Start: 2019-07-18

## 2019-07-01 RX ORDER — LORAZEPAM 2 MG/ML
0.5 INJECTION INTRAMUSCULAR EVERY 4 HOURS PRN
Status: CANCELLED
Start: 2019-07-25

## 2019-07-01 RX ORDER — MEPERIDINE HYDROCHLORIDE 25 MG/ML
25 INJECTION INTRAMUSCULAR; INTRAVENOUS; SUBCUTANEOUS EVERY 30 MIN PRN
Status: CANCELLED | OUTPATIENT
Start: 2019-07-11

## 2019-07-01 RX ORDER — EPINEPHRINE 1 MG/ML
0.3 INJECTION, SOLUTION INTRAMUSCULAR; SUBCUTANEOUS EVERY 5 MIN PRN
Status: CANCELLED | OUTPATIENT
Start: 2019-07-11

## 2019-07-01 RX ORDER — ALBUTEROL SULFATE 90 UG/1
1-2 AEROSOL, METERED RESPIRATORY (INHALATION)
Status: CANCELLED
Start: 2019-07-18

## 2019-07-01 RX ORDER — ALBUTEROL SULFATE 0.83 MG/ML
2.5 SOLUTION RESPIRATORY (INHALATION)
Status: CANCELLED | OUTPATIENT
Start: 2019-07-11

## 2019-07-01 RX ORDER — DIPHENHYDRAMINE HCL 25 MG
25 CAPSULE ORAL ONCE
Status: CANCELLED
Start: 2019-07-18

## 2019-07-01 RX ORDER — EPINEPHRINE 0.3 MG/.3ML
0.3 INJECTION SUBCUTANEOUS EVERY 5 MIN PRN
Status: CANCELLED | OUTPATIENT
Start: 2019-07-25

## 2019-07-01 RX ORDER — EPINEPHRINE 0.3 MG/.3ML
0.3 INJECTION SUBCUTANEOUS EVERY 5 MIN PRN
Status: CANCELLED | OUTPATIENT
Start: 2019-07-18

## 2019-07-01 RX ORDER — SODIUM CHLORIDE 9 MG/ML
1000 INJECTION, SOLUTION INTRAVENOUS CONTINUOUS PRN
Status: CANCELLED
Start: 2019-07-11

## 2019-07-01 RX ORDER — DIPHENHYDRAMINE HCL 25 MG
25 CAPSULE ORAL ONCE
Status: CANCELLED
Start: 2019-07-11

## 2019-07-01 RX ORDER — EPINEPHRINE 1 MG/ML
0.3 INJECTION, SOLUTION INTRAMUSCULAR; SUBCUTANEOUS EVERY 5 MIN PRN
Status: CANCELLED | OUTPATIENT
Start: 2019-07-25

## 2019-07-01 RX ORDER — MEPERIDINE HYDROCHLORIDE 25 MG/ML
25 INJECTION INTRAMUSCULAR; INTRAVENOUS; SUBCUTANEOUS EVERY 30 MIN PRN
Status: CANCELLED | OUTPATIENT
Start: 2019-07-25

## 2019-07-01 RX ORDER — ALBUTEROL SULFATE 0.83 MG/ML
2.5 SOLUTION RESPIRATORY (INHALATION)
Status: CANCELLED | OUTPATIENT
Start: 2019-07-25

## 2019-07-01 RX ORDER — MEPERIDINE HYDROCHLORIDE 25 MG/ML
25 INJECTION INTRAMUSCULAR; INTRAVENOUS; SUBCUTANEOUS EVERY 30 MIN PRN
Status: CANCELLED | OUTPATIENT
Start: 2019-07-18

## 2019-07-01 RX ORDER — DIPHENHYDRAMINE HYDROCHLORIDE 50 MG/ML
50 INJECTION INTRAMUSCULAR; INTRAVENOUS
Status: CANCELLED
Start: 2019-07-18

## 2019-07-01 RX ORDER — DIPHENHYDRAMINE HYDROCHLORIDE 50 MG/ML
50 INJECTION INTRAMUSCULAR; INTRAVENOUS
Status: CANCELLED
Start: 2019-07-25

## 2019-07-01 RX ORDER — SODIUM CHLORIDE 9 MG/ML
1000 INJECTION, SOLUTION INTRAVENOUS CONTINUOUS PRN
Status: CANCELLED
Start: 2019-07-18

## 2019-07-01 RX ORDER — HEPARIN SODIUM (PORCINE) LOCK FLUSH IV SOLN 100 UNIT/ML 100 UNIT/ML
500 SOLUTION INTRAVENOUS ONCE
Status: CANCELLED
Start: 2019-07-18

## 2019-07-01 RX ORDER — ALBUTEROL SULFATE 90 UG/1
1-2 AEROSOL, METERED RESPIRATORY (INHALATION)
Status: CANCELLED
Start: 2019-07-25

## 2019-07-01 RX ORDER — ALBUTEROL SULFATE 0.83 MG/ML
2.5 SOLUTION RESPIRATORY (INHALATION)
Status: CANCELLED | OUTPATIENT
Start: 2019-07-18

## 2019-07-01 RX ORDER — HEPARIN SODIUM (PORCINE) LOCK FLUSH IV SOLN 100 UNIT/ML 100 UNIT/ML
500 SOLUTION INTRAVENOUS ONCE
Status: CANCELLED
Start: 2019-07-11

## 2019-07-01 RX ORDER — METHYLPREDNISOLONE SODIUM SUCCINATE 125 MG/2ML
125 INJECTION, POWDER, LYOPHILIZED, FOR SOLUTION INTRAMUSCULAR; INTRAVENOUS
Status: CANCELLED
Start: 2019-07-25

## 2019-07-01 RX ORDER — DIPHENHYDRAMINE HYDROCHLORIDE 50 MG/ML
50 INJECTION INTRAMUSCULAR; INTRAVENOUS
Status: CANCELLED
Start: 2019-07-11

## 2019-07-01 RX ORDER — EPINEPHRINE 1 MG/ML
0.3 INJECTION, SOLUTION INTRAMUSCULAR; SUBCUTANEOUS EVERY 5 MIN PRN
Status: CANCELLED | OUTPATIENT
Start: 2019-07-18

## 2019-07-01 RX ORDER — ALBUTEROL SULFATE 90 UG/1
1-2 AEROSOL, METERED RESPIRATORY (INHALATION)
Status: CANCELLED
Start: 2019-07-11

## 2019-07-01 RX ORDER — METHYLPREDNISOLONE SODIUM SUCCINATE 125 MG/2ML
125 INJECTION, POWDER, LYOPHILIZED, FOR SOLUTION INTRAMUSCULAR; INTRAVENOUS
Status: CANCELLED
Start: 2019-07-11

## 2019-07-01 RX ORDER — METHYLPREDNISOLONE SODIUM SUCCINATE 125 MG/2ML
125 INJECTION, POWDER, LYOPHILIZED, FOR SOLUTION INTRAMUSCULAR; INTRAVENOUS
Status: CANCELLED
Start: 2019-07-18

## 2019-07-01 RX ORDER — HEPARIN SODIUM (PORCINE) LOCK FLUSH IV SOLN 100 UNIT/ML 100 UNIT/ML
500 SOLUTION INTRAVENOUS ONCE
Status: CANCELLED
Start: 2019-07-25

## 2019-07-01 RX ORDER — DIPHENHYDRAMINE HCL 25 MG
25 CAPSULE ORAL ONCE
Status: CANCELLED
Start: 2019-07-25

## 2019-07-01 RX ORDER — LORAZEPAM 2 MG/ML
0.5 INJECTION INTRAMUSCULAR EVERY 4 HOURS PRN
Status: CANCELLED
Start: 2019-07-11

## 2019-07-01 ASSESSMENT — PAIN SCALES - GENERAL: PAINLEVEL: NO PAIN (0)

## 2019-07-01 NOTE — TELEPHONE ENCOUNTER
"I spoke to patient & we discussed the details of a possible CT colonography per Dr Hood. Pt is concerned that if he proceeded w/ a CT colonography, he may not be able to drink copious amounts of oral contrast/barium due to his hx of gastrectomy. Per CT tech at [facility], pt will only need to drink \"a small amount of barium,\" which they will give pt the day of the CT. Pt will have to complete a colon cleansing regimen in prep for this procedure which is similar to prep for regular colonoscopies. They use a combination of laxatives such as bisacodyl then pt will be NPO at midnight before the procedure, ok to drink clears.     Pt is getting 2nd opinion at [facility] this Friday and will let us know if he would like to proceed w/ CT colonography. Of note, a prior auth will be required for this procedure. Pt agrees & verbalizes understanding of this plan.      "

## 2019-07-01 NOTE — PROGRESS NOTES
[facility]    Hematology/Oncology Established Patient Note      Today's Date: 7/01/19    Reason for Follow-up: adenocarcinoma of the GE junction      HISTORY OF PRESENT ILLNESS: Daniel Sandhu is a 39 year old male who presents with adenocarcinoma of the GE junction.  In early 2017, patient started noticing difficulty swallowing, and that food seems to take longer to go down.  It became more frequent, and he saw his PCP, and was referred for EGD, which showed an esophageal mass located at the GE junction, measuring 4 cm.  Biopsy showed poorly differentiated adenocarcinoma.  HER-2 was sent and is equivocal (2+).  CT c/a/p showed a mildly prominent lymph node in the gastrohepatic ligament, but there were no pathologically enlarged lymph nodes in the chest, abdomen, or pelvis.  There was otherwise no evidence of metastatic disease.  PET-CT showed thickening of the distal esophagus consistent with known esophageal adenocarcinoma, as well as mildly hypermetabolic 1 cm lymph node in the gastrohepatic ligament.  There was no evidence of distant metastatic disease.  He underwent EUS on 6/9/17, which showed the esophageal tumor in the lower third of the esophagus, staged T2NX.  There were 2 abnormal lymph nodes seen in the gastrohepatic ligament; pathology was suspicious for adenocarcinoma.  Celiac node was sampled as well, which was benign.  He was seen by surgery, Dr. Esteban, and recommended srinivas-operative chemotherapy.    Port was placed on 6/22/17.  It was removed on 3/19/18.    He underwent chemotherapy with epirubicin, oxaliplatin, and capecitabine x 3 cycles 6/26/17-8/7/17.      On 11/3/17, he underwent laparotomy with total gastrectomy and abdominal lymphadenectomy, left thoracotomy with distal esophagectomy and intrathoracic Terrell-en-Y esophagojejunostomy, feeding jejunostomy, left pharyngostomy tube placement, right tube thoracostomy, and flexible bronchoscopy.  On 11/9/17, he was taken back  to OR for I&D of pharyngostomy tube abscess.  Pathology showed adenocarcinoma, moderate differentiated, extensive residual tumor with no evidence tumor regression, margins negative, perineural invasion present, 1 of 28 lymph nodes were involved with malignancy, stage [id number] (stage IIIA).  HER-2 is non-amplified.    He resumed chemotherapy post-surgery, with plans to complete 3 more cycles, with 5-FU, epirubicin, oxaliplatin.  However, he only completed 2 more cycles, due to poor tolerance.  He was admitted to the hospital 1/9/18-1/13/18 for nausea, diarrhea, failure to thrive, severe malnutrition, generalized weakness.  His infusional chemotherapy was stopped on 1/8/18.  He underwent EGD in the hospital on 1/11/18, which showed ulcers, and no evidence of recurrence of his cancer.  One area biopsied showed inflammation, no evidence of malignancy.    He saw Dr. Esteban on 3/6/19 and had CT abdomen/pelvis done, which showed a 3.1 cm lesion in hepatic segment 3.  He underwent biopsy on 3/20/19 by IR, which showed moderately differentiated adenocarcinoma consistent with metastasis from primary esophageal carcinoma.  HER-2 is non-amplified.  Restaging PET scan on 3/29/19 showed the 4.4 cm left lobe liver metastasis.  There is nodular foci of hypermetabolic uptake in the left mid abdomen in relation to the small bowel loops, without definite underlying lesion on CT.  This is favored to represent metastatic disease unless proven otherwise.    He started on chemotherapy with paclitaxel and ramucirumab on 4/10/19.      INTERIM HISTORY: Daniel comes in for follow-up today.  He says that he has been tolerating chemotherapy quite well.  He hasn't lost all his hair, so he is glad about that.  He notes having sinus drainage and a tickle in his throat.  He has tried saline nasal rinse and that has helped.  He is going to see ENT soon.  He is going to [facility] this Friday for second opinion.        REVIEW OF SYSTEMS:   14 point  ROS was reviewed and is negative other than as noted above in HPI.       HOME MEDICATIONS:  Current Outpatient Medications   Medication Sig Dispense Refill     cyabnocobalamin (VITAMIN B-12) 2500 MCG sublingual tablet Place 2,500 mcg under the tongue daily 30 tablet 1     ferrous gluconate (FERGON) 324 (38 Fe) MG tablet Take 324 mg by mouth daily (with breakfast)       multivitamin, therapeutic with minerals (MULTI-VITAMIN) TABS tablet Take 1 tablet by mouth daily Generic [employer] Vitamin       saccharomyces boulardii (FLORASTOR) 250 MG capsule Take 250 mg by mouth 2 times daily       LORazepam (ATIVAN) 0.5 MG tablet Take 1 tablet (0.5 mg) by mouth every 4 hours as needed (Anxiety, Nausea/Vomiting or Sleep) (Patient not taking: Reported on 6/6/2019) 30 tablet 2     prochlorperazine (COMPAZINE) 10 MG tablet Take 1 tablet (10 mg) by mouth every 6 hours as needed (Nausea/Vomiting) (Patient not taking: Reported on 5/6/2019) 30 tablet 2         ALLERGIES:  No Known Allergies      PAST MEDICAL HISTORY:  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) 6/5/2017         PAST SURGICAL HISTORY:  Past Surgical History:   Procedure Laterality Date     ESOPHAGOGASTRODUODENOSCOPY       ESOPHAGOSCOPY, GASTROSCOPY, DUODENOSCOPY (EGD), COMBINED N/A 6/9/2017    Procedure: COMBINED ENDOSCOPIC ULTRASOUND, ESOPHAGOSCOPY, GASTROSCOPY, DUODENOSCOPY (EGD), FINE NEEDLE ASPIRATE/BIOPSY;  Upper Endoscopic Ultrasound, fine needle aspirate/biopsy;  Surgeon: Guru Mark Avila MD;  Location:  OR     ESOPHAGOSCOPY, GASTROSCOPY, DUODENOSCOPY (EGD), COMBINED N/A 11/3/2017    Procedure: COMBINED ESOPHAGOSCOPY, GASTROSCOPY, DUODENOSCOPY (EGD);;  Surgeon: Yunior Esteban MD;  [facility] OR     ESOPHAGOSCOPY, GASTROSCOPY, DUODENOSCOPY (EGD), COMBINED N/A 11/9/2017    Procedure: COMBINED ESOPHAGOSCOPY, GASTROSCOPY, DUODENOSCOPY (EGD);  Esophogastroduodenoscopy, take out  pharangostomy tube;  Surgeon: Yunior Esteban MD;  Location: [facility] OR     ESOPHAGOSCOPY, GASTROSCOPY, DUODENOSCOPY (EGD), COMBINED N/A 1/11/2018    Procedure: COMBINED ESOPHAGOSCOPY, GASTROSCOPY, DUODENOSCOPY (EGD), BIOPSY SINGLE OR MULTIPLE;  COMBINED ESOPHAGOSCOPY, GASTROSCOPY, DUODENOSCOPY (EGD);  Surgeon: Alessandro Mcgregor MD;  Location: [facility] GI     GASTRECTOMY N/A 11/3/2017    Procedure: GASTRECTOMY;;  Surgeon: Yunior Esteban MD;  Location: [facility] OR     HAND SURGERY      childhood, torn tendon     INSERT PORT VASCULAR ACCESS Right 6/22/2017    Procedure: INSERT PORT VASCULAR ACCESS;  Single Lumen Chest Power Port;  Surgeon: Iván Driver PA-C;  [facility] OR     INSERT PORT VASCULAR ACCESS Right 4/8/2019    Procedure: Single Lumen Chest Port Placement;  Surgeon: Krysten Ballard PA-C;  [facility] OR     IR CHEST PORT PLACEMENT > 5 YRS OF AGE  4/8/2019     IR LIVER BIOPSY PERCUTANEOUS  3/20/2019     LAPAROSCOPY DIAGNOSTIC (GENERAL) N/A 11/3/2017    Procedure: LAPAROSCOPY DIAGNOSTIC (GENERAL);  diagnostic laparoscopy, right chest tube, total gastrectomy with distal esophagectomy, intrathoracic eveline-y esophago-jejunostomy, feeding jejunostomy, pharyngostomy, esophagogastroduodenoscopy, flexible bronchoscopy;  Surgeon: Yunior Esteban MD;  [facility] OR     LAPAROTOMY EXPLORATORY N/A 11/3/2017    Procedure: LAPAROTOMY EXPLORATORY;;  Surgeon: Yunior Esteban MD;  [facility] OR     PHARYNGOSTOMY N/A 11/3/2017    Procedure: PHARYNGOSTOMY;;  Surgeon: Yunior Esteban MD;  [facility] OR     REMOVE PORT VASCULAR ACCESS Right 3/19/2018    Procedure: REMOVE PORT VASCULAR ACCESS;  Right Port Removal;  Surgeon: Krysten Hopper PA-C;  [facility]     THORACOTOMY Left 11/3/2017    Procedure: THORACOTOMY;;  Surgeon: Yunior Esteban MD;  Location: [facility] OR         SOCIAL HISTORY:  Social History     Socioeconomic History     Marital status:       Spouse name: Not on file     Number of children: 1     Years of education: Not on file     Highest education level: Not on file   Occupational History     Occupation: musician and teacher    Social Needs     Financial resource strain: Not on file     Food insecurity:     Worry: Not on file     Inability: Not on file     Transportation needs:     Medical: Not on file     Non-medical: Not on file   Tobacco Use     Smoking status: Never Smoker     Smokeless tobacco: Never Used   Substance and Sexual Activity     Alcohol use: Yes     Comment: 1 beer daily     Drug use: Yes     Types: Marijuana     Comment: occasional     Sexual activity: Yes     Partners: Female     Birth control/protection: Natural Family Planning   Lifestyle     Physical activity:     Days per week: Not on file     Minutes per session: Not on file     Stress: Not on file   Relationships     Social connections:     Talks on phone: Not on file     Gets together: Not on file     Attends Scientologist service: Not on file     Active member of club or organization: Not on file     Attends meetings of clubs or organizations: Not on file     Relationship status: Not on file     Intimate partner violence:     Fear of current or ex partner: Not on file     Emotionally abused: Not on file     Physically abused: Not on file     Forced sexual activity: Not on file   Other Topics Concern     Parent/sibling w/ CABG, MI or angioplasty before 65F 55M? 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  He denies smoking.  He drinks ~1 beer a day.  He denies illicit drug use, other than occasional marijuana.  He lives in [place] with his wife, and 4.5 year-old daughter.  His wife is currently pregnant with a boy, and is due in 6 weeks.  He has a half sister who  of breast cancer at age 32; she was diagnosed in her late 20's.  A paternal grandmother had breast cancer in  her 70's      FAMILY HISTORY:  Family History   Problem Relation Age of Onset     Other - See Comments Father         sepsis     Cancer Sister         breast cancer  29 yo 1/2 sister      Cancer Paternal Grandmother         breast         PHYSICAL EXAM:  Vital signs:  /71   Pulse 72   Temp 98.3  F (36.8  C) (Oral)   Resp 14   Wt 68.4 kg (150 lb 12.7 oz)   SpO2 99%   BMI 22.26 kg/m     ECO  GENERAL/CONSTITUTIONAL: No acute distress.     EYES: No scleral icterus.  RESPIRATORY: Clear to auscultation bilaterally.  CARDIOVASCULAR: Regular rate and rhythm.  GASTROINTESTINAL: Bowel sounds present.  Non-tender.  No distention.    MUSCULOSKELETAL: Warm and well-perfused, no cyanosis, clubbing, or edema.  NEUROLOGIC: Alert, oriented, answers questions appropriately.  INTEGUMENTARY: No jaundice.  Port in place at the right upper chest.      LABS:  None today.      PATHOLOGY:  3/20/19:  FINAL DIAGNOSIS:   Liver, needle biopsy   - Moderately differentiated adenocarcinoma consistent with metastasis from    primary esophageal adenocarcinoma.     HER-2 non-amplified.      IMAGING:  PET-CT 19:  IMPRESSION: Metastatic esophageal carcinoma with liver metastasis:  1. Liver metastatic lesion in the left hepatic lobe has significantly  decreased in size and metabolic activity as above. No new suspicious  lesion in the liver.  2. Previously seen hypermetabolic uptake in the left anterior  abdominal cavity have shifted to the lower pelvic cavity. The uptake  corresponds with adjacent small bowel in the lower pelvis. Consider CT  enterography for further evaluation as metastatic disease cannot be  rule out.          ASSESSMENT/PLAN:  Daniel Sandhu is a 39 year old male with:    1) Adenocarcinoma of the GE junction: now s/p 3 cycles of neoadjuvant chemotherapy with EOX, followed by surgical resection on 11/3/17.  Pathology showed adenocarcinoma, moderate differentiated, extensive residual tumor with no evidence  tumor regression, margins negative, perineural invasion present, 1 of 28 lymph nodes were involved with malignancy, stage [id number] (stage IIIA).  HER-2 is non-amplified.    He has received EOF x 2 cycles after surgery, and he has not tolerated well.  The 5-FU on cycle 5 was discontinued early. Chemotherapy was stopped at that point due to poor tolerance.    Patient now has biopsy-confirmed metastatic disease to the liver and possibly peritoneum.  He is asymptomatic currently.      [facility] testing shows MS-stable, TMB-low (4 mut/Mb), ERBB2 amplification equivocal, and TP53 H214R alteration.  PD-L1 was negative (0%).  We review the results together and possible clinical trials, and possible second opinion at [facility].      He has been tolerating chemotherapy with paclitaxel+ramucirumab very well so far.    PET-CT was done after 3 cycles of treatment, which shows response, with significantly decreased size and metabolic activity of the left hepatic lobe lesion.  No new suspicious lesion in the liver.  There previously seen hypermetabolic uptake in the left anterior abdominal cavity have shifted to the lower pelvic cavity.      We reviewed the PET-CT images together on the computer.  The liver lesion certainly looks better.  The hypermetabolic uptake in the pelvis remains a question.  We discussed  CT enterography, as radiology recommended, but patient is concerned about the amount of contrast he'd have to drink.  In addition, he is going to [facility] for a second opinion on Friday, 7/5/19.    -he will proceed with C4D1 of chemotherapy on 7/11/19; he wanted to hold this week due to holiday, and he is going to [facility] on 7/5/19.  -will plan to get another PET-CT after 6 cycles of chemotherapy to evaluate response   -he will see ROMEO with cycle 5  -I will see him with cycle 6    I discussed additional support, including palliative care, , oncology psychology.  Daniel and his wife are  understanding having difficulty coping and were teary today.  They opt to wait for now and may be open to additional support later on.      Patient has addition questions regarding liver directed treatment to the liver lesion.  We have discussed that the data on metastatectomy on esophageal/gastric tumors are scant, and appears to have no overall survival benefit.  I did discuss with Dr. Avalos, and he agrees that the data is poor.  However, if patient has stable disease for at least 6 months, and no evidence of peritoneal disease, then he may consider surgery or targeted treatment to the liver.  Daniel expressed understanding and satisfaction with this.  As above, he is going to [facility] for a second opinion later this week.    2) Genetics:  -genetic testing was negative    3) Fertility: Daniel and his wife have 2 children, including a baby boy just born around June 2017.  He is not planning for more children in the near future.    4) Patient talked about medical cannabis, and I offered to certify him, but he says that the program is too expensive for him.  He does use marijuana edibles or vapes marijuana, which is helpful.      5) Sinus drainage, occasional epistaxis, cough: He requested referral to ENT; he is seeing ENT on 7/26/19.      I spent a total of 40 minutes with the patient, with over >50% of the time in counseling and/or coordination of care.       Laurence Hood MD  Hematology/Oncology  [facility]

## 2019-07-01 NOTE — NURSING NOTE
"Oncology Rooming Note    July 1, 2019 9:17 AM   Daniel Sandhu is a 39 year old male who presents for:    Chief Complaint   Patient presents with     Oncology Clinic Visit     Esophageal Ca      Initial Vitals: /71   Pulse 72   Temp 98.3  F (36.8  C) (Oral)   Resp 14   Wt 68.4 kg (150 lb 12.7 oz)   SpO2 99%   BMI 22.26 kg/m   Estimated body mass index is 22.26 kg/m  as calculated from the following:    Height as of 6/6/19: 1.753 m (5' 9.02\").    Weight as of this encounter: 68.4 kg (150 lb 12.7 oz). Body surface area is 1.83 meters squared.  No Pain (0) Comment: Data Unavailable   No LMP for male patient.  Allergies reviewed: Yes  Medications reviewed: Yes    Medications: Medication refills not needed today.  Pharmacy name entered into EPIC:    [facility] [place], [place] - [address] [place] [address] [facility] [address] [facility] [place], [place] - [address] [facility] [place], [place] - [phone]    Clinical concerns: Scan results  Hood  was notified.      Christine Antonio MA              "

## 2019-07-01 NOTE — LETTER
7/1/2019       RE: Daniel Sandhu  [address] [facility] [address]     Dear Colleague,    Thank you for referring your patient, Daniel Sandhu, to the [facility]. Please see a copy of my visit note below.    [facility]    Hematology/Oncology Established Patient Note      Today's Date: 7/01/19    Reason for Follow-up: adenocarcinoma of the GE junction      HISTORY OF PRESENT ILLNESS: Daniel Sandhu is a 39 year old male who presents with adenocarcinoma of the GE junction.  In early 2017, patient started noticing difficulty swallowing, and that food seems to take longer to go down.  It became more frequent, and he saw his PCP, and was referred for EGD, which showed an esophageal mass located at the GE junction, measuring 4 cm.  Biopsy showed poorly differentiated adenocarcinoma.  HER-2 was sent and is equivocal (2+).  CT c/a/p showed a mildly prominent lymph node in the gastrohepatic ligament, but there were no pathologically enlarged lymph nodes in the chest, abdomen, or pelvis.  There was otherwise no evidence of metastatic disease.  PET-CT showed thickening of the distal esophagus consistent with known esophageal adenocarcinoma, as well as mildly hypermetabolic 1 cm lymph node in the gastrohepatic ligament.  There was no evidence of distant metastatic disease.  He underwent EUS on 6/9/17, which showed the esophageal tumor in the lower third of the esophagus, staged T2NX.  There were 2 abnormal lymph nodes seen in the gastrohepatic ligament; pathology was suspicious for adenocarcinoma.  Celiac node was sampled as well, which was benign.  He was seen by surgery, Dr. Esteban, and recommended srinivas-operative chemotherapy.    Port was placed on 6/22/17.  It was removed on 3/19/18.    He underwent chemotherapy with epirubicin, oxaliplatin, and capecitabine x 3 cycles 6/26/17-8/7/17.      On 11/3/17, he underwent laparotomy with total gastrectomy and abdominal  lymphadenectomy, left thoracotomy with distal esophagectomy and intrathoracic Terrell-en-Y esophagojejunostomy, feeding jejunostomy, left pharyngostomy tube placement, right tube thoracostomy, and flexible bronchoscopy.  On 11/9/17, he was taken back to OR for I&D of pharyngostomy tube abscess.  Pathology showed adenocarcinoma, moderate differentiated, extensive residual tumor with no evidence tumor regression, margins negative, perineural invasion present, 1 of 28 lymph nodes were involved with malignancy, stage [id number] (stage IIIA).  HER-2 is non-amplified.    He resumed chemotherapy post-surgery, with plans to complete 3 more cycles, with 5-FU, epirubicin, oxaliplatin.  However, he only completed 2 more cycles, due to poor tolerance.  He was admitted to the hospital 1/9/18-1/13/18 for nausea, diarrhea, failure to thrive, severe malnutrition, generalized weakness.  His infusional chemotherapy was stopped on 1/8/18.  He underwent EGD in the hospital on 1/11/18, which showed ulcers, and no evidence of recurrence of his cancer.  One area biopsied showed inflammation, no evidence of malignancy.    He saw Dr. Esteban on 3/6/19 and had CT abdomen/pelvis done, which showed a 3.1 cm lesion in hepatic segment 3.  He underwent biopsy on 3/20/19 by IR, which showed moderately differentiated adenocarcinoma consistent with metastasis from primary esophageal carcinoma.  HER-2 is non-amplified.  Restaging PET scan on 3/29/19 showed the 4.4 cm left lobe liver metastasis.  There is nodular foci of hypermetabolic uptake in the left mid abdomen in relation to the small bowel loops, without definite underlying lesion on CT.  This is favored to represent metastatic disease unless proven otherwise.    He started on chemotherapy with paclitaxel and ramucirumab on 4/10/19.      INTERIM HISTORY: Daniel comes in for follow-up today.  He says that he has been tolerating chemotherapy quite well.  He hasn't lost all his hair, so he is glad about  that.  He notes having sinus drainage and a tickle in his throat.  He has tried saline nasal rinse and that has helped.  He is going to see ENT soon.  He is going to [facility] this Friday for second opinion.        REVIEW OF SYSTEMS:   14 point ROS was reviewed and is negative other than as noted above in HPI.       HOME MEDICATIONS:  Current Outpatient Medications   Medication Sig Dispense Refill     cyabnocobalamin (VITAMIN B-12) 2500 MCG sublingual tablet Place 2,500 mcg under the tongue daily 30 tablet 1     ferrous gluconate (FERGON) 324 (38 Fe) MG tablet Take 324 mg by mouth daily (with breakfast)       multivitamin, therapeutic with minerals (MULTI-VITAMIN) TABS tablet Take 1 tablet by mouth daily Generic Britt Vitamin       saccharomyces boulardii (FLORASTOR) 250 MG capsule Take 250 mg by mouth 2 times daily       LORazepam (ATIVAN) 0.5 MG tablet Take 1 tablet (0.5 mg) by mouth every 4 hours as needed (Anxiety, Nausea/Vomiting or Sleep) (Patient not taking: Reported on 6/6/2019) 30 tablet 2     prochlorperazine (COMPAZINE) 10 MG tablet Take 1 tablet (10 mg) by mouth every 6 hours as needed (Nausea/Vomiting) (Patient not taking: Reported on 5/6/2019) 30 tablet 2         ALLERGIES:  No Known Allergies      PAST MEDICAL HISTORY:  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) 6/5/2017         PAST SURGICAL HISTORY:  Past Surgical History:   Procedure Laterality Date     ESOPHAGOGASTRODUODENOSCOPY       ESOPHAGOSCOPY, GASTROSCOPY, DUODENOSCOPY (EGD), COMBINED N/A 6/9/2017    Procedure: COMBINED ENDOSCOPIC ULTRASOUND, ESOPHAGOSCOPY, GASTROSCOPY, DUODENOSCOPY (EGD), FINE NEEDLE ASPIRATE/BIOPSY;  Upper Endoscopic Ultrasound, fine needle aspirate/biopsy;  Surgeon: Guru Mark Avila MD;  Location:  OR     ESOPHAGOSCOPY, GASTROSCOPY, DUODENOSCOPY (EGD), COMBINED N/A 11/3/2017    Procedure: COMBINED ESOPHAGOSCOPY, GASTROSCOPY, DUODENOSCOPY  (EGD);;  Surgeon: Yunior Esteban MD;  Location: [facility] OR     ESOPHAGOSCOPY, GASTROSCOPY, DUODENOSCOPY (EGD), COMBINED N/A 11/9/2017    Procedure: COMBINED ESOPHAGOSCOPY, GASTROSCOPY, DUODENOSCOPY (EGD);  Esophogastroduodenoscopy, take out pharangostomy tube;  Surgeon: Yunior Esteban MD;  Location: [facility] OR     ESOPHAGOSCOPY, GASTROSCOPY, DUODENOSCOPY (EGD), COMBINED N/A 1/11/2018    Procedure: COMBINED ESOPHAGOSCOPY, GASTROSCOPY, DUODENOSCOPY (EGD), BIOPSY SINGLE OR MULTIPLE;  COMBINED ESOPHAGOSCOPY, GASTROSCOPY, DUODENOSCOPY (EGD);  Surgeon: Alessandro Mcgregor MD;  Location: [facility] GI     GASTRECTOMY N/A 11/3/2017    Procedure: GASTRECTOMY;;  Surgeon: Yunior Esteban MD;  Location: [facility] OR     HAND SURGERY      childhood, torn tendon     INSERT PORT VASCULAR ACCESS Right 6/22/2017    Procedure: INSERT PORT VASCULAR ACCESS;  Single Lumen Chest Power Port;  Surgeon: Iván Driver PA-C;  [facility] OR     INSERT PORT VASCULAR ACCESS Right 4/8/2019    Procedure: Single Lumen Chest Port Placement;  Surgeon: Krytsen Ballard PA-C;  [facility] OR     IR CHEST PORT PLACEMENT > 5 YRS OF AGE  4/8/2019     IR LIVER BIOPSY PERCUTANEOUS  3/20/2019     LAPAROSCOPY DIAGNOSTIC (GENERAL) N/A 11/3/2017    Procedure: LAPAROSCOPY DIAGNOSTIC (GENERAL);  diagnostic laparoscopy, right chest tube, total gastrectomy with distal esophagectomy, intrathoracic eveline-y esophago-jejunostomy, feeding jejunostomy, pharyngostomy, esophagogastroduodenoscopy, flexible bronchoscopy;  Surgeon: Yunior Esteban MD;  Location: [facility] OR     LAPAROTOMY EXPLORATORY N/A 11/3/2017    Procedure: LAPAROTOMY EXPLORATORY;;  Surgeon: Yunior Esteban MD;  [facility]: [facility] OR     PHARYNGOSTOMY N/A 11/3/2017    Procedure: PHARYNGOSTOMY;;  Surgeon: Yunior Esteban MD;  Location: [facility] OR     REMOVE PORT VASCULAR ACCESS Right 3/19/2018    Procedure: REMOVE PORT VASCULAR ACCESS;  Right Port Removal;  Surgeon: Noble,  Krysten CHOU PA-C;  [facility]     THORACOTOMY Left 11/3/2017    Procedure: THORACOTOMY;;  Surgeon: Yunior Esteban MD;  Location:  OR         SOCIAL HISTORY:  Social History     Socioeconomic History     Marital status:      Spouse name: Not on file     Number of children: 1     Years of education: Not on file     Highest education level: Not on file   Occupational History     Occupation: musician and teacher    Social Needs     Financial resource strain: Not on file     Food insecurity:     Worry: Not on file     Inability: Not on file     Transportation needs:     Medical: Not on file     Non-medical: Not on file   Tobacco Use     Smoking status: Never Smoker     Smokeless tobacco: Never Used   Substance and Sexual Activity     Alcohol use: Yes     Comment: 1 beer daily     Drug use: Yes     Types: Marijuana     Comment: occasional     Sexual activity: Yes     Partners: Female     Birth control/protection: Natural Family Planning   Lifestyle     Physical activity:     Days per week: Not on file     Minutes per session: Not on file     Stress: Not on file   Relationships     Social connections:     Talks on phone: Not on file     Gets together: Not on file     Attends Sikh service: Not on file     Active member of club or organization: Not on file     Attends meetings of clubs or organizations: Not on file     Relationship status: Not on file     Intimate partner violence:     Fear of current or ex partner: Not on file     Emotionally abused: Not on file     Physically abused: Not on file     Forced sexual activity: Not on file   Other Topics Concern     Parent/sibling w/ CABG, MI or angioplasty before 65F 55M? 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  He denies smoking.  He drinks ~1 beer a day.  He denies illicit drug use, other than occasional marijuana.  He lives in [place]  with his wife, and 4.5 year-old daughter.  His wife is currently pregnant with a boy, and is due in 6 weeks.  He has a half sister who  of breast cancer at age 32; she was diagnosed in her late 20's.  A paternal grandmother had breast cancer in her 70's      FAMILY HISTORY:  Family History   Problem Relation Age of Onset     Other - See Comments Father         sepsis     Cancer Sister         breast cancer  31 yo 1/2 sister      Cancer Paternal Grandmother         breast         PHYSICAL EXAM:  Vital signs:  /71   Pulse 72   Temp 98.3  F (36.8  C) (Oral)   Resp 14   Wt 68.4 kg (150 lb 12.7 oz)   SpO2 99%   BMI 22.26 kg/m      ECO  GENERAL/CONSTITUTIONAL: No acute distress.     EYES: No scleral icterus.  RESPIRATORY: Clear to auscultation bilaterally.  CARDIOVASCULAR: Regular rate and rhythm.  GASTROINTESTINAL: Bowel sounds present.  Non-tender.  No distention.    MUSCULOSKELETAL: Warm and well-perfused, no cyanosis, clubbing, or edema.  NEUROLOGIC: Alert, oriented, answers questions appropriately.  INTEGUMENTARY: No jaundice.  Port in place at the right upper chest.      LABS:  None today.      PATHOLOGY:  3/20/19:  FINAL DIAGNOSIS:   Liver, needle biopsy   - Moderately differentiated adenocarcinoma consistent with metastasis from    primary esophageal adenocarcinoma.     HER-2 non-amplified.      IMAGING:  PET-CT 19:  IMPRESSION: Metastatic esophageal carcinoma with liver metastasis:  1. Liver metastatic lesion in the left hepatic lobe has significantly  decreased in size and metabolic activity as above. No new suspicious  lesion in the liver.  2. Previously seen hypermetabolic uptake in the left anterior  abdominal cavity have shifted to the lower pelvic cavity. The uptake  corresponds with adjacent small bowel in the lower pelvis. Consider CT  enterography for further evaluation as metastatic disease cannot be  rule out.          ASSESSMENT/PLAN:  Daniel Sandhu is a 39 year old male  with:    1) Adenocarcinoma of the GE junction: now s/p 3 cycles of neoadjuvant chemotherapy with EOX, followed by surgical resection on 11/3/17.  Pathology showed adenocarcinoma, moderate differentiated, extensive residual tumor with no evidence tumor regression, margins negative, perineural invasion present, 1 of 28 lymph nodes were involved with malignancy, stage [id number] (stage IIIA).  HER-2 is non-amplified.    He has received EOF x 2 cycles after surgery, and he has not tolerated well.  The 5-FU on cycle 5 was discontinued early. Chemotherapy was stopped at that point due to poor tolerance.    Patient now has biopsy-confirmed metastatic disease to the liver and possibly peritoneum.  He is asymptomatic currently.      [facility] testing shows MS-stable, TMB-low (4 mut/Mb), ERBB2 amplification equivocal, and TP53 H214R alteration.  PD-L1 was negative (0%).  We review the results together and possible clinical trials, and possible second opinion at [facility].      He has been tolerating chemotherapy with paclitaxel+ramucirumab very well so far.    PET-CT was done after 3 cycles of treatment, which shows response, with significantly decreased size and metabolic activity of the left hepatic lobe lesion.  No new suspicious lesion in the liver.  There previously seen hypermetabolic uptake in the left anterior abdominal cavity have shifted to the lower pelvic cavity.      We reviewed the PET-CT images together on the computer.  The liver lesion certainly looks better.  The hypermetabolic uptake in the pelvis remains a question.  We discussed  CT enterography, as radiology recommended, but patient is concerned about the amount of contrast he'd have to drink.  In addition, he is going to [facility] for a second opinion on Friday, 7/5/19.    -he will proceed with C4D1 of chemotherapy on 7/11/19; he wanted to hold this week due to holiday, and he is going to [facility] on 7/5/19.  -will plan to get another PET-CT  after 6 cycles of chemotherapy to evaluate response   -he will see ROMEO with cycle 5  -I will see him with cycle 6    I discussed additional support, including palliative care, , oncology psychology.  Daniel and his wife are understanding having difficulty coping and were teary today.  They opt to wait for now and may be open to additional support later on.      Patient has addition questions regarding liver directed treatment to the liver lesion.  We have discussed that the data on metastatectomy on esophageal/gastric tumors are scant, and appears to have no overall survival benefit.  I did discuss with Dr. Avalos, and he agrees that the data is poor.  However, if patient has stable disease for at least 6 months, and no evidence of peritoneal disease, then he may consider surgery or targeted treatment to the liver.  Daniel expressed understanding and satisfaction with this.  As above, he is going to [facility] for a second opinion later this week.    2) Genetics:  -genetic testing was negative    3) Fertility: Daniel and his wife have 2 children, including a baby boy just born around June 2017.  He is not planning for more children in the near future.    4) Patient talked about medical cannabis, and I offered to certify him, but he says that the program is too expensive for him.  He does use marijuana edibles or vapes marijuana, which is helpful.      5) Sinus drainage, occasional epistaxis, cough: He requested referral to ENT; he is seeing ENT on 7/26/19.      I spent a total of 40 minutes with the patient, with over >50% of the time in counseling and/or coordination of care.       Laurence Hood MD  Hematology/Oncology  [facility]

## 2019-07-11 ENCOUNTER — INFUSION THERAPY VISIT (OUTPATIENT)
Dept: ONCOLOGY | Facility: CLINIC | Age: 39
End: 2019-07-11
Attending: INTERNAL MEDICINE
Payer: COMMERCIAL

## 2019-07-11 ENCOUNTER — PRE VISIT (OUTPATIENT)
Dept: OTOLARYNGOLOGY | Facility: CLINIC | Age: 39
End: 2019-07-11

## 2019-07-11 VITALS
WEIGHT: 149.9 LBS | OXYGEN SATURATION: 99 % | BODY MASS INDEX: 22.13 KG/M2 | SYSTOLIC BLOOD PRESSURE: 111 MMHG | HEART RATE: 70 BPM | DIASTOLIC BLOOD PRESSURE: 60 MMHG | TEMPERATURE: 97.9 F | RESPIRATION RATE: 16 BRPM

## 2019-07-11 DIAGNOSIS — C15.5 MALIGNANT NEOPLASM OF LOWER THIRD OF ESOPHAGUS (H): Primary | ICD-10-CM

## 2019-07-11 LAB
ALBUMIN SERPL-MCNC: 3.4 G/DL (ref 3.4–5)
ALBUMIN UR-MCNC: NEGATIVE MG/DL
ALP SERPL-CCNC: 63 U/L (ref 40–150)
ALT SERPL W P-5'-P-CCNC: 25 U/L (ref 0–70)
AST SERPL W P-5'-P-CCNC: 20 U/L (ref 0–45)
BASOPHILS # BLD AUTO: 0 10E9/L (ref 0–0.2)
BASOPHILS NFR BLD AUTO: 0.6 %
BILIRUB DIRECT SERPL-MCNC: 0.1 MG/DL (ref 0–0.2)
BILIRUB SERPL-MCNC: 0.3 MG/DL (ref 0.2–1.3)
DIFFERENTIAL METHOD BLD: ABNORMAL
EOSINOPHIL # BLD AUTO: 0.1 10E9/L (ref 0–0.7)
EOSINOPHIL NFR BLD AUTO: 1.6 %
ERYTHROCYTE [DISTWIDTH] IN BLOOD BY AUTOMATED COUNT: 12.8 % (ref 10–15)
HCT VFR BLD AUTO: 36.2 % (ref 40–53)
HGB BLD-MCNC: 12.1 G/DL (ref 13.3–17.7)
IMM GRANULOCYTES # BLD: 0 10E9/L (ref 0–0.4)
IMM GRANULOCYTES NFR BLD: 0.6 %
LYMPHOCYTES # BLD AUTO: 1.1 10E9/L (ref 0.8–5.3)
LYMPHOCYTES NFR BLD AUTO: 22.7 %
MCH RBC QN AUTO: 30.6 PG (ref 26.5–33)
MCHC RBC AUTO-ENTMCNC: 33.4 G/DL (ref 31.5–36.5)
MCV RBC AUTO: 92 FL (ref 78–100)
MONOCYTES # BLD AUTO: 0.5 10E9/L (ref 0–1.3)
MONOCYTES NFR BLD AUTO: 10.5 %
NEUTROPHILS # BLD AUTO: 3.1 10E9/L (ref 1.6–8.3)
NEUTROPHILS NFR BLD AUTO: 64 %
NRBC # BLD AUTO: 0 10*3/UL
NRBC BLD AUTO-RTO: 0 /100
PLATELET # BLD AUTO: 161 10E9/L (ref 150–450)
PROT SERPL-MCNC: 6.6 G/DL (ref 6.8–8.8)
RBC # BLD AUTO: 3.95 10E12/L (ref 4.4–5.9)
WBC # BLD AUTO: 4.9 10E9/L (ref 4–11)

## 2019-07-11 PROCEDURE — 82378 CARCINOEMBRYONIC ANTIGEN: CPT | Performed by: INTERNAL MEDICINE

## 2019-07-11 PROCEDURE — 25000132 ZZH RX MED GY IP 250 OP 250 PS 637: Mod: ZF | Performed by: INTERNAL MEDICINE

## 2019-07-11 PROCEDURE — 25000128 H RX IP 250 OP 636: Mod: ZF | Performed by: INTERNAL MEDICINE

## 2019-07-11 PROCEDURE — 80076 HEPATIC FUNCTION PANEL: CPT | Performed by: INTERNAL MEDICINE

## 2019-07-11 PROCEDURE — 85025 COMPLETE CBC W/AUTO DIFF WBC: CPT | Performed by: INTERNAL MEDICINE

## 2019-07-11 PROCEDURE — 81003 URINALYSIS AUTO W/O SCOPE: CPT | Performed by: INTERNAL MEDICINE

## 2019-07-11 PROCEDURE — 25800030 ZZH RX IP 258 OP 636: Mod: ZF | Performed by: INTERNAL MEDICINE

## 2019-07-11 PROCEDURE — 25000125 ZZHC RX 250: Mod: ZF | Performed by: INTERNAL MEDICINE

## 2019-07-11 PROCEDURE — 96413 CHEMO IV INFUSION 1 HR: CPT

## 2019-07-11 PROCEDURE — 96375 TX/PRO/DX INJ NEW DRUG ADDON: CPT

## 2019-07-11 PROCEDURE — 96417 CHEMO IV INFUS EACH ADDL SEQ: CPT

## 2019-07-11 RX ORDER — DIPHENHYDRAMINE HCL 25 MG
25 CAPSULE ORAL ONCE
Status: COMPLETED | OUTPATIENT
Start: 2019-07-11 | End: 2019-07-11

## 2019-07-11 RX ORDER — HEPARIN SODIUM (PORCINE) LOCK FLUSH IV SOLN 100 UNIT/ML 100 UNIT/ML
5 SOLUTION INTRAVENOUS ONCE
Status: COMPLETED | OUTPATIENT
Start: 2019-07-11 | End: 2019-07-11

## 2019-07-11 RX ORDER — HEPARIN SODIUM (PORCINE) LOCK FLUSH IV SOLN 100 UNIT/ML 100 UNIT/ML
500 SOLUTION INTRAVENOUS ONCE
Status: COMPLETED | OUTPATIENT
Start: 2019-07-11 | End: 2019-07-11

## 2019-07-11 RX ADMIN — SODIUM CHLORIDE 250 ML: 9 INJECTION, SOLUTION INTRAVENOUS at 09:28

## 2019-07-11 RX ADMIN — FAMOTIDINE 20 MG: 10 INJECTION INTRAVENOUS at 09:28

## 2019-07-11 RX ADMIN — SODIUM CHLORIDE 500 MG: 9 INJECTION, SOLUTION INTRAVENOUS at 09:55

## 2019-07-11 RX ADMIN — DEXAMETHASONE SODIUM PHOSPHATE 20 MG: 10 INJECTION, SOLUTION INTRAMUSCULAR; INTRAVENOUS at 09:28

## 2019-07-11 RX ADMIN — HEPARIN 500 UNITS: 100 SYRINGE at 12:03

## 2019-07-11 RX ADMIN — HEPARIN 5 ML: 100 SYRINGE at 08:53

## 2019-07-11 RX ADMIN — DIPHENHYDRAMINE HYDROCHLORIDE 25 MG: 25 CAPSULE ORAL at 09:28

## 2019-07-11 RX ADMIN — PACLITAXEL 145 MG: 6 INJECTION, SOLUTION INTRAVENOUS at 10:58

## 2019-07-11 ASSESSMENT — PAIN SCALES - GENERAL: PAINLEVEL: NO PAIN (0)

## 2019-07-11 NOTE — PROGRESS NOTES
Infusion Nursing Note:  Daniel Sandhu presents for D1C4 Cyramza, Taxol    Note: pt feeling well today, no new issues or concerns to report. Has an ongoing dry/nonproductive cough, due to postnasal drip from a cold a couple weeks ago, pt states it has improved a lot.    Pain: katy    Treatment Conditions:  Lab Results   Component Value Date    HGB 12.1 07/11/2019     Lab Results   Component Value Date    WBC 4.9 07/11/2019      Lab Results   Component Value Date    ANEU 3.1 07/11/2019     Lab Results   Component Value Date     07/11/2019      Lab Results   Component Value Date     04/01/2019                   Lab Results   Component Value Date    POTASSIUM 4.2 04/01/2019           Lab Results   Component Value Date    MAG 2.2 03/07/2018            Lab Results   Component Value Date    CR 0.68 04/01/2019                   Lab Results   Component Value Date    YOBANY 9.3 04/01/2019                Lab Results   Component Value Date    BILITOTAL 0.3 07/11/2019           Lab Results   Component Value Date    ALBUMIN 3.4 07/11/2019                    Lab Results   Component Value Date    ALT 25 07/11/2019           Lab Results   Component Value Date    AST 20 07/11/2019       Results reviewed, labs MET treatment parameters, ok to proceed with treatment.  Urine protein: negative.    Intravenous Access:  Implanted Port.    Post Infusion Assessment:  Patient tolerated infusion without incident.  Blood return noted pre and post infusion.  No evidence of extravasations.  Access discontinued per protocol.    Discharge Plan:   Patient declined prescription refills.  Discharge instructions reviewed with: Patient.  Patient and/or family verbalized understanding of discharge instructions and all questions answered.  AVS to patient via Post-iT.  Patient will return 7/18 for next appointment.   Patient discharged in stable condition accompanied by: self.    Andie Bailey RN

## 2019-07-11 NOTE — PATIENT INSTRUCTIONS
Contact numbers:  Triage Main/After hours Line: [phone]    Main (scheduling) line: [phone]    Call with chills and/or temperature greater than or equal to 100.5 and questions or concerns.    If after hours, weekends, or holidays, call main hospital  at  [phone] and ask for Oncology doctor on call.         July 2019 Sunday Monday Tuesday Wednesday Thursday Friday Saturday        1    [facility] RETURN   9:00 AM   (30 min.)   Laurence Hood MD   [facility] 2     3     4     5     6       7     8     9     10     11    [facility]   8:30 AM   (15 min.)   [facility]   [facility] Draw    [facility] ONC INFUSION 180   9:00 AM   (180 min.)    ONCOLOGY INFUSION   [facility] 12     13       14     15     16     17     18    [facility] DRAW   8:30 AM   (15 min.)   [facility]   [facility] Draw    [facility] ONC INFUSION 180   9:00 AM   (180 min.)    ONCOLOGY INFUSION   [facility] 19     20       21     22     23     24     25    [facility] DRAW   8:30 AM   (15 min.)   [facility]   [facility] Draw    [facility] ONC INFUSION 180   9:00 AM   (180 min.)    ONCOLOGY INFUSION   [facility] 26    [facility] NEW  12:45 PM   (30 min.)   Rod Barrios MD   [facility] 27       28     29     30     31                                August 2019 Sunday Monday Tuesday Wednesday Thursday Friday Saturday                       1     2     3       4     5     6     7     8    [facility] DRAW   8:30 AM   (15 min.)   [facility]   [facility] Draw    [facility] ONC INFUSION 180   9:00 AM   (180 min.)    ONCOLOGY INFUSION   [facility] 9     10       11     12     13     14     15    [facility] DRAW   8:45 AM   (15 min.)   [facility]   [facility] Draw    [facility] RETURN   9:05 AM   (50 min.)    Alisa Otero PA-C   [facility] ONC INFUSION 180  10:30 AM   (180 min.)    ONCOLOGY INFUSION   [facility] 16     17       18     19     20     21     22    [facility] LAB DRAW   8:30 AM   (15 min.)   [facility] LAB DRAW   [facility] Lab Draw    [facility] ONC INFUSION 180   9:00 AM   (180 min.)    ONCOLOGY INFUSION   [facility] 23     24       25     26     27     28     29     30     31                     Lab Results:  Recent Results (from the past 12 hour(s))   CBC with platelets differential    Collection Time: 07/11/19  9:01 AM   Result Value Ref Range    WBC 4.9 4.0 - 11.0 10e9/L    RBC Count 3.95 (L) 4.4 - 5.9 10e12/L    Hemoglobin 12.1 (L) 13.3 - 17.7 g/dL    Hematocrit 36.2 (L) 40.0 - 53.0 %    MCV 92 78 - 100 fl    MCH 30.6 26.5 - 33.0 pg    MCHC 33.4 31.5 - 36.5 g/dL    RDW 12.8 10.0 - 15.0 %    Platelet Count 161 150 - 450 10e9/L    Diff Method Automated Method     % Neutrophils 64.0 %    % Lymphocytes 22.7 %    % Monocytes 10.5 %    % Eosinophils 1.6 %    % Basophils 0.6 %    % Immature Granulocytes 0.6 %    Nucleated RBCs 0 0 /100    Absolute Neutrophil 3.1 1.6 - 8.3 10e9/L    Absolute Lymphocytes 1.1 0.8 - 5.3 10e9/L    Absolute Monocytes 0.5 0.0 - 1.3 10e9/L    Absolute Eosinophils 0.1 0.0 - 0.7 10e9/L    Absolute Basophils 0.0 0.0 - 0.2 10e9/L    Abs Immature Granulocytes 0.0 0 - 0.4 10e9/L    Absolute Nucleated RBC 0.0    Hepatic panel    Collection Time: 07/11/19  9:01 AM   Result Value Ref Range    Bilirubin Direct 0.1 0.0 - 0.2 mg/dL    Bilirubin Total 0.3 0.2 - 1.3 mg/dL    Albumin 3.4 3.4 - 5.0 g/dL    Protein Total 6.6 (L) 6.8 - 8.8 g/dL    Alkaline Phosphatase 63 40 - 150 U/L    ALT 25 0 - 70 U/L    AST 20 0 - 45 U/L   Protein qualitative urine    Collection Time: 07/11/19  9:01 AM   Result Value Ref Range    Protein Albumin Urine Negative NEG^Negative mg/dL

## 2019-07-12 LAB — CEA SERPL-MCNC: <0.5 UG/L (ref 0–2.5)

## 2019-07-18 ENCOUNTER — INFUSION THERAPY VISIT (OUTPATIENT)
Dept: ONCOLOGY | Facility: CLINIC | Age: 39
End: 2019-07-18
Attending: INTERNAL MEDICINE
Payer: COMMERCIAL

## 2019-07-18 ENCOUNTER — APPOINTMENT (OUTPATIENT)
Dept: LAB | Facility: CLINIC | Age: 39
End: 2019-07-18
Attending: INTERNAL MEDICINE
Payer: COMMERCIAL

## 2019-07-18 VITALS
OXYGEN SATURATION: 98 % | RESPIRATION RATE: 16 BRPM | BODY MASS INDEX: 21.54 KG/M2 | HEART RATE: 82 BPM | DIASTOLIC BLOOD PRESSURE: 62 MMHG | SYSTOLIC BLOOD PRESSURE: 114 MMHG | WEIGHT: 145.9 LBS | TEMPERATURE: 98.5 F

## 2019-07-18 DIAGNOSIS — C15.5 MALIGNANT NEOPLASM OF LOWER THIRD OF ESOPHAGUS (H): Primary | ICD-10-CM

## 2019-07-18 LAB
BASOPHILS # BLD AUTO: 0.1 10E9/L (ref 0–0.2)
BASOPHILS NFR BLD AUTO: 1.3 %
DIFFERENTIAL METHOD BLD: ABNORMAL
EOSINOPHIL # BLD AUTO: 0.1 10E9/L (ref 0–0.7)
EOSINOPHIL NFR BLD AUTO: 2.9 %
ERYTHROCYTE [DISTWIDTH] IN BLOOD BY AUTOMATED COUNT: 12.4 % (ref 10–15)
HCT VFR BLD AUTO: 37.5 % (ref 40–53)
HGB BLD-MCNC: 12.4 G/DL (ref 13.3–17.7)
IMM GRANULOCYTES # BLD: 0 10E9/L (ref 0–0.4)
IMM GRANULOCYTES NFR BLD: 0.3 %
LYMPHOCYTES # BLD AUTO: 0.8 10E9/L (ref 0.8–5.3)
LYMPHOCYTES NFR BLD AUTO: 22 %
MCH RBC QN AUTO: 30.3 PG (ref 26.5–33)
MCHC RBC AUTO-ENTMCNC: 33.1 G/DL (ref 31.5–36.5)
MCV RBC AUTO: 92 FL (ref 78–100)
MONOCYTES # BLD AUTO: 0.3 10E9/L (ref 0–1.3)
MONOCYTES NFR BLD AUTO: 7.4 %
NEUTROPHILS # BLD AUTO: 2.5 10E9/L (ref 1.6–8.3)
NEUTROPHILS NFR BLD AUTO: 66.1 %
NRBC # BLD AUTO: 0 10*3/UL
NRBC BLD AUTO-RTO: 0 /100
PLATELET # BLD AUTO: 147 10E9/L (ref 150–450)
RBC # BLD AUTO: 4.09 10E12/L (ref 4.4–5.9)
WBC # BLD AUTO: 3.8 10E9/L (ref 4–11)

## 2019-07-18 PROCEDURE — 25000125 ZZHC RX 250: Mod: ZF | Performed by: INTERNAL MEDICINE

## 2019-07-18 PROCEDURE — 25000132 ZZH RX MED GY IP 250 OP 250 PS 637: Mod: ZF | Performed by: INTERNAL MEDICINE

## 2019-07-18 PROCEDURE — 25000128 H RX IP 250 OP 636: Mod: ZF | Performed by: INTERNAL MEDICINE

## 2019-07-18 PROCEDURE — 85025 COMPLETE CBC W/AUTO DIFF WBC: CPT | Performed by: INTERNAL MEDICINE

## 2019-07-18 PROCEDURE — 25800030 ZZH RX IP 258 OP 636: Mod: ZF | Performed by: INTERNAL MEDICINE

## 2019-07-18 PROCEDURE — 96375 TX/PRO/DX INJ NEW DRUG ADDON: CPT

## 2019-07-18 PROCEDURE — 96413 CHEMO IV INFUSION 1 HR: CPT

## 2019-07-18 RX ORDER — DIPHENHYDRAMINE HCL 25 MG
25 CAPSULE ORAL ONCE
Status: COMPLETED | OUTPATIENT
Start: 2019-07-18 | End: 2019-07-18

## 2019-07-18 RX ORDER — HEPARIN SODIUM (PORCINE) LOCK FLUSH IV SOLN 100 UNIT/ML 100 UNIT/ML
500 SOLUTION INTRAVENOUS ONCE
Status: COMPLETED | OUTPATIENT
Start: 2019-07-18 | End: 2019-07-18

## 2019-07-18 RX ORDER — HEPARIN SODIUM (PORCINE) LOCK FLUSH IV SOLN 100 UNIT/ML 100 UNIT/ML
5 SOLUTION INTRAVENOUS
Status: COMPLETED | OUTPATIENT
Start: 2019-07-18 | End: 2019-07-18

## 2019-07-18 RX ADMIN — SODIUM CHLORIDE 250 ML: 9 INJECTION, SOLUTION INTRAVENOUS at 09:10

## 2019-07-18 RX ADMIN — HEPARIN SODIUM (PORCINE) LOCK FLUSH IV SOLN 100 UNIT/ML 500 UNITS: 100 SOLUTION at 10:48

## 2019-07-18 RX ADMIN — DEXAMETHASONE SODIUM PHOSPHATE 20 MG: 10 INJECTION, SOLUTION INTRAMUSCULAR; INTRAVENOUS at 09:13

## 2019-07-18 RX ADMIN — PACLITAXEL 145 MG: 6 INJECTION, SOLUTION INTRAVENOUS at 09:41

## 2019-07-18 RX ADMIN — DIPHENHYDRAMINE HYDROCHLORIDE 25 MG: 25 CAPSULE ORAL at 09:08

## 2019-07-18 RX ADMIN — HEPARIN SODIUM (PORCINE) LOCK FLUSH IV SOLN 100 UNIT/ML 5 ML: 100 SOLUTION at 08:43

## 2019-07-18 RX ADMIN — FAMOTIDINE 20 MG: 10 INJECTION INTRAVENOUS at 09:11

## 2019-07-18 ASSESSMENT — PAIN SCALES - GENERAL: PAINLEVEL: NO PAIN (0)

## 2019-07-18 NOTE — PATIENT INSTRUCTIONS
Contact Numbers    [facility] [place]: [phone]  [facility] [place] and after hours / weekends / holidays:  [phone]      Please call the triage or after hours line if you experience a temperature greater than or equal to 100.5, shaking chills, have uncontrolled nausea, vomiting and/or diarrhea, dizziness, shortness of breath, chest pain, bleeding, unexplained bruising, or if you have any other new/concerning symptoms, questions or concerns.      If you are having any concerning symptoms or wish to speak to a provider before your next infusion visit, please call your care coordinator or triage to notify them so we can adequately serve you.     If you need a refill on a narcotic prescription or other medication, please call before your infusion appointment.           July 2019 Sunday Monday Tuesday Wednesday Thursday Friday Saturday        1    [facility] RETURN   9:00 AM   (30 min.)   Laurence Hood MD   [facility] 2     3     4     5     6       7     8     9     10     11    [facility]   8:30 AM   (15 min.)    [facility] DRAW   [facility] Lab Draw    [facility] ONC INFUSION 180   9:00 AM   (180 min.)    ONCOLOGY INFUSION   [facility] 12     13       14     15     16     17     18    [facility] D[facility]   8:30 AM   (15 min.)    [facility] DRAW   [facility] Lab Draw    [facility] ONC INFUSION 180   9:00 AM   (180 min.)    ONCOLOGY INFUSION   [facility] 19     20       21     22     23     24     25    [facility] DRAW   8:30 AM   (15 min.)    [facility] DRAW   [facility] Lab Draw    [facility] ONC INFUSION 180   9:00 AM   (180 min.)    ONCOLOGY INFUSION   [facility] 26    [facility] NEW  12:45 PM   (30 min.)   Rod Barrios MD   [facility] 27       28     29     30     31                                August 2019 Sunday Monday Tuesday Wednesday Thursday Friday Saturday                        1     2     3       4     5     6     7     8    [facility] DRAW   8:30 AM   (15 min.)    [facility] DRAW   [facility]    [facility] ONC INFUSION 180   9:00 AM   (180 min.)    ONCOLOGY INFUSION   [facility] 9     10       11     12     13     14     15    [facility] DRAW   8:45 AM   (15 min.)    [facility] DRAW   [facility] Lab Draw    [facility] RETURN   9:05 AM   (50 min.)   Alisa Otero PA-C   [facility]    [facility] ONC INFUSION 180  10:30 AM   (180 min.)    ONCOLOGY INFUSION   [facility] 16     17       18     19     20     21     22    [facility] DRAW   8:30 AM   (15 min.)    [facility] LAB DRAW   [facility] Lab Draw    [facility] ONC INFUSION 180   9:00 AM   (180 min.)    ONCOLOGY INFUSION   [facility] 23     24       25     26     27     28     29     30     31                    Recent Results (from the past 24 hour(s))   CBC with platelets differential    Collection Time: 07/18/19  8:49 AM   Result Value Ref Range    WBC 3.8 (L) 4.0 - 11.0 10e9/L    RBC Count 4.09 (L) 4.4 - 5.9 10e12/L    Hemoglobin 12.4 (L) 13.3 - 17.7 g/dL    Hematocrit 37.5 (L) 40.0 - 53.0 %    MCV 92 78 - 100 fl    MCH 30.3 26.5 - 33.0 pg    MCHC 33.1 31.5 - 36.5 g/dL    RDW 12.4 10.0 - 15.0 %    Platelet Count 147 (L) 150 - 450 10e9/L    Diff Method Automated Method     % Neutrophils 66.1 %    % Lymphocytes 22.0 %    % Monocytes 7.4 %    % Eosinophils 2.9 %    % Basophils 1.3 %    % Immature Granulocytes 0.3 %    Nucleated RBCs 0 0 /100    Absolute Neutrophil 2.5 1.6 - 8.3 10e9/L    Absolute Lymphocytes 0.8 0.8 - 5.3 10e9/L    Absolute Monocytes 0.3 0.0 - 1.3 10e9/L    Absolute Eosinophils 0.1 0.0 - 0.7 10e9/L    Absolute Basophils 0.1 0.0 - 0.2 10e9/L    Abs Immature Granulocytes 0.0 0 - 0.4 10e9/L    Absolute Nucleated RBC 0.0

## 2019-07-18 NOTE — NURSING NOTE
"Chief Complaint   Patient presents with     Port Draw     labs drawn from port by rn.  vs taken     Port accessed with 20 gauge 3/4\" gripper needle and labs drawn by rn.  Port flushed with NS and heparin.  Pt tolerated well.  VS taken.  Pt checked in for next appt.      Fidelia Massey RN    "

## 2019-07-18 NOTE — PROGRESS NOTES
Infusion Nursing Note:  Daniel Sandhu presents today for cycle 4, day 8 taxol.    Patient seen by provider today: No   present during visit today: Not Applicable.    Note: Patient states that his post nasal drip and cough are improving.  He is scheduled to see ENT in a couple of weeks.  He otherwise denies any complaints.    Intravenous Access:  Implanted Port.    Treatment Conditions:  Lab Results   Component Value Date    HGB 12.4 07/18/2019     Lab Results   Component Value Date    WBC 3.8 07/18/2019      Lab Results   Component Value Date    ANEU 2.5 07/18/2019     Lab Results   Component Value Date     07/18/2019      Results reviewed, labs MET treatment parameters, ok to proceed with treatment.      Post Infusion Assessment:  Patient tolerated infusion without incident.  Blood return noted pre and post infusion.  Site patent and intact, free from redness, edema or discomfort.  No evidence of extravasations.  Access discontinued per protocol.       Discharge Plan:   Patient declined prescription refills.  Discharge instructions reviewed with: Patient.  Patient and/or family verbalized understanding of discharge instructions and all questions answered.  AVS to patient via [employer].  Patient will return 7/25 for next appointment.   Patient discharged in stable condition accompanied by: self.  Departure Mode: Ambulatory.  Face to Face time: 0.    Tricia Romero RN    Per patient request, Dr Hood messaged to see if benadryl can be removed from taxol premed in the future as he has never had a reaction.  Pharmacy recommended leaving it as a premed on the days he receives cyramza.

## 2019-07-25 ENCOUNTER — APPOINTMENT (OUTPATIENT)
Dept: LAB | Facility: CLINIC | Age: 39
End: 2019-07-25
Attending: INTERNAL MEDICINE
Payer: COMMERCIAL

## 2019-07-25 ENCOUNTER — INFUSION THERAPY VISIT (OUTPATIENT)
Dept: ONCOLOGY | Facility: CLINIC | Age: 39
End: 2019-07-25
Attending: INTERNAL MEDICINE
Payer: COMMERCIAL

## 2019-07-25 VITALS
SYSTOLIC BLOOD PRESSURE: 112 MMHG | DIASTOLIC BLOOD PRESSURE: 72 MMHG | BODY MASS INDEX: 21.55 KG/M2 | OXYGEN SATURATION: 97 % | RESPIRATION RATE: 16 BRPM | HEART RATE: 75 BPM | TEMPERATURE: 98.1 F | WEIGHT: 146 LBS

## 2019-07-25 DIAGNOSIS — C15.5 MALIGNANT NEOPLASM OF LOWER THIRD OF ESOPHAGUS (H): Primary | ICD-10-CM

## 2019-07-25 LAB
ALBUMIN UR-MCNC: NEGATIVE MG/DL
BASOPHILS # BLD AUTO: 0 10E9/L (ref 0–0.2)
BASOPHILS NFR BLD AUTO: 0.8 %
DIFFERENTIAL METHOD BLD: ABNORMAL
EOSINOPHIL # BLD AUTO: 0.1 10E9/L (ref 0–0.7)
EOSINOPHIL NFR BLD AUTO: 3.6 %
ERYTHROCYTE [DISTWIDTH] IN BLOOD BY AUTOMATED COUNT: 12.4 % (ref 10–15)
HCT VFR BLD AUTO: 36.1 % (ref 40–53)
HGB BLD-MCNC: 12.1 G/DL (ref 13.3–17.7)
IMM GRANULOCYTES # BLD: 0 10E9/L (ref 0–0.4)
IMM GRANULOCYTES NFR BLD: 0 %
LYMPHOCYTES # BLD AUTO: 0.8 10E9/L (ref 0.8–5.3)
LYMPHOCYTES NFR BLD AUTO: 33.9 %
MCH RBC QN AUTO: 30.6 PG (ref 26.5–33)
MCHC RBC AUTO-ENTMCNC: 33.5 G/DL (ref 31.5–36.5)
MCV RBC AUTO: 91 FL (ref 78–100)
MONOCYTES # BLD AUTO: 0.2 10E9/L (ref 0–1.3)
MONOCYTES NFR BLD AUTO: 8.1 %
NEUTROPHILS # BLD AUTO: 1.3 10E9/L (ref 1.6–8.3)
NEUTROPHILS NFR BLD AUTO: 53.6 %
NRBC # BLD AUTO: 0 10*3/UL
NRBC BLD AUTO-RTO: 0 /100
PLATELET # BLD AUTO: 125 10E9/L (ref 150–450)
RBC # BLD AUTO: 3.95 10E12/L (ref 4.4–5.9)
WBC # BLD AUTO: 2.5 10E9/L (ref 4–11)

## 2019-07-25 PROCEDURE — G0463 HOSPITAL OUTPT CLINIC VISIT: HCPCS | Mod: 25

## 2019-07-25 PROCEDURE — 96413 CHEMO IV INFUSION 1 HR: CPT

## 2019-07-25 PROCEDURE — 25000128 H RX IP 250 OP 636: Mod: ZF | Performed by: INTERNAL MEDICINE

## 2019-07-25 PROCEDURE — 81003 URINALYSIS AUTO W/O SCOPE: CPT | Performed by: INTERNAL MEDICINE

## 2019-07-25 PROCEDURE — 25000125 ZZHC RX 250: Mod: ZF | Performed by: INTERNAL MEDICINE

## 2019-07-25 PROCEDURE — 25800030 ZZH RX IP 258 OP 636: Mod: ZF | Performed by: INTERNAL MEDICINE

## 2019-07-25 PROCEDURE — 96375 TX/PRO/DX INJ NEW DRUG ADDON: CPT

## 2019-07-25 PROCEDURE — 85025 COMPLETE CBC W/AUTO DIFF WBC: CPT | Performed by: INTERNAL MEDICINE

## 2019-07-25 PROCEDURE — 96417 CHEMO IV INFUS EACH ADDL SEQ: CPT

## 2019-07-25 PROCEDURE — 25000132 ZZH RX MED GY IP 250 OP 250 PS 637: Mod: ZF | Performed by: INTERNAL MEDICINE

## 2019-07-25 RX ORDER — HEPARIN SODIUM (PORCINE) LOCK FLUSH IV SOLN 100 UNIT/ML 100 UNIT/ML
5 SOLUTION INTRAVENOUS ONCE
Status: COMPLETED | OUTPATIENT
Start: 2019-07-25 | End: 2019-07-25

## 2019-07-25 RX ORDER — DIPHENHYDRAMINE HCL 25 MG
25 CAPSULE ORAL ONCE
Status: COMPLETED | OUTPATIENT
Start: 2019-07-25 | End: 2019-07-25

## 2019-07-25 RX ORDER — HEPARIN SODIUM (PORCINE) LOCK FLUSH IV SOLN 100 UNIT/ML 100 UNIT/ML
500 SOLUTION INTRAVENOUS ONCE
Status: COMPLETED | OUTPATIENT
Start: 2019-07-25 | End: 2019-07-25

## 2019-07-25 RX ADMIN — HEPARIN 500 UNITS: 100 SYRINGE at 12:43

## 2019-07-25 RX ADMIN — DEXAMETHASONE SODIUM PHOSPHATE 20 MG: 10 INJECTION, SOLUTION INTRAMUSCULAR; INTRAVENOUS at 09:42

## 2019-07-25 RX ADMIN — DIPHENHYDRAMINE HYDROCHLORIDE 25 MG: 25 CAPSULE ORAL at 09:33

## 2019-07-25 RX ADMIN — SODIUM CHLORIDE 250 ML: 9 INJECTION, SOLUTION INTRAVENOUS at 09:33

## 2019-07-25 RX ADMIN — SODIUM CHLORIDE 500 MG: 9 INJECTION, SOLUTION INTRAVENOUS at 10:23

## 2019-07-25 RX ADMIN — PACLITAXEL 145 MG: 6 INJECTION, SOLUTION INTRAVENOUS at 11:41

## 2019-07-25 RX ADMIN — HEPARIN 5 ML: 100 SYRINGE at 08:40

## 2019-07-25 RX ADMIN — FAMOTIDINE 20 MG: 10 INJECTION INTRAVENOUS at 09:33

## 2019-07-25 ASSESSMENT — PAIN SCALES - GENERAL: PAINLEVEL: NO PAIN (0)

## 2019-07-25 NOTE — PROGRESS NOTES
Infusion Nursing Note:  Daniel Sandhu presents today for Cycle 4 Day 15 of Cyramza and Taxol.    Patient seen by provider today: No   present during visit today: Not Applicable.    Note: Patient reports feeling well today. He continues to have some post-nasal drip, but is seeing an ENT doctor tomorrow for it. Reports his non-productive cough is not new and continues to improve. No other new issues or concerns today.       At the end of infusion, after deaccessing needle, patient reported feeling warmer and flush in his torso, arms, and legs. Vitals stable. Denies any shortness of breath, itching, and no hives/flushing seen on patient's body. Patient monitored and given ice packs. After about 5 minutes patient reported feeling cooler and back to normal. Patient discharged and heading to the ED to meet his wife who is currently in the emergency room. Patient reinforced to tell a nurse there if he notices a rash on his skin, shortness of breath, itching, difficulty swallowing, or flushing in his face or body. Patient verbalized understanding.     Intravenous Access:  Implanted Port.    Treatment Conditions:  Lab Results   Component Value Date    HGB 12.1 07/25/2019     Lab Results   Component Value Date    WBC 2.5 07/25/2019      Lab Results   Component Value Date    ANEU 1.3 07/25/2019     Lab Results   Component Value Date     07/25/2019      Lab Results   Component Value Date     04/01/2019                   Lab Results   Component Value Date    POTASSIUM 4.2 04/01/2019           Lab Results   Component Value Date    MAG 2.2 03/07/2018            Lab Results   Component Value Date    CR 0.68 04/01/2019                   Lab Results   Component Value Date    YOBANY 9.3 04/01/2019                Lab Results   Component Value Date    BILITOTAL 0.3 07/11/2019           Lab Results   Component Value Date    ALBUMIN 3.4 07/11/2019                    Lab Results   Component Value Date    ALT 25  07/11/2019           Lab Results   Component Value Date    AST 20 07/11/2019       Results reviewed, labs did NOT meet treatment parameters: ANC<1.5 (see TORB in treatment plan).  Urine protein negative.      Post Infusion Assessment:  Patient tolerated infusion without incident.  Blood return noted pre and post infusion.  Site patent and intact, free from redness, edema or discomfort.  No evidence of extravasations.  Access discontinued per protocol.       Discharge Plan:   Patient declined prescription refills.  Discharge instructions reviewed with: Patient.  Patient and/or family verbalized understanding of discharge instructions and all questions answered.  AVS to patient via [employer].  Patient will return 8/8/19 for next appointment.   Patient discharged in stable condition accompanied by: self.  Departure Mode: Ambulatory.  Face to Face time: 5 minutes.    Neva Lewis RN

## 2019-07-25 NOTE — PATIENT INSTRUCTIONS
Contact Numbers  [facility]: [phone]        Please call the [facility] Triage line if you experience a temperature greater than or equal to 100.5, shaking chills, have uncontrolled nausea, vomiting and/or diarrhea, dizziness, shortness of breath, chest pain, bleeding, unexplained bruising, or if you have any other new/concerning symptoms, questions or concerns.     If it is after hours, weekends, or holidays, please call the main hospital  at  [phone] and ask to speak to the Oncology doctor on call.     If you are having any concerning symptoms or wish to speak to a provider before your next infusion visit, please call your care coordinator or triage to notify them so we can adequately serve you.     If you need a refill on a narcotic prescription or other medication, please call triage before your infusion appointment.       July 2019 Sunday Monday Tuesday Wednesday Thursday Friday Saturday        1    [facility] RETURN   9:00 AM   (30 min.)   Laurence Hood MD   [facility] 2     3     4     5     6       7     8     9     10     11    [facility]   8:30 AM   (15 min.)    [facility] DRAW   [facility] Lab Draw    [facility] ONC INFUSION 180   9:00 AM   (180 min.)    ONCOLOGY INFUSION   [facility] 12     13       14     15     16     17     18    [facility] DRAW   8:30 AM   (15 min.)    [facility] DRAW   [facility] Lab Draw    [facility] ONC INFUSION 180   9:00 AM   (180 min.)    ONCOLOGY INFUSION   [facility] 19     20       21     22     23     24     25    [facility] DRAW   8:30 AM   (15 min.)    [facility] DRAW   [facility] Lab Draw    [facility] ONC INFUSION 180   9:00 AM   (180 min.)    ONCOLOGY INFUSION   [facility] 26    [facility] NEW  12:45 PM   (30 min.)   Rod Barrios MD   [facility]ose [facility] Thro[facility] 27       28     29     30     31                                 August 2019 Sunday Monday Tuesday Wednesday Thursday Friday Saturday                       1     2     3       4     5     6     7     8    [facility] DRAW   8:30 AM   (15 min.)    [facility] LAB DRAW   [facility] Draw    [facility] ONC INFUSION 180   9:00 AM   (180 min.)   [facility] ONCOLOGY INFUSION   [facility] 9     10       11     12     13     14     15    [facility] DRAW   8:45 AM   (15 min.)    [facility] LAB DRAW   [facility] Lab Draw    [facility] RETURN   9:05 AM   (50 min.)   Alisa Otero PA-C   [facility]    [facility] ONC INFUSION 180  10:30 AM   (180 min.)    ONCOLOGY INFUSION   [facility] 16     17       18     19     20     21     22    [facility] DRAW   8:30 AM   (15 min.)    [facility] LAB DRAW   [facility] Lab Draw    [facility] ONC INFUSION 180   9:00 AM   (180 min.)    ONCOLOGY INFUSION   [facility] 23     24       25     26     27     28     29     30     31                     Lab Results:  Recent Results (from the past 12 hour(s))   CBC with platelets differential    Collection Time: 07/25/19  8:46 AM   Result Value Ref Range    WBC 2.5 (L) 4.0 - 11.0 10e9/L    RBC Count 3.95 (L) 4.4 - 5.9 10e12/L    Hemoglobin 12.1 (L) 13.3 - 17.7 g/dL    Hematocrit 36.1 (L) 40.0 - 53.0 %    MCV 91 78 - 100 fl    MCH 30.6 26.5 - 33.0 pg    MCHC 33.5 31.5 - 36.5 g/dL    RDW 12.4 10.0 - 15.0 %    Platelet Count 125 (L) 150 - 450 10e9/L    Diff Method Automated Method     % Neutrophils 53.6 %    % Lymphocytes 33.9 %    % Monocytes 8.1 %    % Eosinophils 3.6 %    % Basophils 0.8 %    % Immature Granulocytes 0.0 %    Nucleated RBCs 0 0 /100    Absolute Neutrophil 1.3 (L) 1.6 - 8.3 10e9/L    Absolute Lymphocytes 0.8 0.8 - 5.3 10e9/L    Absolute Monocytes 0.2 0.0 - 1.3 10e9/L    Absolute Eosinophils 0.1 0.0 - 0.7 10e9/L    Absolute Basophils 0.0 0.0 - 0.2 10e9/L    Abs Immature Granulocytes 0.0 0 - 0.4  10e9/L    Absolute Nucleated RBC 0.0    Protein qualitative urine    Collection Time: 07/25/19  8:46 AM   Result Value Ref Range    Protein Albumin Urine Negative NEG^Negative mg/dL

## 2019-07-26 ENCOUNTER — OFFICE VISIT (OUTPATIENT)
Dept: OTOLARYNGOLOGY | Facility: CLINIC | Age: 39
End: 2019-07-26
Attending: PHYSICIAN ASSISTANT
Payer: COMMERCIAL

## 2019-07-26 VITALS
DIASTOLIC BLOOD PRESSURE: 66 MMHG | HEART RATE: 84 BPM | BODY MASS INDEX: 21.77 KG/M2 | HEIGHT: 69 IN | SYSTOLIC BLOOD PRESSURE: 110 MMHG | WEIGHT: 147 LBS

## 2019-07-26 DIAGNOSIS — H93.233 HYPERACUSIS OF BOTH EARS: ICD-10-CM

## 2019-07-26 DIAGNOSIS — R04.0 EPISTAXIS: Primary | ICD-10-CM

## 2019-07-26 RX ORDER — EUCALYPTUS/PEPPERMINT OIL
SOLUTION, NON-ORAL NASAL
Qty: 10 ML | Refills: 3 | Status: SHIPPED | OUTPATIENT
Start: 2019-07-26 | End: 2019-11-13

## 2019-07-26 RX ORDER — MUPIROCIN 20 MG/G
OINTMENT TOPICAL
Qty: 2 G | Refills: 3 | Status: SHIPPED | OUTPATIENT
Start: 2019-07-26

## 2019-07-26 ASSESSMENT — MIFFLIN-ST. JEOR: SCORE: 1577.17

## 2019-07-26 NOTE — PROGRESS NOTES
The patient presents with a history of nasal bleeding intermittently but primarily severe dryness of the nose. 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 He is interested in molded ear plugs. The patient denies sinusitis, rhinitis, facial pain, nasal obstruction or purulent nasal discharge. The patient denies chronic or recurrent tonsillitis, chronic or recurrent pharyngitis. The patient denies otalgia, otorrhea, eustachian tube dysfunction, ear infections, dizziness or tinnitus.     This patient is seen in consultation at the request of Violeta Coto.    All other systems were reviewed and they are either negative or they are not directly pertinent to this Otolaryngology examination.      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) 6/5/2017       Past Surgical History:    Past Surgical History:   Procedure Laterality Date     ESOPHAGOGASTRODUODENOSCOPY       ESOPHAGOSCOPY, GASTROSCOPY, DUODENOSCOPY (EGD), COMBINED N/A 6/9/2017    Procedure: COMBINED ENDOSCOPIC ULTRASOUND, ESOPHAGOSCOPY, GASTROSCOPY, DUODENOSCOPY (EGD), FINE NEEDLE ASPIRATE/BIOPSY;  Upper Endoscopic Ultrasound, fine needle aspirate/biopsy;  Surgeon: Guru Mark Avila MD;  [facility]: [facility] OR     ESOPHAGOSCOPY, GASTROSCOPY, DUODENOSCOPY (EGD), COMBINED N/A 11/3/2017    Procedure: COMBINED ESOPHAGOSCOPY, GASTROSCOPY, DUODENOSCOPY (EGD);;  Surgeon: Yunior Esteban MD;  Location: [facility] OR     ESOPHAGOSCOPY, GASTROSCOPY, DUODENOSCOPY (EGD), COMBINED N/A 11/9/2017    Procedure: COMBINED ESOPHAGOSCOPY, GASTROSCOPY, DUODENOSCOPY (EGD);  Esophogastroduodenoscopy, take out pharangostomy tube;  Surgeon: Yunior Esteban MD;  Location: [facility] OR     ESOPHAGOSCOPY, GASTROSCOPY, DUODENOSCOPY (EGD), COMBINED N/A 1/11/2018    Procedure: COMBINED ESOPHAGOSCOPY, GASTROSCOPY, DUODENOSCOPY (EGD), BIOPSY SINGLE OR MULTIPLE;  COMBINED  ESOPHAGOSCOPY, GASTROSCOPY, DUODENOSCOPY (EGD);  Surgeon: Alessandro Mcgregor MD;  Location: [facility] GI     GASTRECTOMY N/A 11/3/2017    Procedure: GASTRECTOMY;;  Surgeon: Yunior Esteban MD;  Location: [facility] OR     HAND SURGERY      childhood, torn tendon     INSERT PORT VASCULAR ACCESS Right 6/22/2017    Procedure: INSERT PORT VASCULAR ACCESS;  Single Lumen Chest Power Port;  Surgeon: Iván Driver PA-C;  [facility]     INSERT PORT VASCULAR ACCESS Right 4/8/2019    Procedure: Single Lumen Chest Port Placement;  Surgeon: Krysten Ballard PA-C;  [facility] OR     IR CHEST PORT PLACEMENT > 5 YRS OF AGE  4/8/2019     IR LIVER BIOPSY PERCUTANEOUS  3/20/2019     LAPAROSCOPY DIAGNOSTIC (GENERAL) N/A 11/3/2017    Procedure: LAPAROSCOPY DIAGNOSTIC (GENERAL);  diagnostic laparoscopy, right chest tube, total gastrectomy with distal esophagectomy, intrathoracic eveline-y esophago-jejunostomy, feeding jejunostomy, pharyngostomy, esophagogastroduodenoscopy, flexible bronchoscopy;  Surgeon: Yunior Esteban MD;  Location: [facility] OR     LAPAROTOMY EXPLORATORY N/A 11/3/2017    Procedure: LAPAROTOMY EXPLORATORY;;  Surgeon: Yunior Esteban MD;  Location: [facility] OR     PHARYNGOSTOMY N/A 11/3/2017    Procedure: PHARYNGOSTOMY;;  Surgeon: Yunior Esteban MD;  Location: [facility] OR     REMOVE PORT VASCULAR ACCESS Right 3/19/2018    Procedure: REMOVE PORT VASCULAR ACCESS;  Right Port Removal;  Surgeon: Krysten Hopper PA-C;  [facility]     THORACOTOMY Left 11/3/2017    Procedure: THORACOTOMY;;  Surgeon: Yunior Esteban MD;  Location: [facility] OR       Medications:      Current Outpatient Medications:      cyabnocobalamin (VITAMIN B-12) 2500 MCG sublingual tablet, Place 2,500 mcg under the tongue daily, Disp: 30 tablet, Rfl: 1     ferrous gluconate (FERGON) 324 (38 Fe) MG tablet, Take 324 mg by mouth daily (with breakfast), Disp: , Rfl:      LORazepam (ATIVAN) 0.5 MG tablet, Take 1 tablet (0.5 mg) by  mouth every 4 hours as needed (Anxiety, Nausea/Vomiting or Sleep) (Patient not taking: Reported on 6/6/2019), Disp: 30 tablet, Rfl: 2     multivitamin, therapeutic with minerals (MULTI-VITAMIN) TABS tablet, Take 1 tablet by mouth daily Generic [place] Vitamin, Disp: , Rfl:      prochlorperazine (COMPAZINE) 10 MG tablet, Take 1 tablet (10 mg) by mouth every 6 hours as needed (Nausea/Vomiting) (Patient not taking: Reported on 5/6/2019), Disp: 30 tablet, Rfl: 2     saccharomyces boulardii (FLORASTOR) 250 MG capsule, Take 250 mg by mouth 2 times daily, Disp: , Rfl:     Allergies:    Patient has no known allergies.    Physical Examination:    The patient is a well developed, well nourished male in no apparent distress.  He is normocephalic, atraumatic with pupils equally round and reactive to light.    Oral Cavity Examination: Normal mucosa with no masses or lesions  Nasal Examination: No active bleeding in either nostril.  No masses or lesions.  Ear Examination:  Ears canals clear, tympanic membranes and middle ear spaces normal,  Neurological Examination: Facial nerve function intact and symmetric  Integumentary Examination:  No lesions on the skin of the head and neck  Neck Examination: No masses or lesions, no lympadenopathy  Endocrine Examination:  Normal thyroid examination  Flexible Fiberoptic Laryngoscopy:  Normal nasopharynx, base of tongue, pyriform sinuses, epiglottis, valleculae, false vocal cords, true vocal cords, and larynx.  Normal motion of the vocal cords with no lesions, masses, nodules, or polyps bilaterally.     Assessment and Plan:    The patient presents with a history of nasal epistaxis. The patient and I have discussed medical option of therapy with the use of Ponaris Nasal Drops, Bacitracin Ointment and other moisturization efforts.  The patient would like to proceed with medical therapy at this time.  We will see the patient again in four weeks to assess his progress with medical treatment.  He will visit with Dr. Claude Boyle or Dr. Kaia Fontana for his VPI. He will be referred to audiology for evaluation for molded ear plugs.       PreOp Diagnosis:  Velopalatal Insufficiency    PostOp Diagnosis: Velopalatal Insufficiency    Procedure: Flexible Fiberoptic Laryngoscopy    Estimated Blood Loss: None    Complications: None    Consent: The patient was informed of the possible complications and probable outcomes of the procedure and the patient gave informed consent.    Fluids: None    Drains: None    Disposition: to home    Findings: Normal nasopharynx, base of tongue, pyriform sinuses, epiglottis, valleculae, false vocal cords, true vocal cords, and larynx.  Normal motion of the vocal cords with no lesions, masses, nodules, or polyps bilaterally.     Description of Procedure:    The patient was positioned on the clinic room chair. The flexible fiberoptic scope was passed the oral cavity and the nasopharynx and oropharynx were evaluated and found to be normal.  The base of tongue and tonsil tissues were visualized and found to be normal. The vocal cords and larynx were visualized and the true vocal cords were visualized and found to be normal.       CC: Dr. Violeta Coto

## 2019-07-26 NOTE — NURSING NOTE
"Chief Complaint   Patient presents with     Consult     Epistaxis, stress VPI, throat irritation, ear plugs     Pulse 84   Ht 1.753 m (5' 9\")   Wt 66.7 kg (147 lb)   BMI 21.71 kg/m      Donna Vaughan, EMT  "

## 2019-07-26 NOTE — LETTER
7/26/2019       RE: Daniel Sandhu  [address] [facility] [address]     Dear Colleague,    Thank you for referring your patient, Daniel Sandhu, to the [facility] EAR NOSE AND THROAT at [facility]. Please see a copy of my visit note below.    The patient presents with a history of nasal bleeding intermittently but primarily severe dryness of the nose. 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 He is interested in molded ear plugs. The patient denies sinusitis, rhinitis, facial pain, nasal obstruction or purulent nasal discharge. The patient denies chronic or recurrent tonsillitis, chronic or recurrent pharyngitis. The patient denies otalgia, otorrhea, eustachian tube dysfunction, ear infections, dizziness or tinnitus.     This patient is seen in consultation at the request of Violeta Coto.    All other systems were reviewed and they are either negative or they are not directly pertinent to this Otolaryngology examination.      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) 6/5/2017       Past Surgical History:    Past Surgical History:   Procedure Laterality Date     ESOPHAGOGASTRODUODENOSCOPY       ESOPHAGOSCOPY, GASTROSCOPY, DUODENOSCOPY (EGD), COMBINED N/A 6/9/2017    Procedure: COMBINED ENDOSCOPIC ULTRASOUND, ESOPHAGOSCOPY, GASTROSCOPY, DUODENOSCOPY (EGD), FINE NEEDLE ASPIRATE/BIOPSY;  Upper Endoscopic Ultrasound, fine needle aspirate/biopsy;  Surgeon: Guru Mark Aivla MD;  Location: [facility] OR     ESOPHAGOSCOPY, GASTROSCOPY, DUODENOSCOPY (EGD), COMBINED N/A 11/3/2017    Procedure: COMBINED ESOPHAGOSCOPY, GASTROSCOPY, DUODENOSCOPY (EGD);;  Surgeon: Yunior Esteban MD;  Location: [facility] OR     ESOPHAGOSCOPY, GASTROSCOPY, DUODENOSCOPY (EGD), COMBINED N/A 11/9/2017    Procedure: COMBINED ESOPHAGOSCOPY, GASTROSCOPY, DUODENOSCOPY (EGD);   Esophogastroduodenoscopy, take out pharangostomy tube;  Surgeon: Yunior Esteban MD;  Location: [facility] OR     ESOPHAGOSCOPY, GASTROSCOPY, DUODENOSCOPY (EGD), COMBINED N/A 1/11/2018    Procedure: COMBINED ESOPHAGOSCOPY, GASTROSCOPY, DUODENOSCOPY (EGD), BIOPSY SINGLE OR MULTIPLE;  COMBINED ESOPHAGOSCOPY, GASTROSCOPY, DUODENOSCOPY (EGD);  Surgeon: Alessandro Mcgregor MD;  Location: [facility] GI     GASTRECTOMY N/A 11/3/2017    Procedure: GASTRECTOMY;;  Surgeon: Yunior Esteban MD;  Location: [facility] OR     HAND SURGERY      childhood, torn tendon     INSERT PORT VASCULAR ACCESS Right 6/22/2017    Procedure: INSERT PORT VASCULAR ACCESS;  Single Lumen Chest Power Port;  Surgeon: Iván Driver PA-C;  [facility]     INSERT PORT VASCULAR ACCESS Right 4/8/2019    Procedure: Single Lumen Chest Port Placement;  Surgeon: Krysten Ballard PA-C;  [facility] OR     IR CHEST PORT PLACEMENT > 5 YRS OF AGE  4/8/2019     IR LIVER BIOPSY PERCUTANEOUS  3/20/2019     LAPAROSCOPY DIAGNOSTIC (GENERAL) N/A 11/3/2017    Procedure: LAPAROSCOPY DIAGNOSTIC (GENERAL);  diagnostic laparoscopy, right chest tube, total gastrectomy with distal esophagectomy, intrathoracic eveline-y esophago-jejunostomy, feeding jejunostomy, pharyngostomy, esophagogastroduodenoscopy, flexible bronchoscopy;  Surgeon: Yunior Esteban MD;  Location: [facility] OR     LAPAROTOMY EXPLORATORY N/A 11/3/2017    Procedure: LAPAROTOMY EXPLORATORY;;  Surgeon: Yunior Esteban MD;  [facility]     PHARYNGOSTOMY N/A 11/3/2017    Procedure: PHARYNGOSTOMY;;  Surgeon: Yunior Esteban MD;  [facility]     REMOVE PORT VASCULAR ACCESS Right 3/19/2018    Procedure: REMOVE PORT VASCULAR ACCESS;  Right Port Removal;  Surgeon: Krysten Hopper PA-C;  [facility]     THORACOTOMY Left 11/3/2017    Procedure: THORACOTOMY;;  Surgeon: Yunior Esteban MD;  Location: [facility] OR       Medications:      Current Outpatient Medications:       cyabnocobalamin (VITAMIN B-12) 2500 MCG sublingual tablet, Place 2,500 mcg under the tongue daily, Disp: 30 tablet, Rfl: 1     ferrous gluconate (FERGON) 324 (38 Fe) MG tablet, Take 324 mg by mouth daily (with breakfast), Disp: , Rfl:      LORazepam (ATIVAN) 0.5 MG tablet, Take 1 tablet (0.5 mg) by mouth every 4 hours as needed (Anxiety, Nausea/Vomiting or Sleep) (Patient not taking: Reported on 6/6/2019), Disp: 30 tablet, Rfl: 2     multivitamin, therapeutic with minerals (MULTI-VITAMIN) TABS tablet, Take 1 tablet by mouth daily Generic [place] Vitamin, Disp: , Rfl:      prochlorperazine (COMPAZINE) 10 MG tablet, Take 1 tablet (10 mg) by mouth every 6 hours as needed (Nausea/Vomiting) (Patient not taking: Reported on 5/6/2019), Disp: 30 tablet, Rfl: 2     saccharomyces boulardii (FLORASTOR) 250 MG capsule, Take 250 mg by mouth 2 times daily, Disp: , Rfl:     Allergies:    Patient has no known allergies.    Physical Examination:    The patient is a well developed, well nourished male in no apparent distress.  He is normocephalic, atraumatic with pupils equally round and reactive to light.    Oral Cavity Examination: Normal mucosa with no masses or lesions  Nasal Examination: No active bleeding in either nostril.  No masses or lesions.  Ear Examination:  Ears canals clear, tympanic membranes and middle ear spaces normal,  Neurological Examination: Facial nerve function intact and symmetric  Integumentary Examination:  No lesions on the skin of the head and neck  Neck Examination: No masses or lesions, no lympadenopathy  Endocrine Examination:  Normal thyroid examination  Flexible Fiberoptic Laryngoscopy:  Normal nasopharynx, base of tongue, pyriform sinuses, epiglottis, valleculae, false vocal cords, true vocal cords, and larynx.  Normal motion of the vocal cords with no lesions, masses, nodules, or polyps bilaterally.     Assessment and Plan:    The patient presents with a history of nasal epistaxis. The patient  and I have discussed medical option of therapy with the use of Ponaris Nasal Drops, Bacitracin Ointment and other moisturization efforts.  The patient would like to proceed with medical therapy at this time.  We will see the patient again in four weeks to assess his progress with medical treatment. He will visit with Dr. Claude Boyle or Dr. Kaia Fontana for his VPI. He will be referred to audiology for evaluation for molded ear plugs.       PreOp Diagnosis:  Velopalatal Insufficiency    PostOp Diagnosis: Velopalatal Insufficiency    Procedure: Flexible Fiberoptic Laryngoscopy    Estimated Blood Loss: None    Complications: None    Consent: The patient was informed of the possible complications and probable outcomes of the procedure and the patient gave informed consent.    Fluids: None    Drains: None    Disposition: to home    Findings: Normal nasopharynx, base of tongue, pyriform sinuses, epiglottis, valleculae, false vocal cords, true vocal cords, and larynx.  Normal motion of the vocal cords with no lesions, masses, nodules, or polyps bilaterally.     Description of Procedure:    The patient was positioned on the clinic room chair. The flexible fiberoptic scope was passed the oral cavity and the nasopharynx and oropharynx were evaluated and found to be normal.  The base of tongue and tonsil tissues were visualized and found to be normal. The vocal cords and larynx were visualized and the true vocal cords were visualized and found to be normal.       CC: Dr. Violeta Coto      Again, thank you for allowing me to participate in the care of your patient.      Sincerely,    Rod Barrios MD

## 2019-07-26 NOTE — PATIENT INSTRUCTIONS
1.  You were seen in the ENT Clinic today by Dr. Barrios.  If you have any questions or concerns after your appointment, please call [phone]. Press option #1 for scheduling related needs. Press option #3 for Nurse advice.    2.  Please schedule an appointment for the following:   - Audiology - Molded Ear Plugs   - Dr. Fontana or Dr. Ratliff - Throat Evaluation    3.  Plan is to return to clinic to see Dr. Barrios in 5 weeks.      Lisa Samson LPN  [facility] Otolaryngology  [phone]    Velopalatal Insufficiency

## 2019-07-30 ENCOUNTER — OFFICE VISIT (OUTPATIENT)
Dept: AUDIOLOGY | Facility: CLINIC | Age: 39
End: 2019-07-30
Payer: COMMERCIAL

## 2019-07-30 DIAGNOSIS — H93.233 HYPERACUSIS OF BOTH EARS: ICD-10-CM

## 2019-07-30 DIAGNOSIS — Z77.122 EXPOSURE TO NOISE: Primary | ICD-10-CM

## 2019-07-30 NOTE — PROGRESS NOTES
AUDIOLOGY REPORT    BACKGROUND INFORMATION: Daniel Sandhu, 39 year old male, was seen in Audiology at the [facility] on 7/30/2019 for an earmold impression.  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    TEST RESULTS AND PROCEDURES:  Otoscopy revealed clear ear canals.  Earmold impression(s) were taken without incident.  The custom filtered earmolds will be ordered from [employer] (green/yelllow/clear, ER-15 clear filters) and the patient has scheduled earmold fitting for 3 weeks. Financial counseling will be contacting the patient via telephone to collect payment by credit card in the amount of $160.    SUMMARY AND RECOMMENDATIONS: Bilateral earmold impressions obtained and customed bilateral hearing protection ordered. Patient will be contacted to collect payment and the patient will return in three weeks for fitting of the custom earplugs. Please call this clinic with questions regarding today's visit.    Christi Rizzo.  Licensed Audiologist  [place] #[id number]

## 2019-08-07 DIAGNOSIS — C15.5 MALIGNANT NEOPLASM OF LOWER THIRD OF ESOPHAGUS (H): ICD-10-CM

## 2019-08-07 RX ORDER — HEPARIN SODIUM (PORCINE) LOCK FLUSH IV SOLN 100 UNIT/ML 100 UNIT/ML
500 SOLUTION INTRAVENOUS ONCE
Status: CANCELLED
Start: 2019-08-15

## 2019-08-07 RX ORDER — MEPERIDINE HYDROCHLORIDE 25 MG/ML
25 INJECTION INTRAMUSCULAR; INTRAVENOUS; SUBCUTANEOUS EVERY 30 MIN PRN
Status: CANCELLED | OUTPATIENT
Start: 2019-08-08

## 2019-08-07 RX ORDER — HEPARIN SODIUM (PORCINE) LOCK FLUSH IV SOLN 100 UNIT/ML 100 UNIT/ML
500 SOLUTION INTRAVENOUS ONCE
Status: CANCELLED
Start: 2019-08-08

## 2019-08-07 RX ORDER — MEPERIDINE HYDROCHLORIDE 25 MG/ML
25 INJECTION INTRAMUSCULAR; INTRAVENOUS; SUBCUTANEOUS EVERY 30 MIN PRN
Status: CANCELLED | OUTPATIENT
Start: 2019-08-22

## 2019-08-07 RX ORDER — LORAZEPAM 2 MG/ML
0.5 INJECTION INTRAMUSCULAR EVERY 4 HOURS PRN
Status: CANCELLED
Start: 2019-08-15

## 2019-08-07 RX ORDER — DIPHENHYDRAMINE HYDROCHLORIDE 50 MG/ML
50 INJECTION INTRAMUSCULAR; INTRAVENOUS
Status: CANCELLED
Start: 2019-08-08

## 2019-08-07 RX ORDER — ALBUTEROL SULFATE 0.83 MG/ML
2.5 SOLUTION RESPIRATORY (INHALATION)
Status: CANCELLED | OUTPATIENT
Start: 2019-08-08

## 2019-08-07 RX ORDER — EPINEPHRINE 1 MG/ML
0.3 INJECTION, SOLUTION INTRAMUSCULAR; SUBCUTANEOUS EVERY 5 MIN PRN
Status: CANCELLED | OUTPATIENT
Start: 2019-08-08

## 2019-08-07 RX ORDER — METHYLPREDNISOLONE SODIUM SUCCINATE 125 MG/2ML
125 INJECTION, POWDER, LYOPHILIZED, FOR SOLUTION INTRAMUSCULAR; INTRAVENOUS
Status: CANCELLED
Start: 2019-08-15

## 2019-08-07 RX ORDER — DIPHENHYDRAMINE HCL 25 MG
25 CAPSULE ORAL ONCE
Status: CANCELLED
Start: 2019-08-08

## 2019-08-07 RX ORDER — EPINEPHRINE 0.3 MG/.3ML
0.3 INJECTION SUBCUTANEOUS EVERY 5 MIN PRN
Status: CANCELLED | OUTPATIENT
Start: 2019-08-15

## 2019-08-07 RX ORDER — LORAZEPAM 2 MG/ML
0.5 INJECTION INTRAMUSCULAR EVERY 4 HOURS PRN
Status: CANCELLED
Start: 2019-08-22

## 2019-08-07 RX ORDER — EPINEPHRINE 1 MG/ML
0.3 INJECTION, SOLUTION INTRAMUSCULAR; SUBCUTANEOUS EVERY 5 MIN PRN
Status: CANCELLED | OUTPATIENT
Start: 2019-08-22

## 2019-08-07 RX ORDER — DIPHENHYDRAMINE HCL 25 MG
25 CAPSULE ORAL ONCE
Status: CANCELLED
Start: 2019-08-22

## 2019-08-07 RX ORDER — ALBUTEROL SULFATE 0.83 MG/ML
2.5 SOLUTION RESPIRATORY (INHALATION)
Status: CANCELLED | OUTPATIENT
Start: 2019-08-15

## 2019-08-07 RX ORDER — MEPERIDINE HYDROCHLORIDE 25 MG/ML
25 INJECTION INTRAMUSCULAR; INTRAVENOUS; SUBCUTANEOUS EVERY 30 MIN PRN
Status: CANCELLED | OUTPATIENT
Start: 2019-08-15

## 2019-08-07 RX ORDER — SODIUM CHLORIDE 9 MG/ML
1000 INJECTION, SOLUTION INTRAVENOUS CONTINUOUS PRN
Status: CANCELLED
Start: 2019-08-08

## 2019-08-07 RX ORDER — ALBUTEROL SULFATE 0.83 MG/ML
2.5 SOLUTION RESPIRATORY (INHALATION)
Status: CANCELLED | OUTPATIENT
Start: 2019-08-22

## 2019-08-07 RX ORDER — EPINEPHRINE 1 MG/ML
0.3 INJECTION, SOLUTION INTRAMUSCULAR; SUBCUTANEOUS EVERY 5 MIN PRN
Status: CANCELLED | OUTPATIENT
Start: 2019-08-15

## 2019-08-07 RX ORDER — EPINEPHRINE 0.3 MG/.3ML
0.3 INJECTION SUBCUTANEOUS EVERY 5 MIN PRN
Status: CANCELLED | OUTPATIENT
Start: 2019-08-08

## 2019-08-07 RX ORDER — HEPARIN SODIUM (PORCINE) LOCK FLUSH IV SOLN 100 UNIT/ML 100 UNIT/ML
500 SOLUTION INTRAVENOUS ONCE
Status: CANCELLED
Start: 2019-08-22

## 2019-08-07 RX ORDER — SODIUM CHLORIDE 9 MG/ML
1000 INJECTION, SOLUTION INTRAVENOUS CONTINUOUS PRN
Status: CANCELLED
Start: 2019-08-22

## 2019-08-07 RX ORDER — DIPHENHYDRAMINE HYDROCHLORIDE 50 MG/ML
50 INJECTION INTRAMUSCULAR; INTRAVENOUS
Status: CANCELLED
Start: 2019-08-15

## 2019-08-07 RX ORDER — LORAZEPAM 2 MG/ML
0.5 INJECTION INTRAMUSCULAR EVERY 4 HOURS PRN
Status: CANCELLED
Start: 2019-08-08

## 2019-08-07 RX ORDER — METHYLPREDNISOLONE SODIUM SUCCINATE 125 MG/2ML
125 INJECTION, POWDER, LYOPHILIZED, FOR SOLUTION INTRAMUSCULAR; INTRAVENOUS
Status: CANCELLED
Start: 2019-08-08

## 2019-08-07 RX ORDER — DIPHENHYDRAMINE HYDROCHLORIDE 50 MG/ML
50 INJECTION INTRAMUSCULAR; INTRAVENOUS
Status: CANCELLED
Start: 2019-08-22

## 2019-08-07 RX ORDER — ALBUTEROL SULFATE 90 UG/1
1-2 AEROSOL, METERED RESPIRATORY (INHALATION)
Status: CANCELLED
Start: 2019-08-08

## 2019-08-07 RX ORDER — SODIUM CHLORIDE 9 MG/ML
1000 INJECTION, SOLUTION INTRAVENOUS CONTINUOUS PRN
Status: CANCELLED
Start: 2019-08-15

## 2019-08-07 RX ORDER — EPINEPHRINE 0.3 MG/.3ML
0.3 INJECTION SUBCUTANEOUS EVERY 5 MIN PRN
Status: CANCELLED | OUTPATIENT
Start: 2019-08-22

## 2019-08-07 RX ORDER — ALBUTEROL SULFATE 90 UG/1
1-2 AEROSOL, METERED RESPIRATORY (INHALATION)
Status: CANCELLED
Start: 2019-08-15

## 2019-08-07 RX ORDER — METHYLPREDNISOLONE SODIUM SUCCINATE 125 MG/2ML
125 INJECTION, POWDER, LYOPHILIZED, FOR SOLUTION INTRAMUSCULAR; INTRAVENOUS
Status: CANCELLED
Start: 2019-08-22

## 2019-08-07 RX ORDER — ALBUTEROL SULFATE 90 UG/1
1-2 AEROSOL, METERED RESPIRATORY (INHALATION)
Status: CANCELLED
Start: 2019-08-22

## 2019-08-08 ENCOUNTER — INFUSION THERAPY VISIT (OUTPATIENT)
Dept: ONCOLOGY | Facility: CLINIC | Age: 39
End: 2019-08-08
Attending: INTERNAL MEDICINE
Payer: COMMERCIAL

## 2019-08-08 VITALS
SYSTOLIC BLOOD PRESSURE: 110 MMHG | BODY MASS INDEX: 21.71 KG/M2 | HEART RATE: 73 BPM | WEIGHT: 147 LBS | TEMPERATURE: 98.6 F | OXYGEN SATURATION: 98 % | DIASTOLIC BLOOD PRESSURE: 60 MMHG | RESPIRATION RATE: 18 BRPM

## 2019-08-08 DIAGNOSIS — C15.5 MALIGNANT NEOPLASM OF LOWER THIRD OF ESOPHAGUS (H): Primary | ICD-10-CM

## 2019-08-08 LAB
ALBUMIN SERPL-MCNC: 3.8 G/DL (ref 3.4–5)
ALBUMIN UR-MCNC: NEGATIVE MG/DL
ALP SERPL-CCNC: 48 U/L (ref 40–150)
ALT SERPL W P-5'-P-CCNC: 32 U/L (ref 0–70)
AST SERPL W P-5'-P-CCNC: 18 U/L (ref 0–45)
BASOPHILS # BLD AUTO: 0 10E9/L (ref 0–0.2)
BASOPHILS NFR BLD AUTO: 0.6 %
BILIRUB DIRECT SERPL-MCNC: 0.1 MG/DL (ref 0–0.2)
BILIRUB SERPL-MCNC: 0.6 MG/DL (ref 0.2–1.3)
CEA SERPL-MCNC: <0.5 UG/L (ref 0–2.5)
DIFFERENTIAL METHOD BLD: ABNORMAL
EOSINOPHIL # BLD AUTO: 0 10E9/L (ref 0–0.7)
EOSINOPHIL NFR BLD AUTO: 0.9 %
ERYTHROCYTE [DISTWIDTH] IN BLOOD BY AUTOMATED COUNT: 12.7 % (ref 10–15)
HCT VFR BLD AUTO: 36 % (ref 40–53)
HGB BLD-MCNC: 12.1 G/DL (ref 13.3–17.7)
IMM GRANULOCYTES # BLD: 0 10E9/L (ref 0–0.4)
IMM GRANULOCYTES NFR BLD: 0 %
LYMPHOCYTES # BLD AUTO: 1.1 10E9/L (ref 0.8–5.3)
LYMPHOCYTES NFR BLD AUTO: 31.8 %
MCH RBC QN AUTO: 30.7 PG (ref 26.5–33)
MCHC RBC AUTO-ENTMCNC: 33.6 G/DL (ref 31.5–36.5)
MCV RBC AUTO: 91 FL (ref 78–100)
MONOCYTES # BLD AUTO: 0.5 10E9/L (ref 0–1.3)
MONOCYTES NFR BLD AUTO: 12.8 %
NEUTROPHILS # BLD AUTO: 1.9 10E9/L (ref 1.6–8.3)
NEUTROPHILS NFR BLD AUTO: 53.9 %
NRBC # BLD AUTO: 0 10*3/UL
NRBC BLD AUTO-RTO: 0 /100
PLATELET # BLD AUTO: 155 10E9/L (ref 150–450)
PROT SERPL-MCNC: 7 G/DL (ref 6.8–8.8)
RBC # BLD AUTO: 3.94 10E12/L (ref 4.4–5.9)
WBC # BLD AUTO: 3.5 10E9/L (ref 4–11)

## 2019-08-08 PROCEDURE — 81003 URINALYSIS AUTO W/O SCOPE: CPT | Performed by: INTERNAL MEDICINE

## 2019-08-08 PROCEDURE — 25000132 ZZH RX MED GY IP 250 OP 250 PS 637: Mod: ZF | Performed by: INTERNAL MEDICINE

## 2019-08-08 PROCEDURE — 25000128 H RX IP 250 OP 636: Mod: ZF | Performed by: INTERNAL MEDICINE

## 2019-08-08 PROCEDURE — 25000125 ZZHC RX 250: Mod: ZF | Performed by: INTERNAL MEDICINE

## 2019-08-08 PROCEDURE — 82378 CARCINOEMBRYONIC ANTIGEN: CPT | Performed by: INTERNAL MEDICINE

## 2019-08-08 PROCEDURE — 25800030 ZZH RX IP 258 OP 636: Mod: ZF | Performed by: INTERNAL MEDICINE

## 2019-08-08 PROCEDURE — 96375 TX/PRO/DX INJ NEW DRUG ADDON: CPT

## 2019-08-08 PROCEDURE — 85025 COMPLETE CBC W/AUTO DIFF WBC: CPT | Performed by: INTERNAL MEDICINE

## 2019-08-08 PROCEDURE — 96417 CHEMO IV INFUS EACH ADDL SEQ: CPT

## 2019-08-08 PROCEDURE — 80076 HEPATIC FUNCTION PANEL: CPT | Performed by: INTERNAL MEDICINE

## 2019-08-08 PROCEDURE — 96413 CHEMO IV INFUSION 1 HR: CPT

## 2019-08-08 RX ORDER — HEPARIN SODIUM (PORCINE) LOCK FLUSH IV SOLN 100 UNIT/ML 100 UNIT/ML
500 SOLUTION INTRAVENOUS ONCE
Status: COMPLETED | OUTPATIENT
Start: 2019-08-08 | End: 2019-08-08

## 2019-08-08 RX ORDER — HEPARIN SODIUM (PORCINE) LOCK FLUSH IV SOLN 100 UNIT/ML 100 UNIT/ML
5 SOLUTION INTRAVENOUS EVERY 8 HOURS
Status: DISCONTINUED | OUTPATIENT
Start: 2019-08-08 | End: 2019-08-08 | Stop reason: HOSPADM

## 2019-08-08 RX ORDER — DIPHENHYDRAMINE HCL 25 MG
25 CAPSULE ORAL ONCE
Status: COMPLETED | OUTPATIENT
Start: 2019-08-08 | End: 2019-08-08

## 2019-08-08 RX ADMIN — FAMOTIDINE 20 MG: 10 INJECTION INTRAVENOUS at 09:56

## 2019-08-08 RX ADMIN — SODIUM CHLORIDE 250 ML: 9 INJECTION, SOLUTION INTRAVENOUS at 09:56

## 2019-08-08 RX ADMIN — PACLITAXEL 145 MG: 6 INJECTION, SOLUTION INTRAVENOUS at 11:37

## 2019-08-08 RX ADMIN — HEPARIN 5 ML: 100 SYRINGE at 08:53

## 2019-08-08 RX ADMIN — SODIUM CHLORIDE 500 MG: 9 INJECTION, SOLUTION INTRAVENOUS at 10:32

## 2019-08-08 RX ADMIN — DIPHENHYDRAMINE HYDROCHLORIDE 25 MG: 25 CAPSULE ORAL at 09:56

## 2019-08-08 RX ADMIN — DEXAMETHASONE SODIUM PHOSPHATE 20 MG: 10 INJECTION, SOLUTION INTRAMUSCULAR; INTRAVENOUS at 09:56

## 2019-08-08 RX ADMIN — HEPARIN 500 UNITS: 100 SYRINGE at 12:43

## 2019-08-08 ASSESSMENT — PAIN SCALES - GENERAL: PAINLEVEL: NO PAIN (0)

## 2019-08-08 NOTE — PATIENT INSTRUCTIONS
Contact numbers:    Triage/[phone]    Call with chills and/or temperature greater than or equal to 100.5 and questions or concerns.    If after hours, weekends, or holidays, call main hospital  at  [phone] and ask for Oncology doctor on call.                                1     2     3       4     5     6     7     8    [facility] D[facility]   8:30 AM   (15 min.)    [facility]   [facility]    [facility] ONC INFUSION 180   9:00 AM   (180 min.)    ONCOLOGY INFUSION   [facility] 9     10       11     12     13     14     15    [facility] DRAW   8:45 AM   (15 min.)    [facility] DRAW   [facility] Lab Draw    [facility] RETURN   9:05 AM   (50 min.)   Alisa Otero PA-C   [facility]    [facility] ONC INFUSION 180  10:30 AM   (180 min.)    ONCOLOGY INFUSION   [facility] 16     17       18     19     20    EAR MOLD FITTING   3:00 PM   (30 min.)   Niecy Felder AuD   [facility] Audiology 21     22    [facility] DRAW   8:30 AM   (15 min.)    [facility] DRAW   [facility] Lab Draw    [facility] ONC INFUSION 180   9:00 AM   (180 min.)    ONCOLOGY INFUSION   [facility] 23     24       25     26     27     28     29     30     31                    1     2     3     4    [facility] DRAW   9:00 AM   (15 min.)    [facility] DRAW   [facility] Lab Draw    [facility] RETURN   9:15 AM   (50 min.)   Violeta Coto PA-C   [facility]    [facility] ONC INFUSION 180  10:30 AM   (180 min.)    ONCOLOGY INFUSION   [facility] 5     6    [facility] RETURN   8:30 AM   (15 min.)   Rod Barrios MD   [facility] Ear Nose and Throat 7       8     9     10     11    [facility] DRAW   8:30 AM    (15 min.)    [facility]   [facility]    P ONC INFUSION 180   9:00 AM   (180 min.)    ONCOLOGY INFUSION   [facility] 12     13     14       15     16     17     18    [facility] DRAW   8:30 AM   (15 min.)    [facility] DRAW   [facility] Lab Draw    P ONC INFUSION 180   9:00 AM   (180 min.)    ONCOLOGY INFUSION   [facility] 19     20    PET ONCOLOGY WHOLE BODY   6:15 AM   (45 min.)   [facility]   [facility] [place] [facility] PET CT 21       22     23    [facility] DRAW   9:15 AM   (15 min.)    [facility] DRAW   [facility] Lab Draw    [facility] RETURN   9:30 AM   (30 min.)   Laurence Hood MD   [facility] 24     25     26     27     28       29     30                                              Lab Results:  Recent Results (from the past 12 hour(s))   CBC with platelets differential    Collection Time: 08/08/19  8:56 AM   Result Value Ref Range    WBC 3.5 (L) 4.0 - 11.0 10e9/L    RBC Count 3.94 (L) 4.4 - 5.9 10e12/L    Hemoglobin 12.1 (L) 13.3 - 17.7 g/dL    Hematocrit 36.0 (L) 40.0 - 53.0 %    MCV 91 78 - 100 fl    MCH 30.7 26.5 - 33.0 pg    MCHC 33.6 31.5 - 36.5 g/dL    RDW 12.7 10.0 - 15.0 %    Platelet Count 155 150 - 450 10e9/L    Diff Method Automated Method     % Neutrophils 53.9 %    % Lymphocytes 31.8 %    % Monocytes 12.8 %    % Eosinophils 0.9 %    % Basophils 0.6 %    % Immature Granulocytes 0.0 %    Nucleated RBCs 0 0 /100    Absolute Neutrophil 1.9 1.6 - 8.3 10e9/L    Absolute Lymphocytes 1.1 0.8 - 5.3 10e9/L    Absolute Monocytes 0.5 0.0 - 1.3 10e9/L    Absolute Eosinophils 0.0 0.0 - 0.7 10e9/L    Absolute Basophils 0.0 0.0 - 0.2 10e9/L    Abs Immature Granulocytes 0.0 0 - 0.4 10e9/L    Absolute Nucleated RBC 0.0    Hepatic panel    Collection Time: 08/08/19  8:56 AM   Result Value Ref Range    Bilirubin Direct 0.1 0.0 - 0.2 mg/dL    Bilirubin Total 0.6 0.2 - 1.3 mg/dL    Albumin 3.8 3.4 - 5.0 g/dL     Protein Total 7.0 6.8 - 8.8 g/dL    Alkaline Phosphatase 48 40 - 150 U/L    ALT 32 0 - 70 U/L    AST 18 0 - 45 U/L   Protein qualitative urine    Collection Time: 08/08/19  8:56 AM   Result Value Ref Range    Protein Albumin Urine Negative NEG^Negative mg/dL

## 2019-08-08 NOTE — PROGRESS NOTES
Infusion Nursing Note:  Daniel Sandhu presents for C5 Cyramza, Taxol     Note: pt feeling well today, no new issues or concerns to report. States he saw ENT and his post nasal drip and nare irritation has improved a lot    Pain: denies    Treatment Conditions:  Lab Results   Component Value Date    HGB 12.1 08/08/2019     Lab Results   Component Value Date    WBC 3.5 08/08/2019      Lab Results   Component Value Date    ANEU 1.9 08/08/2019     Lab Results   Component Value Date     08/08/2019      Lab Results   Component Value Date     04/01/2019                   Lab Results   Component Value Date    POTASSIUM 4.2 04/01/2019           Lab Results   Component Value Date    MAG 2.2 03/07/2018            Lab Results   Component Value Date    CR 0.68 04/01/2019                   Lab Results   Component Value Date    YOBANY 9.3 04/01/2019                Lab Results   Component Value Date    BILITOTAL 0.6 08/08/2019           Lab Results   Component Value Date    ALBUMIN 3.8 08/08/2019                    Lab Results   Component Value Date    ALT 32 08/08/2019           Lab Results   Component Value Date    AST 18 08/08/2019       Results reviewed, labs MET treatment parameters, ok to proceed with treatment.  Urine protein- negative.  /60    Intravenous Access:  Implanted Port.    Post Infusion Assessment:  Patient tolerated infusion without incident.  Blood return noted pre and post infusion.  Site patent and intact, free from redness, edema or discomfort.  No evidence of extravasations.  Access discontinued per protocol.    Discharge Plan:   Patient declined prescription refills.  Discharge instructions reviewed with: Patient.  Patient and/or family verbalized understanding of discharge instructions and all questions answered.  AVS to patient via [employer].  Patient will return 8/15 for next appointment.   Patient discharged in stable condition accompanied by: self.    Andie Bailey RN

## 2019-08-13 NOTE — PROGRESS NOTES
[facility]    Hematology/Oncology Established Patient Note      Today's Date: August 13, 2019    Reason for Follow-up: adenocarcinoma of the GE junction      HISTORY OF PRESENT ILLNESS: Daniel Sandhu is a 39 year old male who presents with adenocarcinoma of the GE junction.  In early 2017, patient started noticing difficulty swallowing, and that food seems to take longer to go down.  It became more frequent, and he saw his PCP, and was referred for EGD, which showed an esophageal mass located at the GE junction, measuring 4 cm.  Biopsy showed poorly differentiated adenocarcinoma.  HER-2 was sent and is equivocal (2+).  CT c/a/p showed a mildly prominent lymph node in the gastrohepatic ligament, but there were no pathologically enlarged lymph nodes in the chest, abdomen, or pelvis.  There was otherwise no evidence of metastatic disease.  PET-CT showed thickening of the distal esophagus consistent with known esophageal adenocarcinoma, as well as mildly hypermetabolic 1 cm lymph node in the gastrohepatic ligament.  There was no evidence of distant metastatic disease.  He underwent EUS on 6/9/17, which showed the esophageal tumor in the lower third of the esophagus, staged T2NX.  There were 2 abnormal lymph nodes seen in the gastrohepatic ligament; pathology was suspicious for adenocarcinoma.  Celiac node was sampled as well, which was benign.  He was seen by surgery, Dr. Esteban, and recommended srinivas-operative chemotherapy.    Port was placed on 6/22/17.  It was removed on 3/19/18.    He underwent chemotherapy with epirubicin, oxaliplatin, and capecitabine x 3 cycles 6/26/17-8/7/17.      On 11/3/17, he underwent laparotomy with total gastrectomy and abdominal lymphadenectomy, left thoracotomy with distal esophagectomy and intrathoracic Terrell-en-Y esophagojejunostomy, feeding jejunostomy, left pharyngostomy tube placement, right tube thoracostomy, and flexible bronchoscopy.  On 11/9/17, he was  taken back to OR for I&D of pharyngostomy tube abscess.  Pathology showed adenocarcinoma, moderate differentiated, extensive residual tumor with no evidence tumor regression, margins negative, perineural invasion present, 1 of 28 lymph nodes were involved with malignancy, stage [id number] (stage IIIA).  HER-2 is non-amplified.    He resumed chemotherapy post-surgery, with plans to complete 3 more cycles, with 5-FU, epirubicin, oxaliplatin.  However, he only completed 2 more cycles, due to poor tolerance.  He was admitted to the hospital 1/9/18-1/13/18 for nausea, diarrhea, failure to thrive, severe malnutrition, generalized weakness.  His infusional chemotherapy was stopped on 1/8/18.  He underwent EGD in the hospital on 1/11/18, which showed ulcers, and no evidence of recurrence of his cancer.  One area biopsied showed inflammation, no evidence of malignancy.    He saw Dr. Esteban on 3/6/19 and had CT abdomen/pelvis done, which showed a 3.1 cm lesion in hepatic segment 3.  He underwent biopsy on 3/20/19 by IR, which showed moderately differentiated adenocarcinoma consistent with metastasis from primary esophageal carcinoma.  HER-2 is non-amplified.  Restaging PET scan on 3/29/19 showed the 4.4 cm left lobe liver metastasis.  There is nodular foci of hypermetabolic uptake in the left mid abdomen in relation to the small bowel loops, without definite underlying lesion on CT.  This is favored to represent metastatic disease unless proven otherwise.    He started on chemotherapy with paclitaxel and ramucirumab on 4/10/19.      INTERIM HISTORY: Daniel is here for routine follow-up.    He overall feels well. The only side effects he has noticed are fatigue with more activity, and about 24-48 hours of abdominal cramping and loose stools (about 1 more stool than his normal) following chemotherapy. He reports that it is tolerable. He does take some THC after chemo and that seems to help him quite a bit. He otherwise hasn't had  any fevers, chills, cough, SOB, chest pain, nausea, vomiting, bleeding, swelling, or any neuropathy. He remains active. He just re-seeded his lawn. Active with family (has a 2 and 7 year old at home). Going back to work in September. 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      REVIEW OF SYSTEMS:   14 point ROS was reviewed and is negative other than as noted above in HPI.       HOME MEDICATIONS:  Current Outpatient Medications   Medication Sig Dispense Refill     cyabnocobalamin (VITAMIN B-12) 2500 MCG sublingual tablet Place 2,500 mcg under the tongue daily 30 tablet 1     ferrous gluconate (FERGON) 324 (38 Fe) MG tablet Take 324 mg by mouth daily (with breakfast)       LORazepam (ATIVAN) 0.5 MG tablet Take 1 tablet (0.5 mg) by mouth every 4 hours as needed (Anxiety, Nausea/Vomiting or Sleep) (Patient not taking: Reported on 7/26/2019) 30 tablet 2     Misc Natural Product Nasal (PONARIS) SOLN Apply two drops to each nostril BID X 2 month supply 10 mL 3     multivitamin, therapeutic with minerals (MULTI-VITAMIN) TABS tablet Take 1 tablet by mouth daily Generic [place] Vitamin       mupirocin (BACTROBAN) 2 % external ointment Apply to anterior nares BID X 2 month supply 2 g 3     prochlorperazine (COMPAZINE) 10 MG tablet Take 1 tablet (10 mg) by mouth every 6 hours as needed (Nausea/Vomiting) (Patient not taking: Reported on 5/6/2019) 30 tablet 2     saccharomyces boulardii (FLORASTOR) 250 MG capsule Take 250 mg by mouth 2 times daily           ALLERGIES:  No Known Allergies      PAST MEDICAL HISTORY:  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) 6/5/2017         PAST SURGICAL HISTORY:  Past Surgical History:   Procedure Laterality Date     ESOPHAGOGASTRODUODENOSCOPY       ESOPHAGOSCOPY, GASTROSCOPY, DUODENOSCOPY (EGD), COMBINED N/A 6/9/2017    Procedure: COMBINED ENDOSCOPIC ULTRASOUND, ESOPHAGOSCOPY,  GASTROSCOPY, DUODENOSCOPY (EGD), FINE NEEDLE ASPIRATE/BIOPSY;  Upper Endoscopic Ultrasound, fine needle aspirate/biopsy;  Surgeon: Guru Mark Avila MD;  Location: [facility] OR     ESOPHAGOSCOPY, GASTROSCOPY, DUODENOSCOPY (EGD), COMBINED N/A 11/3/2017    Procedure: COMBINED ESOPHAGOSCOPY, GASTROSCOPY, DUODENOSCOPY (EGD);;  Surgeon: Yunior Esteban MD;  Location: [facility] OR     ESOPHAGOSCOPY, GASTROSCOPY, DUODENOSCOPY (EGD), COMBINED N/A 11/9/2017    Procedure: COMBINED ESOPHAGOSCOPY, GASTROSCOPY, DUODENOSCOPY (EGD);  Esophogastroduodenoscopy, take out pharangostomy tube;  Surgeon: Yunior Esteban MD;  Location: [facility] OR     ESOPHAGOSCOPY, GASTROSCOPY, DUODENOSCOPY (EGD), COMBINED N/A 1/11/2018    Procedure: COMBINED ESOPHAGOSCOPY, GASTROSCOPY, DUODENOSCOPY (EGD), BIOPSY SINGLE OR MULTIPLE;  COMBINED ESOPHAGOSCOPY, GASTROSCOPY, DUODENOSCOPY (EGD);  Surgeon: Alessandro Mcgregor MD;  Location: [facility]     GASTRECTOMY N/A 11/3/2017    Procedure: GASTRECTOMY;;  Surgeon: Yunior Esteban MD;  Location: [facility] OR     HAND SURGERY      childhood, torn tendon     INSERT PORT VASCULAR ACCESS Right 6/22/2017    Procedure: INSERT PORT VASCULAR ACCESS;  Single Lumen Chest Power Port;  Surgeon: Iván Driver PA-C;  Location: [facility]     INSERT PORT VASCULAR ACCESS Right 4/8/2019    Procedure: Single Lumen Chest Port Placement;  Surgeon: Krysten Ballard PA-C;  Location: [facility] OR     IR CHEST PORT PLACEMENT > 5 YRS OF AGE  4/8/2019     IR LIVER BIOPSY PERCUTANEOUS  3/20/2019     LAPAROSCOPY DIAGNOSTIC (GENERAL) N/A 11/3/2017    Procedure: LAPAROSCOPY DIAGNOSTIC (GENERAL);  diagnostic laparoscopy, right chest tube, total gastrectomy with distal esophagectomy, intrathoracic eveline-y esophago-jejunostomy, feeding jejunostomy, pharyngostomy, esophagogastroduodenoscopy, flexible bronchoscopy;  Surgeon: Yunior Esteban MD;  Location: [facility]     LAPAROTOMY EXPLORATORY N/A 11/3/2017    Procedure:  LAPAROTOMY EXPLORATORY;;  Surgeon: Yunior Esteban MD;  Location: [facility]     PHARYNGOSTOMY N/A 11/3/2017    Procedure: PHARYNGOSTOMY;;  Surgeon: Yunior Esteban MD;  Location: [facility]     REMOVE PORT VASCULAR ACCESS Right 3/19/2018    Procedure: REMOVE PORT VASCULAR ACCESS;  Right Port Removal;  Surgeon: Krysten Hopper PA-C;  [facility]     THORACOTOMY Left 11/3/2017    Procedure: THORACOTOMY;;  Surgeon: Yunior Esteban MD;  Location: [facility] OR         SOCIAL HISTORY:  Social History     Socioeconomic History     Marital status:      Spouse name: Not on file     Number of children: 1     Years of education: Not on file     Highest education level: Not on file   Occupational History     Occupation: musician and teacher    Social Needs     Financial resource strain: Not on file     Food insecurity:     Worry: Not on file     Inability: Not on file     Transportation needs:     Medical: Not on file     Non-medical: Not on file   Tobacco Use     Smoking status: Never Smoker     Smokeless tobacco: Never Used   Substance and Sexual Activity     Alcohol use: Yes     Comment: 1 beer daily     Drug use: Yes     Types: Marijuana     Comment: occasional     Sexual activity: Yes     Partners: Female     Birth control/protection: Natural Family Planning   Lifestyle     Physical activity:     Days per week: Not on file     Minutes per session: Not on file     Stress: Not on file   Relationships     Social connections:     Talks on phone: Not on file     Gets together: Not on file     Attends [employer] service: Not on file     Active member of club or organization: Not on file     Attends meetings of clubs or organizations: Not on file     Relationship status: Not on file     Intimate partner violence:     Fear of current or ex partner: Not on file     Emotionally abused: Not on file     Physically abused: Not on file     Forced sexual activity: Not on file   Other Topics Concern      "Parent/sibling w/ CABG, MI or angioplasty before 65F 55M? Not Asked   Social History Narrative     Not on file       FAMILY HISTORY:  Family History   Problem Relation Age of Onset     Other - See Comments Father         sepsis     Cancer Sister         breast cancer  31 yo 1/2 sister      Cancer Paternal Grandmother         breast         PHYSICAL EXAM:  Vital signs:  /65   Pulse 83   Temp 98.1  F (36.7  C)   Resp 16   Ht 1.753 m (5' 9\")   Wt 66.6 kg (146 lb 14.4 oz)   SpO2 99%   BMI 21.69 kg/m     ECO  GENERAL/CONSTITUTIONAL: No acute distress.     EYES: No scleral icterus.  RESPIRATORY: Clear to auscultation bilaterally.  CARDIOVASCULAR: Regular rate and rhythm.  GASTROINTESTINAL: Bowel sounds present.  Non-tender.  No distention.    MUSCULOSKELETAL: Warm and well-perfused, no cyanosis, clubbing, or edema.  NEUROLOGIC: Alert, oriented, answers questions appropriately.  INTEGUMENTARY: No jaundice.  Port in place at the right upper chest.      LABS:    Results for DANIEL SANDHU (MRN [id number]) as of 8/15/2019 09:25   Ref. Range 2019 08:46 2019 08:56 8/15/2019 08:49   WBC Latest Ref Range: 4.0 - 11.0 10e9/L 2.5 (L) 3.5 (L) 5.3   Hemoglobin Latest Ref Range: 13.3 - 17.7 g/dL 12.1 (L) 12.1 (L) 11.4 (L)   Hematocrit Latest Ref Range: 40.0 - 53.0 % 36.1 (L) 36.0 (L) 33.9 (L)   Platelet Count Latest Ref Range: 150 - 450 10e9/L 125 (L) 155 119 (L)     ASSESSMENT/PLAN:  Daniel Sandhu is a 39 year old male with:    1) Adenocarcinoma of the GE junction: now s/p 3 cycles of neoadjuvant chemotherapy with EOX, followed by surgical resection on 11/3/17.  Pathology showed adenocarcinoma, moderate differentiated, extensive residual tumor with no evidence tumor regression, margins negative, perineural invasion present, 1 of 28 lymph nodes were involved with malignancy, stage [id number] (stage IIIA).  HER-2 is non-amplified.  He has received EOF x 2 cycles after surgery, and he has not tolerated well.  " The 5-FU on cycle 5 was discontinued early. Chemotherapy was stopped at that point due to poor tolerance.    Patient now has biopsy-confirmed metastatic disease to the liver and possibly peritoneum.  He is asymptomatic currently.      [facility] testing shows MS-stable, TMB-low (4 mut/Mb), ERBB2 amplification equivocal, and TP53 H214R alteration.  PD-L1 was negative (0%).  He was started on chemotherapy with paclitaxel+ramucirumab April 2019.    Here for consideration of cycle 5 day 8. He continues to tolerate well with mild fatigue, transient abdominal cramping with loose stools. Otherwise, feeling well. We will continue.  -will plan to get another PET-CT after 6 cycles of chemotherapy to evaluate response       Of note, Dr. Hood has discussed with Daniel that the data on metastatectomy on esophageal/gastric tumors are scant, and appears to have no overall survival benefit.  I did discuss with Dr. Avalos, and he agrees that the data is poor.  However, if patient has stable disease for at least 6 months, and no evidence of peritoneal disease, then he may consider surgery or targeted treatment to the liver.      2) Genetics:  -genetic testing was negative    3) Fertility: Daniel and his wife have 2 children, including a baby boy just born around June 2017.  He is not planning for more children in the near future.    4) Sinus drainage, occasional epistaxis, cough: Saw ENT on 7/26/19, underwent laryngoscopy, dx with velopalatal insufficiency. He was given nasal solutions (Ponaris, Bactroban), with the Ponaris being very helpful for him.    5) Abdominal cramping, diarrhea: lasts 24-48 hours after chemo, mild. He uses a THC edible if needed and that is helpful for him.    Alisa Otero PA-C

## 2019-08-15 ENCOUNTER — APPOINTMENT (OUTPATIENT)
Dept: LAB | Facility: CLINIC | Age: 39
End: 2019-08-15
Attending: INTERNAL MEDICINE
Payer: COMMERCIAL

## 2019-08-15 ENCOUNTER — ONCOLOGY VISIT (OUTPATIENT)
Dept: ONCOLOGY | Facility: CLINIC | Age: 39
End: 2019-08-15
Attending: PHYSICIAN ASSISTANT
Payer: COMMERCIAL

## 2019-08-15 ENCOUNTER — INFUSION THERAPY VISIT (OUTPATIENT)
Dept: ONCOLOGY | Facility: CLINIC | Age: 39
End: 2019-08-15
Attending: INTERNAL MEDICINE
Payer: COMMERCIAL

## 2019-08-15 VITALS
SYSTOLIC BLOOD PRESSURE: 112 MMHG | BODY MASS INDEX: 21.76 KG/M2 | WEIGHT: 146.9 LBS | HEART RATE: 83 BPM | DIASTOLIC BLOOD PRESSURE: 65 MMHG | HEIGHT: 69 IN | OXYGEN SATURATION: 99 % | RESPIRATION RATE: 16 BRPM | TEMPERATURE: 98.1 F

## 2019-08-15 DIAGNOSIS — D64.9 ANEMIA, UNSPECIFIED TYPE: ICD-10-CM

## 2019-08-15 DIAGNOSIS — C15.5 MALIGNANT NEOPLASM OF LOWER THIRD OF ESOPHAGUS (H): Primary | ICD-10-CM

## 2019-08-15 LAB
ALBUMIN SERPL-MCNC: 3.6 G/DL (ref 3.4–5)
ALP SERPL-CCNC: 50 U/L (ref 40–150)
ALT SERPL W P-5'-P-CCNC: 32 U/L (ref 0–70)
ANION GAP SERPL CALCULATED.3IONS-SCNC: 5 MMOL/L (ref 3–14)
AST SERPL W P-5'-P-CCNC: 17 U/L (ref 0–45)
BASOPHILS # BLD AUTO: 0 10E9/L (ref 0–0.2)
BASOPHILS NFR BLD AUTO: 0.6 %
BILIRUB SERPL-MCNC: 0.4 MG/DL (ref 0.2–1.3)
BUN SERPL-MCNC: 23 MG/DL (ref 7–30)
CALCIUM SERPL-MCNC: 8.2 MG/DL (ref 8.5–10.1)
CHLORIDE SERPL-SCNC: 109 MMOL/L (ref 94–109)
CO2 SERPL-SCNC: 27 MMOL/L (ref 20–32)
CREAT SERPL-MCNC: 0.74 MG/DL (ref 0.66–1.25)
DIFFERENTIAL METHOD BLD: ABNORMAL
EOSINOPHIL # BLD AUTO: 0.1 10E9/L (ref 0–0.7)
EOSINOPHIL NFR BLD AUTO: 1.5 %
ERYTHROCYTE [DISTWIDTH] IN BLOOD BY AUTOMATED COUNT: 12.5 % (ref 10–15)
FERRITIN SERPL-MCNC: 133 NG/ML (ref 26–388)
FOLATE SERPL-MCNC: 19.1 NG/ML
GFR SERPL CREATININE-BSD FRML MDRD: >90 ML/MIN/{1.73_M2}
GLUCOSE SERPL-MCNC: 83 MG/DL (ref 70–99)
HCT VFR BLD AUTO: 33.9 % (ref 40–53)
HGB BLD-MCNC: 11.4 G/DL (ref 13.3–17.7)
IMM GRANULOCYTES # BLD: 0 10E9/L (ref 0–0.4)
IMM GRANULOCYTES NFR BLD: 0.6 %
LYMPHOCYTES # BLD AUTO: 1 10E9/L (ref 0.8–5.3)
LYMPHOCYTES NFR BLD AUTO: 18.8 %
MCH RBC QN AUTO: 30.8 PG (ref 26.5–33)
MCHC RBC AUTO-ENTMCNC: 33.6 G/DL (ref 31.5–36.5)
MCV RBC AUTO: 92 FL (ref 78–100)
MONOCYTES # BLD AUTO: 0.3 10E9/L (ref 0–1.3)
MONOCYTES NFR BLD AUTO: 5.1 %
NEUTROPHILS # BLD AUTO: 3.9 10E9/L (ref 1.6–8.3)
NEUTROPHILS NFR BLD AUTO: 73.4 %
NRBC # BLD AUTO: 0 10*3/UL
NRBC BLD AUTO-RTO: 0 /100
PLATELET # BLD AUTO: 119 10E9/L (ref 150–450)
POTASSIUM SERPL-SCNC: 4.3 MMOL/L (ref 3.4–5.3)
PROT SERPL-MCNC: 6.4 G/DL (ref 6.8–8.8)
RBC # BLD AUTO: 3.7 10E12/L (ref 4.4–5.9)
SODIUM SERPL-SCNC: 140 MMOL/L (ref 133–144)
VIT B12 SERPL-MCNC: 408 PG/ML (ref 193–986)
WBC # BLD AUTO: 5.3 10E9/L (ref 4–11)

## 2019-08-15 PROCEDURE — 25000128 H RX IP 250 OP 636: Mod: ZF | Performed by: INTERNAL MEDICINE

## 2019-08-15 PROCEDURE — 82607 VITAMIN B-12: CPT | Performed by: FAMILY MEDICINE

## 2019-08-15 PROCEDURE — G0463 HOSPITAL OUTPT CLINIC VISIT: HCPCS | Mod: ZF

## 2019-08-15 PROCEDURE — 85025 COMPLETE CBC W/AUTO DIFF WBC: CPT | Performed by: INTERNAL MEDICINE

## 2019-08-15 PROCEDURE — 96413 CHEMO IV INFUSION 1 HR: CPT

## 2019-08-15 PROCEDURE — 82728 ASSAY OF FERRITIN: CPT | Performed by: FAMILY MEDICINE

## 2019-08-15 PROCEDURE — 25800030 ZZH RX IP 258 OP 636: Mod: ZF | Performed by: INTERNAL MEDICINE

## 2019-08-15 PROCEDURE — 25000128 H RX IP 250 OP 636: Mod: ZF | Performed by: PHYSICIAN ASSISTANT

## 2019-08-15 PROCEDURE — 80053 COMPREHEN METABOLIC PANEL: CPT | Performed by: FAMILY MEDICINE

## 2019-08-15 PROCEDURE — 82746 ASSAY OF FOLIC ACID SERUM: CPT | Performed by: FAMILY MEDICINE

## 2019-08-15 PROCEDURE — 96375 TX/PRO/DX INJ NEW DRUG ADDON: CPT

## 2019-08-15 PROCEDURE — 99214 OFFICE O/P EST MOD 30 MIN: CPT | Mod: ZP | Performed by: PHYSICIAN ASSISTANT

## 2019-08-15 PROCEDURE — 25000125 ZZHC RX 250: Mod: ZF | Performed by: INTERNAL MEDICINE

## 2019-08-15 RX ORDER — HEPARIN SODIUM (PORCINE) LOCK FLUSH IV SOLN 100 UNIT/ML 100 UNIT/ML
5 SOLUTION INTRAVENOUS EVERY 8 HOURS
Status: DISCONTINUED | OUTPATIENT
Start: 2019-08-15 | End: 2019-08-15 | Stop reason: HOSPADM

## 2019-08-15 RX ORDER — HEPARIN SODIUM (PORCINE) LOCK FLUSH IV SOLN 100 UNIT/ML 100 UNIT/ML
500 SOLUTION INTRAVENOUS ONCE
Status: COMPLETED | OUTPATIENT
Start: 2019-08-15 | End: 2019-08-15

## 2019-08-15 RX ADMIN — DEXAMETHASONE SODIUM PHOSPHATE 20 MG: 10 INJECTION, SOLUTION INTRAMUSCULAR; INTRAVENOUS at 10:24

## 2019-08-15 RX ADMIN — FAMOTIDINE 20 MG: 10 INJECTION INTRAVENOUS at 10:33

## 2019-08-15 RX ADMIN — HEPARIN SODIUM (PORCINE) LOCK FLUSH IV SOLN 100 UNIT/ML 500 UNITS: 100 SOLUTION at 12:01

## 2019-08-15 RX ADMIN — SODIUM CHLORIDE 250 ML: 9 INJECTION, SOLUTION INTRAVENOUS at 10:23

## 2019-08-15 RX ADMIN — PACLITAXEL 145 MG: 6 INJECTION, SOLUTION INTRAVENOUS at 10:58

## 2019-08-15 RX ADMIN — HEPARIN 5 ML: 100 SYRINGE at 08:57

## 2019-08-15 ASSESSMENT — PAIN SCALES - GENERAL: PAINLEVEL: NO PAIN (0)

## 2019-08-15 ASSESSMENT — MIFFLIN-ST. JEOR: SCORE: 1576.71

## 2019-08-15 NOTE — LETTER
8/15/2019      RE: Daniel Sandhu  [address] [facility] [address]       [facility]    Hematology/Oncology Established Patient Note      Today's Date: August 13, 2019    Reason for Follow-up: adenocarcinoma of the GE junction      HISTORY OF PRESENT ILLNESS: Daniel Sandhu is a 39 year old male who presents with adenocarcinoma of the GE junction.  In early 2017, patient started noticing difficulty swallowing, and that food seems to take longer to go down.  It became more frequent, and he saw his PCP, and was referred for EGD, which showed an esophageal mass located at the GE junction, measuring 4 cm.  Biopsy showed poorly differentiated adenocarcinoma.  HER-2 was sent and is equivocal (2+).  CT c/a/p showed a mildly prominent lymph node in the gastrohepatic ligament, but there were no pathologically enlarged lymph nodes in the chest, abdomen, or pelvis.  There was otherwise no evidence of metastatic disease.  PET-CT showed thickening of the distal esophagus consistent with known esophageal adenocarcinoma, as well as mildly hypermetabolic 1 cm lymph node in the gastrohepatic ligament.  There was no evidence of distant metastatic disease.  He underwent EUS on 6/9/17, which showed the esophageal tumor in the lower third of the esophagus, staged T2NX.  There were 2 abnormal lymph nodes seen in the gastrohepatic ligament; pathology was suspicious for adenocarcinoma.  Celiac node was sampled as well, which was benign.  He was seen by surgery, Dr. Esteban, and recommended srinivas-operative chemotherapy.    Port was placed on 6/22/17.  It was removed on 3/19/18.    He underwent chemotherapy with epirubicin, oxaliplatin, and capecitabine x 3 cycles 6/26/17-8/7/17.      On 11/3/17, he underwent laparotomy with total gastrectomy and abdominal lymphadenectomy, left thoracotomy with distal esophagectomy and intrathoracic Terrell-en-Y esophagojejunostomy, feeding jejunostomy, left  pharyngostomy tube placement, right tube thoracostomy, and flexible bronchoscopy.  On 11/9/17, he was taken back to OR for I&D of pharyngostomy tube abscess.  Pathology showed adenocarcinoma, moderate differentiated, extensive residual tumor with no evidence tumor regression, margins negative, perineural invasion present, 1 of 28 lymph nodes were involved with malignancy, stage [id number] (stage IIIA).  HER-2 is non-amplified.    He resumed chemotherapy post-surgery, with plans to complete 3 more cycles, with 5-FU, epirubicin, oxaliplatin.  However, he only completed 2 more cycles, due to poor tolerance.  He was admitted to the hospital 1/9/18-1/13/18 for nausea, diarrhea, failure to thrive, severe malnutrition, generalized weakness.  His infusional chemotherapy was stopped on 1/8/18.  He underwent EGD in the hospital on 1/11/18, which showed ulcers, and no evidence of recurrence of his cancer.  One area biopsied showed inflammation, no evidence of malignancy.    He saw Dr. Esteban on 3/6/19 and had CT abdomen/pelvis done, which showed a 3.1 cm lesion in hepatic segment 3.  He underwent biopsy on 3/20/19 by IR, which showed moderately differentiated adenocarcinoma consistent with metastasis from primary esophageal carcinoma.  HER-2 is non-amplified.  Restaging PET scan on 3/29/19 showed the 4.4 cm left lobe liver metastasis.  There is nodular foci of hypermetabolic uptake in the left mid abdomen in relation to the small bowel loops, without definite underlying lesion on CT.  This is favored to represent metastatic disease unless proven otherwise.    He started on chemotherapy with paclitaxel and ramucirumab on 4/10/19.      INTERIM HISTORY: Daniel is here for routine follow-up.    He overall feels well. The only side effects he has noticed are fatigue with more activity, and about 24-48 hours of abdominal cramping and loose stools (about 1 more stool than his normal) following chemotherapy. He reports that it is  tolerable. He does take some THC after chemo and that seems to help him quite a bit. He otherwise hasn't had any fevers, chills, cough, SOB, chest pain, nausea, vomiting, bleeding, swelling, or any neuropathy. He remains active. He just re-seeded his lawn. Active with family (has a 2 and 7 year old at home). Going back to work in September. 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      REVIEW OF SYSTEMS:   14 point ROS was reviewed and is negative other than as noted above in HPI.       HOME MEDICATIONS:  Current Outpatient Medications   Medication Sig Dispense Refill     cyabnocobalamin (VITAMIN B-12) 2500 MCG sublingual tablet Place 2,500 mcg under the tongue daily 30 tablet 1     ferrous gluconate (FERGON) 324 (38 Fe) MG tablet Take 324 mg by mouth daily (with breakfast)       LORazepam (ATIVAN) 0.5 MG tablet Take 1 tablet (0.5 mg) by mouth every 4 hours as needed (Anxiety, Nausea/Vomiting or Sleep) (Patient not taking: Reported on 7/26/2019) 30 tablet 2     Misc Natural Product Nasal (PONARIS) SOLN Apply two drops to each nostril BID X 2 month supply 10 mL 3     multivitamin, therapeutic with minerals (MULTI-VITAMIN) TABS tablet Take 1 tablet by mouth daily Generic [place] Vitamin       mupirocin (BACTROBAN) 2 % external ointment Apply to anterior nares BID X 2 month supply 2 g 3     prochlorperazine (COMPAZINE) 10 MG tablet Take 1 tablet (10 mg) by mouth every 6 hours as needed (Nausea/Vomiting) (Patient not taking: Reported on 5/6/2019) 30 tablet 2     saccharomyces boulardii (FLORASTOR) 250 MG capsule Take 250 mg by mouth 2 times daily           ALLERGIES:  No Known Allergies      PAST MEDICAL HISTORY:  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) 6/5/2017         PAST SURGICAL HISTORY:  Past Surgical History:   Procedure Laterality Date     ESOPHAGOGASTRODUODENOSCOPY       ESOPHAGOSCOPY, GASTROSCOPY,  DUODENOSCOPY (EGD), COMBINED N/A 6/9/2017    Procedure: COMBINED ENDOSCOPIC ULTRASOUND, ESOPHAGOSCOPY, GASTROSCOPY, DUODENOSCOPY (EGD), FINE NEEDLE ASPIRATE/BIOPSY;  Upper Endoscopic Ultrasound, fine needle aspirate/biopsy;  Surgeon: Guru aMrk Avila MD;  [facility]     ESOPHAGOSCOPY, GASTROSCOPY, DUODENOSCOPY (EGD), COMBINED N/A 11/3/2017    Procedure: COMBINED ESOPHAGOSCOPY, GASTROSCOPY, DUODENOSCOPY (EGD);;  Surgeon: Yunior Esteban MD;  Location: [facility] OR     ESOPHAGOSCOPY, GASTROSCOPY, DUODENOSCOPY (EGD), COMBINED N/A 11/9/2017    Procedure: COMBINED ESOPHAGOSCOPY, GASTROSCOPY, DUODENOSCOPY (EGD);  Esophogastroduodenoscopy, take out pharangostomy tube;  Surgeon: Yunior Esteban MD;  Location: [facility] OR     ESOPHAGOSCOPY, GASTROSCOPY, DUODENOSCOPY (EGD), COMBINED N/A 1/11/2018    Procedure: COMBINED ESOPHAGOSCOPY, GASTROSCOPY, DUODENOSCOPY (EGD), BIOPSY SINGLE OR MULTIPLE;  COMBINED ESOPHAGOSCOPY, GASTROSCOPY, DUODENOSCOPY (EGD);  Surgeon: Alessandro Mcgregor MD;  Location: [facility] GI     GASTRECTOMY N/A 11/3/2017    Procedure: GASTRECTOMY;;  Surgeon: Yunior Esteban MD;  Location: [facility] OR     HAND SURGERY      childhood, torn tendon     INSERT PORT VASCULAR ACCESS Right 6/22/2017    Procedure: INSERT PORT VASCULAR ACCESS;  Single Lumen Chest Power Port;  Surgeon: Iván Driver PA-C;  [facility]     INSERT PORT VASCULAR ACCESS Right 4/8/2019    Procedure: Single Lumen Chest Port Placement;  Surgeon: Krysten Ballard PA-C;  [facility]     IR CHEST PORT PLACEMENT > 5 YRS OF AGE  4/8/2019     IR LIVER BIOPSY PERCUTANEOUS  3/20/2019     LAPAROSCOPY DIAGNOSTIC (GENERAL) N/A 11/3/2017    Procedure: LAPAROSCOPY DIAGNOSTIC (GENERAL);  diagnostic laparoscopy, right chest tube, total gastrectomy with distal esophagectomy, intrathoracic eveline-y esophago-jejunostomy, feeding jejunostomy, pharyngostomy, esophagogastroduodenoscopy, flexible bronchoscopy;  Surgeon:  Yunior Esteban MD;  [facility]     LAPAROTOMY EXPLORATORY N/A 11/3/2017    Procedure: LAPAROTOMY EXPLORATORY;;  Surgeon: Yunior Esteban MD;  [facility]     PHARYNGOSTOMY N/A 11/3/2017    Procedure: PHARYNGOSTOMY;;  Surgeon: Yunior Esteban MD;  [facility]     REMOVE PORT VASCULAR ACCESS Right 3/19/2018    Procedure: REMOVE PORT VASCULAR ACCESS;  Right Port Removal;  Surgeon: Krysten Hopper PA-C;  [facility]     THORACOTOMY Left 11/3/2017    Procedure: THORACOTOMY;;  Surgeon: Yunior Esteban MD;  Location: [facility] OR         SOCIAL HISTORY:  Social History     Socioeconomic History     Marital status:      Spouse name: Not on file     Number of children: 1     Years of education: Not on file     Highest education level: Not on file   Occupational History     Occupation: musician and teacher    Social Needs     Financial resource strain: Not on file     Food insecurity:     Worry: Not on file     Inability: Not on file     Transportation needs:     Medical: Not on file     Non-medical: Not on file   Tobacco Use     Smoking status: Never Smoker     Smokeless tobacco: Never Used   Substance and Sexual Activity     Alcohol use: Yes     Comment: 1 beer daily     Drug use: Yes     Types: Marijuana     Comment: occasional     Sexual activity: Yes     Partners: Female     Birth control/protection: Natural Family Planning   Lifestyle     Physical activity:     Days per week: Not on file     Minutes per session: Not on file     Stress: Not on file   Relationships     Social connections:     Talks on phone: Not on file     Gets together: Not on file     Attends Confucianist service: Not on file     Active member of club or organization: Not on file     Attends meetings of clubs or organizations: Not on file     Relationship status: Not on file     Intimate partner violence:     Fear of current or ex partner: Not on file     Emotionally abused: Not on file      "Physically abused: Not on file     Forced sexual activity: Not on file   Other Topics Concern     Parent/sibling w/ CABG, MI or angioplasty before 65F 55M? Not Asked   Social History Narrative     Not on file       FAMILY HISTORY:  Family History   Problem Relation Age of Onset     Other - See Comments Father         sepsis     Cancer Sister         breast cancer  31 yo 1/2 sister      Cancer Paternal Grandmother         breast         PHYSICAL EXAM:  Vital signs:  /65   Pulse 83   Temp 98.1  F (36.7  C)   Resp 16   Ht 1.753 m (5' 9\")   Wt 66.6 kg (146 lb 14.4 oz)   SpO2 99%   BMI 21.69 kg/m      ECO  GENERAL/CONSTITUTIONAL: No acute distress.     EYES: No scleral icterus.  RESPIRATORY: Clear to auscultation bilaterally.  CARDIOVASCULAR: Regular rate and rhythm.  GASTROINTESTINAL: Bowel sounds present.  Non-tender.  No distention.    MUSCULOSKELETAL: Warm and well-perfused, no cyanosis, clubbing, or edema.  NEUROLOGIC: Alert, oriented, answers questions appropriately.  INTEGUMENTARY: No jaundice.  Port in place at the right upper chest.      LABS:    Results for DANIEL SANDHU (MRN [id number]) as of 8/15/2019 09:25   Ref. Range 2019 08:46 2019 08:56 8/15/2019 08:49   WBC Latest Ref Range: 4.0 - 11.0 10e9/L 2.5 (L) 3.5 (L) 5.3   Hemoglobin Latest Ref Range: 13.3 - 17.7 g/dL 12.1 (L) 12.1 (L) 11.4 (L)   Hematocrit Latest Ref Range: 40.0 - 53.0 % 36.1 (L) 36.0 (L) 33.9 (L)   Platelet Count Latest Ref Range: 150 - 450 10e9/L 125 (L) 155 119 (L)     ASSESSMENT/PLAN:  Daniel Sandhu is a 39 year old male with:    1) Adenocarcinoma of the GE junction: now s/p 3 cycles of neoadjuvant chemotherapy with EOX, followed by surgical resection on 11/3/17.  Pathology showed adenocarcinoma, moderate differentiated, extensive residual tumor with no evidence tumor regression, margins negative, perineural invasion present, 1 of 28 lymph nodes were involved with malignancy, stage [id number] (stage IIIA).  " HER-2 is non-amplified.  He has received EOF x 2 cycles after surgery, and he has not tolerated well.  The 5-FU on cycle 5 was discontinued early. Chemotherapy was stopped at that point due to poor tolerance.    Patient now has biopsy-confirmed metastatic disease to the liver and possibly peritoneum.  He is asymptomatic currently.      [facility] One testing shows MS-stable, TMB-low (4 mut/Mb), ERBB2 amplification equivocal, and TP53 H214R alteration.  PD-L1 was negative (0%).  He was started on chemotherapy with paclitaxel+ramucirumab April 2019.    Here for consideration of cycle 5 day 8. He continues to tolerate well with mild fatigue, transient abdominal cramping with loose stools. Otherwise, feeling well. We will continue.  -will plan to get another PET-CT after 6 cycles of chemotherapy to evaluate response       Of note, Dr. Hood has discussed with Daniel that the data on metastatectomy on esophageal/gastric tumors are scant, and appears to have no overall survival benefit.  I did discuss with Dr. Avalos, and he agrees that the data is poor.  However, if patient has stable disease for at least 6 months, and no evidence of peritoneal disease, then he may consider surgery or targeted treatment to the liver.      2) Genetics:  -genetic testing was negative    3) Fertility: Daniel and his wife have 2 children, including a baby boy just born around June 2017.  He is not planning for more children in the near future.    4) Sinus drainage, occasional epistaxis, cough: Saw ENT on 7/26/19, underwent laryngoscopy, dx with velopalatal insufficiency. He was given nasal solutions (Ponaris, Bactroban), with the Ponaris being very helpful for him.    5) Abdominal cramping, diarrhea: lasts 24-48 hours after chemo, mild. He uses a THC edible if needed and that is helpful for him.    Alisa Otero PA-C

## 2019-08-15 NOTE — NURSING NOTE
"Oncology Rooming Note    August 15, 2019 9:19 AM   Daniel Sandhu is a 39 year old male who presents for:    Chief Complaint   Patient presents with     Port Draw     accessed with power needle. heparin locked, vitals checked     Oncology Clinic Visit     RETURN VISIT; ESOPHOGEAL CA FOLLOW UP      Initial Vitals: /65   Pulse 83   Temp 98.1  F (36.7  C)   Resp 16   Ht 1.753 m (5' 9\")   Wt 66.6 kg (146 lb 14.4 oz)   SpO2 99%   BMI 21.69 kg/m   Estimated body mass index is 21.69 kg/m  as calculated from the following:    Height as of this encounter: 1.753 m (5' 9\").    Weight as of this encounter: 66.6 kg (146 lb 14.4 oz). Body surface area is 1.8 meters squared.  No Pain (0) Comment: Data Unavailable   No LMP for male patient.  Allergies reviewed: Yes  Medications reviewed: Yes    Medications: Medication refills not needed today.  Pharmacy name entered into "[employer]:    [facility] - [place], [place] - [address] [facility] - [place], [place] - [address] [facility] [place], [place] - [address] [place] [address] [id number]    Clinical concerns: No new concerns today  Alisa Otero  was notified.      Kaia Smalls              "

## 2019-08-15 NOTE — NURSING NOTE
Chief Complaint   Patient presents with     Port Draw     accessed with power needle. heparin locked, vitals checked     Rhonda Ramirez RN on 8/15/2019 at 9:04 AM

## 2019-08-15 NOTE — PROGRESS NOTES
Infusion Nursing Note:  Daniel Sandhu presents today for Cycle 5, day 8 Taxol.    Patient seen by provider today: Yes: MATHEUS Gomez    Note: Patient presents to clinic today feeling well with no questions.  Pt did not request or require any intervention for pain today.    Intravenous Access:  Implanted Port.    Treatment Conditions:  Lab Results   Component Value Date    HGB 11.4 08/15/2019     Lab Results   Component Value Date    WBC 5.3 08/15/2019      Lab Results   Component Value Date    ANEU 3.9 08/15/2019     Lab Results   Component Value Date     08/15/2019      Lab Results   Component Value Date     08/15/2019                   Lab Results   Component Value Date    POTASSIUM 4.3 08/15/2019           Lab Results   Component Value Date    MAG 2.2 03/07/2018            Lab Results   Component Value Date    CR 0.74 08/15/2019                   Lab Results   Component Value Date    YOBANY 8.2 08/15/2019                Lab Results   Component Value Date    BILITOTAL 0.4 08/15/2019           Lab Results   Component Value Date    ALBUMIN 3.6 08/15/2019                    Lab Results   Component Value Date    ALT 32 08/15/2019           Lab Results   Component Value Date    AST 17 08/15/2019     Results reviewed, labs MET treatment parameters, ok to proceed with treatment.    Post Infusion Assessment:  Patient tolerated infusion without incident.  Blood return noted pre and post infusion.  Site patent and intact, free from redness, edema or discomfort.  No evidence of extravasations.  Access discontinued per protocol.    Discharge Plan:   Patient declined prescription refills.  Discharge instructions reviewed with: Patient.  Patient and/or family verbalized understanding of discharge instructions and all questions answered.  AVS to patient via [employer].  Patient will return 8/22/2019 for next appointment.   Patient discharged in stable condition accompanied by: self.  Departure Mode:  Ambulatory.    Laurence Davidson RN

## 2019-08-15 NOTE — PATIENT INSTRUCTIONS
Contact Numbers    [facility] Main Line/TRIAGE: [phone]    Call with chills and/or temperature greater than or equal to 100.5, uncontrolled nausea/vomiting, diarrhea, constipation, dizziness, shortness of breath, chest pain, bleeding, unexplained bruising, or any new/concerning symptoms, questions/concerns.     If you are having any concerning symptoms or wish to speak to a provider before your next infusion visit, please call your care coordinator or triage to notify them so we can adequately serve you.       After Hours: [phone]    If after hours, weekends, or holidays, call main hospital  and ask for Oncology doctor on call.       August 2019 Sunday Monday Tuesday Wednesday Thursday Friday Saturday                       1     2     3       4     5     6     7     8    [facility] DRAW   8:30 AM   (15 min.)   [facility]   [facility]    P ONC INFUSION 180   9:00 AM   (180 min.)    ONCOLOGY INFUSION   [facility] 9     10       11     12     13     14     15    [facility] DRAW   8:45 AM   (15 min.)    [facility] DRAW   [facility] Lab Draw    [facility] RETURN   9:05 AM   (50 min.)   Alisa Otero PA-C   [facility]    [facility] ONC INFUSION 180  10:30 AM   (180 min.)    ONCOLOGY INFUSION   [facility] 16     17       18     19     20    EAR MOLD FITTING   3:00 PM   (30 min.)   Niecy Felder AuD   [facility] Audiology 21     22    [facility] DRAW   8:30 AM   (15 min.)   [facility]   [facility] Draw    [facility] ONC INFUSION 180   9:00 AM   (180 min.)    ONCOLOGY INFUSION   [facility] 23     24       25     26     27     28     29     30     31 September 2019 Sunday Monday Tuesday Wednesday Thursday Friday Saturday   1     2     3     4    [facility] DRAW   9:00 AM   (15 min.)    [facility] DRAW   [facility]  Draw    [facility] RETURN   9:15 AM   (50 min.)   Violeta Coto PA-C   [facility]    [facility] ONC INFUSION 180  10:30 AM   (180 min.)    ONCOLOGY INFUSION   [facility] 5     6    [facility] RETURN   8:30 AM   (15 min.)   Rod Barrios MD   [facility]ose [facility] Th[facility] 7       8     9     10     11    [facility] DRAW   8:30 AM   (15 min.)    [facility] DRAW   [facility] Lab Draw    [facility] ONC INFUSION 180   9:00 AM   (180 min.)    ONCOLOGY INFUSION   [facility] 12     13     14       15     16     17     18    [facility] DRAW   8:30 AM   (15 min.)    [facility]   [facility]    [facility] ONC INFUSION 180   9:00 AM   (180 min.)    ONCOLOGY INFUSION   [facility] 19     20    PET ONCOLOGY WHOLE BODY   6:15 AM   (45 min.)   [id number]   [facility] PET CT 21       22     23    [facility] DRAW   9:15 AM   (15 min.)    [facility] DRAW   [facility] Lab Draw    [facility] RETURN   9:30 AM   (30 min.)   Laurence Hood MD   [facility] 24     25     26     27     28       29     30                                              Lab Results:  Recent Results (from the past 12 hour(s))   Ferritin    Collection Time: 08/15/19  8:48 AM   Result Value Ref Range    Ferritin 133 26 - 388 ng/mL   Vitamin B12    Collection Time: 08/15/19  8:48 AM   Result Value Ref Range    Vitamin B12 408 193 - 986 pg/mL   Comprehensive metabolic panel    Collection Time: 08/15/19  8:48 AM   Result Value Ref Range    Sodium 140 133 - 144 mmol/L    Potassium 4.3 3.4 - 5.3 mmol/L    Chloride 109 94 - 109 mmol/L    Carbon Dioxide 27 20 - 32 mmol/L    Anion Gap 5 3 - 14 mmol/L    Glucose 83 70 - 99 mg/dL    Urea Nitrogen 23 7 - 30 mg/dL    Creatinine 0.74 0.66 - 1.25 mg/dL    GFR Estimate >90 >60 mL/min/[1.73_m2]    GFR Estimate If Black >90 >60 mL/min/[1.73_m2]    Calcium 8.2 (L) 8.5 - 10.1 mg/dL     Bilirubin Total 0.4 0.2 - 1.3 mg/dL    Albumin 3.6 3.4 - 5.0 g/dL    Protein Total 6.4 (L) 6.8 - 8.8 g/dL    Alkaline Phosphatase 50 40 - 150 U/L    ALT 32 0 - 70 U/L    AST 17 0 - 45 U/L   CBC with platelets differential    Collection Time: 08/15/19  8:49 AM   Result Value Ref Range    WBC 5.3 4.0 - 11.0 10e9/L    RBC Count 3.70 (L) 4.4 - 5.9 10e12/L    Hemoglobin 11.4 (L) 13.3 - 17.7 g/dL    Hematocrit 33.9 (L) 40.0 - 53.0 %    MCV 92 78 - 100 fl    MCH 30.8 26.5 - 33.0 pg    MCHC 33.6 31.5 - 36.5 g/dL    RDW 12.5 10.0 - 15.0 %    Platelet Count 119 (L) 150 - 450 10e9/L    Diff Method Automated Method     % Neutrophils 73.4 %    % Lymphocytes 18.8 %    % Monocytes 5.1 %    % Eosinophils 1.5 %    % Basophils 0.6 %    % Immature Granulocytes 0.6 %    Nucleated RBCs 0 0 /100    Absolute Neutrophil 3.9 1.6 - 8.3 10e9/L    Absolute Lymphocytes 1.0 0.8 - 5.3 10e9/L    Absolute Monocytes 0.3 0.0 - 1.3 10e9/L    Absolute Eosinophils 0.1 0.0 - 0.7 10e9/L    Absolute Basophils 0.0 0.0 - 0.2 10e9/L    Abs Immature Granulocytes 0.0 0 - 0.4 10e9/L    Absolute Nucleated RBC 0.0

## 2019-08-16 NOTE — RESULT ENCOUNTER NOTE
The calcium and total protein tests are slightly low, but not bad.    Other labs here are all fine.    Lencho Chan MD

## 2019-08-20 ENCOUNTER — OFFICE VISIT (OUTPATIENT)
Dept: AUDIOLOGY | Facility: CLINIC | Age: 39
End: 2019-08-20

## 2019-08-20 DIAGNOSIS — Z77.122 EXPOSURE TO NOISE: Primary | ICD-10-CM

## 2019-08-20 NOTE — PROGRESS NOTES
AUDIOLOGY REPORT    BACKGROUND INFORMATION: Daniel Sandhu, 39 year old male, was seen in the Audiology Clinic at the [facility] on 8/20/2019 for a musician's earplug fitting.    TEST RESULTS AND PROCEDURES:  Otoscopy revealed clear ear canals. Patient practiced placing the musician's earplugs. He expressed understanding regarding use and care of the earplugs. He reported that the right one was not as snug as the left one following jaw movements but would try it for a few shows before deciding if they needed to be remade.    The 90 day remake policy was reviewed and he expressed understanding.      SUMMARY AND RECOMMENDATIONS:  Bilateral musician's earplugs were fit today.  Today's appointment is a no charge visit as he was previously billed for the earplugs.  Please call this clinic with questions regarding today's visit.        Edi Mcneil  Audiologist  [place] License  #[id number]

## 2019-08-21 NOTE — TELEPHONE ENCOUNTER
FUTURE VISIT INFORMATION      FUTURE VISIT INFORMATION:    Date: 10/4/19    Time: 10:20 am    Location: [facility]  REFERRAL INFORMATION:    Referring provider:  Dr. Barrios    Referring providers clinic:   [facility]    Reason for visit/diagnosis  Velopharyngeal insufficiency    RECORDS REQUESTED FROM:       Clinic name Comments Records Status Imaging Status    [facility] Office Visit-7/26/19-Dr. Barrios [facility] CT Soft Tissue Neck-3/29/19  XR Esophagram-1/9/18  XR Video Swallow-11/3/17 [facility] Upper GI-1/11/18 [facility] Upper GI-5/30/17 [facility]

## 2019-08-22 ENCOUNTER — INFUSION THERAPY VISIT (OUTPATIENT)
Dept: ONCOLOGY | Facility: CLINIC | Age: 39
End: 2019-08-22
Attending: INTERNAL MEDICINE
Payer: COMMERCIAL

## 2019-08-22 ENCOUNTER — APPOINTMENT (OUTPATIENT)
Dept: LAB | Facility: CLINIC | Age: 39
End: 2019-08-22
Attending: INTERNAL MEDICINE
Payer: COMMERCIAL

## 2019-08-22 VITALS
RESPIRATION RATE: 16 BRPM | SYSTOLIC BLOOD PRESSURE: 105 MMHG | TEMPERATURE: 98.1 F | BODY MASS INDEX: 21.74 KG/M2 | OXYGEN SATURATION: 99 % | WEIGHT: 147.2 LBS | DIASTOLIC BLOOD PRESSURE: 54 MMHG | HEART RATE: 56 BPM

## 2019-08-22 DIAGNOSIS — C15.5 MALIGNANT NEOPLASM OF LOWER THIRD OF ESOPHAGUS (H): Primary | ICD-10-CM

## 2019-08-22 LAB
ALBUMIN UR-MCNC: NEGATIVE MG/DL
BASOPHILS # BLD AUTO: 0 10E9/L (ref 0–0.2)
BASOPHILS NFR BLD AUTO: 1 %
DIFFERENTIAL METHOD BLD: ABNORMAL
EOSINOPHIL # BLD AUTO: 0.1 10E9/L (ref 0–0.7)
EOSINOPHIL NFR BLD AUTO: 2.5 %
ERYTHROCYTE [DISTWIDTH] IN BLOOD BY AUTOMATED COUNT: 12.6 % (ref 10–15)
HCT VFR BLD AUTO: 34.8 % (ref 40–53)
HGB BLD-MCNC: 11.8 G/DL (ref 13.3–17.7)
IMM GRANULOCYTES # BLD: 0 10E9/L (ref 0–0.4)
IMM GRANULOCYTES NFR BLD: 0.3 %
LYMPHOCYTES # BLD AUTO: 0.8 10E9/L (ref 0.8–5.3)
LYMPHOCYTES NFR BLD AUTO: 23.9 %
MCH RBC QN AUTO: 31.3 PG (ref 26.5–33)
MCHC RBC AUTO-ENTMCNC: 33.9 G/DL (ref 31.5–36.5)
MCV RBC AUTO: 92 FL (ref 78–100)
MONOCYTES # BLD AUTO: 0.2 10E9/L (ref 0–1.3)
MONOCYTES NFR BLD AUTO: 7 %
NEUTROPHILS # BLD AUTO: 2.1 10E9/L (ref 1.6–8.3)
NEUTROPHILS NFR BLD AUTO: 65.3 %
NRBC # BLD AUTO: 0 10*3/UL
NRBC BLD AUTO-RTO: 0 /100
PLATELET # BLD AUTO: 121 10E9/L (ref 150–450)
RBC # BLD AUTO: 3.77 10E12/L (ref 4.4–5.9)
WBC # BLD AUTO: 3.1 10E9/L (ref 4–11)

## 2019-08-22 PROCEDURE — 25000128 H RX IP 250 OP 636: Mod: ZF | Performed by: INTERNAL MEDICINE

## 2019-08-22 PROCEDURE — 85025 COMPLETE CBC W/AUTO DIFF WBC: CPT | Performed by: INTERNAL MEDICINE

## 2019-08-22 PROCEDURE — 96417 CHEMO IV INFUS EACH ADDL SEQ: CPT

## 2019-08-22 PROCEDURE — 25800030 ZZH RX IP 258 OP 636: Mod: ZF | Performed by: INTERNAL MEDICINE

## 2019-08-22 PROCEDURE — 96413 CHEMO IV INFUSION 1 HR: CPT

## 2019-08-22 PROCEDURE — 81003 URINALYSIS AUTO W/O SCOPE: CPT | Performed by: INTERNAL MEDICINE

## 2019-08-22 PROCEDURE — 25000132 ZZH RX MED GY IP 250 OP 250 PS 637: Mod: ZF | Performed by: INTERNAL MEDICINE

## 2019-08-22 PROCEDURE — 25000125 ZZHC RX 250: Mod: ZF | Performed by: INTERNAL MEDICINE

## 2019-08-22 PROCEDURE — 96375 TX/PRO/DX INJ NEW DRUG ADDON: CPT

## 2019-08-22 RX ORDER — HEPARIN SODIUM (PORCINE) LOCK FLUSH IV SOLN 100 UNIT/ML 100 UNIT/ML
5 SOLUTION INTRAVENOUS ONCE
Status: COMPLETED | OUTPATIENT
Start: 2019-08-22 | End: 2019-08-22

## 2019-08-22 RX ORDER — HEPARIN SODIUM (PORCINE) LOCK FLUSH IV SOLN 100 UNIT/ML 100 UNIT/ML
500 SOLUTION INTRAVENOUS ONCE
Status: COMPLETED | OUTPATIENT
Start: 2019-08-22 | End: 2019-08-22

## 2019-08-22 RX ORDER — DIPHENHYDRAMINE HCL 25 MG
25 CAPSULE ORAL ONCE
Status: COMPLETED | OUTPATIENT
Start: 2019-08-22 | End: 2019-08-22

## 2019-08-22 RX ADMIN — DIPHENHYDRAMINE HYDROCHLORIDE 25 MG: 25 CAPSULE ORAL at 09:37

## 2019-08-22 RX ADMIN — HEPARIN 5 ML: 100 SYRINGE at 08:58

## 2019-08-22 RX ADMIN — SODIUM CHLORIDE 250 ML: 9 INJECTION, SOLUTION INTRAVENOUS at 09:43

## 2019-08-22 RX ADMIN — PACLITAXEL 145 MG: 6 INJECTION, SOLUTION INTRAVENOUS at 11:09

## 2019-08-22 RX ADMIN — SODIUM CHLORIDE 500 MG: 9 INJECTION, SOLUTION INTRAVENOUS at 10:06

## 2019-08-22 RX ADMIN — DEXAMETHASONE SODIUM PHOSPHATE 20 MG: 10 INJECTION, SOLUTION INTRAMUSCULAR; INTRAVENOUS at 09:46

## 2019-08-22 RX ADMIN — HEPARIN 500 UNITS: 100 SYRINGE at 12:12

## 2019-08-22 RX ADMIN — FAMOTIDINE 20 MG: 10 INJECTION INTRAVENOUS at 09:43

## 2019-08-22 ASSESSMENT — PAIN SCALES - GENERAL: PAINLEVEL: NO PAIN (0)

## 2019-08-22 NOTE — NURSING NOTE
"Chief Complaint   Patient presents with     Port Draw     labs drawn via port by rn. vs taken      Port accessed by RN with 20 G 3/4\" gripper needle, labs drawn from port in lab. Flushed with saline and heparin. VS taken. Pt checked into next appointment.   Isha Rose, MELECIO     "

## 2019-08-22 NOTE — PROGRESS NOTES
Infusion Nursing Note:  Daniel Sandhu presents today for Cycle 5 Day 15 Cyramza and Taxol.    Patient seen by provider today: No   present during visit today: Not Applicable.    Note: Daniel arrives to infusion feeling well overall. He denies dizziness, lightheadedness, cough, SOB, N/V, constipation, diarrhea, fever, or chills. He offers no new complaints or concerns at this time.    Intravenous Access:  Implanted Port.    Treatment Conditions:  Lab Results   Component Value Date    HGB 11.8 08/22/2019     Lab Results   Component Value Date    WBC 3.1 08/22/2019      Lab Results   Component Value Date    ANEU 2.1 08/22/2019     Lab Results   Component Value Date     08/22/2019      Urine protein: Negative  BP: 105/54    Post Infusion Assessment:  Patient tolerated infusion without incident.  Blood return noted pre and post infusion.  Site patent and intact, free from redness, edema or discomfort.  No evidence of extravasations.  Access discontinued per protocol.     Discharge Plan:   Patient declined prescription refills.  Patient and/or family verbalized understanding of discharge instructions and all questions answered.  AVS to patient via [employer].  Patient will return 9/4/2019 for next appointment.   Patient discharged in stable condition.  Departure Mode: Ambulatory.    Phillip Ambrose RN

## 2019-08-22 NOTE — PATIENT INSTRUCTIONS
[facility] Main Line (triage and scheduling): [phone]    Call triage nurse with chills and/or temperature greater than or equal to 100.4, uncontrolled nausea/vomiting, diarrhea, constipation, dizziness, shortness of breath, chest pain, bleeding, unexplained bruising, or any new/concerning symptoms, questions/concerns.     If you are having any concerning symptoms or wish to speak to a provider before your next infusion visit, please call your care coordinator or triage to notify them so we can adequately serve you.

## 2019-08-26 ENCOUNTER — OFFICE VISIT (OUTPATIENT)
Dept: AUDIOLOGY | Facility: CLINIC | Age: 39
End: 2019-08-26
Payer: COMMERCIAL

## 2019-08-26 DIAGNOSIS — H90.42 SENSORINEURAL HEARING LOSS (SNHL) OF LEFT EAR WITH UNRESTRICTED HEARING OF RIGHT EAR: Primary | ICD-10-CM

## 2019-08-26 NOTE — PROGRESS NOTES
AUDIOLOGY REPORT    BACKGROUND INFORMATION: Daniel Sandhu, 39 year old male, was seen in Audiology at the [facility] on 7/30/2019 for an new right earmold impression.  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  He had earmold made, but the right does not fit well    TEST RESULTS AND PROCEDURES:  Otoscopy revealed clear ear canals.  Earmold impressions was taken without incident.  The custom filtered earmold will be remade from [employer] (green/yelllow/clear, ER-15 clear filters) and the earmold will be sent to the patient.  This is a warranty no charge visit.    SUMMARY AND RECOMMENDATIONS: Right earmold impression obtained and customed remake ordered, and sent to the patient.  Please call this clinic with questions regarding today's visit.      Zaki Menjivar, CCC-A  Licensed Audiologist  [place] #[id number]

## 2019-09-04 ENCOUNTER — INFUSION THERAPY VISIT (OUTPATIENT)
Dept: ONCOLOGY | Facility: CLINIC | Age: 39
End: 2019-09-04
Attending: INTERNAL MEDICINE
Payer: COMMERCIAL

## 2019-09-04 ENCOUNTER — APPOINTMENT (OUTPATIENT)
Dept: LAB | Facility: CLINIC | Age: 39
End: 2019-09-04
Attending: INTERNAL MEDICINE
Payer: COMMERCIAL

## 2019-09-04 ENCOUNTER — ONCOLOGY VISIT (OUTPATIENT)
Dept: ONCOLOGY | Facility: CLINIC | Age: 39
End: 2019-09-04
Attending: PHYSICIAN ASSISTANT
Payer: COMMERCIAL

## 2019-09-04 VITALS
WEIGHT: 146.5 LBS | BODY MASS INDEX: 21.63 KG/M2 | RESPIRATION RATE: 18 BRPM | SYSTOLIC BLOOD PRESSURE: 110 MMHG | HEART RATE: 58 BPM | DIASTOLIC BLOOD PRESSURE: 68 MMHG | TEMPERATURE: 98 F | OXYGEN SATURATION: 100 %

## 2019-09-04 DIAGNOSIS — C15.5 MALIGNANT NEOPLASM OF LOWER THIRD OF ESOPHAGUS (H): Primary | ICD-10-CM

## 2019-09-04 DIAGNOSIS — C15.5 MALIGNANT NEOPLASM OF LOWER THIRD OF ESOPHAGUS (H): ICD-10-CM

## 2019-09-04 PROBLEM — T45.1X5A CHEMOTHERAPY-INDUCED NAUSEA: Status: RESOLVED | Noted: 2017-12-18 | Resolved: 2019-09-04

## 2019-09-04 PROBLEM — B37.0 THRUSH: Status: RESOLVED | Noted: 2017-09-25 | Resolved: 2019-09-04

## 2019-09-04 PROBLEM — R11.0 CHEMOTHERAPY-INDUCED NAUSEA: Status: RESOLVED | Noted: 2017-12-18 | Resolved: 2019-09-04

## 2019-09-04 PROBLEM — R53.1 WEAKNESS: Status: RESOLVED | Noted: 2018-01-09 | Resolved: 2019-09-04

## 2019-09-04 LAB
ALBUMIN SERPL-MCNC: 3.8 G/DL (ref 3.4–5)
ALBUMIN UR-MCNC: NEGATIVE MG/DL
ALP SERPL-CCNC: 47 U/L (ref 40–150)
ALT SERPL W P-5'-P-CCNC: 29 U/L (ref 0–70)
AST SERPL W P-5'-P-CCNC: 19 U/L (ref 0–45)
BASOPHILS # BLD AUTO: 0 10E9/L (ref 0–0.2)
BASOPHILS NFR BLD AUTO: 1 %
BILIRUB DIRECT SERPL-MCNC: 0.1 MG/DL (ref 0–0.2)
BILIRUB SERPL-MCNC: 0.4 MG/DL (ref 0.2–1.3)
CEA SERPL-MCNC: <0.5 UG/L (ref 0–2.5)
DIFFERENTIAL METHOD BLD: ABNORMAL
EOSINOPHIL # BLD AUTO: 0.1 10E9/L (ref 0–0.7)
EOSINOPHIL NFR BLD AUTO: 1.7 %
ERYTHROCYTE [DISTWIDTH] IN BLOOD BY AUTOMATED COUNT: 13 % (ref 10–15)
HCT VFR BLD AUTO: 36.3 % (ref 40–53)
HGB BLD-MCNC: 12.2 G/DL (ref 13.3–17.7)
IMM GRANULOCYTES # BLD: 0 10E9/L (ref 0–0.4)
IMM GRANULOCYTES NFR BLD: 0.3 %
LYMPHOCYTES # BLD AUTO: 1.2 10E9/L (ref 0.8–5.3)
LYMPHOCYTES NFR BLD AUTO: 38.5 %
MCH RBC QN AUTO: 30.8 PG (ref 26.5–33)
MCHC RBC AUTO-ENTMCNC: 33.6 G/DL (ref 31.5–36.5)
MCV RBC AUTO: 92 FL (ref 78–100)
MONOCYTES # BLD AUTO: 0.5 10E9/L (ref 0–1.3)
MONOCYTES NFR BLD AUTO: 16.6 %
NEUTROPHILS # BLD AUTO: 1.3 10E9/L (ref 1.6–8.3)
NEUTROPHILS NFR BLD AUTO: 41.9 %
NRBC # BLD AUTO: 0 10*3/UL
NRBC BLD AUTO-RTO: 0 /100
PLATELET # BLD AUTO: 141 10E9/L (ref 150–450)
PROT SERPL-MCNC: 6.9 G/DL (ref 6.8–8.8)
RBC # BLD AUTO: 3.96 10E12/L (ref 4.4–5.9)
WBC # BLD AUTO: 3 10E9/L (ref 4–11)

## 2019-09-04 PROCEDURE — 81003 URINALYSIS AUTO W/O SCOPE: CPT | Performed by: INTERNAL MEDICINE

## 2019-09-04 PROCEDURE — 96413 CHEMO IV INFUSION 1 HR: CPT

## 2019-09-04 PROCEDURE — 25800030 ZZH RX IP 258 OP 636: Mod: ZF | Performed by: PHYSICIAN ASSISTANT

## 2019-09-04 PROCEDURE — 82378 CARCINOEMBRYONIC ANTIGEN: CPT | Performed by: INTERNAL MEDICINE

## 2019-09-04 PROCEDURE — 25000132 ZZH RX MED GY IP 250 OP 250 PS 637: Mod: ZF | Performed by: PHYSICIAN ASSISTANT

## 2019-09-04 PROCEDURE — 85025 COMPLETE CBC W/AUTO DIFF WBC: CPT | Performed by: INTERNAL MEDICINE

## 2019-09-04 PROCEDURE — 96375 TX/PRO/DX INJ NEW DRUG ADDON: CPT

## 2019-09-04 PROCEDURE — 99214 OFFICE O/P EST MOD 30 MIN: CPT | Mod: ZP | Performed by: PHYSICIAN ASSISTANT

## 2019-09-04 PROCEDURE — 25000125 ZZHC RX 250: Mod: ZF | Performed by: PHYSICIAN ASSISTANT

## 2019-09-04 PROCEDURE — 80076 HEPATIC FUNCTION PANEL: CPT | Performed by: INTERNAL MEDICINE

## 2019-09-04 PROCEDURE — 96417 CHEMO IV INFUS EACH ADDL SEQ: CPT

## 2019-09-04 PROCEDURE — 25000128 H RX IP 250 OP 636: Mod: ZF | Performed by: PHYSICIAN ASSISTANT

## 2019-09-04 PROCEDURE — G0463 HOSPITAL OUTPT CLINIC VISIT: HCPCS | Mod: ZF

## 2019-09-04 RX ORDER — METHYLPREDNISOLONE SODIUM SUCCINATE 125 MG/2ML
125 INJECTION, POWDER, LYOPHILIZED, FOR SOLUTION INTRAMUSCULAR; INTRAVENOUS
Status: CANCELLED
Start: 2019-09-11

## 2019-09-04 RX ORDER — EPINEPHRINE 1 MG/ML
0.3 INJECTION, SOLUTION INTRAMUSCULAR; SUBCUTANEOUS EVERY 5 MIN PRN
Status: CANCELLED | OUTPATIENT
Start: 2019-09-11

## 2019-09-04 RX ORDER — ALBUTEROL SULFATE 0.83 MG/ML
2.5 SOLUTION RESPIRATORY (INHALATION)
Status: CANCELLED | OUTPATIENT
Start: 2019-09-04

## 2019-09-04 RX ORDER — EPINEPHRINE 1 MG/ML
0.3 INJECTION, SOLUTION INTRAMUSCULAR; SUBCUTANEOUS EVERY 5 MIN PRN
Status: CANCELLED | OUTPATIENT
Start: 2019-09-18

## 2019-09-04 RX ORDER — DIPHENHYDRAMINE HCL 25 MG
25 CAPSULE ORAL ONCE
Status: CANCELLED
Start: 2019-09-04

## 2019-09-04 RX ORDER — DIPHENHYDRAMINE HYDROCHLORIDE 50 MG/ML
50 INJECTION INTRAMUSCULAR; INTRAVENOUS
Status: CANCELLED
Start: 2019-09-04

## 2019-09-04 RX ORDER — HEPARIN SODIUM (PORCINE) LOCK FLUSH IV SOLN 100 UNIT/ML 100 UNIT/ML
500 SOLUTION INTRAVENOUS ONCE
Status: CANCELLED
Start: 2019-09-11

## 2019-09-04 RX ORDER — SODIUM CHLORIDE 9 MG/ML
1000 INJECTION, SOLUTION INTRAVENOUS CONTINUOUS PRN
Status: CANCELLED
Start: 2019-09-11

## 2019-09-04 RX ORDER — ALBUTEROL SULFATE 90 UG/1
1-2 AEROSOL, METERED RESPIRATORY (INHALATION)
Status: CANCELLED
Start: 2019-09-11

## 2019-09-04 RX ORDER — ALBUTEROL SULFATE 0.83 MG/ML
2.5 SOLUTION RESPIRATORY (INHALATION)
Status: CANCELLED | OUTPATIENT
Start: 2019-09-18

## 2019-09-04 RX ORDER — EPINEPHRINE 1 MG/ML
0.3 INJECTION, SOLUTION INTRAMUSCULAR; SUBCUTANEOUS EVERY 5 MIN PRN
Status: CANCELLED | OUTPATIENT
Start: 2019-09-04

## 2019-09-04 RX ORDER — ALBUTEROL SULFATE 90 UG/1
1-2 AEROSOL, METERED RESPIRATORY (INHALATION)
Status: CANCELLED
Start: 2019-09-18

## 2019-09-04 RX ORDER — MEPERIDINE HYDROCHLORIDE 25 MG/ML
25 INJECTION INTRAMUSCULAR; INTRAVENOUS; SUBCUTANEOUS EVERY 30 MIN PRN
Status: CANCELLED | OUTPATIENT
Start: 2019-09-04

## 2019-09-04 RX ORDER — LORAZEPAM 2 MG/ML
0.5 INJECTION INTRAMUSCULAR EVERY 4 HOURS PRN
Status: CANCELLED
Start: 2019-09-04

## 2019-09-04 RX ORDER — LORAZEPAM 2 MG/ML
0.5 INJECTION INTRAMUSCULAR EVERY 4 HOURS PRN
Status: CANCELLED
Start: 2019-09-11

## 2019-09-04 RX ORDER — MEPERIDINE HYDROCHLORIDE 25 MG/ML
25 INJECTION INTRAMUSCULAR; INTRAVENOUS; SUBCUTANEOUS EVERY 30 MIN PRN
Status: CANCELLED | OUTPATIENT
Start: 2019-09-18

## 2019-09-04 RX ORDER — DIPHENHYDRAMINE HYDROCHLORIDE 50 MG/ML
50 INJECTION INTRAMUSCULAR; INTRAVENOUS
Status: CANCELLED
Start: 2019-09-18

## 2019-09-04 RX ORDER — HEPARIN SODIUM (PORCINE) LOCK FLUSH IV SOLN 100 UNIT/ML 100 UNIT/ML
500 SOLUTION INTRAVENOUS ONCE
Status: DISCONTINUED | OUTPATIENT
Start: 2019-09-04 | End: 2019-09-04 | Stop reason: HOSPADM

## 2019-09-04 RX ORDER — HEPARIN SODIUM (PORCINE) LOCK FLUSH IV SOLN 100 UNIT/ML 100 UNIT/ML
500 SOLUTION INTRAVENOUS ONCE
Status: CANCELLED
Start: 2019-09-04

## 2019-09-04 RX ORDER — SODIUM CHLORIDE 9 MG/ML
1000 INJECTION, SOLUTION INTRAVENOUS CONTINUOUS PRN
Status: CANCELLED
Start: 2019-09-04

## 2019-09-04 RX ORDER — EPINEPHRINE 0.3 MG/.3ML
0.3 INJECTION SUBCUTANEOUS EVERY 5 MIN PRN
Status: CANCELLED | OUTPATIENT
Start: 2019-09-18

## 2019-09-04 RX ORDER — SODIUM CHLORIDE 9 MG/ML
1000 INJECTION, SOLUTION INTRAVENOUS CONTINUOUS PRN
Status: CANCELLED
Start: 2019-09-18

## 2019-09-04 RX ORDER — METHYLPREDNISOLONE SODIUM SUCCINATE 125 MG/2ML
125 INJECTION, POWDER, LYOPHILIZED, FOR SOLUTION INTRAMUSCULAR; INTRAVENOUS
Status: CANCELLED
Start: 2019-09-18

## 2019-09-04 RX ORDER — EPINEPHRINE 0.3 MG/.3ML
0.3 INJECTION SUBCUTANEOUS EVERY 5 MIN PRN
Status: CANCELLED | OUTPATIENT
Start: 2019-09-04

## 2019-09-04 RX ORDER — METHYLPREDNISOLONE SODIUM SUCCINATE 125 MG/2ML
125 INJECTION, POWDER, LYOPHILIZED, FOR SOLUTION INTRAMUSCULAR; INTRAVENOUS
Status: CANCELLED
Start: 2019-09-04

## 2019-09-04 RX ORDER — DIPHENHYDRAMINE HCL 25 MG
25 CAPSULE ORAL ONCE
Status: CANCELLED
Start: 2019-09-18

## 2019-09-04 RX ORDER — HEPARIN SODIUM (PORCINE) LOCK FLUSH IV SOLN 100 UNIT/ML 100 UNIT/ML
500 SOLUTION INTRAVENOUS ONCE
Status: CANCELLED
Start: 2019-09-18

## 2019-09-04 RX ORDER — MEPERIDINE HYDROCHLORIDE 25 MG/ML
25 INJECTION INTRAMUSCULAR; INTRAVENOUS; SUBCUTANEOUS EVERY 30 MIN PRN
Status: CANCELLED | OUTPATIENT
Start: 2019-09-11

## 2019-09-04 RX ORDER — DIPHENHYDRAMINE HYDROCHLORIDE 50 MG/ML
50 INJECTION INTRAMUSCULAR; INTRAVENOUS
Status: CANCELLED
Start: 2019-09-11

## 2019-09-04 RX ORDER — EPINEPHRINE 0.3 MG/.3ML
0.3 INJECTION SUBCUTANEOUS EVERY 5 MIN PRN
Status: CANCELLED | OUTPATIENT
Start: 2019-09-11

## 2019-09-04 RX ORDER — LORAZEPAM 2 MG/ML
0.5 INJECTION INTRAMUSCULAR EVERY 4 HOURS PRN
Status: CANCELLED
Start: 2019-09-18

## 2019-09-04 RX ORDER — DIPHENHYDRAMINE HCL 25 MG
25 CAPSULE ORAL ONCE
Status: COMPLETED | OUTPATIENT
Start: 2019-09-04 | End: 2019-09-04

## 2019-09-04 RX ORDER — ALBUTEROL SULFATE 0.83 MG/ML
2.5 SOLUTION RESPIRATORY (INHALATION)
Status: CANCELLED | OUTPATIENT
Start: 2019-09-11

## 2019-09-04 RX ORDER — ALBUTEROL SULFATE 90 UG/1
1-2 AEROSOL, METERED RESPIRATORY (INHALATION)
Status: CANCELLED
Start: 2019-09-04

## 2019-09-04 RX ORDER — HEPARIN SODIUM (PORCINE) LOCK FLUSH IV SOLN 100 UNIT/ML 100 UNIT/ML
5 SOLUTION INTRAVENOUS EVERY 8 HOURS PRN
Status: DISCONTINUED | OUTPATIENT
Start: 2019-09-04 | End: 2019-09-04 | Stop reason: HOSPADM

## 2019-09-04 RX ADMIN — SODIUM CHLORIDE 500 MG: 9 INJECTION, SOLUTION INTRAVENOUS at 11:07

## 2019-09-04 RX ADMIN — DIPHENHYDRAMINE HYDROCHLORIDE 25 MG: 25 CAPSULE ORAL at 10:45

## 2019-09-04 RX ADMIN — FAMOTIDINE 20 MG: 10 INJECTION INTRAVENOUS at 10:45

## 2019-09-04 RX ADMIN — DEXAMETHASONE SODIUM PHOSPHATE 20 MG: 10 INJECTION, SOLUTION INTRAMUSCULAR; INTRAVENOUS at 10:53

## 2019-09-04 RX ADMIN — SODIUM CHLORIDE 250 ML: 9 INJECTION, SOLUTION INTRAVENOUS at 10:45

## 2019-09-04 RX ADMIN — PACLITAXEL 145 MG: 6 INJECTION, SOLUTION INTRAVENOUS at 12:11

## 2019-09-04 RX ADMIN — HEPARIN SODIUM (PORCINE) LOCK FLUSH IV SOLN 100 UNIT/ML 5 ML: 100 SOLUTION at 13:15

## 2019-09-04 RX ADMIN — HEPARIN SODIUM (PORCINE) LOCK FLUSH IV SOLN 100 UNIT/ML 5 ML: 100 SOLUTION at 09:37

## 2019-09-04 ASSESSMENT — PAIN SCALES - GENERAL: PAINLEVEL: NO PAIN (0)

## 2019-09-04 NOTE — PROGRESS NOTES
[facility]    Hematology/Oncology Established Patient Note    Today's Date: Sep 4, 2019    Reason for Follow-up: adenocarcinoma of the GE junction      HISTORY OF PRESENT ILLNESS: Daniel Sandhu is a 39 year old male who presents with adenocarcinoma of the GE junction.  In early 2017, patient started noticing difficulty swallowing, and that food seems to take longer to go down.  It became more frequent, and he saw his PCP, and was referred for EGD, which showed an esophageal mass located at the GE junction, measuring 4 cm.  Biopsy showed poorly differentiated adenocarcinoma.  HER-2 was sent and is equivocal (2+).  CT c/a/p showed a mildly prominent lymph node in the gastrohepatic ligament, but there were no pathologically enlarged lymph nodes in the chest, abdomen, or pelvis.  There was otherwise no evidence of metastatic disease.  PET-CT showed thickening of the distal esophagus consistent with known esophageal adenocarcinoma, as well as mildly hypermetabolic 1 cm lymph node in the gastrohepatic ligament.  There was no evidence of distant metastatic disease.  He underwent EUS on 6/9/17, which showed the esophageal tumor in the lower third of the esophagus, staged T2NX.  There were 2 abnormal lymph nodes seen in the gastrohepatic ligament; pathology was suspicious for adenocarcinoma.  Celiac node was sampled as well, which was benign.  He was seen by surgery, Dr. Esteban, and recommended srinivas-operative chemotherapy.    Port was placed on 6/22/17.  It was removed on 3/19/18.    He underwent chemotherapy with epirubicin, oxaliplatin, and capecitabine x 3 cycles 6/26/17-8/7/17.      On 11/3/17, he underwent laparotomy with total gastrectomy and abdominal lymphadenectomy, left thoracotomy with distal esophagectomy and intrathoracic Terrell-en-Y esophagojejunostomy, feeding jejunostomy, left pharyngostomy tube placement, right tube thoracostomy, and flexible bronchoscopy.  On 11/9/17, he was taken  back to OR for I&D of pharyngostomy tube abscess.  Pathology showed adenocarcinoma, moderate differentiated, extensive residual tumor with no evidence tumor regression, margins negative, perineural invasion present, 1 of 28 lymph nodes were involved with malignancy, stage [id number] (stage IIIA).  HER-2 is non-amplified.    He resumed chemotherapy post-surgery, with plans to complete 3 more cycles, with 5-FU, epirubicin, oxaliplatin.  However, he only completed 2 more cycles, due to poor tolerance.  He was admitted to the hospital 1/9/18-1/13/18 for nausea, diarrhea, failure to thrive, severe malnutrition, generalized weakness.  His infusional chemotherapy was stopped on 1/8/18.  He underwent EGD in the hospital on 1/11/18, which showed ulcers, and no evidence of recurrence of his cancer.  One area biopsied showed inflammation, no evidence of malignancy.    He saw Dr. Esteban on 3/6/19 and had CT abdomen/pelvis done, which showed a 3.1 cm lesion in hepatic segment 3.  He underwent biopsy on 3/20/19 by IR, which showed moderately differentiated adenocarcinoma consistent with metastasis from primary esophageal carcinoma.  HER-2 is non-amplified.  Restaging PET scan on 3/29/19 showed the 4.4 cm left lobe liver metastasis.  There is nodular foci of hypermetabolic uptake in the left mid abdomen in relation to the small bowel loops, without definite underlying lesion on CT.  This is favored to represent metastatic disease unless proven otherwise.    He started on chemotherapy with paclitaxel and ramucirumab on 4/10/19.    INTERIM HISTORY: Daniel is here for routine follow-up.  He is doing well today.  He reports that his nose has been doing much better since working with ENT but he does still feel a particular area in his left throat that feels dry or sore.  He will be having follow-up with ENT later this week.  He continues to have some fatigue for the first few days following chemotherapy.  He reports no issues with his  bowels.  He denies any issues with eating and drinking.  He plans to see [place] on September 27 for a second opinion.  He denies other concerns.    HOME MEDICATIONS:  Current Outpatient Medications   Medication Sig Dispense Refill     cyabnocobalamin (VITAMIN B-12) 2500 MCG sublingual tablet Place 2,500 mcg under the tongue daily 30 tablet 1     ferrous gluconate (FERGON) 324 (38 Fe) MG tablet Take 324 mg by mouth daily (with breakfast)       Misc Natural Product Nasal (PONARIS) SOLN Apply two drops to each nostril BID X 2 month supply 10 mL 3     multivitamin, therapeutic with minerals (MULTI-VITAMIN) TABS tablet Take 1 tablet by mouth daily Generic [place] Vitamin       mupirocin (BACTROBAN) 2 % external ointment Apply to anterior nares BID X 2 month supply 2 g 3     saccharomyces boulardii (FLORASTOR) 250 MG capsule Take 250 mg by mouth 2 times daily       LORazepam (ATIVAN) 0.5 MG tablet Take 1 tablet (0.5 mg) by mouth every 4 hours as needed (Anxiety, Nausea/Vomiting or Sleep) (Patient not taking: Reported on 7/26/2019) 30 tablet 2     prochlorperazine (COMPAZINE) 10 MG tablet Take 1 tablet (10 mg) by mouth every 6 hours as needed (Nausea/Vomiting) (Patient not taking: Reported on 5/6/2019) 30 tablet 2     PHYSICAL EXAM:  General: The patient is a pleasant male in no acute distress.  /68 (BP Location: Right arm, Patient Position: Sitting, Cuff Size: Adult Regular)   Pulse 58   Temp 98  F (36.7  C) (Oral)   Resp 18   Wt 66.5 kg (146 lb 8 oz)   SpO2 100%   BMI 21.63 kg/m    Wt Readings from Last 10 Encounters:   09/04/19 66.5 kg (146 lb 8 oz)   08/22/19 66.8 kg (147 lb 3.2 oz)   08/15/19 66.6 kg (146 lb 14.4 oz)   08/08/19 66.7 kg (147 lb)   07/26/19 66.7 kg (147 lb)   07/25/19 66.2 kg (146 lb)   07/18/19 66.2 kg (145 lb 14.4 oz)   07/11/19 68 kg (149 lb 14.4 oz)   07/01/19 68.4 kg (150 lb 12.7 oz)   06/20/19 67.6 kg (149 lb 1.6 oz)   HEENT: EOMI, PERRL. Sclerae are anicteric. Oral mucosa is pink and  moist with no lesions or thrush.   Lymph: Neck is supple with no lymphadenopathy in the cervical or supraclavicular areas.   Heart: Regular rate and rhythm.   Lungs: Clear to auscultation bilaterally.   Abdomen: Bowel sounds present, soft, nontender with no palpable hepatosplenomegaly or masses.   Extremities: No lower extremity edema noted bilaterally.   Neuro: Cranial nerves II through XII are grossly intact.  Skin: No rashes, petechiae, or bruising noted on exposed skin.    LABS:   9/4/2019 09:45   Albumin 3.8   Protein Total 6.9   Bilirubin Total 0.4   Alkaline Phosphatase 47   ALT 29   AST 19   Bilirubin Direct 0.1   WBC 3.0 (L)   Hemoglobin 12.2 (L)   Hematocrit 36.3 (L)   Platelet Count 141 (L)   RBC Count 3.96 (L)   MCV 92   MCH 30.8   MCHC 33.6   RDW 13.0   Diff Method Automated Method   % Neutrophils 41.9   % Lymphocytes 38.5   % Monocytes 16.6   % Eosinophils 1.7   % Basophils 1.0   % Immature Granulocytes 0.3   Nucleated RBCs 0   Absolute Neutrophil 1.3 (L)   Absolute Lymphocytes 1.2   Absolute Monocytes 0.5   Absolute Eosinophils 0.1   Absolute Basophils 0.0   Abs Immature Granulocytes 0.0   Absolute Nucleated RBC 0.0   Protein Albumin Urine Negative       ASSESSMENT/PLAN:      1) Adenocarcinoma of the GE junction: now s/p 3 cycles of neoadjuvant chemotherapy with EOX, followed by surgical resection on 11/3/17.  Pathology showed adenocarcinoma, moderate differentiated, extensive residual tumor with no evidence tumor regression, margins negative, perineural invasion present, 1 of 28 lymph nodes were involved with malignancy, stage [id number] (stage IIIA).  HER-2 is non-amplified.    He has received EOF x 2 cycles after surgery, and he has not tolerated well.  The 5-FU on cycle 5 was discontinued early. Chemotherapy was stopped at that point due to poor tolerance.    Patient now has biopsy-confirmed metastatic disease to the liver and possibly peritoneum.  He is asymptomatic currently.      [facility]  testing shows MS-stable, TMB-low (4 mut/Mb), ERBB2 amplification equivocal, and TP53 H214R alteration.  PD-L1 was negative (0%).  He is planning to be seen at [facility] on 9/27.      He is doing well today and will continue with cycle 6 of  paclitaxel+ramucirumab. He will have a PET/CT after this cycle and follow up with Dr. Hood to review.     2) Genetics:  -genetic testing was negative    3) Fertility: Daniel and his wife have 2 children, including a baby boy born around June 2017.  He is not planning for more children in the near future.    4) Epistaxis: Much improved, but still has mild left sore throat. He will see ENT in follow up again later this week.     Violeta Coto PA-C  [facility]  [address], [place] [id number] [phone]

## 2019-09-04 NOTE — PROGRESS NOTES
Infusion Nursing Note:  Daniel Sandhu presents today for Cycle 6 Day 1 Cyramza, Taxol.    Patient seen by provider today: Yes: Violeta JARVIS    Treatment Conditions:  Lab Results   Component Value Date    HGB 12.2 09/04/2019     Lab Results   Component Value Date    WBC 3.0 09/04/2019      Lab Results   Component Value Date    ANEU 1.3 09/04/2019     Lab Results   Component Value Date     09/04/2019        Lab Results   Component Value Date    BILITOTAL 0.4 09/04/2019           Lab Results   Component Value Date    ALBUMIN 3.8 09/04/2019                    Lab Results   Component Value Date    ALT 29 09/04/2019           Lab Results   Component Value Date    AST 19 09/04/2019       Results reviewed, labs MET treatment parameters, ok to proceed with treatment.      Note: Urine protein: negative   BP: 110/68    9/4/19 TORB Violeta JARVIS / Ade North RN: OK to give chemo today with ANC 1.3.    Neutropenic precautions reviewed with pt. Instructed to call day/night with chills/temp >=100.4.    Intravenous Access:  Implanted Port.    Post Infusion Assessment:  Patient tolerated infusion without incident.  Blood return noted pre and post infusion.  Access discontinued per protocol.    Discharge Plan:   Patient declined prescription refills.  Discharge instructions reviewed with: Patient.  Patient and/or family verbalized understanding of discharge instructions and all questions answered.  Patient will return 9/11/19 for next appointment.  Patient discharged in stable condition accompanied by: self.  Departure Mode: Ambulatory.  Face to Face time: 0.    Ade North, MELECIO, RN

## 2019-09-04 NOTE — LETTER
9/4/2019      RE: Daniel Sandhu  [address] [id number]       [facility]    Hematology/Oncology Established Patient Note    Today's Date: Sep 4, 2019    Reason for Follow-up: adenocarcinoma of the GE junction    HISTORY OF PRESENT ILLNESS: Daniel Sandhu is a 39 year old male who presents with adenocarcinoma of the GE junction.  In early 2017, patient started noticing difficulty swallowing, and that food seems to take longer to go down.  It became more frequent, and he saw his PCP, and was referred for EGD, which showed an esophageal mass located at the GE junction, measuring 4 cm.  Biopsy showed poorly differentiated adenocarcinoma.  HER-2 was sent and is equivocal (2+).  CT c/a/p showed a mildly prominent lymph node in the gastrohepatic ligament, but there were no pathologically enlarged lymph nodes in the chest, abdomen, or pelvis.  There was otherwise no evidence of metastatic disease.  PET-CT showed thickening of the distal esophagus consistent with known esophageal adenocarcinoma, as well as mildly hypermetabolic 1 cm lymph node in the gastrohepatic ligament.  There was no evidence of distant metastatic disease.  He underwent EUS on 6/9/17, which showed the esophageal tumor in the lower third of the esophagus, staged T2NX.  There were 2 abnormal lymph nodes seen in the gastrohepatic ligament; pathology was suspicious for adenocarcinoma.  Celiac node was sampled as well, which was benign.  He was seen by surgery, Dr. Esteban, and recommended srinivas-operative chemotherapy.    Port was placed on 6/22/17.  It was removed on 3/19/18.    He underwent chemotherapy with epirubicin, oxaliplatin, and capecitabine x 3 cycles 6/26/17-8/7/17.      On 11/3/17, he underwent laparotomy with total gastrectomy and abdominal lymphadenectomy, left thoracotomy with distal esophagectomy and intrathoracic Terrell-en-Y esophagojejunostomy, feeding jejunostomy, left pharyngostomy tube  placement, right tube thoracostomy, and flexible bronchoscopy.  On 11/9/17, he was taken back to OR for I&D of pharyngostomy tube abscess.  Pathology showed adenocarcinoma, moderate differentiated, extensive residual tumor with no evidence tumor regression, margins negative, perineural invasion present, 1 of 28 lymph nodes were involved with malignancy, stage [id number] (stage IIIA).  HER-2 is non-amplified.    He resumed chemotherapy post-surgery, with plans to complete 3 more cycles, with 5-FU, epirubicin, oxaliplatin.  However, he only completed 2 more cycles, due to poor tolerance.  He was admitted to the hospital 1/9/18-1/13/18 for nausea, diarrhea, failure to thrive, severe malnutrition, generalized weakness.  His infusional chemotherapy was stopped on 1/8/18.  He underwent EGD in the hospital on 1/11/18, which showed ulcers, and no evidence of recurrence of his cancer.  One area biopsied showed inflammation, no evidence of malignancy.    He saw Dr. Esteban on 3/6/19 and had CT abdomen/pelvis done, which showed a 3.1 cm lesion in hepatic segment 3.  He underwent biopsy on 3/20/19 by IR, which showed moderately differentiated adenocarcinoma consistent with metastasis from primary esophageal carcinoma.  HER-2 is non-amplified.  Restaging PET scan on 3/29/19 showed the 4.4 cm left lobe liver metastasis.  There is nodular foci of hypermetabolic uptake in the left mid abdomen in relation to the small bowel loops, without definite underlying lesion on CT.  This is favored to represent metastatic disease unless proven otherwise.    He started on chemotherapy with paclitaxel and ramucirumab on 4/10/19.    INTERIM HISTORY: Daniel is here for routine follow-up.  He is doing well today.  He reports that his nose has been doing much better since working with ENT but he does still feel a particular area in his left throat that feels dry or sore.  He will be having follow-up with ENT later this week.  He continues to have some  fatigue for the first few days following chemotherapy.  He reports no issues with his bowels.  He denies any issues with eating and drinking.  He plans to see [place] on September 27 for a second opinion.  He denies other concerns.    HOME MEDICATIONS:  Current Outpatient Medications   Medication Sig Dispense Refill     cyabnocobalamin (VITAMIN B-12) 2500 MCG sublingual tablet Place 2,500 mcg under the tongue daily 30 tablet 1     ferrous gluconate (FERGON) 324 (38 Fe) MG tablet Take 324 mg by mouth daily (with breakfast)       Misc Natural Product Nasal (PONARIS) SOLN Apply two drops to each nostril BID X 2 month supply 10 mL 3     multivitamin, therapeutic with minerals (MULTI-VITAMIN) TABS tablet Take 1 tablet by mouth daily Generic [place] Vitamin       mupirocin (BACTROBAN) 2 % external ointment Apply to anterior nares BID X 2 month supply 2 g 3     saccharomyces boulardii (FLORASTOR) 250 MG capsule Take 250 mg by mouth 2 times daily       LORazepam (ATIVAN) 0.5 MG tablet Take 1 tablet (0.5 mg) by mouth every 4 hours as needed (Anxiety, Nausea/Vomiting or Sleep) (Patient not taking: Reported on 7/26/2019) 30 tablet 2     prochlorperazine (COMPAZINE) 10 MG tablet Take 1 tablet (10 mg) by mouth every 6 hours as needed (Nausea/Vomiting) (Patient not taking: Reported on 5/6/2019) 30 tablet 2     PHYSICAL EXAM:  General: The patient is a pleasant male in no acute distress.  /68 (BP Location: Right arm, Patient Position: Sitting, Cuff Size: Adult Regular)   Pulse 58   Temp 98  F (36.7  C) (Oral)   Resp 18   Wt 66.5 kg (146 lb 8 oz)   SpO2 100%   BMI 21.63 kg/m     Wt Readings from Last 10 Encounters:   09/04/19 66.5 kg (146 lb 8 oz)   08/22/19 66.8 kg (147 lb 3.2 oz)   08/15/19 66.6 kg (146 lb 14.4 oz)   08/08/19 66.7 kg (147 lb)   07/26/19 66.7 kg (147 lb)   07/25/19 66.2 kg (146 lb)   07/18/19 66.2 kg (145 lb 14.4 oz)   07/11/19 68 kg (149 lb 14.4 oz)   07/01/19 68.4 kg (150 lb 12.7 oz)   06/20/19 67.6  kg (149 lb 1.6 oz)   HEENT: EOMI, PERRL. Sclerae are anicteric. Oral mucosa is pink and moist with no lesions or thrush.   Lymph: Neck is supple with no lymphadenopathy in the cervical or supraclavicular areas.   Heart: Regular rate and rhythm.   Lungs: Clear to auscultation bilaterally.   Abdomen: Bowel sounds present, soft, nontender with no palpable hepatosplenomegaly or masses.   Extremities: No lower extremity edema noted bilaterally.   Neuro: Cranial nerves II through XII are grossly intact.  Skin: No rashes, petechiae, or bruising noted on exposed skin.    LABS:   9/4/2019 09:45   Albumin 3.8   Protein Total 6.9   Bilirubin Total 0.4   Alkaline Phosphatase 47   ALT 29   AST 19   Bilirubin Direct 0.1   WBC 3.0 (L)   Hemoglobin 12.2 (L)   Hematocrit 36.3 (L)   Platelet Count 141 (L)   RBC Count 3.96 (L)   MCV 92   MCH 30.8   MCHC 33.6   RDW 13.0   Diff Method Automated Method   % Neutrophils 41.9   % Lymphocytes 38.5   % Monocytes 16.6   % Eosinophils 1.7   % Basophils 1.0   % Immature Granulocytes 0.3   Nucleated RBCs 0   Absolute Neutrophil 1.3 (L)   Absolute Lymphocytes 1.2   Absolute Monocytes 0.5   Absolute Eosinophils 0.1   Absolute Basophils 0.0   Abs Immature Granulocytes 0.0   Absolute Nucleated RBC 0.0   Protein Albumin Urine Negative       ASSESSMENT/PLAN:      1) Adenocarcinoma of the GE junction: now s/p 3 cycles of neoadjuvant chemotherapy with EOX, followed by surgical resection on 11/3/17.  Pathology showed adenocarcinoma, moderate differentiated, extensive residual tumor with no evidence tumor regression, margins negative, perineural invasion present, 1 of 28 lymph nodes were involved with malignancy, stage [id number] (stage IIIA).  HER-2 is non-amplified.    He has received EOF x 2 cycles after surgery, and he has not tolerated well.  The 5-FU on cycle 5 was discontinued early. Chemotherapy was stopped at that point due to poor tolerance.    Patient now has biopsy-confirmed metastatic disease to  the liver and possibly peritoneum.  He is asymptomatic currently.      [facility] testing shows MS-stable, TMB-low (4 mut/Mb), ERBB2 amplification equivocal, and TP53 H214R alteration.  PD-L1 was negative (0%).  He is planning to be seen at [facility] on 9/27.      He is doing well today and will continue with cycle 6 of  paclitaxel+ramucirumab. He will have a PET/CT after this cycle and follow up with Dr. Hood to review.     2) Genetics:  -genetic testing was negative    3) Fertility: Daniel and his wife have 2 children, including a baby boy born around June 2017.  He is not planning for more children in the near future.    4) Epistaxis: Much improved, but still has mild left sore throat. He will see ENT in follow up again later this week.     Violeta Coto PA-C  [facility]  [address] [place] [address] [phone]

## 2019-09-04 NOTE — NURSING NOTE
"Oncology Rooming Note    September 4, 2019 9:53 AM   Daniel Sandhu is a 39 year old male who presents for:    Chief Complaint   Patient presents with     Port Draw     Labs drawn via port by RN in lab. Line flushed and hep locked. VS taken.     Oncology Clinic Visit     Return; Esophogeal Ca     Initial Vitals: /68 (BP Location: Right arm, Patient Position: Sitting, Cuff Size: Adult Regular)   Pulse 58   Temp 98  F (36.7  C) (Oral)   Resp 18   Wt 66.5 kg (146 lb 8 oz)   SpO2 100%   BMI 21.63 kg/m   Estimated body mass index is 21.63 kg/m  as calculated from the following:    Height as of 8/15/19: 1.753 m (5' 9\").    Weight as of this encounter: 66.5 kg (146 lb 8 oz). Body surface area is 1.8 meters squared.  No Pain (0) Comment: Data Unavailable   No LMP for male patient.  Allergies reviewed: Yes  Medications reviewed: Yes    Medications: Medication refills not needed today.  Pharmacy name entered into [employer]:    [facility] [place], [place] - [address] [facility] [place], [place] - [address] [facility] [place], [place] - [id number]    Clinical concerns: No new concerns.       Wanda Vazquez CMA              "

## 2019-09-06 ENCOUNTER — OFFICE VISIT (OUTPATIENT)
Dept: OTOLARYNGOLOGY | Facility: CLINIC | Age: 39
End: 2019-09-06
Payer: COMMERCIAL

## 2019-09-06 VITALS
BODY MASS INDEX: 21.86 KG/M2 | DIASTOLIC BLOOD PRESSURE: 66 MMHG | HEART RATE: 76 BPM | SYSTOLIC BLOOD PRESSURE: 114 MMHG | WEIGHT: 148 LBS

## 2019-09-06 DIAGNOSIS — R04.0 EPISTAXIS: Primary | ICD-10-CM

## 2019-09-06 ASSESSMENT — PAIN SCALES - GENERAL: PAINLEVEL: NO PAIN (0)

## 2019-09-06 NOTE — PROGRESS NOTES
The patient presents with a history of nasal bleeding intermittently but primarily severe dryness of the nose. 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 He also has an irritation on the left side of the throat at times. He describes this as a feeling of dryness. He reports significant improvement of his nasal bleeding with the use of ponaris nasal drops and bactroban.     All other systems were reviewed and they are either negative or they are not directly pertinent to this Otolaryngology examination.      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) 6/5/2017       Past Surgical History:    Past Surgical History:   Procedure Laterality Date     ESOPHAGOGASTRODUODENOSCOPY       ESOPHAGOSCOPY, GASTROSCOPY, DUODENOSCOPY (EGD), COMBINED N/A 6/9/2017    Procedure: COMBINED ENDOSCOPIC ULTRASOUND, ESOPHAGOSCOPY, GASTROSCOPY, DUODENOSCOPY (EGD), FINE NEEDLE ASPIRATE/BIOPSY;  Upper Endoscopic Ultrasound, fine needle aspirate/biopsy;  Surgeon: Guru Mark Avila MD;  [facility]     ESOPHAGOSCOPY, GASTROSCOPY, DUODENOSCOPY (EGD), COMBINED N/A 11/3/2017    Procedure: COMBINED ESOPHAGOSCOPY, GASTROSCOPY, DUODENOSCOPY (EGD);;  Surgeon: Yunior Esteban MD;  [facility]     ESOPHAGOSCOPY, GASTROSCOPY, DUODENOSCOPY (EGD), COMBINED N/A 11/9/2017    Procedure: COMBINED ESOPHAGOSCOPY, GASTROSCOPY, DUODENOSCOPY (EGD);  Esophogastroduodenoscopy, take out pharangostomy tube;  Surgeon: Yunior Esteban MD;  [facility]     ESOPHAGOSCOPY, GASTROSCOPY, DUODENOSCOPY (EGD), COMBINED N/A 1/11/2018    Procedure: COMBINED ESOPHAGOSCOPY, GASTROSCOPY, DUODENOSCOPY (EGD), BIOPSY SINGLE OR MULTIPLE;  COMBINED ESOPHAGOSCOPY, GASTROSCOPY, DUODENOSCOPY (EGD);  Surgeon: Alessandro Mcgregor MD;  [facility]     GASTRECTOMY N/A 11/3/2017    Procedure: GASTRECTOMY;;  Surgeon: Yunior Esteban,  MD;  [facility]     HAND SURGERY      childhood, torn tendon     INSERT PORT VASCULAR ACCESS Right 6/22/2017    Procedure: INSERT PORT VASCULAR ACCESS;  Single Lumen Chest Power Port;  Surgeon: Iván Driver PA-C;  [facility]     INSERT PORT VASCULAR ACCESS Right 4/8/2019    Procedure: Single Lumen Chest Port Placement;  Surgeon: Krysten Ballard PA-C;  [facility]     IR CHEST PORT PLACEMENT > 5 YRS OF AGE  4/8/2019     IR LIVER BIOPSY PERCUTANEOUS  3/20/2019     LAPAROSCOPY DIAGNOSTIC (GENERAL) N/A 11/3/2017    Procedure: LAPAROSCOPY DIAGNOSTIC (GENERAL);  diagnostic laparoscopy, right chest tube, total gastrectomy with distal esophagectomy, intrathoracic eveline-y esophago-jejunostomy, feeding jejunostomy, pharyngostomy, esophagogastroduodenoscopy, flexible bronchoscopy;  Surgeon: Yunior Esteban MD;  [facility]     LAPAROTOMY EXPLORATORY N/A 11/3/2017    Procedure: LAPAROTOMY EXPLORATORY;;  Surgeon: Yunior Esteban MD;  [facility]     PHARYNGOSTOMY N/A 11/3/2017    Procedure: PHARYNGOSTOMY;;  Surgeon: Yunior Esteban MD;  [facility]     REMOVE PORT VASCULAR ACCESS Right 3/19/2018    Procedure: REMOVE PORT VASCULAR ACCESS;  Right Port Removal;  Surgeon: Krysten Hopper PA-C;  [facility]     THORACOTOMY Left 11/3/2017    Procedure: THORACOTOMY;;  Surgeon: Yunior Esteban MD;  [facility]       Medications:      Current Outpatient Medications:      cyabnocobalamin (VITAMIN B-12) 2500 MCG sublingual tablet, Place 2,500 mcg under the tongue daily, Disp: 30 tablet, Rfl: 1     ferrous gluconate (FERGON) 324 (38 Fe) MG tablet, Take 324 mg by mouth daily (with breakfast), Disp: , Rfl:      Misc Natural Product Nasal (PONARIS) SOLN, Apply two drops to each nostril BID X 2 month supply, Disp: 10 mL, Rfl: 3     multivitamin, therapeutic with minerals (MULTI-VITAMIN) TABS tablet, Take 1 tablet by mouth daily Generic [place] Vitamin, Disp: ,  Rfl:      mupirocin (BACTROBAN) 2 % external ointment, Apply to anterior nares BID X 2 month supply, Disp: 2 g, Rfl: 3     saccharomyces boulardii (FLORASTOR) 250 MG capsule, Take 250 mg by mouth 2 times daily, Disp: , Rfl:      LORazepam (ATIVAN) 0.5 MG tablet, Take 1 tablet (0.5 mg) by mouth every 4 hours as needed (Anxiety, Nausea/Vomiting or Sleep) (Patient not taking: Reported on 7/26/2019), Disp: 30 tablet, Rfl: 2     prochlorperazine (COMPAZINE) 10 MG tablet, Take 1 tablet (10 mg) by mouth every 6 hours as needed (Nausea/Vomiting) (Patient not taking: Reported on 5/6/2019), Disp: 30 tablet, Rfl: 2    Allergies:    Patient has no known allergies.    Physical Examination:    The patient is a well developed, well nourished male in no apparent distress.  He is normocephalic, atraumatic with pupils equally round and reactive to light.    Oral Cavity Examination: Normal mucosa with no masses or lesions  Nasal Examination: No active bleeding in either nostril.  No masses or lesions.  Ear Examination:  Ears canals clear, tympanic membranes and middle ear spaces normal,  Neurological Examination: Facial nerve function intact and symmetric  Integumentary Examination:  No lesions on the skin of the head and neck  Neck Examination: No masses or lesions, no lympadenopathy  Endocrine Examination:  Normal thyroid examination    Assessment and Plan:    The patient presents with a history of nasal epistaxis. He has responded very well to medical therapy. He will be seen by our laryngologists to address his throat discomfort and velopharyngeal insufficiency. He will be seen again in December.     CC: Dr. Violeta Coto

## 2019-09-06 NOTE — LETTER
9/6/2019       RE: Daniel Sandhu  [address] [address]     Dear Colleague,    Thank you for referring your patient, Daniel Sandhu, to the [facility] EAR NOSE AND TH[facility]AT at [facility]. Please see a copy of my visit note below.    The patient presents with a history of nasal bleeding intermittently but primarily severe dryness of the nose. 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 He also has an irritation on the left side of the throat at times. He describes this as a feeling of dryness. He reports significant improvement of his nasal bleeding with the use of ponaris nasal drops and bactroban.     All other systems were reviewed and they are either negative or they are not directly pertinent to this Otolaryngology examination.      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) 6/5/2017       Past Surgical History:    Past Surgical History:   Procedure Laterality Date     ESOPHAGOGASTRODUODENOSCOPY       ESOPHAGOSCOPY, GASTROSCOPY, DUODENOSCOPY (EGD), COMBINED N/A 6/9/2017    Procedure: COMBINED ENDOSCOPIC ULTRASOUND, ESOPHAGOSCOPY, GASTROSCOPY, DUODENOSCOPY (EGD), FINE NEEDLE ASPIRATE/BIOPSY;  Upper Endoscopic Ultrasound, fine needle aspirate/biopsy;  Surgeon: Guru Mark Avila MD;  [facility]     ESOPHAGOSCOPY, GASTROSCOPY, DUODENOSCOPY (EGD), COMBINED N/A 11/3/2017    Procedure: COMBINED ESOPHAGOSCOPY, GASTROSCOPY, DUODENOSCOPY (EGD);;  Surgeon: Yunior Esteban MD;  [facility]     ESOPHAGOSCOPY, GASTROSCOPY, DUODENOSCOPY (EGD), COMBINED N/A 11/9/2017    Procedure: COMBINED ESOPHAGOSCOPY, GASTROSCOPY, DUODENOSCOPY (EGD);  Esophogastroduodenoscopy, take out pharangostomy tube;  Surgeon: Yunior Esteban MD;  [facility]     ESOPHAGOSCOPY, GASTROSCOPY, DUODENOSCOPY (EGD), COMBINED N/A 1/11/2018     Procedure: COMBINED ESOPHAGOSCOPY, GASTROSCOPY, DUODENOSCOPY (EGD), BIOPSY SINGLE OR MULTIPLE;  COMBINED ESOPHAGOSCOPY, GASTROSCOPY, DUODENOSCOPY (EGD);  Surgeon: Alessandro Mcgregor MD;  [facility]     GASTRECTOMY N/A 11/3/2017    Procedure: GASTRECTOMY;;  Surgeon: Yunior Esteban MD;  [facility]     HAND SURGERY      childhood, torn tendon     INSERT PORT VASCULAR ACCESS Right 6/22/2017    Procedure: INSERT PORT VASCULAR ACCESS;  Single Lumen Chest Power Port;  Surgeon: Iván Driver PA-C;  [facility]     INSERT PORT VASCULAR ACCESS Right 4/8/2019    Procedure: Single Lumen Chest Port Placement;  Surgeon: Krysten Ballard PA-C;  [facility]     IR CHEST PORT PLACEMENT > 5 YRS OF AGE  4/8/2019     IR LIVER BIOPSY PERCUTANEOUS  3/20/2019     LAPAROSCOPY DIAGNOSTIC (GENERAL) N/A 11/3/2017    Procedure: LAPAROSCOPY DIAGNOSTIC (GENERAL);  diagnostic laparoscopy, right chest tube, total gastrectomy with distal esophagectomy, intrathoracic eveline-y esophago-jejunostomy, feeding jejunostomy, pharyngostomy, esophagogastroduodenoscopy, flexible bronchoscopy;  Surgeon: Yunior Esteban MD;  [facility]     LAPAROTOMY EXPLORATORY N/A 11/3/2017    Procedure: LAPAROTOMY EXPLORATORY;;  Surgeon: Yunior Esteban MD;  [facility]     PHARYNGOSTOMY N/A 11/3/2017    Procedure: PHARYNGOSTOMY;;  Surgeon: Yunior Esteban MD;  [facility]     REMOVE PORT VASCULAR ACCESS Right 3/19/2018    Procedure: REMOVE PORT VASCULAR ACCESS;  Right Port Removal;  Surgeon: Krysten Hopper PA-C;  [facility]     THORACOTOMY Left 11/3/2017    Procedure: THORACOTOMY;;  Surgeon: Yunior Esteban MD;  [facility]       Medications:      Current Outpatient Medications:      cyabnocobalamin (VITAMIN B-12) 2500 MCG sublingual tablet, Place 2,500 mcg under the tongue daily, Disp: 30 tablet, Rfl: 1     ferrous gluconate (FERGON) 324 (38 Fe) MG tablet, Take 324 mg by  mouth daily (with breakfast), Disp: , Rfl:      Misc Natural Product Nasal (PONARIS) SOLN, Apply two drops to each nostril BID X 2 month supply, Disp: 10 mL, Rfl: 3     multivitamin, therapeutic with minerals (MULTI-VITAMIN) TABS tablet, Take 1 tablet by mouth daily Generic [place] Vitamin, Disp: , Rfl:      mupirocin (BACTROBAN) 2 % external ointment, Apply to anterior nares BID X 2 month supply, Disp: 2 g, Rfl: 3     saccharomyces boulardii (FLORASTOR) 250 MG capsule, Take 250 mg by mouth 2 times daily, Disp: , Rfl:      LORazepam (ATIVAN) 0.5 MG tablet, Take 1 tablet (0.5 mg) by mouth every 4 hours as needed (Anxiety, Nausea/Vomiting or Sleep) (Patient not taking: Reported on 7/26/2019), Disp: 30 tablet, Rfl: 2     prochlorperazine (COMPAZINE) 10 MG tablet, Take 1 tablet (10 mg) by mouth every 6 hours as needed (Nausea/Vomiting) (Patient not taking: Reported on 5/6/2019), Disp: 30 tablet, Rfl: 2    Allergies:    Patient has no known allergies.    Physical Examination:    The patient is a well developed, well nourished male in no apparent distress.  He is normocephalic, atraumatic with pupils equally round and reactive to light.    Oral Cavity Examination: Normal mucosa with no masses or lesions  Nasal Examination: No active bleeding in either nostril.  No masses or lesions.  Ear Examination:  Ears canals clear, tympanic membranes and middle ear spaces normal,  Neurological Examination: Facial nerve function intact and symmetric  Integumentary Examination:  No lesions on the skin of the head and neck  Neck Examination: No masses or lesions, no lympadenopathy  Endocrine Examination:  Normal thyroid examination    Assessment and Plan:    The patient presents with a history of nasal epistaxis. He has responded very well to medical therapy. He will be seen by our laryngologists to address his throat discomfort and velopharyngeal insufficiency. He will be seen again in December.     CC: Dr. Violeta Coto      Again,  thank you for allowing me to participate in the care of your patient.      Sincerely,    Rod Barrios MD

## 2019-09-06 NOTE — PATIENT INSTRUCTIONS
1.  You were seen in the ENT Clinic today by Dr. Barrios.  If you have any questions or concerns after your appointment, please call [phone]. Press option #1 for scheduling related needs. Press option #3 for Nurse advice.    2.  Please schedule an appointment for the following:   - Dr. Imelda Millan    3.  Plan is to return to clinic to see Dr. Barrios in 3 months.      Lisa Samson LPN  [facility] Otolaryng[facility]  [phone]    The patient presents with a history of nasal epistaxis. He has responded very well to medical therapy. He will be seen by our laryngologists to address his throat discomfort and velopharyngeal insufficiency. He will be seen again in December.

## 2019-09-06 NOTE — NURSING NOTE
Chief Complaint   Patient presents with     RECHECK     Epistaxis/nasal dryness     Blood pressure 114/66, pulse 76, weight 67.1 kg (148 lb).    Donna Vaughan EMT

## 2019-09-11 ENCOUNTER — INFUSION THERAPY VISIT (OUTPATIENT)
Dept: ONCOLOGY | Facility: CLINIC | Age: 39
End: 2019-09-11
Attending: INTERNAL MEDICINE
Payer: COMMERCIAL

## 2019-09-11 ENCOUNTER — APPOINTMENT (OUTPATIENT)
Dept: LAB | Facility: CLINIC | Age: 39
End: 2019-09-11
Attending: INTERNAL MEDICINE
Payer: COMMERCIAL

## 2019-09-11 VITALS
DIASTOLIC BLOOD PRESSURE: 56 MMHG | OXYGEN SATURATION: 99 % | HEART RATE: 72 BPM | RESPIRATION RATE: 16 BRPM | WEIGHT: 147 LBS | SYSTOLIC BLOOD PRESSURE: 116 MMHG | TEMPERATURE: 97.9 F | BODY MASS INDEX: 21.71 KG/M2

## 2019-09-11 DIAGNOSIS — C15.5 MALIGNANT NEOPLASM OF LOWER THIRD OF ESOPHAGUS (H): Primary | ICD-10-CM

## 2019-09-11 LAB
BASOPHILS # BLD AUTO: 0 10E9/L (ref 0–0.2)
BASOPHILS NFR BLD AUTO: 0.6 %
DIFFERENTIAL METHOD BLD: ABNORMAL
EOSINOPHIL # BLD AUTO: 0 10E9/L (ref 0–0.7)
EOSINOPHIL NFR BLD AUTO: 1.2 %
ERYTHROCYTE [DISTWIDTH] IN BLOOD BY AUTOMATED COUNT: 12.6 % (ref 10–15)
HCT VFR BLD AUTO: 35.5 % (ref 40–53)
HGB BLD-MCNC: 12 G/DL (ref 13.3–17.7)
IMM GRANULOCYTES # BLD: 0 10E9/L (ref 0–0.4)
IMM GRANULOCYTES NFR BLD: 0.3 %
LYMPHOCYTES # BLD AUTO: 1.2 10E9/L (ref 0.8–5.3)
LYMPHOCYTES NFR BLD AUTO: 33.9 %
MCH RBC QN AUTO: 30.8 PG (ref 26.5–33)
MCHC RBC AUTO-ENTMCNC: 33.8 G/DL (ref 31.5–36.5)
MCV RBC AUTO: 91 FL (ref 78–100)
MONOCYTES # BLD AUTO: 0.3 10E9/L (ref 0–1.3)
MONOCYTES NFR BLD AUTO: 7.2 %
NEUTROPHILS # BLD AUTO: 2 10E9/L (ref 1.6–8.3)
NEUTROPHILS NFR BLD AUTO: 56.8 %
NRBC # BLD AUTO: 0 10*3/UL
NRBC BLD AUTO-RTO: 0 /100
PLATELET # BLD AUTO: 128 10E9/L (ref 150–450)
RBC # BLD AUTO: 3.9 10E12/L (ref 4.4–5.9)
WBC # BLD AUTO: 3.5 10E9/L (ref 4–11)

## 2019-09-11 PROCEDURE — 85025 COMPLETE CBC W/AUTO DIFF WBC: CPT | Performed by: PHYSICIAN ASSISTANT

## 2019-09-11 PROCEDURE — 25800030 ZZH RX IP 258 OP 636: Mod: ZF | Performed by: PHYSICIAN ASSISTANT

## 2019-09-11 PROCEDURE — 25000128 H RX IP 250 OP 636: Mod: ZF | Performed by: PHYSICIAN ASSISTANT

## 2019-09-11 PROCEDURE — 25000125 ZZHC RX 250: Mod: ZF | Performed by: PHYSICIAN ASSISTANT

## 2019-09-11 PROCEDURE — 96375 TX/PRO/DX INJ NEW DRUG ADDON: CPT

## 2019-09-11 PROCEDURE — 96367 TX/PROPH/DG ADDL SEQ IV INF: CPT

## 2019-09-11 PROCEDURE — 25000128 H RX IP 250 OP 636: Mod: ZF | Performed by: INTERNAL MEDICINE

## 2019-09-11 PROCEDURE — 96413 CHEMO IV INFUSION 1 HR: CPT

## 2019-09-11 RX ORDER — HEPARIN SODIUM (PORCINE) LOCK FLUSH IV SOLN 100 UNIT/ML 100 UNIT/ML
5 SOLUTION INTRAVENOUS EVERY 8 HOURS PRN
Status: DISCONTINUED | OUTPATIENT
Start: 2019-09-11 | End: 2019-09-11 | Stop reason: HOSPADM

## 2019-09-11 RX ORDER — HEPARIN SODIUM (PORCINE) LOCK FLUSH IV SOLN 100 UNIT/ML 100 UNIT/ML
500 SOLUTION INTRAVENOUS ONCE
Status: COMPLETED | OUTPATIENT
Start: 2019-09-11 | End: 2019-09-11

## 2019-09-11 RX ADMIN — DEXAMETHASONE SODIUM PHOSPHATE 20 MG: 10 INJECTION, SOLUTION INTRAMUSCULAR; INTRAVENOUS at 09:33

## 2019-09-11 RX ADMIN — FAMOTIDINE 20 MG: 10 INJECTION INTRAVENOUS at 09:30

## 2019-09-11 RX ADMIN — PACLITAXEL 145 MG: 6 INJECTION, SOLUTION INTRAVENOUS at 10:08

## 2019-09-11 RX ADMIN — HEPARIN SODIUM (PORCINE) LOCK FLUSH IV SOLN 100 UNIT/ML 500 UNITS: 100 SOLUTION at 11:16

## 2019-09-11 RX ADMIN — SODIUM CHLORIDE 250 ML: 9 INJECTION, SOLUTION INTRAVENOUS at 09:30

## 2019-09-11 RX ADMIN — HEPARIN 5 ML: 100 SYRINGE at 08:39

## 2019-09-11 ASSESSMENT — PAIN SCALES - GENERAL: PAINLEVEL: NO PAIN (0)

## 2019-09-11 NOTE — PATIENT INSTRUCTIONS
Contact Numbers  [facility]: [phone]        Please call the [facility] Triage line if you experience a temperature greater than or equal to 100.5, shaking chills, have uncontrolled nausea, vomiting and/or diarrhea, dizziness, shortness of breath, chest pain, bleeding, unexplained bruising, or if you have any other new/concerning symptoms, questions or concerns.     If it is after hours, weekends, or holidays, please call the main hospital  at  [phone] and ask to speak to the Oncology doctor on call.     If you are having any concerning symptoms or wish to speak to a provider before your next infusion visit, please call your care coordinator or triage to notify them so we can adequately serve you.     If you need a refill on a narcotic prescription or other medication, please call triage before your infusion appointment.       September 2019 Sunday Monday Tuesday Wednesday Thursday Friday Saturday   1     2     3     4    [facility] DRAW   9:00 AM   (15 min.)   [facility]   [facility] Lab Draw    [facility] RETURN   9:15 AM   (50 min.)   Violeta Coto PA-C   [facility] ONC INFUSION 180  10:30 AM   (180 min.)    ONCOLOGY INFUSION   [facility] 5     6    [facility] RETURN   8:30 AM   (15 min.)   Rod Barrios MD   [facility] Ear Nose and Throat 7       8     9     10     11    [facility] DRAW   8:30 AM   (15 min.)    [facility] DRAW   [facility] Lab Draw    [facility] ONC INFUSION 180   9:00 AM   (180 min.)    ONCOLOGY INFUSION   [facility] 12     13     14       15     16     17     18    [facility] DRAW   8:30 AM   (15 min.)    [facility] DRAW   [facility] Lab Draw    [facility] ONC INFUSION 180   9:00 AM   (180 min.)    ONCOLOGY INFUSION   [facility] 19     20    PET ONCOLOGY WHOLE BODY   6:30 AM   (45 min.)   [id number]   [facility] [place] [facility] PET CT    [facility]  THROAT   1:25 PM   (40 min.)   Imelda Anderson MD   [facility] Ear Nose and Throat 21       22     23    [facility] DRAW   9:15 AM   (15 min.)    [facility]    [facility] RETURN   9:30 AM   (30 min.)   Laurence Hood MD   [facility] 24     25     26     27     28       29     30 October 2019 Sunday Monday Tuesday Wednesday Thursday Friday Saturday             1     2     3     4     5       6     7     8     9     10     11     12       13     14     15     16     17     18     19       20     21     22     23     24     25     26       27     28     29     30     31                               Lab Results:  Recent Results (from the past 12 hour(s))   CBC with platelets differential    Collection Time: 09/11/19  8:43 AM   Result Value Ref Range    WBC 3.5 (L) 4.0 - 11.0 10e9/L    RBC Count 3.90 (L) 4.4 - 5.9 10e12/L    Hemoglobin 12.0 (L) 13.3 - 17.7 g/dL    Hematocrit 35.5 (L) 40.0 - 53.0 %    MCV 91 78 - 100 fl    MCH 30.8 26.5 - 33.0 pg    MCHC 33.8 31.5 - 36.5 g/dL    RDW 12.6 10.0 - 15.0 %    Platelet Count 128 (L) 150 - 450 10e9/L    Diff Method Automated Method     % Neutrophils 56.8 %    % Lymphocytes 33.9 %    % Monocytes 7.2 %    % Eosinophils 1.2 %    % Basophils 0.6 %    % Immature Granulocytes 0.3 %    Nucleated RBCs 0 0 /100    Absolute Neutrophil 2.0 1.6 - 8.3 10e9/L    Absolute Lymphocytes 1.2 0.8 - 5.3 10e9/L    Absolute Monocytes 0.3 0.0 - 1.3 10e9/L    Absolute Eosinophils 0.0 0.0 - 0.7 10e9/L    Absolute Basophils 0.0 0.0 - 0.2 10e9/L    Abs Immature Granulocytes 0.0 0 - 0.4 10e9/L    Absolute Nucleated RBC 0.0

## 2019-09-11 NOTE — PROGRESS NOTES
Infusion Nursing Note:  Daniel Sandhu presents today for Cycle 6 Day 8 of Taxol.    Patient seen by provider today: No   present during visit today: Not Applicable.    Note: Patient reports feeling well today. He reports having a baseline dry non-productive cough that is unchanged. He has a bowel movement this morning, but thinks he needs to add more fiber into his diet. Reports that his nasal issues have improved greatly and continues to follow with ENT. He stubbed his big left toe accidentally last night, but no other issues or concerns today.     Intravenous Access:  Implanted Port.    Treatment Conditions:  Lab Results   Component Value Date    HGB 12.0 09/11/2019     Lab Results   Component Value Date    WBC 3.5 09/11/2019      Lab Results   Component Value Date    ANEU 2.0 09/11/2019     Lab Results   Component Value Date     09/11/2019      Results reviewed, labs MET treatment parameters, ok to proceed with treatment.      Post Infusion Assessment:  Patient tolerated infusion without incident.  Blood return noted pre and post infusion.  Site patent and intact, free from redness, edema or discomfort.  No evidence of extravasations.  Access discontinued per protocol.       Discharge Plan:   Patient declined prescription refills.  Discharge instructions reviewed with: Patient.  Patient and/or family verbalized understanding of discharge instructions and all questions answered.  AVS to patient via [employer].  Patient will return 9/18/19 for next appointment.   Patient discharged in stable condition accompanied by: self.  Departure Mode: Ambulatory.    Neva Lewis, RN, RN

## 2019-09-17 NOTE — PROGRESS NOTES
[facility]       Patient: Daniel Sandhu  MRN: [id number]    : 1980  Age/Gender: 39 year old male  Date of Service: 2019     PCP and Referring Provider(s):    PCP: Lencho Chan  Referring Physician: Rod Barrios    Reason for Consultation:  VPI with  Musical instrument  Left neck pain    History:    HISTORY OF PRESENT ILLNESS: Mr. Sandhu is a 39 year old male who presents to us today with his chief complaint that he has nasal air escape while he is playing intermittently the Bench. This has been going on for 20 years. Only happens with certain passages when he plays for a long period of time. He has pursued this once by talking to a specialist at [place] where he was offered an injection. He states this has become more of a problem since his cancer - since he lost 10 lbs with it. 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   Of note he also reports intermittent left neck pain. He denies a voice problem. 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 He also has an upcoming recording that he is playing together with 2 other people and he is worried this problem will be much more apparent on the recording.   He has been seeing Dr Barrios for epistaxis for which he is doing much better now. He was referred here by Dr Barrios.     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         PAST SURGICAL HISTORY:   Past Surgical History:   Procedure Laterality Date     ESOPHAGOGASTRODUODENOSCOPY       ESOPHAGOSCOPY, GASTROSCOPY, DUODENOSCOPY (EGD), COMBINED N/A 2017    Procedure: COMBINED ENDOSCOPIC ULTRASOUND, ESOPHAGOSCOPY, GASTROSCOPY, DUODENOSCOPY (EGD), FINE NEEDLE ASPIRATE/BIOPSY;  Upper Endoscopic Ultrasound, fine needle aspirate/biopsy;  Surgeon: Guru Austin  Mark Bell MD;  Location: [facility]     ESOPHAGOSCOPY, GASTROSCOPY, DUODENOSCOPY (EGD), COMBINED N/A 11/3/2017    Procedure: COMBINED ESOPHAGOSCOPY, GASTROSCOPY, DUODENOSCOPY (EGD);;  Surgeon: Yunior Esteban MD;  Location: [facility] OR     ESOPHAGOSCOPY, GASTROSCOPY, DUODENOSCOPY (EGD), COMBINED N/A 11/9/2017    Procedure: COMBINED ESOPHAGOSCOPY, GASTROSCOPY, DUODENOSCOPY (EGD);  Esophogastroduodenoscopy, take out pharangostomy tube;  Surgeon: Yunior Esteban MD;  Location: [facility] OR     ESOPHAGOSCOPY, GASTROSCOPY, DUODENOSCOPY (EGD), COMBINED N/A 1/11/2018    Procedure: COMBINED ESOPHAGOSCOPY, GASTROSCOPY, DUODENOSCOPY (EGD), BIOPSY SINGLE OR MULTIPLE;  COMBINED ESOPHAGOSCOPY, GASTROSCOPY, DUODENOSCOPY (EGD);  Surgeon: Alessandro Mcgregor MD;  Location: [facility]     GASTRECTOMY N/A 11/3/2017    Procedure: GASTRECTOMY;;  Surgeon: Yunior Esteban MD;  Location: [facility] OR     HAND SURGERY      childhood, torn tendon     INSERT PORT VASCULAR ACCESS Right 6/22/2017    Procedure: INSERT PORT VASCULAR ACCESS;  Single Lumen Chest Power Port;  Surgeon: Iván Drvier PA-C;  [facility]     INSERT PORT VASCULAR ACCESS Right 4/8/2019    Procedure: Single Lumen Chest Port Placement;  Surgeon: Krysten Ballard PA-C;  [facility]     IR CHEST PORT PLACEMENT > 5 YRS OF AGE  4/8/2019     IR LIVER BIOPSY PERCUTANEOUS  3/20/2019     LAPAROSCOPY DIAGNOSTIC (GENERAL) N/A 11/3/2017    Procedure: LAPAROSCOPY DIAGNOSTIC (GENERAL);  diagnostic laparoscopy, right chest tube, total gastrectomy with distal esophagectomy, intrathoracic eveline-y esophago-jejunostomy, feeding jejunostomy, pharyngostomy, esophagogastroduodenoscopy, flexible bronchoscopy;  Surgeon: Yunior Esteban MD;  Location: [facility]     LAPAROTOMY EXPLORATORY N/A 11/3/2017    Procedure: LAPAROTOMY EXPLORATORY;;  Surgeon: Yunior Esteban MD;  [facility]     PHARYNGOSTOMY N/A 11/3/2017    Procedure: PHARYNGOSTOMY;;  Surgeon:  Yunior Esteban MD;  [facility]     REMOVE PORT VASCULAR ACCESS Right 3/19/2018    Procedure: REMOVE PORT VASCULAR ACCESS;  Right Port Removal;  Surgeon: Krysten Hopper PA-C;  [facility]     THORACOTOMY Left 11/3/2017    Procedure: THORACOTOMY;;  Surgeon: Yunior Esteban MD;  Location: [facility]         CURRENT MEDICATIONS:   Current Outpatient Medications:      cyabnocobalamin (VITAMIN B-12) 2500 MCG sublingual tablet, Place 2,500 mcg under the tongue daily, Disp: 30 tablet, Rfl: 1     ferrous gluconate (FERGON) 324 (38 Fe) MG tablet, Take 324 mg by mouth daily (with breakfast), Disp: , Rfl:      Misc Natural Product Nasal (PONARIS) SOLN, Apply two drops to each nostril BID X 2 month supply, Disp: 10 mL, Rfl: 3     multivitamin, therapeutic with minerals (MULTI-VITAMIN) TABS tablet, Take 1 tablet by mouth daily Generic [employer] Vitamin, Disp: , Rfl:      mupirocin (BACTROBAN) 2 % external ointment, Apply to anterior nares BID X 2 month supply, Disp: 2 g, Rfl: 3     saccharomyces boulardii (FLORASTOR) 250 MG capsule, Take 250 mg by mouth 2 times daily, Disp: , Rfl:      LORazepam (ATIVAN) 0.5 MG tablet, Take 1 tablet (0.5 mg) by mouth every 4 hours as needed (Anxiety, Nausea/Vomiting or Sleep) (Patient not taking: Reported on 7/26/2019), Disp: 30 tablet, Rfl: 2     prochlorperazine (COMPAZINE) 10 MG tablet, Take 1 tablet (10 mg) by mouth every 6 hours as needed (Nausea/Vomiting), Disp: 30 tablet, Rfl: 2      ALLERGIES: Patient has no known allergies.      SOCIAL HISTORY:    Social History     Socioeconomic History     Marital status:      Spouse name: Not on file     Number of children: 1     Years of education: Not on file     Highest education level: Not on file   Occupational History     Occupation: musician and teacher    Social Needs     Financial resource strain: Not on file     Food insecurity:     Worry: Not on file     Inability: Not on file     Transportation needs:      Medical: Not on file     Non-medical: Not on file   Tobacco Use     Smoking status: Never Smoker     Smokeless tobacco: Never Used   Substance and Sexual Activity     Alcohol use: Yes     Comment: 1 beer daily     Drug use: Yes     Types: Marijuana     Comment: occasional     Sexual activity: Yes     Partners: Female     Birth control/protection: Natural Family Planning   Lifestyle     Physical activity:     Days per week: Not on file     Minutes per session: Not on file     Stress: Not on file   Relationships     Social connections:     Talks on phone: Not on file     Gets together: Not on file     Attends Anabaptism service: Not on file     Active member of club or organization: Not on file     Attends meetings of clubs or organizations: Not on file     Relationship status: Not on file     Intimate partner violence:     Fear of current or ex partner: Not on file     Emotionally abused: Not on file     Physically abused: Not on file     Forced sexual activity: Not on file   Other Topics Concern     Parent/sibling w/ CABG, MI or angioplasty before 65F 55M? Not Asked   Social History Narrative     Not on file           FAMILY HISTORY: Non-contributory for problems with anesthesia      REVIEW OF SYSTEMS: The patient was asked a 14 point review of systems regarding constitutional symptoms, eye symptoms, ears, nose, mouth, throat symptoms, cardiovascular symptoms, respiratory symptoms, gastrointestinal symptoms, genitourinary symptoms, musculoskeletal symptoms, integumentary symptoms, neurological symptoms, psychiatric symptoms, endocrine symptoms, hematologic/lymphatic symptoms, and allergic/ immunologic symptoms.   The pertinent factors have been included in the HPI, and the complete list can be found in the patient's written chart.      Physical Examination:  The patient underwent a physical examination as described below. The pertinent positive and negative findings are summarized after the description of the  examination.    Constitutional: The patient's developmental and nutritional status was assessed. The patient's voice quality was assessed.  Head and Face: The head and face were inspected for deformities. The sinuses were palpated. The salivary glands were palpated. Facial muscle strength was assessed bilaterally.  Eyes: Extraocular movements and primary gaze alignment were assessed.  Ears, Nose, Mouth and Throat: The ears and nose were examined for deformities. The nasal septum, mucosa, and turbinates were inspected by anterior rhinoscopy. The lips, teeth, and gums were examined for abnormalities. The oral mucosa, tongue, palate, tonsils, lateral and posterior pharynx were inspected for the presence of asymmetry or mucosal lesions.    Neck: The tracheal position was noted, and the neck mass palpated to determine if there were any asymmetries, abnormal neck masses, thyromegally, or thyroid nodules.  Respiratory: The nature of the breathing and chest expansion/symmetry was observed.  Cardiovascular: The patient was examined to determine the presence of any edema or jugular venous distension.  Abdomen: The contour of the abdomen was noted.  Lymphatic: The patient was examined for infraclavicular lymphadenopathy.  Musculoskeletal: The patient was inspected for the presence of skeletal deformities.  Extremities: The extremities were examined for any clubbing or cyanosis.  Skin: The skin was examined for inflammatory or neoplastic conditions.  Neurologic: The patient's orientation, mood, and affect were noted. The cranial nerve  functions were examined.    Other pertinent positive and negative findings on physical examination:   No bifid uvula, no submucous cleft  Left TH space tenderness  FOM/BOT soft  Left neck incision well healed  Anterior rhinoscopy - scabbing of the anterior of the inferior turbinate    All other physical examination findings were within normal limits and noncontributory.    Procedures:   Video  Laryngoscopy with Stroboscopy (CPT 22690) and Behavioral & Qualitative Evaluation of Voice and Resonence (CPT 06252)     Preoperative Diagnosis:  Dysphonia and throat symptoms  Postoperative Diagnosis: Dysphonia and throat symptoms  Indication:  Patient has new or persistent dysphonia and throat symptoms.  This requires evaluation by stroboscopy to fully delineate the laryngeal functioning; especially mucosal wave assessment, ultrasharp visualization of lesions on the vocal folds, and overall functioning of the larynx.  Details of Procedure: A 70 degree rigid telescopic laryngoscope or a distal chip flexible scope was used. The lighting was obtained via a light cable connected to a stroboscopic unit. The telescope was inserted either transorally or transnasally until the vocal folds could be visualized. The patient was instructed to sustain the vowel  ee  at a comfortable pitch and loudness as the voice was monitored by a microphone connected to a fundamental frequency tracker. This circuit tracked vocal periodicity, allowing the light to flash in synchrony with the glottal cycles. A setting on the stroboscope was set to change the phase of light strobing with relation to the vocal fundamental frequency, producing an image of 1 to 2 glottal cycles every second. The video images were recorded for analysis. Use of the variable speed, slow and stop scan allowed careful study of pertinent segments of laryngeal function to increase accuracy of clinical assessments of function and dysfunction.  In particular, features of glottal closure, mucosal wave on the vocal fold cover and laryngeal symmetry were analyzed. Lastly, the patient was asked to phonate speech samples and auditory/perceptual evaluation of voice and resonance were performed.  The vocal quality was carefully evaluated for hoarseness, breathiness, loudness, phrase length and intelligibility to determine the source of dysphonia.    Findings:   A. BEHAVIORAL &  QUALITATIVE EVALUATION OF VOICE AND RESONENCE   Comments: F0 180 MPT: 10sec  Vocal Quality: strained    Pitch Range:  Mildly reduced  Phrase Length: normal  Vocal Loudness: normal  Dysarthria: no    B. LARYNGOVIDEOSTROBOSCOPY   Anatomic/Physiological Deviations:  Left vocal process granuloma  Good approximation of the soft palate to the nasopharynx except when the patient imitated the noise that happens during instrument playing there was more bubbling and likely a space centrally  Despite playing the instrument today the noise was not spontaneously induced  Mucosal wave: Right:   no restriction      Left:  no restriction   Bilateral Vocal Fold Vibration: mild asymmetry   Vocal Process: Right:  no restriction      Left:  no restriction  Vocal Fold closure:  Complete     Complication(s): None  Disposition: Patient tolerated the procedure well        Review of Relevant Clinical Data:    Radiology:   PET CT 6/21/2019  Metastatic esophageal carcinoma with liver metastasis:  1. Liver metastatic lesion in the left hepatic lobe has significantly  decreased in size and metabolic activity as above. No new suspicious  lesion in the liver.  2. Previously seen hypermetabolic uptake in the left anterior  abdominal cavity have shifted to the lower pelvic cavity. The uptake  corresponds with adjacent small bowel in the lower pelvis. Consider CT  enterography for further evaluation as metastatic disease cannot be  rule out.        Labs:  No results found for: TSH  Lab Results   Component Value Date     09/23/2019    CO2 28 09/23/2019    BUN 14 09/23/2019    PHOS 2.3 (L) 01/15/2018     Lab Results   Component Value Date    WBC 2.8 (L) 09/23/2019    HGB 12.4 (L) 09/23/2019    HCT 37.2 (L) 09/23/2019    MCV 93 09/23/2019     (L) 09/23/2019     No results found for: MILAGROS  No components found for: RHEUMATOIDFACTOR,  RF  Lab Results   Component Value Date    CRP 17.0 (H) 11/13/2017     No components found for: CKTOT,  URICACID  No components found for: C3, C4, DSDNAAB, NDNAABIFA  No results found for: MPOAB         Impression/Plan:      IMPRESSION: Mr. Sandhu is a 39 year old male who is being seen for the followin. VPI  - not true VPI, only intermittent during playing the bassoon, likely exacerbated lately from weight loss  - will refer to cleft palate clinic   - discussed options of observation vs injection augmentation   - consider nutrition consult while undergoing chemotherapy to prevent further weight loss    2. Laryngeal granuloma  - high voice use and history of neck surgery likely multifactorial from behavioral adaptations and paresis  - cause of the throat discomfort  - discussed lifestyle modifications for LPR, voice rest and increased hydration  - already on PPI        RETURN VISIT: 6 months, or earlier as needed.     Thank you for the kind referral and for allowing me to share in the care of Mr. Sandhu. If you have any questions, please do not hesitate to contact me.        Imelda Sky MD    of Otolaryngology - Head and Neck Surgery   Voice, Airway, and Swallowing Disorders   [facility] at the [facility]    Clinics & Surgery [address] [address]  Phone: [phone]  Fax: [phone]    [place] [address]  Phone: [phone]  Fax: [phone]

## 2019-09-18 ENCOUNTER — INFUSION THERAPY VISIT (OUTPATIENT)
Dept: ONCOLOGY | Facility: CLINIC | Age: 39
End: 2019-09-18
Attending: INTERNAL MEDICINE
Payer: COMMERCIAL

## 2019-09-18 ENCOUNTER — APPOINTMENT (OUTPATIENT)
Dept: LAB | Facility: CLINIC | Age: 39
End: 2019-09-18
Attending: INTERNAL MEDICINE
Payer: COMMERCIAL

## 2019-09-18 VITALS
RESPIRATION RATE: 16 BRPM | WEIGHT: 146.5 LBS | BODY MASS INDEX: 21.63 KG/M2 | HEART RATE: 70 BPM | TEMPERATURE: 98 F | OXYGEN SATURATION: 100 % | DIASTOLIC BLOOD PRESSURE: 69 MMHG | SYSTOLIC BLOOD PRESSURE: 110 MMHG

## 2019-09-18 DIAGNOSIS — C15.5 MALIGNANT NEOPLASM OF LOWER THIRD OF ESOPHAGUS (H): Primary | ICD-10-CM

## 2019-09-18 LAB
ALBUMIN UR-MCNC: 30 MG/DL
BASOPHILS # BLD AUTO: 0 10E9/L (ref 0–0.2)
BASOPHILS NFR BLD AUTO: 0.8 %
DIFFERENTIAL METHOD BLD: ABNORMAL
EOSINOPHIL # BLD AUTO: 0 10E9/L (ref 0–0.7)
EOSINOPHIL NFR BLD AUTO: 1.5 %
ERYTHROCYTE [DISTWIDTH] IN BLOOD BY AUTOMATED COUNT: 12.7 % (ref 10–15)
HCT VFR BLD AUTO: 33.3 % (ref 40–53)
HGB BLD-MCNC: 11.2 G/DL (ref 13.3–17.7)
IMM GRANULOCYTES # BLD: 0 10E9/L (ref 0–0.4)
IMM GRANULOCYTES NFR BLD: 0 %
LYMPHOCYTES # BLD AUTO: 0.9 10E9/L (ref 0.8–5.3)
LYMPHOCYTES NFR BLD AUTO: 33.8 %
MCH RBC QN AUTO: 30.9 PG (ref 26.5–33)
MCHC RBC AUTO-ENTMCNC: 33.6 G/DL (ref 31.5–36.5)
MCV RBC AUTO: 92 FL (ref 78–100)
MONOCYTES # BLD AUTO: 0.2 10E9/L (ref 0–1.3)
MONOCYTES NFR BLD AUTO: 6.8 %
NEUTROPHILS # BLD AUTO: 1.5 10E9/L (ref 1.6–8.3)
NEUTROPHILS NFR BLD AUTO: 57.1 %
NRBC # BLD AUTO: 0 10*3/UL
NRBC BLD AUTO-RTO: 0 /100
PLATELET # BLD AUTO: 118 10E9/L (ref 150–450)
RBC # BLD AUTO: 3.62 10E12/L (ref 4.4–5.9)
WBC # BLD AUTO: 2.6 10E9/L (ref 4–11)

## 2019-09-18 PROCEDURE — 96413 CHEMO IV INFUSION 1 HR: CPT

## 2019-09-18 PROCEDURE — 96417 CHEMO IV INFUS EACH ADDL SEQ: CPT

## 2019-09-18 PROCEDURE — 85025 COMPLETE CBC W/AUTO DIFF WBC: CPT | Performed by: PHYSICIAN ASSISTANT

## 2019-09-18 PROCEDURE — 25000128 H RX IP 250 OP 636: Mod: ZF | Performed by: INTERNAL MEDICINE

## 2019-09-18 PROCEDURE — 25800030 ZZH RX IP 258 OP 636: Mod: ZF | Performed by: PHYSICIAN ASSISTANT

## 2019-09-18 PROCEDURE — 96375 TX/PRO/DX INJ NEW DRUG ADDON: CPT

## 2019-09-18 PROCEDURE — 96367 TX/PROPH/DG ADDL SEQ IV INF: CPT

## 2019-09-18 PROCEDURE — 25000125 ZZHC RX 250: Mod: ZF | Performed by: PHYSICIAN ASSISTANT

## 2019-09-18 PROCEDURE — 81003 URINALYSIS AUTO W/O SCOPE: CPT | Performed by: PHYSICIAN ASSISTANT

## 2019-09-18 PROCEDURE — 25000132 ZZH RX MED GY IP 250 OP 250 PS 637: Mod: ZF | Performed by: PHYSICIAN ASSISTANT

## 2019-09-18 PROCEDURE — 25000128 H RX IP 250 OP 636: Mod: ZF | Performed by: PHYSICIAN ASSISTANT

## 2019-09-18 RX ORDER — HEPARIN SODIUM (PORCINE) LOCK FLUSH IV SOLN 100 UNIT/ML 100 UNIT/ML
5 SOLUTION INTRAVENOUS ONCE
Status: COMPLETED | OUTPATIENT
Start: 2019-09-18 | End: 2019-09-18

## 2019-09-18 RX ORDER — DIPHENHYDRAMINE HCL 25 MG
25 CAPSULE ORAL ONCE
Status: COMPLETED | OUTPATIENT
Start: 2019-09-18 | End: 2019-09-18

## 2019-09-18 RX ORDER — HEPARIN SODIUM (PORCINE) LOCK FLUSH IV SOLN 100 UNIT/ML 100 UNIT/ML
500 SOLUTION INTRAVENOUS ONCE
Status: COMPLETED | OUTPATIENT
Start: 2019-09-18 | End: 2019-09-18

## 2019-09-18 RX ADMIN — SODIUM CHLORIDE 500 MG: 9 INJECTION, SOLUTION INTRAVENOUS at 09:58

## 2019-09-18 RX ADMIN — DIPHENHYDRAMINE HYDROCHLORIDE 25 MG: 25 CAPSULE ORAL at 09:33

## 2019-09-18 RX ADMIN — SODIUM CHLORIDE 250 ML: 9 INJECTION, SOLUTION INTRAVENOUS at 09:21

## 2019-09-18 RX ADMIN — FAMOTIDINE 20 MG: 10 INJECTION INTRAVENOUS at 09:33

## 2019-09-18 RX ADMIN — HEPARIN 5 ML: 100 SYRINGE at 08:41

## 2019-09-18 RX ADMIN — HEPARIN SODIUM (PORCINE) LOCK FLUSH IV SOLN 100 UNIT/ML 500 UNITS: 100 SOLUTION at 12:14

## 2019-09-18 RX ADMIN — PACLITAXEL 145 MG: 6 INJECTION, SOLUTION INTRAVENOUS at 11:04

## 2019-09-18 RX ADMIN — DEXAMETHASONE SODIUM PHOSPHATE 20 MG: 10 INJECTION, SOLUTION INTRAMUSCULAR; INTRAVENOUS at 09:33

## 2019-09-18 ASSESSMENT — PAIN SCALES - GENERAL: PAINLEVEL: MILD PAIN (2)

## 2019-09-18 NOTE — NURSING NOTE
Chief Complaint   Patient presents with     Port Draw     Labs drawn via port by RN in lab. VS taken. Patient checked in for next appt.     Port accessed with 20 gauge gripper needle by RN, labs collected, line flushed with saline and heparin.  Vitals taken. Pt checked in for appointment. Urine specimen collected.    Kristine Mcwilliams RN

## 2019-09-18 NOTE — PROGRESS NOTES
Infusion Nursing Note:  Daniel Sandhu presents today for chemotherapy, cycle 6, day 15, taxol, ramucirumab.    Patient seen by provider today: No   present during visit today: Not Applicable.    Note: Pt c/o HA rated 2/10, but also explained that pt's child woke him up earlier than expected this morning so he did not get as much sleep as planned.  Pt stated pain was tolerable and will take something at home if it persists.  No other complaints or concerns at this time.    Intravenous Access:  Implanted Port.    Treatment Conditions:  Lab Results   Component Value Date    HGB 11.2 09/18/2019     Lab Results   Component Value Date    WBC 2.6 09/18/2019      Lab Results   Component Value Date    ANEU 1.5 09/18/2019     Lab Results   Component Value Date     09/18/2019      Lab Results   Component Value Date     08/15/2019                   Lab Results   Component Value Date    POTASSIUM 4.3 08/15/2019           Lab Results   Component Value Date    MAG 2.2 03/07/2018            Lab Results   Component Value Date    CR 0.74 08/15/2019                   Lab Results   Component Value Date    YOBANY 8.2 08/15/2019                Lab Results   Component Value Date    BILITOTAL 0.4 09/04/2019           Lab Results   Component Value Date    ALBUMIN 3.8 09/04/2019                    Lab Results   Component Value Date    ALT 29 09/04/2019           Lab Results   Component Value Date    AST 19 09/04/2019       Urine: protein albumin = 30, will let pt know to  home 24 urine collection and informed pt to discuss the need for it with Dr. Hood on appointment Monday 9/23/19.       Post Infusion Assessment:  Patient tolerated infusion without incident.  Blood return noted pre and post infusion.  Site patent and intact, free from redness, edema or discomfort.  No evidence of extravasations.  Access discontinued per protocol.       Discharge Plan:   Patient declined prescription refills.  Discharge  instructions reviewed with: Patient.  Patient and/or family verbalized understanding of discharge instructions and all questions answered.  AVS to patient via Netflix.  Patient will return 9/23 for appointment with Dr. Hood.   Patient discharged in stable condition accompanied by: self.  Departure Mode: Ambulatory.    Flaco Deshpande, RN, RN

## 2019-09-18 NOTE — PATIENT INSTRUCTIONS
Contact numbers:    Triage/[phone]    Call with chills and/or temperature greater than or equal to 100.5 and questions or concerns.    If after hours, weekends, or holidays, call main hospital  at  [phone] and ask for Oncology doctor on call.            1     2     3     4    [facility] DRAW   9:00 AM   (15 min.)    [facility] DRAW   [facility] Draw    [facility] RETURN   9:15 AM   (50 min.)   Violeta Coto PA-C   [facility]    [facility] ONC INFUSION 180  10:30 AM   (180 min.)    ONCOLOGY INFUSION   [facility] 5     6    [facility] RETURN   8:30 AM   (15 min.)   Rod Barrios MD   [facility] 7       8     9     10     11    [facility] DRAW   8:30 AM   (15 min.)    [facility] DRAW   [facility] Draw    [facility] ONC INFUSION 180   9:00 AM   (180 min.)    ONCOLOGY INFUSION   [facility] 12     13     14       15     16     17     18    [facility] DRAW   8:30 AM   (15 min.)    [facility] DRAW   [facility] Draw    [facility] ONC INFUSION 180   9:00 AM   (180 min.)    ONCOLOGY INFUSION   [facility] 19     20    PET ONCOLOGY WHOLE BODY   6:30 AM   (45 min.)   [id number]   [facility] [place] PET CT    [facility] NEW THROAT   1:25 PM   (40 min.)   Imelda Anderson MD   [facility] 21       22     23    [facility] DRAW   9:15 AM   (15 min.)    [facility] DRAW   [facility] Draw    [facility] RETURN   9:30 AM   (30 min.)   Laurence Hood MD   [facility] 24     25     26     27     28       29     30                                                       1     2     3     4     5       6     7     8     9     10     11     12       13     14     15     16     17      18     19       20     21     22     23     24     25     26       27     28     29     30     31                               Lab Results:  Recent Results (from the past 12 hour(s))   CBC with platelets differential    Collection Time: 09/18/19  8:46 AM   Result Value Ref Range    WBC 2.6 (L) 4.0 - 11.0 10e9/L    RBC Count 3.62 (L) 4.4 - 5.9 10e12/L    Hemoglobin 11.2 (L) 13.3 - 17.7 g/dL    Hematocrit 33.3 (L) 40.0 - 53.0 %    MCV 92 78 - 100 fl    MCH 30.9 26.5 - 33.0 pg    MCHC 33.6 31.5 - 36.5 g/dL    RDW 12.7 10.0 - 15.0 %    Platelet Count 118 (L) 150 - 450 10e9/L    Diff Method Automated Method     % Neutrophils 57.1 %    % Lymphocytes 33.8 %    % Monocytes 6.8 %    % Eosinophils 1.5 %    % Basophils 0.8 %    % Immature Granulocytes 0.0 %    Nucleated RBCs 0 0 /100    Absolute Neutrophil 1.5 (L) 1.6 - 8.3 10e9/L    Absolute Lymphocytes 0.9 0.8 - 5.3 10e9/L    Absolute Monocytes 0.2 0.0 - 1.3 10e9/L    Absolute Eosinophils 0.0 0.0 - 0.7 10e9/L    Absolute Basophils 0.0 0.0 - 0.2 10e9/L    Abs Immature Granulocytes 0.0 0 - 0.4 10e9/L    Absolute Nucleated RBC 0.0    Protein qualitative urine    Collection Time: 09/18/19  8:46 AM   Result Value Ref Range    Protein Albumin Urine 30 (A) NEG^Negative mg/dL

## 2019-09-20 ENCOUNTER — OFFICE VISIT (OUTPATIENT)
Dept: OTOLARYNGOLOGY | Facility: CLINIC | Age: 39
End: 2019-09-20
Payer: COMMERCIAL

## 2019-09-20 ENCOUNTER — HOSPITAL ENCOUNTER (OUTPATIENT)
Dept: PET IMAGING | Facility: CLINIC | Age: 39
Setting detail: NUCLEAR MEDICINE
Discharge: HOME | End: 2019-09-20
Attending: INTERNAL MEDICINE | Admitting: INTERNAL MEDICINE
Payer: COMMERCIAL

## 2019-09-20 VITALS
DIASTOLIC BLOOD PRESSURE: 81 MMHG | HEART RATE: 67 BPM | WEIGHT: 148 LBS | SYSTOLIC BLOOD PRESSURE: 128 MMHG | BODY MASS INDEX: 21.86 KG/M2

## 2019-09-20 DIAGNOSIS — R07.0 THROAT DISCOMFORT: Primary | ICD-10-CM

## 2019-09-20 DIAGNOSIS — R49.0 DYSPHONIA: ICD-10-CM

## 2019-09-20 DIAGNOSIS — J38.3 VOCAL PROCESS GRANULOMA: ICD-10-CM

## 2019-09-20 DIAGNOSIS — Q38.8 VELOPHARYNGEAL INSUFFICIENCY, CONGENITAL: Primary | ICD-10-CM

## 2019-09-20 DIAGNOSIS — C15.5 MALIGNANT NEOPLASM OF LOWER THIRD OF ESOPHAGUS (H): ICD-10-CM

## 2019-09-20 DIAGNOSIS — J38.7 LARYNGEAL GRANULOMA: ICD-10-CM

## 2019-09-20 PROCEDURE — A9552 F18 FDG: HCPCS | Performed by: INTERNAL MEDICINE

## 2019-09-20 PROCEDURE — 25000128 H RX IP 250 OP 636: Performed by: INTERNAL MEDICINE

## 2019-09-20 PROCEDURE — 71260 CT THORAX DX C+: CPT

## 2019-09-20 PROCEDURE — 34300033 ZZH RX 343: Performed by: INTERNAL MEDICINE

## 2019-09-20 PROCEDURE — 78816 PET IMAGE W/CT FULL BODY: CPT | Mod: PS

## 2019-09-20 RX ORDER — IOPAMIDOL 755 MG/ML
60-135 INJECTION, SOLUTION INTRAVASCULAR ONCE
Status: COMPLETED | OUTPATIENT
Start: 2019-09-20 | End: 2019-09-20

## 2019-09-20 RX ADMIN — IOPAMIDOL 89 ML: 755 INJECTION, SOLUTION INTRAVENOUS at 07:56

## 2019-09-20 RX ADMIN — FLUDEOXYGLUCOSE F-18 10.14 MCI.: 500 INJECTION, SOLUTION INTRAVENOUS at 06:59

## 2019-09-20 RX ADMIN — Medication 500 UNITS: at 08:00

## 2019-09-20 ASSESSMENT — PAIN SCALES - GENERAL: PAINLEVEL: NO PAIN (0)

## 2019-09-20 NOTE — LETTER
9/20/2019       RE: Daniel Sandhu  [address] [facility] [id number]     Dear Colleague,    Thank you for referring your patient, Daniel Sandhu, to the [facility] VOICE at [facility]. Please see a copy of my visit note below.    [facility] VOICE CLINIC  Evaluation report    Clinician: Geovany Purvis M.M., M.A., CCC/SLP  Seen in conjunction with: Dr. Sky  Referring physician:  Dr. 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    Onset: Greater than 20 years  Inciting incident: none  Course: Unchanging  Salient history: He has a history significant for adenocarcinoma of the GE junction which he is followed by Dr. Coto.  Most recently he began a course of Chemotherapy with paclitaxel and ramucirumab on 4/10/19.  At his most recent Oncology visit patient denies any difficulty eating or drinking, though he acknowledges some fatigue following chemotherapy.  He had been experiencing epistaxis for which he worked with Dr. Rod Barrios and this is feeling much better following medical treatment.  He presents today specifically to address velopharyngeal insufficiency.  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  He has tried to pay attention to when and how this presents, but finds that the problem presents inconsistently.  He does report that it is more likely to happen after he has been playing for some time, and that certain passages have been more likely but not gauranteed to bring the issue on.   He wonders if his weightloss subsequent to his cancer treatment could have impacted his symptoms for the worse    He states that he previously spoke with the physician at the [facility] who had helped  "a young clear noticed to overcome a similar problem as reported in the news.  At that time she described an injection to the area, but he was unable to follow-up as his he had limited health insurance at that time.    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    Patient denies significant dyspnea, dysphagia, cough and pain.     OTHER PERTINENT HISTORY    Complex medical history: please also refer to Dr. Sky's dictation.     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) 6/5/2017     Past Surgical History:   Procedure Laterality Date     ESOPHAGOGASTRODUODENOSCOPY       ESOPHAGOSCOPY, GASTROSCOPY, DUODENOSCOPY (EGD), COMBINED N/A 6/9/2017    Procedure: COMBINED ENDOSCOPIC ULTRASOUND, ESOPHAGOSCOPY, GASTROSCOPY, DUODENOSCOPY (EGD), FINE NEEDLE ASPIRATE/BIOPSY;  Upper Endoscopic Ultrasound, fine needle aspirate/biopsy;  Surgeon: Guru Mark Avila MD;  Location:  OR     ESOPHAGOSCOPY, GASTROSCOPY, DUODENOSCOPY (EGD), COMBINED N/A 11/3/2017    Procedure: COMBINED ESOPHAGOSCOPY, GASTROSCOPY, DUODENOSCOPY (EGD);;  Surgeon: Yunior Esteban MD;  Location:  OR     ESOPHAGOSCOPY, GASTROSCOPY, DUODENOSCOPY (EGD), COMBINED N/A 11/9/2017    Procedure: COMBINED ESOPHAGOSCOPY, GASTROSCOPY, DUODENOSCOPY (EGD);  Esophogastroduodenoscopy, take out pharangostomy tube;  Surgeon: " Yunior Esteban MD;  Location: [facility] OR     ESOPHAGOSCOPY, GASTROSCOPY, DUODENOSCOPY (EGD), COMBINED N/A 1/11/2018    Procedure: COMBINED ESOPHAGOSCOPY, GASTROSCOPY, DUODENOSCOPY (EGD), BIOPSY SINGLE OR MULTIPLE;  COMBINED ESOPHAGOSCOPY, GASTROSCOPY, DUODENOSCOPY (EGD);  Surgeon: Alessandro Mcgregor MD;  Location: [facility] GI     GASTRECTOMY N/A 11/3/2017    Procedure: GASTRECTOMY;;  Surgeon: Yunior Esteban MD;  Location: [facility] OR     HAND SURGERY      childhood, torn tendon     INSERT PORT VASCULAR ACCESS Right 6/22/2017    Procedure: INSERT PORT VASCULAR ACCESS;  Single Lumen Chest Power Port;  Surgeon: Iván Driver PA-C;  Location: [facility] OR     INSERT PORT VASCULAR ACCESS Right 4/8/2019    Procedure: Single Lumen Chest Port Placement;  Surgeon: Krysten Ballard PA-C;  Location: [facility] OR     IR CHEST PORT PLACEMENT > 5 YRS OF AGE  4/8/2019     IR LIVER BIOPSY PERCUTANEOUS  3/20/2019     LAPAROSCOPY DIAGNOSTIC (GENERAL) N/A 11/3/2017    Procedure: LAPAROSCOPY DIAGNOSTIC (GENERAL);  diagnostic laparoscopy, right chest tube, total gastrectomy with distal esophagectomy, intrathoracic eveline-y esophago-jejunostomy, feeding jejunostomy, pharyngostomy, esophagogastroduodenoscopy, flexible bronchoscopy;  Surgeon: Yunior Esteban MD;  Location: [facility] OR     LAPAROTOMY EXPLORATORY N/A 11/3/2017    Procedure: LAPAROTOMY EXPLORATORY;;  Surgeon: Yunior Esteban MD;  Location: [facility] OR     PHARYNGOSTOMY N/A 11/3/2017    Procedure: PHARYNGOSTOMY;;  Surgeon: Yunior Esteban MD;  Location: [facility] OR     REMOVE PORT VASCULAR ACCESS Right 3/19/2018    Procedure: REMOVE PORT VASCULAR ACCESS;  Right Port Removal;  Surgeon: Krysten Hopper PA-C;  [facility]     THORACOTOMY Left 11/3/2017    Procedure: THORACOTOMY;;  Surgeon: Yunior Esteban MD;  Location: [facility] OR     OBJECTIVE  PATIENT REPORTED MEASURES    PERCEPTUAL EVALUATION (CPT 65416)  POSTURE / TENSION:     No overt tension  visible    BREATHING:     appears within normal limits and adequate     LARYNGEAL PALPATION:     no significant tenderness    VOICE:    Roughness: Minimal    Breathiness: Minimal    Strain: Mild to moderate Intermittent    Loudness    Conversational speech:  Mildly increased    Projected speech:  WNL    Pitch:    Conversational speech:  WNL    Pitch glide: WNL with increased strain at elevated F0    Resonance:    Conversational speech:  Pressed quality, casual speech within normal limits    Singing vs. Speech: Increased strain noted with singing versus running speech    CAPE-V Overall Severity:  12/100    COUGH/THROAT CLEARING:    Not observed    LARYNGEAL EXAMINATION  Procedure: Flexible endoscopy with chip-tip technology with stroboscopy, left nostril; topical anesthesia with 3% Lidocaine and 0.25% phenylephrine was applied.   Performed by: Dr. Sky   The laryngeal and pharyngeal structures were evaluated for gross appearance, mobility, function, and focal lesions / abnormalities of the associated mucosa.  Stroboscopy was warranted to evaluate closure, symmetry, and vibratory characteristics of the vocal folds.  All findings were within normal limits with the exception of the following salient features:     Mild diffusely thickened secretions present throughout the supraglottis    Left vocal process demonstrates an area of focal irritation/ulceration    There is an area of irregularity / redundancy of mucosa along the superior portion of the mid-membranous left true vocal fold    Mildly decreased range of motion, but vocal fold mobility is brisk and bilateral    Significant contracture of the posterior glottis on phonation    Stroboscopy demonstrates closed phase predominant closure, moderate reduction of the mucosal wave of the left true vocal fold in the area of irregular mucosa.  Essentially symmetric vibration. Periodic.    Normal palatal motion is noted during basic speech tasks    When playing his instrument  (a significantly higher pressure context) there is an increase in bubbly secretions at the point of closure of the velum    A true episode of velopharyngeal incompetence as described with the patient was not able to be visualized; however, the patient was able to replicate the sensation and incomplete closure at the midline of the velum was noted      Irregularity of the LTVF mucosa along the mid-membranous aspect of the superior fold      Subtle air escape a the velum during playing.     The laryngeal exam was reviewed with Mr. Sandhu, and I provided pertinent explanations, as well as written and oral information.    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  Laryngeal evaluation demonstrates a small left vocal process granuloma, mucosal change of the superior surface of the left true vocal fold with corresponding reduction of the mucosal wave on that side.  Salient to the patient's key complaints, although palatal function is intact for basic tasks further supported by lack of hypernasality, in the context of his bassoon playing increased air escape is noted along the midline of the velum In the form of bubbling secretions.    RECOMMENDATIONS:     With regards to the patient's chief complaint of velopharyngeal insufficiency I will speak to colleagues who worked closely with the cleft palate and craniofacial abnormality population regarding possible benefit of behavioral intervention.    Dr. Sky felt that the patient could be a candidate for injection augmentation of the palate to improve closure, and I agree that this could be the most beneficial avenue of intervention    With regards to the patient's vocal process granuloma, the patient is largely asymptomatic apart from  intermittent dry/irritated sensations.  Given that he has significant vocal demands he was encouraged to be judicious in his voice use for the time being, and should the sensation of left-sided irritation increase reengage with our clinic to pursue a short course of speech therapy to optimize patterns of voice use in order to facilitate resolution of the granuloma.    Should therapy be pursued the following prognosis and goals would apply    He demonstrates a Good prognosis for improvement given adherence to therapeutic recommendations.     Positive indicators: diagnosis is known to respond to treatment    Negative indicators: none    DURATION / FREQUENCY: 3 biweekly and 2 monthly one-hour sessions    GOALS:  Patient goal:   1. To understand the problem and fix it as much as possible    Short-term goal(s): Within the first 4 sessions, Mr. Sandhu:  1. will demonstrate assigned laryngeal massage techniques with 80% accuracy or better with no clinician support  2. will be able to independently list key factors in maintenance of good vocal hygiene with 80% accuracy, and report on their use outside the therapy room.  3. will accurately identify target vs. habitual voice quality during therapy tasks in 4 out of 5 trials with no clinician support  4. will demonstrate the ability to alternate between target and habitual voice quality given clinician cue 75% of the time during therapy tasks    Long-term goal(s): In 6 months, Mr. Sandhu will:  1. Report a week of typical vocal activities, in which dysphonia and Throat irritation do not exceed a level of 1 out of 10, 80% of the time   This treatment plan was developed with the patient who agreed with the recommendations.      TOTAL SERVICE TIME: 50 minutes  EVALUATION OF VOICE AND RESONANCE (50503)  NO CHARGE FACILITY FEE (33249)    Geovany Purvis M.M., M.A., CCC-SLP  Speech-Language Pathologist  Certificate of Vocology  [phone]    *this report was created in part  through the use of computerized dictation software, and though reviewed following completion, some typographic errors may persist.  If there is confusion regarding any of this notes contents, please contact me for clarification.*      Again, thank you for allowing me to participate in the care of your patient.      Sincerely,    Cameron Purvis, SLP

## 2019-09-20 NOTE — NURSING NOTE
Chief Complaint   Patient presents with     Consult     Stress VPI     Blood pressure 128/81, pulse 67, weight 67.1 kg (148 lb).    Donna Vaughan EMT

## 2019-09-20 NOTE — LETTER
2019       RE: Daniel Sandhu  [address] [address]     Dear Colleague,    Thank you for referring your patient, Daniel Sandhu, to the [facility] EAR NOSE AND THROAT at [facility]. Please see a copy of my visit note below.      [facility] Otol[facility]       Patient: Daniel Sandhu  MRN: [id number]    : 1980  Age/Gender: 39 year old male  Date of Service: 2019     PCP and Referring Provider(s):    PCP: Lencho Chan  Referring Physician: Rod Barrios    Reason for Consultation:  VPI with  Musical instrument  Left neck pain    History:    HISTORY OF PRESENT ILLNESS: Mr. Sandhu is a 39 year old male who presents to us today with his chief complaint that he has nasal air escape while he is playing intermittently the Pollenizer. This has been going on for 20 years. Only happens with certain passages when he plays for a long period of time. He has pursued this once by talking to a specialist at [place] where he was offered an injection. He states this has become more of a problem since his cancer - since he lost 10 lbs with it. 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   Of note he also reports intermittent left neck pain. He denies a voice problem. 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 He also has an upcoming recording that he is playing together with 2 other people and he is worried this problem will be much more apparent on the recording.   He has been seeing Dr Barrios for epistaxis for which he is doing much better now. He was referred here by Dr Barrios.     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         PAST SURGICAL HISTORY:   Past Surgical History:   Procedure Laterality Date      ESOPHAGOGASTRODUODENOSCOPY       ESOPHAGOSCOPY, GASTROSCOPY, DUODENOSCOPY (EGD), COMBINED N/A 6/9/2017    Procedure: COMBINED ENDOSCOPIC ULTRASOUND, ESOPHAGOSCOPY, GASTROSCOPY, DUODENOSCOPY (EGD), FINE NEEDLE ASPIRATE/BIOPSY;  Upper Endoscopic Ultrasound, fine needle aspirate/biopsy;  Surgeon: Guru Mark Avila MD;  Location: [facility] OR     ESOPHAGOSCOPY, GASTROSCOPY, DUODENOSCOPY (EGD), COMBINED N/A 11/3/2017    Procedure: COMBINED ESOPHAGOSCOPY, GASTROSCOPY, DUODENOSCOPY (EGD);;  Surgeon: Yunior Esteban MD;  Location: [facility] OR     ESOPHAGOSCOPY, GASTROSCOPY, DUODENOSCOPY (EGD), COMBINED N/A 11/9/2017    Procedure: COMBINED ESOPHAGOSCOPY, GASTROSCOPY, DUODENOSCOPY (EGD);  Esophogastroduodenoscopy, take out pharangostomy tube;  Surgeon: Yunior Esteban MD;  Location: [facility] OR     ESOPHAGOSCOPY, GASTROSCOPY, DUODENOSCOPY (EGD), COMBINED N/A 1/11/2018    Procedure: COMBINED ESOPHAGOSCOPY, GASTROSCOPY, DUODENOSCOPY (EGD), BIOPSY SINGLE OR MULTIPLE;  COMBINED ESOPHAGOSCOPY, GASTROSCOPY, DUODENOSCOPY (EGD);  Surgeon: Alessandro Mcgregor MD;  Location: [facility] GI     GASTRECTOMY N/A 11/3/2017    Procedure: GASTRECTOMY;;  Surgeon: Yunior Esteban MD;  Location: [facility] OR     HAND SURGERY      childhood, torn tendon     INSERT PORT VASCULAR ACCESS Right 6/22/2017    Procedure: INSERT PORT VASCULAR ACCESS;  Single Lumen Chest Power Port;  Surgeon: Iván Driver PA-C;  Location: [facility] OR     INSERT PORT VASCULAR ACCESS Right 4/8/2019    Procedure: Single Lumen Chest Port Placement;  Surgeon: Krysten Ballard PA-C;  Location: [facility] OR     IR CHEST PORT PLACEMENT > 5 YRS OF AGE  4/8/2019     IR LIVER BIOPSY PERCUTANEOUS  3/20/2019     LAPAROSCOPY DIAGNOSTIC (GENERAL) N/A 11/3/2017    Procedure: LAPAROSCOPY DIAGNOSTIC (GENERAL);  diagnostic laparoscopy, right chest tube, total gastrectomy with distal esophagectomy, intrathoracic eveline-y esophago-jejunostomy, feeding jejunostomy, pharyngostomy,  esophagogastroduodenoscopy, flexible bronchoscopy;  Surgeon: Yunior Esteban MD;  Location: [facility]     LAPAROTOMY EXPLORATORY N/A 11/3/2017    Procedure: LAPAROTOMY EXPLORATORY;;  Surgeon: Yunior Esteban MD;  [facility]: [facility]     PHARYNGOSTOMY N/A 11/3/2017    Procedure: PHARYNGOSTOMY;;  Surgeon: Yunior Esteban MD;  [facility]: [facility]     REMOVE PORT VASCULAR ACCESS Right 3/19/2018    Procedure: REMOVE PORT VASCULAR ACCESS;  Right Port Removal;  Surgeon: Krysten Hopper PA-C;  [facility]     THORACOTOMY Left 11/3/2017    Procedure: THORACOTOMY;;  Surgeon: Yunior Esteban MD;  Location: [facility]         CURRENT MEDICATIONS:   Current Outpatient Medications:      cyabnocobalamin (VITAMIN B-12) 2500 MCG sublingual tablet, Place 2,500 mcg under the tongue daily, Disp: 30 tablet, Rfl: 1     ferrous gluconate (FERGON) 324 (38 Fe) MG tablet, Take 324 mg by mouth daily (with breakfast), Disp: , Rfl:      LORazepam (ATIVAN) 0.5 MG tablet, Take 1 tablet (0.5 mg) by mouth every 4 hours as needed (Anxiety, Nausea/Vomiting or Sleep) (Patient not taking: Reported on 7/26/2019), Disp: 30 tablet, Rfl: 2     Misc Natural Product Nasal (PONARIS) SOLN, Apply two drops to each nostril BID X 2 month supply, Disp: 10 mL, Rfl: 3     multivitamin, therapeutic with minerals (MULTI-VITAMIN) TABS tablet, Take 1 tablet by mouth daily Generic [place] Vitamin, Disp: , Rfl:      mupirocin (BACTROBAN) 2 % external ointment, Apply to anterior nares BID X 2 month supply, Disp: 2 g, Rfl: 3     prochlorperazine (COMPAZINE) 10 MG tablet, Take 1 tablet (10 mg) by mouth every 6 hours as needed (Nausea/Vomiting) (Patient not taking: Reported on 5/6/2019), Disp: 30 tablet, Rfl: 2     saccharomyces boulardii (FLORASTOR) 250 MG capsule, Take 250 mg by mouth 2 times daily, Disp: , Rfl:       ALLERGIES: Patient has no known allergies.      SOCIAL HISTORY:    Social History     Socioeconomic History     Marital status:       Spouse name: Not on file     Number of children: 1     Years of education: Not on file     Highest education level: Not on file   Occupational History     Occupation: musician and teacher    Social Needs     Financial resource strain: Not on file     Food insecurity:     Worry: Not on file     Inability: Not on file     Transportation needs:     Medical: Not on file     Non-medical: Not on file   Tobacco Use     Smoking status: Never Smoker     Smokeless tobacco: Never Used   Substance and Sexual Activity     Alcohol use: Yes     Comment: 1 beer daily     Drug use: Yes     Types: Marijuana     Comment: occasional     Sexual activity: Yes     Partners: Female     Birth control/protection: Natural Family Planning   Lifestyle     Physical activity:     Days per week: Not on file     Minutes per session: Not on file     Stress: Not on file   Relationships     Social connections:     Talks on phone: Not on file     Gets together: Not on file     Attends Mosque service: Not on file     Active member of club or organization: Not on file     Attends meetings of clubs or organizations: Not on file     Relationship status: Not on file     Intimate partner violence:     Fear of current or ex partner: Not on file     Emotionally abused: Not on file     Physically abused: Not on file     Forced sexual activity: Not on file   Other Topics Concern     Parent/sibling w/ CABG, MI or angioplasty before 65F 55M? Not Asked   Social History Narrative     Not on file           FAMILY HISTORY: Non-contributory for problems with anesthesia      REVIEW OF SYSTEMS: The patient was asked a 14 point review of systems regarding constitutional symptoms, eye symptoms, ears, nose, mouth, throat symptoms, cardiovascular symptoms, respiratory symptoms, gastrointestinal symptoms, genitourinary symptoms, musculoskeletal symptoms, integumentary symptoms, neurological symptoms, psychiatric symptoms, endocrine symptoms,  hematologic/lymphatic symptoms, and allergic/ immunologic symptoms.   The pertinent factors have been included in the HPI, and the complete list can be found in the patient's written chart.      Physical Examination:  The patient underwent a physical examination as described below. The pertinent positive and negative findings are summarized after the description of the examination.    Constitutional: The patient's developmental and nutritional status was assessed. The patient's voice quality was assessed.  Head and Face: The head and face were inspected for deformities. The sinuses were palpated. The salivary glands were palpated. Facial muscle strength was assessed bilaterally.  Eyes: Extraocular movements and primary gaze alignment were assessed.  Ears, Nose, Mouth and Throat: The ears and nose were examined for deformities. The nasal septum, mucosa, and turbinates were inspected by anterior rhinoscopy. The lips, teeth, and gums were examined for abnormalities. The oral mucosa, tongue, palate, tonsils, lateral and posterior pharynx were inspected for the presence of asymmetry or mucosal lesions.    Neck: The tracheal position was noted, and the neck mass palpated to determine if there were any asymmetries, abnormal neck masses, thyromegally, or thyroid nodules.  Respiratory: The nature of the breathing and chest expansion/symmetry was observed.  Cardiovascular: The patient was examined to determine the presence of any edema or jugular venous distension.  Abdomen: The contour of the abdomen was noted.  Lymphatic: The patient was examined for infraclavicular lymphadenopathy.  Musculoskeletal: The patient was inspected for the presence of skeletal deformities.  Extremities: The extremities were examined for any clubbing or cyanosis.  Skin: The skin was examined for inflammatory or neoplastic conditions.  Neurologic: The patient's orientation, mood, and affect were noted. The cranial nerve  functions were  examined.    Other pertinent positive and negative findings on physical examination:   No bifid uvula, no submucous cleft  Left TH space tenderness  FOM/BOT soft  Left neck incision well healed  Anterior rhinoscopy - scabbing of the anterior of the inferior turbinate    All other physical examination findings were within normal limits and noncontributory.    Procedures:   Video Laryngoscopy with Stroboscopy (CPT 58922) and Behavioral & Qualitative Evaluation of Voice and Resonence (CPT 40567)     Preoperative Diagnosis:  Dysphonia and throat symptoms  Postoperative Diagnosis: Dysphonia and throat symptoms  Indication:  Patient has new or persistent dysphonia and throat symptoms.  This requires evaluation by stroboscopy to fully delineate the laryngeal functioning; especially mucosal wave assessment, ultrasharp visualization of lesions on the vocal folds, and overall functioning of the larynx.  Details of Procedure: A 70 degree rigid telescopic laryngoscope or a distal chip flexible scope was used. The lighting was obtained via a light cable connected to a stroboscopic unit. The telescope was inserted either transorally or transnasally until the vocal folds could be visualized. The patient was instructed to sustain the vowel  ee  at a comfortable pitch and loudness as the voice was monitored by a microphone connected to a fundamental frequency tracker. This circuit tracked vocal periodicity, allowing the light to flash in synchrony with the glottal cycles. A setting on the stroboscope was set to change the phase of light strobing with relation to the vocal fundamental frequency, producing an image of 1 to 2 glottal cycles every second. The video images were recorded for analysis. Use of the variable speed, slow and stop scan allowed careful study of pertinent segments of laryngeal function to increase accuracy of clinical assessments of function and dysfunction.  In particular, features of glottal closure, mucosal  wave on the vocal fold cover and laryngeal symmetry were analyzed. Lastly, the patient was asked to phonate speech samples and auditory/perceptual evaluation of voice and resonance were performed.  The vocal quality was carefully evaluated for hoarseness, breathiness, loudness, phrase length and intelligibility to determine the source of dysphonia.    Findings:   A. BEHAVIORAL & QUALITATIVE EVALUATION OF VOICE AND RESONENCE   Comments: F0 180 MPT: 10sec  Vocal Quality: strained    Pitch Range:  Mildly reduced  Phrase Length: normal  Vocal Loudness: normal  Dysarthria: no    B. LARYNGOVIDEOSTROBOSCOPY   Anatomic/Physiological Deviations:  Left vocal process granuloma  Good approximation of the soft palate to the nasopharynx except when the patient imitated the noise that happens during instrument playing there was more bubbling and likely a space centrally  Despite playing the instrument today the noise was not spontaneously induced  Mucosal wave: Right:   no restriction      Left:  no restriction   Bilateral Vocal Fold Vibration: mild asymmetry   Vocal Process: Right:  no restriction      Left:  no restriction  Vocal Fold closure:  Complete     Complication(s): None  Disposition: Patient tolerated the procedure well        Review of Relevant Clinical Data:    Radiology:   PET CT 6/21/2019  Metastatic esophageal carcinoma with liver metastasis:  1. Liver metastatic lesion in the left hepatic lobe has significantly  decreased in size and metabolic activity as above. No new suspicious  lesion in the liver.  2. Previously seen hypermetabolic uptake in the left anterior  abdominal cavity have shifted to the lower pelvic cavity. The uptake  corresponds with adjacent small bowel in the lower pelvis. Consider CT  enterography for further evaluation as metastatic disease cannot be  rule out.        Labs:  No results found for: TSH  Lab Results   Component Value Date     08/15/2019    CO2 27 08/15/2019    BUN 23  08/15/2019    PHOS 2.3 (L) 01/15/2018     Lab Results   Component Value Date    WBC 3.5 (L) 2019    HGB 12.0 (L) 2019    HCT 35.5 (L) 2019    MCV 91 2019     (L) 2019     No results found for: MILAGROS  No components found for: RHEUMATOIDFACTOR,  RF  Lab Results   Component Value Date    CRP 17.0 (H) 2017     No components found for: CKTOT, URICACID  No components found for: C3, C4, DSDNAAB, NDNAABIFA  No results found for: MPOAB         Impression/Plan:      IMPRESSION: Mr. Sandhu is a 39 year old male who is being seen for the followin. VPI  - not true VPI, only intermittent during playing the bassoon, likely exacerbated lately from weight loss  - will refer to cleft palate clinic   - discussed options of observation vs injection augmentation   - consider nutrition consult while undergoing chemotherapy to prevent further weight loss    2. Laryngeal granuloma  - high voice use and history of neck surgery likely multifactorial from behavioral adaptations and paresis  - cause of the throat discomfort  - discussed lifestyle modifications for LPR, voice rest and increased hydration  - already on PPI        RETURN VISIT: 6 months, or earlier as needed.     Thank you for the kind referral and for allowing me to share in the care of Mr. Sandhu. If you have any questions, please do not hesitate to contact me.        Imelda Sky MD    of Otolaryngology - Head and Neck Surgery   Voice, Airway, and Swallowing Disorders   [facility] at [facility]    Clinics & Surgery [address] [address]  Phone: [phone]  Fax: [phone]    [place] [address]  Phone: [phone]  Fax: [phone]        Again, thank you for allowing me to participate in the care of your patient.      Sincerely,    Imelda Anderson MD

## 2019-09-21 DIAGNOSIS — C15.5 MALIGNANT NEOPLASM OF LOWER THIRD OF ESOPHAGUS (H): Primary | ICD-10-CM

## 2019-09-23 ENCOUNTER — ONCOLOGY VISIT (OUTPATIENT)
Dept: ONCOLOGY | Facility: CLINIC | Age: 39
End: 2019-09-23
Attending: INTERNAL MEDICINE
Payer: COMMERCIAL

## 2019-09-23 VITALS
HEIGHT: 69 IN | SYSTOLIC BLOOD PRESSURE: 121 MMHG | OXYGEN SATURATION: 100 % | WEIGHT: 146.9 LBS | BODY MASS INDEX: 21.76 KG/M2 | TEMPERATURE: 97.9 F | RESPIRATION RATE: 16 BRPM | HEART RATE: 78 BPM | DIASTOLIC BLOOD PRESSURE: 63 MMHG

## 2019-09-23 DIAGNOSIS — C15.5 MALIGNANT NEOPLASM OF LOWER THIRD OF ESOPHAGUS (H): ICD-10-CM

## 2019-09-23 DIAGNOSIS — C15.5 MALIGNANT NEOPLASM OF LOWER THIRD OF ESOPHAGUS (H): Primary | ICD-10-CM

## 2019-09-23 LAB
ALBUMIN SERPL-MCNC: 4 G/DL (ref 3.4–5)
ALP SERPL-CCNC: 41 U/L (ref 40–150)
ALT SERPL W P-5'-P-CCNC: 28 U/L (ref 0–70)
ANION GAP SERPL CALCULATED.3IONS-SCNC: 5 MMOL/L (ref 3–14)
AST SERPL W P-5'-P-CCNC: 19 U/L (ref 0–45)
BASOPHILS # BLD AUTO: 0 10E9/L (ref 0–0.2)
BASOPHILS NFR BLD AUTO: 0.7 %
BILIRUB SERPL-MCNC: 0.6 MG/DL (ref 0.2–1.3)
BUN SERPL-MCNC: 14 MG/DL (ref 7–30)
CALCIUM SERPL-MCNC: 9 MG/DL (ref 8.5–10.1)
CHLORIDE SERPL-SCNC: 106 MMOL/L (ref 94–109)
CO2 SERPL-SCNC: 28 MMOL/L (ref 20–32)
CREAT SERPL-MCNC: 0.67 MG/DL (ref 0.66–1.25)
CREAT UR-MCNC: 15 MG/DL
DIFFERENTIAL METHOD BLD: ABNORMAL
EOSINOPHIL # BLD AUTO: 0.1 10E9/L (ref 0–0.7)
EOSINOPHIL NFR BLD AUTO: 1.8 %
ERYTHROCYTE [DISTWIDTH] IN BLOOD BY AUTOMATED COUNT: 12.6 % (ref 10–15)
GFR SERPL CREATININE-BSD FRML MDRD: >90 ML/MIN/{1.73_M2}
GLUCOSE SERPL-MCNC: 100 MG/DL (ref 70–99)
HCT VFR BLD AUTO: 37.2 % (ref 40–53)
HGB BLD-MCNC: 12.4 G/DL (ref 13.3–17.7)
IMM GRANULOCYTES # BLD: 0 10E9/L (ref 0–0.4)
IMM GRANULOCYTES NFR BLD: 0 %
LYMPHOCYTES # BLD AUTO: 0.9 10E9/L (ref 0.8–5.3)
LYMPHOCYTES NFR BLD AUTO: 33.9 %
MCH RBC QN AUTO: 30.8 PG (ref 26.5–33)
MCHC RBC AUTO-ENTMCNC: 33.3 G/DL (ref 31.5–36.5)
MCV RBC AUTO: 93 FL (ref 78–100)
MONOCYTES # BLD AUTO: 0.1 10E9/L (ref 0–1.3)
MONOCYTES NFR BLD AUTO: 4.3 %
NEUTROPHILS # BLD AUTO: 1.6 10E9/L (ref 1.6–8.3)
NEUTROPHILS NFR BLD AUTO: 59.3 %
NRBC # BLD AUTO: 0 10*3/UL
NRBC BLD AUTO-RTO: 0 /100
PLATELET # BLD AUTO: 135 10E9/L (ref 150–450)
POTASSIUM SERPL-SCNC: 3.9 MMOL/L (ref 3.4–5.3)
PROT SERPL-MCNC: 7.2 G/DL (ref 6.8–8.8)
PROT UR-MCNC: <0.05 G/L
PROT/CREAT 24H UR: NORMAL G/G CR (ref 0–0.2)
RBC # BLD AUTO: 4.02 10E12/L (ref 4.4–5.9)
SODIUM SERPL-SCNC: 139 MMOL/L (ref 133–144)
WBC # BLD AUTO: 2.8 10E9/L (ref 4–11)

## 2019-09-23 PROCEDURE — 36415 COLL VENOUS BLD VENIPUNCTURE: CPT

## 2019-09-23 PROCEDURE — 80053 COMPREHEN METABOLIC PANEL: CPT | Performed by: INTERNAL MEDICINE

## 2019-09-23 PROCEDURE — 85025 COMPLETE CBC W/AUTO DIFF WBC: CPT | Performed by: INTERNAL MEDICINE

## 2019-09-23 PROCEDURE — G0463 HOSPITAL OUTPT CLINIC VISIT: HCPCS | Mod: ZF

## 2019-09-23 PROCEDURE — 84156 ASSAY OF PROTEIN URINE: CPT | Performed by: INTERNAL MEDICINE

## 2019-09-23 PROCEDURE — 99215 OFFICE O/P EST HI 40 MIN: CPT | Mod: ZP | Performed by: INTERNAL MEDICINE

## 2019-09-23 ASSESSMENT — PAIN SCALES - GENERAL: PAINLEVEL: NO PAIN (0)

## 2019-09-23 ASSESSMENT — MIFFLIN-ST. JEOR: SCORE: 1576.96

## 2019-09-23 NOTE — TELEPHONE ENCOUNTER
ONCOLOGY INTAKE: Records Information      APPT INFORMATION:  Referring provider:  Laurence Hood MD  Referring provider s clinic:   ONCOLOGY ADULT  Reason for visit/diagnosis:  C15.5 (ICD-10-CM) - Malignant neoplasm of lower third of esophagus (H)  Has patient been notified of appointment date and time?: Yes    RECORDS INFORMATION:  Were the records received with the referral (via [employer])? No, Internal    Has patient been seen for any external appt for this diagnosis? No    If yes, where? NA    Has patient had any imaging or procedures outside of [place] for this condition? No      If Yes, where? NA    ADDITIONAL INFORMATION:

## 2019-09-23 NOTE — PROGRESS NOTES
[facility]    Hematology/Oncology Established Patient Note      Today's Date: 9/23/19    Reason for Follow-up: adenocarcinoma of the GE junction      HISTORY OF PRESENT ILLNESS: Daniel Sandhu is a 39 year old male who presents with adenocarcinoma of the GE junction.  In early 2017, patient started noticing difficulty swallowing, and that food seems to take longer to go down.  It became more frequent, and he saw his PCP, and was referred for EGD, which showed an esophageal mass located at the GE junction, measuring 4 cm.  Biopsy showed poorly differentiated adenocarcinoma.  HER-2 was sent and is equivocal (2+).  CT c/a/p showed a mildly prominent lymph node in the gastrohepatic ligament, but there were no pathologically enlarged lymph nodes in the chest, abdomen, or pelvis.  There was otherwise no evidence of metastatic disease.  PET-CT showed thickening of the distal esophagus consistent with known esophageal adenocarcinoma, as well as mildly hypermetabolic 1 cm lymph node in the gastrohepatic ligament.  There was no evidence of distant metastatic disease.  He underwent EUS on 6/9/17, which showed the esophageal tumor in the lower third of the esophagus, staged T2NX.  There were 2 abnormal lymph nodes seen in the gastrohepatic ligament; pathology was suspicious for adenocarcinoma.  Celiac node was sampled as well, which was benign.  He was seen by surgery, Dr. Esteban, and recommended srinivas-operative chemotherapy.    Port was placed on 6/22/17.  It was removed on 3/19/18.    He underwent chemotherapy with epirubicin, oxaliplatin, and capecitabine x 3 cycles 6/26/17-8/7/17.      On 11/3/17, he underwent laparotomy with total gastrectomy and abdominal lymphadenectomy, left thoracotomy with distal esophagectomy and intrathoracic Terrell-en-Y esophagojejunostomy, feeding jejunostomy, left pharyngostomy tube placement, right tube thoracostomy, and flexible bronchoscopy.  On 11/9/17, he was taken back  to OR for I&D of pharyngostomy tube abscess.  Pathology showed adenocarcinoma, moderate differentiated, extensive residual tumor with no evidence tumor regression, margins negative, perineural invasion present, 1 of 28 lymph nodes were involved with malignancy, stage [id number] (stage IIIA).  HER-2 is non-amplified.    He resumed chemotherapy post-surgery, with plans to complete 3 more cycles, with 5-FU, epirubicin, oxaliplatin.  However, he only completed 2 more cycles, due to poor tolerance.  He was admitted to the hospital 1/9/18-1/13/18 for nausea, diarrhea, failure to thrive, severe malnutrition, generalized weakness.  His infusional chemotherapy was stopped on 1/8/18.  He underwent EGD in the hospital on 1/11/18, which showed ulcers, and no evidence of recurrence of his cancer.  One area biopsied showed inflammation, no evidence of malignancy.    He saw Dr. Esteban on 3/6/19 and had CT abdomen/pelvis done, which showed a 3.1 cm lesion in hepatic segment 3.  He underwent biopsy on 3/20/19 by IR, which showed moderately differentiated adenocarcinoma consistent with metastasis from primary esophageal carcinoma.  HER-2 is non-amplified.  Restaging PET scan on 3/29/19 showed the 4.4 cm left lobe liver metastasis.  There is nodular foci of hypermetabolic uptake in the left mid abdomen in relation to the small bowel loops, without definite underlying lesion on CT.  This is favored to represent metastatic disease unless proven otherwise.    He started on chemotherapy with paclitaxel and ramucirumab on 4/10/19.      INTERIM HISTORY: Daniel comes in for follow-up today.  He says that he feels well.  He continues to tolerate chemotherapy well and denies any significant side effects with it.  He is planning to see oncology at [facility] on 9/27/19 for second opinion.        REVIEW OF SYSTEMS:   14 point ROS was reviewed and is negative other than as noted above in HPI.       HOME MEDICATIONS:  Current Outpatient Medications    Medication Sig Dispense Refill     cyabnocobalamin (VITAMIN B-12) 2500 MCG sublingual tablet Place 2,500 mcg under the tongue daily 30 tablet 1     ferrous gluconate (FERGON) 324 (38 Fe) MG tablet Take 324 mg by mouth daily (with breakfast)       LORazepam (ATIVAN) 0.5 MG tablet Take 1 tablet (0.5 mg) by mouth every 4 hours as needed (Anxiety, Nausea/Vomiting or Sleep) (Patient not taking: Reported on 7/26/2019) 30 tablet 2     Misc Natural Product Nasal (PONARIS) SOLN Apply two drops to each nostril BID X 2 month supply 10 mL 3     multivitamin, therapeutic with minerals (MULTI-VITAMIN) TABS tablet Take 1 tablet by mouth daily Generic [place] Vitamin       mupirocin (BACTROBAN) 2 % external ointment Apply to anterior nares BID X 2 month supply 2 g 3     prochlorperazine (COMPAZINE) 10 MG tablet Take 1 tablet (10 mg) by mouth every 6 hours as needed (Nausea/Vomiting) (Patient not taking: Reported on 5/6/2019) 30 tablet 2     saccharomyces boulardii (FLORASTOR) 250 MG capsule Take 250 mg by mouth 2 times daily           ALLERGIES:  No Known Allergies      PAST MEDICAL HISTORY:  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) 6/5/2017         PAST SURGICAL HISTORY:  Past Surgical History:   Procedure Laterality Date     ESOPHAGOGASTRODUODENOSCOPY       ESOPHAGOSCOPY, GASTROSCOPY, DUODENOSCOPY (EGD), COMBINED N/A 6/9/2017    Procedure: COMBINED ENDOSCOPIC ULTRASOUND, ESOPHAGOSCOPY, GASTROSCOPY, DUODENOSCOPY (EGD), FINE NEEDLE ASPIRATE/BIOPSY;  Upper Endoscopic Ultrasound, fine needle aspirate/biopsy;  Surgeon: Guru Mark Avila MD;  Location:  OR     ESOPHAGOSCOPY, GASTROSCOPY, DUODENOSCOPY (EGD), COMBINED N/A 11/3/2017    Procedure: COMBINED ESOPHAGOSCOPY, GASTROSCOPY, DUODENOSCOPY (EGD);;  Surgeon: Yunior Esteban MD;  [facility]     ESOPHAGOSCOPY, GASTROSCOPY, DUODENOSCOPY (EGD), COMBINED N/A 11/9/2017    Procedure: COMBINED  ESOPHAGOSCOPY, GASTROSCOPY, DUODENOSCOPY (EGD);  Esophogastroduodenoscopy, take out pharangostomy tube;  Surgeon: Yunior Esteban MD;  [facility]     ESOPHAGOSCOPY, GASTROSCOPY, DUODENOSCOPY (EGD), COMBINED N/A 1/11/2018    Procedure: COMBINED ESOPHAGOSCOPY, GASTROSCOPY, DUODENOSCOPY (EGD), BIOPSY SINGLE OR MULTIPLE;  COMBINED ESOPHAGOSCOPY, GASTROSCOPY, DUODENOSCOPY (EGD);  Surgeon: Alessandro Mcgregor MD;  [facility] GI     GASTRECTOMY N/A 11/3/2017    Procedure: GASTRECTOMY;;  Surgeon: Yunior Esteban MD;  [facility]     HAND SURGERY      childhood, torn tendon     INSERT PORT VASCULAR ACCESS Right 6/22/2017    Procedure: INSERT PORT VASCULAR ACCESS;  Single Lumen Chest Power Port;  Surgeon: Iván Driver PA-C;  [facility]     INSERT PORT VASCULAR ACCESS Right 4/8/2019    Procedure: Single Lumen Chest Port Placement;  Surgeon: Krysten Ballard PA-C;  [facility]: [facility]     IR CHEST PORT PLACEMENT > 5 YRS OF AGE  4/8/2019     IR LIVER BIOPSY PERCUTANEOUS  3/20/2019     LAPAROSCOPY DIAGNOSTIC (GENERAL) N/A 11/3/2017    Procedure: LAPAROSCOPY DIAGNOSTIC (GENERAL);  diagnostic laparoscopy, right chest tube, total gastrectomy with distal esophagectomy, intrathoracic eveline-y esophago-jejunostomy, feeding jejunostomy, pharyngostomy, esophagogastroduodenoscopy, flexible bronchoscopy;  Surgeon: Yunior Esteban MD;  [facility]     LAPAROTOMY EXPLORATORY N/A 11/3/2017    Procedure: LAPAROTOMY EXPLORATORY;;  Surgeon: Yunior Esteban MD;  [facility]     PHARYNGOSTOMY N/A 11/3/2017    Procedure: PHARYNGOSTOMY;;  Surgeon: Yunior Esteban MD;  [facility]     REMOVE PORT VASCULAR ACCESS Right 3/19/2018    Procedure: REMOVE PORT VASCULAR ACCESS;  Right Port Removal;  Surgeon: Krysten Hopper PA-C;  [facility]     THORACOTOMY Left 11/3/2017    Procedure: THORACOTOMY;;  Surgeon: Yunior Esteban MD;  [facility]         SOCIAL  HISTORY:  Social History     Socioeconomic History     Marital status:      Spouse name: Not on file     Number of children: 1     Years of education: Not on file     Highest education level: Not on file   Occupational History     Occupation: musician and teacher    Social Needs     Financial resource strain: Not on file     Food insecurity:     Worry: Not on file     Inability: Not on file     Transportation needs:     Medical: Not on file     Non-medical: Not on file   Tobacco Use     Smoking status: Never Smoker     Smokeless tobacco: Never Used   Substance and Sexual Activity     Alcohol use: Yes     Comment: 1 beer daily     Drug use: Yes     Types: Marijuana     Comment: occasional     Sexual activity: Yes     Partners: Female     Birth control/protection: Natural Family Planning   Lifestyle     Physical activity:     Days per week: Not on file     Minutes per session: Not on file     Stress: Not on file   Relationships     Social connections:     Talks on phone: Not on file     Gets together: Not on file     Attends Confucianism service: Not on file     Active member of club or organization: Not on file     Attends meetings of clubs or organizations: Not on file     Relationship status: Not on file     Intimate partner violence:     Fear of current or ex partner: Not on file     Emotionally abused: Not on file     Physically abused: Not on file     Forced sexual activity: Not on file   Other Topics Concern     Parent/sibling w/ CABG, MI or angioplasty before 65F 55M? 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  He denies smoking.  He drinks ~1 beer a day.  He denies illicit drug use, other than occasional marijuana.  He lives in [place] with his wife, and 4.5 year-old daughter.  His wife is currently pregnant with a boy, and is due in 6 weeks.  He has a half sister who  of breast cancer at age  32; she was diagnosed in her late 20's.  A paternal grandmother had breast cancer in her 70's      FAMILY HISTORY:  Family History   Problem Relation Age of Onset     Other - See Comments Father         sepsis     Cancer Sister         breast cancer  29 yo 1/2 sister      Cancer Paternal Grandmother         breast         PHYSICAL EXAM:  Vital signs:  /63 (BP Location: Right arm, Patient Position: Sitting, Cuff Size: Adult Regular)   Pulse 78   Temp 97.9  F (36.6  C) (Oral)   Resp 16   Wt 66.6 kg (146 lb 14.4 oz)   SpO2 100%   BMI 21.69 kg/m     ECO  GENERAL/CONSTITUTIONAL: No acute distress.     EYES: No scleral icterus.  NEUROLOGIC: Alert, oriented, answers questions appropriately.  INTEGUMENTARY: No jaundice.  Port in place at the right upper chest.      LABS:  CBC RESULTS:   Recent Labs   Lab Test 19  0945   WBC 2.8*   RBC 4.02*   HGB 12.4*   HCT 37.2*   MCV 93   MCH 30.8   MCHC 33.3   RDW 12.6   *     Recent Labs   Lab Test 19  0945 08/15/19  0848    140   POTASSIUM 3.9 4.3   CHLORIDE 106 109   CO2 28 27   ANIONGAP 5 5   * 83   BUN 14 23   CR 0.67 0.74   YOBANY 9.0 8.2*     Lab Results   Component Value Date    AST 19 2019     Lab Results   Component Value Date    ALT 28 2019     No results found for: BILICONJ   Lab Results   Component Value Date    BILITOTAL 0.6 2019     Lab Results   Component Value Date    ALBUMIN 4.0 2019     Lab Results   Component Value Date    PROTTOTAL 7.2 2019      Lab Results   Component Value Date    ALKPHOS 41 2019         PATHOLOGY:  3/20/19:  FINAL DIAGNOSIS:   Liver, needle biopsy   - Moderately differentiated adenocarcinoma consistent with metastasis from    primary esophageal adenocarcinoma.     HER-2 non-amplified.      IMAGING:  PET-CT 19:  In this patient with history of adenocarcinoma of the gastroesophageal  junction status post total gastrectomy and distal esophagectomy:  1.  Increased FDG uptake but decreased size of a metastatic lesion in  left hepatic lobe compared to 6/21/2019 compatible with mixed response  to treatment. No new hepatic lesions.  2. Stable postoperative changes in the lower chest and upper abdomen  with persistent ill-defined, non-FDG avid soft tissue stranding at the  operative site. No abnormal FDG uptake to suggest local recurrence.  3. Mild FDG avidity of the anorectal junction, likely hemorrhoidal in  etiology however direct visualization may be considered.      ASSESSMENT/PLAN:  Daniel Sandhu is a 39 year old male with:    1) Adenocarcinoma of the GE junction: now s/p 3 cycles of neoadjuvant chemotherapy with EOX, followed by surgical resection on 11/3/17.  Pathology showed adenocarcinoma, moderate differentiated, extensive residual tumor with no evidence tumor regression, margins negative, perineural invasion present, 1 of 28 lymph nodes were involved with malignancy, stage [id number] (stage IIIA).  HER-2 is non-amplified.    He has received EOF x 2 cycles after surgery, and he has not tolerated well.  The 5-FU on cycle 5 was discontinued early. Chemotherapy was stopped at that point due to poor tolerance.    Patient now has biopsy-confirmed metastatic disease to the liver and possibly peritoneum.  He is asymptomatic currently.      [facility] testing shows MS-stable, TMB-low (4 mut/Mb), ERBB2 amplification equivocal, and TP53 H214R alteration.  PD-L1 was negative (0%).  We review the results together and possible clinical trials, and possible second opinion at [facility].      He has been tolerating chemotherapy with paclitaxel+ramucirumab very well so far.    Another PET-CT was done on 9/20/19 after 6 cycles of chemotherapy.  We reviewed the images together on the computer, as well as the radiology report.  There is increased FDG uptake, but decreased size of the metastatic lesion in the left hepatic lobe, compared to 6/21/19, compatible with mixed  response to treatment.  No new hepatic lesions.  There are stable post-operative changes in the lower chest and upper abdomen, with persistent ill-defined, non-FDG avid soft tissue stranding at the operative site.  Mild FDG avidity of the anorectal junction, likely hemorrhoidal in etiology.      I previously discussed additional support, including palliative care, , oncology psychology.  Daniel and his wife are understanding having difficulty coping and were teary at the time.  They opt to wait for now and may be open to additional support later on.      Patient has additional questions regarding liver directed treatment to the liver lesion.  We have discussed that the data on metastatectomy on esophageal/gastric tumors are scant, and appears to have no overall survival benefit.  I did discuss with Dr. Avalos, and he agrees that the data is poor.  However, if patient has stable disease for at least 6 months, and no evidence of peritoneal disease, then he may consider surgery or targeted treatment to the liver.  Daniel expressed understanding and satisfaction with this.  As above, he is going to [facility] for a second opinion on 9/27/19.    -with the mixed response on the recent PET scan, I'm inclined to continue the paclitaxel and ramicirumab for now, especially as he is tolerating it so well, and repeat another scan in another couple of months.    -he is going to [facility] for second opinion on 9/27/19.  He is also interested in meeting with surgical oncology here.  As above, Dr. Avalos reviewed his case previously, but he is on leave now.  I will have him meet with Dr. Chau for his opinion.    -will hold off on scheduling next cycle of chemotherapy until after his other opinions.  -since [facility], I will have him establish care with another GI oncology MD here    2) Genetics:  -genetic testing was negative    3) Fertility: Daniel and his wife have 2 children,  including a baby boy just born around June 2017.  He is not planning for more children in the near future.    4) Patient talked about medical cannabis, and I offered to certify him, but he says that the program is too expensive for him.  He does use marijuana edibles or vapes marijuana, which is helpful.        I spent a total of 40 minutes with the patient, with over >50% of the time in counseling and/or coordination of care.       Laurence Hood MD  Hematology/Oncology  [facility]

## 2019-09-23 NOTE — NURSING NOTE
Chief Complaint   Patient presents with     Blood Draw     labs drawn via vpt by rn. vs taken      Blood drawn via vpt by RN in lab. VS taken. Pt checked into next appointment.   Isha Rose RN

## 2019-09-23 NOTE — NURSING NOTE
"Oncology Rooming Note    September 23, 2019 9:56 AM   Daniel Sandhu is a 39 year old male who presents for:    Chief Complaint   Patient presents with     Blood Draw     labs drawn via vpt by rn. vs taken      Oncology Clinic Visit     Return visit related to Esophageal Cancer     Initial Vitals: /63 (BP Location: Right arm, Patient Position: Sitting, Cuff Size: Adult Regular)   Pulse 78   Temp 97.9  F (36.6  C) (Oral)   Resp 16   Ht 1.753 m (5' 9.02\")   Wt 66.6 kg (146 lb 14.4 oz)   SpO2 100%   BMI 21.68 kg/m   Estimated body mass index is 21.68 kg/m  as calculated from the following:    Height as of this encounter: 1.753 m (5' 9.02\").    Weight as of this encounter: 66.6 kg (146 lb 14.4 oz). Body surface area is 1.8 meters squared.  No Pain (0) Comment: Data Unavailable   No LMP for male patient.  Allergies reviewed: Yes  Medications reviewed: Yes    Medications: Medication refills not needed today.  Pharmacy name entered into EPIC:    [facility] - [place], [place] - [address] [facility] [place], [place] - [address] [facility] [place], [place] - [id number]    Clinical concerns: No new concerns. Provider was notified.      Xochitl Tovar LPN            "

## 2019-09-23 NOTE — LETTER
RE: Daniel Sandhu  [address] [facility] [id number]     Dear Colleague,    Thank you for referring your patient, Daniel Sandhu, to the [facility]. Please see a copy of my visit note below.    [facility]    Hematology/Oncology Established Patient Note    Today's Date: 9/23/19    Reason for Follow-up: adenocarcinoma of the GE junction    HISTORY OF PRESENT ILLNESS: Daniel Sandhu is a 39 year old male who presents with adenocarcinoma of the GE junction.  In early 2017, patient started noticing difficulty swallowing, and that food seems to take longer to go down.  It became more frequent, and he saw his PCP, and was referred for EGD, which showed an esophageal mass located at the GE junction, measuring 4 cm.  Biopsy showed poorly differentiated adenocarcinoma.  HER-2 was sent and is equivocal (2+).  CT c/a/p showed a mildly prominent lymph node in the gastrohepatic ligament, but there were no pathologically enlarged lymph nodes in the chest, abdomen, or pelvis.  There was otherwise no evidence of metastatic disease.  PET-CT showed thickening of the distal esophagus consistent with known esophageal adenocarcinoma, as well as mildly hypermetabolic 1 cm lymph node in the gastrohepatic ligament.  There was no evidence of distant metastatic disease.  He underwent EUS on 6/9/17, which showed the esophageal tumor in the lower third of the esophagus, staged T2NX.  There were 2 abnormal lymph nodes seen in the gastrohepatic ligament; pathology was suspicious for adenocarcinoma.  Celiac node was sampled as well, which was benign.  He was seen by surgery, Dr. Esteban, and recommended srinivas-operative chemotherapy.    Port was placed on 6/22/17.  It was removed on 3/19/18.    He underwent chemotherapy with epirubicin, oxaliplatin, and capecitabine x 3 cycles 6/26/17-8/7/17.      On 11/3/17, he underwent laparotomy with total gastrectomy and abdominal  lymphadenectomy, left thoracotomy with distal esophagectomy and intrathoracic Terrell-en-Y esophagojejunostomy, feeding jejunostomy, left pharyngostomy tube placement, right tube thoracostomy, and flexible bronchoscopy.  On 11/9/17, he was taken back to OR for I&D of pharyngostomy tube abscess.  Pathology showed adenocarcinoma, moderate differentiated, extensive residual tumor with no evidence tumor regression, margins negative, perineural invasion present, 1 of 28 lymph nodes were involved with malignancy, stage [id number] (stage IIIA).  HER-2 is non-amplified.    He resumed chemotherapy post-surgery, with plans to complete 3 more cycles, with 5-FU, epirubicin, oxaliplatin.  However, he only completed 2 more cycles, due to poor tolerance.  He was admitted to the hospital 1/9/18-1/13/18 for nausea, diarrhea, failure to thrive, severe malnutrition, generalized weakness.  His infusional chemotherapy was stopped on 1/8/18.  He underwent EGD in the hospital on 1/11/18, which showed ulcers, and no evidence of recurrence of his cancer.  One area biopsied showed inflammation, no evidence of malignancy.    He saw Dr. Esteban on 3/6/19 and had CT abdomen/pelvis done, which showed a 3.1 cm lesion in hepatic segment 3.  He underwent biopsy on 3/20/19 by IR, which showed moderately differentiated adenocarcinoma consistent with metastasis from primary esophageal carcinoma.  HER-2 is non-amplified.  Restaging PET scan on 3/29/19 showed the 4.4 cm left lobe liver metastasis.  There is nodular foci of hypermetabolic uptake in the left mid abdomen in relation to the small bowel loops, without definite underlying lesion on CT.  This is favored to represent metastatic disease unless proven otherwise.    He started on chemotherapy with paclitaxel and ramucirumab on 4/10/19.    INTERIM HISTORY: Daniel comes in for follow-up today.  He says that he feels well.  He continues to tolerate chemotherapy well and denies any significant side effects  with it.  He is planning to see oncology at [facility] on 9/27/19 for second opinion.    REVIEW OF SYSTEMS:   14 point ROS was reviewed and is negative other than as noted above in HPI.     HOME MEDICATIONS:  Current Outpatient Medications   Medication Sig Dispense Refill     cyabnocobalamin (VITAMIN B-12) 2500 MCG sublingual tablet Place 2,500 mcg under the tongue daily 30 tablet 1     ferrous gluconate (FERGON) 324 (38 Fe) MG tablet Take 324 mg by mouth daily (with breakfast)       LORazepam (ATIVAN) 0.5 MG tablet Take 1 tablet (0.5 mg) by mouth every 4 hours as needed (Anxiety, Nausea/Vomiting or Sleep) (Patient not taking: Reported on 7/26/2019) 30 tablet 2     Misc Natural Product Nasal (PONARIS) SOLN Apply two drops to each nostril BID X 2 month supply 10 mL 3     multivitamin, therapeutic with minerals (MULTI-VITAMIN) TABS tablet Take 1 tablet by mouth daily Generic [place] Vitamin       mupirocin (BACTROBAN) 2 % external ointment Apply to anterior nares BID X 2 month supply 2 g 3     prochlorperazine (COMPAZINE) 10 MG tablet Take 1 tablet (10 mg) by mouth every 6 hours as needed (Nausea/Vomiting) (Patient not taking: Reported on 5/6/2019) 30 tablet 2     saccharomyces boulardii (FLORASTOR) 250 MG capsule Take 250 mg by mouth 2 times daily         ALLERGIES:  No Known Allergies    PAST MEDICAL HISTORY:  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) 6/5/2017       PAST SURGICAL HISTORY:  Past Surgical History:   Procedure Laterality Date     ESOPHAGOGASTRODUODENOSCOPY       ESOPHAGOSCOPY, GASTROSCOPY, DUODENOSCOPY (EGD), COMBINED N/A 6/9/2017    Procedure: COMBINED ENDOSCOPIC ULTRASOUND, ESOPHAGOSCOPY, GASTROSCOPY, DUODENOSCOPY (EGD), FINE NEEDLE ASPIRATE/BIOPSY;  Upper Endoscopic Ultrasound, fine needle aspirate/biopsy;  Surgeon: Guru Mark Avila MD;  Location:  OR     ESOPHAGOSCOPY, GASTROSCOPY, DUODENOSCOPY (EGD), COMBINED N/A  11/3/2017    Procedure: COMBINED ESOPHAGOSCOPY, GASTROSCOPY, DUODENOSCOPY (EGD);;  Surgeon: Yunior Esteban MD;  Location: [facility] OR     ESOPHAGOSCOPY, GASTROSCOPY, DUODENOSCOPY (EGD), COMBINED N/A 11/9/2017    Procedure: COMBINED ESOPHAGOSCOPY, GASTROSCOPY, DUODENOSCOPY (EGD);  Esophogastroduodenoscopy, take out pharangostomy tube;  Surgeon: Yunior Esteban MD;  Location: [facility] OR     ESOPHAGOSCOPY, GASTROSCOPY, DUODENOSCOPY (EGD), COMBINED N/A 1/11/2018    Procedure: COMBINED ESOPHAGOSCOPY, GASTROSCOPY, DUODENOSCOPY (EGD), BIOPSY SINGLE OR MULTIPLE;  COMBINED ESOPHAGOSCOPY, GASTROSCOPY, DUODENOSCOPY (EGD);  Surgeon: Alessandro Mcgregor MD;  Location: [facility] GI     GASTRECTOMY N/A 11/3/2017    Procedure: GASTRECTOMY;;  Surgeon: Yunior Esteban MD;  Location: [facility] OR     HAND SURGERY      childhood, torn tendon     INSERT PORT VASCULAR ACCESS Right 6/22/2017    Procedure: INSERT PORT VASCULAR ACCESS;  Single Lumen Chest Power Port;  Surgeon: Iván Driver PA-C;  Location: [facility] OR     INSERT PORT VASCULAR ACCESS Right 4/8/2019    Procedure: Single Lumen Chest Port Placement;  Surgeon: Krysten Ballard PA-C;  Location: [facility] OR     IR CHEST PORT PLACEMENT > 5 YRS OF AGE  4/8/2019     IR LIVER BIOPSY PERCUTANEOUS  3/20/2019     LAPAROSCOPY DIAGNOSTIC (GENERAL) N/A 11/3/2017    Procedure: LAPAROSCOPY DIAGNOSTIC (GENERAL);  diagnostic laparoscopy, right chest tube, total gastrectomy with distal esophagectomy, intrathoracic eveline-y esophago-jejunostomy, feeding jejunostomy, pharyngostomy, esophagogastroduodenoscopy, flexible bronchoscopy;  Surgeon: Yunior Esteban MD;  Location: [facility] OR     LAPAROTOMY EXPLORATORY N/A 11/3/2017    Procedure: LAPAROTOMY EXPLORATORY;;  Surgeon: Yunior Esteban MD;  Location: [facility] OR     PHARYNGOSTOMY N/A 11/3/2017    Procedure: PHARYNGOSTOMY;;  Surgeon: Yunior Esteban MD;  Location: [facility] OR     REMOVE PORT VASCULAR ACCESS Right 3/19/2018     Procedure: REMOVE PORT VASCULAR ACCESS;  Right Port Removal;  Surgeon: Krysten Hopper PA-C;  [facility]     THORACOTOMY Left 11/3/2017    Procedure: THORACOTOMY;;  Surgeon: Yunior Esteban MD;  Location:  OR         SOCIAL HISTORY:  Social History     Socioeconomic History     Marital status:      Spouse name: Not on file     Number of children: 1     Years of education: Not on file     Highest education level: Not on file   Occupational History     Occupation: musician and teacher    Social Needs     Financial resource strain: Not on file     Food insecurity:     Worry: Not on file     Inability: Not on file     Transportation needs:     Medical: Not on file     Non-medical: Not on file   Tobacco Use     Smoking status: Never Smoker     Smokeless tobacco: Never Used   Substance and Sexual Activity     Alcohol use: Yes     Comment: 1 beer daily     Drug use: Yes     Types: Marijuana     Comment: occasional     Sexual activity: Yes     Partners: Female     Birth control/protection: Natural Family Planning   Lifestyle     Physical activity:     Days per week: Not on file     Minutes per session: Not on file     Stress: Not on file   Relationships     Social connections:     Talks on phone: Not on file     Gets together: Not on file     Attends [employer] service: Not on file     Active member of club or organization: Not on file     Attends meetings of clubs or organizations: Not on file     Relationship status: Not on file     Intimate partner violence:     Fear of current or ex partner: Not on file     Emotionally abused: Not on file     Physically abused: Not on file     Forced sexual activity: Not on file   Other Topics Concern     Parent/sibling w/ CABG, MI or angioplasty before 65F 55M? 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  He denies smoking.  He drinks ~1 beer a day.  He denies  illicit drug use, other than occasional marijuana.  He lives in [place] with his wife, and 4.5 year-old daughter.  His wife is currently pregnant with a boy, and is due in 6 weeks.  He has a half sister who  of breast cancer at age 32; she was diagnosed in her late 20's.  A paternal grandmother had breast cancer in her 70's    FAMILY HISTORY:  Family History   Problem Relation Age of Onset     Other - See Comments Father         sepsis     Cancer Sister         breast cancer  29 yo 1/2 sister      Cancer Paternal Grandmother         breast       PHYSICAL EXAM:  Vital signs:  /63 (BP Location: Right arm, Patient Position: Sitting, Cuff Size: Adult Regular)   Pulse 78   Temp 97.9  F (36.6  C) (Oral)   Resp 16   Wt 66.6 kg (146 lb 14.4 oz)   SpO2 100%   BMI 21.69 kg/m      ECO  GENERAL/CONSTITUTIONAL: No acute distress.     EYES: No scleral icterus.  NEUROLOGIC: Alert, oriented, answers questions appropriately.  INTEGUMENTARY: No jaundice.  Port in place at the right upper chest.      LABS:  CBC RESULTS:   Recent Labs   Lab Test 19  0945   WBC 2.8*   RBC 4.02*   HGB 12.4*   HCT 37.2*   MCV 93   MCH 30.8   MCHC 33.3   RDW 12.6   *     Recent Labs   Lab Test 19  0945 08/15/19  0848    140   POTASSIUM 3.9 4.3   CHLORIDE 106 109   CO2 28 27   ANIONGAP 5 5   * 83   BUN 14 23   CR 0.67 0.74   YOBANY 9.0 8.2*     Lab Results   Component Value Date    AST 19 2019     Lab Results   Component Value Date    ALT 2019     No results found for: BILICONJ   Lab Results   Component Value Date    BILITOTAL 0.6 2019     Lab Results   Component Value Date    ALBUMIN 4.0 2019     Lab Results   Component Value Date    PROTTOTAL 7.2 2019      Lab Results   Component Value Date    ALKPHOS 41 2019       PATHOLOGY:  3/20/19:  FINAL DIAGNOSIS:   Liver, needle biopsy   - Moderately differentiated adenocarcinoma consistent with metastasis from     primary esophageal adenocarcinoma.     HER-2 non-amplified.    IMAGING:  PET-CT 9/20/19:  In this patient with history of adenocarcinoma of the gastroesophageal  junction status post total gastrectomy and distal esophagectomy:  1. Increased FDG uptake but decreased size of a metastatic lesion in  left hepatic lobe compared to 6/21/2019 compatible with mixed response  to treatment. No new hepatic lesions.  2. Stable postoperative changes in the lower chest and upper abdomen  with persistent ill-defined, non-FDG avid soft tissue stranding at the  operative site. No abnormal FDG uptake to suggest local recurrence.  3. Mild FDG avidity of the anorectal junction, likely hemorrhoidal in  etiology however direct visualization may be considered.    ASSESSMENT/PLAN:  Daniel Sandhu is a 39 year old male with:    1) Adenocarcinoma of the GE junction: now s/p 3 cycles of neoadjuvant chemotherapy with EOX, followed by surgical resection on 11/3/17.  Pathology showed adenocarcinoma, moderate differentiated, extensive residual tumor with no evidence tumor regression, margins negative, perineural invasion present, 1 of 28 lymph nodes were involved with malignancy, stage [id number] (stage IIIA).  HER-2 is non-amplified.    He has received EOF x 2 cycles after surgery, and he has not tolerated well.  The 5-FU on cycle 5 was discontinued early. Chemotherapy was stopped at that point due to poor tolerance.    Patient now has biopsy-confirmed metastatic disease to the liver and possibly peritoneum.  He is asymptomatic currently.      [facility] testing shows MS-stable, TMB-low (4 mut/Mb), ERBB2 amplification equivocal, and TP53 H214R alteration.  PD-L1 was negative (0%).  We review the results together and possible clinical trials, and possible second opinion at [facility].      He has been tolerating chemotherapy with paclitaxel+ramucirumab very well so far.    Another PET-CT was done on 9/20/19 after 6 cycles of chemotherapy.   We reviewed the images together on the computer, as well as the radiology report.  There is increased FDG uptake, but decreased size of the metastatic lesion in the left hepatic lobe, compared to 6/21/19, compatible with mixed response to treatment.  No new hepatic lesions.  There are stable post-operative changes in the lower chest and upper abdomen, with persistent ill-defined, non-FDG avid soft tissue stranding at the operative site.  Mild FDG avidity of the anorectal junction, likely hemorrhoidal in etiology.      I previously discussed additional support, including palliative care, , oncology psychology.  Daniel and his wife are understanding having difficulty coping and were teary at the time.  They opt to wait for now and may be open to additional support later on.      Patient has additional questions regarding liver directed treatment to the liver lesion.  We have discussed that the data on metastatectomy on esophageal/gastric tumors are scant, and appears to have no overall survival benefit.  I did discuss with Dr. Avalos, and he agrees that the data is poor.  However, if patient has stable disease for at least 6 months, and no evidence of peritoneal disease, then he may consider surgery or targeted treatment to the liver.  Daniel expressed understanding and satisfaction with this.  As above, he is going to [facility] for a second opinion on 9/27/19.    -with the mixed response on the recent PET scan, I'm inclined to continue the paclitaxel and ramicirumab for now, especially as he is tolerating it so well, and repeat another scan in another couple of months.    -he is going to [facility] for second opinion on 9/27/19.  He is also interested in meeting with surgical oncology here.  As above, Dr. Avalos reviewed his case previously, but he is on leave now.  I will have him meet with Dr. Chau for his opinion.    -will hold off on scheduling next cycle of chemotherapy until after his other  opinions.  -since [facility] will be leaving [facility] location, I will have him establish care with another GI oncology MD here    2) Genetics:  -genetic testing was negative    3) Fertility: Daniel and his wife have 2 children, including a baby boy just born around June 2017.  He is not planning for more children in the near future.    4) Patient talked about medical cannabis, and I offered to certify him, but he says that the program is too expensive for him.  He does use marijuana edibles or vapes marijuana, which is helpful.      I spent a total of 40 minutes with the patient, with over >50% of the time in counseling and/or coordination of care.     Laurence Hood MD  Hematology/Oncology  [facility]

## 2019-09-24 NOTE — TELEPHONE ENCOUNTER
RECORDS STATUS - ALL OTHER DIAGNOSIS      RECORDS RECEIVED FROM: [facility]   DATE RECEIVED: 9/24/19   NOTES STATUS DETAILS   OFFICE NOTE from referring provider Laurence Henry MD - OV 9/23/19   OFFICE NOTE from medical oncologist Cassie Marrero   DISCHARGE SUMMARY from hospital     DISCHARGE REPORT from the ER     OPERATIVE REPORT     MEDICATION LIST [facility]    CLINICAL TRIAL TREATMENTS TO DATE     LABS     PATHOLOGY REPORTS [facility] Surg Path: 3/20/19, 11/3/17, 6/9/17 6/2/17: Surg Consult   ANYTHING RELATED TO DIAGNOSIS Epic 9/23/19   GENONOMIC TESTING     TYPE:     IMAGING (NEED IMAGES & REPORT)     CT SCANS PACS    XR  PACS    MRI     MAMMO     ULTRASOUND PACS    PET PACS

## 2019-09-26 ENCOUNTER — DOCUMENTATION ONLY (OUTPATIENT)
Dept: SURGERY | Facility: CLINIC | Age: 39
End: 2019-09-26

## 2019-09-26 NOTE — TELEPHONE ENCOUNTER
Pt was originally scheduled with Dr Chau on 10/11/19 at 2pm, then rescheduled to 10/3/19 at 12pm with Dr ITZEL Grace in Thoracic, then rescheduled back to Dr Chau on 10/11/19 at 1pm per inbasket request from Yajaira. Referral was unclear.    INBASKET MESSAGE   I apologize for the confusion on this patient as it was listed as an esophageal cancer and the referral was also listed as general surgery with esophageal linked to it. It was not clear that they are actually requesting evaluation for his metastatic esophageal cancer to the liver.     I apologize an we can reschedule this patient to be seen with Dr. Chau.     Because I already offered another pt his previous appointment we can add him on 10/11 (same day as previous appointment) but at 1 pm instead. There is a patient currently schedule there who is suppose to be canceled as he is metastatic pancreas cancer and Dr. Chau and Dr. Avalos don't see that diagnosis.     Please reschedule pt to be seen with Dr. Chau, again I apologize for the confusion as I was not aware it was a referral for a liver surgery.

## 2019-09-27 ENCOUNTER — TRANSFERRED RECORDS (OUTPATIENT)
Dept: HEALTH INFORMATION MANAGEMENT | Facility: CLINIC | Age: 39
End: 2019-09-27

## 2019-10-04 ENCOUNTER — PRE VISIT (OUTPATIENT)
Dept: OTOLARYNGOLOGY | Facility: CLINIC | Age: 39
End: 2019-10-04

## 2019-10-11 ENCOUNTER — PATIENT OUTREACH (OUTPATIENT)
Dept: ONCOLOGY | Facility: CLINIC | Age: 39
End: 2019-10-11

## 2019-10-11 ENCOUNTER — OFFICE VISIT (OUTPATIENT)
Dept: SURGERY | Facility: CLINIC | Age: 39
End: 2019-10-11
Attending: SURGERY
Payer: COMMERCIAL

## 2019-10-11 ENCOUNTER — PRE VISIT (OUTPATIENT)
Dept: SURGERY | Facility: CLINIC | Age: 39
End: 2019-10-11

## 2019-10-11 VITALS
RESPIRATION RATE: 14 BRPM | HEART RATE: 70 BPM | SYSTOLIC BLOOD PRESSURE: 125 MMHG | TEMPERATURE: 97.9 F | DIASTOLIC BLOOD PRESSURE: 63 MMHG | HEIGHT: 69 IN | BODY MASS INDEX: 22.02 KG/M2 | WEIGHT: 148.7 LBS | OXYGEN SATURATION: 100 %

## 2019-10-11 DIAGNOSIS — C15.5 MALIGNANT NEOPLASM OF LOWER THIRD OF ESOPHAGUS (H): Primary | ICD-10-CM

## 2019-10-11 PROBLEM — C78.7 MALIGNANT NEOPLASM METASTATIC TO LIVER (H): Status: ACTIVE | Noted: 2019-09-27

## 2019-10-11 PROCEDURE — G0463 HOSPITAL OUTPT CLINIC VISIT: HCPCS | Mod: ZF

## 2019-10-11 ASSESSMENT — MIFFLIN-ST. JEOR: SCORE: 1585.13

## 2019-10-11 ASSESSMENT — PAIN SCALES - GENERAL: PAINLEVEL: NO PAIN (0)

## 2019-10-11 NOTE — PROGRESS NOTES
RN Care Coordination Note  OUTGOING CALL: Notified pt that Dr. Hood and I received update from surgical team and Dr. Hood would like to resume chemo next week, see him in clinic Monday 10/14. Pt agreeable and appt scheduled.  Nannette Ovalles, RN, BSN, OCN  Care Coordinator  [facility]

## 2019-10-11 NOTE — PROGRESS NOTES
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  Isolated lesion in peripheral left lobe.  He was diagnosed in 2017 and underwent total gastrectomy with partial esophagectomy and R-Y reconstruction.  He had srinivas-operative EOX and did not tolerate this well.  Final pathology showed ypT3N1 disease and almost no response to therapy.  He was found to have biopsy proven recurrence in the left lobe of the liver in 3/2019 and has since been on paclitaxel and targeted therapy w/ Dr. Hood.  He has had some response in the liver tumor over the past 6 months, has tolerated the therapy well, and has not developed evidence of disease progression elsewhere.  He is otherwise healthy, independent, and fit.  He presents for recommendations on surgery for his liver metastasis.     Pertinent Work-Up/Findings: PET-CT from 9/2019 shows only left liver lobe metastatic lesion with overall size decreased compared to prior imaging.     Pertinent Exam: Abdomen soft, non-tender, and non-distended.  Incisions well-healed.  No jaundice or scleral icterus.  Patient appears well.     Assessment/Counseling/Plan: 39 M w/ metachronous isolated metastasis to the left liver from a previously treated GE junction adenocarcinoma in 2017.  He has had response to 6 months of treatment with paclitaxel and targeted therapy and is tolerating therapy well.  I discussed with the patient the typical natural history of his disease, especially when metastatic.  The outcomes have been shown to be nearly universally poor.  Small case series have demonstrated the feasibility of surgery for liver metastases from GE junction cancer, but no clear survival benefit has ever been demonstrated.  As such, surgery for liver metastases from his disease is not the standard of care and is not recommended by the NCCN.  The worry is that he takes a total of 3 months off of  chemotherapy in order to have surgery and that he progresses during that interval.  The patient was in understanding of this.  However, given patient age and his desire to be aggressive, I think it is reasonable to continue chemotherapy for another 3 months followed by restaging in the form of PET-CT and CT liver protocol to get a good look at the liver.  If the patient has no other evidence of disease at that point, then he will have gone 9 months on systemic therapy without evidence of disease progression.  This may inform a more indolent biology that would lend itself to local therapy for the isolated liver lesion.  As such, I told the patient that I would consider surgery if there were no progression over the course of the next 3 months.  He will return to clinic with new imaging in 3 months to make that assessment.  All questions were answered.  The patient was in agreement with and understanding of the above plan.     A total of 45 minutes of face to face time was spent on this case, of which, more than half (50%) was spent in counseling and coordination of care.

## 2019-10-11 NOTE — PATIENT INSTRUCTIONS
You will find a brief summary of your discussion and care plan from today's visit below.  Please review this information with your primary care provider.  ______________________________________________________________________    As discussed today by Dr. Chau, we will plan the following:     Return to clinic in 3 months with Dr. Chau     Plan to have CT of your liver same day prior to the clinic.  Our  will contact you with the appointment day and time.           _____________________________________________________________________    It was a pleasure seeing you in clinic today - please contact us if there are any further questions about upcoming appointments that arise following today's visit.  During business hours, you may reach the Clinic Coordinator at [phone].  For urgent/emergent questions after business hours, you may reach the on-call GI Fellow by contacting the [facility]  at [phone].    Any benign/non-urgent test results are usually communicated via letter or MyChart message within 1-2 weeks after completion.  Urgent results (those that require a change in the previously-discussed care plan) are usually communicated via a phone call once available from our clinic staff to discuss the results and the next steps in your evaluation.    I recommend signing up for "[employer]hart access if you have not already done so and are comfortable with using a computer.  This allows for online access to your lab results and also helps you communicate efficiently with the clinic should any questions arise in your care.    My role as your RN care coordinator is to assist with any additional questions or concerns regarding your diagnosis and plan of care and to be your advocate.  I am happy to be part of your care team at the [facility].      Sincerely,      Rossy Riley  BSN, HNBC, STAR-T  RN Care Coordinator  Ph: [phone]  FAX: [phone]

## 2019-10-11 NOTE — NURSING NOTE
"Oncology Rooming Note    October 11, 2019 1:12 PM   Daniel Sandhu is a 39 year old male who presents for:    Chief Complaint   Patient presents with     Oncology Clinic Visit     New; Malignant Neoplasm of Esophagus     Initial Vitals: /63   Pulse 70   Temp 97.9  F (36.6  C) (Oral)   Resp 14   Ht 1.753 m (5' 9.02\")   Wt 67.4 kg (148 lb 11.2 oz)   SpO2 100%   BMI 21.95 kg/m   Estimated body mass index is 21.95 kg/m  as calculated from the following:    Height as of this encounter: 1.753 m (5' 9.02\").    Weight as of this encounter: 67.4 kg (148 lb 11.2 oz). Body surface area is 1.81 meters squared.  No Pain (0) Comment: Data Unavailable   No LMP for male patient.  Allergies reviewed: Yes  Medications reviewed: Yes    Medications: Medication refills not needed today.  Pharmacy name entered into EPIC:    [facility] - [place], [place] - [address] [facility] - [place], [place] - [address] [facility] [place], [place] - [address] [facility] [id number]    Clinical concerns: No concerns.        Keri Littlejohn CMA              "

## 2019-10-11 NOTE — LETTER
10/11/2019       RE: Daniel Sandhu  [address] [address]     Dear Colleague,    Thank you for referring your patient, Daniel Sandhu, to the [facility]. Please see a copy of my visit note below.    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  Isolated lesion in peripheral left lobe.  He was diagnosed in 2017 and underwent total gastrectomy with partial esophagectomy and R-Y reconstruction.  He had srinivas-operative EOX and did not tolerate this well.  Final pathology showed ypT3N1 disease and almost no response to therapy.  He was found to have biopsy proven recurrence in the left lobe of the liver in 3/2019 and has since been on paclitaxel and targeted therapy w/ Dr. Hood.  He has had some response in the liver tumor over the past 6 months, has tolerated the therapy well, and has not developed evidence of disease progression elsewhere.  He is otherwise healthy, independent, and fit.  He presents for recommendations on surgery for his liver metastasis.     Pertinent Work-Up/Findings: PET-CT from 9/2019 shows only left liver lobe metastatic lesion with overall size decreased compared to prior imaging.     Pertinent Exam: Abdomen soft, non-tender, and non-distended.   Incisions well-healed.  No jaundice or scleral icterus.  Patient appears well.     Assessment/Counseling/Plan: 39 M w/ metachronous isolated metastasis to the left liver from a previously treated GE junction adenocarcinoma in 2017.  He has had response to 6 months of treatment with paclitaxel and targeted therapy and is tolerating therapy well.  I discussed with the patient the typical natural history of his disease, especially when metastatic.  The outcomes have been shown to be nearly universally poor.  Small case series have demonstrated the feasibility of surgery for liver metastases from GE  junction cancer, but no clear survival benefit has ever been demonstrated.  As such, surgery for liver metastases from his disease is not the standard of care and is not recommended by the NCCN.  The worry is that he takes a total of 3 months off of chemotherapy in order to have surgery and that he progresses during that interval.  The patient was in understanding of this.  However, given patient age and his desire to be aggressive, I think it is reasonable to continue chemotherapy for another 3 months followed by restaging in the form of PET-CT and CT liver protocol to get a good look at the liver.  If the patient has no other evidence of disease at that point, then he will have gone 9 months on systemic therapy without evidence of disease progression.  This may inform a more indolent biology that would lend itself to local therapy for the isolated liver lesion.  As such, I told the patient that I would consider surgery if there were no progression over the course of the next 3 months.  He will return to clinic with new imaging in 3 months to make that assessment.  All questions were answered.  The patient was in agreement with and understanding of the above plan.     A total of 45 minutes of face to face time was spent on this case, of which, more than half (50%) was spent in counseling and coordination of care.    Again, thank you for allowing me to participate in the care of your patient.      Sincerely,    Dwight Chau MD

## 2019-10-14 ENCOUNTER — PATIENT OUTREACH (OUTPATIENT)
Dept: ONCOLOGY | Facility: CLINIC | Age: 39
End: 2019-10-14

## 2019-10-14 ENCOUNTER — ONCOLOGY VISIT (OUTPATIENT)
Dept: ONCOLOGY | Facility: CLINIC | Age: 39
End: 2019-10-14
Attending: INTERNAL MEDICINE
Payer: COMMERCIAL

## 2019-10-14 VITALS
HEART RATE: 65 BPM | RESPIRATION RATE: 14 BRPM | DIASTOLIC BLOOD PRESSURE: 64 MMHG | WEIGHT: 147 LBS | BODY MASS INDEX: 21.77 KG/M2 | SYSTOLIC BLOOD PRESSURE: 104 MMHG | HEIGHT: 69 IN | TEMPERATURE: 98.2 F | OXYGEN SATURATION: 99 %

## 2019-10-14 DIAGNOSIS — C15.5 MALIGNANT NEOPLASM OF LOWER THIRD OF ESOPHAGUS (H): ICD-10-CM

## 2019-10-14 PROCEDURE — 99214 OFFICE O/P EST MOD 30 MIN: CPT | Mod: ZP | Performed by: INTERNAL MEDICINE

## 2019-10-14 PROCEDURE — G0463 HOSPITAL OUTPT CLINIC VISIT: HCPCS | Mod: ZF

## 2019-10-14 RX ORDER — ALBUTEROL SULFATE 0.83 MG/ML
2.5 SOLUTION RESPIRATORY (INHALATION)
Status: CANCELLED | OUTPATIENT
Start: 2019-10-16

## 2019-10-14 RX ORDER — HEPARIN SODIUM (PORCINE) LOCK FLUSH IV SOLN 100 UNIT/ML 100 UNIT/ML
500 SOLUTION INTRAVENOUS ONCE
Status: CANCELLED | OUTPATIENT
Start: 2019-10-23

## 2019-10-14 RX ORDER — SODIUM CHLORIDE 9 MG/ML
1000 INJECTION, SOLUTION INTRAVENOUS CONTINUOUS PRN
Status: CANCELLED
Start: 2019-10-30

## 2019-10-14 RX ORDER — EPINEPHRINE 1 MG/ML
0.3 INJECTION, SOLUTION INTRAMUSCULAR; SUBCUTANEOUS EVERY 5 MIN PRN
Status: CANCELLED | OUTPATIENT
Start: 2019-10-30

## 2019-10-14 RX ORDER — EPINEPHRINE 1 MG/ML
0.3 INJECTION, SOLUTION INTRAMUSCULAR; SUBCUTANEOUS EVERY 5 MIN PRN
Status: CANCELLED | OUTPATIENT
Start: 2019-10-23

## 2019-10-14 RX ORDER — METHYLPREDNISOLONE SODIUM SUCCINATE 125 MG/2ML
125 INJECTION, POWDER, LYOPHILIZED, FOR SOLUTION INTRAMUSCULAR; INTRAVENOUS
Status: CANCELLED
Start: 2019-10-30

## 2019-10-14 RX ORDER — ALBUTEROL SULFATE 0.83 MG/ML
2.5 SOLUTION RESPIRATORY (INHALATION)
Status: CANCELLED | OUTPATIENT
Start: 2019-10-23

## 2019-10-14 RX ORDER — EPINEPHRINE 0.3 MG/.3ML
0.3 INJECTION SUBCUTANEOUS EVERY 5 MIN PRN
Status: CANCELLED | OUTPATIENT
Start: 2019-10-23

## 2019-10-14 RX ORDER — ALBUTEROL SULFATE 90 UG/1
1-2 AEROSOL, METERED RESPIRATORY (INHALATION)
Status: CANCELLED
Start: 2019-10-30

## 2019-10-14 RX ORDER — LORAZEPAM 2 MG/ML
0.5 INJECTION INTRAMUSCULAR EVERY 4 HOURS PRN
Status: CANCELLED
Start: 2019-10-23

## 2019-10-14 RX ORDER — HEPARIN SODIUM (PORCINE) LOCK FLUSH IV SOLN 100 UNIT/ML 100 UNIT/ML
500 SOLUTION INTRAVENOUS ONCE
Status: CANCELLED | OUTPATIENT
Start: 2019-10-16

## 2019-10-14 RX ORDER — LORAZEPAM 2 MG/ML
0.5 INJECTION INTRAMUSCULAR EVERY 4 HOURS PRN
Status: CANCELLED
Start: 2019-10-30

## 2019-10-14 RX ORDER — DIPHENHYDRAMINE HYDROCHLORIDE 50 MG/ML
50 INJECTION INTRAMUSCULAR; INTRAVENOUS
Status: CANCELLED
Start: 2019-10-23

## 2019-10-14 RX ORDER — MEPERIDINE HYDROCHLORIDE 25 MG/ML
25 INJECTION INTRAMUSCULAR; INTRAVENOUS; SUBCUTANEOUS EVERY 30 MIN PRN
Status: CANCELLED | OUTPATIENT
Start: 2019-10-16

## 2019-10-14 RX ORDER — SODIUM CHLORIDE 9 MG/ML
1000 INJECTION, SOLUTION INTRAVENOUS CONTINUOUS PRN
Status: CANCELLED
Start: 2019-10-16

## 2019-10-14 RX ORDER — DIPHENHYDRAMINE HCL 25 MG
25 CAPSULE ORAL ONCE
Status: CANCELLED | OUTPATIENT
Start: 2019-10-16

## 2019-10-14 RX ORDER — DIPHENHYDRAMINE HYDROCHLORIDE 50 MG/ML
50 INJECTION INTRAMUSCULAR; INTRAVENOUS
Status: CANCELLED
Start: 2019-10-30

## 2019-10-14 RX ORDER — LORAZEPAM 2 MG/ML
0.5 INJECTION INTRAMUSCULAR EVERY 4 HOURS PRN
Status: CANCELLED
Start: 2019-10-16

## 2019-10-14 RX ORDER — EPINEPHRINE 0.3 MG/.3ML
0.3 INJECTION SUBCUTANEOUS EVERY 5 MIN PRN
Status: CANCELLED | OUTPATIENT
Start: 2019-10-30

## 2019-10-14 RX ORDER — SODIUM CHLORIDE 9 MG/ML
1000 INJECTION, SOLUTION INTRAVENOUS CONTINUOUS PRN
Status: CANCELLED
Start: 2019-10-23

## 2019-10-14 RX ORDER — ALBUTEROL SULFATE 90 UG/1
1-2 AEROSOL, METERED RESPIRATORY (INHALATION)
Status: CANCELLED
Start: 2019-10-23

## 2019-10-14 RX ORDER — METHYLPREDNISOLONE SODIUM SUCCINATE 125 MG/2ML
125 INJECTION, POWDER, LYOPHILIZED, FOR SOLUTION INTRAMUSCULAR; INTRAVENOUS
Status: CANCELLED
Start: 2019-10-16

## 2019-10-14 RX ORDER — EPINEPHRINE 1 MG/ML
0.3 INJECTION, SOLUTION INTRAMUSCULAR; SUBCUTANEOUS EVERY 5 MIN PRN
Status: CANCELLED | OUTPATIENT
Start: 2019-10-16

## 2019-10-14 RX ORDER — ALBUTEROL SULFATE 0.83 MG/ML
2.5 SOLUTION RESPIRATORY (INHALATION)
Status: CANCELLED | OUTPATIENT
Start: 2019-10-30

## 2019-10-14 RX ORDER — HEPARIN SODIUM (PORCINE) LOCK FLUSH IV SOLN 100 UNIT/ML 100 UNIT/ML
500 SOLUTION INTRAVENOUS ONCE
Status: CANCELLED | OUTPATIENT
Start: 2019-10-30

## 2019-10-14 RX ORDER — ALBUTEROL SULFATE 90 UG/1
1-2 AEROSOL, METERED RESPIRATORY (INHALATION)
Status: CANCELLED
Start: 2019-10-16

## 2019-10-14 RX ORDER — DIPHENHYDRAMINE HCL 25 MG
25 CAPSULE ORAL ONCE
Status: CANCELLED | OUTPATIENT
Start: 2019-10-30

## 2019-10-14 RX ORDER — EPINEPHRINE 0.3 MG/.3ML
0.3 INJECTION SUBCUTANEOUS EVERY 5 MIN PRN
Status: CANCELLED | OUTPATIENT
Start: 2019-10-16

## 2019-10-14 RX ORDER — DIPHENHYDRAMINE HYDROCHLORIDE 50 MG/ML
50 INJECTION INTRAMUSCULAR; INTRAVENOUS
Status: CANCELLED
Start: 2019-10-16

## 2019-10-14 RX ORDER — METHYLPREDNISOLONE SODIUM SUCCINATE 125 MG/2ML
125 INJECTION, POWDER, LYOPHILIZED, FOR SOLUTION INTRAMUSCULAR; INTRAVENOUS
Status: CANCELLED
Start: 2019-10-23

## 2019-10-14 RX ORDER — MEPERIDINE HYDROCHLORIDE 25 MG/ML
25 INJECTION INTRAMUSCULAR; INTRAVENOUS; SUBCUTANEOUS EVERY 30 MIN PRN
Status: CANCELLED | OUTPATIENT
Start: 2019-10-23

## 2019-10-14 RX ORDER — MEPERIDINE HYDROCHLORIDE 25 MG/ML
25 INJECTION INTRAMUSCULAR; INTRAVENOUS; SUBCUTANEOUS EVERY 30 MIN PRN
Status: CANCELLED | OUTPATIENT
Start: 2019-10-30

## 2019-10-14 ASSESSMENT — MIFFLIN-ST. JEOR: SCORE: 1577.42

## 2019-10-14 ASSESSMENT — PAIN SCALES - GENERAL: PAINLEVEL: NO PAIN (0)

## 2019-10-14 NOTE — NURSING NOTE
"Oncology Rooming Note    October 14, 2019 8:56 AM   Daniel Sandhu is a 39 year old male who presents for:    Chief Complaint   Patient presents with     Oncology Clinic Visit     [facility]     Initial Vitals: /64 (BP Location: Left arm, Patient Position: Chair, Cuff Size: Adult Regular)   Pulse 65   Temp 98.2  F (36.8  C) (Oral)   Resp 14   Ht 1.753 m (5' 9.02\")   Wt 66.7 kg (147 lb)   SpO2 99%   BMI 21.70 kg/m   Estimated body mass index is 21.7 kg/m  as calculated from the following:    Height as of this encounter: 1.753 m (5' 9.02\").    Weight as of this encounter: 66.7 kg (147 lb). Body surface area is 1.8 meters squared.  No Pain (0) Comment: Data Unavailable   No LMP for male patient.  Allergies reviewed: Yes  Medications reviewed: Yes    Medications: Medication refills not needed today.  Pharmacy name entered into EPIC:    [facility] - [place], [place] - [address] [facility] - [place], [place] - [address] [facility] [place], [place] - [address] [place] [address] [id number]    Clinical concerns: No new concerns. Erwin was notified.      Ricky Lopez LPN            "

## 2019-10-14 NOTE — LETTER
10/14/2019       RE: Daniel Sandhu  [address] [address]     Dear Colleague,    Thank you for referring your patient, Daniel Sandhu, to the [facility]. Please see a copy of my visit note below.    [facility]    Hematology/Oncology Established Patient Note    Today's Date: 10/14/19    Reason for Follow-up: adenocarcinoma of the GE junction      HISTORY OF PRESENT ILLNESS: Daniel Sandhu is a 39 year old male who presents with adenocarcinoma of the GE junction.  In early 2017, patient started noticing difficulty swallowing, and that food seems to take longer to go down.  It became more frequent, and he saw his PCP, and was referred for EGD, which showed an esophageal mass located at the GE junction, measuring 4 cm.  Biopsy showed poorly differentiated adenocarcinoma.  HER-2 was sent and is equivocal (2+).  CT c/a/p showed a mildly prominent lymph node in the gastrohepatic ligament, but there were no pathologically enlarged lymph nodes in the chest, abdomen, or pelvis.  There was otherwise no evidence of metastatic disease.  PET-CT showed thickening of the distal esophagus consistent with known esophageal adenocarcinoma, as well as mildly hypermetabolic 1 cm lymph node in the gastrohepatic ligament.  There was no evidence of distant metastatic disease.  He underwent EUS on 6/9/17, which showed the esophageal tumor in the lower third of the esophagus, staged T2NX.  There were 2 abnormal lymph nodes seen in the gastrohepatic ligament; pathology was suspicious for adenocarcinoma.  Celiac node was sampled as well, which was benign.  He was seen by surgery, Dr. Esteban, and recommended srinivas-operative chemotherapy.    Port was placed on 6/22/17.  It was removed on 3/19/18.    He underwent chemotherapy with epirubicin, oxaliplatin, and capecitabine x 3 cycles 6/26/17-8/7/17.      On 11/3/17, he underwent laparotomy with total gastrectomy and abdominal  lymphadenectomy, left thoracotomy with distal esophagectomy and intrathoracic Terrell-en-Y esophagojejunostomy, feeding jejunostomy, left pharyngostomy tube placement, right tube thoracostomy, and flexible bronchoscopy.  On 11/9/17, he was taken back to OR for I&D of pharyngostomy tube abscess.  Pathology showed adenocarcinoma, moderate differentiated, extensive residual tumor with no evidence tumor regression, margins negative, perineural invasion present, 1 of 28 lymph nodes were involved with malignancy, stage [id number] (stage IIIA).  HER-2 is non-amplified.    He resumed chemotherapy post-surgery, with plans to complete 3 more cycles, with 5-FU, epirubicin, oxaliplatin.  However, he only completed 2 more cycles, due to poor tolerance.  He was admitted to the hospital 1/9/18-1/13/18 for nausea, diarrhea, failure to thrive, severe malnutrition, generalized weakness.  His infusional chemotherapy was stopped on 1/8/18.  He underwent EGD in the hospital on 1/11/18, which showed ulcers, and no evidence of recurrence of his cancer.  One area biopsied showed inflammation, no evidence of malignancy.    He saw Dr. Esteban on 3/6/19 and had CT abdomen/pelvis done, which showed a 3.1 cm lesion in hepatic segment 3.  He underwent biopsy on 3/20/19 by IR, which showed moderately differentiated adenocarcinoma consistent with metastasis from primary esophageal carcinoma.  HER-2 is non-amplified.  Restaging PET scan on 3/29/19 showed the 4.4 cm left lobe liver metastasis.  There is nodular foci of hypermetabolic uptake in the left mid abdomen in relation to the small bowel loops, without definite underlying lesion on CT.  This is favored to represent metastatic disease unless proven otherwise.    He started on chemotherapy with paclitaxel and ramucirumab on 4/10/19.      INTERIM HISTORY: Daniel comes in for follow-up today.  He is feeling well.  He went to [facility] for a second opinion, and also met with Dr. Chau in surgical  oncology last week.        REVIEW OF SYSTEMS:   14 point ROS was reviewed and is negative other than as noted above in HPI.       HOME MEDICATIONS:  Current Outpatient Medications   Medication Sig Dispense Refill     cyabnocobalamin (VITAMIN B-12) 2500 MCG sublingual tablet Place 2,500 mcg under the tongue daily 30 tablet 1     ferrous gluconate (FERGON) 324 (38 Fe) MG tablet Take 324 mg by mouth daily (with breakfast)       multivitamin, therapeutic with minerals (MULTI-VITAMIN) TABS tablet Take 1 tablet by mouth daily Generic [facility] Vitamin       saccharomyces boulardii (FLORASTOR) 250 MG capsule Take 250 mg by mouth 2 times daily       LORazepam (ATIVAN) 0.5 MG tablet Take 1 tablet (0.5 mg) by mouth every 4 hours as needed (Anxiety, Nausea/Vomiting or Sleep) (Patient not taking: Reported on 7/26/2019) 30 tablet 2     Misc Natural Product Nasal (PONARIS) SOLN Apply two drops to each nostril BID X 2 month supply (Patient not taking: Reported on 10/11/2019) 10 mL 3     mupirocin (BACTROBAN) 2 % external ointment Apply to anterior nares BID X 2 month supply (Patient not taking: Reported on 10/11/2019) 2 g 3     prochlorperazine (COMPAZINE) 10 MG tablet Take 1 tablet (10 mg) by mouth every 6 hours as needed (Nausea/Vomiting) (Patient not taking: Reported on 10/11/2019) 30 tablet 2         ALLERGIES:  No Known Allergies      PAST MEDICAL HISTORY:  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) 6/5/2017         PAST SURGICAL HISTORY:  Past Surgical History:   Procedure Laterality Date     ESOPHAGOGASTRODUODENOSCOPY       ESOPHAGOSCOPY, GASTROSCOPY, DUODENOSCOPY (EGD), COMBINED N/A 6/9/2017    Procedure: COMBINED ENDOSCOPIC ULTRASOUND, ESOPHAGOSCOPY, GASTROSCOPY, DUODENOSCOPY (EGD), FINE NEEDLE ASPIRATE/BIOPSY;  Upper Endoscopic Ultrasound, fine needle aspirate/biopsy;  Surgeon: Guru Mark Avila MD;  Location:  OR     ESOPHAGOSCOPY, GASTROSCOPY,  DUODENOSCOPY (EGD), COMBINED N/A 11/3/2017    Procedure: COMBINED ESOPHAGOSCOPY, GASTROSCOPY, DUODENOSCOPY (EGD);;  Surgeon: Yunior Esteban MD;  Location: [facility] OR     ESOPHAGOSCOPY, GASTROSCOPY, DUODENOSCOPY (EGD), COMBINED N/A 11/9/2017    Procedure: COMBINED ESOPHAGOSCOPY, GASTROSCOPY, DUODENOSCOPY (EGD);  Esophogastroduodenoscopy, take out pharangostomy tube;  Surgeon: Yunior Esteban MD;  Location: [facility] OR     ESOPHAGOSCOPY, GASTROSCOPY, DUODENOSCOPY (EGD), COMBINED N/A 1/11/2018    Procedure: COMBINED ESOPHAGOSCOPY, GASTROSCOPY, DUODENOSCOPY (EGD), BIOPSY SINGLE OR MULTIPLE;  COMBINED ESOPHAGOSCOPY, GASTROSCOPY, DUODENOSCOPY (EGD);  Surgeon: Alessandro Mcgregor MD;  Location: [facility] GI     GASTRECTOMY N/A 11/3/2017    Procedure: GASTRECTOMY;;  Surgeon: Yunior Esteban MD;  Location: [facility] OR     HAND SURGERY      childhood, torn tendon     INSERT PORT VASCULAR ACCESS Right 6/22/2017    Procedure: INSERT PORT VASCULAR ACCESS;  Single Lumen Chest Power Port;  Surgeon: Iván Driver PA-C;  [facility] OR     INSERT PORT VASCULAR ACCESS Right 4/8/2019    Procedure: Single Lumen Chest Port Placement;  Surgeon: Krysten Ballard PA-C;  Location: [facility] OR     IR CHEST PORT PLACEMENT > 5 YRS OF AGE  4/8/2019     IR LIVER BIOPSY PERCUTANEOUS  3/20/2019     LAPAROSCOPY DIAGNOSTIC (GENERAL) N/A 11/3/2017    Procedure: LAPAROSCOPY DIAGNOSTIC (GENERAL);  diagnostic laparoscopy, right chest tube, total gastrectomy with distal esophagectomy, intrathoracic eveline-y esophago-jejunostomy, feeding jejunostomy, pharyngostomy, esophagogastroduodenoscopy, flexible bronchoscopy;  Surgeon: Yunior Esteban MD;  Location[facility] OR     LAPAROTOMY EXPLORATORY N/A 11/3/2017    Procedure: LAPAROTOMY EXPLORATORY;;  Surgeon: Yunior Esteban MD;  [facility]     PHARYNGOSTOMY N/A 11/3/2017    Procedure: PHARYNGOSTOMY;;  Surgeon: Yunior Esteban MD;  [facility]: [facility] OR     REMOVE PORT VASCULAR  ACCESS Right 3/19/2018    Procedure: REMOVE PORT VASCULAR ACCESS;  Right Port Removal;  Surgeon: Krysten Hopper PA-C;  [facility]     THORACOTOMY Left 11/3/2017    Procedure: THORACOTOMY;;  Surgeon: Yunior Esteban MD;  Location: [facility] OR         SOCIAL HISTORY:  Social History     Socioeconomic History     Marital status:      Spouse name: Not on file     Number of children: 1     Years of education: Not on file     Highest education level: Not on file   Occupational History     Occupation: musician and teacher    Social Needs     Financial resource strain: Not on file     Food insecurity:     Worry: Not on file     Inability: Not on file     Transportation needs:     Medical: Not on file     Non-medical: Not on file   Tobacco Use     Smoking status: Never Smoker     Smokeless tobacco: Never Used   Substance and Sexual Activity     Alcohol use: Yes     Comment: 1 beer daily     Drug use: Yes     Types: Marijuana     Comment: occasional     Sexual activity: Yes     Partners: Female     Birth control/protection: Natural Family Planning   Lifestyle     Physical activity:     Days per week: Not on file     Minutes per session: Not on file     Stress: Not on file   Relationships     Social connections:     Talks on phone: Not on file     Gets together: Not on file     Attends Sabianist service: Not on file     Active member of club or organization: Not on file     Attends meetings of clubs or organizations: Not on file     Relationship status: Not on file     Intimate partner violence:     Fear of current or ex partner: Not on file     Emotionally abused: Not on file     Physically abused: Not on file     Forced sexual activity: Not on file   Other Topics Concern     Parent/sibling w/ CABG, MI or angioplasty before 65F 55M? 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  He denies smoking.  He drinks ~1  "beer a day.  He denies illicit drug use, other than occasional marijuana.  He lives in [place] with his wife, and 4.5 year-old daughter.  His wife is currently pregnant with a boy, and is due in 6 weeks.  He has a half sister who  of breast cancer at age 32; she was diagnosed in her late 20's.  A paternal grandmother had breast cancer in her 70's      FAMILY HISTORY:  Family History   Problem Relation Age of Onset     Other - See Comments Father         sepsis     Cancer Sister         breast cancer  29 yo 1/2 sister      Cancer Paternal Grandmother         breast         PHYSICAL EXAM:  Vital signs:  /64 (BP Location: Left arm, Patient Position: Chair, Cuff Size: Adult Regular)   Pulse 65   Temp 98.2  F (36.8  C) (Oral)   Resp 14   Ht 1.753 m (5' 9.02\")   Wt 66.7 kg (147 lb)   SpO2 99%   BMI 21.70 kg/m      ECO  GENERAL/CONSTITUTIONAL: No acute distress.   Accompanied by son.  EYES: No scleral icterus.  CARDIOVASCULAR: Regular rate and rhythm.  RESPIRATORY: Clear to auscultation bilaterally.  GASTROINTESTINAL: Bowel sounds present, non-tender.  NEUROLOGIC: Alert, oriented, answers questions appropriately.  INTEGUMENTARY: No jaundice.  Port in place at the right upper chest.    LABS:  None today.    PATHOLOGY:  3/20/19:  FINAL DIAGNOSIS:   Liver, needle biopsy   - Moderately differentiated adenocarcinoma consistent with metastasis from    primary esophageal adenocarcinoma.     HER-2 non-amplified.    IMAGING:  PET-CT 19:  In this patient with history of adenocarcinoma of the gastroesophageal  junction status post total gastrectomy and distal esophagectomy:  1. Increased FDG uptake but decreased size of a metastatic lesion in  left hepatic lobe compared to 2019 compatible with mixed response  to treatment. No new hepatic lesions.  2. Stable postoperative changes in the lower chest and upper abdomen  with persistent ill-defined, non-FDG avid soft tissue stranding at " the  operative site. No abnormal FDG uptake to suggest local recurrence.  3. Mild FDG avidity of the anorectal junction, likely hemorrhoidal in  etiology however direct visualization may be considered.      ASSESSMENT/PLAN:  Daniel Sandhu is a 39 year old male with:    1) Adenocarcinoma of the GE junction: now s/p 3 cycles of neoadjuvant chemotherapy with EOX, followed by surgical resection on 11/3/17.  Pathology showed adenocarcinoma, moderate differentiated, extensive residual tumor with no evidence tumor regression, margins negative, perineural invasion present, 1 of 28 lymph nodes were involved with malignancy, stage [id number] (stage IIIA).  HER-2 is non-amplified.    He has received EOF x 2 cycles after surgery, and he has not tolerated well.  The 5-FU on cycle 5 was discontinued early. Chemotherapy was stopped at that point due to poor tolerance.    Patient now has biopsy-confirmed metastatic disease to the liver and possibly peritoneum.  He is asymptomatic currently.      [facility] testing shows MS-stable, TMB-low (4 mut/Mb), ERBB2 amplification equivocal, and TP53 H214R alteration.  PD-L1 was negative (0%).  We review the results together and possible clinical trials, and possible second opinion at [facility].      He has been tolerating chemotherapy with paclitaxel+ramucirumab very well so far.    Another PET-CT was done on 9/20/19 after 6 cycles of chemotherapy.  There is increased FDG uptake, but decreased size of the metastatic lesion in the left hepatic lobe, compared to 6/21/19, compatible with mixed response to treatment.  No new hepatic lesions.  There are stable post-operative changes in the lower chest and upper abdomen, with persistent ill-defined, non-FDG avid soft tissue stranding at the operative site.  Mild FDG avidity of the anorectal junction, likely hemorrhoidal in etiology.      I previously discussed additional support, including palliative care, , oncology psychology.   Daniel and his wife are understandably having difficulty coping and were teary at the time.  They opt to wait for now and may be open to additional support later on.      Patient has additional questions regarding liver directed treatment to the liver lesion.  We have discussed that the data on metastatectomy on esophageal/gastric tumors are scant, and appears to have no overall survival benefit.  I did discuss with surgical oncology, and they agree that the data is poor.  However, considering patient's age and his desire to be aggressive, they may consider liver directed therapy, if there is response to chemotherapy.  He has met with Dr. Chau on 10/11/19.  He will continue on chemotherapy for another 3 months, followed by restaging with PET-CT and CT liver protocol.  If there is still no clear disease progression, Dr. Chau may re-consider surgery.  He has also gone to [facility] for a second opinion on 9/27/19, and they agree with current course.    -he will resume chemotherapy with cycle 7 of paclitaxel and ramicumumab this week  -he will see ROMEO with cycle 8 in November 2019  -since I will be leaving the [facility] location, I will have him establish care with another GI oncology MD here.  He will see Dr. Ponce, Dr. Woodall, or Dr. Bailon in December 2019 with cycle 9 of chemotherapy  -he will get repeat imaging after cycle 9 of chemotherapy is completed and then follow-up with Dr. Chau again    2) Genetics:  -genetic testing was negative    3) Fertility: Daniel and his wife have 2 children, including a baby boy just born around June 2017.  He is not planning for more children in the near future.    4) Patient talked about medical cannabis, and I offered to certify him, but he says that the program is too expensive for him.  He does use marijuana edibles or vapes marijuana, which is helpful.      Laurence Hood MD  Hematology/Oncology  [facility]

## 2019-10-16 ENCOUNTER — INFUSION THERAPY VISIT (OUTPATIENT)
Dept: ONCOLOGY | Facility: CLINIC | Age: 39
End: 2019-10-16
Attending: INTERNAL MEDICINE
Payer: COMMERCIAL

## 2019-10-16 ENCOUNTER — APPOINTMENT (OUTPATIENT)
Dept: LAB | Facility: CLINIC | Age: 39
End: 2019-10-16
Attending: INTERNAL MEDICINE
Payer: COMMERCIAL

## 2019-10-16 VITALS
RESPIRATION RATE: 16 BRPM | OXYGEN SATURATION: 99 % | HEART RATE: 70 BPM | WEIGHT: 147.7 LBS | DIASTOLIC BLOOD PRESSURE: 64 MMHG | TEMPERATURE: 97.9 F | BODY MASS INDEX: 21.8 KG/M2 | SYSTOLIC BLOOD PRESSURE: 105 MMHG

## 2019-10-16 DIAGNOSIS — C15.5 MALIGNANT NEOPLASM OF LOWER THIRD OF ESOPHAGUS (H): Primary | ICD-10-CM

## 2019-10-16 LAB
ALBUMIN SERPL-MCNC: 3.6 G/DL (ref 3.4–5)
ALBUMIN UR-MCNC: NEGATIVE MG/DL
ALP SERPL-CCNC: 45 U/L (ref 40–150)
ALT SERPL W P-5'-P-CCNC: 26 U/L (ref 0–70)
AST SERPL W P-5'-P-CCNC: 24 U/L (ref 0–45)
BASOPHILS # BLD AUTO: 0 10E9/L (ref 0–0.2)
BASOPHILS NFR BLD AUTO: 0.6 %
BILIRUB DIRECT SERPL-MCNC: 0.1 MG/DL (ref 0–0.2)
BILIRUB SERPL-MCNC: 0.4 MG/DL (ref 0.2–1.3)
CEA SERPL-MCNC: <0.5 UG/L (ref 0–2.5)
DIFFERENTIAL METHOD BLD: ABNORMAL
EOSINOPHIL # BLD AUTO: 0.1 10E9/L (ref 0–0.7)
EOSINOPHIL NFR BLD AUTO: 2.5 %
ERYTHROCYTE [DISTWIDTH] IN BLOOD BY AUTOMATED COUNT: 13 % (ref 10–15)
HCT VFR BLD AUTO: 37 % (ref 40–53)
HGB BLD-MCNC: 12.2 G/DL (ref 13.3–17.7)
IMM GRANULOCYTES # BLD: 0 10E9/L (ref 0–0.4)
IMM GRANULOCYTES NFR BLD: 0.2 %
LYMPHOCYTES # BLD AUTO: 1 10E9/L (ref 0.8–5.3)
LYMPHOCYTES NFR BLD AUTO: 20.7 %
MCH RBC QN AUTO: 30.5 PG (ref 26.5–33)
MCHC RBC AUTO-ENTMCNC: 33 G/DL (ref 31.5–36.5)
MCV RBC AUTO: 93 FL (ref 78–100)
MONOCYTES # BLD AUTO: 0.5 10E9/L (ref 0–1.3)
MONOCYTES NFR BLD AUTO: 11.2 %
NEUTROPHILS # BLD AUTO: 3.1 10E9/L (ref 1.6–8.3)
NEUTROPHILS NFR BLD AUTO: 64.8 %
NRBC # BLD AUTO: 0 10*3/UL
NRBC BLD AUTO-RTO: 0 /100
PLATELET # BLD AUTO: 131 10E9/L (ref 150–450)
PROT SERPL-MCNC: 6.6 G/DL (ref 6.8–8.8)
RBC # BLD AUTO: 4 10E12/L (ref 4.4–5.9)
WBC # BLD AUTO: 4.7 10E9/L (ref 4–11)

## 2019-10-16 PROCEDURE — 25000132 ZZH RX MED GY IP 250 OP 250 PS 637: Mod: ZF | Performed by: INTERNAL MEDICINE

## 2019-10-16 PROCEDURE — 96411 CHEMO IV PUSH ADDL DRUG: CPT

## 2019-10-16 PROCEDURE — 90686 IIV4 VACC NO PRSV 0.5 ML IM: CPT | Mod: ZF | Performed by: INTERNAL MEDICINE

## 2019-10-16 PROCEDURE — 96413 CHEMO IV INFUSION 1 HR: CPT

## 2019-10-16 PROCEDURE — 25000125 ZZHC RX 250: Mod: ZF | Performed by: INTERNAL MEDICINE

## 2019-10-16 PROCEDURE — 96375 TX/PRO/DX INJ NEW DRUG ADDON: CPT

## 2019-10-16 PROCEDURE — 82378 CARCINOEMBRYONIC ANTIGEN: CPT | Performed by: INTERNAL MEDICINE

## 2019-10-16 PROCEDURE — 81003 URINALYSIS AUTO W/O SCOPE: CPT | Performed by: INTERNAL MEDICINE

## 2019-10-16 PROCEDURE — 25000128 H RX IP 250 OP 636: Mod: ZF | Performed by: INTERNAL MEDICINE

## 2019-10-16 PROCEDURE — 85025 COMPLETE CBC W/AUTO DIFF WBC: CPT | Performed by: INTERNAL MEDICINE

## 2019-10-16 PROCEDURE — G0008 ADMIN INFLUENZA VIRUS VAC: HCPCS

## 2019-10-16 PROCEDURE — 80076 HEPATIC FUNCTION PANEL: CPT | Performed by: INTERNAL MEDICINE

## 2019-10-16 PROCEDURE — 25800030 ZZH RX IP 258 OP 636: Mod: ZF | Performed by: INTERNAL MEDICINE

## 2019-10-16 RX ORDER — HEPARIN SODIUM (PORCINE) LOCK FLUSH IV SOLN 100 UNIT/ML 100 UNIT/ML
500 SOLUTION INTRAVENOUS ONCE
Status: COMPLETED | OUTPATIENT
Start: 2019-10-16 | End: 2019-10-16

## 2019-10-16 RX ORDER — HEPARIN SODIUM (PORCINE) LOCK FLUSH IV SOLN 100 UNIT/ML 100 UNIT/ML
5 SOLUTION INTRAVENOUS EVERY 8 HOURS PRN
Status: DISCONTINUED | OUTPATIENT
Start: 2019-10-16 | End: 2019-10-16 | Stop reason: HOSPADM

## 2019-10-16 RX ORDER — DIPHENHYDRAMINE HCL 25 MG
25 CAPSULE ORAL ONCE
Status: COMPLETED | OUTPATIENT
Start: 2019-10-16 | End: 2019-10-16

## 2019-10-16 RX ADMIN — SODIUM CHLORIDE 500 MG: 9 INJECTION, SOLUTION INTRAVENOUS at 08:14

## 2019-10-16 RX ADMIN — SODIUM CHLORIDE 250 ML: 9 INJECTION, SOLUTION INTRAVENOUS at 07:42

## 2019-10-16 RX ADMIN — HEPARIN 5 ML: 100 SYRINGE at 06:53

## 2019-10-16 RX ADMIN — DIPHENHYDRAMINE HYDROCHLORIDE 25 MG: 25 CAPSULE ORAL at 07:38

## 2019-10-16 RX ADMIN — FAMOTIDINE 20 MG: 10 INJECTION INTRAVENOUS at 07:42

## 2019-10-16 RX ADMIN — DEXAMETHASONE SODIUM PHOSPHATE 20 MG: 10 INJECTION, SOLUTION INTRAMUSCULAR; INTRAVENOUS at 07:45

## 2019-10-16 RX ADMIN — HEPARIN 500 UNITS: 100 SYRINGE at 10:33

## 2019-10-16 RX ADMIN — INFLUENZA A VIRUS A/BRISBANE/02/2018 IVR-190 (H1N1) ANTIGEN (FORMALDEHYDE INACTIVATED), INFLUENZA A VIRUS A/KANSAS/14/2017 X-327 (H3N2) ANTIGEN (FORMALDEHYDE INACTIVATED), INFLUENZA B VIRUS B/PHUKET/3073/2013 ANTIGEN (FORMALDEHYDE INACTIVATED), AND INFLUENZA B VIRUS B/MARYLAND/15/2016 BX-69A ANTIGEN (FORMALDEHYDE INACTIVATED) 0.5 ML: 15; 15; 15; 15 INJECTION, SUSPENSION INTRAMUSCULAR at 09:33

## 2019-10-16 RX ADMIN — PACLITAXEL 145 MG: 6 INJECTION, SOLUTION INTRAVENOUS at 09:28

## 2019-10-16 ASSESSMENT — PAIN SCALES - GENERAL: PAINLEVEL: NO PAIN (0)

## 2019-10-16 NOTE — PROGRESS NOTES
Infusion Nursing Note:  Daniel Sandhu presents today for Day 1 Cycle 7 Cyramza and Taxol.  Flu shot  Patient seen by provider today: No   present during visit today: Not Applicable.    Note: Patient feels well today.  He denies fevers or any other concerning signs of infection.    Patient is interested in getting a flu shot today.  Intravenous Access:  Implanted Port.    Treatment Conditions:  Lab Results   Component Value Date    HGB 12.2 10/16/2019     Lab Results   Component Value Date    WBC 4.7 10/16/2019      Lab Results   Component Value Date    ANEU 3.1 10/16/2019     Lab Results   Component Value Date     10/16/2019          Lab Results   Component Value Date    BILITOTAL 0.4 10/16/2019           Lab Results   Component Value Date    ALBUMIN 3.6 10/16/2019                    Lab Results   Component Value Date    ALT 26 10/16/2019           Lab Results   Component Value Date    AST 24 10/16/2019       Results reviewed, labs MET treatment parameters, ok to proceed with treatment.  Urine protein: negative.      Post Infusion Assessment:  Patient tolerated infusion without incident.  Patient tolerated flu shot injection without incident into right deltoid.  Blood return noted pre and post infusion.  Site patent and intact, free from redness, edema or discomfort.  No evidence of extravasations.  Access discontinued per protocol.       Discharge Plan:   Patient declined prescription refills.  Discharge instructions reviewed with: Patient.  Patient and/or family verbalized understanding of discharge instructions and all questions answered.  Copy of AVS reviewed with patient and/or family.  Patient will return 10/23/19 for next appointment.  AVS to patient via [employer].  Patient will return 10/23/19 for next appointment.   Patient discharged in stable condition accompanied by: self.  Departure Mode: Ambulatory.    Eusebia Garcia, MELECIO, RN

## 2019-10-16 NOTE — PATIENT INSTRUCTIONS
Contact Numbers  [facility]: [phone] (for symptom and scheduling needs)    Please call the [facility] Triage line if you experience a temperature greater than or equal to 100.4, shaking chills, have uncontrolled nausea, vomiting and/or diarrhea, dizziness, shortness of breath, chest pain, bleeding, unexplained bruising, or if you have any other new/concerning symptoms, questions or concerns.     If you are having any concerning symptoms or wish to speak to a provider before your next infusion visit, please call your care coordinator or triage to notify them so we can adequately serve you.     If you need a refill on a narcotic prescription or other medication, please call triage before your infusion appointment.

## 2019-10-23 ENCOUNTER — INFUSION THERAPY VISIT (OUTPATIENT)
Dept: ONCOLOGY | Facility: CLINIC | Age: 39
End: 2019-10-23
Attending: INTERNAL MEDICINE
Payer: COMMERCIAL

## 2019-10-23 ENCOUNTER — APPOINTMENT (OUTPATIENT)
Dept: LAB | Facility: CLINIC | Age: 39
End: 2019-10-23
Attending: INTERNAL MEDICINE
Payer: COMMERCIAL

## 2019-10-23 VITALS
TEMPERATURE: 97.7 F | WEIGHT: 148 LBS | SYSTOLIC BLOOD PRESSURE: 122 MMHG | OXYGEN SATURATION: 100 % | HEART RATE: 75 BPM | BODY MASS INDEX: 21.85 KG/M2 | RESPIRATION RATE: 16 BRPM | DIASTOLIC BLOOD PRESSURE: 78 MMHG

## 2019-10-23 DIAGNOSIS — C78.7 LIVER METASTASES: ICD-10-CM

## 2019-10-23 DIAGNOSIS — C15.5 MALIGNANT NEOPLASM OF LOWER THIRD OF ESOPHAGUS (H): Primary | ICD-10-CM

## 2019-10-23 LAB
BASOPHILS # BLD AUTO: 0 10E9/L (ref 0–0.2)
BASOPHILS NFR BLD AUTO: 0.5 %
DIFFERENTIAL METHOD BLD: ABNORMAL
EOSINOPHIL # BLD AUTO: 0.1 10E9/L (ref 0–0.7)
EOSINOPHIL NFR BLD AUTO: 3.5 %
ERYTHROCYTE [DISTWIDTH] IN BLOOD BY AUTOMATED COUNT: 13.1 % (ref 10–15)
HCT VFR BLD AUTO: 34.6 % (ref 40–53)
HGB BLD-MCNC: 11.7 G/DL (ref 13.3–17.7)
IMM GRANULOCYTES # BLD: 0 10E9/L (ref 0–0.4)
IMM GRANULOCYTES NFR BLD: 0.2 %
LYMPHOCYTES # BLD AUTO: 1 10E9/L (ref 0.8–5.3)
LYMPHOCYTES NFR BLD AUTO: 25.1 %
MCH RBC QN AUTO: 31 PG (ref 26.5–33)
MCHC RBC AUTO-ENTMCNC: 33.8 G/DL (ref 31.5–36.5)
MCV RBC AUTO: 92 FL (ref 78–100)
MONOCYTES # BLD AUTO: 0.3 10E9/L (ref 0–1.3)
MONOCYTES NFR BLD AUTO: 6.2 %
NEUTROPHILS # BLD AUTO: 2.6 10E9/L (ref 1.6–8.3)
NEUTROPHILS NFR BLD AUTO: 64.5 %
NRBC # BLD AUTO: 0 10*3/UL
NRBC BLD AUTO-RTO: 0 /100
PLATELET # BLD AUTO: 120 10E9/L (ref 150–450)
RBC # BLD AUTO: 3.78 10E12/L (ref 4.4–5.9)
WBC # BLD AUTO: 4 10E9/L (ref 4–11)

## 2019-10-23 PROCEDURE — 25000128 H RX IP 250 OP 636: Mod: ZF | Performed by: INTERNAL MEDICINE

## 2019-10-23 PROCEDURE — 96413 CHEMO IV INFUSION 1 HR: CPT

## 2019-10-23 PROCEDURE — 96375 TX/PRO/DX INJ NEW DRUG ADDON: CPT

## 2019-10-23 PROCEDURE — 25800030 ZZH RX IP 258 OP 636: Mod: ZF | Performed by: INTERNAL MEDICINE

## 2019-10-23 PROCEDURE — 85025 COMPLETE CBC W/AUTO DIFF WBC: CPT | Performed by: INTERNAL MEDICINE

## 2019-10-23 PROCEDURE — 25000125 ZZHC RX 250: Mod: ZF | Performed by: INTERNAL MEDICINE

## 2019-10-23 RX ORDER — HEPARIN SODIUM (PORCINE) LOCK FLUSH IV SOLN 100 UNIT/ML 100 UNIT/ML
500 SOLUTION INTRAVENOUS ONCE
Status: COMPLETED | OUTPATIENT
Start: 2019-10-23 | End: 2019-10-23

## 2019-10-23 RX ORDER — HEPARIN SODIUM (PORCINE) LOCK FLUSH IV SOLN 100 UNIT/ML 100 UNIT/ML
5 SOLUTION INTRAVENOUS ONCE
Status: COMPLETED | OUTPATIENT
Start: 2019-10-23 | End: 2019-10-23

## 2019-10-23 RX ADMIN — PACLITAXEL 145 MG: 6 INJECTION, SOLUTION INTRAVENOUS at 09:19

## 2019-10-23 RX ADMIN — SODIUM CHLORIDE 250 ML: 9 INJECTION, SOLUTION INTRAVENOUS at 08:47

## 2019-10-23 RX ADMIN — HEPARIN 500 UNITS: 100 SYRINGE at 10:26

## 2019-10-23 RX ADMIN — DEXAMETHASONE SODIUM PHOSPHATE 10 MG: 10 INJECTION, SOLUTION INTRAMUSCULAR; INTRAVENOUS at 09:02

## 2019-10-23 RX ADMIN — HEPARIN 5 ML: 100 SYRINGE at 08:10

## 2019-10-23 RX ADMIN — FAMOTIDINE 20 MG: 10 INJECTION INTRAVENOUS at 08:51

## 2019-10-23 ASSESSMENT — PAIN SCALES - GENERAL: PAINLEVEL: NO PAIN (0)

## 2019-10-23 NOTE — PROGRESS NOTES
"Infusion Nursing Note:  Daniel Sandhu presents today for Cycle 7 Day 8 Taxol.    Patient seen by provider today: No    Note: Pt states he had a good week. States he \"felt like crap\" for one day, but other than that had no adverse side effects other than some acid reflux, which he will talk with ENT about next week.     Pt still has Decadron 20 mg ordered as premedication. Discussed with Dr. Hood.    TORB Dr. Hood/Tricia Hou RN 10/23/19 @ 0845: Decrease IV Decadron to 10 mg.     Pt would like to see how the lower Decadron dose goes this week before changing future doses.     Intravenous Access:  Implanted Port.    Treatment Conditions:  Lab Results   Component Value Date    HGB 11.7 10/23/2019     Lab Results   Component Value Date    WBC 4.0 10/23/2019      Lab Results   Component Value Date    ANEU 2.6 10/23/2019     Lab Results   Component Value Date     10/23/2019      Results reviewed, labs MET treatment parameters, ok to proceed with treatment.    Post Infusion Assessment:  Patient tolerated infusion without incident.  Blood return noted pre and post infusion.  Site patent and intact, free from redness, edema or discomfort.  No evidence of extravasations. Access discontinued per protocol.     Discharge Plan:   Patient declined prescription refills.  AVS to patient via [employer].  Patient will return 10/30 for next appointment.   Patient discharged in stable condition accompanied by: self.  Departure Mode: Ambulatory.    Tricia Hou RN  "

## 2019-10-30 ENCOUNTER — INFUSION THERAPY VISIT (OUTPATIENT)
Dept: ONCOLOGY | Facility: CLINIC | Age: 39
End: 2019-10-30
Attending: INTERNAL MEDICINE
Payer: COMMERCIAL

## 2019-10-30 ENCOUNTER — APPOINTMENT (OUTPATIENT)
Dept: LAB | Facility: CLINIC | Age: 39
End: 2019-10-30
Attending: INTERNAL MEDICINE
Payer: COMMERCIAL

## 2019-10-30 VITALS
RESPIRATION RATE: 16 BRPM | TEMPERATURE: 97.8 F | BODY MASS INDEX: 21.71 KG/M2 | HEART RATE: 72 BPM | SYSTOLIC BLOOD PRESSURE: 107 MMHG | DIASTOLIC BLOOD PRESSURE: 61 MMHG | WEIGHT: 147.1 LBS | OXYGEN SATURATION: 100 %

## 2019-10-30 DIAGNOSIS — C15.5 MALIGNANT NEOPLASM OF LOWER THIRD OF ESOPHAGUS (H): Primary | ICD-10-CM

## 2019-10-30 LAB
ALBUMIN UR-MCNC: NEGATIVE MG/DL
BASOPHILS # BLD AUTO: 0 10E9/L (ref 0–0.2)
BASOPHILS NFR BLD AUTO: 0.7 %
DIFFERENTIAL METHOD BLD: ABNORMAL
EOSINOPHIL # BLD AUTO: 0.1 10E9/L (ref 0–0.7)
EOSINOPHIL NFR BLD AUTO: 2.1 %
ERYTHROCYTE [DISTWIDTH] IN BLOOD BY AUTOMATED COUNT: 13.2 % (ref 10–15)
HCT VFR BLD AUTO: 35.5 % (ref 40–53)
HGB BLD-MCNC: 11.8 G/DL (ref 13.3–17.7)
IMM GRANULOCYTES # BLD: 0 10E9/L (ref 0–0.4)
IMM GRANULOCYTES NFR BLD: 0.3 %
LYMPHOCYTES # BLD AUTO: 0.9 10E9/L (ref 0.8–5.3)
LYMPHOCYTES NFR BLD AUTO: 30.8 %
MCH RBC QN AUTO: 30.7 PG (ref 26.5–33)
MCHC RBC AUTO-ENTMCNC: 33.2 G/DL (ref 31.5–36.5)
MCV RBC AUTO: 92 FL (ref 78–100)
MONOCYTES # BLD AUTO: 0.2 10E9/L (ref 0–1.3)
MONOCYTES NFR BLD AUTO: 6.5 %
NEUTROPHILS # BLD AUTO: 1.7 10E9/L (ref 1.6–8.3)
NEUTROPHILS NFR BLD AUTO: 59.6 %
NRBC # BLD AUTO: 0 10*3/UL
NRBC BLD AUTO-RTO: 0 /100
PLATELET # BLD AUTO: 136 10E9/L (ref 150–450)
RBC # BLD AUTO: 3.84 10E12/L (ref 4.4–5.9)
WBC # BLD AUTO: 2.9 10E9/L (ref 4–11)

## 2019-10-30 PROCEDURE — 25000125 ZZHC RX 250: Mod: ZF | Performed by: INTERNAL MEDICINE

## 2019-10-30 PROCEDURE — 96417 CHEMO IV INFUS EACH ADDL SEQ: CPT

## 2019-10-30 PROCEDURE — 25800030 ZZH RX IP 258 OP 636: Mod: ZF | Performed by: INTERNAL MEDICINE

## 2019-10-30 PROCEDURE — 85025 COMPLETE CBC W/AUTO DIFF WBC: CPT | Performed by: INTERNAL MEDICINE

## 2019-10-30 PROCEDURE — 96367 TX/PROPH/DG ADDL SEQ IV INF: CPT

## 2019-10-30 PROCEDURE — 25000128 H RX IP 250 OP 636: Mod: ZF | Performed by: INTERNAL MEDICINE

## 2019-10-30 PROCEDURE — 81003 URINALYSIS AUTO W/O SCOPE: CPT | Performed by: INTERNAL MEDICINE

## 2019-10-30 PROCEDURE — 25000132 ZZH RX MED GY IP 250 OP 250 PS 637: Mod: ZF | Performed by: INTERNAL MEDICINE

## 2019-10-30 PROCEDURE — 96413 CHEMO IV INFUSION 1 HR: CPT

## 2019-10-30 PROCEDURE — 96375 TX/PRO/DX INJ NEW DRUG ADDON: CPT

## 2019-10-30 RX ORDER — HEPARIN SODIUM (PORCINE) LOCK FLUSH IV SOLN 100 UNIT/ML 100 UNIT/ML
500 SOLUTION INTRAVENOUS ONCE
Status: COMPLETED | OUTPATIENT
Start: 2019-10-30 | End: 2019-10-30

## 2019-10-30 RX ORDER — HEPARIN SODIUM (PORCINE) LOCK FLUSH IV SOLN 100 UNIT/ML 100 UNIT/ML
5 SOLUTION INTRAVENOUS ONCE
Status: COMPLETED | OUTPATIENT
Start: 2019-10-30 | End: 2019-10-30

## 2019-10-30 RX ORDER — DIPHENHYDRAMINE HCL 25 MG
25 CAPSULE ORAL ONCE
Status: COMPLETED | OUTPATIENT
Start: 2019-10-30 | End: 2019-10-30

## 2019-10-30 RX ADMIN — DIPHENHYDRAMINE HYDROCHLORIDE 25 MG: 25 CAPSULE ORAL at 12:45

## 2019-10-30 RX ADMIN — HEPARIN 5 ML: 100 SYRINGE at 12:02

## 2019-10-30 RX ADMIN — HEPARIN 500 UNITS: 100 SYRINGE at 14:36

## 2019-10-30 RX ADMIN — PACLITAXEL 145 MG: 6 INJECTION, SOLUTION INTRAVENOUS at 13:35

## 2019-10-30 RX ADMIN — DEXAMETHASONE SODIUM PHOSPHATE 20 MG: 10 INJECTION, SOLUTION INTRAMUSCULAR; INTRAVENOUS at 12:46

## 2019-10-30 RX ADMIN — FAMOTIDINE 20 MG: 10 INJECTION INTRAVENOUS at 12:44

## 2019-10-30 RX ADMIN — SODIUM CHLORIDE 250 ML: 9 INJECTION, SOLUTION INTRAVENOUS at 12:44

## 2019-10-30 RX ADMIN — SODIUM CHLORIDE 500 MG: 9 INJECTION, SOLUTION INTRAVENOUS at 13:04

## 2019-10-30 ASSESSMENT — PAIN SCALES - GENERAL: PAINLEVEL: NO PAIN (0)

## 2019-10-30 NOTE — PATIENT INSTRUCTIONS
[facility] Triage and after hours / weekends / holidays:  [phone]    Please call the triage or after hours line if you experience a temperature greater than or equal to 100.5, shaking chills, have uncontrolled nausea, vomiting and/or diarrhea, dizziness, shortness of breath, chest pain, bleeding, unexplained bruising, or if you have any other new/concerning symptoms, questions or concerns.      If you are having any concerning symptoms or wish to speak to a provider before your next infusion visit, please call your care coordinator or triage to notify them so we can adequately serve you.     If you need a refill on a narcotic prescription or other medication, please call before your infusion appointment.                 October 2019 Sunday Monday Tuesday Wednesday Thursday Friday Saturday             1     2     3     4     5       6     7     8     9     10     11    [facility] NEW  12:45 PM   (60 min.)   Dwight Chau MD   [facility] 12       13     14    [facility] RETURN   8:30 AM   (30 min.)   Laurence Hood MD   [facility] 15     16    [facility] DRAW   6:30 AM   (15 min.)    [facility] DRAW   [facility] Draw    [facility] ONC INFUSION 180   7:00 AM   (180 min.)    ONCOLOGY INFUSION   [facility] 17     18     19       20     21     22     23    [facility] DRAW   7:30 AM   (15 min.)    [facility] DRAW   [facility] Draw    [facility] ONC INFUSION 180   8:00 AM   (180 min.)    ONCOLOGY INFUSION   [facility] 24     25     26       27     28     29     30    [facility] DRAW  11:45 AM   (15 min.)    [facility] DRAW   [facility] Draw    [facility] ONC INFUSION 180  12:30 PM   (180 min.)    ONCOLOGY INFUSION   [facility] 31 November 2019 Sunday Monday Tuesday Wednesday Thursday Friday Saturday                            1    [facility] PROCEDURE    3:25 PM   (20 min.)   Imelda Anderson MD   [facility] 2       3     4     5     6     7     8     9       10     11     12     13    [facility] DRAW   6:30 AM   (15 min.)    [facility] DRAW   [facility] Draw    [facility] RETURN   6:45 AM   (50 min.)   Violeta Coto PA-C   [facility]    [facility] ONC INFUSION 180   8:00 AM   (180 min.)   [facility] ONCOLOGY INFUSION   [facility] 14     15     16       17     18     19     20    [facility] DRAW   7:30 AM   (15 min.)    [facility]    [facility] ONC INFUSION 180   8:00 AM   (180 min.)    ONCOLOGY INFUSION   [facility] 21     22     23       24     25     26     27    [facility] DRAW   8:30 AM   (15 min.)    [facility]    [facility] ONC INFUSION 180   9:00 AM   (180 min.)    ONCOLOGY INFUSION   [facility] 28     29     30                     Lab Results:  Recent Results (from the past 12 hour(s))   CBC with platelets differential    Collection Time: 10/30/19 12:05 PM   Result Value Ref Range    WBC 2.9 (L) 4.0 - 11.0 10e9/L    RBC Count 3.84 (L) 4.4 - 5.9 10e12/L    Hemoglobin 11.8 (L) 13.3 - 17.7 g/dL    Hematocrit 35.5 (L) 40.0 - 53.0 %    MCV 92 78 - 100 fl    MCH 30.7 26.5 - 33.0 pg    MCHC 33.2 31.5 - 36.5 g/dL    RDW 13.2 10.0 - 15.0 %    Platelet Count 136 (L) 150 - 450 10e9/L    Diff Method Automated Method     % Neutrophils 59.6 %    % Lymphocytes 30.8 %    % Monocytes 6.5 %    % Eosinophils 2.1 %    % Basophils 0.7 %    % Immature Granulocytes 0.3 %    Nucleated RBCs 0 0 /100    Absolute Neutrophil 1.7 1.6 - 8.3 10e9/L    Absolute Lymphocytes 0.9 0.8 - 5.3 10e9/L    Absolute Monocytes 0.2 0.0 - 1.3 10e9/L    Absolute Eosinophils 0.1 0.0 - 0.7 10e9/L    Absolute Basophils 0.0 0.0 - 0.2 10e9/L    Abs Immature Granulocytes 0.0 0 - 0.4 10e9/L    Absolute Nucleated RBC 0.0    Protein  qualitative urine    Collection Time: 10/30/19 12:09 PM   Result Value Ref Range    Protein Albumin Urine Negative NEG^Negative mg/dL

## 2019-10-30 NOTE — PROGRESS NOTES
Infusion Nursing Note:  Daniel Sandhu presents today for Cycle 7, Day 15 Cyramza, Taxol.    Patient seen by provider today: No   present during visit today: Not Applicable.    Note: Pt assessed prior to initiating treatment. Denies any new issues or symptoms to report. Pt stated that last week when he received only Taxol, he was premedicated with 10mg IV Dexamethasone and he wanted to report that his symptoms were good. He did want to stick to 20mg IV Dexamethasone this week, but will discuss with provider next visit about possibly lowering his dose.     Intravenous Access:  Implanted Port.    Treatment Conditions:  Lab Results   Component Value Date    HGB 11.8 10/30/2019     Lab Results   Component Value Date    WBC 2.9 10/30/2019      Lab Results   Component Value Date    ANEU 1.7 10/30/2019     Lab Results   Component Value Date     10/30/2019      Lab Results   Component Value Date     09/23/2019                   Lab Results   Component Value Date    POTASSIUM 3.9 09/23/2019           Lab Results   Component Value Date    MAG 2.2 03/07/2018            Lab Results   Component Value Date    CR 0.67 09/23/2019                   Lab Results   Component Value Date    YOBANY 9.0 09/23/2019                Lab Results   Component Value Date    BILITOTAL 0.4 10/16/2019           Lab Results   Component Value Date    ALBUMIN 3.6 10/16/2019                    Lab Results   Component Value Date    ALT 26 10/16/2019           Lab Results   Component Value Date    AST 24 10/16/2019     Results reviewed, labs MET treatment parameters, ok to proceed with treatment.  Urine Protein - Negative.    Post Infusion Assessment:  Patient tolerated infusion without incident.  Blood return noted pre and post infusion.  Site patent and intact, free from redness, edema or discomfort.  No evidence of extravasations.  Access discontinued per protocol.     Discharge Plan:   Patient declined prescription refills.  AVS to  patient via [employer].  Patient will return 11/13/19 for next appointment.   Patient discharged in stable condition accompanied by: self.  Departure Mode: Ambulatory.    Magdalena Ochoa RN

## 2019-10-30 NOTE — PROGRESS NOTES
[facility]       Patient: Daniel Sandhu  MRN: [id number]    : 1980  Age/Gender: 39 year old male  Date of Service: 2019     Chief Complaint   VPI with musical instrument  Vocal process granuloma, left    Interval History     HISTORY OF PRESENT ILLNESS: Mr. 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  He continues to have the VPI and he is here today to trial the injection.  He also reports feeling soreness on the left side of his neck where he knows the granulomas.  He is doing antireflux precautions and has recently bought a pillow to help elevate head of bed at night.  He has been told that he should not be using reflux medications.  He denies other new complaints today.      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         PAST SURGICAL HISTORY:   Past Surgical History:   Procedure Laterality Date     ESOPHAGOGASTRODUODENOSCOPY       ESOPHAGOSCOPY, GASTROSCOPY, DUODENOSCOPY (EGD), COMBINED N/A 2017    Procedure: COMBINED ENDOSCOPIC ULTRASOUND, ESOPHAGOSCOPY, GASTROSCOPY, DUODENOSCOPY (EGD), FINE NEEDLE ASPIRATE/BIOPSY;  Upper Endoscopic Ultrasound, fine needle aspirate/biopsy;  Surgeon: Guru Mark Avila MD;  Location: [facility]     ESOPHAGOSCOPY, GASTROSCOPY, DUODENOSCOPY (EGD), COMBINED N/A 11/3/2017    Procedure: COMBINED ESOPHAGOSCOPY, GASTROSCOPY, DUODENOSCOPY (EGD);;  Surgeon: Yunior Esteban MD;  Location: [facility] OR     ESOPHAGOSCOPY, GASTROSCOPY, DUODENOSCOPY (EGD), COMBINED N/A 2017    Procedure: COMBINED ESOPHAGOSCOPY, GASTROSCOPY, DUODENOSCOPY (EGD);  Esophogastroduodenoscopy, take out pharangostomy tube;  Surgeon: Yunior Esteban MD;  Location: [facility] OR     ESOPHAGOSCOPY, GASTROSCOPY, DUODENOSCOPY (EGD), COMBINED N/A  1/11/2018    Procedure: COMBINED ESOPHAGOSCOPY, GASTROSCOPY, DUODENOSCOPY (EGD), BIOPSY SINGLE OR MULTIPLE;  COMBINED ESOPHAGOSCOPY, GASTROSCOPY, DUODENOSCOPY (EGD);  Surgeon: Alessandro Mcgregor MD;  Location: [facility]     GASTRECTOMY N/A 11/3/2017    Procedure: GASTRECTOMY;;  Surgeon: Yunior Esteban MD;  Location: [facility] OR     HAND SURGERY      childhood, torn tendon     INSERT PORT VASCULAR ACCESS Right 6/22/2017    Procedure: INSERT PORT VASCULAR ACCESS;  Single Lumen Chest Power Port;  Surgeon: Iván Driver PA-C;  Location: [facility] OR     INSERT PORT VASCULAR ACCESS Right 4/8/2019    Procedure: Single Lumen Chest Port Placement;  Surgeon: Krysten Ballard PA-C;  Location: [facility] OR     IR CHEST PORT PLACEMENT > 5 YRS OF AGE  4/8/2019     IR LIVER BIOPSY PERCUTANEOUS  3/20/2019     LAPAROSCOPY DIAGNOSTIC (GENERAL) N/A 11/3/2017    Procedure: LAPAROSCOPY DIAGNOSTIC (GENERAL);  diagnostic laparoscopy, right chest tube, total gastrectomy with distal esophagectomy, intrathoracic eveline-y esophago-jejunostomy, feeding jejunostomy, pharyngostomy, esophagogastroduodenoscopy, flexible bronchoscopy;  Surgeon: Yunior Esteban MD;  Location: [facility]     LAPAROTOMY EXPLORATORY N/A 11/3/2017    Procedure: LAPAROTOMY EXPLORATORY;;  Surgeon: Yunior Esteban MD;  Location: [facility]     PHARYNGOSTOMY N/A 11/3/2017    Procedure: PHARYNGOSTOMY;;  Surgeon: Yunior Esteban MD;  Location: [facility]     REMOVE PORT VASCULAR ACCESS Right 3/19/2018    Procedure: REMOVE PORT VASCULAR ACCESS;  Right Port Removal;  Surgeon: Krysten Hopper PA-C;  [facility]     THORACOTOMY Left 11/3/2017    Procedure: THORACOTOMY;;  Surgeon: Yunior Esteban MD;  Location: [facility]         CURRENT MEDICATIONS:   Current Outpatient Medications:      cyabnocobalamin (VITAMIN B-12) 2500 MCG sublingual tablet, Place 2,500 mcg under the tongue daily, Disp: 30 tablet, Rfl: 1     ferrous gluconate (FERGON) 324 (38 Fe) MG tablet,  Take 324 mg by mouth daily (with breakfast), Disp: , Rfl:      LORazepam (ATIVAN) 0.5 MG tablet, Take 1 tablet (0.5 mg) by mouth every 4 hours as needed (Anxiety, Nausea/Vomiting or Sleep) (Patient not taking: Reported on 7/26/2019), Disp: 30 tablet, Rfl: 2     Misc Natural Product Nasal (PONARIS) SOLN, Apply two drops to each nostril BID X 2 month supply, Disp: 10 mL, Rfl: 3     multivitamin, therapeutic with minerals (MULTI-VITAMIN) TABS tablet, Take 1 tablet by mouth daily Generic [place] Vitamin, Disp: , Rfl:      mupirocin (BACTROBAN) 2 % external ointment, Apply to anterior nares BID X 2 month supply (Patient not taking: Reported on 10/11/2019), Disp: 2 g, Rfl: 3     prochlorperazine (COMPAZINE) 10 MG tablet, Take 1 tablet (10 mg) by mouth every 6 hours as needed (Nausea/Vomiting) (Patient not taking: Reported on 10/11/2019), Disp: 30 tablet, Rfl: 2     saccharomyces boulardii (FLORASTOR) 250 MG capsule, Take 250 mg by mouth 2 times daily, Disp: , Rfl:   No current facility-administered medications for this visit.     Facility-Administered Medications Ordered in Other Visits:      heparin 100 UNIT/ML injection 500 Units, 500 Units, Intravenous, Once, Laurence Hood MD     PACLitaxel (TAXOL) 145 mg in sodium chloride 0.9 % 299 mL infusion, 80 mg/m2 (Treatment Plan Recorded), Intravenous, Once, Laurence Hood MD, Last Rate: 299 mL/hr at 10/30/19 1335, 145 mg at 10/30/19 1335     sodium chloride (PF) 0.9% PF flush 10 mL, 10 mL, Intravenous, Once, Laurence Hood MD      ALLERGIES: Patient has no known allergies.      SOCIAL HISTORY:    Social History     Socioeconomic History     Marital status:      Spouse name: Not on file     Number of children: 1     Years of education: Not on file     Highest education level: Not on file   Occupational History     Occupation: musician and teacher    Social Needs     Financial resource strain: Not on file     Food insecurity:      Worry: Not on file     Inability: Not on file     Transportation needs:     Medical: Not on file     Non-medical: Not on file   Tobacco Use     Smoking status: Never Smoker     Smokeless tobacco: Never Used   Substance and Sexual Activity     Alcohol use: Yes     Comment: 1 beer daily     Drug use: Yes     Types: Marijuana     Comment: occasional     Sexual activity: Yes     Partners: Female     Birth control/protection: Natural Family Planning   Lifestyle     Physical activity:     Days per week: Not on file     Minutes per session: Not on file     Stress: Not on file   Relationships     Social connections:     Talks on phone: Not on file     Gets together: Not on file     Attends Mu-ism service: Not on file     Active member of club or organization: Not on file     Attends meetings of clubs or organizations: Not on file     Relationship status: Not on file     Intimate partner violence:     Fear of current or ex partner: Not on file     Emotionally abused: Not on file     Physically abused: Not on file     Forced sexual activity: Not on file   Other Topics Concern     Parent/sibling w/ CABG, MI or angioplasty before 65F 55M? Not Asked   Social History Narrative     Not on file           FAMILY HISTORY: Non-contributory for problems with anesthesia      REVIEW OF SYSTEMS:   The patient was asked a 14 point review of systems regarding constitutional symptoms, eye symptoms, ears, nose, mouth, throat symptoms, cardiovascular symptoms, respiratory symptoms, gastrointestinal symptoms, genitourinary symptoms, musculoskeletal symptoms, integumentary symptoms, neurological symptoms, psychiatric symptoms, endocrine symptoms, hematologic/lymphatic symptoms, and allergic/ immunologic symptoms.   The pertinent factors have been included in the HPI and below.  Patient Supplied Answers to Review of Systems   ENT ROS 9/6/2019   Ears, Nose, Throat Sore throat   Cardiopulmonary -   Gastrointestinal/Genitourinary Diarrhea            Physical Examination     The patient underwent a physical examination as described below. The pertinent positive and negative findings are summarized after the description of the examination.    Constitutional: The patient's developmental and nutritional status was assessed. The patient's voice quality was assessed.  Head and Face: The head and face were inspected for deformities. The sinuses were palpated. The salivary glands were palpated. Facial muscle strength was assessed bilaterally.  Eyes: Extraocular movements and primary gaze alignment were assessed.  Ears, Nose, Mouth and Throat: The ears and nose were examined for deformities. The nasal septum, mucosa, and turbinates were inspected by anterior rhinoscopy. The lips, teeth, and gums were examined for abnormalities. The oral mucosa, tongue, palate, tonsils, lateral and posterior pharynx were inspected for the presence of asymmetry or mucosal lesions.    Neck: The tracheal position was noted, and the neck mass palpated to determine if there were any asymmetries, abnormal neck masses, thyromegally, or thyroid nodules.  Respiratory: The nature of the breathing and chest expansion/symmetry was observed.  Cardiovascular: The patient was examined to determine the presence of any edema or jugular venous distension.  Abdomen: The contour of the abdomen was noted.  Lymphatic: The patient was examined for infraclavicular lymphadenopathy.  Musculoskeletal: The patient was inspected for the presence of skeletal deformities.  Extremities: The extremities were examined for any clubbing or cyanosis.  Skin: The skin was examined for inflammatory or neoplastic conditions.  Neurologic: The patient's orientation, mood, and affect were noted. The cranial nerve  functions were examined.    Other pertinent positive and negative findings on physical examination:   OC/OP: no lesions, uvula midline, soft palate elevates symmetrically, FOM/BOT soft  Neck: no lesions, no TH  tenderness to palpation    All other physical examination findings were within normal limits and noncontributory.    Procedures   Flexible laryngoscopy (CPT 35621)      Pre-procedure diagnosis:  Post-procedure diagnosis:  Indication for procedure: Mr. Sandhu is a 39 year old male with left vocal process granuloma  Procedure(s): Fiberoptic Laryngoscopy    Details of Procedure: After informed consent was obtained, the patient was seated in the examination chair.  The areas of the nasopharynx as well as the hypopharynx were anesthetized with topical 4% lidocaine with 0.25% phenylephrine atomizer.  Examination of the base of tongue was performed first.  Attention was directed to any evidence of masses in the area or evidence of leukoplakia or candidal infection.  Attention was directed to the epiglottis where its size and position was determined and its movement on phonation of the vowel  e .  The piriform sinuses were then inspected for any mass lesions or pooling of secretions.  Attention was then directed to the larynx. The vocal folds were inspected for infection or any areas of leukoplakia, for masses, polypoid degeneration, or hemorrhage.  Having done this, the arytenoids and vocal processes were inspected for erythema or evidence of granuloma formation.  The posterior commissure was then inspected for evidence of inflammatory changes in the mucosa and heaping up of mucosal tissue. The patient was then instructed to say the vowel  e .  Adduction of vocal folds to the midline was observed for any evidence of paresis or paralysis of the larynx or asymmetry in rotation of the larynx to the left or right. The patient was asked to breathe and the degree of abduction was noted bilaterally.  Subglottic view of the larynx was obtained for any additional mass lesions or mucosal changes.  Finally the post cricoid was examined for evidence of pooling of secretions, as well as the pharyngeal wall mucosa.   Anesthesia type:  topical 4% lidocaine with 0.25% phenylephrine    Findings:  Anatomic/physiological deviations: left vocal process granuloma   Right vocal process: No restriction of mobility   Left vocal process: No restriction of mobility  Glottal gap: Complete glottal closure  Supraglottic structures: Normal  Hypopharynx: Normal     Estimated Blood Loss: minimal  Complications: None  Disposition: Patient tolerated the procedure well            Nasopharyngoscopy with Injection Augmentation     Pre-procedure diagnosis: velopharyngeal insufficiency  Post-procedure diagnosis: Same  Indication for procedure: Mr. Sandhu is a 39 year old male with VPI  Procedure(s): Fiberoptic Laryngoscopy with Injection of 0.8 cc of Voice Gel into the posterior pharyngeal wall   Details of Procedure: Informed consent was obtained and the patient was told that there is a small possibility of retropharyngeal infection, air embolus, bleeding issues, airway obstruction, dypshagia, pain and need for repeat procedures. The areas of the nasopharynx as well as the oropharynx were anesthetized with topical 4% lidocaine with 0.25% phenylephrine atomizer. The scope was then passed in the right or left nasal cavity, depending upon which side had the greater opening, and passed in through middle or the inferior meatus. Upon reaching the nasopharynx, the patient was instructed to breathe through the nasal cavity and the area of gap was identified. Then a 25-gauge needle was secured to the augmentation syringe and air was removed from the needle. The needle was passed transorally to the posterior pharyngeal wall to the area of a gap. The injection was performed in 0.1 ml aliquots, watching the closure of the gap with the scope from above and based on patient comfort.     Anesthesia type: topical 4% lidocaine with 0.25% phenylephrine atomizer.     Findings:   Approximately 0.8 cc of Voice Gel implant was injected   Anatomic/physiological deviations: bubbling of  secretions in nasopharynx, stopped after the injection    Estimated Blood Loss: minimal  Complications: None  Disposition: Patient tolerated the procedure well      Review of Relevant Clinical Data     Notes: Laurence Hood MD    Radiology: none    Pathology: none    Procedures: none    Labs:  No results found for: TSH  Lab Results   Component Value Date     2019    CO2 28 2019    BUN 14 2019    PHOS 2.3 (L) 01/15/2018     Lab Results   Component Value Date    WBC 2.9 (L) 10/30/2019    HGB 11.8 (L) 10/30/2019    HCT 35.5 (L) 10/30/2019    MCV 92 10/30/2019     (L) 10/30/2019     No results found for: MILAGROS  No components found for: RHEUMATOIDFACTOR,  RF  Lab Results   Component Value Date    CRP 17.0 (H) 2017     No components found for: CKTOT, URICACID  No components found for: C3, C4, DSDNAAB, NDNAABIFA  No results found for: MPOAB     Impression & Plan     IMPRESSION: Mr. Sandhu is a 39 year old male who is being seen for the followin. VPI  -Status post recent injection resolution of his symptoms  -Gave prescription for antibiotic   -Also gave also gave prescription for Medrol Dosepak in case pain and neck stiffness is  -If fevers or signs of feeling sick should come into the ER for evaluation and IV antibiotics because he has risk of retropharyngeal phlegmon from area that was injected    2.  Vocal process granuloma  -Worse on today's exam  -We will start him on Flovent inhaler  -We will follow-up with his team regarding whether or not he can start antireflux medications  -He is using special pillow at night already  -Might need voice therapy if no improvement      RETURN VISIT: follow up in 2 months, or earlier as needed.     Thank you for the kind referral and for allowing me to share in the care of Mr. Sandhu. If you have any questions, please do not hesitate to contact me.        Imelda Sky MD    of Otolaryngology - Head and Neck  Surgery   Voice, Airway, and Swallowing Disorders   [facility] at the [facility]    Clinics & Surgery Center  [address] [place] [id number]  Phone: [phone]  Fax: [phone]    [address] [address]  Phone: [phone]  Fax: [phone]     CC: Laurence Hood MD

## 2019-11-01 ENCOUNTER — OFFICE VISIT (OUTPATIENT)
Dept: OTOLARYNGOLOGY | Facility: CLINIC | Age: 39
End: 2019-11-01
Payer: COMMERCIAL

## 2019-11-01 VITALS — WEIGHT: 147 LBS | BODY MASS INDEX: 21.7 KG/M2

## 2019-11-01 DIAGNOSIS — J39.0 RETROPHARYNGEAL ABSCESS: ICD-10-CM

## 2019-11-01 DIAGNOSIS — R49.0 DYSPHONIA: ICD-10-CM

## 2019-11-01 DIAGNOSIS — J38.3 VOCAL PROCESS GRANULOMA: ICD-10-CM

## 2019-11-01 DIAGNOSIS — Q38.8 VELOPHARYNGEAL INSUFFICIENCY, CONGENITAL: Primary | ICD-10-CM

## 2019-11-01 RX ORDER — METHYLPREDNISOLONE 4 MG
TABLET, DOSE PACK ORAL
Qty: 21 TABLET | Refills: 0 | Status: SHIPPED | OUTPATIENT
Start: 2019-11-01 | End: 2019-11-20

## 2019-11-01 ASSESSMENT — PAIN SCALES - GENERAL: PAINLEVEL: NO PAIN (0)

## 2019-11-01 NOTE — LETTER
2019       RE: Daniel Sandhu  [address] [facility] [id number]     Dear Colleague,    Thank you for referring your patient, Daniel Sandhu, to the [facility] EAR NOSE AND THROAT at [facility]. Please see a copy of my visit note below.      [facility]       Patient: Daniel Sandhu  MRN: [id number]    : 1980  Age/Gender: 39 year old male  Date of Service: 2019     Chief Complaint   VPI with musical instrument  Vocal process granuloma, left    Interval History     HISTORY OF PRESENT ILLNESS: Mr. 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  He continues to have the VPI and he is here today to trial the injection.  He also reports feeling soreness on the left side of his neck where he knows the granulomas.  He is doing antireflux precautions and has recently bought a pillow to help elevate head of bed at night.  He has been told that he should not be using reflux medications.  He denies other new complaints today.      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         PAST SURGICAL HISTORY:   Past Surgical History:   Procedure Laterality Date     ESOPHAGOGASTRODUODENOSCOPY       ESOPHAGOSCOPY, GASTROSCOPY, DUODENOSCOPY (EGD), COMBINED N/A 2017    Procedure: COMBINED ENDOSCOPIC ULTRASOUND, ESOPHAGOSCOPY, GASTROSCOPY, DUODENOSCOPY (EGD), FINE NEEDLE ASPIRATE/BIOPSY;  Upper Endoscopic Ultrasound, fine needle aspirate/biopsy;  Surgeon: Guru Mark Avila MD;  [facility]: [facility]     ESOPHAGOSCOPY, GASTROSCOPY, DUODENOSCOPY (EGD), COMBINED N/A 11/3/2017    Procedure: COMBINED ESOPHAGOSCOPY, GASTROSCOPY, DUODENOSCOPY (EGD);;  Surgeon: Yunior Esteban MD;  Location: [facility] OR     ESOPHAGOSCOPY,  GASTROSCOPY, DUODENOSCOPY (EGD), COMBINED N/A 11/9/2017    Procedure: COMBINED ESOPHAGOSCOPY, GASTROSCOPY, DUODENOSCOPY (EGD);  Esophogastroduodenoscopy, take out pharangostomy tube;  Surgeon: Yunior Esteban MD;  Location: [facility] OR     ESOPHAGOSCOPY, GASTROSCOPY, DUODENOSCOPY (EGD), COMBINED N/A 1/11/2018    Procedure: COMBINED ESOPHAGOSCOPY, GASTROSCOPY, DUODENOSCOPY (EGD), BIOPSY SINGLE OR MULTIPLE;  COMBINED ESOPHAGOSCOPY, GASTROSCOPY, DUODENOSCOPY (EGD);  Surgeon: Alessandro Mcgregor MD;  [facility]: [facility] GI     GASTRECTOMY N/A 11/3/2017    Procedure: GASTRECTOMY;;  Surgeon: Yunior Esteban MD;  Location: [facility] OR     HAND SURGERY      childhood, torn tendon     INSERT PORT VASCULAR ACCESS Right 6/22/2017    Procedure: INSERT PORT VASCULAR ACCESS;  Single Lumen Chest Power Port;  Surgeon: Iván Driver PA-C;  [facility]     INSERT PORT VASCULAR ACCESS Right 4/8/2019    Procedure: Single Lumen Chest Port Placement;  Surgeon: Krysten Ballard PA-C;  [facility]     IR CHEST PORT PLACEMENT > 5 YRS OF AGE  4/8/2019     IR LIVER BIOPSY PERCUTANEOUS  3/20/2019     LAPAROSCOPY DIAGNOSTIC (GENERAL) N/A 11/3/2017    Procedure: LAPAROSCOPY DIAGNOSTIC (GENERAL);  diagnostic laparoscopy, right chest tube, total gastrectomy with distal esophagectomy, intrathoracic eveline-y esophago-jejunostomy, feeding jejunostomy, pharyngostomy, esophagogastroduodenoscopy, flexible bronchoscopy;  Surgeon: Yunior Esteban MD;  [facility]     LAPAROTOMY EXPLORATORY N/A 11/3/2017    Procedure: LAPAROTOMY EXPLORATORY;;  Surgeon: Yunior Esteban MD;  [facility]     PHARYNGOSTOMY N/A 11/3/2017    Procedure: PHARYNGOSTOMY;;  Surgeon: Yunior Esteban MD;  [facility]     REMOVE PORT VASCULAR ACCESS Right 3/19/2018    Procedure: REMOVE PORT VASCULAR ACCESS;  Right Port Removal;  Surgeon: Krysten Hopper PA-C;  [facility]     THORACOTOMY Left 11/3/2017    Procedure:  THORACOTOMY;;  Surgeon: Yunior Esteban MD;  [facility]         CURRENT MEDICATIONS:   Current Outpatient Medications:      cyabnocobalamin (VITAMIN B-12) 2500 MCG sublingual tablet, Place 2,500 mcg under the tongue daily, Disp: 30 tablet, Rfl: 1     ferrous gluconate (FERGON) 324 (38 Fe) MG tablet, Take 324 mg by mouth daily (with breakfast), Disp: , Rfl:      LORazepam (ATIVAN) 0.5 MG tablet, Take 1 tablet (0.5 mg) by mouth every 4 hours as needed (Anxiety, Nausea/Vomiting or Sleep) (Patient not taking: Reported on 7/26/2019), Disp: 30 tablet, Rfl: 2     Misc Natural Product Nasal (PONARIS) SOLN, Apply two drops to each nostril BID X 2 month supply, Disp: 10 mL, Rfl: 3     multivitamin, therapeutic with minerals (MULTI-VITAMIN) TABS tablet, Take 1 tablet by mouth daily Generic [place] Vitamin, Disp: , Rfl:      mupirocin (BACTROBAN) 2 % external ointment, Apply to anterior nares BID X 2 month supply (Patient not taking: Reported on 10/11/2019), Disp: 2 g, Rfl: 3     prochlorperazine (COMPAZINE) 10 MG tablet, Take 1 tablet (10 mg) by mouth every 6 hours as needed (Nausea/Vomiting) (Patient not taking: Reported on 10/11/2019), Disp: 30 tablet, Rfl: 2     saccharomyces boulardii (FLORASTOR) 250 MG capsule, Take 250 mg by mouth 2 times daily, Disp: , Rfl:   No current facility-administered medications for this visit.     Facility-Administered Medications Ordered in Other Visits:      heparin 100 UNIT/ML injection 500 Units, 500 Units, Intravenous, Once, Laurence Hood MD     PACLitaxel (TAXOL) 145 mg in sodium chloride 0.9 % 299 mL infusion, 80 mg/m2 (Treatment Plan Recorded), Intravenous, Once, Laurence Hood MD, Last Rate: 299 mL/hr at 10/30/19 1335, 145 mg at 10/30/19 1335     sodium chloride (PF) 0.9% PF flush 10 mL, 10 mL, Intravenous, Once, Laurence Hood MD      ALLERGIES: Patient has no known allergies.      SOCIAL HISTORY:    Social History     Socioeconomic  History     Marital status:      Spouse name: Not on file     Number of children: 1     Years of education: Not on file     Highest education level: Not on file   Occupational History     Occupation: musician and teacher    Social Needs     Financial resource strain: Not on file     Food insecurity:     Worry: Not on file     Inability: Not on file     Transportation needs:     Medical: Not on file     Non-medical: Not on file   Tobacco Use     Smoking status: Never Smoker     Smokeless tobacco: Never Used   Substance and Sexual Activity     Alcohol use: Yes     Comment: 1 beer daily     Drug use: Yes     Types: Marijuana     Comment: occasional     Sexual activity: Yes     Partners: Female     Birth control/protection: Natural Family Planning   Lifestyle     Physical activity:     Days per week: Not on file     Minutes per session: Not on file     Stress: Not on file   Relationships     Social connections:     Talks on phone: Not on file     Gets together: Not on file     Attends Mu-ism service: Not on file     Active member of club or organization: Not on file     Attends meetings of clubs or organizations: Not on file     Relationship status: Not on file     Intimate partner violence:     Fear of current or ex partner: Not on file     Emotionally abused: Not on file     Physically abused: Not on file     Forced sexual activity: Not on file   Other Topics Concern     Parent/sibling w/ CABG, MI or angioplasty before 65F 55M? Not Asked   Social History Narrative     Not on file           FAMILY HISTORY: Non-contributory for problems with anesthesia      REVIEW OF SYSTEMS:   The patient was asked a 14 point review of systems regarding constitutional symptoms, eye symptoms, ears, nose, mouth, throat symptoms, cardiovascular symptoms, respiratory symptoms, gastrointestinal symptoms, genitourinary symptoms, musculoskeletal symptoms, integumentary symptoms, neurological symptoms, psychiatric symptoms, endocrine  symptoms, hematologic/lymphatic symptoms, and allergic/ immunologic symptoms.   The pertinent factors have been included in the HPI and below.  Patient Supplied Answers to Review of Systems   ENT ROS 9/6/2019   Ears, Nose, Throat Sore throat   Cardiopulmonary -   Gastrointestinal/Genitourinary Diarrhea           Physical Examination     The patient underwent a physical examination as described below. The pertinent positive and negative findings are summarized after the description of the examination.    Constitutional: The patient's developmental and nutritional status was assessed. The patient's voice quality was assessed.  Head and Face: The head and face were inspected for deformities. The sinuses were palpated. The salivary glands were palpated. Facial muscle strength was assessed bilaterally.  Eyes: Extraocular movements and primary gaze alignment were assessed.  Ears, Nose, Mouth and Throat: The ears and nose were examined for deformities. The nasal septum, mucosa, and turbinates were inspected by anterior rhinoscopy. The lips, teeth, and gums were examined for abnormalities. The oral mucosa, tongue, palate, tonsils, lateral and posterior pharynx were inspected for the presence of asymmetry or mucosal lesions.    Neck: The tracheal position was noted, and the neck mass palpated to determine if there were any asymmetries, abnormal neck masses, thyromegally, or thyroid nodules.  Respiratory: The nature of the breathing and chest expansion/symmetry was observed.  Cardiovascular: The patient was examined to determine the presence of any edema or jugular venous distension.  Abdomen: The contour of the abdomen was noted.  Lymphatic: The patient was examined for infraclavicular lymphadenopathy.  Musculoskeletal: The patient was inspected for the presence of skeletal deformities.  Extremities: The extremities were examined for any clubbing or cyanosis.  Skin: The skin was examined for inflammatory or neoplastic  conditions.  Neurologic: The patient's orientation, mood, and affect were noted. The cranial nerve  functions were examined.    Other pertinent positive and negative findings on physical examination:   OC/OP: no lesions, uvula midline, soft palate elevates symmetrically, FOM/BOT soft  Neck: no lesions, no TH tenderness to palpation    All other physical examination findings were within normal limits and noncontributory.    Procedures   Flexible laryngoscopy (CPT 27251)      Pre-procedure diagnosis:  Post-procedure diagnosis:  Indication for procedure: Mr. Sandhu is a 39 year old male with left vocal process granuloma  Procedure(s): Fiberoptic Laryngoscopy    Details of Procedure: After informed consent was obtained, the patient was seated in the examination chair.  The areas of the nasopharynx as well as the hypopharynx were anesthetized with topical 4% lidocaine with 0.25% phenylephrine atomizer.  Examination of the base of tongue was performed first.  Attention was directed to any evidence of masses in the area or evidence of leukoplakia or candidal infection.  Attention was directed to the epiglottis where its size and position was determined and its movement on phonation of the vowel  e .  The piriform sinuses were then inspected for any mass lesions or pooling of secretions.  Attention was then directed to the larynx. The vocal folds were inspected for infection or any areas of leukoplakia, for masses, polypoid degeneration, or hemorrhage.  Having done this, the arytenoids and vocal processes were inspected for erythema or evidence of granuloma formation.  The posterior commissure was then inspected for evidence of inflammatory changes in the mucosa and heaping up of mucosal tissue. The patient was then instructed to say the vowel  e .  Adduction of vocal folds to the midline was observed for any evidence of paresis or paralysis of the larynx or asymmetry in rotation of the larynx to the left or right. The  patient was asked to breathe and the degree of abduction was noted bilaterally.  Subglottic view of the larynx was obtained for any additional mass lesions or mucosal changes.  Finally the post cricoid was examined for evidence of pooling of secretions, as well as the pharyngeal wall mucosa.   Anesthesia type: topical 4% lidocaine with 0.25% phenylephrine    Findings:  Anatomic/physiological deviations: left vocal process granuloma   Right vocal process: No restriction of mobility   Left vocal process: No restriction of mobility  Glottal gap: Complete glottal closure  Supraglottic structures: Normal  Hypopharynx: Normal     Estimated Blood Loss: minimal  Complications: None  Disposition: Patient tolerated the procedure well            Nasopharyngoscopy with Injection Augmentation     Pre-procedure diagnosis: velopharyngeal insufficiency  Post-procedure diagnosis: Same  Indication for procedure: Mr. Sandhu is a 39 year old male with VPI  Procedure(s): Fiberoptic Laryngoscopy with Injection of 0.8 cc of Voice Gel into the posterior pharyngeal wall   Details of Procedure: Informed consent was obtained and the patient was told that there is a small possibility of retropharyngeal infection, air embolus, bleeding issues, airway obstruction, dypshagia, pain and need for repeat procedures. The areas of the nasopharynx as well as the oropharynx were anesthetized with topical 4% lidocaine with 0.25% phenylephrine atomizer. The scope was then passed in the right or left nasal cavity, depending upon which side had the greater opening, and passed in through middle or the inferior meatus. Upon reaching the nasopharynx, the patient was instructed to breathe through the nasal cavity and the area of gap was identified. Then a 25-gauge needle was secured to the augmentation syringe and air was removed from the needle. The needle was passed transorally to the posterior pharyngeal wall to the area of a gap. The injection was performed  in 0.1 ml aliquots, watching the closure of the gap with the scope from above and based on patient comfort.     Anesthesia type: topical 4% lidocaine with 0.25% phenylephrine atomizer.     Findings:   Approximately 0.8 cc of Voice Gel implant was injected   Anatomic/physiological deviations: bubbling of secretions in nasopharynx, stopped after the injection    Estimated Blood Loss: minimal  Complications: None  Disposition: Patient tolerated the procedure well      Review of Relevant Clinical Data     Notes: Laurence Hood MD    Radiology: none    Pathology: none    Procedures: none    Labs:  No results found for: TSH  Lab Results   Component Value Date     2019    CO2 28 2019    BUN 14 2019    PHOS 2.3 (L) 01/15/2018     Lab Results   Component Value Date    WBC 2.9 (L) 10/30/2019    HGB 11.8 (L) 10/30/2019    HCT 35.5 (L) 10/30/2019    MCV 92 10/30/2019     (L) 10/30/2019     No results found for: MILAGROS  No components found for: RHEUMATOIDFACTOR,  RF  Lab Results   Component Value Date    CRP 17.0 (H) 2017     No components found for: CKTOT, URICACID  No components found for: C3, C4, DSDNAAB, NDNAABIFA  No results found for: MPOAB     Impression & Plan     IMPRESSION: Mr. Sandhu is a 39 year old male who is being seen for the followin. VPI  -Status post recent injection resolution of his symptoms  -Gave prescription for antibiotic   -Also gave also gave prescription for Medrol Dosepak in case pain and neck stiffness is  -If fevers or signs of feeling sick should come into the ER for evaluation and IV antibiotics because he has risk of retropharyngeal phlegmon from area that was injected    2.  Vocal process granuloma  -Worse on today's exam  -We will start him on Flovent inhaler  -We will follow-up with his team regarding whether or not he can start antireflux medications  -He is using special pillow at night already  -Might need voice therapy if no  improvement      RETURN VISIT: follow up in 2 months, or earlier as needed.     Thank you for the kind referral and for allowing me to share in the care of Mr. Sandhu. If you have any questions, please do not hesitate to contact me.        Imelda Sky MD    of Otolaryngology - Head and Neck Surgery   Voice, Airway, and Swallowing Disorders   [facility] at the [facility] [place] [address]  Phone: [phone]  Fax: [phone]    [place] [address]  Phone: [phone]  Fax: [phone]     CC: Laurence Hood MD

## 2019-11-01 NOTE — NURSING NOTE
Chief Complaint   Patient presents with     RECHECK     Prolaryn injection     Weight 66.7 kg (147 lb).    Donna Vaughan EMT

## 2019-11-04 NOTE — NURSING NOTE
prolaryn gel injectable implant, part number [id number], lot number [id number], use before 09/07/2021, [facility]

## 2019-11-05 ENCOUNTER — HEALTH MAINTENANCE LETTER (OUTPATIENT)
Age: 39
End: 2019-11-05

## 2019-11-13 ENCOUNTER — INFUSION THERAPY VISIT (OUTPATIENT)
Dept: ONCOLOGY | Facility: CLINIC | Age: 39
End: 2019-11-13
Attending: INTERNAL MEDICINE
Payer: COMMERCIAL

## 2019-11-13 ENCOUNTER — APPOINTMENT (OUTPATIENT)
Dept: LAB | Facility: CLINIC | Age: 39
End: 2019-11-13
Attending: INTERNAL MEDICINE
Payer: COMMERCIAL

## 2019-11-13 ENCOUNTER — ONCOLOGY VISIT (OUTPATIENT)
Dept: ONCOLOGY | Facility: CLINIC | Age: 39
End: 2019-11-13
Attending: PHYSICIAN ASSISTANT
Payer: COMMERCIAL

## 2019-11-13 VITALS
HEIGHT: 69 IN | HEART RATE: 97 BPM | OXYGEN SATURATION: 98 % | RESPIRATION RATE: 18 BRPM | BODY MASS INDEX: 21.7 KG/M2 | TEMPERATURE: 98 F | WEIGHT: 146.5 LBS | DIASTOLIC BLOOD PRESSURE: 62 MMHG | SYSTOLIC BLOOD PRESSURE: 111 MMHG

## 2019-11-13 DIAGNOSIS — R04.0 EPISTAXIS: ICD-10-CM

## 2019-11-13 DIAGNOSIS — C15.5 MALIGNANT NEOPLASM OF LOWER THIRD OF ESOPHAGUS (H): Primary | ICD-10-CM

## 2019-11-13 LAB
ALBUMIN SERPL-MCNC: 3.7 G/DL (ref 3.4–5)
ALBUMIN UR-MCNC: NEGATIVE MG/DL
ALP SERPL-CCNC: 47 U/L (ref 40–150)
ALT SERPL W P-5'-P-CCNC: 27 U/L (ref 0–70)
AST SERPL W P-5'-P-CCNC: 18 U/L (ref 0–45)
BASOPHILS # BLD AUTO: 0 10E9/L (ref 0–0.2)
BASOPHILS NFR BLD AUTO: 0.6 %
BILIRUB DIRECT SERPL-MCNC: 0.1 MG/DL (ref 0–0.2)
BILIRUB SERPL-MCNC: 0.3 MG/DL (ref 0.2–1.3)
CEA SERPL-MCNC: 0.7 UG/L (ref 0–2.5)
DIFFERENTIAL METHOD BLD: ABNORMAL
EOSINOPHIL # BLD AUTO: 0.1 10E9/L (ref 0–0.7)
EOSINOPHIL NFR BLD AUTO: 1.7 %
ERYTHROCYTE [DISTWIDTH] IN BLOOD BY AUTOMATED COUNT: 13.5 % (ref 10–15)
HCT VFR BLD AUTO: 37.5 % (ref 40–53)
HGB BLD-MCNC: 12.5 G/DL (ref 13.3–17.7)
IMM GRANULOCYTES # BLD: 0 10E9/L (ref 0–0.4)
IMM GRANULOCYTES NFR BLD: 0 %
LYMPHOCYTES # BLD AUTO: 1.1 10E9/L (ref 0.8–5.3)
LYMPHOCYTES NFR BLD AUTO: 30.7 %
MCH RBC QN AUTO: 30.7 PG (ref 26.5–33)
MCHC RBC AUTO-ENTMCNC: 33.3 G/DL (ref 31.5–36.5)
MCV RBC AUTO: 92 FL (ref 78–100)
MONOCYTES # BLD AUTO: 0.5 10E9/L (ref 0–1.3)
MONOCYTES NFR BLD AUTO: 14.4 %
NEUTROPHILS # BLD AUTO: 1.9 10E9/L (ref 1.6–8.3)
NEUTROPHILS NFR BLD AUTO: 52.6 %
NRBC # BLD AUTO: 0 10*3/UL
NRBC BLD AUTO-RTO: 0 /100
PLATELET # BLD AUTO: 141 10E9/L (ref 150–450)
PROT SERPL-MCNC: 6.6 G/DL (ref 6.8–8.8)
RBC # BLD AUTO: 4.07 10E12/L (ref 4.4–5.9)
WBC # BLD AUTO: 3.6 10E9/L (ref 4–11)

## 2019-11-13 PROCEDURE — G0463 HOSPITAL OUTPT CLINIC VISIT: HCPCS | Mod: ZF

## 2019-11-13 PROCEDURE — 25000132 ZZH RX MED GY IP 250 OP 250 PS 637: Mod: ZF | Performed by: PHYSICIAN ASSISTANT

## 2019-11-13 PROCEDURE — 85025 COMPLETE CBC W/AUTO DIFF WBC: CPT | Performed by: PHYSICIAN ASSISTANT

## 2019-11-13 PROCEDURE — 80076 HEPATIC FUNCTION PANEL: CPT | Performed by: PHYSICIAN ASSISTANT

## 2019-11-13 PROCEDURE — 82378 CARCINOEMBRYONIC ANTIGEN: CPT | Performed by: PHYSICIAN ASSISTANT

## 2019-11-13 PROCEDURE — 25000125 ZZHC RX 250: Mod: ZF | Performed by: PHYSICIAN ASSISTANT

## 2019-11-13 PROCEDURE — 99214 OFFICE O/P EST MOD 30 MIN: CPT | Mod: ZP | Performed by: PHYSICIAN ASSISTANT

## 2019-11-13 PROCEDURE — 96367 TX/PROPH/DG ADDL SEQ IV INF: CPT

## 2019-11-13 PROCEDURE — 25000128 H RX IP 250 OP 636: Mod: ZF | Performed by: PHYSICIAN ASSISTANT

## 2019-11-13 PROCEDURE — 81003 URINALYSIS AUTO W/O SCOPE: CPT | Performed by: PHYSICIAN ASSISTANT

## 2019-11-13 PROCEDURE — 96375 TX/PRO/DX INJ NEW DRUG ADDON: CPT

## 2019-11-13 PROCEDURE — 96413 CHEMO IV INFUSION 1 HR: CPT

## 2019-11-13 PROCEDURE — 25800030 ZZH RX IP 258 OP 636: Mod: ZF | Performed by: PHYSICIAN ASSISTANT

## 2019-11-13 PROCEDURE — 96417 CHEMO IV INFUS EACH ADDL SEQ: CPT

## 2019-11-13 RX ORDER — MEPERIDINE HYDROCHLORIDE 25 MG/ML
25 INJECTION INTRAMUSCULAR; INTRAVENOUS; SUBCUTANEOUS EVERY 30 MIN PRN
Status: CANCELLED | OUTPATIENT
Start: 2019-11-20

## 2019-11-13 RX ORDER — HEPARIN SODIUM (PORCINE) LOCK FLUSH IV SOLN 100 UNIT/ML 100 UNIT/ML
500 SOLUTION INTRAVENOUS ONCE
Status: CANCELLED | OUTPATIENT
Start: 2019-11-13

## 2019-11-13 RX ORDER — SODIUM CHLORIDE 9 MG/ML
1000 INJECTION, SOLUTION INTRAVENOUS CONTINUOUS PRN
Status: CANCELLED
Start: 2019-11-20

## 2019-11-13 RX ORDER — MEPERIDINE HYDROCHLORIDE 25 MG/ML
25 INJECTION INTRAMUSCULAR; INTRAVENOUS; SUBCUTANEOUS EVERY 30 MIN PRN
Status: CANCELLED | OUTPATIENT
Start: 2019-12-26

## 2019-11-13 RX ORDER — DIPHENHYDRAMINE HCL 25 MG
25 CAPSULE ORAL ONCE
Status: CANCELLED | OUTPATIENT
Start: 2019-11-13

## 2019-11-13 RX ORDER — LORAZEPAM 2 MG/ML
0.5 INJECTION INTRAMUSCULAR EVERY 4 HOURS PRN
Status: CANCELLED
Start: 2019-12-26

## 2019-11-13 RX ORDER — MEPERIDINE HYDROCHLORIDE 25 MG/ML
25 INJECTION INTRAMUSCULAR; INTRAVENOUS; SUBCUTANEOUS EVERY 30 MIN PRN
Status: CANCELLED | OUTPATIENT
Start: 2019-12-18

## 2019-11-13 RX ORDER — MEPERIDINE HYDROCHLORIDE 25 MG/ML
25 INJECTION INTRAMUSCULAR; INTRAVENOUS; SUBCUTANEOUS EVERY 30 MIN PRN
Status: CANCELLED | OUTPATIENT
Start: 2019-12-11

## 2019-11-13 RX ORDER — EUCALYPTUS/PEPPERMINT OIL
SOLUTION, NON-ORAL NASAL
Qty: 10 ML | Refills: 3 | Status: SHIPPED | OUTPATIENT
Start: 2019-11-13 | End: 2023-06-06

## 2019-11-13 RX ORDER — MEPERIDINE HYDROCHLORIDE 25 MG/ML
25 INJECTION INTRAMUSCULAR; INTRAVENOUS; SUBCUTANEOUS EVERY 30 MIN PRN
Status: CANCELLED | OUTPATIENT
Start: 2019-11-13

## 2019-11-13 RX ORDER — ALBUTEROL SULFATE 0.83 MG/ML
2.5 SOLUTION RESPIRATORY (INHALATION)
Status: CANCELLED | OUTPATIENT
Start: 2019-11-13

## 2019-11-13 RX ORDER — HEPARIN SODIUM (PORCINE) LOCK FLUSH IV SOLN 100 UNIT/ML 100 UNIT/ML
500 SOLUTION INTRAVENOUS ONCE
Status: CANCELLED | OUTPATIENT
Start: 2019-11-27

## 2019-11-13 RX ORDER — SODIUM CHLORIDE 9 MG/ML
1000 INJECTION, SOLUTION INTRAVENOUS CONTINUOUS PRN
Status: CANCELLED
Start: 2019-11-13

## 2019-11-13 RX ORDER — ALBUTEROL SULFATE 90 UG/1
1-2 AEROSOL, METERED RESPIRATORY (INHALATION)
Status: CANCELLED
Start: 2019-11-20

## 2019-11-13 RX ORDER — EPINEPHRINE 0.3 MG/.3ML
0.3 INJECTION SUBCUTANEOUS EVERY 5 MIN PRN
Status: CANCELLED | OUTPATIENT
Start: 2019-11-27

## 2019-11-13 RX ORDER — MEPERIDINE HYDROCHLORIDE 25 MG/ML
25 INJECTION INTRAMUSCULAR; INTRAVENOUS; SUBCUTANEOUS EVERY 30 MIN PRN
Status: CANCELLED | OUTPATIENT
Start: 2019-11-27

## 2019-11-13 RX ORDER — METHYLPREDNISOLONE SODIUM SUCCINATE 125 MG/2ML
125 INJECTION, POWDER, LYOPHILIZED, FOR SOLUTION INTRAMUSCULAR; INTRAVENOUS
Status: CANCELLED
Start: 2019-11-27

## 2019-11-13 RX ORDER — HEPARIN SODIUM (PORCINE) LOCK FLUSH IV SOLN 100 UNIT/ML 100 UNIT/ML
500 SOLUTION INTRAVENOUS ONCE
Status: CANCELLED | OUTPATIENT
Start: 2019-11-20

## 2019-11-13 RX ORDER — LORAZEPAM 2 MG/ML
0.5 INJECTION INTRAMUSCULAR EVERY 4 HOURS PRN
Status: CANCELLED
Start: 2019-11-13

## 2019-11-13 RX ORDER — ALBUTEROL SULFATE 90 UG/1
1-2 AEROSOL, METERED RESPIRATORY (INHALATION)
Status: CANCELLED
Start: 2019-12-18

## 2019-11-13 RX ORDER — LORAZEPAM 2 MG/ML
0.5 INJECTION INTRAMUSCULAR EVERY 4 HOURS PRN
Status: CANCELLED
Start: 2019-11-27

## 2019-11-13 RX ORDER — LORAZEPAM 2 MG/ML
0.5 INJECTION INTRAMUSCULAR EVERY 4 HOURS PRN
Status: CANCELLED
Start: 2019-12-18

## 2019-11-13 RX ORDER — ALBUTEROL SULFATE 0.83 MG/ML
2.5 SOLUTION RESPIRATORY (INHALATION)
Status: CANCELLED | OUTPATIENT
Start: 2019-11-27

## 2019-11-13 RX ORDER — ALBUTEROL SULFATE 90 UG/1
1-2 AEROSOL, METERED RESPIRATORY (INHALATION)
Status: CANCELLED
Start: 2019-11-27

## 2019-11-13 RX ORDER — METHYLPREDNISOLONE SODIUM SUCCINATE 125 MG/2ML
125 INJECTION, POWDER, LYOPHILIZED, FOR SOLUTION INTRAMUSCULAR; INTRAVENOUS
Status: CANCELLED
Start: 2019-11-20

## 2019-11-13 RX ORDER — EPINEPHRINE 1 MG/ML
0.3 INJECTION, SOLUTION INTRAMUSCULAR; SUBCUTANEOUS EVERY 5 MIN PRN
Status: CANCELLED | OUTPATIENT
Start: 2019-12-18

## 2019-11-13 RX ORDER — SODIUM CHLORIDE 9 MG/ML
1000 INJECTION, SOLUTION INTRAVENOUS CONTINUOUS PRN
Status: CANCELLED
Start: 2019-12-18

## 2019-11-13 RX ORDER — ALBUTEROL SULFATE 90 UG/1
1-2 AEROSOL, METERED RESPIRATORY (INHALATION)
Status: CANCELLED
Start: 2019-11-13

## 2019-11-13 RX ORDER — METHYLPREDNISOLONE SODIUM SUCCINATE 125 MG/2ML
125 INJECTION, POWDER, LYOPHILIZED, FOR SOLUTION INTRAMUSCULAR; INTRAVENOUS
Status: CANCELLED
Start: 2019-12-18

## 2019-11-13 RX ORDER — DIPHENHYDRAMINE HYDROCHLORIDE 50 MG/ML
50 INJECTION INTRAMUSCULAR; INTRAVENOUS
Status: CANCELLED
Start: 2019-11-13

## 2019-11-13 RX ORDER — DIPHENHYDRAMINE HYDROCHLORIDE 50 MG/ML
50 INJECTION INTRAMUSCULAR; INTRAVENOUS
Status: CANCELLED
Start: 2019-11-27

## 2019-11-13 RX ORDER — EPINEPHRINE 1 MG/ML
0.3 INJECTION, SOLUTION INTRAMUSCULAR; SUBCUTANEOUS EVERY 5 MIN PRN
Status: CANCELLED | OUTPATIENT
Start: 2019-12-11

## 2019-11-13 RX ORDER — DIPHENHYDRAMINE HCL 25 MG
25 CAPSULE ORAL ONCE
Status: CANCELLED | OUTPATIENT
Start: 2019-11-27

## 2019-11-13 RX ORDER — SODIUM CHLORIDE 9 MG/ML
1000 INJECTION, SOLUTION INTRAVENOUS CONTINUOUS PRN
Status: CANCELLED
Start: 2019-12-11

## 2019-11-13 RX ORDER — DIPHENHYDRAMINE HYDROCHLORIDE 50 MG/ML
50 INJECTION INTRAMUSCULAR; INTRAVENOUS
Status: CANCELLED
Start: 2019-12-26

## 2019-11-13 RX ORDER — HEPARIN SODIUM (PORCINE) LOCK FLUSH IV SOLN 100 UNIT/ML 100 UNIT/ML
500 SOLUTION INTRAVENOUS ONCE
Status: COMPLETED | OUTPATIENT
Start: 2019-11-13 | End: 2019-11-13

## 2019-11-13 RX ORDER — EPINEPHRINE 1 MG/ML
0.3 INJECTION, SOLUTION INTRAMUSCULAR; SUBCUTANEOUS EVERY 5 MIN PRN
Status: CANCELLED | OUTPATIENT
Start: 2019-11-20

## 2019-11-13 RX ORDER — EPINEPHRINE 0.3 MG/.3ML
0.3 INJECTION SUBCUTANEOUS EVERY 5 MIN PRN
Status: CANCELLED | OUTPATIENT
Start: 2019-11-20

## 2019-11-13 RX ORDER — ALBUTEROL SULFATE 0.83 MG/ML
2.5 SOLUTION RESPIRATORY (INHALATION)
Status: CANCELLED | OUTPATIENT
Start: 2019-12-18

## 2019-11-13 RX ORDER — EPINEPHRINE 0.3 MG/.3ML
0.3 INJECTION SUBCUTANEOUS EVERY 5 MIN PRN
Status: CANCELLED | OUTPATIENT
Start: 2019-12-26

## 2019-11-13 RX ORDER — EPINEPHRINE 0.3 MG/.3ML
0.3 INJECTION SUBCUTANEOUS EVERY 5 MIN PRN
Status: CANCELLED | OUTPATIENT
Start: 2019-11-13

## 2019-11-13 RX ORDER — METHYLPREDNISOLONE SODIUM SUCCINATE 125 MG/2ML
125 INJECTION, POWDER, LYOPHILIZED, FOR SOLUTION INTRAMUSCULAR; INTRAVENOUS
Status: CANCELLED
Start: 2019-11-13

## 2019-11-13 RX ORDER — LORAZEPAM 2 MG/ML
0.5 INJECTION INTRAMUSCULAR EVERY 4 HOURS PRN
Status: CANCELLED
Start: 2019-11-20

## 2019-11-13 RX ORDER — DIPHENHYDRAMINE HYDROCHLORIDE 50 MG/ML
50 INJECTION INTRAMUSCULAR; INTRAVENOUS
Status: CANCELLED
Start: 2019-12-11

## 2019-11-13 RX ORDER — LORAZEPAM 2 MG/ML
0.5 INJECTION INTRAMUSCULAR EVERY 4 HOURS PRN
Status: CANCELLED
Start: 2019-12-11

## 2019-11-13 RX ORDER — EPINEPHRINE 0.3 MG/.3ML
0.3 INJECTION SUBCUTANEOUS EVERY 5 MIN PRN
Status: CANCELLED | OUTPATIENT
Start: 2019-12-11

## 2019-11-13 RX ORDER — EPINEPHRINE 0.3 MG/.3ML
0.3 INJECTION SUBCUTANEOUS EVERY 5 MIN PRN
Status: CANCELLED | OUTPATIENT
Start: 2019-12-18

## 2019-11-13 RX ORDER — METHYLPREDNISOLONE SODIUM SUCCINATE 125 MG/2ML
125 INJECTION, POWDER, LYOPHILIZED, FOR SOLUTION INTRAMUSCULAR; INTRAVENOUS
Status: CANCELLED
Start: 2019-12-26

## 2019-11-13 RX ORDER — HEPARIN SODIUM (PORCINE) LOCK FLUSH IV SOLN 100 UNIT/ML 100 UNIT/ML
5 SOLUTION INTRAVENOUS ONCE
Status: COMPLETED | OUTPATIENT
Start: 2019-11-13 | End: 2019-11-13

## 2019-11-13 RX ORDER — ALBUTEROL SULFATE 90 UG/1
1-2 AEROSOL, METERED RESPIRATORY (INHALATION)
Status: CANCELLED
Start: 2019-12-26

## 2019-11-13 RX ORDER — EPINEPHRINE 1 MG/ML
0.3 INJECTION, SOLUTION INTRAMUSCULAR; SUBCUTANEOUS EVERY 5 MIN PRN
Status: CANCELLED | OUTPATIENT
Start: 2019-12-26

## 2019-11-13 RX ORDER — ALBUTEROL SULFATE 0.83 MG/ML
2.5 SOLUTION RESPIRATORY (INHALATION)
Status: CANCELLED | OUTPATIENT
Start: 2019-12-26

## 2019-11-13 RX ORDER — HEPARIN SODIUM (PORCINE) LOCK FLUSH IV SOLN 100 UNIT/ML 100 UNIT/ML
500 SOLUTION INTRAVENOUS ONCE
Status: CANCELLED | OUTPATIENT
Start: 2019-12-11

## 2019-11-13 RX ORDER — DIPHENHYDRAMINE HCL 25 MG
25 CAPSULE ORAL ONCE
Status: CANCELLED | OUTPATIENT
Start: 2019-12-11

## 2019-11-13 RX ORDER — HEPARIN SODIUM (PORCINE) LOCK FLUSH IV SOLN 100 UNIT/ML 100 UNIT/ML
500 SOLUTION INTRAVENOUS ONCE
Status: CANCELLED | OUTPATIENT
Start: 2019-12-18

## 2019-11-13 RX ORDER — ALBUTEROL SULFATE 0.83 MG/ML
2.5 SOLUTION RESPIRATORY (INHALATION)
Status: CANCELLED | OUTPATIENT
Start: 2019-11-20

## 2019-11-13 RX ORDER — DIPHENHYDRAMINE HYDROCHLORIDE 50 MG/ML
50 INJECTION INTRAMUSCULAR; INTRAVENOUS
Status: CANCELLED
Start: 2019-11-20

## 2019-11-13 RX ORDER — EPINEPHRINE 1 MG/ML
0.3 INJECTION, SOLUTION INTRAMUSCULAR; SUBCUTANEOUS EVERY 5 MIN PRN
Status: CANCELLED | OUTPATIENT
Start: 2019-11-13

## 2019-11-13 RX ORDER — ALBUTEROL SULFATE 0.83 MG/ML
2.5 SOLUTION RESPIRATORY (INHALATION)
Status: CANCELLED | OUTPATIENT
Start: 2019-12-11

## 2019-11-13 RX ORDER — EPINEPHRINE 1 MG/ML
0.3 INJECTION, SOLUTION INTRAMUSCULAR; SUBCUTANEOUS EVERY 5 MIN PRN
Status: CANCELLED | OUTPATIENT
Start: 2019-11-27

## 2019-11-13 RX ORDER — SODIUM CHLORIDE 9 MG/ML
1000 INJECTION, SOLUTION INTRAVENOUS CONTINUOUS PRN
Status: CANCELLED
Start: 2019-12-26

## 2019-11-13 RX ORDER — DIPHENHYDRAMINE HCL 25 MG
25 CAPSULE ORAL ONCE
Status: CANCELLED | OUTPATIENT
Start: 2019-12-26

## 2019-11-13 RX ORDER — HEPARIN SODIUM (PORCINE) LOCK FLUSH IV SOLN 100 UNIT/ML 100 UNIT/ML
500 SOLUTION INTRAVENOUS ONCE
Status: CANCELLED | OUTPATIENT
Start: 2019-12-26

## 2019-11-13 RX ORDER — DIPHENHYDRAMINE HCL 25 MG
25 CAPSULE ORAL ONCE
Status: COMPLETED | OUTPATIENT
Start: 2019-11-13 | End: 2019-11-13

## 2019-11-13 RX ORDER — DIPHENHYDRAMINE HYDROCHLORIDE 50 MG/ML
50 INJECTION INTRAMUSCULAR; INTRAVENOUS
Status: CANCELLED
Start: 2019-12-18

## 2019-11-13 RX ORDER — METHYLPREDNISOLONE SODIUM SUCCINATE 125 MG/2ML
125 INJECTION, POWDER, LYOPHILIZED, FOR SOLUTION INTRAMUSCULAR; INTRAVENOUS
Status: CANCELLED
Start: 2019-12-11

## 2019-11-13 RX ORDER — SODIUM CHLORIDE 9 MG/ML
1000 INJECTION, SOLUTION INTRAVENOUS CONTINUOUS PRN
Status: CANCELLED
Start: 2019-11-27

## 2019-11-13 RX ORDER — ALBUTEROL SULFATE 90 UG/1
1-2 AEROSOL, METERED RESPIRATORY (INHALATION)
Status: CANCELLED
Start: 2019-12-11

## 2019-11-13 RX ADMIN — PACLITAXEL 145 MG: 6 INJECTION, SOLUTION INTRAVENOUS at 09:26

## 2019-11-13 RX ADMIN — FAMOTIDINE 20 MG: 10 INJECTION INTRAVENOUS at 08:14

## 2019-11-13 RX ADMIN — HEPARIN 500 UNITS: 100 SYRINGE at 10:32

## 2019-11-13 RX ADMIN — SODIUM CHLORIDE 250 ML: 9 INJECTION, SOLUTION INTRAVENOUS at 08:13

## 2019-11-13 RX ADMIN — DIPHENHYDRAMINE HYDROCHLORIDE 25 MG: 25 CAPSULE ORAL at 08:13

## 2019-11-13 RX ADMIN — DEXAMETHASONE SODIUM PHOSPHATE 20 MG: 10 INJECTION, SOLUTION INTRAMUSCULAR; INTRAVENOUS at 08:22

## 2019-11-13 RX ADMIN — SODIUM CHLORIDE 500 MG: 9 INJECTION, SOLUTION INTRAVENOUS at 08:47

## 2019-11-13 RX ADMIN — HEPARIN 5 ML: 100 SYRINGE at 06:58

## 2019-11-13 ASSESSMENT — PAIN SCALES - GENERAL: PAINLEVEL: NO PAIN (0)

## 2019-11-13 ASSESSMENT — MIFFLIN-ST. JEOR: SCORE: 1575.15

## 2019-11-13 NOTE — PROGRESS NOTES
Infusion Nursing Note:  Daniel Sandhu presents today for Day 1 Cycle 8 Ramucirumab, Taxol.    Patient seen by provider today: Yes: MATHEUS Card   present during visit today: Not Applicable.    Note: N/A.    Intravenous Access:  Implanted Port.    Treatment Conditions:  Lab Results   Component Value Date    HGB 12.5 11/13/2019     Lab Results   Component Value Date    WBC 3.6 11/13/2019      Lab Results   Component Value Date    ANEU 1.9 11/13/2019     Lab Results   Component Value Date     11/13/2019      Results reviewed, labs MET treatment parameters, ok to proceed with treatment.  Urine protein: NEGATIVE.    Post Infusion Assessment:  Patient tolerated infusion without incident.  Blood return noted pre and post infusion.  Access discontinued per protocol.     Discharge Plan:   Patient declined prescription refills.  AVS to patient via [employer].  Patient will return 11/20 for next appointment.   Patient discharged in stable condition accompanied by: self.  Departure Mode: Ambulatory.    Geovanna Polanco RN, RN                                                        '

## 2019-11-13 NOTE — PATIENT INSTRUCTIONS
Contact Numbers  [facility] Main Line: [phone]  [facility] NurseTriage and After Hours:  [phone]    Call triage with chills and/or temperature greater than or equal to 100.5, uncontrolled nausea/vomiting, diarrhea, constipation, dizziness, shortness of breath, chest pain, bleeding, unexplained bruising, or any new/concerning symptoms, questions/concerns.     If after hours, weekends, or holidays, call the nurse triage number. Calls may be forwarded to the hospital , please ask for the adult oncology doctor on call.     If you are having any concerning symptoms or wish to speak to a provider before your next infusion visit, please call your care coordinator or triage to notify them so we can adequately serve you.     If you need a refill on a narcotic prescription, please call triage or your care coordinator before your infusion appointment.           November 2019 Sunday Monday Tuesday Wednesday Thursday Friday Saturday                            1    [facility] PROCEDURE   3:25 PM   (20 min.)   Imelda Anderson MD   [facility] Ear Nose and Throat 2       3     4     5     6     7     8     9       10     11     12     13    [facility] DRAW   6:30 AM   (15 min.)    [facility] DRAW   [facility] Lab Draw    [facility] RETURN   6:45 AM   (50 min.)   Violeta Coto PA-C   [facility]    [facility] ONC INFUSION 180   8:00 AM   (180 min.)    ONCOLOGY INFUSION   [facility] 14     15     16       17     18     19     20    [facility] DRAW   7:30 AM   (15 min.)   [facility]   [facility] Draw    [facility] ONC INFUSION 180   8:00 AM   (180 min.)    ONCOLOGY INFUSION   [facility] 21     22     23       24     25     26     27    [facility] DRAW   8:30 AM   (15 min.)    [facility] DRAW   [facility] Draw    [facility] ONC INFUSION 180   9:00 AM   (180 min.)    ONCOLOGY INFUSION   [facility] 28     29     30                  December 2019 Sunday Monday Tuesday Wednesday Thursday Friday Saturday   1     2     3     4     5     6     7       8     9     10     11    [facility] DRAW   8:30 AM   (15 min.)    [facility] DRAW   [facility] Draw    [facility] ONC INFUSION 180   9:00 AM   (180 min.)    ONCOLOGY INFUSION   [facility] 12     13    [facility] RETURN   8:30 AM   (15 min.)   Rod Barrios MD   [facility] Ear Nose and Throat 14       15     16     17     18    [facility] DRAW   8:30 AM   (15 min.)    [facility]RAW   [facility]    [facility] ONC INFUSION 180   9:00 AM   (180 min.)    ONCOLOGY INFUSION   [facility] 19     20     21       22     23    [facility] RETURN   5:15 PM   (30 min.)   Daryn Ponce MD   [facility] 24     25     26    [facility] DRAW   8:30 AM   (15 min.)    [facility] DRAW   [facility] Lab Draw    [facility] ONC INFUSION 180   9:00 AM   (180 min.)    ONCOLOGY INFUSION   [facility] 27     28       29     30     31                                         Lab Results:  Recent Results (from the past 12 hour(s))   CBC with platelets differential    Collection Time: 11/13/19  7:02 AM   Result Value Ref Range    WBC 3.6 (L) 4.0 - 11.0 10e9/L    RBC Count 4.07 (L) 4.4 - 5.9 10e12/L    Hemoglobin 12.5 (L) 13.3 - 17.7 g/dL    Hematocrit 37.5 (L) 40.0 - 53.0 %    MCV 92 78 - 100 fl    MCH 30.7 26.5 - 33.0 pg    MCHC 33.3 31.5 - 36.5 g/dL    RDW 13.5 10.0 - 15.0 %    Platelet Count 141 (L) 150 - 450 10e9/L    Diff Method Automated Method     % Neutrophils 52.6 %    % Lymphocytes 30.7 %    % Monocytes 14.4 %    % Eosinophils 1.7 %    % Basophils 0.6 %    % Immature Granulocytes 0.0 %    Nucleated RBCs 0 0 /100    Absolute Neutrophil 1.9 1.6 - 8.3 10e9/L    Absolute Lymphocytes 1.1 0.8 - 5.3 10e9/L    Absolute Monocytes 0.5 0.0 - 1.3 10e9/L    Absolute Eosinophils 0.1 0.0 - 0.7 10e9/L     Absolute Basophils 0.0 0.0 - 0.2 10e9/L    Abs Immature Granulocytes 0.0 0 - 0.4 10e9/L    Absolute Nucleated RBC 0.0    Hepatic panel    Collection Time: 11/13/19  7:02 AM   Result Value Ref Range    Bilirubin Direct 0.1 0.0 - 0.2 mg/dL    Bilirubin Total 0.3 0.2 - 1.3 mg/dL    Albumin 3.7 3.4 - 5.0 g/dL    Protein Total 6.6 (L) 6.8 - 8.8 g/dL    Alkaline Phosphatase 47 40 - 150 U/L    ALT 27 0 - 70 U/L    AST 18 0 - 45 U/L   Protein qualitative urine    Collection Time: 11/13/19  7:03 AM   Result Value Ref Range    Protein Albumin Urine Negative NEG^Negative mg/dL

## 2019-11-13 NOTE — LETTER
RE: Daniel Sandhu  [address] [facility] [address]       [facility]    Hematology/Oncology Established Patient Note    Today's Date: Nov 13, 2019    Reason for Follow-up: adenocarcinoma of the GE junction    HISTORY OF PRESENT ILLNESS: Daniel Sandhu is a 39 year old male who presents with adenocarcinoma of the GE junction.  In early 2017, patient started noticing difficulty swallowing, and that food seems to take longer to go down.  It became more frequent, and he saw his PCP, and was referred for EGD, which showed an esophageal mass located at the GE junction, measuring 4 cm.  Biopsy showed poorly differentiated adenocarcinoma.  HER-2 was sent and is equivocal (2+).  CT c/a/p showed a mildly prominent lymph node in the gastrohepatic ligament, but there were no pathologically enlarged lymph nodes in the chest, abdomen, or pelvis.  There was otherwise no evidence of metastatic disease.  PET-CT showed thickening of the distal esophagus consistent with known esophageal adenocarcinoma, as well as mildly hypermetabolic 1 cm lymph node in the gastrohepatic ligament.  There was no evidence of distant metastatic disease.  He underwent EUS on 6/9/17, which showed the esophageal tumor in the lower third of the esophagus, staged T2NX.  There were 2 abnormal lymph nodes seen in the gastrohepatic ligament; pathology was suspicious for adenocarcinoma.  Celiac node was sampled as well, which was benign.  He was seen by surgery, Dr. Esteban, and recommended srinivas-operative chemotherapy.    Port was placed on 6/22/17.  It was removed on 3/19/18.    He underwent chemotherapy with epirubicin, oxaliplatin, and capecitabine x 3 cycles 6/26/17-8/7/17.      On 11/3/17, he underwent laparotomy with total gastrectomy and abdominal lymphadenectomy, left thoracotomy with distal esophagectomy and intrathoracic Terrell-en-Y esophagojejunostomy, feeding jejunostomy, left pharyngostomy tube placement,  right tube thoracostomy, and flexible bronchoscopy.  On 11/9/17, he was taken back to OR for I&D of pharyngostomy tube abscess.  Pathology showed adenocarcinoma, moderate differentiated, extensive residual tumor with no evidence tumor regression, margins negative, perineural invasion present, 1 of 28 lymph nodes were involved with malignancy, stage [id number] (stage IIIA).  HER-2 is non-amplified.    He resumed chemotherapy post-surgery, with plans to complete 3 more cycles, with 5-FU, epirubicin, oxaliplatin.  However, he only completed 2 more cycles, due to poor tolerance.  He was admitted to the hospital 1/9/18-1/13/18 for nausea, diarrhea, failure to thrive, severe malnutrition, generalized weakness.  His infusional chemotherapy was stopped on 1/8/18.  He underwent EGD in the hospital on 1/11/18, which showed ulcers, and no evidence of recurrence of his cancer.  One area biopsied showed inflammation, no evidence of malignancy.    He saw Dr. Esteban on 3/6/19 and had CT abdomen/pelvis done, which showed a 3.1 cm lesion in hepatic segment 3.  He underwent biopsy on 3/20/19 by IR, which showed moderately differentiated adenocarcinoma consistent with metastasis from primary esophageal carcinoma.  HER-2 is non-amplified.  Restaging PET scan on 3/29/19 showed the 4.4 cm left lobe liver metastasis.  There is nodular foci of hypermetabolic uptake in the left mid abdomen in relation to the small bowel loops, without definite underlying lesion on CT.  This is favored to represent metastatic disease unless proven otherwise.    He started on chemotherapy with paclitaxel and ramucirumab on 4/10/19.    INTERIM HISTORY: Daniel is here for routine follow-up.  He reports rare headaches and if they are bad he takes Tylenol.  He continues to get abdominal cramping on days 1 and 2 of chemotherapy that resolves on its own.  He did have a strong urines smell on 3 different episodes during the week that he was taking his Augmentin.  This  has since resolved.  He denies any other urinary symptoms associated with this.  He reports that he has chronically had issues falling asleep and this is unchanged.  He does still get blood mixed with his nasal mucus and occasionally has a drips of blood from his nose.  He does find a benefit from Ponaris and would like to continue using this.  He denies other concerns.    REVIEW OF SYSTEMS:  Patient denies any of the following except if noted above: fevers, chills, difficulty with energy, vision or hearing changes, chest pain, dyspnea, current abdominal pain, nausea, vomiting, diarrhea, constipation, current urinary concerns, numbness, tingling, issues with sleep or mood.     HOME MEDICATIONS:  Current Outpatient Medications   Medication Sig Dispense Refill     amoxicillin-clavulanate (AUGMENTIN) 875-125 MG tablet Take 1 tablet by mouth 2 times daily for 14 days 28 tablet 0     cyabnocobalamin (VITAMIN B-12) 2500 MCG sublingual tablet Place 2,500 mcg under the tongue daily 30 tablet 1     ferrous gluconate (FERGON) 324 (38 Fe) MG tablet Take 324 mg by mouth daily (with breakfast)       LORazepam (ATIVAN) 0.5 MG tablet Take 1 tablet (0.5 mg) by mouth every 4 hours as needed (Anxiety, Nausea/Vomiting or Sleep) 30 tablet 2     methylPREDNISolone (MEDROL DOSEPAK) 4 MG tablet therapy pack Take as directed per patient instruction card 21 tablet 0     Misc Natural Product Nasal (PONARIS) SOLN Apply two drops to each nostril BID X 2 month supply 10 mL 3     multivitamin, therapeutic with minerals (MULTI-VITAMIN) TABS tablet Take 1 tablet by mouth daily Generic [place] Vitamin       mupirocin (BACTROBAN) 2 % external ointment Apply to anterior nares BID X 2 month supply 2 g 3     prochlorperazine (COMPAZINE) 10 MG tablet Take 1 tablet (10 mg) by mouth every 6 hours as needed (Nausea/Vomiting) 30 tablet 2     saccharomyces boulardii (FLORASTOR) 250 MG capsule Take 250 mg by mouth 2 times daily       PHYSICAL  "EXAM:  General: The patient is a pleasant male in no acute distress.  /62 (BP Location: Right arm, Patient Position: Sitting, Cuff Size: Adult Regular)   Pulse 97   Temp 98  F (36.7  C) (Oral)   Resp 18   Ht 1.753 m (5' 9.02\")   Wt 66.5 kg (146 lb 8 oz)   SpO2 98%   BMI 21.62 kg/m     Wt Readings from Last 10 Encounters:   11/13/19 66.5 kg (146 lb 8 oz)   11/01/19 66.7 kg (147 lb)   10/30/19 66.7 kg (147 lb 1.6 oz)   10/23/19 67.1 kg (148 lb)   10/16/19 67 kg (147 lb 11.2 oz)   10/14/19 66.7 kg (147 lb)   10/11/19 67.4 kg (148 lb 11.2 oz)   09/23/19 66.6 kg (146 lb 14.4 oz)   09/20/19 67.1 kg (148 lb)   09/18/19 66.5 kg (146 lb 8 oz)   HEENT: EOMI, PERRL. Sclerae are anicteric. Oral mucosa is pink and moist with no lesions or thrush.   Lymph: Neck is supple with no lymphadenopathy in the cervical or supraclavicular areas.   Heart: Regular rate and rhythm.   Lungs: Clear to auscultation bilaterally.   Abdomen: Bowel sounds present, soft, nontender with no palpable hepatosplenomegaly or masses.   Extremities: No lower extremity edema noted bilaterally.   Neuro: Cranial nerves II through XII are grossly intact.  Skin: No rashes, petechiae, or bruising noted on exposed skin.    LABS:   11/13/2019 07:02   Albumin 3.7   Protein Total 6.6 (L)   Bilirubin Total 0.3   Alkaline Phosphatase 47   ALT 27   AST 18   Bilirubin Direct 0.1   WBC 3.6 (L)   Hemoglobin 12.5 (L)   Hematocrit 37.5 (L)   Platelet Count 141 (L)   RBC Count 4.07 (L)   MCV 92   MCH 30.7   MCHC 33.3   RDW 13.5   Diff Method Automated Method   % Neutrophils 52.6   % Lymphocytes 30.7   % Monocytes 14.4   % Eosinophils 1.7   % Basophils 0.6   % Immature Granulocytes 0.0   Nucleated RBCs 0   Absolute Neutrophil 1.9   Absolute Lymphocytes 1.1   Absolute Monocytes 0.5   Absolute Eosinophils 0.1   Absolute Basophils 0.0   Abs Immature Granulocytes 0.0   Absolute Nucleated RBC 0.0     ASSESSMENT/PLAN:      1) Adenocarcinoma of the GE junction: now s/p 3 " cycles of neoadjuvant chemotherapy with EOX, followed by surgical resection on 11/3/17.  Pathology showed adenocarcinoma, moderate differentiated, extensive residual tumor with no evidence tumor regression, margins negative, perineural invasion present, 1 of 28 lymph nodes were involved with malignancy, stage [id number] (stage IIIA).  HER-2 is non-amplified.    He has received EOF x 2 cycles after surgery, and he has not tolerated well.  The 5-FU on cycle 5 was discontinued early. Chemotherapy was stopped at that point due to poor tolerance.    Patient now has biopsy-confirmed metastatic disease to the liver and possibly peritoneum.  He is asymptomatic currently.      Foundation One testing shows MS-stable, TMB-low (4 mut/Mb), ERBB2 amplification equivocal, and TP53 H214R alteration.  PD-L1 was negative (0%).  He is planning to be seen at [facility] on 9/27.      He is doing well today and will continue with cycle 8 of  paclitaxel+ramucirumab. He will see Dr. Ponce to establish care the end of December. He has met with Dr. Chau to discuss surgery. He will have a CT abd/pelvis in January to revisit this.     2) Genetics:  -genetic testing was negative    3) Fertility: Daniel and his wife have 2 children, including a baby boy born around June 2017.  He is not planning for more children in the near future.    4) Epistaxis: Much improved, but still has mild left sore throat. He is following with ENT regarding a granuloma. Ponaris was refilled as this has been helpful for his epistaxis.     5) Vaccination. Patient received the influenza vaccine this season.    Violeta Coto PA-C  [facility]  [address], [place] [id number] [phone]

## 2019-11-13 NOTE — NURSING NOTE
"Oncology Rooming Note    November 13, 2019 7:15 AM   Daniel Sandhu is a 39 year old male who presents for:    Chief Complaint   Patient presents with     Port Draw     labs drawn via port by rn. vs taken      Oncology Clinic Visit     Return Visit for Malignant neoplasm of lower third of esophagus      Initial Vitals: /62 (BP Location: Right arm, Patient Position: Sitting, Cuff Size: Adult Regular)   Pulse 97   Temp 98  F (36.7  C) (Oral)   Resp 18   Ht 1.753 m (5' 9.02\")   Wt 66.5 kg (146 lb 8 oz)   SpO2 98%   BMI 21.62 kg/m   Estimated body mass index is 21.62 kg/m  as calculated from the following:    Height as of this encounter: 1.753 m (5' 9.02\").    Weight as of this encounter: 66.5 kg (146 lb 8 oz). Body surface area is 1.8 meters squared.  No Pain (0) Comment: Data Unavailable   No LMP for male patient.  Allergies reviewed: Yes  Medications reviewed: Yes    Medications: MEDICATION REFILLS NEEDED TODAY. Provider was notified.  Pharmacy name entered into EPIC:    [facility] [place], [place] - [address] [facility] [place], [place] - [address] [facility] [place], [place] - [address] [facility] [address] [phone]    Clinical concerns: No questions or concerns to escalate.   Violeta was notified.      Emely Xavier, RN, MSN            "

## 2019-11-13 NOTE — PROGRESS NOTES
[facility]    Hematology/Oncology Established Patient Note    Today's Date: Nov 13, 2019    Reason for Follow-up: adenocarcinoma of the GE junction    HISTORY OF PRESENT ILLNESS: Daniel Sandhu is a 39 year old male who presents with adenocarcinoma of the GE junction.  In early 2017, patient started noticing difficulty swallowing, and that food seems to take longer to go down.  It became more frequent, and he saw his PCP, and was referred for EGD, which showed an esophageal mass located at the GE junction, measuring 4 cm.  Biopsy showed poorly differentiated adenocarcinoma.  HER-2 was sent and is equivocal (2+).  CT c/a/p showed a mildly prominent lymph node in the gastrohepatic ligament, but there were no pathologically enlarged lymph nodes in the chest, abdomen, or pelvis.  There was otherwise no evidence of metastatic disease.  PET-CT showed thickening of the distal esophagus consistent with known esophageal adenocarcinoma, as well as mildly hypermetabolic 1 cm lymph node in the gastrohepatic ligament.  There was no evidence of distant metastatic disease.  He underwent EUS on 6/9/17, which showed the esophageal tumor in the lower third of the esophagus, staged T2NX.  There were 2 abnormal lymph nodes seen in the gastrohepatic ligament; pathology was suspicious for adenocarcinoma.  Celiac node was sampled as well, which was benign.  He was seen by surgery, Dr. Esteban, and recommended srinivas-operative chemotherapy.    Port was placed on 6/22/17.  It was removed on 3/19/18.    He underwent chemotherapy with epirubicin, oxaliplatin, and capecitabine x 3 cycles 6/26/17-8/7/17.      On 11/3/17, he underwent laparotomy with total gastrectomy and abdominal lymphadenectomy, left thoracotomy with distal esophagectomy and intrathoracic Terrell-en-Y esophagojejunostomy, feeding jejunostomy, left pharyngostomy tube placement, right tube thoracostomy, and flexible bronchoscopy.  On 11/9/17, he was taken  back to OR for I&D of pharyngostomy tube abscess.  Pathology showed adenocarcinoma, moderate differentiated, extensive residual tumor with no evidence tumor regression, margins negative, perineural invasion present, 1 of 28 lymph nodes were involved with malignancy, stage [id number] (stage IIIA).  HER-2 is non-amplified.    He resumed chemotherapy post-surgery, with plans to complete 3 more cycles, with 5-FU, epirubicin, oxaliplatin.  However, he only completed 2 more cycles, due to poor tolerance.  He was admitted to the hospital 1/9/18-1/13/18 for nausea, diarrhea, failure to thrive, severe malnutrition, generalized weakness.  His infusional chemotherapy was stopped on 1/8/18.  He underwent EGD in the hospital on 1/11/18, which showed ulcers, and no evidence of recurrence of his cancer.  One area biopsied showed inflammation, no evidence of malignancy.    He saw Dr. Esteban on 3/6/19 and had CT abdomen/pelvis done, which showed a 3.1 cm lesion in hepatic segment 3.  He underwent biopsy on 3/20/19 by IR, which showed moderately differentiated adenocarcinoma consistent with metastasis from primary esophageal carcinoma.  HER-2 is non-amplified.  Restaging PET scan on 3/29/19 showed the 4.4 cm left lobe liver metastasis.  There is nodular foci of hypermetabolic uptake in the left mid abdomen in relation to the small bowel loops, without definite underlying lesion on CT.  This is favored to represent metastatic disease unless proven otherwise.    He started on chemotherapy with paclitaxel and ramucirumab on 4/10/19.    INTERIM HISTORY: Daniel is here for routine follow-up.  He reports rare headaches and if they are bad he takes Tylenol.  He continues to get abdominal cramping on days 1 and 2 of chemotherapy that resolves on its own.  He did have a strong urines smell on 3 different episodes during the week that he was taking his Augmentin.  This has since resolved.  He denies any other urinary symptoms associated with  this.  He reports that he has chronically had issues falling asleep and this is unchanged.  He does still get blood mixed with his nasal mucus and occasionally has a drips of blood from his nose.  He does find a benefit from Ponaris and would like to continue using this.  He denies other concerns.    REVIEW OF SYSTEMS:  Patient denies any of the following except if noted above: fevers, chills, difficulty with energy, vision or hearing changes, chest pain, dyspnea, current abdominal pain, nausea, vomiting, diarrhea, constipation, current urinary concerns, numbness, tingling, issues with sleep or mood.     HOME MEDICATIONS:  Current Outpatient Medications   Medication Sig Dispense Refill     amoxicillin-clavulanate (AUGMENTIN) 875-125 MG tablet Take 1 tablet by mouth 2 times daily for 14 days 28 tablet 0     cyabnocobalamin (VITAMIN B-12) 2500 MCG sublingual tablet Place 2,500 mcg under the tongue daily 30 tablet 1     ferrous gluconate (FERGON) 324 (38 Fe) MG tablet Take 324 mg by mouth daily (with breakfast)       LORazepam (ATIVAN) 0.5 MG tablet Take 1 tablet (0.5 mg) by mouth every 4 hours as needed (Anxiety, Nausea/Vomiting or Sleep) 30 tablet 2     methylPREDNISolone (MEDROL DOSEPAK) 4 MG tablet therapy pack Take as directed per patient instruction card 21 tablet 0     Misc Natural Product Nasal (PONARIS) SOLN Apply two drops to each nostril BID X 2 month supply 10 mL 3     multivitamin, therapeutic with minerals (MULTI-VITAMIN) TABS tablet Take 1 tablet by mouth daily Generic [place] Vitamin       mupirocin (BACTROBAN) 2 % external ointment Apply to anterior nares BID X 2 month supply 2 g 3     prochlorperazine (COMPAZINE) 10 MG tablet Take 1 tablet (10 mg) by mouth every 6 hours as needed (Nausea/Vomiting) 30 tablet 2     saccharomyces boulardii (FLORASTOR) 250 MG capsule Take 250 mg by mouth 2 times daily       PHYSICAL EXAM:  General: The patient is a pleasant male in no acute distress.  /62 (BP  "Location: Right arm, Patient Position: Sitting, Cuff Size: Adult Regular)   Pulse 97   Temp 98  F (36.7  C) (Oral)   Resp 18   Ht 1.753 m (5' 9.02\")   Wt 66.5 kg (146 lb 8 oz)   SpO2 98%   BMI 21.62 kg/m    Wt Readings from Last 10 Encounters:   11/13/19 66.5 kg (146 lb 8 oz)   11/01/19 66.7 kg (147 lb)   10/30/19 66.7 kg (147 lb 1.6 oz)   10/23/19 67.1 kg (148 lb)   10/16/19 67 kg (147 lb 11.2 oz)   10/14/19 66.7 kg (147 lb)   10/11/19 67.4 kg (148 lb 11.2 oz)   09/23/19 66.6 kg (146 lb 14.4 oz)   09/20/19 67.1 kg (148 lb)   09/18/19 66.5 kg (146 lb 8 oz)   HEENT: EOMI, PERRL. Sclerae are anicteric. Oral mucosa is pink and moist with no lesions or thrush.   Lymph: Neck is supple with no lymphadenopathy in the cervical or supraclavicular areas.   Heart: Regular rate and rhythm.   Lungs: Clear to auscultation bilaterally.   Abdomen: Bowel sounds present, soft, nontender with no palpable hepatosplenomegaly or masses.   Extremities: No lower extremity edema noted bilaterally.   Neuro: Cranial nerves II through XII are grossly intact.  Skin: No rashes, petechiae, or bruising noted on exposed skin.    LABS:   11/13/2019 07:02   Albumin 3.7   Protein Total 6.6 (L)   Bilirubin Total 0.3   Alkaline Phosphatase 47   ALT 27   AST 18   Bilirubin Direct 0.1   WBC 3.6 (L)   Hemoglobin 12.5 (L)   Hematocrit 37.5 (L)   Platelet Count 141 (L)   RBC Count 4.07 (L)   MCV 92   MCH 30.7   MCHC 33.3   RDW 13.5   Diff Method Automated Method   % Neutrophils 52.6   % Lymphocytes 30.7   % Monocytes 14.4   % Eosinophils 1.7   % Basophils 0.6   % Immature Granulocytes 0.0   Nucleated RBCs 0   Absolute Neutrophil 1.9   Absolute Lymphocytes 1.1   Absolute Monocytes 0.5   Absolute Eosinophils 0.1   Absolute Basophils 0.0   Abs Immature Granulocytes 0.0   Absolute Nucleated RBC 0.0     ASSESSMENT/PLAN:      1) Adenocarcinoma of the GE junction: now s/p 3 cycles of neoadjuvant chemotherapy with EOX, followed by surgical resection on " 11/3/17.  Pathology showed adenocarcinoma, moderate differentiated, extensive residual tumor with no evidence tumor regression, margins negative, perineural invasion present, 1 of 28 lymph nodes were involved with malignancy, stage [id number] (stage IIIA).  HER-2 is non-amplified.    He has received EOF x 2 cycles after surgery, and he has not tolerated well.  The 5-FU on cycle 5 was discontinued early. Chemotherapy was stopped at that point due to poor tolerance.    Patient now has biopsy-confirmed metastatic disease to the liver and possibly peritoneum.  He is asymptomatic currently.      Foundation One testing shows MS-stable, TMB-low (4 mut/Mb), ERBB2 amplification equivocal, and TP53 H214R alteration.  PD-L1 was negative (0%).  He is planning to be seen at [facility] on 9/27.      He is doing well today and will continue with cycle 8 of  paclitaxel+ramucirumab. He will see Dr. Ponce to establish care the end of December. He has met with Dr. Chau to discuss surgery. He will have a CT abd/pelvis in January to revisit this.     2) Genetics:  -genetic testing was negative    3) Fertility: Daniel and his wife have 2 children, including a baby boy born around June 2017.  He is not planning for more children in the near future.    4) Epistaxis: Much improved, but still has mild left sore throat. He is following with ENT regarding a granuloma. Ponaris was refilled as this has been helpful for his epistaxis.     5) Vaccination. Patient received the influenza vaccine this season.    Violeta Coto PA-C  [facility]  [address] [place] [address]  [phone]

## 2019-11-20 ENCOUNTER — INFUSION THERAPY VISIT (OUTPATIENT)
Dept: ONCOLOGY | Facility: CLINIC | Age: 39
End: 2019-11-20
Attending: INTERNAL MEDICINE
Payer: COMMERCIAL

## 2019-11-20 VITALS
BODY MASS INDEX: 21.68 KG/M2 | SYSTOLIC BLOOD PRESSURE: 123 MMHG | DIASTOLIC BLOOD PRESSURE: 62 MMHG | TEMPERATURE: 97.3 F | OXYGEN SATURATION: 100 % | HEART RATE: 93 BPM | RESPIRATION RATE: 16 BRPM | WEIGHT: 146.9 LBS

## 2019-11-20 DIAGNOSIS — B37.0 THRUSH: ICD-10-CM

## 2019-11-20 DIAGNOSIS — C15.5 MALIGNANT NEOPLASM OF LOWER THIRD OF ESOPHAGUS (H): Primary | ICD-10-CM

## 2019-11-20 LAB
BASOPHILS # BLD AUTO: 0 10E9/L (ref 0–0.2)
BASOPHILS NFR BLD AUTO: 0.8 %
DIFFERENTIAL METHOD BLD: ABNORMAL
EOSINOPHIL # BLD AUTO: 0.1 10E9/L (ref 0–0.7)
EOSINOPHIL NFR BLD AUTO: 1.9 %
ERYTHROCYTE [DISTWIDTH] IN BLOOD BY AUTOMATED COUNT: 13.1 % (ref 10–15)
HCT VFR BLD AUTO: 36 % (ref 40–53)
HGB BLD-MCNC: 12.2 G/DL (ref 13.3–17.7)
IMM GRANULOCYTES # BLD: 0 10E9/L (ref 0–0.4)
IMM GRANULOCYTES NFR BLD: 0.3 %
LYMPHOCYTES # BLD AUTO: 0.8 10E9/L (ref 0.8–5.3)
LYMPHOCYTES NFR BLD AUTO: 22.3 %
MCH RBC QN AUTO: 31.2 PG (ref 26.5–33)
MCHC RBC AUTO-ENTMCNC: 33.9 G/DL (ref 31.5–36.5)
MCV RBC AUTO: 92 FL (ref 78–100)
MONOCYTES # BLD AUTO: 0.3 10E9/L (ref 0–1.3)
MONOCYTES NFR BLD AUTO: 6.9 %
NEUTROPHILS # BLD AUTO: 2.5 10E9/L (ref 1.6–8.3)
NEUTROPHILS NFR BLD AUTO: 67.8 %
NRBC # BLD AUTO: 0 10*3/UL
NRBC BLD AUTO-RTO: 0 /100
PLATELET # BLD AUTO: 101 10E9/L (ref 150–450)
RBC # BLD AUTO: 3.91 10E12/L (ref 4.4–5.9)
WBC # BLD AUTO: 3.6 10E9/L (ref 4–11)

## 2019-11-20 PROCEDURE — 25000128 H RX IP 250 OP 636: Mod: ZF | Performed by: INTERNAL MEDICINE

## 2019-11-20 PROCEDURE — 85025 COMPLETE CBC W/AUTO DIFF WBC: CPT | Performed by: PHYSICIAN ASSISTANT

## 2019-11-20 PROCEDURE — G0463 HOSPITAL OUTPT CLINIC VISIT: HCPCS | Mod: 25

## 2019-11-20 PROCEDURE — 25000128 H RX IP 250 OP 636: Mod: ZF | Performed by: PHYSICIAN ASSISTANT

## 2019-11-20 PROCEDURE — 25000125 ZZHC RX 250: Mod: ZF | Performed by: PHYSICIAN ASSISTANT

## 2019-11-20 PROCEDURE — 96375 TX/PRO/DX INJ NEW DRUG ADDON: CPT

## 2019-11-20 PROCEDURE — 96413 CHEMO IV INFUSION 1 HR: CPT

## 2019-11-20 PROCEDURE — 25800030 ZZH RX IP 258 OP 636: Mod: ZF | Performed by: PHYSICIAN ASSISTANT

## 2019-11-20 RX ORDER — FLUCONAZOLE 200 MG/1
200 TABLET ORAL DAILY
Qty: 7 TABLET | Refills: 0 | Status: SHIPPED | OUTPATIENT
Start: 2019-11-20 | End: 2019-11-27

## 2019-11-20 RX ORDER — HEPARIN SODIUM (PORCINE) LOCK FLUSH IV SOLN 100 UNIT/ML 100 UNIT/ML
5 SOLUTION INTRAVENOUS ONCE
Status: COMPLETED | OUTPATIENT
Start: 2019-11-20 | End: 2019-11-20

## 2019-11-20 RX ORDER — HEPARIN SODIUM (PORCINE) LOCK FLUSH IV SOLN 100 UNIT/ML 100 UNIT/ML
500 SOLUTION INTRAVENOUS ONCE
Status: COMPLETED | OUTPATIENT
Start: 2019-11-20 | End: 2019-11-20

## 2019-11-20 RX ADMIN — HEPARIN 5 ML: 100 SYRINGE at 07:50

## 2019-11-20 RX ADMIN — DEXAMETHASONE SODIUM PHOSPHATE 20 MG: 10 INJECTION, SOLUTION INTRAMUSCULAR; INTRAVENOUS at 08:58

## 2019-11-20 RX ADMIN — PACLITAXEL 145 MG: 6 INJECTION, SOLUTION INTRAVENOUS at 09:19

## 2019-11-20 RX ADMIN — HEPARIN 500 UNITS: 100 SYRINGE at 10:32

## 2019-11-20 RX ADMIN — SODIUM CHLORIDE 250 ML: 9 INJECTION, SOLUTION INTRAVENOUS at 08:56

## 2019-11-20 RX ADMIN — FAMOTIDINE 20 MG: 10 INJECTION INTRAVENOUS at 08:56

## 2019-11-20 ASSESSMENT — PAIN SCALES - GENERAL: PAINLEVEL: NO PAIN (0)

## 2019-11-20 NOTE — PATIENT INSTRUCTIONS
[facility] Triage and after hours / weekends / holidays:  [phone]    Please call the triage or after hours line if you experience a temperature greater than or equal to 100.5, shaking chills, have uncontrolled nausea, vomiting and/or diarrhea, dizziness, shortness of breath, chest pain, bleeding, unexplained bruising, or if you have any other new/concerning symptoms, questions or concerns.      If you are having any concerning symptoms or wish to speak to a provider before your next infusion visit, please call your care coordinator or triage to notify them so we can adequately serve you.     If you need a refill on a narcotic prescription or other medication, please call before your infusion appointment.                 November 2019 Sunday Monday Tuesday Wednesday Thursday Friday Saturday                            1    UMP PROCEDURE   3:25 PM   (20 min.)   Imelda Anderson MD   [facility] 2       3     4     5     6     7     8     9       10     11     12     13    [facility] DRAW   6:30 AM   (15 min.)    [facility] DRAW   [facility] Lab Draw    UMP RETURN   6:45 AM   (50 min.)   Violeta Coto PA-C   [facility]    UMP ONC INFUSION 180   8:00 AM   (180 min.)    ONCOLOGY INFUSION   [facility] 14     15     16       17     18     19     20    [facility] DRAW   7:30 AM   (15 min.)    [facility] DRAW   [facility] Lab Draw    P ONC INFUSION 180   8:00 AM   (180 min.)    ONCOLOGY INFUSION   [facility] 21     22     23       24     25     26     27    P [facility] DRAW   8:30 AM   (15 min.)    [facility] DRAW   [facility] Lab Draw    UMP ONC INFUSION 180   9:00 AM   (180 min.)    ONCOLOGY INFUSION   [facility] 28 29 30 December 2019 Sunday Monday Tuesday Wednesday Thursday Friday Saturday   1     2     3     4     5     6     7        8     9     10     11    [facility] DRAW   8:30 AM   (15 min.)    [facility]   [facility]    UMP ONC INFUSION 180   9:00 AM   (180 min.)    ONCOLOGY INFUSION   [facility] 12     13    UMP RETURN   8:30 AM   (15 min.)   Rod Barrios MD   [facility] 14       15     16     17     18    [facility] DRAW   8:30 AM   (15 min.)    [facility] LAB DRAW   [facility] Draw    UMP ONC INFUSION 180   9:00 AM   (180 min.)    ONCOLOGY INFUSION   [facility] 19     20     21       22     23    UMP RETURN   5:15 PM   (30 min.)   Daryn Ponce MD   [facility] 24     25     26    [facility] DRAW   8:30 AM   (15 min.)    [facility] LAB DRAW   [facility] Draw    UMP ONC INFUSION 180   9:00 AM   (180 min.)    ONCOLOGY INFUSION   [facility] 27     28       29     30     31                                        Recent Results (from the past 24 hour(s))   CBC with platelets differential    Collection Time: 11/20/19  7:53 AM   Result Value Ref Range    WBC 3.6 (L) 4.0 - 11.0 10e9/L    RBC Count 3.91 (L) 4.4 - 5.9 10e12/L    Hemoglobin 12.2 (L) 13.3 - 17.7 g/dL    Hematocrit 36.0 (L) 40.0 - 53.0 %    MCV 92 78 - 100 fl    MCH 31.2 26.5 - 33.0 pg    MCHC 33.9 31.5 - 36.5 g/dL    RDW 13.1 10.0 - 15.0 %    Platelet Count 101 (L) 150 - 450 10e9/L    Diff Method Automated Method     % Neutrophils 67.8 %    % Lymphocytes 22.3 %    % Monocytes 6.9 %    % Eosinophils 1.9 %    % Basophils 0.8 %    % Immature Granulocytes 0.3 %    Nucleated RBCs 0 0 /100    Absolute Neutrophil 2.5 1.6 - 8.3 10e9/L    Absolute Lymphocytes 0.8 0.8 - 5.3 10e9/L    Absolute Monocytes 0.3 0.0 - 1.3 10e9/L    Absolute Eosinophils 0.1 0.0 - 0.7 10e9/L    Absolute Basophils 0.0 0.0 - 0.2 10e9/L    Abs Immature Granulocytes 0.0 0 - 0.4 10e9/L    Absolute Nucleated RBC 0.0

## 2019-11-20 NOTE — PROGRESS NOTES
Infusion Nursing Note:  Daniel Sandhu presents today for Cycle 8 Day 8 Taxol .    Patient seen by provider today: No   present during visit today: Not Applicable.    Note: Patient reported some thrush last week following infusion, reports a history of severe thrush in the past, which nystatin has not worked for. He is asking for something to take to treat it, if it gets worse this week. Notedyellow/thick area on back of tongue. Violeta JARVIS paged and filled fluconazole for patient.     Intravenous Access:  Implanted Port.    Treatment Conditions:  Lab Results   Component Value Date    HGB 12.2 11/20/2019     Lab Results   Component Value Date    WBC 3.6 11/20/2019      Lab Results   Component Value Date    ANEU 2.5 11/20/2019     Lab Results   Component Value Date     11/20/2019      Lab Results   Component Value Date     09/23/2019                   Lab Results   Component Value Date    POTASSIUM 3.9 09/23/2019           Lab Results   Component Value Date    MAG 2.2 03/07/2018            Lab Results   Component Value Date    CR 0.67 09/23/2019                   Lab Results   Component Value Date    YOBANY 9.0 09/23/2019                Lab Results   Component Value Date    BILITOTAL 0.3 11/13/2019           Lab Results   Component Value Date    ALBUMIN 3.7 11/13/2019                    Lab Results   Component Value Date    ALT 27 11/13/2019           Lab Results   Component Value Date    AST 18 11/13/2019       Results reviewed, labs MET treatment parameters, ok to proceed with treatment.      Post Infusion Assessment:  Patient tolerated infusion without incident.  Blood return noted pre and post infusion.  Access discontinued per protocol.       Discharge Plan:   Prescription refills given for fluconazole.  AVS to patient via [employer].  Patient will return 11/27 for next appointment.   Patient discharged in stable condition accompanied by: self.  Departure Mode: Ambulatory.  Face to Face  time: 2 minutes.    Mandi Vazquez RN

## 2019-11-22 ENCOUNTER — TEAM CONFERENCE (OUTPATIENT)
Dept: OTOLARYNGOLOGY | Facility: CLINIC | Age: 39
End: 2019-11-22

## 2019-11-22 NOTE — TELEPHONE ENCOUNTER
Patient seen at his mom's visit to me today.  He reports he recovered well from his VPI injection.  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  Has not had a very prolonged concert yet but this is coming up.  He did not have any complications from the injection.  He also reports he took antibiotics in the Medrol Dosepak and this also has helped decrease some of his left neck pain that he believes is from the granuloma.  He will come follow-up in early to late January.

## 2019-11-27 ENCOUNTER — INFUSION THERAPY VISIT (OUTPATIENT)
Dept: ONCOLOGY | Facility: CLINIC | Age: 39
End: 2019-11-27
Attending: INTERNAL MEDICINE
Payer: COMMERCIAL

## 2019-11-27 ENCOUNTER — APPOINTMENT (OUTPATIENT)
Dept: LAB | Facility: CLINIC | Age: 39
End: 2019-11-27
Attending: INTERNAL MEDICINE
Payer: COMMERCIAL

## 2019-11-27 VITALS
DIASTOLIC BLOOD PRESSURE: 64 MMHG | WEIGHT: 144.1 LBS | BODY MASS INDEX: 21.27 KG/M2 | OXYGEN SATURATION: 99 % | HEART RATE: 83 BPM | TEMPERATURE: 97.8 F | RESPIRATION RATE: 18 BRPM | SYSTOLIC BLOOD PRESSURE: 109 MMHG

## 2019-11-27 DIAGNOSIS — C15.5 MALIGNANT NEOPLASM OF LOWER THIRD OF ESOPHAGUS (H): Primary | ICD-10-CM

## 2019-11-27 LAB
ALBUMIN UR-MCNC: NEGATIVE MG/DL
BASOPHILS # BLD AUTO: 0 10E9/L (ref 0–0.2)
BASOPHILS NFR BLD AUTO: 1.4 %
DIFFERENTIAL METHOD BLD: ABNORMAL
EOSINOPHIL # BLD AUTO: 0.1 10E9/L (ref 0–0.7)
EOSINOPHIL NFR BLD AUTO: 2.2 %
ERYTHROCYTE [DISTWIDTH] IN BLOOD BY AUTOMATED COUNT: 12.9 % (ref 10–15)
HCT VFR BLD AUTO: 34.6 % (ref 40–53)
HGB BLD-MCNC: 11.6 G/DL (ref 13.3–17.7)
IMM GRANULOCYTES # BLD: 0 10E9/L (ref 0–0.4)
IMM GRANULOCYTES NFR BLD: 0.4 %
LYMPHOCYTES # BLD AUTO: 1 10E9/L (ref 0.8–5.3)
LYMPHOCYTES NFR BLD AUTO: 34.7 %
MCH RBC QN AUTO: 30.8 PG (ref 26.5–33)
MCHC RBC AUTO-ENTMCNC: 33.5 G/DL (ref 31.5–36.5)
MCV RBC AUTO: 92 FL (ref 78–100)
MONOCYTES # BLD AUTO: 0.2 10E9/L (ref 0–1.3)
MONOCYTES NFR BLD AUTO: 6.1 %
NEUTROPHILS # BLD AUTO: 1.5 10E9/L (ref 1.6–8.3)
NEUTROPHILS NFR BLD AUTO: 55.2 %
NRBC # BLD AUTO: 0 10*3/UL
NRBC BLD AUTO-RTO: 0 /100
PLATELET # BLD AUTO: 127 10E9/L (ref 150–450)
RBC # BLD AUTO: 3.77 10E12/L (ref 4.4–5.9)
WBC # BLD AUTO: 2.8 10E9/L (ref 4–11)

## 2019-11-27 PROCEDURE — 25000128 H RX IP 250 OP 636: Mod: ZF | Performed by: PHYSICIAN ASSISTANT

## 2019-11-27 PROCEDURE — 96375 TX/PRO/DX INJ NEW DRUG ADDON: CPT

## 2019-11-27 PROCEDURE — 96417 CHEMO IV INFUS EACH ADDL SEQ: CPT

## 2019-11-27 PROCEDURE — 85025 COMPLETE CBC W/AUTO DIFF WBC: CPT | Performed by: PHYSICIAN ASSISTANT

## 2019-11-27 PROCEDURE — 96413 CHEMO IV INFUSION 1 HR: CPT

## 2019-11-27 PROCEDURE — 25000128 H RX IP 250 OP 636: Mod: ZF | Performed by: INTERNAL MEDICINE

## 2019-11-27 PROCEDURE — 25000132 ZZH RX MED GY IP 250 OP 250 PS 637: Mod: ZF | Performed by: PHYSICIAN ASSISTANT

## 2019-11-27 PROCEDURE — 25000125 ZZHC RX 250: Mod: ZF | Performed by: PHYSICIAN ASSISTANT

## 2019-11-27 PROCEDURE — 81003 URINALYSIS AUTO W/O SCOPE: CPT | Performed by: PHYSICIAN ASSISTANT

## 2019-11-27 PROCEDURE — 25800030 ZZH RX IP 258 OP 636: Mod: ZF | Performed by: PHYSICIAN ASSISTANT

## 2019-11-27 RX ORDER — HEPARIN SODIUM (PORCINE) LOCK FLUSH IV SOLN 100 UNIT/ML 100 UNIT/ML
500 SOLUTION INTRAVENOUS ONCE
Status: COMPLETED | OUTPATIENT
Start: 2019-11-27 | End: 2019-11-27

## 2019-11-27 RX ORDER — HEPARIN SODIUM (PORCINE) LOCK FLUSH IV SOLN 100 UNIT/ML 100 UNIT/ML
5 SOLUTION INTRAVENOUS EVERY 8 HOURS PRN
Status: DISCONTINUED | OUTPATIENT
Start: 2019-11-27 | End: 2019-11-27 | Stop reason: HOSPADM

## 2019-11-27 RX ORDER — DIPHENHYDRAMINE HCL 25 MG
25 CAPSULE ORAL ONCE
Status: COMPLETED | OUTPATIENT
Start: 2019-11-27 | End: 2019-11-27

## 2019-11-27 RX ADMIN — FAMOTIDINE 20 MG: 10 INJECTION INTRAVENOUS at 09:37

## 2019-11-27 RX ADMIN — DEXAMETHASONE SODIUM PHOSPHATE 20 MG: 10 INJECTION, SOLUTION INTRAMUSCULAR; INTRAVENOUS at 09:45

## 2019-11-27 RX ADMIN — DIPHENHYDRAMINE HYDROCHLORIDE 25 MG: 25 CAPSULE ORAL at 09:36

## 2019-11-27 RX ADMIN — HEPARIN 5 ML: 100 SYRINGE at 08:42

## 2019-11-27 RX ADMIN — HEPARIN 500 UNITS: 100 SYRINGE at 11:54

## 2019-11-27 RX ADMIN — SODIUM CHLORIDE 250 ML: 9 INJECTION, SOLUTION INTRAVENOUS at 09:36

## 2019-11-27 RX ADMIN — PACLITAXEL 145 MG: 6 INJECTION, SOLUTION INTRAVENOUS at 10:47

## 2019-11-27 RX ADMIN — SODIUM CHLORIDE 500 MG: 9 INJECTION, SOLUTION INTRAVENOUS at 10:09

## 2019-11-27 ASSESSMENT — PAIN SCALES - GENERAL: PAINLEVEL: NO PAIN (0)

## 2019-11-27 NOTE — PROGRESS NOTES
Infusion Nursing Note:  Daniel Sandhu presents today for C8 D15 Cyramza/Taxol.    Patient seen by provider today: No   present during visit today: Not Applicable.    Note: Patient overall feeling well today. States fluconazole helped thrush and now has resolved. Also states it seems like his phlegm/thrush was mixed with blood d/t intermittent bloody noses (not new). Continues to see small amount of blood when blowing nose. Providers aware of this per pt. Will see ENT specialist soon but has been using nasal drops in the meantime which have helped symptoms. Denies pain, cough, SOB, fevers or chills today.    Intravenous Access:  Implanted Port.    Treatment Conditions:  Lab Results   Component Value Date    HGB 11.6 11/27/2019     Lab Results   Component Value Date    WBC 2.8 11/27/2019      Lab Results   Component Value Date    ANEU 1.5 11/27/2019     Lab Results   Component Value Date     11/27/2019      Lab Results   Component Value Date     09/23/2019                   Lab Results   Component Value Date    POTASSIUM 3.9 09/23/2019           Lab Results   Component Value Date    MAG 2.2 03/07/2018            Lab Results   Component Value Date    CR 0.67 09/23/2019                   Lab Results   Component Value Date    YOBANY 9.0 09/23/2019                Lab Results   Component Value Date    BILITOTAL 0.3 11/13/2019           Lab Results   Component Value Date    ALBUMIN 3.7 11/13/2019                    Lab Results   Component Value Date    ALT 27 11/13/2019           Lab Results   Component Value Date    AST 18 11/13/2019       Results reviewed, labs MET treatment parameters, ok to proceed with treatment.  Urine negative.      Post Infusion Assessment:  Patient tolerated infusion without incident.  Blood return noted pre and post infusion.  Site patent and intact, free from redness, edema or discomfort.  No evidence of extravasations.  Access discontinued per protocol.       Discharge  Plan:   Patient declined prescription refills.  Discharge instructions reviewed with: Patient.  Patient and/or family verbalized understanding of discharge instructions and all questions answered.  AVS to patient via [employer].  Patient will return 12/11 for next appointment.   Patient discharged in stable condition accompanied by: self.  Departure Mode: Ambulatory.    Radha Osuna RN

## 2019-11-27 NOTE — PATIENT INSTRUCTIONS
Contact Numbers    [facility] (for Scheduling/Triage/After Hours Nurse Line): [phone]    Please call the [facility] nurse triage or the after hours nurse line if you experience a temperature greater than or equal to 100.5, shaking chills, have uncontrolled nausea, vomiting and/or diarrhea, dizziness, lightheadedness, shortness of breath, chest pain, bleeding, unexplained bruising, or if you have any other new/concerning symptoms, questions or concerns.     If you are having any concerning symptoms or wish to speak to a provider before your next infusion visit, please call your care coordinator or triage to notify them so we can adequately serve you.     If you need a refill on a narcotic prescription or other medication, please call triage before your infusion appointment.       November 2019 Sunday Monday Tuesday Wednesday Thursday Friday Saturday                            1    UMP PROCEDURE   3:25 PM   (20 min.)   Imelda Anderson MD   [facility] 2       3     4     5     6     7     8     9       10     11     12     13    [facility] D[facility]   6:30 AM   (15 min.)    [facility] DRAW   [facility] Lab Draw    UMP RETURN   6:45 AM   (50 min.)   Viole[facility]oto [facility]-C   [facility]   8:00 AM   (180 min.)    ONCOLOGY INFUSION   [facility] 14     15     16       17     18     19     20    [facility] DRAW   7:30 AM   (15 min.)    [facility]   [facility]    P ONC INFUSION 180   8:00 AM   (180 min.)    ONCOLOGY INFUSION   [facility] 21     22     23       24     25     26     27    [facility] DRAW   8:30 AM   (15 min.)    [facility]   [facility]    P ONC INFUSION 180   9:00 AM   (180 min.)    ONCOLOGY INFUSION   [facility] 28 29 30 December 2019 Sunday Monday Tuesday Wednesday Thursday  Friday Saturday   1     2     3     4     5     6     7       8     9     10     11    [facility] DRAW   8:30 AM   (15 min.)    [facility] LAB DRAW   [facility] Draw    UMP ONC INFUSION 180   9:00 AM   (180 min.)    ONCOLOGY INFUSION   [facility] 12     13    UMP RETURN   8:30 AM   (15 min.)   Rod Barrios MD   [facility]ose [facility] Throat 14       15     16     17     18    [facility] DRAW   8:30 AM   (15 min.)    [facility] LAB DRAW   [facility] Draw    UMP ONC INFUSION 180   9:00 AM   (180 min.)    ONCOLOGY INFUSION   [facility] 19     20     21       22     23    UMP RETURN   5:15 PM   (30 min.)   Daryn Ponce MD   [facility] 24     25     26    [facility] DRAW   8:30 AM   (15 min.)    [facility] LAB DRAW   [facility] Lab Draw    UMP ONC INFUSION 180   9:00 AM   (180 min.)    ONCOLOGY INFUSION   [facility] 27     28       29     30     31                                         Lab Results:  Recent Results (from the past 12 hour(s))   CBC with platelets differential    Collection Time: 11/27/19  8:49 AM   Result Value Ref Range    WBC 2.8 (L) 4.0 - 11.0 10e9/L    RBC Count 3.77 (L) 4.4 - 5.9 10e12/L    Hemoglobin 11.6 (L) 13.3 - 17.7 g/dL    Hematocrit 34.6 (L) 40.0 - 53.0 %    MCV 92 78 - 100 fl    MCH 30.8 26.5 - 33.0 pg    MCHC 33.5 31.5 - 36.5 g/dL    RDW 12.9 10.0 - 15.0 %    Platelet Count 127 (L) 150 - 450 10e9/L    Diff Method Automated Method     % Neutrophils 55.2 %    % Lymphocytes 34.7 %    % Monocytes 6.1 %    % Eosinophils 2.2 %    % Basophils 1.4 %    % Immature Granulocytes 0.4 %    Nucleated RBCs 0 0 /100    Absolute Neutrophil 1.5 (L) 1.6 - 8.3 10e9/L    Absolute Lymphocytes 1.0 0.8 - 5.3 10e9/L    Absolute Monocytes 0.2 0.0 - 1.3 10e9/L    Absolute Eosinophils 0.1 0.0 - 0.7 10e9/L    Absolute Basophils 0.0 0.0 - 0.2 10e9/L    Abs Immature  Granulocytes 0.0 0 - 0.4 10e9/L    Absolute Nucleated RBC 0.0    Protein qualitative urine    Collection Time: 11/27/19  8:50 AM   Result Value Ref Range    Protein Albumin Urine Negative NEG^Negative mg/dL

## 2019-12-11 ENCOUNTER — APPOINTMENT (OUTPATIENT)
Dept: LAB | Facility: CLINIC | Age: 39
End: 2019-12-11
Attending: INTERNAL MEDICINE
Payer: COMMERCIAL

## 2019-12-11 ENCOUNTER — INFUSION THERAPY VISIT (OUTPATIENT)
Dept: ONCOLOGY | Facility: CLINIC | Age: 39
End: 2019-12-11
Attending: INTERNAL MEDICINE
Payer: COMMERCIAL

## 2019-12-11 VITALS
RESPIRATION RATE: 16 BRPM | WEIGHT: 148.8 LBS | BODY MASS INDEX: 21.96 KG/M2 | TEMPERATURE: 97.9 F | HEART RATE: 71 BPM | DIASTOLIC BLOOD PRESSURE: 71 MMHG | OXYGEN SATURATION: 100 % | SYSTOLIC BLOOD PRESSURE: 117 MMHG

## 2019-12-11 DIAGNOSIS — C15.5 MALIGNANT NEOPLASM OF LOWER THIRD OF ESOPHAGUS (H): Primary | ICD-10-CM

## 2019-12-11 LAB
ALBUMIN SERPL-MCNC: 3.5 G/DL (ref 3.4–5)
ALBUMIN UR-MCNC: NEGATIVE MG/DL
ALP SERPL-CCNC: 46 U/L (ref 40–150)
ALT SERPL W P-5'-P-CCNC: 29 U/L (ref 0–70)
AST SERPL W P-5'-P-CCNC: 22 U/L (ref 0–45)
BASOPHILS # BLD AUTO: 0 10E9/L (ref 0–0.2)
BASOPHILS NFR BLD AUTO: 0.5 %
BILIRUB DIRECT SERPL-MCNC: 0.1 MG/DL (ref 0–0.2)
BILIRUB SERPL-MCNC: 0.4 MG/DL (ref 0.2–1.3)
CEA SERPL-MCNC: 0.5 UG/L (ref 0–2.5)
DIFFERENTIAL METHOD BLD: ABNORMAL
EOSINOPHIL # BLD AUTO: 0.1 10E9/L (ref 0–0.7)
EOSINOPHIL NFR BLD AUTO: 1.2 %
ERYTHROCYTE [DISTWIDTH] IN BLOOD BY AUTOMATED COUNT: 13.3 % (ref 10–15)
HCT VFR BLD AUTO: 36.7 % (ref 40–53)
HGB BLD-MCNC: 12.1 G/DL (ref 13.3–17.7)
IMM GRANULOCYTES # BLD: 0 10E9/L (ref 0–0.4)
IMM GRANULOCYTES NFR BLD: 0.2 %
LYMPHOCYTES # BLD AUTO: 1.2 10E9/L (ref 0.8–5.3)
LYMPHOCYTES NFR BLD AUTO: 27.7 %
MCH RBC QN AUTO: 30.3 PG (ref 26.5–33)
MCHC RBC AUTO-ENTMCNC: 33 G/DL (ref 31.5–36.5)
MCV RBC AUTO: 92 FL (ref 78–100)
MONOCYTES # BLD AUTO: 0.6 10E9/L (ref 0–1.3)
MONOCYTES NFR BLD AUTO: 13.3 %
NEUTROPHILS # BLD AUTO: 2.5 10E9/L (ref 1.6–8.3)
NEUTROPHILS NFR BLD AUTO: 57.1 %
NRBC # BLD AUTO: 0 10*3/UL
NRBC BLD AUTO-RTO: 0 /100
PLATELET # BLD AUTO: 158 10E9/L (ref 150–450)
PROT SERPL-MCNC: 6.7 G/DL (ref 6.8–8.8)
RBC # BLD AUTO: 3.99 10E12/L (ref 4.4–5.9)
WBC # BLD AUTO: 4.3 10E9/L (ref 4–11)

## 2019-12-11 PROCEDURE — 80076 HEPATIC FUNCTION PANEL: CPT | Performed by: PHYSICIAN ASSISTANT

## 2019-12-11 PROCEDURE — 82378 CARCINOEMBRYONIC ANTIGEN: CPT | Performed by: PHYSICIAN ASSISTANT

## 2019-12-11 PROCEDURE — 85025 COMPLETE CBC W/AUTO DIFF WBC: CPT | Performed by: PHYSICIAN ASSISTANT

## 2019-12-11 PROCEDURE — 96413 CHEMO IV INFUSION 1 HR: CPT

## 2019-12-11 PROCEDURE — 25800030 ZZH RX IP 258 OP 636: Mod: ZF | Performed by: PHYSICIAN ASSISTANT

## 2019-12-11 PROCEDURE — 96417 CHEMO IV INFUS EACH ADDL SEQ: CPT

## 2019-12-11 PROCEDURE — 81003 URINALYSIS AUTO W/O SCOPE: CPT | Performed by: PHYSICIAN ASSISTANT

## 2019-12-11 PROCEDURE — 25000128 H RX IP 250 OP 636: Mod: ZF | Performed by: PHYSICIAN ASSISTANT

## 2019-12-11 PROCEDURE — 25000128 H RX IP 250 OP 636: Mod: ZF | Performed by: INTERNAL MEDICINE

## 2019-12-11 PROCEDURE — 25000132 ZZH RX MED GY IP 250 OP 250 PS 637: Mod: ZF | Performed by: PHYSICIAN ASSISTANT

## 2019-12-11 PROCEDURE — 25000125 ZZHC RX 250: Mod: ZF | Performed by: PHYSICIAN ASSISTANT

## 2019-12-11 PROCEDURE — 96375 TX/PRO/DX INJ NEW DRUG ADDON: CPT

## 2019-12-11 RX ORDER — DIPHENHYDRAMINE HCL 25 MG
25 CAPSULE ORAL ONCE
Status: COMPLETED | OUTPATIENT
Start: 2019-12-11 | End: 2019-12-11

## 2019-12-11 RX ORDER — HEPARIN SODIUM (PORCINE) LOCK FLUSH IV SOLN 100 UNIT/ML 100 UNIT/ML
500 SOLUTION INTRAVENOUS ONCE
Status: COMPLETED | OUTPATIENT
Start: 2019-12-11 | End: 2019-12-11

## 2019-12-11 RX ORDER — HEPARIN SODIUM (PORCINE) LOCK FLUSH IV SOLN 100 UNIT/ML 100 UNIT/ML
5 SOLUTION INTRAVENOUS ONCE
Status: COMPLETED | OUTPATIENT
Start: 2019-12-11 | End: 2019-12-11

## 2019-12-11 RX ADMIN — PACLITAXEL 145 MG: 6 INJECTION, SOLUTION INTRAVENOUS at 10:50

## 2019-12-11 RX ADMIN — DIPHENHYDRAMINE HYDROCHLORIDE 25 MG: 25 CAPSULE ORAL at 09:36

## 2019-12-11 RX ADMIN — DEXAMETHASONE SODIUM PHOSPHATE 20 MG: 10 INJECTION, SOLUTION INTRAMUSCULAR; INTRAVENOUS at 09:37

## 2019-12-11 RX ADMIN — FAMOTIDINE 20 MG: 10 INJECTION INTRAVENOUS at 09:36

## 2019-12-11 RX ADMIN — SODIUM CHLORIDE 500 MG: 9 INJECTION, SOLUTION INTRAVENOUS at 10:10

## 2019-12-11 RX ADMIN — SODIUM CHLORIDE 250 ML: 9 INJECTION, SOLUTION INTRAVENOUS at 09:36

## 2019-12-11 RX ADMIN — Medication 5 ML: at 08:48

## 2019-12-11 RX ADMIN — Medication 500 UNITS: at 12:06

## 2019-12-11 ASSESSMENT — PAIN SCALES - GENERAL: PAINLEVEL: MILD PAIN (2)

## 2019-12-11 NOTE — PROGRESS NOTES
Infusion Nursing Note:  Daniel Sandhu presents today for Cycle 9 Day 1 Cyramza, Taxol.    Patient seen by provider today: No   present during visit today: Not Applicable.    Note: Patient reports that he is feeling well today, he denies any new complaints.    Intravenous Access:  Implanted Port.    Treatment Conditions:  Lab Results   Component Value Date    HGB 12.1 12/11/2019     Lab Results   Component Value Date    WBC 4.3 12/11/2019      Lab Results   Component Value Date    ANEU 2.5 12/11/2019     Lab Results   Component Value Date     12/11/2019      Lab Results   Component Value Date    BILITOTAL 0.4 12/11/2019           Lab Results   Component Value Date    ALBUMIN 3.5 12/11/2019                    Lab Results   Component Value Date    ALT 29 12/11/2019           Lab Results   Component Value Date    AST 22 12/11/2019     Urine protein negative.   Results reviewed, labs MET treatment parameters, ok to proceed with treatment.      Post Infusion Assessment:  Patient tolerated infusion without incident.  Blood return noted pre and post infusion.  Site patent and intact, free from redness, edema or discomfort.  No evidence of extravasations.  Access discontinued per protocol.       Discharge Plan:   Patient declined prescription refills.  Discharge instructions reviewed with: Patient.  Patient and/or family verbalized understanding of discharge instructions and all questions answered.  AVS to patient via [employer].  Patient will return 12/18/2019 for next infusion appointment.   Patient discharged in stable condition accompanied by: self.  Departure Mode: Ambulatory.    Theresa Marcus RN

## 2019-12-13 ENCOUNTER — OFFICE VISIT (OUTPATIENT)
Dept: OTOLARYNGOLOGY | Facility: CLINIC | Age: 39
End: 2019-12-13
Payer: COMMERCIAL

## 2019-12-13 VITALS
BODY MASS INDEX: 23.07 KG/M2 | SYSTOLIC BLOOD PRESSURE: 118 MMHG | TEMPERATURE: 97.9 F | DIASTOLIC BLOOD PRESSURE: 71 MMHG | OXYGEN SATURATION: 100 % | WEIGHT: 147 LBS | RESPIRATION RATE: 12 BRPM | HEART RATE: 81 BPM | HEIGHT: 67 IN

## 2019-12-13 DIAGNOSIS — R04.0 EPISTAXIS: Primary | ICD-10-CM

## 2019-12-13 ASSESSMENT — MIFFLIN-ST. JEOR: SCORE: 1550.54

## 2019-12-13 ASSESSMENT — PAIN SCALES - GENERAL: PAINLEVEL: NO PAIN (0)

## 2019-12-13 NOTE — LETTER
12/13/2019       RE: Daniel Sandhu  [address] [facility] [address]     Dear Colleague,    Thank you for referring your patient, Daniel Sandhu, to the [facility] E[facility] at [facility]. Please see a copy of my visit note below.    The patient presents with a history of nasal bleeding intermittently but primarily severe dryness of the nose. 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 Imelda Sky for the management of this condition. He describes this as a feeling of dryness. He reports significant improvement of his nasal bleeding with the use of ponaris nasal drops and bactroban.     All other systems were reviewed and they are either negative or they are not directly pertinent to this Otolaryngology examination.      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) 6/5/2017       Past Surgical History:    Past Surgical History:   Procedure Laterality Date     ESOPHAGOGASTRODUODENOSCOPY       ESOPHAGOSCOPY, GASTROSCOPY, DUODENOSCOPY (EGD), COMBINED N/A 6/9/2017    Procedure: COMBINED ENDOSCOPIC ULTRASOUND, ESOPHAGOSCOPY, GASTROSCOPY, DUODENOSCOPY (EGD), FINE NEEDLE ASPIRATE/BIOPSY;  Upper Endoscopic Ultrasound, fine needle aspirate/biopsy;  Surgeon: Guru Mark Avila MD;  Location: [facility] OR     ESOPHAGOSCOPY, GASTROSCOPY, DUODENOSCOPY (EGD), COMBINED N/A 11/3/2017    Procedure: COMBINED ESOPHAGOSCOPY, GASTROSCOPY, DUODENOSCOPY (EGD);;  Surgeon: Yunior Esteban MD;  Location: [facility] OR     ESOPHAGOSCOPY, GASTROSCOPY, DUODENOSCOPY (EGD), COMBINED N/A 11/9/2017    Procedure: COMBINED ESOPHAGOSCOPY, GASTROSCOPY, DUODENOSCOPY (EGD);  Esophogastroduodenoscopy, take out pharangostomy tube;  Surgeon: Yunior Esteban MD;  Location: [facility] OR     ESOPHAGOSCOPY, GASTROSCOPY, DUODENOSCOPY (EGD), COMBINED N/A  1/11/2018    Procedure: COMBINED ESOPHAGOSCOPY, GASTROSCOPY, DUODENOSCOPY (EGD), BIOPSY SINGLE OR MULTIPLE;  COMBINED ESOPHAGOSCOPY, GASTROSCOPY, DUODENOSCOPY (EGD);  Surgeon: Alessandro Mcgregor MD;  Location: [facility] GI     GASTRECTOMY N/A 11/3/2017    Procedure: GASTRECTOMY;;  Surgeon: Yunior Esteban MD;  Location: [facility] OR     HAND SURGERY      childhood, torn tendon     INSERT PORT VASCULAR ACCESS Right 6/22/2017    Procedure: INSERT PORT VASCULAR ACCESS;  Single Lumen Chest Power Port;  Surgeon: Iván Driver PA-C;  Location: [facility] OR     INSERT PORT VASCULAR ACCESS Right 4/8/2019    Procedure: Single Lumen Chest Port Placement;  Surgeon: Krysten Ballard PA-C;  Location: [facility] OR     IR CHEST PORT PLACEMENT > 5 YRS OF AGE  4/8/2019     IR LIVER BIOPSY PERCUTANEOUS  3/20/2019     LAPAROSCOPY DIAGNOSTIC (GENERAL) N/A 11/3/2017    Procedure: LAPAROSCOPY DIAGNOSTIC (GENERAL);  diagnostic laparoscopy, right chest tube, total gastrectomy with distal esophagectomy, intrathoracic eveline-y esophago-jejunostomy, feeding jejunostomy, pharyngostomy, esophagogastroduodenoscopy, flexible bronchoscopy;  Surgeon: Yunior Esteban MD;  Location: [facility] OR     LAPAROTOMY EXPLORATORY N/A 11/3/2017    Procedure: LAPAROTOMY EXPLORATORY;;  Surgeon: Yunior Esteban MD;  Location: [facility] OR     PHARYNGOSTOMY N/A 11/3/2017    Procedure: PHARYNGOSTOMY;;  Surgeon: Yuinor Esteban MD;  Location: [facility] OR     REMOVE PORT VASCULAR ACCESS Right 3/19/2018    Procedure: REMOVE PORT VASCULAR ACCESS;  Right Port Removal;  Surgeon: Krysten Hopper PA-C;  [facility]     THORACOTOMY Left 11/3/2017    Procedure: THORACOTOMY;;  Surgeon: Yunior Esteban MD;  Location: [facility] OR       Medications:      Current Outpatient Medications:      cyabnocobalamin (VITAMIN B-12) 2500 MCG sublingual tablet, Place 2,500 mcg under the tongue daily, Disp: 30 tablet, Rfl: 1     ferrous gluconate (FERGON) 324 (38 Fe) MG tablet, Take  324 mg by mouth daily (with breakfast), Disp: , Rfl:      LORazepam (ATIVAN) 0.5 MG tablet, Take 1 tablet (0.5 mg) by mouth every 4 hours as needed (Anxiety, Nausea/Vomiting or Sleep), Disp: 30 tablet, Rfl: 2     Misc Natural Product Nasal (PONARIS) SOLN, Apply two drops to each nostril BID X 2 month supply, Disp: 10 mL, Rfl: 3     multivitamin, therapeutic with minerals (MULTI-VITAMIN) TABS tablet, Take 1 tablet by mouth daily Generic [place] Vitamin, Disp: , Rfl:      mupirocin (BACTROBAN) 2 % external ointment, Apply to anterior nares BID X 2 month supply, Disp: 2 g, Rfl: 3     prochlorperazine (COMPAZINE) 10 MG tablet, Take 1 tablet (10 mg) by mouth every 6 hours as needed (Nausea/Vomiting), Disp: 30 tablet, Rfl: 2     saccharomyces boulardii (FLORASTOR) 250 MG capsule, Take 250 mg by mouth 2 times daily, Disp: , Rfl:     Allergies:    Patient has no known allergies.    Physical Examination:    The patient is a well developed, well nourished male in no apparent distress.  He is normocephalic, atraumatic with pupils equally round and reactive to light.    Oral Cavity Examination: Normal mucosa with no masses or lesions  Nasal Examination: No active bleeding in either nostril.  No masses or lesions but granular tissue along the right nasal septum  Neurological Examination: Facial nerve function intact and symmetric  Integumentary Examination:  No lesions on the skin of the head and neck    Assessment and Plan:    The patient presents with a history of nasal epistaxis. He has responded very well to medical therapy, but he continues to have granular tissue along the right nasal septum. He will continue to use medical therapy, but he will be referred to Dr. Rod Campos for possible nasal biopsy should this granular tissue persist despite therapy. He will continue work with Dr. Imelda Sky to address his throat discomfort and velopharyngeal insufficiency.    CC: Dr. Violeta Coto      Again, thank you for allowing  me to participate in the care of your patient.      Sincerely,    Rod Barrios MD

## 2019-12-13 NOTE — PROGRESS NOTES
The patient presents with a history of nasal bleeding intermittently but primarily severe dryness of the nose. 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 Imelda Sky for the management of this condition. He describes this as a feeling of dryness. He reports significant improvement of his nasal bleeding with the use of ponaris nasal drops and bactroban.     All other systems were reviewed and they are either negative or they are not directly pertinent to this Otolaryngology examination.      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) 6/5/2017       Past Surgical History:    Past Surgical History:   Procedure Laterality Date     ESOPHAGOGASTRODUODENOSCOPY       ESOPHAGOSCOPY, GASTROSCOPY, DUODENOSCOPY (EGD), COMBINED N/A 6/9/2017    Procedure: COMBINED ENDOSCOPIC ULTRASOUND, ESOPHAGOSCOPY, GASTROSCOPY, DUODENOSCOPY (EGD), FINE NEEDLE ASPIRATE/BIOPSY;  Upper Endoscopic Ultrasound, fine needle aspirate/biopsy;  Surgeon: Guru Mark Avila MD;  Location: [facility]     ESOPHAGOSCOPY, GASTROSCOPY, DUODENOSCOPY (EGD), COMBINED N/A 11/3/2017    Procedure: COMBINED ESOPHAGOSCOPY, GASTROSCOPY, DUODENOSCOPY (EGD);;  Surgeon: Yunior Esteban MD;  Location: [facility]     ESOPHAGOSCOPY, GASTROSCOPY, DUODENOSCOPY (EGD), COMBINED N/A 11/9/2017    Procedure: COMBINED ESOPHAGOSCOPY, GASTROSCOPY, DUODENOSCOPY (EGD);  Esophogastroduodenoscopy, take out pharangostomy tube;  Surgeon: Yunior Esteban MD;  Location: [facility]     ESOPHAGOSCOPY, GASTROSCOPY, DUODENOSCOPY (EGD), COMBINED N/A 1/11/2018    Procedure: COMBINED ESOPHAGOSCOPY, GASTROSCOPY, DUODENOSCOPY (EGD), BIOPSY SINGLE OR MULTIPLE;  COMBINED ESOPHAGOSCOPY, GASTROSCOPY, DUODENOSCOPY (EGD);  Surgeon: Alessandro Mcgregor MD;  Location: [facility]     GASTRECTOMY N/A 11/3/2017    Procedure: GASTRECTOMY;;  Surgeon: Yunior Esteban  MD Geo;  Location: [facility]     HAND SURGERY      childhood, torn tendon     INSERT PORT VASCULAR ACCESS Right 6/22/2017    Procedure: INSERT PORT VASCULAR ACCESS;  Single Lumen Chest Power Port;  Surgeon: Iván Driver PA-C;  [facility]     INSERT PORT VASCULAR ACCESS Right 4/8/2019    Procedure: Single Lumen Chest Port Placement;  Surgeon: Krysten Ballard PA-C;  [facility]     IR CHEST PORT PLACEMENT > 5 YRS OF AGE  4/8/2019     IR LIVER BIOPSY PERCUTANEOUS  3/20/2019     LAPAROSCOPY DIAGNOSTIC (GENERAL) N/A 11/3/2017    Procedure: LAPAROSCOPY DIAGNOSTIC (GENERAL);  diagnostic laparoscopy, right chest tube, total gastrectomy with distal esophagectomy, intrathoracic eveline-y esophago-jejunostomy, feeding jejunostomy, pharyngostomy, esophagogastroduodenoscopy, flexible bronchoscopy;  Surgeon: Yunior Esteban MD;  [facility]     LAPAROTOMY EXPLORATORY N/A 11/3/2017    Procedure: LAPAROTOMY EXPLORATORY;;  Surgeon: Yunior Esteban MD;  [facility]     PHARYNGOSTOMY N/A 11/3/2017    Procedure: PHARYNGOSTOMY;;  Surgeon: Yunior Esteban MD;  [facility]     REMOVE PORT VASCULAR ACCESS Right 3/19/2018    Procedure: REMOVE PORT VASCULAR ACCESS;  Right Port Removal;  Surgeon: Krysten Hopper PA-C;  [facility]     THORACOTOMY Left 11/3/2017    Procedure: THORACOTOMY;;  Surgeon: Yunior Esteban MD;  Location: [facility]       Medications:      Current Outpatient Medications:      cyabnocobalamin (VITAMIN B-12) 2500 MCG sublingual tablet, Place 2,500 mcg under the tongue daily, Disp: 30 tablet, Rfl: 1     ferrous gluconate (FERGON) 324 (38 Fe) MG tablet, Take 324 mg by mouth daily (with breakfast), Disp: , Rfl:      LORazepam (ATIVAN) 0.5 MG tablet, Take 1 tablet (0.5 mg) by mouth every 4 hours as needed (Anxiety, Nausea/Vomiting or Sleep), Disp: 30 tablet, Rfl: 2     Misc Natural Product Nasal (PONARIS) SOLN, Apply two drops to each nostril BID X 2 month  supply, Disp: 10 mL, Rfl: 3     multivitamin, therapeutic with minerals (MULTI-VITAMIN) TABS tablet, Take 1 tablet by mouth daily Generic [place] Vitamin, Disp: , Rfl:      mupirocin (BACTROBAN) 2 % external ointment, Apply to anterior nares BID X 2 month supply, Disp: 2 g, Rfl: 3     prochlorperazine (COMPAZINE) 10 MG tablet, Take 1 tablet (10 mg) by mouth every 6 hours as needed (Nausea/Vomiting), Disp: 30 tablet, Rfl: 2     saccharomyces boulardii (FLORASTOR) 250 MG capsule, Take 250 mg by mouth 2 times daily, Disp: , Rfl:     Allergies:    Patient has no known allergies.    Physical Examination:    The patient is a well developed, well nourished male in no apparent distress.  He is normocephalic, atraumatic with pupils equally round and reactive to light.    Oral Cavity Examination: Normal mucosa with no masses or lesions  Nasal Examination: No active bleeding in either nostril.  No masses or lesions but granular tissue along the right nasal septum  Neurological Examination: Facial nerve function intact and symmetric  Integumentary Examination:  No lesions on the skin of the head and neck    Assessment and Plan:    The patient presents with a history of nasal epistaxis. He has responded very well to medical therapy, but he continues to have granular tissue along the right nasal septum. He will continue to use medical therapy, but he will be referred to Dr. Rod Campos for possible nasal biopsy should this granular tissue persist despite therapy. He will continue work with Dr. Imelda Sky to address his throat discomfort and velopharyngeal insufficiency.    CC: Dr. Violeta Coto

## 2019-12-13 NOTE — TELEPHONE ENCOUNTER
FUTURE VISIT INFORMATION      FUTURE VISIT INFORMATION:    Date: 1/10/20    Time: 8:30 AM    Location: [facility]  REFERRAL INFORMATION:    Referring provider:  Dr. Barrios    Referring providers clinic:  [facility]    Reason for visit/diagnosis: Possible diagnosis of nasal septum    RECORDS REQUESTED FROM:       Clinic name Comments Records Status Imaging Status   [facility] - ENT 12/13/19 - OV with Dr. Barrios  11/01/19 - OV [facility] Dr[facility]elda Sky [facility] 6/6/19 - OV with MATHEUS Card [facility] - Imaging 9/20/19 - PET Oncology Whole Body  6/21/19 -PET Oncology Whole Body  3/29/19 - PET Oncology Whole Body  11/1/17 - PET Oncology Whole Body  9/22/17 - PET Oncology Eyes to Thighs  6/9/17 - PET Oncology Eyes to Thighs [facility]

## 2019-12-13 NOTE — NURSING NOTE
"Chief Complaint   Patient presents with     RECHECK     epitaxis      Blood pressure 118/71, pulse 81, temperature 97.9  F (36.6  C), resp. rate 12, height 1.71 m (5' 7.32\"), weight 66.7 kg (147 lb), SpO2 100 %.    Rashawn Rojas LPN\    "

## 2019-12-13 NOTE — PATIENT INSTRUCTIONS
1.  You were seen in the ENT Clinic today by Dr. Barrios.  If you have any questions or concerns after your appointment, please call [phone]. Press option #1 for scheduling related needs. Press option #3 for Nurse advice.    2.  Plan is to return to clinic to see Dr. Rod Campos for further evaluation.      Lisa Samson LPN  [facility] Otolaryngology  [phone]    The patient presents with a history of nasal epistaxis. He has responded very well to medical therapy, but he continues to have granular tissue along the right nasal septum. He will continue to use medical therapy, but he will be referred to Dr. Rod Campos for possible nasal biopsy should this granular tissue persist despite therapy. He will continue work with Dr. Imelda Sky to address his throat discomfort and velopharyngeal insufficiency.

## 2019-12-18 ENCOUNTER — PATIENT OUTREACH (OUTPATIENT)
Dept: CARE COORDINATION | Facility: CLINIC | Age: 39
End: 2019-12-18

## 2019-12-18 ENCOUNTER — INFUSION THERAPY VISIT (OUTPATIENT)
Dept: ONCOLOGY | Facility: CLINIC | Age: 39
End: 2019-12-18
Attending: INTERNAL MEDICINE
Payer: COMMERCIAL

## 2019-12-18 VITALS
HEART RATE: 84 BPM | OXYGEN SATURATION: 100 % | BODY MASS INDEX: 22.49 KG/M2 | WEIGHT: 145 LBS | TEMPERATURE: 98.5 F | DIASTOLIC BLOOD PRESSURE: 69 MMHG | SYSTOLIC BLOOD PRESSURE: 117 MMHG | RESPIRATION RATE: 16 BRPM

## 2019-12-18 DIAGNOSIS — C15.5 MALIGNANT NEOPLASM OF LOWER THIRD OF ESOPHAGUS (H): Primary | ICD-10-CM

## 2019-12-18 LAB
BASOPHILS # BLD AUTO: 0 10E9/L (ref 0–0.2)
BASOPHILS NFR BLD AUTO: 0.7 %
DIFFERENTIAL METHOD BLD: ABNORMAL
EOSINOPHIL # BLD AUTO: 0.1 10E9/L (ref 0–0.7)
EOSINOPHIL NFR BLD AUTO: 2.4 %
ERYTHROCYTE [DISTWIDTH] IN BLOOD BY AUTOMATED COUNT: 13.2 % (ref 10–15)
HCT VFR BLD AUTO: 35.8 % (ref 40–53)
HGB BLD-MCNC: 11.8 G/DL (ref 13.3–17.7)
IMM GRANULOCYTES # BLD: 0 10E9/L (ref 0–0.4)
IMM GRANULOCYTES NFR BLD: 0.2 %
LYMPHOCYTES # BLD AUTO: 1 10E9/L (ref 0.8–5.3)
LYMPHOCYTES NFR BLD AUTO: 24.5 %
MCH RBC QN AUTO: 30.2 PG (ref 26.5–33)
MCHC RBC AUTO-ENTMCNC: 33 G/DL (ref 31.5–36.5)
MCV RBC AUTO: 92 FL (ref 78–100)
MONOCYTES # BLD AUTO: 0.3 10E9/L (ref 0–1.3)
MONOCYTES NFR BLD AUTO: 8 %
NEUTROPHILS # BLD AUTO: 2.7 10E9/L (ref 1.6–8.3)
NEUTROPHILS NFR BLD AUTO: 64.2 %
NRBC # BLD AUTO: 0 10*3/UL
NRBC BLD AUTO-RTO: 0 /100
PLATELET # BLD AUTO: 140 10E9/L (ref 150–450)
RBC # BLD AUTO: 3.91 10E12/L (ref 4.4–5.9)
WBC # BLD AUTO: 4.3 10E9/L (ref 4–11)

## 2019-12-18 PROCEDURE — 25000128 H RX IP 250 OP 636: Mod: ZF | Performed by: PHYSICIAN ASSISTANT

## 2019-12-18 PROCEDURE — 25000125 ZZHC RX 250: Mod: ZF | Performed by: PHYSICIAN ASSISTANT

## 2019-12-18 PROCEDURE — 85025 COMPLETE CBC W/AUTO DIFF WBC: CPT | Performed by: PHYSICIAN ASSISTANT

## 2019-12-18 PROCEDURE — 96367 TX/PROPH/DG ADDL SEQ IV INF: CPT

## 2019-12-18 PROCEDURE — 25000128 H RX IP 250 OP 636: Mod: ZF | Performed by: INTERNAL MEDICINE

## 2019-12-18 PROCEDURE — 96413 CHEMO IV INFUSION 1 HR: CPT

## 2019-12-18 PROCEDURE — 25800030 ZZH RX IP 258 OP 636: Mod: ZF | Performed by: PHYSICIAN ASSISTANT

## 2019-12-18 PROCEDURE — 96375 TX/PRO/DX INJ NEW DRUG ADDON: CPT

## 2019-12-18 RX ORDER — HEPARIN SODIUM (PORCINE) LOCK FLUSH IV SOLN 100 UNIT/ML 100 UNIT/ML
5 SOLUTION INTRAVENOUS EVERY 8 HOURS
Status: DISCONTINUED | OUTPATIENT
Start: 2019-12-18 | End: 2019-12-18 | Stop reason: HOSPADM

## 2019-12-18 RX ORDER — HEPARIN SODIUM (PORCINE) LOCK FLUSH IV SOLN 100 UNIT/ML 100 UNIT/ML
500 SOLUTION INTRAVENOUS ONCE
Status: DISCONTINUED | OUTPATIENT
Start: 2019-12-18 | End: 2019-12-18 | Stop reason: HOSPADM

## 2019-12-18 RX ADMIN — FAMOTIDINE 20 MG: 10 INJECTION INTRAVENOUS at 10:22

## 2019-12-18 RX ADMIN — DEXAMETHASONE SODIUM PHOSPHATE 20 MG: 10 INJECTION, SOLUTION INTRAMUSCULAR; INTRAVENOUS at 10:04

## 2019-12-18 RX ADMIN — Medication 5 ML: at 08:40

## 2019-12-18 RX ADMIN — SODIUM CHLORIDE 250 ML: 9 INJECTION, SOLUTION INTRAVENOUS at 10:04

## 2019-12-18 RX ADMIN — PACLITAXEL 145 MG: 6 INJECTION, SOLUTION INTRAVENOUS at 10:28

## 2019-12-18 ASSESSMENT — PAIN SCALES - GENERAL: PAINLEVEL: NO PAIN (0)

## 2019-12-18 NOTE — PROGRESS NOTES
Infusion Nursing Note:  Daniel Sandhu presents today for C9D8 Taxol.    Patient seen by provider today: No    Note: Patient reports feeling well.  Has diarrhea and some abdominal cramping for about a day following his infusions with both Cyramza and Taxol.  Also reports intermittent fatigue. Denies peripheral neuropathy.       Intravenous Access:  Implanted Port.      Treatment Conditions:  Lab Results   Component Value Date    HGB 11.8 12/18/2019     Lab Results   Component Value Date    WBC 4.3 12/18/2019      Lab Results   Component Value Date    ANEU 2.7 12/18/2019     Lab Results   Component Value Date     12/18/2019          Post Infusion Assessment:  Patient tolerated injection without incident.  Blood return noted pre and post infusion.  Site patent and intact, free from redness, edema or discomfort.  No evidence of extravasations.  Access discontinued per protocol.    Discharge Plan:   Patient declined prescription refills.  Discharge instructions reviewed with: Patient.  Patient and/or family verbalized understanding of discharge instructions and all questions answered.  AVS to patient via [employer].  Patient will return 12/26/19 for C9D15 Cyramza/Taxol next appointment.   Patient discharged in stable condition accompanied by: self.  Departure Mode: Ambulatory.    Donna Soria, RN, RN

## 2019-12-18 NOTE — PROGRESS NOTES
Social Work Follow-Up Encounter Visit  Oncology Clinic    Data/Intervention:  Patient Name:  Daniel Sandhu  /Age:  1980 (39 year old)    Reason for Follow-Up:  Patient requested to meet with SW during his infusion appointment    Collaborated With:    -patient     SW met with patient in infusion.  Patient had received paperwork from [employer] for re certification of services. SW assisted in completing needed documentation, completed forms faxed to [email or web address].    Resources Provided:  [facility] re certification completed.     Assessment:  Patient engaged in resources appropriately. No immediate needs. Expressed some concerns in anticipation of increased medical bills at start of .  SW will work with patient again in new year to apply for any eligible financial grants.     Plan:  Previously provided patient/family with writer's contact information and availability.   Sw available to assist with any needs.     Soo Yeon Han, MSW, LICSW  Pager: [phone]  Phone: [phone]

## 2019-12-23 ENCOUNTER — ONCOLOGY VISIT (OUTPATIENT)
Dept: ONCOLOGY | Facility: CLINIC | Age: 39
End: 2019-12-23
Attending: INTERNAL MEDICINE
Payer: COMMERCIAL

## 2019-12-23 VITALS
WEIGHT: 147.7 LBS | SYSTOLIC BLOOD PRESSURE: 132 MMHG | DIASTOLIC BLOOD PRESSURE: 83 MMHG | BODY MASS INDEX: 23.18 KG/M2 | OXYGEN SATURATION: 99 % | RESPIRATION RATE: 13 BRPM | HEART RATE: 71 BPM | TEMPERATURE: 97.9 F | HEIGHT: 67 IN

## 2019-12-23 DIAGNOSIS — C15.5 MALIGNANT NEOPLASM OF LOWER THIRD OF ESOPHAGUS (H): Primary | ICD-10-CM

## 2019-12-23 DIAGNOSIS — C78.7 MALIGNANT NEOPLASM METASTATIC TO LIVER (H): ICD-10-CM

## 2019-12-23 PROCEDURE — G0463 HOSPITAL OUTPT CLINIC VISIT: HCPCS | Mod: ZF

## 2019-12-23 PROCEDURE — 99213 OFFICE O/P EST LOW 20 MIN: CPT | Mod: GC | Performed by: INTERNAL MEDICINE

## 2019-12-23 ASSESSMENT — PAIN SCALES - GENERAL: PAINLEVEL: NO PAIN (0)

## 2019-12-23 ASSESSMENT — MIFFLIN-ST. JEOR: SCORE: 1553.71

## 2019-12-23 NOTE — LETTER
12/23/2019      RE: Daniel Sandhu  [address] [place] [address]       Mr. 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    In early 2017, patient started noticing difficulty swallowing.  He was referred for EGD which showed an esophageal mass located at the GE junction, measuring 4 cm.  Biopsy showed poorly differentiated adenocarcinoma.  HER-2 was sent and was equivocal (2+).  CT c/a/p showed a mildly prominent lymph node in the gastrohepatic ligament, but there were no pathologically enlarged lymph nodes in the chest, abdomen, or pelvis.  There was otherwise no evidence of metastatic disease.  PET-CT showed thickening of the distal esophagus consistent with known esophageal adenocarcinoma, as well as mildly hypermetabolic 1 cm lymph node in the gastrohepatic ligament.  There was no evidence of distant metastatic disease.  He underwent EUS on 6/9/17, which showed the esophageal tumor in the lower third of the esophagus, staged T2NX.  There were 2 abnormal lymph nodes seen in the gastrohepatic ligament; pathology was suspicious for adenocarcinoma.  Celiac node was sampled as well, which was benign.  He was seen by surgery, Dr. Esteban, who recommended srinivas-operative chemotherapy.    He underwent chemotherapy with epirubicin, oxaliplatin, and capecitabine x 3 cycles 6/26/17-8/7/17.      On 11/3/17, he underwent laparotomy with total gastrectomy and abdominal lymphadenectomy, left thoracotomy with distal esophagectomy and intrathoracic Terrell-en-Y esophagojejunostomy, feeding jejunostomy, left pharyngostomy tube placement, right tube thoracostomy, and flexible bronchoscopy.  On 11/9/17, he was taken back to OR for I&D of pharyngostomy tube abscess.  Pathology showed adenocarcinoma, moderate differentiated, extensive residual tumor with no evidence tumor regression, margins negative, perineural invasion present, 1 of 28 lymph nodes were involved with malignancy, stage  [id number] (stage IIIA).  HER-2 was non-amplified.    He resumed chemotherapy post-surgery, with plan to complete 3 more cycles, with 5-FU, epirubicin, oxaliplatin.  However, he only completed 2 more cycles due to poor tolerance.  He was admitted to the hospital 1/9/18-1/13/18 for nausea, diarrhea, failure to thrive, severe malnutrition, generalized weakness.  His infusional chemotherapy was stopped on 1/8/18.  He underwent EGD in the hospital on 1/11/18, which showed ulcers, and no evidence of recurrence of his cancer.  One area biopsied showed inflammation, no evidence of malignancy.    He saw Dr. Esteban on 3/6/19 and had CT abdomen/pelvis done, which showed a 3.1 cm lesion in hepatic segment 3.  He underwent biopsy on 3/20/19 by IR, which showed moderately differentiated adenocarcinoma consistent with metastasis from primary esophageal carcinoma.  HER-2 is non-amplified.  Restaging PET scan on 3/29/19 showed the 4.4 cm left lobe liver metastasis.  There is nodular foci of hypermetabolic uptake in the left mid abdomen in relation to the small bowel loops, without definite underlying lesion on CT.  This is favored to represent metastatic disease unless proven otherwise.    He started on chemotherapy with paclitaxel and ramucirumab on 4/10/19.    --    Mr. Sandhu presents today accompanied by his wife.  He is currently mid-cycle 9 of paclitaxel/ramucirumab with a planned CT on 1/10/2020.  He reports he is doing well overall and is presenting today to establish care with Dr. Ponce (patient previously followed with Dr. Hood).  He notes ongoing epistaxis which has been a problem for months; he continues to follow with ENT and has been using various treatments to control his nose bleeds.  He otherwise has diarrhea and fatigue for 24-48 hours after the start of each cycle which then resolves.  He feels like he returns back to his baseline after this period and thinks this is very tolerable.  He notes he feels he could  continue his current treatments indefinitely based on the side effect profile.    The remainder of a 10 point review of systems is otherwise negative.    On physical exam Mr. Sawant and appears robustly healthy.  His vital signs are unremarkable.  He has no scleral icterus and no visible lesion in the oropharynx.  He has no adenopathy palpable in his neck or supraclavicular spaces.  His lungs are clear to auscultation without dullness to percussion.  His heart rate and rhythm are regular.  His abdomen is soft and nontender without palpable mass organomegaly.  He has a easily reducible central abdominal hernia.  He has no peripheral edema and no tenderness in his calves or thighs.  There is no cyanosis or clubbing of his digits.  His speech is fluent and his cranial nerves are grossly intact.  His gait and station are unremarkable.    There is no new imaging today but a scan is planned for 1/10/2020.  Recent CBC showed stable anemia and mild thrombocytopenia.  CEA on 12/11 was normal at 0.5.      Assessment/plan: 39 year old with adenocarcinoma of the GE junction who presents to [facility] care.  He is currently tolerating his chemotherapy well and will plan to continue on current treatment pending the results of his upcoming CT scan.  His epistaxis is being evaluated but may in part be due to ramucirumab (associated with epistaxis in 4 to 15% of patients, likely due to VEGFR inhibition).  He will see Dr. Chau on 1/10/2020 and we will likely plan to have the patient meet with Dr. Ponce near that time depending on what the CT shows.    Geovany Mak MD  Hematology/Oncology Fellow  [phone]    Attending note: I saw the patient in conjunction with the fellow and agree with the above.  We will make further plans once we have his restaging scan done in a couple of weeks. The patient questioned what value there was in local therapy if he has metastatic disease--particularly if there are peritoneal mets as  suspected--and I endorsed that concern.    Daryn Ponce MD

## 2019-12-23 NOTE — PROGRESS NOTES
Mr. 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    In early 2017, patient started noticing difficulty swallowing.  He was referred for EGD which showed an esophageal mass located at the GE junction, measuring 4 cm.  Biopsy showed poorly differentiated adenocarcinoma.  HER-2 was sent and was equivocal (2+).  CT c/a/p showed a mildly prominent lymph node in the gastrohepatic ligament, but there were no pathologically enlarged lymph nodes in the chest, abdomen, or pelvis.  There was otherwise no evidence of metastatic disease.  PET-CT showed thickening of the distal esophagus consistent with known esophageal adenocarcinoma, as well as mildly hypermetabolic 1 cm lymph node in the gastrohepatic ligament.  There was no evidence of distant metastatic disease.  He underwent EUS on 6/9/17, which showed the esophageal tumor in the lower third of the esophagus, staged T2NX.  There were 2 abnormal lymph nodes seen in the gastrohepatic ligament; pathology was suspicious for adenocarcinoma.  Celiac node was sampled as well, which was benign.  He was seen by surgery, Dr. Esteban, who recommended srinivas-operative chemotherapy.    He underwent chemotherapy with epirubicin, oxaliplatin, and capecitabine x 3 cycles 6/26/17-8/7/17.      On 11/3/17, he underwent laparotomy with total gastrectomy and abdominal lymphadenectomy, left thoracotomy with distal esophagectomy and intrathoracic Terrell-en-Y esophagojejunostomy, feeding jejunostomy, left pharyngostomy tube placement, right tube thoracostomy, and flexible bronchoscopy.  On 11/9/17, he was taken back to OR for I&D of pharyngostomy tube abscess.  Pathology showed adenocarcinoma, moderate differentiated, extensive residual tumor with no evidence tumor regression, margins negative, perineural invasion present, 1 of 28 lymph nodes were involved with malignancy, stage [id number] (stage IIIA).  HER-2 was non-amplified.    He resumed chemotherapy post-surgery, with  plan to complete 3 more cycles, with 5-FU, epirubicin, oxaliplatin.  However, he only completed 2 more cycles due to poor tolerance.  He was admitted to the hospital 1/9/18-1/13/18 for nausea, diarrhea, failure to thrive, severe malnutrition, generalized weakness.  His infusional chemotherapy was stopped on 1/8/18.  He underwent EGD in the hospital on 1/11/18, which showed ulcers, and no evidence of recurrence of his cancer.  One area biopsied showed inflammation, no evidence of malignancy.    He saw Dr. Esteban on 3/6/19 and had CT abdomen/pelvis done, which showed a 3.1 cm lesion in hepatic segment 3.  He underwent biopsy on 3/20/19 by IR, which showed moderately differentiated adenocarcinoma consistent with metastasis from primary esophageal carcinoma.  HER-2 is non-amplified.  Restaging PET scan on 3/29/19 showed the 4.4 cm left lobe liver metastasis.  There is nodular foci of hypermetabolic uptake in the left mid abdomen in relation to the small bowel loops, without definite underlying lesion on CT.  This is favored to represent metastatic disease unless proven otherwise.    He started on chemotherapy with paclitaxel and ramucirumab on 4/10/19.    --    Mr. Sandhu presents today accompanied by his wife.  He is currently mid-cycle 9 of paclitaxel/ramucirumab with a planned CT on 1/10/2020.  He reports he is doing well overall and is presenting today to establish care with Dr. Ponce (patient previously followed with Dr. Hood).  He notes ongoing epistaxis which has been a problem for months; he continues to follow with ENT and has been using various treatments to control his nose bleeds.  He otherwise has diarrhea and fatigue for 24-48 hours after the start of each cycle which then resolves.  He feels like he returns back to his baseline after this period and thinks this is very tolerable.  He notes he feels he could continue his current treatments indefinitely based on the side effect profile.    The remainder  of a 10 point review of systems is otherwise negative.    On physical exam Mr. Sawant and appears robustly healthy.  His vital signs are unremarkable.  He has no scleral icterus and no visible lesion in the oropharynx.  He has no adenopathy palpable in his neck or supraclavicular spaces.  His lungs are clear to auscultation without dullness to percussion.  His heart rate and rhythm are regular.  His abdomen is soft and nontender without palpable mass organomegaly.  He has a easily reducible central abdominal hernia.  He has no peripheral edema and no tenderness in his calves or thighs.  There is no cyanosis or clubbing of his digits.  His speech is fluent and his cranial nerves are grossly intact.  His gait and station are unremarkable.    There is no new imaging today but a scan is planned for 1/10/2020.  Recent CBC showed stable anemia and mild thrombocytopenia.  CEA on 12/11 was normal at 0.5.      Assessment/plan: 39 year old with adenocarcinoma of the GE junction who presents to [facility].  He is currently tolerating his chemotherapy well and will plan to continue on current treatment pending the results of his upcoming CT scan.  His epistaxis is being evaluated but may in part be due to ramucirumab (associated with epistaxis in 4 to 15% of patients, likely due to VEGFR inhibition).  He will see Dr. Chau on 1/10/2020 and we will likely plan to have the patient meet with Dr. Ponce near that time depending on what the CT shows.    Geovany Mak MD  Hematology/Oncology Fellow  [phone]    Attending note: I saw the patient in conjunction with the fellow and agree with the above.  We will make further plans once we have his restaging scan done in a couple of weeks. The patient questioned what value there was in local therapy if he has metastatic disease--particularly if there are peritoneal mets as suspected--and I endorsed that concern.

## 2019-12-26 ENCOUNTER — INFUSION THERAPY VISIT (OUTPATIENT)
Dept: ONCOLOGY | Facility: CLINIC | Age: 39
End: 2019-12-26
Attending: INTERNAL MEDICINE
Payer: COMMERCIAL

## 2019-12-26 ENCOUNTER — APPOINTMENT (OUTPATIENT)
Dept: LAB | Facility: CLINIC | Age: 39
End: 2019-12-26
Attending: INTERNAL MEDICINE
Payer: COMMERCIAL

## 2019-12-26 VITALS
DIASTOLIC BLOOD PRESSURE: 64 MMHG | WEIGHT: 145 LBS | BODY MASS INDEX: 22.49 KG/M2 | HEART RATE: 74 BPM | TEMPERATURE: 97.9 F | RESPIRATION RATE: 16 BRPM | OXYGEN SATURATION: 100 % | SYSTOLIC BLOOD PRESSURE: 121 MMHG

## 2019-12-26 DIAGNOSIS — C15.5 MALIGNANT NEOPLASM OF LOWER THIRD OF ESOPHAGUS (H): Primary | ICD-10-CM

## 2019-12-26 LAB
ALBUMIN UR-MCNC: 10 MG/DL
BASOPHILS # BLD AUTO: 0 10E9/L (ref 0–0.2)
BASOPHILS NFR BLD AUTO: 0.8 %
DIFFERENTIAL METHOD BLD: ABNORMAL
EOSINOPHIL # BLD AUTO: 0.2 10E9/L (ref 0–0.7)
EOSINOPHIL NFR BLD AUTO: 4.5 %
ERYTHROCYTE [DISTWIDTH] IN BLOOD BY AUTOMATED COUNT: 13.2 % (ref 10–15)
HCT VFR BLD AUTO: 34.9 % (ref 40–53)
HGB BLD-MCNC: 11.6 G/DL (ref 13.3–17.7)
IMM GRANULOCYTES # BLD: 0 10E9/L (ref 0–0.4)
IMM GRANULOCYTES NFR BLD: 0.3 %
LYMPHOCYTES # BLD AUTO: 1 10E9/L (ref 0.8–5.3)
LYMPHOCYTES NFR BLD AUTO: 29.2 %
MCH RBC QN AUTO: 30.6 PG (ref 26.5–33)
MCHC RBC AUTO-ENTMCNC: 33.2 G/DL (ref 31.5–36.5)
MCV RBC AUTO: 92 FL (ref 78–100)
MONOCYTES # BLD AUTO: 0.3 10E9/L (ref 0–1.3)
MONOCYTES NFR BLD AUTO: 9 %
NEUTROPHILS # BLD AUTO: 2 10E9/L (ref 1.6–8.3)
NEUTROPHILS NFR BLD AUTO: 56.2 %
NRBC # BLD AUTO: 0 10*3/UL
NRBC BLD AUTO-RTO: 0 /100
PLATELET # BLD AUTO: 148 10E9/L (ref 150–450)
RBC # BLD AUTO: 3.79 10E12/L (ref 4.4–5.9)
WBC # BLD AUTO: 3.6 10E9/L (ref 4–11)

## 2019-12-26 PROCEDURE — 81003 URINALYSIS AUTO W/O SCOPE: CPT | Performed by: PHYSICIAN ASSISTANT

## 2019-12-26 PROCEDURE — 96375 TX/PRO/DX INJ NEW DRUG ADDON: CPT

## 2019-12-26 PROCEDURE — 25000132 ZZH RX MED GY IP 250 OP 250 PS 637: Mod: ZF | Performed by: PHYSICIAN ASSISTANT

## 2019-12-26 PROCEDURE — 25800030 ZZH RX IP 258 OP 636: Mod: ZF | Performed by: PHYSICIAN ASSISTANT

## 2019-12-26 PROCEDURE — 96367 TX/PROPH/DG ADDL SEQ IV INF: CPT

## 2019-12-26 PROCEDURE — 96417 CHEMO IV INFUS EACH ADDL SEQ: CPT

## 2019-12-26 PROCEDURE — 96413 CHEMO IV INFUSION 1 HR: CPT

## 2019-12-26 PROCEDURE — 25000125 ZZHC RX 250: Mod: ZF | Performed by: PHYSICIAN ASSISTANT

## 2019-12-26 PROCEDURE — 25000128 H RX IP 250 OP 636: Mod: ZF | Performed by: INTERNAL MEDICINE

## 2019-12-26 PROCEDURE — 85025 COMPLETE CBC W/AUTO DIFF WBC: CPT | Performed by: PHYSICIAN ASSISTANT

## 2019-12-26 PROCEDURE — 25000128 H RX IP 250 OP 636: Mod: ZF | Performed by: PHYSICIAN ASSISTANT

## 2019-12-26 RX ORDER — HEPARIN SODIUM (PORCINE) LOCK FLUSH IV SOLN 100 UNIT/ML 100 UNIT/ML
500 SOLUTION INTRAVENOUS ONCE
Status: COMPLETED | OUTPATIENT
Start: 2019-12-26 | End: 2019-12-26

## 2019-12-26 RX ORDER — DIPHENHYDRAMINE HCL 25 MG
25 CAPSULE ORAL ONCE
Status: COMPLETED | OUTPATIENT
Start: 2019-12-26 | End: 2019-12-26

## 2019-12-26 RX ORDER — HEPARIN SODIUM (PORCINE) LOCK FLUSH IV SOLN 100 UNIT/ML 100 UNIT/ML
5 SOLUTION INTRAVENOUS ONCE
Status: COMPLETED | OUTPATIENT
Start: 2019-12-26 | End: 2019-12-26

## 2019-12-26 RX ADMIN — Medication 5 ML: at 08:52

## 2019-12-26 RX ADMIN — DIPHENHYDRAMINE HYDROCHLORIDE 25 MG: 25 CAPSULE ORAL at 09:58

## 2019-12-26 RX ADMIN — SODIUM CHLORIDE 250 ML: 9 INJECTION, SOLUTION INTRAVENOUS at 09:58

## 2019-12-26 RX ADMIN — SODIUM CHLORIDE 500 MG: 9 INJECTION, SOLUTION INTRAVENOUS at 10:34

## 2019-12-26 RX ADMIN — DEXAMETHASONE SODIUM PHOSPHATE 20 MG: 10 INJECTION, SOLUTION INTRAMUSCULAR; INTRAVENOUS at 10:07

## 2019-12-26 RX ADMIN — FAMOTIDINE 20 MG: 10 INJECTION INTRAVENOUS at 09:58

## 2019-12-26 RX ADMIN — Medication 500 UNITS: at 12:18

## 2019-12-26 RX ADMIN — PACLITAXEL 145 MG: 6 INJECTION, SOLUTION INTRAVENOUS at 11:16

## 2019-12-26 ASSESSMENT — PAIN SCALES - GENERAL: PAINLEVEL: NO PAIN (0)

## 2019-12-26 NOTE — NURSING NOTE
Chief Complaint   Patient presents with     Port Draw     labs drawn via port by Rn in lab     /64 (BP Location: Left arm, Patient Position: Chair, Cuff Size: Adult Regular)   Pulse 74   Temp 97.9  F (36.6  C) (Oral)   Resp 16   Wt 65.8 kg (145 lb)   SpO2 100%   BMI 22.49 kg/m      Port accessed by RN in lab. Labs collected and sent. Pt tolerated well. Line flushed with Normal Saline & Heparin.   Pt checked in for next appointment.    Germania Carranza, RN

## 2019-12-26 NOTE — PROGRESS NOTES
Infusion Nursing Note:  Daniel Sandhu presents today for C9D15 Cyramza, Taxol.    Patient seen by provider today: No   present during visit today: Not Applicable.    Note: Pt presents to infusion feeling tired after allan. He is still having frequent nose bleeds but he is able to control them and is following with ENT. He also has intermittent congestion with a dry cough throughout winter time. Pt has a small spot on his left forearm that is scabbed over. He says it has been there for a little over a week. If he accidentally knocks the scab off, it bleeds for awhile but he is able to stop it. Instructed to call triage with any signs of infection from the site. Pt verbalized an understanding. No new concerns at this time.     Intravenous Access:  Implanted Port.    Treatment Conditions:  Lab Results   Component Value Date    HGB 11.6 12/26/2019     Lab Results   Component Value Date    WBC 3.6 12/26/2019      Lab Results   Component Value Date    ANEU 2.0 12/26/2019     Lab Results   Component Value Date     12/26/2019      Results reviewed, labs MET treatment parameters, ok to proceed with treatment.  Urine Protein 10.      Post Infusion Assessment:  Patient tolerated infusion without incident.  Blood return noted pre and post infusion.  Site patent and intact, free from redness, edema or discomfort.  No evidence of extravasations.  Access discontinued per protocol.       Discharge Plan:   Prescription refills given for Bactroban.  Discharge instructions reviewed with: Patient.  Patient and/or family verbalized understanding of discharge instructions and all questions answered.  AVS to patient via Oxxy.  Patient will return 1/10 for CT. IB sent to care team for follow up in regards to his next infusion. Plan to be seen on 1/10 and team will decide further treatment at that time.    Patient discharged in stable condition accompanied by: self.  Departure Mode: Ambulatory.  Face to Face time:  0.    Kimberlee Hutton RN

## 2020-01-10 ENCOUNTER — OFFICE VISIT (OUTPATIENT)
Dept: SURGERY | Facility: CLINIC | Age: 40
End: 2020-01-10
Attending: SURGERY
Payer: COMMERCIAL

## 2020-01-10 ENCOUNTER — OFFICE VISIT (OUTPATIENT)
Dept: OTOLARYNGOLOGY | Facility: CLINIC | Age: 40
End: 2020-01-10
Payer: COMMERCIAL

## 2020-01-10 ENCOUNTER — TELEPHONE (OUTPATIENT)
Dept: SURGERY | Facility: CLINIC | Age: 40
End: 2020-01-10

## 2020-01-10 ENCOUNTER — PRE VISIT (OUTPATIENT)
Dept: OTOLARYNGOLOGY | Facility: CLINIC | Age: 40
End: 2020-01-10

## 2020-01-10 ENCOUNTER — ANCILLARY PROCEDURE (OUTPATIENT)
Dept: CT IMAGING | Facility: CLINIC | Age: 40
End: 2020-01-10
Attending: SURGERY
Payer: COMMERCIAL

## 2020-01-10 VITALS
HEART RATE: 87 BPM | OXYGEN SATURATION: 99 % | BODY MASS INDEX: 23.28 KG/M2 | SYSTOLIC BLOOD PRESSURE: 129 MMHG | WEIGHT: 148.3 LBS | TEMPERATURE: 98.3 F | RESPIRATION RATE: 16 BRPM | DIASTOLIC BLOOD PRESSURE: 73 MMHG | HEIGHT: 67 IN

## 2020-01-10 VITALS — BODY MASS INDEX: 23.07 KG/M2 | WEIGHT: 147 LBS | HEIGHT: 67 IN

## 2020-01-10 DIAGNOSIS — C15.9 PRIMARY MALIGNANT NEOPLASM OF ESOPHAGUS WITH METASTASIS TO OTHER SITE (H): Primary | ICD-10-CM

## 2020-01-10 DIAGNOSIS — C15.5 MALIGNANT NEOPLASM OF LOWER THIRD OF ESOPHAGUS (H): ICD-10-CM

## 2020-01-10 DIAGNOSIS — R04.0 EPISTAXIS: Primary | ICD-10-CM

## 2020-01-10 DIAGNOSIS — C78.7 LIVER METASTASES: ICD-10-CM

## 2020-01-10 PROCEDURE — G0463 HOSPITAL OUTPT CLINIC VISIT: HCPCS | Mod: ZF

## 2020-01-10 RX ORDER — HEPARIN SODIUM (PORCINE) LOCK FLUSH IV SOLN 100 UNIT/ML 100 UNIT/ML
5 SOLUTION INTRAVENOUS ONCE
Status: COMPLETED | OUTPATIENT
Start: 2020-01-10 | End: 2020-01-10

## 2020-01-10 RX ORDER — IOPAMIDOL 755 MG/ML
91 INJECTION, SOLUTION INTRAVASCULAR ONCE
Status: COMPLETED | OUTPATIENT
Start: 2020-01-10 | End: 2020-01-10

## 2020-01-10 RX ADMIN — IOPAMIDOL 91 ML: 755 INJECTION, SOLUTION INTRAVASCULAR at 10:13

## 2020-01-10 RX ADMIN — HEPARIN SODIUM (PORCINE) LOCK FLUSH IV SOLN 100 UNIT/ML 5 ML: 100 SOLUTION at 10:25

## 2020-01-10 ASSESSMENT — ENCOUNTER SYMPTOMS
HOARSE VOICE: 1
TROUBLE SWALLOWING: 0
SMELL DISTURBANCE: 0
SINUS PAIN: 0
SINUS CONGESTION: 1
SORE THROAT: 1
NECK MASS: 0
TASTE DISTURBANCE: 0

## 2020-01-10 ASSESSMENT — MIFFLIN-ST. JEOR
SCORE: 1550.54
SCORE: 1556.43

## 2020-01-10 ASSESSMENT — PAIN SCALES - GENERAL
PAINLEVEL: NO PAIN (0)
PAINLEVEL: NO PAIN (0)

## 2020-01-10 NOTE — PROGRESS NOTES
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  Isolated lesion in segment 4b.  He was diagnosed in 2017 and underwent total gastrectomy with partial esophagectomy and R-Y reconstruction.  He had srinivas-operative EOX and did not tolerate this well.  Final pathology showed ypT3N1 disease and almost no response to therapy.  He was found to have biopsy proven recurrence in the left lobe of the liver in 3/2019 and has since been on paclitaxel and targeted therapy w/ Dr. Hood --> Dr. Ponce.  He had some response in the liver tumor over the first 6 months, but the lesion has stabilized since (based on most recent CT scan).  He has tolerated the therapy well, and has not developed evidence of disease progression elsewhere.  He is otherwise healthy, independent, and fit.  He presents for recommendations on surgery for his liver metastasis.     Pertinent Work-Up/Findings: PET-CT from 9/2019 shows only left liver lobe metastatic lesion with overall size decreased compared to prior imaging.  Liver protocol CT today shows stabilization of the lesion with possible growth (may be due to differences in technique and timing of contrast).  No other evidence of metastatic disease.     Pertinent Exam: Abdomen soft, non-tender, and non-distended.  Incisions well-healed.  No jaundice or scleral icterus.  Patient appears well.     Assessment/Counseling/Plan: 39 M w/ metachronous isolated metastasis to the left liver from a previously treated GE junction adenocarcinoma in 2017.  He has had response to 9 months of treatment with paclitaxel and targeted therapy and is tolerating therapy well.  I discussed with the patient the typical natural history of his disease, especially when metastatic.  The outcomes have been shown to be nearly universally poor.  Small case series have demonstrated the feasibility of surgery for liver metastases  from GE junction cancer, but no clear survival benefit has ever been demonstrated.  As such, surgery for liver metastases from his disease is not the standard of care and is not recommended by the NCCN.  The worry is that he takes a total of 3 months off of chemotherapy in order to have surgery and that he progresses during that interval.  The patient was in understanding of this.  If any surgical approach is ever taken, ablation (either percutaneous or laparoscopic) would likely be the best choice in order to minimize or eliminate time away from systemic therapy.  I have discussed his case with Dr. Ponce of medical oncology.  The consensus is to continue with systemic therapy and hold on liver directed therapy at this time.  If the patient continues to shown no progression and no other evidence of disease, this may inform a more indolent biology that would lend itself to local therapy for the isolated liver lesion in the future.  Patient to continue with medical oncology at this time.  All questions were answered.  The patient was in agreement with and understanding of the above plan.     A total of 20 minutes of face to face time was spent on this case, of which, more than half (50%) was spent in counseling and coordination of care.

## 2020-01-10 NOTE — NURSING NOTE
"Oncology Rooming Note    January 10, 2020 11:47 AM   Daniel Sandhu is a 39 year old male who presents for:    Chief Complaint   Patient presents with     Oncology Clinic Visit     [facility] RETURN- MALIGNANT NEAOPLASM OF LOWER THIRD OF ESOPHAGUS     Initial Vitals: /73 (BP Location: Right arm, Patient Position: Chair, Cuff Size: Adult Regular)   Pulse 87   Temp 98.3  F (36.8  C) (Oral)   Resp 16   Ht 1.71 m (5' 7.32\")   Wt 67.3 kg (148 lb 4.8 oz)   SpO2 99%   BMI 23.00 kg/m   Estimated body mass index is 23 kg/m  as calculated from the following:    Height as of this encounter: 1.71 m (5' 7.32\").    Weight as of this encounter: 67.3 kg (148 lb 4.8 oz). Body surface area is 1.79 meters squared.  No Pain (0) Comment: Data Unavailable   No LMP for male patient.  Allergies reviewed: Yes  Medications reviewed: Yes    Medications: Medication refills not needed today.  Pharmacy name entered into EPIC:    [facility] - [place], [place] - [address] [facility] - [place], [place] - [address] [facility] [place], [place] - [phone]    Clinical concerns: No new concerns. Dr. Chau was notified.      Ricky Lopez LPN            "

## 2020-01-10 NOTE — NURSING NOTE
"Chief Complaint   Patient presents with     Consult     Epistaxis     Height 1.71 m (5' 7.32\"), weight 66.7 kg (147 lb).    Donna Vaughan, EMT    "

## 2020-01-10 NOTE — PROGRESS NOTES
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     Interval History: Daniel Sandhu returns for follow up after seeing Dr. Barrios, who ID'd susp lesion of RT caudal septum. Pt still c/o mild epistaxis on RT.       [place] [facility] History  n/a    Review of systems: A 14-point review of systems has been conducted and is negative for any notable symptoms, except as dictated in the history of present illness.     Physical Exam:  Vital signs: Ht 1.71 m (5' 7.32\")   Wt 66.7 kg (147 lb)   BMI 22.80 kg/m     General Appearance: No acute distress, appropriate demeanor, conversant  Eyes: moist conjunctivae; EOMI; pupils symmetric; visual acuity grossly intact; no proptosis  Head: normocephalic; overall symmetric appearance without deformity  Face: overall symmetric without deformity; HB I-VI  Ears: Normal appearance of external ear; external meatus normal in appearance; TMs intact without perforation bilaterally;   Nose: No external deformity; septum relatively midline; see nasal endo  Oral Cavity/oropharynx: Normal appearance of mucosa; tongue midline; no mass or lesions; oropharynx without obvious mucosal abnormality  Neck: no palpable lymphadenopathy; thyroid without palpable nodules  Lungs: symmetric chest rise; no wheezing  CV: Good distal perfusion; normal hear rate  Extremities: No deformity  Neurologic Exam: Cranial nerves II-XII are grossly intact; no focal deficity      Procedure Note  Procedure performed: Rigid nasal endoscopy with control of epistaxis (right)  Indication: epistaxis   Anesthesia: 4% topical lidocaine with 0.05% " oxymetazoline  Description of procedure: A 30 degree, 3 mm rigid endoscope was inserted into bilateral nasal cavities and the nasal valves, nasal cavity, middle meatus, sphenoethmoid recess, and nasopharynx were thoroughly evaluated for evidence of a potential source of epistaxis. After the source of epistaxis was identified, topical anesthesia consisting of  4% topical lidocaine with 0.05% oxymetazoline was applied over the area to be cauterized and silver nitrate cautery was applied under endoscopic visualization.         Findings  Cautery of RT caudal septal vessels with silver nitrate under endo visualization     The patient tolerated the procedure well without complication.     Assessment/Medical Decision Making/Plan:  RT endo cautery today  Discussed moisturization techniques and avoiding excessive trauma to nose, nose blowing, etc.   RTC as need      Rod Campos MD    [facility]  Rhinology, Endoscopic Skull Base Surgery  [facility]  Department of Otolaryngology - Head & Neck Surgery    Additional portions of the patient's have been reviewed below.   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        Past Surgical History:   Procedure Laterality Date     ESOPHAGOGASTRODUODENOSCOPY       ESOPHAGOSCOPY, GASTROSCOPY, DUODENOSCOPY (EGD), COMBINED N/A 6/9/2017    Procedure: COMBINED ENDOSCOPIC ULTRASOUND, ESOPHAGOSCOPY, GASTROSCOPY, DUODENOSCOPY (EGD), FINE NEEDLE ASPIRATE/BIOPSY;  Upper Endoscopic Ultrasound, fine needle aspirate/biopsy;  Surgeon: Guru Mark Avila MD;  Location:  OR     ESOPHAGOSCOPY, GASTROSCOPY, DUODENOSCOPY (EGD), COMBINED N/A 11/3/2017    Procedure: COMBINED ESOPHAGOSCOPY, GASTROSCOPY, DUODENOSCOPY (EGD);;  Surgeon: Carlito  Yunior Guardado MD;  Location: [facility] OR     ESOPHAGOSCOPY, GASTROSCOPY, DUODENOSCOPY (EGD), COMBINED N/A 11/9/2017    Procedure: COMBINED ESOPHAGOSCOPY, GASTROSCOPY, DUODENOSCOPY (EGD);  Esophogastroduodenoscopy, take out pharangostomy tube;  Surgeon: Yunior Esteban MD;  Location: [facility] OR     ESOPHAGOSCOPY, GASTROSCOPY, DUODENOSCOPY (EGD), COMBINED N/A 1/11/2018    Procedure: COMBINED ESOPHAGOSCOPY, GASTROSCOPY, DUODENOSCOPY (EGD), BIOPSY SINGLE OR MULTIPLE;  COMBINED ESOPHAGOSCOPY, GASTROSCOPY, DUODENOSCOPY (EGD);  Surgeon: Alessandro Mcgregor MD;  Location: [facility] GI     GASTRECTOMY N/A 11/3/2017    Procedure: GASTRECTOMY;;  Surgeon: Yunior Esteban MD;  Location: [facility] OR     HAND SURGERY      childhood, torn tendon     INSERT PORT VASCULAR ACCESS Right 6/22/2017    Procedure: INSERT PORT VASCULAR ACCESS;  Single Lumen Chest Power Port;  Surgeon: Iván Driver PA-C;  Location: [facility] OR     INSERT PORT VASCULAR ACCESS Right 4/8/2019    Procedure: Single Lumen Chest Port Placement;  Surgeon: Krysten Ballard PA-C;  Location: [facility] OR     IR CHEST PORT PLACEMENT > 5 YRS OF AGE  4/8/2019     IR LIVER BIOPSY PERCUTANEOUS  3/20/2019     LAPAROSCOPY DIAGNOSTIC (GENERAL) N/A 11/3/2017    Procedure: LAPAROSCOPY DIAGNOSTIC (GENERAL);  diagnostic laparoscopy, right chest tube, total gastrectomy with distal esophagectomy, intrathoracic eveline-y esophago-jejunostomy, feeding jejunostomy, pharyngostomy, esophagogastroduodenoscopy, flexible bronchoscopy;  Surgeon: Yunior Esteban MD;  Location: [facility] OR     LAPAROTOMY EXPLORATORY N/A 11/3/2017    Procedure: LAPAROTOMY EXPLORATORY;;  Surgeon: Yunior Esteban MD;  Location: [facility] OR     PHARYNGOSTOMY N/A 11/3/2017    Procedure: PHARYNGOSTOMY;;  Surgeon: Yunior Esteban MD;  Location: [facility] OR     REMOVE PORT VASCULAR ACCESS Right 3/19/2018    Procedure: REMOVE PORT VASCULAR ACCESS;  Right Port Removal;  Surgeon: Krysten Hopper PA-C;  Location:   OR     THORACOTOMY Left 11/3/2017    Procedure: THORACOTOMY;;  Surgeon: Yunior Esteban MD;  Location: [facility] OR        Family History   Problem Relation Age of Onset     Other - See Comments Father         sepsis     Cancer Sister         breast cancer  29 yo 1/2 sister      Cancer Paternal Grandmother         breast        Social History     Socioeconomic History     Marital status:      Spouse name: None     Number of children: 1     Years of education: None     Highest education level: None   Occupational History     Occupation: musician and teacher    Social Needs     Financial resource strain: None     Food insecurity:     Worry: None     Inability: None     Transportation needs:     Medical: None     Non-medical: None   Tobacco Use     Smoking status: Never Smoker     Smokeless tobacco: Never Used   Substance and Sexual Activity     Alcohol use: Yes     Comment: 1 beer daily     Drug use: Yes     Types: Marijuana     Comment: occasional     Sexual activity: Yes     Partners: Female     Birth control/protection: Natural Family Planning   Lifestyle     Physical activity:     Days per week: None     Minutes per session: None     Stress: None   Relationships     Social connections:     Talks on phone: None     Gets together: None     Attends Taoist service: None     Active member of club or organization: None     Attends meetings of clubs or organizations: None     Relationship status: None     Intimate partner violence:     Fear of current or ex partner: None     Emotionally abused: None     Physically abused: None     Forced sexual activity: None   Other Topics Concern     Parent/sibling w/ CABG, MI or angioplasty before 65F 55M? Not Asked   Social History Narrative     None

## 2020-01-10 NOTE — LETTER
1/10/2020       RE: Daniel Sandhu  [address] [id number]     Dear Colleague,    Thank you for referring your patient, Daniel Sandhu, to the [facility]. Please see a copy of my visit note below.    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  Isolated lesion in segment 4b.  He was diagnosed in 2017 and underwent total gastrectomy with partial esophagectomy and R-Y reconstruction.  He had srinivas-operative EOX and did not tolerate this well.  Final pathology showed ypT3N1 disease and almost no response to therapy.  He was found to have biopsy proven recurrence in the left lobe of the liver in 3/2019 and has since been on paclitaxel and targeted therapy w/ Dr. Hood --> Dr. Ponce.  He had some response in the liver tumor over the first 6 months, but the lesion has stabilized since (based on most recent CT scan).  He has tolerated the therapy well, and has not developed evidence of disease progression elsewhere.  He is otherwise healthy, independent, and fit.  He presents for recommendations on surgery for his liver metastasis.     Pertinent Work-Up/Findings: PET-CT from 9/2019 shows only left liver lobe metastatic lesion with overall size decreased compared to prior imaging.  Liver protocol CT today shows stabilization of the lesion with possible growth (may be due to differences in technique and timing of contrast).  No other evidence of metastatic disease.     Pertinent Exam: Abdomen soft, non-tender, and non-distended.  Incisions well-healed.  No jaundice or scleral icterus.  Patient appears well.     Assessment/Counseling/Plan: 39 M w/ metachronous isolated metastasis to the left liver from a previously treated GE junction adenocarcinoma in 2017.  He has had response to 9 months of treatment with paclitaxel and targeted therapy and is tolerating therapy well.  I  discussed with the patient the typical natural history of his disease, especially when metastatic.  The outcomes have been shown to be nearly universally poor.  Small case series have demonstrated the feasibility of surgery for liver metastases from GE junction cancer, but no clear survival benefit has ever been demonstrated.  As such, surgery for liver metastases from his disease is not the standard of care and is not recommended by the NCCN.  The worry is that he takes a total of 3 months off of chemotherapy in order to have surgery and that he progresses during that interval.  The patient was in understanding of this.   If any surgical approach is ever taken, ablation (either percutaneous or laparoscopic) would likely be the best choice in order to minimize or eliminate time away from systemic therapy.  I have discussed his case with Dr. Ponce of medical oncology.  The consensus is to continue with systemic therapy and hold on liver directed therapy at this time.  If the patient continues to shown no progression and no other evidence of disease, this may inform a more indolent biology that would lend itself to local therapy for the isolated liver lesion in the future.  Patient to continue with medical oncology at this time.  All questions were answered.  The patient was in agreement with and understanding of the above plan.     A total of 20 minutes of face to face time was spent on this case, of which, more than half (50%) was spent in counseling and coordination of care.    Again, thank you for allowing me to participate in the care of your patient.      Sincerely,    Dwight hCau MD

## 2020-01-10 NOTE — TELEPHONE ENCOUNTER
I called the patient to let him know that I discussed his case with Dr. Ponce and that we will not pursue liver directed therapy at this time.  I also informed him that his lesion on imaging is likely stable and differences noted are likely due to differences in contrast timing.  All questions answered.  The patient was in agreement with and understanding of the plan.

## 2020-01-10 NOTE — LETTER
"1/10/2020       RE: Daniel Sandhu  [address] [address]     Dear Colleague,    Thank you for referring your patient, Daniel Sandhu, to the [facility] EAR NOSE [facility]AT at [facility]. Please see a copy of my visit note below.        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     Interval History: Daniel Sandhu returns for follow up after seeing Dr. Barrios, who ID'd susp lesion of RT caudal septum. Pt still c/o mild epistaxis on RT.       [place] [facility]  n/a    Review of systems: A 14-point review of systems has been conducted and is negative for any notable symptoms, except as dictated in the history of present illness.     Physical Exam:  Vital signs: Ht 1.71 m (5' 7.32\")   Wt 66.7 kg (147 lb)   BMI 22.80 kg/m      General Appearance: No acute distress, appropriate demeanor, conversant  Eyes: moist conjunctivae; EOMI; pupils symmetric; visual acuity grossly intact; no proptosis  Head: normocephalic; overall symmetric appearance without deformity  Face: overall symmetric without deformity; HB I-VI  Ears: Normal appearance of external ear; external meatus normal in appearance; TMs intact without perforation bilaterally;   Nose: No external deformity; septum relatively midline; see nasal endo  Oral Cavity/oropharynx: Normal appearance of mucosa; tongue midline; no mass or lesions; oropharynx without obvious mucosal abnormality  Neck: no palpable lymphadenopathy; thyroid without palpable nodules  Lungs: symmetric chest rise; no wheezing  CV: Good distal perfusion; normal hear rate  Extremities: No " deformity  Neurologic Exam: Cranial nerves II-XII are grossly intact; no focal deficity      Procedure Note  Procedure performed: Rigid nasal endoscopy with control of epistaxis (right)  Indication: epistaxis   Anesthesia: 4% topical lidocaine with 0.05% oxymetazoline  Description of procedure: A 30 degree, 3 mm rigid endoscope was inserted into bilateral nasal cavities and the nasal valves, nasal cavity, middle meatus, sphenoethmoid recess, and nasopharynx were thoroughly evaluated for evidence of a potential source of epistaxis. After the source of epistaxis was identified, topical anesthesia consisting of  4% topical lidocaine with 0.05% oxymetazoline was applied over the area to be cauterized and silver nitrate cautery was applied under endoscopic visualization.         Findings  Cautery of RT caudal septal vessels with silver nitrate under endo visualization     The patient tolerated the procedure well without complication.     Assessment/Medical Decision Making/Plan:  RT endo cautery today  Discussed moisturization techniques and avoiding excessive trauma to nose, nose blowing, etc.   RTC as need      Rod Campos MD    [facility]  Rhinology, Endoscopic Skull Base Surgery  [facility]  Department of Otolaryngology - Head & Neck Surgery    Additional portions of the patient's have been reviewed below.   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        Past Surgical History:   Procedure Laterality Date     ESOPHAGOGASTRODUODENOSCOPY       ESOPHAGOSCOPY, GASTROSCOPY, DUODENOSCOPY (EGD), COMBINED N/A 6/9/2017    Procedure: COMBINED ENDOSCOPIC ULTRASOUND, ESOPHAGOSCOPY, GASTROSCOPY, DUODENOSCOPY (EGD), FINE NEEDLE ASPIRATE/BIOPSY;  Upper Endoscopic Ultrasound, fine  needle aspirate/biopsy;  Surgeon: Guru Mark Avila MD;  [facility]: [facility] OR     ESOPHAGOSCOPY, GASTROSCOPY, DUODENOSCOPY (EGD), COMBINED N/A 11/3/2017    Procedure: COMBINED ESOPHAGOSCOPY, GASTROSCOPY, DUODENOSCOPY (EGD);;  Surgeon: Yunior Esteban MD;  Location: [facility] OR     ESOPHAGOSCOPY, GASTROSCOPY, DUODENOSCOPY (EGD), COMBINED N/A 11/9/2017    Procedure: COMBINED ESOPHAGOSCOPY, GASTROSCOPY, DUODENOSCOPY (EGD);  Esophogastroduodenoscopy, take out pharangostomy tube;  Surgeon: Yunior Esteban MD;  Location: [facility] OR     ESOPHAGOSCOPY, GASTROSCOPY, DUODENOSCOPY (EGD), COMBINED N/A 1/11/2018    Procedure: COMBINED ESOPHAGOSCOPY, GASTROSCOPY, DUODENOSCOPY (EGD), BIOPSY SINGLE OR MULTIPLE;  COMBINED ESOPHAGOSCOPY, GASTROSCOPY, DUODENOSCOPY (EGD);  Surgeon: Alessandro Mcgregor MD;  Location: [facility] GI     GASTRECTOMY N/A 11/3/2017    Procedure: GASTRECTOMY;;  Surgeon: Yunior Esteban MD;  Location: [facility] OR     HAND SURGERY      childhood, torn tendon     INSERT PORT VASCULAR ACCESS Right 6/22/2017    Procedure: INSERT PORT VASCULAR ACCESS;  Single Lumen Chest Power Port;  Surgeon: Iván Driver PA-C;  [facility] OR     INSERT PORT VASCULAR ACCESS Right 4/8/2019    Procedure: Single Lumen Chest Port Placement;  Surgeon: Krysten Ballard PA-C;  [facility] OR     IR CHEST PORT PLACEMENT > 5 YRS OF AGE  4/8/2019     IR LIVER BIOPSY PERCUTANEOUS  3/20/2019     LAPAROSCOPY DIAGNOSTIC (GENERAL) N/A 11/3/2017    Procedure: LAPAROSCOPY DIAGNOSTIC (GENERAL);  diagnostic laparoscopy, right chest tube, total gastrectomy with distal esophagectomy, intrathoracic eveline-y esophago-jejunostomy, feeding jejunostomy, pharyngostomy, esophagogastroduodenoscopy, flexible bronchoscopy;  Surgeon: Yunior Esteban MD;  Location: [facility] OR     LAPAROTOMY EXPLORATORY N/A 11/3/2017    Procedure: LAPAROTOMY EXPLORATORY;;  Surgeon: Yunior Esteban MD;  [facility]: [facility]     PHARYNGOSTOMY  N/A 11/3/2017    Procedure: PHARYNGOSTOMY;;  Surgeon: Yunior Esteban MD;  [facility]: [facility] OR     REMOVE PORT VASCULAR ACCESS Right 3/19/2018    Procedure: REMOVE PORT VASCULAR ACCESS;  Right Port Removal;  Surgeon: Krysten Hopper PA-C;  [facility]     THORACOTOMY Left 11/3/2017    Procedure: THORACOTOMY;;  Surgeon: Yunior Esteban MD;  Location: [facility] OR        Family History   Problem Relation Age of Onset     Other - See Comments Father         sepsis     Cancer Sister         breast cancer  29 yo 1/2 sister      Cancer Paternal Grandmother         breast        Social History     Socioeconomic History     Marital status:      Spouse name: None     Number of children: 1     Years of education: None     Highest education level: None   Occupational History     Occupation: musician and teacher    Social Needs     Financial resource strain: None     Food insecurity:     Worry: None     Inability: None     Transportation needs:     Medical: None     Non-medical: None   Tobacco Use     Smoking status: Never Smoker     Smokeless tobacco: Never Used   Substance and Sexual Activity     Alcohol use: Yes     Comment: 1 beer daily     Drug use: Yes     Types: Marijuana     Comment: occasional     Sexual activity: Yes     Partners: Female     Birth control/protection: Natural Family Planning   Lifestyle     Physical activity:     Days per week: None     Minutes per session: None     Stress: None   Relationships     Social connections:     Talks on phone: None     Gets together: None     Attends Denominational service: None     Active member of club or organization: None     Attends meetings of clubs or organizations: None     Relationship status: None     Intimate partner violence:     Fear of current or ex partner: None     Emotionally abused: None     Physically abused: None     Forced sexual activity: None   Other Topics Concern     Parent/sibling w/ CABG, MI or angioplasty before 65F 55M? Not  Asked   Social History Narrative     None              Again, thank you for allowing me to participate in the care of your patient.      Sincerely,    Rod Campos MD

## 2020-01-13 DIAGNOSIS — C15.5 MALIGNANT NEOPLASM OF LOWER THIRD OF ESOPHAGUS (H): Primary | ICD-10-CM

## 2020-01-13 DIAGNOSIS — C78.7 MALIGNANT NEOPLASM METASTATIC TO LIVER (H): ICD-10-CM

## 2020-01-15 ENCOUNTER — APPOINTMENT (OUTPATIENT)
Dept: LAB | Facility: CLINIC | Age: 40
End: 2020-01-15
Attending: INTERNAL MEDICINE
Payer: COMMERCIAL

## 2020-01-15 ENCOUNTER — INFUSION THERAPY VISIT (OUTPATIENT)
Dept: ONCOLOGY | Facility: CLINIC | Age: 40
End: 2020-01-15
Attending: PHYSICIAN ASSISTANT
Payer: COMMERCIAL

## 2020-01-15 VITALS
DIASTOLIC BLOOD PRESSURE: 53 MMHG | TEMPERATURE: 97.8 F | HEART RATE: 71 BPM | BODY MASS INDEX: 23.16 KG/M2 | RESPIRATION RATE: 20 BRPM | SYSTOLIC BLOOD PRESSURE: 109 MMHG | OXYGEN SATURATION: 100 % | WEIGHT: 149.3 LBS

## 2020-01-15 DIAGNOSIS — C15.5 MALIGNANT NEOPLASM OF LOWER THIRD OF ESOPHAGUS (H): Primary | ICD-10-CM

## 2020-01-15 LAB
ALBUMIN SERPL-MCNC: 3.6 G/DL (ref 3.4–5)
ALBUMIN UR-MCNC: NEGATIVE MG/DL
ALP SERPL-CCNC: 53 U/L (ref 40–150)
ALT SERPL W P-5'-P-CCNC: 29 U/L (ref 0–70)
AST SERPL W P-5'-P-CCNC: 19 U/L (ref 0–45)
BASOPHILS # BLD AUTO: 0 10E9/L (ref 0–0.2)
BASOPHILS NFR BLD AUTO: 0.7 %
BILIRUB DIRECT SERPL-MCNC: <0.1 MG/DL (ref 0–0.2)
BILIRUB SERPL-MCNC: 0.3 MG/DL (ref 0.2–1.3)
CEA SERPL-MCNC: <0.5 UG/L (ref 0–2.5)
DIFFERENTIAL METHOD BLD: ABNORMAL
EOSINOPHIL # BLD AUTO: 0.1 10E9/L (ref 0–0.7)
EOSINOPHIL NFR BLD AUTO: 1.5 %
ERYTHROCYTE [DISTWIDTH] IN BLOOD BY AUTOMATED COUNT: 13.2 % (ref 10–15)
HCT VFR BLD AUTO: 36.6 % (ref 40–53)
HGB BLD-MCNC: 12.1 G/DL (ref 13.3–17.7)
IMM GRANULOCYTES # BLD: 0 10E9/L (ref 0–0.4)
IMM GRANULOCYTES NFR BLD: 0.2 %
LYMPHOCYTES # BLD AUTO: 1.1 10E9/L (ref 0.8–5.3)
LYMPHOCYTES NFR BLD AUTO: 18.5 %
MCH RBC QN AUTO: 30.9 PG (ref 26.5–33)
MCHC RBC AUTO-ENTMCNC: 33.1 G/DL (ref 31.5–36.5)
MCV RBC AUTO: 93 FL (ref 78–100)
MONOCYTES # BLD AUTO: 0.6 10E9/L (ref 0–1.3)
MONOCYTES NFR BLD AUTO: 9.8 %
NEUTROPHILS # BLD AUTO: 4.1 10E9/L (ref 1.6–8.3)
NEUTROPHILS NFR BLD AUTO: 69.3 %
NRBC # BLD AUTO: 0 10*3/UL
NRBC BLD AUTO-RTO: 0 /100
PLATELET # BLD AUTO: 140 10E9/L (ref 150–450)
PROT SERPL-MCNC: 7 G/DL (ref 6.8–8.8)
RBC # BLD AUTO: 3.92 10E12/L (ref 4.4–5.9)
WBC # BLD AUTO: 5.9 10E9/L (ref 4–11)

## 2020-01-15 PROCEDURE — 85025 COMPLETE CBC W/AUTO DIFF WBC: CPT | Performed by: PHYSICIAN ASSISTANT

## 2020-01-15 PROCEDURE — 81003 URINALYSIS AUTO W/O SCOPE: CPT | Performed by: PHYSICIAN ASSISTANT

## 2020-01-15 PROCEDURE — 25000128 H RX IP 250 OP 636: Mod: ZF | Performed by: PHYSICIAN ASSISTANT

## 2020-01-15 PROCEDURE — 25800030 ZZH RX IP 258 OP 636: Mod: ZF | Performed by: PHYSICIAN ASSISTANT

## 2020-01-15 PROCEDURE — 96413 CHEMO IV INFUSION 1 HR: CPT

## 2020-01-15 PROCEDURE — 80076 HEPATIC FUNCTION PANEL: CPT | Performed by: PHYSICIAN ASSISTANT

## 2020-01-15 PROCEDURE — 96367 TX/PROPH/DG ADDL SEQ IV INF: CPT

## 2020-01-15 PROCEDURE — 82378 CARCINOEMBRYONIC ANTIGEN: CPT | Performed by: PHYSICIAN ASSISTANT

## 2020-01-15 PROCEDURE — 96375 TX/PRO/DX INJ NEW DRUG ADDON: CPT

## 2020-01-15 PROCEDURE — 25000125 ZZHC RX 250: Mod: ZF | Performed by: PHYSICIAN ASSISTANT

## 2020-01-15 PROCEDURE — 96417 CHEMO IV INFUS EACH ADDL SEQ: CPT

## 2020-01-15 PROCEDURE — 25000132 ZZH RX MED GY IP 250 OP 250 PS 637: Mod: ZF | Performed by: PHYSICIAN ASSISTANT

## 2020-01-15 RX ORDER — LORAZEPAM 2 MG/ML
0.5 INJECTION INTRAMUSCULAR EVERY 4 HOURS PRN
Status: CANCELLED
Start: 2020-01-22

## 2020-01-15 RX ORDER — EPINEPHRINE 1 MG/ML
0.3 INJECTION, SOLUTION INTRAMUSCULAR; SUBCUTANEOUS EVERY 5 MIN PRN
Status: CANCELLED | OUTPATIENT
Start: 2020-01-22

## 2020-01-15 RX ORDER — ALBUTEROL SULFATE 90 UG/1
1-2 AEROSOL, METERED RESPIRATORY (INHALATION)
Status: CANCELLED
Start: 2020-01-22

## 2020-01-15 RX ORDER — DIPHENHYDRAMINE HYDROCHLORIDE 50 MG/ML
50 INJECTION INTRAMUSCULAR; INTRAVENOUS
Status: CANCELLED
Start: 2020-01-22

## 2020-01-15 RX ORDER — HEPARIN SODIUM (PORCINE) LOCK FLUSH IV SOLN 100 UNIT/ML 100 UNIT/ML
500 SOLUTION INTRAVENOUS ONCE
Status: CANCELLED | OUTPATIENT
Start: 2020-01-22

## 2020-01-15 RX ORDER — ALBUTEROL SULFATE 90 UG/1
1-2 AEROSOL, METERED RESPIRATORY (INHALATION)
Status: CANCELLED
Start: 2020-01-15

## 2020-01-15 RX ORDER — ALBUTEROL SULFATE 90 UG/1
1-2 AEROSOL, METERED RESPIRATORY (INHALATION)
Status: CANCELLED
Start: 2020-01-29

## 2020-01-15 RX ORDER — MEPERIDINE HYDROCHLORIDE 25 MG/ML
25 INJECTION INTRAMUSCULAR; INTRAVENOUS; SUBCUTANEOUS EVERY 30 MIN PRN
Status: CANCELLED | OUTPATIENT
Start: 2020-01-29

## 2020-01-15 RX ORDER — METHYLPREDNISOLONE SODIUM SUCCINATE 125 MG/2ML
125 INJECTION, POWDER, LYOPHILIZED, FOR SOLUTION INTRAMUSCULAR; INTRAVENOUS
Status: CANCELLED
Start: 2020-01-15

## 2020-01-15 RX ORDER — DIPHENHYDRAMINE HYDROCHLORIDE 50 MG/ML
50 INJECTION INTRAMUSCULAR; INTRAVENOUS
Status: CANCELLED
Start: 2020-01-29

## 2020-01-15 RX ORDER — HEPARIN SODIUM (PORCINE) LOCK FLUSH IV SOLN 100 UNIT/ML 100 UNIT/ML
500 SOLUTION INTRAVENOUS ONCE
Status: CANCELLED | OUTPATIENT
Start: 2020-01-29

## 2020-01-15 RX ORDER — LORAZEPAM 2 MG/ML
0.5 INJECTION INTRAMUSCULAR EVERY 4 HOURS PRN
Status: CANCELLED
Start: 2020-01-15

## 2020-01-15 RX ORDER — DIPHENHYDRAMINE HYDROCHLORIDE 50 MG/ML
50 INJECTION INTRAMUSCULAR; INTRAVENOUS
Status: CANCELLED
Start: 2020-01-15

## 2020-01-15 RX ORDER — LORAZEPAM 2 MG/ML
0.5 INJECTION INTRAMUSCULAR EVERY 4 HOURS PRN
Status: CANCELLED
Start: 2020-01-29

## 2020-01-15 RX ORDER — ALBUTEROL SULFATE 0.83 MG/ML
2.5 SOLUTION RESPIRATORY (INHALATION)
Status: CANCELLED | OUTPATIENT
Start: 2020-01-15

## 2020-01-15 RX ORDER — DIPHENHYDRAMINE HCL 25 MG
25 CAPSULE ORAL ONCE
Status: COMPLETED | OUTPATIENT
Start: 2020-01-15 | End: 2020-01-15

## 2020-01-15 RX ORDER — EPINEPHRINE 0.3 MG/.3ML
0.3 INJECTION SUBCUTANEOUS EVERY 5 MIN PRN
Status: CANCELLED | OUTPATIENT
Start: 2020-01-15

## 2020-01-15 RX ORDER — ALBUTEROL SULFATE 0.83 MG/ML
2.5 SOLUTION RESPIRATORY (INHALATION)
Status: CANCELLED | OUTPATIENT
Start: 2020-01-22

## 2020-01-15 RX ORDER — EPINEPHRINE 0.3 MG/.3ML
0.3 INJECTION SUBCUTANEOUS EVERY 5 MIN PRN
Status: CANCELLED | OUTPATIENT
Start: 2020-01-22

## 2020-01-15 RX ORDER — HEPARIN SODIUM (PORCINE) LOCK FLUSH IV SOLN 100 UNIT/ML 100 UNIT/ML
500 SOLUTION INTRAVENOUS ONCE
Status: COMPLETED | OUTPATIENT
Start: 2020-01-15 | End: 2020-01-15

## 2020-01-15 RX ORDER — MEPERIDINE HYDROCHLORIDE 25 MG/ML
25 INJECTION INTRAMUSCULAR; INTRAVENOUS; SUBCUTANEOUS EVERY 30 MIN PRN
Status: CANCELLED | OUTPATIENT
Start: 2020-01-15

## 2020-01-15 RX ORDER — METHYLPREDNISOLONE SODIUM SUCCINATE 125 MG/2ML
125 INJECTION, POWDER, LYOPHILIZED, FOR SOLUTION INTRAMUSCULAR; INTRAVENOUS
Status: CANCELLED
Start: 2020-01-22

## 2020-01-15 RX ORDER — DIPHENHYDRAMINE HCL 25 MG
25 CAPSULE ORAL ONCE
Status: CANCELLED | OUTPATIENT
Start: 2020-01-29

## 2020-01-15 RX ORDER — SODIUM CHLORIDE 9 MG/ML
1000 INJECTION, SOLUTION INTRAVENOUS CONTINUOUS PRN
Status: CANCELLED
Start: 2020-01-29

## 2020-01-15 RX ORDER — SODIUM CHLORIDE 9 MG/ML
1000 INJECTION, SOLUTION INTRAVENOUS CONTINUOUS PRN
Status: CANCELLED
Start: 2020-01-15

## 2020-01-15 RX ORDER — ALBUTEROL SULFATE 0.83 MG/ML
2.5 SOLUTION RESPIRATORY (INHALATION)
Status: CANCELLED | OUTPATIENT
Start: 2020-01-29

## 2020-01-15 RX ORDER — HEPARIN SODIUM (PORCINE) LOCK FLUSH IV SOLN 100 UNIT/ML 100 UNIT/ML
5 SOLUTION INTRAVENOUS EVERY 8 HOURS
Status: DISCONTINUED | OUTPATIENT
Start: 2020-01-15 | End: 2020-01-15 | Stop reason: HOSPADM

## 2020-01-15 RX ORDER — EPINEPHRINE 1 MG/ML
0.3 INJECTION, SOLUTION INTRAMUSCULAR; SUBCUTANEOUS EVERY 5 MIN PRN
Status: CANCELLED | OUTPATIENT
Start: 2020-01-29

## 2020-01-15 RX ORDER — SODIUM CHLORIDE 9 MG/ML
1000 INJECTION, SOLUTION INTRAVENOUS CONTINUOUS PRN
Status: CANCELLED
Start: 2020-01-22

## 2020-01-15 RX ORDER — METHYLPREDNISOLONE SODIUM SUCCINATE 125 MG/2ML
125 INJECTION, POWDER, LYOPHILIZED, FOR SOLUTION INTRAMUSCULAR; INTRAVENOUS
Status: CANCELLED
Start: 2020-01-29

## 2020-01-15 RX ORDER — EPINEPHRINE 0.3 MG/.3ML
0.3 INJECTION SUBCUTANEOUS EVERY 5 MIN PRN
Status: CANCELLED | OUTPATIENT
Start: 2020-01-29

## 2020-01-15 RX ORDER — EPINEPHRINE 1 MG/ML
0.3 INJECTION, SOLUTION INTRAMUSCULAR; SUBCUTANEOUS EVERY 5 MIN PRN
Status: CANCELLED | OUTPATIENT
Start: 2020-01-15

## 2020-01-15 RX ORDER — MEPERIDINE HYDROCHLORIDE 25 MG/ML
25 INJECTION INTRAMUSCULAR; INTRAVENOUS; SUBCUTANEOUS EVERY 30 MIN PRN
Status: CANCELLED | OUTPATIENT
Start: 2020-01-22

## 2020-01-15 RX ADMIN — SODIUM CHLORIDE 500 MG: 9 INJECTION, SOLUTION INTRAVENOUS at 12:30

## 2020-01-15 RX ADMIN — SODIUM CHLORIDE 250 ML: 9 INJECTION, SOLUTION INTRAVENOUS at 11:49

## 2020-01-15 RX ADMIN — Medication 500 UNITS: at 14:12

## 2020-01-15 RX ADMIN — DEXAMETHASONE SODIUM PHOSPHATE 20 MG: 10 INJECTION, SOLUTION INTRAMUSCULAR; INTRAVENOUS at 12:01

## 2020-01-15 RX ADMIN — FAMOTIDINE 20 MG: 10 INJECTION INTRAVENOUS at 11:53

## 2020-01-15 RX ADMIN — DIPHENHYDRAMINE HYDROCHLORIDE 25 MG: 25 CAPSULE ORAL at 11:49

## 2020-01-15 RX ADMIN — PACLITAXEL 145 MG: 6 INJECTION, SOLUTION INTRAVENOUS at 13:10

## 2020-01-15 RX ADMIN — Medication 5 ML: at 10:52

## 2020-01-15 ASSESSMENT — PAIN SCALES - GENERAL: PAINLEVEL: NO PAIN (0)

## 2020-01-15 NOTE — NURSING NOTE
Chief Complaint   Patient presents with     Port Draw     Right port accessed with a gripper needle, labs drawn from port, flushed with saline and heparin, gauze dressing applied, vitals completed, checked into next appointment.   Xochitl Diop RN

## 2020-01-15 NOTE — PROGRESS NOTES
"Infusion Nursing Note:  Daniel Sandhu presents today for C10 D1 Cyramza/Taxol.    Patient seen by provider today: No   present during visit today: Not Applicable.    Note: Patient feeling well today. Saw ENT specialist last week and had cauterization performed and has not had nose bleeds since. Does report \"scratchy throat\" but denies pain. Woke up during the night with some congestion but has improved. Otherwise reports good energy levels and no other acute concerns.    Intravenous Access:  Implanted Port.    Treatment Conditions:  Lab Results   Component Value Date    HGB 12.1 01/15/2020     Lab Results   Component Value Date    WBC 5.9 01/15/2020      Lab Results   Component Value Date    ANEU 4.1 01/15/2020     Lab Results   Component Value Date     01/15/2020      Lab Results   Component Value Date     09/23/2019                   Lab Results   Component Value Date    POTASSIUM 3.9 09/23/2019           Lab Results   Component Value Date    MAG 2.2 03/07/2018            Lab Results   Component Value Date    CR 0.67 09/23/2019                   Lab Results   Component Value Date    YOBANY 9.0 09/23/2019                Lab Results   Component Value Date    BILITOTAL 0.3 01/15/2020           Lab Results   Component Value Date    ALBUMIN 3.6 01/15/2020                    Lab Results   Component Value Date    ALT 29 01/15/2020           Lab Results   Component Value Date    AST 19 01/15/2020       Results reviewed, labs MET treatment parameters, ok to proceed with treatment.  Urine negative.      Post Infusion Assessment:  Patient tolerated infusion without incident.  Blood return noted pre and post infusion.  Site patent and intact, free from redness, edema or discomfort.  No evidence of extravasations.  Access discontinued per protocol.       Discharge Plan:   Patient declined prescription refills.  Discharge instructions reviewed with: Patient.  Patient and/or family verbalized understanding " of discharge instructions and all questions answered.  AVS to patient via Webb[employer].  Patient will return 1/22 for next appointment.   Patient discharged in stable condition accompanied by: self.  Departure Mode: Ambulatory.    Radha Osuna RN

## 2020-01-15 NOTE — PATIENT INSTRUCTIONS
Contact Numbers    [facility] (for Scheduling/Triage/After Hours Nurse Line): [phone]    Please call the [facility] nurse triage or the after hours nurse line if you experience a temperature greater than or equal to 100.5, shaking chills, have uncontrolled nausea, vomiting and/or diarrhea, dizziness, lightheadedness, shortness of breath, chest pain, bleeding, unexplained bruising, or if you have any other new/concerning symptoms, questions or concerns.     If you are having any concerning symptoms or wish to speak to a provider before your next infusion visit, please call your care coordinator or triage to notify them so we can adequately serve you.     If you need a refill on a narcotic prescription or other medication, please call triage before your infusion appointment.       January 2020 Sunday Monday Tuesday Wednesday Thursday Friday Saturday                  1     2     3     4       5     6     7     8     9     10    [facility] NEW RHINOLOGY   8:15 AM   (30 min.)   Rod Campos MD   [facility] Ear Nose and Throat    CT ABDOMEN PELVIS W   9:40 AM   (20 min.)   [facility]   [facility] CT    [facility] RETURN  11:15 AM   (30 min.)   Dwight Chau MD   [facility] 11       12     13     14     15    [facility] DRAW  10:30 AM   (15 min.)    [facility] DRAW   [facility] Lab Draw    [facility] ONC INFUSION 180  11:00 AM   (180 min.)    ONCOLOGY INFUSION   [facility] 16    [facility] SPEECH PATH AND ENT EVAL   7:45 AM   (30 min.)   Desire Sanchez, SLP   [facility] Voice    [facility] RETURN   7:45 AM   (20 min.)   Imelda Sky MD   [facility] Ear Nose and Throat 17     18       19     20     21     22    [facility] DRAW   6:15 AM   (15 min.)    [facility] DRAW   [facility] Draw    [facility] ONC INFUSION 180   7:00 AM   (180 min.)    ONCOLOGY INFUSION   [facility] 23     24     25       26     27     28     29    [facility] DRAW   7:30  AM   (15 min.)   [facility] LAB DRAW   [facility] Lab Draw    [facility] ONC INFUSION 180   8:00 AM   (180 min.)    ONCOLOGY INFUSION   [facility] 30 31 February 2020 Sunday Monday Tuesday Wednesday Thursday Friday Saturday                                 1       2     3     4     5     6     7     8       9     10     11     12     13     14     15       16     17     18     19     20     21     22       23     24     25     26     27     28     29                     Lab Results:  Recent Results (from the past 12 hour(s))   Protein qualitative urine    Collection Time: 01/15/20 10:45 AM   Result Value Ref Range    Protein Albumin Urine Negative NEG^Negative mg/dL   CBC with platelets differential    Collection Time: 01/15/20 10:50 AM   Result Value Ref Range    WBC 5.9 4.0 - 11.0 10e9/L    RBC Count 3.92 (L) 4.4 - 5.9 10e12/L    Hemoglobin 12.1 (L) 13.3 - 17.7 g/dL    Hematocrit 36.6 (L) 40.0 - 53.0 %    MCV 93 78 - 100 fl    MCH 30.9 26.5 - 33.0 pg    MCHC 33.1 31.5 - 36.5 g/dL    RDW 13.2 10.0 - 15.0 %    Platelet Count 140 (L) 150 - 450 10e9/L    Diff Method Automated Method     % Neutrophils 69.3 %    % Lymphocytes 18.5 %    % Monocytes 9.8 %    % Eosinophils 1.5 %    % Basophils 0.7 %    % Immature Granulocytes 0.2 %    Nucleated RBCs 0 0 /100    Absolute Neutrophil 4.1 1.6 - 8.3 10e9/L    Absolute Lymphocytes 1.1 0.8 - 5.3 10e9/L    Absolute Monocytes 0.6 0.0 - 1.3 10e9/L    Absolute Eosinophils 0.1 0.0 - 0.7 10e9/L    Absolute Basophils 0.0 0.0 - 0.2 10e9/L    Abs Immature Granulocytes 0.0 0 - 0.4 10e9/L    Absolute Nucleated RBC 0.0    Hepatic panel    Collection Time: 01/15/20 10:50 AM   Result Value Ref Range    Bilirubin Direct <0.1 0.0 - 0.2 mg/dL    Bilirubin Total 0.3 0.2 - 1.3 mg/dL    Albumin 3.6 3.4 - 5.0 g/dL    Protein Total 7.0 6.8 - 8.8 g/dL    Alkaline Phosphatase 53 40 - 150 U/L    ALT 29 0 - 70 U/L    AST 19 0 - 45 U/L   Carcinoembryonic  Antigen    Collection Time: 01/15/20 10:50 AM   Result Value Ref Range    CEA <0.5 0 - 2.5 ug/L

## 2020-01-16 ENCOUNTER — OFFICE VISIT (OUTPATIENT)
Dept: OTOLARYNGOLOGY | Facility: CLINIC | Age: 40
End: 2020-01-16
Payer: COMMERCIAL

## 2020-01-16 VITALS — WEIGHT: 145 LBS | BODY MASS INDEX: 22.76 KG/M2 | HEIGHT: 67 IN

## 2020-01-16 DIAGNOSIS — Q38.8 VELOPHARYNGEAL INSUFFICIENCY, CONGENITAL: ICD-10-CM

## 2020-01-16 DIAGNOSIS — J38.3 VOCAL PROCESS GRANULOMA: ICD-10-CM

## 2020-01-16 DIAGNOSIS — R49.0 DYSPHONIA: Primary | ICD-10-CM

## 2020-01-16 ASSESSMENT — PAIN SCALES - GENERAL: PAINLEVEL: NO PAIN (0)

## 2020-01-16 ASSESSMENT — MIFFLIN-ST. JEOR: SCORE: 1541.47

## 2020-01-16 NOTE — PATIENT INSTRUCTIONS
You were seen in the ENT Clinic today by Dr. Sky  If you have any questions or concerns after your appointment, please call   -  ENT Clinic: [phone] scheduling option 1 and nurse advice option 3.    -  Recommendations: Options:     prolaryn voice gel - 2-3 months    juvederm, restylane 4-6 months    prolaryn plus CAHA - 18 months     fat injection - permanent    -  Please follow up with Dr. Sky in approximately  4-6 months or sooner if decide on surgery.         Thank you for choosing [facility] and allowing us to be apart of your care team!  Rosie Dunlap LPN

## 2020-01-16 NOTE — NURSING NOTE
"Chief Complaint   Patient presents with     RECHECK     Follow up     Height 1.71 m (5' 7.32\"), weight 65.8 kg (145 lb).    Ami Darden, EMT  "

## 2020-01-16 NOTE — LETTER
2020       RE: Daniel Sandhu  [address] [facility] [address]     Dear Colleague,    Thank you for referring your patient, Daniel Sandhu, to the [facility] EAR NOSE AND THROAT at [facility]. Please see a copy of my visit note below.                [facility]   at the [facility]   Otolaryngology Clinic     Patient: Danile Sandhu    MRN: [id number]    : 1980    Age/Gender: 39 year old male  Date of Service: 2020  Rendering Provider:   Imelda Sky MD       Chief Complaint   VPI with musical instrument  Vocal process granuloma, left    Interval History     HISTORY OF PRESENT ILLNESS: Mr. 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  This is not frequent enough that colleagues have mentioned it but it is noticeable to him.  He would be interested in other treatment if it would last longer. He is not interested in something very invasive however given the uncertainty about his long term health.     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 He is doing antireflux precautions and has recently bought a pillow to help elevate head of bed at night.  He has been told that he should not be using reflux medications.  He denies other new complaints today Trial of Flovent inhaler was discussed but ultimately not prescribed.  He had chemotherapy yesterday therefore is feeling a bit under the weather, which is usual for him afterwards.  His tumor was stable on most recent imaging.  His primary oncologist is Dr. Daryn Ponce.    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         PAST SURGICAL HISTORY:   Past Surgical History:   Procedure Laterality Date     ESOPHAGOGASTRODUODENOSCOPY       ESOPHAGOSCOPY, GASTROSCOPY, DUODENOSCOPY (EGD), COMBINED N/A 6/9/2017    Procedure: COMBINED ENDOSCOPIC ULTRASOUND, ESOPHAGOSCOPY, GASTROSCOPY, DUODENOSCOPY (EGD), FINE NEEDLE ASPIRATE/BIOPSY;  Upper Endoscopic Ultrasound, fine needle aspirate/biopsy;  Surgeon: Guru Mark Avila MD;  Location: [facility] OR     ESOPHAGOSCOPY, GASTROSCOPY, DUODENOSCOPY (EGD), COMBINED N/A 11/3/2017    Procedure: COMBINED ESOPHAGOSCOPY, GASTROSCOPY, DUODENOSCOPY (EGD);;  Surgeon: Yunior Esteban MD;  Location: [facility] OR     ESOPHAGOSCOPY, GASTROSCOPY, DUODENOSCOPY (EGD), COMBINED N/A 11/9/2017    Procedure: COMBINED ESOPHAGOSCOPY, GASTROSCOPY, DUODENOSCOPY (EGD);  Esophogastroduodenoscopy, take out pharangostomy tube;  Surgeon: Yunior Esteban MD;  Location: [facility] OR     ESOPHAGOSCOPY, GASTROSCOPY, DUODENOSCOPY (EGD), COMBINED N/A 1/11/2018    Procedure: COMBINED ESOPHAGOSCOPY, GASTROSCOPY, DUODENOSCOPY (EGD), BIOPSY SINGLE OR MULTIPLE;  COMBINED ESOPHAGOSCOPY, GASTROSCOPY, DUODENOSCOPY (EGD);  Surgeon: Alessandro Mcgregor MD;  Location: [facility] GI     GASTRECTOMY N/A 11/3/2017    Procedure: GASTRECTOMY;;  Surgeon: Yunior Esteban MD;  Location: [facility] OR     HAND SURGERY      childhood, torn tendon     INSERT PORT VASCULAR ACCESS Right 6/22/2017    Procedure: INSERT PORT VASCULAR ACCESS;  Single Lumen Chest Power Port;  Surgeon: Iván Driver PA-C;  [facility]: [facility]     INSERT PORT VASCULAR ACCESS Right 4/8/2019    Procedure: Single Lumen Chest Port Placement;  Surgeon: Krysten Ballard PA-C;  Location: [facility] OR     IR CHEST PORT PLACEMENT > 5 YRS OF AGE  4/8/2019     IR LIVER BIOPSY PERCUTANEOUS  3/20/2019     LAPAROSCOPY DIAGNOSTIC (GENERAL) N/A 11/3/2017    Procedure: LAPAROSCOPY  DIAGNOSTIC (GENERAL);  diagnostic laparoscopy, right chest tube, total gastrectomy with distal esophagectomy, intrathoracic eveline-y esophago-jejunostomy, feeding jejunostomy, pharyngostomy, esophagogastroduodenoscopy, flexible bronchoscopy;  Surgeon: Yunior Esteban MD;  Location: [facility]     LAPAROTOMY EXPLORATORY N/A 11/3/2017    Procedure: LAPAROTOMY EXPLORATORY;;  Surgeon: Yunior Esteban MD;  [facility]: [facility]     PHARYNGOSTOMY N/A 11/3/2017    Procedure: PHARYNGOSTOMY;;  Surgeon: Yunior Esteban MD;  [facility]: [facility]     REMOVE PORT VASCULAR ACCESS Right 3/19/2018    Procedure: REMOVE PORT VASCULAR ACCESS;  Right Port Removal;  Surgeon: Krysten Hopper PA-C;  [facility]     THORACOTOMY Left 11/3/2017    Procedure: THORACOTOMY;;  Surgeon: Yunior Esteban MD;  Location: [facility]         CURRENT MEDICATIONS:   Current Outpatient Medications:      cyabnocobalamin (VITAMIN B-12) 2500 MCG sublingual tablet, Place 2,500 mcg under the tongue daily, Disp: 30 tablet, Rfl: 1     ferrous gluconate (FERGON) 324 (38 Fe) MG tablet, Take 324 mg by mouth daily (with breakfast), Disp: , Rfl:      multivitamin, therapeutic with minerals (MULTI-VITAMIN) TABS tablet, Take 1 tablet by mouth daily Generic [place] Vitamin, Disp: , Rfl:      mupirocin (BACTROBAN) 2 % external ointment, Apply to anterior nares BID X 2 month supply, Disp: 2 g, Rfl: 3     saccharomyces boulardii (FLORASTOR) 250 MG capsule, Take 250 mg by mouth 2 times daily, Disp: , Rfl:      LORazepam (ATIVAN) 0.5 MG tablet, Take 1 tablet (0.5 mg) by mouth every 4 hours as needed (Anxiety, Nausea/Vomiting or Sleep) (Patient not taking: Reported on 12/26/2019), Disp: 30 tablet, Rfl: 2     Misc Natural Product Nasal (PONARIS) SOLN, Apply two drops to each nostril BID X 2 month supply (Patient not taking: Reported on 1/15/2020), Disp: 10 mL, Rfl: 3     prochlorperazine (COMPAZINE) 10 MG tablet, Take 1 tablet (10 mg) by mouth every 6  hours as needed (Nausea/Vomiting) (Patient not taking: Reported on 12/26/2019), Disp: 30 tablet, Rfl: 2  No current facility-administered medications for this visit.     Facility-Administered Medications Ordered in Other Visits:      CT Sodium Chloride, 80 mL, Intravenous, Once, Vinod Wade MD      ALLERGIES: Patient has no known allergies.      SOCIAL HISTORY:    Social History     Socioeconomic History     Marital status:      Spouse name: Not on file     Number of children: 1     Years of education: Not on file     Highest education level: Not on file   Occupational History     Occupation: musician and teacher    Social Needs     Financial resource strain: Not on file     Food insecurity:     Worry: Not on file     Inability: Not on file     Transportation needs:     Medical: Not on file     Non-medical: Not on file   Tobacco Use     Smoking status: Never Smoker     Smokeless tobacco: Never Used   Substance and Sexual Activity     Alcohol use: Yes     Comment: 1 beer daily     Drug use: Yes     Types: Marijuana     Comment: occasional     Sexual activity: Yes     Partners: Female     Birth control/protection: Natural Family Planning   Lifestyle     Physical activity:     Days per week: Not on file     Minutes per session: Not on file     Stress: Not on file   Relationships     Social connections:     Talks on phone: Not on file     Gets together: Not on file     Attends Christianity service: Not on file     Active member of club or organization: Not on file     Attends meetings of clubs or organizations: Not on file     Relationship status: Not on file     Intimate partner violence:     Fear of current or ex partner: Not on file     Emotionally abused: Not on file     Physically abused: Not on file     Forced sexual activity: Not on file   Other Topics Concern     Parent/sibling w/ CABG, MI or angioplasty before 65F 55M? Not Asked   Social History Narrative     Not on file           FAMILY HISTORY:    Family History   Problem Relation Age of Onset     Other - See Comments Father         sepsis     Cancer Sister         breast cancer  31 yo 1/2 sister      Cancer Paternal Grandmother         breast      Non-contributory for problems with anesthesia      REVIEW OF SYSTEMS:   The patient was asked a 14 point review of systems regarding constitutional symptoms, eye symptoms, ears, nose, mouth, throat symptoms, cardiovascular symptoms, respiratory symptoms, gastrointestinal symptoms, genitourinary symptoms, musculoskeletal symptoms, integumentary symptoms, neurological symptoms, psychiatric symptoms, endocrine symptoms, hematologic/lymphatic symptoms, and allergic/ immunologic symptoms.   The pertinent factors have been included in the HPI and below.  Patient Supplied Answers to Review of Systems  [facility] ROS 1/10/2020   Constitutional Problems with sleep   Ears, Nose, Throat Nasal congestion or drainage, Sore throat, Hoarseness   Cardiopulmonary Cough   Gastrointestinal/Genitourinary -       Physical Examination     The patient underwent a physical examination as described below. The pertinent positive and negative findings are summarized after the description of the examination.    Constitutional: The patient's developmental and nutritional status was assessed. The patient's voice quality was assessed.  Head and Face: The head and face were inspected for deformities. The sinuses were palpated. The salivary glands were palpated. Facial muscle strength was assessed bilaterally.  Eyes: Extraocular movements and primary gaze alignment were assessed.  Ears, Nose, Mouth and Throat: The ears and nose were examined for deformities. The nasal septum, mucosa, and turbinates were inspected by anterior rhinoscopy. The lips, teeth, and gums were examined for abnormalities. The oral mucosa, tongue, palate, tonsils, lateral and posterior pharynx were inspected for the presence of asymmetry or mucosal lesions.    Neck: The  tracheal position was noted, and the neck mass palpated to determine if there were any asymmetries, abnormal neck masses, thyromegally, or thyroid nodules.  Respiratory: The nature of the breathing and chest expansion/symmetry was observed.  Cardiovascular: The patient was examined to determine the presence of any edema or jugular venous distension.  Abdomen: The contour of the abdomen was noted.  Lymphatic: The patient was examined for infraclavicular lymphadenopathy.  Musculoskeletal: The patient was inspected for the presence of skeletal deformities.  Extremities: The extremities were examined for any clubbing or cyanosis.  Skin: The skin was examined for inflammatory or neoplastic conditions.  Neurologic: The patient's orientation, mood, and affect were noted. The cranial nerve  functions were examined.    Other pertinent positive and negative findings on physical examination:   OC/OP: no lesions, uvula midline, soft palate elevates symmetrically, FOM/BOT soft  Neck: no lesions, no TH tenderness to palpation    All other physical examination findings were within normal limits and noncontributory.    Procedures     Flexible laryngoscopy (CPT 46883)      Pre-procedure diagnosis:  Post-procedure diagnosis:  Indication for procedure: Mr. Sandhu is a 39 year old male with vocal process granuloma  Procedure(s): Fiberoptic Laryngoscopy    Details of Procedure: After informed consent was obtained, the patient was seated in the examination chair.  The areas of the nasopharynx as well as the hypopharynx were anesthetized with topical 4% lidocaine with 0.25% phenylephrine atomizer.  Examination of the base of tongue was performed first.  Attention was directed to any evidence of masses in the area or evidence of leukoplakia or candidal infection.  Attention was directed to the epiglottis where its size and position was determined and its movement on phonation of the vowel  e .  The piriform sinuses were then inspected for  any mass lesions or pooling of secretions.  Attention was then directed to the larynx. The vocal folds were inspected for infection or any areas of leukoplakia, for masses, polypoid degeneration, or hemorrhage.  Having done this, the arytenoids and vocal processes were inspected for erythema or evidence of granuloma formation.  The posterior commissure was then inspected for evidence of inflammatory changes in the mucosa and heaping up of mucosal tissue. The patient was then instructed to say the vowel  e .  Adduction of vocal folds to the midline was observed for any evidence of paresis or paralysis of the larynx or asymmetry in rotation of the larynx to the left or right. The patient was asked to breathe and the degree of abduction was noted bilaterally.  Subglottic view of the larynx was obtained for any additional mass lesions or mucosal changes.  Finally the post cricoid was examined for evidence of pooling of secretions, as well as the pharyngeal wall mucosa.   Anesthesia type: 0.25% phenylephrine    Findings:  Anatomic/physiological deviations: left vocal process granuloma much improved, now with small ulceration on the right side   Right vocal process: No restriction of mobility   Left vocal process: No restriction of mobility  Glottal gap: Complete glottal closure  Supraglottic structures: Normal  Hypopharynx: Normal     Estimated Blood Loss: minimal  Complications: None  Disposition: Patient tolerated the procedure well           Review of Relevant Clinical Data     Notes: N/a    Radiology: N/a    Pathology: N/a    Procedures: N/a    Labs:  No results found for: TSH  Lab Results   Component Value Date     09/23/2019    CO2 28 09/23/2019    BUN 14 09/23/2019    PHOS 2.3 (L) 01/15/2018     Lab Results   Component Value Date    WBC 5.9 01/15/2020    HGB 12.1 (L) 01/15/2020    HCT 36.6 (L) 01/15/2020    MCV 93 01/15/2020     (L) 01/15/2020     Lab Results   Component Value Date    INR 1.12  "03/15/2019     No results found for: MILAGROS  No components found for: RHEUMATOIDFACTOR,  RF  Lab Results   Component Value Date    CRP 17.0 (H) 11/13/2017     No components found for: CKTOT, URICACID  No components found for: C3, C4, DSDNAAB, NDNAABIFA  No results found for: MPOAB    Patient reported Quality of Life (QOL) Measures     Patient Supplied Answers To VHI Questionnaire  Voice Handicap Index (VHI-10) 1/16/2020   My voice makes it difficult for people to hear me 0   People have difficulty understanding me in a noisy room 0   My voice difficulties restrict my personal and social life.  0   I feel left out of conversations because of my voice 0   My voice problem causes me to lose income 0   I feel as though I have to strain to produce voice 0   The clarity of my voice is unpredictable 0   My voice problem upsets me 0   My voice makes me feel handicapped 0   People ask, \"What's wrong with your voice?\" 0   VHI-10 0         Patient Supplied Answers To EAT Questionnaire  Eating Assessment Tool (EAT-10) 1/16/2020   My swallowing problem has caused me to lose weight 0   My swallowing problem interferes with my ability to go out for meals 0   Swallowing liquids takes extra effort 0   Swallowing solids takes extra effort 0   Swallowing pills takes extra effort 0   Swallowing is painful 0   The pleasure of eating is affected by my swallowing 0   When I swallow food sticks in my throat 0   I cough when I eat 0   Swallowing is stressful 0   EAT-10 0       Reflux Symptom Index  Within the last month, how did the following problems affect the patient?  0 = no problem; 5= severe problem  Hoarseness or a problem with your voice 0   Clearing your throat  0   Excess throat mucous or postnasal drip 3   Difficulty swallowing food, liquid or pills 0   Coughing after you ate or after lying down  0   Breathing difficulties or choking episodes 0   Troublesome or annoying cough 3   Sensations of something sticking in your throat or a " lump in your throat  0   Heartburn, chest pain, indigestion, or stomach acid coming up 0         Total: 6     Impression & Plan     IMPRESSION: Mr. Sandhu is a 39 year old male who is being seen for the followin. VPI   - not true VPI, only intermittent during playing the bassoon, likely exacerbated lately from weight loss  - s/p voice gel injection to the posterior OP wall on 19 with good   - injection augmentation did improve his symptoms for a limited time  - he did not have any side effects and experience pain and swelling at the site  - I am wary about repeating the injection given his medical comorbidities with something more long lasting but did discus alternative procedures including repeat prolaryn gel injection, juvederm, restylane, prolaryn plus CAHA, or fat injection   - will reach out if he wants to pursue either of these    2. Laryngeal granuloma  - Improved on repeat laryngoscopy today  - Reviewed lifestyle modifications for LPR, voice rest and increased hydration    RETURN VISIT: follow up 4 - 6 months, or earlier as needed.       Scribe Disclosure:  I, Francisca Oates, am serving as a scribe to document services personally performed by Imelda Sky MD at this visit, based upon the provider's statements to me. All documentation has been reviewed by the aforementioned provider prior to being entered into the official medical record.     The documentation recorded by the scribe accurately reflects the services I personally performed and the decisions made by me.    Thank you for the kind referral and for allowing me to share in the care of Mr. Sandhu. If you have any questions, please do not hesitate to contact me.        Imelda Sky MD    of Otolaryngology - Head and Neck Surgery   Voice, Airway, and Swallowing Disorders   [facility] at the [facility]    Clinics & Surgery [address] [address]  Phone:  [phone]  Fax: [phone]    [place] [address]  Phone: [phone]  Fax: [phone]     CC: Dr. Daryn Ponce.

## 2020-01-20 NOTE — PROGRESS NOTES
[facility]   at the [facility]   Otolaryngology Clinic     Patient: Daniel Sandhu    MRN: [id number]    : 1980    Age/Gender: 39 year old male  Date of Service: 2020  Rendering Provider:   Imelda Sky MD       Chief Complaint   VPI with musical instrument  Vocal process granuloma, left    Interval History     HISTORY OF PRESENT ILLNESS: Mr. 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  This is not frequent enough that colleagues have mentioned it but it is noticeable to him.  He would be interested in other treatment if it would last longer. He is not interested in something very invasive however given the uncertainty about his long term health.     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 He is doing antireflux precautions and has recently bought a pillow to help elevate head of bed at night.  He has been told that he should not be using reflux medications.  He denies other new complaints today Trial of Flovent inhaler was discussed but ultimately not prescribed.  He had chemotherapy yesterday therefore is feeling a bit under the weather, which is usual for him afterwards.  His tumor was stable on most recent imaging.  His primary oncologist is Dr. Daryn Ponce.    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         PAST SURGICAL HISTORY:   Past Surgical History:   Procedure Laterality Date     ESOPHAGOGASTRODUODENOSCOPY       ESOPHAGOSCOPY, GASTROSCOPY, DUODENOSCOPY (EGD), COMBINED N/A 2017    Procedure:  COMBINED ENDOSCOPIC ULTRASOUND, ESOPHAGOSCOPY, GASTROSCOPY, DUODENOSCOPY (EGD), FINE NEEDLE ASPIRATE/BIOPSY;  Upper Endoscopic Ultrasound, fine needle aspirate/biopsy;  Surgeon: Guru Mark Avila MD;  [facility]     ESOPHAGOSCOPY, GASTROSCOPY, DUODENOSCOPY (EGD), COMBINED N/A 11/3/2017    Procedure: COMBINED ESOPHAGOSCOPY, GASTROSCOPY, DUODENOSCOPY (EGD);;  Surgeon: Yunior Esteban MD;  Location: [facility] OR     ESOPHAGOSCOPY, GASTROSCOPY, DUODENOSCOPY (EGD), COMBINED N/A 11/9/2017    Procedure: COMBINED ESOPHAGOSCOPY, GASTROSCOPY, DUODENOSCOPY (EGD);  Esophogastroduodenoscopy, take out pharangostomy tube;  Surgeon: Yunior Esteban MD;  Location: [facility] OR     ESOPHAGOSCOPY, GASTROSCOPY, DUODENOSCOPY (EGD), COMBINED N/A 1/11/2018    Procedure: COMBINED ESOPHAGOSCOPY, GASTROSCOPY, DUODENOSCOPY (EGD), BIOPSY SINGLE OR MULTIPLE;  COMBINED ESOPHAGOSCOPY, GASTROSCOPY, DUODENOSCOPY (EGD);  Surgeon: Alessandro Mcgregor MD;  Location: [facility] GI     GASTRECTOMY N/A 11/3/2017    Procedure: GASTRECTOMY;;  Surgeon: Yunior Esteban MD;  Location: [facility] OR     HAND SURGERY      childhood, torn tendon     INSERT PORT VASCULAR ACCESS Right 6/22/2017    Procedure: INSERT PORT VASCULAR ACCESS;  Single Lumen Chest Power Port;  Surgeon: Iván Driver PA-C;  [facility]     INSERT PORT VASCULAR ACCESS Right 4/8/2019    Procedure: Single Lumen Chest Port Placement;  Surgeon: Krysten Ballard PA-C;  [facility] OR     IR CHEST PORT PLACEMENT > 5 YRS OF AGE  4/8/2019     IR LIVER BIOPSY PERCUTANEOUS  3/20/2019     LAPAROSCOPY DIAGNOSTIC (GENERAL) N/A 11/3/2017    Procedure: LAPAROSCOPY DIAGNOSTIC (GENERAL);  diagnostic laparoscopy, right chest tube, total gastrectomy with distal esophagectomy, intrathoracic eveline-y esophago-jejunostomy, feeding jejunostomy, pharyngostomy, esophagogastroduodenoscopy, flexible bronchoscopy;  Surgeon: Yunior Esteban MD;  Location:  OR      LAPAROTOMY EXPLORATORY N/A 11/3/2017    Procedure: LAPAROTOMY EXPLORATORY;;  Surgeon: Yunior Esteban MD;  [facility] OR     PHARYNGOSTOMY N/A 11/3/2017    Procedure: PHARYNGOSTOMY;;  Surgeon: Yunior Esteban MD;  [facility] OR     REMOVE PORT VASCULAR ACCESS Right 3/19/2018    Procedure: REMOVE PORT VASCULAR ACCESS;  Right Port Removal;  Surgeon: Krysten Hopper PA-C;  [facility]     THORACOTOMY Left 11/3/2017    Procedure: THORACOTOMY;;  Surgeon: Yunior Esteban MD;  [facility]: [facility] OR         CURRENT MEDICATIONS:   Current Outpatient Medications:      cyabnocobalamin (VITAMIN B-12) 2500 MCG sublingual tablet, Place 2,500 mcg under the tongue daily, Disp: 30 tablet, Rfl: 1     ferrous gluconate (FERGON) 324 (38 Fe) MG tablet, Take 324 mg by mouth daily (with breakfast), Disp: , Rfl:      multivitamin, therapeutic with minerals (MULTI-VITAMIN) TABS tablet, Take 1 tablet by mouth daily Generic Elmer Vitamin, Disp: , Rfl:      mupirocin (BACTROBAN) 2 % external ointment, Apply to anterior nares BID X 2 month supply, Disp: 2 g, Rfl: 3     saccharomyces boulardii (FLORASTOR) 250 MG capsule, Take 250 mg by mouth 2 times daily, Disp: , Rfl:      LORazepam (ATIVAN) 0.5 MG tablet, Take 1 tablet (0.5 mg) by mouth every 4 hours as needed (Anxiety, Nausea/Vomiting or Sleep) (Patient not taking: Reported on 12/26/2019), Disp: 30 tablet, Rfl: 2     Misc Natural Product Nasal (PONARIS) SOLN, Apply two drops to each nostril BID X 2 month supply (Patient not taking: Reported on 1/15/2020), Disp: 10 mL, Rfl: 3     prochlorperazine (COMPAZINE) 10 MG tablet, Take 1 tablet (10 mg) by mouth every 6 hours as needed (Nausea/Vomiting) (Patient not taking: Reported on 12/26/2019), Disp: 30 tablet, Rfl: 2  No current facility-administered medications for this visit.     Facility-Administered Medications Ordered in Other Visits:      CT Sodium Chloride, 80 mL, Intravenous, Once, [place], Can,  MD      ALLERGIES: Patient has no known allergies.      SOCIAL HISTORY:    Social History     Socioeconomic History     Marital status:      Spouse name: Not on file     Number of children: 1     Years of education: Not on file     Highest education level: Not on file   Occupational History     Occupation: musician and teacher    Social Needs     Financial resource strain: Not on file     Food insecurity:     Worry: Not on file     Inability: Not on file     Transportation needs:     Medical: Not on file     Non-medical: Not on file   Tobacco Use     Smoking status: Never Smoker     Smokeless tobacco: Never Used   Substance and Sexual Activity     Alcohol use: Yes     Comment: 1 beer daily     Drug use: Yes     Types: Marijuana     Comment: occasional     Sexual activity: Yes     Partners: Female     Birth control/protection: Natural Family Planning   Lifestyle     Physical activity:     Days per week: Not on file     Minutes per session: Not on file     Stress: Not on file   Relationships     Social connections:     Talks on phone: Not on file     Gets together: Not on file     Attends Latter day service: Not on file     Active member of club or organization: Not on file     Attends meetings of clubs or organizations: Not on file     Relationship status: Not on file     Intimate partner violence:     Fear of current or ex partner: Not on file     Emotionally abused: Not on file     Physically abused: Not on file     Forced sexual activity: Not on file   Other Topics Concern     Parent/sibling w/ CABG, MI or angioplasty before 65F 55M? Not Asked   Social History Narrative     Not on file           FAMILY HISTORY:   Family History   Problem Relation Age of Onset     Other - See Comments Father         sepsis     Cancer Sister         breast cancer  29 yo 1/2 sister      Cancer Paternal Grandmother         breast      Non-contributory for problems with anesthesia      REVIEW OF SYSTEMS:   The patient was  asked a 14 point review of systems regarding constitutional symptoms, eye symptoms, ears, nose, mouth, throat symptoms, cardiovascular symptoms, respiratory symptoms, gastrointestinal symptoms, genitourinary symptoms, musculoskeletal symptoms, integumentary symptoms, neurological symptoms, psychiatric symptoms, endocrine symptoms, hematologic/lymphatic symptoms, and allergic/ immunologic symptoms.   The pertinent factors have been included in the HPI and below.  Patient Supplied Answers to Review of Systems  [facility] E[facility] ROS 1/10/2020   Constitutional Problems with sleep   Ears, Nose, Throat Nasal congestion or drainage, Sore throat, Hoarseness   Cardiopulmonary Cough   Gastrointestinal/Genitourinary -       Physical Examination     The patient underwent a physical examination as described below. The pertinent positive and negative findings are summarized after the description of the examination.    Constitutional: The patient's developmental and nutritional status was assessed. The patient's voice quality was assessed.  Head and Face: The head and face were inspected for deformities. The sinuses were palpated. The salivary glands were palpated. Facial muscle strength was assessed bilaterally.  Eyes: Extraocular movements and primary gaze alignment were assessed.  Ears, Nose, Mouth and Throat: The ears and nose were examined for deformities. The nasal septum, mucosa, and turbinates were inspected by anterior rhinoscopy. The lips, teeth, and gums were examined for abnormalities. The oral mucosa, tongue, palate, tonsils, lateral and posterior pharynx were inspected for the presence of asymmetry or mucosal lesions.    Neck: The tracheal position was noted, and the neck mass palpated to determine if there were any asymmetries, abnormal neck masses, thyromegally, or thyroid nodules.  Respiratory: The nature of the breathing and chest expansion/symmetry was observed.  Cardiovascular: The patient was examined to determine the  presence of any edema or jugular venous distension.  Abdomen: The contour of the abdomen was noted.  Lymphatic: The patient was examined for infraclavicular lymphadenopathy.  Musculoskeletal: The patient was inspected for the presence of skeletal deformities.  Extremities: The extremities were examined for any clubbing or cyanosis.  Skin: The skin was examined for inflammatory or neoplastic conditions.  Neurologic: The patient's orientation, mood, and affect were noted. The cranial nerve  functions were examined.    Other pertinent positive and negative findings on physical examination:   OC/OP: no lesions, uvula midline, soft palate elevates symmetrically, FOM/BOT soft  Neck: no lesions, no TH tenderness to palpation    All other physical examination findings were within normal limits and noncontributory.    Procedures     Flexible laryngoscopy (CPT 74411)      Pre-procedure diagnosis:  Post-procedure diagnosis:  Indication for procedure: Mr. Sandhu is a 39 year old male with vocal process granuloma  Procedure(s): Fiberoptic Laryngoscopy    Details of Procedure: After informed consent was obtained, the patient was seated in the examination chair.  The areas of the nasopharynx as well as the hypopharynx were anesthetized with topical 4% lidocaine with 0.25% phenylephrine atomizer.  Examination of the base of tongue was performed first.  Attention was directed to any evidence of masses in the area or evidence of leukoplakia or candidal infection.  Attention was directed to the epiglottis where its size and position was determined and its movement on phonation of the vowel  e .  The piriform sinuses were then inspected for any mass lesions or pooling of secretions.  Attention was then directed to the larynx. The vocal folds were inspected for infection or any areas of leukoplakia, for masses, polypoid degeneration, or hemorrhage.  Having done this, the arytenoids and vocal processes were inspected for erythema or  evidence of granuloma formation.  The posterior commissure was then inspected for evidence of inflammatory changes in the mucosa and heaping up of mucosal tissue. The patient was then instructed to say the vowel  e .  Adduction of vocal folds to the midline was observed for any evidence of paresis or paralysis of the larynx or asymmetry in rotation of the larynx to the left or right. The patient was asked to breathe and the degree of abduction was noted bilaterally.  Subglottic view of the larynx was obtained for any additional mass lesions or mucosal changes.  Finally the post cricoid was examined for evidence of pooling of secretions, as well as the pharyngeal wall mucosa.   Anesthesia type: 0.25% phenylephrine    Findings:  Anatomic/physiological deviations: left vocal process granuloma much improved, now with small ulceration on the right side   Right vocal process: No restriction of mobility   Left vocal process: No restriction of mobility  Glottal gap: Complete glottal closure  Supraglottic structures: Normal  Hypopharynx: Normal     Estimated Blood Loss: minimal  Complications: None  Disposition: Patient tolerated the procedure well           Review of Relevant Clinical Data     Notes: N/a    Radiology: N/a    Pathology: N/a    Procedures: N/a    Labs:  No results found for: TSH  Lab Results   Component Value Date     09/23/2019    CO2 28 09/23/2019    BUN 14 09/23/2019    PHOS 2.3 (L) 01/15/2018     Lab Results   Component Value Date    WBC 5.9 01/15/2020    HGB 12.1 (L) 01/15/2020    HCT 36.6 (L) 01/15/2020    MCV 93 01/15/2020     (L) 01/15/2020     Lab Results   Component Value Date    INR 1.12 03/15/2019     No results found for: MILAGROS  No components found for: RHEUMATOIDFACTOR,  RF  Lab Results   Component Value Date    CRP 17.0 (H) 11/13/2017     No components found for: CKTOT, URICACID  No components found for: C3, C4, DSDNAAB, NDNAABIFA  No results found for: MPOAB    Patient reported  "Quality of Life (QOL) Measures     Patient Supplied Answers To VHI Questionnaire  Voice Handicap Index (VHI-10) 2020   My voice makes it difficult for people to hear me 0   People have difficulty understanding me in a noisy room 0   My voice difficulties restrict my personal and social life.  0   I feel left out of conversations because of my voice 0   My voice problem causes me to lose income 0   I feel as though I have to strain to produce voice 0   The clarity of my voice is unpredictable 0   My voice problem upsets me 0   My voice makes me feel handicapped 0   People ask, \"What's wrong with your voice?\" 0   VHI-10 0         Patient Supplied Answers To EAT Questionnaire  Eating Assessment Tool (EAT-10) 2020   My swallowing problem has caused me to lose weight 0   My swallowing problem interferes with my ability to go out for meals 0   Swallowing liquids takes extra effort 0   Swallowing solids takes extra effort 0   Swallowing pills takes extra effort 0   Swallowing is painful 0   The pleasure of eating is affected by my swallowing 0   When I swallow food sticks in my throat 0   I cough when I eat 0   Swallowing is stressful 0   EAT-10 0       Reflux Symptom Index  Within the last month, how did the following problems affect the patient?  0 = no problem; 5= severe problem  Hoarseness or a problem with your voice 0   Clearing your throat  0   Excess throat mucous or postnasal drip 3   Difficulty swallowing food, liquid or pills 0   Coughing after you ate or after lying down  0   Breathing difficulties or choking episodes 0   Troublesome or annoying cough 3   Sensations of something sticking in your throat or a lump in your throat  0   Heartburn, chest pain, indigestion, or stomach acid coming up 0         Total: 6     Impression & Plan     IMPRESSION: Mr. Sandhu is a 39 year old male who is being seen for the followin. VPI   - not true VPI, only intermittent during playing the [employer], likely " exacerbated lately from weight loss  - s/p voice gel injection to the posterior OP wall on 11/1/19 with good   - injection augmentation did improve his symptoms for a limited time  - he did not have any side effects and experience pain and swelling at the site  - I am wary about repeating the injection given his medical comorbidities with something more long lasting but did discus alternative procedures including repeat prolaryn gel injection, juvederm, restylane, prolaryn plus CAHA, or fat injection   - will reach out if he wants to pursue either of these    2. Laryngeal granuloma  - Improved on repeat laryngoscopy today  - Reviewed lifestyle modifications for LPR, voice rest and increased hydration    RETURN VISIT: follow up 4 - 6 months, or earlier as needed.       Scribe Disclosure:  I, Francisca Oates, am serving as a scribe to document services personally performed by Imelda Sky MD at this visit, based upon the provider's statements to me. All documentation has been reviewed by the aforementioned provider prior to being entered into the official medical record.     The documentation recorded by the scribe accurately reflects the services I personally performed and the decisions made by me.    Thank you for the kind referral and for allowing me to share in the care of Mr. Sandhu. If you have any questions, please do not hesitate to contact me.        Imelda Sky MD    of Otolaryngology - Head and Neck Surgery   Voice, Airway, and Swallowing Disorders   [facility] at the [facility]    Clinics & Surgery Center  [address] [place] [id number]  Phone: [phone]  Fax: [phone]    [place] [address]  Phone: [phone]  Fax: [phone]     CC: Dr. Daryn Ponce.

## 2020-01-22 ENCOUNTER — INFUSION THERAPY VISIT (OUTPATIENT)
Dept: ONCOLOGY | Facility: CLINIC | Age: 40
End: 2020-01-22
Attending: INTERNAL MEDICINE
Payer: COMMERCIAL

## 2020-01-22 ENCOUNTER — APPOINTMENT (OUTPATIENT)
Dept: LAB | Facility: CLINIC | Age: 40
End: 2020-01-22
Attending: INTERNAL MEDICINE
Payer: COMMERCIAL

## 2020-01-22 VITALS
WEIGHT: 145.5 LBS | SYSTOLIC BLOOD PRESSURE: 114 MMHG | RESPIRATION RATE: 16 BRPM | HEART RATE: 73 BPM | DIASTOLIC BLOOD PRESSURE: 65 MMHG | BODY MASS INDEX: 22.57 KG/M2 | TEMPERATURE: 97.6 F | OXYGEN SATURATION: 100 %

## 2020-01-22 DIAGNOSIS — C15.5 MALIGNANT NEOPLASM OF LOWER THIRD OF ESOPHAGUS (H): Primary | ICD-10-CM

## 2020-01-22 LAB
BASOPHILS # BLD AUTO: 0 10E9/L (ref 0–0.2)
BASOPHILS NFR BLD AUTO: 0.6 %
DIFFERENTIAL METHOD BLD: ABNORMAL
EOSINOPHIL # BLD AUTO: 0.1 10E9/L (ref 0–0.7)
EOSINOPHIL NFR BLD AUTO: 4.5 %
ERYTHROCYTE [DISTWIDTH] IN BLOOD BY AUTOMATED COUNT: 12.9 % (ref 10–15)
HCT VFR BLD AUTO: 34.9 % (ref 40–53)
HGB BLD-MCNC: 11.6 G/DL (ref 13.3–17.7)
IMM GRANULOCYTES # BLD: 0 10E9/L (ref 0–0.4)
IMM GRANULOCYTES NFR BLD: 0.6 %
LYMPHOCYTES # BLD AUTO: 1.1 10E9/L (ref 0.8–5.3)
LYMPHOCYTES NFR BLD AUTO: 36 %
MCH RBC QN AUTO: 30.4 PG (ref 26.5–33)
MCHC RBC AUTO-ENTMCNC: 33.2 G/DL (ref 31.5–36.5)
MCV RBC AUTO: 91 FL (ref 78–100)
MONOCYTES # BLD AUTO: 0.3 10E9/L (ref 0–1.3)
MONOCYTES NFR BLD AUTO: 8.3 %
NEUTROPHILS # BLD AUTO: 1.6 10E9/L (ref 1.6–8.3)
NEUTROPHILS NFR BLD AUTO: 50 %
NRBC # BLD AUTO: 0 10*3/UL
NRBC BLD AUTO-RTO: 0 /100
PLATELET # BLD AUTO: 118 10E9/L (ref 150–450)
PLATELET # BLD EST: ABNORMAL 10*3/UL
RBC # BLD AUTO: 3.82 10E12/L (ref 4.4–5.9)
WBC # BLD AUTO: 3.1 10E9/L (ref 4–11)

## 2020-01-22 PROCEDURE — 25800030 ZZH RX IP 258 OP 636: Mod: ZF | Performed by: PHYSICIAN ASSISTANT

## 2020-01-22 PROCEDURE — 85025 COMPLETE CBC W/AUTO DIFF WBC: CPT | Performed by: PHYSICIAN ASSISTANT

## 2020-01-22 PROCEDURE — 96375 TX/PRO/DX INJ NEW DRUG ADDON: CPT

## 2020-01-22 PROCEDURE — 25000128 H RX IP 250 OP 636: Mod: ZF | Performed by: PHYSICIAN ASSISTANT

## 2020-01-22 PROCEDURE — 25000125 ZZHC RX 250: Mod: ZF | Performed by: PHYSICIAN ASSISTANT

## 2020-01-22 PROCEDURE — 25000128 H RX IP 250 OP 636: Mod: ZF | Performed by: INTERNAL MEDICINE

## 2020-01-22 PROCEDURE — 96413 CHEMO IV INFUSION 1 HR: CPT

## 2020-01-22 RX ORDER — HEPARIN SODIUM (PORCINE) LOCK FLUSH IV SOLN 100 UNIT/ML 100 UNIT/ML
5 SOLUTION INTRAVENOUS ONCE
Status: COMPLETED | OUTPATIENT
Start: 2020-01-22 | End: 2020-01-22

## 2020-01-22 RX ORDER — HEPARIN SODIUM (PORCINE) LOCK FLUSH IV SOLN 100 UNIT/ML 100 UNIT/ML
500 SOLUTION INTRAVENOUS ONCE
Status: COMPLETED | OUTPATIENT
Start: 2020-01-22 | End: 2020-01-22

## 2020-01-22 RX ADMIN — Medication 5 ML: at 06:37

## 2020-01-22 RX ADMIN — DEXAMETHASONE SODIUM PHOSPHATE 20 MG: 10 INJECTION, SOLUTION INTRAMUSCULAR; INTRAVENOUS at 07:52

## 2020-01-22 RX ADMIN — FAMOTIDINE 20 MG: 10 INJECTION INTRAVENOUS at 07:50

## 2020-01-22 RX ADMIN — PACLITAXEL 145 MG: 6 INJECTION, SOLUTION INTRAVENOUS at 08:18

## 2020-01-22 RX ADMIN — SODIUM CHLORIDE 250 ML: 9 INJECTION, SOLUTION INTRAVENOUS at 07:50

## 2020-01-22 RX ADMIN — Medication 500 UNITS: at 09:20

## 2020-01-22 ASSESSMENT — PAIN SCALES - GENERAL: PAINLEVEL: NO PAIN (0)

## 2020-01-22 NOTE — PATIENT INSTRUCTIONS
Contact Numbers  [facility] Main Line: [phone]  [facility] NurseTriage and After Hours:  [phone]    Call triage with chills and/or temperature greater than or equal to 100.5, uncontrolled nausea/vomiting, diarrhea, constipation, dizziness, shortness of breath, chest pain, bleeding, unexplained bruising, or any new/concerning symptoms, questions/concerns.     If after hours, weekends, or holidays, call the nurse triage number. Calls may be forwarded to the hospital , please ask for the adult oncology doctor on call.     If you are having any concerning symptoms or wish to speak to a provider before your next infusion visit, please call your care coordinator or triage to notify them so we can adequately serve you.     If you need a refill on a narcotic prescription, please call triage or your care coordinator before your infusion appointment.           January 2020 Sunday Monday Tuesday Wednesday Thursday Friday Saturday                  1     2     3     4       5     6     7     8     9     10    [facility]Y   8:15 AM   (30 min.)   Rod Campos MD   [facility]    CT ABDOMEN PELVIS W   9:40 AM   (20 min.)   [facility]   [facility] CT    [facility] RETURN  11:15 AM   (30 min.)   Dwight Chau MD   [facility] 11       12     13     14     15    [facility] DRAW  10:30 AM   (15 min.)    [facility]    [facility] ONC INFUSION 180  11:00 AM   (180 min.)    ONCOLOGY INFUSION   [facility] 16    [facility] RETURN   7:45 AM   (20 min.)   Imleda Sky MD   [facility] 17     18       19     20     21     22    [facility] DRAW   6:15 AM   (15 min.)    [facility] DRAW   [facility] Draw    [facility] ONC INFUSION 180   7:00 AM   (180 min.)    ONCOLOGY INFUSION   [facility] 23     24     25       26     27     28     29    [facility] DRAW   7:30 AM   (15 min.)     [facility]    [facility] INFUSION 180   8:00 AM   (180 min.)    ONCOLOGY INFUSION   [facility] 30 31 February 2020 Sunday Monday Tuesday Wednesday Thursday Friday Saturday                                 1       2     3     4     5     6     7     8       9     10     11     12    [facility] DRAW   7:15 AM   (15 min.)    [facility] DRAW   [facility] Lab Draw    [facility] RETURN   7:35 AM   (50 min.)   Violeta Coto PA-C   [facility]    [facility] ONC INFUSION 180   9:00 AM   (180 min.)   [facility] ONCOLOGY INFUSION   [facility] 13     14     15       16     17     18     19    [facility] DRAW   7:30 AM   (15 min.)    [facility] DRAW   [facility] Lab Draw    [facility] ONC INFUSION 180   8:00 AM   (180 min.)    ONCOLOGY INFUSION   [facility] 20     21     22       23     24     25     26    [facility] DRAW   7:30 AM   (15 min.)    [facility] DRAW   [facility]    [facility] ONC INFUSION 180   8:00 AM   (180 min.)    ONCOLOGY INFUSION   [facility] 27     28     29                     Lab Results:  Recent Results (from the past 12 hour(s))   CBC with platelets differential    Collection Time: 01/22/20  6:39 AM   Result Value Ref Range    WBC 3.1 (L) 4.0 - 11.0 10e9/L    RBC Count 3.82 (L) 4.4 - 5.9 10e12/L    Hemoglobin 11.6 (L) 13.3 - 17.7 g/dL    Hematocrit 34.9 (L) 40.0 - 53.0 %    MCV 91 78 - 100 fl    MCH 30.4 26.5 - 33.0 pg    MCHC 33.2 31.5 - 36.5 g/dL    RDW 12.9 10.0 - 15.0 %    Platelet Count 118 (L) 150 - 450 10e9/L    Diff Method Automated Method     % Neutrophils 50.0 %    % Lymphocytes 36.0 %    % Monocytes 8.3 %    % Eosinophils 4.5 %    % Basophils 0.6 %    % Immature Granulocytes 0.6 %    Nucleated RBCs 0 0 /100    Absolute Neutrophil 1.6 1.6 - 8.3 10e9/L    Absolute Lymphocytes 1.1 0.8 - 5.3 10e9/L    Absolute  Monocytes 0.3 0.0 - 1.3 10e9/L    Absolute Eosinophils 0.1 0.0 - 0.7 10e9/L    Absolute Basophils 0.0 0.0 - 0.2 10e9/L    Abs Immature Granulocytes 0.0 0 - 0.4 10e9/L    Absolute Nucleated RBC 0.0     Platelet Estimate Confirming automated cell count

## 2020-01-22 NOTE — PROGRESS NOTES
Infusion Nursing Note:  Daniel Sandhu presents today for Day 8 Cycle 10 Taxol.    Patient seen by provider today: No   present during visit today: Not Applicable.    Note: Daniel arrives to infusion today doing well. His nose bleeds have returned on the right nostril. He will contact ENT regarding the next steps. He otherwise denies any concerns or changes today.     Intravenous Access:  Implanted Port.    Treatment Conditions:  Lab Results   Component Value Date    HGB 11.6 01/22/2020     Lab Results   Component Value Date    WBC 3.1 01/22/2020      Lab Results   Component Value Date    ANEU 1.6 01/22/2020     Lab Results   Component Value Date     01/22/2020      Results reviewed, labs MET treatment parameters, ok to proceed with treatment.    Post Infusion Assessment:  Patient tolerated infusion without incident.  Blood return noted pre and post infusion.  Access discontinued per protocol.     Discharge Plan:   Patient declined prescription refills.  AVS to patient via [employer]T.  Patient will return 01/29 for next appointment.   Patient discharged in stable condition accompanied by: self.  Departure Mode: Ambulatory.    Geovanna Polanco RN

## 2020-01-29 ENCOUNTER — INFUSION THERAPY VISIT (OUTPATIENT)
Dept: ONCOLOGY | Facility: CLINIC | Age: 40
End: 2020-01-29
Attending: INTERNAL MEDICINE
Payer: COMMERCIAL

## 2020-01-29 ENCOUNTER — APPOINTMENT (OUTPATIENT)
Dept: LAB | Facility: CLINIC | Age: 40
End: 2020-01-29
Attending: INTERNAL MEDICINE
Payer: COMMERCIAL

## 2020-01-29 VITALS
DIASTOLIC BLOOD PRESSURE: 60 MMHG | TEMPERATURE: 98.7 F | OXYGEN SATURATION: 100 % | SYSTOLIC BLOOD PRESSURE: 120 MMHG | RESPIRATION RATE: 16 BRPM | BODY MASS INDEX: 22.6 KG/M2 | WEIGHT: 145.7 LBS | HEART RATE: 75 BPM

## 2020-01-29 DIAGNOSIS — C15.5 MALIGNANT NEOPLASM OF LOWER THIRD OF ESOPHAGUS (H): Primary | ICD-10-CM

## 2020-01-29 LAB
ALBUMIN UR-MCNC: 10 MG/DL
BASOPHILS # BLD AUTO: 0 10E9/L (ref 0–0.2)
BASOPHILS NFR BLD AUTO: 1.1 %
DIFFERENTIAL METHOD BLD: ABNORMAL
EOSINOPHIL # BLD AUTO: 0.2 10E9/L (ref 0–0.7)
EOSINOPHIL NFR BLD AUTO: 6.4 %
ERYTHROCYTE [DISTWIDTH] IN BLOOD BY AUTOMATED COUNT: 12.8 % (ref 10–15)
HCT VFR BLD AUTO: 33.6 % (ref 40–53)
HGB BLD-MCNC: 10.9 G/DL (ref 13.3–17.7)
IMM GRANULOCYTES # BLD: 0 10E9/L (ref 0–0.4)
IMM GRANULOCYTES NFR BLD: 0.4 %
LYMPHOCYTES # BLD AUTO: 0.9 10E9/L (ref 0.8–5.3)
LYMPHOCYTES NFR BLD AUTO: 32.1 %
MCH RBC QN AUTO: 30.2 PG (ref 26.5–33)
MCHC RBC AUTO-ENTMCNC: 32.4 G/DL (ref 31.5–36.5)
MCV RBC AUTO: 93 FL (ref 78–100)
MONOCYTES # BLD AUTO: 0.2 10E9/L (ref 0–1.3)
MONOCYTES NFR BLD AUTO: 7.9 %
NEUTROPHILS # BLD AUTO: 1.4 10E9/L (ref 1.6–8.3)
NEUTROPHILS NFR BLD AUTO: 52.1 %
NRBC # BLD AUTO: 0 10*3/UL
NRBC BLD AUTO-RTO: 0 /100
PLATELET # BLD AUTO: 131 10E9/L (ref 150–450)
RBC # BLD AUTO: 3.61 10E12/L (ref 4.4–5.9)
WBC # BLD AUTO: 2.7 10E9/L (ref 4–11)

## 2020-01-29 PROCEDURE — 25000125 ZZHC RX 250: Mod: ZF | Performed by: PHYSICIAN ASSISTANT

## 2020-01-29 PROCEDURE — 85025 COMPLETE CBC W/AUTO DIFF WBC: CPT | Performed by: PHYSICIAN ASSISTANT

## 2020-01-29 PROCEDURE — 96413 CHEMO IV INFUSION 1 HR: CPT

## 2020-01-29 PROCEDURE — 96375 TX/PRO/DX INJ NEW DRUG ADDON: CPT

## 2020-01-29 PROCEDURE — 25000128 H RX IP 250 OP 636: Mod: ZF | Performed by: PHYSICIAN ASSISTANT

## 2020-01-29 PROCEDURE — 25000128 H RX IP 250 OP 636: Mod: ZF | Performed by: INTERNAL MEDICINE

## 2020-01-29 PROCEDURE — 96367 TX/PROPH/DG ADDL SEQ IV INF: CPT

## 2020-01-29 PROCEDURE — 25800030 ZZH RX IP 258 OP 636: Mod: ZF | Performed by: PHYSICIAN ASSISTANT

## 2020-01-29 PROCEDURE — 96417 CHEMO IV INFUS EACH ADDL SEQ: CPT

## 2020-01-29 PROCEDURE — 81003 URINALYSIS AUTO W/O SCOPE: CPT | Performed by: PHYSICIAN ASSISTANT

## 2020-01-29 RX ORDER — HEPARIN SODIUM (PORCINE) LOCK FLUSH IV SOLN 100 UNIT/ML 100 UNIT/ML
5 SOLUTION INTRAVENOUS
Status: COMPLETED | OUTPATIENT
Start: 2020-01-29 | End: 2020-01-29

## 2020-01-29 RX ORDER — DIPHENHYDRAMINE HCL 25 MG
25 CAPSULE ORAL ONCE
Status: DISCONTINUED | OUTPATIENT
Start: 2020-01-29 | End: 2020-01-29 | Stop reason: HOSPADM

## 2020-01-29 RX ORDER — HEPARIN SODIUM (PORCINE) LOCK FLUSH IV SOLN 100 UNIT/ML 100 UNIT/ML
500 SOLUTION INTRAVENOUS ONCE
Status: COMPLETED | OUTPATIENT
Start: 2020-01-29 | End: 2020-01-29

## 2020-01-29 RX ADMIN — Medication 500 UNITS: at 11:13

## 2020-01-29 RX ADMIN — SODIUM CHLORIDE 250 ML: 9 INJECTION, SOLUTION INTRAVENOUS at 09:08

## 2020-01-29 RX ADMIN — PACLITAXEL 145 MG: 6 INJECTION, SOLUTION INTRAVENOUS at 10:08

## 2020-01-29 RX ADMIN — FAMOTIDINE 20 MG: 10 INJECTION INTRAVENOUS at 09:08

## 2020-01-29 RX ADMIN — SODIUM CHLORIDE 500 MG: 9 INJECTION, SOLUTION INTRAVENOUS at 09:31

## 2020-01-29 RX ADMIN — Medication 5 ML: at 07:53

## 2020-01-29 RX ADMIN — DEXAMETHASONE SODIUM PHOSPHATE 20 MG: 10 INJECTION, SOLUTION INTRAMUSCULAR; INTRAVENOUS at 09:15

## 2020-01-29 ASSESSMENT — PAIN SCALES - GENERAL: PAINLEVEL: NO PAIN (0)

## 2020-01-29 NOTE — PATIENT INSTRUCTIONS
[facility] Triage and after hours / weekends / holidays:  [phone]    Please call the triage or after hours line if you experience a temperature greater than or equal to 100.5, shaking chills, have uncontrolled nausea, vomiting and/or diarrhea, dizziness, shortness of breath, chest pain, bleeding, unexplained bruising, or if you have any other new/concerning symptoms, questions or concerns.      If you are having any concerning symptoms or wish to speak to a provider before your next infusion visit, please call your care coordinator or triage to notify them so we can adequately serve you.     If you need a refill on a narcotic prescription or other medication, please call before your infusion appointment.

## 2020-01-29 NOTE — PROGRESS NOTES
Infusion Nursing Note:  Daniel Sandhu presents today for Cycle 10 Day 15 Taxol and Cyramza.        Note: Daniel reports feeling well today.  He continues to have nosebleeds just about every morning lasting no longer than five minutes.  This has been reported before and is noted in Dr Ponce's visit note from 12/23/19.  Violeta Nnamdi contacted today to confirm it was ok to continue with cyrmaza despite ongoing nose bleeds.  Daniel has been and continues to be followed by Dr Campos in ENT re nose bleeds.  Violeta stated it was ok to move forward with cyramza today.  See TORB in treatment plan.    Pt declined benadryl tolerated treatment with out incident    Intravenous Access:  Implanted Port.  Positive blood return pre and post taxol    Treatment Conditions:  Lab Results   Component Value Date    HGB 10.9 01/29/2020     Lab Results   Component Value Date    WBC 2.7 01/29/2020      Lab Results   Component Value Date    ANEU 1.4 01/29/2020     Lab Results   Component Value Date     01/29/2020    Urine Protein: 10mg/dl    Results reviewed, labs did NOT meet treatment parameters: Violeta Nnamdi notified of pt's ANC ok to proceed with cycle 10 day 15 today. See TORB in treatment plan.        Post Infusion Assessment:  Patient tolerated infusion without incident.       Discharge Plan:     Patient declined prescription refills.    AVS to patient via Chef.  Patient will return 2/12/2020 for cycle 11 day 1.  Face to Face time: 0.    Jodi Cedeno RN

## 2020-02-12 ENCOUNTER — APPOINTMENT (OUTPATIENT)
Dept: LAB | Facility: CLINIC | Age: 40
End: 2020-02-12
Attending: INTERNAL MEDICINE
Payer: COMMERCIAL

## 2020-02-12 ENCOUNTER — INFUSION THERAPY VISIT (OUTPATIENT)
Dept: ONCOLOGY | Facility: CLINIC | Age: 40
End: 2020-02-12
Attending: INTERNAL MEDICINE
Payer: COMMERCIAL

## 2020-02-12 ENCOUNTER — ONCOLOGY VISIT (OUTPATIENT)
Dept: ONCOLOGY | Facility: CLINIC | Age: 40
End: 2020-02-12
Attending: PHYSICIAN ASSISTANT
Payer: COMMERCIAL

## 2020-02-12 VITALS
RESPIRATION RATE: 16 BRPM | WEIGHT: 145.7 LBS | TEMPERATURE: 98 F | HEART RATE: 95 BPM | DIASTOLIC BLOOD PRESSURE: 61 MMHG | SYSTOLIC BLOOD PRESSURE: 112 MMHG | BODY MASS INDEX: 22.6 KG/M2 | OXYGEN SATURATION: 100 %

## 2020-02-12 DIAGNOSIS — C15.5 MALIGNANT NEOPLASM OF LOWER THIRD OF ESOPHAGUS (H): Primary | ICD-10-CM

## 2020-02-12 LAB
ALBUMIN SERPL-MCNC: 3.6 G/DL (ref 3.4–5)
ALBUMIN UR-MCNC: NEGATIVE MG/DL
ALP SERPL-CCNC: 53 U/L (ref 40–150)
ALT SERPL W P-5'-P-CCNC: 28 U/L (ref 0–70)
AST SERPL W P-5'-P-CCNC: 19 U/L (ref 0–45)
BASOPHILS # BLD AUTO: 0 10E9/L (ref 0–0.2)
BASOPHILS NFR BLD AUTO: 0.9 %
BILIRUB DIRECT SERPL-MCNC: <0.1 MG/DL (ref 0–0.2)
BILIRUB SERPL-MCNC: 0.3 MG/DL (ref 0.2–1.3)
CEA SERPL-MCNC: <0.5 UG/L (ref 0–2.5)
DIFFERENTIAL METHOD BLD: ABNORMAL
EOSINOPHIL # BLD AUTO: 0.1 10E9/L (ref 0–0.7)
EOSINOPHIL NFR BLD AUTO: 3.2 %
ERYTHROCYTE [DISTWIDTH] IN BLOOD BY AUTOMATED COUNT: 13.1 % (ref 10–15)
HCT VFR BLD AUTO: 35.2 % (ref 40–53)
HGB BLD-MCNC: 11.8 G/DL (ref 13.3–17.7)
IMM GRANULOCYTES # BLD: 0 10E9/L (ref 0–0.4)
IMM GRANULOCYTES NFR BLD: 0.3 %
LYMPHOCYTES # BLD AUTO: 1 10E9/L (ref 0.8–5.3)
LYMPHOCYTES NFR BLD AUTO: 31.3 %
MCH RBC QN AUTO: 30.7 PG (ref 26.5–33)
MCHC RBC AUTO-ENTMCNC: 33.5 G/DL (ref 31.5–36.5)
MCV RBC AUTO: 92 FL (ref 78–100)
MONOCYTES # BLD AUTO: 0.6 10E9/L (ref 0–1.3)
MONOCYTES NFR BLD AUTO: 18 %
NEUTROPHILS # BLD AUTO: 1.5 10E9/L (ref 1.6–8.3)
NEUTROPHILS NFR BLD AUTO: 46.3 %
NRBC # BLD AUTO: 0 10*3/UL
NRBC BLD AUTO-RTO: 0 /100
PLATELET # BLD AUTO: 131 10E9/L (ref 150–450)
PROT SERPL-MCNC: 6.7 G/DL (ref 6.8–8.8)
RBC # BLD AUTO: 3.84 10E12/L (ref 4.4–5.9)
WBC # BLD AUTO: 3.2 10E9/L (ref 4–11)

## 2020-02-12 PROCEDURE — 96417 CHEMO IV INFUS EACH ADDL SEQ: CPT

## 2020-02-12 PROCEDURE — 25000125 ZZHC RX 250: Mod: ZF | Performed by: PHYSICIAN ASSISTANT

## 2020-02-12 PROCEDURE — 80076 HEPATIC FUNCTION PANEL: CPT | Performed by: PHYSICIAN ASSISTANT

## 2020-02-12 PROCEDURE — 99214 OFFICE O/P EST MOD 30 MIN: CPT | Mod: ZP | Performed by: PHYSICIAN ASSISTANT

## 2020-02-12 PROCEDURE — 96375 TX/PRO/DX INJ NEW DRUG ADDON: CPT

## 2020-02-12 PROCEDURE — 96413 CHEMO IV INFUSION 1 HR: CPT

## 2020-02-12 PROCEDURE — 25000128 H RX IP 250 OP 636: Mod: ZF | Performed by: PHYSICIAN ASSISTANT

## 2020-02-12 PROCEDURE — 25800030 ZZH RX IP 258 OP 636: Mod: ZF | Performed by: PHYSICIAN ASSISTANT

## 2020-02-12 PROCEDURE — 81003 URINALYSIS AUTO W/O SCOPE: CPT | Performed by: PHYSICIAN ASSISTANT

## 2020-02-12 PROCEDURE — 85025 COMPLETE CBC W/AUTO DIFF WBC: CPT | Performed by: PHYSICIAN ASSISTANT

## 2020-02-12 PROCEDURE — 82378 CARCINOEMBRYONIC ANTIGEN: CPT | Performed by: PHYSICIAN ASSISTANT

## 2020-02-12 RX ORDER — EPINEPHRINE 1 MG/ML
0.3 INJECTION, SOLUTION INTRAMUSCULAR; SUBCUTANEOUS EVERY 5 MIN PRN
Status: CANCELLED | OUTPATIENT
Start: 2020-02-19

## 2020-02-12 RX ORDER — ALBUTEROL SULFATE 90 UG/1
1-2 AEROSOL, METERED RESPIRATORY (INHALATION)
Status: CANCELLED
Start: 2020-02-19

## 2020-02-12 RX ORDER — ALBUTEROL SULFATE 0.83 MG/ML
2.5 SOLUTION RESPIRATORY (INHALATION)
Status: CANCELLED | OUTPATIENT
Start: 2020-02-19

## 2020-02-12 RX ORDER — EPINEPHRINE 1 MG/ML
0.3 INJECTION, SOLUTION INTRAMUSCULAR; SUBCUTANEOUS EVERY 5 MIN PRN
Status: CANCELLED | OUTPATIENT
Start: 2020-03-18

## 2020-02-12 RX ORDER — MEPERIDINE HYDROCHLORIDE 25 MG/ML
25 INJECTION INTRAMUSCULAR; INTRAVENOUS; SUBCUTANEOUS EVERY 30 MIN PRN
Status: CANCELLED | OUTPATIENT
Start: 2020-02-26

## 2020-02-12 RX ORDER — METHYLPREDNISOLONE SODIUM SUCCINATE 125 MG/2ML
125 INJECTION, POWDER, LYOPHILIZED, FOR SOLUTION INTRAMUSCULAR; INTRAVENOUS
Status: CANCELLED
Start: 2020-03-18

## 2020-02-12 RX ORDER — LORAZEPAM 2 MG/ML
0.5 INJECTION INTRAMUSCULAR EVERY 4 HOURS PRN
Status: CANCELLED
Start: 2020-02-26

## 2020-02-12 RX ORDER — HEPARIN SODIUM (PORCINE) LOCK FLUSH IV SOLN 100 UNIT/ML 100 UNIT/ML
500 SOLUTION INTRAVENOUS ONCE
Status: COMPLETED | OUTPATIENT
Start: 2020-02-12 | End: 2020-02-12

## 2020-02-12 RX ORDER — EPINEPHRINE 1 MG/ML
0.3 INJECTION, SOLUTION INTRAMUSCULAR; SUBCUTANEOUS EVERY 5 MIN PRN
Status: CANCELLED | OUTPATIENT
Start: 2020-03-11

## 2020-02-12 RX ORDER — DIPHENHYDRAMINE HCL 25 MG
25 CAPSULE ORAL ONCE
Status: CANCELLED | OUTPATIENT
Start: 2020-02-26

## 2020-02-12 RX ORDER — DIPHENHYDRAMINE HCL 25 MG
25 CAPSULE ORAL
Status: CANCELLED
Start: 2020-02-19

## 2020-02-12 RX ORDER — EPINEPHRINE 0.3 MG/.3ML
0.3 INJECTION SUBCUTANEOUS EVERY 5 MIN PRN
Status: CANCELLED | OUTPATIENT
Start: 2020-02-19

## 2020-02-12 RX ORDER — MEPERIDINE HYDROCHLORIDE 25 MG/ML
25 INJECTION INTRAMUSCULAR; INTRAVENOUS; SUBCUTANEOUS EVERY 30 MIN PRN
Status: CANCELLED | OUTPATIENT
Start: 2020-02-19

## 2020-02-12 RX ORDER — DIPHENHYDRAMINE HCL 25 MG
25 CAPSULE ORAL
Status: CANCELLED
Start: 2020-03-18

## 2020-02-12 RX ORDER — DIPHENHYDRAMINE HYDROCHLORIDE 50 MG/ML
50 INJECTION INTRAMUSCULAR; INTRAVENOUS
Status: CANCELLED
Start: 2020-02-26

## 2020-02-12 RX ORDER — LORAZEPAM 2 MG/ML
0.5 INJECTION INTRAMUSCULAR EVERY 4 HOURS PRN
Status: CANCELLED
Start: 2020-02-12

## 2020-02-12 RX ORDER — EPINEPHRINE 1 MG/ML
0.3 INJECTION, SOLUTION INTRAMUSCULAR; SUBCUTANEOUS EVERY 5 MIN PRN
Status: CANCELLED | OUTPATIENT
Start: 2020-02-26

## 2020-02-12 RX ORDER — EPINEPHRINE 0.3 MG/.3ML
0.3 INJECTION SUBCUTANEOUS EVERY 5 MIN PRN
Status: CANCELLED | OUTPATIENT
Start: 2020-03-18

## 2020-02-12 RX ORDER — DIPHENHYDRAMINE HCL 25 MG
25 CAPSULE ORAL
Status: CANCELLED
Start: 2020-03-11

## 2020-02-12 RX ORDER — ALBUTEROL SULFATE 90 UG/1
1-2 AEROSOL, METERED RESPIRATORY (INHALATION)
Status: CANCELLED
Start: 2020-03-25

## 2020-02-12 RX ORDER — MEPERIDINE HYDROCHLORIDE 25 MG/ML
25 INJECTION INTRAMUSCULAR; INTRAVENOUS; SUBCUTANEOUS EVERY 30 MIN PRN
Status: CANCELLED | OUTPATIENT
Start: 2020-03-25

## 2020-02-12 RX ORDER — DIPHENHYDRAMINE HCL 25 MG
25 CAPSULE ORAL
Status: CANCELLED
Start: 2020-03-25

## 2020-02-12 RX ORDER — DIPHENHYDRAMINE HYDROCHLORIDE 50 MG/ML
50 INJECTION INTRAMUSCULAR; INTRAVENOUS
Status: CANCELLED
Start: 2020-03-11

## 2020-02-12 RX ORDER — LORAZEPAM 2 MG/ML
0.5 INJECTION INTRAMUSCULAR EVERY 4 HOURS PRN
Status: CANCELLED
Start: 2020-03-18

## 2020-02-12 RX ORDER — HEPARIN SODIUM (PORCINE) LOCK FLUSH IV SOLN 100 UNIT/ML 100 UNIT/ML
500 SOLUTION INTRAVENOUS ONCE
Status: CANCELLED | OUTPATIENT
Start: 2020-03-11

## 2020-02-12 RX ORDER — EPINEPHRINE 0.3 MG/.3ML
0.3 INJECTION SUBCUTANEOUS EVERY 5 MIN PRN
Status: CANCELLED | OUTPATIENT
Start: 2020-03-11

## 2020-02-12 RX ORDER — DIPHENHYDRAMINE HCL 25 MG
25 CAPSULE ORAL ONCE
Status: CANCELLED | OUTPATIENT
Start: 2020-02-12

## 2020-02-12 RX ORDER — SODIUM CHLORIDE 9 MG/ML
1000 INJECTION, SOLUTION INTRAVENOUS CONTINUOUS PRN
Status: CANCELLED
Start: 2020-03-11

## 2020-02-12 RX ORDER — DIPHENHYDRAMINE HYDROCHLORIDE 50 MG/ML
50 INJECTION INTRAMUSCULAR; INTRAVENOUS
Status: CANCELLED
Start: 2020-03-25

## 2020-02-12 RX ORDER — METHYLPREDNISOLONE SODIUM SUCCINATE 125 MG/2ML
125 INJECTION, POWDER, LYOPHILIZED, FOR SOLUTION INTRAMUSCULAR; INTRAVENOUS
Status: CANCELLED
Start: 2020-02-26

## 2020-02-12 RX ORDER — DIPHENHYDRAMINE HYDROCHLORIDE 50 MG/ML
50 INJECTION INTRAMUSCULAR; INTRAVENOUS
Status: CANCELLED
Start: 2020-02-12

## 2020-02-12 RX ORDER — LORAZEPAM 2 MG/ML
0.5 INJECTION INTRAMUSCULAR EVERY 4 HOURS PRN
Status: CANCELLED
Start: 2020-03-11

## 2020-02-12 RX ORDER — LORAZEPAM 2 MG/ML
0.5 INJECTION INTRAMUSCULAR EVERY 4 HOURS PRN
Status: CANCELLED
Start: 2020-03-25

## 2020-02-12 RX ORDER — LORAZEPAM 2 MG/ML
0.5 INJECTION INTRAMUSCULAR EVERY 4 HOURS PRN
Status: CANCELLED
Start: 2020-02-19

## 2020-02-12 RX ORDER — HEPARIN SODIUM (PORCINE) LOCK FLUSH IV SOLN 100 UNIT/ML 100 UNIT/ML
5 SOLUTION INTRAVENOUS ONCE
Status: COMPLETED | OUTPATIENT
Start: 2020-02-12 | End: 2020-02-12

## 2020-02-12 RX ORDER — DIPHENHYDRAMINE HYDROCHLORIDE 50 MG/ML
50 INJECTION INTRAMUSCULAR; INTRAVENOUS
Status: CANCELLED
Start: 2020-02-19

## 2020-02-12 RX ORDER — ALBUTEROL SULFATE 0.83 MG/ML
2.5 SOLUTION RESPIRATORY (INHALATION)
Status: CANCELLED | OUTPATIENT
Start: 2020-02-12

## 2020-02-12 RX ORDER — EPINEPHRINE 0.3 MG/.3ML
0.3 INJECTION SUBCUTANEOUS EVERY 5 MIN PRN
Status: CANCELLED | OUTPATIENT
Start: 2020-03-25

## 2020-02-12 RX ORDER — ALBUTEROL SULFATE 0.83 MG/ML
2.5 SOLUTION RESPIRATORY (INHALATION)
Status: CANCELLED | OUTPATIENT
Start: 2020-02-26

## 2020-02-12 RX ORDER — DIPHENHYDRAMINE HCL 25 MG
25 CAPSULE ORAL
Status: CANCELLED
Start: 2020-02-26

## 2020-02-12 RX ORDER — EPINEPHRINE 1 MG/ML
0.3 INJECTION, SOLUTION INTRAMUSCULAR; SUBCUTANEOUS EVERY 5 MIN PRN
Status: CANCELLED | OUTPATIENT
Start: 2020-03-25

## 2020-02-12 RX ORDER — ALBUTEROL SULFATE 90 UG/1
1-2 AEROSOL, METERED RESPIRATORY (INHALATION)
Status: CANCELLED
Start: 2020-03-11

## 2020-02-12 RX ORDER — EPINEPHRINE 0.3 MG/.3ML
0.3 INJECTION SUBCUTANEOUS EVERY 5 MIN PRN
Status: CANCELLED | OUTPATIENT
Start: 2020-02-12

## 2020-02-12 RX ORDER — HEPARIN SODIUM (PORCINE) LOCK FLUSH IV SOLN 100 UNIT/ML 100 UNIT/ML
500 SOLUTION INTRAVENOUS ONCE
Status: CANCELLED | OUTPATIENT
Start: 2020-02-12

## 2020-02-12 RX ORDER — SODIUM CHLORIDE 9 MG/ML
1000 INJECTION, SOLUTION INTRAVENOUS CONTINUOUS PRN
Status: CANCELLED
Start: 2020-03-25

## 2020-02-12 RX ORDER — HEPARIN SODIUM (PORCINE) LOCK FLUSH IV SOLN 100 UNIT/ML 100 UNIT/ML
500 SOLUTION INTRAVENOUS ONCE
Status: CANCELLED | OUTPATIENT
Start: 2020-02-26

## 2020-02-12 RX ORDER — DIPHENHYDRAMINE HCL 25 MG
25 CAPSULE ORAL
Status: CANCELLED
Start: 2020-02-12

## 2020-02-12 RX ORDER — SODIUM CHLORIDE 9 MG/ML
1000 INJECTION, SOLUTION INTRAVENOUS CONTINUOUS PRN
Status: CANCELLED
Start: 2020-02-19

## 2020-02-12 RX ORDER — METHYLPREDNISOLONE SODIUM SUCCINATE 125 MG/2ML
125 INJECTION, POWDER, LYOPHILIZED, FOR SOLUTION INTRAMUSCULAR; INTRAVENOUS
Status: CANCELLED
Start: 2020-03-25

## 2020-02-12 RX ORDER — ALBUTEROL SULFATE 90 UG/1
1-2 AEROSOL, METERED RESPIRATORY (INHALATION)
Status: CANCELLED
Start: 2020-03-18

## 2020-02-12 RX ORDER — SODIUM CHLORIDE 9 MG/ML
1000 INJECTION, SOLUTION INTRAVENOUS CONTINUOUS PRN
Status: CANCELLED
Start: 2020-02-26

## 2020-02-12 RX ORDER — HEPARIN SODIUM (PORCINE) LOCK FLUSH IV SOLN 100 UNIT/ML 100 UNIT/ML
500 SOLUTION INTRAVENOUS ONCE
Status: CANCELLED | OUTPATIENT
Start: 2020-03-25

## 2020-02-12 RX ORDER — METHYLPREDNISOLONE SODIUM SUCCINATE 125 MG/2ML
125 INJECTION, POWDER, LYOPHILIZED, FOR SOLUTION INTRAMUSCULAR; INTRAVENOUS
Status: CANCELLED
Start: 2020-02-12

## 2020-02-12 RX ORDER — METHYLPREDNISOLONE SODIUM SUCCINATE 125 MG/2ML
125 INJECTION, POWDER, LYOPHILIZED, FOR SOLUTION INTRAMUSCULAR; INTRAVENOUS
Status: CANCELLED
Start: 2020-03-11

## 2020-02-12 RX ORDER — MEPERIDINE HYDROCHLORIDE 25 MG/ML
25 INJECTION INTRAMUSCULAR; INTRAVENOUS; SUBCUTANEOUS EVERY 30 MIN PRN
Status: CANCELLED | OUTPATIENT
Start: 2020-03-11

## 2020-02-12 RX ORDER — HEPARIN SODIUM (PORCINE) LOCK FLUSH IV SOLN 100 UNIT/ML 100 UNIT/ML
500 SOLUTION INTRAVENOUS ONCE
Status: CANCELLED | OUTPATIENT
Start: 2020-02-19

## 2020-02-12 RX ORDER — ALBUTEROL SULFATE 90 UG/1
1-2 AEROSOL, METERED RESPIRATORY (INHALATION)
Status: CANCELLED
Start: 2020-02-12

## 2020-02-12 RX ORDER — EPINEPHRINE 1 MG/ML
0.3 INJECTION, SOLUTION INTRAMUSCULAR; SUBCUTANEOUS EVERY 5 MIN PRN
Status: CANCELLED | OUTPATIENT
Start: 2020-02-12

## 2020-02-12 RX ORDER — SODIUM CHLORIDE 9 MG/ML
1000 INJECTION, SOLUTION INTRAVENOUS CONTINUOUS PRN
Status: CANCELLED
Start: 2020-03-18

## 2020-02-12 RX ORDER — EPINEPHRINE 0.3 MG/.3ML
0.3 INJECTION SUBCUTANEOUS EVERY 5 MIN PRN
Status: CANCELLED | OUTPATIENT
Start: 2020-02-26

## 2020-02-12 RX ORDER — ALBUTEROL SULFATE 0.83 MG/ML
2.5 SOLUTION RESPIRATORY (INHALATION)
Status: CANCELLED | OUTPATIENT
Start: 2020-03-11

## 2020-02-12 RX ORDER — MEPERIDINE HYDROCHLORIDE 25 MG/ML
25 INJECTION INTRAMUSCULAR; INTRAVENOUS; SUBCUTANEOUS EVERY 30 MIN PRN
Status: CANCELLED | OUTPATIENT
Start: 2020-02-12

## 2020-02-12 RX ORDER — HEPARIN SODIUM (PORCINE) LOCK FLUSH IV SOLN 100 UNIT/ML 100 UNIT/ML
500 SOLUTION INTRAVENOUS ONCE
Status: CANCELLED | OUTPATIENT
Start: 2020-03-18

## 2020-02-12 RX ORDER — SODIUM CHLORIDE 9 MG/ML
1000 INJECTION, SOLUTION INTRAVENOUS CONTINUOUS PRN
Status: CANCELLED
Start: 2020-02-12

## 2020-02-12 RX ORDER — ALBUTEROL SULFATE 90 UG/1
1-2 AEROSOL, METERED RESPIRATORY (INHALATION)
Status: CANCELLED
Start: 2020-02-26

## 2020-02-12 RX ORDER — MEPERIDINE HYDROCHLORIDE 25 MG/ML
25 INJECTION INTRAMUSCULAR; INTRAVENOUS; SUBCUTANEOUS EVERY 30 MIN PRN
Status: CANCELLED | OUTPATIENT
Start: 2020-03-18

## 2020-02-12 RX ORDER — METHYLPREDNISOLONE SODIUM SUCCINATE 125 MG/2ML
125 INJECTION, POWDER, LYOPHILIZED, FOR SOLUTION INTRAMUSCULAR; INTRAVENOUS
Status: CANCELLED
Start: 2020-02-19

## 2020-02-12 RX ORDER — ALBUTEROL SULFATE 0.83 MG/ML
2.5 SOLUTION RESPIRATORY (INHALATION)
Status: CANCELLED | OUTPATIENT
Start: 2020-03-18

## 2020-02-12 RX ORDER — DIPHENHYDRAMINE HYDROCHLORIDE 50 MG/ML
50 INJECTION INTRAMUSCULAR; INTRAVENOUS
Status: CANCELLED
Start: 2020-03-18

## 2020-02-12 RX ORDER — ALBUTEROL SULFATE 0.83 MG/ML
2.5 SOLUTION RESPIRATORY (INHALATION)
Status: CANCELLED | OUTPATIENT
Start: 2020-03-25

## 2020-02-12 RX ADMIN — PACLITAXEL 145 MG: 6 INJECTION, SOLUTION INTRAVENOUS at 10:02

## 2020-02-12 RX ADMIN — FAMOTIDINE 20 MG: 10 INJECTION INTRAVENOUS at 08:50

## 2020-02-12 RX ADMIN — SODIUM CHLORIDE 250 ML: 9 INJECTION, SOLUTION INTRAVENOUS at 08:46

## 2020-02-12 RX ADMIN — Medication 500 UNITS: at 11:10

## 2020-02-12 RX ADMIN — SODIUM CHLORIDE 500 MG: 9 INJECTION, SOLUTION INTRAVENOUS at 09:26

## 2020-02-12 RX ADMIN — DEXAMETHASONE SODIUM PHOSPHATE 20 MG: 10 INJECTION, SOLUTION INTRAMUSCULAR; INTRAVENOUS at 08:52

## 2020-02-12 RX ADMIN — Medication 5 ML: at 07:35

## 2020-02-12 ASSESSMENT — PAIN SCALES - GENERAL: PAINLEVEL: NO PAIN (0)

## 2020-02-12 NOTE — PATIENT INSTRUCTIONS
[facility] Main Line: [phone]    Call triage nurse with chills and/or temperature greater than or equal to 100.4, uncontrolled nausea/vomiting, diarrhea, constipation, dizziness, shortness of breath, chest pain, bleeding, unexplained bruising, or any new/concerning symptoms, questions/concerns.   If you are having any concerning symptoms or wish to speak to a provider before your next infusion visit, please call your care coordinator or triage to notify them so we can adequately serve you.   Triage Nurse Line: [phone]    If after hours, weekends, or holidays [phone]

## 2020-02-12 NOTE — PROGRESS NOTES
[facility]    Hematology/Oncology Established Patient Note    Today's Date: Feb 12, 2020    Reason for Follow-up: adenocarcinoma of the GE junction    HISTORY OF PRESENT ILLNESS: Daniel Sandhu is a 39 year old male who presents with adenocarcinoma of the GE junction.  In early 2017, patient started noticing difficulty swallowing, and that food seems to take longer to go down.  It became more frequent, and he saw his PCP, and was referred for EGD, which showed an esophageal mass located at the GE junction, measuring 4 cm.  Biopsy showed poorly differentiated adenocarcinoma.  HER-2 was sent and is equivocal (2+).  CT c/a/p showed a mildly prominent lymph node in the gastrohepatic ligament, but there were no pathologically enlarged lymph nodes in the chest, abdomen, or pelvis.  There was otherwise no evidence of metastatic disease.  PET-CT showed thickening of the distal esophagus consistent with known esophageal adenocarcinoma, as well as mildly hypermetabolic 1 cm lymph node in the gastrohepatic ligament.  There was no evidence of distant metastatic disease.  He underwent EUS on 6/9/17, which showed the esophageal tumor in the lower third of the esophagus, staged T2NX.  There were 2 abnormal lymph nodes seen in the gastrohepatic ligament; pathology was suspicious for adenocarcinoma.  Celiac node was sampled as well, which was benign.  He was seen by surgery, Dr. Esteban, and recommended srinivas-operative chemotherapy.    Port was placed on 6/22/17.  It was removed on 3/19/18.    He underwent chemotherapy with epirubicin, oxaliplatin, and capecitabine x 3 cycles 6/26/17-8/7/17.      On 11/3/17, he underwent laparotomy with total gastrectomy and abdominal lymphadenectomy, left thoracotomy with distal esophagectomy and intrathoracic Terrell-en-Y esophagojejunostomy, feeding jejunostomy, left pharyngostomy tube placement, right tube thoracostomy, and flexible bronchoscopy.  On 11/9/17, he was taken  back to OR for I&D of pharyngostomy tube abscess.  Pathology showed adenocarcinoma, moderate differentiated, extensive residual tumor with no evidence tumor regression, margins negative, perineural invasion present, 1 of 28 lymph nodes were involved with malignancy, stage [id number] (stage IIIA).  HER-2 is non-amplified.    He resumed chemotherapy post-surgery, with plans to complete 3 more cycles, with 5-FU, epirubicin, oxaliplatin.  However, he only completed 2 more cycles, due to poor tolerance.  He was admitted to the hospital 1/9/18-1/13/18 for nausea, diarrhea, failure to thrive, severe malnutrition, generalized weakness.  His infusional chemotherapy was stopped on 1/8/18.  He underwent EGD in the hospital on 1/11/18, which showed ulcers, and no evidence of recurrence of his cancer.  One area biopsied showed inflammation, no evidence of malignancy.    He saw Dr. Esteban on 3/6/19 and had CT abdomen/pelvis done, which showed a 3.1 cm lesion in hepatic segment 3.  He underwent biopsy on 3/20/19 by IR, which showed moderately differentiated adenocarcinoma consistent with metastasis from primary esophageal carcinoma.  HER-2 is non-amplified.  Restaging PET scan on 3/29/19 showed the 4.4 cm left lobe liver metastasis.  There is nodular foci of hypermetabolic uptake in the left mid abdomen in relation to the small bowel loops, without definite underlying lesion on CT.  This is favored to represent metastatic disease unless proven otherwise.    He started on chemotherapy with paclitaxel and ramucirumab on 4/10/19. Imaging initially showed improvement in disease and has been generally stable since that time.     INTERIM HISTORY: Daniel is here for routine follow-up.   Patient reports ongoing nosebleeds on the right side that initially improved with cautery but has gotten worse again.  He plans to follow-up with ENT regarding this.  He has had some improvement with using a humidifier in his bedroom.  He reports eating and  drinking okay.  He reports normal bowel movements.  He denies any pain issues.  He denies any neuropathy.  He has some regarding functional profiling of his tumor as well as the potential for adjusting the time that he eats and fasting prior to chemotherapy.  He denies other concerns.    HOME MEDICATIONS:  Current Outpatient Medications   Medication Sig Dispense Refill     cyabnocobalamin (VITAMIN B-12) 2500 MCG sublingual tablet Place 2,500 mcg under the tongue daily 30 tablet 1     ferrous gluconate (FERGON) 324 (38 Fe) MG tablet Take 324 mg by mouth daily (with breakfast)       multivitamin, therapeutic with minerals (MULTI-VITAMIN) TABS tablet Take 1 tablet by mouth daily Generic [place] Vitamin       mupirocin (BACTROBAN) 2 % external ointment Apply to anterior nares BID X 2 month supply 2 g 3     saccharomyces boulardii (FLORASTOR) 250 MG capsule Take 250 mg by mouth 2 times daily       LORazepam (ATIVAN) 0.5 MG tablet Take 1 tablet (0.5 mg) by mouth every 4 hours as needed (Anxiety, Nausea/Vomiting or Sleep) (Patient not taking: Reported on 12/26/2019) 30 tablet 2     Misc Natural Product Nasal (PONARIS) SOLN Apply two drops to each nostril BID X 2 month supply (Patient not taking: Reported on 1/15/2020) 10 mL 3     prochlorperazine (COMPAZINE) 10 MG tablet Take 1 tablet (10 mg) by mouth every 6 hours as needed (Nausea/Vomiting) (Patient not taking: Reported on 12/26/2019) 30 tablet 2     PHYSICAL EXAM:  General: The patient is a pleasant male in no acute distress.  /61   Pulse 95   Temp 98  F (36.7  C)   Resp 16   Wt 66.1 kg (145 lb 11.2 oz)   SpO2 100%   BMI 22.60 kg/m    Wt Readings from Last 10 Encounters:   02/12/20 66.1 kg (145 lb 11.2 oz)   01/29/20 66.1 kg (145 lb 11.2 oz)   01/22/20 66 kg (145 lb 8 oz)   01/16/20 65.8 kg (145 lb)   01/15/20 67.7 kg (149 lb 4.8 oz)   01/10/20 67.3 kg (148 lb 4.8 oz)   01/10/20 66.7 kg (147 lb)   12/26/19 65.8 kg (145 lb)   12/23/19 67 kg (147 lb 11.2  oz)   12/18/19 65.8 kg (145 lb)   HEENT: EOMI, PERRL. Sclerae are anicteric. Oral mucosa is pink and moist with no lesions or thrush.   Lymph: Neck is supple with no lymphadenopathy in the cervical or supraclavicular areas.   Heart: Regular rate and rhythm.   Lungs: Clear to auscultation bilaterally.   Abdomen: Bowel sounds present, soft, nontender with no palpable hepatosplenomegaly or masses.   Extremities: No lower extremity edema noted bilaterally.   Neuro: Cranial nerves II through XII are grossly intact.  Skin: No rashes, petechiae, or bruising noted on exposed skin.    LABS:   2/12/2020 07:43   Albumin 3.6   Protein Total 6.7 (L)   Bilirubin Total 0.3   Alkaline Phosphatase 53   ALT 28   AST 19   Bilirubin Direct <0.1   WBC 3.2 (L)   Hemoglobin 11.8 (L)   Hematocrit 35.2 (L)   Platelet Count 131 (L)   RBC Count 3.84 (L)   MCV 92   MCH 30.7   MCHC 33.5   RDW 13.1   Diff Method Automated Method   % Neutrophils 46.3   % Lymphocytes 31.3   % Monocytes 18.0   % Eosinophils 3.2   % Basophils 0.9   % Immature Granulocytes 0.3   Nucleated RBCs 0   Absolute Neutrophil 1.5 (L)   Absolute Lymphocytes 1.0   Absolute Monocytes 0.6   Absolute Eosinophils 0.1   Absolute Basophils 0.0   Abs Immature Granulocytes 0.0   Absolute Nucleated RBC 0.0     ASSESSMENT/PLAN:      1) Adenocarcinoma of the GE junction: now s/p 3 cycles of neoadjuvant chemotherapy with EOX, followed by surgical resection on 11/3/17.  Pathology showed adenocarcinoma, moderate differentiated, extensive residual tumor with no evidence tumor regression, margins negative, perineural invasion present, 1 of 28 lymph nodes were involved with malignancy, stage [id number] (stage IIIA).  HER-2 is non-amplified.    He has received EOF x 2 cycles after surgery, and he has not tolerated well.  The 5-FU on cycle 5 was discontinued early. Chemotherapy was stopped at that point due to poor tolerance.    Patient now has biopsy-confirmed metastatic disease to the liver and  possibly peritoneum.  He is asymptomatic currently.      [facility] One testing shows MS-stable, TMB-low (4 mut/Mb), ERBB2 amplification equivocal, and TP53 H214R alteration.  PD-L1 was negative (0%).  He is planning to be seen at [facility] on 9/27.      He is doing well today and will continue with cycle 11 of  paclitaxel+ramucirumab. He will see Dr. Ponce in April with repeat imaging. He will call sooner for concerns.     2) Epistaxis: Much improved. Will continue to work with ENT and discuss potential additional cautery.    3) Vaccination. Patient received the influenza vaccine this season.    Violeta Coto PA-C  [facility]  [address] [place] [address] [phone]

## 2020-02-12 NOTE — NURSING NOTE
"Oncology Rooming Note    February 12, 2020 7:57 AM   Daniel Sandhu is a 39 year old male who presents for:    Chief Complaint   Patient presents with     Port Draw     VS done, labs collected via portacath with gripper needle by lab RN, heparin locked.  Hx esophageal CA.     Oncology Clinic Visit     Patient with Esophogeal cancer here for provider visit and lab review      Initial Vitals: /61   Pulse 95   Temp 98  F (36.7  C)   Resp 16   Wt 66.1 kg (145 lb 11.2 oz)   SpO2 100%   BMI 22.60 kg/m   Estimated body mass index is 22.6 kg/m  as calculated from the following:    Height as of 1/16/20: 1.71 m (5' 7.32\").    Weight as of this encounter: 66.1 kg (145 lb 11.2 oz). Body surface area is 1.77 meters squared.  No Pain (0) Comment: Data Unavailable   No LMP for male patient.  Allergies reviewed: Yes  Medications reviewed: Yes    Medications: Medication refills not needed today.  Pharmacy name entered into EPIC:    [facility] - [place], [place] - [address] [facility] [place], [place] [facility] [address] [facility] [place], [place] - [address] [place] [address] [phone]    Clinical concerns:     Gabbie Red CMA              "

## 2020-02-12 NOTE — PROGRESS NOTES
Infusion Nursing Note:  Daniel Sandhu presents today for Cycle 11 Day 1 Cyramza, Taxol.    Patient seen by provider today: Yes: Violeta JARVIS   present during visit today: Not Applicable.    Note: Daniel did not take the benadryl pre his last treatment of Cyramza/Taxol.  He did fine and would like to avoid the benadryl if possible as he felt better post infusion without taking it.    MAMIE Dong RN / Violeta JARVIS.  May remove benadryl as pre med and add it to PRN    Intravenous Access:  Implanted Port.    Treatment Conditions:  Lab Results   Component Value Date    HGB 11.8 02/12/2020     Lab Results   Component Value Date    WBC 3.2 02/12/2020      Lab Results   Component Value Date    ANEU 1.5 02/12/2020     Lab Results   Component Value Date     02/12/2020      Lab Results   Component Value Date     09/23/2019                   Lab Results   Component Value Date    POTASSIUM 3.9 09/23/2019           Lab Results   Component Value Date    MAG 2.2 03/07/2018            Lab Results   Component Value Date    CR 0.67 09/23/2019                   Lab Results   Component Value Date    YOBANY 9.0 09/23/2019                Lab Results   Component Value Date    BILITOTAL 0.3 02/12/2020           Lab Results   Component Value Date    ALBUMIN 3.6 02/12/2020                    Lab Results   Component Value Date    ALT 28 02/12/2020           Lab Results   Component Value Date    AST 19 02/12/2020       Results reviewed, labs MET treatment parameters, ok to proceed with treatment.      Post Infusion Assessment:  Patient tolerated infusion without incident.  Blood return noted pre and post infusion.  Site patent and intact, free from redness, edema or discomfort.  No evidence of extravasations.  Access discontinued per protocol.       Discharge Plan:   Patient declined prescription refills.  AVS to patient via [employer].  Patient will return 2/19/2020 labs/chemo for next appointment.   Patient  discharged in stable condition accompanied by: self.  Departure Mode: Ambulatory.  Face to Face time: 0.    Ciarra Dong

## 2020-02-12 NOTE — LETTER
2/12/2020      RE: Daniel Sandhu  [address] [facility] [id number]       [facility]    Hematology/Oncology Established Patient Note    Today's Date: Feb 12, 2020    Reason for Follow-up: adenocarcinoma of the GE junction    HISTORY OF PRESENT ILLNESS: Daniel Sandhu is a 39 year old male who presents with adenocarcinoma of the GE junction.  In early 2017, patient started noticing difficulty swallowing, and that food seems to take longer to go down.  It became more frequent, and he saw his PCP, and was referred for EGD, which showed an esophageal mass located at the GE junction, measuring 4 cm.  Biopsy showed poorly differentiated adenocarcinoma.  HER-2 was sent and is equivocal (2+).  CT c/a/p showed a mildly prominent lymph node in the gastrohepatic ligament, but there were no pathologically enlarged lymph nodes in the chest, abdomen, or pelvis.  There was otherwise no evidence of metastatic disease.  PET-CT showed thickening of the distal esophagus consistent with known esophageal adenocarcinoma, as well as mildly hypermetabolic 1 cm lymph node in the gastrohepatic ligament.  There was no evidence of distant metastatic disease.  He underwent EUS on 6/9/17, which showed the esophageal tumor in the lower third of the esophagus, staged T2NX.  There were 2 abnormal lymph nodes seen in the gastrohepatic ligament; pathology was suspicious for adenocarcinoma.  Celiac node was sampled as well, which was benign.  He was seen by surgery, Dr. Esteban, and recommended srinivas-operative chemotherapy.    Port was placed on 6/22/17.  It was removed on 3/19/18.    He underwent chemotherapy with epirubicin, oxaliplatin, and capecitabine x 3 cycles 6/26/17-8/7/17.      On 11/3/17, he underwent laparotomy with total gastrectomy and abdominal lymphadenectomy, left thoracotomy with distal esophagectomy and intrathoracic Terrell-en-Y esophagojejunostomy, feeding jejunostomy, left pharyngostomy tube  placement, right tube thoracostomy, and flexible bronchoscopy.  On 11/9/17, he was taken back to OR for I&D of pharyngostomy tube abscess.  Pathology showed adenocarcinoma, moderate differentiated, extensive residual tumor with no evidence tumor regression, margins negative, perineural invasion present, 1 of 28 lymph nodes were involved with malignancy, stage [id number] (stage IIIA).  HER-2 is non-amplified.    He resumed chemotherapy post-surgery, with plans to complete 3 more cycles, with 5-FU, epirubicin, oxaliplatin.  However, he only completed 2 more cycles, due to poor tolerance.  He was admitted to the hospital 1/9/18-1/13/18 for nausea, diarrhea, failure to thrive, severe malnutrition, generalized weakness.  His infusional chemotherapy was stopped on 1/8/18.  He underwent EGD in the hospital on 1/11/18, which showed ulcers, and no evidence of recurrence of his cancer.  One area biopsied showed inflammation, no evidence of malignancy.    He saw Dr. Esteban on 3/6/19 and had CT abdomen/pelvis done, which showed a 3.1 cm lesion in hepatic segment 3.  He underwent biopsy on 3/20/19 by IR, which showed moderately differentiated adenocarcinoma consistent with metastasis from primary esophageal carcinoma.  HER-2 is non-amplified.  Restaging PET scan on 3/29/19 showed the 4.4 cm left lobe liver metastasis.  There is nodular foci of hypermetabolic uptake in the left mid abdomen in relation to the small bowel loops, without definite underlying lesion on CT.  This is favored to represent metastatic disease unless proven otherwise.    He started on chemotherapy with paclitaxel and ramucirumab on 4/10/19. Imaging initially showed improvement in disease and has been generally stable since that time.     INTERIM HISTORY: Daniel is here for routine follow-up.   Patient reports ongoing nosebleeds on the right side that initially improved with cautery but has gotten worse again.  He plans to follow-up with ENT regarding this.  He  has had some improvement with using a humidifier in his bedroom.  He reports eating and drinking okay.  He reports normal bowel movements.  He denies any pain issues.  He denies any neuropathy.  He has some regarding functional profiling of his tumor as well as the potential for adjusting the time that he eats and fasting prior to chemotherapy.  He denies other concerns.    HOME MEDICATIONS:  Current Outpatient Medications   Medication Sig Dispense Refill     cyabnocobalamin (VITAMIN B-12) 2500 MCG sublingual tablet Place 2,500 mcg under the tongue daily 30 tablet 1     ferrous gluconate (FERGON) 324 (38 Fe) MG tablet Take 324 mg by mouth daily (with breakfast)       multivitamin, therapeutic with minerals (MULTI-VITAMIN) TABS tablet Take 1 tablet by mouth daily Generic [place] Vitamin       mupirocin (BACTROBAN) 2 % external ointment Apply to anterior nares BID X 2 month supply 2 g 3     saccharomyces boulardii (FLORASTOR) 250 MG capsule Take 250 mg by mouth 2 times daily       LORazepam (ATIVAN) 0.5 MG tablet Take 1 tablet (0.5 mg) by mouth every 4 hours as needed (Anxiety, Nausea/Vomiting or Sleep) (Patient not taking: Reported on 12/26/2019) 30 tablet 2     Misc Natural Product Nasal (PONARIS) SOLN Apply two drops to each nostril BID X 2 month supply (Patient not taking: Reported on 1/15/2020) 10 mL 3     prochlorperazine (COMPAZINE) 10 MG tablet Take 1 tablet (10 mg) by mouth every 6 hours as needed (Nausea/Vomiting) (Patient not taking: Reported on 12/26/2019) 30 tablet 2     PHYSICAL EXAM:  General: The patient is a pleasant male in no acute distress.  /61   Pulse 95   Temp 98  F (36.7  C)   Resp 16   Wt 66.1 kg (145 lb 11.2 oz)   SpO2 100%   BMI 22.60 kg/m     Wt Readings from Last 10 Encounters:   02/12/20 66.1 kg (145 lb 11.2 oz)   01/29/20 66.1 kg (145 lb 11.2 oz)   01/22/20 66 kg (145 lb 8 oz)   01/16/20 65.8 kg (145 lb)   01/15/20 67.7 kg (149 lb 4.8 oz)   01/10/20 67.3 kg (148 lb 4.8 oz)    01/10/20 66.7 kg (147 lb)   12/26/19 65.8 kg (145 lb)   12/23/19 67 kg (147 lb 11.2 oz)   12/18/19 65.8 kg (145 lb)   HEENT: EOMI, PERRL. Sclerae are anicteric. Oral mucosa is pink and moist with no lesions or thrush.   Lymph: Neck is supple with no lymphadenopathy in the cervical or supraclavicular areas.   Heart: Regular rate and rhythm.   Lungs: Clear to auscultation bilaterally.   Abdomen: Bowel sounds present, soft, nontender with no palpable hepatosplenomegaly or masses.   Extremities: No lower extremity edema noted bilaterally.   Neuro: Cranial nerves II through XII are grossly intact.  Skin: No rashes, petechiae, or bruising noted on exposed skin.    LABS:   2/12/2020 07:43   Albumin 3.6   Protein Total 6.7 (L)   Bilirubin Total 0.3   Alkaline Phosphatase 53   ALT 28   AST 19   Bilirubin Direct <0.1   WBC 3.2 (L)   Hemoglobin 11.8 (L)   Hematocrit 35.2 (L)   Platelet Count 131 (L)   RBC Count 3.84 (L)   MCV 92   MCH 30.7   MCHC 33.5   RDW 13.1   Diff Method Automated Method   % Neutrophils 46.3   % Lymphocytes 31.3   % Monocytes 18.0   % Eosinophils 3.2   % Basophils 0.9   % Immature Granulocytes 0.3   Nucleated RBCs 0   Absolute Neutrophil 1.5 (L)   Absolute Lymphocytes 1.0   Absolute Monocytes 0.6   Absolute Eosinophils 0.1   Absolute Basophils 0.0   Abs Immature Granulocytes 0.0   Absolute Nucleated RBC 0.0     ASSESSMENT/PLAN:      1) Adenocarcinoma of the GE junction: now s/p 3 cycles of neoadjuvant chemotherapy with EOX, followed by surgical resection on 11/3/17.  Pathology showed adenocarcinoma, moderate differentiated, extensive residual tumor with no evidence tumor regression, margins negative, perineural invasion present, 1 of 28 lymph nodes were involved with malignancy, stage [id number] (stage IIIA).  HER-2 is non-amplified.    He has received EOF x 2 cycles after surgery, and he has not tolerated well.  The 5-FU on cycle 5 was discontinued early. Chemotherapy was stopped at that point due to poor  tolerance.    Patient now has biopsy-confirmed metastatic disease to the liver and possibly peritoneum.  He is asymptomatic currently.      [facility] testing shows MS-stable, TMB-low (4 mut/Mb), ERBB2 amplification equivocal, and TP53 H214R alteration.  PD-L1 was negative (0%).  He is planning to be seen at [facility] on 9/27.      He is doing well today and will continue with cycle 11 of  paclitaxel+ramucirumab. He will see Dr. Ponce in April with repeat imaging. He will call sooner for concerns.     2) Epistaxis: Much improved. Will continue to work with ENT and discuss potential additional cautery.    3) Vaccination. Patient received the influenza vaccine this season.    Violeta Coto PA-C  [facility]  [address] [place], [place] [id number] [phone]

## 2020-02-12 NOTE — NURSING NOTE
Chief Complaint   Patient presents with     Port Draw     VS done, labs collected via portacath with gripper needle by lab RN, heparin locked.  Hx esophageal CA.

## 2020-02-19 ENCOUNTER — INFUSION THERAPY VISIT (OUTPATIENT)
Dept: ONCOLOGY | Facility: CLINIC | Age: 40
End: 2020-02-19
Attending: INTERNAL MEDICINE
Payer: COMMERCIAL

## 2020-02-19 ENCOUNTER — APPOINTMENT (OUTPATIENT)
Dept: LAB | Facility: CLINIC | Age: 40
End: 2020-02-19
Attending: INTERNAL MEDICINE
Payer: COMMERCIAL

## 2020-02-19 VITALS
DIASTOLIC BLOOD PRESSURE: 60 MMHG | RESPIRATION RATE: 16 BRPM | SYSTOLIC BLOOD PRESSURE: 114 MMHG | HEART RATE: 86 BPM | BODY MASS INDEX: 22.45 KG/M2 | WEIGHT: 144.7 LBS | TEMPERATURE: 98.7 F | OXYGEN SATURATION: 98 %

## 2020-02-19 DIAGNOSIS — C15.5 MALIGNANT NEOPLASM OF LOWER THIRD OF ESOPHAGUS (H): Primary | ICD-10-CM

## 2020-02-19 LAB
BASOPHILS # BLD AUTO: 0 10E9/L (ref 0–0.2)
BASOPHILS NFR BLD AUTO: 1 %
DIFFERENTIAL METHOD BLD: ABNORMAL
EOSINOPHIL # BLD AUTO: 0.2 10E9/L (ref 0–0.7)
EOSINOPHIL NFR BLD AUTO: 4.1 %
ERYTHROCYTE [DISTWIDTH] IN BLOOD BY AUTOMATED COUNT: 12.7 % (ref 10–15)
HCT VFR BLD AUTO: 35.8 % (ref 40–53)
HGB BLD-MCNC: 11.7 G/DL (ref 13.3–17.7)
IMM GRANULOCYTES # BLD: 0 10E9/L (ref 0–0.4)
IMM GRANULOCYTES NFR BLD: 0.5 %
LYMPHOCYTES # BLD AUTO: 0.9 10E9/L (ref 0.8–5.3)
LYMPHOCYTES NFR BLD AUTO: 22.3 %
MCH RBC QN AUTO: 30.3 PG (ref 26.5–33)
MCHC RBC AUTO-ENTMCNC: 32.7 G/DL (ref 31.5–36.5)
MCV RBC AUTO: 93 FL (ref 78–100)
MONOCYTES # BLD AUTO: 0.3 10E9/L (ref 0–1.3)
MONOCYTES NFR BLD AUTO: 7.9 %
NEUTROPHILS # BLD AUTO: 2.5 10E9/L (ref 1.6–8.3)
NEUTROPHILS NFR BLD AUTO: 64.2 %
NRBC # BLD AUTO: 0 10*3/UL
NRBC BLD AUTO-RTO: 0 /100
PLATELET # BLD AUTO: 122 10E9/L (ref 150–450)
RBC # BLD AUTO: 3.86 10E12/L (ref 4.4–5.9)
WBC # BLD AUTO: 3.9 10E9/L (ref 4–11)

## 2020-02-19 PROCEDURE — 96375 TX/PRO/DX INJ NEW DRUG ADDON: CPT

## 2020-02-19 PROCEDURE — 25000125 ZZHC RX 250: Mod: ZF | Performed by: PHYSICIAN ASSISTANT

## 2020-02-19 PROCEDURE — 85025 COMPLETE CBC W/AUTO DIFF WBC: CPT | Performed by: PHYSICIAN ASSISTANT

## 2020-02-19 PROCEDURE — 25800030 ZZH RX IP 258 OP 636: Mod: ZF | Performed by: PHYSICIAN ASSISTANT

## 2020-02-19 PROCEDURE — 25000128 H RX IP 250 OP 636: Mod: ZF | Performed by: INTERNAL MEDICINE

## 2020-02-19 PROCEDURE — 25000128 H RX IP 250 OP 636: Mod: ZF | Performed by: PHYSICIAN ASSISTANT

## 2020-02-19 PROCEDURE — 96413 CHEMO IV INFUSION 1 HR: CPT

## 2020-02-19 RX ORDER — HEPARIN SODIUM (PORCINE) LOCK FLUSH IV SOLN 100 UNIT/ML 100 UNIT/ML
500 SOLUTION INTRAVENOUS ONCE
Status: COMPLETED | OUTPATIENT
Start: 2020-02-19 | End: 2020-02-19

## 2020-02-19 RX ORDER — HEPARIN SODIUM (PORCINE) LOCK FLUSH IV SOLN 100 UNIT/ML 100 UNIT/ML
5 SOLUTION INTRAVENOUS EVERY 8 HOURS PRN
Status: DISCONTINUED | OUTPATIENT
Start: 2020-02-19 | End: 2020-02-19 | Stop reason: HOSPADM

## 2020-02-19 RX ADMIN — SODIUM CHLORIDE 145 MG: 9 INJECTION, SOLUTION INTRAVENOUS at 08:55

## 2020-02-19 RX ADMIN — Medication 500 UNITS: at 09:59

## 2020-02-19 RX ADMIN — Medication 5 ML: at 07:49

## 2020-02-19 RX ADMIN — SODIUM CHLORIDE 250 ML: 9 INJECTION, SOLUTION INTRAVENOUS at 08:21

## 2020-02-19 RX ADMIN — DEXAMETHASONE SODIUM PHOSPHATE 20 MG: 10 INJECTION, SOLUTION INTRAMUSCULAR; INTRAVENOUS at 08:26

## 2020-02-19 RX ADMIN — FAMOTIDINE 20 MG: 10 INJECTION INTRAVENOUS at 08:21

## 2020-02-19 ASSESSMENT — PAIN SCALES - GENERAL: PAINLEVEL: NO PAIN (0)

## 2020-02-19 NOTE — PROGRESS NOTES
Infusion Nursing Note:  Daniel Sandhu presents today for Cycle 11, Day 8 Taxol.    Patient seen by provider today: No    Note: Pt assessed prior to initiating treatment. Pt denies any new issues or symptoms.    Intravenous Access:  Implanted Port.    Treatment Conditions:  Lab Results   Component Value Date    HGB 11.7 02/19/2020     Lab Results   Component Value Date    WBC 3.9 02/19/2020      Lab Results   Component Value Date    ANEU 2.5 02/19/2020     Lab Results   Component Value Date     02/19/2020      Lab Results   Component Value Date     09/23/2019                   Lab Results   Component Value Date    POTASSIUM 3.9 09/23/2019           Lab Results   Component Value Date    MAG 2.2 03/07/2018            Lab Results   Component Value Date    CR 0.67 09/23/2019                   Lab Results   Component Value Date    YOBANY 9.0 09/23/2019                Lab Results   Component Value Date    BILITOTAL 0.3 02/12/2020           Lab Results   Component Value Date    ALBUMIN 3.6 02/12/2020                    Lab Results   Component Value Date    ALT 28 02/12/2020           Lab Results   Component Value Date    AST 19 02/12/2020     Results reviewed, labs MET treatment parameters, ok to proceed with treatment.    Post Infusion Assessment:  Patient tolerated infusion without incident.  Blood return noted pre and post infusion.  Site patent and intact, free from redness, edema or discomfort.  No evidence of extravasations.  Access discontinued per protocol.     Discharge Plan:   Patient declined prescription refills.  AVS to patient via [employer].  Patient will return 2/26/20 for next appointment.   Patient discharged in stable condition accompanied by: self.  Departure Mode: Ambulatory.    Magdalena Ochoa RN

## 2020-02-26 ENCOUNTER — INFUSION THERAPY VISIT (OUTPATIENT)
Dept: ONCOLOGY | Facility: CLINIC | Age: 40
End: 2020-02-26
Attending: INTERNAL MEDICINE
Payer: COMMERCIAL

## 2020-02-26 ENCOUNTER — APPOINTMENT (OUTPATIENT)
Dept: LAB | Facility: CLINIC | Age: 40
End: 2020-02-26
Attending: INTERNAL MEDICINE
Payer: COMMERCIAL

## 2020-02-26 VITALS
BODY MASS INDEX: 22.82 KG/M2 | OXYGEN SATURATION: 100 % | HEART RATE: 70 BPM | DIASTOLIC BLOOD PRESSURE: 63 MMHG | WEIGHT: 147.1 LBS | TEMPERATURE: 98.4 F | RESPIRATION RATE: 16 BRPM | SYSTOLIC BLOOD PRESSURE: 113 MMHG

## 2020-02-26 DIAGNOSIS — C15.5 MALIGNANT NEOPLASM OF LOWER THIRD OF ESOPHAGUS (H): Primary | ICD-10-CM

## 2020-02-26 LAB
ALBUMIN UR-MCNC: NEGATIVE MG/DL
BASOPHILS # BLD AUTO: 0 10E9/L (ref 0–0.2)
BASOPHILS NFR BLD AUTO: 0.9 %
DIFFERENTIAL METHOD BLD: ABNORMAL
EOSINOPHIL # BLD AUTO: 0.1 10E9/L (ref 0–0.7)
EOSINOPHIL NFR BLD AUTO: 4.3 %
ERYTHROCYTE [DISTWIDTH] IN BLOOD BY AUTOMATED COUNT: 12.7 % (ref 10–15)
HCT VFR BLD AUTO: 34.3 % (ref 40–53)
HGB BLD-MCNC: 11.2 G/DL (ref 13.3–17.7)
IMM GRANULOCYTES # BLD: 0 10E9/L (ref 0–0.4)
IMM GRANULOCYTES NFR BLD: 0.3 %
LYMPHOCYTES # BLD AUTO: 0.9 10E9/L (ref 0.8–5.3)
LYMPHOCYTES NFR BLD AUTO: 28 %
MCH RBC QN AUTO: 30.3 PG (ref 26.5–33)
MCHC RBC AUTO-ENTMCNC: 32.7 G/DL (ref 31.5–36.5)
MCV RBC AUTO: 93 FL (ref 78–100)
MONOCYTES # BLD AUTO: 0.2 10E9/L (ref 0–1.3)
MONOCYTES NFR BLD AUTO: 7.5 %
NEUTROPHILS # BLD AUTO: 1.9 10E9/L (ref 1.6–8.3)
NEUTROPHILS NFR BLD AUTO: 59 %
NRBC # BLD AUTO: 0 10*3/UL
NRBC BLD AUTO-RTO: 0 /100
PLATELET # BLD AUTO: 132 10E9/L (ref 150–450)
RBC # BLD AUTO: 3.7 10E12/L (ref 4.4–5.9)
WBC # BLD AUTO: 3.2 10E9/L (ref 4–11)

## 2020-02-26 PROCEDURE — 81003 URINALYSIS AUTO W/O SCOPE: CPT | Performed by: PHYSICIAN ASSISTANT

## 2020-02-26 PROCEDURE — 25000128 H RX IP 250 OP 636: Mod: ZF | Performed by: INTERNAL MEDICINE

## 2020-02-26 PROCEDURE — 96413 CHEMO IV INFUSION 1 HR: CPT

## 2020-02-26 PROCEDURE — 85025 COMPLETE CBC W/AUTO DIFF WBC: CPT | Performed by: PHYSICIAN ASSISTANT

## 2020-02-26 PROCEDURE — 25800030 ZZH RX IP 258 OP 636: Mod: ZF | Performed by: PHYSICIAN ASSISTANT

## 2020-02-26 PROCEDURE — 96417 CHEMO IV INFUS EACH ADDL SEQ: CPT

## 2020-02-26 PROCEDURE — 25000125 ZZHC RX 250: Mod: ZF | Performed by: PHYSICIAN ASSISTANT

## 2020-02-26 PROCEDURE — 96375 TX/PRO/DX INJ NEW DRUG ADDON: CPT

## 2020-02-26 PROCEDURE — 25000128 H RX IP 250 OP 636: Mod: ZF | Performed by: PHYSICIAN ASSISTANT

## 2020-02-26 RX ORDER — HEPARIN SODIUM (PORCINE) LOCK FLUSH IV SOLN 100 UNIT/ML 100 UNIT/ML
500 SOLUTION INTRAVENOUS ONCE
Status: DISCONTINUED | OUTPATIENT
Start: 2020-02-26 | End: 2020-02-26 | Stop reason: HOSPADM

## 2020-02-26 RX ORDER — HEPARIN SODIUM (PORCINE) LOCK FLUSH IV SOLN 100 UNIT/ML 100 UNIT/ML
5 SOLUTION INTRAVENOUS EVERY 8 HOURS PRN
Status: DISCONTINUED | OUTPATIENT
Start: 2020-02-26 | End: 2020-02-26 | Stop reason: HOSPADM

## 2020-02-26 RX ADMIN — SODIUM CHLORIDE 250 ML: 9 INJECTION, SOLUTION INTRAVENOUS at 08:48

## 2020-02-26 RX ADMIN — SODIUM CHLORIDE 145 MG: 9 INJECTION, SOLUTION INTRAVENOUS at 09:52

## 2020-02-26 RX ADMIN — Medication 5 ML: at 07:54

## 2020-02-26 RX ADMIN — SODIUM CHLORIDE 500 MG: 9 INJECTION, SOLUTION INTRAVENOUS at 09:17

## 2020-02-26 RX ADMIN — DEXAMETHASONE SODIUM PHOSPHATE 20 MG: 10 INJECTION, SOLUTION INTRAMUSCULAR; INTRAVENOUS at 08:52

## 2020-02-26 RX ADMIN — FAMOTIDINE 20 MG: 10 INJECTION INTRAVENOUS at 08:49

## 2020-02-26 RX ADMIN — Medication 5 ML: at 10:54

## 2020-02-26 ASSESSMENT — PAIN SCALES - GENERAL: PAINLEVEL: NO PAIN (0)

## 2020-02-26 NOTE — PROGRESS NOTES
Infusion Nursing Note:  Daniel Sandhu presents today for Cycle 11 Day 15 of Taxol and Cyramza.    Patient seen by provider today: No   present during visit today: Not Applicable.    Note: Patient reports feeling well today. Does periodically get nosebleeds, but no more than normal. He does have one tender gum area on the left side of his mouth, but reports that it is normal for him. Denies any open sores. No other issues or concerns today.     Intravenous Access:  Implanted Port.    Treatment Conditions:  Lab Results   Component Value Date    HGB 11.2 02/26/2020     Lab Results   Component Value Date    WBC 3.2 02/26/2020      Lab Results   Component Value Date    ANEU 1.9 02/26/2020     Lab Results   Component Value Date     02/26/2020      Results reviewed, labs MET treatment parameters, ok to proceed with treatment.  Urine negative for protein.      Post Infusion Assessment:  Patient tolerated infusion without incident.  Blood return noted pre and post infusion.  Site patent and intact, free from redness, edema or discomfort.  No evidence of extravasations.  Access discontinued per protocol.       Discharge Plan:   Patient declined prescription refills.  Discharge instructions reviewed with: Patient.  Patient and/or family verbalized understanding of discharge instructions and all questions answered.  AVS to patient via [employer].  Patient will return 3/11/20 for next appointment.   Patient discharged in stable condition accompanied by: self.  Departure Mode: Ambulatory.    Neva Lewis RN

## 2020-02-26 NOTE — PATIENT INSTRUCTIONS
Contact Numbers  [facility]: [phone]        Please call the [facility] Triage line if you experience a temperature greater than or equal to 100.5, shaking chills, have uncontrolled nausea, vomiting and/or diarrhea, dizziness, shortness of breath, chest pain, bleeding, unexplained bruising, or if you have any other new/concerning symptoms, questions or concerns.     If it is after hours, weekends, or holidays, please call the main hospital  at  [phone] and ask to speak to the Oncology doctor on call.     If you are having any concerning symptoms or wish to speak to a provider before your next infusion visit, please call your care coordinator or triage to notify them so we can adequately serve you.     If you need a refill on a narcotic prescription or other medication, please call triage before your infusion appointment.         February 2020 Sunday Monday Tuesday Wednesday Thursday Friday Saturday                                 1       2     3     4     5     6     7     8       9     10     11     12    [facility]   7:15 AM   (15 min.)    [facility]   [facility] Lab Draw    [facility] RETURN   7:35 AM   (50 min.)   Violeta Coto PA-C   [facility]    [facility] ONC INFUSION 180   9:00 AM   (180 min.)    ONCOLOGY INFUSION   [facility] 13     14     15       16     17     18     19    [facility]   7:30 AM   (15 min.)    [facility]   [facility] Lab Draw    [facility] ON[facility] INFUSION 180   8:00 AM   (180 min.)    ONCOLOGY INFUSION   [facility] 20     21     22       23     24     25     26    [facility]   7:30 AM   (15 min.)    [facility]RAW   [facility] Lab Draw    [facility] ON[facility] INFUSION 180   8:00 AM   (180 min.)    ONCOLOGY INFUSION   [facility] 27     28     29 March 2020 Sunday Monday Tuesday Wednesday Thursday Friday Saturday    1     2     3     4     5     6     7       8     9     10     11    [facility]   7:30 AM   (15 min.)    [facility] DRAW   [facility] Draw    [facility] INFUSION 180   8:00 AM   (180 min.)    ONCOLOGY INFUSION   [facility] 12     13     14       15     16     17     18    [facility]   7:30 AM   (15 min.)    [facility] DRAW   [facility] Lab Draw    [facility] ON[facility] INFUSION 180   8:00 AM   (180 min.)    ONCOLOGY INFUSION   [facility] 19     20     21       22     23     24     25    [facility]   8:30 AM   (15 min.)    [facility] DRAW   [facility] Lab Draw    [facility] ONC INFUSION 180   9:00 AM   (180 min.)    ONCOLOGY INFUSION   [facility] 26     27     28       29     30     31                                         Lab Results:  Recent Results (from the past 12 hour(s))   CBC with platelets differential    Collection Time: 02/26/20  8:03 AM   Result Value Ref Range    WBC 3.2 (L) 4.0 - 11.0 10e9/L    RBC Count 3.70 (L) 4.4 - 5.9 10e12/L    Hemoglobin 11.2 (L) 13.3 - 17.7 g/dL    Hematocrit 34.3 (L) 40.0 - 53.0 %    MCV 93 78 - 100 fl    MCH 30.3 26.5 - 33.0 pg    MCHC 32.7 31.5 - 36.5 g/dL    RDW 12.7 10.0 - 15.0 %    Platelet Count 132 (L) 150 - 450 10e9/L    Diff Method Automated Method     % Neutrophils 59.0 %    % Lymphocytes 28.0 %    % Monocytes 7.5 %    % Eosinophils 4.3 %    % Basophils 0.9 %    % Immature Granulocytes 0.3 %    Nucleated RBCs 0 0 /100    Absolute Neutrophil 1.9 1.6 - 8.3 10e9/L    Absolute Lymphocytes 0.9 0.8 - 5.3 10e9/L    Absolute Monocytes 0.2 0.0 - 1.3 10e9/L    Absolute Eosinophils 0.1 0.0 - 0.7 10e9/L    Absolute Basophils 0.0 0.0 - 0.2 10e9/L    Abs Immature Granulocytes 0.0 0 - 0.4 10e9/L    Absolute Nucleated RBC 0.0    Protein qualitative urine    Collection Time: 02/26/20  8:05 AM   Result Value Ref Range    Protein Albumin Urine Negative NEG^Negative mg/dL

## 2020-03-11 ENCOUNTER — APPOINTMENT (OUTPATIENT)
Dept: LAB | Facility: CLINIC | Age: 40
End: 2020-03-11
Attending: INTERNAL MEDICINE
Payer: COMMERCIAL

## 2020-03-11 ENCOUNTER — INFUSION THERAPY VISIT (OUTPATIENT)
Dept: ONCOLOGY | Facility: CLINIC | Age: 40
End: 2020-03-11
Attending: INTERNAL MEDICINE
Payer: COMMERCIAL

## 2020-03-11 VITALS
BODY MASS INDEX: 23 KG/M2 | HEART RATE: 78 BPM | OXYGEN SATURATION: 100 % | SYSTOLIC BLOOD PRESSURE: 118 MMHG | DIASTOLIC BLOOD PRESSURE: 67 MMHG | TEMPERATURE: 98.1 F | WEIGHT: 148.3 LBS | RESPIRATION RATE: 16 BRPM

## 2020-03-11 DIAGNOSIS — C15.5 MALIGNANT NEOPLASM OF LOWER THIRD OF ESOPHAGUS (H): Primary | ICD-10-CM

## 2020-03-11 LAB
ALBUMIN SERPL-MCNC: 3.5 G/DL (ref 3.4–5)
ALBUMIN UR-MCNC: NEGATIVE MG/DL
ALP SERPL-CCNC: 53 U/L (ref 40–150)
ALT SERPL W P-5'-P-CCNC: 32 U/L (ref 0–70)
AST SERPL W P-5'-P-CCNC: 20 U/L (ref 0–45)
BASOPHILS # BLD AUTO: 0 10E9/L (ref 0–0.2)
BASOPHILS NFR BLD AUTO: 0.6 %
BILIRUB DIRECT SERPL-MCNC: <0.1 MG/DL (ref 0–0.2)
BILIRUB SERPL-MCNC: 0.3 MG/DL (ref 0.2–1.3)
CEA SERPL-MCNC: <0.5 UG/L (ref 0–2.5)
DIFFERENTIAL METHOD BLD: ABNORMAL
EOSINOPHIL # BLD AUTO: 0.1 10E9/L (ref 0–0.7)
EOSINOPHIL NFR BLD AUTO: 2.6 %
ERYTHROCYTE [DISTWIDTH] IN BLOOD BY AUTOMATED COUNT: 12.8 % (ref 10–15)
HCT VFR BLD AUTO: 34.2 % (ref 40–53)
HGB BLD-MCNC: 11.4 G/DL (ref 13.3–17.7)
IMM GRANULOCYTES # BLD: 0 10E9/L (ref 0–0.4)
IMM GRANULOCYTES NFR BLD: 0 %
LYMPHOCYTES # BLD AUTO: 0.9 10E9/L (ref 0.8–5.3)
LYMPHOCYTES NFR BLD AUTO: 29.4 %
MCH RBC QN AUTO: 31.1 PG (ref 26.5–33)
MCHC RBC AUTO-ENTMCNC: 33.3 G/DL (ref 31.5–36.5)
MCV RBC AUTO: 93 FL (ref 78–100)
MONOCYTES # BLD AUTO: 0.5 10E9/L (ref 0–1.3)
MONOCYTES NFR BLD AUTO: 17.2 %
NEUTROPHILS # BLD AUTO: 1.6 10E9/L (ref 1.6–8.3)
NEUTROPHILS NFR BLD AUTO: 50.2 %
NRBC # BLD AUTO: 0 10*3/UL
NRBC BLD AUTO-RTO: 0 /100
PLATELET # BLD AUTO: 142 10E9/L (ref 150–450)
PROT SERPL-MCNC: 6.6 G/DL (ref 6.8–8.8)
RBC # BLD AUTO: 3.66 10E12/L (ref 4.4–5.9)
WBC # BLD AUTO: 3.1 10E9/L (ref 4–11)

## 2020-03-11 PROCEDURE — 81003 URINALYSIS AUTO W/O SCOPE: CPT | Performed by: PHYSICIAN ASSISTANT

## 2020-03-11 PROCEDURE — 85025 COMPLETE CBC W/AUTO DIFF WBC: CPT | Performed by: PHYSICIAN ASSISTANT

## 2020-03-11 PROCEDURE — 80076 HEPATIC FUNCTION PANEL: CPT | Performed by: PHYSICIAN ASSISTANT

## 2020-03-11 PROCEDURE — 82378 CARCINOEMBRYONIC ANTIGEN: CPT | Performed by: PHYSICIAN ASSISTANT

## 2020-03-11 PROCEDURE — 25800030 ZZH RX IP 258 OP 636: Mod: ZF | Performed by: PHYSICIAN ASSISTANT

## 2020-03-11 PROCEDURE — 25000125 ZZHC RX 250: Mod: ZF | Performed by: PHYSICIAN ASSISTANT

## 2020-03-11 PROCEDURE — 96375 TX/PRO/DX INJ NEW DRUG ADDON: CPT

## 2020-03-11 PROCEDURE — 96413 CHEMO IV INFUSION 1 HR: CPT

## 2020-03-11 PROCEDURE — 25000128 H RX IP 250 OP 636: Mod: ZF | Performed by: PHYSICIAN ASSISTANT

## 2020-03-11 PROCEDURE — 96417 CHEMO IV INFUS EACH ADDL SEQ: CPT

## 2020-03-11 PROCEDURE — 25000128 H RX IP 250 OP 636: Mod: ZF | Performed by: INTERNAL MEDICINE

## 2020-03-11 RX ORDER — HEPARIN SODIUM (PORCINE) LOCK FLUSH IV SOLN 100 UNIT/ML 100 UNIT/ML
5 SOLUTION INTRAVENOUS ONCE
Status: COMPLETED | OUTPATIENT
Start: 2020-03-11 | End: 2020-03-11

## 2020-03-11 RX ORDER — HEPARIN SODIUM (PORCINE) LOCK FLUSH IV SOLN 100 UNIT/ML 100 UNIT/ML
500 SOLUTION INTRAVENOUS ONCE
Status: COMPLETED | OUTPATIENT
Start: 2020-03-11 | End: 2020-03-11

## 2020-03-11 RX ADMIN — SODIUM CHLORIDE 145 MG: 9 INJECTION, SOLUTION INTRAVENOUS at 10:12

## 2020-03-11 RX ADMIN — SODIUM CHLORIDE 500 MG: 9 INJECTION, SOLUTION INTRAVENOUS at 09:27

## 2020-03-11 RX ADMIN — Medication 500 UNITS: at 11:21

## 2020-03-11 RX ADMIN — Medication 5 ML: at 08:02

## 2020-03-11 RX ADMIN — SODIUM CHLORIDE 250 ML: 9 INJECTION, SOLUTION INTRAVENOUS at 09:01

## 2020-03-11 RX ADMIN — DEXAMETHASONE SODIUM PHOSPHATE 20 MG: 10 INJECTION, SOLUTION INTRAMUSCULAR; INTRAVENOUS at 09:03

## 2020-03-11 RX ADMIN — FAMOTIDINE 20 MG: 10 INJECTION INTRAVENOUS at 09:01

## 2020-03-11 ASSESSMENT — PAIN SCALES - GENERAL: PAINLEVEL: NO PAIN (0)

## 2020-03-11 NOTE — PROGRESS NOTES
Infusion Nursing Note:  Daniel Sandhu presents today for Cycle 12 Day 1 Cyramza, Taxol.    Patient seen by provider today: No   present during visit today: Not Applicable.    Note: Patient reports he is feeling OK. He has had a bad week personally with his dog passing away; however, he denies any new side effects.     Intravenous Access:  Implanted Port.    Treatment Conditions:  Lab Results   Component Value Date    HGB 11.4 03/11/2020     Lab Results   Component Value Date    WBC 3.1 03/11/2020      Lab Results   Component Value Date    ANEU 1.6 03/11/2020     Lab Results   Component Value Date     03/11/2020      Lab Results   Component Value Date    BILITOTAL 0.3 03/11/2020           Lab Results   Component Value Date    ALBUMIN 3.5 03/11/2020                    Lab Results   Component Value Date    ALT 32 03/11/2020           Lab Results   Component Value Date    AST 20 03/11/2020       Results reviewed, labs MET treatment parameters, ok to proceed with treatment.  Urine negative.      Post Infusion Assessment:  Patient tolerated infusion without incident.  Blood return noted pre and post infusion.  Site patent and intact, free from redness, edema or discomfort.  No evidence of extravasations.  Access discontinued per protocol.       Discharge Plan:   Patient declined prescription refills.  Discharge instructions reviewed with: Patient.  Patient and/or family verbalized understanding of discharge instructions and all questions answered.  AVS to patient via [employer].  Patient will return 3/18/2020 for next infusion appointment.   Patient discharged in stable condition accompanied by: self.  Departure Mode: Ambulatory.    Theresa Marcus RN

## 2020-03-17 ENCOUNTER — TELEPHONE (OUTPATIENT)
Dept: ONCOLOGY | Facility: CLINIC | Age: 40
End: 2020-03-17

## 2020-03-17 NOTE — TELEPHONE ENCOUNTER
COVID-19 PRE-INFUSION APPOINTMENT PHONE SCREEN    1. In the last month, have you been in contact with someone who was confirmed or suspected to have Coronavirus/COVID-19? NO    2. Do you have any of the following symptoms:     a. Fever (or reported chills): NO    b. Cough: NO    c. Shortness of Breath: NO    d. Rash: NO    3. Have you traveled internationally or in the [place] in the last month? Internationally: [place], [place], [place], [place] and all of [place].  In the [place]: [place], [place], [place], [place], [place] and [place]. NO    Recommendation: You can come for infusion tomorrow.     Was the provider sent an Epic [employer]? No

## 2020-03-18 ENCOUNTER — INFUSION THERAPY VISIT (OUTPATIENT)
Dept: ONCOLOGY | Facility: CLINIC | Age: 40
End: 2020-03-18
Attending: INTERNAL MEDICINE
Payer: COMMERCIAL

## 2020-03-18 VITALS
SYSTOLIC BLOOD PRESSURE: 123 MMHG | TEMPERATURE: 98.1 F | DIASTOLIC BLOOD PRESSURE: 55 MMHG | WEIGHT: 145.2 LBS | BODY MASS INDEX: 22.52 KG/M2 | OXYGEN SATURATION: 98 % | RESPIRATION RATE: 16 BRPM | HEART RATE: 94 BPM

## 2020-03-18 DIAGNOSIS — C15.5 MALIGNANT NEOPLASM OF LOWER THIRD OF ESOPHAGUS (H): Primary | ICD-10-CM

## 2020-03-18 LAB
BASOPHILS # BLD AUTO: 0 10E9/L (ref 0–0.2)
BASOPHILS NFR BLD AUTO: 0.7 %
DIFFERENTIAL METHOD BLD: ABNORMAL
EOSINOPHIL # BLD AUTO: 0.1 10E9/L (ref 0–0.7)
EOSINOPHIL NFR BLD AUTO: 1.6 %
ERYTHROCYTE [DISTWIDTH] IN BLOOD BY AUTOMATED COUNT: 12.6 % (ref 10–15)
HCT VFR BLD AUTO: 36 % (ref 40–53)
HGB BLD-MCNC: 11.9 G/DL (ref 13.3–17.7)
IMM GRANULOCYTES # BLD: 0 10E9/L (ref 0–0.4)
IMM GRANULOCYTES NFR BLD: 0.2 %
LYMPHOCYTES # BLD AUTO: 1.3 10E9/L (ref 0.8–5.3)
LYMPHOCYTES NFR BLD AUTO: 29.8 %
MCH RBC QN AUTO: 30.5 PG (ref 26.5–33)
MCHC RBC AUTO-ENTMCNC: 33.1 G/DL (ref 31.5–36.5)
MCV RBC AUTO: 92 FL (ref 78–100)
MONOCYTES # BLD AUTO: 0.3 10E9/L (ref 0–1.3)
MONOCYTES NFR BLD AUTO: 5.6 %
NEUTROPHILS # BLD AUTO: 2.8 10E9/L (ref 1.6–8.3)
NEUTROPHILS NFR BLD AUTO: 62.1 %
NRBC # BLD AUTO: 0 10*3/UL
NRBC BLD AUTO-RTO: 0 /100
PLATELET # BLD AUTO: 138 10E9/L (ref 150–450)
PLATELET # BLD EST: ABNORMAL 10*3/UL
RBC # BLD AUTO: 3.9 10E12/L (ref 4.4–5.9)
RBC MORPH BLD: ABNORMAL
WBC # BLD AUTO: 4.5 10E9/L (ref 4–11)

## 2020-03-18 PROCEDURE — 25000125 ZZHC RX 250: Mod: ZF | Performed by: PHYSICIAN ASSISTANT

## 2020-03-18 PROCEDURE — 25000128 H RX IP 250 OP 636: Mod: ZF | Performed by: PHYSICIAN ASSISTANT

## 2020-03-18 PROCEDURE — 85025 COMPLETE CBC W/AUTO DIFF WBC: CPT | Performed by: INTERNAL MEDICINE

## 2020-03-18 PROCEDURE — 96413 CHEMO IV INFUSION 1 HR: CPT

## 2020-03-18 PROCEDURE — 96375 TX/PRO/DX INJ NEW DRUG ADDON: CPT

## 2020-03-18 PROCEDURE — 25800030 ZZH RX IP 258 OP 636: Mod: ZF | Performed by: PHYSICIAN ASSISTANT

## 2020-03-18 PROCEDURE — 25000128 H RX IP 250 OP 636: Mod: ZF | Performed by: INTERNAL MEDICINE

## 2020-03-18 RX ORDER — HEPARIN SODIUM (PORCINE) LOCK FLUSH IV SOLN 100 UNIT/ML 100 UNIT/ML
500 SOLUTION INTRAVENOUS ONCE
Status: COMPLETED | OUTPATIENT
Start: 2020-03-18 | End: 2020-03-18

## 2020-03-18 RX ORDER — HEPARIN SODIUM (PORCINE) LOCK FLUSH IV SOLN 100 UNIT/ML 100 UNIT/ML
5 SOLUTION INTRAVENOUS ONCE
Status: COMPLETED | OUTPATIENT
Start: 2020-03-18 | End: 2020-03-18

## 2020-03-18 RX ADMIN — PACLITAXEL 145 MG: 6 INJECTION, SOLUTION INTRAVENOUS at 09:28

## 2020-03-18 RX ADMIN — DEXAMETHASONE SODIUM PHOSPHATE 20 MG: 10 INJECTION, SOLUTION INTRAMUSCULAR; INTRAVENOUS at 09:04

## 2020-03-18 RX ADMIN — FAMOTIDINE 20 MG: 10 INJECTION INTRAVENOUS at 09:02

## 2020-03-18 RX ADMIN — Medication 500 UNITS: at 10:40

## 2020-03-18 RX ADMIN — SODIUM CHLORIDE 250 ML: 9 INJECTION, SOLUTION INTRAVENOUS at 09:02

## 2020-03-18 RX ADMIN — Medication 5 ML: at 07:52

## 2020-03-18 ASSESSMENT — PAIN SCALES - GENERAL: PAINLEVEL: NO PAIN (0)

## 2020-03-18 NOTE — PROGRESS NOTES
Infusion Nursing Note:  Daniel Sandhu presents today for Cycle 12 Day 8 Taxol.    Patient seen by provider today: No   present during visit today: Not Applicable.    Note: Patient arrives with no new concerns, has been feeling well, denies fevers.    Intravenous Access:  Implanted Port.    Treatment Conditions:  Lab Results   Component Value Date    HGB 11.9 03/18/2020     Lab Results   Component Value Date    WBC 4.5 03/18/2020      Lab Results   Component Value Date    ANEU 2.8 03/18/2020     Lab Results   Component Value Date     03/18/2020      Results reviewed, labs MET treatment parameters, ok to proceed with treatment.      Post Infusion Assessment:  Patient tolerated infusion without incident.  Blood return noted pre and post infusion.  Access discontinued per protocol.       Discharge Plan:   Patient declined prescription refills.  AVS to patient via [employer].  Patient will return 3/25 for next appointment.   Patient discharged in stable condition accompanied by: self.  Departure Mode: Ambulatory.    Mandi Vazquez RN

## 2020-03-25 ENCOUNTER — APPOINTMENT (OUTPATIENT)
Dept: LAB | Facility: CLINIC | Age: 40
End: 2020-03-25
Attending: INTERNAL MEDICINE
Payer: COMMERCIAL

## 2020-03-25 ENCOUNTER — INFUSION THERAPY VISIT (OUTPATIENT)
Dept: ONCOLOGY | Facility: CLINIC | Age: 40
End: 2020-03-25
Attending: INTERNAL MEDICINE
Payer: COMMERCIAL

## 2020-03-25 VITALS
TEMPERATURE: 98 F | DIASTOLIC BLOOD PRESSURE: 75 MMHG | RESPIRATION RATE: 16 BRPM | WEIGHT: 145.8 LBS | HEART RATE: 92 BPM | OXYGEN SATURATION: 100 % | SYSTOLIC BLOOD PRESSURE: 114 MMHG | BODY MASS INDEX: 22.62 KG/M2

## 2020-03-25 DIAGNOSIS — C15.5 MALIGNANT NEOPLASM OF LOWER THIRD OF ESOPHAGUS (H): Primary | ICD-10-CM

## 2020-03-25 LAB
ALBUMIN UR-MCNC: ABNORMAL MG/DL
BASOPHILS # BLD AUTO: 0 10E9/L (ref 0–0.2)
BASOPHILS NFR BLD AUTO: 0.6 %
DIFFERENTIAL METHOD BLD: ABNORMAL
EOSINOPHIL # BLD AUTO: 0.1 10E9/L (ref 0–0.7)
EOSINOPHIL NFR BLD AUTO: 2.6 %
ERYTHROCYTE [DISTWIDTH] IN BLOOD BY AUTOMATED COUNT: 12.6 % (ref 10–15)
HCT VFR BLD AUTO: 35.8 % (ref 40–53)
HGB BLD-MCNC: 11.9 G/DL (ref 13.3–17.7)
IMM GRANULOCYTES # BLD: 0 10E9/L (ref 0–0.4)
IMM GRANULOCYTES NFR BLD: 0.3 %
LYMPHOCYTES # BLD AUTO: 1.2 10E9/L (ref 0.8–5.3)
LYMPHOCYTES NFR BLD AUTO: 36.1 %
MCH RBC QN AUTO: 30.7 PG (ref 26.5–33)
MCHC RBC AUTO-ENTMCNC: 33.2 G/DL (ref 31.5–36.5)
MCV RBC AUTO: 93 FL (ref 78–100)
MONOCYTES # BLD AUTO: 0.3 10E9/L (ref 0–1.3)
MONOCYTES NFR BLD AUTO: 8.5 %
NEUTROPHILS # BLD AUTO: 1.8 10E9/L (ref 1.6–8.3)
NEUTROPHILS NFR BLD AUTO: 51.9 %
NRBC # BLD AUTO: 0 10*3/UL
NRBC BLD AUTO-RTO: 0 /100
PLATELET # BLD AUTO: 142 10E9/L (ref 150–450)
RBC # BLD AUTO: 3.87 10E12/L (ref 4.4–5.9)
WBC # BLD AUTO: 3.4 10E9/L (ref 4–11)

## 2020-03-25 PROCEDURE — 96417 CHEMO IV INFUS EACH ADDL SEQ: CPT

## 2020-03-25 PROCEDURE — 25000128 H RX IP 250 OP 636: Mod: ZF | Performed by: INTERNAL MEDICINE

## 2020-03-25 PROCEDURE — 96413 CHEMO IV INFUSION 1 HR: CPT

## 2020-03-25 PROCEDURE — 25000128 H RX IP 250 OP 636: Mod: ZF | Performed by: PHYSICIAN ASSISTANT

## 2020-03-25 PROCEDURE — 25000125 ZZHC RX 250: Mod: ZF | Performed by: PHYSICIAN ASSISTANT

## 2020-03-25 PROCEDURE — 25000132 ZZH RX MED GY IP 250 OP 250 PS 637: Mod: ZF | Performed by: PHYSICIAN ASSISTANT

## 2020-03-25 PROCEDURE — 81003 URINALYSIS AUTO W/O SCOPE: CPT | Performed by: PHYSICIAN ASSISTANT

## 2020-03-25 PROCEDURE — 96375 TX/PRO/DX INJ NEW DRUG ADDON: CPT

## 2020-03-25 PROCEDURE — 25800030 ZZH RX IP 258 OP 636: Mod: ZF | Performed by: PHYSICIAN ASSISTANT

## 2020-03-25 PROCEDURE — 85025 COMPLETE CBC W/AUTO DIFF WBC: CPT | Performed by: INTERNAL MEDICINE

## 2020-03-25 RX ORDER — HEPARIN SODIUM (PORCINE) LOCK FLUSH IV SOLN 100 UNIT/ML 100 UNIT/ML
5 SOLUTION INTRAVENOUS ONCE
Status: COMPLETED | OUTPATIENT
Start: 2020-03-25 | End: 2020-03-25

## 2020-03-25 RX ORDER — DIPHENHYDRAMINE HCL 25 MG
25 CAPSULE ORAL
Status: COMPLETED | OUTPATIENT
Start: 2020-03-25 | End: 2020-03-25

## 2020-03-25 RX ORDER — HEPARIN SODIUM (PORCINE) LOCK FLUSH IV SOLN 100 UNIT/ML 100 UNIT/ML
500 SOLUTION INTRAVENOUS ONCE
Status: COMPLETED | OUTPATIENT
Start: 2020-03-25 | End: 2020-03-25

## 2020-03-25 RX ADMIN — DIPHENHYDRAMINE HYDROCHLORIDE 25 MG: 25 CAPSULE ORAL at 09:59

## 2020-03-25 RX ADMIN — FAMOTIDINE 20 MG: 10 INJECTION INTRAVENOUS at 09:59

## 2020-03-25 RX ADMIN — Medication 5 ML: at 09:06

## 2020-03-25 RX ADMIN — DEXAMETHASONE SODIUM PHOSPHATE 20 MG: 10 INJECTION, SOLUTION INTRAMUSCULAR; INTRAVENOUS at 10:00

## 2020-03-25 RX ADMIN — SODIUM CHLORIDE 500 MG: 9 INJECTION, SOLUTION INTRAVENOUS at 10:24

## 2020-03-25 RX ADMIN — Medication 500 UNITS: at 12:02

## 2020-03-25 RX ADMIN — SODIUM CHLORIDE 145 MG: 9 INJECTION, SOLUTION INTRAVENOUS at 11:01

## 2020-03-25 RX ADMIN — SODIUM CHLORIDE 250 ML: 9 INJECTION, SOLUTION INTRAVENOUS at 10:00

## 2020-03-25 ASSESSMENT — PAIN SCALES - GENERAL: PAINLEVEL: NO PAIN (0)

## 2020-03-25 NOTE — PROGRESS NOTES
Infusion Nursing Note:  Daniel Sandhu presents for C12D15 Cyramza, Taxol    Note: pt feeling well today, no new issues or concerns to report.    Pt currently scheduled for D1 chemo 4/8, followed by a CT scan 4/10 and then seeing Dr. Ponce 4/13. IB sent to Dr. Ponce to see if we should delay the cycle and start after CT scan.    Pain: denies    Treatment Conditions:  Lab Results   Component Value Date    HGB 11.9 03/25/2020     Lab Results   Component Value Date    WBC 3.4 03/25/2020      Lab Results   Component Value Date    ANEU 1.8 03/25/2020     Lab Results   Component Value Date     03/25/2020      Lab Results   Component Value Date     09/23/2019                   Lab Results   Component Value Date    POTASSIUM 3.9 09/23/2019           Lab Results   Component Value Date    MAG 2.2 03/07/2018            Lab Results   Component Value Date    CR 0.67 09/23/2019                   Lab Results   Component Value Date    YOBANY 9.0 09/23/2019                Lab Results   Component Value Date    BILITOTAL 0.3 03/11/2020           Lab Results   Component Value Date    ALBUMIN 3.5 03/11/2020                    Lab Results   Component Value Date    ALT 32 03/11/2020           Lab Results   Component Value Date    AST 20 03/11/2020       Results reviewed, labs MET treatment parameters, ok to proceed with treatment.  Urine protein: trace.    Intravenous Access:  Peripheral IV placed.    Post Infusion Assessment:  Patient tolerated infusion without incident.  Blood return noted pre and post infusion.  No evidence of extravasations.  Access discontinued per protocol.    Discharge Plan:   Patient declined prescription refills.  Discharge instructions reviewed with: Patient.  Patient and/or family verbalized understanding of discharge instructions and all questions answered.  AVS to patient via [email or web address].  Patient will return 4/8 for next appointment.   Patient discharged in stable condition accompanied by:  self.    Andie Bailey, RN, RN

## 2020-03-25 NOTE — PATIENT INSTRUCTIONS
Contact numbers:    Triage/[phone]    Call with chills and/or temperature greater than or equal to 100.5 and questions or concerns.    If after hours, weekends, or holidays, call main hospital  at  [phone] and ask for Oncology doctor on call.            1     2     3     4     5     6     7       8     9     10     11    [facility]   7:30 AM   (15 min.)    [facility] DRAW   [facility] Draw    [facility] INFUSION 180   8:00 AM   (180 min.)    ONCOLOGY INFUSION   [facility] 12     13     14       15     16     17     18    [facility]   7:30 AM   (15 min.)    [facility] DRAW   [facility] Draw    [facility] ONC INFUSION 180   8:00 AM   (180 min.)    ONCOLOGY INFUSION   [facility] 19     20     21       22     23     24     25    [facility]   8:30 AM   (15 min.)    [facility] LAB DRAW   [facility] Lab Draw    [facility] ONC INFUSION 180   9:00 AM   (180 min.)    ONCOLOGY INFUSION   [facility] 26     27     28       29     30     31                                                       1     2     3     4       5     6     7     8    [facility]   8:30 AM   (15 min.)    [facility] DRAW   [facility] Lab Draw    [facility] ON[facility] INFUSION 180   9:00 AM   (180 min.)    ONCOLOGY INFUSION   [facility] 9     10    CT CHEST ABDOMEN PELVIS WWO   9:00 AM   (20 min.)   UCCT2   [facility] CT 11       12     13    [facility]   7:45 AM   (15 min.)    [facility] DRAW   [facility] Lab Draw    [facility] RETURN   8:00 AM   (30 min.)   Daryn Ponce MD   [facility] 14     15    [facility] ON[facility] INFUSION 180   8:00 AM   (180 min.)    ONCOLOGY INFUSION   [facility]  16     17     18       19     20     21     22    [facility] LAB DRAW   8:30 AM   (15 min.)   [facility] LAB DRAW   [facility] Lab Draw    [facility] ONC INFUSION 180   9:00 AM   (180 min.)    ONCOLOGY INFUSION   [facility] 23     24     25       26     27     28     29     30                               Lab Results:  Recent Results (from the past 12 hour(s))   CBC with platelets differential    Collection Time: 03/25/20  9:08 AM   Result Value Ref Range    WBC 3.4 (L) 4.0 - 11.0 10e9/L    RBC Count 3.87 (L) 4.4 - 5.9 10e12/L    Hemoglobin 11.9 (L) 13.3 - 17.7 g/dL    Hematocrit 35.8 (L) 40.0 - 53.0 %    MCV 93 78 - 100 fl    MCH 30.7 26.5 - 33.0 pg    MCHC 33.2 31.5 - 36.5 g/dL    RDW 12.6 10.0 - 15.0 %    Platelet Count 142 (L) 150 - 450 10e9/L    Diff Method Automated Method     % Neutrophils 51.9 %    % Lymphocytes 36.1 %    % Monocytes 8.5 %    % Eosinophils 2.6 %    % Basophils 0.6 %    % Immature Granulocytes 0.3 %    Nucleated RBCs 0 0 /100    Absolute Neutrophil 1.8 1.6 - 8.3 10e9/L    Absolute Lymphocytes 1.2 0.8 - 5.3 10e9/L    Absolute Monocytes 0.3 0.0 - 1.3 10e9/L    Absolute Eosinophils 0.1 0.0 - 0.7 10e9/L    Absolute Basophils 0.0 0.0 - 0.2 10e9/L    Abs Immature Granulocytes 0.0 0 - 0.4 10e9/L    Absolute Nucleated RBC 0.0    Protein qualitative urine    Collection Time: 03/25/20  9:14 AM   Result Value Ref Range    Protein Albumin Urine Trace (A) NEG^Negative mg/dL

## 2020-04-10 ENCOUNTER — ANCILLARY PROCEDURE (OUTPATIENT)
Dept: CT IMAGING | Facility: CLINIC | Age: 40
End: 2020-04-10
Attending: INTERNAL MEDICINE
Payer: COMMERCIAL

## 2020-04-10 VITALS
DIASTOLIC BLOOD PRESSURE: 76 MMHG | SYSTOLIC BLOOD PRESSURE: 121 MMHG | WEIGHT: 145 LBS | BODY MASS INDEX: 22.49 KG/M2 | RESPIRATION RATE: 16 BRPM | TEMPERATURE: 97.6 F | OXYGEN SATURATION: 98 % | HEART RATE: 67 BPM

## 2020-04-10 DIAGNOSIS — C78.7 MALIGNANT NEOPLASM METASTATIC TO LIVER (H): ICD-10-CM

## 2020-04-10 DIAGNOSIS — C15.5 MALIGNANT NEOPLASM OF LOWER THIRD OF ESOPHAGUS (H): ICD-10-CM

## 2020-04-10 LAB
ALBUMIN SERPL-MCNC: 3.6 G/DL (ref 3.4–5)
ALP SERPL-CCNC: 43 U/L (ref 40–150)
ALT SERPL W P-5'-P-CCNC: 29 U/L (ref 0–70)
ANION GAP SERPL CALCULATED.3IONS-SCNC: 5 MMOL/L (ref 3–14)
AST SERPL W P-5'-P-CCNC: 22 U/L (ref 0–45)
BASOPHILS # BLD AUTO: 0 10E9/L (ref 0–0.2)
BASOPHILS NFR BLD AUTO: 1.2 %
BILIRUB SERPL-MCNC: 0.3 MG/DL (ref 0.2–1.3)
BUN SERPL-MCNC: 22 MG/DL (ref 7–30)
CALCIUM SERPL-MCNC: 8.6 MG/DL (ref 8.5–10.1)
CEA SERPL-MCNC: 1.1 UG/L (ref 0–2.5)
CHLORIDE SERPL-SCNC: 109 MMOL/L (ref 94–109)
CO2 SERPL-SCNC: 28 MMOL/L (ref 20–32)
CREAT SERPL-MCNC: 0.71 MG/DL (ref 0.66–1.25)
DIFFERENTIAL METHOD BLD: ABNORMAL
EOSINOPHIL # BLD AUTO: 0.1 10E9/L (ref 0–0.7)
EOSINOPHIL NFR BLD AUTO: 2.1 %
ERYTHROCYTE [DISTWIDTH] IN BLOOD BY AUTOMATED COUNT: 12.9 % (ref 10–15)
GFR SERPL CREATININE-BSD FRML MDRD: >90 ML/MIN/{1.73_M2}
GLUCOSE SERPL-MCNC: 88 MG/DL (ref 70–99)
HCT VFR BLD AUTO: 36.8 % (ref 40–53)
HGB BLD-MCNC: 12 G/DL (ref 13.3–17.7)
IMM GRANULOCYTES # BLD: 0 10E9/L (ref 0–0.4)
IMM GRANULOCYTES NFR BLD: 0 %
LYMPHOCYTES # BLD AUTO: 1.1 10E9/L (ref 0.8–5.3)
LYMPHOCYTES NFR BLD AUTO: 32 %
MCH RBC QN AUTO: 30.2 PG (ref 26.5–33)
MCHC RBC AUTO-ENTMCNC: 32.6 G/DL (ref 31.5–36.5)
MCV RBC AUTO: 93 FL (ref 78–100)
MONOCYTES # BLD AUTO: 0.5 10E9/L (ref 0–1.3)
MONOCYTES NFR BLD AUTO: 16.2 %
NEUTROPHILS # BLD AUTO: 1.6 10E9/L (ref 1.6–8.3)
NEUTROPHILS NFR BLD AUTO: 48.5 %
NRBC # BLD AUTO: 0 10*3/UL
NRBC BLD AUTO-RTO: 0 /100
PLATELET # BLD AUTO: 140 10E9/L (ref 150–450)
POTASSIUM SERPL-SCNC: 4 MMOL/L (ref 3.4–5.3)
PROT SERPL-MCNC: 6.7 G/DL (ref 6.8–8.8)
RBC # BLD AUTO: 3.97 10E12/L (ref 4.4–5.9)
SODIUM SERPL-SCNC: 142 MMOL/L (ref 133–144)
WBC # BLD AUTO: 3.3 10E9/L (ref 4–11)

## 2020-04-10 PROCEDURE — 82378 CARCINOEMBRYONIC ANTIGEN: CPT | Performed by: INTERNAL MEDICINE

## 2020-04-10 PROCEDURE — 85025 COMPLETE CBC W/AUTO DIFF WBC: CPT | Performed by: INTERNAL MEDICINE

## 2020-04-10 PROCEDURE — 80053 COMPREHEN METABOLIC PANEL: CPT | Performed by: INTERNAL MEDICINE

## 2020-04-10 PROCEDURE — 25000128 H RX IP 250 OP 636: Performed by: INTERNAL MEDICINE

## 2020-04-10 RX ORDER — HEPARIN SODIUM (PORCINE) LOCK FLUSH IV SOLN 100 UNIT/ML 100 UNIT/ML
5 SOLUTION INTRAVENOUS ONCE
Status: COMPLETED | OUTPATIENT
Start: 2020-04-10 | End: 2020-04-10

## 2020-04-10 RX ORDER — HEPARIN SODIUM (PORCINE) LOCK FLUSH IV SOLN 100 UNIT/ML 100 UNIT/ML
5 SOLUTION INTRAVENOUS EVERY 8 HOURS
Status: DISCONTINUED | OUTPATIENT
Start: 2020-04-10 | End: 2020-04-18 | Stop reason: HOSPADM

## 2020-04-10 RX ORDER — IOPAMIDOL 755 MG/ML
89 INJECTION, SOLUTION INTRAVASCULAR ONCE
Status: COMPLETED | OUTPATIENT
Start: 2020-04-10 | End: 2020-04-10

## 2020-04-10 RX ADMIN — Medication 5 ML: at 09:00

## 2020-04-10 RX ADMIN — HEPARIN SODIUM (PORCINE) LOCK FLUSH IV SOLN 100 UNIT/ML 5 ML: 100 SOLUTION at 09:35

## 2020-04-10 RX ADMIN — IOPAMIDOL 89 ML: 755 INJECTION, SOLUTION INTRAVASCULAR at 09:14

## 2020-04-10 ASSESSMENT — PAIN SCALES - GENERAL: PAINLEVEL: NO PAIN (0)

## 2020-04-10 NOTE — NURSING NOTE
Chief Complaint   Patient presents with     Port Draw     Labs drawn via PORT by RN in lab. VS taken     Carlos Andrade RN

## 2020-04-13 ENCOUNTER — TELEPHONE (OUTPATIENT)
Dept: ONCOLOGY | Facility: CLINIC | Age: 40
End: 2020-04-13

## 2020-04-13 ENCOUNTER — PATIENT OUTREACH (OUTPATIENT)
Dept: ONCOLOGY | Facility: CLINIC | Age: 40
End: 2020-04-13

## 2020-04-13 ENCOUNTER — VIRTUAL VISIT (OUTPATIENT)
Dept: ONCOLOGY | Facility: CLINIC | Age: 40
End: 2020-04-13
Attending: INTERNAL MEDICINE
Payer: COMMERCIAL

## 2020-04-13 DIAGNOSIS — C15.5 MALIGNANT NEOPLASM OF LOWER THIRD OF ESOPHAGUS (H): Primary | ICD-10-CM

## 2020-04-13 PROCEDURE — 40000114 ZZH STATISTIC NO CHARGE CLINIC VISIT

## 2020-04-13 PROCEDURE — 99214 OFFICE O/P EST MOD 30 MIN: CPT | Mod: 95 | Performed by: INTERNAL MEDICINE

## 2020-04-13 RX ORDER — EPINEPHRINE 1 MG/ML
0.3 INJECTION, SOLUTION INTRAMUSCULAR; SUBCUTANEOUS EVERY 5 MIN PRN
Status: CANCELLED | OUTPATIENT
Start: 2020-04-15

## 2020-04-13 RX ORDER — HEPARIN SODIUM (PORCINE) LOCK FLUSH IV SOLN 100 UNIT/ML 100 UNIT/ML
5 SOLUTION INTRAVENOUS
Status: CANCELLED | OUTPATIENT
Start: 2020-04-15

## 2020-04-13 RX ORDER — ALBUTEROL SULFATE 90 UG/1
1-2 AEROSOL, METERED RESPIRATORY (INHALATION)
Status: CANCELLED
Start: 2020-04-15

## 2020-04-13 RX ORDER — DIPHENHYDRAMINE HYDROCHLORIDE 50 MG/ML
50 INJECTION INTRAMUSCULAR; INTRAVENOUS
Status: CANCELLED
Start: 2020-04-15

## 2020-04-13 RX ORDER — MEPERIDINE HYDROCHLORIDE 25 MG/ML
25 INJECTION INTRAMUSCULAR; INTRAVENOUS; SUBCUTANEOUS EVERY 30 MIN PRN
Status: CANCELLED | OUTPATIENT
Start: 2020-04-15

## 2020-04-13 RX ORDER — SODIUM CHLORIDE 9 MG/ML
1000 INJECTION, SOLUTION INTRAVENOUS CONTINUOUS PRN
Status: CANCELLED
Start: 2020-04-15

## 2020-04-13 RX ORDER — LORAZEPAM 2 MG/ML
0.5 INJECTION INTRAMUSCULAR EVERY 4 HOURS PRN
Status: CANCELLED
Start: 2020-04-15

## 2020-04-13 RX ORDER — EPINEPHRINE 0.3 MG/.3ML
0.3 INJECTION SUBCUTANEOUS EVERY 5 MIN PRN
Status: CANCELLED | OUTPATIENT
Start: 2020-04-15

## 2020-04-13 RX ORDER — NALOXONE HYDROCHLORIDE 0.4 MG/ML
.1-.4 INJECTION, SOLUTION INTRAMUSCULAR; INTRAVENOUS; SUBCUTANEOUS
Status: CANCELLED | OUTPATIENT
Start: 2020-04-15

## 2020-04-13 RX ORDER — ALBUTEROL SULFATE 0.83 MG/ML
2.5 SOLUTION RESPIRATORY (INHALATION)
Status: CANCELLED | OUTPATIENT
Start: 2020-04-15

## 2020-04-13 RX ORDER — METHYLPREDNISOLONE SODIUM SUCCINATE 125 MG/2ML
125 INJECTION, POWDER, LYOPHILIZED, FOR SOLUTION INTRAMUSCULAR; INTRAVENOUS
Status: CANCELLED
Start: 2020-04-15

## 2020-04-13 RX ORDER — HEPARIN SODIUM,PORCINE 10 UNIT/ML
5 VIAL (ML) INTRAVENOUS
Status: CANCELLED | OUTPATIENT
Start: 2020-04-15

## 2020-04-13 NOTE — LETTER
"4/13/2020      RE: Daniel Sandhu  [address] [id number]       Daniel Sandhu is a 39 year old male who is being evaluated via a billable video visit.      The patient has been notified of following:     \"This video visit will be conducted via a call between you and your physician/provider. We have found that certain health care needs can be provided without the need for an in-person physical exam.  This service lets us provide the care you need with a video conversation.  If a prescription is necessary we can send it directly to your pharmacy.  If lab work is needed we can place an order for that and you can then stop by our lab to have the test done at a later time.    Video visits are billed at different rates depending on your insurance coverage.  Please reach out to your insurance provider with any questions.    If during the course of the call the physician/provider feels a video visit is not appropriate, you will not be charged for this service.\"    Patient has given verbal consent for Video visit? Yes    How would you like to obtain your AVS? 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    He had T3N1 disease resected back in 2017 with perioperative EO X.  He recurred just about a year ago, 2 years after he completed his adjuvant therapy with a biopsy confirmed metastasis in his liver.  He has been on treatment with Ramucirumab Mab and Taxol ever since.  Today's evaluation is to assess his ongoing response.  He tells me he is doing reasonably well with his chemotherapy.  His main ongoing toxicity is minor nosebleeds.  His appetite is good and he is eating well but does not seem able to gain much weight.  He is not physically limited in his activities, although his physical activity has been " greatly curtailed by the pandemic restrictions.  He is still able to do his work from home although that somewhat difficult with his wife and young children also being at home.  He has no problems with fevers chills or sweats.  He has no respiratory symptoms.  He occasionally gets some minor twinges of pain at his old G-tube site but otherwise is pain-free.  He does not have any significant peripheral neuropathy symptoms.  The remainder of his 10 point review of systems is otherwise negative.  He denies any other new medical problems since our last visit.    He appears quite well from what I can see via the video link.    I reviewed with him his lab work and CT scan done on Friday.  Unfortunately on the CT scan his liver lesion has increased from 2 cm to 3 cm, but no other new sites of disease are apparent.  He does not have an informative tumor marker.  His electrolytes, renal function, liver enzymes are all normal.  He has very mild cytopenias consistent with his chemotherapy.  I reviewed again with him also his prior molecular profiling.  His tumor is HER-2 negative (at one point it was equivocal.)  He has no indications of susceptibility to immune targeting agents and there otherwise were no actionable mutations.    Assessment/plan:   Metastatic gastroesophageal cancer now with disease progression on Ramucirumab and Taxol.  He is maintaining a normal performance status.  I had a long discussion with him recommending that we change therapies in reviewing what the possibilities were going forward.  He has had a modest amount of difficulty with his perioperative earwax and I think he probably is still sensitive to that regimen given his 2-year interval before he developed metastases.  I explained to him that we got away from including the epirubicin and recommended proceeding with Xeloda and oxaliplatin which will be more tolerable for him.  I discussed that there are other active agents that might be used in the  setting but they were things I prefer to use and subsequent lines of therapy.  We talked about what research studies might be available and I explained most studies are currently on enrollment hold due to the pandemic restrictions.  We discussed whether there might be value to repeating his molecular profiling as there are some genes that probably were not included in his profiling done a few years ago, in particular and track that might be of interest.  At the end of this long discussion he has had all his questions answered had a good understanding.  He wanted to go ahead with the XELOX and will get that started this Wednesday.      Video-Visit Details    Type of service:  Video Visit    Video End Time (time video stopped): 8:45    Originating Location (pt. Location): Home    Distant Location (provider location):  [facility]     Mode of Communication:  Video Conference via [employer]      Md Daryn Muro MD

## 2020-04-13 NOTE — PROGRESS NOTES
RN Care Coordination Note  Outgoing Call: Placed call to patient to discuss treatment change to XELOX. Patient states he has received both of the drugs in the past as part of a previous regimen. Briefly reviewed expected side effects. Patient denies need for any additional teaching information. Will plan to begin new regimen 4/20/20. Message sent to scheduling team. Patient had no further questions at this time.           Francine Garcia RN, BSN, OCN   RN Care Coordinator   [facility]

## 2020-04-13 NOTE — PROGRESS NOTES
"Daniel Sandhu is a 39 year old male who is being evaluated via a billable video visit.      The patient has been notified of following:     \"This video visit will be conducted via a call between you and your physician/provider. We have found that certain health care needs can be provided without the need for an in-person physical exam.  This service lets us provide the care you need with a video conversation.  If a prescription is necessary we can send it directly to your pharmacy.  If lab work is needed we can place an order for that and you can then stop by our lab to have the test done at a later time.    Video visits are billed at different rates depending on your insurance coverage.  Please reach out to your insurance provider with any questions.    If during the course of the call the physician/provider feels a video visit is not appropriate, you will not be charged for this service.\"    Patient has given verbal consent for Video visit? Yes    How would you like to obtain your AVS? 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    He had T3N1 disease resected back in 2017 with perioperative EO X.  He recurred just about a year ago, 2 years after he completed his adjuvant therapy with a biopsy confirmed metastasis in his liver.  He has been on treatment with Ramucirumab Mab and Taxol ever since.  Today's evaluation is to assess his ongoing response.  He tells me he is doing reasonably well with his chemotherapy.  His main ongoing toxicity is minor nosebleeds.  His appetite is good and he is eating well but does not seem able to gain much weight.  He is not physically limited in his activities, although his physical activity has been greatly curtailed by the pandemic restrictions.  He is still able to do his work from home " although that somewhat difficult with his wife and young children also being at home.  He has no problems with fevers chills or sweats.  He has no respiratory symptoms.  He occasionally gets some minor twinges of pain at his old G-tube site but otherwise is pain-free.  He does not have any significant peripheral neuropathy symptoms.  The remainder of his 10 point review of systems is otherwise negative.  He denies any other new medical problems since our last visit.    He appears quite well from what I can see via the video link.    I reviewed with him his lab work and CT scan done on Friday.  Unfortunately on the CT scan his liver lesion has increased from 2 cm to 3 cm, but no other new sites of disease are apparent.  He does not have an informative tumor marker.  His electrolytes, renal function, liver enzymes are all normal.  He has very mild cytopenias consistent with his chemotherapy.  I reviewed again with him also his prior molecular profiling.  His tumor is HER-2 negative (at one point it was equivocal.)  He has no indications of susceptibility to immune targeting agents and there otherwise were no actionable mutations.    Assessment/plan:   Metastatic gastroesophageal cancer now with disease progression on Ramucirumab and Taxol.  He is maintaining a normal performance status.  I had a long discussion with him recommending that we change therapies in reviewing what the possibilities were going forward.  He has had a modest amount of difficulty with his perioperative earwax and I think he probably is still sensitive to that regimen given his 2-year interval before he developed metastases.  I explained to him that we got away from including the epirubicin and recommended proceeding with Xeloda and oxaliplatin which will be more tolerable for him.  I discussed that there are other active agents that might be used in the setting but they were things I prefer to use and subsequent lines of therapy.  We talked  about what research studies might be available and I explained most studies are currently on enrollment hold due to the pandemic restrictions.  We discussed whether there might be value to repeating his molecular profiling as there are some genes that probably were not included in his profiling done a few years ago, in particular and track that might be of interest.  At the end of this long discussion he has had all his questions answered had a good understanding.  He wanted to go ahead with the XELOX and will get that started this Wednesday.      Video-Visit Details    Type of service:  Video Visit    Video End Time (time video stopped): 8:45    Originating Location (pt. Location): Home    Distant Location (provider location):  [facility]     Mode of Communication:  Video Conference via [facility]

## 2020-04-14 ENCOUNTER — TELEPHONE (OUTPATIENT)
Dept: ONCOLOGY | Facility: CLINIC | Age: 40
End: 2020-04-14

## 2020-04-14 DIAGNOSIS — C15.5 MALIGNANT NEOPLASM OF LOWER THIRD OF ESOPHAGUS (H): Primary | ICD-10-CM

## 2020-04-14 RX ORDER — LOPERAMIDE HCL 2 MG
CAPSULE ORAL
Qty: 30 CAPSULE | Refills: 0 | Status: SHIPPED | OUTPATIENT
Start: 2020-04-14

## 2020-04-14 RX ORDER — CAPECITABINE 500 MG/1
1000 TABLET, FILM COATED ORAL 2 TIMES DAILY
Qty: 112 TABLET | Refills: 0 | Status: SHIPPED | OUTPATIENT
Start: 2020-04-14 | End: 2020-04-21

## 2020-04-14 RX ORDER — ONDANSETRON 8 MG/1
8 TABLET, FILM COATED ORAL EVERY 8 HOURS PRN
Qty: 10 TABLET | Refills: 5 | Status: SHIPPED | OUTPATIENT
Start: 2020-04-14

## 2020-04-14 RX ORDER — PROCHLORPERAZINE MALEATE 10 MG
10 TABLET ORAL EVERY 6 HOURS PRN
Qty: 30 TABLET | Refills: 5 | Status: CANCELLED | OUTPATIENT
Start: 2020-04-14

## 2020-04-14 NOTE — ORAL ONC MGMT
"Oral Chemotherapy Monitoring Program    Primary Oncologist: Dr. Ponce  Primary Oncology Clinic: [facility]  Cancer Diagnosis: Gastroesophageal Cancer    Drug: Xeloda (capecitabine) 2000mg ([id number] tablets) BID, days 1-14 of 21-day cycle  Start Date: Planned for 4/20/20  Dose is appropriate for patients: BSA, Renal Function and Hepatic Function   Expected duration of therapy: Until disease progression or unacceptable toxicity    Drug Interaction Assessment: No drug interactions identified upon review of medication list.    Lab Monitoring Plan  CBC & CMP q3 weeks with infusions  Subjective/Objective:  Daniel Sandhu is a 39 year old male contacted by phone for an initial visit for oral chemotherapy education. Daniel has been on Xeloda in the past and is aware of potential toxicities. He notes that during his last course of therapy with Xeloda, he experienced peeling on his palms and soles as well as a bothersome skin rash on the tip of the urethra which caused significant quality-of-life issues. He is hoping for a better experience this time with a different dose and cycle.     ORAL CHEMOTHERAPY 4/14/2020   Drug Name Xeloda (Capecitabine)   Current Dosage 2000mg   Current Schedule BID   Cycle Details 2 weeks on 1 week off   Planned next cycle start date 4/20/2020   Any new drug interactions? No   Is the dose as ordered appropriate for the patient? Yes       Vitals:  BP:   BP Readings from Last 1 Encounters:   04/10/20 121/76     Wt Readings from Last 1 Encounters:   04/10/20 65.8 kg (145 lb)     Estimated body surface area is 1.77 meters squared as calculated from the following:    Height as of 1/16/20: 1.71 m (5' 7.32\").    Weight as of 4/10/20: 65.8 kg (145 lb).      Labs:  _  Result Component Current Result Ref Range   Sodium 142 (4/10/2020) 133 - 144 mmol/L     _  Result Component Current Result Ref Range   Potassium 4.0 (4/10/2020) 3.4 - 5.3 mmol/L     _  Result Component Current Result Ref Range "   Calcium 8.6 (4/10/2020) 8.5 - 10.1 mg/dL     No results found for Mag within last 30 days.     No results found for Phos within last 30 days.     _  Result Component Current Result Ref Range   Albumin 3.6 (4/10/2020) 3.4 - 5.0 g/dL     _  Result Component Current Result Ref Range   Urea Nitrogen 22 (4/10/2020) 7 - 30 mg/dL     _  Result Component Current Result Ref Range   Creatinine 0.71 (4/10/2020) 0.66 - 1.25 mg/dL       _  Result Component Current Result Ref Range   AST 22 (4/10/2020) 0 - 45 U/L     _  Result Component Current Result Ref Range   ALT 29 (4/10/2020) 0 - 70 U/L     _  Result Component Current Result Ref Range   Bilirubin Total 0.3 (4/10/2020) 0.2 - 1.3 mg/dL       _  Result Component Current Result Ref Range   WBC 3.3 (L) (4/10/2020) 4.0 - 11.0 10e9/L     _  Result Component Current Result Ref Range   Hemoglobin 12.0 (L) (4/10/2020) 13.3 - 17.7 g/dL     _  Result Component Current Result Ref Range   Platelet Count 140 (L) (4/10/2020) 150 - 450 10e9/L     _  Result Component Current Result Ref Range   Absolute Neutrophil 1.6 (4/10/2020) 1.6 - 8.3 10e9/L       Assessment:  Patient is appropriate to start therapy.    Plan:  Basic chemotherapy teaching was reviewed with the patient including indication, start date of therapy, dose, administration, adverse effects, missed doses, food and drug interactions, monitoring, side effect management, office contact information, and safe handling. Written materials were provided and all questions answered.    Follow-Up:  Initial assessment ~1 week after starting.      Damion Colorado  Oncology Pharmacy Intern  [facility]  [phone]

## 2020-04-17 ENCOUNTER — MYC MEDICAL ADVICE (OUTPATIENT)
Dept: FAMILY MEDICINE | Facility: CLINIC | Age: 40
End: 2020-04-17

## 2020-04-17 DIAGNOSIS — Z90.3 S/P GASTRECTOMY: ICD-10-CM

## 2020-04-17 DIAGNOSIS — D64.9 ANEMIA, UNSPECIFIED TYPE: Primary | ICD-10-CM

## 2020-04-17 NOTE — TELEPHONE ENCOUNTER
Routing to PCP to review and advise.    Please see My Chart message.      Mari Grigsby RN  [facility]

## 2020-04-20 ENCOUNTER — APPOINTMENT (OUTPATIENT)
Dept: LAB | Facility: CLINIC | Age: 40
End: 2020-04-20
Attending: INTERNAL MEDICINE
Payer: COMMERCIAL

## 2020-04-20 ENCOUNTER — INFUSION THERAPY VISIT (OUTPATIENT)
Dept: ONCOLOGY | Facility: CLINIC | Age: 40
End: 2020-04-20
Attending: INTERNAL MEDICINE
Payer: COMMERCIAL

## 2020-04-20 VITALS
WEIGHT: 146.7 LBS | SYSTOLIC BLOOD PRESSURE: 114 MMHG | DIASTOLIC BLOOD PRESSURE: 70 MMHG | BODY MASS INDEX: 22.76 KG/M2 | HEART RATE: 82 BPM | TEMPERATURE: 97.7 F | RESPIRATION RATE: 18 BRPM | OXYGEN SATURATION: 100 %

## 2020-04-20 DIAGNOSIS — C15.5 MALIGNANT NEOPLASM OF LOWER THIRD OF ESOPHAGUS (H): Primary | ICD-10-CM

## 2020-04-20 DIAGNOSIS — Z90.3 S/P GASTRECTOMY: ICD-10-CM

## 2020-04-20 DIAGNOSIS — D64.9 ANEMIA, UNSPECIFIED TYPE: ICD-10-CM

## 2020-04-20 LAB
ALBUMIN SERPL-MCNC: 3.6 G/DL (ref 3.4–5)
ALP SERPL-CCNC: 48 U/L (ref 40–150)
ALT SERPL W P-5'-P-CCNC: 32 U/L (ref 0–70)
ANION GAP SERPL CALCULATED.3IONS-SCNC: 5 MMOL/L (ref 3–14)
AST SERPL W P-5'-P-CCNC: 23 U/L (ref 0–45)
BASOPHILS # BLD AUTO: 0 10E9/L (ref 0–0.2)
BASOPHILS NFR BLD AUTO: 0.7 %
BILIRUB SERPL-MCNC: 0.2 MG/DL (ref 0.2–1.3)
BUN SERPL-MCNC: 19 MG/DL (ref 7–30)
CALCIUM SERPL-MCNC: 8.8 MG/DL (ref 8.5–10.1)
CHLORIDE SERPL-SCNC: 109 MMOL/L (ref 94–109)
CO2 SERPL-SCNC: 26 MMOL/L (ref 20–32)
CREAT SERPL-MCNC: 0.79 MG/DL (ref 0.66–1.25)
DIFFERENTIAL METHOD BLD: ABNORMAL
EOSINOPHIL # BLD AUTO: 0.1 10E9/L (ref 0–0.7)
EOSINOPHIL NFR BLD AUTO: 1.8 %
ERYTHROCYTE [DISTWIDTH] IN BLOOD BY AUTOMATED COUNT: 12.8 % (ref 10–15)
GFR SERPL CREATININE-BSD FRML MDRD: >90 ML/MIN/{1.73_M2}
GLUCOSE SERPL-MCNC: 113 MG/DL (ref 70–99)
HCT VFR BLD AUTO: 39.3 % (ref 40–53)
HGB BLD-MCNC: 12.8 G/DL (ref 13.3–17.7)
IMM GRANULOCYTES # BLD: 0 10E9/L (ref 0–0.4)
IMM GRANULOCYTES NFR BLD: 0 %
LYMPHOCYTES # BLD AUTO: 1.3 10E9/L (ref 0.8–5.3)
LYMPHOCYTES NFR BLD AUTO: 30.6 %
MCH RBC QN AUTO: 30.4 PG (ref 26.5–33)
MCHC RBC AUTO-ENTMCNC: 32.6 G/DL (ref 31.5–36.5)
MCV RBC AUTO: 93 FL (ref 78–100)
MONOCYTES # BLD AUTO: 0.6 10E9/L (ref 0–1.3)
MONOCYTES NFR BLD AUTO: 12.9 %
NEUTROPHILS # BLD AUTO: 2.3 10E9/L (ref 1.6–8.3)
NEUTROPHILS NFR BLD AUTO: 54 %
NRBC # BLD AUTO: 0 10*3/UL
NRBC BLD AUTO-RTO: 0 /100
PLATELET # BLD AUTO: 114 10E9/L (ref 150–450)
POTASSIUM SERPL-SCNC: 4.2 MMOL/L (ref 3.4–5.3)
PROT SERPL-MCNC: 6.8 G/DL (ref 6.8–8.8)
RBC # BLD AUTO: 4.21 10E12/L (ref 4.4–5.9)
SODIUM SERPL-SCNC: 140 MMOL/L (ref 133–144)
VIT B12 SERPL-MCNC: 600 PG/ML (ref 193–986)
WBC # BLD AUTO: 4.3 10E9/L (ref 4–11)

## 2020-04-20 PROCEDURE — 25000128 H RX IP 250 OP 636: Mod: ZF | Performed by: INTERNAL MEDICINE

## 2020-04-20 PROCEDURE — 80053 COMPREHEN METABOLIC PANEL: CPT | Performed by: INTERNAL MEDICINE

## 2020-04-20 PROCEDURE — 96375 TX/PRO/DX INJ NEW DRUG ADDON: CPT

## 2020-04-20 PROCEDURE — 82607 VITAMIN B-12: CPT | Performed by: PHYSICIAN ASSISTANT

## 2020-04-20 PROCEDURE — 25800030 ZZH RX IP 258 OP 636: Mod: ZF | Performed by: INTERNAL MEDICINE

## 2020-04-20 PROCEDURE — 96415 CHEMO IV INFUSION ADDL HR: CPT

## 2020-04-20 PROCEDURE — 96413 CHEMO IV INFUSION 1 HR: CPT

## 2020-04-20 PROCEDURE — 85025 COMPLETE CBC W/AUTO DIFF WBC: CPT | Performed by: INTERNAL MEDICINE

## 2020-04-20 RX ORDER — PROCHLORPERAZINE MALEATE 10 MG
10 TABLET ORAL EVERY 6 HOURS PRN
Qty: 30 TABLET | Refills: 2 | Status: SHIPPED | OUTPATIENT
Start: 2020-04-20

## 2020-04-20 RX ORDER — HEPARIN SODIUM (PORCINE) LOCK FLUSH IV SOLN 100 UNIT/ML 100 UNIT/ML
5 SOLUTION INTRAVENOUS
Status: DISCONTINUED | OUTPATIENT
Start: 2020-04-20 | End: 2020-04-20 | Stop reason: HOSPADM

## 2020-04-20 RX ORDER — HEPARIN SODIUM (PORCINE) LOCK FLUSH IV SOLN 100 UNIT/ML 100 UNIT/ML
5 SOLUTION INTRAVENOUS ONCE
Status: COMPLETED | OUTPATIENT
Start: 2020-04-20 | End: 2020-04-20

## 2020-04-20 RX ORDER — LORAZEPAM 0.5 MG/1
0.5 TABLET ORAL EVERY 4 HOURS PRN
Qty: 30 TABLET | Refills: 2 | Status: SHIPPED | OUTPATIENT
Start: 2020-04-20

## 2020-04-20 RX ORDER — LORAZEPAM 0.5 MG/1
0.5 TABLET ORAL EVERY 4 HOURS PRN
Qty: 30 TABLET | Refills: 2 | Status: SHIPPED | OUTPATIENT
Start: 2020-04-20 | End: 2023-06-06

## 2020-04-20 RX ADMIN — OXALIPLATIN 250 MG: 5 INJECTION, SOLUTION INTRAVENOUS at 10:02

## 2020-04-20 RX ADMIN — DEXTROSE MONOHYDRATE 250 ML: 50 INJECTION, SOLUTION INTRAVENOUS at 09:30

## 2020-04-20 RX ADMIN — DEXAMETHASONE SODIUM PHOSPHATE: 10 INJECTION, SOLUTION INTRAMUSCULAR; INTRAVENOUS at 09:34

## 2020-04-20 RX ADMIN — Medication 5 ML: at 08:36

## 2020-04-20 RX ADMIN — Medication 5 ML: at 12:04

## 2020-04-20 ASSESSMENT — PAIN SCALES - GENERAL: PAINLEVEL: NO PAIN (0)

## 2020-04-20 NOTE — NURSING NOTE
Chief Complaint   Patient presents with     Port Draw     labs drawn via port by RN in lab     /70 (BP Location: Left arm, Patient Position: Chair, Cuff Size: Adult Regular)   Pulse 82   Temp 97.7  F (36.5  C) (Oral)   Resp 18   SpO2 100%     Port accessed by RN in lab. Labs collected and sent. Pt tolerated well. Line flushed with Normal Saline & Heparin.   Pt checked in for next appointment.    Germania Carranza RN

## 2020-04-20 NOTE — PROGRESS NOTES
Infusion Nursing Note:  Daniel Sandhu presents today for Cycle 1 Day 1 of Oxaliplatin.    Patient seen by provider today: No   present during visit today: Not Applicable.    Note: Patient reports feeling well today. Denies any new issues or concerns. Does have occasional nose bleeds still, but improving and managing okay at home. Has received both Xeloda and Oxaliplatin in 2017. No new questions about regimen today. Is receiving Xeloda today from [employer].      Patient reports reading a study from 1 year ago, about patient's who chewed on ice during Oxaliplatin to reduce cold sensitivity. RN informed patient of risks and how that is usually the opposite of what we educate patient's on. He would still like to try chewing on ice today during infusion. RN again educated him of precautions and risks with cold sensitivity.       Intravenous Access:  Implanted Port.    Treatment Conditions:  Lab Results   Component Value Date    HGB 12.8 04/20/2020     Lab Results   Component Value Date    WBC 4.3 04/20/2020      Lab Results   Component Value Date    ANEU 2.3 04/20/2020     Lab Results   Component Value Date     04/20/2020      Lab Results   Component Value Date     04/20/2020                   Lab Results   Component Value Date    POTASSIUM 4.2 04/20/2020           Lab Results   Component Value Date    MAG 2.2 03/07/2018            Lab Results   Component Value Date    CR 0.79 04/20/2020                   Lab Results   Component Value Date    YOBANY 8.8 04/20/2020                Lab Results   Component Value Date    BILITOTAL 0.2 04/20/2020           Lab Results   Component Value Date    ALBUMIN 3.6 04/20/2020                    Lab Results   Component Value Date    ALT 32 04/20/2020           Lab Results   Component Value Date    AST 23 04/20/2020       Results reviewed, labs MET treatment parameters, ok to proceed with treatment.      Post Infusion Assessment:  Patient tolerated infusion without  incident.  Blood return noted pre and post infusion.  Site patent and intact, free from redness, edema or discomfort.  No evidence of extravasations.  Access discontinued per protocol.       Discharge Plan:   Prescription refills given for Ativan, Compazine, Imodium, and Zofran.  Discharge instructions reviewed with: Patient.  Patient and/or family verbalized understanding of discharge instructions and all questions answered.  AVS to patient via [employer].  Patient will return 5/11/20 for next appointment.   Patient discharged in stable condition accompanied by: self.  Departure Mode: Ambulatory.    Neva Lewis RN

## 2020-04-21 DIAGNOSIS — C15.5 MALIGNANT NEOPLASM OF LOWER THIRD OF ESOPHAGUS (H): ICD-10-CM

## 2020-04-21 RX ORDER — CAPECITABINE 500 MG/1
1000 TABLET, FILM COATED ORAL 2 TIMES DAILY
Qty: 112 TABLET | Refills: 0 | Status: SHIPPED | OUTPATIENT
Start: 2020-04-21 | End: 2020-05-08

## 2020-04-21 NOTE — ORAL ONC MGMT
4/21/2020    Xeloda Rx escribed on 4/14 local printed. Patient notified RN[facility] this am that patient still did not have Rx. I re-ordered Rx and it e-scribed successful. I also called in Rx to [facility] and gave verbal order to pharmacist. I told pharmacist that patient was suppose to have this Rx yesterday and he stated he would start working on it ASAP.    Neva Kingsley, Pharm.D., [facility]  [phone]  04/21/20

## 2020-04-28 ENCOUNTER — MYC MEDICAL ADVICE (OUTPATIENT)
Dept: ONCOLOGY | Facility: CLINIC | Age: 40
End: 2020-04-28

## 2020-04-29 ENCOUNTER — TELEPHONE (OUTPATIENT)
Dept: ONCOLOGY | Facility: CLINIC | Age: 40
End: 2020-04-29

## 2020-04-29 NOTE — TELEPHONE ENCOUNTER
Oral Chemotherapy Monitoring Program    Primary Oncologist: Dr. Ponce  Primary Oncology Clinic: [facility]  Cancer Diagnosis: Gastroesophageal Cancer     Drug: Xeloda (capecitabine) 2000mg ([id number] tablets) BID, days 1-14 of 21-day cycle  Start Date: 4/22/2020  Dose is appropriate for patients: BSA, Renal Function and Hepatic Function            Expected duration of therapy: Until disease progression or unacceptable toxicity     Drug Interaction Assessment: No drug interactions identified upon review of medication list.     Lab Monitoring Plan  CBC & CMP q3 weeks with infusions    Subjective/Objective:  Daniel Sandhu is a 39 year old male contacted by phone for a follow-up visit for oral chemotherapy.  Daniel reports that he is feeling well today and not having the cramping he has been experiencing the last few days. He started taking Xeloda on 4/22 and missed one dose Sunday due to waking up late and not wanting to take the dose too late in the day. Daniel noted that cramping and nausea started after the oxaliplatin infusion and Xeloda has not made him nauseous so far. His last episode of nausea was on 4/25 or 4/26. He is wondering if he can receive an anti-emetic infusion with his next oxaliplatin infusion as he received Aloxi and Emend in the past and that helped him. Daniel was wondering about Buscopan (scopolamine) use as he read that may help with cramping and improve his quality of life. A drug interaction check showed no interactions with his chemotherapy and only a level C (monitor) interaction with prochlorperazine. Daniel was going to check with his local [facility] to see if they can order in scopolamine for him. He feels that loperamide is not needed at this time as his stools have not been watery, and when he has a bowel movement it provides relief from the cramping so he does not want to use loperamide to prevent bowel movements. I also recommended he try a heating pad to see if that provides relief  "from cramping as well.    ORAL CHEMOTHERAPY 4/14/2020 4/29/2020   Drug Name Xeloda (Capecitabine) Xeloda (Capecitabine)   Current Dosage 2000mg 2000mg   Current Schedule BID BID   Cycle Details 2 weeks on 1 week off 2 weeks on 1 week off   Start Date of Last Cycle - 4/22/2020   Planned next cycle start date 4/20/2020 5/7/2020   Doses missed in last 2 weeks - 1   Adherence Assessment - Adherent   Adverse Effects - Diarrhea;Nausea   Pharmacist Intervention(nausea) - Yes   Intervention(s) - Patient education   Diarrhea - Grade 1   Pharmacist Intervention(diarrhea) - Yes   Intervention(s) - Patient education   Any new drug interactions? No No   Is the dose as ordered appropriate for the patient? Yes -     Vitals:  BP:   BP Readings from Last 1 Encounters:   04/20/20 114/70     Wt Readings from Last 1 Encounters:   04/20/20 66.5 kg (146 lb 11.2 oz)     Estimated body surface area is 1.78 meters squared as calculated from the following:    Height as of 1/16/20: 1.71 m (5' 7.32\").    Weight as of 4/20/20: 66.5 kg (146 lb 11.2 oz).    Labs:  _  Result Component Current Result Ref Range   Sodium 140 (4/20/2020) 133 - 144 mmol/L     _  Result Component Current Result Ref Range   Potassium 4.2 (4/20/2020) 3.4 - 5.3 mmol/L     _  Result Component Current Result Ref Range   Calcium 8.8 (4/20/2020) 8.5 - 10.1 mg/dL     No results found for Mag within last 30 days.     No results found for Phos within last 30 days.     _  Result Component Current Result Ref Range   Albumin 3.6 (4/20/2020) 3.4 - 5.0 g/dL     _  Result Component Current Result Ref Range   Urea Nitrogen 19 (4/20/2020) 7 - 30 mg/dL     _  Result Component Current Result Ref Range   Creatinine 0.79 (4/20/2020) 0.66 - 1.25 mg/dL       _  Result Component Current Result Ref Range   AST 23 (4/20/2020) 0 - 45 U/L     _  Result Component Current Result Ref Range   ALT 32 (4/20/2020) 0 - 70 U/L     _  Result Component Current Result Ref Range   Bilirubin Total 0.2 " (4/20/2020) 0.2 - 1.3 mg/dL       _  Result Component Current Result Ref Range   WBC 4.3 (4/20/2020) 4.0 - 11.0 10e9/L     _  Result Component Current Result Ref Range   Hemoglobin 12.8 (L) (4/20/2020) 13.3 - 17.7 g/dL     _  Result Component Current Result Ref Range   Platelet Count 114 (L) (4/20/2020) 150 - 450 10e9/L     _  Result Component Current Result Ref Range   Absolute Neutrophil 2.3 (4/20/2020) 1.6 - 8.3 10e9/L       Assessment:  Daniel is tolerating Xeloda at this time with some diarrhea/cramping and nausea that is attributed to the oxaliplatin. He has experienced these side effects in the past and has a good understanding of how to manage the symptoms.    Plan:  Continue Xeloda  Try using heat for cramping relief.  Messaged RN Care Coordinator regarding adding an anti-emetic with infusion.    Follow-Up:  Video appointment with Violeta Coto 5/8 and next infusion 5/11.    Mario Brooks  Pharmacy Intern  Oral Chemotherapy Monitoring Program  [phone]

## 2020-05-05 DIAGNOSIS — C15.5 MALIGNANT NEOPLASM OF LOWER THIRD OF ESOPHAGUS (H): Primary | ICD-10-CM

## 2020-05-05 RX ORDER — ALBUTEROL SULFATE 0.83 MG/ML
2.5 SOLUTION RESPIRATORY (INHALATION)
Status: CANCELLED | OUTPATIENT
Start: 2020-05-06

## 2020-05-05 RX ORDER — DIPHENHYDRAMINE HYDROCHLORIDE 50 MG/ML
50 INJECTION INTRAMUSCULAR; INTRAVENOUS
Status: CANCELLED
Start: 2020-05-06

## 2020-05-05 RX ORDER — MEPERIDINE HYDROCHLORIDE 25 MG/ML
25 INJECTION INTRAMUSCULAR; INTRAVENOUS; SUBCUTANEOUS EVERY 30 MIN PRN
Status: CANCELLED | OUTPATIENT
Start: 2020-05-06

## 2020-05-05 RX ORDER — EPINEPHRINE 0.3 MG/.3ML
0.3 INJECTION SUBCUTANEOUS EVERY 5 MIN PRN
Status: CANCELLED | OUTPATIENT
Start: 2020-05-06

## 2020-05-05 RX ORDER — CAPECITABINE 500 MG/1
1000 TABLET, FILM COATED ORAL 2 TIMES DAILY
Qty: 112 TABLET | Refills: 0 | Status: SHIPPED | OUTPATIENT
Start: 2020-05-05 | End: 2020-05-05

## 2020-05-05 RX ORDER — LORAZEPAM 2 MG/ML
0.5 INJECTION INTRAMUSCULAR EVERY 4 HOURS PRN
Status: CANCELLED
Start: 2020-05-06

## 2020-05-05 RX ORDER — HEPARIN SODIUM,PORCINE 10 UNIT/ML
5 VIAL (ML) INTRAVENOUS
Status: CANCELLED | OUTPATIENT
Start: 2020-05-06

## 2020-05-05 RX ORDER — EPINEPHRINE 1 MG/ML
0.3 INJECTION, SOLUTION INTRAMUSCULAR; SUBCUTANEOUS EVERY 5 MIN PRN
Status: CANCELLED | OUTPATIENT
Start: 2020-05-06

## 2020-05-05 RX ORDER — SODIUM CHLORIDE 9 MG/ML
1000 INJECTION, SOLUTION INTRAVENOUS CONTINUOUS PRN
Status: CANCELLED
Start: 2020-05-06

## 2020-05-05 RX ORDER — CAPECITABINE 500 MG/1
1000 TABLET, FILM COATED ORAL 2 TIMES DAILY
Qty: 112 TABLET | Refills: 0 | Status: SHIPPED | OUTPATIENT
Start: 2020-05-05 | End: 2020-07-02

## 2020-05-05 RX ORDER — METHYLPREDNISOLONE SODIUM SUCCINATE 125 MG/2ML
125 INJECTION, POWDER, LYOPHILIZED, FOR SOLUTION INTRAMUSCULAR; INTRAVENOUS
Status: CANCELLED
Start: 2020-05-06

## 2020-05-05 RX ORDER — ALBUTEROL SULFATE 90 UG/1
1-2 AEROSOL, METERED RESPIRATORY (INHALATION)
Status: CANCELLED
Start: 2020-05-06

## 2020-05-05 RX ORDER — NALOXONE HYDROCHLORIDE 0.4 MG/ML
.1-.4 INJECTION, SOLUTION INTRAMUSCULAR; INTRAVENOUS; SUBCUTANEOUS
Status: CANCELLED | OUTPATIENT
Start: 2020-05-06

## 2020-05-05 RX ORDER — HEPARIN SODIUM (PORCINE) LOCK FLUSH IV SOLN 100 UNIT/ML 100 UNIT/ML
5 SOLUTION INTRAVENOUS
Status: CANCELLED | OUTPATIENT
Start: 2020-05-06

## 2020-05-08 ENCOUNTER — VIRTUAL VISIT (OUTPATIENT)
Dept: ONCOLOGY | Facility: CLINIC | Age: 40
End: 2020-05-08
Attending: PHYSICIAN ASSISTANT
Payer: COMMERCIAL

## 2020-05-08 DIAGNOSIS — R19.7 DIARRHEA, UNSPECIFIED TYPE: ICD-10-CM

## 2020-05-08 DIAGNOSIS — C15.5 MALIGNANT NEOPLASM OF LOWER THIRD OF ESOPHAGUS (H): Primary | ICD-10-CM

## 2020-05-08 PROCEDURE — 40000114 ZZH STATISTIC NO CHARGE CLINIC VISIT

## 2020-05-08 PROCEDURE — 99214 OFFICE O/P EST MOD 30 MIN: CPT | Mod: GT | Performed by: PHYSICIAN ASSISTANT

## 2020-05-08 RX ORDER — DIPHENOXYLATE HCL/ATROPINE 2.5-.025MG
1-2 TABLET ORAL 4 TIMES DAILY PRN
Qty: 30 TABLET | Refills: 3 | Status: CANCELLED | OUTPATIENT
Start: 2020-05-08

## 2020-05-08 RX ORDER — DEXAMETHASONE 4 MG/1
4 TABLET ORAL
Qty: 3 TABLET | Refills: 3 | Status: SHIPPED | OUTPATIENT
Start: 2020-05-08 | End: 2020-07-24

## 2020-05-08 RX ORDER — DIPHENOXYLATE HCL/ATROPINE 2.5-.025MG
1-2 TABLET ORAL 4 TIMES DAILY PRN
Qty: 30 TABLET | Refills: 3 | Status: SHIPPED | OUTPATIENT
Start: 2020-05-08

## 2020-05-08 NOTE — PROGRESS NOTES
"Daniel Sandhu is a 39 year old male who is being evaluated via a billable video visit.      The patient has been notified of following:     \"This video visit will be conducted via a call between you and your physician/provider. We have found that certain health care needs can be provided without the need for an in-person physical exam.  This service lets us provide the care you need with a video conversation.  If a prescription is necessary we can send it directly to your pharmacy.  If lab work is needed we can place an order for that and you can then stop by our lab to have the test done at a later time.    Video visits are billed at different rates depending on your insurance coverage.  Please reach out to your insurance provider with any questions.    If during the course of the call the physician/provider feels a video visit is not appropriate, you will not be charged for this service.\"    Patient has given verbal consent for Video visit? Yes    How would you like to obtain your AVS? MyChart    Patient would like the video invitation sent by: Send to e-mail at: [email or web address]    Will anyone else be joining your video visit? No     Secondary Video Option (Doximity), send text message to:  [phone]    I have reviewed and updated the patient's allergies and medication list.    Concerns:  No other significant past medical history or family history.      Refills: Tab Hughes, EMT      [facility]    Hematology/Oncology Established Patient Note    Today's Date: May 8, 2020    Reason for Follow-up: adenocarcinoma of the GE junction    HISTORY OF PRESENT ILLNESS: Daniel Sandhu is a 39 year old male who presents with adenocarcinoma of the GE junction.  In early 2017, patient started noticing difficulty swallowing, and that food seems to take longer to go down.  It became more frequent, and he saw his PCP, and was referred for EGD, which showed an esophageal mass located at " the GE junction, measuring 4 cm.  Biopsy showed poorly differentiated adenocarcinoma.  HER-2 was sent and is equivocal (2+).  CT c/a/p showed a mildly prominent lymph node in the gastrohepatic ligament, but there were no pathologically enlarged lymph nodes in the chest, abdomen, or pelvis.  There was otherwise no evidence of metastatic disease.  PET-CT showed thickening of the distal esophagus consistent with known esophageal adenocarcinoma, as well as mildly hypermetabolic 1 cm lymph node in the gastrohepatic ligament.  There was no evidence of distant metastatic disease.  He underwent EUS on 6/9/17, which showed the esophageal tumor in the lower third of the esophagus, staged T2NX.  There were 2 abnormal lymph nodes seen in the gastrohepatic ligament; pathology was suspicious for adenocarcinoma.  Celiac node was sampled as well, which was benign.  He was seen by surgery, Dr. Esteban, and recommended srinivas-operative chemotherapy.    Port was placed on 6/22/17.  It was removed on 3/19/18.    He underwent chemotherapy with epirubicin, oxaliplatin, and capecitabine x 3 cycles 6/26/17-8/7/17.      On 11/3/17, he underwent laparotomy with total gastrectomy and abdominal lymphadenectomy, left thoracotomy with distal esophagectomy and intrathoracic Terrell-en-Y esophagojejunostomy, feeding jejunostomy, left pharyngostomy tube placement, right tube thoracostomy, and flexible bronchoscopy.  On 11/9/17, he was taken back to OR for I&D of pharyngostomy tube abscess.  Pathology showed adenocarcinoma, moderate differentiated, extensive residual tumor with no evidence tumor regression, margins negative, perineural invasion present, 1 of 28 lymph nodes were involved with malignancy, stage [id number] (stage IIIA).  HER-2 is non-amplified.    He resumed chemotherapy post-surgery, with plans to complete 3 more cycles, with 5-FU, epirubicin, oxaliplatin.  However, he only completed 2 more cycles, due to poor tolerance.  He was admitted to  the hospital 1/9/18-1/13/18 for nausea, diarrhea, failure to thrive, severe malnutrition, generalized weakness.  His infusional chemotherapy was stopped on 1/8/18.  He underwent EGD in the hospital on 1/11/18, which showed ulcers, and no evidence of recurrence of his cancer.  One area biopsied showed inflammation, no evidence of malignancy.    He saw Dr. Esteban on 3/6/19 and had CT abdomen/pelvis done, which showed a 3.1 cm lesion in hepatic segment 3.  He underwent biopsy on 3/20/19 by IR, which showed moderately differentiated adenocarcinoma consistent with metastasis from primary esophageal carcinoma.  HER-2 is non-amplified.  Restaging PET scan on 3/29/19 showed the 4.4 cm left lobe liver metastasis.  There is nodular foci of hypermetabolic uptake in the left mid abdomen in relation to the small bowel loops, without definite underlying lesion on CT.  This is favored to represent metastatic disease unless proven otherwise.    He started on chemotherapy with paclitaxel and ramucirumab on 4/10/19. Imaging initially showed improvement in disease and has been generally stable since that time.     INTERIM HISTORY:   Patient reports that the first 4 to 5 days following chemotherapy were tough with fatigue, anorexia, nausea, diarrhea, and pain in his jaw with initially chewing.  He reports all of the symptoms have now improved.  He finds Compazine more helpful than Zofran for nausea.  He did not find significant improvement in diarrhea with Imodium.  He has tried to force himself to eat but is concerned that he lost some weight with this cycle.  He has picked up the Gas-X but since his symptoms improved has not yet needed to try it.  He also notes that he again developed a sore on the tip of his penis as he did before when he took Xeloda.  He stopped the Xeloda 1 day early due to this.  He denies any mouth sores.  He reports that his feet are sensitive on the balls and heels on the outside edges as well as his thumbs.   He denies any peeling, dryness, or cracking on his hands or feet pill.  He has been using udder balm once at night.  He denies other concerns.    HOME MEDICATIONS:  Current Outpatient Medications   Medication Sig Dispense Refill     capecitabine (XELODA) 500 MG tablet Take 4 tablets (2,000 mg) by mouth 2 times daily Days 1 through 14, then off for 7 days. 112 tablet 0     capecitabine (XELODA) 500 MG tablet Take 4 tablets (2,000 mg) by mouth 2 times daily Days 1 through 14, then off for 7 days. Take with water within 30 min after meal. 112 tablet 0     cyabnocobalamin (VITAMIN B-12) 2500 MCG sublingual tablet Place 2,500 mcg under the tongue daily 30 tablet 1     ferrous gluconate (FERGON) 324 (38 Fe) MG tablet Take 324 mg by mouth daily (with breakfast)       loperamide (IMODIUM) 2 MG capsule Start with 2 caps (4 mg), then take one cap (2 mg) after each diarrheal stool as needed. Do not use more than 8 caps (16 mg) per day. 30 capsule 0     LORazepam (ATIVAN) 0.5 MG tablet Take 1 tablet (0.5 mg) by mouth every 4 hours as needed (Anxiety, Nausea/Vomiting or Sleep) 30 tablet 2     LORazepam (ATIVAN) 0.5 MG tablet Take 1 tablet (0.5 mg) by mouth every 4 hours as needed (Anxiety, Nausea/Vomiting or Sleep) 30 tablet 2     Misc Natural Product Nasal (PONARIS) SOLN Apply two drops to each nostril BID X 2 month supply 10 mL 3     multivitamin, therapeutic with minerals (MULTI-VITAMIN) TABS tablet Take 1 tablet by mouth daily Generic [place] Vitamin       mupirocin (BACTROBAN) 2 % external ointment Apply to anterior nares BID X 2 month supply 2 g 3     ondansetron (ZOFRAN) 8 MG tablet Take 1 tablet (8 mg) by mouth every 8 hours as needed (Nausea/Vomiting) 10 tablet 5     prochlorperazine (COMPAZINE) 10 MG tablet Take 1 tablet (10 mg) by mouth every 6 hours as needed (Nausea/Vomiting) 30 tablet 2     saccharomyces boulardii (FLORASTOR) 250 MG capsule Take 250 mg by mouth 2 times daily       capecitabine (XELODA) 500 MG  "tablet CHEMO Take 2 tablets (1,000 mg) by mouth 2 times daily for 21 days 84 tablet 0     capecitabine (XELODA) 500 MG tablet CHEMO Take 2 tablets (1,000 mg) by mouth 2 times daily for 21 days 84 tablet 0     Video physical exam  General: Patient appears well in no acute distress. Normal body habitus.  Skin: No visualized rash or lesions on visualized skin  Eyes: EOMI, no erythema, sclera icterus or discharge noted  Resp: Appears to be breathing comfortably without accessory muscle usage, speaking in full sentences, no cough  MSK: Appears to have normal range of motion based on visualized movements  Neurologic: No apparent tremors, facial movements symmetric  Psych: affect normal, alert and oriented  The rest of a comprehensive physical examination is deferred due to PHE (public health emergency) video restrictions\"    LABS:  No new labs today. Will review labs on 5/11/20.    ASSESSMENT/PLAN:      Adenocarcinoma of the GE junction: now s/p 3 cycles of neoadjuvant chemotherapy with EOX, followed by surgical resection on 11/3/17.  Pathology showed adenocarcinoma, moderate differentiated, extensive residual tumor with no evidence tumor regression, margins negative, perineural invasion present, 1 of 28 lymph nodes were involved with malignancy, stage [id number] (stage IIIA).  HER-2 is non-amplified.    He has received EOF x 2 cycles after surgery, and he has not tolerated well.  The 5-FU on cycle 5 was discontinued early. Chemotherapy was stopped at that point due to poor tolerance.    Patient then developed biopsy-confirmed metastatic disease to the liver and possibly peritoneum.       Foundation One testing shows MS-stable, TMB-low (4 mut/Mb), ERBB2 amplification equivocal, and TP53 H214R alteration.  PD-L1 was negative (0%).      He completed 12 cycles of paclitaxel+ramucirumab with subsequent disease progression. He then started on Xeloda and oxaliplatin on 4/20/20. He tolerated it fairly well with fatigue, anorexia, " nausea, diarrhea, and a sore on the tip of his penis. He is recovering well from cycle 1 and will continue with cycle 2 on 5/11/20. He will see Dr. Ponce in 3 weeks with a CT CAP. He will call sooner for concerns.     Nausea. Will add in IV Emend and po dex 4 mg qam days 2-4. The dex may also help with his fatigue. He will continue to use Compazine and Zofran at home prn.    Diarrhea. Not well managed with Imodium. Will instead try Lomotil with this cycle. He will also try Gas-X for abdominal cramping.     Penile sore. Likely due to Xeloda. Will try applying Aquaphor regularly followed by a sock to avoid rubbing on the tip.        Video-Visit Details    Type of service:  Video Visit    Video Start Time: 11:55am  Video End Time: 12:21pm    Originating Location (pt. Location): Home    Distant Location (provider location):  [facility]     Platform used for Video Visit: Mazin Coto PA-C  [facility]  [address] [place] [id number]  [phone]

## 2020-05-11 ENCOUNTER — INFUSION THERAPY VISIT (OUTPATIENT)
Dept: ONCOLOGY | Facility: CLINIC | Age: 40
End: 2020-05-11
Attending: INTERNAL MEDICINE
Payer: COMMERCIAL

## 2020-05-11 ENCOUNTER — APPOINTMENT (OUTPATIENT)
Dept: LAB | Facility: CLINIC | Age: 40
End: 2020-05-11
Attending: INTERNAL MEDICINE
Payer: COMMERCIAL

## 2020-05-11 VITALS
SYSTOLIC BLOOD PRESSURE: 119 MMHG | WEIGHT: 142 LBS | OXYGEN SATURATION: 100 % | RESPIRATION RATE: 18 BRPM | TEMPERATURE: 98.3 F | BODY MASS INDEX: 22.03 KG/M2 | DIASTOLIC BLOOD PRESSURE: 69 MMHG | HEART RATE: 102 BPM

## 2020-05-11 DIAGNOSIS — C15.5 MALIGNANT NEOPLASM OF LOWER THIRD OF ESOPHAGUS (H): Primary | ICD-10-CM

## 2020-05-11 LAB
ALBUMIN SERPL-MCNC: 3.6 G/DL (ref 3.4–5)
ALP SERPL-CCNC: 49 U/L (ref 40–150)
ALT SERPL W P-5'-P-CCNC: 24 U/L (ref 0–70)
ANION GAP SERPL CALCULATED.3IONS-SCNC: 5 MMOL/L (ref 3–14)
AST SERPL W P-5'-P-CCNC: 23 U/L (ref 0–45)
BASOPHILS # BLD AUTO: 0 10E9/L (ref 0–0.2)
BASOPHILS NFR BLD AUTO: 0.5 %
BILIRUB SERPL-MCNC: 0.5 MG/DL (ref 0.2–1.3)
BUN SERPL-MCNC: 18 MG/DL (ref 7–30)
CALCIUM SERPL-MCNC: 8.7 MG/DL (ref 8.5–10.1)
CHLORIDE SERPL-SCNC: 107 MMOL/L (ref 94–109)
CO2 SERPL-SCNC: 28 MMOL/L (ref 20–32)
CREAT SERPL-MCNC: 0.72 MG/DL (ref 0.66–1.25)
DIFFERENTIAL METHOD BLD: ABNORMAL
EOSINOPHIL # BLD AUTO: 0.1 10E9/L (ref 0–0.7)
EOSINOPHIL NFR BLD AUTO: 1.7 %
ERYTHROCYTE [DISTWIDTH] IN BLOOD BY AUTOMATED COUNT: 13.9 % (ref 10–15)
GFR SERPL CREATININE-BSD FRML MDRD: >90 ML/MIN/{1.73_M2}
GLUCOSE SERPL-MCNC: 98 MG/DL (ref 70–99)
HCT VFR BLD AUTO: 34.9 % (ref 40–53)
HGB BLD-MCNC: 11.5 G/DL (ref 13.3–17.7)
IMM GRANULOCYTES # BLD: 0 10E9/L (ref 0–0.4)
IMM GRANULOCYTES NFR BLD: 0.3 %
LYMPHOCYTES # BLD AUTO: 1.1 10E9/L (ref 0.8–5.3)
LYMPHOCYTES NFR BLD AUTO: 16.4 %
MCH RBC QN AUTO: 31.1 PG (ref 26.5–33)
MCHC RBC AUTO-ENTMCNC: 33 G/DL (ref 31.5–36.5)
MCV RBC AUTO: 94 FL (ref 78–100)
MONOCYTES # BLD AUTO: 0.7 10E9/L (ref 0–1.3)
MONOCYTES NFR BLD AUTO: 11.2 %
NEUTROPHILS # BLD AUTO: 4.6 10E9/L (ref 1.6–8.3)
NEUTROPHILS NFR BLD AUTO: 69.9 %
NRBC # BLD AUTO: 0 10*3/UL
NRBC BLD AUTO-RTO: 0 /100
PLATELET # BLD AUTO: 147 10E9/L (ref 150–450)
POTASSIUM SERPL-SCNC: 4.3 MMOL/L (ref 3.4–5.3)
PROT SERPL-MCNC: 6.7 G/DL (ref 6.8–8.8)
RBC # BLD AUTO: 3.7 10E12/L (ref 4.4–5.9)
SODIUM SERPL-SCNC: 139 MMOL/L (ref 133–144)
WBC # BLD AUTO: 6.5 10E9/L (ref 4–11)

## 2020-05-11 PROCEDURE — 85025 COMPLETE CBC W/AUTO DIFF WBC: CPT | Performed by: INTERNAL MEDICINE

## 2020-05-11 PROCEDURE — 96367 TX/PROPH/DG ADDL SEQ IV INF: CPT

## 2020-05-11 PROCEDURE — 80053 COMPREHEN METABOLIC PANEL: CPT | Performed by: INTERNAL MEDICINE

## 2020-05-11 PROCEDURE — 25000128 H RX IP 250 OP 636: Mod: ZF | Performed by: INTERNAL MEDICINE

## 2020-05-11 PROCEDURE — 96413 CHEMO IV INFUSION 1 HR: CPT

## 2020-05-11 PROCEDURE — 25800030 ZZH RX IP 258 OP 636: Mod: ZF | Performed by: INTERNAL MEDICINE

## 2020-05-11 PROCEDURE — 96415 CHEMO IV INFUSION ADDL HR: CPT

## 2020-05-11 RX ORDER — HEPARIN SODIUM (PORCINE) LOCK FLUSH IV SOLN 100 UNIT/ML 100 UNIT/ML
5 SOLUTION INTRAVENOUS
Status: DISCONTINUED | OUTPATIENT
Start: 2020-05-11 | End: 2020-05-11 | Stop reason: HOSPADM

## 2020-05-11 RX ORDER — HEPARIN SODIUM (PORCINE) LOCK FLUSH IV SOLN 100 UNIT/ML 100 UNIT/ML
500 SOLUTION INTRAVENOUS ONCE
Status: COMPLETED | OUTPATIENT
Start: 2020-05-11 | End: 2020-05-11

## 2020-05-11 RX ADMIN — SODIUM CHLORIDE 150 MG: 9 INJECTION, SOLUTION INTRAVENOUS at 09:17

## 2020-05-11 RX ADMIN — OXALIPLATIN 250 MG: 5 INJECTION, SOLUTION INTRAVENOUS at 09:47

## 2020-05-11 RX ADMIN — DEXAMETHASONE SODIUM PHOSPHATE: 10 INJECTION, SOLUTION INTRAMUSCULAR; INTRAVENOUS at 08:49

## 2020-05-11 RX ADMIN — Medication 500 UNITS: at 08:03

## 2020-05-11 RX ADMIN — Medication 5 ML: at 11:57

## 2020-05-11 RX ADMIN — DEXTROSE MONOHYDRATE 250 ML: 50 INJECTION, SOLUTION INTRAVENOUS at 08:49

## 2020-05-11 ASSESSMENT — PAIN SCALES - GENERAL: PAINLEVEL: NO PAIN (0)

## 2020-05-11 NOTE — NURSING NOTE
Chief Complaint   Patient presents with     Port Draw     Port accessed for labs, heparin locked. Vitals checked. Pt checked in for infusion.     Tricia Hou RN.

## 2020-05-11 NOTE — PROGRESS NOTES
Infusion Nursing Note:  Daniel Sandhu presents today for Cycle 2 Day 1 Oxaplatin.    Patient seen by provider today: Yes: MAHTEUS Card telephone visit on 5/8   present during visit today: Not Applicable.    Note: Patient reports he is feeling OK, he denies any changes since his visit with MATHEUS Card on Friday.    Intravenous Access:  Implanted Port.    Treatment Conditions:  Lab Results   Component Value Date    HGB 11.5 05/11/2020     Lab Results   Component Value Date    WBC 6.5 05/11/2020      Lab Results   Component Value Date    ANEU 4.6 05/11/2020     Lab Results   Component Value Date     05/11/2020      Lab Results   Component Value Date     05/11/2020                   Lab Results   Component Value Date    POTASSIUM 4.3 05/11/2020            Lab Results   Component Value Date    CR 0.72 05/11/2020                   Lab Results   Component Value Date    YOBANY 8.7 05/11/2020                Lab Results   Component Value Date    BILITOTAL 0.5 05/11/2020           Lab Results   Component Value Date    ALBUMIN 3.6 05/11/2020                    Lab Results   Component Value Date    ALT 24 05/11/2020           Lab Results   Component Value Date    AST 23 05/11/2020       Results reviewed, labs MET treatment parameters, ok to proceed with treatment.      Post Infusion Assessment:  Patient tolerated infusion without incident.  Blood return noted pre and post infusion.  Site patent and intact, free from redness, edema or discomfort.  No evidence of extravasations.  Access discontinued per protocol.       Discharge Plan:   Prescription refills given for decadron, lomotil.  Patient declined prescription refills.  Discharge instructions reviewed with: Patient.  AVS to patient via [employer].  Patient will return 6/1/2020 for next MD visit and infusion appointment.   Patient discharged in stable condition accompanied by: self.  Departure Mode: Ambulatory.    Theresa Marcus  RN

## 2020-05-25 DIAGNOSIS — C15.5 MALIGNANT NEOPLASM OF LOWER THIRD OF ESOPHAGUS (H): Primary | ICD-10-CM

## 2020-05-25 RX ORDER — ALBUTEROL SULFATE 90 UG/1
1-2 AEROSOL, METERED RESPIRATORY (INHALATION)
Status: CANCELLED
Start: 2020-05-27

## 2020-05-25 RX ORDER — SODIUM CHLORIDE 9 MG/ML
1000 INJECTION, SOLUTION INTRAVENOUS CONTINUOUS PRN
Status: CANCELLED
Start: 2020-05-27

## 2020-05-25 RX ORDER — HEPARIN SODIUM (PORCINE) LOCK FLUSH IV SOLN 100 UNIT/ML 100 UNIT/ML
5 SOLUTION INTRAVENOUS
Status: CANCELLED | OUTPATIENT
Start: 2020-05-27

## 2020-05-25 RX ORDER — MEPERIDINE HYDROCHLORIDE 25 MG/ML
25 INJECTION INTRAMUSCULAR; INTRAVENOUS; SUBCUTANEOUS EVERY 30 MIN PRN
Status: CANCELLED | OUTPATIENT
Start: 2020-05-27

## 2020-05-25 RX ORDER — EPINEPHRINE 0.3 MG/.3ML
0.3 INJECTION SUBCUTANEOUS EVERY 5 MIN PRN
Status: CANCELLED | OUTPATIENT
Start: 2020-05-27

## 2020-05-25 RX ORDER — DIPHENHYDRAMINE HYDROCHLORIDE 50 MG/ML
50 INJECTION INTRAMUSCULAR; INTRAVENOUS
Status: CANCELLED
Start: 2020-05-27

## 2020-05-25 RX ORDER — EPINEPHRINE 1 MG/ML
0.3 INJECTION, SOLUTION, CONCENTRATE INTRAVENOUS EVERY 5 MIN PRN
Status: CANCELLED | OUTPATIENT
Start: 2020-05-27

## 2020-05-25 RX ORDER — NALOXONE HYDROCHLORIDE 0.4 MG/ML
.1-.4 INJECTION, SOLUTION INTRAMUSCULAR; INTRAVENOUS; SUBCUTANEOUS
Status: CANCELLED | OUTPATIENT
Start: 2020-05-27

## 2020-05-25 RX ORDER — LORAZEPAM 2 MG/ML
0.5 INJECTION INTRAMUSCULAR EVERY 4 HOURS PRN
Status: CANCELLED
Start: 2020-05-27

## 2020-05-25 RX ORDER — ALBUTEROL SULFATE 0.83 MG/ML
2.5 SOLUTION RESPIRATORY (INHALATION)
Status: CANCELLED | OUTPATIENT
Start: 2020-05-27

## 2020-05-25 RX ORDER — HEPARIN SODIUM,PORCINE 10 UNIT/ML
5 VIAL (ML) INTRAVENOUS
Status: CANCELLED | OUTPATIENT
Start: 2020-05-27

## 2020-05-26 DIAGNOSIS — C15.5 MALIGNANT NEOPLASM OF LOWER THIRD OF ESOPHAGUS (H): Primary | ICD-10-CM

## 2020-05-26 RX ORDER — CAPECITABINE 500 MG/1
1000 TABLET, FILM COATED ORAL 2 TIMES DAILY
Qty: 112 TABLET | Refills: 0 | Status: SHIPPED | OUTPATIENT
Start: 2020-05-26 | End: 2020-07-02

## 2020-05-26 RX ORDER — CAPECITABINE 500 MG/1
1000 TABLET, FILM COATED ORAL 2 TIMES DAILY
Qty: 112 TABLET | Refills: 0 | Status: SHIPPED | OUTPATIENT
Start: 2020-05-26 | End: 2020-05-26

## 2020-05-29 ENCOUNTER — ANCILLARY PROCEDURE (OUTPATIENT)
Dept: CT IMAGING | Facility: CLINIC | Age: 40
End: 2020-05-29
Attending: INTERNAL MEDICINE
Payer: COMMERCIAL

## 2020-05-29 DIAGNOSIS — C15.5 MALIGNANT NEOPLASM OF LOWER THIRD OF ESOPHAGUS (H): ICD-10-CM

## 2020-05-29 LAB
ALBUMIN SERPL-MCNC: 3.7 G/DL (ref 3.4–5)
ALP SERPL-CCNC: 49 U/L (ref 40–150)
ALT SERPL W P-5'-P-CCNC: 43 U/L (ref 0–70)
ANION GAP SERPL CALCULATED.3IONS-SCNC: 4 MMOL/L (ref 3–14)
AST SERPL W P-5'-P-CCNC: 35 U/L (ref 0–45)
BASOPHILS # BLD AUTO: 0 10E9/L (ref 0–0.2)
BASOPHILS NFR BLD AUTO: 0.6 %
BILIRUB SERPL-MCNC: 0.5 MG/DL (ref 0.2–1.3)
BUN SERPL-MCNC: 20 MG/DL (ref 7–30)
CALCIUM SERPL-MCNC: 9.3 MG/DL (ref 8.5–10.1)
CEA SERPL-MCNC: 1 UG/L (ref 0–2.5)
CHLORIDE SERPL-SCNC: 103 MMOL/L (ref 94–109)
CO2 SERPL-SCNC: 28 MMOL/L (ref 20–32)
CREAT SERPL-MCNC: 0.69 MG/DL (ref 0.66–1.25)
DIFFERENTIAL METHOD BLD: ABNORMAL
EOSINOPHIL # BLD AUTO: 0.1 10E9/L (ref 0–0.7)
EOSINOPHIL NFR BLD AUTO: 1.5 %
ERYTHROCYTE [DISTWIDTH] IN BLOOD BY AUTOMATED COUNT: 15 % (ref 10–15)
GFR SERPL CREATININE-BSD FRML MDRD: >90 ML/MIN/{1.73_M2}
GLUCOSE SERPL-MCNC: 90 MG/DL (ref 70–99)
HCT VFR BLD AUTO: 34.5 % (ref 40–53)
HGB BLD-MCNC: 11.7 G/DL (ref 13.3–17.7)
IMM GRANULOCYTES # BLD: 0 10E9/L (ref 0–0.4)
IMM GRANULOCYTES NFR BLD: 0.3 %
LYMPHOCYTES # BLD AUTO: 0.9 10E9/L (ref 0.8–5.3)
LYMPHOCYTES NFR BLD AUTO: 26.8 %
MCH RBC QN AUTO: 32 PG (ref 26.5–33)
MCHC RBC AUTO-ENTMCNC: 33.9 G/DL (ref 31.5–36.5)
MCV RBC AUTO: 94 FL (ref 78–100)
MONOCYTES # BLD AUTO: 0.6 10E9/L (ref 0–1.3)
MONOCYTES NFR BLD AUTO: 16.5 %
NEUTROPHILS # BLD AUTO: 1.9 10E9/L (ref 1.6–8.3)
NEUTROPHILS NFR BLD AUTO: 54.3 %
NRBC # BLD AUTO: 0 10*3/UL
NRBC BLD AUTO-RTO: 0 /100
PLATELET # BLD AUTO: 53 10E9/L (ref 150–450)
PLATELET # BLD EST: ABNORMAL 10*3/UL
POTASSIUM SERPL-SCNC: 3.8 MMOL/L (ref 3.4–5.3)
PROT SERPL-MCNC: 7.2 G/DL (ref 6.8–8.8)
RBC # BLD AUTO: 3.66 10E12/L (ref 4.4–5.9)
SODIUM SERPL-SCNC: 135 MMOL/L (ref 133–144)
WBC # BLD AUTO: 3.4 10E9/L (ref 4–11)

## 2020-05-29 PROCEDURE — 82378 CARCINOEMBRYONIC ANTIGEN: CPT | Performed by: INTERNAL MEDICINE

## 2020-05-29 PROCEDURE — 80053 COMPREHEN METABOLIC PANEL: CPT | Performed by: INTERNAL MEDICINE

## 2020-05-29 PROCEDURE — 85025 COMPLETE CBC W/AUTO DIFF WBC: CPT | Performed by: INTERNAL MEDICINE

## 2020-05-29 RX ORDER — HEPARIN SODIUM (PORCINE) LOCK FLUSH IV SOLN 100 UNIT/ML 100 UNIT/ML
5 SOLUTION INTRAVENOUS ONCE
Status: COMPLETED | OUTPATIENT
Start: 2020-05-29 | End: 2020-05-29

## 2020-05-29 RX ORDER — IOPAMIDOL 755 MG/ML
86 INJECTION, SOLUTION INTRAVASCULAR ONCE
Status: COMPLETED | OUTPATIENT
Start: 2020-05-29 | End: 2020-05-29

## 2020-05-29 RX ADMIN — HEPARIN SODIUM (PORCINE) LOCK FLUSH IV SOLN 100 UNIT/ML 5 ML: 100 SOLUTION at 09:18

## 2020-05-29 RX ADMIN — IOPAMIDOL 86 ML: 755 INJECTION, SOLUTION INTRAVASCULAR at 09:14

## 2020-06-01 ENCOUNTER — VIRTUAL VISIT (OUTPATIENT)
Dept: ONCOLOGY | Facility: CLINIC | Age: 40
End: 2020-06-01
Attending: INTERNAL MEDICINE
Payer: COMMERCIAL

## 2020-06-01 DIAGNOSIS — C15.5 MALIGNANT NEOPLASM OF LOWER THIRD OF ESOPHAGUS (H): Primary | ICD-10-CM

## 2020-06-01 PROCEDURE — 99214 OFFICE O/P EST MOD 30 MIN: CPT | Mod: GT | Performed by: INTERNAL MEDICINE

## 2020-06-01 PROCEDURE — 40001009 ZZH VIDEO/TELEPHONE VISIT; NO CHARGE

## 2020-06-01 RX ORDER — HEPARIN SODIUM,PORCINE 10 UNIT/ML
5 VIAL (ML) INTRAVENOUS
Status: CANCELLED | OUTPATIENT
Start: 2020-06-08

## 2020-06-01 RX ORDER — SODIUM CHLORIDE 9 MG/ML
1000 INJECTION, SOLUTION INTRAVENOUS CONTINUOUS PRN
Status: CANCELLED
Start: 2020-06-08

## 2020-06-01 RX ORDER — NALOXONE HYDROCHLORIDE 0.4 MG/ML
.1-.4 INJECTION, SOLUTION INTRAMUSCULAR; INTRAVENOUS; SUBCUTANEOUS
Status: CANCELLED | OUTPATIENT
Start: 2020-06-08

## 2020-06-01 RX ORDER — MEPERIDINE HYDROCHLORIDE 25 MG/ML
25 INJECTION INTRAMUSCULAR; INTRAVENOUS; SUBCUTANEOUS EVERY 30 MIN PRN
Status: CANCELLED | OUTPATIENT
Start: 2020-06-08

## 2020-06-01 RX ORDER — ALBUTEROL SULFATE 0.83 MG/ML
2.5 SOLUTION RESPIRATORY (INHALATION)
Status: CANCELLED | OUTPATIENT
Start: 2020-06-08

## 2020-06-01 RX ORDER — DIPHENHYDRAMINE HYDROCHLORIDE 50 MG/ML
50 INJECTION INTRAMUSCULAR; INTRAVENOUS
Status: CANCELLED
Start: 2020-06-08

## 2020-06-01 RX ORDER — ALBUTEROL SULFATE 90 UG/1
1-2 AEROSOL, METERED RESPIRATORY (INHALATION)
Status: CANCELLED
Start: 2020-06-08

## 2020-06-01 RX ORDER — EPINEPHRINE 0.3 MG/.3ML
0.3 INJECTION SUBCUTANEOUS EVERY 5 MIN PRN
Status: CANCELLED | OUTPATIENT
Start: 2020-06-08

## 2020-06-01 RX ORDER — HEPARIN SODIUM (PORCINE) LOCK FLUSH IV SOLN 100 UNIT/ML 100 UNIT/ML
5 SOLUTION INTRAVENOUS
Status: CANCELLED | OUTPATIENT
Start: 2020-06-08

## 2020-06-01 RX ORDER — METHYLPREDNISOLONE SODIUM SUCCINATE 125 MG/2ML
125 INJECTION, POWDER, LYOPHILIZED, FOR SOLUTION INTRAMUSCULAR; INTRAVENOUS
Status: CANCELLED
Start: 2020-06-08

## 2020-06-01 RX ORDER — EPINEPHRINE 1 MG/ML
0.3 INJECTION, SOLUTION INTRAMUSCULAR; SUBCUTANEOUS EVERY 5 MIN PRN
Status: CANCELLED | OUTPATIENT
Start: 2020-06-08

## 2020-06-01 RX ORDER — LORAZEPAM 2 MG/ML
0.5 INJECTION INTRAMUSCULAR EVERY 4 HOURS PRN
Status: CANCELLED
Start: 2020-06-08

## 2020-06-01 NOTE — Clinical Note
June 1, 2020       TO: Daniel Sandhu  [address] [facility] [address]       DearMr.Edson,    We are writing to inform you of your test results.    {[facility] results letter list:[id number]}    No results found from the In Basket message.    ***

## 2020-06-01 NOTE — PROGRESS NOTES
"Daniel Sandhu is a 39 year old male who is being evaluated via a billable video visit.      The patient has been notified of following:     \"This video visit will be conducted via a call between you and your physician/provider. We have found that certain health care needs can be provided without the need for an in-person physical exam.  This service lets us provide the care you need with a video conversation.  If a prescription is necessary we can send it directly to your pharmacy.  If lab work is needed we can place an order for that and you can then stop by our lab to have the test done at a later time.    Video visits are billed at different rates depending on your insurance coverage.  Please reach out to your insurance provider with any questions.    If during the course of the call the physician/provider feels a video visit is not appropriate, you will not be charged for this service.\"    Patient has given verbal consent for Video visit? Yes    How would you like to obtain your AVS? MyChart    Patient would like the video invitation sent by: Text to cell phone: [phone]    Will anyone else be joining your video visit? No          Secondary Video Option ([employer]), send text message to:  [phone]    I have reviewed and updated the patient's allergies and medication list.    Concerns: Questions about medication side effects.       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     He had T3N1 disease resected back in 2017 with perioperative EOX.  He recurred 2 years after he completed his adjuvant therapy with a biopsy confirmed metastasis in his liver.  He responded to Ramucirumab and Taxol, but two months ago progressed after a year of therapy. He has now had two months of XELOX, and today's evaluation is to assess his ongoing response. He has tolerated the new chemotherapy moderately well. There was a lot of " N/V and diarrhea the first cycle, but with the adjustments he has now done much better this cycle with those symptoms. He gets a fair bit of cold sensitivity and cramping in hands that lasts at least a week and interferes with some of the fine motor skills he needs for his work. He is fairly fatigued for about 10 days after his treatment. The remainder of a 10-point systems review is otherwise unremarkable and there have been no other new medical problems.    GENERAL: Healthy, alert and no distress  EYES: Eyes grossly normal to inspection.  No discharge or erythema, or obvious scleral/conjunctival abnormalities.  RESP: No audible wheeze, cough, or visible cyanosis.  No visible retractions or increased work of breathing.    SKIN: Visible skin clear. No significant rash, abnormal pigmentation or lesions. NO peeling or cracking on palms.  NEURO: Cranial nerves grossly intact.  Mentation and speech appropriate for age.  PSYCH: Mentation appears normal, affect normal/bright, judgement and insight intact, normal speech and appearance well-groomed.     I reviewed with the patient and his wife his labs and   CT. The scan shows his liver met to be stable to slightly better and there are no new lesions. His labs including CEA ar all normal except for a platelet count of 50.    A/P: Metastatic GE junction cancer in a patient with an ECOG status of 1, moderate toxicity from his chemotherapy and evidence of disease response. We decided to hold chemotherapy for a week to allow his platelets to recover. He is having along enough period of toxicity after his dosing that we will move to a 2 week on/2 week off schedule. We will re-evaluate his response in another two months.    Video-Visit Details    Type of service:  Video Visit    Video Start Time: 7:15  Video End Time: 7:45    Originating Location (pt. Location): Home    Distant Location (provider location):  [facility]     Platform used for Video Visit:  [employer]    Modesto Ponce MD

## 2020-06-01 NOTE — LETTER
"    6/1/2020         RE: Daniel Sandhu  [address] [facility] [address]        Dear Colleague,    Thank you for referring your patient, Daniel Sandhu, to the [facility]. Please see a copy of my visit note below.    Dnaiel Sandhu is a 39 year old male who is being evaluated via a billable video visit.      The patient has been notified of following:     \"This video visit will be conducted via a call between you and your physician/provider. We have found that certain health care needs can be provided without the need for an in-person physical exam.  This service lets us provide the care you need with a video conversation.  If a prescription is necessary we can send it directly to your pharmacy.  If lab work is needed we can place an order for that and you can then stop by our lab to have the test done at a later time.    Video visits are billed at different rates depending on your insurance coverage.  Please reach out to your insurance provider with any questions.    If during the course of the call the physician/provider feels a video visit is not appropriate, you will not be charged for this service.\"    Patient has given verbal consent for Video visit? Yes    How would you like to obtain your AVS? MyChart    Patient would like the video invitation sent by: Text to cell phone: [phone]    Will anyone else be joining your video visit? No          Secondary Video Option ([employer]), send text message to:  [phone]    I have reviewed and updated the patient's allergies and medication list.    Concerns: Questions about medication side effects.       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     He had T3N1 disease resected back in 2017 with perioperative EOX.  He recurred 2 years after he completed his adjuvant therapy with a biopsy confirmed metastasis in his liver.  He " responded to Ramucirumab and Taxol, but two months ago progressed after a year of therapy. He has now had two months of XELOX, and today's evaluation is to assess his ongoing response. He has tolerated the new chemotherapy moderately well. There was a lot of N/V and diarrhea the first cycle, but with the adjustments he has now done much better this cycle with those symptoms. He gets a fair bit of cold sensitivity and cramping in hands that lasts at least a week and interferes with some of the fine motor skills he needs for his work. He is fairly fatigued for about 10 days after his treatment. The remainder of a 10-point systems review is otherwise unremarkable and there have been no other new medical problems.    GENERAL: Healthy, alert and no distress  EYES: Eyes grossly normal to inspection.  No discharge or erythema, or obvious scleral/conjunctival abnormalities.  RESP: No audible wheeze, cough, or visible cyanosis.  No visible retractions or increased work of breathing.    SKIN: Visible skin clear. No significant rash, abnormal pigmentation or lesions. NO peeling or cracking on palms.  NEURO: Cranial nerves grossly intact.  Mentation and speech appropriate for age.  PSYCH: Mentation appears normal, affect normal/bright, judgement and insight intact, normal speech and appearance well-groomed.     I reviewed with the patient and his wife his labs and   CT. The scan shows his liver met to be stable to slightly better and there are no new lesions. His labs including CEA ar all normal except for a platelet count of 50.    A/P: Metastatic GE junction cancer in a patient with an ECOG status of 1, moderate toxicity from his chemotherapy and evidence of disease response. We decided to hold chemotherapy for a week to allow his platelets to recover. He is having along enough period of toxicity after his dosing that we will move to a 2 week on/2 week off schedule. We will re-evaluate his response in another two  months.    Video-Visit Details    Type of service:  Video Visit    Video Start Time: 7:15  Video End Time: 7:45    Originating Location (pt. Location): Home    Distant Location (provider location):  [facility]     Platform used for Video Visit: Peterson Ponce MD

## 2020-06-08 ENCOUNTER — APPOINTMENT (OUTPATIENT)
Dept: LAB | Facility: CLINIC | Age: 40
End: 2020-06-08
Attending: INTERNAL MEDICINE
Payer: COMMERCIAL

## 2020-06-08 ENCOUNTER — INFUSION THERAPY VISIT (OUTPATIENT)
Dept: ONCOLOGY | Facility: CLINIC | Age: 40
End: 2020-06-08
Attending: INTERNAL MEDICINE
Payer: COMMERCIAL

## 2020-06-08 VITALS
BODY MASS INDEX: 22.26 KG/M2 | WEIGHT: 143.5 LBS | HEART RATE: 87 BPM | DIASTOLIC BLOOD PRESSURE: 69 MMHG | SYSTOLIC BLOOD PRESSURE: 99 MMHG | TEMPERATURE: 98 F | OXYGEN SATURATION: 99 %

## 2020-06-08 DIAGNOSIS — C15.5 MALIGNANT NEOPLASM OF LOWER THIRD OF ESOPHAGUS (H): Primary | ICD-10-CM

## 2020-06-08 LAB
ALBUMIN SERPL-MCNC: 3.6 G/DL (ref 3.4–5)
ALP SERPL-CCNC: 53 U/L (ref 40–150)
ALT SERPL W P-5'-P-CCNC: 38 U/L (ref 0–70)
ANION GAP SERPL CALCULATED.3IONS-SCNC: 4 MMOL/L (ref 3–14)
AST SERPL W P-5'-P-CCNC: 30 U/L (ref 0–45)
BASOPHILS # BLD AUTO: 0 10E9/L (ref 0–0.2)
BASOPHILS NFR BLD AUTO: 0.9 %
BILIRUB SERPL-MCNC: 0.5 MG/DL (ref 0.2–1.3)
BUN SERPL-MCNC: 17 MG/DL (ref 7–30)
CALCIUM SERPL-MCNC: 8.6 MG/DL (ref 8.5–10.1)
CHLORIDE SERPL-SCNC: 107 MMOL/L (ref 94–109)
CO2 SERPL-SCNC: 28 MMOL/L (ref 20–32)
CREAT SERPL-MCNC: 0.78 MG/DL (ref 0.66–1.25)
DIFFERENTIAL METHOD BLD: ABNORMAL
EOSINOPHIL # BLD AUTO: 0.1 10E9/L (ref 0–0.7)
EOSINOPHIL NFR BLD AUTO: 2.3 %
ERYTHROCYTE [DISTWIDTH] IN BLOOD BY AUTOMATED COUNT: 15.9 % (ref 10–15)
GFR SERPL CREATININE-BSD FRML MDRD: >90 ML/MIN/{1.73_M2}
GLUCOSE SERPL-MCNC: 95 MG/DL (ref 70–99)
HCT VFR BLD AUTO: 34.4 % (ref 40–53)
HGB BLD-MCNC: 11.4 G/DL (ref 13.3–17.7)
IMM GRANULOCYTES # BLD: 0 10E9/L (ref 0–0.4)
IMM GRANULOCYTES NFR BLD: 0.3 %
LYMPHOCYTES # BLD AUTO: 0.9 10E9/L (ref 0.8–5.3)
LYMPHOCYTES NFR BLD AUTO: 27.1 %
MCH RBC QN AUTO: 31.4 PG (ref 26.5–33)
MCHC RBC AUTO-ENTMCNC: 33.1 G/DL (ref 31.5–36.5)
MCV RBC AUTO: 95 FL (ref 78–100)
MONOCYTES # BLD AUTO: 0.5 10E9/L (ref 0–1.3)
MONOCYTES NFR BLD AUTO: 14.6 %
NEUTROPHILS # BLD AUTO: 1.9 10E9/L (ref 1.6–8.3)
NEUTROPHILS NFR BLD AUTO: 54.8 %
NRBC # BLD AUTO: 0 10*3/UL
NRBC BLD AUTO-RTO: 0 /100
PLATELET # BLD AUTO: 79 10E9/L (ref 150–450)
POTASSIUM SERPL-SCNC: 4.2 MMOL/L (ref 3.4–5.3)
PROT SERPL-MCNC: 6.9 G/DL (ref 6.8–8.8)
RBC # BLD AUTO: 3.63 10E12/L (ref 4.4–5.9)
SODIUM SERPL-SCNC: 140 MMOL/L (ref 133–144)
WBC # BLD AUTO: 3.4 10E9/L (ref 4–11)

## 2020-06-08 PROCEDURE — 25000128 H RX IP 250 OP 636: Mod: ZF | Performed by: INTERNAL MEDICINE

## 2020-06-08 PROCEDURE — 96415 CHEMO IV INFUSION ADDL HR: CPT

## 2020-06-08 PROCEDURE — 96367 TX/PROPH/DG ADDL SEQ IV INF: CPT

## 2020-06-08 PROCEDURE — 96413 CHEMO IV INFUSION 1 HR: CPT

## 2020-06-08 PROCEDURE — 96375 TX/PRO/DX INJ NEW DRUG ADDON: CPT

## 2020-06-08 PROCEDURE — 80053 COMPREHEN METABOLIC PANEL: CPT | Performed by: INTERNAL MEDICINE

## 2020-06-08 PROCEDURE — 85025 COMPLETE CBC W/AUTO DIFF WBC: CPT | Performed by: INTERNAL MEDICINE

## 2020-06-08 PROCEDURE — 25800030 ZZH RX IP 258 OP 636: Mod: ZF | Performed by: INTERNAL MEDICINE

## 2020-06-08 RX ORDER — DEXAMETHASONE 4 MG/1
4 TABLET ORAL
Qty: 3 TABLET | Refills: 3 | Status: SHIPPED | OUTPATIENT
Start: 2020-06-08 | End: 2020-07-24

## 2020-06-08 RX ORDER — HEPARIN SODIUM (PORCINE) LOCK FLUSH IV SOLN 100 UNIT/ML 100 UNIT/ML
5 SOLUTION INTRAVENOUS EVERY 8 HOURS
Status: DISCONTINUED | OUTPATIENT
Start: 2020-06-08 | End: 2020-06-08 | Stop reason: HOSPADM

## 2020-06-08 RX ORDER — HEPARIN SODIUM (PORCINE) LOCK FLUSH IV SOLN 100 UNIT/ML 100 UNIT/ML
5 SOLUTION INTRAVENOUS
Status: DISCONTINUED | OUTPATIENT
Start: 2020-06-08 | End: 2020-06-08 | Stop reason: HOSPADM

## 2020-06-08 RX ADMIN — Medication 5 ML: at 11:42

## 2020-06-08 RX ADMIN — SODIUM CHLORIDE 150 MG: 9 INJECTION, SOLUTION INTRAVENOUS at 09:10

## 2020-06-08 RX ADMIN — HEPARIN SODIUM (PORCINE) LOCK FLUSH IV SOLN 100 UNIT/ML 5 ML: 100 SOLUTION at 08:14

## 2020-06-08 RX ADMIN — DEXAMETHASONE SODIUM PHOSPHATE: 10 INJECTION, SOLUTION INTRAMUSCULAR; INTRAVENOUS at 08:53

## 2020-06-08 RX ADMIN — OXALIPLATIN 250 MG: 100 INJECTION, SOLUTION, CONCENTRATE INTRAVENOUS at 09:37

## 2020-06-08 RX ADMIN — DEXTROSE MONOHYDRATE 250 ML: 50 INJECTION, SOLUTION INTRAVENOUS at 08:53

## 2020-06-08 ASSESSMENT — PAIN SCALES - GENERAL: PAINLEVEL: NO PAIN (0)

## 2020-06-08 NOTE — PROGRESS NOTES
Infusion Nursing Note:  Daniel Sandhu presents today for Cycle 3 Day 1 Oxaliplatin, Xeloda.    Patient seen by provider today: No   present during visit today: Not Applicable.    Note: Patient reports feeling well with his extra week off this cycle. Denies bleeding, fevers, eating and drinking appropriately.     Intravenous Access:  Implanted Port.    Treatment Conditions:  Lab Results   Component Value Date    HGB 11.4 06/08/2020     Lab Results   Component Value Date    WBC 3.4 06/08/2020      Lab Results   Component Value Date    ANEU 1.9 06/08/2020     Lab Results   Component Value Date    PLT 79 06/08/2020      Lab Results   Component Value Date     06/08/2020                   Lab Results   Component Value Date    POTASSIUM 4.2 06/08/2020           Lab Results   Component Value Date    MAG 2.2 03/07/2018            Lab Results   Component Value Date    CR 0.78 06/08/2020                   Lab Results   Component Value Date    YOBANY 8.6 06/08/2020                Lab Results   Component Value Date    BILITOTAL 0.5 06/08/2020           Lab Results   Component Value Date    ALBUMIN 3.6 06/08/2020                    Lab Results   Component Value Date    ALT 38 06/08/2020           Lab Results   Component Value Date    AST 30 06/08/2020       Results reviewed, labs MET treatment parameters, ok to proceed with treatment.    Patient requesting ice chips during his oxaliplatin infusion. (Has tried this in the past, and read that it can help, he is very aware of cold sensitivity neuropathy).       Post Infusion Assessment:  Patient tolerated infusion without incident.  Blood return noted pre and post infusion.  Access discontinued per protocol.       Discharge Plan:   Prescription refills given for decadron. Patient already received Xeloda in the mail from [facility] and took his first dose this morning.   Discharge instructions reviewed with: Patient.  AVS to patient via [employer].  Patient will return 7/6  for next appointment.   Patient discharged in stable condition accompanied by: self.  Departure Mode: Ambulatory.  Face to Face time: 0.    Mandi Vazquez RN

## 2020-06-16 ENCOUNTER — TELEPHONE (OUTPATIENT)
Dept: ONCOLOGY | Facility: CLINIC | Age: 40
End: 2020-06-16

## 2020-06-16 NOTE — TELEPHONE ENCOUNTER
Oral Chemotherapy Monitoring Program    Primary Oncologist: Dr. Ponce  Primary Oncology Clinic: [facility]  Cancer Diagnosis: Gastroesophageal Cancer     Drug: Xeloda (capecitabine) 2000mg (4m443dl tablets) BID, days 1-14 of 28-day cycle  Dose is appropriate for patients: BSA, Renal Function and Hepatic Function            Expected duration of therapy: Until disease progression or unacceptable toxicity     Drug Interaction Assessment: No drug interactions identified upon review of medication list.     Lab Monitoring Plan  CBC & CMP q3 weeks with infusions    Subjective/Objective:  Daniel Sandhu is a 39 year old male contacted by phone for a follow-up visit for oral chemotherapy.  Daniel reports that he is feeling well and the switch from two weeks on/one week off to two weeks on/two weeks off for his Xeloda therapy has helped immensely with his side effects. He noted that his hands and feet have recovered and that he is only experiencing slight peeling of the skin. Patient also mentioned that he is moving to [place], [place] and was worried about how that would affect his therapy. I encouraged the patient to reach out to Dr. Ponce's team via [employer] to see what his options would be as far as [facility] or if it would be better for him to transfer to an oncology clinic in [place].       ORAL CHEMOTHERAPY 4/14/2020 4/29/2020 6/16/2020   Drug Name Xeloda (Capecitabine) Xeloda (Capecitabine) Xeloda (Capecitabine)   Current Dosage 2000mg 2000mg 2000mg   Current Schedule BID BID Other   Cycle Details 2 weeks on 1 week off 2 weeks on 1 week off 2 weeks on 2 weeks off   Start Date of Last Cycle - 4/22/2020 -   Planned next cycle start date 4/20/2020 5/7/2020 -   Doses missed in last 2 weeks - 1 0   Adherence Assessment - Adherent Adherent   Adverse Effects - Diarrhea;Nausea No AE identified during assessment   Pharmacist Intervention(nausea) - Yes -   Intervention(s) - Patient  "education -   Diarrhea - Grade 1 -   Pharmacist Intervention(diarrhea) - Yes -   Intervention(s) - Patient education -   Any new drug interactions? No No -   Is the dose as ordered appropriate for the patient? Yes - -     Vitals:  BP:   BP Readings from Last 1 Encounters:   06/08/20 99/69     Wt Readings from Last 1 Encounters:   06/08/20 65.1 kg (143 lb 8 oz)     Estimated body surface area is 1.76 meters squared as calculated from the following:    Height as of 1/16/20: 1.71 m (5' 7.32\").    Weight as of 6/8/20: 65.1 kg (143 lb 8 oz).    Labs:  _  Result Component Current Result Ref Range   Sodium 140 (6/8/2020) 133 - 144 mmol/L     _  Result Component Current Result Ref Range   Potassium 4.2 (6/8/2020) 3.4 - 5.3 mmol/L     _  Result Component Current Result Ref Range   Calcium 8.6 (6/8/2020) 8.5 - 10.1 mg/dL     No results found for Mag within last 30 days.     No results found for Phos within last 30 days.     _  Result Component Current Result Ref Range   Albumin 3.6 (6/8/2020) 3.4 - 5.0 g/dL     _  Result Component Current Result Ref Range   Urea Nitrogen 17 (6/8/2020) 7 - 30 mg/dL     _  Result Component Current Result Ref Range   Creatinine 0.78 (6/8/2020) 0.66 - 1.25 mg/dL       _  Result Component Current Result Ref Range   AST 30 (6/8/2020) 0 - 45 U/L     _  Result Component Current Result Ref Range   ALT 38 (6/8/2020) 0 - 70 U/L     _  Result Component Current Result Ref Range   Bilirubin Total 0.5 (6/8/2020) 0.2 - 1.3 mg/dL       _  Result Component Current Result Ref Range   WBC 3.4 (L) (6/8/2020) 4.0 - 11.0 10e9/L     _  Result Component Current Result Ref Range   Hemoglobin 11.4 (L) (6/8/2020) 13.3 - 17.7 g/dL     _  Result Component Current Result Ref Range   Platelet Count 79 (L) (6/8/2020) 150 - 450 10e9/L     _  Result Component Current Result Ref Range   Absolute Neutrophil 1.9 (6/8/2020) 1.6 - 8.3 10e9/L       Assessment:  Daniel is tolerating Xeloda therapy well, especially with the added week " off.     Plan:  Continue Xeloda therapy    Follow-Up:  Monthly assessment ~7/13      Jerrod Queen  Pharmacy Intern  [facility]  [phone]

## 2020-07-02 DIAGNOSIS — C15.5 MALIGNANT NEOPLASM OF LOWER THIRD OF ESOPHAGUS (H): Primary | ICD-10-CM

## 2020-07-02 RX ORDER — CAPECITABINE 500 MG/1
1000 TABLET, FILM COATED ORAL 2 TIMES DAILY
Qty: 112 TABLET | Refills: 0 | Status: SHIPPED | OUTPATIENT
Start: 2020-07-02 | End: 2020-07-24

## 2020-07-02 RX ORDER — CAPECITABINE 500 MG/1
1000 TABLET, FILM COATED ORAL 2 TIMES DAILY
Qty: 112 TABLET | Refills: 0 | Status: SHIPPED | OUTPATIENT
Start: 2020-07-02 | End: 2020-07-02

## 2020-07-03 ENCOUNTER — DOCUMENTATION ONLY (OUTPATIENT)
Dept: ONCOLOGY | Facility: CLINIC | Age: 40
End: 2020-07-03

## 2020-07-03 NOTE — PROGRESS NOTES
Received Reasonable Accommodations Request via [employer] message. Form has been completed and placed in provider folder for signature. Once complete, form will be sent to patient as requested.     Keri Littlejohn CMA ([facility])

## 2020-07-06 ENCOUNTER — APPOINTMENT (OUTPATIENT)
Dept: LAB | Facility: CLINIC | Age: 40
End: 2020-07-06
Attending: INTERNAL MEDICINE
Payer: COMMERCIAL

## 2020-07-06 ENCOUNTER — INFUSION THERAPY VISIT (OUTPATIENT)
Dept: ONCOLOGY | Facility: CLINIC | Age: 40
End: 2020-07-06
Attending: INTERNAL MEDICINE
Payer: COMMERCIAL

## 2020-07-06 VITALS
SYSTOLIC BLOOD PRESSURE: 117 MMHG | DIASTOLIC BLOOD PRESSURE: 60 MMHG | BODY MASS INDEX: 22.8 KG/M2 | HEART RATE: 86 BPM | RESPIRATION RATE: 16 BRPM | WEIGHT: 147 LBS | TEMPERATURE: 97.9 F | OXYGEN SATURATION: 99 %

## 2020-07-06 DIAGNOSIS — C15.5 MALIGNANT NEOPLASM OF LOWER THIRD OF ESOPHAGUS (H): Primary | ICD-10-CM

## 2020-07-06 LAB
ALBUMIN SERPL-MCNC: 3.6 G/DL (ref 3.4–5)
ALP SERPL-CCNC: 59 U/L (ref 40–150)
ALT SERPL W P-5'-P-CCNC: 50 U/L (ref 0–70)
ANION GAP SERPL CALCULATED.3IONS-SCNC: 6 MMOL/L (ref 3–14)
AST SERPL W P-5'-P-CCNC: 41 U/L (ref 0–45)
BASOPHILS # BLD AUTO: 0 10E9/L (ref 0–0.2)
BASOPHILS NFR BLD AUTO: 0.6 %
BILIRUB SERPL-MCNC: 0.6 MG/DL (ref 0.2–1.3)
BUN SERPL-MCNC: 21 MG/DL (ref 7–30)
CALCIUM SERPL-MCNC: 8.4 MG/DL (ref 8.5–10.1)
CHLORIDE SERPL-SCNC: 110 MMOL/L (ref 94–109)
CO2 SERPL-SCNC: 26 MMOL/L (ref 20–32)
CREAT SERPL-MCNC: 0.75 MG/DL (ref 0.66–1.25)
DIFFERENTIAL METHOD BLD: ABNORMAL
EOSINOPHIL # BLD AUTO: 0.1 10E9/L (ref 0–0.7)
EOSINOPHIL NFR BLD AUTO: 2.3 %
ERYTHROCYTE [DISTWIDTH] IN BLOOD BY AUTOMATED COUNT: 16.2 % (ref 10–15)
GFR SERPL CREATININE-BSD FRML MDRD: >90 ML/MIN/{1.73_M2}
GLUCOSE SERPL-MCNC: 104 MG/DL (ref 70–99)
HCT VFR BLD AUTO: 34.2 % (ref 40–53)
HGB BLD-MCNC: 11.3 G/DL (ref 13.3–17.7)
IMM GRANULOCYTES # BLD: 0 10E9/L (ref 0–0.4)
IMM GRANULOCYTES NFR BLD: 0.3 %
LYMPHOCYTES # BLD AUTO: 0.9 10E9/L (ref 0.8–5.3)
LYMPHOCYTES NFR BLD AUTO: 25.9 %
MCH RBC QN AUTO: 32.5 PG (ref 26.5–33)
MCHC RBC AUTO-ENTMCNC: 33 G/DL (ref 31.5–36.5)
MCV RBC AUTO: 98 FL (ref 78–100)
MONOCYTES # BLD AUTO: 0.6 10E9/L (ref 0–1.3)
MONOCYTES NFR BLD AUTO: 17.2 %
NEUTROPHILS # BLD AUTO: 1.9 10E9/L (ref 1.6–8.3)
NEUTROPHILS NFR BLD AUTO: 53.7 %
NRBC # BLD AUTO: 0 10*3/UL
NRBC BLD AUTO-RTO: 0 /100
PLATELET # BLD AUTO: 80 10E9/L (ref 150–450)
POTASSIUM SERPL-SCNC: 4.2 MMOL/L (ref 3.4–5.3)
PROT SERPL-MCNC: 6.9 G/DL (ref 6.8–8.8)
RBC # BLD AUTO: 3.48 10E12/L (ref 4.4–5.9)
SODIUM SERPL-SCNC: 141 MMOL/L (ref 133–144)
WBC # BLD AUTO: 3.4 10E9/L (ref 4–11)

## 2020-07-06 PROCEDURE — 96415 CHEMO IV INFUSION ADDL HR: CPT

## 2020-07-06 PROCEDURE — 96413 CHEMO IV INFUSION 1 HR: CPT

## 2020-07-06 PROCEDURE — 85025 COMPLETE CBC W/AUTO DIFF WBC: CPT | Performed by: INTERNAL MEDICINE

## 2020-07-06 PROCEDURE — 25000128 H RX IP 250 OP 636: Mod: ZF | Performed by: INTERNAL MEDICINE

## 2020-07-06 PROCEDURE — 96375 TX/PRO/DX INJ NEW DRUG ADDON: CPT

## 2020-07-06 PROCEDURE — 25800030 ZZH RX IP 258 OP 636: Mod: ZF | Performed by: INTERNAL MEDICINE

## 2020-07-06 PROCEDURE — 80053 COMPREHEN METABOLIC PANEL: CPT | Performed by: INTERNAL MEDICINE

## 2020-07-06 PROCEDURE — 96367 TX/PROPH/DG ADDL SEQ IV INF: CPT

## 2020-07-06 RX ORDER — EPINEPHRINE 0.3 MG/.3ML
0.3 INJECTION SUBCUTANEOUS EVERY 5 MIN PRN
Status: CANCELLED | OUTPATIENT
Start: 2020-07-06

## 2020-07-06 RX ORDER — HEPARIN SODIUM (PORCINE) LOCK FLUSH IV SOLN 100 UNIT/ML 100 UNIT/ML
5 SOLUTION INTRAVENOUS
Status: DISCONTINUED | OUTPATIENT
Start: 2020-07-06 | End: 2020-07-06 | Stop reason: HOSPADM

## 2020-07-06 RX ORDER — EPINEPHRINE 1 MG/ML
0.3 INJECTION, SOLUTION INTRAMUSCULAR; SUBCUTANEOUS EVERY 5 MIN PRN
Status: CANCELLED | OUTPATIENT
Start: 2020-07-06

## 2020-07-06 RX ORDER — ALBUTEROL SULFATE 90 UG/1
1-2 AEROSOL, METERED RESPIRATORY (INHALATION)
Status: CANCELLED
Start: 2020-07-06

## 2020-07-06 RX ORDER — DEXAMETHASONE 4 MG/1
4 TABLET ORAL
Qty: 3 TABLET | Refills: 3 | Status: SHIPPED | OUTPATIENT
Start: 2020-07-06 | End: 2020-07-24

## 2020-07-06 RX ORDER — MEPERIDINE HYDROCHLORIDE 25 MG/ML
25 INJECTION INTRAMUSCULAR; INTRAVENOUS; SUBCUTANEOUS EVERY 30 MIN PRN
Status: CANCELLED | OUTPATIENT
Start: 2020-07-06

## 2020-07-06 RX ORDER — HEPARIN SODIUM,PORCINE 10 UNIT/ML
5 VIAL (ML) INTRAVENOUS
Status: CANCELLED | OUTPATIENT
Start: 2020-07-06

## 2020-07-06 RX ORDER — LORAZEPAM 2 MG/ML
0.5 INJECTION INTRAMUSCULAR EVERY 4 HOURS PRN
Status: CANCELLED
Start: 2020-07-06

## 2020-07-06 RX ORDER — DIPHENHYDRAMINE HYDROCHLORIDE 50 MG/ML
50 INJECTION INTRAMUSCULAR; INTRAVENOUS
Status: CANCELLED
Start: 2020-07-06

## 2020-07-06 RX ORDER — METHYLPREDNISOLONE SODIUM SUCCINATE 125 MG/2ML
125 INJECTION, POWDER, LYOPHILIZED, FOR SOLUTION INTRAMUSCULAR; INTRAVENOUS
Status: CANCELLED
Start: 2020-07-06

## 2020-07-06 RX ORDER — HEPARIN SODIUM (PORCINE) LOCK FLUSH IV SOLN 100 UNIT/ML 100 UNIT/ML
5 SOLUTION INTRAVENOUS
Status: COMPLETED | OUTPATIENT
Start: 2020-07-06 | End: 2020-07-06

## 2020-07-06 RX ORDER — NALOXONE HYDROCHLORIDE 0.4 MG/ML
.1-.4 INJECTION, SOLUTION INTRAMUSCULAR; INTRAVENOUS; SUBCUTANEOUS
Status: CANCELLED | OUTPATIENT
Start: 2020-07-06

## 2020-07-06 RX ORDER — ALBUTEROL SULFATE 0.83 MG/ML
2.5 SOLUTION RESPIRATORY (INHALATION)
Status: CANCELLED | OUTPATIENT
Start: 2020-07-06

## 2020-07-06 RX ORDER — SODIUM CHLORIDE 9 MG/ML
1000 INJECTION, SOLUTION INTRAVENOUS CONTINUOUS PRN
Status: CANCELLED
Start: 2020-07-06

## 2020-07-06 RX ADMIN — Medication 5 ML: at 12:51

## 2020-07-06 RX ADMIN — DEXAMETHASONE SODIUM PHOSPHATE: 10 INJECTION, SOLUTION INTRAMUSCULAR; INTRAVENOUS at 09:55

## 2020-07-06 RX ADMIN — SODIUM CHLORIDE 150 MG: 9 INJECTION, SOLUTION INTRAVENOUS at 10:10

## 2020-07-06 RX ADMIN — Medication 5 ML: at 07:50

## 2020-07-06 RX ADMIN — OXALIPLATIN 250 MG: 5 INJECTION, SOLUTION INTRAVENOUS at 10:43

## 2020-07-06 RX ADMIN — DEXTROSE MONOHYDRATE 250 ML: 50 INJECTION, SOLUTION INTRAVENOUS at 09:55

## 2020-07-06 ASSESSMENT — PAIN SCALES - GENERAL: PAINLEVEL: NO PAIN (0)

## 2020-07-06 NOTE — PROGRESS NOTES
Infusion Nursing Note:  Daniel Sandhu presents today for Cycle 4 Day 1 Oxaliplatin and Xeloda.    Patient seen by provider today: No   present during visit today: Not Applicable.    Note: Daniel feels well today.  He denies fevers, cough, and shortness of breath.  Patient has skin peeling on his feet and his skin feels tight and dry on his hands.  Reviewed use of hydrating lotions and gloves.      Patient feels that his vision has worsened over the last several months.  He experiences intermittent blurry and double vision daily.  Vision seems to improve when he removes his glasses.  He has not been to the eye doctor in a while.  Reviewed eye sight with Dr. Ponce.  Dr. Ponce advised an eye doctor visit and to notify use if he notices sudden changes in his vision.    Intravenous Access:  Implanted Port.    Treatment Conditions:  Lab Results   Component Value Date    HGB 11.3 07/06/2020     Lab Results   Component Value Date    WBC 3.4 07/06/2020      Lab Results   Component Value Date    ANEU 1.9 07/06/2020     Lab Results   Component Value Date    PLT 80 07/06/2020      Lab Results   Component Value Date     07/06/2020                   Lab Results   Component Value Date    POTASSIUM 4.2 07/06/2020           Lab Results   Component Value Date    CR 0.75 07/06/2020                   Lab Results   Component Value Date    YOBANY 8.4 07/06/2020                Lab Results   Component Value Date    BILITOTAL 0.6 07/06/2020           Lab Results   Component Value Date    ALBUMIN 3.6 07/06/2020                    Lab Results   Component Value Date    ALT 50 07/06/2020           Lab Results   Component Value Date    AST 41 07/06/2020       Results reviewed, labs MET treatment parameters, ok to proceed with treatment.      Post Infusion Assessment:  Patient tolerated infusion without incident.  Blood return noted pre and post infusion.  Site patent and intact, free from redness, edema or discomfort.  No  evidence of extravasations.  Access discontinued per protocol.       Discharge Plan:   Prescription refills given for dexamethasone.  Patient already has Xeloda.  Discharge instructions reviewed with: Patient.  Patient and/or family verbalized understanding of discharge instructions and all questions answered.  AVS to patient via [employer].  Patient will return 8/3/2020 for next appointment.   Patient discharged in stable condition accompanied by: self.  Departure Mode: Ambulatory.    Eusebia Garcia RN

## 2020-07-13 NOTE — PROGRESS NOTES
Signed form received.Copy of forms sent to scanning, original forms mailed to patient's home address on file per request.    Lisa Albert CMA

## 2020-07-14 ENCOUNTER — TELEPHONE (OUTPATIENT)
Dept: ONCOLOGY | Facility: CLINIC | Age: 40
End: 2020-07-14

## 2020-07-14 NOTE — TELEPHONE ENCOUNTER
Oral Chemotherapy Monitoring Program    Primary Oncologist: Dr. Ponce  Primary Oncology Clinic: [facility]  Cancer Diagnosis: Gastroesophageal Cancer    Therapy History:  : Xeloda (capecitabine) 2000mg (3i730zb tablets) BID, days 1-14 of 28-day cycle  C1D1: 4/22/2020  C4D1: 7/6/2020  Drug Interaction Assessment: No new drug interactions identified.    Lab Monitoring Plan  CBC & CMP q3 weeks with infusions  Subjective/Objective:  Daniel Sandhu is a 40 year old male contacted by phone for a follow-up visit for oral chemotherapy.  Daniel confirmed he is adherent to Xeloda therapy. He states that he is adjusting well to the medication. Daniel reports that his hands and feet are much better with lotion use. He noted an increase in gas and said that taking Gas X has not resolved the issue. He denied constipation and diarrhea. Daniel also endorsed nausea after his infusions but says that it resolves quickly.    ORAL CHEMOTHERAPY 4/14/2020 4/29/2020 6/16/2020 7/14/2020   Drug Name Xeloda (Capecitabine) Xeloda (Capecitabine) Xeloda (Capecitabine) Xeloda (Capecitabine)   Current Dosage 2000mg 2000mg 2000mg 2000mg   Current Schedule BID BID Other BID   Cycle Details 2 weeks on 1 week off 2 weeks on 1 week off 2 weeks on 2 weeks off 2 weeks on 2 weeks off   Start Date of Last Cycle - 4/22/2020 - 7/6/2020   Planned next cycle start date 4/20/2020 5/7/2020 - 8/3/2020   Doses missed in last 2 weeks - 1 0 0   Adherence Assessment - Adherent Adherent Adherent   Adverse Effects - Diarrhea;Nausea No AE identified during assessment No AE identified during assessment   Pharmacist Intervention(nausea) - Yes - -   Intervention(s) - Patient education - -   Diarrhea - Grade 1 - -   Pharmacist Intervention(diarrhea) - Yes - -   Intervention(s) - Patient education - -   Any new drug interactions? No No - No   Is the dose as ordered appropriate for the patient? Yes - - Yes       Vitals:  BP:   BP Readings from Last 1 Encounters:  "  07/06/20 117/60     Wt Readings from Last 1 Encounters:   07/06/20 66.7 kg (147 lb)     Estimated body surface area is 1.78 meters squared as calculated from the following:    Height as of 1/16/20: 1.71 m (5' 7.32\").    Weight as of 7/6/20: 66.7 kg (147 lb).    Labs:  _  Result Component Current Result Ref Range   Sodium 141 (7/6/2020) 133 - 144 mmol/L     _  Result Component Current Result Ref Range   Potassium 4.2 (7/6/2020) 3.4 - 5.3 mmol/L     _  Result Component Current Result Ref Range   Calcium 8.4 (L) (7/6/2020) 8.5 - 10.1 mg/dL     No results found for Mag within last 30 days.     No results found for Phos within last 30 days.     _  Result Component Current Result Ref Range   Albumin 3.6 (7/6/2020) 3.4 - 5.0 g/dL     _  Result Component Current Result Ref Range   Urea Nitrogen 21 (7/6/2020) 7 - 30 mg/dL     _  Result Component Current Result Ref Range   Creatinine 0.75 (7/6/2020) 0.66 - 1.25 mg/dL       _  Result Component Current Result Ref Range   AST 41 (7/6/2020) 0 - 45 U/L     _  Result Component Current Result Ref Range   ALT 50 (7/6/2020) 0 - 70 U/L     _  Result Component Current Result Ref Range   Bilirubin Total 0.6 (7/6/2020) 0.2 - 1.3 mg/dL       _  Result Component Current Result Ref Range   WBC 3.4 (L) (7/6/2020) 4.0 - 11.0 10e9/L     _  Result Component Current Result Ref Range   Hemoglobin 11.3 (L) (7/6/2020) 13.3 - 17.7 g/dL     _  Result Component Current Result Ref Range   Platelet Count 80 (L) (7/6/2020) 150 - 450 10e9/L     _  Result Component Current Result Ref Range   Absolute Neutrophil 1.9 (7/6/2020) 1.6 - 8.3 10e9/L       Assessment:  Daniel is tolerating Xeloda therapy well with some GI discomfort and bloating. I suggested that Daniel could try pepto bismol if the Gas X does not work.    Plan:  Continue therapy as planned.    Follow-Up:  Review labs 7/31    Refill Due:  7/20 for 8/3    Shirlene Hager  Pharmacy Intern  [facility]  [phone]      "

## 2020-07-23 DIAGNOSIS — C15.5 MALIGNANT NEOPLASM OF LOWER THIRD OF ESOPHAGUS (H): Primary | ICD-10-CM

## 2020-07-23 RX ORDER — HEPARIN SODIUM,PORCINE 10 UNIT/ML
5 VIAL (ML) INTRAVENOUS
Status: CANCELLED | OUTPATIENT
Start: 2020-08-03

## 2020-07-23 RX ORDER — MEPERIDINE HYDROCHLORIDE 25 MG/ML
25 INJECTION INTRAMUSCULAR; INTRAVENOUS; SUBCUTANEOUS EVERY 30 MIN PRN
Status: CANCELLED | OUTPATIENT
Start: 2020-08-03

## 2020-07-23 RX ORDER — METHYLPREDNISOLONE SODIUM SUCCINATE 125 MG/2ML
125 INJECTION, POWDER, LYOPHILIZED, FOR SOLUTION INTRAMUSCULAR; INTRAVENOUS
Status: CANCELLED
Start: 2020-08-03

## 2020-07-23 RX ORDER — DIPHENHYDRAMINE HYDROCHLORIDE 50 MG/ML
50 INJECTION INTRAMUSCULAR; INTRAVENOUS
Status: CANCELLED
Start: 2020-08-03

## 2020-07-23 RX ORDER — EPINEPHRINE 0.3 MG/.3ML
0.3 INJECTION SUBCUTANEOUS EVERY 5 MIN PRN
Status: CANCELLED | OUTPATIENT
Start: 2020-08-03

## 2020-07-23 RX ORDER — ALBUTEROL SULFATE 0.83 MG/ML
2.5 SOLUTION RESPIRATORY (INHALATION)
Status: CANCELLED | OUTPATIENT
Start: 2020-08-03

## 2020-07-23 RX ORDER — ALBUTEROL SULFATE 90 UG/1
1-2 AEROSOL, METERED RESPIRATORY (INHALATION)
Status: CANCELLED
Start: 2020-08-03

## 2020-07-23 RX ORDER — EPINEPHRINE 1 MG/ML
0.3 INJECTION, SOLUTION INTRAMUSCULAR; SUBCUTANEOUS EVERY 5 MIN PRN
Status: CANCELLED | OUTPATIENT
Start: 2020-08-03

## 2020-07-23 RX ORDER — SODIUM CHLORIDE 9 MG/ML
1000 INJECTION, SOLUTION INTRAVENOUS CONTINUOUS PRN
Status: CANCELLED
Start: 2020-08-03

## 2020-07-23 RX ORDER — HEPARIN SODIUM (PORCINE) LOCK FLUSH IV SOLN 100 UNIT/ML 100 UNIT/ML
5 SOLUTION INTRAVENOUS
Status: CANCELLED | OUTPATIENT
Start: 2020-08-03

## 2020-07-23 RX ORDER — LORAZEPAM 2 MG/ML
0.5 INJECTION INTRAMUSCULAR EVERY 4 HOURS PRN
Status: CANCELLED
Start: 2020-08-03

## 2020-07-23 RX ORDER — NALOXONE HYDROCHLORIDE 0.4 MG/ML
.1-.4 INJECTION, SOLUTION INTRAMUSCULAR; INTRAVENOUS; SUBCUTANEOUS
Status: CANCELLED | OUTPATIENT
Start: 2020-08-03

## 2020-07-24 DIAGNOSIS — C15.5 MALIGNANT NEOPLASM OF LOWER THIRD OF ESOPHAGUS (H): Primary | ICD-10-CM

## 2020-07-24 RX ORDER — CAPECITABINE 500 MG/1
1000 TABLET, FILM COATED ORAL 2 TIMES DAILY
Qty: 112 TABLET | Refills: 0 | Status: SHIPPED | OUTPATIENT
Start: 2020-07-24 | End: 2020-08-07

## 2020-07-24 RX ORDER — DEXAMETHASONE 4 MG/1
4 TABLET ORAL
Qty: 3 TABLET | Refills: 3 | Status: SHIPPED | OUTPATIENT
Start: 2020-07-24

## 2020-07-31 ENCOUNTER — ANCILLARY PROCEDURE (OUTPATIENT)
Dept: CT IMAGING | Facility: CLINIC | Age: 40
End: 2020-07-31
Attending: INTERNAL MEDICINE
Payer: COMMERCIAL

## 2020-07-31 VITALS
SYSTOLIC BLOOD PRESSURE: 122 MMHG | WEIGHT: 144.6 LBS | TEMPERATURE: 97.1 F | DIASTOLIC BLOOD PRESSURE: 65 MMHG | OXYGEN SATURATION: 100 % | BODY MASS INDEX: 22.43 KG/M2 | HEART RATE: 71 BPM | RESPIRATION RATE: 16 BRPM

## 2020-07-31 DIAGNOSIS — C15.5 MALIGNANT NEOPLASM OF LOWER THIRD OF ESOPHAGUS (H): ICD-10-CM

## 2020-07-31 LAB
ALBUMIN SERPL-MCNC: 3.8 G/DL (ref 3.4–5)
ALP SERPL-CCNC: 74 U/L (ref 40–150)
ALT SERPL W P-5'-P-CCNC: 64 U/L (ref 0–70)
ANION GAP SERPL CALCULATED.3IONS-SCNC: 4 MMOL/L (ref 3–14)
AST SERPL W P-5'-P-CCNC: 59 U/L (ref 0–45)
BASOPHILS # BLD AUTO: 0 10E9/L (ref 0–0.2)
BASOPHILS NFR BLD AUTO: 0.3 %
BILIRUB SERPL-MCNC: 0.7 MG/DL (ref 0.2–1.3)
BUN SERPL-MCNC: 19 MG/DL (ref 7–30)
CALCIUM SERPL-MCNC: 8.9 MG/DL (ref 8.5–10.1)
CEA SERPL-MCNC: 1.1 UG/L (ref 0–2.5)
CHLORIDE SERPL-SCNC: 106 MMOL/L (ref 94–109)
CO2 SERPL-SCNC: 28 MMOL/L (ref 20–32)
CREAT SERPL-MCNC: 0.71 MG/DL (ref 0.66–1.25)
DIFFERENTIAL METHOD BLD: ABNORMAL
EOSINOPHIL # BLD AUTO: 0.1 10E9/L (ref 0–0.7)
EOSINOPHIL NFR BLD AUTO: 2.5 %
ERYTHROCYTE [DISTWIDTH] IN BLOOD BY AUTOMATED COUNT: 15.7 % (ref 10–15)
GFR SERPL CREATININE-BSD FRML MDRD: >90 ML/MIN/{1.73_M2}
GLUCOSE SERPL-MCNC: 89 MG/DL (ref 70–99)
HCT VFR BLD AUTO: 35.8 % (ref 40–53)
HGB BLD-MCNC: 12.1 G/DL (ref 13.3–17.7)
IMM GRANULOCYTES # BLD: 0 10E9/L (ref 0–0.4)
IMM GRANULOCYTES NFR BLD: 0 %
LYMPHOCYTES # BLD AUTO: 1 10E9/L (ref 0.8–5.3)
LYMPHOCYTES NFR BLD AUTO: 31.3 %
MCH RBC QN AUTO: 33 PG (ref 26.5–33)
MCHC RBC AUTO-ENTMCNC: 33.8 G/DL (ref 31.5–36.5)
MCV RBC AUTO: 98 FL (ref 78–100)
MONOCYTES # BLD AUTO: 0.6 10E9/L (ref 0–1.3)
MONOCYTES NFR BLD AUTO: 18.7 %
NEUTROPHILS # BLD AUTO: 1.5 10E9/L (ref 1.6–8.3)
NEUTROPHILS NFR BLD AUTO: 47.2 %
NRBC # BLD AUTO: 0 10*3/UL
NRBC BLD AUTO-RTO: 0 /100
PLATELET # BLD AUTO: 70 10E9/L (ref 150–450)
POTASSIUM SERPL-SCNC: 3.7 MMOL/L (ref 3.4–5.3)
PROT SERPL-MCNC: 7.2 G/DL (ref 6.8–8.8)
RBC # BLD AUTO: 3.67 10E12/L (ref 4.4–5.9)
SODIUM SERPL-SCNC: 137 MMOL/L (ref 133–144)
WBC # BLD AUTO: 3.3 10E9/L (ref 4–11)

## 2020-07-31 PROCEDURE — 85025 COMPLETE CBC W/AUTO DIFF WBC: CPT | Performed by: INTERNAL MEDICINE

## 2020-07-31 PROCEDURE — 82378 CARCINOEMBRYONIC ANTIGEN: CPT | Performed by: INTERNAL MEDICINE

## 2020-07-31 PROCEDURE — 36591 DRAW BLOOD OFF VENOUS DEVICE: CPT

## 2020-07-31 PROCEDURE — 25000128 H RX IP 250 OP 636: Performed by: INTERNAL MEDICINE

## 2020-07-31 PROCEDURE — 80053 COMPREHEN METABOLIC PANEL: CPT | Performed by: INTERNAL MEDICINE

## 2020-07-31 RX ORDER — HEPARIN SODIUM (PORCINE) LOCK FLUSH IV SOLN 100 UNIT/ML 100 UNIT/ML
5 SOLUTION INTRAVENOUS
Status: COMPLETED | OUTPATIENT
Start: 2020-07-31 | End: 2020-07-31

## 2020-07-31 RX ORDER — IOPAMIDOL 755 MG/ML
88 INJECTION, SOLUTION INTRAVASCULAR ONCE
Status: COMPLETED | OUTPATIENT
Start: 2020-07-31 | End: 2020-07-31

## 2020-07-31 RX ADMIN — IOPAMIDOL 88 ML: 755 INJECTION, SOLUTION INTRAVASCULAR at 08:18

## 2020-07-31 RX ADMIN — Medication 5 ML: at 07:53

## 2020-07-31 ASSESSMENT — PAIN SCALES - GENERAL: PAINLEVEL: NO PAIN (0)

## 2020-07-31 NOTE — NURSING NOTE
"Chief Complaint   Patient presents with     Port Draw     labs drawn from port by rn.  vs taken     Port accessed with 20 gauge 3/4\" Power needle and labs drawn by rn.  Port flushed with NS and heparin.  Pt tolerated well.  VS taken.     Fidelia Massey RN      "

## 2020-08-03 ENCOUNTER — VIRTUAL VISIT (OUTPATIENT)
Dept: ONCOLOGY | Facility: CLINIC | Age: 40
End: 2020-08-03
Attending: INTERNAL MEDICINE
Payer: COMMERCIAL

## 2020-08-03 VITALS — BODY MASS INDEX: 22.49 KG/M2 | WEIGHT: 145 LBS

## 2020-08-03 DIAGNOSIS — C15.5 MALIGNANT NEOPLASM OF LOWER THIRD OF ESOPHAGUS (H): ICD-10-CM

## 2020-08-03 PROCEDURE — 99214 OFFICE O/P EST MOD 30 MIN: CPT | Mod: GT | Performed by: INTERNAL MEDICINE

## 2020-08-03 PROCEDURE — 40001009 ZZH VIDEO/TELEPHONE VISIT; NO CHARGE

## 2020-08-03 ASSESSMENT — PAIN SCALES - GENERAL: PAINLEVEL: NO PAIN (0)

## 2020-08-03 NOTE — PROGRESS NOTES
"Daniel Sandhu is a 40 year old male who is being evaluated via a billable video visit.      The patient has been notified of following:     \"This video visit will be conducted via a call between you and your physician/provider. We have found that certain health care needs can be provided without the need for an in-person physical exam.  This service lets us provide the care you need with a video conversation.  If a prescription is necessary we can send it directly to your pharmacy.  If lab work is needed we can place an order for that and you can then stop by our lab to have the test done at a later time.    Video visits are billed at different rates depending on your insurance coverage.  Please reach out to your insurance provider with any questions.    If during the course of the call the physician/provider feels a video visit is not appropriate, you will not be charged for this service.\"    Patient has given verbal consent for Video visit? Yes  How would you like to obtain your AVS? MyChart  If you are dropped from the video visit, the video invite should be resent to: Send to e-mail at: [email or web address]  Will anyone else be joining your video visit? No        ADITI Ambrose      Video-Visit Details    Type of service:  Video Visit    Video Start Time: 9:15  Video End Time: 9:45    Originating Location (pt. Location): Home    Distant Location (provider location):  [facility]     Platform used for Video Visit: [facility]        Oncology Note - Video/Phone Visit     Daniel Sandhu MRN# [id number]   Age: 40 year old YOB: 1980     Daniel Sandhu is here in follow-up of metastatic gastroesophageal cancer.  Interval History:   Daniel Sandhu  who is being evaluated via a telephone visit.      He reported feeling fine with more tolerance to chemo side effects. His fatigue is better after starting some steroids, he is managing his nausea with medical marijuana, Zofran and Compazine " not helping that much.  Otherwise patient denies any change in his bowel movements habit. Appetite is good and his weight is stable.  No chest pain or SOB. No urine issues or legs swelling.   Patient is selling his house in 2 weeks and moving to [facility], he still working as a teacher at [employer] and his wife.got started her job as well in the new place. For that he asked for his care to be transferred over there.     Remainder of her 10 point review of systems is otherwise negative.      Oncology History:   He had T3N1 disease resected back in 2017 with perioperative EOX.  He recurred 2 years after he completed his adjuvant therapy with a biopsy confirmed metastasis in his liver.  He responded to Ramucirumab and Taxol, but two months ago progressed after a year of therapy. He has now had two months of XELOX.  He has tolerated the new chemotherapy moderately well. There was a lot of N/V and diarrhea the first cycle, but with the adjustments these symptoms resolved.   He gets a fair bit of cold sensitivity and cramping in hands that lasts at least a week and interferes with some of the fine motor skills he needs for his work.   He is fairly fatigued for about 10 days after his treatment.      Medications: reviewed.  Current Outpatient Medications   Medication     capecitabine (XELODA) 500 MG tablet     cyabnocobalamin (VITAMIN B-12) 2500 MCG sublingual tablet     dexamethasone (DECADRON) 4 MG tablet     diphenoxylate-atropine (LOMOTIL) 2.5-0.025 MG tablet     ferrous gluconate (FERGON) 324 (38 Fe) MG tablet     loperamide (IMODIUM) 2 MG capsule     LORazepam (ATIVAN) 0.5 MG tablet     LORazepam (ATIVAN) 0.5 MG tablet     Misc Natural Product Nasal (PONARIS) SOLN     multivitamin, therapeutic with minerals (MULTI-VITAMIN) TABS tablet     mupirocin (BACTROBAN) 2 % external ointment     ondansetron (ZOFRAN) 8 MG tablet     prochlorperazine (COMPAZINE) 10 MG tablet     saccharomyces boulardii  (FLORASTOR) 250 MG capsule     No current facility-administered medications for this visit.      Facility-Administered Medications Ordered in Other Visits   Medication     CT Sodium Chloride            Physical Exam:   Over the phone. No physical ex.          Data:   I have personally reviewed the following labs/imaging:  Recent Labs   Lab Test 07/31/20 0758 07/06/20 0755 06/08/20  0811   WBC 3.3* 3.4* 3.4*   RBC 3.67* 3.48* 3.63*   HGB 12.1* 11.3* 11.4*   HCT 35.8* 34.2* 34.4*   MCV 98 98 95   MCH 33.0 32.5 31.4   MCHC 33.8 33.0 33.1   RDW 15.7* 16.2* 15.9*   PLT 70* 80* 79*   NEUTROPHIL 47.2 53.7 54.8     Recent Labs   Lab Test 07/31/20 0758 07/06/20  0755 06/08/20  0811    141 140   POTASSIUM 3.7 4.2 4.2   CHLORIDE 106 110* 107   CO2 28 26 28   ANIONGAP 4 6 4   GLC 89 104* 95   BUN 19 21 17   CR 0.71 0.75 0.78   YOBANY 8.9 8.4* 8.6     Recent Labs   Lab Test 07/31/20 0758 07/06/20  0755 06/08/20  0811   PROTTOTAL 7.2 6.9 6.9   ALBUMIN 3.8 3.6 3.6   BILITOTAL 0.7 0.6 0.5   ALKPHOS 74 59 53   AST 59* 41 30   ALT 64 50 38     Lab Results   Component Value Date    CEA 1.1 07/31/2020     Images:   CT CAP 7/31/20  IMPRESSION:   1. Slight decrease in size (since 5/29/2020) of the biopsy-proven metastasis in hepatic segment IVb. No new focal hepatic lesion.  2. No evidence of metastatic disease elsewhere in the chest, abdomen, or pelvis.  3. New mild heterogeneity of the hepatic parenchyma, suggesting some degree of hepatic inflammation and/or hepatic steatosis.            Assessment and Plan:   Daniel Sandhu is a 40 year old with metastatic GE junction cancer in a patient with an ECOG status of 1, moderate toxicity from his chemotherapy and evidence of disease response. He is having along enough period of toxicity after his dosing that we will move to a 2 week on/2 week off schedule which helps recovering from chemo side effects. CT CAP shows his liver metastasis to be stable to slightly better with no new  lesions. His labs including CEA ar all normal except for a platelet count of 70k. He is still teaching at [employer] and he is selling house in 2 weeks and moving to [facility].     Recommendations:  Cont XELOX, his new cycle will be 8/5/2020.    Patient is moving to [place], [place] in 2 weeks and asked if he can move his care locally.   Cont nausea management        My attending (Dr. Ponce) and I have participated in reviewing patient's history, labs/images and treatment options.       Matt Estrada M.D.   Heme/Onc Fellow  Pager: [phone]    08/03/2020    Attending note: I saw the patient in conjunction with the fellow and agree with the above.His tumor is well controlled and therapy is going better now with dose adjustments. We will plan on another 2 months of the same and then another response assessment. He is unsure how he want to do his care now that he is moving to [place], but will probably continue to keysha here in the near tierm while he works on establishing care there.

## 2020-08-03 NOTE — LETTER
8/3/2020         RE: Daniel Sandhu  [address] [facility] [address]        Dear Colleague,    Thank you for referring your patient, Daniel Sandhu, to the [facility]. Please see a copy of my visit note below.    Daniel Sandhu is a 40 year old male who is being evaluated via a billable video visit.        Video-Visit Details    Type of service:  Video Visit    Video Start Time: 9:15  Video End Time: 9:45    Originating Location (pt. Location): Home    Distant Location (provider location):  [facility]     Platform used for Video Visit: Miro        Oncology Note - Video/Phone Visit     Daniel Sandhu MRN# [id number]   Age: 40 year old YOB: 1980     Daniel Sandhu is here in follow-up of metastatic gastroesophageal cancer.  Interval History:   Daniel Sandhu  who is being evaluated via a telephone visit.      He reported feeling fine with more tolerance to chemo side effects. His fatigue is better after starting some steroids, he is managing his nausea with medical marijuana, Zofran and Compazine not helping that much.  Otherwise patient denies any change in his bowel movements habit. Appetite is good and his weight is stable.  No chest pain or SOB. No urine issues or legs swelling.   Patient is selling his house in 2 weeks and moving to [facility], he still working as a teacher at [employer] and his wife.got started her job as well in the new place. For that he asked for his care to be transferred over there.     Remainder of her 10 point review of systems is otherwise negative.      Oncology History:   He had T3N1 disease resected back in 2017 with perioperative EOX.  He recurred 2 years after he completed his adjuvant therapy with a biopsy confirmed metastasis in his liver.  He responded to Ramucirumab and Taxol, but two months ago progressed after a year of therapy. He has now had two months of XELOX.  He has  tolerated the new chemotherapy moderately well. There was a lot of N/V and diarrhea the first cycle, but with the adjustments these symptoms resolved.   He gets a fair bit of cold sensitivity and cramping in hands that lasts at least a week and interferes with some of the fine motor skills he needs for his work.   He is fairly fatigued for about 10 days after his treatment.      Medications: reviewed.  Current Outpatient Medications   Medication     capecitabine (XELODA) 500 MG tablet     cyabnocobalamin (VITAMIN B-12) 2500 MCG sublingual tablet     dexamethasone (DECADRON) 4 MG tablet     diphenoxylate-atropine (LOMOTIL) 2.5-0.025 MG tablet     ferrous gluconate (FERGON) 324 (38 Fe) MG tablet     loperamide (IMODIUM) 2 MG capsule     LORazepam (ATIVAN) 0.5 MG tablet     LORazepam (ATIVAN) 0.5 MG tablet     Misc Natural Product Nasal (PONARIS) SOLN     multivitamin, therapeutic with minerals (MULTI-VITAMIN) TABS tablet     mupirocin (BACTROBAN) 2 % external ointment     ondansetron (ZOFRAN) 8 MG tablet     prochlorperazine (COMPAZINE) 10 MG tablet     saccharomyces boulardii (FLORASTOR) 250 MG capsule     No current facility-administered medications for this visit.      Facility-Administered Medications Ordered in Other Visits   Medication     CT Sodium Chloride            Physical Exam:   Over the phone. No physical ex.          Data:   I have personally reviewed the following labs/imaging:  Recent Labs   Lab Test 07/31/20 0758 07/06/20  0755 06/08/20  0811   WBC 3.3* 3.4* 3.4*   RBC 3.67* 3.48* 3.63*   HGB 12.1* 11.3* 11.4*   HCT 35.8* 34.2* 34.4*   MCV 98 98 95   MCH 33.0 32.5 31.4   MCHC 33.8 33.0 33.1   RDW 15.7* 16.2* 15.9*   PLT 70* 80* 79*   NEUTROPHIL 47.2 53.7 54.8     Recent Labs   Lab Test 07/31/20 0758 07/06/20  0755 06/08/20  0811    141 140   POTASSIUM 3.7 4.2 4.2   CHLORIDE 106 110* 107   CO2 28 26 28   ANIONGAP 4 6 4   GLC 89 104* 95   BUN 19 21 17   CR 0.71 0.75 0.78   YOBANY 8.9 8.4* 8.6      Recent Labs   Lab Test 07/31/20  0758 07/06/20  0755 06/08/20  0811   PROTTOTAL 7.2 6.9 6.9   ALBUMIN 3.8 3.6 3.6   BILITOTAL 0.7 0.6 0.5   ALKPHOS 74 59 53   AST 59* 41 30   ALT 64 50 38     Lab Results   Component Value Date    CEA 1.1 07/31/2020     Images:   CT CAP 7/31/20  IMPRESSION:   1. Slight decrease in size (since 5/29/2020) of the biopsy-proven metastasis in hepatic segment IVb. No new focal hepatic lesion.  2. No evidence of metastatic disease elsewhere in the chest, abdomen, or pelvis.  3. New mild heterogeneity of the hepatic parenchyma, suggesting some degree of hepatic inflammation and/or hepatic steatosis.            Assessment and Plan:   Daniel Sandhu is a 40 year old with metastatic GE junction cancer in a patient with an ECOG status of 1, moderate toxicity from his chemotherapy and evidence of disease response. He is having along enough period of toxicity after his dosing that we will move to a 2 week on/2 week off schedule which helps recovering from chemo side effects. CT CAP shows his liver metastasis to be stable to slightly better with no new lesions. His labs including CEA ar all normal except for a platelet count of 70k. He is still teaching at [employer] and he is selling house in 2 weeks and moving to [facility].     Recommendations:  Cont XELOX, his new cycle will be 8/5/2020.    Patient is moving to [place], [place] in 2 weeks and asked if he can move his care locally.   Cont nausea management        My attending (Dr. Ponce) and I have participated in reviewing patient's history, labs/images and treatment options.       Matt Estrada M.D.   Heme/Onc Fellow  Pager: [phone]    08/03/2020    Attending note: I saw the patient in conjunction with the fellow and agree with the above.His tumor is well controlled and therapy is going better now with dose adjustments. We will plan on another 2 months of the same and then another response assessment. He is unsure how he  want to do his care now that he is moving to [place], but will probably continue to keysha here in the near tierm while he works on establishing care there.      Again, thank you for allowing me to participate in the care of your patient.        Sincerely,        Daryn Ponce MD

## 2020-08-05 ENCOUNTER — INFUSION THERAPY VISIT (OUTPATIENT)
Dept: ONCOLOGY | Facility: CLINIC | Age: 40
End: 2020-08-05
Attending: INTERNAL MEDICINE
Payer: COMMERCIAL

## 2020-08-05 ENCOUNTER — APPOINTMENT (OUTPATIENT)
Dept: LAB | Facility: CLINIC | Age: 40
End: 2020-08-05
Attending: INTERNAL MEDICINE
Payer: COMMERCIAL

## 2020-08-05 VITALS
BODY MASS INDEX: 23.08 KG/M2 | HEART RATE: 93 BPM | RESPIRATION RATE: 14 BRPM | OXYGEN SATURATION: 99 % | WEIGHT: 148.8 LBS | DIASTOLIC BLOOD PRESSURE: 55 MMHG | TEMPERATURE: 96.1 F | SYSTOLIC BLOOD PRESSURE: 96 MMHG

## 2020-08-05 DIAGNOSIS — C15.5 MALIGNANT NEOPLASM OF LOWER THIRD OF ESOPHAGUS (H): Primary | ICD-10-CM

## 2020-08-05 LAB
ALBUMIN SERPL-MCNC: 3.6 G/DL (ref 3.4–5)
ALP SERPL-CCNC: 68 U/L (ref 40–150)
ALT SERPL W P-5'-P-CCNC: 58 U/L (ref 0–70)
ANION GAP SERPL CALCULATED.3IONS-SCNC: 6 MMOL/L (ref 3–14)
AST SERPL W P-5'-P-CCNC: 52 U/L (ref 0–45)
BASOPHILS # BLD AUTO: 0 10E9/L (ref 0–0.2)
BASOPHILS NFR BLD AUTO: 0.5 %
BILIRUB SERPL-MCNC: 0.6 MG/DL (ref 0.2–1.3)
BUN SERPL-MCNC: 20 MG/DL (ref 7–30)
CALCIUM SERPL-MCNC: 8.8 MG/DL (ref 8.5–10.1)
CHLORIDE SERPL-SCNC: 109 MMOL/L (ref 94–109)
CO2 SERPL-SCNC: 26 MMOL/L (ref 20–32)
CREAT SERPL-MCNC: 0.73 MG/DL (ref 0.66–1.25)
DIFFERENTIAL METHOD BLD: ABNORMAL
EOSINOPHIL # BLD AUTO: 0.1 10E9/L (ref 0–0.7)
EOSINOPHIL NFR BLD AUTO: 3 %
ERYTHROCYTE [DISTWIDTH] IN BLOOD BY AUTOMATED COUNT: 14.9 % (ref 10–15)
GFR SERPL CREATININE-BSD FRML MDRD: >90 ML/MIN/{1.73_M2}
GLUCOSE SERPL-MCNC: 99 MG/DL (ref 70–99)
HCT VFR BLD AUTO: 35.7 % (ref 40–53)
HGB BLD-MCNC: 11.8 G/DL (ref 13.3–17.7)
IMM GRANULOCYTES # BLD: 0 10E9/L (ref 0–0.4)
IMM GRANULOCYTES NFR BLD: 0 %
LYMPHOCYTES # BLD AUTO: 0.9 10E9/L (ref 0.8–5.3)
LYMPHOCYTES NFR BLD AUTO: 24.5 %
MCH RBC QN AUTO: 32.6 PG (ref 26.5–33)
MCHC RBC AUTO-ENTMCNC: 33.1 G/DL (ref 31.5–36.5)
MCV RBC AUTO: 99 FL (ref 78–100)
MONOCYTES # BLD AUTO: 0.6 10E9/L (ref 0–1.3)
MONOCYTES NFR BLD AUTO: 15.2 %
NEUTROPHILS # BLD AUTO: 2.1 10E9/L (ref 1.6–8.3)
NEUTROPHILS NFR BLD AUTO: 56.8 %
NRBC # BLD AUTO: 0 10*3/UL
NRBC BLD AUTO-RTO: 0 /100
PLATELET # BLD AUTO: 68 10E9/L (ref 150–450)
POTASSIUM SERPL-SCNC: 3.9 MMOL/L (ref 3.4–5.3)
PROT SERPL-MCNC: 7.1 G/DL (ref 6.8–8.8)
RBC # BLD AUTO: 3.62 10E12/L (ref 4.4–5.9)
SODIUM SERPL-SCNC: 140 MMOL/L (ref 133–144)
WBC # BLD AUTO: 3.7 10E9/L (ref 4–11)

## 2020-08-05 PROCEDURE — 25000128 H RX IP 250 OP 636: Mod: ZF | Performed by: INTERNAL MEDICINE

## 2020-08-05 PROCEDURE — 96415 CHEMO IV INFUSION ADDL HR: CPT

## 2020-08-05 PROCEDURE — 96367 TX/PROPH/DG ADDL SEQ IV INF: CPT

## 2020-08-05 PROCEDURE — 85025 COMPLETE CBC W/AUTO DIFF WBC: CPT | Performed by: INTERNAL MEDICINE

## 2020-08-05 PROCEDURE — 80053 COMPREHEN METABOLIC PANEL: CPT | Performed by: INTERNAL MEDICINE

## 2020-08-05 PROCEDURE — 96413 CHEMO IV INFUSION 1 HR: CPT

## 2020-08-05 PROCEDURE — 25800030 ZZH RX IP 258 OP 636: Mod: ZF | Performed by: INTERNAL MEDICINE

## 2020-08-05 PROCEDURE — 96375 TX/PRO/DX INJ NEW DRUG ADDON: CPT

## 2020-08-05 RX ORDER — HEPARIN SODIUM (PORCINE) LOCK FLUSH IV SOLN 100 UNIT/ML 100 UNIT/ML
5 SOLUTION INTRAVENOUS DAILY PRN
Status: DISCONTINUED | OUTPATIENT
Start: 2020-08-05 | End: 2020-08-05 | Stop reason: HOSPADM

## 2020-08-05 RX ORDER — HEPARIN SODIUM (PORCINE) LOCK FLUSH IV SOLN 100 UNIT/ML 100 UNIT/ML
5 SOLUTION INTRAVENOUS
Status: DISCONTINUED | OUTPATIENT
Start: 2020-08-05 | End: 2020-08-05 | Stop reason: HOSPADM

## 2020-08-05 RX ADMIN — Medication 5 ML: at 08:05

## 2020-08-05 RX ADMIN — OXALIPLATIN 250 MG: 100 INJECTION, SOLUTION, CONCENTRATE INTRAVENOUS at 09:55

## 2020-08-05 RX ADMIN — DEXAMETHASONE SODIUM PHOSPHATE: 10 INJECTION, SOLUTION INTRAMUSCULAR; INTRAVENOUS at 09:34

## 2020-08-05 RX ADMIN — DEXTROSE MONOHYDRATE 250 ML: 50 INJECTION, SOLUTION INTRAVENOUS at 09:06

## 2020-08-05 RX ADMIN — SODIUM CHLORIDE 150 MG: 9 INJECTION, SOLUTION INTRAVENOUS at 09:10

## 2020-08-05 ASSESSMENT — PAIN SCALES - GENERAL: PAINLEVEL: NO PAIN (0)

## 2020-08-05 NOTE — PROGRESS NOTES
Infusion Nursing Note:  Daniel Sandhu presents today for C5 D1 Oxaliplatin.    Patient seen by provider today: No   present during visit today: Not Applicable.    Note: Patient arrives feeling well but tired. No other concerns or complaints.    Per TORB Dr. Ponce/Radha Osuna, RN @0841 8/5/20:  - Okay to proceed with platelet count of 68    Intravenous Access:  Implanted Port.    Treatment Conditions:  Lab Results   Component Value Date    HGB 11.8 08/05/2020     Lab Results   Component Value Date    WBC 3.7 08/05/2020      Lab Results   Component Value Date    ANEU 2.1 08/05/2020     Lab Results   Component Value Date    PLT 68 08/05/2020      Lab Results   Component Value Date     08/05/2020                   Lab Results   Component Value Date    POTASSIUM 3.9 08/05/2020           Lab Results   Component Value Date    MAG 2.2 03/07/2018            Lab Results   Component Value Date    CR 0.73 08/05/2020                   Lab Results   Component Value Date    YOBANY 8.8 08/05/2020                Lab Results   Component Value Date    BILITOTAL 0.6 08/05/2020           Lab Results   Component Value Date    ALBUMIN 3.6 08/05/2020                    Lab Results   Component Value Date    ALT 58 08/05/2020           Lab Results   Component Value Date    AST 52 08/05/2020       Results reviewed, labs did NOT meet treatment parameters: platelets <75. See TORB above.      Post Infusion Assessment:  Patient tolerated infusion without incident.  Blood return noted pre and post infusion.  Site patent and intact, free from redness, edema or discomfort.  No evidence of extravasations.       Discharge Plan:   Prescription refills given for Dexamethasone.  Discharge instructions reviewed with: Patient.  Patient and/or family verbalized understanding of discharge instructions and all questions answered.  AVS to patient via [employer].  Patient will return 9/2 for next appointment.   Patient discharged in stable  condition accompanied by: self.  Departure Mode: Ambulatory.    Radha Osuna RN

## 2020-08-05 NOTE — NURSING NOTE
Chief Complaint   Patient presents with     Port Draw     Labs drawn via port by RN in lab. VS taken.      Labs drawn via Port accessed using 20g gripper needle. Line flushed and Heparin locked. Vital signs taken. Checked into next appointment.       Marlena Thomas RN

## 2020-08-20 ENCOUNTER — TELEPHONE (OUTPATIENT)
Dept: ONCOLOGY | Facility: CLINIC | Age: 40
End: 2020-08-20

## 2020-08-20 NOTE — TELEPHONE ENCOUNTER
Oral Chemotherapy Monitoring Program    Primary Oncologist: Dr. Ponce  Primary Oncology Clinic: [facility]  Cancer Diagnosis: Gastroesophageal Cancer     Therapy History:  Xeloda (capecitabine) 2000mg (4s890po tablets) BID, days 1-14 of 28-day cycle  C1D1: 4/22/2020  C5D1: 8/4/2020    Drug Interaction Assessment: No new drug interactions identified.     Lab Monitoring Plan  CBC & CMP q3 weeks with infusions    Subjective/Objective:  Daniel Sandhu is a 40 year old male contacted by phone for a follow-up visit for oral chemotherapy.  Daniel confirmed his dose of Xeloda and reports missing one dose in his current cycle. He says he is doing well but has noticed more side effects this cycle due to the fact that he is also moving. Daniel notes that his HFS is worse because he has to use his hands a lot when moving. He uses udder cream and gloves at night which has relieved some of the pain. Daniel reports that he has noticed no other new side effects and has been able to manage any other side effects that he experiences.    ORAL CHEMOTHERAPY 4/14/2020 4/29/2020 6/16/2020 7/14/2020 8/20/2020   Drug Name Xeloda (Capecitabine) Xeloda (Capecitabine) Xeloda (Capecitabine) Xeloda (Capecitabine) Xeloda (Capecitabine)   Current Dosage 2000mg 2000mg 2000mg 2000mg 2000mg   Current Schedule BID BID Other BID BID   Cycle Details 2 weeks on 1 week off 2 weeks on 1 week off 2 weeks on 2 weeks off 2 weeks on 2 weeks off 2 weeks on 2 weeks off   Start Date of Last Cycle - 4/22/2020 - 7/6/2020 8/4/2020   Planned next cycle start date 4/20/2020 5/7/2020 - 8/3/2020 9/2/2020   Doses missed in last 2 weeks - 1 0 0 0   Adherence Assessment - Adherent Adherent Adherent Adherent   Adverse Effects - Diarrhea;Nausea No AE identified during assessment No AE identified during assessment Palmar-plantar erythrodysethesia syndrome   Pharmacist Intervention(nausea) - Yes - - -   Intervention(s) - Patient education - - -   Diarrhea - Grade 1 -  "- -   Pharmacist Intervention(diarrhea) - Yes - - -   Intervention(s) - Patient education - - -   Palmar-plantar Erythrodysethesia syndrome[hand-foot syndrome] - - - - Grade 1   Pharmacist Intervention(Palmar-plantar) - - - - Yes   Intervention(s) - - - - Patient education   Any new drug interactions? No No - No No   Is the dose as ordered appropriate for the patient? Yes - - Yes Yes     Vitals:  BP:   BP Readings from Last 1 Encounters:   08/05/20 96/55     Wt Readings from Last 1 Encounters:   08/05/20 67.5 kg (148 lb 12.8 oz)     Estimated body surface area is 1.79 meters squared as calculated from the following:    Height as of 1/16/20: 1.71 m (5' 7.32\").    Weight as of 8/5/20: 67.5 kg (148 lb 12.8 oz).    Labs:  _  Result Component Current Result Ref Range   Sodium 140 (8/5/2020) 133 - 144 mmol/L     _  Result Component Current Result Ref Range   Potassium 3.9 (8/5/2020) 3.4 - 5.3 mmol/L     _  Result Component Current Result Ref Range   Calcium 8.8 (8/5/2020) 8.5 - 10.1 mg/dL     No results found for Mag within last 30 days.     No results found for Phos within last 30 days.     _  Result Component Current Result Ref Range   Albumin 3.6 (8/5/2020) 3.4 - 5.0 g/dL     _  Result Component Current Result Ref Range   Urea Nitrogen 20 (8/5/2020) 7 - 30 mg/dL     _  Result Component Current Result Ref Range   Creatinine 0.73 (8/5/2020) 0.66 - 1.25 mg/dL       _  Result Component Current Result Ref Range   AST 52 (H) (8/5/2020) 0 - 45 U/L     _  Result Component Current Result Ref Range   ALT 58 (8/5/2020) 0 - 70 U/L     _  Result Component Current Result Ref Range   Bilirubin Total 0.6 (8/5/2020) 0.2 - 1.3 mg/dL       _  Result Component Current Result Ref Range   WBC 3.7 (L) (8/5/2020) 4.0 - 11.0 10e9/L     _  Result Component Current Result Ref Range   Hemoglobin 11.8 (L) (8/5/2020) 13.3 - 17.7 g/dL     _  Result Component Current Result Ref Range   Platelet Count 68 (L) (8/5/2020) 150 - 450 10e9/L     _  Result " Component Current Result Ref Range   Absolute Neutrophil 2.1 (8/5/2020) 1.6 - 8.3 10e9/L       Assessment:  Daniel is tolerating Xeloda therapy well and managing all side effects well.    Plan:  Continue therapy as planned.    Follow-Up:  Review [facility] appt 9/28 and check to see if he plans to transfer care locally.    Shirlene Hager  Pharmacy Intern  [facility]  [phone]

## 2020-08-21 ENCOUNTER — TELEPHONE (OUTPATIENT)
Dept: ONCOLOGY | Facility: CLINIC | Age: 40
End: 2020-08-21

## 2020-08-21 DIAGNOSIS — C15.5 MALIGNANT NEOPLASM OF LOWER THIRD OF ESOPHAGUS (H): Primary | ICD-10-CM

## 2020-08-21 DIAGNOSIS — C78.7 MALIGNANT NEOPLASM METASTATIC TO LIVER (H): ICD-10-CM

## 2020-08-21 NOTE — TELEPHONE ENCOUNTER
Hem/Onc referral faxed as requested below.  Successful fax transmission verified via RightFax.    Keri Littlejohn CMA ([facility])          ----- Message from Francine Garcia RN sent at 8/21/2020  2:59 PM CDT -----  Regarding: Fax referral  Hi team,     Would you be able to fax the hem/onc referral just entered to [facility] at [phone].    Thanks!  Unruly

## 2020-08-30 DIAGNOSIS — C15.5 MALIGNANT NEOPLASM OF LOWER THIRD OF ESOPHAGUS (H): Primary | ICD-10-CM

## 2020-08-30 RX ORDER — EPINEPHRINE 0.3 MG/.3ML
0.3 INJECTION SUBCUTANEOUS EVERY 5 MIN PRN
Status: CANCELLED | OUTPATIENT
Start: 2020-08-31

## 2020-08-30 RX ORDER — ALBUTEROL SULFATE 0.83 MG/ML
2.5 SOLUTION RESPIRATORY (INHALATION)
Status: CANCELLED | OUTPATIENT
Start: 2020-08-31

## 2020-08-30 RX ORDER — HEPARIN SODIUM (PORCINE) LOCK FLUSH IV SOLN 100 UNIT/ML 100 UNIT/ML
5 SOLUTION INTRAVENOUS
Status: CANCELLED | OUTPATIENT
Start: 2020-08-31

## 2020-08-30 RX ORDER — SODIUM CHLORIDE 9 MG/ML
1000 INJECTION, SOLUTION INTRAVENOUS CONTINUOUS PRN
Status: CANCELLED
Start: 2020-08-31

## 2020-08-30 RX ORDER — MEPERIDINE HYDROCHLORIDE 25 MG/ML
25 INJECTION INTRAMUSCULAR; INTRAVENOUS; SUBCUTANEOUS EVERY 30 MIN PRN
Status: CANCELLED | OUTPATIENT
Start: 2020-08-31

## 2020-08-30 RX ORDER — LORAZEPAM 2 MG/ML
0.5 INJECTION INTRAMUSCULAR EVERY 4 HOURS PRN
Status: CANCELLED
Start: 2020-08-31

## 2020-08-30 RX ORDER — METHYLPREDNISOLONE SODIUM SUCCINATE 125 MG/2ML
125 INJECTION, POWDER, LYOPHILIZED, FOR SOLUTION INTRAMUSCULAR; INTRAVENOUS
Status: CANCELLED
Start: 2020-08-31

## 2020-08-30 RX ORDER — EPINEPHRINE 1 MG/ML
0.3 INJECTION, SOLUTION INTRAMUSCULAR; SUBCUTANEOUS EVERY 5 MIN PRN
Status: CANCELLED | OUTPATIENT
Start: 2020-08-31

## 2020-08-30 RX ORDER — HEPARIN SODIUM,PORCINE 10 UNIT/ML
5 VIAL (ML) INTRAVENOUS
Status: CANCELLED | OUTPATIENT
Start: 2020-08-31

## 2020-08-30 RX ORDER — DIPHENHYDRAMINE HYDROCHLORIDE 50 MG/ML
50 INJECTION INTRAMUSCULAR; INTRAVENOUS
Status: CANCELLED
Start: 2020-08-31

## 2020-08-30 RX ORDER — NALOXONE HYDROCHLORIDE 0.4 MG/ML
.1-.4 INJECTION, SOLUTION INTRAMUSCULAR; INTRAVENOUS; SUBCUTANEOUS
Status: CANCELLED | OUTPATIENT
Start: 2020-08-31

## 2020-08-30 RX ORDER — ALBUTEROL SULFATE 90 UG/1
1-2 AEROSOL, METERED RESPIRATORY (INHALATION)
Status: CANCELLED
Start: 2020-08-31

## 2020-08-31 ENCOUNTER — TRANSFERRED RECORDS (OUTPATIENT)
Dept: HEALTH INFORMATION MANAGEMENT | Facility: CLINIC | Age: 40
End: 2020-08-31

## 2020-08-31 DIAGNOSIS — C15.5 MALIGNANT NEOPLASM OF LOWER THIRD OF ESOPHAGUS (H): Primary | ICD-10-CM

## 2020-08-31 RX ORDER — CAPECITABINE 500 MG/1
1000 TABLET, FILM COATED ORAL 2 TIMES DAILY
Qty: 112 TABLET | Refills: 0 | Status: SHIPPED | OUTPATIENT
Start: 2020-08-31 | End: 2020-09-14

## 2020-09-28 DIAGNOSIS — C15.5 MALIGNANT NEOPLASM OF LOWER THIRD OF ESOPHAGUS (H): Primary | ICD-10-CM

## 2020-09-28 RX ORDER — ALBUTEROL SULFATE 0.83 MG/ML
2.5 SOLUTION RESPIRATORY (INHALATION)
Status: CANCELLED | OUTPATIENT
Start: 2020-09-28

## 2020-09-28 RX ORDER — HEPARIN SODIUM,PORCINE 10 UNIT/ML
5 VIAL (ML) INTRAVENOUS
Status: CANCELLED | OUTPATIENT
Start: 2020-09-28

## 2020-09-28 RX ORDER — SODIUM CHLORIDE 9 MG/ML
1000 INJECTION, SOLUTION INTRAVENOUS CONTINUOUS PRN
Status: CANCELLED
Start: 2020-09-28

## 2020-09-28 RX ORDER — ALBUTEROL SULFATE 90 UG/1
1-2 AEROSOL, METERED RESPIRATORY (INHALATION)
Status: CANCELLED
Start: 2020-09-28

## 2020-09-28 RX ORDER — LORAZEPAM 2 MG/ML
0.5 INJECTION INTRAMUSCULAR EVERY 4 HOURS PRN
Status: CANCELLED
Start: 2020-09-28

## 2020-09-28 RX ORDER — NALOXONE HYDROCHLORIDE 0.4 MG/ML
.1-.4 INJECTION, SOLUTION INTRAMUSCULAR; INTRAVENOUS; SUBCUTANEOUS
Status: CANCELLED | OUTPATIENT
Start: 2020-09-28

## 2020-09-28 RX ORDER — DIPHENHYDRAMINE HYDROCHLORIDE 50 MG/ML
50 INJECTION INTRAMUSCULAR; INTRAVENOUS
Status: CANCELLED
Start: 2020-09-28

## 2020-09-28 RX ORDER — HEPARIN SODIUM (PORCINE) LOCK FLUSH IV SOLN 100 UNIT/ML 100 UNIT/ML
5 SOLUTION INTRAVENOUS
Status: CANCELLED | OUTPATIENT
Start: 2020-09-28

## 2020-09-28 RX ORDER — METHYLPREDNISOLONE SODIUM SUCCINATE 125 MG/2ML
125 INJECTION, POWDER, LYOPHILIZED, FOR SOLUTION INTRAMUSCULAR; INTRAVENOUS
Status: CANCELLED
Start: 2020-09-28

## 2020-09-28 RX ORDER — EPINEPHRINE 0.3 MG/.3ML
0.3 INJECTION SUBCUTANEOUS EVERY 5 MIN PRN
Status: CANCELLED | OUTPATIENT
Start: 2020-09-28

## 2020-09-28 RX ORDER — EPINEPHRINE 1 MG/ML
0.3 INJECTION, SOLUTION INTRAMUSCULAR; SUBCUTANEOUS EVERY 5 MIN PRN
Status: CANCELLED | OUTPATIENT
Start: 2020-09-28

## 2020-09-28 RX ORDER — MEPERIDINE HYDROCHLORIDE 25 MG/ML
25 INJECTION INTRAMUSCULAR; INTRAVENOUS; SUBCUTANEOUS EVERY 30 MIN PRN
Status: CANCELLED | OUTPATIENT
Start: 2020-09-28

## 2020-10-28 ENCOUNTER — TRANSFERRED RECORDS (OUTPATIENT)
Dept: HEALTH INFORMATION MANAGEMENT | Facility: CLINIC | Age: 40
End: 2020-10-28

## 2020-11-22 ENCOUNTER — HEALTH MAINTENANCE LETTER (OUTPATIENT)
Age: 40
End: 2020-11-22

## 2020-11-27 ENCOUNTER — TELEPHONE (OUTPATIENT)
Dept: PHARMACY | Facility: CLINIC | Age: 40
End: 2020-11-27

## 2020-11-27 NOTE — ORAL ONC MGMT
[facility] Oral Chemotherapy Monitoring Program    Thank you for the opportunity to be a part in the care of this patient's oral chemotherapy. The oncology pharmacy will no longer be following this patient for oral chemotherapy. If there are any questions or the plan changes, feel free to contact us.    ORAL CHEMOTHERAPY 4/14/2020 4/29/2020 6/16/2020 7/14/2020 8/20/2020 11/27/2020   Assessment Type - - - - - Discontinuation   Stop Date - - - - - 9/15/2020   Reason for Discontinuation - - - - - Other/unknown reason   Other: - - - - -  switching to other clinic   Diagnosis Code - - - - - Esophageal Cancer   Providers - - - - - Dr. Ponce   Clinic Name/Location - - - - - [facility]   Drug Name Xeloda (Capecitabine) Xeloda (Capecitabine) Xeloda (Capecitabine) Xeloda (Capecitabine) Xeloda (Capecitabine) Xeloda (Capecitabine)   Dose - - - - - 2,000 mg   Current Schedule BID BID Other BID BID BID   Cycle Details 2 weeks on 1 week off 2 weeks on 1 week off 2 weeks on 2 weeks off 2 weeks on 2 weeks off 2 weeks on 2 weeks off 2 weeks on, 2 weeks off   Start Date of Last Cycle - 4/22/2020 - 7/6/2020 8/4/2020 -   Planned next cycle start date 4/20/2020 5/7/2020 - 8/3/2020 9/2/2020 -   Doses missed in last 2 weeks - 1 0 0 0 -   Adherence Assessment - Adherent Adherent Adherent Adherent -   Adverse Effects - Diarrhea;Nausea No AE identified during assessment No AE identified during assessment Palmar-plantar erythrodysethesia syndrome -   Pharmacist Intervention(nausea) - Yes - - - -   Diarrhea - Grade 1 - - - -   Pharmacist Intervention(diarrhea) - Yes - - - -   Palmar-plantar Erythrodysethesia syndrome[hand-foot syndrome] - - - - Grade 1 -   Pharmacist Intervention(Palmar-plantar) - - - - Yes -   Any new drug interactions? No No - No No -   Is the dose as ordered appropriate for the patient? Yes - - Yes Yes -       Jabier Reagan Pharmacy Intern  Oral Chemotherapy Monitoring  Program  [phone]

## 2020-11-30 ENCOUNTER — TRANSFERRED RECORDS (OUTPATIENT)
Dept: HEALTH INFORMATION MANAGEMENT | Facility: CLINIC | Age: 40
End: 2020-11-30

## 2021-04-01 NOTE — NURSING NOTE
Lab only genetic testing that was drawn and walked down to lab by writer., pt tolerated well. Nafisa Lau     no tenderness

## 2021-04-08 NOTE — TELEPHONE ENCOUNTER
RECORDS RECEIVED FROM: Carpal tunnel syndrome/Lencho Chan MD in  FAMILY PRACTICE/HP/orthocn   DATE RECEIVED: Jun 9, 2021     NOTES STATUS DETAILS   OFFICE NOTE from referring provider Internal  Lencho Chan MD   OFFICE NOTE from other specialist N/A    DISCHARGE SUMMARY from hospital N/A    DISCHARGE REPORT from the ER N/A    OPERATIVE REPORT N/A    MEDICATION LIST Internal    IMPLANT RECORD/STICKER N/A    LABS     CBC/DIFF N/A    CULTURES N/A    INJECTIONS DONE IN RADIOLOGY N/A    MRI N/A    CT SCAN N/A    XRAYS (IMAGES & REPORTS) N/A    TUMOR     PATHOLOGY  Slides & report N/A      04/08/21   2:47 PM   COMPLETE  Pooja De La Cruz, CMA

## 2021-04-26 ENCOUNTER — TRANSFERRED RECORDS (OUTPATIENT)
Dept: HEALTH INFORMATION MANAGEMENT | Facility: CLINIC | Age: 41
End: 2021-04-26

## 2021-05-17 NOTE — MR AVS SNAPSHOT
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  Normal or non-critical lab and imaging results will be communicated to you by MyChart, letter or phone within 4 business days after the clinic has received the results. If you do not hear from us within 7 days, please contact the clinic through MyChart or phone. If you have a critical or abnormal lab result, we will notify you by phone as soon as possible.  Submit refill requests through [employer] or call your pharmacy and they will forward the refill request to us. Please allow 3 business days for your refill to be completed.          Additional Information About Your Visit        MyChart Information     [employer] gives you secure access to your electronic health record. If you see a primary care provider, you can also send messages to your care team and make appointments. If you have questions, please call your primary care clinic.  If you do not have a primary care provider, please call [phone] and they will assist you.        Care EveryWhere ID     This is your Care EveryWhere ID. This could be used by other organizations to access your [facility] medical records  [id number]    "     Your Vitals Were     Pulse Temperature Respirations Height Pulse Oximetry BMI (Body Mass Index)    80 98.2  F (36.8  C) (Oral) 18 1.749 m (5' 8.86\") 100% 23.07 kg/m2       Blood Pressure from Last 3 Encounters:   06/19/17 129/81   06/14/17 118/74   06/09/17 114/66    Weight from Last 3 Encounters:   06/19/17 70.6 kg (155 lb 9.6 oz)   06/14/17 71.4 kg (157 lb 6.4 oz)   06/09/17 70.1 kg (154 lb 8.7 oz)               Primary Care Provider Office Phone # Fax #    Lencho Chan [phone]       [address] [address]        Thank you!     Thank you for choosing [facility]  for your care. Our goal is always to provide you with excellent care. Hearing back from our patients is one way we can continue to improve our services. Please take a few minutes to complete the written survey that you may receive in the mail after your visit with us. Thank you!             Your Updated Medication List - Protect others around you: Learn how to safely use, store and throw away your medicines at www.disposemymeds.org.      Notice  As of 6/19/2017  2:33 PM    You have not been prescribed any medications.      " Billing Type: Third-Party Bill

## 2021-06-09 ENCOUNTER — PRE VISIT (OUTPATIENT)
Dept: ORTHOPEDICS | Facility: CLINIC | Age: 41
End: 2021-06-09

## 2021-09-19 ENCOUNTER — HEALTH MAINTENANCE LETTER (OUTPATIENT)
Age: 41
End: 2021-09-19

## 2021-12-27 LAB
ALT SERPL-CCNC: 48 U/L (ref 12–70)
AST SERPL-CCNC: 39 U/L (ref 15–46)
CREATININE (EXTERNAL): 1 MG/DL (ref 0.6–1.2)
GFR ESTIMATED (EXTERNAL): 80.9 ML/MIN (ref 60–150)
GLUCOSE (EXTERNAL): 101 MG/DL (ref 70–99)
POTASSIUM (EXTERNAL): 3.9 MMOL/L (ref 3.4–5.1)

## 2022-01-09 ENCOUNTER — HEALTH MAINTENANCE LETTER (OUTPATIENT)
Age: 42
End: 2022-01-09

## 2022-01-12 LAB
ALT SERPL-CCNC: 40 U/L (ref 12–70)
AST SERPL-CCNC: 29 U/L (ref 15–46)
CREATININE (EXTERNAL): 0.7 MG/DL (ref 0.6–1.2)
GFR ESTIMATED (EXTERNAL): >90 ML/MIN (ref 60–150)
GLUCOSE (EXTERNAL): 97 MG/DL (ref 70–99)
INR (EXTERNAL): 1 (ref 0.9–1.1)
POTASSIUM (EXTERNAL): 3.9 MMOL/L (ref 3.4–5.1)

## 2022-02-21 LAB
ALT SERPL-CCNC: 60 U/L (ref 12–70)
AST SERPL-CCNC: 43 U/L (ref 15–46)
CREATININE (EXTERNAL): 0.8 MG/DL (ref 0.6–1.2)
GFR ESTIMATED (EXTERNAL): >90 ML/MIN (ref 60–150)
GLUCOSE (EXTERNAL): 89 MG/DL (ref 70–99)
POTASSIUM (EXTERNAL): 4.3 MMOL/L (ref 3.4–5.1)

## 2022-03-21 LAB
ALT SERPL-CCNC: 45 U/L (ref 12–70)
AST SERPL-CCNC: 43 U/L (ref 15–46)
CREATININE (EXTERNAL): 0.7 MG/DL (ref 0.6–1.2)
GFR ESTIMATED (EXTERNAL): >90 ML/MIN (ref 60–150)
GLUCOSE (EXTERNAL): 81 MG/DL (ref 70–99)
POTASSIUM (EXTERNAL): 4 MMOL/L (ref 3.4–5.1)

## 2022-04-18 LAB
ALT SERPL-CCNC: 50 U/L (ref 12–70)
AST SERPL-CCNC: 34 U/L (ref 15–46)
CREATININE (EXTERNAL): 0.7 MG/DL (ref 0.6–1.2)
GFR ESTIMATED (EXTERNAL): >90 (ref 60–150)
GLUCOSE (EXTERNAL): 77 MG/DL (ref 70–99)
POTASSIUM (EXTERNAL): 4.2 MMOL/L (ref 3.4–5.1)

## 2022-05-31 LAB
ALT SERPL-CCNC: 40 U/L (ref 12–70)
AST SERPL-CCNC: 34 U/L (ref 15–46)
CREATININE (EXTERNAL): 0.8 MG/DL (ref 0.6–1.2)
GFR ESTIMATED (EXTERNAL): >90 ML/MIN (ref 60–150)
GLUCOSE (EXTERNAL): 71 MG/DL (ref 70–99)
POTASSIUM (EXTERNAL): 3.8 MMOL/L (ref 3.4–5.1)

## 2022-05-31 NOTE — PLAN OF CARE
"Problem: Failure to Thrive/Undernutrition (Pediatric)  Goal: Signs and Symptoms of Listed Potential Problems Will be Absent, Minimized or Managed (Failure to Thrive/Undernutrition)  Signs and symptoms of listed potential problems will be absent, minimized or managed by discharge/transition of care (reference Failure to Thrive/Undernutrition (Pediatric) CPG).   Outcome: No Change  /72 (BP Location: Left arm, Cuff Size: Adult Regular)  Pulse 76  Temp 98.7  F (37.1  C)  Resp 16  Ht 1.75 m (5' 8.9\")  Wt 60.1 kg (132 lb 7.9 oz)  SpO2 98%  BMI 19.62 kg/m2    Pt's AVSS, denied pain all night and x1 large watery green stool. Pt's G/J tube slightly pulled out by pt accidentally.  MD aware of G/J tube and pt's status. Pt is NPO for EGD toady. Bia-cath infusing at 100ml/h and schedule Zofran and compazine given which help maintain pt's nausea. Pt is up independently in room. Keep monitoring pt as ordered and notify MD with any new changes.      Problem: Failure to Thrive/Undernutrition (Pediatric)  Goal: Signs and Symptoms of Listed Potential Problems Will be Absent, Minimized or Managed (Failure to Thrive/Undernutrition)  Signs and symptoms of listed potential problems will be absent, minimized or managed by discharge/transition of care (reference Failure to Thrive/Undernutrition (Pediatric) CPG).   Outcome: No Change   01/11/18 0559   Failure to Thrive/Undernutrition   Problems Assessed (Failure to Thrive/Undernutrition) all   Problems Present (Failure to Thrive) situational response            "
"Problem: Failure to Thrive/Undernutrition (Pediatric)  Goal: Signs and Symptoms of Listed Potential Problems Will be Absent, Minimized or Managed (Failure to Thrive/Undernutrition)  Signs and symptoms of listed potential problems will be absent, minimized or managed by discharge/transition of care (reference Failure to Thrive/Undernutrition (Pediatric) CPG).   Outcome: No Change  /78 (BP Location: Left arm, Cuff Size: Adult Regular)  Pulse 79  Temp 97.5  F (36.4  C) (Oral)  Resp 16  Ht 1.75 m (5' 8.9\")  Wt 60.4 kg (133 lb 2.5 oz)  SpO2 98%  BMI 19.72 kg/m2  NEURO: Pt's AL&OX4  CV: AVSS  PULM: lungs clear and maintaining sat's in the upper 90's while on RA  GI: Pt having large amount of watery stool x2 during the shift.  PT is npo for possible Esophogram during this admission.     : Pt is voiding but not saving.   SKIN: Skin intact with no open area.   LABS: K+ 4.2, Mag 2.1, Phos 3.2, WBC 1.7, Hgb 11.9, Plt 277  LDA: LLQ G/J/tube intact and clamped off.   GTTS: MIVF infusing at 125ml/h.   PAIN: Pt denied pain all night.   ACTIVITY: Pt is up independently in room.   PLAN: Keep monitoring pt as ordered and notify MD with any new changes.                     "
"Problem: Patient Care Overview  Goal: Plan of Care/Patient Progress Review  Outcome: Therapy, progress toward functional goals is gradual  /81  Pulse 76  Temp 97.4  F (36.3  C) (Oral)  Resp 16  Ht 1.75 m (5' 8.9\")  Wt 59.8 kg (131 lb 13.4 oz)  SpO2 100%  BMI 19.53 kg/m2  Neuro: A&Ox4.   Cardiac: VSS.   Respiratory: RA   GI/: Voiding spontaneously. Loose stools.  Diet/appetite: Tolerating diet. Denies nausea   Activity: Up independently.  Pain: . Denies   Skin: Intact, no new deficits noted.  Lines:  Port A cath.  Drains: J tube.    Start tube feeds, advanced to regular diet. Possible plan to d/c tomorrow. Will continue to monitor and follow plan of care.           "
Problem: Patient Care Overview  Goal: Individualization & Mutuality  Afebrile,vitals stable, small loose stool x1,watery stool x 1,received lomotil x 2.Appetite fair,received prn compazine 5 mg po x 1 as well as scheduled IV zofran.Tube feeding .Osmolite 1.5 running at 60 ml/hr,phosphorus replaced..Ambulated  with PT staff ,steady on feet.Plan to discharge home this. evening.      
Problem: Patient Care Overview  Goal: Individualization & Mutuality  Outcome: No Change  Pt is afeb, vital signs are stable; denied any pain but complained of diarrhea and takes Imodium q 3-4 hours; Tube feeding started at 10 cc and needs to be increased q 6 hrs as tolerated. Was able to eat some fruits; monitor pt closely and continue plan of care.    Problem: Failure to Thrive/Undernutrition (Pediatric)  Goal: Signs and Symptoms of Listed Potential Problems Will be Absent, Minimized or Managed (Failure to Thrive/Undernutrition)  Signs and symptoms of listed potential problems will be absent, minimized or managed by discharge/transition of care (reference Failure to Thrive/Undernutrition (Pediatric) CPG).    01/11/18 0559   Failure to Thrive/Undernutrition   Problems Assessed (Failure to Thrive/Undernutrition) all   Problems Present (Failure to Thrive) situational response         
Problem: Patient Care Overview  Goal: Plan of Care/Patient Progress Review  Discharge Planner PT   Patient plan for discharge: Home with assist  Current status: Patient making great progress with PT despite fatigue. Ambulates 150 feet with 1 UE support on IV Pole, demonstrates indep with ambulation without use of IV pole. Patient ascends/descends 12 stairs with 1 railing modified indep.   Barriers to return to prior living situation: None from PT standpoint  Recommendations for discharge: Home with assist,  vs OP cancer rehab  Rationale for recommendations: Patient would benefit from OP cancer rehab for fatigue management, activity tolerance        Entered by: Lisa Samson 01/12/2018 10:45 AM         
Problem: Patient Care Overview  Goal: Plan of Care/Patient Progress Review  Discharge Planner PT  PT 5C   Patient plan for discharge: Home with spouse  Current status: PT order for evaluation acknowledged. PT evaluation completed and treatment initiated. Pt reports low activity endurance. Pt completed supine LE exercises. Pt fatigues quickly with sit to stand exercise. He was SOB during supine exercises and recovered quickly with rest. Pt ambulated in room without assistive device, supervision, and with IV stand management. Pt completed ~ 80' and was SOB and tired after ambulation. Pt transfers independently and LE strength is normal.  Barriers to return to prior living situation: Medical condition  Recommendations for discharge: Home with spouse, home exercise program.  Rationale for recommendations: As endurance improves, pt will be safe functionally at home.       Entered by: Lenard Cardenas 01/11/2018 10:48 AM         
Problem: Patient Care Overview  Goal: Plan of Care/Patient Progress Review  Discharge Planner PT  PT 5C  Patient plan for discharge: Home with spouse  Current status: Pt stated that he is feeling stronger today. Pt completed supine, seated, and standing LE exercises. SOB during exercise and managed with short rests during exercises. Written instructions for home exercise program prepared and pt instructed in the LE home exercise program. He verbalized understanding of the instructions and the guidelines for progressing exercises at home. Pt directed in ascending/descending stairs with one handrail and supervision of 1. Pt completed 24 steps with moderate SOB. Pt completed stairs safely step-over-step. Pt ambulated ~ 425' without assistive device and with supervision of 1. SOB during ambulation. Pt ambulated safely with normal balance and gait pattern.  Barriers to return to prior living situation: Medical condition.  Recommendations for discharge: Home with home exercise program.  Rationale for recommendations: Pt is functioning independently with exercises and ambulation.       Entered by: Lenard Cardenas 01/13/2018 10:26 AM         
Problem: Patient Care Overview  Goal: Plan of Care/Patient Progress Review  OT 5C: Defer. Per chart review and discussion with PT, patient with no inpatient OT needs. Patient performing all ADLs without assist, has wife to assist with heavier ADLs and IADLs as needed. PT to follow for discharge recommendations. OT to complete orders, please reconsult if changes arise.      
Problem: Patient Care Overview  Goal: Plan of Care/Patient Progress Review  OT 5C: cancel, pt in endoscopy for majority of day, unable to check in later in day 2/2 schedule demands, will reschedule.       
Problem: Patient Care Overview  Goal: Plan of Care/Patient Progress Review  Outcome: Improving  VSS, A&O X4. Continues to have frequent large watery stools.  NP added lomotil and increased frequency of imodium, will start administering one or the other after each BM.  K+ replaced in a.m, recheck was 3.4.  20 mEq K+ now scheduled BID.  Tolerating liquids well, good amount of intake.  Minimal amount of food taken in.  Yogurt and soup for breakfast/lunch.  TF increased to 60 mL at 1800, now at goal rate.  Denies nausea, zofran scheduled.  NS @ 100, to stop at 0130.  Plan to discharge home tomorrow. Up ad gerald.       
Problem: Patient Care Overview  Goal: Plan of Care/Patient Progress Review  Outcome: No Change  [id number] AVSS throughout shift. Pt had no complaints of pain or nausea; states he does have abdominal cramping that is normal for him & nausea is controlled with scheduled zofran. Pt NPO @ 0000 for possible EGD tomorrow. Had 2 watery stools & imodium given x1. Pt using ocean spray & biotene for mouth dryness; states it is working well. NS running @ 100ml/hr. No other concerns, continue plan of care.     Problem: Failure to Thrive/Undernutrition (Pediatric)  Goal: Signs and Symptoms of Listed Potential Problems Will be Absent, Minimized or Managed (Failure to Thrive/Undernutrition)  Signs and symptoms of listed potential problems will be absent, minimized or managed by discharge/transition of care (reference Failure to Thrive/Undernutrition (Pediatric) CPG).   Outcome: No Change   01/10/18 7332   Failure to Thrive/Undernutrition   Problems Assessed (Failure to Thrive/Undernutrition) all   Problems Present (Failure to Thrive) situational response         
Problem: Patient Care Overview  Goal: Plan of Care/Patient Progress Review  Outcome: No Change  D: Malignant neoplasm of lower third of esophagus (H)  (primary encounter diagnosis)    I: Monitored vitals and assessed pt status.   Running: TFs at 60 ml/hr  PRN:    A: A0x4. VSS, on RA.  Afebrile.  Denies pain.Lytes replaced per protocol. Will need K phos when potassium done. Up to bathroom independently. Has had one loose stool overnight.    I/O this shift:  In: 150 [NG/GT:30]  Out: 500 [Stool:500]    Temp:  [97.4  F (36.3  C)-98.9  F (37.2  C)] 98.6  F (37  C)  Pulse:  [84-94] 94  Heart Rate:  [80-93] 80  Resp:  [16] 16  BP: (107-120)/(68-79) 107/69  SpO2:  [97 %-99 %] 98 %      P: Continue to monitor Pt status and report changes to treatment team.              
Problem: Patient Care Overview  Goal: Plan of Care/Patient Progress Review  Outcome: No Change  Low grade max of 99.9 this shift- down to 97.9, all other vital signs stable thus far this shift. No complaints of dizziness, SOB, or pain this shift. Some mild nausea noted at beginning of shift but well controlled with scheduled Zofran and PRN Compazine, see MAR. 2 loose, watery bowel movements noted thus far this shift- Imodium given x1, see MAR. NS IVF infusing at 100 mL/hr. Tube feedings infusing at 20 mL/hr- to be upped again this morning at 0600 to 30 mL/hr. Will need potassium replaced this AM, see results review and MAR. Appears to have slept well throughout the night. Will continue to monitor.     Problem: Failure to Thrive/Undernutrition (Pediatric)  Goal: Signs and Symptoms of Listed Potential Problems Will be Absent, Minimized or Managed (Failure to Thrive/Undernutrition)  Signs and symptoms of listed potential problems will be absent, minimized or managed by discharge/transition of care (reference Failure to Thrive/Undernutrition (Pediatric) CPG).   Outcome: No Change   01/12/18 0540   Failure to Thrive/Undernutrition   Problems Assessed (Failure to Thrive/Undernutrition) all   Problems Present (Failure to Thrive) situational response         
Problem: Patient Care Overview  Goal: Plan of Care/Patient Progress Review  Outcome: No Change  Pt had an esophagram this am.  Pt eating small amounts.  Antiemetics and oxycodone x1 for abdominal pain.  Pt continues to have a dry mouth.  Saline nasal spray and biotene ordered.  Imodium started.  Pt resting comfortably now.  Will continue with care plan and notify MD if any changes.        
Problem: Patient Care Overview  Goal: Plan of Care/Patient Progress Review  Physical Therapy Discharge Summary    Reason for therapy discharge:    Discharged to home.    Progress towards therapy goal(s). See goals on [facility] in [facility] electronic [facility] record for goal details.  Goals partially met.  Barriers to achieving goals:   discharge from facility.    Therapy recommendation(s):    Continue home exercise program.        
(3) slightly limited

## 2022-06-28 LAB
ALT SERPL-CCNC: 35 U/L (ref 12–70)
AST SERPL-CCNC: 32 U/L (ref 15–46)
CREATININE (EXTERNAL): 0.7 MG/DL (ref 0.6–1.2)
GFR ESTIMATED (EXTERNAL): >90 ML/MIN (ref 60–150)
GLUCOSE (EXTERNAL): 85 MG/DL (ref 70–99)
INR (EXTERNAL): 1 (ref 0.9–1.1)
POTASSIUM (EXTERNAL): 4.1 MMOL/L (ref 3.4–5.1)

## 2022-07-12 LAB — TSH SERPL-ACNC: 1.12 UIU/ML (ref 0.35–4)

## 2022-07-26 LAB
ALT SERPL-CCNC: 39 U/L (ref 12–70)
AST SERPL-CCNC: 35 U/L (ref 15–46)
CREATININE (EXTERNAL): 0.8 MG/DL (ref 0.6–1.2)
GFR ESTIMATED (EXTERNAL): >90 ML/MIN (ref 60–150)
GLUCOSE (EXTERNAL): 88 MG/DL (ref 70–99)
POTASSIUM (EXTERNAL): 4.1 MMOL/L (ref 3.4–5.1)

## 2022-08-29 LAB
ALT SERPL-CCNC: 33 U/L (ref 12–70)
AST SERPL-CCNC: 25 U/L (ref 15–46)
CREATININE (EXTERNAL): 0.8 MG/DL (ref 0.6–1.2)
GFR ESTIMATED (EXTERNAL): >90 ML/MIN (ref 60–150)
GLUCOSE (EXTERNAL): 94 MG/DL (ref 70–99)
POTASSIUM (EXTERNAL): 4 MMOL/L (ref 3.4–5.1)

## 2022-09-28 LAB
ALT SERPL-CCNC: 30 U/L (ref 12–70)
AST SERPL-CCNC: 27 U/L (ref 15–46)
CREATININE (EXTERNAL): 0.8 MG/DL (ref 0.6–1.2)
GFR ESTIMATED (EXTERNAL): >90 ML/MIN (ref 60–150)
GLUCOSE (EXTERNAL): 94 MG/DL (ref 70–99)
POTASSIUM (EXTERNAL): 3.7 MMOL/L (ref 3.4–5.1)

## 2022-10-24 LAB
ALT SERPL-CCNC: 37 U/L (ref 12–70)
AST SERPL-CCNC: 26 U/L (ref 15–46)
CREATININE (EXTERNAL): 0.7 MG/DL (ref 0.6–1.2)
GLUCOSE (EXTERNAL): 95 MG/DL (ref 70–99)
POTASSIUM (EXTERNAL): 3.9 MMOL/L (ref 3.4–5.1)

## 2022-11-21 ENCOUNTER — HEALTH MAINTENANCE LETTER (OUTPATIENT)
Age: 42
End: 2022-11-21

## 2022-11-21 LAB
ALT SERPL-CCNC: 26 U/L (ref 12–70)
AST SERPL-CCNC: 23 U/L (ref 15–46)
CREATININE (EXTERNAL): 0.65 MG/DL (ref 0.6–1.2)
GFR ESTIMATED (EXTERNAL): >90 ML/MIN/1.73M2
GLUCOSE (EXTERNAL): 98 MG/DL (ref 70–99)
POTASSIUM (EXTERNAL): 4.5 MMOL/L (ref 3.4–5.1)

## 2022-12-08 ENCOUNTER — TRANSFERRED RECORDS (OUTPATIENT)
Dept: HEALTH INFORMATION MANAGEMENT | Facility: CLINIC | Age: 42
End: 2022-12-08

## 2022-12-19 LAB
ALT SERPL-CCNC: 45 U/L (ref 12–70)
AST SERPL-CCNC: 26 U/L (ref 15–46)
CREATININE (EXTERNAL): 0.66 MG/DL (ref 0.6–1.2)
GFR ESTIMATED (EXTERNAL): >90 ML/MIN/1.73M2
GLUCOSE (EXTERNAL): 87 MG/DL (ref 70–99)
POTASSIUM (EXTERNAL): 4.3 MMOL/L (ref 3.4–5.1)

## 2023-01-30 LAB
ALT SERPL-CCNC: 32 U/L (ref 12–70)
AST SERPL-CCNC: 25 U/L (ref 15–46)
CREATININE (EXTERNAL): 0.75 MG/DL (ref 0.6–1.2)
GFR ESTIMATED (EXTERNAL): >90 ML/MIN/1.73M2
GLUCOSE (EXTERNAL): 96 MG/DL (ref 70–99)
POTASSIUM (EXTERNAL): 4.1 MMOL/L (ref 3.4–5.1)

## 2023-02-22 ENCOUNTER — TRANSFERRED RECORDS (OUTPATIENT)
Dept: HEALTH INFORMATION MANAGEMENT | Facility: CLINIC | Age: 43
End: 2023-02-22

## 2023-02-27 LAB
ALT SERPL-CCNC: 30 U/L (ref 12–70)
AST SERPL-CCNC: 26 U/L (ref 15–46)
CREATININE (EXTERNAL): 0.79 MG/DL (ref 0.6–1.2)
GFR ESTIMATED (EXTERNAL): >90 ML/MIN/1.73M2
GLUCOSE (EXTERNAL): 92 MG/DL (ref 70–99)
POTASSIUM (EXTERNAL): 4.3 MMOL/L (ref 3.4–5.1)

## 2023-03-10 ENCOUNTER — TRANSFERRED RECORDS (OUTPATIENT)
Dept: HEALTH INFORMATION MANAGEMENT | Facility: CLINIC | Age: 43
End: 2023-03-10

## 2023-03-27 LAB
ALT SERPL-CCNC: 36 U/L (ref 12–70)
AST SERPL-CCNC: 30 U/L (ref 15–46)
CREATININE (EXTERNAL): 0.72 MG/DL (ref 0.6–1.2)
GFR ESTIMATED (EXTERNAL): >90 ML/MIN/1.73M2
GLUCOSE (EXTERNAL): 104 MG/DL (ref 70–99)
POTASSIUM (EXTERNAL): 4.2 MMOL/L (ref 3.4–5.1)

## 2023-04-16 ENCOUNTER — HEALTH MAINTENANCE LETTER (OUTPATIENT)
Age: 43
End: 2023-04-16

## 2023-04-24 LAB
CREATININE (EXTERNAL): 0.8 MG/DL (ref 0.6–1.2)
GFR ESTIMATED (EXTERNAL): >90 ML/MIN/1.73M2
GLUCOSE (EXTERNAL): 127 MG/DL (ref 70–99)
POTASSIUM (EXTERNAL): 4.3 MMOL/L (ref 3.4–5.1)

## 2023-05-14 ENCOUNTER — TRANSFERRED RECORDS (OUTPATIENT)
Dept: HEALTH INFORMATION MANAGEMENT | Facility: CLINIC | Age: 43
End: 2023-05-14

## 2023-05-15 ENCOUNTER — TRANSFERRED RECORDS (OUTPATIENT)
Dept: HEALTH INFORMATION MANAGEMENT | Facility: CLINIC | Age: 43
End: 2023-05-15

## 2023-05-22 ENCOUNTER — TRANSFERRED RECORDS (OUTPATIENT)
Dept: HEALTH INFORMATION MANAGEMENT | Facility: CLINIC | Age: 43
End: 2023-05-22

## 2023-05-22 ENCOUNTER — MEDICAL CORRESPONDENCE (OUTPATIENT)
Dept: HEALTH INFORMATION MANAGEMENT | Facility: CLINIC | Age: 43
End: 2023-05-22

## 2023-05-22 ENCOUNTER — TRANSCRIBE ORDERS (OUTPATIENT)
Dept: OTHER | Age: 43
End: 2023-05-22

## 2023-05-22 DIAGNOSIS — C15.8 MALIGNANT NEOPLASM OF OVERLAPPING SITES OF ESOPHAGUS (H): Primary | ICD-10-CM

## 2023-05-22 LAB
ALT SERPL-CCNC: 29 U/L (ref 12–70)
AST SERPL-CCNC: 28 U/L (ref 15–46)
CREATININE (EXTERNAL): 0.78 MG/DL (ref 0.6–1.2)
GFR ESTIMATED (EXTERNAL): >90 ML/MIN/1.73M2
GLUCOSE (EXTERNAL): 183 MG/DL (ref 70–99)
POTASSIUM (EXTERNAL): 3.8 MMOL/L (ref 3.4–5.1)

## 2023-05-23 ENCOUNTER — PRE VISIT (OUTPATIENT)
Dept: ONCOLOGY | Facility: CLINIC | Age: 43
End: 2023-05-23

## 2023-05-23 ENCOUNTER — PATIENT OUTREACH (OUTPATIENT)
Dept: ONCOLOGY | Facility: CLINIC | Age: 43
End: 2023-05-23

## 2023-05-23 NOTE — TELEPHONE ENCOUNTER
RECORDS STATUS - ALL OTHER DIAGNOSIS    ABDOMINAL IMAGING (CT SCANS, MRI, US, PET SCANS) DATING BACK TO 2018  RECORDS RECEIVED FROM: Fax from [facility] ([phone])  Dx: malignant neoplasm overlapping sites of esophagus     Appt Date: TB- NN WQ     Action    Action Taken 2023 4:34pm KEB   I called pt Daniel - all the scans are at the [facility] in [place].     I called the [facility]- Ph: [phone] #2 #1- phone went to . I left a brief message requesting a call back.     4:52pm KEB   I received a call back and they gave me a ph: [phone] for the scheduling or Radiology Dept. The phone went to .     I tried calling a Xray Tech Ph: [phone] - they are closed but the phone number works. They recommended I call back tomorrow.     2023 1:06pm KEB   I resolved imaging in PACS- We are still missing recent scans.     I called the [facility] Ph: [phone]- they will push scans from [id number] to  PACS.     1:50pm KEB   All images are in PACS now.       NOTES STATUS DETAILS   OFFICE NOTE from referring provider External: [facility] 23: Monik Delaney NP   OFFICE NOTE from medical oncologist External: [facility] 23: Dr. Sue Montez   OFFICE NOTE from other specialist External: [facility] 22: Dr. Dorothea Batres   DISCHARGE SUMMARY from hosp External: [facility] 22: [facility]   OPERATIVE REPORT External: [facility] 22: Laparotomy, open cholecystectomy, biopsy of the falciform ligament, wedge resection of segment 3 liver metastasis   MEDICATION LIST External: [facility]    LABS     PATHOLOGY REPORTS External: [facility] 22: [id number]   ANYTHING RELATED TO DIAGNOSIS Req -[facility]    IMAGING (NEED IMAGES & REPORT)  [facility]   CT SCANS Complete -- -[facility]:  23: CT Abd Pel   MRI Complete- - -[facility]  [facility]:  03/10/23: MR Pelvis  02/24/23: MR Abd   XRAYS Complete--[facility]:   05/14/23: XR Chest   ULTRASOUND Complete--[facility]:  12/12/22: US Abd   PET Complete-[facility]:  05/15/23, 02/06/23: PET CT Skull

## 2023-05-24 ENCOUNTER — PATIENT OUTREACH (OUTPATIENT)
Dept: SURGERY | Facility: CLINIC | Age: 43
End: 2023-05-24

## 2023-05-24 NOTE — PROGRESS NOTES
New Patient Oncology Nurse Navigator Note     Referring provider: Monik Delaney NP     Referring Clinic/Organization: Other - [facility]      Referred to: Surgical Oncology -  Hepatobiliary / GI Cancers     Requested provider (if applicable): Dr. Dwight Chau    Referral Received: 05/24/23       Evaluation for : Esophageal cancer with liver mets      Clinical History (per Nurse review of records provided):      See book marked documents:       Referring MD office note    Pathology report    Imaging reports     Procedure report       No Known Allergies     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) 6/5/2017       Past Surgical History:   Procedure Laterality Date     ESOPHAGOGASTRODUODENOSCOPY       ESOPHAGOSCOPY, GASTROSCOPY, DUODENOSCOPY (EGD), COMBINED N/A 6/9/2017    Procedure: COMBINED ENDOSCOPIC ULTRASOUND, ESOPHAGOSCOPY, GASTROSCOPY, DUODENOSCOPY (EGD), FINE NEEDLE ASPIRATE/BIOPSY;  Upper Endoscopic Ultrasound, fine needle aspirate/biopsy;  Surgeon: Guru Mark Avila MD;  Location: [facility] OR     ESOPHAGOSCOPY, GASTROSCOPY, DUODENOSCOPY (EGD), COMBINED N/A 11/3/2017    Procedure: COMBINED ESOPHAGOSCOPY, GASTROSCOPY, DUODENOSCOPY (EGD);;  Surgeon: Yunior Esteban MD;  Location: [facility] OR     ESOPHAGOSCOPY, GASTROSCOPY, DUODENOSCOPY (EGD), COMBINED N/A 11/9/2017    Procedure: COMBINED ESOPHAGOSCOPY, GASTROSCOPY, DUODENOSCOPY (EGD);  Esophogastroduodenoscopy, take out pharangostomy tube;  Surgeon: Yunior Esteban MD;  Location: [facility] OR     ESOPHAGOSCOPY, GASTROSCOPY, DUODENOSCOPY (EGD), COMBINED N/A 1/11/2018    Procedure: COMBINED ESOPHAGOSCOPY, GASTROSCOPY, DUODENOSCOPY (EGD), BIOPSY SINGLE OR MULTIPLE;  COMBINED ESOPHAGOSCOPY, GASTROSCOPY, DUODENOSCOPY (EGD);  Surgeon: Alessandro Mcgregor MD;  Location: [facility] GI     GASTRECTOMY N/A 11/3/2017    Procedure: GASTRECTOMY;;  Surgeon: Yunior Esteban MD;   Location: [facility] OR     HAND SURGERY      childhood, torn tendon     INSERT PORT VASCULAR ACCESS Right 6/22/2017    Procedure: INSERT PORT VASCULAR ACCESS;  Single Lumen Chest Power Port;  Surgeon: Iván Driver PA-C;  [facility] OR     INSERT PORT VASCULAR ACCESS Right 4/8/2019    Procedure: Single Lumen Chest Port Placement;  Surgeon: Krysten Ballard PA-C;  [facility] OR     IR CHEST PORT PLACEMENT > 5 YRS OF AGE  4/8/2019     IR LIVER BIOPSY PERCUTANEOUS  3/20/2019     LAPAROSCOPY DIAGNOSTIC (GENERAL) N/A 11/3/2017    Procedure: LAPAROSCOPY DIAGNOSTIC (GENERAL);  diagnostic laparoscopy, right chest tube, total gastrectomy with distal esophagectomy, intrathoracic eveline-y esophago-jejunostomy, feeding jejunostomy, pharyngostomy, esophagogastroduodenoscopy, flexible bronchoscopy;  Surgeon: Yunior Esteban MD;  Location: [facility] OR     LAPAROTOMY EXPLORATORY N/A 11/3/2017    Procedure: LAPAROTOMY EXPLORATORY;;  Surgeon: Yunior Esteban MD;  [facility]: [facility]     PHARYNGOSTOMY N/A 11/3/2017    Procedure: PHARYNGOSTOMY;;  Surgeon: Yunior Esteban MD;  Location: [facility]     REMOVE PORT VASCULAR ACCESS Right 3/19/2018    Procedure: REMOVE PORT VASCULAR ACCESS;  Right Port Removal;  Surgeon: Krysten Hopper PA-C;  [facility]     THORACOTOMY Left 11/3/2017    Procedure: THORACOTOMY;;  Surgeon: Yunior Esteban MD;  Location: [facility] OR       Current Outpatient Medications   Medication Sig Dispense Refill     capecitabine (XELODA) 500 MG tablet Take 4 tablets (2,000 mg) by mouth 2 times daily for 14 days Days 1 through 14, then off for 14 days. 112 tablet 0     cyabnocobalamin (VITAMIN B-12) 2500 MCG sublingual tablet Place 2,500 mcg under the tongue daily 30 tablet 1     dexamethasone (DECADRON) 4 MG tablet Take 1 tablet (4 mg) by mouth daily (with breakfast) Day 2-4 of each chemotherapy cycle. 3 tablet 3     diphenoxylate-atropine (LOMOTIL) 2.5-0.025 MG tablet Take 1-2 tablets by mouth  4 times daily as needed for diarrhea (Patient not taking: Reported on 7/6/2020) 30 tablet 3     ferrous gluconate (FERGON) 324 (38 Fe) MG tablet Take 324 mg by mouth daily (with breakfast)       loperamide (IMODIUM) 2 MG capsule Start with 2 caps (4 mg), then take one cap (2 mg) after each diarrheal stool as needed. Do not use more than 8 caps (16 mg) per day. (Patient not taking: Reported on 7/6/2020) 30 capsule 0     LORazepam (ATIVAN) 0.5 MG tablet Take 1 tablet (0.5 mg) by mouth every 4 hours as needed (Anxiety, Nausea/Vomiting or Sleep) (Patient not taking: Reported on 7/6/2020) 30 tablet 2     LORazepam (ATIVAN) 0.5 MG tablet Take 1 tablet (0.5 mg) by mouth every 4 hours as needed (Anxiety, Nausea/Vomiting or Sleep) (Patient not taking: Reported on 7/6/2020) 30 tablet 2     Misc Natural Product Nasal (PONARIS) SOLN Apply two drops to each nostril BID X 2 month supply (Patient not taking: Reported on 8/5/2020) 10 mL 3     multivitamin, therapeutic with minerals (MULTI-VITAMIN) TABS tablet Take 1 tablet by mouth daily Generic [place] Vitamin       mupirocin (BACTROBAN) 2 % external ointment Apply to anterior nares BID X 2 month supply (Patient not taking: Reported on 7/6/2020) 2 g 3     ondansetron (ZOFRAN) 8 MG tablet Take 1 tablet (8 mg) by mouth every 8 hours as needed (Nausea/Vomiting) (Patient not taking: Reported on 7/6/2020) 10 tablet 5     prochlorperazine (COMPAZINE) 10 MG tablet Take 1 tablet (10 mg) by mouth every 6 hours as needed (Nausea/Vomiting) (Patient not taking: Reported on 8/5/2020) 30 tablet 2     saccharomyces boulardii (FLORASTOR) 250 MG capsule Take 250 mg by mouth 2 times daily          Patient Active Problem List   Diagnosis     Malignant neoplasm of lower third of esophagus (H)     S/P gastrectomy     Malignant neoplasm metastatic to liver (H)         Clinical Assessment / Barriers to Care (Per Nurse):    None at this time.     Records Location:     [facility] Everywhere   Faxed -  Media tab/Scanned     Records Needed:     ABDOMINAL IMAGING (CT SCANS, MRI, US, PET SCANS)  DATING BACK TO 2020      Additional testing needed prior to consult:     NONE AT THIS TIME    Referral updates and Plan:       Consult with Surgical Oncology     05/24/2023 10:32 AM - called and spoke with patient regarding referral. Confirmed he has recently been on chemo but developed an allergy to previous medication. He stated he was started on folfiri for 6 weeks but on most recent PET scan there was no evidence new chemo was working and provider has stopped and requested consult for liver directed therapy. Offered patients a visit on 5/30 and 6/6 with Dr. Chau, at this time he has taken 6/6 and was transferred to scheduling to finalize appointment.     Yajaira Vaughan, RN, BSN   Surgical Oncology New Patient Nurse Navigator  [facility]  [phone]

## 2023-06-05 NOTE — PROGRESS NOTES
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  He was diagnosed in 2017 and underwent total gastrectomy with partial esophagectomy and R-Y reconstruction.  He had srinivas-operative EOX.  Final pathology showed ypT3N1 disease and almost no response to therapy.  He was found to have his first biopsy proven recurrence in the left lobe of the liver in 3/2019 and was further treated w/ Dr. Hood --> Dr. Ponce.  The patient was sent to me around that time for liver directed therapy, which ultimately we recommended against at the time.  The patient went elsewhere for care in 2020, given he moved away.  Since that time, he has had further systemic therapy.  He has had a diagnostic laparoscopy showing positive peritoneal cytology, TARE of a liver lesion, open partial resection of a liver lesion, and also removal of a falciform nodule that also turned out to be metastatic adenocarcinoma.  On his most recent PET-CT read, he appears to have disease recurrence within the liver and worsening of peritoneal metastatic disease.  We are not currently able to view any of this imaging.  He is otherwise healthy, independent, and fit.  He presents for recommendations on surgery for management of his metastatic GE junction cancer.    Of Note: The patient has been doing well and appears well.  Other than the above treatment history, there have been no new changes.    /67   Pulse 79   Temp 98.1  F (36.7  C) (Oral)   Resp 16   Ht 1.755 m (5' 9.09\")   Wt 65.5 kg (144 lb 4.8 oz)   SpO2 100%   BMI 21.25 kg/m      We have outside imaging reads, which suggest disease progression within the liver and peritoneal cavity, but we do not yet have the images in the system to review.    Assessment/Plan:  42 M w/ metastatic GE junction cancer originally treated in 2017.  He has recurrent/progressive liver and peritoneal disease and presents to discuss possible further surgical " therapy.  We discussed the situation in detail and that there is no evidence that surgical therapy is beneficial for multi-sight metastatic disease from a GEJ primary tumor.  I don't believe that aggressive surgical therapy will change his outcome and may even be counterproductive.  Thus, no surgical therapy is indicated.  He should continue with systemic therapy, especially given he has had good response to it in the past.  He should also consider clinical trials if he progresses through chemotherapy.  Once I get the imaging to look at, I will review it and give the patient a follow-up call, but I do not anticipate the imaging to change my treatment recommendation.  Questions answered and the patient was in agreement with and understanding of the plan.    A total of 20 minutes were spent on this encounter including face to face consultation and the remainder in imaging review, chart review, documentation, and coordination of care.

## 2023-06-06 ENCOUNTER — ONCOLOGY VISIT (OUTPATIENT)
Dept: SURGERY | Facility: CLINIC | Age: 43
End: 2023-06-06
Attending: SURGERY
Payer: COMMERCIAL

## 2023-06-06 VITALS
WEIGHT: 144.3 LBS | DIASTOLIC BLOOD PRESSURE: 67 MMHG | HEART RATE: 79 BPM | TEMPERATURE: 98.1 F | SYSTOLIC BLOOD PRESSURE: 116 MMHG | RESPIRATION RATE: 16 BRPM | OXYGEN SATURATION: 100 % | BODY MASS INDEX: 21.37 KG/M2 | HEIGHT: 69 IN

## 2023-06-06 DIAGNOSIS — C15.9 PRIMARY CANCER OF ESOPHAGUS WITH METASTASIS TO OTHER SITE (H): Primary | ICD-10-CM

## 2023-06-06 PROCEDURE — 99202 OFFICE O/P NEW SF 15 MIN: CPT | Performed by: SURGERY

## 2023-06-06 PROCEDURE — G0463 HOSPITAL OUTPT CLINIC VISIT: HCPCS | Performed by: SURGERY

## 2023-06-06 RX ORDER — VALACYCLOVIR HYDROCHLORIDE 1 G/1
TABLET, FILM COATED ORAL
COMMUNITY
Start: 2023-06-03

## 2023-06-06 ASSESSMENT — PAIN SCALES - GENERAL: PAINLEVEL: MODERATE PAIN (5)

## 2023-06-06 NOTE — NURSING NOTE
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    Weight as of this encounter: 65.5 kg (144 lb 4.8 oz). Body surface area is 1.79 meters squared.  Moderate Pain (5) Comment: Data Unavailable   No LMP for male patient.  Allergies reviewed: Yes  Medications reviewed: Yes    Medications: Medication refills not needed today.  Pharmacy name entered into [email or web address]:    [facility] [place], [place] - [address] [facility] [place], [place] - [address] [facility] [place], [place] - [address] [facility] [id number]  [place], [place] - [address] [facility]  KARIS PERKINS [address]    Clinical concerns: No new clinical concerns other than reason for visit today.     Elizabeth Garrett, EMT    "

## 2023-06-06 NOTE — LETTER
"    6/6/2023         RE: Daniel Sandhu  [address] [address]        Dear Colleague,    Thank you for referring your patient, Daniel Sandhu, to the [facility]. Please see a copy of my visit note below.    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  He was diagnosed in 2017 and underwent total gastrectomy with partial esophagectomy and R-Y reconstruction.  He had srinivas-operative EOX.  Final pathology showed ypT3N1 disease and almost no response to therapy.  He was found to have his first biopsy proven recurrence in the left lobe of the liver in 3/2019 and was further treated w/ Dr. Hood --> Dr. Ponce.  The patient was sent to me around that time for liver directed therapy, which ultimately we recommended against at the time.  The patient went elsewhere for care in 2020, given he moved away.  Since that time, he has had further systemic therapy.  He has had a diagnostic laparoscopy showing positive peritoneal cytology, TARE of a liver lesion, open partial resection of a liver lesion, and also removal of a falciform nodule that also turned out to be metastatic adenocarcinoma.  On his most recent PET-CT read, he appears to have disease recurrence within the liver and worsening of peritoneal metastatic disease.  We are not currently able to view any of this imaging.  He is otherwise healthy, independent, and fit.  He presents for recommendations on surgery for management of his metastatic GE junction cancer.    Of Note: The patient has been doing well and appears well.  Other than the above treatment history, there have been no new changes.    /67   Pulse 79   Temp 98.1  F (36.7  C) (Oral)   Resp 16   Ht 1.755 m (5' 9.09\")   Wt 65.5 kg (144 lb 4.8 oz)   SpO2 100%   BMI 21.25 kg/m      We have outside imaging reads, which suggest disease progression within the liver and peritoneal " cavity, but we do not yet have the images in the system to review.    Assessment/Plan:  42 M w/ metastatic GE junction cancer originally treated in 2017.  He has recurrent/progressive liver and peritoneal disease and presents to discuss possible further surgical therapy.  We discussed the situation in detail and that there is no evidence that surgical therapy is beneficial for multi-sight metastatic disease from a GEJ primary tumor.  I don't believe that aggressive surgical therapy will change his outcome and may even be counterproductive.  Thus, no surgical therapy is indicated.  He should continue with systemic therapy, especially given he has had good response to it in the past.  He should also consider clinical trials if he progresses through chemotherapy.  Once I get the imaging to look at, I will review it and give the patient a follow-up call, but I do not anticipate the imaging to change my treatment recommendation.  Questions answered and the patient was in agreement with and understanding of the plan.    A total of 20 minutes were spent on this encounter including face to face consultation and the remainder in imaging review, chart review, documentation, and coordination of care.          Dwight Chau MD

## 2023-06-07 NOTE — TELEPHONE ENCOUNTER
2Action June 7, 2023 8:45 AM    Action Taken Records from [facility] received and sent to HIM for upload.    Response via fax from [facility] team, they sent image to [employer]. We do not use [employer], resent request to [facility] for DISC       Action 06/09/23 1:49PM MMT   Action Taken  Images received from [facility] and sent to [email or web address] to upload into PACS.

## 2023-06-08 ENCOUNTER — TELEPHONE (OUTPATIENT)
Dept: SURGERY | Facility: CLINIC | Age: 43
End: 2023-06-08

## 2023-06-08 NOTE — TELEPHONE ENCOUNTER
I called and had a brief conversation with the patient.  I informed him that I reviewed his images and that there is no change to the recommendations I gave in clinic.  I do not think that surgical intervention would alter outcome and may even worsen outcome.  Questions answered and the patient was in agreement with and understanding of the plan.

## 2023-07-20 NOTE — IP AVS SNAPSHOT
[address] [address]    Phone:  [phone]                                       After Visit Summary   11/3/2017    Daniel Sandhu    MRN: [id number]           After Visit Summary Signature Page     I have received my discharge instructions, and my questions have been answered. I have discussed any challenges I see with this plan with the nurse or doctor.    ..........................................................................................................................................  Patient/Patient Representative Signature      ..........................................................................................................................................  Patient Representative Print Name and Relationship to Patient    ..................................................               ................................................  Date                                            Time    ..........................................................................................................................................  Reviewed by Signature/Title    ...................................................              ..............................................  Date                                                            Time           History Of Present Illness  Jacque is a 68 year old female who presented to the emergency room today with complaints of shortness of breath since this morning.  She has a past medical history of CKD on dialysis hypertension hypothyroidism diabetes mellitus type 2 and CVA.  The onset was this morning.  There were no aggravating factors.  There were no alleviating factors.  She had no associated symptoms including nausea vomiting constipation diarrhea chest pain lightheadedness dizziness urinary symptoms fevers chills or sweats.  Patient has dialysis on  and Friday and she did indeed have it yesterday.  She does endorse some left lower extremity edema which is not new.  Patient is normally in room air but was found to be hypoxic at 85% upon arrival to the emergency room.    At today's assessment she is alert and oriented x3.  She remains in nasal cannula oxygen.  She has no associated symptoms including those mentioned above at the time of this assessment.      Past Medical History  Past Medical History:   Diagnosis Date   • Anemia     From GI blood losses, needed multiple transfusions   • Cerebral infarction (CMD)    • CKD (chronic kidney disease)     On dialysis   • Clostridium difficile diarrhea    • H/O blood clots    • Hypertension    • Hypothyroidism    • Pneumonia    • PONV (postoperative nausea and vomiting)     vomited after waking up ( a stent was removed from her fistula)   • Type 2 diabetes mellitus (CMD)     With retinopathy and gastroparesis        Surgical History  Past Surgical History:   Procedure Laterality Date   • Back surgery     • Bowel resection     •  section, classic     • Colon surgery      reversal of colon resection   • Egd  2022        Social History  Social History     Tobacco Use   • Smoking status: Never   • Smokeless tobacco: Never   Vaping Use   • Vaping Use: never used   Substance Use Topics   • Alcohol use: No   • Drug use: Never       Family  History  Family History   Problem Relation Age of Onset   • Cancer Mother    • Myocardial Infarction Father         Allergies  ALLERGIES:  Duloxetine hcl    Medications  (Not in a hospital admission)      Review of Systems  Review of Systems   All other systems reviewed and are negative.        Last Recorded Vitals  Blood pressure (!) 148/47, pulse (!) 55, temperature 98.4 °F (36.9 °C), temperature source Oral, resp. rate 18, height 5' (1.524 m), weight 83.3 kg (183 lb 10.3 oz), SpO2 96 %.    Physical Exam  Vitals reviewed.   Constitutional:       General: She is awake.      Appearance: Normal appearance. She is well-developed, well-groomed and normal weight.   HENT:      Head: Normocephalic and atraumatic.      Nose: Nose normal.      Mouth/Throat:      Mouth: Mucous membranes are moist.   Eyes:      General: Lids are normal.   Cardiovascular:      Rate and Rhythm: Normal rate and regular rhythm.      Pulses: Normal pulses.      Heart sounds: Normal heart sounds.      Comments: Patient states left lower extremity edema is not new  Pulmonary:      Effort: Pulmonary effort is normal.      Breath sounds: Normal breath sounds.   Abdominal:      General: Abdomen is flat. Bowel sounds are normal.      Palpations: Abdomen is soft.   Musculoskeletal:      Right lower leg: No edema.      Left lower le+ Edema present.   Skin:     General: Skin is warm and dry.   Neurological:      Mental Status: She is alert and oriented to person, place, and time.   Psychiatric:         Attention and Perception: Attention and perception normal.         Mood and Affect: Mood and affect normal.         Speech: Speech normal.         Behavior: Behavior normal. Behavior is cooperative.         Thought Content: Thought content normal.         Cognition and Memory: Cognition and memory normal.         Judgment: Judgment normal.          Imaging  LAST X-RAY:  === 23 ===    XR CHEST AP OR PA    - Narrative -  EXAM: XR CHEST AP OR  PA    CLINICAL INDICATION: weakness, fatigue, shortness of breath    COMPARISON: 05/23/2023    FINDINGS: A single frontal view of the chest is obtained.  The cardiac  silhouette and pulmonary vasculature are enlarged.  Redemonstrated is left  IJ hemodialysis catheter with tip in the distal SVC.  The lungs show  numerous calcified granulomata diffusely scattered throughout both lungs  with no new focal consolidation or pneumothorax.  Minimal trace effusions  pleural effusions are identified.    - Impression -  1.    Cardiomegaly, trace effusions and prior granulomatous disease with no  new acute intrathoracic abnormalities identified.    Electronically Signed by: EFREM CLEARY M.D.  Signed on: 7/20/2023 4:12 PM  Workstation ID: [id number]      === 05/23/23 ===    XR CHEST AP OR PA    - Narrative -  EXAM: XR CHEST AP OR PA    CLINICAL INDICATION: Weakness and shortness of breath for a few weeks.    COMPARISON: 04/29/2023    - Impression -  FINDINGS AND IMPRESSION:  Frontal view of the chest was obtained.    Stable position of tunneled left IJ hemodialysis catheter with tip at the  cavoatrial junction.    Cardiomegaly.  Pulmonary vasculature is enlarged.  Mild vascular  congestion.    No evidence of focal consolidation, large volume pleural effusion or  pneumothorax.  Redemonstration of innumerable calcified granulomas.    Electronically Signed by: JAY LUI M.D.  Signed on: 5/23/2023 11:14 AM  Workstation ID: [id number]      === 04/29/23 ===    XR CHEST AP OR PA    - Narrative -  EXAM: PORTABLE CHEST, 1 VIEW    CLINICAL INDICATION:  Shortness of breath and dizziness.    COMPARISON: 01/20/2023    - Impression -  FINDINGS/IMPRESSION:    No focal consolidation, pneumothorax or pleural effusion.  Stable  innumerable bilateral granulomas.    Stable cardiac size and left-sided dialysis catheter.    Electronically Signed by: RONY LOUISE M.D.  Signed on: 4/29/2023 6:08 PM  Workstation ID:  [id number]    Labs   Recent Results (from the past 24 hour(s))   INR - Point of Care    Collection Time: 07/20/23  2:07 PM   Result Value Ref Range    INR-POC 2.60    Comprehensive Metabolic Panel    Collection Time: 07/20/23  3:48 PM   Result Value Ref Range    Fasting Status      Sodium 131 (L) 135 - 145 mmol/L    Potassium 4.7 3.4 - 5.1 mmol/L    Chloride 95 (L) 97 - 110 mmol/L    Carbon Dioxide 33 (H) 21 - 32 mmol/L    Anion Gap 8 7 - 19 mmol/L    Glucose 100 (H) 70 - 99 mg/dL    BUN 23 (H) 6 - 20 mg/dL    Creatinine 4.46 (H) 0.51 - 0.95 mg/dL    Glomerular Filtration Rate 10 (L) >=60    BUN/Cr 5 (L) 7 - 25    Calcium 8.1 (L) 8.4 - 10.2 mg/dL    Bilirubin, Total 0.8 0.2 - 1.0 mg/dL    GOT/AST 26 <=37 Units/L    GPT/ALT 12 <64 Units/L    Alkaline Phosphatase 115 45 - 117 Units/L    Albumin 3.2 (L) 3.6 - 5.1 g/dL    Protein, Total 7.8 6.4 - 8.2 g/dL    Globulin 4.6 (H) 2.0 - 4.0 g/dL    A/G Ratio 0.7 (L) 1.0 - 2.4   TROPONIN I, HIGH SENSITIVITY    Collection Time: 07/20/23  3:48 PM   Result Value Ref Range    Troponin I, High Sensitivity 26 <52 ng/L   Magnesium    Collection Time: 07/20/23  3:48 PM   Result Value Ref Range    Magnesium 2.0 1.7 - 2.4 mg/dL   Lactic Acid Venous With Reflex    Collection Time: 07/20/23  3:48 PM   Result Value Ref Range    Lactate, Venous 1.0 0.0 - 2.0 mmol/L   CBC with Automated Differential (performable only)    Collection Time: 07/20/23  3:48 PM   Result Value Ref Range    WBC 4.1 (L) 4.2 - 11.0 K/mcL    RBC 2.91 (L) 4.00 - 5.20 mil/mcL    HGB 9.1 (L) 12.0 - 15.5 g/dL    HCT 28.0 (L) 36.0 - 46.5 %    MCV 96.2 78.0 - 100.0 fl    MCH 31.3 26.0 - 34.0 pg    MCHC 32.5 32.0 - 36.5 g/dL    RDW-CV 13.7 11.0 - 15.0 %    RDW-SD 49.0 39.0 - 50.0 fL     140 - 450 K/mcL    NRBC 0 <=0 /100 WBC    Neutrophil, Percent 58 %    Lymphocytes, Percent 26 %    Mono, Percent 11 %    Eosinophils, Percent 2 %    Basophils, Percent 2 %    Immature Granulocytes 1 %    Absolute Neutrophils 2.4 1.8  - 7.7 K/mcL    Absolute Lymphocytes 1.1 1.0 - 4.0 K/mcL    Absolute Monocytes 0.5 0.3 - 0.9 K/mcL    Absolute Eosinophils  0.1 0.0 - 0.5 K/mcL    Absolute Basophils 0.1 0.0 - 0.3 K/mcL    Absolute Immature Granulocytes 0.0 0.0 - 0.2 K/mcL   Electrocardiogram 12-Lead    Collection Time: 07/20/23  4:21 PM   Result Value Ref Range    Ventricular Rate EKG/Min (BPM) 52     Atrial Rate (BPM) 52     AZ-Interval (MSEC) 236     QRS-Interval (MSEC) 104     QT-Interval (MSEC) 514     QTc 478     P Axis (Degrees) 45     R Axis (Degrees) -39     T Axis (Degrees) 24     REPORT TEXT       Sinus bradycardia  with 1st degree AV block  Left axis deviation  Moderate voltage criteria for LVH, may be normal variant  Nonspecific ST abnormality  Abnormal ECG  When compared with ECG of  23-MAY-2023 10:23,  Vent. rate  has decreased  BY  34 BPM  Nonspecific T wave abnormality now evident in  Inferior leads            ASSESSMENT/PLAN: Jacque is a 68 year old female who presented to the emergency room today with complaints of shortness of breath since this morning.  She has a past medical history of CKD on dialysis hypertension hypothyroidism diabetes mellitus type 2 and CVA.    Acute hypoxic resp failure possibly due to fluid overload  Baseline RA  BNP 74787>> she has not been below 51,000 since April 2023  O2 sat 85% on RA  Titrate O2 to maintain O2 sat >90%  Patient is on Coumadin with a therapeutic INR  Nephro on consult with tentative dialysis tomorrow    ESRD  Nephro on consult  HD at [facility] on MWF  Has left PC -- multiple failed access in the past  Renvela as ordered     HTN  Metoprolol as ordered  Losartan as ordered  Amlodipine as ordered  Echo May 2023 EF 65%     DM2  SS as ordered     Anemia of chronic disease  HG 9.1>> at basline    No overt s/s bleeding     Hypothyroidism  Synthroid as ordered  TSH  1.8     Hx left arm DVT and RUL PE  Coumadin per pharmacy     Chronic back and hip pain secondary to lumbar  spondylosis related radiculopathy  Norco as needed as ordered  Dilaudid as needed as ordered    Obesity  BMI 35.26  Diet and lifestyle modifications encouraged        Smoking Cessation  Counseling given: Not Answered      DVT: Coumadin    DISPO: Home 24 to 48 hours pending clinical course    Code Status    Code Status: Prior    Primary Care Physician  Froylan Barnard MD        Case discussed w Dr Dunlap

## 2023-12-03 NOTE — PROGRESS NOTES
[facility]    Hematology/Oncology Established Patient Note      Today's Date: 10/14/19    Reason for Follow-up: adenocarcinoma of the GE junction      HISTORY OF PRESENT ILLNESS: Daniel Sandhu is a 39 year old male who presents with adenocarcinoma of the GE junction.  In early 2017, patient started noticing difficulty swallowing, and that food seems to take longer to go down.  It became more frequent, and he saw his PCP, and was referred for EGD, which showed an esophageal mass located at the GE junction, measuring 4 cm.  Biopsy showed poorly differentiated adenocarcinoma.  HER-2 was sent and is equivocal (2+).  CT c/a/p showed a mildly prominent lymph node in the gastrohepatic ligament, but there were no pathologically enlarged lymph nodes in the chest, abdomen, or pelvis.  There was otherwise no evidence of metastatic disease.  PET-CT showed thickening of the distal esophagus consistent with known esophageal adenocarcinoma, as well as mildly hypermetabolic 1 cm lymph node in the gastrohepatic ligament.  There was no evidence of distant metastatic disease.  He underwent EUS on 6/9/17, which showed the esophageal tumor in the lower third of the esophagus, staged T2NX.  There were 2 abnormal lymph nodes seen in the gastrohepatic ligament; pathology was suspicious for adenocarcinoma.  Celiac node was sampled as well, which was benign.  He was seen by surgery, Dr. Esteban, and recommended srinivas-operative chemotherapy.    Port was placed on 6/22/17.  It was removed on 3/19/18.    He underwent chemotherapy with epirubicin, oxaliplatin, and capecitabine x 3 cycles 6/26/17-8/7/17.      On 11/3/17, he underwent laparotomy with total gastrectomy and abdominal lymphadenectomy, left thoracotomy with distal esophagectomy and intrathoracic Terrell-en-Y esophagojejunostomy, feeding jejunostomy, left pharyngostomy tube placement, right tube thoracostomy, and flexible bronchoscopy.  On 11/9/17, he was taken  back to OR for I&D of pharyngostomy tube abscess.  Pathology showed adenocarcinoma, moderate differentiated, extensive residual tumor with no evidence tumor regression, margins negative, perineural invasion present, 1 of 28 lymph nodes were involved with malignancy, stage [id number] (stage IIIA).  HER-2 is non-amplified.    He resumed chemotherapy post-surgery, with plans to complete 3 more cycles, with 5-FU, epirubicin, oxaliplatin.  However, he only completed 2 more cycles, due to poor tolerance.  He was admitted to the hospital 1/9/18-1/13/18 for nausea, diarrhea, failure to thrive, severe malnutrition, generalized weakness.  His infusional chemotherapy was stopped on 1/8/18.  He underwent EGD in the hospital on 1/11/18, which showed ulcers, and no evidence of recurrence of his cancer.  One area biopsied showed inflammation, no evidence of malignancy.    He saw Dr. Esteban on 3/6/19 and had CT abdomen/pelvis done, which showed a 3.1 cm lesion in hepatic segment 3.  He underwent biopsy on 3/20/19 by IR, which showed moderately differentiated adenocarcinoma consistent with metastasis from primary esophageal carcinoma.  HER-2 is non-amplified.  Restaging PET scan on 3/29/19 showed the 4.4 cm left lobe liver metastasis.  There is nodular foci of hypermetabolic uptake in the left mid abdomen in relation to the small bowel loops, without definite underlying lesion on CT.  This is favored to represent metastatic disease unless proven otherwise.    He started on chemotherapy with paclitaxel and ramucirumab on 4/10/19.      INTERIM HISTORY: Daniel comes in for follow-up today.  He is feeling well.  He went to [facility] for a second opinion, and also met with Dr. Chau in surgical oncology last week.          REVIEW OF SYSTEMS:   14 point ROS was reviewed and is negative other than as noted above in HPI.       HOME MEDICATIONS:  Current Outpatient Medications   Medication Sig Dispense Refill     cyabnocobalamin (VITAMIN  B-12) 2500 MCG sublingual tablet Place 2,500 mcg under the tongue daily 30 tablet 1     ferrous gluconate (FERGON) 324 (38 Fe) MG tablet Take 324 mg by mouth daily (with breakfast)       multivitamin, therapeutic with minerals (MULTI-VITAMIN) TABS tablet Take 1 tablet by mouth daily Generic [employer] Vitamin       saccharomyces boulardii (FLORASTOR) 250 MG capsule Take 250 mg by mouth 2 times daily       LORazepam (ATIVAN) 0.5 MG tablet Take 1 tablet (0.5 mg) by mouth every 4 hours as needed (Anxiety, Nausea/Vomiting or Sleep) (Patient not taking: Reported on 7/26/2019) 30 tablet 2     Misc Natural Product Nasal (PONARIS) SOLN Apply two drops to each nostril BID X 2 month supply (Patient not taking: Reported on 10/11/2019) 10 mL 3     mupirocin (BACTROBAN) 2 % external ointment Apply to anterior nares BID X 2 month supply (Patient not taking: Reported on 10/11/2019) 2 g 3     prochlorperazine (COMPAZINE) 10 MG tablet Take 1 tablet (10 mg) by mouth every 6 hours as needed (Nausea/Vomiting) (Patient not taking: Reported on 10/11/2019) 30 tablet 2         ALLERGIES:  No Known Allergies      PAST MEDICAL HISTORY:  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) 6/5/2017         PAST SURGICAL HISTORY:  Past Surgical History:   Procedure Laterality Date     ESOPHAGOGASTRODUODENOSCOPY       ESOPHAGOSCOPY, GASTROSCOPY, DUODENOSCOPY (EGD), COMBINED N/A 6/9/2017    Procedure: COMBINED ENDOSCOPIC ULTRASOUND, ESOPHAGOSCOPY, GASTROSCOPY, DUODENOSCOPY (EGD), FINE NEEDLE ASPIRATE/BIOPSY;  Upper Endoscopic Ultrasound, fine needle aspirate/biopsy;  Surgeon: Guru Mark Avila MD;  Location: [facility] OR     ESOPHAGOSCOPY, GASTROSCOPY, DUODENOSCOPY (EGD), COMBINED N/A 11/3/2017    Procedure: COMBINED ESOPHAGOSCOPY, GASTROSCOPY, DUODENOSCOPY (EGD);;  Surgeon: Yunior Esteban MD;  Location: [facility] OR     ESOPHAGOSCOPY, GASTROSCOPY, DUODENOSCOPY (EGD), COMBINED N/A  11/9/2017    Procedure: COMBINED ESOPHAGOSCOPY, GASTROSCOPY, DUODENOSCOPY (EGD);  Esophogastroduodenoscopy, take out pharangostomy tube;  Surgeon: Yunior Esteban MD;  Location: [facility] OR     ESOPHAGOSCOPY, GASTROSCOPY, DUODENOSCOPY (EGD), COMBINED N/A 1/11/2018    Procedure: COMBINED ESOPHAGOSCOPY, GASTROSCOPY, DUODENOSCOPY (EGD), BIOPSY SINGLE OR MULTIPLE;  COMBINED ESOPHAGOSCOPY, GASTROSCOPY, DUODENOSCOPY (EGD);  Surgeon: Alessandro Mcgregor MD;  Location: [facility] GI     GASTRECTOMY N/A 11/3/2017    Procedure: GASTRECTOMY;;  Surgeon: Yunior Esteban MD;  Location: [facility] OR     HAND SURGERY      childhood, torn tendon     INSERT PORT VASCULAR ACCESS Right 6/22/2017    Procedure: INSERT PORT VASCULAR ACCESS;  Single Lumen Chest Power Port;  Surgeon: Iván Driver PA-C;  Location: [facility] OR     INSERT PORT VASCULAR ACCESS Right 4/8/2019    Procedure: Single Lumen Chest Port Placement;  Surgeon: Krysten Ballard PA-C;  Location: [facility] OR     IR CHEST PORT PLACEMENT > 5 YRS OF AGE  4/8/2019     IR LIVER BIOPSY PERCUTANEOUS  3/20/2019     LAPAROSCOPY DIAGNOSTIC (GENERAL) N/A 11/3/2017    Procedure: LAPAROSCOPY DIAGNOSTIC (GENERAL);  diagnostic laparoscopy, right chest tube, total gastrectomy with distal esophagectomy, intrathoracic eveline-y esophago-jejunostomy, feeding jejunostomy, pharyngostomy, esophagogastroduodenoscopy, flexible bronchoscopy;  Surgeon: Yunior Esteban MD;  Location: [facility] OR     LAPAROTOMY EXPLORATORY N/A 11/3/2017    Procedure: LAPAROTOMY EXPLORATORY;;  Surgeon: Yunior Esteban MD;  Location: [facility] OR     PHARYNGOSTOMY N/A 11/3/2017    Procedure: PHARYNGOSTOMY;;  Surgeon: Yunior Esteban MD;  Location: [facility] OR     REMOVE PORT VASCULAR ACCESS Right 3/19/2018    Procedure: REMOVE PORT VASCULAR ACCESS;  Right Port Removal;  Surgeon: Krysten Hopper PA-C;  [facility]: [facility]     THORACOTOMY Left 11/3/2017    Procedure: THORACOTOMY;;  Surgeon: Yunior Esteban MD;   Location:  OR         SOCIAL HISTORY:  Social History     Socioeconomic History     Marital status:      Spouse name: Not on file     Number of children: 1     Years of education: Not on file     Highest education level: Not on file   Occupational History     Occupation: musician and teacher    Social Needs     Financial resource strain: Not on file     Food insecurity:     Worry: Not on file     Inability: Not on file     Transportation needs:     Medical: Not on file     Non-medical: Not on file   Tobacco Use     Smoking status: Never Smoker     Smokeless tobacco: Never Used   Substance and Sexual Activity     Alcohol use: Yes     Comment: 1 beer daily     Drug use: Yes     Types: Marijuana     Comment: occasional     Sexual activity: Yes     Partners: Female     Birth control/protection: Natural Family Planning   Lifestyle     Physical activity:     Days per week: Not on file     Minutes per session: Not on file     Stress: Not on file   Relationships     Social connections:     Talks on phone: Not on file     Gets together: Not on file     Attends [employer]ism service: Not on file     Active member of club or organization: Not on file     Attends meetings of clubs or organizations: Not on file     Relationship status: Not on file     Intimate partner violence:     Fear of current or ex partner: Not on file     Emotionally abused: Not on file     Physically abused: Not on file     Forced sexual activity: Not on file   Other Topics Concern     Parent/sibling w/ CABG, MI or angioplasty before 65F 55M? 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  He denies smoking.  He drinks ~1 beer a day.  He denies illicit drug use, other than occasional marijuana.  He lives in [place] with his wife, and 4.5 year-old daughter.  His wife is currently pregnant with a boy, and is due in 6 weeks.  He has a half sister who  " of breast cancer at age 32; she was diagnosed in her late 20's.  A paternal grandmother had breast cancer in her 70's      FAMILY HISTORY:  Family History   Problem Relation Age of Onset     Other - See Comments Father         sepsis     Cancer Sister         breast cancer  29 yo 1/2 sister      Cancer Paternal Grandmother         breast         PHYSICAL EXAM:  Vital signs:  /64 (BP Location: Left arm, Patient Position: Chair, Cuff Size: Adult Regular)   Pulse 65   Temp 98.2  F (36.8  C) (Oral)   Resp 14   Ht 1.753 m (5' 9.02\")   Wt 66.7 kg (147 lb)   SpO2 99%   BMI 21.70 kg/m     ECO  GENERAL/CONSTITUTIONAL: No acute distress.   Accompanied by son.  EYES: No scleral icterus.  CARDIOVASCULAR: Regular rate and rhythm.  RESPIRATORY: Clear to auscultation bilaterally.  GASTROINTESTINAL: Bowel sounds present, non-tender.  NEUROLOGIC: Alert, oriented, answers questions appropriately.  INTEGUMENTARY: No jaundice.  Port in place at the right upper chest.      LABS:  None today.      PATHOLOGY:  3/20/19:  FINAL DIAGNOSIS:   Liver, needle biopsy   - Moderately differentiated adenocarcinoma consistent with metastasis from    primary esophageal adenocarcinoma.     HER-2 non-amplified.      IMAGING:  PET-CT 19:  In this patient with history of adenocarcinoma of the gastroesophageal  junction status post total gastrectomy and distal esophagectomy:  1. Increased FDG uptake but decreased size of a metastatic lesion in  left hepatic lobe compared to 2019 compatible with mixed response  to treatment. No new hepatic lesions.  2. Stable postoperative changes in the lower chest and upper abdomen  with persistent ill-defined, non-FDG avid soft tissue stranding at the  operative site. No abnormal FDG uptake to suggest local recurrence.  3. Mild FDG avidity of the anorectal junction, likely hemorrhoidal in  etiology however direct visualization may be considered.      ASSESSMENT/PLAN:  Daniel Sandhu is " a 39 year old male with:    1) Adenocarcinoma of the GE junction: now s/p 3 cycles of neoadjuvant chemotherapy with EOX, followed by surgical resection on 11/3/17.  Pathology showed adenocarcinoma, moderate differentiated, extensive residual tumor with no evidence tumor regression, margins negative, perineural invasion present, 1 of 28 lymph nodes were involved with malignancy, stage [id number] (stage IIIA).  HER-2 is non-amplified.    He has received EOF x 2 cycles after surgery, and he has not tolerated well.  The 5-FU on cycle 5 was discontinued early. Chemotherapy was stopped at that point due to poor tolerance.    Patient now has biopsy-confirmed metastatic disease to the liver and possibly peritoneum.  He is asymptomatic currently.      [facility] testing shows MS-stable, TMB-low (4 mut/Mb), ERBB2 amplification equivocal, and TP53 H214R alteration.  PD-L1 was negative (0%).  We review the results together and possible clinical trials, and possible second opinion at [facility].      He has been tolerating chemotherapy with paclitaxel+ramucirumab very well so far.    Another PET-CT was done on 9/20/19 after 6 cycles of chemotherapy.  There is increased FDG uptake, but decreased size of the metastatic lesion in the left hepatic lobe, compared to 6/21/19, compatible with mixed response to treatment.  No new hepatic lesions.  There are stable post-operative changes in the lower chest and upper abdomen, with persistent ill-defined, non-FDG avid soft tissue stranding at the operative site.  Mild FDG avidity of the anorectal junction, likely hemorrhoidal in etiology.      I previously discussed additional support, including palliative care, , oncology psychology.  Daniel and his wife are understandably having difficulty coping and were teary at the time.  They opt to wait for now and may be open to additional support later on.      Patient has additional questions regarding liver directed treatment to  the liver lesion.  We have discussed that the data on metastatectomy on esophageal/gastric tumors are scant, and appears to have no overall survival benefit.  I did discuss with surgical oncology, and they agree that the data is poor.  However, considering patient's age and his desire to be aggressive, they may consider liver directed therapy, if there is response to chemotherapy.  He has met with Dr. Chau on 10/11/19.  He will continue on chemotherapy for another 3 months, followed by restaging with PET-CT and CT liver protocol.  If there is still no clear disease progression, Dr. Chau may re-consider surgery.  He has also gone to [facility] for a second opinion on 9/27/19, and they agree with current course.    -he will resume chemotherapy with cycle 7 of paclitaxel and ramicumumab this week  -he will see ROMEO with cycle 8 in November 2019  -since I will be leaving the [place] location, I will have him establish care with another GI oncology MD here.  He will see Dr. Ponce, Dr. Woodall, or Dr. Bailon in December 2019 with cycle 9 of chemotherapy  -he will get repeat imaging after cycle 9 of chemotherapy is completed and then follow-up with Dr. Chau again    2) Genetics:  -genetic testing was negative    3) Fertility: Daniel and his wife have 2 children, including a baby boy just born around June 2017.  He is not planning for more children in the near future.    4) Patient talked about medical cannabis, and I offered to certify him, but he says that the program is too expensive for him.  He does use marijuana edibles or vapes marijuana, which is helpful.        Laurence Hood MD  Hematology/Oncology  [facility]     There are no Wet Read(s) to document.

## 2024-06-22 ENCOUNTER — HEALTH MAINTENANCE LETTER (OUTPATIENT)
Age: 44
End: 2024-06-22

## 2025-07-12 ENCOUNTER — HEALTH MAINTENANCE LETTER (OUTPATIENT)
Age: 45
End: 2025-07-12

## (undated) DEVICE — STPL SKIN 35W 059037

## (undated) DEVICE — Device

## (undated) DEVICE — ENDO BITE BLOCK ADULT OMNI-BLOC

## (undated) DEVICE — LINEN TOWEL PACK X5 5464

## (undated) DEVICE — PREP CHLORAPREP 26ML TINTED ORANGE  260815

## (undated) DEVICE — SUCTION MANIFOLD DORNOCH ULTRA CART UL-CL500

## (undated) DEVICE — TAPE DURAPORE 3" SILK 1538-3

## (undated) DEVICE — SU DERMABOND ADVANCED .7ML DNX12

## (undated) DEVICE — SPONGE DOUBLE 1.25" W/STRING 23275-690

## (undated) DEVICE — ESU LIGASURE MARYLAND JAW OPEN SEALER/DVDR 5MMX37CM LF1737

## (undated) DEVICE — JELLY LUBRICATING SURGILUBE 2OZ TUBE

## (undated) DEVICE — CATH TRAY FOLEY SURESTEP 16FR W/URNE MTR STLK LATEX A303316A

## (undated) DEVICE — SYR 10ML SLIP TIP W/O NDL 303134

## (undated) DEVICE — LIGHT HANDLE X1 31140133

## (undated) DEVICE — GLOVE PROTEXIS POWDER FREE SMT 7.5  2D72PT75X

## (undated) DEVICE — CATH TRAY URINE METER DRAIN BAG 2000ML ADD-A-FOLEY 399400A

## (undated) DEVICE — ENDO TUBING CO2 SMARTCAP STERILE DISP 100145CO2EXT

## (undated) DEVICE — LINEN GOWN XLG 5407

## (undated) DEVICE — SOL WATER IRRIG 1000ML BOTTLE 07139-09

## (undated) DEVICE — ESU ELEC BLADE 6" COATED E1450-6

## (undated) DEVICE — SOL WATER IRRIG 500ML BOTTLE 2F7113

## (undated) DEVICE — SUCTION TIP YANKAUER STR K87

## (undated) DEVICE — SPONGE LAP 18X18" X8435

## (undated) DEVICE — KIT INTRODUCER FLUENT MICRO 5FRX10CM ECHO TIP KIT-038-04

## (undated) DEVICE — DECANTER BAG 2002S

## (undated) DEVICE — SU SILK 2-0 SH 30" K833H

## (undated) DEVICE — SU SILK 3-0 RB-1 CR 8X18" C053D

## (undated) DEVICE — SU MONOCRYL 4-0 P-3 18" UND Y494G

## (undated) DEVICE — SU SILK 0 TIE 6X18" A186H

## (undated) DEVICE — SYR BULB IRRIG 50ML LATEX FREE 0035280

## (undated) DEVICE — SPECIMEN CONTAINER 3OZ W/FORMALIN 59901

## (undated) DEVICE — SU VICRYL 0 UR-6 27" J603H

## (undated) DEVICE — SU TICRON 2 GS-21DA 36" 3146-81

## (undated) DEVICE — PACK CENTRAL LINE INSERTION SAN32CLFCG

## (undated) DEVICE — DRSG MEDIPORE 3 1/2X13 3/4" 3573

## (undated) DEVICE — DRAIN CHEST TUBE RIGHT ANGLED 28FR 8128

## (undated) DEVICE — STPL ENDO GIA 60X3.5MM STRAIGHT 030414

## (undated) DEVICE — ENDO VALVE BX EVIS MAJ-210

## (undated) DEVICE — CONNECTOR BLAKE DRAIN SGL BCC1

## (undated) DEVICE — NDL COUNTER 20CT 31142493

## (undated) DEVICE — PREP CHLORAPREP W/ORANGE TINT 10.5ML 260715

## (undated) DEVICE — SU ETHIBOND 5 LRDA 30" B499T

## (undated) DEVICE — BLADE KNIFE SURG 15 371115

## (undated) DEVICE — SPONGE RAY-TEC 4X8" 7318

## (undated) DEVICE — ENDO PROBE COVER ULTRASOUND BALLOON LATEX  MAJ-233

## (undated) DEVICE — STPL CIRCULAR DST 25MM W/TILT TIP EEA25

## (undated) DEVICE — ESU GROUND PAD ADULT W/CORD E7507

## (undated) DEVICE — SU SILK 3-0 TIE 12X30" A304H

## (undated) DEVICE — KIT ENDO FIRST STEP DISINFECTANT 200ML W/POUCH EP-4

## (undated) DEVICE — TIES BANDING T50R

## (undated) DEVICE — ACQUIRE ENDOSCOPIC ULTRASOUND FINE NEEDLE BIOPSY DEVICE

## (undated) DEVICE — GLOVE PROTEXIS POWDER FREE SMT 6.5  2D72PT65X

## (undated) DEVICE — DRAPE U SPLIT 74X120" 29440

## (undated) DEVICE — TUBING SUCTION 10'X3/16" N510

## (undated) DEVICE — ENDO ADPT BRONCH SWIVEL Y A1002

## (undated) DEVICE — CONNECTOR MALE TO MALE LL

## (undated) DEVICE — ENDO TROCAR 12MM VERSAONE BLADELESS W/STD FIX CAN NONB12STF

## (undated) DEVICE — SYR 30ML SLIP TIP W/O NDL 302833

## (undated) DEVICE — ENDO VALVE SUCTION BRONCH EVIS MAJ-209

## (undated) DEVICE — SU NDL CUT 3/8 1822-14D

## (undated) DEVICE — ESU PENCIL W/COATED BLADE E2450H

## (undated) DEVICE — DRAPE IOBAN INCISE 23X17" 6650EZ

## (undated) DEVICE — BLADE KNIFE SURG 11 371111

## (undated) DEVICE — GLOVE PROTEXIS POWDER FREE SMT 7.0  2D72PT70X

## (undated) DEVICE — BAG URINARY DRAIN LUBRISIL IC 4000ML LF 253509A

## (undated) DEVICE — SU SILK 0 TIE 6X30" A306H

## (undated) DEVICE — GLOVE PROTEXIS POWDER FREE 8.0 ORTHOPEDIC 2D73ET80

## (undated) DEVICE — DILATOR VASC W/INTRO GW 5FRX19CM .038" 405500

## (undated) DEVICE — SU VICRYL 4-0 PS-2 18" UND J496H

## (undated) DEVICE — DRAPE SLEEVE 599

## (undated) DEVICE — TUBE JEJUNOSTOMY 12FR  0200-12LV

## (undated) DEVICE — TUBE NASOGASTRIC 18FR 9H LATEX 0005540

## (undated) DEVICE — SU SILK 0 SH 30" K834H

## (undated) DEVICE — GOWN XLG DISP 9545

## (undated) DEVICE — SU VICRYL 3-0 SH 27" J316H

## (undated) DEVICE — SU SILK 2-0 TIE 12X30" A305H

## (undated) DEVICE — SOL WATER IRRIG 1000ML BOTTLE 2F7114

## (undated) DEVICE — SU VICRYL 2-0 CT-1 27" UND J259H

## (undated) DEVICE — RAD KNIFE HANDLE W/11 BLADE DISPOSABLE 371611

## (undated) DEVICE — SOL NACL 0.9% IRRIG 1000ML BOTTLE 2F7124

## (undated) DEVICE — LUBRICANT INST KIT ENDO-LUBE 220-90

## (undated) DEVICE — STPL ENDO RELOAD GIA 60-4.8MM ROT 030459

## (undated) DEVICE — LINEN TOWEL PACK X6 WHITE 5487

## (undated) DEVICE — DRSG PRIMAPORE 02X3" 7133

## (undated) DEVICE — COVER CAMERA IN-LIGHT DISP LT-C02

## (undated) DEVICE — DRAIN PENROSE 3/8X18" LATEX 0918020

## (undated) DEVICE — PAD CHUX UNDERPAD 23X24" 7136

## (undated) DEVICE — SPONGE KITTNER 30-101

## (undated) DEVICE — COVER ULTRASOUND PROBE W/GEL FLEXI-FEEL 6"X58" LF  25-FF658

## (undated) DEVICE — SUCTION TIP YANKAUER W/O VENT K86

## (undated) DEVICE — DRSG GAUZE 4X4" TRAY 6939

## (undated) DEVICE — NDL 25GA 2"  8881200441

## (undated) DEVICE — STPL ENDO HANDLE GIA ULTRA UNIVERSAL STD EGIAUSTND

## (undated) DEVICE — DRSG PRIMAPORE 03 1/8X6" 66000318

## (undated) DEVICE — SUCTION DRY CHEST DRAIN OASIS 3600-100

## (undated) DEVICE — BLADE KNIFE SURG 10 371110

## (undated) DEVICE — SU PDS II 0 TP-1 60" Z991G

## (undated) DEVICE — PACK ENDOSCOPY GI CUSTOM UMMC

## (undated) DEVICE — ENDO SYSTEM WATER BOTTLE & TUBING W/CO2 FILTER 00711549

## (undated) DEVICE — GOWN IMPERVIOUS 2XL BLUE

## (undated) DEVICE — DRAIN JACKSON PRATT ROUND SIL 19FR W/TROCAR LF JP-2232

## (undated) DEVICE — SPECIMEN TRAP MUCOUS 40ML LUKI C30200A

## (undated) DEVICE — WIPES FOLEY CARE SURESTEP PROVON DFC100

## (undated) DEVICE — KNIFE HANDLE W/15 BLADE 371615

## (undated) DEVICE — BLADE CLIPPER SGL USE 9680

## (undated) DEVICE — LINEN TOWEL PACK X30 5481

## (undated) DEVICE — ENDO CLIP CARTIRDGE K2 MED/LG 10 1112

## (undated) DEVICE — TAPE MICROPORE 2"X1.5YD 1530S-2

## (undated) DEVICE — SU SILK 3-0 SH CR 8X18" C013D

## (undated) DEVICE — SU PROLENE 2-0 SHDA 36" 8523H

## (undated) RX ORDER — FENTANYL CITRATE 50 UG/ML
INJECTION, SOLUTION INTRAMUSCULAR; INTRAVENOUS
Status: DISPENSED
Start: 2017-06-09

## (undated) RX ORDER — ROCURONIUM BROMIDE 50 MG/5 ML
SYRINGE (ML) INTRAVENOUS
Status: DISPENSED
Start: 2017-11-03

## (undated) RX ORDER — GLYCOPYRROLATE 0.2 MG/ML
INJECTION, SOLUTION INTRAMUSCULAR; INTRAVENOUS
Status: DISPENSED
Start: 2017-06-09

## (undated) RX ORDER — DEXAMETHASONE SODIUM PHOSPHATE 4 MG/ML
INJECTION, SOLUTION INTRA-ARTICULAR; INTRALESIONAL; INTRAMUSCULAR; INTRAVENOUS; SOFT TISSUE
Status: DISPENSED
Start: 2017-11-03

## (undated) RX ORDER — IOPAMIDOL 755 MG/ML
INJECTION, SOLUTION INTRAVASCULAR
Status: DISPENSED
Start: 2017-11-03

## (undated) RX ORDER — HEPARIN SODIUM (PORCINE) LOCK FLUSH IV SOLN 100 UNIT/ML 100 UNIT/ML
SOLUTION INTRAVENOUS
Status: DISPENSED
Start: 2020-07-31

## (undated) RX ORDER — HEPARIN SODIUM (PORCINE) LOCK FLUSH IV SOLN 100 UNIT/ML 100 UNIT/ML
SOLUTION INTRAVENOUS
Status: DISPENSED
Start: 2020-01-10

## (undated) RX ORDER — PHENYLEPHRINE HCL IN 0.9% NACL 1 MG/10 ML
SYRINGE (ML) INTRAVENOUS
Status: DISPENSED
Start: 2017-11-09

## (undated) RX ORDER — PHENYLEPHRINE HCL IN 0.9% NACL 1 MG/10 ML
SYRINGE (ML) INTRAVENOUS
Status: DISPENSED
Start: 2017-06-09

## (undated) RX ORDER — FENTANYL CITRATE 50 UG/ML
INJECTION, SOLUTION INTRAMUSCULAR; INTRAVENOUS
Status: DISPENSED
Start: 2017-11-03

## (undated) RX ORDER — SODIUM CHLORIDE 9 MG/ML
INJECTION, SOLUTION INTRAVENOUS
Status: DISPENSED
Start: 2019-03-20

## (undated) RX ORDER — ONDANSETRON 2 MG/ML
INJECTION INTRAMUSCULAR; INTRAVENOUS
Status: DISPENSED
Start: 2017-06-09

## (undated) RX ORDER — HEPARIN SODIUM (PORCINE) LOCK FLUSH IV SOLN 100 UNIT/ML 100 UNIT/ML
SOLUTION INTRAVENOUS
Status: DISPENSED
Start: 2018-03-07

## (undated) RX ORDER — ACETAMINOPHEN 325 MG/1
TABLET ORAL
Status: DISPENSED
Start: 2019-04-08

## (undated) RX ORDER — EPHEDRINE SULFATE 50 MG/ML
INJECTION, SOLUTION INTRAMUSCULAR; INTRAVENOUS; SUBCUTANEOUS
Status: DISPENSED
Start: 2017-11-09

## (undated) RX ORDER — PHENYLEPHRINE HCL IN 0.9% NACL 1 MG/10 ML
SYRINGE (ML) INTRAVENOUS
Status: DISPENSED
Start: 2017-11-03

## (undated) RX ORDER — LIDOCAINE HYDROCHLORIDE 20 MG/ML
INJECTION, SOLUTION EPIDURAL; INFILTRATION; INTRACAUDAL; PERINEURAL
Status: DISPENSED
Start: 2017-06-09

## (undated) RX ORDER — HEPARIN SODIUM (PORCINE) LOCK FLUSH IV SOLN 100 UNIT/ML 100 UNIT/ML
SOLUTION INTRAVENOUS
Status: DISPENSED
Start: 2020-05-29

## (undated) RX ORDER — BUPIVACAINE HYDROCHLORIDE AND EPINEPHRINE 5; 5 MG/ML; UG/ML
INJECTION, SOLUTION EPIDURAL; INTRACAUDAL; PERINEURAL
Status: DISPENSED
Start: 2017-11-03

## (undated) RX ORDER — KETAMINE HYDROCHLORIDE 10 MG/ML
INJECTION, SOLUTION INTRAMUSCULAR; INTRAVENOUS
Status: DISPENSED
Start: 2017-11-03

## (undated) RX ORDER — PROPOFOL 10 MG/ML
INJECTION, EMULSION INTRAVENOUS
Status: DISPENSED
Start: 2017-11-03

## (undated) RX ORDER — FENTANYL CITRATE 50 UG/ML
INJECTION, SOLUTION INTRAMUSCULAR; INTRAVENOUS
Status: DISPENSED
Start: 2017-11-09

## (undated) RX ORDER — GABAPENTIN 300 MG/1
CAPSULE ORAL
Status: DISPENSED
Start: 2017-11-03

## (undated) RX ORDER — FENTANYL CITRATE 50 UG/ML
INJECTION, SOLUTION INTRAMUSCULAR; INTRAVENOUS
Status: DISPENSED
Start: 2018-01-11

## (undated) RX ORDER — LIDOCAINE HYDROCHLORIDE 20 MG/ML
INJECTION, SOLUTION EPIDURAL; INFILTRATION; INTRACAUDAL; PERINEURAL
Status: DISPENSED
Start: 2017-11-03

## (undated) RX ORDER — LIDOCAINE HYDROCHLORIDE 40 MG/ML
SOLUTION TOPICAL
Status: DISPENSED
Start: 2019-11-01

## (undated) RX ORDER — EPHEDRINE SULFATE 50 MG/ML
INJECTION, SOLUTION INTRAMUSCULAR; INTRAVENOUS; SUBCUTANEOUS
Status: DISPENSED
Start: 2017-11-03

## (undated) RX ORDER — HEPARIN SODIUM (PORCINE) LOCK FLUSH IV SOLN 100 UNIT/ML 100 UNIT/ML
SOLUTION INTRAVENOUS
Status: DISPENSED
Start: 2020-04-10

## (undated) RX ORDER — LIDOCAINE HYDROCHLORIDE 20 MG/ML
INJECTION, SOLUTION EPIDURAL; INFILTRATION; INTRACAUDAL; PERINEURAL
Status: DISPENSED
Start: 2017-11-09

## (undated) RX ORDER — SIMETHICONE 20 MG/.3ML
EMULSION ORAL
Status: DISPENSED
Start: 2018-01-11

## (undated) RX ORDER — FENTANYL CITRATE 50 UG/ML
INJECTION, SOLUTION INTRAMUSCULAR; INTRAVENOUS
Status: DISPENSED
Start: 2019-03-20

## (undated) RX ORDER — LIDOCAINE HYDROCHLORIDE 20 MG/ML
SOLUTION OROPHARYNGEAL
Status: DISPENSED
Start: 2020-01-16

## (undated) RX ORDER — ONDANSETRON 2 MG/ML
INJECTION INTRAMUSCULAR; INTRAVENOUS
Status: DISPENSED
Start: 2018-01-11

## (undated) RX ORDER — GLYCOPYRROLATE 0.2 MG/ML
INJECTION, SOLUTION INTRAMUSCULAR; INTRAVENOUS
Status: DISPENSED
Start: 2017-11-09

## (undated) RX ORDER — LIDOCAINE HYDROCHLORIDE 20 MG/ML
SOLUTION OROPHARYNGEAL
Status: DISPENSED
Start: 2019-11-01

## (undated) RX ORDER — PROPOFOL 10 MG/ML
INJECTION, EMULSION INTRAVENOUS
Status: DISPENSED
Start: 2017-11-09

## (undated) RX ORDER — HEPARIN SODIUM 5000 [USP'U]/.5ML
INJECTION, SOLUTION INTRAVENOUS; SUBCUTANEOUS
Status: DISPENSED
Start: 2017-11-03

## (undated) RX ORDER — LIDOCAINE HYDROCHLORIDE 10 MG/ML
INJECTION, SOLUTION INFILTRATION; PERINEURAL
Status: DISPENSED
Start: 2019-03-20

## (undated) RX ORDER — ONDANSETRON 2 MG/ML
INJECTION INTRAMUSCULAR; INTRAVENOUS
Status: DISPENSED
Start: 2017-11-03

## (undated) RX ORDER — CEFAZOLIN SODIUM 1 G/3ML
INJECTION, POWDER, FOR SOLUTION INTRAMUSCULAR; INTRAVENOUS
Status: DISPENSED
Start: 2017-11-03

## (undated) RX ORDER — LIDOCAINE 40 MG/G
CREAM TOPICAL
Status: DISPENSED
Start: 2017-11-03

## (undated) RX ORDER — CEFAZOLIN SODIUM 1 G/3ML
INJECTION, POWDER, FOR SOLUTION INTRAMUSCULAR; INTRAVENOUS
Status: DISPENSED
Start: 2018-03-19

## (undated) RX ORDER — PROPOFOL 10 MG/ML
INJECTION, EMULSION INTRAVENOUS
Status: DISPENSED
Start: 2017-06-09

## (undated) RX ORDER — ACETAMINOPHEN 325 MG/1
TABLET ORAL
Status: DISPENSED
Start: 2017-11-03

## (undated) RX ORDER — CEFAZOLIN SODIUM 2 G/100ML
INJECTION, SOLUTION INTRAVENOUS
Status: DISPENSED
Start: 2017-11-03

## (undated) RX ORDER — ONDANSETRON 2 MG/ML
INJECTION INTRAMUSCULAR; INTRAVENOUS
Status: DISPENSED
Start: 2017-11-09

## (undated) RX ORDER — DEXAMETHASONE SODIUM PHOSPHATE 4 MG/ML
INJECTION, SOLUTION INTRA-ARTICULAR; INTRALESIONAL; INTRAMUSCULAR; INTRAVENOUS; SOFT TISSUE
Status: DISPENSED
Start: 2017-06-09

## (undated) RX ORDER — CEFAZOLIN SODIUM 1 G/50ML
SOLUTION INTRAVENOUS
Status: DISPENSED
Start: 2019-04-08